# Patient Record
Sex: FEMALE | Race: WHITE | NOT HISPANIC OR LATINO | Employment: UNEMPLOYED | URBAN - METROPOLITAN AREA
[De-identification: names, ages, dates, MRNs, and addresses within clinical notes are randomized per-mention and may not be internally consistent; named-entity substitution may affect disease eponyms.]

---

## 2017-01-17 ENCOUNTER — OFFICE VISIT (OUTPATIENT)
Dept: PSYCHIATRY | Facility: CLINIC | Age: 47
End: 2017-01-17
Attending: CLINICAL NURSE SPECIALIST
Payer: COMMERCIAL

## 2017-01-17 VITALS
BODY MASS INDEX: 19.49 KG/M2 | WEIGHT: 110 LBS | SYSTOLIC BLOOD PRESSURE: 128 MMHG | HEART RATE: 66 BPM | DIASTOLIC BLOOD PRESSURE: 79 MMHG

## 2017-01-17 DIAGNOSIS — F32.A DEPRESSION, UNSPECIFIED DEPRESSION TYPE: ICD-10-CM

## 2017-01-17 DIAGNOSIS — F41.9 ANXIETY: Primary | ICD-10-CM

## 2017-01-17 PROCEDURE — 99212 OFFICE O/P EST SF 10 MIN: CPT | Mod: ZF

## 2017-01-17 RX ORDER — CLONAZEPAM 0.5 MG/1
0.25 TABLET ORAL 2 TIMES DAILY PRN
Qty: 20 TABLET | Refills: 1
Start: 2017-01-17 | End: 2017-05-16

## 2017-01-17 NOTE — PROGRESS NOTES
Outpatient Psychiatry Progress Note     Provider: SUNNY Ayala CNS  Date: 2017  Service:  Medication follow up with counseling.   Patient Identification: Valencia Ye  : 1970   MRN: [id number]    Valencia Ye is a 46 year old year old female who presents for ongoing psychiatric care.  Valencia Ye was last seen in clinic on 16.   At that time,   Assessment & Plan       Valencia Ye is seen today for follow up and reports increase in anxiety and would like to consider psychotropic medications. Reviewed previous medication trials and other medication options. She would like to try Fluoxetine 5 to 10mg. Will also reorder Klonopin which she had used sparingly before.    Diagnosis  Axis 1: Anxiety  Axis 2: none  Axis 3: See problem list in the medical record    Plan:  Medication: Start Fluoxetine 10mg taking one capsule every other day for 5 mg daily dose and increase to one capsule daily if tolerated. Klonopin at previous dose of 0.25mg bid prn.  OTC Recommendations: none  Lab Orders:  none  Referrals: none  Release of Information: none  Future Treatment Considerations:per symptoms    Return for Follow Up:3 months               2017   Today Valencia reports has jaw tightness with Fluoxetine. She tried lithium orotate and at 10mg thought she had some effect but it seemed to lose it's omph.  Tried LULU E at 50mg and this had some effect but didn't help that much. She didn't increase further because in the past at higher doses.  Then went back to Klonopin at /4 th 1/2 tablet  4 to 5 days per week and had a really good effect - able to sleep better and anxiety during the day greatly improved. With improvement in anxiety depression became more apparent. Doesn't take Klonopin on the weekend. Seemed to have a reaction when she drank Champagne of over intoxication and the hang over.   Thinks nausea might be due to the lithium and is thinking that she will try  stopping this and see if it gets better. If it does not resolve will consider restarting it and trying higher dose.  Stress is the same.  Side effects of medication include: see above  Psychiatric Review of Systems:  The patient endorses symptoms of depression: In the last 2 weeks several days appetite disturbance.  More than 1/2 days sleep disturbance.  Nearly everyday anhedonia, feeling depressed, fatigue, concentration problems.  She  patient endorses symptoms of anxiety : has some flashbacks of past things when she is anxious  She endorses symptoms of gagandeep including none.    She endorses symptoms of psychosis including no psychotic symptoms.       Review of Medical Systems:  Sleep: disrupted  Energy: decreased  Concentration: decreased  Appetite: decreased  GI Concerns: no new concerns  Cardiac concerns: no new concerns  Neurological concerns: no new concerns  Other medical concerns: no new concerns  Current Substance Use:  Alcohol:might have a glass or two of wine on days she doesn't take Klonopin.   Other drugs:none  Caffeine:no change  Nicotine: none  Past Medical History:   Past Medical History   Diagnosis Date     Churg-Antonina syndrome (H)      dx 1990     Goiter      Hypocalcemia      Steroid long-term use      Hypertension      Pneumonia      4/23/10     Asthma      associated with Churg Antonina syndrome     Sinusitis, chronic      Pericarditis      1990 and 1992     Recurrent UTI      Aortitis (H)      Varicose veins of leg with swelling      Hearing loss, conductive      Major depressive disorder      Eustachian tube dysfunction      Osteoporosis      Rheumatoid vasculitis (H)      Nonspecific abnormal results of thyroid function study      Irritable bowel syndrome      Mannose-binding lectin deficiency 2014     Patient Active Problem List   Diagnosis     Goiter     History of systemic steroid therapy     ILD (interstitial lung disease) (H)     Palpitations     Churg-Antonina syndrome (H)     History  of eating disorder     Bronchiectasis (H)     Asthma     Anxiety     Chronic fatigue     Mannose-binding lectin deficiency     Benign essential hypertension       Allergies:   Allergies   Allergen Reactions     Colistimethate Anaphylaxis     Colymycin M [Colistimethate Sodium] Anaphylaxis     Clindamycin Rash     Zithromax [Azithromycin]      Heart palpitation          Current Medications     Current Outpatient Prescriptions Ordered in [facility]   Medication Sig Dispense Refill     UNABLE TO FIND 3,600 mg daily MEDICATION NAME: monolaurin       UNABLE TO FIND daily MEDICATION NAME: lithium orotate       UNKNOWN MED DOSAGE NAC takes daily at unknown dose       clonazePAM (KLONOPIN) 0.5 MG tablet Take 0.5 tablets (0.25 mg) by mouth 2 times daily as needed for anxiety 20 tablet 1     atenolol (TENORMIN) 25 MG tablet 1/4 tab at bedtime 45 tablet 0     fluticasone (FLONASE) 50 MCG/ACT nasal spray Spray 2 sprays into both nostrils daily 48 g 3     predniSONE (DELTASONE) 1 MG tablet Take 5 mgs one day, alternating with 6 mgs the next. 200 tablet 5     UNABLE TO FIND Take 1 capsule by mouth daily MEDICATION NAME: resveratrol       ciprofloxacin-dexamethasone (CIPRODEX) otic suspension Instill 3 drops into the affected ear twice daily as needed. 7.5 mL 1     albuterol (2.5 MG/3ML) 0.083% nebulizer solution Take 1 vial (2.5 mg) by nebulization 2 times daily Use with Mucomyst 60 vial 11     budesonide-formoterol (SYMBICORT) 160-4.5 MCG/ACT inhaler Inhale 2 puffs into the lungs 2 times daily 1 Inhaler 11     Ipratropium-Albuterol (COMBIVENT RESPIMAT)  MCG/ACT inhaler Inhale 1 puff into the lungs 4 times daily Not to exceed 6 doses per day. 1 Inhaler 11     acetylcysteine (MUCOMYST) 20 % nebulizer solution Take 4 mLs by nebulization 2 times daily Use with albuterol solution. Discard open bottle of Mucomyst after 96 hours. 240 mL 11     UNABLE TO FIND Take 12.5 mg by mouth daily MEDICATION NAME: NADH with coQ10       UNABLE TO  "FIND Take 1 capsule by mouth daily lMEDICATION NAME: L-carnitine       UNABLE TO FIND Take 1 capsule by mouth daily MEDICATION NAME: phosphtydil choline       UNABLE TO FIND Take 1 tablet by mouth daily MEDICATION NAME: probiotics       conjugated estrogens (PREMARIN) vaginal cream Place  vaginally three times a week. Use 0.5 grams weekly as directed 42.5 g 0     cholecalciferol 5000 UNITS CAPS Take by mouth daily       sulfamethoxazole-trimethoprim (BACTRIM DS,SEPTRA DS) 800-160 MG per tablet Take 1 tablet by mouth postcoitally to prevent UTI's. 30 tablet 0     glucosamine-chondroitin (GLUCOSAMINE CHONDR COMPLEX) 500-400 MG CAPS Take 2 capsules by mouth daily.       phenazopyridine (PYRIDIUM) 200 MG tablet Take 200 mg by mouth 3 times daily as needed. For pain       Omega-3 Fatty Acids (FISH OIL) 1200 MG capsule Take 3 capsules by mouth daily.       No current Epic-ordered facility-administered medications on file.        Mental Status Exam     Appearance:  Casually dressed and Well groomed  Behavior/relationship to examiner/demeanor:  Cooperative  Orientation: Oriented to person, place, time and situation  Psychomotor: normal form  Speech Rate:  Normal  Speech Spontaneity:  Normal  Mood:  \"depressed\"  Affect:  Appropriate/mood-congruent  Thought Process (Associations):  Goal directed  Thought Content:  no overt psychosis, denies suicidal ideation, intent or thoughts and patient does not appear to be responding to internal stimuli  Abnormal Perception:  None  Attention/Concentration:  Normal  Language:  Intact  Insight:  Good  Judgment:  Good      Results     Vital signs: /79 mmHg  Pulse 66  Wt 49.896 kg (110 lb)  LMP 02/01/2009    Laboratory Data:   Reviewed recent data ordered by other providers    Assessment & Plan      Valencia Ye is seen today for follow up and reports she has been more depressed but anxiety is controlled with low dose Klonopin prn.  Has tried a number of supplements for " depression and continues on low dose Lithium orotate but has side effects or mood instability on other supplements and with previous medication trials. At this time would like to continue current supplements with slight increase in Klonopin. Has been in therapy in the past and is not interested in pursuing at this time.    Diagnosis  Axis 1: Anxiety, Depression, unspecified  Axis 2: none  Axis 3: See problem list in the medical record    Plan:  Medication: Continue Klonopin at 1/4 to 1/2 tablet 4 to 5 times a week  OTC Recommendations: none  Lab Orders:  none  Referrals: none  Release of Information: none  Future Treatment Considerations:per symptoms  Return for Follow Up: 6 months   The risks, benefits, alternatives and side effects have been discussed and are understood by the patient. The patient understands the risks of using street drugs or alcohol. There are no medical contraindications, the patient agrees to treatment, and has the capacity to do so. The patient understands to call 911 or come to the nearest ED if life threatening or urgent symptoms present.  Over 50% of this time was spent counseling the patient and/or coordinating care regarding review of social and occupational functioning.  In addition patient was counseled on health and wellness practices to augment medication treatment of symptoms. See note for details.    Marlyn Flores, APRN CNS 1/17/2017

## 2017-01-17 NOTE — TELEPHONE ENCOUNTER
Marlyn Flores APRN CNS     Sent: Tue January 17, 2017  2:15 PM       To: Sissy Wilkes RN              Message        Please call in [facility].       Thank you,       Marlyn

## 2017-01-17 NOTE — NURSING NOTE
Chief Complaint   Patient presents with     RECHECK     Anxiety     Reviewed allergies, smoking status, and pharmacy preference  Administered abuse screening questions   Obtained weight, blood pressure and heart rate   Mily Mclaughlin LPN

## 2017-01-26 ENCOUNTER — MYC MEDICAL ADVICE (OUTPATIENT)
Dept: INTERNAL MEDICINE | Facility: CLINIC | Age: 47
End: 2017-01-26

## 2017-01-26 DIAGNOSIS — R00.2 PALPITATIONS: Primary | ICD-10-CM

## 2017-01-27 ENCOUNTER — OFFICE VISIT (OUTPATIENT)
Dept: PULMONOLOGY | Facility: CLINIC | Age: 47
End: 2017-01-27
Attending: INTERNAL MEDICINE

## 2017-01-27 VITALS
SYSTOLIC BLOOD PRESSURE: 114 MMHG | DIASTOLIC BLOOD PRESSURE: 79 MMHG | RESPIRATION RATE: 16 BRPM | HEART RATE: 71 BPM | OXYGEN SATURATION: 98 %

## 2017-01-27 DIAGNOSIS — M30.1 CHURG-STRAUSS SYNDROME (H): ICD-10-CM

## 2017-01-27 DIAGNOSIS — J84.9 ILD (INTERSTITIAL LUNG DISEASE) (H): Primary | ICD-10-CM

## 2017-01-27 DIAGNOSIS — D72.18 CHURG-STRAUSS SYNDROME (H): ICD-10-CM

## 2017-01-27 DIAGNOSIS — J47.9 BRONCHIECTASIS WITHOUT COMPLICATION (H): Primary | ICD-10-CM

## 2017-01-27 LAB
DLCOUNC-%PRED-PRE: 96 %
DLCOUNC-PRE: 21.76 ML/MIN/MMHG
DLCOUNC-PRED: 22.56 ML/MIN/MMHG
ERV-%PRED-PRE: 124 %
ERV-PRE: 1.47 L
ERV-PRED: 1.18 L
EXPTIME-PRE: 7.36 SEC
FEF2575-%PRED-PRE: 52 %
FEF2575-PRE: 1.41 L/SEC
FEF2575-PRED: 2.71 L/SEC
FEFMAX-%PRED-PRE: 90 %
FEFMAX-PRE: 5.95 L/SEC
FEFMAX-PRED: 6.59 L/SEC
FEV1-%PRED-PRE: 105 %
FEV1-PRE: 2.67 L
FEV1FEV6-PRE: 66 %
FEV1FEV6-PRED: 83 %
FEV1FVC-PRE: 66 %
FEV1FVC-PRED: 83 %
FEV1SVC-PRE: 73 %
FEV1SVC-PRED: 76 %
FIFMAX-PRE: 4.56 L/SEC
FRCPLETH-%PRED-PRE: 140 %
FRCPLETH-PRE: 3.6 L
FRCPLETH-PRED: 2.57 L
FVC-%PRED-PRE: 131 %
FVC-PRE: 4.04 L
FVC-PRED: 3.08 L
IC-%PRED-PRE: 101 %
IC-PRE: 2.2 L
IC-PRED: 2.17 L
RVPLETH-%PRED-PRE: 136 %
RVPLETH-PRE: 2.13 L
RVPLETH-PRED: 1.56 L
TLCPLETH-%PRED-PRE: 125 %
TLCPLETH-PRE: 5.8 L
TLCPLETH-PRED: 4.61 L
VA-%PRED-PRE: 100 %
VA-PRE: 4.75 L
VC-%PRED-PRE: 109 %
VC-PRE: 3.66 L
VC-PRED: 3.35 L

## 2017-01-27 RX ORDER — ALBUTEROL SULFATE 0.83 MG/ML
1 SOLUTION RESPIRATORY (INHALATION) 2 TIMES DAILY
Qty: 60 VIAL | Refills: 11 | Status: SHIPPED | OUTPATIENT
Start: 2017-01-27 | End: 2018-03-15

## 2017-01-27 RX ORDER — BUDESONIDE AND FORMOTEROL FUMARATE DIHYDRATE 160; 4.5 UG/1; UG/1
2 AEROSOL RESPIRATORY (INHALATION) 2 TIMES DAILY
Qty: 1 INHALER | Refills: 11 | Status: SHIPPED | OUTPATIENT
Start: 2017-01-27 | End: 2018-03-12

## 2017-01-27 RX ORDER — ACETYLCYSTEINE 200 MG/ML
4 SOLUTION ORAL; RESPIRATORY (INHALATION) 2 TIMES DAILY
Qty: 240 ML | Refills: 11 | Status: SHIPPED | OUTPATIENT
Start: 2017-01-27 | End: 2017-02-24

## 2017-01-27 ASSESSMENT — PAIN SCALES - GENERAL: PAINLEVEL: NO PAIN (0)

## 2017-01-27 NOTE — MR AVS SNAPSHOT
After Visit Summary   1/27/2017    Valencia Ye    MRN: [id number]           Patient Information     Date Of Birth          1970        Visit Information        Provider Department      1/27/2017 4:30 PM Dagoberto Briscoe MD [facility]        Today's Diagnoses     Bronchiectasis without complication (H)    -  1     Churg-Antonina syndrome (H)           Care Instructions    Continue current medication.          Follow-ups after your visit        Follow-up notes from your care team     Return in about 1 year (around 1/27/2018).      Your next 10 appointments already scheduled     Jan 26, 2018  7:30 AM   CF LOOP with  PFL CF   [facility] Pulmonary Function Testing ([facility])    [address] [place] [id number] [phone]            Jan 26, 2018  8:10 AM   (Arrive by 7:55 AM)   RETURN CYSTIC FIBROSIS VISIT with Dagoberto Briscoe MD   [facility] ([facility])    [address] [place] [id number] [phone]              Future tests that were ordered for you today     Open Future Orders        Priority Expected Expires Ordered    RESPIRATORY FLOW VOLUME LOOP Routine 1/27/2018 2/27/2018 1/27/2017            Who to contact     If you have questions or need follow up information about today's clinic visit or your schedule please contact [facility] directly at [phone].  Normal or non-critical lab and imaging results will be communicated to you by MyChart, letter or phone within 4 business days after the clinic has received the results. If you do not hear from us within 7 days, please contact the clinic through MyChart or phone. If you have a critical or abnormal lab result, we will notify you by phone as soon as possible.  Submit refill requests through [employer] or call your  pharmacy and they will forward the refill request to us. Please allow 3 business days for your refill to be completed.          Additional Information About Your Visit        [employer] Information     [employer] gives you secure access to your electronic health record. If you see a primary care provider, you can also send messages to your care team and make appointments. If you have questions, please call your primary care clinic.  If you do not have a primary care provider, please call [phone] and they will assist you.        Care EveryWhere ID     This is your Care EveryWhere ID. This could be used by other organizations to access your [facility] medical records  [id number]        Your Vitals Were     Pulse Respirations Pulse Oximetry Last Period          71 16 98% 02/01/2009         Blood Pressure from Last 3 Encounters:   01/27/17 114/79   01/17/17 128/79   12/07/16 120/80    Weight from Last 3 Encounters:   01/17/17 49.896 kg (110 lb)   12/07/16 50.395 kg (111 lb 1.6 oz)   11/10/16 49.669 kg (109 lb 8 oz)                 Where to get your medicines      These medications were sent to [id number] - [address] AT [facility] [place] [address]  [address] [facility] [address], [facility] [address]     Phone:  [phone]    - acetylcysteine 20 % injection  - albuterol (2.5 MG/3ML) 0.083% neb solution  - budesonide-formoterol 160-4.5 MCG/ACT Inhaler  - Ipratropium-Albuterol  MCG/ACT inhaler       Primary Care Provider Office Phone # Fax #    Morelia Simental [phone]       [facility] [address]  [facility] [address]        Thank you!     Thank you for choosing [facility]  for your care. Our goal is always to provide you with excellent care. Hearing back from our patients is one way we can continue to improve our services. Please take a few minutes to complete the written survey that you may receive in the mail after  your visit with us. Thank you!             Your Updated Medication List - Protect others around you: Learn how to safely use, store and throw away your medicines at www.disposemymeds.org.          This list is accurate as of: 1/27/17  5:44 PM.  Always use your most recent med list.                   Brand Name Dispense Instructions for use    acetylcysteine 20 % injection    MUCOMYST    240 mL    Take 4 mLs by nebulization 2 times daily Use with albuterol solution. Discard open bottle of Mucomyst after 96 hours.       albuterol (2.5 MG/3ML) 0.083% neb solution     60 vial    Take 1 vial (2.5 mg) by nebulization 2 times daily Use with Mucomyst       atenolol 25 MG tablet    TENORMIN    45 tablet    1/4 tab at bedtime       budesonide-formoterol 160-4.5 MCG/ACT Inhaler    SYMBICORT    1 Inhaler    Inhale 2 puffs into the lungs 2 times daily       cholecalciferol 5000 UNITS Caps      Take by mouth daily       ciprofloxacin-dexamethasone otic suspension    CIPRODEX    7.5 mL    Instill 3 drops into the affected ear twice daily as needed.       clonazePAM 0.5 MG tablet    klonoPIN    20 tablet    Take 0.5 tablets (0.25 mg) by mouth 2 times daily as needed for anxiety       conjugated estrogens cream    PREMARIN    42.5 g    Place  vaginally three times a week. Use 0.5 grams weekly as directed       Fish Oil 1200 MG Caps      Take 3 capsules by mouth daily.       fluticasone 50 MCG/ACT spray    FLONASE    48 g    Spray 2 sprays into both nostrils daily       GLUCOSAMINE CHONDR COMPLEX 500-400 MG Caps per capsule   Generic drug:  glucosamine-chondroitin      Take 2 capsules by mouth daily.       Ipratropium-Albuterol  MCG/ACT inhaler    COMBIVENT RESPIMAT    1 Inhaler    Inhale 1 puff into the lungs 4 times daily Not to exceed 6 doses per day.       phenazopyridine 200 MG tablet    PYRIDIUM     Take 200 mg by mouth 3 times daily as needed. For pain       predniSONE 1 MG tablet    DELTASONE    200 tablet    Take 5 mgs  one day, alternating with 6 mgs the next.       * UNABLE TO FIND      Take 12.5 mg by mouth daily MEDICATION NAME: NADH with coQ10       * UNABLE TO FIND      Take 1 capsule by mouth daily lMEDICATION NAME: L-carnitine       * UNABLE TO FIND      Take 1 capsule by mouth daily MEDICATION NAME: phosphtydil choline       * UNABLE TO FIND      Take 1 tablet by mouth daily MEDICATION NAME: probiotics       * UNABLE TO FIND      3,600 mg daily MEDICATION NAME: monolaurin       * UNABLE TO FIND      daily MEDICATION NAME: lithium orotate       * UNABLE TO FIND      Take 1 capsule by mouth daily MEDICATION NAME: resveratrol       UNKNOWN MED DOSAGE      NAC takes daily at unknown dose       * Notice:  This list has 7 medication(s) that are the same as other medications prescribed for you. Read the directions carefully, and ask your doctor or other care provider to review them with you.

## 2017-01-27 NOTE — PROGRESS NOTES
Reason for Visit  Valencia Ye is a 46 year old female who is being seen for Bronchiectasis.    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  She has been doing well since her last visit with no exacerbations. She does not appear to be actively infected at this time.  She did discontinue her aztreonam nebs without any change in her symptoms.  I do think it is reasonable to stay off of this medication.  She will continue her 1-2 times daily airway clearance with albuterol and Mucomyst and twice daily inhaled steroids.  She has received her flu vaccine. I have encouraged her to increase her exercise. PFTs are increased from her last appointment and within her baseline range. I do not think she is having an exacerbation at this time.  I have reminded her to call my office if she has any progression in her symptoms.   I will see the patient in followup in 1 year, and if she is not feeling well she will schedule a follow-up in 6 months.    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 This is her first visit to the pulmonary clinic in about 1 year.    The patient has had some fatigue in the past year, but has had no lung infections since she began vesting regularly. She did not tolerate inhaled antibiotics and so she is not using them at this time. She notes her exercise tolerance has decreased, however she thinks this could be due to aging rather than her bronchiectasis. She exercises intermittently and usually does Pilates or aerobic dance, however the patient notes she stays very busy and does not have much time for exercise these days. She does not cough and has no lung congestion. The patient will occasionally  expectorate clear/white, occasionally yellow sputum. Occasionally she has chest muscle spasms which usually last for a few minutes. They are self-limiting. No fever, chills or sweats.     Vest therapies for 20 minutes qAM during the week, BID for 30 minutes on the weekends.       REVIEW OF SYSTEMS:     ENT: No sinus or ear pain, rhinorrhea or sore throat.  She has occasional palpitations, though with no associated symptoms, longstanding and unchanged.    She has chronic myalgias and arthralgias and easy bruising, all unchanged.   She has no nausea, vomiting, diarrhea or abdominal pain.     A complete ROS was otherwise negative except as noted in the HPI.    Current Outpatient Prescriptions   Medication     UNABLE TO FIND     UNABLE TO FIND     UNKNOWN MED DOSAGE     atenolol (TENORMIN) 25 MG tablet     fluticasone (FLONASE) 50 MCG/ACT nasal spray     UNABLE TO FIND     ciprofloxacin-dexamethasone (CIPRODEX) otic suspension     albuterol (2.5 MG/3ML) 0.083% nebulizer solution     budesonide-formoterol (SYMBICORT) 160-4.5 MCG/ACT inhaler     acetylcysteine (MUCOMYST) 20 % nebulizer solution     UNABLE TO FIND     UNABLE TO FIND     cholecalciferol 5000 UNITS CAPS     glucosamine-chondroitin (GLUCOSAMINE CHONDR COMPLEX) 500-400 MG CAPS     Omega-3 Fatty Acids (FISH OIL) 1200 MG capsule     clonazePAM (KLONOPIN) 0.5 MG tablet     predniSONE (DELTASONE) 1 MG tablet     Ipratropium-Albuterol (COMBIVENT RESPIMAT)  MCG/ACT inhaler     UNABLE TO FIND     UNABLE TO FIND     conjugated estrogens (PREMARIN) vaginal cream     phenazopyridine (PYRIDIUM) 200 MG tablet     No current facility-administered medications for this visit.     Allergies   Allergen Reactions     Colistimethate Anaphylaxis     Colymycin M [Colistimethate Sodium] Anaphylaxis     Clindamycin Rash     Zithromax [Azithromycin]      Heart palpitation     Past Medical History   Diagnosis Date     Churg-Antonina syndrome (H)      dx 1990     Goiter       Hypocalcemia      Steroid long-term use      Hypertension      Pneumonia      4/23/10     Asthma      associated with Churg Antonina syndrome     Sinusitis, chronic      Pericarditis       and      Recurrent UTI      Aortitis (H)      Varicose veins of leg with swelling      Hearing loss, conductive      Major depressive disorder      Eustachian tube dysfunction      Osteoporosis      Rheumatoid vasculitis (H)      Nonspecific abnormal results of thyroid function study      Irritable bowel syndrome      Mannose-binding lectin deficiency        Past Surgical History   Procedure Laterality Date      section       Hysterectomy  2009     without oopherectomy     Hc colp cervix/upper vagina w loop elec bx cervix       Sinus surgery       Ent surgery       Tubal ligation       Picc insertion  10/10/2013     5fr DL PASV PICC, 43cm (1cm external) in the L basilic vein w/ tip in the low SVC.     Colonoscopy Left 2014     Procedure: COMBINED COLONOSCOPY, SINGLE OR MULTIPLE BIOPSY/POLYPECTOMY BY BIOPSY;  Surgeon: Js Pinedo MD;  Location: [facility]     Hysterectomy, pap no longer indicated       cervix removed        Social History     Social History     Marital Status: Single     Spouse Name: N/A     Number of Children: N/A     Years of Education: N/A     Occupational History     Not on file.     Social History Main Topics     Smoking status: Never Smoker      Smokeless tobacco: Never Used     Alcohol Use: 0.0 oz/week     0 Standard drinks or equivalent per week      Comment: 12 drinks/week     Drug Use: No     Sexual Activity:     Partners: Male     Other Topics Concern      Service No     Blood Transfusions No     Caffeine Concern No     Occupational Exposure No     Hobby Hazards No     Sleep Concern No     Stress Concern Yes     Weight Concern No     Special Diet Yes     IBS diet     Back Care No     Exercise Not Asked     1 mile power walk 5 days a week at least.       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  .  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  Non smoker. No smokers at home.  Enjoys walking and dancing.  Alcohol--1 to 2 drinks daily.  Few times a year has more than 4 drinks in a day.  No illicits.                       /79 mmHg  Pulse 71  Resp 16  SpO2 98%  LMP 02/01/2009  Exam:   GENERAL APPEARANCE: Well developed, well nourished, alert, and in no apparent distress.  EYES: PERRL, EOMI  HENT: Nasal mucosa with edema and no hyperemia. No nasal polyps.  EARS: Canals clear, TMs scarred with bilat perforation  MOUTH: Oral mucosa is moist, without any lesions, no tonsillar enlargement, no oropharyngeal exudate.  NECK: supple, no masses, no thyromegaly.  LYMPHATICS: No significant axillary, cervical, or supraclavicular nodes.  RESP: normal percussion, good air flow throughout.  No crackles. No rhonchi. No wheezes.  CV: Occasional ectopic beats. Normal S1, S2, regular rhythm, normal rate. No murmur.  No rub. No gallop. No LE edema.   ABDOMEN:  Bowel sounds normal, soft, nontender, no HSM or masses.   MS: extremities normal. No clubbing. No cyanosis.  SKIN: no rash on limited exam  NEURO: Mentation intact, speech normal, normal strength and tone, normal gait and stance  PSYCH: mentation appears normal. and affect normal/bright  Results:  Recent Results (from the past 168 hour(s))   General PFT Lab (Please always keep checked)    Collection Time: 01/27/17  2:44 PM   Result Value Ref Range    FVC-Pred 3.08 L    FVC-Pre 4.04 L    FVC-%Pred-Pre 131 %    FEV1-Pre 2.67 L    FEV1-%Pred-Pre 105 %    FEV1FVC-Pred 83 %    FEV1FVC-Pre 66 %    FEFMax-Pred 6.59 L/sec    FEFMax-Pre 5.95 L/sec    FEFMax-%Pred-Pre 90 %    FEF2575-Pred 2.71 L/sec    FEF2575-Pre 1.41 L/sec    CVW8672-%Pred-Pre 52 %    ExpTime-Pre 7.36 sec    FIFMax-Pre 4.56 L/sec    VC-Pred 3.35 L    VC-Pre 3.66 L    VC-%Pred-Pre 109 %    IC-Pred  2.17 L    IC-Pre 2.20 L    IC-%Pred-Pre 101 %    ERV-Pred 1.18 L    ERV-Pre 1.47 L    ERV-%Pred-Pre 124 %    FEV1FEV6-Pred 83 %    FEV1FEV6-Pre 66 %    FRCPleth-Pred 2.57 L    FRCPleth-Pre 3.60 L    FRCPleth-%Pred-Pre 140 %    RVPleth-Pred 1.56 L    RVPleth-Pre 2.13 L    RVPleth-%Pred-Pre 136 %    TLCPleth-Pred 4.61 L    TLCPleth-Pre 5.80 L    TLCPleth-%Pred-Pre 125 %    DLCOunc-Pred 22.56 ml/min/mmHg    DLCOunc-Pre 21.76 ml/min/mmHg    DLCOunc-%Pred-Pre 96 %    VA-Pre 4.75 L    VA-%Pred-Pre 100 %    FEV1SVC-Pred 76 %    FEV1SVC-Pre 73 %                             Results as noted above.    PFT Interpretation:  Very mild obstructive ventilatory defect.  Increased from previous.  Similar to recent best.  Valid Maneuver                          Scribe Disclosure:   I, Lisa Graham, am serving as a scribe; to document services personally performed by DAGOBERTO PIERRE MD based on data collection and the provider's statements to me.     Provider Disclosure:  I agree with above History, Review of Systems, Physical exam and Plan.  I have reviewed the content of the documentation and have edited it as needed. I have personally performed the services documented here and the documentation accurately represents those services and the decisions I have made.      Electronically signed by:  Dagoberto Pierre

## 2017-01-27 NOTE — Clinical Note
1/27/2017       RE: Valencia Ye  [address] [place] [id number]     Dear Colleague,    Thank you for referring your patient, Valencia Ye, to the [facility] at [facility]. Please see a copy of my visit note below.    Reason for Visit  Valencia Ye is a 46 year old female who is being seen for Bronchiectasis.    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  She has been doing well since her last visit with no exacerbations. She does not appear to be actively infected at this time.  She did discontinue her aztreonam nebs without any change in her symptoms.  I do think it is reasonable to stay off of this medication.  She will continue her 1-2 times daily airway clearance with albuterol and Mucomyst and twice daily inhaled steroids.  She has received her flu vaccine. I have encouraged her to increase her exercise. PFTs are increased from her last appointment and within her baseline range. I do not think she is having an exacerbation at this time.  I have reminded her to call my office if she has any progression in her symptoms.   I will see the patient in followup in 1 year, and if she is not feeling well she will schedule a follow-up in 6 months.    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 This is her first visit to the pulmonary clinic in about 1 year.    The patient has had some fatigue in the past year, but has had no lung infections since she began vesting regularly. She did not tolerate inhaled antibiotics and so she is not using them at this time. She notes her exercise tolerance has decreased, however she  thinks this could be due to aging rather than her bronchiectasis. She exercises intermittently and usually does Pilates or aerobic dance, however the patient notes she stays very busy and does not have much time for exercise these days. She does not cough and has no lung congestion. The patient will occasionally expectorate clear/white, occasionally yellow sputum. Occasionally she has chest muscle spasms which usually last for a few minutes. They are self-limiting. No fever, chills or sweats.     Vest therapies for 20 minutes qAM during the week, BID for 30 minutes on the weekends.       REVIEW OF SYSTEMS:     ENT: No sinus or ear pain, rhinorrhea or sore throat.  She has occasional palpitations, though with no associated symptoms, longstanding and unchanged.    She has chronic myalgias and arthralgias and easy bruising, all unchanged.   She has no nausea, vomiting, diarrhea or abdominal pain.     A complete ROS was otherwise negative except as noted in the HPI.    Current Outpatient Prescriptions   Medication     UNABLE TO FIND     UNABLE TO FIND     UNKNOWN MED DOSAGE     atenolol (TENORMIN) 25 MG tablet     fluticasone (FLONASE) 50 MCG/ACT nasal spray     UNABLE TO FIND     ciprofloxacin-dexamethasone (CIPRODEX) otic suspension     albuterol (2.5 MG/3ML) 0.083% nebulizer solution     budesonide-formoterol (SYMBICORT) 160-4.5 MCG/ACT inhaler     acetylcysteine (MUCOMYST) 20 % nebulizer solution     UNABLE TO FIND     UNABLE TO FIND     cholecalciferol 5000 UNITS CAPS     glucosamine-chondroitin (GLUCOSAMINE CHONDR COMPLEX) 500-400 MG CAPS     Omega-3 Fatty Acids (FISH OIL) 1200 MG capsule     clonazePAM (KLONOPIN) 0.5 MG tablet     predniSONE (DELTASONE) 1 MG tablet     Ipratropium-Albuterol (COMBIVENT RESPIMAT)  MCG/ACT inhaler     UNABLE TO FIND     UNABLE TO FIND     conjugated estrogens (PREMARIN) vaginal cream     phenazopyridine (PYRIDIUM) 200 MG tablet     No current facility-administered medications  for this visit.     Allergies   Allergen Reactions     Colistimethate Anaphylaxis     Colymycin M [Colistimethate Sodium] Anaphylaxis     Clindamycin Rash     Zithromax [Azithromycin]      Heart palpitation     Past Medical History   Diagnosis Date     Churg-Antonina syndrome (H)      dx      Goiter      Hypocalcemia      Steroid long-term use      Hypertension      Pneumonia      4/23/10     Asthma      associated with Churg Antonina syndrome     Sinusitis, chronic      Pericarditis       and      Recurrent UTI      Aortitis (H)      Varicose veins of leg with swelling      Hearing loss, conductive      Major depressive disorder      Eustachian tube dysfunction      Osteoporosis      Rheumatoid vasculitis (H)      Nonspecific abnormal results of thyroid function study      Irritable bowel syndrome      Mannose-binding lectin deficiency        Past Surgical History   Procedure Laterality Date      section       Hysterectomy  2009     without oopherectomy     Hc colp cervix/upper vagina w loop elec bx cervix       Sinus surgery       Ent surgery       Tubal ligation       Picc insertion  10/10/2013     5fr DL PASV PICC, 43cm (1cm external) in the L basilic vein w/ tip in the low SVC.     Colonoscopy Left 2014     Procedure: COMBINED COLONOSCOPY, SINGLE OR MULTIPLE BIOPSY/POLYPECTOMY BY BIOPSY;  Surgeon: Js Pinedo MD;  Location: [facility]     Hysterectomy, pap no longer indicated       cervix removed        Social History     Social History     Marital Status: Single     Spouse Name: N/A     Number of Children: N/A     Years of Education: N/A     Occupational History     Not on file.     Social History Main Topics     Smoking status: Never Smoker      Smokeless tobacco: Never Used     Alcohol Use: 0.0 oz/week     0 Standard drinks or equivalent per week      Comment: 12 drinks/week     Drug Use: No     Sexual Activity:     Partners: Male     Other Topics Concern       Service No     Blood Transfusions No     Caffeine Concern No     Occupational Exposure No     Hobby Hazards No     Sleep Concern No     Stress Concern Yes     Weight Concern No     Special Diet Yes     IBS diet     Back Care No     Exercise Not Asked     1 mile power walk 5 days a week at least.      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  .  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  Non smoker. No smokers at home.  Enjoys walking and dancing.  Alcohol--1 to 2 drinks daily.  Few times a year has more than 4 drinks in a day.  No illicits.                       /79 mmHg  Pulse 71  Resp 16  SpO2 98%  LMP 02/01/2009  Exam:   GENERAL APPEARANCE: Well developed, well nourished, alert, and in no apparent distress.  EYES: PERRL, EOMI  HENT: Nasal mucosa with edema and no hyperemia. No nasal polyps.  EARS: Canals clear, TMs scarred with bilat perforation  MOUTH: Oral mucosa is moist, without any lesions, no tonsillar enlargement, no oropharyngeal exudate.  NECK: supple, no masses, no thyromegaly.  LYMPHATICS: No significant axillary, cervical, or supraclavicular nodes.  RESP: normal percussion, good air flow throughout.  No crackles. No rhonchi. No wheezes.  CV: Occasional ectopic beats. Normal S1, S2, regular rhythm, normal rate. No murmur.  No rub. No gallop. No LE edema.   ABDOMEN:  Bowel sounds normal, soft, nontender, no HSM or masses.   MS: extremities normal. No clubbing. No cyanosis.  SKIN: no rash on limited exam  NEURO: Mentation intact, speech normal, normal strength and tone, normal gait and stance  PSYCH: mentation appears normal. and affect normal/bright  Results:  Recent Results (from the past 168 hour(s))   General PFT Lab (Please always keep checked)    Collection Time: 01/27/17  2:44 PM   Result Value Ref Range    FVC-Pred 3.08 L    FVC-Pre 4.04 L    FVC-%Pred-Pre 131 %    FEV1-Pre 2.67 L    FEV1-%Pred-Pre 105 %     FEV1FVC-Pred 83 %    FEV1FVC-Pre 66 %    FEFMax-Pred 6.59 L/sec    FEFMax-Pre 5.95 L/sec    FEFMax-%Pred-Pre 90 %    FEF2575-Pred 2.71 L/sec    FEF2575-Pre 1.41 L/sec    OSM1027-%Pred-Pre 52 %    ExpTime-Pre 7.36 sec    FIFMax-Pre 4.56 L/sec    VC-Pred 3.35 L    VC-Pre 3.66 L    VC-%Pred-Pre 109 %    IC-Pred 2.17 L    IC-Pre 2.20 L    IC-%Pred-Pre 101 %    ERV-Pred 1.18 L    ERV-Pre 1.47 L    ERV-%Pred-Pre 124 %    FEV1FEV6-Pred 83 %    FEV1FEV6-Pre 66 %    FRCPleth-Pred 2.57 L    FRCPleth-Pre 3.60 L    FRCPleth-%Pred-Pre 140 %    RVPleth-Pred 1.56 L    RVPleth-Pre 2.13 L    RVPleth-%Pred-Pre 136 %    TLCPleth-Pred 4.61 L    TLCPleth-Pre 5.80 L    TLCPleth-%Pred-Pre 125 %    DLCOunc-Pred 22.56 ml/min/mmHg    DLCOunc-Pre 21.76 ml/min/mmHg    DLCOunc-%Pred-Pre 96 %    VA-Pre 4.75 L    VA-%Pred-Pre 100 %    FEV1SVC-Pred 76 %    FEV1SVC-Pre 73 %                             Results as noted above.    PFT Interpretation:  {PFT INTER:646450}  {Increase/decrease:645412}  {UMP PFT BELOW SIMILAR:486910}  Valid Maneuver                          Scribe Disclosure:   I, Lisa Graham, am serving as a scribe; to document services personally performed by DAGOBERTO PIERRE MD based on data collection and the provider's statements to me.     Provider Disclosure:  I agree with above History, Review of Systems, Physical exam and Plan.  I have reviewed the content of the documentation and have edited it as needed. I have personally performed the services documented here and the documentation accurately represents those services and the decisions I have made.      Electronically signed by:  ***      Again, thank you for allowing me to participate in the care of your patient.      Sincerely,    Dagoberto Pierre MD

## 2017-01-27 NOTE — LETTER
1/27/2017       RE: Valencia Ye  [address] [place] [id number]     Dear Colleague,    Thank you for referring your patient, Valencia Ye, to the [facility] at [facility]. Please see a copy of my visit note below.    Reason for Visit  Valencia Ye is a 46 year old female who is being seen for Bronchiectasis.    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  She has been doing well since her last visit with no exacerbations. She does not appear to be actively infected at this time.  She did discontinue her aztreonam nebs without any change in her symptoms.  I do think it is reasonable to stay off of this medication.  She will continue her 1-2 times daily airway clearance with albuterol and Mucomyst and twice daily inhaled steroids.  She has received her flu vaccine. I have encouraged her to increase her exercise. PFTs are increased from her last appointment and within her baseline range. I do not think she is having an exacerbation at this time.  I have reminded her to call my office if she has any progression in her symptoms.   I will see the patient in followup in 1 year, and if she is not feeling well she will schedule a follow-up in 6 months.    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 This is her first visit to the pulmonary clinic in about 1 year.    The patient has had some fatigue in the past year, but has had no lung infections since she began vesting regularly. She did not tolerate inhaled antibiotics and so she is not using them at this time. She notes her exercise tolerance has decreased, however she  thinks this could be due to aging rather than her bronchiectasis. She exercises intermittently and usually does Pilates or aerobic dance, however the patient notes she stays very busy and does not have much time for exercise these days. She does not cough and has no lung congestion. The patient will occasionally expectorate clear/white, occasionally yellow sputum. Occasionally she has chest muscle spasms which usually last for a few minutes. They are self-limiting. No fever, chills or sweats.     Vest therapies for 20 minutes qAM during the week, BID for 30 minutes on the weekends.       REVIEW OF SYSTEMS:     ENT: No sinus or ear pain, rhinorrhea or sore throat.  She has occasional palpitations, though with no associated symptoms, longstanding and unchanged.    She has chronic myalgias and arthralgias and easy bruising, all unchanged.   She has no nausea, vomiting, diarrhea or abdominal pain.     A complete ROS was otherwise negative except as noted in the HPI.    Current Outpatient Prescriptions   Medication     UNABLE TO FIND     UNABLE TO FIND     UNKNOWN MED DOSAGE     atenolol (TENORMIN) 25 MG tablet     fluticasone (FLONASE) 50 MCG/ACT nasal spray     UNABLE TO FIND     ciprofloxacin-dexamethasone (CIPRODEX) otic suspension     albuterol (2.5 MG/3ML) 0.083% nebulizer solution     budesonide-formoterol (SYMBICORT) 160-4.5 MCG/ACT inhaler     acetylcysteine (MUCOMYST) 20 % nebulizer solution     UNABLE TO FIND     UNABLE TO FIND     cholecalciferol 5000 UNITS CAPS     glucosamine-chondroitin (GLUCOSAMINE CHONDR COMPLEX) 500-400 MG CAPS     Omega-3 Fatty Acids (FISH OIL) 1200 MG capsule     clonazePAM (KLONOPIN) 0.5 MG tablet     predniSONE (DELTASONE) 1 MG tablet     Ipratropium-Albuterol (COMBIVENT RESPIMAT)  MCG/ACT inhaler     UNABLE TO FIND     UNABLE TO FIND     conjugated estrogens (PREMARIN) vaginal cream     phenazopyridine (PYRIDIUM) 200 MG tablet     No current facility-administered medications  for this visit.     Allergies   Allergen Reactions     Colistimethate Anaphylaxis     Colymycin M [Colistimethate Sodium] Anaphylaxis     Clindamycin Rash     Zithromax [Azithromycin]      Heart palpitation     Past Medical History   Diagnosis Date     Churg-Antonina syndrome (H)      dx      Goiter      Hypocalcemia      Steroid long-term use      Hypertension      Pneumonia      4/23/10     Asthma      associated with Churg Antonina syndrome     Sinusitis, chronic      Pericarditis       and      Recurrent UTI      Aortitis (H)      Varicose veins of leg with swelling      Hearing loss, conductive      Major depressive disorder      Eustachian tube dysfunction      Osteoporosis      Rheumatoid vasculitis (H)      Nonspecific abnormal results of thyroid function study      Irritable bowel syndrome      Mannose-binding lectin deficiency        Past Surgical History   Procedure Laterality Date      section       Hysterectomy  2009     without oopherectomy     Hc colp cervix/upper vagina w loop elec bx cervix       Sinus surgery       Ent surgery       Tubal ligation       Picc insertion  10/10/2013     5fr DL PASV PICC, 43cm (1cm external) in the L basilic vein w/ tip in the low SVC.     Colonoscopy Left 2014     Procedure: COMBINED COLONOSCOPY, SINGLE OR MULTIPLE BIOPSY/POLYPECTOMY BY BIOPSY;  Surgeon: Js Pinedo MD;  Location: [facility]     Hysterectomy, pap no longer indicated       cervix removed        Social History     Social History     Marital Status: Single     Spouse Name: N/A     Number of Children: N/A     Years of Education: N/A     Occupational History     Not on file.     Social History Main Topics     Smoking status: Never Smoker      Smokeless tobacco: Never Used     Alcohol Use: 0.0 oz/week     0 Standard drinks or equivalent per week      Comment: 12 drinks/week     Drug Use: No     Sexual Activity:     Partners: Male     Other Topics Concern       Service No     Blood Transfusions No     Caffeine Concern No     Occupational Exposure No     Hobby Hazards No     Sleep Concern No     Stress Concern Yes     Weight Concern No     Special Diet Yes     IBS diet     Back Care No     Exercise Not Asked     1 mile power walk 5 days a week at least.      Seat Belt Yes     Self-Exams Yes     Social History Narrative    [employer] in Radiology Department.  .  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  Non smoker. No smokers at home.  Enjoys walking and dancing.  Alcohol--1 to 2 drinks daily.  Few times a year has more than 4 drinks in a day.  No illicits.                       /79 mmHg  Pulse 71  Resp 16  SpO2 98%  LMP 02/01/2009  Exam:   GENERAL APPEARANCE: Well developed, well nourished, alert, and in no apparent distress.  EYES: PERRL, EOMI  HENT: Nasal mucosa with edema and no hyperemia. No nasal polyps.  EARS: Canals clear, TMs scarred with bilat perforation  MOUTH: Oral mucosa is moist, without any lesions, no tonsillar enlargement, no oropharyngeal exudate.  NECK: supple, no masses, no thyromegaly.  LYMPHATICS: No significant axillary, cervical, or supraclavicular nodes.  RESP: normal percussion, good air flow throughout.  No crackles. No rhonchi. No wheezes.  CV: Occasional ectopic beats. Normal S1, S2, regular rhythm, normal rate. No murmur.  No rub. No gallop. No LE edema.   ABDOMEN:  Bowel sounds normal, soft, nontender, no HSM or masses.   MS: extremities normal. No clubbing. No cyanosis.  SKIN: no rash on limited exam  NEURO: Mentation intact, speech normal, normal strength and tone, normal gait and stance  PSYCH: mentation appears normal. and affect normal/bright  Results:  Recent Results (from the past 168 hour(s))   General PFT Lab (Please always keep checked)    Collection Time: 01/27/17  2:44 PM   Result Value Ref Range    FVC-Pred 3.08 L    FVC-Pre 4.04 L    FVC-%Pred-Pre 131 %    FEV1-Pre 2.67 L    FEV1-%Pred-Pre 105 %     FEV1FVC-Pred 83 %    FEV1FVC-Pre 66 %    FEFMax-Pred 6.59 L/sec    FEFMax-Pre 5.95 L/sec    FEFMax-%Pred-Pre 90 %    FEF2575-Pred 2.71 L/sec    FEF2575-Pre 1.41 L/sec    XGQ3465-%Pred-Pre 52 %    ExpTime-Pre 7.36 sec    FIFMax-Pre 4.56 L/sec    VC-Pred 3.35 L    VC-Pre 3.66 L    VC-%Pred-Pre 109 %    IC-Pred 2.17 L    IC-Pre 2.20 L    IC-%Pred-Pre 101 %    ERV-Pred 1.18 L    ERV-Pre 1.47 L    ERV-%Pred-Pre 124 %    FEV1FEV6-Pred 83 %    FEV1FEV6-Pre 66 %    FRCPleth-Pred 2.57 L    FRCPleth-Pre 3.60 L    FRCPleth-%Pred-Pre 140 %    RVPleth-Pred 1.56 L    RVPleth-Pre 2.13 L    RVPleth-%Pred-Pre 136 %    TLCPleth-Pred 4.61 L    TLCPleth-Pre 5.80 L    TLCPleth-%Pred-Pre 125 %    DLCOunc-Pred 22.56 ml/min/mmHg    DLCOunc-Pre 21.76 ml/min/mmHg    DLCOunc-%Pred-Pre 96 %    VA-Pre 4.75 L    VA-%Pred-Pre 100 %    FEV1SVC-Pred 76 %    FEV1SVC-Pre 73 %                             Results as noted above.    PFT Interpretation:  Very mild obstructive ventilatory defect.  Increased from previous.  Similar to recent best.  Valid Maneuver                          Scribe Disclosure:   I, Lisa Graham, am serving as a scribe; to document services personally performed by DAGOBERTO PIERRE MD based on data collection and the provider's statements to me.     Provider Disclosure:  I agree with above History, Review of Systems, Physical exam and Plan.  I have reviewed the content of the documentation and have edited it as needed. I have personally performed the services documented here and the documentation accurately represents those services and the decisions I have made.      Electronically signed by:  Dagoberto Pierre       Again, thank you for allowing me to participate in the care of your patient.      Sincerely,    Dagoberto Pierre MD

## 2017-01-29 PROBLEM — F32.A DEPRESSION, UNSPECIFIED DEPRESSION TYPE: Status: ACTIVE | Noted: 2017-01-29

## 2017-01-29 RX ORDER — ATENOLOL 25 MG/1
TABLET ORAL
Qty: 45 TABLET | Refills: 1 | Status: SHIPPED | OUTPATIENT
Start: 2017-01-29 | End: 2017-05-19

## 2017-02-24 DIAGNOSIS — J47.9 BRONCHIECTASIS WITHOUT COMPLICATION (H): ICD-10-CM

## 2017-02-24 RX ORDER — ACETYLCYSTEINE 200 MG/ML
4 SOLUTION ORAL; RESPIRATORY (INHALATION) 2 TIMES DAILY
Qty: 240 ML | Refills: 11 | Status: SHIPPED | OUTPATIENT
Start: 2017-02-24 | End: 2018-02-22

## 2017-03-08 ENCOUNTER — TELEPHONE (OUTPATIENT)
Dept: INTERNAL MEDICINE | Facility: CLINIC | Age: 47
End: 2017-03-08

## 2017-03-08 NOTE — TELEPHONE ENCOUNTER
Pt states she finished Tamiflu yesterday but now her gums are really raw and sore. Hurts to eat. Denies having any white patches. Does not think this is thrush. She is immunocompromised.   Advised to take zyrtec or claritin and go to . She agrees.

## 2017-03-10 ENCOUNTER — APPOINTMENT (OUTPATIENT)
Dept: GENERAL RADIOLOGY | Facility: CLINIC | Age: 47
End: 2017-03-10
Attending: EMERGENCY MEDICINE
Payer: COMMERCIAL

## 2017-03-10 ENCOUNTER — HOSPITAL ENCOUNTER (EMERGENCY)
Facility: CLINIC | Age: 47
Discharge: HOME | End: 2017-03-10
Attending: EMERGENCY MEDICINE | Admitting: EMERGENCY MEDICINE
Payer: COMMERCIAL

## 2017-03-10 VITALS
HEIGHT: 63 IN | OXYGEN SATURATION: 97 % | SYSTOLIC BLOOD PRESSURE: 98 MMHG | HEART RATE: 87 BPM | WEIGHT: 102 LBS | RESPIRATION RATE: 16 BRPM | BODY MASS INDEX: 18.07 KG/M2 | DIASTOLIC BLOOD PRESSURE: 73 MMHG | TEMPERATURE: 97.9 F

## 2017-03-10 DIAGNOSIS — M79.10 MYALGIA: ICD-10-CM

## 2017-03-10 DIAGNOSIS — R50.9 FEVER, UNSPECIFIED: ICD-10-CM

## 2017-03-10 LAB
ALBUMIN SERPL-MCNC: 3.7 G/DL (ref 3.4–5)
ALBUMIN UR-MCNC: NEGATIVE MG/DL
ALP SERPL-CCNC: 48 U/L (ref 40–150)
ALT SERPL W P-5'-P-CCNC: 18 U/L (ref 0–50)
ANION GAP SERPL CALCULATED.3IONS-SCNC: 9 MMOL/L (ref 3–14)
APPEARANCE UR: CLEAR
AST SERPL W P-5'-P-CCNC: 17 U/L (ref 0–45)
BASOPHILS # BLD AUTO: 0 10E9/L (ref 0–0.2)
BASOPHILS NFR BLD AUTO: 0.1 %
BILIRUB SERPL-MCNC: 0.4 MG/DL (ref 0.2–1.3)
BILIRUB UR QL STRIP: NEGATIVE
BUN SERPL-MCNC: 14 MG/DL (ref 7–30)
CALCIUM SERPL-MCNC: 9 MG/DL (ref 8.5–10.1)
CHLORIDE SERPL-SCNC: 104 MMOL/L (ref 94–109)
CO2 SERPL-SCNC: 23 MMOL/L (ref 20–32)
COLOR UR AUTO: ABNORMAL
CREAT SERPL-MCNC: 0.77 MG/DL (ref 0.52–1.04)
DIFFERENTIAL METHOD BLD: ABNORMAL
EOSINOPHIL # BLD AUTO: 0 10E9/L (ref 0–0.7)
EOSINOPHIL NFR BLD AUTO: 0.2 %
ERYTHROCYTE [DISTWIDTH] IN BLOOD BY AUTOMATED COUNT: 12 % (ref 10–15)
FLUAV+FLUBV AG SPEC QL: NEGATIVE
FLUAV+FLUBV AG SPEC QL: NORMAL
GFR SERPL CREATININE-BSD FRML MDRD: 82 ML/MIN/1.7M2
GLUCOSE SERPL-MCNC: 84 MG/DL (ref 70–99)
GLUCOSE UR STRIP-MCNC: NEGATIVE MG/DL
HCT VFR BLD AUTO: 39.6 % (ref 35–47)
HGB BLD-MCNC: 13.6 G/DL (ref 11.7–15.7)
HGB UR QL STRIP: NEGATIVE
IMM GRANULOCYTES # BLD: 0.1 10E9/L (ref 0–0.4)
IMM GRANULOCYTES NFR BLD: 0.3 %
KETONES UR STRIP-MCNC: NEGATIVE MG/DL
LACTATE BLD-SCNC: 2.2 MMOL/L (ref 0.7–2.1)
LACTATE BLD-SCNC: 2.3 MMOL/L (ref 0.7–2.1)
LEUKOCYTE ESTERASE UR QL STRIP: NEGATIVE
LIPASE SERPL-CCNC: 149 U/L (ref 73–393)
LYMPHOCYTES # BLD AUTO: 2.2 10E9/L (ref 0.8–5.3)
LYMPHOCYTES NFR BLD AUTO: 10.6 %
MCH RBC QN AUTO: 33.7 PG (ref 26.5–33)
MCHC RBC AUTO-ENTMCNC: 34.3 G/DL (ref 31.5–36.5)
MCV RBC AUTO: 98 FL (ref 78–100)
MONOCYTES # BLD AUTO: 0.2 10E9/L (ref 0–1.3)
MONOCYTES NFR BLD AUTO: 0.7 %
MUCOUS THREADS #/AREA URNS LPF: PRESENT /LPF
NEUTROPHILS # BLD AUTO: 18.3 10E9/L (ref 1.6–8.3)
NEUTROPHILS NFR BLD AUTO: 88.1 %
NITRATE UR QL: NEGATIVE
NRBC # BLD AUTO: 0.1 10*3/UL
NRBC BLD AUTO-RTO: 0 /100
PH UR STRIP: 7 PH (ref 5–7)
PLATELET # BLD AUTO: 292 10E9/L (ref 150–450)
POTASSIUM SERPL-SCNC: 4.2 MMOL/L (ref 3.4–5.3)
PROCALCITONIN SERPL-MCNC: NORMAL NG/ML
PROT SERPL-MCNC: 7.2 G/DL (ref 6.8–8.8)
RBC # BLD AUTO: 4.04 10E12/L (ref 3.8–5.2)
RBC #/AREA URNS AUTO: 1 /HPF (ref 0–2)
SODIUM SERPL-SCNC: 136 MMOL/L (ref 133–144)
SP GR UR STRIP: 1 (ref 1–1.03)
SPECIMEN SOURCE: NORMAL
SQUAMOUS #/AREA URNS AUTO: <1 /HPF (ref 0–1)
URN SPEC COLLECT METH UR: ABNORMAL
UROBILINOGEN UR STRIP-MCNC: NORMAL MG/DL (ref 0–2)
WBC # BLD AUTO: 20.8 10E9/L (ref 4–11)
WBC #/AREA URNS AUTO: <1 /HPF (ref 0–2)

## 2017-03-10 PROCEDURE — 87086 URINE CULTURE/COLONY COUNT: CPT | Performed by: EMERGENCY MEDICINE

## 2017-03-10 PROCEDURE — 99284 EMERGENCY DEPT VISIT MOD MDM: CPT | Mod: Z6 | Performed by: EMERGENCY MEDICINE

## 2017-03-10 PROCEDURE — 81001 URINALYSIS AUTO W/SCOPE: CPT | Performed by: EMERGENCY MEDICINE

## 2017-03-10 PROCEDURE — 85025 COMPLETE CBC W/AUTO DIFF WBC: CPT | Performed by: EMERGENCY MEDICINE

## 2017-03-10 PROCEDURE — 25000128 H RX IP 250 OP 636: Performed by: EMERGENCY MEDICINE

## 2017-03-10 PROCEDURE — 87040 BLOOD CULTURE FOR BACTERIA: CPT | Performed by: EMERGENCY MEDICINE

## 2017-03-10 PROCEDURE — 96374 THER/PROPH/DIAG INJ IV PUSH: CPT | Performed by: EMERGENCY MEDICINE

## 2017-03-10 PROCEDURE — 25000132 ZZH RX MED GY IP 250 OP 250 PS 637: Performed by: EMERGENCY MEDICINE

## 2017-03-10 PROCEDURE — 71020 XR CHEST 2 VW: CPT

## 2017-03-10 PROCEDURE — 83690 ASSAY OF LIPASE: CPT | Performed by: EMERGENCY MEDICINE

## 2017-03-10 PROCEDURE — 83605 ASSAY OF LACTIC ACID: CPT | Performed by: EMERGENCY MEDICINE

## 2017-03-10 PROCEDURE — 84145 PROCALCITONIN (PCT): CPT | Performed by: EMERGENCY MEDICINE

## 2017-03-10 PROCEDURE — 99284 EMERGENCY DEPT VISIT MOD MDM: CPT | Mod: 25 | Performed by: EMERGENCY MEDICINE

## 2017-03-10 PROCEDURE — 87804 INFLUENZA ASSAY W/OPTIC: CPT | Performed by: EMERGENCY MEDICINE

## 2017-03-10 PROCEDURE — 96361 HYDRATE IV INFUSION ADD-ON: CPT

## 2017-03-10 PROCEDURE — 80053 COMPREHEN METABOLIC PANEL: CPT | Performed by: EMERGENCY MEDICINE

## 2017-03-10 RX ORDER — ACETAMINOPHEN 325 MG/1
650 TABLET ORAL ONCE
Status: COMPLETED | OUTPATIENT
Start: 2017-03-10 | End: 2017-03-10

## 2017-03-10 RX ORDER — KETOROLAC TROMETHAMINE 30 MG/ML
15 INJECTION, SOLUTION INTRAMUSCULAR; INTRAVENOUS ONCE
Status: COMPLETED | OUTPATIENT
Start: 2017-03-10 | End: 2017-03-10

## 2017-03-10 RX ADMIN — SODIUM CHLORIDE 1000 ML: 9 INJECTION, SOLUTION INTRAVENOUS at 18:36

## 2017-03-10 RX ADMIN — SODIUM CHLORIDE 1000 ML: 9 INJECTION, SOLUTION INTRAVENOUS at 21:00

## 2017-03-10 RX ADMIN — ACETAMINOPHEN 650 MG: 325 TABLET, FILM COATED ORAL at 18:36

## 2017-03-10 RX ADMIN — KETOROLAC TROMETHAMINE 15 MG: 30 INJECTION, SOLUTION INTRAMUSCULAR at 18:36

## 2017-03-10 ASSESSMENT — ENCOUNTER SYMPTOMS
DIARRHEA: 0
AGITATION: 0
FATIGUE: 1
BACK PAIN: 0
SHORTNESS OF BREATH: 0
FEVER: 1
ABDOMINAL PAIN: 0
NECK PAIN: 0
POLYDIPSIA: 0
DIFFICULTY URINATING: 0
ARTHRALGIAS: 0
COUGH: 1
LIGHT-HEADEDNESS: 0
ADENOPATHY: 0
NAUSEA: 0
HEADACHES: 0
CHILLS: 1
EYE PAIN: 0
COLOR CHANGE: 0
NECK STIFFNESS: 0
BRUISES/BLEEDS EASILY: 0
VOMITING: 0
MYALGIAS: 1
SORE THROAT: 0
EYE REDNESS: 0
PHOTOPHOBIA: 0

## 2017-03-10 NOTE — ED AVS SNAPSHOT
[facility] [place], Emergency Department    [address] [place] [id number]    Phone:  [phone]                                       Valencia Ye   MRN: [id number]    Department:  [facility], Emergency Department   Date of Visit:  3/10/2017           After Visit Summary Signature Page     I have received my discharge instructions, and my questions have been answered. I have discussed any challenges I see with this plan with the nurse or doctor.    ..........................................................................................................................................  Patient/Patient Representative Signature      ..........................................................................................................................................  Patient Representative Print Name and Relationship to Patient    ..................................................               ................................................  Date                                            Time    ..........................................................................................................................................  Reviewed by Signature/Title    ...................................................              ..............................................  Date                                                            Time

## 2017-03-10 NOTE — ED AVS SNAPSHOT
[facility], Emergency Department    [address] [facility] [address]    Phone:  [phone]                                       Valencia Ye   MRN: [id number]    Department:  [facility], Emergency Department   Date of Visit:  3/10/2017           Patient Information     Date Of Birth          1970        Your diagnoses for this visit were:     Fever, unspecified     Myalgia        You were seen by Radames Silveira MD.        Discharge Instructions       Please make an appointment to follow up with Your Primary Care Provider in 3 days unless symptoms completely resolve. If your symptoms worsen please come back to the emergency department for re-evaluation.    Febrile Illness, Uncertain Cause (Adult)  You have a fever, but the cause is not certain. A fever is a natural reaction of the body to an illness such as infection due to a virus or bacteria. In most cases, the temperature itself is not harmful. It actually helps the body fight infections. A fever does not need to be treated unless you feel very uncomfortable.  Sometimes a fever can be an early sign of a more serious infection, so make sure to follow up if your condition worsens.  Home care  Unless given other instructions by your healthcare provider, follow these guidelines when caring for yourself at home.  General care    If your symptoms are severe, rest at home for the first 2 to 3 days. When you resume activity, don't let yourself get too tired.    Do not smoke. Also avoid being exposed to secondhand smoke.    Your appetite may be poor, so a light diet is fine. Avoid dehydration by drinking 6 to 8 glasses of fluids per day (such as water, soft drinks, sports drinks, juices, tea, or soup). Extra fluids will help loosen secretions in the nose and lungs.  Medicines    You can take acetaminophen or ibuprofen for pain, unless you were given a different fever-reducing/pain medicine to use. (Note: If you have chronic liver or kidney  disease or have ever had a stomach ulcer or gastrointestinal bleeding, talk with your healthcare provider before using these medicines. Also talk to your provider if you are taking medicine to prevent blood clots.) Aspirin should never be given to anyone younger than 18 years of age who is ill with a viral infection or fever. It may cause severe liver or brain damage.    If you were given antibiotics, take them until they are used up, or your healthcare provider tells you to stop. It is important to finish the antibiotics even though you feel better. This is to make sure the infection has cleared. Be aware that antibiotics are not usually given for a fever with an unknown cause.    Over-the-counter medicines will not shorten the duration of the illness. However, they may be helpful for the following symptoms: cough, sore throat, or nasal and sinus congestion. Ask your pharmacist for product suggestions. (Note: Do not use decongestants if you have high blood pressure.)  Follow-up care  Follow up with your healthcare provider, or as advised.    If a culture was done, you will be notified if your treatment needs to be changed. You can call as directed for the results.    If X-rays, a CT, or an ultrasound were done, a specialist will review them. You will be notified of any findings that may affect your care.  Call 911  Contact emergency services right away if any of these occur:    Trouble breathing or swallowing, or wheezing    Chest pain    Confusion    Extreme drowsiness or trouble awakening    Fainting or loss of consciousness    Rapid heart rate    Low blood pressure    Vomiting blood, or large amounts of blood in stool    Seizure  When to seek medical advice  Call your healthcare provider right away if any of these occur:    Cough with lots of colored sputum (mucus) or blood in your sputum    Severe headache    Face, neck, throat, or ear pain    Feeling drowsy    Abdominal pain    Repeated vomiting or  diarrhea    Joint pain or a new rash    Burning when urinating    Fever of 100.4 F (38 C) or higher, that does not get better after taking fever-reducing medicine    Feeling weak or dizzy    [id number] [employer]. [address] [place] [address], [place] [address]. All rights reserved. This information is not intended as a substitute for professional medical care. Always follow your healthcare professional's instructions.            Future Appointments        Provider Department Dept Phone Center    3/28/2017 10:15 AM Ron Myers MD [facility] AT [place] [phone] [facility] CLIN    1/26/2018 7:30 AM Pulmonary Function Lab [facility] Pulmonary Function Testing [phone] [facility]    1/26/2018 8:10 AM Dagoberto Briscoe MD [facility] for [facility] [phone] [facility]      24 Hour Appointment Hotline       To make an appointment at any [facility], call [phone] ([phone]). If you don't have a family doctor or clinic, we will help you find one. [facility] clinics are conveniently located to serve the needs of you and your family.             Review of your medicines      Our records show that you are taking the medicines listed below. If these are incorrect, please call your family doctor or clinic.        Dose / Directions Last dose taken    acetylcysteine 20 % nebulizer solution   Commonly known as:  MUCOMYST   Dose:  4 mL   Quantity:  240 mL        Take 4 mLs by nebulization 2 times daily Use with albuterol solution. Discard open bottle of Mucomyst after 96 hours.   Refills:  11        albuterol (2.5 MG/3ML) 0.083% neb solution   Dose:  1 vial   Quantity:  60 vial        Take 1 vial (2.5 mg) by nebulization 2 times daily Use with Mucomyst   Refills:  11        atenolol 25 MG tablet   Commonly known as:  TENORMIN   Quantity:  45 tablet        1/4 tab at bedtime   Refills:  1        budesonide-formoterol 160-4.5 MCG/ACT Inhaler   Commonly known  as:  SYMBICORT   Dose:  2 puff   Quantity:  1 Inhaler        Inhale 2 puffs into the lungs 2 times daily   Refills:  11        cholecalciferol 5000 UNITS Caps        Take by mouth daily   Refills:  0        ciprofloxacin-dexamethasone otic suspension   Commonly known as:  CIPRODEX   Quantity:  7.5 mL        Instill 3 drops into the affected ear twice daily as needed.   Refills:  1        clonazePAM 0.5 MG tablet   Commonly known as:  klonoPIN   Dose:  0.25 mg   Quantity:  20 tablet        Take 0.5 tablets (0.25 mg) by mouth 2 times daily as needed for anxiety   Refills:  1        conjugated estrogens cream   Commonly known as:  PREMARIN   Quantity:  42.5 g        Place  vaginally three times a week. Use 0.5 grams weekly as directed   Refills:  0        Fish Oil 1200 MG Caps   Dose:  3 capsule        Take 3 capsules by mouth daily.   Refills:  0        fluticasone 50 MCG/ACT spray   Commonly known as:  FLONASE   Dose:  2 spray   Quantity:  48 g        Spray 2 sprays into both nostrils daily   Refills:  3        GLUCOSAMINE CHONDR COMPLEX 500-400 MG Caps per capsule   Dose:  2 capsule   Generic drug:  glucosamine-chondroitin        Take 2 capsules by mouth daily.   Refills:  0        Ipratropium-Albuterol  MCG/ACT inhaler   Commonly known as:  COMBIVENT RESPIMAT   Dose:  1 puff   Quantity:  1 Inhaler        Inhale 1 puff into the lungs 4 times daily Not to exceed 6 doses per day.   Refills:  11        phenazopyridine 200 MG tablet   Commonly known as:  PYRIDIUM   Dose:  200 mg        Take 200 mg by mouth 3 times daily as needed. For pain   Refills:  0        predniSONE 1 MG tablet   Commonly known as:  DELTASONE   Quantity:  200 tablet        Take 5 mgs one day, alternating with 6 mgs the next.   Refills:  5        * UNABLE TO FIND   Dose:  12.5 mg        Take 12.5 mg by mouth daily MEDICATION NAME: NADH with coQ10   Refills:  0        * UNABLE TO FIND   Dose:  1 capsule        Take 1 capsule by mouth daily  lMEDICATION NAME: L-carnitine   Refills:  0        * UNABLE TO FIND   Dose:  1 capsule        Take 1 capsule by mouth daily MEDICATION NAME: phosphtydil choline   Refills:  0        * UNABLE TO FIND   Dose:  1 tablet        Take 1 tablet by mouth daily MEDICATION NAME: probiotics   Refills:  0        * UNABLE TO FIND   Dose:  3600 mg        3,600 mg daily MEDICATION NAME: monolaurin   Refills:  0        * UNABLE TO FIND   Indication:  5-10 mg        daily MEDICATION NAME: lithium orotate   Refills:  0        * UNABLE TO FIND   Dose:  1 capsule        Take 1 capsule by mouth daily MEDICATION NAME: resveratrol   Refills:  0        UNKNOWN MED DOSAGE        NAC takes daily at unknown dose   Refills:  0        * Notice:  This list has 7 medication(s) that are the same as other medications prescribed for you. Read the directions carefully, and ask your doctor or other care provider to review them with you.            Procedures and tests performed during your visit     Procedure/Test Number of Times Performed    Blood culture 3    CBC with platelets differential 1    Comprehensive metabolic panel 1    Influenza A/B antigen 1    Lactic acid whole blood 2    Lipase 1    Peripheral IV catheter 1    Procalcitonin 1    UA with Microscopic 1    Urine Culture 1    XR Chest 2 Views 1      Orders Needing Specimen Collection     None      Pending Results     Date and Time Order Name Status Description    3/10/2017 2103 Urine Culture In process     3/10/2017 1902 Blood culture In process     3/10/2017 1838 Blood culture In process             Pending Culture Results     Date and Time Order Name Status Description    3/10/2017 2103 Urine Culture In process     3/10/2017 1902 Blood culture In process     3/10/2017 1838 Blood culture In process             Thank you for choosing [facility]       Thank you for choosing [facility] for your care. Our goal is always to provide you with excellent care. Hearing back from our patients is one way  we can continue to improve our services. Please take a few minutes to complete the written survey that you may receive in the mail after you visit with us. Thank you!        [employer] Information     [employer] gives you secure access to your electronic health record. If you see a primary care provider, you can also send messages to your care team and make appointments. If you have questions, please call your primary care clinic.  If you do not have a primary care provider, please call [phone] and they will assist you.        Care EveryWhere ID     This is your Care EveryWhere ID. This could be used by other organizations to access your [facility] medical records  [id number]        After Visit Summary       This is your record. Keep this with you and show to your community pharmacist(s) and doctor(s) at your next visit.

## 2017-03-10 NOTE — ED NOTES
Alert orientated ambulatory patient presents to ER triage with c/o:    1.) Generalized Body Aches    Hx:  Patient given Tamiflu last week for influenza.  Patient reports a return of symptoms this week.      Airway WDLs  Breathing WDLs  Circulation HR > 100

## 2017-03-11 LAB
BACTERIA SPEC CULT: NO GROWTH
Lab: NORMAL
MICRO REPORT STATUS: NORMAL
SPECIMEN SOURCE: NORMAL

## 2017-03-11 NOTE — ED PROVIDER NOTES
History     Chief Complaint   Patient presents with     Generalized Body Aches     HPI  Valencia Ye is a 46 year old female with a complex medical history including hypertension, pericarditis, Churg-Antonina syndrome, Mannose-binding lectin deficiency who presents to the Emergency Department with generalized body aches. She states that 7 days ago (Friday) she had shakes, chills, and sweats and was started on Tamiflu by her immunologist. She states that while taking this she developed pain in her gums.     Patient states that she completed this Tuesday. The patient states that today she had generalized body aches and shaking chills. She states that she has had a cough minimally productive of yellow sputum. Patient states that this cough was productive of grey sputum and she did vest treatments for this. Patient has had congestion which has been improving. The patient reports loss of appetite. She reports ear fullness and history of tubes in her ears. The patient did not measure her temperature at home. No nausea, vomiting, diarrhea.     She states that she is followed by Dr. Butts at [facility]ology here. Patient is a non-smoker. She is chronically on alternating doses of 5mg and 6mg of prednisone.    I have reviewed the Medications, Allergies, Past Medical and Surgical History, and Social History in the Epic system and with the patient.    Review of Systems   Constitutional: Positive for chills, fatigue and fever ( subjective).   HENT: Positive for congestion. Negative for sore throat.    Eyes: Negative for photophobia, pain, redness and visual disturbance.   Respiratory: Positive for cough. Negative for shortness of breath.    Cardiovascular: Negative for chest pain.   Gastrointestinal: Negative for abdominal pain, diarrhea, nausea and vomiting.   Endocrine: Negative for polydipsia and polyuria.   Genitourinary: Negative for difficulty urinating.   Musculoskeletal: Positive for  "myalgias. Negative for arthralgias, back pain, neck pain and neck stiffness.   Skin: Negative for color change.   Allergic/Immunologic: Positive for immunocompromised state.   Neurological: Negative for light-headedness and headaches.   Hematological: Negative for adenopathy. Does not bruise/bleed easily.   Psychiatric/Behavioral: Negative for agitation and behavioral problems.       Physical Exam   BP: 124/75  Heart Rate: 118  Temp: 97.9  F (36.6  C)  Resp: 18  Height: 160 cm (5' 3\")  Weight: 46.3 kg (102 lb)  SpO2: 98 %  Physical Exam   Constitutional: She is oriented to person, place, and time. She appears well-developed and well-nourished. No distress.   HENT:   Head: Normocephalic and atraumatic.   Right Ear: External ear normal.   Left Ear: External ear normal.   Nose: Nose normal.   Mouth/Throat: Oropharynx is clear and moist. No oropharyngeal exudate.   Eyes: Conjunctivae and EOM are normal. Pupils are equal, round, and reactive to light. No scleral icterus.   Neck: Normal range of motion and full passive range of motion without pain. Neck supple. No spinous process tenderness and no muscular tenderness present. No rigidity. No edema, no erythema and normal range of motion present.   There is no meningismus   Cardiovascular: Normal heart sounds and intact distal pulses.    tachycardia   Pulmonary/Chest: Breath sounds normal. No respiratory distress.   Abdominal: Soft. Bowel sounds are normal. She exhibits no distension. There is no tenderness. There is no rebound and no guarding.   Musculoskeletal: Normal range of motion. She exhibits no edema or tenderness.   Lymphadenopathy:     She has no cervical adenopathy.   Neurological: She is alert and oriented to person, place, and time. No cranial nerve deficit. She exhibits normal muscle tone. Coordination normal.   Skin: Skin is warm. No rash noted. She is not diaphoretic. No erythema.   Psychiatric: She has a normal mood and affect. Her behavior is normal. " Judgment and thought content normal.   Nursing note and vitals reviewed.      ED Course     ED Course     Procedures             Critical Care time:  none               Labs Ordered and Resulted from Time of ED Arrival Up to the Time of Departure from the ED   CBC WITH PLATELETS DIFFERENTIAL - Abnormal; Notable for the following:        Result Value    WBC 20.8 (*)     MCH 33.7 (*)     Absolute Neutrophil 18.3 (*)     All other components within normal limits   LACTIC ACID WHOLE BLOOD - Abnormal; Notable for the following:     Lactic Acid 2.3 (*)     All other components within normal limits   ROUTINE UA WITH MICROSCOPIC - Abnormal; Notable for the following:     Mucous Urine Present (*)     All other components within normal limits   LACTIC ACID WHOLE BLOOD - Abnormal; Notable for the following:     Lactic Acid 2.2 (*)     All other components within normal limits   COMPREHENSIVE METABOLIC PANEL   LIPASE   PROCALCITONIN   PERIPHERAL IV CATHETER   BLOOD CULTURE   INFLUENZA A/B ANTIGEN   URINE CULTURE AEROBIC BACTERIAL   BLOOD CULTURE   BLOOD CULTURE       Assessments & Plan (with Medical Decision Making)   This is a 46 year old female with a recent influenza diagnosis and completion of Tamiflu course presents with body aches, fatigue, subjective fevers and chills. Differential diagnosis: influenza, pneumonia, dehydration, electrolyte abnormalities, unlikely acute pancreatitis or hepatitis.    After thorough history and physical examination, the patient appears to be in no acute distress.  I rechecked her temperature in the room during the physical examination and at this point she does have a fever with a temperature of 100.5 degrees Farenheit. I will give her oral Tylenol, hydrate her with a bolus of IV saline, treat her with intravenous Toradol as well. I will obtain laboratory studies and chest x-ray for further diagnostic evaluation. She and her family agree with the plan.     Patient s laboratory studies  returned with a leukocytosis of 20,800. There is no anemia hemoglobin is normal at 13.6. Electrolytes show no evidence of dehydration creatinine is normal at 0.77. LFTs and lipase are normal. Procalcitonin is undetectable. Influenza testing is negative. Lactic acid was initially elevated at 2.3 and after a 2L IV bolus it slightly decreased to 2.2. Urinalysis shows no evidence of infection. I reviewed patient s chest x-ray and I read the radiology report; there is no evidence of pneumonia. Patient s symptoms have improved after emergency department stay and management. I offered her admission to the observation unit given her fever and leukocytosis, however, she declined stating that she preferred to go home and follow with her primary care physician and immunologist. Her family agrees with this plan as well. She does agree to return to the emergency department if her symptoms worsen or she develops any focal signs of infection. At this point she ll be discharged.     I have reviewed the nursing notes.  I have reviewed the findings, diagnosis, plan and need for follow up with the patient.    Discharge Medication List as of 3/10/2017 10:48 PM          Final diagnoses:   Fever, unspecified   Myalgia     ISaul, am serving as a trained medical scribe to document services personally performed by Radames Silveira MD, based on the provider's statements to me.      Radames CLEMENTE MD, was physically present and have reviewed and verified the accuracy of this note documented by Saul Huntley.    3/10/2017   [facility], EMERGENCY DEPARTMENT     Radames Silveira MD  03/11/17 0056

## 2017-03-11 NOTE — DISCHARGE INSTRUCTIONS
Please make an appointment to follow up with Your Primary Care Provider in 3 days unless symptoms completely resolve. If your symptoms worsen please come back to the emergency department for re-evaluation.    Febrile Illness, Uncertain Cause (Adult)  You have a fever, but the cause is not certain. A fever is a natural reaction of the body to an illness such as infection due to a virus or bacteria. In most cases, the temperature itself is not harmful. It actually helps the body fight infections. A fever does not need to be treated unless you feel very uncomfortable.  Sometimes a fever can be an early sign of a more serious infection, so make sure to follow up if your condition worsens.  Home care  Unless given other instructions by your healthcare provider, follow these guidelines when caring for yourself at home.  General care    If your symptoms are severe, rest at home for the first 2 to 3 days. When you resume activity, don't let yourself get too tired.    Do not smoke. Also avoid being exposed to secondhand smoke.    Your appetite may be poor, so a light diet is fine. Avoid dehydration by drinking 6 to 8 glasses of fluids per day (such as water, soft drinks, sports drinks, juices, tea, or soup). Extra fluids will help loosen secretions in the nose and lungs.  Medicines    You can take acetaminophen or ibuprofen for pain, unless you were given a different fever-reducing/pain medicine to use. (Note: If you have chronic liver or kidney disease or have ever had a stomach ulcer or gastrointestinal bleeding, talk with your healthcare provider before using these medicines. Also talk to your provider if you are taking medicine to prevent blood clots.) Aspirin should never be given to anyone younger than 18 years of age who is ill with a viral infection or fever. It may cause severe liver or brain damage.    If you were given antibiotics, take them until they are used up, or your healthcare provider tells you to stop. It  is important to finish the antibiotics even though you feel better. This is to make sure the infection has cleared. Be aware that antibiotics are not usually given for a fever with an unknown cause.    Over-the-counter medicines will not shorten the duration of the illness. However, they may be helpful for the following symptoms: cough, sore throat, or nasal and sinus congestion. Ask your pharmacist for product suggestions. (Note: Do not use decongestants if you have high blood pressure.)  Follow-up care  Follow up with your healthcare provider, or as advised.    If a culture was done, you will be notified if your treatment needs to be changed. You can call as directed for the results.    If X-rays, a CT, or an ultrasound were done, a specialist will review them. You will be notified of any findings that may affect your care.  Call 911  Contact emergency services right away if any of these occur:    Trouble breathing or swallowing, or wheezing    Chest pain    Confusion    Extreme drowsiness or trouble awakening    Fainting or loss of consciousness    Rapid heart rate    Low blood pressure    Vomiting blood, or large amounts of blood in stool    Seizure  When to seek medical advice  Call your healthcare provider right away if any of these occur:    Cough with lots of colored sputum (mucus) or blood in your sputum    Severe headache    Face, neck, throat, or ear pain    Feeling drowsy    Abdominal pain    Repeated vomiting or diarrhea    Joint pain or a new rash    Burning when urinating    Fever of 100.4 F (38 C) or higher, that does not get better after taking fever-reducing medicine    Feeling weak or dizzy    0112-2622 [employer]. [address] [place] [address], [place] [address]. All rights reserved. This information is not intended as a substitute for professional medical care. Always follow your healthcare professional's instructions.

## 2017-03-16 LAB
BACTERIA SPEC CULT: NO GROWTH
BACTERIA SPEC CULT: NO GROWTH
MICRO REPORT STATUS: NORMAL
MICRO REPORT STATUS: NORMAL
SPECIMEN SOURCE: NORMAL
SPECIMEN SOURCE: NORMAL

## 2017-03-17 ENCOUNTER — OFFICE VISIT (OUTPATIENT)
Dept: INTERNAL MEDICINE | Facility: CLINIC | Age: 47
End: 2017-03-17
Payer: COMMERCIAL

## 2017-03-17 VITALS
DIASTOLIC BLOOD PRESSURE: 70 MMHG | OXYGEN SATURATION: 99 % | HEIGHT: 63 IN | BODY MASS INDEX: 19.4 KG/M2 | SYSTOLIC BLOOD PRESSURE: 110 MMHG | TEMPERATURE: 97.7 F | WEIGHT: 109.5 LBS | HEART RATE: 85 BPM

## 2017-03-17 DIAGNOSIS — J18.9 PNEUMONIA DUE TO INFECTIOUS ORGANISM, UNSPECIFIED LATERALITY, UNSPECIFIED PART OF LUNG: Primary | ICD-10-CM

## 2017-03-17 DIAGNOSIS — B37.31 YEAST INFECTION OF THE VAGINA: ICD-10-CM

## 2017-03-17 PROCEDURE — 99214 OFFICE O/P EST MOD 30 MIN: CPT | Performed by: INTERNAL MEDICINE

## 2017-03-17 RX ORDER — FLUCONAZOLE 150 MG/1
150 TABLET ORAL ONCE
Qty: 1 TABLET | Refills: 1 | Status: SHIPPED | OUTPATIENT
Start: 2017-03-17 | End: 2017-03-17

## 2017-03-17 NOTE — MR AVS SNAPSHOT
After Visit Summary   3/17/2017    Valencia Ye    MRN: [id number]           Patient Information     Date Of Birth          1970        Visit Information        Provider Department      3/17/2017 1:20 PM Morelia Simental MD [facility]        Today's Diagnoses     Pneumonia due to infectious organism, unspecified laterality, unspecified part of lung    -  1    Yeast infection of the vagina           Follow-ups after your visit        Your next 10 appointments already scheduled     Mar 28, 2017 10:15 AM CDT   New Visit with Ron Myers MD   [facility] [place] ([facility])    [address]  [facility] [address]   [phone]            Apr 03, 2017  5:20 PM CDT   MyChart Long with Morelia Simental MD   [facility] ([facility])    [facility] [address]  [facility] [address]   [phone]            Apr 04, 2017  8:45 AM CDT   Adult Med Follow UP with SUNNY Ayala CNS   [facility] ([facility])    [address]  [address] [facility] [address]   [phone]            Apr 04, 2017  2:00 PM CDT   (Arrive by 1:45 PM)   MyChart Eye Care New with Randal Logan OD   [facility] ([facility])    [address] [place] [id number] [phone]            Jan 26, 2018  7:30 AM CST   CF LOOP with  PFL CF   [facility] Pulmonary Function Testing ([facility])    [address] [place] [id number] [phone]            Jan 26, 2018  8:10 AM CST   (Arrive by 7:55 AM)   RETURN CYSTIC FIBROSIS VISIT with Dagoberto Briscoe MD   [facility] ([facility])    [address] [place] [id number] [phone]              Who to contact      "If you have questions or need follow up information about today's clinic visit or your schedule please contact [facility] directly at [phone].  Normal or non-critical lab and imaging results will be communicated to you by [employer], letter or phone within 4 business days after the clinic has received the results. If you do not hear from us within 7 days, please contact the clinic through [employer] or phone. If you have a critical or abnormal lab result, we will notify you by phone as soon as possible.  Submit refill requests through [employer] or call your pharmacy and they will forward the refill request to us. Please allow 3 business days for your refill to be completed.          Additional Information About Your Visit        [employer] Information     [employer] gives you secure access to your electronic health record. If you see a primary care provider, you can also send messages to your care team and make appointments. If you have questions, please call your primary care clinic.  If you do not have a primary care provider, please call [phone] and they will assist you.        Care EveryWhere ID     This is your Care EveryWhere ID. This could be used by other organizations to access your [facility] medical records  [id number]        Your Vitals Were     Pulse Temperature Height Last Period Pulse Oximetry BMI (Body Mass Index)    85 97.7  F (36.5  C) (Oral) 5' 3\" (1.6 m) 02/01/2009 99% 19.4 kg/m2       Blood Pressure from Last 3 Encounters:   03/21/17 106/67   03/17/17 110/70   03/10/17 98/73    Weight from Last 3 Encounters:   03/21/17 108 lb 6.4 oz (49.2 kg)   03/17/17 109 lb 8 oz (49.7 kg)   03/10/17 102 lb (46.3 kg)              Today, you had the following     No orders found for display         Today's Medication Changes          These changes are accurate as of: 3/17/17 11:59 PM.  If you have any questions, ask your nurse or doctor.               Start taking these medicines.        " Dose/Directions    fluconazole 150 MG tablet   Commonly known as:  DIFLUCAN   Used for:  Yeast infection of the vagina   Started by:  Morelia Simental MD        Dose:  150 mg   Take 1 tablet (150 mg) by mouth once for 1 dose   Quantity:  1 tablet   Refills:  1            Where to get your medicines      These medications were sent to [facility] - [facility] [address] [place] [address]  [address], [facility] [address]     Phone:  [phone]     fluconazole 150 MG tablet                Primary Care Provider Office Phone # Fax #    Morelia Simental [phone]       [facility] [address]  [facility] [address]        Thank you!     Thank you for choosing [facility]  for your care. Our goal is always to provide you with excellent care. Hearing back from our patients is one way we can continue to improve our services. Please take a few minutes to complete the written survey that you may receive in the mail after your visit with us. Thank you!             Your Updated Medication List - Protect others around you: Learn how to safely use, store and throw away your medicines at www.disposemymeds.org.          This list is accurate as of: 3/17/17 11:59 PM.  Always use your most recent med list.                   Brand Name Dispense Instructions for use    acetylcysteine 20 % nebulizer solution    MUCOMYST    240 mL    Take 4 mLs by nebulization 2 times daily Use with albuterol solution. Discard open bottle of Mucomyst after 96 hours.       albuterol (2.5 MG/3ML) 0.083% neb solution     60 vial    Take 1 vial (2.5 mg) by nebulization 2 times daily Use with Mucomyst       atenolol 25 MG tablet    TENORMIN    45 tablet    1/4 tab at bedtime       budesonide-formoterol 160-4.5 MCG/ACT Inhaler    SYMBICORT    1 Inhaler    Inhale 2 puffs into the lungs 2 times daily       cholecalciferol 5000 UNITS Caps      Take by mouth daily        ciprofloxacin-dexamethasone otic suspension    CIPRODEX    7.5 mL    Instill 3 drops into the affected ear twice daily as needed.       clonazePAM 0.5 MG tablet    klonoPIN    20 tablet    Take 0.5 tablets (0.25 mg) by mouth 2 times daily as needed for anxiety       conjugated estrogens cream    PREMARIN    42.5 g    Place  vaginally three times a week. Use 0.5 grams weekly as directed       Fish Oil 1200 MG Caps      Take 3 capsules by mouth daily.       fluconazole 150 MG tablet    DIFLUCAN    1 tablet    Take 1 tablet (150 mg) by mouth once for 1 dose       fluticasone 50 MCG/ACT spray    FLONASE    48 g    Spray 2 sprays into both nostrils daily       GLUCOSAMINE CHONDR COMPLEX 500-400 MG Caps per capsule   Generic drug:  glucosamine-chondroitin      Take 2 capsules by mouth daily.       Ipratropium-Albuterol  MCG/ACT inhaler    COMBIVENT RESPIMAT    1 Inhaler    Inhale 1 puff into the lungs 4 times daily Not to exceed 6 doses per day.       predniSONE 1 MG tablet    DELTASONE    200 tablet    Take 5 mgs one day, alternating with 6 mgs the next.       * UNABLE TO FIND      Take 12.5 mg by mouth daily MEDICATION NAME: NADH with coQ10       * UNABLE TO FIND      Take 1 capsule by mouth daily MEDICATION NAME: phosphtydil choline       * UNABLE TO FIND      3,600 mg daily MEDICATION NAME: monolaurin       * UNABLE TO FIND      daily MEDICATION NAME: lithium orotate       * UNABLE TO FIND      Take 1 capsule by mouth daily MEDICATION NAME: resveratrol       * Notice:  This list has 5 medication(s) that are the same as other medications prescribed for you. Read the directions carefully, and ask your doctor or other care provider to review them with you.

## 2017-03-17 NOTE — PROGRESS NOTES
SUBJECTIVE:                                                    Valencia Ye is a 46 year old female who presents to clinic today for the following health issues:      ED/UC Followup:    Facility:  [facility]  Date of visit: 03/10/17  Reason for visit: fever, bad body aches  Current Status: improved mildly       Her immunologist put her on antibiotic for presumed pneumonia the day after her ED visit. Her fever resolved in 2 days. She has mild cough. She still is very tired though. She has mild dyspnea. She is tolerating the Augmentin with only some mild abdominal bloating.    Patient Active Problem List   Diagnosis     Goiter     History of systemic steroid therapy     ILD (interstitial lung disease) (H)     Palpitations     Churg-Antonina syndrome (H)     History of eating disorder     Bronchiectasis (H)     Asthma     Anxiety     Chronic fatigue     Mannose-binding lectin deficiency     Benign essential hypertension     Depression, unspecified depression type     Current Outpatient Prescriptions   Medication Sig Dispense Refill     acetylcysteine (MUCOMYST) 20 % injection Take 4 mLs by nebulization 2 times daily Use with albuterol solution. Discard open bottle of Mucomyst after 96 hours. 240 mL 11     atenolol (TENORMIN) 25 MG tablet 1/4 tab at bedtime 45 tablet 1     albuterol (2.5 MG/3ML) 0.083% neb solution Take 1 vial (2.5 mg) by nebulization 2 times daily Use with Mucomyst 60 vial 11     budesonide-formoterol (SYMBICORT) 160-4.5 MCG/ACT Inhaler Inhale 2 puffs into the lungs 2 times daily 1 Inhaler 11     Ipratropium-Albuterol (COMBIVENT RESPIMAT)  MCG/ACT inhaler Inhale 1 puff into the lungs 4 times daily Not to exceed 6 doses per day. 1 Inhaler 11     clonazePAM (KLONOPIN) 0.5 MG tablet Take 0.5 tablets (0.25 mg) by mouth 2 times daily as needed for anxiety 20 tablet 1     UNABLE TO FIND 3,600 mg daily MEDICATION NAME: monolaurin       fluticasone (FLONASE) 50 MCG/ACT nasal spray Spray 2 sprays into  "both nostrils daily 48 g 3     predniSONE (DELTASONE) 1 MG tablet Take 5 mgs one day, alternating with 6 mgs the next. 200 tablet 5     UNABLE TO FIND Take 1 capsule by mouth daily MEDICATION NAME: resveratrol       UNABLE TO FIND Take 12.5 mg by mouth daily MEDICATION NAME: NADH with coQ10       UNABLE TO FIND Take 1 capsule by mouth daily MEDICATION NAME: phosphtydil choline       cholecalciferol 5000 UNITS CAPS Take by mouth daily       glucosamine-chondroitin (GLUCOSAMINE CHONDR COMPLEX) 500-400 MG CAPS Take 2 capsules by mouth daily.       Omega-3 Fatty Acids (FISH OIL) 1200 MG capsule Take 3 capsules by mouth daily.       amoxicillin-clavulanate (AUGMENTIN) 875-125 MG per tablet Take 1 tablet by mouth 2 times daily  0     fluvoxaMINE (LUVOX) 25 MG tablet Take one half to one tablet by mouth at bedtime 30 tablet 1     UNABLE TO FIND daily MEDICATION NAME: lithium orotate       ciprofloxacin-dexamethasone (CIPRODEX) otic suspension Instill 3 drops into the affected ear twice daily as needed. 7.5 mL 1     conjugated estrogens (PREMARIN) vaginal cream Place  vaginally three times a week. Use 0.5 grams weekly as directed 42.5 g 0      Social History   Substance Use Topics     Smoking status: Never Smoker     Smokeless tobacco: Never Used     Alcohol use 0.0 oz/week     0 Standard drinks or equivalent per week      Comment: 12 drinks/week        Reviewed and updated as needed this visit by clinical staff  Allergies  Meds       Reviewed and updated as needed this visit by Provider         ROS:  No fever, chills, no sore throat, nasal congestion, sinus pain, she has a little vaginal itching    OBJECTIVE:                                                    /70  Pulse 85  Temp 97.7  F (36.5  C) (Oral)  Ht 5' 3\" (1.6 m)  Wt 109 lb 8 oz (49.7 kg)  LMP 02/01/2009  SpO2 99%  BMI 19.4 kg/m2  Body mass index is 19.4 kg/(m^2).      Lungs clear     ASSESSMENT/PLAN:                                                  "           1. Pneumonia due to infectious organism, unspecified laterality, unspecified part of lung  Presumed but never documented, improving. Advised it can take a long time to recover, make sure adequate diet    2. Yeast infection of the vagina    - fluconazole (DIFLUCAN) 150 MG tablet; Take 1 tablet (150 mg) by mouth once for 1 dose  Dispense: 1 tablet; Refill: 1        Morelia Simental MD  [facility]

## 2017-03-17 NOTE — NURSING NOTE
"Chief Complaint   Patient presents with     Hospital F/U       Initial /70  Pulse 85  Temp 97.7  F (36.5  C) (Oral)  Ht 5' 3\" (1.6 m)  Wt 109 lb 8 oz (49.7 kg)  LMP 02/01/2009  SpO2 99%  BMI 19.4 kg/m2 Estimated body mass index is 19.4 kg/(m^2) as calculated from the following:    Height as of this encounter: 5' 3\" (1.6 m).    Weight as of this encounter: 109 lb 8 oz (49.7 kg).  Medication Reconciliation: complete    "

## 2017-03-20 ASSESSMENT — PATIENT HEALTH QUESTIONNAIRE - PHQ9: SUM OF ALL RESPONSES TO PHQ QUESTIONS 1-9: 16

## 2017-03-20 ASSESSMENT — ASTHMA QUESTIONNAIRES: ACT_TOTALSCORE: 20

## 2017-03-21 ENCOUNTER — HOSPITAL ENCOUNTER (OUTPATIENT)
Dept: LAB | Facility: CLINIC | Age: 47
Discharge: HOME | End: 2017-03-21
Attending: ALLERGY & IMMUNOLOGY | Admitting: ALLERGY & IMMUNOLOGY
Payer: COMMERCIAL

## 2017-03-21 ENCOUNTER — OFFICE VISIT (OUTPATIENT)
Dept: PSYCHIATRY | Facility: CLINIC | Age: 47
End: 2017-03-21
Attending: CLINICAL NURSE SPECIALIST
Payer: COMMERCIAL

## 2017-03-21 ENCOUNTER — MYC MEDICAL ADVICE (OUTPATIENT)
Dept: INTERNAL MEDICINE | Facility: CLINIC | Age: 47
End: 2017-03-21

## 2017-03-21 VITALS
SYSTOLIC BLOOD PRESSURE: 106 MMHG | WEIGHT: 108.4 LBS | DIASTOLIC BLOOD PRESSURE: 67 MMHG | BODY MASS INDEX: 19.2 KG/M2 | HEART RATE: 76 BPM

## 2017-03-21 DIAGNOSIS — B99.9 RECURRENT INFECTIONS: Primary | ICD-10-CM

## 2017-03-21 DIAGNOSIS — D72.18 CHURG-STRAUSS SYNDROME WITH LUNG INVOLVEMENT (H): ICD-10-CM

## 2017-03-21 DIAGNOSIS — F32.A DEPRESSION, UNSPECIFIED DEPRESSION TYPE: ICD-10-CM

## 2017-03-21 DIAGNOSIS — M30.1 CHURG-STRAUSS SYNDROME WITH LUNG INVOLVEMENT (H): ICD-10-CM

## 2017-03-21 DIAGNOSIS — F41.9 ANXIETY: Primary | ICD-10-CM

## 2017-03-21 LAB
BASOPHILS # BLD AUTO: 0.1 10E9/L (ref 0–0.2)
BASOPHILS NFR BLD AUTO: 1.1 %
CRP SERPL-MCNC: <2.9 MG/L (ref 0–8)
DIFFERENTIAL METHOD BLD: ABNORMAL
EOSINOPHIL # BLD AUTO: 0.2 10E9/L (ref 0–0.7)
EOSINOPHIL NFR BLD AUTO: 2 %
ERYTHROCYTE [DISTWIDTH] IN BLOOD BY AUTOMATED COUNT: 11.9 % (ref 10–15)
ERYTHROCYTE [SEDIMENTATION RATE] IN BLOOD BY WESTERGREN METHOD: 8 MM/H (ref 0–20)
HCT VFR BLD AUTO: 39.7 % (ref 35–47)
HGB BLD-MCNC: 13.6 G/DL (ref 11.7–15.7)
IMM GRANULOCYTES # BLD: 0 10E9/L (ref 0–0.4)
IMM GRANULOCYTES NFR BLD: 0.2 %
LYMPHOCYTES # BLD AUTO: 1.2 10E9/L (ref 0.8–5.3)
LYMPHOCYTES NFR BLD AUTO: 15.1 %
MCH RBC QN AUTO: 33.8 PG (ref 26.5–33)
MCHC RBC AUTO-ENTMCNC: 34.3 G/DL (ref 31.5–36.5)
MCV RBC AUTO: 99 FL (ref 78–100)
MONOCYTES # BLD AUTO: 0.4 10E9/L (ref 0–1.3)
MONOCYTES NFR BLD AUTO: 4.5 %
NEUTROPHILS # BLD AUTO: 6.2 10E9/L (ref 1.6–8.3)
NEUTROPHILS NFR BLD AUTO: 77.1 %
NRBC # BLD AUTO: 0 10*3/UL
NRBC BLD AUTO-RTO: 0 /100
PLATELET # BLD AUTO: 332 10E9/L (ref 150–450)
RBC # BLD AUTO: 4.02 10E12/L (ref 3.8–5.2)
WBC # BLD AUTO: 8 10E9/L (ref 4–11)

## 2017-03-21 PROCEDURE — 99212 OFFICE O/P EST SF 10 MIN: CPT | Mod: ZF

## 2017-03-21 PROCEDURE — 36415 COLL VENOUS BLD VENIPUNCTURE: CPT | Performed by: ALLERGY & IMMUNOLOGY

## 2017-03-21 PROCEDURE — 85025 COMPLETE CBC W/AUTO DIFF WBC: CPT | Performed by: ALLERGY & IMMUNOLOGY

## 2017-03-21 PROCEDURE — 85652 RBC SED RATE AUTOMATED: CPT | Performed by: ALLERGY & IMMUNOLOGY

## 2017-03-21 PROCEDURE — 86140 C-REACTIVE PROTEIN: CPT | Performed by: ALLERGY & IMMUNOLOGY

## 2017-03-21 RX ORDER — FLUVOXAMINE MALEATE 25 MG
TABLET ORAL
Qty: 30 TABLET | Refills: 1 | Status: SHIPPED | OUTPATIENT
Start: 2017-03-21 | End: 2017-05-16 | Stop reason: SINTOL

## 2017-03-21 NOTE — PROGRESS NOTES
"  Outpatient Psychiatry Progress Note     Provider: SUNNY Ayala CNS  Date: 3/21/2017  Service:  Medication follow up with counseling.   Patient Identification: Valencia Ye  : 1970   MRN: [id number]    Valencia Ye is a 46 year old year old female who presents for ongoing psychiatric care.  Valencia Ye was last seen in clinic on 17.   At that time,   Assessment & Plan      Valencia Ye is seen today for follow up and reports she has been more depressed but anxiety is controlled with low dose Klonopin prn. Has tried a number of supplements for depression and continues on low dose Lithium orotate but has side effects or mood instability on other supplements and with previous medication trials. At this time would like to continue current supplements with slight increase in Klonopin. Has been in therapy in the past and is not interested in pursuing at this time.     Diagnosis  Axis 1: Anxiety, Depression, unspecified  Axis 2: none  Axis 3: See problem list in the medical record     Plan:  Medication: Continue Klonopin at 1/4 to 1/2 tablet 4 to 5 times a week  OTC Recommendations: none  Lab Orders: none  Referrals: none  Release of Information: none  Future Treatment Considerations:per symptoms  Return for Follow Up: 6 months      3/21/2017  Today Valencia reports she is not doing well, stating \"last week was the worst week of all.\"  She has always felt that she could work things but not at this time. She recently had a lung infection and had to take a week off work.  Muscles hurt all the time , brain fog. The last 2 1/2 weeks have been bad, maybe had the flu elevated white count. Is now on Augmentin for 2 weeks. Significant increase in fatigue where she has not been able to do anything after coming back from work to home. Reports occasionally taking Klonopin 0.25 mg to help with sleep.  But states when she takes Klonopin, \"I feel throbbing pain of depression\" but " "when she does not take Klonopin, feels \"sharp pain of anxiety, so it's double edge sword.\" Further decreased appetite.  Reports sudden weeping at home x last 2 weeks.  suggested she stop taking Astrology classes but pt reports this is one of the only thing that she really looks forward to besides being with the family.  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      Pt also discussed that she feels she has limitation on her life due to complicated medical problems and feels she needs to do all things while her time allows her.  She is also concerned that she may not be able to do as much as she can do due to her failing health.     Side effects of medication include: none    Psychiatric Review of Systems:  The patient endorses symptoms of depression: In the last 2 weeks per PHQ 9 several days passive thoughts of being better off dead.  Nearly everyday anhedonia, feeling depressed, sleep and appetite disturbance, fatigue, feelings of failure, concentration problems.  She  patient endorses symptoms of anxiety : increased last 2 weeks, but managed sufficiently with Klonopin 0.25 mg occasional use during day time  She endorses symptoms of gagandeep including none.    She endorses symptoms of psychosis including no psychotic symptoms.       Review of Medical Systems:  Sleep:  Able to sleep at night with Klonopin  Energy: decreased  Concentration: stable  Appetite: decreased  GI Concerns: no change  Cardiac concerns: stable  Neurological concerns: no change  Other medical concerns: recent lung infection    Current Substance Use:  Alcohol:none for a couple months  Other drugs:denies  Caffeine:denies  Nicotine: denies    Past Medical History:   Past Medical History   Diagnosis Date     Aortitis (H)      Asthma      associated with Churg Antonina syndrome     Churg-Antonina syndrome (H)      dx 1990     Eustachian tube dysfunction      Goiter      Hearing loss, conductive      Hypertension      " Hypocalcemia      Irritable bowel syndrome      Major depressive disorder      Mannose-binding lectin deficiency 2014     Nonspecific abnormal results of thyroid function study      Osteoporosis      Pericarditis      1990 and 1992     Pneumonia      4/23/10     Recurrent UTI      Rheumatoid vasculitis (H)      Sinusitis, chronic      Steroid long-term use      Varicose veins of leg with swelling      Patient Active Problem List   Diagnosis     Goiter     History of systemic steroid therapy     ILD (interstitial lung disease) (H)     Palpitations     Churg-Antonina syndrome (H)     History of eating disorder     Bronchiectasis (H)     Asthma     Anxiety     Chronic fatigue     Mannose-binding lectin deficiency     Benign essential hypertension     Depression, unspecified depression type       Allergies:   Allergies   Allergen Reactions     Colistimethate Anaphylaxis     Colymycin M [Colistimethate Sodium] Anaphylaxis     Clindamycin Rash     Zithromax [Azithromycin]      Heart palpitation          Current Medications     Current Outpatient Prescriptions Ordered in [facility]   Medication Sig Dispense Refill     acetylcysteine (MUCOMYST) 20 % injection Take 4 mLs by nebulization 2 times daily Use with albuterol solution. Discard open bottle of Mucomyst after 96 hours. 240 mL 11     atenolol (TENORMIN) 25 MG tablet 1/4 tab at bedtime 45 tablet 1     albuterol (2.5 MG/3ML) 0.083% neb solution Take 1 vial (2.5 mg) by nebulization 2 times daily Use with Mucomyst 60 vial 11     budesonide-formoterol (SYMBICORT) 160-4.5 MCG/ACT Inhaler Inhale 2 puffs into the lungs 2 times daily 1 Inhaler 11     Ipratropium-Albuterol (COMBIVENT RESPIMAT)  MCG/ACT inhaler Inhale 1 puff into the lungs 4 times daily Not to exceed 6 doses per day. 1 Inhaler 11     clonazePAM (KLONOPIN) 0.5 MG tablet Take 0.5 tablets (0.25 mg) by mouth 2 times daily as needed for anxiety 20 tablet 1     UNABLE TO FIND 3,600 mg daily MEDICATION NAME: monolaurin        UNABLE TO FIND daily MEDICATION NAME: lithium orotate       UNKNOWN MED DOSAGE NAC takes daily at unknown dose       fluticasone (FLONASE) 50 MCG/ACT nasal spray Spray 2 sprays into both nostrils daily 48 g 3     predniSONE (DELTASONE) 1 MG tablet Take 5 mgs one day, alternating with 6 mgs the next. 200 tablet 5     UNABLE TO FIND Take 1 capsule by mouth daily MEDICATION NAME: resveratrol       ciprofloxacin-dexamethasone (CIPRODEX) otic suspension Instill 3 drops into the affected ear twice daily as needed. 7.5 mL 1     UNABLE TO FIND Take 12.5 mg by mouth daily MEDICATION NAME: NADH with coQ10       UNABLE TO FIND Take 1 capsule by mouth daily MEDICATION NAME: phosphtydil choline       conjugated estrogens (PREMARIN) vaginal cream Place  vaginally three times a week. Use 0.5 grams weekly as directed 42.5 g 0     cholecalciferol 5000 UNITS CAPS Take by mouth daily       glucosamine-chondroitin (GLUCOSAMINE CHONDR COMPLEX) 500-400 MG CAPS Take 2 capsules by mouth daily.       Omega-3 Fatty Acids (FISH OIL) 1200 MG capsule Take 3 capsules by mouth daily.       No current Epic-ordered facility-administered medications on file.         Mental Status Exam     Appearance:  Formally dressed and Well groomed  Behavior/relationship to examiner/demeanor:  Cooperative   Orientation: Oriented to person, place, time and situation  Psychomotor: normal form  Speech Rate:  Normal  Speech Spontaneity:  Normal  Mood:  calm and neutral  Affect:  Appropriate/mood-congruent  Thought Process (Associations):  Goal directed  Thought Content:  no overt psychosis, denies suicidal ideation, intent or thoughts and patient does not appear to be responding to internal stimuli  Abnormal Perception:  None  Attention/Concentration:  Normal  Language:  Intact  Insight:  Adequate  Judgment:  Adequate for safety      Results     Vital signs: LMP 02/01/2009    Laboratory Data:  reviewed lab results from ED visit 3/10/2017    Assessment & Plan       Valencia Ye is seen today for follow up and reports worsening depressed mood, decreased appetite and fatigue in the context of recent lung infection.  Pt continues to take supplements for depression, but is open to try Luvox for augmentation of anxiety and depression in addition to PRN Klonopin.  Pt declines therapy at this time as she feels she cannot find somebody guaranteed to work well with her, otherwise adding another appointment will only increase her fatigue.    Diagnosis  Axis 1: Anxiety, Depression, unspecified  Axis 2: none  Axis 3: See problem list in the medical record      Plan:  Medication: Start Luvox 12.5mg and continue PRN Klonopin  OTC Recommendations: no change  Lab Orders:  none  Referrals: none  Release of Information: none  Future Treatment Considerations:per response to Luvox  Return for Follow Up:2 weeks   The risks, benefits, alternatives and side effects have been discussed and are understood by the patient. The patient understands the risks of using street drugs or alcohol. There are no medical contraindications, the patient agrees to treatment, and has the capacity to do so. The patient understands to call 911 or come to the nearest ED if life threatening or urgent symptoms present.  Over 50% of this time was spent counseling the patient and/or coordinating care regarding review of social and occupational functioning.  In addition patient was counseled on health and wellness practices to augment medication treatment of symptoms. See note for details.    Isabell Luther, Psychiatric/Mental Health Nurse Practitioner Student, 3/21/2017      Marlyn Flores, SUNNY CNS 3/21/2017

## 2017-03-21 NOTE — MR AVS SNAPSHOT
After Visit Summary   3/21/2017    Valencia Ye    MRN: [id number]           Patient Information     Date Of Birth          1970        Visit Information        Provider Department      3/21/2017 8:15 AM Marlyn Flores APRN [facility]        Today's Diagnoses     Anxiety    -  1    Depression, unspecified depression type           Follow-ups after your visit        Follow-up notes from your care team     Return in about 2 weeks (around 4/4/2017).      Your next 10 appointments already scheduled     Apr 03, 2017  5:20 PM CDT   [facility] with Morelia Simental MD   [facility] ([facility])    [address]  [facility] [address]   [phone]            Apr 04, 2017  8:45 AM CDT   Adult Med Follow UP with USNNY Ayala CNS   Psychiatry Clinic ([facility])    [place] [id number]  [address] [facility] [id number] [phone]            Apr 04, 2017  2:00 PM CDT   (Arrive by 1:45 PM)   [facility] New with Randal Logan OD   [facility] ([facility])    [address] [place] [address]  [address] [facility] [id number] [phone]            Jun 02, 2017 11:00 AM CDT   New Visit with Yvrose Moeller MD   [facility] [place] ([facility])    [address]  [facility] [address]   [phone]            Jan 26, 2018  7:30 AM CST   CF LOOP with  PFL CF   [facility] Pulmonary Function Testing ([facility])    [address] [facility]  [address] [facility] [id number] [phone]            Jan 26, 2018  8:10 AM CST   (Arrive by 7:55 AM)   RETURN CYSTIC FIBROSIS VISIT with Dagoberto Briscoe MD   [facility] ([facility])    [address] [place] [address]  [address] [facility] [id number] [phone]               Who to contact     Please call your clinic at [phone] to:    Ask questions about your health    Make or cancel appointments    Discuss your medicines    Learn about your test results    Speak to your doctor   If you have compliments or concerns about an experience at your clinic, or if you wish to file a complaint, please contact [facility] Patient Relations at [phone] or email us at [email or web address]         Additional Information About Your Visit        [employer] Information     [employer] gives you secure access to your electronic health record. If you see a primary care provider, you can also send messages to your care team and make appointments. If you have questions, please call your primary care clinic.  If you do not have a primary care provider, please call [phone] and they will assist you.      [employer] is an electronic gateway that provides easy, online access to your medical records. With [employer], you can request a clinic appointment, read your test results, renew a prescription or communicate with your care team.     To access your existing account, please contact your [facility] or call [phone] for assistance.        Care EveryWhere ID     This is your Care EveryWhere ID. This could be used by other organizations to access your [place] medical records  [id number]        Your Vitals Were     Pulse Last Period BMI (Body Mass Index)             76 02/01/2009 19.2 kg/m2          Blood Pressure from Last 3 Encounters:   03/21/17 106/67   03/17/17 110/70   03/10/17 98/73    Weight from Last 3 Encounters:   03/21/17 49.2 kg (108 lb 6.4 oz)   03/17/17 49.7 kg (109 lb 8 oz)   03/10/17 46.3 kg (102 lb)              Today, you had the following     No orders found for display         Today's Medication Changes          These changes are accurate as of: 3/21/17 11:59 PM.  If you have any questions, ask your nurse  or doctor.               Start taking these medicines.        Dose/Directions    fluvoxaMINE 25 MG tablet   Commonly known as:  LUVOX   Used for:  Depression, unspecified depression type, Anxiety   Started by:  Marlyn Flores APRN CNS        Take one half to one tablet by mouth at bedtime   Quantity:  30 tablet   Refills:  1            Where to get your medicines      These medications were sent to [id number] - [address] AT [facility] [address]  [address] [place] [address] [address], [facility] [address]     Phone:  [phone]     fluvoxaMINE 25 MG tablet                Primary Care Provider Office Phone # Fax #    Morelia Simental [phone]       [facility] [address]  [facility] [address]        Thank you!     Thank you for choosing PSYCHIATRY CLINIC  for your care. Our goal is always to provide you with excellent care. Hearing back from our patients is one way we can continue to improve our services. Please take a few minutes to complete the written survey that you may receive in the mail after your visit with us. Thank you!             Your Updated Medication List - Protect others around you: Learn how to safely use, store and throw away your medicines at www.disposemymeds.org.          This list is accurate as of: 3/21/17 11:59 PM.  Always use your most recent med list.                   Brand Name Dispense Instructions for use    acetylcysteine 20 % nebulizer solution    MUCOMYST    240 mL    Take 4 mLs by nebulization 2 times daily Use with albuterol solution. Discard open bottle of Mucomyst after 96 hours.       albuterol (2.5 MG/3ML) 0.083% neb solution     60 vial    Take 1 vial (2.5 mg) by nebulization 2 times daily Use with Mucomyst       atenolol 25 MG tablet    TENORMIN    45 tablet    1/4 tab at bedtime       AUGMENTIN 875-125 MG per tablet   Generic drug:  amoxicillin-clavulanate      Take 1 tablet by mouth 2 times daily        budesonide-formoterol 160-4.5 MCG/ACT Inhaler    SYMBICORT    1 Inhaler    Inhale 2 puffs into the lungs 2 times daily       cholecalciferol 5000 UNITS Caps      Take by mouth daily       ciprofloxacin-dexamethasone otic suspension    CIPRODEX    7.5 mL    Instill 3 drops into the affected ear twice daily as needed.       clonazePAM 0.5 MG tablet    klonoPIN    20 tablet    Take 0.5 tablets (0.25 mg) by mouth 2 times daily as needed for anxiety       conjugated estrogens cream    PREMARIN    42.5 g    Place  vaginally three times a week. Use 0.5 grams weekly as directed       Fish Oil 1200 MG Caps      Take 3 capsules by mouth daily.       fluticasone 50 MCG/ACT spray    FLONASE    48 g    Spray 2 sprays into both nostrils daily       fluvoxaMINE 25 MG tablet    LUVOX    30 tablet    Take one half to one tablet by mouth at bedtime       GLUCOSAMINE CHONDR COMPLEX 500-400 MG Caps per capsule   Generic drug:  glucosamine-chondroitin      Take 2 capsules by mouth daily.       Ipratropium-Albuterol  MCG/ACT inhaler    COMBIVENT RESPIMAT    1 Inhaler    Inhale 1 puff into the lungs 4 times daily Not to exceed 6 doses per day.       * UNABLE TO FIND      Take 12.5 mg by mouth daily MEDICATION NAME: NADH with coQ10       * UNABLE TO FIND      Take 1 capsule by mouth daily MEDICATION NAME: phosphtydil choline       * UNABLE TO FIND      3,600 mg daily MEDICATION NAME: monolaurin       * UNABLE TO FIND      daily MEDICATION NAME: lithium orotate       * UNABLE TO FIND      Take 1 capsule by mouth daily MEDICATION NAME: resveratrol       * Notice:  This list has 5 medication(s) that are the same as other medications prescribed for you. Read the directions carefully, and ask your doctor or other care provider to review them with you.

## 2017-03-21 NOTE — NURSING NOTE
Chief Complaint   Patient presents with     Recheck Medication   anxiety    Reviewed allergies, smoking status, and pharmacy preference  Administered abuse screening questions   Obtained weight, blood pressure and heart rate

## 2017-03-23 DIAGNOSIS — J47.9 BRONCHIECTASIS WITHOUT COMPLICATION (H): ICD-10-CM

## 2017-03-23 PROBLEM — F33.1 MODERATE EPISODE OF RECURRENT MAJOR DEPRESSIVE DISORDER (H): Status: ACTIVE | Noted: 2017-01-29

## 2017-03-23 RX ORDER — PREDNISONE 1 MG/1
TABLET ORAL
Qty: 200 TABLET | Refills: 5 | Status: SHIPPED | OUTPATIENT
Start: 2017-03-23 | End: 2017-11-04

## 2017-03-23 NOTE — TELEPHONE ENCOUNTER
Prednisone      Last Written Prescription Date:  8/29/16  Last Fill Quantity: 200,   # refills: 5  Last Office Visit with [facility], [facility] or [facility] prescribing provider: 3/17/17  Future Office visit:    Next 5 appointments (look out 90 days)     Apr 03, 2017  5:20 PM CDT   [facility] with Morelia Simental MD   [facility] ([facility])    [address]  [facility] [id number] [phone]                   Routing refill request to provider for review/approval because:  Drug not on the [facility], [facility] or [facility] refill protocol or controlled substance

## 2017-04-03 ENCOUNTER — MYC MEDICAL ADVICE (OUTPATIENT)
Dept: INTERNAL MEDICINE | Facility: CLINIC | Age: 47
End: 2017-04-03

## 2017-04-03 DIAGNOSIS — N84.2: Primary | ICD-10-CM

## 2017-04-04 ENCOUNTER — OFFICE VISIT (OUTPATIENT)
Dept: PSYCHIATRY | Facility: CLINIC | Age: 47
End: 2017-04-04
Attending: CLINICAL NURSE SPECIALIST
Payer: COMMERCIAL

## 2017-04-04 VITALS
BODY MASS INDEX: 19.31 KG/M2 | SYSTOLIC BLOOD PRESSURE: 100 MMHG | DIASTOLIC BLOOD PRESSURE: 62 MMHG | WEIGHT: 109 LBS | HEART RATE: 70 BPM

## 2017-04-04 DIAGNOSIS — F41.9 ANXIETY: Primary | ICD-10-CM

## 2017-04-04 DIAGNOSIS — F33.1 MODERATE EPISODE OF RECURRENT MAJOR DEPRESSIVE DISORDER (H): ICD-10-CM

## 2017-04-04 PROCEDURE — 99212 OFFICE O/P EST SF 10 MIN: CPT | Mod: ZF

## 2017-04-04 NOTE — PROGRESS NOTES
Outpatient Psychiatry Progress Note     Provider: SUNNY Ayala CNS  Date: 2017  Service:  Medication follow up with counseling.   Patient Identification: Valencia Ye  : 1970   MRN: [id number]    Valencia Ye is a 46 year old year old female who presents for ongoing psychiatric care.  Valencia Ye was last seen in clinic on 3/21/17.   At that time,   Assessment & Plan      Valencia Ye is seen today for follow up and reports worsening depressed mood, decreased appetite and fatigue in the context of recent lung infection. Pt continues to take supplements for depression, but is open to try Luvox for augmentation of anxiety and depression in addition to PRN Klonopin. Pt declines therapy at this time as she feels she cannot find somebody guaranteed to work well with her, otherwise adding another appointment will only increase her fatigue.     Diagnosis  Axis 1: Anxiety, Depression, unspecified  Axis 2: none  Axis 3: See problem list in the medical record        Plan:  Medication: Start Luvox 12.5mg and continue PRN Klonopin  OTC Recommendations: no change  Lab Orders: none  Referrals: none  Release of Information: none  Future Treatment Considerations:per response to Luvox  Return for Follow Up:2 weeks    3/27/17 My Chart message:  Hello - i see that there are quite a few articles published in medical journals about glaucoma being a side effect of Luvox and clonazepam. I'm already on prednisone for life. So now I'm terrified that the meds that have been helpful with anxiety and depression come at too great a risk. I'm nixing the Luvox and tapering off the clonazepam (occasional, desperate case use only) until (if..) there is good evidence that my current cocktail of drugs is not going to result in glaucoma. Which presents the other terrifying problem of having nothing to help with anxiety.     2017  Today Valencia reports she has taken Luvox most days since  last seen despite of her initial hesitation with Luvox. Though moderate, feels calmer. Has less crying episodes. However, feels Luvox may be affecting her terminal sleep, usually wakes up at 4 am, but has been waking up 2:30 to 3 am when she takes Luvox.  Continues to take Klonopin 0.25 mg HS every night.    Continues fatigued since last bought of lung problems.  Feels that she is accepting the changes in functioning due to health problems, it is just that she doesn't have time to do all the things that she needs to do to keep up with things. Reports she has been anxious and depressed since young age, but was able to balance difficulties with her interests such as dancing and home projects, but due to low energy, cannot engage in activities that bring her mood and anxiety.  She realizes that she can't see herself lazy because this is not the cause. Denies suicidal ideation, self-injurious behavior or homicidal ideation.    Side effects of medication include: mild jaw clenching.    Psychiatric Review of Systems:    The patient endorses symptoms of depression: In the last 2 weeks per PHQ 9: several days: insomnia, suicidal ideation, more than half the days: anhedonia, depressed, concentration; nearly every day: fatigue, poor appetite and self-failure.  She  patient endorses symptoms of anxiety : some ruminating thoughts  She endorses symptoms of gagandeep including none.    She endorses symptoms of psychosis including no psychotic symptoms.       Review of Medical Systems:  Sleep: takes a quarter tablet of Klonopin everynight. Not sleeping through the night but wakes up at the end since the Luvox  Energy: remains low  Concentration: remains low  Appetite: remains low  GI Concerns: denies  Cardiac concerns: denies  Neurological concerns: denies  Other medical concerns: feels not recovers fully from lung infection any longer    Current Substance Use:  Alcohol:denies  Other drugs:denies  Caffeine:denies  Nicotine:  denies    Past Medical History:   Past Medical History:   Diagnosis Date     Aortitis (H)      Asthma     associated with Churg Antonina syndrome     Churg-Antonina syndrome (H)     dx 1990     Eustachian tube dysfunction      Goiter      Hearing loss, conductive      Hypertension      Hypocalcemia      Irritable bowel syndrome      Major depressive disorder      Mannose-binding lectin deficiency 2014     Nonspecific abnormal results of thyroid function study      Osteoporosis      Pericarditis     1990 and 1992     Pneumonia     4/23/10     Recurrent UTI      Rheumatoid vasculitis (H)      Sinusitis, chronic      Steroid long-term use      Varicose veins of leg with swelling      Patient Active Problem List   Diagnosis     Goiter     History of systemic steroid therapy     ILD (interstitial lung disease) (H)     Palpitations     Churg-Antonina syndrome (H)     History of eating disorder     Bronchiectasis (H)     Asthma     Anxiety     Chronic fatigue     Mannose-binding lectin deficiency     Benign essential hypertension     Moderate episode of recurrent major depressive disorder (H)       Allergies:   Allergies   Allergen Reactions     Colistimethate Anaphylaxis     Colymycin M [Colistimethate Sodium] Anaphylaxis     Clindamycin Rash     Zithromax [Azithromycin]      Heart palpitation          Current Medications     Current Outpatient Prescriptions Ordered in [facility]   Medication Sig Dispense Refill     predniSONE (DELTASONE) 1 MG tablet Take 5 mgs one day, alternating with 6 mgs the next. 200 tablet 5     amoxicillin-clavulanate (AUGMENTIN) 875-125 MG per tablet Take 1 tablet by mouth 2 times daily  0     fluvoxaMINE (LUVOX) 25 MG tablet Take one half to one tablet by mouth at bedtime 30 tablet 1     acetylcysteine (MUCOMYST) 20 % injection Take 4 mLs by nebulization 2 times daily Use with albuterol solution. Discard open bottle of Mucomyst after 96 hours. 240 mL 11     atenolol (TENORMIN) 25 MG tablet 1/4 tab at  bedtime 45 tablet 1     albuterol (2.5 MG/3ML) 0.083% neb solution Take 1 vial (2.5 mg) by nebulization 2 times daily Use with Mucomyst 60 vial 11     budesonide-formoterol (SYMBICORT) 160-4.5 MCG/ACT Inhaler Inhale 2 puffs into the lungs 2 times daily 1 Inhaler 11     Ipratropium-Albuterol (COMBIVENT RESPIMAT)  MCG/ACT inhaler Inhale 1 puff into the lungs 4 times daily Not to exceed 6 doses per day. 1 Inhaler 11     clonazePAM (KLONOPIN) 0.5 MG tablet Take 0.5 tablets (0.25 mg) by mouth 2 times daily as needed for anxiety 20 tablet 1     UNABLE TO FIND 3,600 mg daily MEDICATION NAME: monolaurin       UNABLE TO FIND daily MEDICATION NAME: lithium orotate       fluticasone (FLONASE) 50 MCG/ACT nasal spray Spray 2 sprays into both nostrils daily 48 g 3     UNABLE TO FIND Take 1 capsule by mouth daily MEDICATION NAME: resveratrol       ciprofloxacin-dexamethasone (CIPRODEX) otic suspension Instill 3 drops into the affected ear twice daily as needed. 7.5 mL 1     UNABLE TO FIND Take 12.5 mg by mouth daily MEDICATION NAME: NADH with coQ10       UNABLE TO FIND Take 1 capsule by mouth daily MEDICATION NAME: phosphtydil choline       conjugated estrogens (PREMARIN) vaginal cream Place  vaginally three times a week. Use 0.5 grams weekly as directed 42.5 g 0     cholecalciferol 5000 UNITS CAPS Take by mouth daily       glucosamine-chondroitin (GLUCOSAMINE CHONDR COMPLEX) 500-400 MG CAPS Take 2 capsules by mouth daily.       Omega-3 Fatty Acids (FISH OIL) 1200 MG capsule Take 3 capsules by mouth daily.       No current Epic-ordered facility-administered medications on file.         Mental Status Exam     Appearance:  Casually dressed and Well groomed  Behavior/relationship to examiner/demeanor:  Cooperative and Pleasant  Orientation: Oriented to person, place, time and situation  Psychomotor: normal form  Speech Rate:  Normal  Speech Spontaneity:  Normal  Mood:  calm and neutral  Affect:   Appropriate/mood-congruent  Thought Process (Associations):  Goal directed  Thought Content:  no overt psychosis, denies suicidal ideation, intent or thoughts and patient does not appear to be responding to internal stimuli  Abnormal Perception:  None  Attention/Concentration:  Normal  Language:  Intact  Insight:  Adequate  Judgment:  Adequate for safety      Results     Vital signs: /62  Pulse 70  Wt 49.4 kg (109 lb)  LMP 02/01/2009  BMI 19.31 kg/m2    Laboratory Data:  none    Assessment & Plan      Valencia Ye is seen today for follow up and reports some improvement in anxiety and mood symptoms, but reports terminal insomnia since starting Luvox.  Pt wants to continue current dose despite some sleep difficulties to determine medication effectiveness in current dose.  Discussed to try taking Luvox in AM (currently taking at HS) to see if it makes difference in night time sleeping.     Diagnosis  Axis 1: Anxiety, Depression, unspecified  Axis 2: none  Axis 3: See problem list in the medical record    Plan:  Medication: Continue Luvox 12.5mg and Klonopin 1/4 tablet at time   OTC Recommendations: none  Lab Orders:  none  Referrals: none  Release of Information: none  Future Treatment Considerations:per symptoms  Return for Follow Up: 4 weeks   The risks, benefits, alternatives and side effects have been discussed and are understood by the patient. The patient understands the risks of using street drugs or alcohol. There are no medical contraindications, the patient agrees to treatment, and has the capacity to do so. The patient understands to call 911 or come to the nearest ED if life threatening or urgent symptoms present.  Over 50% of this time was spent counseling the patient and/or coordinating care regarding review of social and occupational functioning.  In addition patient was counseled on health and wellness practices to augment medication treatment of symptoms. See note for  details.    Isabell Luther, Psychiatric/Mental Health Nurse Practitioner Student, 4/4/2017      Marlyn Flores, APRN CNS 4/4/2017

## 2017-04-04 NOTE — MR AVS SNAPSHOT
After Visit Summary   4/4/2017    Valencia Ye    MRN: [id number]           Patient Information     Date Of Birth          1970        Visit Information        Provider Department      4/4/2017 8:45 AM Marlyn Flores APRN [facility]        Today's Diagnoses     Anxiety    -  1    Moderate episode of recurrent major depressive disorder (H)           Follow-ups after your visit        Follow-up notes from your care team     Return in about 4 weeks (around 5/2/2017).      Your next 10 appointments already scheduled     Apr 14, 2017 11:00 AM CDT   [facility]    [address]  [facility] [id number] [phone]            May 05, 2017  2:20 PM CDT   (Arrive by 2:05 PM)   RETURN GENERAL with Randal Logan OD   [facility] ([facility])    [address] [place] [address]  [address] [facility] [id number] [phone]            Jun 02, 2017 11:00 AM CDT   New Visit with Yvrose Moeller MD   [facility] [place] ([facility])    [address] [facility] [address]  [facility] [id number] [phone]            Jan 26, 2018  7:30 AM CST   CF LOOP with  PFL CF   [facility] ([facility])    [address]  [address] [facility] [id number] [phone]            Jan 26, 2018  8:10 AM CST   (Arrive by 7:55 AM)   RETURN CYSTIC FIBROSIS VISIT with Dagoberto Briscoe MD   [facility] ([facility])    [address] [place] [address]  [address] [facility] [id number] [phone]              Who to contact     Please call your clinic at [phone] to:    Ask questions about your health    Make or cancel appointments    Discuss your medicines    Learn about your test results    Speak to your doctor    If you have compliments or concerns about an experience at your clinic, or if you wish to file a complaint, please contact [facility] Patient Relations at [phone] or email us at [email or web address]         Additional Information About Your Visit        MyChart Information     [employer] gives you secure access to your electronic health record. If you see a primary care provider, you can also send messages to your care team and make appointments. If you have questions, please call your primary care clinic.  If you do not have a primary care provider, please call [phone] and they will assist you.      [employer] is an electronic gateway that provides easy, online access to your medical records. With [employer], you can request a clinic appointment, read your test results, renew a prescription or communicate with your care team.     To access your existing account, please contact your [facility] or call [phone] for assistance.        Care EveryWhere ID     This is your Care EveryWhere ID. This could be used by other organizations to access your [facility] medical records  [id number]        Your Vitals Were     Pulse Last Period BMI (Body Mass Index)             70 02/01/2009 19.31 kg/m2          Blood Pressure from Last 3 Encounters:   04/04/17 100/62   03/21/17 106/67   03/17/17 110/70    Weight from Last 3 Encounters:   04/04/17 49.4 kg (109 lb)   03/21/17 49.2 kg (108 lb 6.4 oz)   03/17/17 49.7 kg (109 lb 8 oz)              Today, you had the following     No orders found for display       Primary Care Provider Office Phone # Fax #    Morelia Simental [phone]       [facility] [address] [facility] [address]  [facility] [address]        Thank you!     Thank you for choosing PSYCHIATRY CLINIC  for your care. Our goal is always to provide you with excellent care. Hearing back from our patients is one way we can continue to  improve our services. Please take a few minutes to complete the written survey that you may receive in the mail after your visit with us. Thank you!             Your Updated Medication List - Protect others around you: Learn how to safely use, store and throw away your medicines at www.disposemymeds.org.          This list is accurate as of: 4/4/17 11:59 PM.  Always use your most recent med list.                   Brand Name Dispense Instructions for use    acetylcysteine 20 % nebulizer solution    MUCOMYST    240 mL    Take 4 mLs by nebulization 2 times daily Use with albuterol solution. Discard open bottle of Mucomyst after 96 hours.       albuterol (2.5 MG/3ML) 0.083% neb solution     60 vial    Take 1 vial (2.5 mg) by nebulization 2 times daily Use with Mucomyst       atenolol 25 MG tablet    TENORMIN    45 tablet    1/4 tab at bedtime       AUGMENTIN 875-125 MG per tablet   Generic drug:  amoxicillin-clavulanate      Take 1 tablet by mouth 2 times daily       budesonide-formoterol 160-4.5 MCG/ACT Inhaler    SYMBICORT    1 Inhaler    Inhale 2 puffs into the lungs 2 times daily       cholecalciferol 5000 UNITS Caps      Take by mouth daily       ciprofloxacin-dexamethasone otic suspension    CIPRODEX    7.5 mL    Instill 3 drops into the affected ear twice daily as needed.       clonazePAM 0.5 MG tablet    klonoPIN    20 tablet    Take 0.5 tablets (0.25 mg) by mouth 2 times daily as needed for anxiety       conjugated estrogens cream    PREMARIN    42.5 g    Place  vaginally three times a week. Use 0.5 grams weekly as directed       Fish Oil 1200 MG Caps      Take 3 capsules by mouth daily.       fluticasone 50 MCG/ACT spray    FLONASE    48 g    Spray 2 sprays into both nostrils daily       fluvoxaMINE 25 MG tablet    LUVOX    30 tablet    Take one half to one tablet by mouth at bedtime       GLUCOSAMINE CHONDR COMPLEX 500-400 MG Caps per capsule   Generic drug:  glucosamine-chondroitin      Take 2 capsules by  mouth daily.       Ipratropium-Albuterol  MCG/ACT inhaler    COMBIVENT RESPIMAT    1 Inhaler    Inhale 1 puff into the lungs 4 times daily Not to exceed 6 doses per day.       predniSONE 1 MG tablet    DELTASONE    200 tablet    Take 5 mgs one day, alternating with 6 mgs the next.       * UNABLE TO FIND      Take 12.5 mg by mouth daily MEDICATION NAME: NADH with coQ10       * UNABLE TO FIND      Take 1 capsule by mouth daily MEDICATION NAME: phosphtydil choline       * UNABLE TO FIND      3,600 mg daily MEDICATION NAME: monolaurin       * UNABLE TO FIND      daily MEDICATION NAME: lithium orotate       * UNABLE TO FIND      Take 1 capsule by mouth daily MEDICATION NAME: resveratrol       * Notice:  This list has 5 medication(s) that are the same as other medications prescribed for you. Read the directions carefully, and ask your doctor or other care provider to review them with you.

## 2017-04-05 ENCOUNTER — TELEPHONE (OUTPATIENT)
Dept: OBGYN | Facility: CLINIC | Age: 47
End: 2017-04-05

## 2017-04-05 NOTE — TELEPHONE ENCOUNTER
Pt calls back, states she needs to change appt back to 04/14 appt she originally had d/t work schedule conflict. Unable to have appt 04/10 or 04/11.    Denies drainage from bumps. States bump increased in number from 2 to 3 after a couple days. Instruct pt to call if bumps increase in number or worsening symptoms.

## 2017-04-05 NOTE — TELEPHONE ENCOUNTER
"Pt calls, states she has appt scheduled with Dr. Sorenson for 04/17 to evaluate vaginal \"polyps\". Indicates the discomfort from them has increased and asking for sooner appt.     States she had 2 bumps in her vagina which has increased to 3 bumps now. Denies discoloration, itching. States she knows what infection looks like and they don't look infected. Notes discomfort when urine hits them.    Rescheduled appt for 04/10.  "

## 2017-04-14 ENCOUNTER — OFFICE VISIT (OUTPATIENT)
Dept: OBGYN | Facility: CLINIC | Age: 47
End: 2017-04-14
Payer: COMMERCIAL

## 2017-04-14 VITALS
SYSTOLIC BLOOD PRESSURE: 104 MMHG | DIASTOLIC BLOOD PRESSURE: 60 MMHG | TEMPERATURE: 98 F | WEIGHT: 107.3 LBS | BODY MASS INDEX: 19.01 KG/M2

## 2017-04-14 DIAGNOSIS — N76.2 ACUTE VULVITIS: Primary | ICD-10-CM

## 2017-04-14 PROCEDURE — 99213 OFFICE O/P EST LOW 20 MIN: CPT | Performed by: OBSTETRICS & GYNECOLOGY

## 2017-04-14 RX ORDER — NYSTATIN AND TRIAMCINOLONE ACETONIDE 100000; 1 [USP'U]/G; MG/G
OINTMENT TOPICAL 2 TIMES DAILY
Qty: 30 G | Refills: 1 | Status: SHIPPED | OUTPATIENT
Start: 2017-04-14 | End: 2017-07-03

## 2017-04-14 ASSESSMENT — PATIENT HEALTH QUESTIONNAIRE - PHQ9: SUM OF ALL RESPONSES TO PHQ QUESTIONS 1-9: 17

## 2017-04-14 NOTE — PROGRESS NOTES
SUBJECTIVE:  Valencia Ye is an 46 year old  P5 woman who presents for new onset vulvar symptoms     -noted onset of symptoms in vulvar area aver last month; began after using antibiotics     -she noted polyps vaginally and red polyp at rectal verge     -'vaginal' polyps symptomatic        Past Medical History:   Diagnosis Date     Aortitis (H)      Asthma     associated with Churg Antonina syndrome     Churg-Antonina syndrome (H)     dx      Eustachian tube dysfunction      Goiter      Hearing loss, conductive      Hypertension      Hypocalcemia      Irritable bowel syndrome      Major depressive disorder      Mannose-binding lectin deficiency      Nonspecific abnormal results of thyroid function study      Osteoporosis      Pericarditis      and      Pneumonia     4/23/10     Recurrent UTI      Rheumatoid vasculitis (H)      Sinusitis, chronic      Steroid long-term use      Varicose veins of leg with swelling      Past Surgical History:   Procedure Laterality Date      SECTION       COLONOSCOPY Left 2014    Procedure: COMBINED COLONOSCOPY, SINGLE OR MULTIPLE BIOPSY/POLYPECTOMY BY BIOPSY;  Surgeon: Js Pinedo MD;  Location:  GI     ENT SURGERY       HC COLP CERVIX/UPPER VAGINA W LOOP ELEC BX CERVIX       HYSTERECTOMY  2009    without oopherectomy     HYSTERECTOMY, PAP NO LONGER INDICATED      cervix removed      PICC INSERTION  10/10/2013    5fr DL PASV PICC, 43cm (1cm external) in the L basilic vein w/ tip in the low SVC.     SINUS SURGERY       TUBAL LIGATION       Current Outpatient Prescriptions   Medication     predniSONE (DELTASONE) 1 MG tablet     acetylcysteine (MUCOMYST) 20 % injection     atenolol (TENORMIN) 25 MG tablet     albuterol (2.5 MG/3ML) 0.083% neb solution     budesonide-formoterol (SYMBICORT) 160-4.5 MCG/ACT Inhaler     Ipratropium-Albuterol (COMBIVENT RESPIMAT)  MCG/ACT inhaler     clonazePAM (KLONOPIN) 0.5 MG tablet      UNABLE TO FIND     fluticasone (FLONASE) 50 MCG/ACT nasal spray     UNABLE TO FIND     ciprofloxacin-dexamethasone (CIPRODEX) otic suspension     UNABLE TO FIND     UNABLE TO FIND     conjugated estrogens (PREMARIN) vaginal cream     cholecalciferol 5000 UNITS CAPS     glucosamine-chondroitin (GLUCOSAMINE CHONDR COMPLEX) 500-400 MG CAPS     Omega-3 Fatty Acids (FISH OIL) 1200 MG capsule     fluvoxaMINE (LUVOX) 25 MG tablet     No current facility-administered medications for this visit.      Allergies   Allergen Reactions     Colistimethate Anaphylaxis     Colymycin M [Colistimethate Sodium] Anaphylaxis     Clindamycin Rash     Zithromax [Azithromycin]      Heart palpitation     Social History   Substance Use Topics     Smoking status: Never Smoker     Smokeless tobacco: Never Used     Alcohol use 0.0 oz/week     0 Standard drinks or equivalent per week      Comment: 12 drinks/week       Review of Systems  CONSTITUTIONAL:NEGATIVE  EYES: NEGATIVE  ENT/MOUTH: NEGATIVE  RESP: NEGATIVE  CV: NEGATIVE  GI: NEGATIVE  : see above    OBJECTIVE:  /60  Temp 98  F (36.7  C) (Oral)  Wt 107 lb 4.8 oz (48.7 kg)  LMP 02/01/2009  BMI 19.01 kg/m2   EXAM:  GENERAL APPEARANCE: healthy, alert and no distress      Pelvic Exam:  Vulva: hypertrophied remnants of hymenal ring      -likely what noted by patient as vaginal polyps  Vagina: negative  Rectum; hemorrhoid    ASSESSMENT:  Vulvitis      -pathophysiology reviewed, recommend therapy with Mycolog  Hemorrhoid      -does not desires therapy at present    PLAN:  As above      Health Maintenance   Topic Date Due     CYSTIC FIBROSIS ANNUAL STUDY  1970     ASTHMA ACTION PLAN Q1 YR (NO INBASKET)  10/02/1977     CYSTIC FIBROSIS DEXA  10/10/2014     PHQ-9 Q1 MONTH (NO INBASKET)  04/17/2017     INFLUENZA VACCINE (SYSTEM ASSIGNED)  09/01/2017     ASTHMA CONTROL TEST Q6 MOS (NO INBASKET)  09/17/2017     ROSIBEL QUESTIONNAIRE 1 YEAR  10/17/2017     COLON CANCER SCREEN (SYSTEM  ASSIGNED)  12/19/2017     TETANUS IMMUNIZATION (SYSTEM ASSIGNED)  01/20/2021

## 2017-04-14 NOTE — MR AVS SNAPSHOT
After Visit Summary   4/14/2017    Valencia Ye    MRN: [id number]           Patient Information     Date Of Birth          1970        Visit Information        Provider Department      4/14/2017 11:00 AM Robin Sorenson MD [facility]        Today's Diagnoses     Acute vulvitis    -  1       Follow-ups after your visit        Your next 10 appointments already scheduled     May 05, 2017  2:20 PM CDT   (Arrive by 2:05 PM)   RETURN GENERAL with Randal Logan OD   [facility] Ophthalmology ([facility])    [address] [place] [address]  [address] [address] [phone]            Jun 02, 2017 11:00 AM CDT   New Visit with Yvrose Moeller MD   [facility] ([facility])    [address]  [facility] [id number] [phone]            Jan 26, 2018  7:30 AM CST   CF LOOP with  PFL CF   [facility] Pulmonary Function Testing ([facility])    [address] [place] [id number] [phone]            Jan 26, 2018  8:10 AM CST   (Arrive by 7:55 AM)   RETURN CYSTIC FIBROSIS VISIT with Dagoberto Briscoe MD   [facility] for [facility] ([facility])    [address] [place] [id number] [phone]              Who to contact     If you have questions or need follow up information about today's clinic visit or your schedule please contact [facility] directly at [phone].  Normal or non-critical lab and imaging results will be communicated to you by MyChart, letter or phone within 4 business days after the clinic has received the results. If you do not hear from us within 7 days, please contact the clinic through MyChart or phone. If you have a critical or abnormal lab result, we will notify you by phone as soon as possible.  Submit  refill requests through [employer] or call your pharmacy and they will forward the refill request to us. Please allow 3 business days for your refill to be completed.          Additional Information About Your Visit        [employer] Information     [employer] gives you secure access to your electronic health record. If you see a primary care provider, you can also send messages to your care team and make appointments. If you have questions, please call your primary care clinic.  If you do not have a primary care provider, please call [phone] and they will assist you.        Care EveryWhere ID     This is your Care EveryWhere ID. This could be used by other organizations to access your [facility] medical records  [id number]        Your Vitals Were     Temperature Last Period BMI (Body Mass Index)             98  F (36.7  C) (Oral) 02/01/2009 19.01 kg/m2          Blood Pressure from Last 3 Encounters:   04/14/17 104/60   03/17/17 110/70   03/10/17 98/73    Weight from Last 3 Encounters:   04/14/17 107 lb 4.8 oz (48.7 kg)   03/17/17 109 lb 8 oz (49.7 kg)   03/10/17 102 lb (46.3 kg)              Today, you had the following     No orders found for display         Today's Medication Changes          These changes are accurate as of: 4/14/17 11:31 AM.  If you have any questions, ask your nurse or doctor.               Start taking these medicines.        Dose/Directions    nystatin-triamcinolone ointment   Commonly known as:  MYCOLOG   Used for:  Acute vulvitis        Apply topically 2 times daily   Quantity:  30 g   Refills:  1         These medicines have changed or have updated prescriptions.        Dose/Directions    * UNABLE TO FIND   This may have changed:  Another medication with the same name was removed. Continue taking this medication, and follow the directions you see here.   Used for:  Abdominal pain, right upper quadrant        Dose:  12.5 mg   Take 12.5 mg by mouth daily MEDICATION NAME: NADH with coQ10    Refills:  0       * UNABLE TO FIND   This may have changed:  Another medication with the same name was removed. Continue taking this medication, and follow the directions you see here.        Dose:  1 capsule   Take 1 capsule by mouth daily MEDICATION NAME: phosphtydil choline   Refills:  0       * UNABLE TO FIND   This may have changed:  Another medication with the same name was removed. Continue taking this medication, and follow the directions you see here.        Dose:  3600 mg   3,600 mg daily MEDICATION NAME: monolaurin   Refills:  0       * UNABLE TO FIND   This may have changed:  Another medication with the same name was removed. Continue taking this medication, and follow the directions you see here.        Dose:  1 capsule   Take 1 capsule by mouth daily MEDICATION NAME: resveratrol   Refills:  0       * Notice:  This list has 4 medication(s) that are the same as other medications prescribed for you. Read the directions carefully, and ask your doctor or other care provider to review them with you.         Where to get your medicines      These medications were sent to [facility] [address] [place] [address] AT [facility] [address]  [address] [place] [address] E, [facility] [address]     Phone:  [phone]     nystatin-triamcinolone ointment                Primary Care Provider Office Phone # Fax #    Morelia Simental [phone]       [facility] [address]  [facility] [address]        Thank you!     Thank you for choosing [facility]  for your care. Our goal is always to provide you with excellent care. Hearing back from our patients is one way we can continue to improve our services. Please take a few minutes to complete the written survey that you may receive in the mail after your visit with us. Thank you!             Your Updated Medication List - Protect others around you: Learn how to safely use, store and throw away your  medicines at www.disposemymeds.org.          This list is accurate as of: 4/14/17 11:31 AM.  Always use your most recent med list.                   Brand Name Dispense Instructions for use    acetylcysteine 20 % nebulizer solution    MUCOMYST    240 mL    Take 4 mLs by nebulization 2 times daily Use with albuterol solution. Discard open bottle of Mucomyst after 96 hours.       albuterol (2.5 MG/3ML) 0.083% neb solution     60 vial    Take 1 vial (2.5 mg) by nebulization 2 times daily Use with Mucomyst       atenolol 25 MG tablet    TENORMIN    45 tablet    1/4 tab at bedtime       budesonide-formoterol 160-4.5 MCG/ACT Inhaler    SYMBICORT    1 Inhaler    Inhale 2 puffs into the lungs 2 times daily       cholecalciferol 5000 UNITS Caps      Take by mouth daily       ciprofloxacin-dexamethasone otic suspension    CIPRODEX    7.5 mL    Instill 3 drops into the affected ear twice daily as needed.       clonazePAM 0.5 MG tablet    klonoPIN    20 tablet    Take 0.5 tablets (0.25 mg) by mouth 2 times daily as needed for anxiety       conjugated estrogens cream    PREMARIN    42.5 g    Place  vaginally three times a week. Use 0.5 grams weekly as directed       Fish Oil 1200 MG Caps      Take 3 capsules by mouth daily.       fluticasone 50 MCG/ACT spray    FLONASE    48 g    Spray 2 sprays into both nostrils daily       fluvoxaMINE 25 MG tablet    LUVOX    30 tablet    Take one half to one tablet by mouth at bedtime       GLUCOSAMINE CHONDR COMPLEX 500-400 MG Caps per capsule   Generic drug:  glucosamine-chondroitin      Take 2 capsules by mouth daily.       Ipratropium-Albuterol  MCG/ACT inhaler    COMBIVENT RESPIMAT    1 Inhaler    Inhale 1 puff into the lungs 4 times daily Not to exceed 6 doses per day.       nystatin-triamcinolone ointment    MYCOLOG    30 g    Apply topically 2 times daily       predniSONE 1 MG tablet    DELTASONE    200 tablet    Take 5 mgs one day, alternating with 6 mgs the next.       *  UNABLE TO FIND      Take 12.5 mg by mouth daily MEDICATION NAME: NADH with coQ10       * UNABLE TO FIND      Take 1 capsule by mouth daily MEDICATION NAME: phosphtydil choline       * UNABLE TO FIND      3,600 mg daily MEDICATION NAME: monolaurin       * UNABLE TO FIND      Take 1 capsule by mouth daily MEDICATION NAME: resveratrol       * Notice:  This list has 4 medication(s) that are the same as other medications prescribed for you. Read the directions carefully, and ask your doctor or other care provider to review them with you.

## 2017-04-14 NOTE — NURSING NOTE
"Chief Complaint   Patient presents with     Vaginal Problem     Sudden onset of vaginal itching end of March this year with vaginal bumps.         Initial /60  Temp 98  F (36.7  C) (Oral)  Wt 107 lb 4.8 oz (48.7 kg)  LMP 2009  BMI 19.01 kg/m2 Estimated body mass index is 19.01 kg/(m^2) as calculated from the following:    Height as of 3/17/17: 5' 3\" (1.6 m).    Weight as of this encounter: 107 lb 4.8 oz (48.7 kg).  BP completed using cuff size: regular        The following HM Due: NONE      The following patient reported/Care Every where data was sent to:  P ABSTRACT QUALITY INITIATIVES [25085]  NA     n/a            "

## 2017-05-16 ENCOUNTER — MYC MEDICAL ADVICE (OUTPATIENT)
Dept: PSYCHIATRY | Facility: CLINIC | Age: 47
End: 2017-05-16

## 2017-05-16 ENCOUNTER — OFFICE VISIT (OUTPATIENT)
Dept: PSYCHIATRY | Facility: CLINIC | Age: 47
End: 2017-05-16
Attending: CLINICAL NURSE SPECIALIST
Payer: COMMERCIAL

## 2017-05-16 VITALS
DIASTOLIC BLOOD PRESSURE: 73 MMHG | BODY MASS INDEX: 18.88 KG/M2 | WEIGHT: 106.6 LBS | SYSTOLIC BLOOD PRESSURE: 110 MMHG | HEART RATE: 77 BPM

## 2017-05-16 DIAGNOSIS — Z92.241 HISTORY OF SYSTEMIC STEROID THERAPY: ICD-10-CM

## 2017-05-16 DIAGNOSIS — F33.1 MODERATE EPISODE OF RECURRENT MAJOR DEPRESSIVE DISORDER (H): ICD-10-CM

## 2017-05-16 DIAGNOSIS — J84.9 ILD (INTERSTITIAL LUNG DISEASE) (H): ICD-10-CM

## 2017-05-16 DIAGNOSIS — F41.9 ANXIETY: Primary | ICD-10-CM

## 2017-05-16 DIAGNOSIS — J47.9 BRONCHIECTASIS (H): Primary | ICD-10-CM

## 2017-05-16 DIAGNOSIS — R53.82 CHRONIC FATIGUE: ICD-10-CM

## 2017-05-16 PROCEDURE — 99212 OFFICE O/P EST SF 10 MIN: CPT | Mod: ZF

## 2017-05-16 RX ORDER — NALTREXONE 100 %
POWDER (GRAM) MISCELLANEOUS
Qty: 30 G | Refills: 1 | Status: SHIPPED | OUTPATIENT
Start: 2017-05-16 | End: 2017-07-18

## 2017-05-16 RX ORDER — CLONAZEPAM 0.5 MG/1
0.25 TABLET ORAL 2 TIMES DAILY PRN
Qty: 20 TABLET | Refills: 1
Start: 2017-05-16 | End: 2018-02-22

## 2017-05-16 NOTE — MR AVS SNAPSHOT
After Visit Summary   5/16/2017    Valencia Ye    MRN: [id number]           Patient Information     Date Of Birth          1970        Visit Information        Provider Department      5/16/2017 9:15 AM Marlyn Flores APRN [facility]        Today's Diagnoses     Anxiety    -  1    Moderate episode of recurrent major depressive disorder (H)        ILD (interstitial lung disease) (H)        History of systemic steroid therapy        Chronic fatigue           Follow-ups after your visit        Follow-up notes from your care team     Return in about 2 months (around 7/16/2017).      Your next 10 appointments already scheduled     Jun 09, 2017  4:00 PM CDT   CF LOOP with  PFL CF   [facility] Pulmonary Function Testing ([facility])    [address] [place] [address]   [phone]            Jun 09, 2017  4:30 PM CDT   (Arrive by 4:15 PM)   RETURN CYSTIC FIBROSIS VISIT with Dagoberto Briscoe MD   [facility] ([facility])    [address] [place] [address]   [phone]            Jun 23, 2017  8:15 AM CDT   Lab with  LAB    [facility] ([facility])    [address] [place] [address]   [phone]            Jun 23, 2017  8:30 AM CDT   PFT VISIT with  PFL C   [facility] Pulmonary Function Testing ([facility])    [address] [place] [address]   [phone]            Jun 23, 2017  8:50 AM CDT   (Arrive by 8:35 AM)   RETURN CYSTIC FIBROSIS VISIT with Dagoberto Briscoe MD   [facility] ([facility])    [address] [place] [address]   [phone]            Jul 18, 2017  7:15 AM CDT   Adult Med Follow UP with SUNNY Ayala CNS    [facility] ([facility])    [address]  [address] [facility] [address]   [phone]            Jan 26, 2018  7:30 AM CST   CF LOOP with UC PFL CF   [facility] Pulmonary Function Testing ([facility])    [address] [facility] [facility] [address]  [address] [facility] [id number] [phone]            Jan 26, 2018  8:10 AM CST   (Arrive by 7:55 AM)   RETURN CYSTIC FIBROSIS VISIT with Dagoberto Briscoe MD   [facility] for Lung Science and Health ([facility])    [address] [facility] [address]  [address] [facility] [id number] [phone]              Who to contact     Please call your clinic at [phone] to:    Ask questions about your health    Make or cancel appointments    Discuss your medicines    Learn about your test results    Speak to your doctor   If you have compliments or concerns about an experience at your clinic, or if you wish to file a complaint, please contact [facility] Patient Relations at [phone] or email us at [email or web address]         Additional Information About Your Visit        [employer] Information     [employer] gives you secure access to your electronic health record. If you see a primary care provider, you can also send messages to your care team and make appointments. If you have questions, please call your primary care clinic.  If you do not have a primary care provider, please call [phone] and they will assist you.      [employer] is an electronic gateway that provides easy, online access to your medical records. With [employer], you can request a clinic appointment, read your test results, renew a prescription or communicate with your care team.     To access your existing account, please contact your [facility] or call [phone] for assistance.        Care EveryWhere ID     This is your Care EveryWhere ID. This  could be used by other organizations to access your [facility] medical records  [id number]        Your Vitals Were     Pulse Last Period BMI (Body Mass Index)             77 02/01/2009 18.88 kg/m2          Blood Pressure from Last 3 Encounters:   06/02/17 90/66   05/26/17 106/60   05/19/17 108/74    Weight from Last 3 Encounters:   06/02/17 48.1 kg (106 lb 1.6 oz)   05/19/17 46.7 kg (103 lb)   05/16/17 48.4 kg (106 lb 9.6 oz)              Today, you had the following     No orders found for display         Today's Medication Changes          These changes are accurate as of: 5/16/17 11:59 PM.  If you have any questions, ask your nurse or doctor.               Start taking these medicines.        Dose/Directions    Naltrexone Powd   Used for:  Chronic fatigue, History of systemic steroid therapy, ILD (interstitial lung disease) (H)   Started by:  Marlyn Flores APRN CNS        4.5mg compounded in capsule to take one daily by mouth. Please dispense 30 capsules   Quantity:  30 g   Refills:  1         Stop taking these medicines if you haven't already. Please contact your care team if you have questions.     fluvoxaMINE 25 MG tablet   Commonly known as:  LUVOX   Stopped by:  Marlyn Flores APRN CNS                Where to get your medicines      These medications were sent to [facility] [place], [place] - [address]  [address], [facility] [address]     Phone:  [phone]     Naltrexone Powd         Some of these will need a paper prescription and others can be bought over the counter.  Ask your nurse if you have questions.     You don't need a prescription for these medications     clonazePAM 0.5 MG tablet                Primary Care Provider Office Phone # Fax #    Morelia Simental [phone]       [facility] [address]  [facility] [address]        Thank you!     Thank you for choosing [facility]  for your care. Our goal is  always to provide you with excellent care. Hearing back from our patients is one way we can continue to improve our services. Please take a few minutes to complete the written survey that you may receive in the mail after your visit with us. Thank you!             Your Updated Medication List - Protect others around you: Learn how to safely use, store and throw away your medicines at www.disposemymeds.org.          This list is accurate as of: 5/16/17 11:59 PM.  Always use your most recent med list.                   Brand Name Dispense Instructions for use    acetylcysteine 20 % nebulizer solution    MUCOMYST    240 mL    Take 4 mLs by nebulization 2 times daily Use with albuterol solution. Discard open bottle of Mucomyst after 96 hours.       albuterol (2.5 MG/3ML) 0.083% neb solution     60 vial    Take 1 vial (2.5 mg) by nebulization 2 times daily Use with Mucomyst       budesonide-formoterol 160-4.5 MCG/ACT Inhaler    SYMBICORT    1 Inhaler    Inhale 2 puffs into the lungs 2 times daily       cholecalciferol 5000 UNITS Caps      Take by mouth daily       ciprofloxacin-dexamethasone otic suspension    CIPRODEX    7.5 mL    Instill 3 drops into the affected ear twice daily as needed.       clonazePAM 0.5 MG tablet    klonoPIN    20 tablet    Take 0.5 tablets (0.25 mg) by mouth 2 times daily as needed for anxiety       conjugated estrogens cream    PREMARIN    42.5 g    Place  vaginally three times a week. Use 0.5 grams weekly as directed       Fish Oil 1200 MG Caps      Take 3 capsules by mouth daily.       fluticasone 50 MCG/ACT spray    FLONASE    48 g    Spray 2 sprays into both nostrils daily       GLUCOSAMINE CHONDR COMPLEX 500-400 MG Caps per capsule   Generic drug:  glucosamine-chondroitin      Take 2 capsules by mouth daily.       Ipratropium-Albuterol  MCG/ACT inhaler    COMBIVENT RESPIMAT    1 Inhaler    Inhale 1 puff into the lungs 4 times daily Not to exceed 6 doses per day.       Naltrexone  Powd     30 g    4.5mg compounded in capsule to take one daily by mouth. Please dispense 30 capsules       nystatin-triamcinolone ointment    MYCOLOG    30 g    Apply topically 2 times daily       predniSONE 1 MG tablet    DELTASONE    200 tablet    Take 5 mgs one day, alternating with 6 mgs the next.       * UNABLE TO FIND      Take 12.5 mg by mouth daily MEDICATION NAME: NADH with coQ10       * UNABLE TO FIND      Take 1 capsule by mouth daily MEDICATION NAME: phosphtydil choline       * UNABLE TO FIND      3,600 mg daily MEDICATION NAME: monolaurin       * UNABLE TO FIND      Take 1 capsule by mouth daily MEDICATION NAME: resveratrol       UNKNOWN MED DOSAGE      Lithium orotate       * Notice:  This list has 4 medication(s) that are the same as other medications prescribed for you. Read the directions carefully, and ask your doctor or other care provider to review them with you.

## 2017-05-16 NOTE — PROGRESS NOTES
Outpatient Psychiatry Progress Note     Provider: SUNNY Ayala CNS  Date: 2017  Service:  Medication follow up with counseling.   Patient Identification: Valencia Ye  : 1970   MRN: [id number]    Valencia Ye is a 46 year old year old female who presents for ongoing psychiatric care.  Valencia Ye was last seen in clinic on 17.   At that time,   Assessment & Plan      Valencia Ye is seen today for follow up and reports some improvement in anxiety and mood symptoms, but reports terminal insomnia since starting Luvox. Pt wants to continue current dose despite some sleep difficulties to determine medication effectiveness in current dose. Discussed to try taking Luvox in AM (currently taking at HS) to see if it makes difference in night time sleeping.  Diagnosis  Axis 1: Anxiety, Depression, unspecified  Axis 2: none  Axis 3: See problem list in the medical record     Plan:  Medication: Continue Luvox 12.5mg and Klonopin 1/4 tablet at time   OTC Recommendations: none  Lab Orders: none  Referrals: none  Release of Information: none  Future Treatment Considerations:per symptoms  Return for Follow Up: 4 weeks      ____________________________________________________________________________________________________________________________________________    2017   Per My chart message on 17  Hello - The luvox was working nicely at a very low dose: decreased both anxiety and depression, helped some with energy. I noticed my heart murmur/irregular heart beat had gone wild - no change in caffeine, etc. It became so severe that it was frightening and I'd have to pause sometimes (while sitting at my desk - so this wasn't a matter of physical activity exacerbating the problem) and just work on calming my heart beat. The only new thing I had on board (diet, meds, sleep, routine, exercise, caffeine, etc) was the luvox. I nixed it and the heart beat problem  cleared up. (Of course internet search turns up arrhythmia associated with Luvox, but if I'd grown an extra head and four arms I could probably find an internet source to support that as a side effect, too.) I have even upped my caffeine since stopping Luvox with no further aggravation of the heart rhythm. Unfortunately, this leaves me with only the 1/4 - 1/2 tab of clonazepam nightly on board for anxiety and depression. I'm not   expecting that there is anything else to try, but thought I'd send this update - thank you -  Valencia  Today Valencia reports she has increased Lithium orotate to twice a day 10mg each.  She is feeling more calm and helps to keep emotions in check.   Hasn't been using klonopin as often and when she does uses only about 1/8 of a tablet.  Has a cold and worried about getting pneumonia.  Feeling that due to fatigue she isn't take care of her children well enough  Side effects of medication include: see above  Psychiatric Review of Systems:  The patient endorses symptoms of depression: In the last 2 weeks per PHQ 9 several days feeling depressed, sleep disturbance, concentration problems, thoughts of being better off dead.  She  patient endorses symptoms of anxiety : feeling overwhelmed, worry about being behind in care of family, home, usual activities  She endorses symptoms of gagandeep including none.    She endorses symptoms of psychosis including no psychotic symptoms.       Review of Medical Systems:  Sleep: mild  Energy: low secondary to health  Concentration: mild to moderate  Appetite: eating healthy, some difficulty with appetite due to health  GI Concerns: no new concerns  Cardiac concerns: no new concerns  Neurological concerns: no new concerns  Other medical concerns: pulmonary disease  Current Substance Use:  Alcohol:denies  Other drugs:none  Caffeine:daily in the am  Nicotine: none  Past Medical History:   Past Medical History:   Diagnosis Date     Aortitis (H)      Asthma     associated  with Churg Antonina syndrome     Churg-Antonina syndrome (H)     dx 1990     Eustachian tube dysfunction      Goiter      Hearing loss, conductive      Hypertension      Hypocalcemia      Irritable bowel syndrome      Major depressive disorder      Mannose-binding lectin deficiency 2014     Nonspecific abnormal results of thyroid function study      Osteoporosis      Pericarditis     1990 and 1992     Pneumonia     4/23/10     Recurrent UTI      Rheumatoid vasculitis (H)      Sinusitis, chronic      Steroid long-term use      Varicose veins of leg with swelling      Patient Active Problem List   Diagnosis     Goiter     History of systemic steroid therapy     ILD (interstitial lung disease) (H)     Palpitations     Churg-Antonina syndrome (H)     History of eating disorder     Bronchiectasis (H)     Asthma     Anxiety     Chronic fatigue     Mannose-binding lectin deficiency     Benign essential hypertension     Moderate episode of recurrent major depressive disorder (H)       Allergies:   Allergies   Allergen Reactions     Colistimethate Anaphylaxis     Colymycin M [Colistimethate Sodium] Anaphylaxis     Clindamycin Rash     Zithromax [Azithromycin]      Heart palpitation          Current Medications     Current Outpatient Prescriptions Ordered in [facility]   Medication Sig Dispense Refill     nystatin-triamcinolone (MYCOLOG) ointment Apply topically 2 times daily 30 g 1     predniSONE (DELTASONE) 1 MG tablet Take 5 mgs one day, alternating with 6 mgs the next. 200 tablet 5     fluvoxaMINE (LUVOX) 25 MG tablet Take one half to one tablet by mouth at bedtime (Patient not taking: Reported on 4/14/2017) 30 tablet 1     acetylcysteine (MUCOMYST) 20 % injection Take 4 mLs by nebulization 2 times daily Use with albuterol solution. Discard open bottle of Mucomyst after 96 hours. 240 mL 11     atenolol (TENORMIN) 25 MG tablet 1/4 tab at bedtime 45 tablet 1     albuterol (2.5 MG/3ML) 0.083% neb solution Take 1 vial (2.5 mg) by  "nebulization 2 times daily Use with Mucomyst 60 vial 11     budesonide-formoterol (SYMBICORT) 160-4.5 MCG/ACT Inhaler Inhale 2 puffs into the lungs 2 times daily 1 Inhaler 11     Ipratropium-Albuterol (COMBIVENT RESPIMAT)  MCG/ACT inhaler Inhale 1 puff into the lungs 4 times daily Not to exceed 6 doses per day. 1 Inhaler 11     clonazePAM (KLONOPIN) 0.5 MG tablet Take 0.5 tablets (0.25 mg) by mouth 2 times daily as needed for anxiety 20 tablet 1     UNABLE TO FIND 3,600 mg daily MEDICATION NAME: monolaurin       fluticasone (FLONASE) 50 MCG/ACT nasal spray Spray 2 sprays into both nostrils daily 48 g 3     UNABLE TO FIND Take 1 capsule by mouth daily MEDICATION NAME: resveratrol       ciprofloxacin-dexamethasone (CIPRODEX) otic suspension Instill 3 drops into the affected ear twice daily as needed. 7.5 mL 1     UNABLE TO FIND Take 12.5 mg by mouth daily MEDICATION NAME: NADH with coQ10       UNABLE TO FIND Take 1 capsule by mouth daily MEDICATION NAME: phosphtydil choline       conjugated estrogens (PREMARIN) vaginal cream Place  vaginally three times a week. Use 0.5 grams weekly as directed 42.5 g 0     cholecalciferol 5000 UNITS CAPS Take by mouth daily       glucosamine-chondroitin (GLUCOSAMINE CHONDR COMPLEX) 500-400 MG CAPS Take 2 capsules by mouth daily.       Omega-3 Fatty Acids (FISH OIL) 1200 MG capsule Take 3 capsules by mouth daily.       No current Epic-ordered facility-administered medications on file.         Mental Status Exam     Appearance:  Casually dressed and Well groomed  Behavior/relationship to examiner/demeanor:  Cooperative  Orientation: Oriented to person, place, time and situation  Psychomotor: normal form  Speech Rate:  Normal  Speech Spontaneity:  Normal  Mood:  \"difficult\"  Affect:  Appropriate/mood-congruent  Thought Process (Associations):  Goal directed  Thought Content:  no overt psychosis, denies suicidal ideation, intent or thoughts and patient does not appear to be " responding to internal stimuli  Abnormal Perception:  None  Attention/Concentration:  Normal  Language:  Intact  Insight:  Good  Judgment:  Good      Results     Vital signs: /73  Pulse 77  Wt 48.4 kg (106 lb 9.6 oz)  LMP 02/01/2009  BMI 18.88 kg/m2    Laboratory Data:  no new data reviewed    Assessment & Plan      Valencia Ye is seen today for follow up and reports she did not tolerate Luvox and discontinued it. Some benefit after she decided to increase lithium orotate slightly. Physically not feeling well and overwhelmed with work.  Gave information on LDN which is not necessarily indicated for mood unless symptoms are secondary to inflammation.     Diagnosis  Axis 1: anxiety, Major Depression, Recurrent moderate  Axis 2: none  Axis 3: See problem list in the medical record    Plan:  Medication: Continue current medication  OTC Recommendations: given information on low dose Naltrexone    Lab Orders:  none  Referrals: none, patient is not interested in therapy referral  Release of Information: none  Future Treatment Considerations:per consideration of LDN  Return for Follow Up:8 weeks   The risks, benefits, alternatives and side effects have been discussed and are understood by the patient. The patient understands the risks of using street drugs or alcohol. There are no medical contraindications, the patient agrees to treatment, and has the capacity to do so. The patient understands to call 911 or come to the nearest ED if life threatening or urgent symptoms present.  Over 50% of this time was spent counseling the patient and/or coordinating care regarding review of social and occupational functioning.  In addition patient was counseled on health and wellness practices to augment medication treatment of symptoms. See note for details.    Marlyn Flores, APRN CNS 5/16/2017

## 2017-05-16 NOTE — TELEPHONE ENCOUNTER
Message  Received: Today       Marlyn Flores APRN CNS Blake, Katherine J, RN                     Please phone in Klonopin

## 2017-05-16 NOTE — NURSING NOTE
Chief Complaint   Patient presents with     Recheck Medication     moderate episode of recurrent major depressive disorder     Reviewed allergies, smoking status, and pharmacy preference  Administered abuse screening questions   Obtained weight, blood pressure and heart rate

## 2017-05-17 DIAGNOSIS — J47.9 BRONCHIECTASIS (H): Primary | ICD-10-CM

## 2017-05-17 NOTE — TELEPHONE ENCOUNTER
[facility]  May 17, 2017    Morelia Simental    Provider: Dagoberto Briscoe MD    Caller: Patient     Clinical information:  Valencia Ye called with complaint of having caught a cold last week that seems to have resolved. However, since the weekend, waking at night with cough, increased mucus, shortness of breath, muscle aches and feeling exhausted. Occasional chest pain. Vesting twice a day. Has concern for pneumonia.    Plan:   Discussed with Dr Briscoe:  See in clinic on Friday with CXR.  Call back with any new or worsening symptoms/concerns.    Caller verbalized understanding of plan and agrees with advice given.

## 2017-05-19 ENCOUNTER — OFFICE VISIT (OUTPATIENT)
Dept: PULMONOLOGY | Facility: CLINIC | Age: 47
End: 2017-05-19
Attending: INTERNAL MEDICINE
Payer: COMMERCIAL

## 2017-05-19 VITALS
HEART RATE: 69 BPM | HEIGHT: 62 IN | BODY MASS INDEX: 18.95 KG/M2 | DIASTOLIC BLOOD PRESSURE: 74 MMHG | RESPIRATION RATE: 16 BRPM | OXYGEN SATURATION: 97 % | WEIGHT: 103 LBS | SYSTOLIC BLOOD PRESSURE: 108 MMHG

## 2017-05-19 DIAGNOSIS — J84.9 ILD (INTERSTITIAL LUNG DISEASE) (H): Primary | ICD-10-CM

## 2017-05-19 DIAGNOSIS — M30.1 CHURG-STRAUSS SYNDROME (H): ICD-10-CM

## 2017-05-19 DIAGNOSIS — D72.18 CHURG-STRAUSS SYNDROME (H): ICD-10-CM

## 2017-05-19 DIAGNOSIS — J47.0 BRONCHIECTASIS WITH ACUTE LOWER RESPIRATORY INFECTION (H): ICD-10-CM

## 2017-05-19 DIAGNOSIS — J47.9 BRONCHIECTASIS (H): ICD-10-CM

## 2017-05-19 DIAGNOSIS — D72.18 CHURG-STRAUSS SYNDROME (H): Primary | ICD-10-CM

## 2017-05-19 DIAGNOSIS — B99.9 RECURRENT INFECTIONS: Primary | ICD-10-CM

## 2017-05-19 DIAGNOSIS — R00.2 PALPITATIONS: ICD-10-CM

## 2017-05-19 DIAGNOSIS — M30.1 CHURG-STRAUSS SYNDROME (H): Primary | ICD-10-CM

## 2017-05-19 DIAGNOSIS — B99.9 RECURRENT INFECTIONS: ICD-10-CM

## 2017-05-19 DIAGNOSIS — J45.30 MILD PERSISTENT ASTHMA WITHOUT COMPLICATION: ICD-10-CM

## 2017-05-19 LAB
ALT SERPL W P-5'-P-CCNC: 16 U/L (ref 0–50)
BASOPHILS # BLD AUTO: 0.1 10E9/L (ref 0–0.2)
BASOPHILS NFR BLD AUTO: 0.6 %
CRP SERPL-MCNC: 13.3 MG/L (ref 0–8)
DIFFERENTIAL METHOD BLD: ABNORMAL
EOSINOPHIL # BLD AUTO: 0.1 10E9/L (ref 0–0.7)
EOSINOPHIL NFR BLD AUTO: 0.6 %
ERYTHROCYTE [DISTWIDTH] IN BLOOD BY AUTOMATED COUNT: 12.2 % (ref 10–15)
ERYTHROCYTE [SEDIMENTATION RATE] IN BLOOD BY WESTERGREN METHOD: 13 MM/H (ref 0–20)
EXPTIME-PRE: 8.32 SEC
FEF2575-%PRED-PRE: 52 %
FEF2575-PRE: 1.41 L/SEC
FEF2575-PRED: 2.7 L/SEC
FEFMAX-%PRED-PRE: 87 %
FEFMAX-PRE: 5.77 L/SEC
FEFMAX-PRED: 6.59 L/SEC
FEV1-%PRED-PRE: 100 %
FEV1-PRE: 2.54 L
FEV1FEV6-PRE: 65 %
FEV1FEV6-PRED: 83 %
FEV1FVC-PRE: 65 %
FEV1FVC-PRED: 83 %
FIFMAX-PRE: 4.92 L/SEC
FVC-%PRED-PRE: 128 %
FVC-PRE: 3.93 L
FVC-PRED: 3.07 L
GRAM STN SPEC: NORMAL
HCT VFR BLD AUTO: 39.6 % (ref 35–47)
HGB BLD-MCNC: 13.5 G/DL (ref 11.7–15.7)
IMM GRANULOCYTES # BLD: 0 10E9/L (ref 0–0.4)
IMM GRANULOCYTES NFR BLD: 0.3 %
LYMPHOCYTES # BLD AUTO: 0.6 10E9/L (ref 0.8–5.3)
LYMPHOCYTES NFR BLD AUTO: 4.9 %
Lab: NORMAL
MCH RBC QN AUTO: 34 PG (ref 26.5–33)
MCHC RBC AUTO-ENTMCNC: 34.1 G/DL (ref 31.5–36.5)
MCV RBC AUTO: 100 FL (ref 78–100)
MICRO REPORT STATUS: NORMAL
MONOCYTES # BLD AUTO: 0.5 10E9/L (ref 0–1.3)
MONOCYTES NFR BLD AUTO: 3.7 %
NEUTROPHILS # BLD AUTO: 11.3 10E9/L (ref 1.6–8.3)
NEUTROPHILS NFR BLD AUTO: 89.9 %
NRBC # BLD AUTO: 0 10*3/UL
NRBC BLD AUTO-RTO: 0 /100
PLATELET # BLD AUTO: 272 10E9/L (ref 150–450)
RBC # BLD AUTO: 3.97 10E12/L (ref 3.8–5.2)
SPECIMEN SOURCE: NORMAL
WBC # BLD AUTO: 12.6 10E9/L (ref 4–11)

## 2017-05-19 PROCEDURE — 87102 FUNGUS ISOLATION CULTURE: CPT | Performed by: INTERNAL MEDICINE

## 2017-05-19 PROCEDURE — 85025 COMPLETE CBC W/AUTO DIFF WBC: CPT | Performed by: INTERNAL MEDICINE

## 2017-05-19 PROCEDURE — 87205 SMEAR GRAM STAIN: CPT | Performed by: INTERNAL MEDICINE

## 2017-05-19 PROCEDURE — 87070 CULTURE OTHR SPECIMN AEROBIC: CPT | Performed by: INTERNAL MEDICINE

## 2017-05-19 PROCEDURE — 86140 C-REACTIVE PROTEIN: CPT | Performed by: INTERNAL MEDICINE

## 2017-05-19 PROCEDURE — 85652 RBC SED RATE AUTOMATED: CPT | Performed by: INTERNAL MEDICINE

## 2017-05-19 PROCEDURE — 36415 COLL VENOUS BLD VENIPUNCTURE: CPT | Performed by: INTERNAL MEDICINE

## 2017-05-19 PROCEDURE — 84460 ALANINE AMINO (ALT) (SGPT): CPT | Performed by: INTERNAL MEDICINE

## 2017-05-19 PROCEDURE — 99212 OFFICE O/P EST SF 10 MIN: CPT | Mod: ZF

## 2017-05-19 RX ORDER — ATENOLOL 25 MG/1
12.5 TABLET ORAL AT BEDTIME
Qty: 45 TABLET | Refills: 1 | COMMUNITY
Start: 2017-05-19 | End: 2017-07-20

## 2017-05-19 ASSESSMENT — PAIN SCALES - GENERAL: PAINLEVEL: NO PAIN (0)

## 2017-05-19 NOTE — PROGRESS NOTES
Reason for Visit  Valencia Ye is a 46 year old female who is being seen for Bronchiectasis.    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 She currently reports increased and darker sputum production, decreased exercise tolerance, and severe fatigue.  PFTs are slightly decreased today, however not significantly below her baseline. Left shift noted and WBC is mildly elevated with today's labs, consistent with infection. CXR was reviewed with the patient and appears to be relatively stable, no infiltrate suggestive of pneumonia appreciated on my review. The patient's symptom complex, labs and physical exam are consistent with a pulmonary exacerbation. The patient most recent cultures were done in 2014 and grew Staph, however prior to that she has grown a strain of Pseudomonas which is resistant to most oral antibiotics. For now, will treat potential Staph infection with Augmentin and await recent sputum culture and gram stain results. If the cultures are primarily Pseudomonas, we will place a PICC line and an appropriate antipseudomonal will be selected. She is allergic to clindamycin and azithromycin. I have encouraged her to continue vest therapies BID with albuterol and Mucomyst and twice daily inhaled steroids.  She has received her flu vaccine. I have asked her to call the pulmonary office with any increase in her symptoms.    I will see the patient in followup in 1 month with PFTs and a sputum culture.    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 The patient presented to the ED on 03/10/17 with a low grade fever (100.5), body aches, fatigue, subjective fevers and chills. She was found to have leukocytosis  "of 20.8 and elevated lactic acid, which normalized after IV saline was administered. The patient was also given oral Tylenol and IV toradol. A CXR showed no signs of pneumonia and so the patient was discharged for outpatient management. Her immunologist started her on Augmentin for a presumed pneumococcal infection on 03/12 and her symptoms did mostly resolve. The patient does note that since this infection her stamina has decreased, and she will have coughing fits with laughing.    Today she is not feeling well. The patient reports she recently contracted a cold at work, however her co-workers only had upper respiratory symptoms. This cold started in her upper respiratory tract and then went to her lungs. Initial symptoms of this cold included a sore throat, mild nasal congestion, and mild sinus pain.  Currently she is experiencing increased sputum production, increased night time dyspnea which wakes her up, and her peak flows have been decreased overall - recently they have been <300, and typically her baseline peak flow is around 410. Sputum is thick and grey-abdelrahman yellow in color in the mornings. Once she is moving around after a vest treatment sputum lightens to yellow but is still quite thick. At her baseline the patient does not produce any sputum. No recent episodes of hemoptysis. She reports that inhalers and vest treatments have provided minimal relief.  The patient also reports experiencing significant fatigue since the cold moved into her lungs. The patient was unable to go to work from Wednesday-Friday of this week. Currently she reports severe fatigue. The patient's exercise tolerance is decreased and she is unable to exercise. She did not notice any fevers, chills or night sweats with this illness.    Vest therapies increased to 20 minutes BID since she had a cold. Otherwise she typically only does vest therapy once daily for 20 minutes.    The patient has not had very many lung \"spasms\" (muscle cramps " in her chest) since her last appointment.     REVIEW OF SYSTEMS:     ENT: No sinus or ear pain, rhinorrhea or sore throat. Sinus symptoms have resolved.  She has occasional palpitations, though with no associated symptoms, longstanding and unchanged.    She has chronic myalgias and arthralgias, all unchanged. Previous body aches from March resolved.  She has no nausea, vomiting, diarrhea or abdominal pain.     A complete ROS was otherwise negative except as noted in the HPI.    Current Outpatient Prescriptions   Medication     UNKNOWN MED DOSAGE     clonazePAM (KLONOPIN) 0.5 MG tablet     Naltrexone POWD     nystatin-triamcinolone (MYCOLOG) ointment     predniSONE (DELTASONE) 1 MG tablet     acetylcysteine (MUCOMYST) 20 % injection     atenolol (TENORMIN) 25 MG tablet     albuterol (2.5 MG/3ML) 0.083% neb solution     budesonide-formoterol (SYMBICORT) 160-4.5 MCG/ACT Inhaler     Ipratropium-Albuterol (COMBIVENT RESPIMAT)  MCG/ACT inhaler     UNABLE TO FIND     fluticasone (FLONASE) 50 MCG/ACT nasal spray     UNABLE TO FIND     ciprofloxacin-dexamethasone (CIPRODEX) otic suspension     UNABLE TO FIND     UNABLE TO FIND     conjugated estrogens (PREMARIN) vaginal cream     cholecalciferol 5000 UNITS CAPS     glucosamine-chondroitin (GLUCOSAMINE CHONDR COMPLEX) 500-400 MG CAPS     Omega-3 Fatty Acids (FISH OIL) 1200 MG capsule     No current facility-administered medications for this visit.      Allergies   Allergen Reactions     Colistimethate Anaphylaxis     Colymycin M [Colistimethate Sodium] Anaphylaxis     Clindamycin Rash     Zithromax [Azithromycin]      Heart palpitation     Past Medical History:   Diagnosis Date     Aortitis (H)      Asthma     associated with Churg Antonina syndrome     Churg-Antonina syndrome (H)     dx 1990     Eustachian tube dysfunction      Goiter      Hearing loss, conductive      Hypertension      Hypocalcemia      Irritable bowel syndrome      Major depressive disorder       Mannose-binding lectin deficiency      Nonspecific abnormal results of thyroid function study      Osteoporosis      Pericarditis      and      Pneumonia     4/23/10     Recurrent UTI      Rheumatoid vasculitis (H)      Sinusitis, chronic      Steroid long-term use      Varicose veins of leg with swelling        Past Surgical History:   Procedure Laterality Date      SECTION       COLONOSCOPY Left 2014    Procedure: COMBINED COLONOSCOPY, SINGLE OR MULTIPLE BIOPSY/POLYPECTOMY BY BIOPSY;  Surgeon: Js Pinedo MD;  Location:  GI     ENT SURGERY       HC COLP CERVIX/UPPER VAGINA W LOOP ELEC BX CERVIX       HYSTERECTOMY  2009    without oopherectomy     HYSTERECTOMY, PAP NO LONGER INDICATED      cervix removed      PICC INSERTION  10/10/2013    5fr DL PASV PICC, 43cm (1cm external) in the L basilic vein w/ tip in the low SVC.     SINUS SURGERY       TUBAL LIGATION         Social History     Social History     Marital status: Single     Spouse name: N/A     Number of children: N/A     Years of education: N/A     Occupational History     Not on file.     Social History Main Topics     Smoking status: Never Smoker     Smokeless tobacco: Never Used     Alcohol use 0.0 oz/week     0 Standard drinks or equivalent per week      Comment: 12 drinks/week     Drug use: No     Sexual activity: Yes     Partners: Male     Other Topics Concern      Service No     Blood Transfusions No     Caffeine Concern No     Occupational Exposure No     Hobby Hazards No     Sleep Concern No     Stress Concern Yes     Weight Concern No     Special Diet Yes     IBS diet     Back Care No     Exercise Not Asked     1 mile power walk 5 days a week at least.      Seat Belt Yes     Self-Exams Yes     Social History Narrative    [employer] in [employer] Department.  .  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  Non smoker. No smokers at home.  Enjoys walking and dancing.  Alcohol--1 to 2  "drinks daily.  Few times a year has more than 4 drinks in a day.  No illicits.                       /74 (BP Location: Left arm, Patient Position: Chair, Cuff Size: Adult Regular)  Pulse 69  Resp 16  Ht 1.575 m (5' 2.01\")  Wt 46.7 kg (103 lb)  LMP 02/01/2009  SpO2 97%  BMI 18.83 kg/m2    Exam:   GENERAL APPEARANCE: Well developed, well nourished, alert, and in no apparent distress.  EYES: PERRL, EOMI  HENT: Nasal mucosa with edema and no hyperemia. No nasal polyps.  EARS: Canals clear, TMs scarred with bilat perforation  MOUTH: Oral mucosa is moist, without any lesions, no tonsillar enlargement, no oropharyngeal exudate.  NECK: supple, no masses, no thyromegaly.  LYMPHATICS: No significant axillary, cervical, or supraclavicular nodes.  RESP: normal percussion, good air flow throughout.  Faint crackles in left mid lung field. No rhonchi. No wheezes.  CV: No ectopic beats. Normal S1, S2, regular rhythm, normal rate. No murmur.  No rub. No gallop. No LE edema.   ABDOMEN:  Bowel sounds normal, soft, nontender, no HSM or masses.   MS: extremities normal. No clubbing. No cyanosis.  SKIN: no rash on limited exam  NEURO: Mentation intact, speech normal, normal strength and tone, normal gait and stance  PSYCH: mentation appears normal. and affect normal/bright  Results:  Recent Results (from the past 168 hour(s))   CBC with platelets differential    Collection Time: 05/19/17  1:08 PM   Result Value Ref Range    WBC 12.6 (H) 4.0 - 11.0 10e9/L    RBC Count 3.97 3.8 - 5.2 10e12/L    Hemoglobin 13.5 11.7 - 15.7 g/dL    Hematocrit 39.6 35.0 - 47.0 %     78 - 100 fl    MCH 34.0 (H) 26.5 - 33.0 pg    MCHC 34.1 31.5 - 36.5 g/dL    RDW 12.2 10.0 - 15.0 %    Platelet Count 272 150 - 450 10e9/L    Diff Method Automated Method     % Neutrophils 89.9 %    % Lymphocytes 4.9 %    % Monocytes 3.7 %    % Eosinophils 0.6 %    % Basophils 0.6 %    % Immature Granulocytes 0.3 %    Nucleated RBCs 0 0 /100    Absolute " Neutrophil 11.3 (H) 1.6 - 8.3 10e9/L    Absolute Lymphocytes 0.6 (L) 0.8 - 5.3 10e9/L    Absolute Monocytes 0.5 0.0 - 1.3 10e9/L    Absolute Eosinophils 0.1 0.0 - 0.7 10e9/L    Absolute Basophils 0.1 0.0 - 0.2 10e9/L    Abs Immature Granulocytes 0.0 0 - 0.4 10e9/L    Absolute Nucleated RBC 0.0    Erythrocyte sedimentation rate auto    Collection Time: 05/19/17  1:08 PM   Result Value Ref Range    Sed Rate 13 0 - 20 mm/h   CRP inflammation    Collection Time: 05/19/17  1:08 PM   Result Value Ref Range    CRP Inflammation 13.3 (H) 0.0 - 8.0 mg/L   ALT    Collection Time: 05/19/17  1:08 PM   Result Value Ref Range    ALT 16 0 - 50 U/L   General PFT Lab (Please always keep checked)    Collection Time: 05/19/17  1:11 PM   Result Value Ref Range    FVC-Pred 3.07 L    FVC-Pre 3.93 L    FVC-%Pred-Pre 128 %    FEV1-Pre 2.54 L    FEV1-%Pred-Pre 100 %    FEV1FVC-Pred 83 %    FEV1FVC-Pre 65 %    FEFMax-Pred 6.59 L/sec    FEFMax-Pre 5.77 L/sec    FEFMax-%Pred-Pre 87 %    FEF2575-Pred 2.70 L/sec    FEF2575-Pre 1.41 L/sec    XIL4655-%Pred-Pre 52 %    ExpTime-Pre 8.32 sec    FIFMax-Pre 4.92 L/sec    FEV1FEV6-Pred 83 %    FEV1FEV6-Pre 65 %   Gram stain    Collection Time: 05/19/17  2:30 PM   Result Value Ref Range    Specimen Description Sputum     Special Requests Screen     Gram Stain Pending     Micro Report Status Pending                   Results as noted above.    PFT Interpretation:  Normal spirometry.  Decreased from previous.  Below recent best.   Valid Maneuver          Scribe Disclosure:   I, Lisa Graham, am serving as a scribe; to document services personally performed by JOLIE PIERRE MD based on data collection and the provider's statements to me.     Provider Disclosure:  I agree with above History, Review of Systems, Physical exam and Plan.  I have reviewed the content of the documentation and have edited it as needed. I have personally performed the services documented here and the documentation accurately  represents those services and the decisions I have made.      Electronically signed by:  Dagoberto Briscoe

## 2017-05-19 NOTE — MR AVS SNAPSHOT
After Visit Summary   5/19/2017    Valencia Ye    MRN: [id number]           Patient Information     Date Of Birth          1970        Visit Information        Provider Department      5/19/2017 2:30 PM Dagoberto Briscoe MD [facility] Lung Science and Health        Today's Diagnoses     Churg-Antonina syndrome (H)    -  1    Palpitations        Mild persistent asthma without complication        Bronchiectasis with acute lower respiratory infection (H)          Care Instructions    Augmentin twice daily for 2 weeks.  Continue vest therapy twice daily.  Call if symptoms get worse or not improving by the middle of next week.        Follow-ups after your visit        Follow-up notes from your care team     Return in about 1 month (around 6/19/2017).      Your next 10 appointments already scheduled     Jun 02, 2017 11:00 AM CDT   New Visit with Yvrose Moeller MD   [facility] PHYSICIANS [facility] ([facility])    [address]  [facility] [address]   [phone]            Jun 23, 2017  8:15 AM CDT   Lab with  LAB    Health Lab ([facility])    [address] [place] [address]   [phone]            Jun 23, 2017  8:30 AM CDT   PFT VISIT with  PFL [facility] Pulmonary Function Testing ([facility])    [address] [place] [address]   [phone]            Jun 23, 2017  8:50 AM CDT   (Arrive by 8:35 AM)   RETURN CYSTIC FIBROSIS VISIT with Dagoberto Briscoe MD   [facility] and Health ([facility])    [address] [place] [address]   [phone]            Jul 18, 2017  7:15 AM CDT   Adult Med Follow UP with SUNNY Ayala CNS   Psychiatry Clinic ([facility])    [address]  [place]  [facility] [address]   [phone]            Jan 26, 2018  7:30 AM CST   CF LOOP with UC PFL CF   [facility] Pulmonary Function Testing ([facility])    [address] [id number] [phone]            Jan 26, 2018  8:10 AM CST   (Arrive by 7:55 AM)   RETURN CYSTIC FIBROSIS VISIT with Dagoberto Briscoe MD   [facility] Lung Science and Health ([facility])    [address] [place] [id number] [phone]              Future tests that were ordered for you today     Open Future Orders        Priority Expected Expires Ordered    RESPIRATORY FLOW VOLUME LOOP Routine 6/19/2017 7/19/2017 5/19/2017    ALT Routine  6/18/2017 5/19/2017            Who to contact     If you have questions or need follow up information about today's clinic visit or your schedule please contact [facility] LUNG SCIENCE AND HEALTH directly at [phone].  Normal or non-critical lab and imaging results will be communicated to you by [employer], letter or phone within 4 business days after the clinic has received the results. If you do not hear from us within 7 days, please contact the clinic through [employer] or phone. If you have a critical or abnormal lab result, we will notify you by phone as soon as possible.  Submit refill requests through [employer] or call your pharmacy and they will forward the refill request to us. Please allow 3 business days for your refill to be completed.          Additional Information About Your Visit        Vaccsyshart Information     [employer] gives you secure access to your electronic health record. If you see a primary care provider, you can also send messages to your care team and make appointments. If you have questions, please call your primary care clinic.  If you do not have a primary care provider, please call [phone] and they will assist you.        Care EveryWhere ID   "   This is your Care EveryWhere ID. This could be used by other organizations to access your [place] medical records  [id number]        Your Vitals Were     Pulse Respirations Height Last Period Pulse Oximetry BMI (Body Mass Index)    69 16 1.575 m (5' 2.01\") 02/01/2009 97% 18.83 kg/m2       Blood Pressure from Last 3 Encounters:   05/19/17 108/74   04/14/17 104/60   03/17/17 110/70    Weight from Last 3 Encounters:   05/19/17 46.7 kg (103 lb)   04/14/17 48.7 kg (107 lb 4.8 oz)   03/17/17 49.7 kg (109 lb 8 oz)              We Performed the Following     Fungus Culture, non-blood     Gram stain     Sputum Culture Aerobic Bacterial          Today's Medication Changes          These changes are accurate as of: 5/19/17  3:35 PM.  If you have any questions, ask your nurse or doctor.               Start taking these medicines.        Dose/Directions    amoxicillin-clavulanate 875-125 MG per tablet   Commonly known as:  AUGMENTIN   Used for:  Bronchiectasis with acute lower respiratory infection (H)   Started by:  Dagoberto Briscoe MD        Dose:  1 tablet   Take 1 tablet by mouth 2 times daily for 14 days   Quantity:  28 tablet   Refills:  0         These medicines have changed or have updated prescriptions.        Dose/Directions    atenolol 25 MG tablet   Commonly known as:  TENORMIN   This may have changed:    - how much to take  - how to take this  - when to take this   Used for:  Palpitations   Changed by:  Dagoberto Briscoe MD        Dose:  12.5 mg   Take 0.5 tablets (12.5 mg) by mouth At Bedtime 1/4 tab at bedtime   Quantity:  45 tablet   Refills:  1       * UNABLE TO FIND   This may have changed:  Another medication with the same name was removed. Continue taking this medication, and follow the directions you see here.   Used for:  Abdominal pain, right upper quadrant   Changed by:  Pratibha Epstein MD        Dose:  12.5 mg   Take 12.5 mg by mouth daily MEDICATION NAME: NADH with coQ10 "   Refills:  0       * UNABLE TO FIND   This may have changed:  Another medication with the same name was removed. Continue taking this medication, and follow the directions you see here.   Changed by:  Pratibha Epstein MD        Dose:  1 capsule   Take 1 capsule by mouth daily MEDICATION NAME: phosphtydil choline   Refills:  0       * UNABLE TO FIND   This may have changed:  Another medication with the same name was removed. Continue taking this medication, and follow the directions you see here.   Changed by:  Morelia Simental MD        Dose:  3600 mg   3,600 mg daily MEDICATION NAME: monolaurin   Refills:  0       * Notice:  This list has 3 medication(s) that are the same as other medications prescribed for you. Read the directions carefully, and ask your doctor or other care provider to review them with you.         Where to get your medicines      These medications were sent to [facility] [id number] [place] [address] AT [facility] [address]  [address] [place] [address] E, [facility] [address]     Phone:  [phone]     amoxicillin-clavulanate 875-125 MG per tablet                Primary Care Provider Office Phone # Fax #    Morelia Simental [phone]       [facility] [address]  [facility] [address]        Thank you!     Thank you for choosing [facility] FOR LUNG SCIENCE AND HEALTH  for your care. Our goal is always to provide you with excellent care. Hearing back from our patients is one way we can continue to improve our services. Please take a few minutes to complete the written survey that you may receive in the mail after your visit with us. Thank you!             Your Updated Medication List - Protect others around you: Learn how to safely use, store and throw away your medicines at www.disposemymeds.org.          This list is accurate as of: 5/19/17  3:35 PM.  Always use your most recent med list.                   Brand Name Dispense  Instructions for use    acetylcysteine 20 % nebulizer solution    MUCOMYST    240 mL    Take 4 mLs by nebulization 2 times daily Use with albuterol solution. Discard open bottle of Mucomyst after 96 hours.       albuterol (2.5 MG/3ML) 0.083% neb solution     60 vial    Take 1 vial (2.5 mg) by nebulization 2 times daily Use with Mucomyst       amoxicillin-clavulanate 875-125 MG per tablet    AUGMENTIN    28 tablet    Take 1 tablet by mouth 2 times daily for 14 days       atenolol 25 MG tablet    TENORMIN    45 tablet    Take 0.5 tablets (12.5 mg) by mouth At Bedtime 1/4 tab at bedtime       budesonide-formoterol 160-4.5 MCG/ACT Inhaler    SYMBICORT    1 Inhaler    Inhale 2 puffs into the lungs 2 times daily       cholecalciferol 5000 UNITS Caps      Take by mouth daily       ciprofloxacin-dexamethasone otic suspension    CIPRODEX    7.5 mL    Instill 3 drops into the affected ear twice daily as needed.       clonazePAM 0.5 MG tablet    klonoPIN    20 tablet    Take 0.5 tablets (0.25 mg) by mouth 2 times daily as needed for anxiety       conjugated estrogens cream    PREMARIN    42.5 g    Place  vaginally three times a week. Use 0.5 grams weekly as directed       Fish Oil 1200 MG Caps      Take 3 capsules by mouth daily.       fluticasone 50 MCG/ACT spray    FLONASE    48 g    Spray 2 sprays into both nostrils daily       GLUCOSAMINE CHONDR COMPLEX 500-400 MG Caps per capsule   Generic drug:  glucosamine-chondroitin      Take 2 capsules by mouth daily.       Ipratropium-Albuterol  MCG/ACT inhaler    COMBIVENT RESPIMAT    1 Inhaler    Inhale 1 puff into the lungs 4 times daily Not to exceed 6 doses per day.       Naltrexone Powd     30 g    4.5mg compounded in capsule to take one daily by mouth. Please dispense 30 capsules       nystatin-triamcinolone ointment    MYCOLOG    30 g    Apply topically 2 times daily       predniSONE 1 MG tablet    DELTASONE    200 tablet    Take 5 mgs one day, alternating with 6 mgs  the next.       * UNABLE TO FIND      Take 12.5 mg by mouth daily MEDICATION NAME: NADH with coQ10       * UNABLE TO FIND      Take 1 capsule by mouth daily MEDICATION NAME: phosphtydil choline       * UNABLE TO FIND      3,600 mg daily MEDICATION NAME: monolaurin       UNKNOWN MED DOSAGE      Lithium oritate       * Notice:  This list has 3 medication(s) that are the same as other medications prescribed for you. Read the directions carefully, and ask your doctor or other care provider to review them with you.

## 2017-05-19 NOTE — NURSING NOTE
Chief Complaint   Patient presents with     Bronchiectasis     Patient is being seen for bronchiectasis follow up      Benita Horvath CMA at 2:21 PM on 5/19/2017

## 2017-05-19 NOTE — PATIENT INSTRUCTIONS
Augmentin twice daily for 2 weeks.  Continue vest therapy twice daily.  Call if symptoms get worse or not improving by the middle of next week.

## 2017-05-21 LAB
BACTERIA SPEC CULT: NORMAL
MICRO REPORT STATUS: NORMAL
SPECIMEN SOURCE: NORMAL

## 2017-05-22 ENCOUNTER — CARE COORDINATION (OUTPATIENT)
Dept: PULMONOLOGY | Facility: CLINIC | Age: 47
End: 2017-05-22

## 2017-05-22 DIAGNOSIS — J47.9 BRONCHIECTASIS WITHOUT ACUTE EXACERBATION (H): Primary | ICD-10-CM

## 2017-05-22 NOTE — PROGRESS NOTES
Pt called with increased work of breathing and not feeling better, asking about plans for abx. Talked with Dr. Briscoe and plan to start ynes and tobra.  PICC line and test dose to be completed on 5/26.  Pt and [facility] updated with plan.

## 2017-05-23 DIAGNOSIS — J47.9 BRONCHIECTASIS (H): Primary | ICD-10-CM

## 2017-05-23 RX ORDER — MEROPENEM 1 G/1
2 INJECTION, POWDER, FOR SOLUTION INTRAVENOUS EVERY 8 HOURS
Qty: 1 EACH | COMMUNITY
Start: 2017-05-26 | End: 2017-07-03

## 2017-05-26 ENCOUNTER — HOSPITAL ENCOUNTER (OUTPATIENT)
Dept: VASCULAR ULTRASOUND | Facility: CLINIC | Age: 47
Discharge: HOME | End: 2017-05-26
Attending: INTERNAL MEDICINE | Admitting: INTERNAL MEDICINE
Payer: COMMERCIAL

## 2017-05-26 ENCOUNTER — INFUSION THERAPY VISIT (OUTPATIENT)
Dept: INFUSION THERAPY | Facility: CLINIC | Age: 47
End: 2017-05-26
Attending: INTERNAL MEDICINE
Payer: COMMERCIAL

## 2017-05-26 VITALS
TEMPERATURE: 98.2 F | OXYGEN SATURATION: 100 % | DIASTOLIC BLOOD PRESSURE: 60 MMHG | SYSTOLIC BLOOD PRESSURE: 106 MMHG | RESPIRATION RATE: 16 BRPM | HEART RATE: 57 BPM

## 2017-05-26 DIAGNOSIS — J47.9 BRONCHIECTASIS WITHOUT ACUTE EXACERBATION (H): Primary | ICD-10-CM

## 2017-05-26 DIAGNOSIS — J47.0 BRONCHIECTASIS WITH ACUTE LOWER RESPIRATORY INFECTION (H): ICD-10-CM

## 2017-05-26 DIAGNOSIS — J47.9 BRONCHIECTASIS (H): ICD-10-CM

## 2017-05-26 DIAGNOSIS — J45.909 ASTHMA: ICD-10-CM

## 2017-05-26 DIAGNOSIS — J47.9 BRONCHIECTASIS WITHOUT ACUTE EXACERBATION (H): ICD-10-CM

## 2017-05-26 PROCEDURE — 36569 INSJ PICC 5 YR+ W/O IMAGING: CPT

## 2017-05-26 PROCEDURE — 96365 THER/PROPH/DIAG IV INF INIT: CPT

## 2017-05-26 PROCEDURE — 25000125 ZZHC RX 250: Performed by: INTERNAL MEDICINE

## 2017-05-26 PROCEDURE — C1751 CATH, INF, PER/CENT/MIDLINE: HCPCS

## 2017-05-26 PROCEDURE — 25000128 H RX IP 250 OP 636: Mod: ZF | Performed by: INTERNAL MEDICINE

## 2017-05-26 PROCEDURE — 40000269 ZZH STATISTIC NO CHARGE FACILITY FEE: Mod: ZF

## 2017-05-26 RX ADMIN — LIDOCAINE HYDROCHLORIDE 2 ML: 10 INJECTION, SOLUTION INFILTRATION; PERINEURAL at 13:58

## 2017-05-26 RX ADMIN — MEROPENEM 2 G: 500 INJECTION, POWDER, FOR SOLUTION INTRAVENOUS at 14:31

## 2017-05-26 NOTE — MR AVS SNAPSHOT
After Visit Summary   5/26/2017    Valencia Ye    MRN: [id number]           Patient Information     Date Of Birth          1970        Visit Information        Provider Department      5/26/2017 3:00 PM UC [facility] ATC; [facility] INFUSION [facility] Specialty and Procedure        Today's Diagnoses     Bronchiectasis without acute exacerbation (H)    -  1    Asthma        Bronchiectasis with acute lower respiratory infection (H)        Bronchiectasis (H)          Care Instructions      Meropenem Solution for injection  What is this medicine?  MEROPENEM (yonas oh PEN em) is a carbapenem antibiotic. It is used to treat certain kinds of bacterial infections. It will not work for colds, flu, or other viral infections.  This medicine may be used for other purposes; ask your health care provider or pharmacist if you have questions.  What should I tell my health care provider before I take this medicine?  They need to know if you have any of these conditions:    brain tumor or lesion    kidney disease    seizure disorder    an unusual or allergic reaction to meropenem, other antibiotics or medicines, foods, dyes, or preservatives    pregnant or trying to get pregnant    breast-feeding  How should I use this medicine?  This medicine is for infusion into a vein. It is usually given by a health care professional in a hospital or clinic setting.  If you get this medicine at home, you will be taught how to prepare and give this medicine. Use exactly as directed. Take your medicine at regular intervals. Do not take it more often than directed.  It is important that you put your used needles and syringes in a special sharps container. Do not put them in a trash can. If you do not have a sharps container, call your pharmacist or healthcare provider to get one.  Talk to your pediatrician regarding the use of this medicine in children. While this drug may be prescribed for children as young  as newborns for selected conditions, precautions do apply.  Overdosage: If you think you have taken too much of this medicine contact a poison control center or emergency room at once.  NOTE: This medicine is only for you. Do not share this medicine with others.  What if I miss a dose?  If you miss a dose, use it as soon as you can. If it is almost time for your next dose, use only that dose. Do not use double or extra doses.  What may interact with this medicine?    birth control pills    probenecid    valproic acid  This list may not describe all possible interactions. Give your health care provider a list of all the medicines, herbs, non-prescription drugs, or dietary supplements you use. Also tell them if you smoke, drink alcohol, or use illegal drugs. Some items may interact with your medicine.  What should I watch for while using this medicine?  Your condition will be monitored carefully while you are receiving this medicine. Tell your doctor or healthcare professional if your symptoms do not start to get better or if they get worse.  Do not treat diarrhea with over the counter products. Contact your doctor if you have diarrhea that lasts more than 2 days or if it is severe and watery.  What side effects may I notice from receiving this medicine?  Side effects that you should report to your doctor or health care professional as soon as possible:    allergic reactions like skin rash, itching or hives, swelling of the face, lips, or tongue    anxiety, confusion, or dizziness    breathing problems    change in blood pressure    chest pain    dark urine    fever    irregular heart rate    pain, swelling, or irritation at site where injected    redness, blistering, peeling or loosening of the skin, including inside the mouth    seizures    unusual bleeding or bruising    white or red patches in mouth    yellowing of the eyes or skin  Side effects that usually do not require medical attention (report to your doctor  or health care professional if they continue or are bothersome):    constipation or diarrhea    diaper rash    headache    nausea, vomiting    stomach upset or gas    vaginal itch or irritation  This list may not describe all possible side effects. Call your doctor for medical advice about side effects. You may report side effects to FDA at 1-922-VWQ-3067.  Where should I keep my medicine?  Keep out of the reach of children.  If you are using this medicine at home, you will be instructed on how to store this medicine. Throw away any unused medicine after the expiration date on the label.  NOTE:This sheet is a summary. It may not cover all possible information. If you have questions about this medicine, talk to your doctor, pharmacist, or health care provider. Copyright  2016 [employer] Standard                Follow-ups after your visit        Your next 10 appointments already scheduled     Jun 02, 2017 11:00 AM CDT   New Visit with Yvrose Moeller MD   [facility] PHYSICIANS [facility] ([facility])    [address]  [facility] [address]   [phone]            Jun 23, 2017  8:15 AM CDT   Lab with  LAB    Health Lab ([facility])    [address] [place] [address]   [phone]            Jun 23, 2017  8:30 AM CDT   PFT VISIT with  PFL [facility] Pulmonary Function Testing ([facility])    [address] [place] [address]   [phone]            Jun 23, 2017  8:50 AM CDT   (Arrive by 8:35 AM)   RETURN CYSTIC FIBROSIS VISIT with Dagoberto Briscoe MD   [facility] for Lung Science and Health ([facility])    [address] [place] [address]   [phone]            Jul 18, 2017  7:15 AM CDT   Adult Med Follow UP with SUNNY Ayala CNS   Psychiatry Clinic ([facility])    [facility]  [address]  [address]  [facility] [address]   [phone]            Jan 26, 2018  7:30 AM CST   CF LOOP with UC PFL CF   [facility] Pulmonary Function Testing ([facility])    [address] [facility]  [address] [facility] [address] [phone]            Jan 26, 2018  8:10 AM CST   (Arrive by 7:55 AM)   RETURN CYSTIC FIBROSIS VISIT with Dagoberto Briscoe MD   [facility] for Lung Science and Health ([facility])    [address] [id number] [phone]              Future tests that were ordered for you today     Open Standing Orders        Priority Remaining Interval Expires Ordered    XR Chest 1 View STAT 2/2 CONDITIONAL X 2  5/25/2017    XR Chest Port 1 View STAT 2/2 CONDITIONAL X 2  5/25/2017    Retention sutures Routine [id number] PRN  5/25/2017            Who to contact     If you have questions or need follow up information about today's clinic visit or your schedule please contact [facility] SPECIALTY AND PROCEDURE directly at [phone].  Normal or non-critical lab and imaging results will be communicated to you by [employer], letter or phone within 4 business days after the clinic has received the results. If you do not hear from us within 7 days, please contact the clinic through [employer] or phone. If you have a critical or abnormal lab result, we will notify you by phone as soon as possible.  Submit refill requests through [employer] or call your pharmacy and they will forward the refill request to us. Please allow 3 business days for your refill to be completed.          Additional Information About Your Visit        [employer] Information     [employer] gives you secure access to your electronic health record. If you see a primary care provider, you can also send messages to your care team and make appointments. If you have questions, please call your primary  care clinic.  If you do not have a primary care provider, please call [phone] and they will assist you.        Care EveryWhere ID     This is your Care EveryWhere ID. This could be used by other organizations to access your [place] medical records  [id number]        Your Vitals Were     Pulse Temperature Respirations Last Period Pulse Oximetry       57 98.2  F (36.8  C) (Oral) 16 02/01/2009 100%        Blood Pressure from Last 3 Encounters:   05/26/17 106/60   05/19/17 108/74   04/14/17 104/60    Weight from Last 3 Encounters:   05/19/17 46.7 kg (103 lb)   04/14/17 48.7 kg (107 lb 4.8 oz)   03/17/17 49.7 kg (109 lb 8 oz)              Today, you had the following     No orders found for display       Primary Care Provider Office Phone # Fax #    Morelia Simental [phone]       [facility] [address]  [facility] [address]        Thank you!     Thank you for choosing [facility] SPECIALTY AND PROCEDURE  for your care. Our goal is always to provide you with excellent care. Hearing back from our patients is one way we can continue to improve our services. Please take a few minutes to complete the written survey that you may receive in the mail after your visit with us. Thank you!             Your Updated Medication List - Protect others around you: Learn how to safely use, store and throw away your medicines at www.disposemymeds.org.          This list is accurate as of: 5/26/17  3:31 PM.  Always use your most recent med list.                   Brand Name Dispense Instructions for use    acetylcysteine 20 % nebulizer solution    MUCOMYST    240 mL    Take 4 mLs by nebulization 2 times daily Use with albuterol solution. Discard open bottle of Mucomyst after 96 hours.       albuterol (2.5 MG/3ML) 0.083% neb solution     60 vial    Take 1 vial (2.5 mg) by nebulization 2 times daily Use with Mucomyst       amoxicillin-clavulanate 875-125 MG per tablet     AUGMENTIN    28 tablet    Take 1 tablet by mouth 2 times daily for 14 days       atenolol 25 MG tablet    TENORMIN    45 tablet    Take 0.5 tablets (12.5 mg) by mouth At Bedtime 1/4 tab at bedtime       budesonide-formoterol 160-4.5 MCG/ACT Inhaler    SYMBICORT    1 Inhaler    Inhale 2 puffs into the lungs 2 times daily       cholecalciferol 5000 UNITS Caps      Take by mouth daily       ciprofloxacin-dexamethasone otic suspension    CIPRODEX    7.5 mL    Instill 3 drops into the affected ear twice daily as needed.       clonazePAM 0.5 MG tablet    klonoPIN    20 tablet    Take 0.5 tablets (0.25 mg) by mouth 2 times daily as needed for anxiety       conjugated estrogens cream    PREMARIN    42.5 g    Place  vaginally three times a week. Use 0.5 grams weekly as directed       Fish Oil 1200 MG Caps      Take 3 capsules by mouth daily.       fluticasone 50 MCG/ACT spray    FLONASE    48 g    Spray 2 sprays into both nostrils daily       GLUCOSAMINE CHONDR COMPLEX 500-400 MG Caps per capsule   Generic drug:  glucosamine-chondroitin      Take 2 capsules by mouth daily.       Ipratropium-Albuterol  MCG/ACT inhaler    COMBIVENT RESPIMAT    1 Inhaler    Inhale 1 puff into the lungs 4 times daily Not to exceed 6 doses per day.       meropenem 1 G vial    MERREM    1 each    Inject 2,000 mg (2 g) into the vein every 8 hours       Naltrexone Powd     30 g    4.5mg compounded in capsule to take one daily by mouth. Please dispense 30 capsules       nystatin-triamcinolone ointment    MYCOLOG    30 g    Apply topically 2 times daily       predniSONE 1 MG tablet    DELTASONE    200 tablet    Take 5 mgs one day, alternating with 6 mgs the next.       tobramycin 250 mg      250 mg every 24 hours       * UNABLE TO FIND      Take 12.5 mg by mouth daily MEDICATION NAME: NADH with coQ10       * UNABLE TO FIND      Take 1 capsule by mouth daily MEDICATION NAME: phosphtydil choline       * UNABLE TO FIND      3,600 mg daily  MEDICATION NAME: monolaurin       UNKNOWN MED DOSAGE      Lithium oritate       * Notice:  This list has 3 medication(s) that are the same as other medications prescribed for you. Read the directions carefully, and ask your doctor or other care provider to review them with you.

## 2017-05-26 NOTE — PATIENT INSTRUCTIONS
Meropenem Solution for injection  What is this medicine?  MEROPENEM (yonas oh PEN em) is a carbapenem antibiotic. It is used to treat certain kinds of bacterial infections. It will not work for colds, flu, or other viral infections.  This medicine may be used for other purposes; ask your health care provider or pharmacist if you have questions.  What should I tell my health care provider before I take this medicine?  They need to know if you have any of these conditions:    brain tumor or lesion    kidney disease    seizure disorder    an unusual or allergic reaction to meropenem, other antibiotics or medicines, foods, dyes, or preservatives    pregnant or trying to get pregnant    breast-feeding  How should I use this medicine?  This medicine is for infusion into a vein. It is usually given by a health care professional in a hospital or clinic setting.  If you get this medicine at home, you will be taught how to prepare and give this medicine. Use exactly as directed. Take your medicine at regular intervals. Do not take it more often than directed.  It is important that you put your used needles and syringes in a special sharps container. Do not put them in a trash can. If you do not have a sharps container, call your pharmacist or healthcare provider to get one.  Talk to your pediatrician regarding the use of this medicine in children. While this drug may be prescribed for children as young as newborns for selected conditions, precautions do apply.  Overdosage: If you think you have taken too much of this medicine contact a poison control center or emergency room at once.  NOTE: This medicine is only for you. Do not share this medicine with others.  What if I miss a dose?  If you miss a dose, use it as soon as you can. If it is almost time for your next dose, use only that dose. Do not use double or extra doses.  What may interact with this medicine?    birth control pills    probenecid    valproic acid  This list  may not describe all possible interactions. Give your health care provider a list of all the medicines, herbs, non-prescription drugs, or dietary supplements you use. Also tell them if you smoke, drink alcohol, or use illegal drugs. Some items may interact with your medicine.  What should I watch for while using this medicine?  Your condition will be monitored carefully while you are receiving this medicine. Tell your doctor or healthcare professional if your symptoms do not start to get better or if they get worse.  Do not treat diarrhea with over the counter products. Contact your doctor if you have diarrhea that lasts more than 2 days or if it is severe and watery.  What side effects may I notice from receiving this medicine?  Side effects that you should report to your doctor or health care professional as soon as possible:    allergic reactions like skin rash, itching or hives, swelling of the face, lips, or tongue    anxiety, confusion, or dizziness    breathing problems    change in blood pressure    chest pain    dark urine    fever    irregular heart rate    pain, swelling, or irritation at site where injected    redness, blistering, peeling or loosening of the skin, including inside the mouth    seizures    unusual bleeding or bruising    white or red patches in mouth    yellowing of the eyes or skin  Side effects that usually do not require medical attention (report to your doctor or health care professional if they continue or are bothersome):    constipation or diarrhea    diaper rash    headache    nausea, vomiting    stomach upset or gas    vaginal itch or irritation  This list may not describe all possible side effects. Call your doctor for medical advice about side effects. You may report side effects to FDA at 0-736-FDA-2928.  Where should I keep my medicine?  Keep out of the reach of children.  If you are using this medicine at home, you will be instructed on how to store this medicine. Throw away  any unused medicine after the expiration date on the label.  NOTE:This sheet is a summary. It may not cover all possible information. If you have questions about this medicine, talk to your doctor, pharmacist, or health care provider. Copyright  2016 Gold Standard

## 2017-05-26 NOTE — PROGRESS NOTES
Nursing Note  Valencia Ye presents today to [facility] for:   Chief Complaint   Patient presents with     Infusion     merrem     During today's [facility] appointment, orders from Dr. Briscoe were completed.  Frequency: Today is 1st dose     Progress note:  Patient identification verified by name and date of birth.  Assessment completed.  Vitals recorded in Doc Flowsheets.  Patient was provided with education regarding infusion and possible side effects.  Patient verbalized understanding.      needed: No  Premedications: were not ordered.  Infusion Rates: infusion given over approximately 30 minutes + 30 minute observation period due to first dose of antibiotic.  Approximate Infusion length:30 minutes.   Labs: were not ordered for this appointment.  Vascular access: PICC accessed today.  Treatment Conditions: non-applicable.  Patient tolerated infusion: well.    Discharge Plan:   Follow up plan of care with: ongoing infusions at [facility].  Discharge instructions were reviewed with patient.  Patient/representative verbalized understanding of discharge instructions and all questions answered.  Patient discharged from [facility] in stable condition.    Sandra Allan RN       Administrations This Visit     meropenem (MERREM) 2 g in NaCl 0.9 % 100 mL intermittent infusion     Admin Date Action Dose Route Administered By             05/26/2017 New Bag 2 g Intravenous Sandra Allan RN                            /58 (BP Location: Left arm)  Pulse 63  Temp 96.7  F (35.9  C) (Oral)  Resp 16  LMP 02/01/2009  SpO2 100%

## 2017-05-30 ENCOUNTER — PRE VISIT (OUTPATIENT)
Dept: CARDIOLOGY | Facility: CLINIC | Age: 47
End: 2017-05-30

## 2017-05-30 LAB
BUN SERPL-MCNC: 13 MG/DL (ref 7–30)
CREAT SERPL-MCNC: 0.62 MG/DL (ref 0.52–1.04)
GFR SERPL CREATININE-BSD FRML MDRD: NORMAL ML/MIN/1.7M2
MAGNESIUM SERPL-MCNC: 2.6 MG/DL (ref 1.6–2.3)
PHOSPHATE SERPL-MCNC: 3.3 MG/DL (ref 2.5–4.5)
POTASSIUM SERPL-SCNC: 4.2 MMOL/L (ref 3.4–5.3)

## 2017-05-30 PROCEDURE — 84520 ASSAY OF UREA NITROGEN: CPT | Performed by: INTERNAL MEDICINE

## 2017-05-30 PROCEDURE — 82565 ASSAY OF CREATININE: CPT | Performed by: INTERNAL MEDICINE

## 2017-05-30 PROCEDURE — 84100 ASSAY OF PHOSPHORUS: CPT | Performed by: INTERNAL MEDICINE

## 2017-05-30 PROCEDURE — 84132 ASSAY OF SERUM POTASSIUM: CPT | Performed by: INTERNAL MEDICINE

## 2017-05-30 PROCEDURE — 83735 ASSAY OF MAGNESIUM: CPT | Performed by: INTERNAL MEDICINE

## 2017-05-31 ENCOUNTER — HOME INFUSION (PRE-WILLOW HOME INFUSION) (OUTPATIENT)
Dept: PHARMACY | Facility: CLINIC | Age: 47
End: 2017-05-31

## 2017-06-01 NOTE — PROGRESS NOTES
This is a snapshot of the patient's [facility] medical record. For complete information or questions call [phone] or In [facility],  Home Infusion ([id number]).  [facility] Number:  [id number]

## 2017-06-02 ENCOUNTER — OFFICE VISIT (OUTPATIENT)
Dept: CARDIOLOGY | Facility: CLINIC | Age: 47
End: 2017-06-02
Attending: INTERNAL MEDICINE
Payer: COMMERCIAL

## 2017-06-02 VITALS
HEART RATE: 76 BPM | HEIGHT: 62 IN | WEIGHT: 106.1 LBS | DIASTOLIC BLOOD PRESSURE: 66 MMHG | SYSTOLIC BLOOD PRESSURE: 90 MMHG | BODY MASS INDEX: 19.53 KG/M2

## 2017-06-02 DIAGNOSIS — R00.2 PALPITATIONS: ICD-10-CM

## 2017-06-02 DIAGNOSIS — D72.18 CHURG-STRAUSS SYNDROME (H): ICD-10-CM

## 2017-06-02 DIAGNOSIS — Z82.49 FAMILY HISTORY OF EARLY CAD: ICD-10-CM

## 2017-06-02 DIAGNOSIS — I10 BENIGN ESSENTIAL HYPERTENSION: Primary | ICD-10-CM

## 2017-06-02 DIAGNOSIS — M30.1 CHURG-STRAUSS SYNDROME (H): ICD-10-CM

## 2017-06-02 PROCEDURE — 93000 ELECTROCARDIOGRAM COMPLETE: CPT | Performed by: INTERNAL MEDICINE

## 2017-06-02 PROCEDURE — 99204 OFFICE O/P NEW MOD 45 MIN: CPT | Mod: 25 | Performed by: INTERNAL MEDICINE

## 2017-06-02 NOTE — LETTER
"6/2/2017    Morelia Simental MD  [address] [facility] [address]  [place] [address]    RE: Valencia Ye       Dear Colleague,    I had the pleasure of seeing Valencia Ye in the [facility].    REASON FOR VISIT:  New patient evaluation to establish care.      Ms. Katie Ye is a pleasant, 46-year-old lady with a history of Churg-Antonina vasculitis, Mannose-binding lectin hypertension, interstitial lung disease and asthma.  She states she has a history of cardiac involvement with the pericardial effusion and probable pericarditis in the past.  I cannot find any Cardiology notes throughout review of her history, however, and the pulmonologist notes I have reviewed do not refer to cardiology issues.  Her last echocardiogram was in 2014 with a Holter monitor at that time, which was benign.  She has had ongoing palpitations likely related to PACs and PVCs as documented on the Holter monitor.  She thinks that perhaps over the course of her life, the palpitations may have gradually worsened with age, but there have been no acute changes.  She does not get lightheaded, presyncope or syncope.  She takes atenolol for her palpitations and states that if she stops the medication, it will increase as will her blood pressure, she says into the 160s.        She has no shortness of breath on exertion, PND, orthopnea, pedal edema or chest pain.  She does note if she lies on her left side, she gets a throbbing sensation in her left chest.  Otherwise, she has palpitations felt as an occasional skip unprovoked throughout the day.      Another thing which has developed is fatigue with exertion and difficulty with exercising.  She is not limited clearly by shortness of breath and not by chest discomfort; however, she notes that she feels that doing physical exertion \"takes the energy out of her,\" rather than giving her energy as it used to.  She is, in fact, considering filing for disability.    "   From the standpoint of standard risk factors, her father had a myocardial infarction in his early 50s.  She denies a history of tobacco abuse, diabetes or dyslipidemia, but does state she is indeed hypertensive when not on her atenolol.        Outpatient Encounter Prescriptions as of 6/2/2017   Medication Sig Dispense Refill     [DISCONTINUED] meropenem (MERREM) 1 G vial Inject 2,000 mg (2 g) into the vein every 8 hours 1 each      [DISCONTINUED] atenolol (TENORMIN) 25 MG tablet Take 0.5 tablets (12.5 mg) by mouth At Bedtime 1/4 tab at bedtime 45 tablet 1     clonazePAM (KLONOPIN) 0.5 MG tablet Take 0.5 tablets (0.25 mg) by mouth 2 times daily as needed for anxiety 20 tablet 1     [DISCONTINUED] UNKNOWN MED DOSAGE Lithium orotate       predniSONE (DELTASONE) 1 MG tablet Take 5 mgs one day, alternating with 6 mgs the next. 200 tablet 5     acetylcysteine (MUCOMYST) 20 % injection Take 4 mLs by nebulization 2 times daily Use with albuterol solution. Discard open bottle of Mucomyst after 96 hours. 240 mL 11     albuterol (2.5 MG/3ML) 0.083% neb solution Take 1 vial (2.5 mg) by nebulization 2 times daily Use with Mucomyst 60 vial 11     budesonide-formoterol (SYMBICORT) 160-4.5 MCG/ACT Inhaler Inhale 2 puffs into the lungs 2 times daily 1 Inhaler 11     [DISCONTINUED] Ipratropium-Albuterol (COMBIVENT RESPIMAT)  MCG/ACT inhaler Inhale 1 puff into the lungs 4 times daily Not to exceed 6 doses per day. 1 Inhaler 11     UNABLE TO FIND 3,600 mg daily MEDICATION NAME: monolaurin       fluticasone (FLONASE) 50 MCG/ACT nasal spray Spray 2 sprays into both nostrils daily 48 g 3     ciprofloxacin-dexamethasone (CIPRODEX) otic suspension Instill 3 drops into the affected ear twice daily as needed. 7.5 mL 1     [DISCONTINUED] UNABLE TO FIND Take 12.5 mg by mouth daily MEDICATION NAME: NADH with coQ10       [DISCONTINUED] UNABLE TO FIND Take 1 capsule by mouth daily MEDICATION NAME: phosphtydil choline       conjugated  estrogens (PREMARIN) vaginal cream Place  vaginally three times a week. Use 0.5 grams weekly as directed (Patient not taking: Reported on 8/11/2017) 42.5 g 0     cholecalciferol 5000 UNITS CAPS Take by mouth daily       glucosamine-chondroitin (GLUCOSAMINE CHONDR COMPLEX) 500-400 MG CAPS Take 2 capsules by mouth daily.       Omega-3 Fatty Acids (FISH OIL) 1200 MG capsule Take 3 capsules by mouth daily.       [DISCONTINUED] tobramycin 250 mg 250 mg every 24 hours       [DISCONTINUED] amoxicillin-clavulanate (AUGMENTIN) 875-125 MG per tablet Take 1 tablet by mouth 2 times daily for 14 days 28 tablet 0     [DISCONTINUED] Naltrexone POWD 4.5mg compounded in capsule to take one daily by mouth. Please dispense 30 capsules (Patient not taking: Reported on 7/14/2017) 30 g 1     [DISCONTINUED] nystatin-triamcinolone (MYCOLOG) ointment Apply topically 2 times daily 30 g 1     No facility-administered encounter medications on file as of 6/2/2017.      ASSESSMENT AND PLAN:  A pleasant, 46-year-old lady with an unfortunate history of Churg-Antonina syndrome.  At this time, she is not clearly symptomatic from the cardiac standpoint, though I would be concerned with her fatigue.  We will do an echocardiogram as well as again a 24 hour Holter monitor.  Of note, Churg-Antonina syndrome may manifest cardiac involvement with myocarditis, heart failure, pericarditis, arrhythmia, coronary arteritis, valvulopathy or intracavity cardiac thrombosis.  If the echocardiogram is abnormal, CMRI will be performed.      Further recommendations will be pending the results of her studies.  Her EKG at this time demonstrates sinus rhythm with a nonspecific ST abnormality, but it is largely normal.      Total time is 45 minutes, 40 in coordination of care and counseling.     Again, thank you for allowing me to participate in the care of your patient.      Sincerely,    Yvrose Moeller MD     [facility]

## 2017-06-02 NOTE — MR AVS SNAPSHOT
After Visit Summary   6/2/2017    Valencia eY    MRN: [id number]           Patient Information     Date Of Birth          1970        Visit Information        Provider Department      6/2/2017 11:00 AM Yvrose Moeller MD [facility] [place]        Today's Diagnoses     Benign essential hypertension    -  1    Churg-Antonina syndrome (H)        Palpitations        Family history of early CAD           Follow-ups after your visit        Additional Services     Follow-Up with Cardiac Advanced Practice Provider                 Your next 10 appointments already scheduled     Jun 23, 2017  8:15 AM CDT   Lab with  LAB    Health Lab ([facility])    [address]  [address] [facility] [address]   [phone]            Jun 23, 2017  8:30 AM CDT   PFT VISIT with  PFL C   [facility] ([facility])    [address]  [address] [facility] [address]   [phone]            Jun 23, 2017  8:50 AM CDT   (Arrive by 8:35 AM)   RETURN CYSTIC FIBROSIS VISIT with Dagoberto Briscoe MD   [facility] ([facility])    [address]  [address] [facility] [address]   [phone]            Jul 18, 2017  7:15 AM CDT   Adult Med Follow UP with SUNNY Ayala CNS   Psychiatry Clinic ([facility])    [address]  [address] [facility] [address]   [phone]            Jan 26, 2018  7:30 AM CST   CF LOOP with  PFL CF   [facility] ([facility])    [address]  [address] [facility] [address]   [phone]            Jan 26, 2018  8:10 AM CST   (Arrive by 7:55 AM)   RETURN CYSTIC FIBROSIS VISIT with Dagoberto Briscoe MD   [facility] ([facility]  "[facility])    [address]  [address] [facility] [id number] [phone]              Future tests that were ordered for you today     Open Future Orders        Priority Expected Expires Ordered    Echocardiogram Complete Routine 6/2/2017 6/2/2018 6/2/2017    Holter Monitor 24 hour - Adult Routine 6/2/2017 7/17/2017 6/2/2017    Follow-Up with Cardiac Advanced Practice Provider Routine 6/14/2017 6/2/2019 6/2/2017    Lipid Profile Routine 6/2/2017 6/2/2018 6/2/2017            Who to contact     If you have questions or need follow up information about today's clinic visit or your schedule please contact [facility] [place] directly at [phone].  Normal or non-critical lab and imaging results will be communicated to you by [employer], letter or phone within 4 business days after the clinic has received the results. If you do not hear from us within 7 days, please contact the clinic through [employer] or phone. If you have a critical or abnormal lab result, we will notify you by phone as soon as possible.  Submit refill requests through [employer] or call your pharmacy and they will forward the refill request to us. Please allow 3 business days for your refill to be completed.          Additional Information About Your Visit        [employer] Information     [employer] gives you secure access to your electronic health record. If you see a primary care provider, you can also send messages to your care team and make appointments. If you have questions, please call your primary care clinic.  If you do not have a primary care provider, please call [phone] and they will assist you.        Care EveryWhere ID     This is your Care EveryWhere ID. This could be used by other organizations to access your [place] medical records  [id number]        Your Vitals Were     Pulse Height Last Period BMI (Body Mass Index)          76 1.575 m (5' 2\") 02/01/2009 19.41 kg/m2      "    Blood Pressure from Last 3 Encounters:   06/02/17 90/66   05/26/17 106/60   05/19/17 108/74    Weight from Last 3 Encounters:   06/02/17 48.1 kg (106 lb 1.6 oz)   05/19/17 46.7 kg (103 lb)   04/14/17 48.7 kg (107 lb 4.8 oz)              We Performed the Following     EKG 12-lead complete w/read - Clinics (performed today)        Primary Care Provider Office Phone # Fax #    Morelia Simental [phone]       [facility] [address]  [facility] [address]        Thank you!     Thank you for choosing [facility] [place]  for your care. Our goal is always to provide you with excellent care. Hearing back from our patients is one way we can continue to improve our services. Please take a few minutes to complete the written survey that you may receive in the mail after your visit with us. Thank you!             Your Updated Medication List - Protect others around you: Learn how to safely use, store and throw away your medicines at www.disposemymeds.org.          This list is accurate as of: 6/2/17 12:49 PM.  Always use your most recent med list.                   Brand Name Dispense Instructions for use    acetylcysteine 20 % nebulizer solution    MUCOMYST    240 mL    Take 4 mLs by nebulization 2 times daily Use with albuterol solution. Discard open bottle of Mucomyst after 96 hours.       albuterol (2.5 MG/3ML) 0.083% neb solution     60 vial    Take 1 vial (2.5 mg) by nebulization 2 times daily Use with Mucomyst       atenolol 25 MG tablet    TENORMIN    45 tablet    Take 0.5 tablets (12.5 mg) by mouth At Bedtime 1/4 tab at bedtime       budesonide-formoterol 160-4.5 MCG/ACT Inhaler    SYMBICORT    1 Inhaler    Inhale 2 puffs into the lungs 2 times daily       cholecalciferol 5000 UNITS Caps      Take by mouth daily       ciprofloxacin-dexamethasone otic suspension    CIPRODEX    7.5 mL    Instill 3 drops into the affected ear twice daily as needed.        clonazePAM 0.5 MG tablet    klonoPIN    20 tablet    Take 0.5 tablets (0.25 mg) by mouth 2 times daily as needed for anxiety       conjugated estrogens cream    PREMARIN    42.5 g    Place  vaginally three times a week. Use 0.5 grams weekly as directed       Fish Oil 1200 MG Caps      Take 3 capsules by mouth daily.       fluticasone 50 MCG/ACT spray    FLONASE    48 g    Spray 2 sprays into both nostrils daily       GLUCOSAMINE CHONDR COMPLEX 500-400 MG Caps per capsule   Generic drug:  glucosamine-chondroitin      Take 2 capsules by mouth daily.       Ipratropium-Albuterol  MCG/ACT inhaler    COMBIVENT RESPIMAT    1 Inhaler    Inhale 1 puff into the lungs 4 times daily Not to exceed 6 doses per day.       meropenem 1 G vial    MERREM    1 each    Inject 2,000 mg (2 g) into the vein every 8 hours       Naltrexone Powd     30 g    4.5mg compounded in capsule to take one daily by mouth. Please dispense 30 capsules       nystatin-triamcinolone ointment    MYCOLOG    30 g    Apply topically 2 times daily       predniSONE 1 MG tablet    DELTASONE    200 tablet    Take 5 mgs one day, alternating with 6 mgs the next.       * UNABLE TO FIND      Take 12.5 mg by mouth daily MEDICATION NAME: NADH with coQ10       * UNABLE TO FIND      Take 1 capsule by mouth daily MEDICATION NAME: phosphtydil choline       * UNABLE TO FIND      3,600 mg daily MEDICATION NAME: monolaurin       UNKNOWN MED DOSAGE      Lithium orotate       * Notice:  This list has 3 medication(s) that are the same as other medications prescribed for you. Read the directions carefully, and ask your doctor or other care provider to review them with you.

## 2017-06-02 NOTE — PROGRESS NOTES
"REASON FOR VISIT:  New patient evaluation to establish care.      HISTORY OF PRESENT ILLNESS:  Ms. Katie Ye is a pleasant, 46-year-old lady with a history of Churg-Antonina vasculitis, Mannose-binding lectin hypertension, interstitial lung disease and asthma.  She states she has a history of cardiac involvement with the pericardial effusion and probable pericarditis in the past.  I cannot find any Cardiology notes throughout review of her history, however, and the pulmonologist notes I have reviewed do not refer to cardiology issues.  Her last echocardiogram was in 2014 with a Holter monitor at that time, which was benign.  She has had ongoing palpitations likely related to PACs and PVCs as documented on the Holter monitor.  She thinks that perhaps over the course of her life, the palpitations may have gradually worsened with age, but there have been no acute changes.  She does not get lightheaded, presyncope or syncope.  She takes atenolol for her palpitations and states that if she stops the medication, it will increase as will her blood pressure, she says into the 160s.        She has no shortness of breath on exertion, PND, orthopnea, pedal edema or chest pain.  She does note if she lies on her left side, she gets a throbbing sensation in her left chest.  Otherwise, she has palpitations felt as an occasional skip unprovoked throughout the day.      Another thing which has developed is fatigue with exertion and difficulty with exercising.  She is not limited clearly by shortness of breath and not by chest discomfort; however, she notes that she feels that doing physical exertion \"takes the energy out of her,\" rather than giving her energy as it used to.  She is, in fact, considering filing for disability.      From the standpoint of standard risk factors, her father had a myocardial infarction in his early 50s.  She denies a history of tobacco abuse, diabetes or dyslipidemia, but does state she is indeed " hypertensive when not on her atenolol.        ASSESSMENT AND PLAN:  A pleasant, 46-year-old lady with an unfortunate history of Churg-Antonina syndrome.  At this time, she is not clearly symptomatic from the cardiac standpoint, though I would be concerned with her fatigue.  We will do an echocardiogram as well as again a 24 hour Holter monitor.  Of note, Churg-Antonina syndrome may manifest cardiac involvement with myocarditis, heart failure, pericarditis, arrhythmia, coronary arteritis, valvulopathy or intracavity cardiac thrombosis.  If the echocardiogram is abnormal, CMRI will be performed.      Further recommendations will be pending the results of her studies.  Her EKG at this time demonstrates sinus rhythm with a nonspecific ST abnormality, but it is largely normal.      Total time is 45 minutes, 40 in coordination of care and counseling.      cc:   Morelia Simental MD   [facility] [address] [place] [address]         SCARLETT ENRIQUE MD             D: 2017 12:48   T: 2017 13:58   MT: [email or web address]      Name:     NABOR BUENROSTRO   MRN:      [id number]        Account:      [id number]   :      1970           Service Date: 2017      Document: [id number]

## 2017-06-02 NOTE — PROGRESS NOTES
DIAGNOSES:      Encounter Diagnoses   Name Primary?     Benign essential hypertension Yes     Churg-Antonina syndrome (H)      Palpitations      Family history of early CAD          HPI:  See dictation        MEDICATIONS:    Current Outpatient Prescriptions   Medication Sig Dispense Refill     meropenem (MERREM) 1 G vial Inject 2,000 mg (2 g) into the vein every 8 hours 1 each      atenolol (TENORMIN) 25 MG tablet Take 0.5 tablets (12.5 mg) by mouth At Bedtime 1/4 tab at bedtime 45 tablet 1     UNKNOWN MED DOSAGE Lithium orotate       clonazePAM (KLONOPIN) 0.5 MG tablet Take 0.5 tablets (0.25 mg) by mouth 2 times daily as needed for anxiety 20 tablet 1     predniSONE (DELTASONE) 1 MG tablet Take 5 mgs one day, alternating with 6 mgs the next. 200 tablet 5     acetylcysteine (MUCOMYST) 20 % injection Take 4 mLs by nebulization 2 times daily Use with albuterol solution. Discard open bottle of Mucomyst after 96 hours. 240 mL 11     albuterol (2.5 MG/3ML) 0.083% neb solution Take 1 vial (2.5 mg) by nebulization 2 times daily Use with Mucomyst 60 vial 11     budesonide-formoterol (SYMBICORT) 160-4.5 MCG/ACT Inhaler Inhale 2 puffs into the lungs 2 times daily 1 Inhaler 11     Ipratropium-Albuterol (COMBIVENT RESPIMAT)  MCG/ACT inhaler Inhale 1 puff into the lungs 4 times daily Not to exceed 6 doses per day. 1 Inhaler 11     UNABLE TO FIND 3,600 mg daily MEDICATION NAME: monolaurin       fluticasone (FLONASE) 50 MCG/ACT nasal spray Spray 2 sprays into both nostrils daily 48 g 3     ciprofloxacin-dexamethasone (CIPRODEX) otic suspension Instill 3 drops into the affected ear twice daily as needed. 7.5 mL 1     UNABLE TO FIND Take 12.5 mg by mouth daily MEDICATION NAME: NADH with coQ10       UNABLE TO FIND Take 1 capsule by mouth daily MEDICATION NAME: phosphtydil choline       conjugated estrogens (PREMARIN) vaginal cream Place  vaginally three times a week. Use 0.5 grams weekly as directed 42.5 g 0     cholecalciferol  5000 UNITS CAPS Take by mouth daily       glucosamine-chondroitin (GLUCOSAMINE CHONDR COMPLEX) 500-400 MG CAPS Take 2 capsules by mouth daily.       Omega-3 Fatty Acids (FISH OIL) 1200 MG capsule Take 3 capsules by mouth daily.       Naltrexone POWD 4.5mg compounded in capsule to take one daily by mouth. Please dispense 30 capsules (Patient not taking: Reported on 2017) 30 g 1     nystatin-triamcinolone (MYCOLOG) ointment Apply topically 2 times daily (Patient not taking: Reported on 2017) 30 g 1         ALLERGIES     Allergies   Allergen Reactions     Colistimethate Anaphylaxis     Colymycin M [Colistimethate Sodium] Anaphylaxis     Clindamycin Rash     Zithromax [Azithromycin]      Heart palpitation       PAST MEDICAL HISTORY:  Past Medical History:   Diagnosis Date     Aortitis (H)      Asthma     associated with Churg Antonina syndrome     Churg-Antonina syndrome (H)     dx      Eustachian tube dysfunction      Goiter      Hearing loss, conductive      Hypertension      Hypocalcemia      Irritable bowel syndrome      Major depressive disorder      Mannose-binding lectin deficiency      Nonspecific abnormal results of thyroid function study      Osteoporosis      Pericarditis      and      Pneumonia     4/23/10     Recurrent UTI      Rheumatoid vasculitis (H)      Sinusitis, chronic      Steroid long-term use      Varicose veins of leg with swelling        PAST SURGICAL HISTORY:  Past Surgical History:   Procedure Laterality Date      SECTION       COLONOSCOPY Left 2014    Procedure: COMBINED COLONOSCOPY, SINGLE OR MULTIPLE BIOPSY/POLYPECTOMY BY BIOPSY;  Surgeon: Js Pinedo MD;  Location:  GI     ENT SURGERY       HC COLP CERVIX/UPPER VAGINA W LOOP ELEC BX CERVIX       HYSTERECTOMY  2009    without oopherectomy     HYSTERECTOMY, PAP NO LONGER INDICATED      cervix removed      PICC INSERTION  10/10/2013    5fr DL PASV PICC, 43cm (1cm external) in the L  basilic vein w/ tip in the low SVC.     PICC INSERTION Left 05/26/2017    5fr DL BioFlo PICC, 42cm (3cm external) in the L lateral brachial vein w/ tip in the SVC RA junction.     SINUS SURGERY       TUBAL LIGATION         FAMILY HISTORY:  Family History   Problem Relation Age of Onset     Allergies Mother      Seasonal     Alcohol/Drug Mother      C.A.D. Father      DIABETES Father      Type 2     C.A.D. Maternal Grandmother      Arthritis Maternal Grandmother      Musculoskeletal Disorder Maternal Grandmother      MS     Alcohol/Drug Maternal Grandfather      Asthma Son      Asthma Daughter      Allergies Daughter      Seasonal     Unknown/Adopted Paternal Grandmother      Unknown/Adopted Paternal Grandfather      CANCER Maternal Aunt      breast age 53     Breast Cancer Maternal Aunt      in her 50s       SOCIAL HISTORY:  Social History     Social History     Marital status: Single     Spouse name: N/A     Number of children: N/A     Years of education: N/A     Social History Main Topics     Smoking status: Never Smoker     Smokeless tobacco: Never Used     Alcohol use No     Drug use: No     Sexual activity: Yes     Partners: Male     Other Topics Concern      Service No     Blood Transfusions No     Caffeine Concern No     Occupational Exposure No     Hobby Hazards No     Sleep Concern No     Stress Concern Yes     Weight Concern No     Special Diet Yes     IBS diet     Back Care No     Exercise Not Asked     1 mile power walk 5 days a week at least.      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  Non smoker. No smokers at home.  Enjoys walking and dancing.  Alcohol--1 to 2 drinks daily.  Few times a year has more than 4 drinks in a day.  No illicits.                       Review of Systems:  Skin:  Negative     Eyes:  Positive for glasses  ENT:  Positive for    Respiratory:  Negative   "  Cardiovascular:    Positive for;palpitations;lightheadedness;dizziness;fatigue  Gastroenterology: Positive for    Genitourinary:  Negative    Musculoskeletal:  Negative    Neurologic:  Positive for numbness or tingling of feet  Psychiatric:  Positive for anxiety;depression  Heme/Lymph/Imm:  Negative    Endocrine:  Positive for thyroid disorder    Physical Exam:  Vitals: BP 90/66 (BP Location: Right arm, Patient Position: Chair, Cuff Size: Adult Regular)  Pulse 76  Ht 1.575 m (5' 2\")  Wt 48.1 kg (106 lb 1.6 oz)  LMP 02/01/2009  BMI 19.41 kg/m2    Constitutional:  cooperative;alert and oriented;in no acute distress chronically ill;thin;frail      Skin:  warm and dry to the touch, no apparent skin lesions or masses noted        Head:  normocephalic, no masses or lesions        Eyes:  pupils equal and round;conjunctivae and lids unremarkable;sclera white;no xanthalasma;EOMS intact        ENT:  no pallor or cyanosis        Neck:  carotid pulses are full and equal bilaterally;JVP normal;no carotid bruit        Chest:  normal breath sounds, clear to auscultation, normal A-P diameter, normal symmetry, normal respiratory excursion, no use of accessory muscles        Cardiac: regular rhythm;normal S1 and S2;no S3 or S4       holosystolic murmur;grade 2;apical          Abdomen:  abdomen soft;BS normoactive        Vascular: pulses full and equal                                        Extremities and Back:  no edema;no deformities, clubbing, cyanosis, erythema observed              Neurological:  no gross motor deficits;affect appropriate, oriented to time, person and place          ASSESSMENT AND PLAN:    See dictation    ORDERS AT TODAY'S VISIT:    Orders Placed This Encounter   Procedures     Lipid Profile     Follow-Up with Cardiac Advanced Practice Provider     EKG 12-lead complete w/read - Clinics (performed today)     Holter Monitor 24 hour - Adult     Echocardiogram Complete           CC  Morelia Simental MD  [place] [facility]  [address]  [place] [address]

## 2017-06-06 ENCOUNTER — HOME INFUSION (PRE-WILLOW HOME INFUSION) (OUTPATIENT)
Dept: PHARMACY | Facility: CLINIC | Age: 47
End: 2017-06-06

## 2017-06-06 LAB
BUN SERPL-MCNC: 8 MG/DL (ref 7–30)
CREAT SERPL-MCNC: 0.6 MG/DL (ref 0.52–1.04)
GFR SERPL CREATININE-BSD FRML MDRD: NORMAL ML/MIN/1.7M2
MAGNESIUM SERPL-MCNC: 2.6 MG/DL (ref 1.6–2.3)
PHOSPHATE SERPL-MCNC: 1.6 MG/DL (ref 2.5–4.5)
POTASSIUM SERPL-SCNC: 3.9 MMOL/L (ref 3.4–5.3)

## 2017-06-06 PROCEDURE — 84132 ASSAY OF SERUM POTASSIUM: CPT | Performed by: INTERNAL MEDICINE

## 2017-06-06 PROCEDURE — 83735 ASSAY OF MAGNESIUM: CPT | Performed by: INTERNAL MEDICINE

## 2017-06-06 PROCEDURE — 82565 ASSAY OF CREATININE: CPT | Performed by: INTERNAL MEDICINE

## 2017-06-06 PROCEDURE — 84520 ASSAY OF UREA NITROGEN: CPT | Performed by: INTERNAL MEDICINE

## 2017-06-06 PROCEDURE — 84100 ASSAY OF PHOSPHORUS: CPT | Performed by: INTERNAL MEDICINE

## 2017-06-07 ENCOUNTER — HOME INFUSION (PRE-WILLOW HOME INFUSION) (OUTPATIENT)
Dept: PHARMACY | Facility: CLINIC | Age: 47
End: 2017-06-07

## 2017-06-07 ASSESSMENT — PATIENT HEALTH QUESTIONNAIRE - PHQ9: SUM OF ALL RESPONSES TO PHQ QUESTIONS 1-9: 11

## 2017-06-09 ENCOUNTER — TRANSFERRED RECORDS (OUTPATIENT)
Dept: HEALTH INFORMATION MANAGEMENT | Facility: CLINIC | Age: 47
End: 2017-06-09

## 2017-06-09 ENCOUNTER — OFFICE VISIT (OUTPATIENT)
Dept: PULMONOLOGY | Facility: CLINIC | Age: 47
End: 2017-06-09
Attending: INTERNAL MEDICINE
Payer: COMMERCIAL

## 2017-06-09 VITALS
SYSTOLIC BLOOD PRESSURE: 115 MMHG | HEIGHT: 62 IN | WEIGHT: 105 LBS | OXYGEN SATURATION: 100 % | BODY MASS INDEX: 19.32 KG/M2 | HEART RATE: 63 BPM | DIASTOLIC BLOOD PRESSURE: 76 MMHG

## 2017-06-09 DIAGNOSIS — I10 BENIGN ESSENTIAL HYPERTENSION: ICD-10-CM

## 2017-06-09 DIAGNOSIS — J47.9 BRONCHIECTASIS WITHOUT ACUTE EXACERBATION (H): Primary | ICD-10-CM

## 2017-06-09 DIAGNOSIS — D72.18 CHURG-STRAUSS SYNDROME (H): ICD-10-CM

## 2017-06-09 DIAGNOSIS — R00.2 PALPITATIONS: ICD-10-CM

## 2017-06-09 DIAGNOSIS — Z82.49 FAMILY HISTORY OF EARLY CAD: ICD-10-CM

## 2017-06-09 DIAGNOSIS — M30.1 CHURG-STRAUSS SYNDROME (H): ICD-10-CM

## 2017-06-09 DIAGNOSIS — J47.1 BRONCHIECTASIS WITH ACUTE EXACERBATION (H): Primary | ICD-10-CM

## 2017-06-09 LAB
ALT SERPL W P-5'-P-CCNC: 47 U/L (ref 0–50)
BASOPHILS # BLD AUTO: 0.1 10E9/L (ref 0–0.2)
BASOPHILS NFR BLD AUTO: 0.8 %
CHOLEST SERPL-MCNC: 179 MG/DL
CRP SERPL-MCNC: <2.9 MG/L (ref 0–8)
DIFFERENTIAL METHOD BLD: NORMAL
EOSINOPHIL # BLD AUTO: 0.2 10E9/L (ref 0–0.7)
EOSINOPHIL NFR BLD AUTO: 3.2 %
ERYTHROCYTE [DISTWIDTH] IN BLOOD BY AUTOMATED COUNT: 12.1 % (ref 10–15)
ERYTHROCYTE [SEDIMENTATION RATE] IN BLOOD BY WESTERGREN METHOD: 7 MM/H (ref 0–20)
EXPTIME-PRE: 6.6 SEC
FEF2575-%PRED-PRE: 79 %
FEF2575-PRE: 2.14 L/SEC
FEF2575-PRED: 2.7 L/SEC
FEFMAX-%PRED-PRE: 90 %
FEFMAX-PRE: 5.99 L/SEC
FEFMAX-PRED: 6.58 L/SEC
FEV1-%PRED-PRE: 116 %
FEV1-PRE: 2.96 L
FEV1FEV6-PRE: 73 %
FEV1FEV6-PRED: 83 %
FEV1FVC-PRE: 73 %
FEV1FVC-PRED: 83 %
FIFMAX-PRE: 5.32 L/SEC
FVC-%PRED-PRE: 132 %
FVC-PRE: 4.05 L
FVC-PRED: 3.07 L
HCT VFR BLD AUTO: 38.5 % (ref 35–47)
HDLC SERPL-MCNC: 71 MG/DL
HGB BLD-MCNC: 12.7 G/DL (ref 11.7–15.7)
IMM GRANULOCYTES # BLD: 0 10E9/L (ref 0–0.4)
IMM GRANULOCYTES NFR BLD: 0.3 %
LDLC SERPL CALC-MCNC: 88 MG/DL
LYMPHOCYTES # BLD AUTO: 1.1 10E9/L (ref 0.8–5.3)
LYMPHOCYTES NFR BLD AUTO: 17 %
MCH RBC QN AUTO: 33 PG (ref 26.5–33)
MCHC RBC AUTO-ENTMCNC: 33 G/DL (ref 31.5–36.5)
MCV RBC AUTO: 100 FL (ref 78–100)
MONOCYTES # BLD AUTO: 0.6 10E9/L (ref 0–1.3)
MONOCYTES NFR BLD AUTO: 9 %
NEUTROPHILS # BLD AUTO: 4.7 10E9/L (ref 1.6–8.3)
NEUTROPHILS NFR BLD AUTO: 69.7 %
NONHDLC SERPL-MCNC: 109 MG/DL
NRBC # BLD AUTO: 0 10*3/UL
NRBC BLD AUTO-RTO: 0 /100
PLATELET # BLD AUTO: 284 10E9/L (ref 150–450)
RBC # BLD AUTO: 3.85 10E12/L (ref 3.8–5.2)
TRIGL SERPL-MCNC: 102 MG/DL
WBC # BLD AUTO: 6.7 10E9/L (ref 4–11)

## 2017-06-09 PROCEDURE — 99211 OFF/OP EST MAY X REQ PHY/QHP: CPT | Mod: ZF

## 2017-06-09 NOTE — LETTER
6/9/2017       RE: Valencia Ye  [address] [place] [id number]     Dear Colleague,    Thank you for referring your patient, Valencia Ye, to the [facility] at [facility]. Please see a copy of my visit note below.    Reason for Visit  Valencia Ye is a 46 year old female who is being seen for Bronchiectasis.    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 At her last appointment she was placed on IV meropenem and IV tobramycin for a pulmonary exacerbation. IV tobramycin was discontinued due to symptoms consistent with vestibular toxicity.     1. Pulmonary: although the patient's long-standing fatigue is unchanged, the patient does notice a significant improvement in her pulmonary symptoms. Her sputum production and cough have improved significantly.  PFTs are significantly increased from her last appointment and the best they have been in our records. Her pulmonary exacerbation has resolved. Her PICC line will be pulled after today's appointment. I have encouraged her to continue current airway clearance therapy with albuterol and Mucomyst and inhaled steroids.  She has received her flu vaccine. I have asked her to call the pulmonary office with any increase in her symptoms. A work letter will be completed indicating she will be off work for the next 2-3 days as she is still feeling quite fatigued, which is partially related to poor sleep as a result of her IV antibiotic dose schedule.     2. Fatigue: I have questioned whether or not the patient actually requires atenolol as her BP appears to be in an excellent range, and this may actually be contributing to her current fatigue. Currently she is taking 0.25 - 0.5 tablet qPM depending on her BP at that time. She reports that when atenolol has been discontinued in  "the past, her BP has increased. The patient will discuss whether or not atenolol can be discontinued with her PCP.    3. Healthcare Coordination: The patient has requested that all of her pulmonary office notes be faxed to Dr. Sunil Butts at [facility], her immunologist who manages her Churg-Antonina syndrome.  At Dr. Butts's request, a CBC with differential, ESR and CRP will be completed after today's appointment and results will be faxed to Dr. Butts.    I will see the patient in followup in 2 months with PFTs and a sputum culture.     Pulmonary HPI    The patient was seen and examined by Dagoberto Birmingham Katie Ye is a 46-year-old female with eosinophilic granuloma with polyangiitis (Churg-Antonina syndrome) and bronchiectasis. At her last pulmonary appointment on 05/19 she was placed on IV meropenem and IV tobramycin for a pulmonary exacerbation. Tobramycin was discontinued due worsened tinnitus.    The patient reports persistent fatigue. Breathing is comfortable at rest. The patient reports she slept for most of the day yesterday due to fatigue from her activities the day before - she ran [employer] and some relatives stopped by for a brief visit. She denies cough and sputum production, marked improvement since before antibiotics. No hemoptysis or CP. She continues to do vest therapies 20 minutes qhs.    She will wake up in the morning with fatigue and muscle aches. Her brain doesn't \"warm up\" until 11am, then she is okay for a few hours, and then brain function will decline again. Her fatigue will worsen as the day goes on. She reports she does not feel sleepy or drowsy, she just feels \"empty\". Prior to IV antibiotics she was working full-time and has tried to arrange her work day so that her more mentally demanding tasks can occur while her brain is functioning well. The patient reports her fatigue has gradually worsened over the past year. She feels that she has never fully " recovered to her previous level of health following her severe illness in 2013.     The patient is being followed by immunologist Dr. Sunil Butts out of [facility]. He is managing her Churg-Antonina syndrome.           REVIEW OF SYSTEMS:     Constitutional: Weight is stable.  ENT: No sinus or ear pain, rhinorrhea or sore throat. Tobramycin was discontinued after noting increased tinnitus. The patient reports she only had about 3-4 doses of IV tobramycin, however her tinnitus and hearing loss have both worsened.   CV: She has occasional palpitations, though with no associated symptoms, longstanding and unchanged.    MS: She has chronic myalgias and arthralgias, all unchanged.   GI: Occasional nausea with antibiotics. Eating food does help. She has no diarrhea or abdominal pain.     A complete ROS was otherwise negative except as noted in the HPI.    Current Outpatient Prescriptions   Medication     meropenem (MERREM) 1 G vial     atenolol (TENORMIN) 25 MG tablet     UNKNOWN MED DOSAGE     clonazePAM (KLONOPIN) 0.5 MG tablet     Naltrexone POWD     nystatin-triamcinolone (MYCOLOG) ointment     predniSONE (DELTASONE) 1 MG tablet     acetylcysteine (MUCOMYST) 20 % injection     albuterol (2.5 MG/3ML) 0.083% neb solution     budesonide-formoterol (SYMBICORT) 160-4.5 MCG/ACT Inhaler     Ipratropium-Albuterol (COMBIVENT RESPIMAT)  MCG/ACT inhaler     UNABLE TO FIND     fluticasone (FLONASE) 50 MCG/ACT nasal spray     ciprofloxacin-dexamethasone (CIPRODEX) otic suspension     UNABLE TO FIND     UNABLE TO FIND     conjugated estrogens (PREMARIN) vaginal cream     cholecalciferol 5000 UNITS CAPS     glucosamine-chondroitin (GLUCOSAMINE CHONDR COMPLEX) 500-400 MG CAPS     Omega-3 Fatty Acids (FISH OIL) 1200 MG capsule     No current facility-administered medications for this visit.      Allergies   Allergen Reactions     Colistimethate Anaphylaxis     Colymycin M [Colistimethate Sodium] Anaphylaxis     Tamiflu  [Oseltamivir]      Gums Blister up     Clindamycin Rash     Zithromax [Azithromycin]      Heart palpitation     Past Medical History:   Diagnosis Date     Aortitis (H)      Asthma     associated with Churg Antonina syndrome     Churg-Antonina syndrome (H)     dx      Eustachian tube dysfunction      Goiter      Hearing loss, conductive      Hypertension      Hypocalcemia      Irritable bowel syndrome      Major depressive disorder      Mannose-binding lectin deficiency      Nonspecific abnormal results of thyroid function study      Osteoporosis      Pericarditis      and      Pneumonia     4/23/10     Recurrent UTI      Rheumatoid vasculitis (H)      Sinusitis, chronic      Steroid long-term use      Varicose veins of leg with swelling        Past Surgical History:   Procedure Laterality Date      SECTION       COLONOSCOPY Left 2014    Procedure: COMBINED COLONOSCOPY, SINGLE OR MULTIPLE BIOPSY/POLYPECTOMY BY BIOPSY;  Surgeon: Js Pinedo MD;  Location:  GI     ENT SURGERY       HC COLP CERVIX/UPPER VAGINA W LOOP ELEC BX CERVIX       HYSTERECTOMY  2009    without oopherectomy     HYSTERECTOMY, PAP NO LONGER INDICATED      cervix removed      PICC INSERTION  10/10/2013    5fr DL PASV PICC, 43cm (1cm external) in the L basilic vein w/ tip in the low SVC.     PICC INSERTION Left 2017    5fr DL BioFlo PICC, 42cm (3cm external) in the L lateral brachial vein w/ tip in the SVC RA junction.     SINUS SURGERY       TUBAL LIGATION         Social History     Social History     Marital status: Single     Spouse name: N/A     Number of children: N/A     Years of education: N/A     Occupational History     Not on file.     Social History Main Topics     Smoking status: Never Smoker     Smokeless tobacco: Never Used     Alcohol use No     Drug use: No     Sexual activity: Yes     Partners: Male     Other Topics Concern      Service No     Blood Transfusions No      "Caffeine Concern No     Occupational Exposure No     Hobby Hazards No     Sleep Concern No     Stress Concern Yes     Weight Concern No     Special Diet Yes     IBS diet     Back Care No     Exercise Not Asked     1 mile power walk 5 days a week at least.      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  .  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  Non smoker. No smokers at home.  Enjoys walking and dancing.  Alcohol--1 to 2 drinks daily.  Few times a year has more than 4 drinks in a day.  No illicits.                       /76 (BP Location: Right arm, Patient Position: Sitting, Cuff Size: Adult Regular)  Pulse 63  Ht 1.575 m (5' 2.01\")  Wt 47.6 kg (105 lb)  LMP 02/01/2009  SpO2 100%  BMI 19.2 kg/m2    Exam:   GENERAL APPEARANCE: Well developed, well nourished, alert, and in no apparent distress.  EYES: PERRL, EOMI  HENT: Nasal mucosa with no edema and no hyperemia. No nasal polyps.  EARS: Canals clear, TMs scarred with bilat perforation  MOUTH: Oral mucosa is moist, without any lesions, no tonsillar enlargement, no oropharyngeal exudate.  NECK: supple, no masses, no thyromegaly.  LYMPHATICS: No significant axillary, cervical, or supraclavicular nodes.  RESP: normal percussion, good air flow throughout.  Faint crackles in left mid lung field. No rhonchi. No wheezes.  CV: No ectopic beats. Normal S1, S2, regular rhythm, normal rate. No murmur.  No rub. No gallop. No LE edema.   ABDOMEN:  Bowel sounds normal, soft, nontender, no HSM or masses.   MS: extremities normal. No clubbing. No cyanosis.  SKIN: no rash on limited exam  NEURO: Mentation intact, speech normal, normal strength and tone, normal gait and stance  PSYCH: mentation appears normal. and affect normal/bright  Results:  Recent Results (from the past 168 hour(s))   Urea nitrogen    Collection Time: 06/06/17 10:30 AM   Result Value Ref Range    Urea Nitrogen 8 7 - 30 mg/dL "   Creatinine    Collection Time: 06/06/17 10:30 AM   Result Value Ref Range    Creatinine 0.60 0.52 - 1.04 mg/dL    GFR Estimate >90  Non  GFR Calc   >60 mL/min/1.7m2    GFR Estimate If Black >90   GFR Calc   >60 mL/min/1.7m2   Potassium    Collection Time: 06/06/17 10:30 AM   Result Value Ref Range    Potassium 3.9 3.4 - 5.3 mmol/L   Magnesium    Collection Time: 06/06/17 10:30 AM   Result Value Ref Range    Magnesium 2.6 (H) 1.6 - 2.3 mg/dL   Phosphorus    Collection Time: 06/06/17 10:30 AM   Result Value Ref Range    Phosphorus 1.6 (L) 2.5 - 4.5 mg/dL   General PFT Lab (Please always keep checked)    Collection Time: 06/09/17  3:50 PM   Result Value Ref Range    FVC-Pred 3.07 L    FVC-Pre 4.05 L    FVC-%Pred-Pre 132 %    FEV1-Pre 2.96 L    FEV1-%Pred-Pre 116 %    FEV1FVC-Pred 83 %    FEV1FVC-Pre 73 %    FEFMax-Pred 6.58 L/sec    FEFMax-Pre 5.99 L/sec    FEFMax-%Pred-Pre 90 %    FEF2575-Pred 2.70 L/sec    FEF2575-Pre 2.14 L/sec    ATH3900-%Pred-Pre 79 %    ExpTime-Pre 6.60 sec    FIFMax-Pre 5.32 L/sec    FEV1FEV6-Pred 83 %    FEV1FEV6-Pre 73 %                             Results as noted above.    PFT Interpretation:  Very mild obstructive ventilatory defect.  Increased from previous.  Similar to recent best.  Valid Maneuver              Scribe Disclosure:   I, Lisa Graham, am serving as a scribe; to document services personally performed by DAGOBERTO PIERRE MD based on data collection and the provider's statements to me.     Provider Disclosure:  I agree with above History, Review of Systems, Physical exam and Plan.  I have reviewed the content of the documentation and have edited it as needed. I have personally performed the services documented here and the documentation accurately represents those services and the decisions I have made.      Electronically signed by:  aDgoberto Pierre     Again, thank you for allowing me to participate in the care of your patient.       Sincerely,    Dagoberto Briscoe MD

## 2017-06-09 NOTE — PATIENT INSTRUCTIONS
Stop IV meropenem.  Discuss stopping atenolol with your primary in case it is contributing to your fatigue.  Otherwise continue current medication, nebs and vest therapy.

## 2017-06-09 NOTE — NURSING NOTE
Valencia CAMARGO Katie Ye comes into clinic today at the request of Dr. Briscoe.    Pt PICC line removed. Site Clean,dry and intact. No evidence of infection. PICC length 42 cm. No bleeding after removal. Site cleaned, covered with sterile gauze and secured with Coban. Pt advised to leave dressing on for 24 hours and call CF office with any questions or concerns.      This service provided today was under the supervising provider of the day Dr Briscoe, who was available if needed.    Reason for visit: DC PICC line    [place]

## 2017-06-09 NOTE — NURSING NOTE
Chief Complaint   Patient presents with     Cystic Fibrosis     Return Visit for f/u- Bronchiectasis    Lexy Johnston at 3:57 PM on 6/9/2017

## 2017-06-09 NOTE — MR AVS SNAPSHOT
After Visit Summary   6/9/2017    Valencia Ye    MRN: [id number]           Patient Information     Date Of Birth          1970        Visit Information        Provider Department      6/9/2017 4:30 PM Dagoberto Briscoe MD [facility]        Today's Diagnoses     Bronchiectasis with acute exacerbation (H)    -  1    Churg-Antonina syndrome (H)          Care Instructions    Stop IV meropenem.  Discuss stopping atenolol with your primary in case it is contributing to your fatigue.  Otherwise continue current medication, nebs and vest therapy.           Follow-ups after your visit        Follow-up notes from your care team     Return in about 2 months (around 8/9/2017).      Your next 10 appointments already scheduled     Jun 20, 2017  8:00 AM CDT   Ech Complete with [address] ([facility])    [address] [facility] [address]  [facility] [id number] [phone]           1.  Please bring or wear a comfortable two-piece outfit. 2.  You may eat, drink and take your normal medicines. 3.  For any questions that cannot be answered, please contact the ordering physician ***Please check-in at the [facility] located in [address] in the [facility] building. When you are finished registering, please go to [address] and have a seat. The technician will call your name for the test.            Jun 23, 2017  8:15 AM CDT   Lab with  LAB    Health Lab ([facility])    [address] [place] [id number] [phone]            Jun 23, 2017  8:30 AM CDT   PFT VISIT with  PFL [facility] Pulmonary Function Testing ([facility])    [address] [id number] [phone]            Jun 23, 2017  8:50 AM CDT   (Arrive by 8:35 AM)   RETURN CYSTIC FIBROSIS VISIT with Dagoberto  Jean Briscoe MD   [facility] ([facility])    [address] [address] [phone]            Jul 18, 2017  7:15 AM CDT   Adult Med Follow UP with SUNNY Ayala CNS   Psychiatry Clinic ([facility])    [address]  [address] [facility] [address]   [phone]            Jan 26, 2018  7:30 AM CST   CF LOOP with  PFL CF   [facility] Pulmonary Function Testing ([facility])    [address] [address] [phone]            Jan 26, 2018  8:10 AM CST   (Arrive by 7:55 AM)   RETURN CYSTIC FIBROSIS VISIT with Dagoberto Briscoe MD   [facility] ([facility])    [address] [id number] [phone]              Who to contact     If you have questions or need follow up information about today's clinic visit or your schedule please contact [facility] directly at [phone].  Normal or non-critical lab and imaging results will be communicated to you by PlateJoyhart, letter or phone within 4 business days after the clinic has received the results. If you do not hear from us within 7 days, please contact the clinic through PlateJoyhart or phone. If you have a critical or abnormal lab result, we will notify you by phone as soon as possible.  Submit refill requests through SimplyTapp or call your pharmacy and they will forward the refill request to us. Please allow 3 business days for your refill to be completed.          Additional Information About Your Visit        SimplyTapp Information     SimplyTapp gives you secure access to your electronic health record. If you see a primary care provider, you can also send messages to your care team and make appointments. If you have questions, please call your  "primary care clinic.  If you do not have a primary care provider, please call [phone] and they will assist you.        Care EveryWhere ID     This is your Care EveryWhere ID. This could be used by other organizations to access your [place] medical records  [id number]        Your Vitals Were     Pulse Height Last Period Pulse Oximetry BMI (Body Mass Index)       63 1.575 m (5' 2.01\") 02/01/2009 100% 19.2 kg/m2        Blood Pressure from Last 3 Encounters:   06/09/17 115/76   06/02/17 90/66   05/26/17 106/60    Weight from Last 3 Encounters:   06/09/17 47.6 kg (105 lb)   06/02/17 48.1 kg (106 lb 1.6 oz)   05/19/17 46.7 kg (103 lb)              We Performed the Following     PICC removal          Today's Medication Changes          These changes are accurate as of: 6/9/17 11:59 PM.  If you have any questions, ask your nurse or doctor.               Stop taking these medicines if you haven't already. Please contact your care team if you have questions.     UNKNOWN MED DOSAGE   Stopped by:  Dagoberto Briscoe MD                    Primary Care Provider Office Phone # Fax #    Morelia Simental [phone]       [facility] [address] [address]        Thank you!     Thank you for choosing [facility]  for your care. Our goal is always to provide you with excellent care. Hearing back from our patients is one way we can continue to improve our services. Please take a few minutes to complete the written survey that you may receive in the mail after your visit with us. Thank you!             Your Updated Medication List - Protect others around you: Learn how to safely use, store and throw away your medicines at www.disposemymeds.org.          This list is accurate as of: 6/9/17 11:59 PM.  Always use your most recent med list.                   Brand Name Dispense Instructions for use    acetylcysteine 20 % nebulizer solution    " MUCOMYST    240 mL    Take 4 mLs by nebulization 2 times daily Use with albuterol solution. Discard open bottle of Mucomyst after 96 hours.       albuterol (2.5 MG/3ML) 0.083% neb solution     60 vial    Take 1 vial (2.5 mg) by nebulization 2 times daily Use with Mucomyst       atenolol 25 MG tablet    TENORMIN    45 tablet    Take 0.5 tablets (12.5 mg) by mouth At Bedtime 1/4 tab at bedtime       budesonide-formoterol 160-4.5 MCG/ACT Inhaler    SYMBICORT    1 Inhaler    Inhale 2 puffs into the lungs 2 times daily       cholecalciferol 5000 UNITS Caps      Take by mouth daily       ciprofloxacin-dexamethasone otic suspension    CIPRODEX    7.5 mL    Instill 3 drops into the affected ear twice daily as needed.       clonazePAM 0.5 MG tablet    klonoPIN    20 tablet    Take 0.5 tablets (0.25 mg) by mouth 2 times daily as needed for anxiety       conjugated estrogens cream    PREMARIN    42.5 g    Place  vaginally three times a week. Use 0.5 grams weekly as directed       Fish Oil 1200 MG Caps      Take 3 capsules by mouth daily.       fluticasone 50 MCG/ACT spray    FLONASE    48 g    Spray 2 sprays into both nostrils daily       GLUCOSAMINE CHONDR COMPLEX 500-400 MG Caps per capsule   Generic drug:  glucosamine-chondroitin      Take 2 capsules by mouth daily.       Ipratropium-Albuterol  MCG/ACT inhaler    COMBIVENT RESPIMAT    1 Inhaler    Inhale 1 puff into the lungs 4 times daily Not to exceed 6 doses per day.       meropenem 1 G vial    MERREM    1 each    Inject 2,000 mg (2 g) into the vein every 8 hours       Naltrexone Powd     30 g    4.5mg compounded in capsule to take one daily by mouth. Please dispense 30 capsules       nystatin-triamcinolone ointment    MYCOLOG    30 g    Apply topically 2 times daily       predniSONE 1 MG tablet    DELTASONE    200 tablet    Take 5 mgs one day, alternating with 6 mgs the next.       * UNABLE TO FIND      Take 12.5 mg by mouth daily MEDICATION NAME: NADH with coQ10        * UNABLE TO FIND      Take 1 capsule by mouth daily MEDICATION NAME: phosphtydil choline       * UNABLE TO FIND      3,600 mg daily MEDICATION NAME: monolaurin       * Notice:  This list has 3 medication(s) that are the same as other medications prescribed for you. Read the directions carefully, and ask your doctor or other care provider to review them with you.

## 2017-06-09 NOTE — PROGRESS NOTES
Reason for Visit  Valencia Ye is a 46 year old female who is being seen for Bronchiectasis.    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 At her last appointment she was placed on IV meropenem and IV tobramycin for a pulmonary exacerbation. IV tobramycin was discontinued due to symptoms consistent with vestibular toxicity.     1. Pulmonary: although the patient's long-standing fatigue is unchanged, the patient does notice a significant improvement in her pulmonary symptoms. Her sputum production and cough have improved significantly.  PFTs are significantly increased from her last appointment and the best they have been in our records. Her pulmonary exacerbation has resolved. Her PICC line will be pulled after today's appointment. I have encouraged her to continue current airway clearance therapy with albuterol and Mucomyst and inhaled steroids.  She has received her flu vaccine. I have asked her to call the pulmonary office with any increase in her symptoms. A work letter will be completed indicating she will be off work for the next 2-3 days as she is still feeling quite fatigued, which is partially related to poor sleep as a result of her IV antibiotic dose schedule.     2. Fatigue: I have questioned whether or not the patient actually requires atenolol as her BP appears to be in an excellent range, and this may actually be contributing to her current fatigue. Currently she is taking 0.25 - 0.5 tablet qPM depending on her BP at that time. She reports that when atenolol has been discontinued in the past, her BP has increased. The patient will discuss whether or not atenolol can be discontinued with her PCP.    3. Healthcare Coordination: The patient has requested that all of her pulmonary office notes be faxed to Dr. Sunil Butts at [facility], her immunologist who manages her Churg-Antonina  "syndrome.  At Dr. Butts's request, a CBC with differential, ESR and CRP will be completed after today's appointment and results will be faxed to Dr. Butts.    I will see the patient in followup in 2 months with PFTs and a sputum culture.     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 At her last pulmonary appointment on 05/19 she was placed on IV meropenem and IV tobramycin for a pulmonary exacerbation. Tobramycin was discontinued due worsened tinnitus.    The patient reports persistent fatigue. Breathing is comfortable at rest. The patient reports she slept for most of the day yesterday due to fatigue from her activities the day before - she ran 1 errand and some relatives stopped by for a brief visit. She denies cough and sputum production, marked improvement since before antibiotics. No hemoptysis or CP. She continues to do vest therapies 20 minutes qhs.    She will wake up in the morning with fatigue and muscle aches. Her brain doesn't \"warm up\" until 11am, then she is okay for a few hours, and then brain function will decline again. Her fatigue will worsen as the day goes on. She reports she does not feel sleepy or drowsy, she just feels \"empty\". Prior to IV antibiotics she was working full-time and has tried to arrange her work day so that her more mentally demanding tasks can occur while her brain is functioning well. The patient reports her fatigue has gradually worsened over the past year. She feels that she has never fully recovered to her previous level of health following her severe illness in 2013.     The patient is being followed by immunologist Dr. Sunil Butts out of [facility]. He is managing her Churg-Antonina syndrome.           REVIEW OF SYSTEMS:     Constitutional: Weight is stable.  ENT: No sinus or ear pain, " rhinorrhea or sore throat. Tobramycin was discontinued after noting increased tinnitus. The patient reports she only had about 3-4 doses of IV tobramycin, however her tinnitus and hearing loss have both worsened.   CV: She has occasional palpitations, though with no associated symptoms, longstanding and unchanged.    MS: She has chronic myalgias and arthralgias, all unchanged.   GI: Occasional nausea with antibiotics. Eating food does help. She has no diarrhea or abdominal pain.     A complete ROS was otherwise negative except as noted in the HPI.    Current Outpatient Prescriptions   Medication     meropenem (MERREM) 1 G vial     atenolol (TENORMIN) 25 MG tablet     UNKNOWN MED DOSAGE     clonazePAM (KLONOPIN) 0.5 MG tablet     Naltrexone POWD     nystatin-triamcinolone (MYCOLOG) ointment     predniSONE (DELTASONE) 1 MG tablet     acetylcysteine (MUCOMYST) 20 % injection     albuterol (2.5 MG/3ML) 0.083% neb solution     budesonide-formoterol (SYMBICORT) 160-4.5 MCG/ACT Inhaler     Ipratropium-Albuterol (COMBIVENT RESPIMAT)  MCG/ACT inhaler     UNABLE TO FIND     fluticasone (FLONASE) 50 MCG/ACT nasal spray     ciprofloxacin-dexamethasone (CIPRODEX) otic suspension     UNABLE TO FIND     UNABLE TO FIND     conjugated estrogens (PREMARIN) vaginal cream     cholecalciferol 5000 UNITS CAPS     glucosamine-chondroitin (GLUCOSAMINE CHONDR COMPLEX) 500-400 MG CAPS     Omega-3 Fatty Acids (FISH OIL) 1200 MG capsule     No current facility-administered medications for this visit.      Allergies   Allergen Reactions     Colistimethate Anaphylaxis     Colymycin M [Colistimethate Sodium] Anaphylaxis     Tamiflu [Oseltamivir]      Gums Blister up     Clindamycin Rash     Zithromax [Azithromycin]      Heart palpitation     Past Medical History:   Diagnosis Date     Aortitis (H)      Asthma     associated with Churg Antonina syndrome     Churg-Antonina syndrome (H)     dx 1990     Eustachian tube dysfunction      Goiter       Hearing loss, conductive      Hypertension      Hypocalcemia      Irritable bowel syndrome      Major depressive disorder      Mannose-binding lectin deficiency      Nonspecific abnormal results of thyroid function study      Osteoporosis      Pericarditis      and      Pneumonia     4/23/10     Recurrent UTI      Rheumatoid vasculitis (H)      Sinusitis, chronic      Steroid long-term use      Varicose veins of leg with swelling        Past Surgical History:   Procedure Laterality Date      SECTION       COLONOSCOPY Left 2014    Procedure: COMBINED COLONOSCOPY, SINGLE OR MULTIPLE BIOPSY/POLYPECTOMY BY BIOPSY;  Surgeon: Js Pinedo MD;  Location:  GI     ENT SURGERY       HC COLP CERVIX/UPPER VAGINA W LOOP ELEC BX CERVIX       HYSTERECTOMY  2009    without oopherectomy     HYSTERECTOMY, PAP NO LONGER INDICATED      cervix removed      PICC INSERTION  10/10/2013    5fr DL PASV PICC, 43cm (1cm external) in the L basilic vein w/ tip in the low SVC.     PICC INSERTION Left 2017    5fr DL BioFlo PICC, 42cm (3cm external) in the L lateral brachial vein w/ tip in the SVC RA junction.     SINUS SURGERY       TUBAL LIGATION         Social History     Social History     Marital status: Single     Spouse name: N/A     Number of children: N/A     Years of education: N/A     Occupational History     Not on file.     Social History Main Topics     Smoking status: Never Smoker     Smokeless tobacco: Never Used     Alcohol use No     Drug use: No     Sexual activity: Yes     Partners: Male     Other Topics Concern      Service No     Blood Transfusions No     Caffeine Concern No     Occupational Exposure No     Hobby Hazards No     Sleep Concern No     Stress Concern Yes     Weight Concern No     Special Diet Yes     IBS diet     Back Care No     Exercise Not Asked     1 mile power walk 5 days a week at least.      Seat Belt Yes     Self-Exams Yes     Social History  "Narrative    [employer] in Radiology Department.  .  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  Non smoker. No smokers at home.  Enjoys walking and dancing.  Alcohol--1 to 2 drinks daily.  Few times a year has more than 4 drinks in a day.  No illicits.                       /76 (BP Location: Right arm, Patient Position: Sitting, Cuff Size: Adult Regular)  Pulse 63  Ht 1.575 m (5' 2.01\")  Wt 47.6 kg (105 lb)  LMP 02/01/2009  SpO2 100%  BMI 19.2 kg/m2    Exam:   GENERAL APPEARANCE: Well developed, well nourished, alert, and in no apparent distress.  EYES: PERRL, EOMI  HENT: Nasal mucosa with no edema and no hyperemia. No nasal polyps.  EARS: Canals clear, TMs scarred with bilat perforation  MOUTH: Oral mucosa is moist, without any lesions, no tonsillar enlargement, no oropharyngeal exudate.  NECK: supple, no masses, no thyromegaly.  LYMPHATICS: No significant axillary, cervical, or supraclavicular nodes.  RESP: normal percussion, good air flow throughout.  Faint crackles in left mid lung field. No rhonchi. No wheezes.  CV: No ectopic beats. Normal S1, S2, regular rhythm, normal rate. No murmur.  No rub. No gallop. No LE edema.   ABDOMEN:  Bowel sounds normal, soft, nontender, no HSM or masses.   MS: extremities normal. No clubbing. No cyanosis.  SKIN: no rash on limited exam  NEURO: Mentation intact, speech normal, normal strength and tone, normal gait and stance  PSYCH: mentation appears normal. and affect normal/bright  Results:  Recent Results (from the past 168 hour(s))   Urea nitrogen    Collection Time: 06/06/17 10:30 AM   Result Value Ref Range    Urea Nitrogen 8 7 - 30 mg/dL   Creatinine    Collection Time: 06/06/17 10:30 AM   Result Value Ref Range    Creatinine 0.60 0.52 - 1.04 mg/dL    GFR Estimate >90  Non  GFR Calc   >60 mL/min/1.7m2    GFR Estimate If Black >90   GFR Calc   >60 mL/min/1.7m2   Potassium    Collection Time: 06/06/17 10:30 AM   Result " Value Ref Range    Potassium 3.9 3.4 - 5.3 mmol/L   Magnesium    Collection Time: 06/06/17 10:30 AM   Result Value Ref Range    Magnesium 2.6 (H) 1.6 - 2.3 mg/dL   Phosphorus    Collection Time: 06/06/17 10:30 AM   Result Value Ref Range    Phosphorus 1.6 (L) 2.5 - 4.5 mg/dL   General PFT Lab (Please always keep checked)    Collection Time: 06/09/17  3:50 PM   Result Value Ref Range    FVC-Pred 3.07 L    FVC-Pre 4.05 L    FVC-%Pred-Pre 132 %    FEV1-Pre 2.96 L    FEV1-%Pred-Pre 116 %    FEV1FVC-Pred 83 %    FEV1FVC-Pre 73 %    FEFMax-Pred 6.58 L/sec    FEFMax-Pre 5.99 L/sec    FEFMax-%Pred-Pre 90 %    FEF2575-Pred 2.70 L/sec    FEF2575-Pre 2.14 L/sec    BIA0069-%Pred-Pre 79 %    ExpTime-Pre 6.60 sec    FIFMax-Pre 5.32 L/sec    FEV1FEV6-Pred 83 %    FEV1FEV6-Pre 73 %                             Results as noted above.    PFT Interpretation:  Very mild obstructive ventilatory defect.  Increased from previous.  Similar to recent best.  Valid Maneuver              Scribe Disclosure:   I, Lisa Graham, am serving as a scribe; to document services personally performed by DAGOBERTO PIERRE MD based on data collection and the provider's statements to me.     Provider Disclosure:  I agree with above History, Review of Systems, Physical exam and Plan.  I have reviewed the content of the documentation and have edited it as needed. I have personally performed the services documented here and the documentation accurately represents those services and the decisions I have made.      Electronically signed by:  Dagoberto Pierre

## 2017-06-13 ENCOUNTER — OFFICE VISIT (OUTPATIENT)
Dept: PSYCHIATRY | Facility: CLINIC | Age: 47
End: 2017-06-13
Attending: CLINICAL NURSE SPECIALIST
Payer: COMMERCIAL

## 2017-06-13 VITALS
BODY MASS INDEX: 19.16 KG/M2 | WEIGHT: 104.8 LBS | DIASTOLIC BLOOD PRESSURE: 80 MMHG | HEART RATE: 83 BPM | SYSTOLIC BLOOD PRESSURE: 128 MMHG

## 2017-06-13 DIAGNOSIS — F41.9 ANXIETY: Primary | ICD-10-CM

## 2017-06-13 DIAGNOSIS — F33.2 SEVERE RECURRENT MAJOR DEPRESSION WITHOUT PSYCHOTIC FEATURES (H): ICD-10-CM

## 2017-06-13 PROCEDURE — 99212 OFFICE O/P EST SF 10 MIN: CPT | Mod: ZF

## 2017-06-13 RX ORDER — MIRTAZAPINE 7.5 MG/1
7.5 TABLET, FILM COATED ORAL AT BEDTIME
Qty: 30 TABLET | Refills: 1 | Status: SHIPPED | OUTPATIENT
Start: 2017-06-13 | End: 2017-06-21

## 2017-06-13 NOTE — NURSING NOTE
Chief Complaint   Patient presents with     Recheck Medication     Anxiety    Reviewed allergies, smoking status, and pharmacy preference  Obtained weight, blood pressure and heart rate

## 2017-06-13 NOTE — PROGRESS NOTES
Outpatient Psychiatry Progress Note     Provider: SUNNY Ayala CNS  Date: 2017  Service:  Medication follow up with counseling.   Patient Identification: Valencia Ye  : 1970   MRN: [id number]    Valencia Ye is a 46 year old year old female who presents for ongoing psychiatric care.  Valencia Ye was last seen in clinic on 17.   At that time,   Assessment & Plan       Valencia Ye is seen today for follow up and reports she did not tolerate Luvox and discontinued it. Some benefit after she decided to increase lithium orotate slightly. Physically not feeling well and overwhelmed with work.  Gave information on LDN which is not necessarily indicated for mood unless symptoms are secondary to inflammation.      Diagnosis  Axis 1: anxiety, Major Depression, Recurrent moderate  Axis 2: none  Axis 3: See problem list in the medical record     Plan:  Medication: Continue current medication  OTC Recommendations: given information on low dose Naltrexone    Lab Orders:  none  Referrals: none, patient is not interested in therapy referral  Release of Information: none  Future Treatment Considerations:per consideration of LDN  Return for Follow Up:8 weeks      ____________________________________________________________________________________________________________________________________________    2017  Today Valencia reports she was off work due to lung infection. Had pic line and off work. Continues to be very fatigued and emotional. Unable to focus on anything. Feeling depressed about her life and being sick.  Feels agitated, racing thoughts, can't get anything, feels the stress of not being able to get things done. Doesn't have anymore to give to anyone and she is  Started LDN on 17. Not sure if any effect.   Since  - mother committed suicide- alcohol toxicity, Valencia had major health problems that year also  Previous medication trials - Zoloft,  Lexapro, Prozac jaw tightness, Wellbutrin psychotic anxiety.  Luvox heart palpitations.    Side effects of medication include: none with current medication.  Psychiatric Review of Systems:  The patient endorses symptoms of depression: In the last 2 weeks per PHQ 9 more than 1/2 days sleep disturbance, restless/lethargy.  Nearly everyday anhedonia, feeling depressed, fatigue, appetite disturbance, feelings of failure, concentration problems, SI but denies plan or intent.   She  patient endorses symptoms of anxiety : with depression increased anxiety about not functioning well in any role.  She endorses symptoms of gagandeep including denies.    She endorses symptoms of psychosis including no psychotic symptoms.       Review of Medical Systems:  Sleep: difficulty falling asleep and waking up alot  Energy: low  Concentration: decreased  Appetite: decreased  GI Concerns: no change  Cardiac concerns: none  Neurological concerns: no new concerns  Other medical concerns: respiratory  Current Substance Use:  Alcohol:denies frequent use or abuse  Other drugs:denies  Caffeine:daily but some decrease  Nicotine: none  Past Medical History:   Past Medical History:   Diagnosis Date     Aortitis (H)      Asthma     associated with Churg Antonina syndrome     Churg-Antonina syndrome (H)     dx 1990     Eustachian tube dysfunction      Goiter      Hearing loss, conductive      Hypertension      Hypocalcemia      Irritable bowel syndrome      Major depressive disorder      Mannose-binding lectin deficiency 2014     Nonspecific abnormal results of thyroid function study      Osteoporosis      Pericarditis     1990 and 1992     Pneumonia     4/23/10     Recurrent UTI      Rheumatoid vasculitis (H)      Sinusitis, chronic      Steroid long-term use      Varicose veins of leg with swelling      Patient Active Problem List   Diagnosis     Goiter     History of systemic steroid therapy     ILD (interstitial lung disease) (H)     Palpitations      Churg-Antonina syndrome (H)     History of eating disorder     Bronchiectasis (H)     Asthma     Anxiety     Chronic fatigue     Mannose-binding lectin deficiency     Benign essential hypertension     Moderate episode of recurrent major depressive disorder (H)     Bronchiectasis without acute exacerbation (H)       Allergies:   Allergies   Allergen Reactions     Colistimethate Anaphylaxis     Colymycin M [Colistimethate Sodium] Anaphylaxis     Tamiflu [Oseltamivir]      Gums Blister up     Clindamycin Rash     Zithromax [Azithromycin]      Heart palpitation          Current Medications     Current Outpatient Prescriptions Ordered in [facility]   Medication Sig Dispense Refill     meropenem (MERREM) 1 G vial Inject 2,000 mg (2 g) into the vein every 8 hours 1 each      atenolol (TENORMIN) 25 MG tablet Take 0.5 tablets (12.5 mg) by mouth At Bedtime 1/4 tab at bedtime 45 tablet 1     clonazePAM (KLONOPIN) 0.5 MG tablet Take 0.5 tablets (0.25 mg) by mouth 2 times daily as needed for anxiety 20 tablet 1     Naltrexone POWD 4.5mg compounded in capsule to take one daily by mouth. Please dispense 30 capsules 30 g 1     nystatin-triamcinolone (MYCOLOG) ointment Apply topically 2 times daily 30 g 1     predniSONE (DELTASONE) 1 MG tablet Take 5 mgs one day, alternating with 6 mgs the next. 200 tablet 5     acetylcysteine (MUCOMYST) 20 % injection Take 4 mLs by nebulization 2 times daily Use with albuterol solution. Discard open bottle of Mucomyst after 96 hours. 240 mL 11     albuterol (2.5 MG/3ML) 0.083% neb solution Take 1 vial (2.5 mg) by nebulization 2 times daily Use with Mucomyst 60 vial 11     budesonide-formoterol (SYMBICORT) 160-4.5 MCG/ACT Inhaler Inhale 2 puffs into the lungs 2 times daily 1 Inhaler 11     Ipratropium-Albuterol (COMBIVENT RESPIMAT)  MCG/ACT inhaler Inhale 1 puff into the lungs 4 times daily Not to exceed 6 doses per day. 1 Inhaler 11     UNABLE TO FIND 3,600 mg daily MEDICATION NAME: monolaurin        "fluticasone (FLONASE) 50 MCG/ACT nasal spray Spray 2 sprays into both nostrils daily 48 g 3     ciprofloxacin-dexamethasone (CIPRODEX) otic suspension Instill 3 drops into the affected ear twice daily as needed. 7.5 mL 1     UNABLE TO FIND Take 12.5 mg by mouth daily MEDICATION NAME: NADH with coQ10       UNABLE TO FIND Take 1 capsule by mouth daily MEDICATION NAME: phosphtydil choline       conjugated estrogens (PREMARIN) vaginal cream Place  vaginally three times a week. Use 0.5 grams weekly as directed 42.5 g 0     cholecalciferol 5000 UNITS CAPS Take by mouth daily       glucosamine-chondroitin (GLUCOSAMINE CHONDR COMPLEX) 500-400 MG CAPS Take 2 capsules by mouth daily.       Omega-3 Fatty Acids (FISH OIL) 1200 MG capsule Take 3 capsules by mouth daily.       No current Epic-ordered facility-administered medications on file.         Mental Status Exam     Appearance:  Casually dressed and Well groomed  Behavior/relationship to examiner/demeanor:  Cooperative  Orientation: Oriented to person, place, time and situation  Psychomotor: normal form  Speech Rate:  Normal  Speech Spontaneity:  Normal  Mood:  \"depressed\"  Affect:  Appropriate/mood-congruent and Dysphoric  Thought Process (Associations):  Goal directed and Circumstantial  Thought Content:  no overt psychosis, patient does not appear to be responding to internal stimuli, Suicidal ideation and denies suicidal intent or plan  Abnormal Perception:  None  Attention/Concentration:  Normal  Language:  Intact  Insight:  Adequate  Judgment:  Adequate for safety      Results     Vital signs: /80  Pulse 83  Wt 47.5 kg (104 lb 12.8 oz)  LMP 02/01/2009  BMI 19.16 kg/m2    Laboratory Data:  reviewed data from other providers    Assessment & Plan      Valencia Ye is seen today for follow up and reports she has been more depressed, which she thinks is secondary to limitations due to decline in health. Struggles through the day and reports SI, feelings " of hopeless and helplessness.  Is willing to try another medication and is requesting form for FMLA be completed.     Diagnosis  Axis 1: Major Depression, Recurrent severe without psychosis, Anxiety with obsessive features  Axis 2: none  Axis 3: See problem list in the medical record    Plan:  Medication: Start Mirtazapine 7.5mg, continue LDN  OTC Recommendations: no new recommendations  Lab Orders:  none  Referrals: Hillary Chopra Ph.D  Release of Information: FMLA  Future Treatment Considerations:per symptoms, response to treatment.  Return for Follow Up:6/21, 6/28/17, 7/21/17   The risks, benefits, alternatives and side effects have been discussed and are understood by the patient. The patient understands the risks of using street drugs or alcohol. There are no medical contraindications, the patient agrees to treatment, and has the capacity to do so. The patient understands to call 911 or come to the nearest ED if life threatening or urgent symptoms present.  Over 50% of this time was spent counseling the patient and/or coordinating care regarding review of social and occupational functioning.  In addition patient was counseled on health and wellness practices to augment medication treatment of symptoms. See note for details.    Marlyn Flores, APRN CNS 6/13/2017

## 2017-06-13 NOTE — MR AVS SNAPSHOT
After Visit Summary   6/13/2017    Valencia Ye    MRN: [id number]           Patient Information     Date Of Birth          1970        Visit Information        Provider Department      6/13/2017 7:45 AM Marlyn Flores APRN CNS [facility]        Today's Diagnoses     Anxiety    -  1    Severe recurrent major depression without psychotic features (H)           Follow-ups after your visit        Your next 10 appointments already scheduled     Jun 20, 2017  8:00 AM CDT   Ech Complete with [id number] ([facility])    [address] [place] [address]  [facility] [id number] [phone]           1.  Please bring or wear a comfortable two-piece outfit. 2.  You may eat, drink and take your normal medicines. 3.  For any questions that cannot be answered, please contact the ordering physician ***Please check-in at the [place] Registration Office located in [address] in the [facility] building. When you are finished registering, please go to [address] and have a seat. The technician will call your name for the test.            Jun 21, 2017  7:15 AM CDT   Adult Med Follow UP with SUNNY Ayala CNS   Psychiatry Clinic ([facility])    [address]  [address] [facility] [address] [phone]            Jun 23, 2017  8:15 AM CDT   Lab with  LAB    Health Lab ([facility])    [address] [place] [id number] [phone]            Jun 28, 2017  1:45 PM CDT   Adult Med Follow UP with SUNNY Ayala CNS   Psychiatry Clinic ([facility])    [address]  [address] [facility] [address] [phone]            Jul 18, 2017  7:15 AM CDT   Adult Med Follow UP with SUNNY Ayala CNS   Psychiatry Clinic ([facility])    [facility]  [facility]  [address] [facility] [address]  [address] [facility] [address]   [phone]            Jul 21, 2017  8:45 AM CDT   Adult Med Follow UP with SUNNY Ayala CNS   Psychiatry Clinic ([facility])    [address]  [address] [facility] [address]   [phone]            Aug 11, 2017  2:00 PM CDT   CF LOOP with [facility] PFL CF   [facility] Pulmonary Function Testing ([facility])    [address] [place] [address] [phone]            Aug 11, 2017  2:30 PM CDT   (Arrive by 2:15 PM)   RETURN CYSTIC FIBROSIS VISIT with Dagoberto Briscoe MD   [facility] ([facility])    [address] [place] [address] [phone]            Jan 26, 2018  7:30 AM CST   CF LOOP with UC PFL CF   [facility] Pulmonary Function Testing ([facility])    [address] [place] [id number] [phone]            Jan 26, 2018  8:10 AM CST   (Arrive by 7:55 AM)   RETURN CYSTIC FIBROSIS VISIT with Dagoberto Briscoe MD   [facility] ([facility])    [address] [place] [id number] [phone]              Who to contact     Please call your clinic at [phone] to:    Ask questions about your health    Make or cancel appointments    Discuss your medicines    Learn about your test results    Speak to your doctor   If you have compliments or concerns about an experience at your clinic, or if you wish to file a complaint, please contact [facility] Patient Relations at [phone] or email us at [email or web address]         Additional Information About Your Visit        MyChart Information     MyChart gives you secure access to your electronic health record. If you see a  primary care provider, you can also send messages to your care team and make appointments. If you have questions, please call your primary care clinic.  If you do not have a primary care provider, please call [phone] and they will assist you.      [employer] is an electronic gateway that provides easy, online access to your medical records. With [employer], you can request a clinic appointment, read your test results, renew a prescription or communicate with your care team.     To access your existing account, please contact your [facility] or call [phone] for assistance.        Care EveryWhere ID     This is your Care EveryWhere ID. This could be used by other organizations to access your [facility] medical records  [id number]        Your Vitals Were     Pulse Last Period BMI (Body Mass Index)             83 02/01/2009 19.16 kg/m2          Blood Pressure from Last 3 Encounters:   06/13/17 128/80   06/09/17 115/76   06/02/17 90/66    Weight from Last 3 Encounters:   06/13/17 47.5 kg (104 lb 12.8 oz)   06/09/17 47.6 kg (105 lb)   06/02/17 48.1 kg (106 lb 1.6 oz)              Today, you had the following     No orders found for display         Today's Medication Changes          These changes are accurate as of: 6/13/17 11:59 PM.  If you have any questions, ask your nurse or doctor.               Start taking these medicines.        Dose/Directions    mirtazapine 7.5 MG Tabs tablet   Commonly known as:  REMERON   Used for:  Anxiety   Started by:  Marlyn Flores APRN CNS        Dose:  7.5 mg   Take 1 tablet (7.5 mg) by mouth At Bedtime   Quantity:  30 tablet   Refills:  1            Where to get your medicines      These medications were sent to [facility] [address] [facility] [address] [facility] [address] [facility] [address]  [address] [facility] [address], [facility] [address]     Phone:  [phone]     mirtazapine 7.5 MG Tabs tablet                Primary Care Provider  Office Phone # Fax #    Morelia Simental [phone]       [facility] [address] [address]        Thank you!     Thank you for choosing PSYCHIATRY CLINIC  for your care. Our goal is always to provide you with excellent care. Hearing back from our patients is one way we can continue to improve our services. Please take a few minutes to complete the written survey that you may receive in the mail after your visit with us. Thank you!             Your Updated Medication List - Protect others around you: Learn how to safely use, store and throw away your medicines at www.disposemymeds.org.          This list is accurate as of: 6/13/17 11:59 PM.  Always use your most recent med list.                   Brand Name Dispense Instructions for use    acetylcysteine 20 % nebulizer solution    MUCOMYST    240 mL    Take 4 mLs by nebulization 2 times daily Use with albuterol solution. Discard open bottle of Mucomyst after 96 hours.       albuterol (2.5 MG/3ML) 0.083% neb solution     60 vial    Take 1 vial (2.5 mg) by nebulization 2 times daily Use with Mucomyst       atenolol 25 MG tablet    TENORMIN    45 tablet    Take 0.5 tablets (12.5 mg) by mouth At Bedtime 1/4 tab at bedtime       budesonide-formoterol 160-4.5 MCG/ACT Inhaler    SYMBICORT    1 Inhaler    Inhale 2 puffs into the lungs 2 times daily       cholecalciferol 5000 UNITS Caps      Take by mouth daily       ciprofloxacin-dexamethasone otic suspension    CIPRODEX    7.5 mL    Instill 3 drops into the affected ear twice daily as needed.       clonazePAM 0.5 MG tablet    klonoPIN    20 tablet    Take 0.5 tablets (0.25 mg) by mouth 2 times daily as needed for anxiety       conjugated estrogens cream    PREMARIN    42.5 g    Place  vaginally three times a week. Use 0.5 grams weekly as directed       Fish Oil 1200 MG Caps      Take 3 capsules by mouth daily.       fluticasone 50 MCG/ACT spray    FLONASE    48 g    Spray 2  sprays into both nostrils daily       GLUCOSAMINE CHONDR COMPLEX 500-400 MG Caps per capsule   Generic drug:  glucosamine-chondroitin      Take 2 capsules by mouth daily.       Ipratropium-Albuterol  MCG/ACT inhaler    COMBIVENT RESPIMAT    1 Inhaler    Inhale 1 puff into the lungs 4 times daily Not to exceed 6 doses per day.       meropenem 1 G vial    MERREM    1 each    Inject 2,000 mg (2 g) into the vein every 8 hours       mirtazapine 7.5 MG Tabs tablet    REMERON    30 tablet    Take 1 tablet (7.5 mg) by mouth At Bedtime       Naltrexone Powd     30 g    4.5mg compounded in capsule to take one daily by mouth. Please dispense 30 capsules       nystatin-triamcinolone ointment    MYCOLOG    30 g    Apply topically 2 times daily       predniSONE 1 MG tablet    DELTASONE    200 tablet    Take 5 mgs one day, alternating with 6 mgs the next.       * UNABLE TO FIND      Take 12.5 mg by mouth daily MEDICATION NAME: NADH with coQ10       * UNABLE TO FIND      Take 1 capsule by mouth daily MEDICATION NAME: phosphtydil choline       * UNABLE TO FIND      3,600 mg daily MEDICATION NAME: monolaurin       * Notice:  This list has 3 medication(s) that are the same as other medications prescribed for you. Read the directions carefully, and ask your doctor or other care provider to review them with you.

## 2017-06-16 ENCOUNTER — TELEPHONE (OUTPATIENT)
Dept: PSYCHIATRY | Facility: CLINIC | Age: 47
End: 2017-06-16

## 2017-06-16 LAB
FUNGUS SPEC CULT: NORMAL
MICRO REPORT STATUS: NORMAL
SPECIMEN SOURCE: NORMAL

## 2017-06-16 NOTE — TELEPHONE ENCOUNTER
-Incoming fax from: [employer]  -Requesting completion of enclosed form: Behavioral Health Questionnaire; request for office records dated 5/15/17 to present also requested  -Purpose: Short term disability claim  -Due date: Not indicated  -Return to: [employer] at [phone] (fax)  -MERARY included: No  -Will notify provider to see if pt brought forms to appt earlier this week and if pt may have completed MERARY at that time

## 2017-06-16 NOTE — TELEPHONE ENCOUNTER
-Per provider she has only rec'd FMLA forms  -Behavioral Health Questionnaire placed in provider folder for review  -Will verify if pt has completed MERARY for [employer]

## 2017-06-19 PROBLEM — F33.2 SEVERE RECURRENT MAJOR DEPRESSION WITHOUT PSYCHOTIC FEATURES (H): Status: ACTIVE | Noted: 2017-06-19

## 2017-06-20 ENCOUNTER — HOSPITAL ENCOUNTER (OUTPATIENT)
Dept: CARDIOLOGY | Facility: CLINIC | Age: 47
Discharge: HOME | End: 2017-06-20
Attending: INTERNAL MEDICINE | Admitting: INTERNAL MEDICINE
Payer: COMMERCIAL

## 2017-06-20 DIAGNOSIS — Z82.49 FAMILY HISTORY OF EARLY CAD: ICD-10-CM

## 2017-06-20 DIAGNOSIS — R00.2 PALPITATIONS: ICD-10-CM

## 2017-06-20 PROCEDURE — 93306 TTE W/DOPPLER COMPLETE: CPT | Mod: 26 | Performed by: INTERNAL MEDICINE

## 2017-06-20 PROCEDURE — 93306 TTE W/DOPPLER COMPLETE: CPT

## 2017-06-21 ENCOUNTER — OFFICE VISIT (OUTPATIENT)
Dept: PSYCHIATRY | Facility: CLINIC | Age: 47
End: 2017-06-21
Attending: CLINICAL NURSE SPECIALIST
Payer: COMMERCIAL

## 2017-06-21 VITALS
SYSTOLIC BLOOD PRESSURE: 120 MMHG | HEART RATE: 65 BPM | WEIGHT: 108 LBS | BODY MASS INDEX: 19.75 KG/M2 | DIASTOLIC BLOOD PRESSURE: 73 MMHG

## 2017-06-21 DIAGNOSIS — F33.2 SEVERE RECURRENT MAJOR DEPRESSION WITHOUT PSYCHOTIC FEATURES (H): ICD-10-CM

## 2017-06-21 DIAGNOSIS — F41.9 ANXIETY: Primary | ICD-10-CM

## 2017-06-21 PROCEDURE — 99212 OFFICE O/P EST SF 10 MIN: CPT | Mod: ZF

## 2017-06-21 ASSESSMENT — PATIENT HEALTH QUESTIONNAIRE - PHQ9: SUM OF ALL RESPONSES TO PHQ QUESTIONS 1-9: 25

## 2017-06-21 NOTE — MR AVS SNAPSHOT
After Visit Summary   6/21/2017    Valencia Ye    MRN: [id number]           Patient Information     Date Of Birth          1970        Visit Information        Provider Department      6/21/2017 7:15 AM Marlyn Flores APRN [facility]        Today's Diagnoses     Anxiety    -  1    Severe recurrent major depression without psychotic features (H)           Follow-ups after your visit        Your next 10 appointments already scheduled     Jul 03, 2017  8:40 AM CDT   SHORT with Morelia Simental MD   [facility] ([facility]    [address]  [facility] [address]   [phone]            Jul 18, 2017  7:15 AM CDT   Adult Med Follow UP with SUNNY Ayala CNS   [facility] ([facility])    [place] [id number]  [address] [place] [address]   [phone]            Jul 21, 2017  8:45 AM CDT   Adult Med Follow UP with SUNNY Ayala CNS   [facility] ([facility])    [place] [id number]  [address] [place] [address]   [phone]            Aug 11, 2017  2:00 PM CDT   CF LOOP with UC PFL CF   [facility] ([facility])    [address] [place] [address]   [phone]            Aug 11, 2017  2:30 PM CDT   (Arrive by 2:15 PM)   RETURN CYSTIC FIBROSIS VISIT with Dagoberto Briscoe MD   [facility] ([facility])    [address] [place] [address]   [phone]            Jan 26, 2018  7:30 AM CST   CF LOOP with UC PFL CF   [facility] ([facility])    [address] [place] [address]   [phone]            Jan 26, 2018  8:10 AM CST   (Arrive by 7:55 AM)   RETURN CYSTIC FIBROSIS VISIT  with Dagoberto Briscoe MD   [facility] ([facility])    [address] [address] [phone]              Who to contact     Please call your clinic at [phone] to:    Ask questions about your health    Make or cancel appointments    Discuss your medicines    Learn about your test results    Speak to your doctor   If you have compliments or concerns about an experience at your clinic, or if you wish to file a complaint, please contact [facility] Patient Relations at [phone] or email us at [email or web address]         Additional Information About Your Visit        [employer] Information     [employer] gives you secure access to your electronic health record. If you see a primary care provider, you can also send messages to your care team and make appointments. If you have questions, please call your primary care clinic.  If you do not have a primary care provider, please call [phone] and they will assist you.      [employer] is an electronic gateway that provides easy, online access to your medical records. With [employer], you can request a clinic appointment, read your test results, renew a prescription or communicate with your care team.     To access your existing account, please contact your [facility] or call [phone] for assistance.        Care EveryWhere ID     This is your Care EveryWhere ID. This could be used by other organizations to access your [place] medical records  [id number]        Your Vitals Were     Pulse Last Period BMI (Body Mass Index)             65 02/01/2009 19.75 kg/m2          Blood Pressure from Last 3 Encounters:   06/21/17 120/73   06/13/17 128/80   06/09/17 115/76    Weight from Last 3 Encounters:   06/21/17 49 kg (108 lb)   06/13/17 47.5 kg (104 lb 12.8 oz)   06/09/17 47.6 kg (105 lb)              Today, you  had the following     No orders found for display       Primary Care Provider Office Phone # Fax #    Morelia Simental [phone]       [facility] [address] [facility] [address]  [facility] [address]        Equal Access to Services     JACK THOMPSON : Hadii aad ku hadhemalathao Soomaali, waaxda luqadaha, qaybta kaalmada adeegyada, vale join hayaan meganbabak gurrolaharpal merritt . So [facility] [phone].    ATENCIÓN: Si habla español, tiene a waller disposición servicios gratuitos de asistencia lingüística. ame al [phone].    We comply with applicable federal civil rights laws and [place] laws. We do not discriminate on the basis of race, color, national origin, age, disability sex, sexual orientation or gender identity.            Thank you!     Thank you for choosing [facility]  for your care. Our goal is always to provide you with excellent care. Hearing back from our patients is one way we can continue to improve our services. Please take a few minutes to complete the written survey that you may receive in the mail after your visit with us. Thank you!             Your Updated Medication List - Protect others around you: Learn how to safely use, store and throw away your medicines at www.disposemymeds.org.          This list is accurate as of: 6/21/17 11:59 PM.  Always use your most recent med list.                   Brand Name Dispense Instructions for use Diagnosis    acetylcysteine 20 % nebulizer solution    MUCOMYST    240 mL    Take 4 mLs by nebulization 2 times daily Use with albuterol solution. Discard open bottle of Mucomyst after 96 hours.    Bronchiectasis without complication (H)       albuterol (2.5 MG/3ML) 0.083% neb solution     60 vial    Take 1 vial (2.5 mg) by nebulization 2 times daily Use with Mucomyst    Bronchiectasis without complication (H)       atenolol 25 MG tablet    TENORMIN    45 tablet    Take 0.5 tablets (12.5 mg) by mouth At Bedtime 1/4 tab at bedtime    Palpitations        budesonide-formoterol 160-4.5 MCG/ACT Inhaler    SYMBICORT    1 Inhaler    Inhale 2 puffs into the lungs 2 times daily    Churg-Antonina syndrome (H)       cholecalciferol 5000 UNITS Caps      Take by mouth daily        ciprofloxacin-dexamethasone otic suspension    CIPRODEX    7.5 mL    Instill 3 drops into the affected ear twice daily as needed.    Ear pain, unspecified laterality       clonazePAM 0.5 MG tablet    klonoPIN    20 tablet    Take 0.5 tablets (0.25 mg) by mouth 2 times daily as needed for anxiety    Anxiety       conjugated estrogens cream    PREMARIN    42.5 g    Place  vaginally three times a week. Use 0.5 grams weekly as directed    Vaginal dryness       Fish Oil 1200 MG Caps      Take 3 capsules by mouth daily.        fluticasone 50 MCG/ACT spray    FLONASE    48 g    Spray 2 sprays into both nostrils daily    Chronic rhinitis       GLUCOSAMINE CHONDR COMPLEX 500-400 MG Caps per capsule   Generic drug:  glucosamine-chondroitin      Take 2 capsules by mouth daily.        Ipratropium-Albuterol  MCG/ACT inhaler    COMBIVENT RESPIMAT    1 Inhaler    Inhale 1 puff into the lungs 4 times daily Not to exceed 6 doses per day.    Churg-Antonina syndrome (H)       meropenem 1 G vial    MERREM    1 each    Inject 2,000 mg (2 g) into the vein every 8 hours    Bronchiectasis (H)       Naltrexone Powd     30 g    4.5mg compounded in capsule to take one daily by mouth. Please dispense 30 capsules    Chronic fatigue, History of systemic steroid therapy, ILD (interstitial lung disease) (H)       nystatin-triamcinolone ointment    MYCOLOG    30 g    Apply topically 2 times daily    Acute vulvitis       predniSONE 1 MG tablet    DELTASONE    200 tablet    Take 5 mgs one day, alternating with 6 mgs the next.    Bronchiectasis without complication (H)       * UNABLE TO FIND      Take 12.5 mg by mouth daily MEDICATION NAME: NADH with coQ10    Abdominal pain, right upper quadrant       * UNABLE TO FIND       Take 1 capsule by mouth daily MEDICATION NAME: phosphtydil choline        * UNABLE TO FIND      3,600 mg daily MEDICATION NAME: monolaurin        * Notice:  This list has 3 medication(s) that are the same as other medications prescribed for you. Read the directions carefully, and ask your doctor or other care provider to review them with you.

## 2017-06-21 NOTE — PROGRESS NOTES
Outpatient Psychiatry Progress Note     Provider: SUNNY Ayala CNS  Date: 2017  Service:  Medication follow up with counseling.   Patient Identification: Valencia Ye  : 1970   MRN: [id number]    Valencia Ye is a 46 year old year old female who presents for ongoing psychiatric care.  Valencia Ye was last seen in clinic on 17.   At that time,   Assessment & Plan       Valencia Ye is seen today for follow up and reports she has been more depressed, which she thinks is secondary to limitations due to decline in health. Struggles through the day and reports SI, feelings of hopeless and helplessness.  Is willing to try another medication and is requesting form for FMLA be completed.      Diagnosis  Axis 1: Major Depression, Recurrent severe without psychosis, Anxiety with obsessive features  Axis 2: none  Axis 3: See problem list in the medical record     Plan:  Medication: Start Mirtazapine 7.5mg, continue LDN  OTC Recommendations: no new recommendations  Lab Orders:  none  Referrals: Hillary Chopra Ph.D  Release of Information: FMLA  Future Treatment Considerations:per symptoms, response to treatment.  Return for Follow Up:, 17, 17      ____________________________________________________________________________________________________________________________________________    2017  Today Valencia reports she stopped Mirtazapine after the first day due side effects. One of her daughter's made a suicide gesture last week and is hospitalized.   Has scheduled with a therapist at [facility]  No new flairs with health problems, no return of viral symptoms.   Taking Klonopin at night and at times 1/8 th of a tablet prn.  Stopped lithium orotate.  Continues with great difficult with daily functioning  Side effects of medication include: see My Chart message  Psychiatric Review of Systems:  The patient endorses symptoms of depression: In the last  2 weeks per PHQ 9 PHQ 9 score of 25.  More than 1/2 days sleep disturbance, SI but denies intent. Has thought of plans and states there is nothing in the home that she would use. Nearly everyday anhedonia, feeling depressed, fatigue, appetite disturbance, feelings of failure, concentration problems, restless/lethargy.   She  patient endorses symptoms of anxiety : continued worry, feeling nervous, panic, difficulty leaving home  She endorses symptoms of gagandeep including none.    She endorses symptoms of psychosis including no psychotic symptoms.       Review of Medical Systems:  Sleep: trouble getting to sleep but then sleeping too much  Energy: low  Concentration: decreased  Appetite: decreased  GI Concerns: no new concerns  Cardiac concerns: no new concerns  Neurological concerns: no new concerns  Other medical concerns: no new health concerns  Current Substance Use:   Alcohol:denies frequent use or abuse  Other drugs:denies  Caffeine:has cut back  Nicotine: none  Past Medical History:   Past Medical History:   Diagnosis Date     Aortitis (H)      Asthma     associated with Churg Antonina syndrome     Churg-Antonina syndrome (H)     dx 1990     Eustachian tube dysfunction      Goiter      Hearing loss, conductive      Hypertension      Hypocalcemia      Irritable bowel syndrome      Major depressive disorder      Mannose-binding lectin deficiency 2014     Nonspecific abnormal results of thyroid function study      Osteoporosis      Pericarditis     1990 and 1992     Pneumonia     4/23/10     Recurrent UTI      Rheumatoid vasculitis (H)      Sinusitis, chronic      Steroid long-term use      Varicose veins of leg with swelling      Patient Active Problem List   Diagnosis     Goiter     History of systemic steroid therapy     ILD (interstitial lung disease) (H)     Palpitations     Churg-Antonina syndrome (H)     History of eating disorder     Bronchiectasis (H)     Asthma     Anxiety     Chronic fatigue      Mannose-binding lectin deficiency     Benign essential hypertension     Moderate episode of recurrent major depressive disorder (H)     Bronchiectasis without acute exacerbation (H)     Severe recurrent major depression without psychotic features (H)       Allergies:   Allergies   Allergen Reactions     Colistimethate Anaphylaxis     Colymycin M [Colistimethate Sodium] Anaphylaxis     Tamiflu [Oseltamivir]      Gums Blister up     Clindamycin Rash     Zithromax [Azithromycin]      Heart palpitation          Current Medications     Current Outpatient Prescriptions Ordered in [facility]   Medication Sig Dispense Refill     mirtazapine (REMERON) 7.5 MG TABS tablet Take 1 tablet (7.5 mg) by mouth At Bedtime 30 tablet 1     meropenem (MERREM) 1 G vial Inject 2,000 mg (2 g) into the vein every 8 hours 1 each      atenolol (TENORMIN) 25 MG tablet Take 0.5 tablets (12.5 mg) by mouth At Bedtime 1/4 tab at bedtime 45 tablet 1     clonazePAM (KLONOPIN) 0.5 MG tablet Take 0.5 tablets (0.25 mg) by mouth 2 times daily as needed for anxiety 20 tablet 1     Naltrexone POWD 4.5mg compounded in capsule to take one daily by mouth. Please dispense 30 capsules 30 g 1     nystatin-triamcinolone (MYCOLOG) ointment Apply topically 2 times daily 30 g 1     predniSONE (DELTASONE) 1 MG tablet Take 5 mgs one day, alternating with 6 mgs the next. 200 tablet 5     acetylcysteine (MUCOMYST) 20 % injection Take 4 mLs by nebulization 2 times daily Use with albuterol solution. Discard open bottle of Mucomyst after 96 hours. 240 mL 11     albuterol (2.5 MG/3ML) 0.083% neb solution Take 1 vial (2.5 mg) by nebulization 2 times daily Use with Mucomyst 60 vial 11     budesonide-formoterol (SYMBICORT) 160-4.5 MCG/ACT Inhaler Inhale 2 puffs into the lungs 2 times daily 1 Inhaler 11     Ipratropium-Albuterol (COMBIVENT RESPIMAT)  MCG/ACT inhaler Inhale 1 puff into the lungs 4 times daily Not to exceed 6 doses per day. 1 Inhaler 11     UNABLE TO FIND 3,600  "mg daily MEDICATION NAME: monolaurin       fluticasone (FLONASE) 50 MCG/ACT nasal spray Spray 2 sprays into both nostrils daily 48 g 3     ciprofloxacin-dexamethasone (CIPRODEX) otic suspension Instill 3 drops into the affected ear twice daily as needed. 7.5 mL 1     UNABLE TO FIND Take 12.5 mg by mouth daily MEDICATION NAME: NADH with coQ10       UNABLE TO FIND Take 1 capsule by mouth daily MEDICATION NAME: phosphtydil choline       conjugated estrogens (PREMARIN) vaginal cream Place  vaginally three times a week. Use 0.5 grams weekly as directed 42.5 g 0     cholecalciferol 5000 UNITS CAPS Take by mouth daily       glucosamine-chondroitin (GLUCOSAMINE CHONDR COMPLEX) 500-400 MG CAPS Take 2 capsules by mouth daily.       Omega-3 Fatty Acids (FISH OIL) 1200 MG capsule Take 3 capsules by mouth daily.       No current Epic-ordered facility-administered medications on file.         Mental Status Exam     Appearance:  Casually dressed and Well groomed  Behavior/relationship to examiner/demeanor:  Cooperative  Orientation: Oriented to person, place, time and situation  Psychomotor: normal form  Speech Rate:  Normal  Speech Spontaneity:  Normal  Mood:  \"depressed\"  Affect:  Dysphoric  Thought Process (Associations):  Goal directed and Circumstantial  Thought Content:  no overt psychosis, patient does not appear to be responding to internal stimuli and Suicidal ideation  Abnormal Perception:  None  Attention/Concentration:  Normal  Language:  Intact  Insight:  Adequate  Judgment:  Adequate for safety      Results     Vital signs: /73  Pulse 65  Wt 49 kg (108 lb)  LMP 02/01/2009  BMI 19.75 kg/m2    Laboratory Data:  no new data    Assessment & Plan      Valencia Ye is seen today for follow up and reports She continues to be very depressed and anxious. Does not tolerate psychotropic medications. Has scheduled with a therapist. Continues unable to work at this time.    Diagnosis  Axis 1: Major Depression, " Recurrent Sever, Anxiety  Axis 2: none  Axis 3: See problem list in the medical record    Plan:  Medication: continue Klonopin and Low Dose naltrexone  OTC Recommendations: none  Lab Orders:  none  Referrals: recommend she contact other providers to consider addition support for disability due to medical problems.  Release of Information: Dr. Butts at [facility] phone # [phone], Fax [phone]. ( Letter faxed on 6/23/17)  Future Treatment Considerations:per medication tolerance, therapy recommendations  Return for Follow Up:one week   The risks, benefits, alternatives and side effects have been discussed and are understood by the patient. The patient understands the risks of using street drugs or alcohol. There are no medical contraindications, the patient agrees to treatment, and has the capacity to do so. The patient understands to call 911 or come to the nearest ED if life threatening or urgent symptoms present.  Over 50% of this time was spent counseling the patient and/or coordinating care regarding review of social and occupational functioning.  In addition patient was counseled on health and wellness practices to augment medication treatment of symptoms. See note for details.    Marlyn Flores, APRN CNS 6/21/2017

## 2017-06-21 NOTE — LETTER
June 23, 2017      RE: Valencia Ye  [address] [place] [id number]       I see Valencia for treatment of depression and anxiety and am currently completing paper work for short term disability.  I think that at least part of her inability to work is due to chronic health problems and recent respiratory infection.  I have asked her to contact you also for short term disability recommendations.  She is not sure that she will be able to return to work even on a part time basis. Valencia is pursuing therapy for depression and anxiety but she has had exceptional side effects with any medication and even supplements for depression and so not currently taking medication to treat these symptoms other than very low dose Clonazepam.   I recently started her on Low Dose Naltrexone thinking that if the inflammation she has improved her mood would improve. She has been on 4.5mg for several weeks without improvement but will continue taking it.    Would you be able to contact me by phone to discuss your opinion about including her medical problems in the application for disability?  I can be reached directly at [phone] but will be out of the office from 6/29/17 until 7/18/17.      Sincerely,      Marlyn CORREA, CNS

## 2017-06-22 NOTE — PROGRESS NOTES
This is a snapshot of the patient's [facility] medical record. For complete information or questions call [phone] or In [facility] ([id number]).  [facility] Number:  [id number]

## 2017-06-22 NOTE — TELEPHONE ENCOUNTER
-Rec'd 2nd request from [employer] for:    -Behavioral Health Questionnaire   -Office visit notes from 5/15/17 - present  -Will notify provider

## 2017-06-23 ENCOUNTER — OFFICE VISIT (OUTPATIENT)
Dept: OPHTHALMOLOGY | Facility: CLINIC | Age: 47
End: 2017-06-23

## 2017-06-23 DIAGNOSIS — M30.1 CHURG-STRAUSS SYNDROME (H): Primary | ICD-10-CM

## 2017-06-23 DIAGNOSIS — H52.13 MYOPIA, BILATERAL: ICD-10-CM

## 2017-06-23 DIAGNOSIS — D72.18 CHURG-STRAUSS SYNDROME (H): Primary | ICD-10-CM

## 2017-06-23 RX ORDER — MIRTAZAPINE 7.5 MG/1
TABLET, FILM COATED ORAL
Refills: 1 | COMMUNITY
Start: 2017-06-14 | End: 2017-07-03

## 2017-06-23 ASSESSMENT — CONF VISUAL FIELD
OD_NORMAL: 1
METHOD: COUNTING FINGERS
OS_NORMAL: 1

## 2017-06-23 ASSESSMENT — REFRACTION_WEARINGRX
SPECS_TYPE: SUN
OS_CYLINDER: +0.75
OD_AXIS: 166
OS_AXIS: 176
OS_SPHERE: -2.25
OD_SPHERE: -2.50
OD_CYLINDER: +0.75

## 2017-06-23 ASSESSMENT — REFRACTION_MANIFEST
OD_AXIS: 168
OD_CYLINDER: +0.75
OS_CYLINDER: +1.00
OS_SPHERE: -1.50
OS_AXIS: 175
OS_ADD: +1.50
OD_SPHERE: -1.75
OD_ADD: +1.50

## 2017-06-23 ASSESSMENT — VISUAL ACUITY
METHOD: SNELLEN - LINEAR
OS_CC: 20/20
OS_SC: J1+
OD_CC: 20/20
OD_SC: J1+
CORRECTION_TYPE: GLASSES

## 2017-06-23 ASSESSMENT — EXTERNAL EXAM - LEFT EYE: OS_EXAM: NORMAL

## 2017-06-23 ASSESSMENT — EXTERNAL EXAM - RIGHT EYE: OD_EXAM: NORMAL

## 2017-06-23 ASSESSMENT — SLIT LAMP EXAM - LIDS
COMMENTS: NORMAL
COMMENTS: NORMAL

## 2017-06-23 ASSESSMENT — TONOMETRY
IOP_METHOD: TONOPEN
OD_IOP_MMHG: 14
OS_IOP_MMHG: 15

## 2017-06-23 ASSESSMENT — CUP TO DISC RATIO
OS_RATIO: 0.2
OD_RATIO: 0.2

## 2017-06-23 NOTE — PROGRESS NOTES
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  Plan:  Continue to monitor annually.    (H52.13) Myopia, bilateral  Comment: Compound myopic astigmatism with presbyopia OU  Plan: Refraction   Educated patient on condition and clinical findings. Dispensed spectacle prescription for full time wear. Educated patient on possibility of adaptation period, if symptoms do not improve return to clinic for further testing. Discussed lens options at length, as patient has never worn a bifocal before.      Complete documentation of historical and exam elements from today's encounter can  be found in the full encounter summary report (not reduplicated in this progress  note). I personally obtained the chief complaint(s) and history of present illness. I  confirmed and edited as necessary the review of systems, past medical/surgical  history, family history, social history, and examination findings as documented by  others; and I examined the patient myself. I personally reviewed the relevant tests,  images, and reports as documented above. I formulated and edited as necessary the  assessment and plan and discussed the findings and management plan with the  patient and family.    Randal Logan, DAVID, FAAO

## 2017-06-23 NOTE — NURSING NOTE
Chief Complaints and History of Present Illnesses   Patient presents with     Annual Eye Exam     long time predisone use     HPI    Affected eye(s):  Both   Symptoms:     No blurred vision   Floaters (Comment: both eyes, increased some since last visit.)      Frequency:  Constant       Do you have eye pain now?:  No      Comments:  Patient states vision has been stable since last visit both eyes. More floaters both eyes since last visit. Denies eye pain or irritation. Does not take eye drops.    Cathi Peng COT 7:32 AM June 23, 2017

## 2017-06-23 NOTE — MR AVS SNAPSHOT
After Visit Summary   6/23/2017    Valencia Ye    MRN: [id number]           Patient Information     Date Of Birth          1970        Visit Information        Provider Department      6/23/2017 7:40 AM [facility] Ophthalmology        Today's Diagnoses     Churg-Antonina syndrome    -  1    Myopia, bilateral           Follow-ups after your visit        Follow-up notes from your care team     Return in about 1 year (around 6/23/2018) for Annual Visit.      Your next 10 appointments already scheduled     Jun 28, 2017  1:45 PM CDT   Adult Med Follow UP with SUNNY Ayala CNS   Psychiatry Clinic ([facility])    [address]  [address] [facility] [address] [phone]            Jun 29, 2017  3:00 PM CDT   MyChart Short with Morelia Simental MD   [facility]    [facility] [address]   [phone]            Jul 18, 2017  7:15 AM CDT   Adult Med Follow UP with SUNNY Ayala CNS   Psychiatry Clinic ([facility])    [address]  [address] [facility] [address] [phone]            Jul 21, 2017  8:45 AM CDT   Adult Med Follow UP with SUNNY Ayala CNS   Psychiatry Clinic ([facility])    [address]  [address] [facility] [address]   [phone]            Aug 11, 2017  2:00 PM CDT   CF LOOP with  PFL CF   [facility] Pulmonary Function Testing ([facility])    [address] [facility]  [address] [facility] [id number] [phone]            Aug 11, 2017  2:30 PM CDT   (Arrive by 2:15 PM)   RETURN CYSTIC FIBROSIS VISIT with Dagoberto Briscoe MD   [facility] ([facility])    [address] [facility]  [address] [facility] [address]    [phone]            Jan 26, 2018  7:30 AM CST   CF LOOP with UC PFL CF   [facility] Pulmonary Function Testing ([facility])    [address] [address] [phone]            Jan 26, 2018  8:10 AM CST   (Arrive by 7:55 AM)   RETURN CYSTIC FIBROSIS VISIT with Dagoberto Briscoe MD   [facility] ([facility])    [address] [address] [phone]              Who to contact     Please call your clinic at [phone] to:    Ask questions about your health    Make or cancel appointments    Discuss your medicines    Learn about your test results    Speak to your doctor   If you have compliments or concerns about an experience at your clinic, or if you wish to file a complaint, please contact [facility] Patient Relations at [phone] or email us at [email or web address]         Additional Information About Your Visit        [employer] Information     [employer] gives you secure access to your electronic health record. If you see a primary care provider, you can also send messages to your care team and make appointments. If you have questions, please call your primary care clinic.  If you do not have a primary care provider, please call [phone] and they will assist you.      [employer] is an electronic gateway that provides easy, online access to your medical records. With [employer], you can request a clinic appointment, read your test results, renew a prescription or communicate with your care team.     To access your existing account, please contact your [facility] Clinic or call [phone] for assistance.        Care EveryWhere ID     This is your Care EveryWhere ID. This could be used by other organizations to access your [facility] medical records  [id number]        Your Vitals Were     Last  Period                   02/01/2009            Blood Pressure from Last 3 Encounters:   06/09/17 115/76   06/02/17 90/66   05/26/17 106/60    Weight from Last 3 Encounters:   06/09/17 47.6 kg (105 lb)   06/02/17 48.1 kg (106 lb 1.6 oz)   05/19/17 46.7 kg (103 lb)              We Performed the Following     Refraction        Primary Care Provider Office Phone # Fax #    Morelia Simental [phone]       [facility] [address] [facility] [address]  [facility] [address]        Equal Access to Services     [facility]: Hadii aad ku hadasho Sooliver, waaxda luqadaha, qaybta kaalmada adebabakyada, vale merritt . So [facility] [phone].    ATENCIÓN: Si habla español, tiene a waller disposición servicios gratuitos de asistencia lingüística. [place] [facility] [phone].    We comply with applicable federal civil rights laws and [place] laws. We do not discriminate on the basis of race, color, national origin, age, disability sex, sexual orientation or gender identity.            Thank you!     Thank you for choosing [facility]  for your care. Our goal is always to provide you with excellent care. Hearing back from our patients is one way we can continue to improve our services. Please take a few minutes to complete the written survey that you may receive in the mail after your visit with us. Thank you!             Your Updated Medication List - Protect others around you: Learn how to safely use, store and throw away your medicines at www.disposemymeds.org.          This list is accurate as of: 6/23/17  9:08 AM.  Always use your most recent med list.                   Brand Name Dispense Instructions for use Diagnosis    acetylcysteine 20 % nebulizer solution    MUCOMYST    240 mL    Take 4 mLs by nebulization 2 times daily Use with albuterol solution. Discard open bottle of Mucomyst after 96 hours.    Bronchiectasis without complication (H)       albuterol (2.5 MG/3ML) 0.083% neb  solution     60 vial    Take 1 vial (2.5 mg) by nebulization 2 times daily Use with Mucomyst    Bronchiectasis without complication (H)       atenolol 25 MG tablet    TENORMIN    45 tablet    Take 0.5 tablets (12.5 mg) by mouth At Bedtime 1/4 tab at bedtime    Palpitations       budesonide-formoterol 160-4.5 MCG/ACT Inhaler    SYMBICORT    1 Inhaler    Inhale 2 puffs into the lungs 2 times daily    Churg-Antonina syndrome (H)       cholecalciferol 5000 UNITS Caps      Take by mouth daily        ciprofloxacin-dexamethasone otic suspension    CIPRODEX    7.5 mL    Instill 3 drops into the affected ear twice daily as needed.    Ear pain, unspecified laterality       clonazePAM 0.5 MG tablet    klonoPIN    20 tablet    Take 0.5 tablets (0.25 mg) by mouth 2 times daily as needed for anxiety    Anxiety       conjugated estrogens cream    PREMARIN    42.5 g    Place  vaginally three times a week. Use 0.5 grams weekly as directed    Vaginal dryness       Fish Oil 1200 MG Caps      Take 3 capsules by mouth daily.        fluticasone 50 MCG/ACT spray    FLONASE    48 g    Spray 2 sprays into both nostrils daily    Chronic rhinitis       GLUCOSAMINE CHONDR COMPLEX 500-400 MG Caps per capsule   Generic drug:  glucosamine-chondroitin      Take 2 capsules by mouth daily.        Ipratropium-Albuterol  MCG/ACT inhaler    COMBIVENT RESPIMAT    1 Inhaler    Inhale 1 puff into the lungs 4 times daily Not to exceed 6 doses per day.    Churg-Antonina syndrome (H)       meropenem 1 G vial    MERREM    1 each    Inject 2,000 mg (2 g) into the vein every 8 hours    Bronchiectasis (H)       mirtazapine 7.5 MG Tabs tablet    REMERON     TK 1 T PO QHS        Naltrexone HCl Powd           Naltrexone Powd     30 g    4.5mg compounded in capsule to take one daily by mouth. Please dispense 30 capsules    Chronic fatigue, History of systemic steroid therapy, ILD (interstitial lung disease) (H)       nystatin-triamcinolone ointment    MYCOLOG     30 g    Apply topically 2 times daily    Acute vulvitis       predniSONE 1 MG tablet    DELTASONE    200 tablet    Take 5 mgs one day, alternating with 6 mgs the next.    Bronchiectasis without complication (H)       * UNABLE TO FIND      Take 12.5 mg by mouth daily MEDICATION NAME: NADH with coQ10    Abdominal pain, right upper quadrant       * UNABLE TO FIND      Take 1 capsule by mouth daily MEDICATION NAME: phosphtydil choline        * UNABLE TO FIND      3,600 mg daily MEDICATION NAME: monolaurin        * Notice:  This list has 3 medication(s) that are the same as other medications prescribed for you. Read the directions carefully, and ask your doctor or other care provider to review them with you.

## 2017-06-27 ENCOUNTER — TELEPHONE (OUTPATIENT)
Dept: PSYCHIATRY | Facility: CLINIC | Age: 47
End: 2017-06-27

## 2017-06-27 NOTE — TELEPHONE ENCOUNTER
-Rec'd completed Behavioral Health Questionnaire from Marlyn Flores, CNS, APRN  -Signed MERARY included  -Faxed to [employer] at [phone]  -Request for records has been tubed to medical records dept for processing along with MERARY

## 2017-07-03 ENCOUNTER — OFFICE VISIT (OUTPATIENT)
Dept: INTERNAL MEDICINE | Facility: CLINIC | Age: 47
End: 2017-07-03
Payer: COMMERCIAL

## 2017-07-03 VITALS
DIASTOLIC BLOOD PRESSURE: 70 MMHG | WEIGHT: 108 LBS | TEMPERATURE: 97.8 F | OXYGEN SATURATION: 100 % | HEART RATE: 82 BPM | SYSTOLIC BLOOD PRESSURE: 110 MMHG | HEIGHT: 63 IN | BODY MASS INDEX: 19.14 KG/M2

## 2017-07-03 DIAGNOSIS — R53.83 FATIGUE, UNSPECIFIED TYPE: Primary | ICD-10-CM

## 2017-07-03 PROCEDURE — 99214 OFFICE O/P EST MOD 30 MIN: CPT | Performed by: INTERNAL MEDICINE

## 2017-07-03 NOTE — NURSING NOTE
"Chief Complaint   Patient presents with     Fatigue   would like to talk about about Ongoing Fatigue     Initial /70  Pulse 82  Temp 97.8  F (36.6  C) (Oral)  Ht 5' 2.75\" (1.594 m)  Wt 108 lb (49 kg)  LMP 02/01/2009  SpO2 100%  BMI 19.28 kg/m2 Estimated body mass index is 19.28 kg/(m^2) as calculated from the following:    Height as of this encounter: 5' 2.75\" (1.594 m).    Weight as of this encounter: 108 lb (49 kg).  Medication Reconciliation: complete    "

## 2017-07-03 NOTE — PROGRESS NOTES
SUBJECTIVE:                                                    Valencia Ye is a 46 year old female who presents to clinic today for the following health issues:     Complaints of persistent fatigue: She has had several episodes of significant infection during the spring and winter. She was on long courses of antibioitc. She did not feel she recovered very well from these episodes. She was put on Naltrexone by her psychiatrist, not sure how that would help. She has been on Nuvigil in the past which did not really help. She has a hard time with memory. She did not go for sleep study.     She has diffuse aches, across upper back, legs, low back hurt with activity. Her arms also hrut.   She is on leave because of the infections, fatigue, anxiety and depression. This runs through the end of July but not sure she will be able to go back to work then.     Patient Active Problem List   Diagnosis     Goiter     History of systemic steroid therapy     ILD (interstitial lung disease) (H)     Palpitations     Churg-Antonina syndrome (H)     History of eating disorder     Bronchiectasis (H)     Asthma     Anxiety     Chronic fatigue     Mannose-binding lectin deficiency     Benign essential hypertension     Moderate episode of recurrent major depressive disorder (H)     Bronchiectasis without acute exacerbation (H)     Severe recurrent major depression without psychotic features (H)     Current Outpatient Prescriptions   Medication Sig Dispense Refill     atenolol (TENORMIN) 25 MG tablet Take 0.5 tablets (12.5 mg) by mouth At Bedtime 1/4 tab at bedtime 45 tablet 1     clonazePAM (KLONOPIN) 0.5 MG tablet Take 0.5 tablets (0.25 mg) by mouth 2 times daily as needed for anxiety 20 tablet 1     Naltrexone POWD 4.5mg compounded in capsule to take one daily by mouth. Please dispense 30 capsules 30 g 1     predniSONE (DELTASONE) 1 MG tablet Take 5 mgs one day, alternating with 6 mgs the next. 200 tablet 5     acetylcysteine  "(MUCOMYST) 20 % injection Take 4 mLs by nebulization 2 times daily Use with albuterol solution. Discard open bottle of Mucomyst after 96 hours. 240 mL 11     albuterol (2.5 MG/3ML) 0.083% neb solution Take 1 vial (2.5 mg) by nebulization 2 times daily Use with Mucomyst 60 vial 11     budesonide-formoterol (SYMBICORT) 160-4.5 MCG/ACT Inhaler Inhale 2 puffs into the lungs 2 times daily 1 Inhaler 11     UNABLE TO FIND 3,600 mg daily MEDICATION NAME: monolaurin       fluticasone (FLONASE) 50 MCG/ACT nasal spray Spray 2 sprays into both nostrils daily 48 g 3     ciprofloxacin-dexamethasone (CIPRODEX) otic suspension Instill 3 drops into the affected ear twice daily as needed. 7.5 mL 1     conjugated estrogens (PREMARIN) vaginal cream Place  vaginally three times a week. Use 0.5 grams weekly as directed 42.5 g 0     cholecalciferol 5000 UNITS CAPS Take by mouth daily       glucosamine-chondroitin (GLUCOSAMINE CHONDR COMPLEX) 500-400 MG CAPS Take 2 capsules by mouth daily.       Omega-3 Fatty Acids (FISH OIL) 1200 MG capsule Take 3 capsules by mouth daily.       Ipratropium-Albuterol (COMBIVENT RESPIMAT)  MCG/ACT inhaler Inhale 1 puff into the lungs 4 times daily Not to exceed 6 doses per day. 1 Inhaler 11      Social History   Substance Use Topics     Smoking status: Never Smoker     Smokeless tobacco: Never Used     Alcohol use No        ROS:  No fever, chills, stable weight    OBJECTIVE:     /70  Pulse 82  Temp 97.8  F (36.6  C) (Oral)  Ht 5' 2.75\" (1.594 m)  Wt 108 lb (49 kg)  LMP 02/01/2009  SpO2 100%  BMI 19.28 kg/m2  Body mass index is 19.28 kg/(m^2).    Not examined.       ASSESSMENT/PLAN:             1. Fatigue, unspecified type  Fatigue is likely multifactorial related to her depression, infections but may still have sleep disturbance. Refer for sleep study again, continue follow up with her other providers.   - SLEEP EVALUATION & MANAGEMENT REFERRAL - ADULT; Future        Morelia Simental, " MD [facility]    25 minutes spent with the patient, >50% of time spent counseling about recovery from infection, diet, sleep

## 2017-07-03 NOTE — MR AVS SNAPSHOT
After Visit Summary   7/3/2017    Valencia Ye    MRN: [id number]           Patient Information     Date Of Birth          1970        Visit Information        Provider Department      7/3/2017 8:40 AM Morelia Simental MD [facility]        Today's Diagnoses     Fatigue, unspecified type    -  1       Follow-ups after your visit        Additional Services     SLEEP EVALUATION & MANAGEMENT REFERRAL - ADULT       Please be aware that coverage of these services is subject to the terms and limitations of your health insurance plan.  Call member services at your health plan with any benefit or coverage questions.      Please bring the following to your appointment:    >>   List of current medications   >>   This referral request   >>   Any documents/labs given to you for this referral    [facility] [phone] (Age 18 and up)                  Your next 10 appointments already scheduled     Jul 18, 2017  7:15 AM CDT   Adult Med Follow UP with SUNNY Ayala CNS   Psychiatry Clinic ([facility])    [address]  [address] [facility] [address] [phone]            Jul 21, 2017  8:45 AM CDT   Adult Med Follow UP with SUNNY Ayala CNS   Psychiatry Clinic ([facility])    [address]  [facility] [address] [phone]            Aug 11, 2017  2:00 PM CDT   CF LOOP with  PFL CF   [facility] Pulmonary Function Testing ([facility])    [address] [id number] [phone]            Aug 11, 2017  2:30 PM CDT   (Arrive by 2:15 PM)   RETURN CYSTIC FIBROSIS VISIT with Dagoberto Briscoe MD   [facility] ([facility])    [address] [id number] [phone]            Jan 26,  2018  7:30 AM CST   CF LOOP with UC PFL CF   [facility] Pulmonary Function Testing ([facility])    [address] [facility]  [address] [facility] [address] [phone]            Jan 26, 2018  8:10 AM CST   (Arrive by 7:55 AM)   RETURN CYSTIC FIBROSIS VISIT with Dagoberto Briscoe MD   [facility] ([facility])    [address] [address] [phone]              Future tests that were ordered for you today     Open Future Orders        Priority Expected Expires Ordered    SLEEP EVALUATION & MANAGEMENT REFERRAL - ADULT Routine  7/3/2018 7/3/2017            Who to contact     If you have questions or need follow up information about today's clinic visit or your schedule please contact [facility] directly at [phone].  Normal or non-critical lab and imaging results will be communicated to you by MyChart, letter or phone within 4 business days after the clinic has received the results. If you do not hear from us within 7 days, please contact the clinic through [employer] or phone. If you have a critical or abnormal lab result, we will notify you by phone as soon as possible.  Submit refill requests through [employer] or call your pharmacy and they will forward the refill request to us. Please allow 3 business days for your refill to be completed.          Additional Information About Your Visit        MyChart Information     [employer] gives you secure access to your electronic health record. If you see a primary care provider, you can also send messages to your care team and make appointments. If you have questions, please call your primary care clinic.  If you do not have a primary care provider, please call [phone] and they will assist you.        Care EveryWhere ID     This is your Care EveryWhere ID. This could be used by other organizations to access your [facility]  "records  [id number]        Your Vitals Were     Pulse Temperature Height Last Period Pulse Oximetry BMI (Body Mass Index)    82 97.8  F (36.6  C) (Oral) 5' 2.75\" (1.594 m) 02/01/2009 100% 19.28 kg/m2       Blood Pressure from Last 3 Encounters:   07/03/17 110/70   06/21/17 120/73   06/13/17 128/80    Weight from Last 3 Encounters:   07/03/17 108 lb (49 kg)   06/21/17 108 lb (49 kg)   06/13/17 104 lb 12.8 oz (47.5 kg)                 Today's Medication Changes          These changes are accurate as of: 7/3/17  9:20 AM.  If you have any questions, ask your nurse or doctor.               These medicines have changed or have updated prescriptions.        Dose/Directions    UNABLE TO FIND   This may have changed:  Another medication with the same name was removed. Continue taking this medication, and follow the directions you see here.   Changed by:  Morelia Simental MD        Dose:  3600 mg   3,600 mg daily MEDICATION NAME: monolaurin   Refills:  0         Stop taking these medicines if you haven't already. Please contact your care team if you have questions.     meropenem 1 G vial   Commonly known as:  MERREM   Stopped by:  Morelia Simental MD           mirtazapine 7.5 MG Tabs tablet   Commonly known as:  REMERON   Stopped by:  Morelia Simental MD           nystatin-triamcinolone ointment   Commonly known as:  MYCOLOG   Stopped by:  Morelia Simental MD                    Primary Care Provider Office Phone # Fax #    Morelia Simental [phone]       [facility] [address]  [facility] [address]        Equal Access to Services     [facility]: Hadii camden rader Sooliver, waaxda luqadaha, qaybta kaalmada adeegyada, vale fields. So [facility] [phone].    ATENCIÓN: Si habla español, tiene a waller disposición servicios gratuitos de asistencia lingüística. Llame al [phone].    We comply with applicable federal civil rights laws and [place] laws. We do not " discriminate on the basis of race, color, national origin, age, disability sex, sexual orientation or gender identity.            Thank you!     Thank you for choosing [facility]  for your care. Our goal is always to provide you with excellent care. Hearing back from our patients is one way we can continue to improve our services. Please take a few minutes to complete the written survey that you may receive in the mail after your visit with us. Thank you!             Your Updated Medication List - Protect others around you: Learn how to safely use, store and throw away your medicines at www.disposemymeds.org.          This list is accurate as of: 7/3/17  9:20 AM.  Always use your most recent med list.                   Brand Name Dispense Instructions for use Diagnosis    acetylcysteine 20 % nebulizer solution    MUCOMYST    240 mL    Take 4 mLs by nebulization 2 times daily Use with albuterol solution. Discard open bottle of Mucomyst after 96 hours.    Bronchiectasis without complication (H)       albuterol (2.5 MG/3ML) 0.083% neb solution     60 vial    Take 1 vial (2.5 mg) by nebulization 2 times daily Use with Mucomyst    Bronchiectasis without complication (H)       atenolol 25 MG tablet    TENORMIN    45 tablet    Take 0.5 tablets (12.5 mg) by mouth At Bedtime 1/4 tab at bedtime    Palpitations       budesonide-formoterol 160-4.5 MCG/ACT Inhaler    SYMBICORT    1 Inhaler    Inhale 2 puffs into the lungs 2 times daily    Churg-Antonina syndrome (H)       cholecalciferol 5000 UNITS Caps      Take by mouth daily        ciprofloxacin-dexamethasone otic suspension    CIPRODEX    7.5 mL    Instill 3 drops into the affected ear twice daily as needed.    Ear pain, unspecified laterality       clonazePAM 0.5 MG tablet    klonoPIN    20 tablet    Take 0.5 tablets (0.25 mg) by mouth 2 times daily as needed for anxiety    Anxiety       conjugated estrogens cream    PREMARIN    42.5 g    Place  vaginally  three times a week. Use 0.5 grams weekly as directed    Vaginal dryness       Fish Oil 1200 MG Caps      Take 3 capsules by mouth daily.        fluticasone 50 MCG/ACT spray    FLONASE    48 g    Spray 2 sprays into both nostrils daily    Chronic rhinitis       GLUCOSAMINE CHONDR COMPLEX 500-400 MG Caps per capsule   Generic drug:  glucosamine-chondroitin      Take 2 capsules by mouth daily.        Ipratropium-Albuterol  MCG/ACT inhaler    COMBIVENT RESPIMAT    1 Inhaler    Inhale 1 puff into the lungs 4 times daily Not to exceed 6 doses per day.    Churg-Antonina syndrome (H)       Naltrexone HCl Powd           Naltrexone Powd     30 g    4.5mg compounded in capsule to take one daily by mouth. Please dispense 30 capsules    Chronic fatigue, History of systemic steroid therapy, ILD (interstitial lung disease) (H)       predniSONE 1 MG tablet    DELTASONE    200 tablet    Take 5 mgs one day, alternating with 6 mgs the next.    Bronchiectasis without complication (H)       UNABLE TO FIND      3,600 mg daily MEDICATION NAME: monolaurin

## 2017-07-06 DIAGNOSIS — D72.18 CHURG-STRAUSS SYNDROME (H): ICD-10-CM

## 2017-07-06 DIAGNOSIS — M30.1 CHURG-STRAUSS SYNDROME (H): ICD-10-CM

## 2017-07-07 NOTE — PROGRESS NOTES
This is a recent snapshot of the patient's [facility] medical record.  For current drug dose and complete information and questions, call [phone] or In Basket pool, [facility] ([id number])  CSN Number:  [id number]

## 2017-07-14 ENCOUNTER — HOSPITAL ENCOUNTER (OUTPATIENT)
Dept: CARDIOLOGY | Facility: CLINIC | Age: 47
Discharge: HOME | End: 2017-07-14
Attending: INTERNAL MEDICINE | Admitting: INTERNAL MEDICINE
Payer: COMMERCIAL

## 2017-07-14 ENCOUNTER — OFFICE VISIT (OUTPATIENT)
Dept: SLEEP MEDICINE | Facility: CLINIC | Age: 47
End: 2017-07-14
Attending: INTERNAL MEDICINE
Payer: COMMERCIAL

## 2017-07-14 VITALS
DIASTOLIC BLOOD PRESSURE: 70 MMHG | WEIGHT: 105 LBS | HEART RATE: 65 BPM | OXYGEN SATURATION: 99 % | BODY MASS INDEX: 18.61 KG/M2 | HEIGHT: 63 IN | SYSTOLIC BLOOD PRESSURE: 112 MMHG

## 2017-07-14 DIAGNOSIS — M30.1 CHURG-STRAUSS SYNDROME (H): ICD-10-CM

## 2017-07-14 DIAGNOSIS — D72.18 CHURG-STRAUSS SYNDROME (H): ICD-10-CM

## 2017-07-14 DIAGNOSIS — J45.909 UNCOMPLICATED ASTHMA, UNSPECIFIED ASTHMA SEVERITY: ICD-10-CM

## 2017-07-14 DIAGNOSIS — J84.9 INTERSTITIAL LUNG DISEASE (H): ICD-10-CM

## 2017-07-14 DIAGNOSIS — R00.2 PALPITATIONS: ICD-10-CM

## 2017-07-14 DIAGNOSIS — R53.83 FATIGUE, UNSPECIFIED TYPE: ICD-10-CM

## 2017-07-14 DIAGNOSIS — I10 ESSENTIAL HYPERTENSION: Primary | ICD-10-CM

## 2017-07-14 DIAGNOSIS — Z82.49 FAMILY HISTORY OF EARLY CAD: ICD-10-CM

## 2017-07-14 DIAGNOSIS — G47.19 EXCESSIVE DAYTIME SLEEPINESS: ICD-10-CM

## 2017-07-14 PROCEDURE — 93225 XTRNL ECG REC<48 HRS REC: CPT

## 2017-07-14 PROCEDURE — 93227 XTRNL ECG REC<48 HR R&I: CPT | Performed by: INTERNAL MEDICINE

## 2017-07-14 PROCEDURE — 99245 OFF/OP CONSLTJ NEW/EST HI 55: CPT | Performed by: FAMILY MEDICINE

## 2017-07-14 RX ORDER — ZOLPIDEM TARTRATE 5 MG/1
TABLET ORAL
Qty: 1 TABLET | Refills: 0 | Status: SHIPPED | OUTPATIENT
Start: 2017-07-14 | End: 2017-08-11

## 2017-07-14 NOTE — PATIENT INSTRUCTIONS
Your blood pressure was checked while you were in clinic today.  Please read the guidelines below about what these numbers mean and what you should do about them.  Your systolic blood pressure is the top number.  This is the pressure when the heart is pumping.  Your diastolic blood pressure is the bottom number.  This is the pressure in between beats.  If your systolic blood pressure is less than 120 and your diastolic blood pressure is less than 80, then your blood pressure is normal. There is nothing more that you need to do about it  If your systolic blood pressure is 120-139 or your diastolic blood pressure is 80-89, your blood pressure may be higher than it should be.  You should have your blood pressure re-checked within a year by a primary care provider.  If your systolic blood pressure is 140 or greater or your diastolic blood pressure is 90 or greater, you may have high blood pressure.  High blood pressure is treatable, but if left untreated over time it can put you at risk for heart attack, stroke, or kidney failure.  You should have your blood pressure re-checked by a primary care provider within the next four weeks.  Your BMI is Body mass index is 18.6 kg/(m^2).  Weight management is a personal decision.  If you are interested in exploring weight loss strategies, the following discussion covers the approaches that may be successful. Body mass index (BMI) is one way to tell whether you are at a healthy weight, overweight, or obese. It measures your weight in relation to your height.  A BMI of 18.5 to 24.9 is in the healthy range. A person with a BMI of 25 to 29.9 is considered overweight, and someone with a BMI of 30 or greater is considered obese. More than two-thirds of American adults are considered overweight or obese.  Being overweight or obese increases the risk for further weight gain. Excess weight may lead to heart disease and diabetes.  Creating and following plans for healthy eating and  physical activity may help you improve your health.  Weight control is part of healthy lifestyle and includes exercise, emotional health, and healthy eating habits. Careful eating habits lifelong are the mainstay of weight control. Though there are significant health benefits from weight loss, long-term weight loss with diet alone may be very difficult to achieve- studies show long-term success with dietary management in less than 10% of people. Attaining a healthy weight may be especially difficult to achieve in those with severe obesity. In some cases, medications, devices and surgical management might be considered.  What can you do?  If you are overweight or obese and are interested in methods for weight loss, you should discuss this with your provider.     Consider reducing daily calorie intake by 500 calories.     Keep a food journal.     Avoiding skipping meals, consider cutting portions instead.    Diet combined with exercise helps maintain muscle while optimizing fat loss. Strength training is particularly important for building and maintaining muscle mass. Exercise helps reduce stress, increase energy, and improves fitness. Increasing exercise without diet control, however, may not burn enough calories to loose weight.       Start walking three days a week 10-20 minutes at a time    Work towards walking thirty minutes five days a week     Eventually, increase the speed of your walking for 1-2 minutes at time    In addition, we recommend that you review healthy lifestyles and methods for weight loss available through the National Institutes of Health patient information sites:  http://win.niddk.nih.gov/publications/index.htm    And look into health and wellness programs that may be available through your health insurance provider, employer, local community center, or soumya club.        MY TREATMENT INFORMATION FOR SLEEP APNEA -  [place] Ye    DOCTOR: Valdemar Granado MD, MPH  SLEEP CENTER :  "[facility]         If I haven't had a sleep study yet, what can I expect?  A personal story from [employer]  https://www.youtube.com/watch?v=AxPLmlRpnCs      Am I having a home sleep study?  Here is a video in case you get home and want to make sure you have done it correctly  https://www.[employer].com/watch?v=JZM1D7gChj7&feature=youtu.be      Frequently asked questions:  1. What is Obstructive Sleep Apnea (THALIA)? THALIA is the most common type of sleep apnea. Apnea literally means, \"without breath.\" It is characterized by repetitive pauses in breathing, despite continued effort to breathe, and is usually associated with a reduction in blood oxygen saturation. Apneas can last 10 to over 60 seconds. It is caused by narrowing or collapse of the upper airway as muscles relax during sleep. Severity of sleep apnea is determined by frequency of breathing events and their effect on your sleep and oxygen levels determined during sleep testing.     2. What are the consequences of THALIA? Symptoms include: daytime sleepiness- possibly increasing the risk of falling asleep while driving, unrefreshing/restless sleep, snoring, insomnia, waking frequently to urinate, waking with heartburn or reflux, reduced concentration and memory, and morning headaches. Other health consequences may include development of high blood pressure and other cardiovascular disease in persons who are susceptible. Untreated THALIA  can contribute to heart disease, stroke and diabetes.     3. What are the treatment options? In most situations, sleep apnea is a lifelong disease that must be managed with daily therapy. Medications are not effective for sleep apnea and surgery is generally not performed until other therapies have been tried. Therapy is usually tailored to the individual patient based on many factors including your wishes as well as severity of sleep apnea and severity of obesity. Continuous Positive Airway (CPAP) is the most reliable " treatment. An oral device to hold your jaw forward is usually the next most reliable option. Other options include postioning devices (to keep you off your back), weight loss, and surgery including a tongue pacing device. There is more detail about some of these options below.            1. CPAP-  WHAT DOES IT DO AND HOW CAN I LEARN TO WEAR IT?                               BEFORE I START, CAN I WATCH A MOVIE TO GET A PLAN ON HOW TO USE CPAP?  https://www.Wir[employer].com/watch?l=o3W75xa138U      Continuous positive airway pressure, or CPAP, is the most effective treatment for obstructive sleep apnea. It works by blowing room air, through a mask, to hold your throat open. A decision to use CPAP is a major step forward in the pursuit of a healthier life. The successful use of CPAP will help you breathe easier, sleep better and live healthier. You can choose CPAP equipment from any durable medical equipment provider that meets your needs.  Using CPAP can be a positive experience if you keep these rg points in mind:  1. Commitment  CPAP is not a quick fix for your problem. It involves a long-term commitment to improve your sleep and your health.    2. Communication  Stay in close communication with both your sleep doctor and your CPAP supplier. Ask lots of questions and seek help when you need it.    3. Consistency  Use CPAP all night, every night and for every nap. You will receive the maximum health benefits from CPAP when you use it every time that you sleep. This will also make it easier for your body to adjust to the treatment.    4. Correction  The first machine and mask that you try may not be the best ones for you. Work with your sleep doctor and your CPAP supplier to make corrections to your equipment selection. Ask about trying a different type of machine or mask if you have ongoing problems. Make sure that your mask is a good fit and learn to use your equipment properly.    5. Challenge  Tell a family member or  "close friend to ask you each morning if you used your CPAP the previous night. Have someone to challenge you to give it your best effort.    6. Connection   Your adjustment to CPAP will be easier if you are able to connect with others who use the same treatment. Ask your sleep doctor if there is a support group in your area for people who have sleep apnea, or look for one on the Internet.  7. Comfort   Increase your level of comfort by using a saline spray, decongestant or heated humidifier if CPAP irritates your nose, mouth or throat. Use your unit's \"ramp\" setting to slowly get used to the air pressure level. There may be soft pads you can buy that will fit over your mask straps. Look on www.CPAP.com for accessories that can help make CPAP use more comfortable.  8. Cleaning   Clean your mask, tubing and headgear on a regular basis. Put this time in your schedule so that you don't forget to do it. Check and replace the filters for your CPAP unit and humidifier.    9. Completion   Although you are never finished with CPAP therapy, you should reward yourself by celebrating the completion of your first month of treatment. Expect this first month to be your hardest period of adjustment. It will involve some trial and error as you find the machine, mask and pressure settings that are right for you.    10. Continuation  After your first month of treatment, continue to make a daily commitment to use your CPAP all night, every night and for every nap.    CPAP-Tips to starting with success:  Begin using your CPAP for short periods of time during the day while you watch TV or read.    Use CPAP every night and for every nap. Using it less often reduces the health benefits and makes it harder for your body to get used to it.    Make small adjustments to your mask, tubing, straps and headgear until you get the right fit. Tightening the mask may actually worsen the leak.  If it leaves significant marks on your face or irritates " the bridge of your nose, it may not be the best mask for you.  Speak with the person who supplied the mask and consider trying other masks. Insurances will allow you to try different masks during the first month of starting CPAP.  Insurance also covers a new mask, hose and filter about every 6 months.    Use a saline nasal spray to ease mild nasal congestion. Neti-Pot or saline nasal rinses may also help. Nasal gel sprays can help reduce nasal dryness.  Biotene mouthwash can be helpful to protect your teeth if you experience frequent dry mouth.  Dry mouth may be a sign of air escaping out of your mouth or out of the mask in the case of a full face mask.  Speak with your provider if you expect that is the case.     Take a nasal decongestant to relieve more severe nasal or sinus congestion.  Do not use Afrin (oxymetazoline) nasal spray more than 3 days in a row.  Speak with your sleep doctor if your nasal congestion is chronic.    Use a heated humidifier that fits your CPAP model to enhance your breathing comfort. Adjust the heat setting up if you get a dry nose or throat, down if you get condensation in the hose or mask.  Position the CPAP lower than you so that any condensation in the hose drains back into the machine rather than towards the mask.    Try a system that uses nasal pillows if traditional masks give you problems.    Clean your mask, tubing and headgear once a week. Make sure the equipment dries fully.    Regularly check and replace the filters for your CPAP unit and humidifier.    Work closely with your sleep provider and your CPAP supplier to make sure that you have the machine, mask and air pressure setting that works best for you. It is better to stop using it and call your provider to solve problems than to lay awake all night frustrated with the device.      BESIDES CPAP, WHAT OTHER THERAPIES ARE THERE?        Positioning Device  Positioning devices are generally used when sleep apnea is mild and  only occurs on your back.This example shows a pillow that straps around the waist. It may be appropriate for those whose sleep study shows milder sleep apnea that occurs primarily when lying flat on one's back. Preliminary studies have shown benefit but effectiveness at home may need to be verified by a home sleep test. These devices are generally not covered by medical insurance.                        Oral Appliance  What is oral appliance therapy?  An oral appliance is a small acrylic device that fits over the upper and lower teeth or tongue (similar to an orthodontic retainer or a mouth guard). This device slightly advances the lower jaw or tongue, which moves the base of the tongue forward, opens the airway, improves breathing and can effectively treat snoring and obstructive sleep apnea sleep apnea. The appliance is fabricated and customized by a qualified dentist with experience in treating snoring and sleep apnea. Oral appliances are usually well tolerated and have relatively high compliance by patients1, 2, 3.  When is an oral appliance indicated?  Oral appliance therapy is recommended as a first-line treatment for patients with primary snoring, mild sleep apnea, and for patients with moderate sleep apnea who prefer appliance therapy to use of CPAP4, 5. Severity of sleep apnea is determined by sleep testing and is based on the number of respiratory events per hour of sleep.   How successful is oral appliance therapy?  The success rate of oral appliance therapy in patients with mild sleep apnea is 75-80% while in patients with moderate sleep apnea it is 50-70%. The chance of success in patients with severe sleep apnea is 40-50%. The research also shows that oral appliances have a beneficial effect on the cardiovascular health of THALIA patients at the same magnitude as CPAP therapy7.  Oral appliances should be a second-line treatment in cases of severe sleep apnea, but if not completely successful then a  combination therapy utilizing CPAP plus oral appliance therapy may be effective. Oral appliances tend to be effective in a broad range of patients although studies show that the patients who have the highest success are females, younger patients, those with milder disease, and less severe obesity. 3, 6.   The chances of success are lower in patients who have more severe THALIA, are older, and those who are morbidly obese.     Example of an oral appliance   Finding a dentist that practices dental sleep medicine  Specific training is available through the American Academy of Dental Sleep Medicine for dentists interested in working in the field of sleep. To find a dentist who is educated in the field of sleep and the use of oral appliances, near you, visit the Web site of the American Academy of Dental Sleep Medicine; also see   http://www.accpstorage.org/newOrganization/patients/oralAppliances.pdf  To search for a dentist certified in these practices:  Http://aadsm.org/FindADentist.aspx?1  1. Elsi, et al. Objectively measured vs self-reported compliance during oral appliance therapy for sleep-disordered breathing. Chest 2013; 144(5): 9122-9034.  2. Sb, et al. Objective measurement of compliance during oral appliance therapy for sleep-disordered breathing. Thorax 2013; 68(1): 91-96.  3. Kellee et al. Mandibular advancement devices in 620 men and women with THALIA and snoring: tolerability and predictors of treatment success. Chest 2004; 125: 9983-9758.  4. Clifford, et al. Oral appliances for snoring and THALIA: a review. Sleep 2006; 29: 244-262.  5. Ayad et al. Oral appliance treatment for THALIA: an update. J Clin Sleep Med 2014; 10(2): 215-227.  6. Florentinekema, et al. Predictors of OSAH treatment outcome. J Dent Res 2007; 86: 6611-8175.      Weight Loss:    Weight management is a personal decision.  If you are interested in exploring weight loss strategies, the following discussion covers the impact on  weight loss on sleep apnea and the approaches that may be successful.    Weight loss decreases severity of sleep apnea in most people with obesity. For those with mild obesity who have developed snoring with weight gain, even 15-30 pound weight loss can improve and occasionally eliminate sleep apnea.  Structured and life-long dietary and health habits are necessary to lose weight and keep healthier weight levels.     Though there may be significant health benefits from weight loss, long-term weight loss is very difficult to achieve- studies show success with dietary management in less than 10% of people. In addition, substantial weight loss may require years of dietary control and may be difficult if patients have severe obesity. In these cases, surgical management may be considered.  Finally, older individuals who have tolerated obesity without health complications may be less likely to benefit from weight loss strategies.    Your BMI is Body mass index is 18.6 kg/(m^2).  Body mass index (BMI) is one way to tell whether you are at a healthy weight, overweight, or obese. It measures your weight in relation to your height.  A BMI of 18.5 to 24.9 is in the healthy range. A person with a BMI of 25 to 29.9 is considered overweight, and someone with a BMI of 30 or greater is considered obese. More than two-thirds of American adults are considered overweight or obese.  Being overweight or obese increases the risk for further weight gain. Excess weight may lead to heart disease and diabetes.  Creating and following plans for healthy eating and physical activity may help you improve your health.  Weight control is part of healthy lifestyle and includes exercise, emotional health, and healthy eating habits. Careful eating habits lifelong are the mainstay of weight control. Though there are significant health benefits from weight loss, long-term weight loss with diet alone may be very difficult to achieve- studies show  long-term success with dietary management in less than 10% of people. Attaining a healthy weight may be especially difficult to achieve in those with severe obesity. In some cases, medications, devices and surgical management might be considered.  What can you do?  If you are overweight or obese and are interested in methods for weight loss, you should discuss this with your provider.     Consider reducing daily calorie intake by 500 calories.     Keep a food journal.     Avoiding skipping meals, consider cutting portions instead.    Diet combined with exercise helps maintain muscle while optimizing fat loss. Strength training is particularly important for building and maintaining muscle mass. Exercise helps reduce stress, increase energy, and improves fitness. Increasing exercise without diet control, however, may not burn enough calories to loose weight.       Start walking three days a week 10-20 minutes at a time    Work towards walking thirty minutes five days a week     Eventually, increase the speed of your walking for 1-2 minutes at time    In addition, we recommend that you review healthy lifestyles and methods for weight loss available through the National Institutes of Health patient information sites:  http://win.niddk.nih.gov/publications/index.htm    And look into health and wellness programs that may be available through your health insurance provider, employer, local community center, or soumya club.    Surgery:    Upper Airway Surgery for THALIA  Surgery for THALIA is a second-line treatment option in the management of sleep apnea.  Surgery should be considered for patients who are having a difficult time tolerating CPAP.    Surgery for THALIA is directed at areas that are responsible for narrowing or complete obstruction of the airway during sleep.  There are a wide range of procedures available to enlarge and/or stabilize the airway to prevent blockage of breathing in the three major areas where it can  occur: the palate, tongue, and nasal regions.  Successful surgical treatment depends on the accurate identification of the factors responsible for obstructive sleep apnea in each person.  A personalized approach is required because there is no single treatment that works well for everyone.  Because of anatomic variation, consultation with an examination by a sleep surgeon is a critical first step in determining what surgical options are best for each patient.  In some cases, examination during sedation may be recommended in order to guide the selection of procedures.  Patients will be counseled about risks and benefits as well as the typical recovery course after surgery. Surgery is typically not a cure for a person s THALIA.  However, surgery will often significantly improve one s THALIA severity (termed  success rate ).  Even in the absence of a cure, surgery will decrease the cardiovascular risk associated with OSA7; improve overall quality of life8 (sleepiness, functionality, sleep quality, etc).          Palate Procedures:  Patients with THALIA often have narrowing of their airway in the region of their tonsils and uvula.  The goals of palate procedures are to widen the airway in this region as well as to help the tissues resist collapse.  Modern palate procedure techniques focus on tissue conservation and soft tissue rearrangement, rather than tissue removal.  Often the uvula is preserved in this procedure. Residual sleep apnea is common in patient after pharyngoplasty with an average reduction in sleep apnea events of 33%2.      Tongue Procedures:  While patients are awake, the muscles that surround the throat are active and keep this region open for breathing. These muscles relax during sleep, allowing the tongue and other structures to collapse and block breathing.  There are several different tongue procedures available.  Selection of a tongue base procedure depends on characteristics seen on physical exam.   Generally, procedures are aimed at removing bulky tissues in this area or preventing the back of the tongue from falling back during sleep.  Success rates for tongue surgery range from 50-62%3.    Hypoglossal Nerve Stimulation:  Hypoglossal nerve stimulation has recently received approval from the [place] Food and Drug Administration for the treatment of obstructive sleep apnea.  This is based on research showing that the system was safe and effective in treating sleep apnea6.  Results showed that the median AHI score decreased 68%, from 29.3 to 9.0. This therapy uses an implant system that senses breathing patterns and delivers mild stimulation to airway muscles, which keeps the airway open during sleep.  The system consists of three fully implanted components: a small generator (similar in size to a pacemaker), a breathing sensor, and a stimulation lead.  Using a small handheld remote, a patient turns the therapy on before bed and off upon awakening.    Candidates for this device must be greater than 22 years of age, have moderate to severe THALIA (AHI between 20-65), BMI less than 32, have tried CPAP/oral appliance without success, and have appropriate upper airway anatomy (determined by a sleep endoscopy performed by Dr. Silver).    Hypoglossal Nerve Stimulation Pathway:    The sleep surgeon s office will work with the patient through the insurance prior-authorization process (including communications and appeals).    Nasal Procedures:  Nasal obstruction can interfere with nasal breathing during the day and night.  Studies have shown that relief of nasal obstruction can improve the ability of some patients to tolerate positive airway pressure therapy for obstructive sleep apnea1.  Treatment options include medications such as nasal saline, topical corticosteroid and antihistamine sprays, and oral medications such as antihistamines or decongestants. Non-surgical treatments can include external nasal dilators for  selected patients. If these are not successful by themselves, surgery can improve the nasal airway either alone or in combination with these other options.    Combination Procedures:  Combination of surgical procedures and other treatments may be recommended, particularly if patients have more than one area of narrowing or persistent positional disease.  The success rate of combination surgery ranges from 66-80%2,3.      1. Marsha IRBY. The Role of the Nose in Snoring and Obstructive Sleep Apnoea: An Update.  Eur Arch Otorhinolaryngol. 2011; 268: 1365-73.  2.  Reena SM; Zoey JA; Lore JR; Pallanch JF; Arabella MB; Wm SG; Aile VELAZQUEZ. Surgical modifications of the upper airway for obstructive sleep apnea in adults: a systematic review and meta-analysis. SLEEP 2010;33(10):0671-0362. Jessica BARBOSA. Hypopharyngeal surgery in obstructive sleep apnea: an evidence-based medicine review.  Arch Otolaryngol Head Neck Surg. 2006 Feb;132(2):206-13.  3. Celestino YH1, Jana Y, Rober HODA. The efficacy of anatomically based multilevel surgery for obstructive sleep apnea. Otolaryngol Head Neck Surg. 2003 Oct;129(4):327-35.  4. Jessica BARBOSA, Goldberg A. Hypopharyngeal Surgery in Obstructive Sleep Apnea: An Evidence-Based Medicine Review. Arch Otolaryngol Head Neck Surg. 2006 Feb;132(2):206-13.  5. Marisa PJ et al. Upper-Airway Stimulation for Obstructive Sleep Apnea.  N Engl J Med. 2014 Jan 9;370(2):139-49.  6. Raimundo Y et al. Increased Incidence of Cardiovascular Disease in Middle-aged Men with Obstructive Sleep Apnea. Am J Respir Crit Care Med; 2002 166: 159-165  7. Sharpe EM et al. Studying Life Effects and Effectiveness of Palatopharyngoplasty (SLEEP) study: Subjective Outcomes of Isolated Uvulopalatopharyngoplasty. Otolaryngol Head Neck Surg. 2011; 144: 623-631.  Please, meet with the nurse and schedule the sleep study and the follow up visit. Bring with you the sleeping aid and use it as necessary.    Thank you!    Valdemar Granado  MD, MPH  Clinical Sleep and Occupational / Environmental Medicine

## 2017-07-14 NOTE — MR AVS SNAPSHOT
After Visit Summary   7/14/2017    Valencia Ye    MRN: [id number]           Patient Information     Date Of Birth          1970        Visit Information        Provider Department      7/14/2017 11:00 AM Valdemar Granado MD [facility]        Today's Diagnoses     Essential hypertension    -  1    Fatigue, unspecified type        Interstitial lung disease (H)        Churg-Antonina syndrome (H)        Uncomplicated asthma, unspecified asthma severity        Excessive daytime sleepiness          Care Instructions    Your blood pressure was checked while you were in clinic today.  Please read the guidelines below about what these numbers mean and what you should do about them.  Your systolic blood pressure is the top number.  This is the pressure when the heart is pumping.  Your diastolic blood pressure is the bottom number.  This is the pressure in between beats.  If your systolic blood pressure is less than 120 and your diastolic blood pressure is less than 80, then your blood pressure is normal. There is nothing more that you need to do about it  If your systolic blood pressure is 120-139 or your diastolic blood pressure is 80-89, your blood pressure may be higher than it should be.  You should have your blood pressure re-checked within a year by a primary care provider.  If your systolic blood pressure is 140 or greater or your diastolic blood pressure is 90 or greater, you may have high blood pressure.  High blood pressure is treatable, but if left untreated over time it can put you at risk for heart attack, stroke, or kidney failure.  You should have your blood pressure re-checked by a primary care provider within the next four weeks.  Your BMI is Body mass index is 18.6 kg/(m^2).  Weight management is a personal decision.  If you are interested in exploring weight loss strategies, the following discussion covers the approaches that may be successful. Body mass  index (BMI) is one way to tell whether you are at a healthy weight, overweight, or obese. It measures your weight in relation to your height.  A BMI of 18.5 to 24.9 is in the healthy range. A person with a BMI of 25 to 29.9 is considered overweight, and someone with a BMI of 30 or greater is considered obese. More than two-thirds of American adults are considered overweight or obese.  Being overweight or obese increases the risk for further weight gain. Excess weight may lead to heart disease and diabetes.  Creating and following plans for healthy eating and physical activity may help you improve your health.  Weight control is part of healthy lifestyle and includes exercise, emotional health, and healthy eating habits. Careful eating habits lifelong are the mainstay of weight control. Though there are significant health benefits from weight loss, long-term weight loss with diet alone may be very difficult to achieve- studies show long-term success with dietary management in less than 10% of people. Attaining a healthy weight may be especially difficult to achieve in those with severe obesity. In some cases, medications, devices and surgical management might be considered.  What can you do?  If you are overweight or obese and are interested in methods for weight loss, you should discuss this with your provider.     Consider reducing daily calorie intake by 500 calories.     Keep a food journal.     Avoiding skipping meals, consider cutting portions instead.    Diet combined with exercise helps maintain muscle while optimizing fat loss. Strength training is particularly important for building and maintaining muscle mass. Exercise helps reduce stress, increase energy, and improves fitness. Increasing exercise without diet control, however, may not burn enough calories to loose weight.       Start walking three days a week 10-20 minutes at a time    Work towards walking thirty minutes five days a week     Eventually,  "increase the speed of your walking for 1-2 minutes at time    In addition, we recommend that you review healthy lifestyles and methods for weight loss available through the National Institutes of Health patient information sites:  http://win.niddk.nih.gov/publications/index.htm    And look into health and wellness programs that may be available through your health insurance provider, employer, local community center, or soumya club.        MY TREATMENT INFORMATION FOR SLEEP APNEA -  Valnecia Ye    DOCTOR: Valdemar Granado MD, MPH  SLEEP CENTER : [facility]         If I haven't had a sleep study yet, what can I expect?  A personal story from [employer]  https://www.RealOps.com/watch?v=AxPLmlRpnCs      Am I having a home sleep study?  Here is a video in case you get home and want to make sure you have done it correctly  https://www.RealOps.com/watch?v=QXC1B8jXvf8&feature=youtu.be      Frequently asked questions:  1. What is Obstructive Sleep Apnea (THALIA)? THALIA is the most common type of sleep apnea. Apnea literally means, \"without breath.\" It is characterized by repetitive pauses in breathing, despite continued effort to breathe, and is usually associated with a reduction in blood oxygen saturation. Apneas can last 10 to over 60 seconds. It is caused by narrowing or collapse of the upper airway as muscles relax during sleep. Severity of sleep apnea is determined by frequency of breathing events and their effect on your sleep and oxygen levels determined during sleep testing.     2. What are the consequences of THALIA? Symptoms include: daytime sleepiness- possibly increasing the risk of falling asleep while driving, unrefreshing/restless sleep, snoring, insomnia, waking frequently to urinate, waking with heartburn or reflux, reduced concentration and memory, and morning headaches. Other health consequences may include development of high blood pressure and other cardiovascular disease in persons who " are susceptible. Untreated THALIA  can contribute to heart disease, stroke and diabetes.     3. What are the treatment options? In most situations, sleep apnea is a lifelong disease that must be managed with daily therapy. Medications are not effective for sleep apnea and surgery is generally not performed until other therapies have been tried. Therapy is usually tailored to the individual patient based on many factors including your wishes as well as severity of sleep apnea and severity of obesity. Continuous Positive Airway (CPAP) is the most reliable treatment. An oral device to hold your jaw forward is usually the next most reliable option. Other options include postioning devices (to keep you off your back), weight loss, and surgery including a tongue pacing device. There is more detail about some of these options below.            1. CPAP-  WHAT DOES IT DO AND HOW CAN I LEARN TO WEAR IT?                               BEFORE I START, CAN I WATCH A MOVIE TO GET A PLAN ON HOW TO USE CPAP?  https://www.Nifti.com/watch?w=m1R04hf389K      Continuous positive airway pressure, or CPAP, is the most effective treatment for obstructive sleep apnea. It works by blowing room air, through a mask, to hold your throat open. A decision to use CPAP is a major step forward in the pursuit of a healthier life. The successful use of CPAP will help you breathe easier, sleep better and live healthier. You can choose CPAP equipment from any durable medical equipment provider that meets your needs.  Using CPAP can be a positive experience if you keep these rg points in mind:  1. Commitment  CPAP is not a quick fix for your problem. It involves a long-term commitment to improve your sleep and your health.    2. Communication  Stay in close communication with both your sleep doctor and your CPAP supplier. Ask lots of questions and seek help when you need it.    3. Consistency  Use CPAP all night, every night and for every nap. You will  "receive the maximum health benefits from CPAP when you use it every time that you sleep. This will also make it easier for your body to adjust to the treatment.    4. Correction  The first machine and mask that you try may not be the best ones for you. Work with your sleep doctor and your CPAP supplier to make corrections to your equipment selection. Ask about trying a different type of machine or mask if you have ongoing problems. Make sure that your mask is a good fit and learn to use your equipment properly.    5. Challenge  Tell a family member or close friend to ask you each morning if you used your CPAP the previous night. Have someone to challenge you to give it your best effort.    6. Connection   Your adjustment to CPAP will be easier if you are able to connect with others who use the same treatment. Ask your sleep doctor if there is a support group in your area for people who have sleep apnea, or look for one on the Internet.  7. Comfort   Increase your level of comfort by using a saline spray, decongestant or heated humidifier if CPAP irritates your nose, mouth or throat. Use your unit's \"ramp\" setting to slowly get used to the air pressure level. There may be soft pads you can buy that will fit over your mask straps. Look on www.CPAP.com for accessories that can help make CPAP use more comfortable.  8. Cleaning   Clean your mask, tubing and headgear on a regular basis. Put this time in your schedule so that you don't forget to do it. Check and replace the filters for your CPAP unit and humidifier.    9. Completion   Although you are never finished with CPAP therapy, you should reward yourself by celebrating the completion of your first month of treatment. Expect this first month to be your hardest period of adjustment. It will involve some trial and error as you find the machine, mask and pressure settings that are right for you.    10. Continuation  After your first month of treatment, continue to make " a daily commitment to use your CPAP all night, every night and for every nap.    CPAP-Tips to starting with success:  Begin using your CPAP for short periods of time during the day while you watch TV or read.    Use CPAP every night and for every nap. Using it less often reduces the health benefits and makes it harder for your body to get used to it.    Make small adjustments to your mask, tubing, straps and headgear until you get the right fit. Tightening the mask may actually worsen the leak.  If it leaves significant marks on your face or irritates the bridge of your nose, it may not be the best mask for you.  Speak with the person who supplied the mask and consider trying other masks. Insurances will allow you to try different masks during the first month of starting CPAP.  Insurance also covers a new mask, hose and filter about every 6 months.    Use a saline nasal spray to ease mild nasal congestion. Neti-Pot or saline nasal rinses may also help. Nasal gel sprays can help reduce nasal dryness.  Biotene mouthwash can be helpful to protect your teeth if you experience frequent dry mouth.  Dry mouth may be a sign of air escaping out of your mouth or out of the mask in the case of a full face mask.  Speak with your provider if you expect that is the case.     Take a nasal decongestant to relieve more severe nasal or sinus congestion.  Do not use Afrin (oxymetazoline) nasal spray more than 3 days in a row.  Speak with your sleep doctor if your nasal congestion is chronic.    Use a heated humidifier that fits your CPAP model to enhance your breathing comfort. Adjust the heat setting up if you get a dry nose or throat, down if you get condensation in the hose or mask.  Position the CPAP lower than you so that any condensation in the hose drains back into the machine rather than towards the mask.    Try a system that uses nasal pillows if traditional masks give you problems.    Clean your mask, tubing and headgear  once a week. Make sure the equipment dries fully.    Regularly check and replace the filters for your CPAP unit and humidifier.    Work closely with your sleep provider and your CPAP supplier to make sure that you have the machine, mask and air pressure setting that works best for you. It is better to stop using it and call your provider to solve problems than to lay awake all night frustrated with the device.      BESIDES CPAP, WHAT OTHER THERAPIES ARE THERE?        Positioning Device  Positioning devices are generally used when sleep apnea is mild and only occurs on your back.This example shows a pillow that straps around the waist. It may be appropriate for those whose sleep study shows milder sleep apnea that occurs primarily when lying flat on one's back. Preliminary studies have shown benefit but effectiveness at home may need to be verified by a home sleep test. These devices are generally not covered by medical insurance.                        Oral Appliance  What is oral appliance therapy?  An oral appliance is a small acrylic device that fits over the upper and lower teeth or tongue (similar to an orthodontic retainer or a mouth guard). This device slightly advances the lower jaw or tongue, which moves the base of the tongue forward, opens the airway, improves breathing and can effectively treat snoring and obstructive sleep apnea sleep apnea. The appliance is fabricated and customized by a qualified dentist with experience in treating snoring and sleep apnea. Oral appliances are usually well tolerated and have relatively high compliance by patients1, 2, 3.  When is an oral appliance indicated?  Oral appliance therapy is recommended as a first-line treatment for patients with primary snoring, mild sleep apnea, and for patients with moderate sleep apnea who prefer appliance therapy to use of CPAP4, 5. Severity of sleep apnea is determined by sleep testing and is based on the number of respiratory events  per hour of sleep.   How successful is oral appliance therapy?  The success rate of oral appliance therapy in patients with mild sleep apnea is 75-80% while in patients with moderate sleep apnea it is 50-70%. The chance of success in patients with severe sleep apnea is 40-50%. The research also shows that oral appliances have a beneficial effect on the cardiovascular health of THALIA patients at the same magnitude as CPAP therapy7.  Oral appliances should be a second-line treatment in cases of severe sleep apnea, but if not completely successful then a combination therapy utilizing CPAP plus oral appliance therapy may be effective. Oral appliances tend to be effective in a broad range of patients although studies show that the patients who have the highest success are females, younger patients, those with milder disease, and less severe obesity. 3, 6.   The chances of success are lower in patients who have more severe THALIA, are older, and those who are morbidly obese.     Example of an oral appliance   Finding a dentist that practices dental sleep medicine  Specific training is available through the American Academy of Dental Sleep Medicine for dentists interested in working in the field of sleep. To find a dentist who is educated in the field of sleep and the use of oral appliances, near you, visit the Web site of the American Academy of Dental Sleep Medicine; also see   http://www.accpstorage.org/newOrganization/patients/oralAppliances.pdf  To search for a dentist certified in these practices:  Http://aadsm.org/FindADentist.aspx?1  1. Elsi, et al. Objectively measured vs self-reported compliance during oral appliance therapy for sleep-disordered breathing. Chest 2013; 144(5): 2777-0821.  2. Sb et al. Objective measurement of compliance during oral appliance therapy for sleep-disordered breathing. Thorax 2013; 68(1): 91-96.  3. Kellee et al. Mandibular advancement devices in 620 men and women with  THALIA and snoring: tolerability and predictors of treatment success. Chest 2004; 125: 2495-6963.  4. Clifford et al. Oral appliances for snoring and THALIA: a review. Sleep 2006; 29: 244-262.  5. Ayad et al. Oral appliance treatment for THALIA: an update. J Clin Sleep Med 2014; 10(2): 215-227.  6. Chula et al. Predictors of OSAH treatment outcome. J Dent Res 2007; 86: 4148-8853.      Weight Loss:    Weight management is a personal decision.  If you are interested in exploring weight loss strategies, the following discussion covers the impact on weight loss on sleep apnea and the approaches that may be successful.    Weight loss decreases severity of sleep apnea in most people with obesity. For those with mild obesity who have developed snoring with weight gain, even 15-30 pound weight loss can improve and occasionally eliminate sleep apnea.  Structured and life-long dietary and health habits are necessary to lose weight and keep healthier weight levels.     Though there may be significant health benefits from weight loss, long-term weight loss is very difficult to achieve- studies show success with dietary management in less than 10% of people. In addition, substantial weight loss may require years of dietary control and may be difficult if patients have severe obesity. In these cases, surgical management may be considered.  Finally, older individuals who have tolerated obesity without health complications may be less likely to benefit from weight loss strategies.    Your BMI is Body mass index is 18.6 kg/(m^2).  Body mass index (BMI) is one way to tell whether you are at a healthy weight, overweight, or obese. It measures your weight in relation to your height.  A BMI of 18.5 to 24.9 is in the healthy range. A person with a BMI of 25 to 29.9 is considered overweight, and someone with a BMI of 30 or greater is considered obese. More than two-thirds of American adults are considered overweight or obese.  Being  overweight or obese increases the risk for further weight gain. Excess weight may lead to heart disease and diabetes.  Creating and following plans for healthy eating and physical activity may help you improve your health.  Weight control is part of healthy lifestyle and includes exercise, emotional health, and healthy eating habits. Careful eating habits lifelong are the mainstay of weight control. Though there are significant health benefits from weight loss, long-term weight loss with diet alone may be very difficult to achieve- studies show long-term success with dietary management in less than 10% of people. Attaining a healthy weight may be especially difficult to achieve in those with severe obesity. In some cases, medications, devices and surgical management might be considered.  What can you do?  If you are overweight or obese and are interested in methods for weight loss, you should discuss this with your provider.     Consider reducing daily calorie intake by 500 calories.     Keep a food journal.     Avoiding skipping meals, consider cutting portions instead.    Diet combined with exercise helps maintain muscle while optimizing fat loss. Strength training is particularly important for building and maintaining muscle mass. Exercise helps reduce stress, increase energy, and improves fitness. Increasing exercise without diet control, however, may not burn enough calories to loose weight.       Start walking three days a week 10-20 minutes at a time    Work towards walking thirty minutes five days a week     Eventually, increase the speed of your walking for 1-2 minutes at time    In addition, we recommend that you review healthy lifestyles and methods for weight loss available through the National Institutes of Health patient information sites:  http://win.niddk.nih.gov/publications/index.htm    And look into health and wellness programs that may be available through your health insurance provider,  employer, local community center, or soumya club.    Surgery:    Upper Airway Surgery for THALIA  Surgery for THALIA is a second-line treatment option in the management of sleep apnea.  Surgery should be considered for patients who are having a difficult time tolerating CPAP.    Surgery for THALIA is directed at areas that are responsible for narrowing or complete obstruction of the airway during sleep.  There are a wide range of procedures available to enlarge and/or stabilize the airway to prevent blockage of breathing in the three major areas where it can occur: the palate, tongue, and nasal regions.  Successful surgical treatment depends on the accurate identification of the factors responsible for obstructive sleep apnea in each person.  A personalized approach is required because there is no single treatment that works well for everyone.  Because of anatomic variation, consultation with an examination by a sleep surgeon is a critical first step in determining what surgical options are best for each patient.  In some cases, examination during sedation may be recommended in order to guide the selection of procedures.  Patients will be counseled about risks and benefits as well as the typical recovery course after surgery. Surgery is typically not a cure for a person s THALIA.  However, surgery will often significantly improve one s THALIA severity (termed  success rate ).  Even in the absence of a cure, surgery will decrease the cardiovascular risk associated with OSA7; improve overall quality of life8 (sleepiness, functionality, sleep quality, etc).          Palate Procedures:  Patients with THALIA often have narrowing of their airway in the region of their tonsils and uvula.  The goals of palate procedures are to widen the airway in this region as well as to help the tissues resist collapse.  Modern palate procedure techniques focus on tissue conservation and soft tissue rearrangement, rather than tissue removal.  Often the  uvula is preserved in this procedure. Residual sleep apnea is common in patient after pharyngoplasty with an average reduction in sleep apnea events of 33%2.      Tongue Procedures:  While patients are awake, the muscles that surround the throat are active and keep this region open for breathing. These muscles relax during sleep, allowing the tongue and other structures to collapse and block breathing.  There are several different tongue procedures available.  Selection of a tongue base procedure depends on characteristics seen on physical exam.  Generally, procedures are aimed at removing bulky tissues in this area or preventing the back of the tongue from falling back during sleep.  Success rates for tongue surgery range from 50-62%3.    Hypoglossal Nerve Stimulation:  Hypoglossal nerve stimulation has recently received approval from the [place] Food and Drug Administration for the treatment of obstructive sleep apnea.  This is based on research showing that the system was safe and effective in treating sleep apnea6.  Results showed that the median AHI score decreased 68%, from 29.3 to 9.0. This therapy uses an implant system that senses breathing patterns and delivers mild stimulation to airway muscles, which keeps the airway open during sleep.  The system consists of three fully implanted components: a small generator (similar in size to a pacemaker), a breathing sensor, and a stimulation lead.  Using a small handheld remote, a patient turns the therapy on before bed and off upon awakening.    Candidates for this device must be greater than 22 years of age, have moderate to severe THALIA (AHI between 20-65), BMI less than 32, have tried CPAP/oral appliance without success, and have appropriate upper airway anatomy (determined by a sleep endoscopy performed by Dr. Silver).    Hypoglossal Nerve Stimulation Pathway:    The sleep surgeon s office will work with the patient through the insurance prior-authorization  process (including communications and appeals).    Nasal Procedures:  Nasal obstruction can interfere with nasal breathing during the day and night.  Studies have shown that relief of nasal obstruction can improve the ability of some patients to tolerate positive airway pressure therapy for obstructive sleep apnea1.  Treatment options include medications such as nasal saline, topical corticosteroid and antihistamine sprays, and oral medications such as antihistamines or decongestants. Non-surgical treatments can include external nasal dilators for selected patients. If these are not successful by themselves, surgery can improve the nasal airway either alone or in combination with these other options.    Combination Procedures:  Combination of surgical procedures and other treatments may be recommended, particularly if patients have more than one area of narrowing or persistent positional disease.  The success rate of combination surgery ranges from 66-80%2,3.      1. Marsha IRBY. The Role of the Nose in Snoring and Obstructive Sleep Apnoea: An Update.  Eur Arch Otorhinolaryngol. 2011; 268: 1365-73.  2.  Reena SM; Zoey JA; Lore JR; Pallanch JF; Arabella MB; Wm SG; Alie VELAZQUEZ. Surgical modifications of the upper airway for obstructive sleep apnea in adults: a systematic review and meta-analysis. SLEEP 2010;33(10):2521-6790. Jessica BARBOSA. Hypopharyngeal surgery in obstructive sleep apnea: an evidence-based medicine review.  Arch Otolaryngol Head Neck Surg. 2006 Feb;132(2):206-13.  3. Celestino YH1, Jana Y, Rober HODA. The efficacy of anatomically based multilevel surgery for obstructive sleep apnea. Otolaryngol Head Neck Surg. 2003 Oct;129(4):327-35.  4. Kezirian E, Goldberg A. Hypopharyngeal Surgery in Obstructive Sleep Apnea: An Evidence-Based Medicine Review. Arch Otolaryngol Head Neck Surg. 2006 Feb;132(2):206-13.  5. Marisa GODWIN et al. Upper-Airway Stimulation for Obstructive Sleep Apnea.  N Engl J Med. 2014 Faraz  9;370(2):139-49.  6. Ramiundo Y et al. Increased Incidence of Cardiovascular Disease in Middle-aged Men with Obstructive Sleep Apnea. Am J Respir Crit Care Med; 2002 166: 159-165  7. Dell GOOD et al. Studying Life Effects and Effectiveness of Palatopharyngoplasty (SLEEP) study: Subjective Outcomes of Isolated Uvulopalatopharyngoplasty. Otolaryngol Head Neck Surg. 2011; 144: 623-631.  Please, meet with the nurse and schedule the sleep study and the follow up visit. Bring with you the sleeping aid and use it as necessary.    Thank you!    Valdemar Granado MD, MPH  Clinical Sleep and Occupational / Environmental Medicine            Follow-ups after your visit        Your next 10 appointments already scheduled     Jul 18, 2017  7:15 AM CDT   Adult Med Follow UP with SUNNY Ayala CNS   Psychiatry Clinic ([facility])    [address]  [address] [place] [address]   [phone]            Jul 21, 2017  8:45 AM CDT   Adult Med Follow UP with SUNNY Ayala CNS   Psychiatry Clinic ([facility])    [address]  [facility] [place] [address]   [phone]            Aug 11, 2017  2:00 PM CDT   CF LOOP with [facility] Pulmonary Function Testing ([facility])    [address] [place] [address]   [phone]            Aug 11, 2017  2:30 PM CDT   (Arrive by 2:15 PM)   RETURN CYSTIC FIBROSIS VISIT with Dagoberto Briscoe MD   [facility] for [facility] and [facility] ([facility])    [address] [place] [address]   [phone]            Jan 26, 2018  7:30 AM CST   CF LOOP with [facility] Pulmonary Function Testing ([facility])    [address] [place] [address]   [phone]            Jan 26, 2018  8:10 AM CST   (Arrive by 7:55  "AM)   RETURN CYSTIC FIBROSIS VISIT with Dagoberto Briscoe MD   [facility] for [facility] ([facility])    [address] [facility]  3rd Floor  [facility] [address] [phone]              Future tests that were ordered for you today     Open Future Orders        Priority Expected Expires Ordered    Comprehensive Sleep Study Routine  1/10/2018 7/14/2017            Who to contact     If you have questions or need follow up information about today's clinic visit or your schedule please contact [facility] directly at [phone].  Normal or non-critical lab and imaging results will be communicated to you by [employer], letter or phone within 4 business days after the clinic has received the results. If you do not hear from us within 7 days, please contact the clinic through [employer] or phone. If you have a critical or abnormal lab result, we will notify you by phone as soon as possible.  Submit refill requests through [employer] or call your pharmacy and they will forward the refill request to us. Please allow 3 business days for your refill to be completed.          Additional Information About Your Visit        MyChart Information     [employer] gives you secure access to your electronic health record. If you see a primary care provider, you can also send messages to your care team and make appointments. If you have questions, please call your primary care clinic.  If you do not have a primary care provider, please call [phone] and they will assist you.        Care EveryWhere ID     This is your Care EveryWhere ID. This could be used by other organizations to access your [facility] medical records  [id number]        Your Vitals Were     Pulse Height Last Period Pulse Oximetry BMI (Body Mass Index)       65 1.6 m (5' 3\") 02/01/2009 99% 18.6 kg/m2        Blood Pressure from Last 3 Encounters:   07/14/17 112/70   07/03/17 110/70   06/09/17 115/76    " Weight from Last 3 Encounters:   07/14/17 47.6 kg (105 lb)   07/03/17 49 kg (108 lb)   06/09/17 47.6 kg (105 lb)              We Performed the Following     SLEEP EVALUATION & MANAGEMENT REFERRAL - ADULT          Today's Medication Changes          These changes are accurate as of: 7/14/17 12:16 PM.  If you have any questions, ask your nurse or doctor.               Start taking these medicines.        Dose/Directions    zolpidem 5 MG tablet   Commonly known as:  AMBIEN   Used for:  Fatigue, unspecified type, Excessive daytime sleepiness, Uncomplicated asthma, unspecified asthma severity, Churg-Antonina syndrome (H), Interstitial lung disease (H), Essential hypertension        Take tablet by mouth 15 minutes prior to sleep, for Sleep Study   Quantity:  1 tablet   Refills:  0            Where to get your medicines      Some of these will need a paper prescription and others can be bought over the counter.  Ask your nurse if you have questions.     Bring a paper prescription for each of these medications     zolpidem 5 MG tablet                Primary Care Provider Office Phone # Fax #    Morelia Simental [phone]       [facility] [address]  [facility] [address]        Equal Access to Services     JACK THOMPSON AH: Hadii camden trinidado Sooliver, waaxda luqadaha, qaybta kaalmada adeegyada, vale fields. So [facility] [phone].    ATENCIÓN: Si habla español, tiene a waller disposición servicios gratuitos de asistencia lingüística. Llame al [phone].    We comply with applicable federal civil rights laws and [place] laws. We do not discriminate on the basis of race, color, national origin, age, disability sex, sexual orientation or gender identity.            Thank you!     Thank you for choosing [facility]  for your care. Our goal is always to provide you with excellent care. Hearing back from our patients is one way we can  continue to improve our services. Please take a few minutes to complete the written survey that you may receive in the mail after your visit with us. Thank you!             Your Updated Medication List - Protect others around you: Learn how to safely use, store and throw away your medicines at www.disposemymeds.org.          This list is accurate as of: 7/14/17 12:16 PM.  Always use your most recent med list.                   Brand Name Dispense Instructions for use Diagnosis    acetylcysteine 20 % nebulizer solution    MUCOMYST    240 mL    Take 4 mLs by nebulization 2 times daily Use with albuterol solution. Discard open bottle of Mucomyst after 96 hours.    Bronchiectasis without complication (H)       albuterol (2.5 MG/3ML) 0.083% neb solution     60 vial    Take 1 vial (2.5 mg) by nebulization 2 times daily Use with Mucomyst    Bronchiectasis without complication (H)       atenolol 25 MG tablet    TENORMIN    45 tablet    Take 0.5 tablets (12.5 mg) by mouth At Bedtime 1/4 tab at bedtime    Palpitations       budesonide-formoterol 160-4.5 MCG/ACT Inhaler    SYMBICORT    1 Inhaler    Inhale 2 puffs into the lungs 2 times daily    Churg-Antonina syndrome (H)       cholecalciferol 5000 UNITS Caps      Take by mouth daily        ciprofloxacin-dexamethasone otic suspension    CIPRODEX    7.5 mL    Instill 3 drops into the affected ear twice daily as needed.    Ear pain, unspecified laterality       clonazePAM 0.5 MG tablet    klonoPIN    20 tablet    Take 0.5 tablets (0.25 mg) by mouth 2 times daily as needed for anxiety    Anxiety       conjugated estrogens cream    PREMARIN    42.5 g    Place  vaginally three times a week. Use 0.5 grams weekly as directed    Vaginal dryness       Fish Oil 1200 MG Caps      Take 3 capsules by mouth daily.        fluticasone 50 MCG/ACT spray    FLONASE    48 g    Spray 2 sprays into both nostrils daily    Chronic rhinitis       GLUCOSAMINE CHONDR COMPLEX 500-400 MG Caps per capsule    Generic drug:  glucosamine-chondroitin      Take 2 capsules by mouth daily.        Ipratropium-Albuterol  MCG/ACT inhaler    COMBIVENT RESPIMAT    1 Inhaler    Inhale 1 puff into the lungs 4 times daily Not to exceed 6 doses per day.    Churg-Antonina syndrome (H)       Naltrexone Powd     30 g    4.5mg compounded in capsule to take one daily by mouth. Please dispense 30 capsules    Chronic fatigue, History of systemic steroid therapy, ILD (interstitial lung disease) (H)       predniSONE 1 MG tablet    DELTASONE    200 tablet    Take 5 mgs one day, alternating with 6 mgs the next.    Bronchiectasis without complication (H)       UNABLE TO FIND      3,600 mg daily MEDICATION NAME: monolaurin        zolpidem 5 MG tablet    AMBIEN    1 tablet    Take tablet by mouth 15 minutes prior to sleep, for Sleep Study    Fatigue, unspecified type, Excessive daytime sleepiness, Uncomplicated asthma, unspecified asthma severity, Churg-Antonina syndrome (H), Interstitial lung disease (H), Essential hypertension

## 2017-07-14 NOTE — PROGRESS NOTES
"[facility]  Outpatient Sleep Medicine Consultation  July 14, 2017        Name: Valencia Ye MRN# [id number]   Age: 46 year old YOB: 1970       Date of Consultation: July 14, 2017  Consultation is requested by: Morelia Simental MD  [facility]  [address] [facility] [address]  [place] [address]  Primary care provider: Morelia Simental    Patient is accompanied by: Patient presents alone today       Reason for Sleep Consult:     Valencia Ye is a 46 year old female patient that presents here for an initial evaluation of \"fatigue.\"         Assessment and Plan:     Summary Sleep Diagnoses:    (1) HTN  (2) Fatigue  (3) Interstitial Lung Disease  (4) Churg-Antonina Syndrome  (5) Asthma      Summary Recommendations:    This patient presents with sxs of chronic fatigue. However, her presentation is not consistent with a hypocretin deficiency or any hypersomnolence disorder (further confirmed by the lack of improvement of the sxs with the use of modafinil). In fact, I also have a low grade of suspicion for any sleep disordered breathing given the physical examination, body habitus, history, and the recent PFT and echocardiogram. Nonetheless, this patient will undergo an in-lab split-night PSG sleep study with TCM (and ABG if TCM shows any abnormalities). This test will help evaluation with a level of certainty that this patient with known pulmonary complications does not have any sleep abnormalities producing the chronic fatigue. At this point, given the low suspicion, no actigraphy or MSLT studies will be obtained. The nature and pathophysiology of the possible conditions were discussed. The different treatment options were also presented. The patient was given zolpidem 5 mg to use during the night of the study only (side effects and possible complications discussed). The patient will follow up with me after completing the PSG sleep study.    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    Educational materials provided in instructions. The patient was instructed to avoid driving or operating any heavy machinery when experiencing drowsiness.    All questions and concerns were addressed today. Pt agrees and understands the assessment and plan.         History of Present Illness:     Valencia Ye is a 46 year old  RHD female pt with history of chronic fatigue, mannose-binding lectin deficiency, HTN, MDD, interstitial lung disease, Churg-Antonina syndrome, asthma, anxiety disorder, and history of eating disorder presents for an initial evaluation today for \"fatigue\" (I was asked by the patient's PCP to evaluate the patient's fatigue). Pt explains that the fatigue started since 2013, after a severe lung infection. She has been lacking stamina and experiencing severe physical fatigue. However, this is not described as excessive daytime sleepiness. In fact, the patient states that she is unable to sleep during the day. She denies any inadvertent naps. Pt was prescribed modafinil for the chronic fatigue, but she did not like this treatment (\"she cannot explain why\"). In fact, when being on this stimulant agent, she did not notice much improvement of the condition. Pt explains that she does not want any pharmacological agent to treat the condition.    Pt does not snore. She denies gasping / choking for air, witnessed apneas, morning cephalgia, morning myalgias, SOB, CP, GERD sxs, or xerostomia. Pt denies any EDS or drowsiness when driving.    PREVIOUS IN- LAB or HOME SLEEP STUDIES: None Reported    SLEEP-WAKE SCHEDULE:     Valencia Ye      -Describes herself " as neither a morning or night person; prefers to go to sleep at 10:00 PM and wakes up at 7:00 AM.      -Pt takes no naps; takes no inadvertent naps.      -ON WEEKDAYS, goes to sleep at 9:00 PM during the week; awakens 4:30 AM with an alarm; falls asleep in 15 minutes; denies difficulty falling asleep.      -ON WEEKENDS, goes to sleep at 10:00 PM and wakes up at 7:00 AM with an alarm; falls asleep in 15 minutes.        -Awakens 1-2 times a night for 5 minutes before falling back to sleep; awakens to go to the bathroom.      BEDTIME ACTIVITIES AND SHIFT WORK:    Valencia Ye     -Bedtime Activities and Other Sleeping Information: Pt lives with , kids, dog, and cat. Sleeps with  on a queen size bed. Dog sleeps on the bed . Pt sleeps in all positions. Pt uses all types of electronics in bed and she also reads and eats in bed.      -Occupation: . Pt works day shifts.    -Working Hours: 715 AM to 415 PM     SCALES        SLEEP APNEA: [employer] score: 2 / 8        SLEEPINESS: [place] sleepiness scale (ESS):  1 / 24    Drowsy driving / near accidents: Denies any near accidents    Consequences: Fatigue    SLEEP COMPLAINTS:  Cardio-respiratory     -Snoring: No snoring reported    -Dyspnea: Pt denies having any witnessed apneas or dyspnea   -Morning headaches or confusion: Denies any morning cephalgia   -Coexisting Lung disease: Insterstitial lung diease with asthma and Churg-Antonina syndrome      -Coexisting Heart disease: History of cardiac effusion with current palpitations     -Does patient have a bed partner: Patient sleeps with spouse   -Has bed partner been sleeping separately because of snoring:  No            RLS Screen:      -When you try to relax in the evening or sleep at night, do you ever have unpleasant, restless feelings in your legs that can be relieved by walking or movement? None Reported     -Periodic limb movement: None Reported    Narcolepsy:     - Denies sudden urges of  sleep attacks   - Denies cataplexy   - Denies sleep paralysis    - Denies hallucinations     Sleep Behaviors:   - Denies leg symptoms/movements   - Denies motor restlessness   - Denies night terrors   - Admits bruxism (uses a dental guard)   - Denies automatic behaviors    Other Subjective Complaints:   - Denies anxiety or rumination    - Denies pain and discomfort at  night   - Denies waking up with heart pounding or racing   - Denies GERD or aspiration         Parasomnia:    -NREM - Denies recurrent persistent confusional arousal, night eating, sleep walking or sleep terrors.      -REM  - Denies dream enactment or injuries.     -Driving Accident or Near Accidents: None reported         Medications:     Current Outpatient Prescriptions   Medication Sig     Ipratropium-Albuterol (COMBIVENT RESPIMAT)  MCG/ACT inhaler Inhale 1 puff into the lungs 4 times daily Not to exceed 6 doses per day.     atenolol (TENORMIN) 25 MG tablet Take 0.5 tablets (12.5 mg) by mouth At Bedtime 1/4 tab at bedtime     clonazePAM (KLONOPIN) 0.5 MG tablet Take 0.5 tablets (0.25 mg) by mouth 2 times daily as needed for anxiety     predniSONE (DELTASONE) 1 MG tablet Take 5 mgs one day, alternating with 6 mgs the next.     acetylcysteine (MUCOMYST) 20 % injection Take 4 mLs by nebulization 2 times daily Use with albuterol solution. Discard open bottle of Mucomyst after 96 hours.     albuterol (2.5 MG/3ML) 0.083% neb solution Take 1 vial (2.5 mg) by nebulization 2 times daily Use with Mucomyst     budesonide-formoterol (SYMBICORT) 160-4.5 MCG/ACT Inhaler Inhale 2 puffs into the lungs 2 times daily     UNABLE TO FIND 3,600 mg daily MEDICATION NAME: monolaurin     fluticasone (FLONASE) 50 MCG/ACT nasal spray Spray 2 sprays into both nostrils daily     ciprofloxacin-dexamethasone (CIPRODEX) otic suspension Instill 3 drops into the affected ear twice daily as needed.     conjugated estrogens (PREMARIN) vaginal cream Place  vaginally three times  a week. Use 0.5 grams weekly as directed     cholecalciferol 5000 UNITS CAPS Take by mouth daily     glucosamine-chondroitin (GLUCOSAMINE CHONDR COMPLEX) 500-400 MG CAPS Take 2 capsules by mouth daily.     Omega-3 Fatty Acids (FISH OIL) 1200 MG capsule Take 3 capsules by mouth daily.     Naltrexone POWD 4.5mg compounded in capsule to take one daily by mouth. Please dispense 30 capsules (Patient not taking: Reported on 2017)     No current facility-administered medications for this visit.       Allergies   Allergen Reactions     Colistimethate Anaphylaxis     Colymycin M [Colistimethate Sodium] Anaphylaxis     Tamiflu [Oseltamivir]      Gums Blister up     Clindamycin Rash     Zithromax [Azithromycin]      Heart palpitation          Past Medical History:     Denies needing any 02 supplement at night.    Past Medical History:   Diagnosis Date     Aortitis (H)      Asthma     associated with Churg Antonina syndrome     Churg-Antonina syndrome (H)     dx      Eustachian tube dysfunction      Goiter      Hearing loss, conductive      Hypertension      Hypocalcemia      Irritable bowel syndrome      Major depressive disorder      Mannose-binding lectin deficiency      Nonspecific abnormal results of thyroid function study      Osteoporosis      Pericarditis      and      Pneumonia     4/23/10     Recurrent UTI      Rheumatoid vasculitis (H)      Sinusitis, chronic      Steroid long-term use      Varicose veins of leg with swelling            Past Surgical History:    Admits previous upper airway surgery.     Past Surgical History:   Procedure Laterality Date      SECTION       COLONOSCOPY Left 2014    Procedure: COMBINED COLONOSCOPY, SINGLE OR MULTIPLE BIOPSY/POLYPECTOMY BY BIOPSY;  Surgeon: Js Pinedo MD;  Location:  GI     ENT SURGERY       HC COLP CERVIX/UPPER VAGINA W LOOP ELEC BX CERVIX       HYSTERECTOMY  2009    without oopherectomy     HYSTERECTOMY, PAP NO  LONGER INDICATED      cervix removed      PICC INSERTION  10/10/2013    5fr DL PASV PICC, 43cm (1cm external) in the L basilic vein w/ tip in the low SVC.     PICC INSERTION Left 05/26/2017    5fr DL BioFlo PICC, 42cm (3cm external) in the L lateral brachial vein w/ tip in the SVC RA junction.     SINUS SURGERY       TUBAL LIGATION              Social History:     Social History   Substance Use Topics     Smoking status: Never Smoker     Smokeless tobacco: Never Used     Alcohol use No     Chemical History:     Tobacco: Never smoked     Uses 1/2 cups/day of coffee. Last caffeine intake is usually before in the morning.    Supplements for wakefulness: Patient does not use any supplements to stay awake    EtOH: None Reported  Recreational Drugs: Patient denies using any recreational drugs     Psych Hx:   PHQ2: Positive   -Little Interest or pleasure in doing things? 1 - several days   -Feeling down, depressed, or hopeless? 2 - more than half the days    Current dangers to self or others: No. Pt denies any SI / HI, hallucinations, or delusions         Family History:     Family History   Problem Relation Age of Onset     Allergies Mother      Seasonal     Alcohol/Drug Mother      C.A.D. Father      DIABETES Father      Type 2     C.A.D. Maternal Grandmother      Arthritis Maternal Grandmother      Musculoskeletal Disorder Maternal Grandmother      MS     Alcohol/Drug Maternal Grandfather      Asthma Son      Asthma Daughter      Allergies Daughter      Seasonal     Unknown/Adopted Paternal Grandmother      Unknown/Adopted Paternal Grandfather      CANCER Maternal Aunt      breast age 53     Breast Cancer Maternal Aunt      in her 50s    -Aunt and grandmother with MS   -Grandmother with RA    Sleep Family Hx:       RLS- None reported   THALIA - None reported  Insomnia - None reported  Parasomnia - None reported         Review of Systems:     A complete 10 point review of systems was negative other than HPI or as commented  "below:     CONSTITUTIONAL: NEGATIVE for weight gain/loss, fever, chills, sweats or night sweats, drug allergies.  EYES: NEGATIVE for changes in vision, blind spots, double vision.  ENT: NEGATIVE for ear pain, sore throat, sinus pain, post-nasal drip, runny nose, bloody nose  CARDIAC: NEGATIVE for fast heartbeats or fluttering in chest, chest pain or pressure, breathlessness when lying flat, swollen legs or swollen feet.  NEUROLOGIC: NEGATIVE headaches, weakness or numbness in the arms or legs.  DERMATOLOGIC: NEGATIVE for rashes, new moles or change in mole(s)  PULMONARY: NEGATIVE SOB at rest, SOB with activity, dry cough, productive cough, coughing up blood, wheezing or whistling when breathing.    GASTROINTESTINAL: NEGATIVE for nausea or vomitting, loose or watery stools, fat or grease in stools, constipation, abdominal pain, bowel movements black in color or blood noted.  GENITOURINARY: NEGATIVE for pain during urination, blood in urine, urinating more frequently than usual, irregular menstrual periods.  MUSCULOSKELETAL: NEGATIVE for muscle pain, bone or joint pain, swollen joints.  ENDOCRINE: NEGATIVE for increased thirst or urination, diabetes.  LYMPHATIC: NEGATIVE for swollen lymph nodes, lumps or bumps in the breasts or nipple discharge.         Physical Examination:   /70  Pulse 65  Ht 1.6 m (5' 3\")  Wt 47.6 kg (105 lb)  LMP 02/01/2009  SpO2 99%  BMI 18.6 kg/m2     VS: Reviewed and normal.  General: Alert, oriented, not in distress. Dressed casually; Good eye contact; Comfortably sitting in a chair; in no apparent distress  HEENT: Normocephalic and atraumatic; NL TM x 2; pupils are isocoric and equally responsive to the light. PERRLA. EOMI. Normal fundoscopic examination; Nasal turbinates are normal with a normal septal alignment;  Mallampati score: Grade II; Tonsillar hypertrophy: 1  hidden by pillars; Pharynx with no erythema or exudates.  NECK: neck supple; symmetrical; no lymphadenopathy; no " thyromegaly, bruit, JVD noted. Neck circumference of 11.8 inches (30 cm).  Lungs: both hemithoraces are symmetrical, normal to palpation, no dullness to percussion, auscultation of lungs revealed normal breath sounds with no expirium prolongation, wheezing, rhonci and crackles.  CVS: Normal S1 and S2 heart sounds with no extra heart sounds. No murmur, rubs, or clicks. Normal peripheral pulses throughout with no obvious peripheral edema.  Abdomen: Bowel sounds present. Abdomen is soft, non-tender, and non-distended. No organomegaly, ascitis, or obvious masses noted. Negative CVA tenderness.  Extremities/musculoskeletal: no peripheral edema, deformity, cyanosis, clubbing  Neurology: awake, alert, and oriented x 3. No obvious gross motor / sensorial deficits with normal strength in all extremities at 5/5 and normal sensation throughout. Cranial nerves are grossly intact with normal II to XII CN functions. Negative Romberg's test with normal gait. DTR are symmetric and normal at 2+/4.  Integumentary: no obvious skin rash.  Psychiatry: Mood and affect are appropriate. Euthymic with affect congruent with full range and intensity. No SI/HI with adequate insight and judgement.          Data: All pertinent previous laboratory data reviewed     No results found for: PH, PHARTERIAL, PO2, [id number], SAT, PCO2, HCO3, BASEEXCESS, DARRON, BEB  Lab Results   Component Value Date    TSH 0.90 11/10/2016    TSH 0.86 09/12/2013     Lab Results   Component Value Date    GLC 84 03/10/2017    GLC 82 11/10/2016     Lab Results   Component Value Date    HGB 12.7 06/09/2017    HGB 13.5 05/19/2017     Lab Results   Component Value Date    BUN 8 06/06/2017    BUN 13 05/30/2017    CR 0.60 06/06/2017    CR 0.62 05/30/2017     Echocardiology:    -Echocardiogram obtained on 06/20/2017 showed a visual EF estimated at 60 to 65%. The right ventricle was normal in size and function. There was only mild mitral regurgitation noted.    -Echocardiogram  obtained on 03/28/2014 showed a visual EF estimated at 60 to 65%. The global right ventricular function was also normal. The pulmonary artery systolic pressure was also normal. The inferior vena cava was also normal. No effusions were noted. There was mild mitral insufficiency noted.  Chest x-ray:    -Chest x-ray films obtained on 05/19/2017 showed normal findings.   -Chest x-ray films obtained on 03/10/2017 showed clear chest.   -Chest CT w/o contrast obtained in 07/10/2013 showed numerous new tree in bud and centrilobular nodules in the lung bases that were consistent with Churg-Antonina syndrome.  PFT:    -PFT obtained on 06/09/2017 showed normal findings with a FVC% at 132%, FEV1% at 116, and a FEV1/FVC at 73%.   -PFT obtained on 05/19/2017 showed a FVC% at 128%, FEV% at 100%, and FEV1/FVC at 65%.  Laboratory Studies:  Component Value Flag Ref Range Units Status Collected Lab   WBC 6.7  4.0 - 11.0 10e9/L Final 06/09/2017  5:56 PM 1740   RBC Count 3.85  3.8 - 5.2 10e12/L Final 06/09/2017  5:56 PM 1740   Hemoglobin 12.7  11.7 - 15.7 g/dL Final 06/09/2017  5:56 PM 1740   Hematocrit 38.5  35.0 - 47.0 % Final 06/09/2017  5:56 PM 1740     78 - 100 fl Final 06/09/2017  5:56 PM 1740   MCH 33.0  26.5 - 33.0 pg Final 06/09/2017  5:56 PM 1740   MCHC 33.0  31.5 - 36.5 g/dL Final 06/09/2017  5:56 PM 1740   RDW 12.1  10.0 - 15.0 % Final 06/09/2017  5:56 PM 1740   Platelet Count 284  150 - 450 10e9/L Final 06/09/2017  5:56 PM 1740   Diff Method     Final 06/09/2017  5:56 PM 1740   Automated Method   % Neutrophils 69.7   % Final 06/09/2017  5:56 PM 1740   % Lymphocytes 17.0   % Final 06/09/2017  5:56 PM 1740   % Monocytes 9.0   % Final 06/09/2017  5:56 PM 1740   % Eosinophils 3.2   % Final 06/09/2017  5:56 PM 1740   % Basophils 0.8   % Final 06/09/2017  5:56 PM 1740   % Immature Granulocytes 0.3   % Final 06/09/2017  5:56 PM 1740   Nucleated RBCs 0  0 /100 Final 06/09/2017  5:56 PM 1740   Absolute Neutrophil 4.7  1.6 -  8.3 10e9/L Final 06/09/2017  5:56 PM 1740   Absolute Lymphocytes 1.1  0.8 - 5.3 10e9/L Final 06/09/2017  5:56 PM 1740   Absolute Monocytes 0.6  0.0 - 1.3 10e9/L Final 06/09/2017  5:56 PM 1740   Absolute Eosinophils 0.2  0.0 - 0.7 10e9/L Final 06/09/2017  5:56 PM 1740   Absolute Basophils 0.1  0.0 - 0.2 10e9/L Final 06/09/2017  5:56 PM 1740   Abs Immature Granulocytes 0.0  0 - 0.4 10e9/L Final 06/09/2017  5:56 PM 1740   Absolute Nucleated RBC 0.0    Final 06/09/2017  5:56 PM 1740     Component Value Flag Ref Range Units Status Collected Lab   Cholesterol 179  <200 mg/dL Final 06/09/2017  5:56 PM 1740   Triglycerides 102  <150 mg/dL Final 06/09/2017  5:56 PM 1740   HDL Cholesterol 71  >49 mg/dL Final 06/09/2017  5:56 PM 1740   LDL Cholesterol Calculated 88  <100 mg/dL Final 06/09/2017  5:56 PM 1740   Comment:   Desirable:       <100 mg/dl   Non HDL Cholesterol 109  <130 mg/dL Final 06/09/2017  5:56 PM 1740     Component Value Flag Ref Range Units Status Collected Lab   Phosphorus 1.6 (L) 2.5 - 4.5 mg/dL Final 06/06/2017 10:30 AM [facility]     Component Value Flag Ref Range Units Status Collected Lab   Magnesium 2.6 (H) 1.6 - 2.3 mg/dL Final 06/06/2017 10:30 AM [facility]        Valdemar Granado MD, MPH  Clinical Sleep Medicine    [place] [address], [place] [address] [phone] Clinic    Total time spent with patient: 80 min. Over >50% of the time was spent for face to face counseling, education, and evaluation.

## 2017-07-14 NOTE — NURSING NOTE
"Chief Complaint   Patient presents with     Consult     great fatigue, referral from PCP, has great anxiety,        Initial /70  Pulse 65  Ht 1.6 m (5' 3\")  Wt 47.6 kg (105 lb)  LMP 02/01/2009  SpO2 99%  BMI 18.6 kg/m2 Estimated body mass index is 18.6 kg/(m^2) as calculated from the following:    Height as of this encounter: 1.6 m (5' 3\").    Weight as of this encounter: 47.6 kg (105 lb).  Medication Reconciliation: complete     Neck circumference 30 cm  ESS 1    Odessa Luo CNA, Sleep Clinic Specialist  "

## 2017-07-18 ENCOUNTER — OFFICE VISIT (OUTPATIENT)
Dept: PSYCHIATRY | Facility: CLINIC | Age: 47
End: 2017-07-18
Attending: CLINICAL NURSE SPECIALIST
Payer: COMMERCIAL

## 2017-07-18 VITALS
DIASTOLIC BLOOD PRESSURE: 78 MMHG | SYSTOLIC BLOOD PRESSURE: 134 MMHG | WEIGHT: 106 LBS | HEART RATE: 76 BPM | BODY MASS INDEX: 18.78 KG/M2

## 2017-07-18 DIAGNOSIS — F41.9 ANXIETY: Primary | ICD-10-CM

## 2017-07-18 DIAGNOSIS — F33.2 SEVERE RECURRENT MAJOR DEPRESSION WITHOUT PSYCHOTIC FEATURES (H): ICD-10-CM

## 2017-07-18 PROCEDURE — 99212 OFFICE O/P EST SF 10 MIN: CPT | Mod: ZF

## 2017-07-18 NOTE — MR AVS SNAPSHOT
After Visit Summary   7/18/2017    Valencia Ye    MRN: [id number]           Patient Information     Date Of Birth          1970        Visit Information        Provider Department      7/18/2017 7:15 AM Marlyn Flores APRN [facility]        Today's Diagnoses     Anxiety    -  1    Severe recurrent major depression without psychotic features (H)           Follow-ups after your visit        Follow-up notes from your care team     Return in about 2 weeks (around 8/1/2017).      Your next 10 appointments already scheduled     Aug 04, 2017  4:45 PM CDT   Adult Med Follow UP with SUNNY Ayala CNS   Psychiatry Clinic ([facility])    [address]  [address] [place] [id number] [phone]            Aug 11, 2017  2:00 PM CDT   CF LOOP with UC PFL CF   [facility] ([facility])    [address] [place] [id number] [phone]            Aug 11, 2017  2:30 PM CDT   (Arrive by 2:15 PM)   RETURN CYSTIC FIBROSIS VISIT with Dagoberto Briscoe MD   [facility] ([facility])    [address] [place] [id number]   [phone]            Jan 26, 2018  7:30 AM CST   CF LOOP with UC PFL CF   [facility] ([facility])    [address] [place] [id number]   [phone]            Jan 26, 2018  8:10 AM CST   (Arrive by 7:55 AM)   RETURN CYSTIC FIBROSIS VISIT with Dagoberto Briscoe MD   [facility] ([facility])    [address] [place] [id number] [phone]              Who to contact     Please call your clinic at [phone] to:    Ask questions about your health    Make or cancel appointments    Discuss  your medicines    Learn about your test results    Speak to your doctor   If you have compliments or concerns about an experience at your clinic, or if you wish to file a complaint, please contact [facility] Patient Relations at [phone] or email us at [email or web address]         Additional Information About Your Visit        MyChart Information     [employer] gives you secure access to your electronic health record. If you see a primary care provider, you can also send messages to your care team and make appointments. If you have questions, please call your primary care clinic.  If you do not have a primary care provider, please call [phone] and they will assist you.      [employer] is an electronic gateway that provides easy, online access to your medical records. With [employer], you can request a clinic appointment, read your test results, renew a prescription or communicate with your care team.     To access your existing account, please contact your [facility] Clinic or call [phone] for assistance.        Care EveryWhere ID     This is your Care EveryWhere ID. This could be used by other organizations to access your [facility] medical records  [id number]        Your Vitals Were     Pulse Last Period BMI (Body Mass Index)             76 02/01/2009 18.78 kg/m2          Blood Pressure from Last 3 Encounters:   07/18/17 134/78   07/14/17 112/70   07/03/17 110/70    Weight from Last 3 Encounters:   07/18/17 48.1 kg (106 lb)   07/14/17 47.6 kg (105 lb)   07/03/17 49 kg (108 lb)              Today, you had the following     No orders found for display       Primary Care Provider Office Phone # Fax #    Morelia Simental [phone]       [facility] [address] [facility] [address]  [facility] [address]        Equal Access to Services     JACK THOMPSON : ruby Cai qaybta kaalmada adeegyada, waxay  jose luis hugomelinda washington starr'aan ah. So [facility] [phone].    ATENCIÓN: Si devaughn mayfeild, tiene a waller disposición servicios gratuitos de asistencia lingüística. [place] al [phone].    We comply with applicable federal civil rights laws and [place] laws. We do not discriminate on the basis of race, color, national origin, age, disability sex, sexual orientation or gender identity.            Thank you!     Thank you for choosing PS[facility]ATRY CLINIC  for your care. Our goal is always to provide you with excellent care. Hearing back from our patients is one way we can continue to improve our services. Please take a few minutes to complete the written survey that you may receive in the mail after your visit with us. Thank you!             Your Updated Medication List - Protect others around you: Learn how to safely use, store and throw away your medicines at www.disposemymeds.org.          This list is accurate as of: 7/18/17  7:57 AM.  Always use your most recent med list.                   Brand Name Dispense Instructions for use Diagnosis    acetylcysteine 20 % nebulizer solution    MUCOMYST    240 mL    Take 4 mLs by nebulization 2 times daily Use with albuterol solution. Discard open bottle of Mucomyst after 96 hours.    Bronchiectasis without complication (H)       albuterol (2.5 MG/3ML) 0.083% neb solution     60 vial    Take 1 vial (2.5 mg) by nebulization 2 times daily Use with Mucomyst    Bronchiectasis without complication (H)       atenolol 25 MG tablet    TENORMIN    45 tablet    Take 0.5 tablets (12.5 mg) by mouth At Bedtime 1/4 tab at bedtime    Palpitations       budesonide-formoterol 160-4.5 MCG/ACT Inhaler    SYMBICORT    1 Inhaler    Inhale 2 puffs into the lungs 2 times daily    Churg-Antonina syndrome (H)       cholecalciferol 5000 UNITS Caps      Take by mouth daily        ciprofloxacin-dexamethasone otic suspension    CIPRODEX    7.5 mL    Instill 3 drops into the affected ear twice daily as  needed.    Ear pain, unspecified laterality       clonazePAM 0.5 MG tablet    klonoPIN    20 tablet    Take 0.5 tablets (0.25 mg) by mouth 2 times daily as needed for anxiety    Anxiety       conjugated estrogens cream    PREMARIN    42.5 g    Place  vaginally three times a week. Use 0.5 grams weekly as directed    Vaginal dryness       Fish Oil 1200 MG Caps      Take 3 capsules by mouth daily.        fluticasone 50 MCG/ACT spray    FLONASE    48 g    Spray 2 sprays into both nostrils daily    Chronic rhinitis       GLUCOSAMINE CHONDR COMPLEX 500-400 MG Caps per capsule   Generic drug:  glucosamine-chondroitin      Take 2 capsules by mouth daily.        Ipratropium-Albuterol  MCG/ACT inhaler    COMBIVENT RESPIMAT    1 Inhaler    Inhale 1 puff into the lungs 4 times daily Not to exceed 6 doses per day.    Churg-Antonina syndrome (H)       predniSONE 1 MG tablet    DELTASONE    200 tablet    Take 5 mgs one day, alternating with 6 mgs the next.    Bronchiectasis without complication (H)       UNABLE TO FIND      3,600 mg daily MEDICATION NAME: monolaurin        zolpidem 5 MG tablet    AMBIEN    1 tablet    Take tablet by mouth 15 minutes prior to sleep, for Sleep Study    Fatigue, unspecified type, Excessive daytime sleepiness, Uncomplicated asthma, unspecified asthma severity, Churg-Antonina syndrome (H), Interstitial lung disease (H), Essential hypertension

## 2017-07-18 NOTE — PROGRESS NOTES
Outpatient Psychiatry Progress Note     Provider: SUNNY Ayala CNS  Date: 2017  Service:  Medication follow up with counseling.   Patient Identification: Valencia Ye  : 1970   MRN: [id number]    Valencia Ye is a 46 year old year old female who presents for ongoing psychiatric care.  Valencia Ye was last seen in clinic on 17.   At that time,   Assessment & Plan       Valencia Ye is seen today for follow up and reports She continues to be very depressed and anxious. Does not tolerate psychotropic medications. Has scheduled with a therapist. Continues unable to work at this time.     Diagnosis  Axis 1: Major Depression, Recurrent Sever, Anxiety  Axis 2: none  Axis 3: See problem list in the medical record     Plan:  Medication: continue Klonopin and Low Dose naltrexone  OTC Recommendations: none  Lab Orders:  none  Referrals: recommend she contact other providers to consider addition support for disability due to medical problems.  Release of Information: Dr. Butts at [facility] phone # [phone], Fax [phone]. ( Letter faxed on 17)  Future Treatment Considerations:per medication tolerance, therapy recommendations  Return for Follow Up:one week      ____________________________________________________________________________________________________________________________________________    2017  Today Valencia reports things are not going well.  She feels that she needs to return to work next week because financially. She restricted LULU e and also considered retrying low dose Sertraline which she had left.  Stopped low dose naltrexone since no effect.  She is getting enough sleep but fatigue continues to a major concern.  Had an assessment for a sleep study but due to lack of support for sleep disorder she decided not to complete the study.   Feels that she has no importance than to care for other people and is not getting  any reciprocal support.  would not consider marriage therapy and is not supportive of her being off work.   Side effects of medication include: none from current medication.   Psychiatric Review of Systems:  The patient endorses symptoms of depression: In the last 2 weeks per PHQ 9 several days  Sleep disturbance. More than 1/2 days SI but denies intent or plan at this time. Nearly everyday anhedonia, feeling depressed, fatigue, appetite disturbance, feelings of failure, concentration problems, lethargy.   She  patient endorses symptoms of anxiety : ruminating, overwhelmed easily  She endorses symptoms of gagandeep including denies.    She endorses symptoms of psychosis including no psychotic symptoms.       Review of Medical Systems:  Sleep: generally ok  Energy: continued low  Concentration: continued impairment  Appetite: decreased  GI Concerns: no new concerns  Cardiac concerns: no new concerns  Neurological concerns: no new concerns  Other medical concerns: no new concerns  Current Substance Use:  Alcohol:denies abuse  Other drugs:denies  Caffeine:no change  Nicotine: none  Past Medical History:   Past Medical History:   Diagnosis Date     Aortitis (H)      Asthma     associated with Churg Antonina syndrome     Churg-Antonina syndrome (H)     dx 1990     Eustachian tube dysfunction      Goiter      Hearing loss, conductive      Hypertension      Hypocalcemia      Irritable bowel syndrome      Major depressive disorder      Mannose-binding lectin deficiency 2014     Nonspecific abnormal results of thyroid function study      Osteoporosis      Pericarditis     1990 and 1992     Pneumonia     4/23/10     Recurrent UTI      Rheumatoid vasculitis (H)      Sinusitis, chronic      Steroid long-term use      Varicose veins of leg with swelling      Patient Active Problem List   Diagnosis     Goiter     History of systemic steroid therapy     ILD (interstitial lung disease) (H)     Palpitations     Churg-Antonina  syndrome (H)     History of eating disorder     Bronchiectasis (H)     Asthma     Anxiety     Chronic fatigue     Mannose-binding lectin deficiency     Benign essential hypertension     Moderate episode of recurrent major depressive disorder (H)     Bronchiectasis without acute exacerbation (H)     Severe recurrent major depression without psychotic features (H)       Allergies:   Allergies   Allergen Reactions     Colistimethate Anaphylaxis     Colymycin M [Colistimethate Sodium] Anaphylaxis     Tamiflu [Oseltamivir]      Gums Blister up     Clindamycin Rash     Zithromax [Azithromycin]      Heart palpitation          Current Medications     Current Outpatient Prescriptions Ordered in [facility]   Medication Sig Dispense Refill     zolpidem (AMBIEN) 5 MG tablet Take tablet by mouth 15 minutes prior to sleep, for Sleep Study 1 tablet 0     Ipratropium-Albuterol (COMBIVENT RESPIMAT)  MCG/ACT inhaler Inhale 1 puff into the lungs 4 times daily Not to exceed 6 doses per day. 1 Inhaler 11     atenolol (TENORMIN) 25 MG tablet Take 0.5 tablets (12.5 mg) by mouth At Bedtime 1/4 tab at bedtime 45 tablet 1     clonazePAM (KLONOPIN) 0.5 MG tablet Take 0.5 tablets (0.25 mg) by mouth 2 times daily as needed for anxiety 20 tablet 1     Naltrexone POWD 4.5mg compounded in capsule to take one daily by mouth. Please dispense 30 capsules (Patient not taking: Reported on 7/14/2017) 30 g 1     predniSONE (DELTASONE) 1 MG tablet Take 5 mgs one day, alternating with 6 mgs the next. 200 tablet 5     acetylcysteine (MUCOMYST) 20 % injection Take 4 mLs by nebulization 2 times daily Use with albuterol solution. Discard open bottle of Mucomyst after 96 hours. 240 mL 11     albuterol (2.5 MG/3ML) 0.083% neb solution Take 1 vial (2.5 mg) by nebulization 2 times daily Use with Mucomyst 60 vial 11     budesonide-formoterol (SYMBICORT) 160-4.5 MCG/ACT Inhaler Inhale 2 puffs into the lungs 2 times daily 1 Inhaler 11     UNABLE TO FIND 3,600 mg  "daily MEDICATION NAME: monolaurin       fluticasone (FLONASE) 50 MCG/ACT nasal spray Spray 2 sprays into both nostrils daily 48 g 3     ciprofloxacin-dexamethasone (CIPRODEX) otic suspension Instill 3 drops into the affected ear twice daily as needed. 7.5 mL 1     conjugated estrogens (PREMARIN) vaginal cream Place  vaginally three times a week. Use 0.5 grams weekly as directed 42.5 g 0     cholecalciferol 5000 UNITS CAPS Take by mouth daily       glucosamine-chondroitin (GLUCOSAMINE CHONDR COMPLEX) 500-400 MG CAPS Take 2 capsules by mouth daily.       Omega-3 Fatty Acids (FISH OIL) 1200 MG capsule Take 3 capsules by mouth daily.       No current Epic-ordered facility-administered medications on file.         Mental Status Exam     Appearance:  Casually dressed and Well groomed  Behavior/relationship to examiner/demeanor:  Cooperative  Orientation: Oriented to person, place, time and situation  Psychomotor: normal form  Speech Rate:  Normal  Speech Spontaneity:  Normal and Pressured  Mood:  \"nothing has changed\"  Affect:  Dysphoric and mild agitation  Thought Process (Associations):  Circumstantial  Thought Content:  no overt psychosis, patient does not appear to be responding to internal stimuli, Suicidal ideation and denies suicidal intent or plan  Abnormal Perception:  None  Attention/Concentration:  Normal  Language:  Intact  Insight:  Adequate  Judgment:  Adequate for safety      Results     Vital signs: /78  Pulse 76  Wt 48.1 kg (106 lb)  LMP 02/01/2009  BMI 18.78 kg/m2    Laboratory Data:  no new data    Assessment & Plan      Valencia Ye is seen today for follow up and reports she continues to be very depressed and fatigued but feels that she has to go back to work next week and see if she is able to function. Due to intolerance of multiple medications patient elects not to try new medication at this time.  Recommend that she continue with therapy but patient feels that it worsens symptoms " and also states by returning to work she will not have time.  Reviewed suicidal ideation given that she reports hopelessness and feeling that her life is all about taking care of other people with out any return in support. Patient agrees if worsening of SI she will seek help and is not at risk of self harm.   Patient will find out of letter is needed or form completed to return to work next week and let me know.    Diagnosis  Axis 1: Major Depression, Recurrent, Severe without psychosis, Anxiety  Axis 2: none  Axis 3: See problem list in the medical record    Plan:  Medication: no new medications at this time.   OTC Recommendations: per current medication list   Lab Orders:  none  Referrals: none  Release of Information: none needed Therapist is Jose Quijano at [facility] and will try to contact him to discuss patient.  Future Treatment Considerations: per medication history  Return for Follow Up:2 weeks    The risks, benefits, alternatives and side effects have been discussed and are understood by the patient. The patient understands the risks of using street drugs or alcohol. There are no medical contraindications, the patient agrees to treatment, and has the capacity to do so. The patient understands to call 911 or come to the nearest ED if life threatening or urgent symptoms present.  Over 50% of this time was spent counseling the patient and/or coordinating care regarding review of social and occupational functioning.  In addition patient was counseled on health and wellness practices to augment medication treatment of symptoms. See note for details.    Marlyn Flores, APRN CNS 7/18/2017

## 2017-07-19 ENCOUNTER — MYC MEDICAL ADVICE (OUTPATIENT)
Dept: INTERNAL MEDICINE | Facility: CLINIC | Age: 47
End: 2017-07-19

## 2017-07-19 ENCOUNTER — TELEPHONE (OUTPATIENT)
Dept: CARDIOLOGY | Facility: CLINIC | Age: 47
End: 2017-07-19

## 2017-07-19 DIAGNOSIS — R00.2 PALPITATIONS: ICD-10-CM

## 2017-07-19 DIAGNOSIS — D72.18 CHURG-STRAUSS SYNDROME (H): Primary | ICD-10-CM

## 2017-07-19 DIAGNOSIS — M30.1 CHURG-STRAUSS SYNDROME (H): Primary | ICD-10-CM

## 2017-07-19 NOTE — TELEPHONE ENCOUNTER
"Results of 24-h holter received. Predominant rhythm was sinus with sinus arrhythmia.  Average HR 80, min. HR 52, max .  4554 SVBP with 5 paired beats.  Per interpreting physicians notes, \"Maximum -SVT. Did not record how long this patient was in SVT. no recording of offset or cessation\".    Per OV note from Dr. Moeller (6/2/17), \" A pleasant, 46-year-old lady with an unfortunate history of Churg-Antonina syndrome.  At this time, she is not clearly symptomatic from the cardiac standpoint, though I would be concerned with her fatigue.  We will do an echocardiogram as well as again a 24 hour Holter monitor.  Of note, Churg-Antonina syndrome may manifest cardiac involvement with myocarditis, heart failure, pericarditis, arrhythmia, coronary arteritis, valvulopathy or intracavity cardiac thrombosis.  If the echocardiogram is abnormal, CMRI will be performed. \"    Results of Echocardiogram are as follows:  Interpretation Summary     The visual ejection fraction is estimated at 60-65%.  The right ventricle is normal in size and function.  There is mild (1+) mitral regurgitation.  Compared to prior study, there is no significant change.    Pt has ordered F/U OV with JIMMY, per scheduling notes, pt declined to schedule f/u OV with JIMMY at this time.     Will route to Dr. Moeller for recommendations.   "

## 2017-07-19 NOTE — TELEPHONE ENCOUNTER
See my chart message.   Call to pharmacy to see if they have Atenolol 25 mg. THey are completely out as are all the [facility].     Please advise for alternative.   Sent her my chart message back regarding this shortage.       Last OV 7/3/17.     BP Readings from Last 3 Encounters:   07/14/17 112/70   07/03/17 110/70   06/09/17 115/76

## 2017-07-20 RX ORDER — METOPROLOL SUCCINATE 25 MG/1
25 TABLET, EXTENDED RELEASE ORAL DAILY
Qty: 30 TABLET | Refills: 2 | Status: SHIPPED | OUTPATIENT
Start: 2017-07-20 | End: 2017-08-11

## 2017-07-21 NOTE — TELEPHONE ENCOUNTER
Please ask for the holter technicians to provide the information outlined in the comment (full disclosure for the SVT). Also please arrange a cardiac MRI with gadoliunium for more sensitive evaluation for cardiac involvement in Churg-Antonina

## 2017-07-25 NOTE — TELEPHONE ENCOUNTER
Call placed to patient to discuss results of holter monitor, echo and recommendations from Dr. Moeller for cMRI.  Pt in agreement with plan to proceed with cMRI and offers no further questions at this time.  Pt transferred to scheduling to schedule MRI.

## 2017-07-29 ENCOUNTER — HOSPITAL ENCOUNTER (OUTPATIENT)
Dept: LAB | Facility: CLINIC | Age: 47
Discharge: HOME | End: 2017-07-29
Attending: INTERNAL MEDICINE | Admitting: INTERNAL MEDICINE
Payer: COMMERCIAL

## 2017-07-29 DIAGNOSIS — I10 ESSENTIAL HYPERTENSION: ICD-10-CM

## 2017-07-29 PROCEDURE — 80048 BASIC METABOLIC PNL TOTAL CA: CPT | Performed by: INTERNAL MEDICINE

## 2017-07-29 PROCEDURE — 36415 COLL VENOUS BLD VENIPUNCTURE: CPT | Performed by: INTERNAL MEDICINE

## 2017-07-31 DIAGNOSIS — I10 ESSENTIAL HYPERTENSION: Primary | ICD-10-CM

## 2017-07-31 LAB
ANION GAP SERPL CALCULATED.3IONS-SCNC: 5 MMOL/L (ref 3–14)
BUN SERPL-MCNC: 9 MG/DL (ref 7–30)
CALCIUM SERPL-MCNC: 8.8 MG/DL (ref 8.5–10.1)
CHLORIDE SERPL-SCNC: 106 MMOL/L (ref 94–109)
CO2 SERPL-SCNC: 30 MMOL/L (ref 20–32)
CREAT SERPL-MCNC: 0.66 MG/DL (ref 0.52–1.04)
GFR SERPL CREATININE-BSD FRML MDRD: NORMAL ML/MIN/1.7M2
GLUCOSE SERPL-MCNC: NORMAL MG/DL (ref 70–99)
POTASSIUM SERPL-SCNC: NORMAL MMOL/L (ref 3.4–5.3)
SODIUM SERPL-SCNC: 141 MMOL/L (ref 133–144)

## 2017-08-01 ENCOUNTER — HOSPITAL ENCOUNTER (OUTPATIENT)
Dept: CARDIOLOGY | Facility: CLINIC | Age: 47
Discharge: HOME | End: 2017-08-01
Attending: INTERNAL MEDICINE | Admitting: INTERNAL MEDICINE
Payer: COMMERCIAL

## 2017-08-01 DIAGNOSIS — D72.18 CHURG-STRAUSS SYNDROME (H): ICD-10-CM

## 2017-08-01 DIAGNOSIS — M30.1 CHURG-STRAUSS SYNDROME (H): ICD-10-CM

## 2017-08-01 PROCEDURE — A9585 GADOBUTROL INJECTION: HCPCS | Performed by: INTERNAL MEDICINE

## 2017-08-01 PROCEDURE — 75561 CARDIAC MRI FOR MORPH W/DYE: CPT

## 2017-08-01 PROCEDURE — 25000128 H RX IP 250 OP 636: Performed by: INTERNAL MEDICINE

## 2017-08-01 PROCEDURE — 75561 CARDIAC MRI FOR MORPH W/DYE: CPT | Mod: 26 | Performed by: INTERNAL MEDICINE

## 2017-08-01 RX ORDER — DIPHENHYDRAMINE HCL 25 MG
25 CAPSULE ORAL
Status: DISCONTINUED | OUTPATIENT
Start: 2017-08-01 | End: 2017-08-02 | Stop reason: HOSPADM

## 2017-08-01 RX ORDER — ACYCLOVIR 200 MG/1
0-1 CAPSULE ORAL
Status: DISCONTINUED | OUTPATIENT
Start: 2017-08-01 | End: 2017-08-02 | Stop reason: HOSPADM

## 2017-08-01 RX ORDER — METHYLPREDNISOLONE SODIUM SUCCINATE 125 MG/2ML
125 INJECTION, POWDER, LYOPHILIZED, FOR SOLUTION INTRAMUSCULAR; INTRAVENOUS
Status: DISCONTINUED | OUTPATIENT
Start: 2017-08-01 | End: 2017-08-02 | Stop reason: HOSPADM

## 2017-08-01 RX ORDER — GADOBUTROL 604.72 MG/ML
5-65 INJECTION INTRAVENOUS ONCE
Status: COMPLETED | OUTPATIENT
Start: 2017-08-01 | End: 2017-08-01

## 2017-08-01 RX ORDER — DIPHENHYDRAMINE HYDROCHLORIDE 50 MG/ML
25-50 INJECTION INTRAMUSCULAR; INTRAVENOUS
Status: DISCONTINUED | OUTPATIENT
Start: 2017-08-01 | End: 2017-08-02 | Stop reason: HOSPADM

## 2017-08-01 RX ORDER — ONDANSETRON 2 MG/ML
4 INJECTION INTRAMUSCULAR; INTRAVENOUS
Status: DISCONTINUED | OUTPATIENT
Start: 2017-08-01 | End: 2017-08-02 | Stop reason: HOSPADM

## 2017-08-01 RX ORDER — DIAZEPAM 5 MG
5 TABLET ORAL EVERY 30 MIN PRN
Status: DISCONTINUED | OUTPATIENT
Start: 2017-08-01 | End: 2017-08-02 | Stop reason: HOSPADM

## 2017-08-01 RX ADMIN — GADOBUTROL 6 ML: 604.72 INJECTION INTRAVENOUS at 09:30

## 2017-08-01 NOTE — TELEPHONE ENCOUNTER
MR cardiac with contrast results for review:    CONCLUSIONS: Normal LV function; LVEF of 55%. 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     Cardiac MRI with gadoliunium completed for more sensitive evaluation for cardiac involvement in Churg-Antonina.     Holter Techs contacted to obtain full disclosure for episode of SVT on holter monitor.  No further records available, full report scanned into epic.     Will route to Dr. Moeller for review of cMRI.

## 2017-08-04 ENCOUNTER — OFFICE VISIT (OUTPATIENT)
Dept: PSYCHIATRY | Facility: CLINIC | Age: 47
End: 2017-08-04
Attending: CLINICAL NURSE SPECIALIST

## 2017-08-04 DIAGNOSIS — M30.1 CHURG-STRAUSS SYNDROME (H): Primary | ICD-10-CM

## 2017-08-04 DIAGNOSIS — D72.18 CHURG-STRAUSS SYNDROME (H): Primary | ICD-10-CM

## 2017-08-04 DIAGNOSIS — F33.2 SEVERE RECURRENT MAJOR DEPRESSION WITHOUT PSYCHOTIC FEATURES (H): ICD-10-CM

## 2017-08-04 DIAGNOSIS — F41.9 ANXIETY: Primary | ICD-10-CM

## 2017-08-04 RX ORDER — SERTRALINE HYDROCHLORIDE 25 MG/1
25 TABLET, FILM COATED ORAL DAILY
Qty: 30 TABLET | Refills: 5 | Status: SHIPPED | OUTPATIENT
Start: 2017-08-04 | End: 2017-10-13

## 2017-08-04 NOTE — MR AVS SNAPSHOT
After Visit Summary   8/4/2017    Valencia Ye    MRN: [id number]           Patient Information     Date Of Birth          1970        Visit Information        Provider Department      8/4/2017 4:45 PM Marlyn Flores APRN [facility]        Today's Diagnoses     Anxiety    -  1    Severe recurrent major depression without psychotic features (H)           Follow-ups after your visit        Follow-up notes from your care team     Return in about 4 weeks (around 9/1/2017).      Your next 10 appointments already scheduled     Sep 06, 2017  4:45 PM CDT   Adult Med Follow UP with SUNNY Ayala CNS   Psychiatry Clinic ([facility])    [address]  [address] [facility] [id number] [phone]            Nov 17, 2017  2:30 PM CST   CF LOOP with UC PFL CF   [facility] Function [facility] ([facility])    [address] [place] [id number] [phone]            Nov 17, 2017  3:10 PM CST   (Arrive by 2:55 PM)   RETURN CYSTIC FIBROSIS VISIT with Dagoberto Briscoe MD   [facility] ([facility])    [address] [place] [id number] [phone]            Jan 26, 2018  7:30 AM CST   CF LOOP with UC PFL CF   [facility] ([facility])    [address] [place] [id number]   [phone]            Jan 26, 2018  8:10 AM CST   (Arrive by 7:55 AM)   RETURN CYSTIC FIBROSIS VISIT with Dagoberto Briscoe MD   [facility] ([facility])    [address] [place] [id number] [phone]              Who to contact     Please call your clinic at [phone] to:    Ask questions about your health    Make or cancel appointments    Discuss  your medicines    Learn about your test results    Speak to your doctor   If you have compliments or concerns about an experience at your clinic, or if you wish to file a complaint, please contact [facility] Patient Relations at [phone] or email us at [email or web address]         Additional Information About Your Visit        [employer] Information     [employer] gives you secure access to your electronic health record. If you see a primary care provider, you can also send messages to your care team and make appointments. If you have questions, please call your primary care clinic.  If you do not have a primary care provider, please call [phone] and they will assist you.      Info is an electronic gateway that provides easy, online access to your medical records. With Info, you can request a clinic appointment, read your test results, renew a prescription or communicate with your care team.     To access your existing account, please contact your [facility] or call [phone] for assistance.        Care EveryWhere ID     This is your Care EveryWhere ID. This could be used by other organizations to access your [facility] medical records  [id number]        Your Vitals Were     Last Period                   02/01/2009            Blood Pressure from Last 3 Encounters:   08/11/17 122/77   07/18/17 134/78   07/14/17 112/70    Weight from Last 3 Encounters:   08/11/17 46.3 kg (102 lb)   07/18/17 48.1 kg (106 lb)   07/14/17 47.6 kg (105 lb)              Today, you had the following     No orders found for display         Today's Medication Changes          These changes are accurate as of: 8/4/17 11:59 PM.  If you have any questions, ask your nurse or doctor.               Start taking these medicines.        Dose/Directions    sertraline 25 MG tablet   Commonly known as:  ZOLOFT   Used for:  Anxiety, Severe recurrent major depression without  psychotic features (H)   Started by:  Marlyn Flores APRN CNS        Dose:  25 mg   Take 1 tablet (25 mg) by mouth daily   Quantity:  30 tablet   Refills:  5            Where to get your medicines      These medications were sent to [id number] - [place], [place] - [address] AT [facility] [place] [address]  [address], [facility] [address]     Phone:  [phone]     sertraline 25 MG tablet                Primary Care Provider Office Phone # Fax #    Morelia Simental [phone]       [address]  [facility] [address]        Equal Access to Services     JACK THOMPSON : Francesco rader Sooliver, waaxda priyank, qaybta kaalmada washingtonyamarc, vale merritt . [facility] [phone].    ATENCIÓN: Si habla español, tiene a waller disposición servicios gratuitos de asistencia lingüística. [facility] [phone].    We comply with applicable federal civil rights laws and [place] laws. We do not discriminate on the basis of race, color, national origin, age, disability sex, sexual orientation or gender identity.            Thank you!     Thank you for choosing PSYCHIATRY CLINIC  for your care. Our goal is always to provide you with excellent care. Hearing back from our patients is one way we can continue to improve our services. Please take a few minutes to complete the written survey that you may receive in the mail after your visit with us. Thank you!             Your Updated Medication List - Protect others around you: Learn how to safely use, store and throw away your medicines at www.disposemymeds.org.          This list is accurate as of: 8/4/17 11:59 PM.  Always use your most recent med list.                   Brand Name Dispense Instructions for use Diagnosis    acetylcysteine 20 % nebulizer solution    MUCOMYST    240 mL    Take 4 mLs by nebulization 2 times daily Use with albuterol solution. Discard open bottle of Mucomyst after 96  hours.    Bronchiectasis without complication (H)       albuterol (2.5 MG/3ML) 0.083% neb solution     60 vial    Take 1 vial (2.5 mg) by nebulization 2 times daily Use with Mucomyst    Bronchiectasis without complication (H)       budesonide-formoterol 160-4.5 MCG/ACT Inhaler    SYMBICORT    1 Inhaler    Inhale 2 puffs into the lungs 2 times daily    Churg-Antonina syndrome (H)       cholecalciferol 5000 UNITS Caps      Take by mouth daily        ciprofloxacin-dexamethasone otic suspension    CIPRODEX    7.5 mL    Instill 3 drops into the affected ear twice daily as needed.    Ear pain, unspecified laterality       clonazePAM 0.5 MG tablet    klonoPIN    20 tablet    Take 0.5 tablets (0.25 mg) by mouth 2 times daily as needed for anxiety    Anxiety       conjugated estrogens cream    PREMARIN    42.5 g    Place  vaginally three times a week. Use 0.5 grams weekly as directed    Vaginal dryness       Fish Oil 1200 MG Caps      Take 3 capsules by mouth daily.        fluticasone 50 MCG/ACT spray    FLONASE    48 g    Spray 2 sprays into both nostrils daily    Chronic rhinitis       GLUCOSAMINE CHONDR COMPLEX 500-400 MG Caps per capsule   Generic drug:  glucosamine-chondroitin      Take 2 capsules by mouth daily.        Ipratropium-Albuterol  MCG/ACT inhaler    COMBIVENT RESPIMAT    1 Inhaler    Inhale 1 puff into the lungs 4 times daily Not to exceed 6 doses per day.    Churg-Antonina syndrome (H)       predniSONE 1 MG tablet    DELTASONE    200 tablet    Take 5 mgs one day, alternating with 6 mgs the next.    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

## 2017-08-04 NOTE — TELEPHONE ENCOUNTER
Discussed with Dr. Moeller. Per Dr. Moeller, Pt. 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 Dr. Moeller would defer changes in treatment to patients Immunologist who manages her Churavel Sarkar.  Dr Moeller recommends obtains labs (Eosinophil cationic protein, BNP, and troponin) and request results of MR be sent to Dr. Sunil Butts at [facility] who manages patient Que Sarkar.  Call placed to [facility] at [phone].  No answer, left detailed VM with triage RN for Dr. Butts and asked for a return call.     Last OV note, MR results faxed to [facility] at [phone].      Call placed to patient to discuss results and recommendations from Dr. Moeller. Pt states understanding and is agreeable to the recommended lab draw and following up with Dr. Butts.

## 2017-08-04 NOTE — PROGRESS NOTES
Outpatient Psychiatry Progress Note     Provider: SUNNY Ayala CNS  Date: 2017  Service:  Medication follow up with counseling.   Patient Identification: Valencia Ye  : 1970   MRN: [id number]    Valencia Ye is a 46 year old year old female who presents for ongoing psychiatric care.  Valencia Ye was last seen in clinic on 17.   At that time,   Assessment & Plan       Valencia Ye is seen today for follow up and reports she continues to be very depressed and fatigued but feels that she has to go back to work next week and see if she is able to function. Due to intolerance of multiple medications patient elects not to try new medication at this time.  Recommend that she continue with therapy but patient feels that it worsens symptoms and also states by returning to work she will not have time.  Reviewed suicidal ideation given that she reports hopelessness and feeling that her life is all about taking care of other people with out any return in support. Patient agrees if worsening of SI she will seek help and is not at risk of self harm.   Patient will find out if letter is needed or form completed to return to work next week and let me know.     Diagnosis  Axis 1: Major Depression, Recurrent, Severe without psychosis, Anxiety  Axis 2: none  Axis 3: See problem list in the medical record     Plan:  Medication: no new medications at this time.   OTC Recommendations: per current medication list   Lab Orders:  none  Referrals: none  Release of Information: none needed Therapist is Jose Quijano at [facility] and will try to contact him to discuss patient.  Future Treatment Considerations: per medication history  Return for Follow Up:2 weeks       ____________________________________________________________________________________________________________________________________________    2017  Today Valencia reports she did return to work. She  feels exhausted all the time. She has been taking Setraline 25mg a few weeks ago and had this left over from a previous prescription.  Before with Sertraline had caused jaw tightness. She is tolerating it this time. Feels less anxious, not as depressed.    At times has internal agitation.  Might use klonopin for this and for sleep if wakes but but always less than prescribed.   Had cardiac MRI recently.  Still very difficult to keep up her home and care of kids.     Side effects of medication include: has been waking up during the night  Psychiatric Review of Systems:  The patient endorses symptoms of depression: In the last 2 weeks per PHQ 9 several days restless/lethargy, SI but denies intent or plan.  More than 1/2 days feeling depressed, sleep disturbance.  Nearly everyday anhedonia, fatigue, appetite disturbance, feelings of failure, concentration problems.   She  patient endorses symptoms of anxiety : continued rumination, obsessive thinking, feeling nervous, tense, overwhelmed  She endorses symptoms of gagandeep including none.    She endorses symptoms of psychosis including no psychotic symptoms.       Review of Medical Systems:  Sleep: moderate insomnia  Energy: still very fatigued  Concentration: continued decreased  Appetite: continued decreased  GI Concerns: no new concerns  Cardiac concerns: no new concerns  Neurological concerns: no new concerns  Other medical concerns: no new concerns, recent cardiac MRI has been discussed results yet with provider  Current Substance Use:  Alcohol:denies  Other drugs:denies  Caffeine:decaf tea  Nicotine: none  Past Medical History:   Past Medical History:   Diagnosis Date     Aortitis (H)      Asthma     associated with Churg Antonina syndrome     Churg-Antonina syndrome (H)     dx 1990     Eustachian tube dysfunction      Goiter      Hearing loss, conductive      Hypertension      Hypocalcemia      Irritable bowel syndrome      Major depressive disorder       Mannose-binding lectin deficiency 2014     Nonspecific abnormal results of thyroid function study      Osteoporosis      Pericarditis     1990 and 1992     Pneumonia     4/23/10     Recurrent UTI      Rheumatoid vasculitis (H)      Sinusitis, chronic      Steroid long-term use      Varicose veins of leg with swelling      Patient Active Problem List   Diagnosis     Goiter     History of systemic steroid therapy     ILD (interstitial lung disease) (H)     Palpitations     Churg-Antonina syndrome (H)     History of eating disorder     Bronchiectasis (H)     Asthma     Anxiety     Chronic fatigue     Mannose-binding lectin deficiency     Benign essential hypertension     Moderate episode of recurrent major depressive disorder (H)     Bronchiectasis without acute exacerbation (H)     Severe recurrent major depression without psychotic features (H)       Allergies:   Allergies   Allergen Reactions     Colistimethate Anaphylaxis     Colymycin M [Colistimethate Sodium] Anaphylaxis     Tamiflu [Oseltamivir]      Gums Blister up     Clindamycin Rash     Zithromax [Azithromycin]      Heart palpitation          Current Medications     Current Outpatient Prescriptions Ordered in [facility]   Medication Sig Dispense Refill     metoprolol (TOPROL-XL) 25 MG 24 hr tablet Take 1 tablet (25 mg) by mouth daily 30 tablet 2     zolpidem (AMBIEN) 5 MG tablet Take tablet by mouth 15 minutes prior to sleep, for Sleep Study 1 tablet 0     Ipratropium-Albuterol (COMBIVENT RESPIMAT)  MCG/ACT inhaler Inhale 1 puff into the lungs 4 times daily Not to exceed 6 doses per day. 1 Inhaler 11     clonazePAM (KLONOPIN) 0.5 MG tablet Take 0.5 tablets (0.25 mg) by mouth 2 times daily as needed for anxiety 20 tablet 1     predniSONE (DELTASONE) 1 MG tablet Take 5 mgs one day, alternating with 6 mgs the next. 200 tablet 5     acetylcysteine (MUCOMYST) 20 % injection Take 4 mLs by nebulization 2 times daily Use with albuterol solution. Discard open bottle  "of Mucomyst after 96 hours. 240 mL 11     albuterol (2.5 MG/3ML) 0.083% neb solution Take 1 vial (2.5 mg) by nebulization 2 times daily Use with Mucomyst 60 vial 11     budesonide-formoterol (SYMBICORT) 160-4.5 MCG/ACT Inhaler Inhale 2 puffs into the lungs 2 times daily 1 Inhaler 11     UNABLE TO FIND 3,600 mg daily MEDICATION NAME: monolaurin       fluticasone (FLONASE) 50 MCG/ACT nasal spray Spray 2 sprays into both nostrils daily 48 g 3     ciprofloxacin-dexamethasone (CIPRODEX) otic suspension Instill 3 drops into the affected ear twice daily as needed. 7.5 mL 1     conjugated estrogens (PREMARIN) vaginal cream Place  vaginally three times a week. Use 0.5 grams weekly as directed 42.5 g 0     cholecalciferol 5000 UNITS CAPS Take by mouth daily       glucosamine-chondroitin (GLUCOSAMINE CHONDR COMPLEX) 500-400 MG CAPS Take 2 capsules by mouth daily.       Omega-3 Fatty Acids (FISH OIL) 1200 MG capsule Take 3 capsules by mouth daily.       No current Epic-ordered facility-administered medications on file.         Mental Status Exam     Appearance:  Casually dressed and Well groomed  Behavior/relationship to examiner/demeanor:  Cooperative  Orientation: Oriented to person, place, time and situation  Psychomotor: normal form  Speech Rate:  Normal  Speech Spontaneity:  Normal  Mood:  \"about the same\"  Affect:  Dysphoric  Thought Process (Associations):  Goal directed and Circumstantial  Thought Content:  no overt psychosis, patient does not appear to be responding to internal stimuli, Suicidal ideation and denies suicidal intent or plan  Abnormal Perception:  None  Attention/Concentration:  Normal  Language:  Intact  Insight:  Adequate  Judgment:  Adequate for safety      Results     Vital signs: LMP 02/01/2009    Laboratory Data:  no new data reviewed    Assessment & Plan      Valencia Ye is seen today for follow up and reports she decided to retry Zoloft and has been taking 25mg with some improvement in " anxiety and depression. Side effect of insomnia. Also continues PRN use of low dose Klonopin. Overall continues to be very depressed. Returned to work because she felt she had no choice but doesn't feel that she is getting work done.     Diagnosis  Axis 1: Anxiety, Major Depression, Recurrent, severe without psychosis.  Axis 2: none  Axis 3: See problem list in the medical record    Plan:  Medication: Continue sertraline 25mg, klonopin prn  OTC Recommendations: none  Lab Orders:  none  Referrals: none  Release of Information: none  Future Treatment Considerations:per symptoms and medication tolerance as on 25mg sertraline for longer time period.   Return for Follow Up:4 weeks   The risks, benefits, alternatives and side effects have been discussed and are understood by the patient. The patient understands the risks of using street drugs or alcohol. There are no medical contraindications, the patient agrees to treatment, and has the capacity to do so. The patient understands to call 911 or come to the nearest ED if life threatening or urgent symptoms present.  Over 50% of this time was spent counseling the patient and/or coordinating care regarding review of social and occupational functioning.  In addition patient was counseled on health and wellness practices to augment medication treatment of symptoms. See note for details.    Marlyn Flores, APRN CNS 8/4/2017

## 2017-08-11 ENCOUNTER — OFFICE VISIT (OUTPATIENT)
Dept: PULMONOLOGY | Facility: CLINIC | Age: 47
End: 2017-08-11
Attending: INTERNAL MEDICINE
Payer: COMMERCIAL

## 2017-08-11 VITALS
SYSTOLIC BLOOD PRESSURE: 122 MMHG | BODY MASS INDEX: 18.07 KG/M2 | OXYGEN SATURATION: 98 % | DIASTOLIC BLOOD PRESSURE: 77 MMHG | HEART RATE: 68 BPM | WEIGHT: 102 LBS | RESPIRATION RATE: 16 BRPM

## 2017-08-11 DIAGNOSIS — D72.18 CHURG-STRAUSS SYNDROME (H): ICD-10-CM

## 2017-08-11 DIAGNOSIS — J47.1 BRONCHIECTASIS WITH ACUTE EXACERBATION (H): Primary | ICD-10-CM

## 2017-08-11 DIAGNOSIS — J47.1 BRONCHIECTASIS WITH ACUTE EXACERBATION (H): ICD-10-CM

## 2017-08-11 DIAGNOSIS — M30.1 CHURG-STRAUSS SYNDROME (H): Primary | ICD-10-CM

## 2017-08-11 DIAGNOSIS — M30.1 CHURG-STRAUSS SYNDROME (H): ICD-10-CM

## 2017-08-11 DIAGNOSIS — J47.9 BRONCHIECTASIS WITHOUT COMPLICATION (H): ICD-10-CM

## 2017-08-11 DIAGNOSIS — D72.18 CHURG-STRAUSS SYNDROME (H): Primary | ICD-10-CM

## 2017-08-11 DIAGNOSIS — R00.2 PALPITATIONS: ICD-10-CM

## 2017-08-11 LAB
GRAM STN SPEC: NORMAL
Lab: NORMAL
MICRO REPORT STATUS: NORMAL
NT-PROBNP SERPL-MCNC: 178 PG/ML (ref 0–125)
SPECIMEN SOURCE: NORMAL
TROPONIN I SERPL-MCNC: NORMAL UG/L (ref 0–0.04)

## 2017-08-11 PROCEDURE — 87070 CULTURE OTHR SPECIMN AEROBIC: CPT | Performed by: INTERNAL MEDICINE

## 2017-08-11 PROCEDURE — 87205 SMEAR GRAM STAIN: CPT | Performed by: INTERNAL MEDICINE

## 2017-08-11 PROCEDURE — 99212 OFFICE O/P EST SF 10 MIN: CPT | Mod: ZF

## 2017-08-11 RX ORDER — METOPROLOL SUCCINATE 25 MG/1
12.5 TABLET, EXTENDED RELEASE ORAL DAILY
Qty: 30 TABLET | Refills: 2 | COMMUNITY
Start: 2017-08-11 | End: 2018-02-16

## 2017-08-11 ASSESSMENT — PAIN SCALES - GENERAL: PAINLEVEL: NO PAIN (0)

## 2017-08-11 NOTE — PATIENT INSTRUCTIONS
Increase vest therapy to twice daily whenever possible.  Increase exercise as tolerated.  Otherwise continue current medication, nebs and vest therapy.

## 2017-08-11 NOTE — LETTER
8/11/2017       RE: Valencia Ye  [address] [place] [id number]     Dear Colleague,    Thank you for referring your patient, Valencia Ye, to the [facility] at [facility]. Please see a copy of my visit note below.    Reason for Visit  Valencia Ye is a 46 year old female who is being seen for Bronchiectasis.    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      1. Pulmonary: The patient does report a recent viral illness with symptoms including an increased cough and minimal sputum production. She reports these symptoms are improving. PFTs are slightly decreased from her last appointment, which is likely due to post-viral airway inflammation. She does not appear to be experiencing a pulmonary exacerbation at this time. I have encouraged her to increase vest therapies to twice daily when she is able, and to also be more consistent with exercise when possible.  I have asked her to call the pulmonary office with any increase in her symptoms.     2. Fatigue: Ongoing, thought to be related to current depression, however etiology remains unclear. She was seen by cardiology and completed a 24 hour Holter monitor and ECHO. She will continue to follow-up with her psychiatrist and immunologist. I will defer to their plans of care.    3. Healthcare Coordination: The patient has requested that all of her pulmonary office notes be faxed to Dr. 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      I will see the patient in followup in 3 months with PFTs and a sputum culture.     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 Earlier this year she had been on IV meropenem for an exacerbation, stopped at the last clinic visit.    The patient did have a recent viral illness with an accompanying increased cough. The cough was mostly dry although she did expectorate some clear sputum. She believes she caught this illness from her kids. Other than this illness she has been well. Sputum is typically thick and yellow in the morning however lightens to white as the day progresses. No hemoptysis. Breathing is comfortable at rest. Exercise tolerance is slightly decreased however she attributes this to deconditioning. The patient has no trouble completing her daily activities. She has not been exercising due to resuming work full-time and fatigue. She denies any recent fevers, chills or night sweats. The patient is vesting once daily for 30 minutes.    She reports fatigue is still an ongoing issue, and thinks it is due to both depression and poor physical health.          REVIEW OF SYSTEMS:     Constitutional: Appetite is good.  ENT: No sinus or ear pain, rhinorrhea or sore throat.   CV: She has occasional palpitations, though with no associated symptoms, longstanding and unchanged. Cardiac MRI was recently completed and showed early Churg-Antonina involvement, however not concerning. Cardiology did not recommend any interventions at this time.  GI: No nausea, vomiting, diarrhea or abdominal pain.   Psych: The patient has had a difficult time with psych meds but is currently taking Zoloft.    A complete ROS was otherwise negative except as noted in the HPI.    Current Outpatient Prescriptions   Medication     Probiotic Product (PROBIOTIC ADVANCED PO)     metoprolol (TOPROL-XL) 25 MG 24 hr tablet     sertraline (ZOLOFT) 25 MG tablet     Ipratropium-Albuterol (COMBIVENT RESPIMAT)  MCG/ACT inhaler     clonazePAM (KLONOPIN) 0.5 MG tablet     predniSONE (DELTASONE) 1 MG tablet     acetylcysteine (MUCOMYST) 20 % injection      albuterol (2.5 MG/3ML) 0.083% neb solution     budesonide-formoterol (SYMBICORT) 160-4.5 MCG/ACT Inhaler     UNABLE TO FIND     fluticasone (FLONASE) 50 MCG/ACT nasal spray     ciprofloxacin-dexamethasone (CIPRODEX) otic suspension     cholecalciferol 5000 UNITS CAPS     glucosamine-chondroitin (GLUCOSAMINE CHONDR COMPLEX) 500-400 MG CAPS     Omega-3 Fatty Acids (FISH OIL) 1200 MG capsule     [DISCONTINUED] metoprolol (TOPROL-XL) 25 MG 24 hr tablet     conjugated estrogens (PREMARIN) vaginal cream     No current facility-administered medications for this visit.      Allergies   Allergen Reactions     Colistimethate Anaphylaxis     Colymycin M [Colistimethate Sodium] Anaphylaxis     Tamiflu [Oseltamivir]      Gums Blister up     Clindamycin Rash     Zithromax [Azithromycin]      Heart palpitation     Past Medical History:   Diagnosis Date     Aortitis (H)      Asthma     associated with Churg Antonina syndrome     Churg-Antonina syndrome (H)     dx      Eustachian tube dysfunction      Goiter      Hearing loss, conductive      Hypertension      Hypocalcemia      Irritable bowel syndrome      Major depressive disorder      Mannose-binding lectin deficiency      Nonspecific abnormal results of thyroid function study      Osteoporosis      Pericarditis      and      Pneumonia     4/23/10     Recurrent UTI      Rheumatoid vasculitis (H)      Sinusitis, chronic      Steroid long-term use      Varicose veins of leg with swelling        Past Surgical History:   Procedure Laterality Date      SECTION       COLONOSCOPY Left 2014    Procedure: COMBINED COLONOSCOPY, SINGLE OR MULTIPLE BIOPSY/POLYPECTOMY BY BIOPSY;  Surgeon: Js Pinedo MD;  Location:  GI     ENT SURGERY       HC COLP CERVIX/UPPER VAGINA W LOOP ELEC BX CERVIX       HYSTERECTOMY  2009    without oopherectomy     HYSTERECTOMY, PAP NO LONGER INDICATED      cervix removed      PICC INSERTION  10/10/2013    5fr DL  PASV PICC, 43cm (1cm external) in the L basilic vein w/ tip in the low SVC.     PICC INSERTION Left 05/26/2017    5fr DL BioFlo PICC, 42cm (3cm external) in the L lateral brachial vein w/ tip in the SVC RA junction.     SINUS SURGERY       TUBAL LIGATION         Social History     Social History     Marital status: Single     Spouse name: N/A     Number of children: N/A     Years of education: N/A     Occupational History     Not on file.     Social History Main Topics     Smoking status: Never Smoker     Smokeless tobacco: Never Used     Alcohol use No     Drug use: No     Sexual activity: Yes     Partners: Male     Other Topics Concern      Service No     Blood Transfusions No     Caffeine Concern No     Occupational Exposure No     Hobby Hazards No     Sleep Concern No     Stress Concern Yes     Weight Concern No     Special Diet Yes     IBS diet     Back Care No     Exercise Not Asked     1 mile power walk 5 days a week at least.      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  Non smoker. No smokers at home.  Enjoys walking and dancing.  Alcohol--1 to 2 drinks daily.  Few times a year has more than 4 drinks in a day.  No illicits.                       /77 (BP Location: Right arm, Patient Position: Chair, Cuff Size: Adult Small)  Pulse 68  Resp 16  Wt 46.3 kg (102 lb)  LMP 02/01/2009  SpO2 98%  BMI 18.07 kg/m2    Exam:   GENERAL APPEARANCE: Well developed, well nourished, alert, and in no apparent distress.  EYES: PERRL, EOMI  HENT: Nasal mucosa with edema and no hyperemia. No nasal polyps.  EARS: Canals clear, TMs scarred with bilat perforation  MOUTH: Oral mucosa is moist, without any lesions, no tonsillar enlargement, no oropharyngeal exudate.  NECK: supple, no masses, no thyromegaly.  LYMPHATICS: No significant axillary, cervical, or supraclavicular nodes.  RESP: normal  percussion, good air flow throughout.  No crackles. No rhonchi. No wheezes.  CV: No ectopic beats. Normal S1, S2, regular rhythm, normal rate. No murmur.  No rub. No gallop. No LE edema.   ABDOMEN:  Bowel sounds normal, soft, nontender, no HSM or masses.   MS: extremities normal. No clubbing. No cyanosis.  SKIN: no rash on limited exam  NEURO: Mentation intact, speech normal, normal strength and tone, normal gait and stance  PSYCH: mentation appears normal. and affect normal/bright  Results:  Recent Results (from the past 168 hour(s))   General PFT Lab (Please always keep checked)    Collection Time: 08/11/17  1:39 PM   Result Value Ref Range    FVC-Pred 3.07 L    FVC-Pre 4.04 L    FVC-%Pred-Pre 131 %    FEV1-Pre 2.57 L    FEV1-%Pred-Pre 101 %    FEV1FVC-Pred 82 %    FEV1FVC-Pre 64 %    FEFMax-Pred 6.58 L/sec    FEFMax-Pre 5.55 L/sec    FEFMax-%Pred-Pre 84 %    FEF2575-Pred 2.69 L/sec    FEF2575-Pre 1.38 L/sec    [id number]-%Pred-Pre 51 %    ExpTime-Pre 8.47 sec    FIFMax-Pre 5.33 L/sec    FEV1FEV6-Pred 83 %    FEV1FEV6-Pre 65 %                                         Results as noted above.    PFT Interpretation:  Very mild obstructive ventilatory defect.  Decreased from previous.  Below recent best.   Valid Maneuver              Scribe Disclosure:   I, Lisa Graham, am serving as a scribe; to document services personally performed by DAGOBERTO PIERRE MD based on data collection and the provider's statements to me.     Provider Disclosure:  I agree with above History, Review of Systems, Physical exam and Plan.  I have reviewed the content of the documentation and have edited it as needed. I have personally performed the services documented here and the documentation accurately represents those services and the decisions I have made.      Electronically signed by:  Dagoberto Pierre     Again, thank you for allowing me to participate in the care of your patient.      Sincerely,    Dagoberto Pierre MD

## 2017-08-11 NOTE — NURSING NOTE
Chief Complaint   Patient presents with     Bronchiectasis     Patient is being seen for bronchiectasis follow up      Benita Horvath CMA at 2:35 PM on 8/11/2017

## 2017-08-11 NOTE — PROGRESS NOTES
Reason for Visit  Valencia Ye is a 46 year old female who is being seen for Bronchiectasis.    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      1. Pulmonary: The patient does report a recent viral illness with symptoms including an increased cough and minimal sputum production. She reports these symptoms are improving. PFTs are slightly decreased from her last appointment, which is likely due to post-viral airway inflammation. She does not appear to be experiencing a pulmonary exacerbation at this time. I have encouraged her to increase vest therapies to twice daily when she is able, and to also be more consistent with exercise when possible.  I have asked her to call the pulmonary office with any increase in her symptoms.     2. Fatigue: Ongoing, thought to be related to current depression, however etiology remains unclear. She was seen by cardiology and completed a 24 hour Holter monitor and ECHO. She will continue to follow-up with her psychiatrist and immunologist. I will defer to their plans of care.    3. Healthcare Coordination: The patient has requested that all of her pulmonary office notes be faxed to Dr. 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      I will see the patient in followup in 3 months with PFTs and a sputum culture.     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 Earlier this year she had been on IV meropenem for an exacerbation, stopped at the last clinic visit.    The patient did have a recent viral illness with an accompanying increased cough. The cough was mostly dry although she did expectorate some clear sputum. She believes she caught this illness from her  kids. Other than this illness she has been well. Sputum is typically thick and yellow in the morning however lightens to white as the day progresses. No hemoptysis. Breathing is comfortable at rest. Exercise tolerance is slightly decreased however she attributes this to deconditioning. The patient has no trouble completing her daily activities. She has not been exercising due to resuming work full-time and fatigue. She denies any recent fevers, chills or night sweats. The patient is vesting once daily for 30 minutes.    She reports fatigue is still an ongoing issue, and thinks it is due to both depression and poor physical health.          REVIEW OF SYSTEMS:     Constitutional: Appetite is good.  ENT: No sinus or ear pain, rhinorrhea or sore throat.   CV: She has occasional palpitations, though with no associated symptoms, longstanding and unchanged. Cardiac MRI was recently completed and showed early Churg-Antonina involvement, however not concerning. Cardiology did not recommend any interventions at this time.  GI: No nausea, vomiting, diarrhea or abdominal pain.   Psych: The patient has had a difficult time with psych meds but is currently taking Zoloft.    A complete ROS was otherwise negative except as noted in the HPI.    Current Outpatient Prescriptions   Medication     Probiotic Product (PROBIOTIC ADVANCED PO)     metoprolol (TOPROL-XL) 25 MG 24 hr tablet     sertraline (ZOLOFT) 25 MG tablet     Ipratropium-Albuterol (COMBIVENT RESPIMAT)  MCG/ACT inhaler     clonazePAM (KLONOPIN) 0.5 MG tablet     predniSONE (DELTASONE) 1 MG tablet     acetylcysteine (MUCOMYST) 20 % injection     albuterol (2.5 MG/3ML) 0.083% neb solution     budesonide-formoterol (SYMBICORT) 160-4.5 MCG/ACT Inhaler     UNABLE TO FIND     fluticasone (FLONASE) 50 MCG/ACT nasal spray     ciprofloxacin-dexamethasone (CIPRODEX) otic suspension     cholecalciferol 5000 UNITS CAPS     glucosamine-chondroitin (GLUCOSAMINE CHONDR COMPLEX)  500-400 MG CAPS     Omega-3 Fatty Acids (FISH OIL) 1200 MG capsule     [DISCONTINUED] metoprolol (TOPROL-XL) 25 MG 24 hr tablet     conjugated estrogens (PREMARIN) vaginal cream     No current facility-administered medications for this visit.      Allergies   Allergen Reactions     Colistimethate Anaphylaxis     Colymycin M [Colistimethate Sodium] Anaphylaxis     Tamiflu [Oseltamivir]      Gums Blister up     Clindamycin Rash     Zithromax [Azithromycin]      Heart palpitation     Past Medical History:   Diagnosis Date     Aortitis (H)      Asthma     associated with Churg Antonina syndrome     Churg-Antonina syndrome (H)     dx      Eustachian tube dysfunction      Goiter      Hearing loss, conductive      Hypertension      Hypocalcemia      Irritable bowel syndrome      Major depressive disorder      Mannose-binding lectin deficiency      Nonspecific abnormal results of thyroid function study      Osteoporosis      Pericarditis      and      Pneumonia     4/23/10     Recurrent UTI      Rheumatoid vasculitis (H)      Sinusitis, chronic      Steroid long-term use      Varicose veins of leg with swelling        Past Surgical History:   Procedure Laterality Date      SECTION       COLONOSCOPY Left 2014    Procedure: COMBINED COLONOSCOPY, SINGLE OR MULTIPLE BIOPSY/POLYPECTOMY BY BIOPSY;  Surgeon: Js Pinedo MD;  Location:  GI     ENT SURGERY       HC COLP CERVIX/UPPER VAGINA W LOOP ELEC BX CERVIX       HYSTERECTOMY  2009    without oopherectomy     HYSTERECTOMY, PAP NO LONGER INDICATED      cervix removed      PICC INSERTION  10/10/2013    5fr DL PASV PICC, 43cm (1cm external) in the L basilic vein w/ tip in the low SVC.     PICC INSERTION Left 2017    5fr DL BioFlo PICC, 42cm (3cm external) in the L lateral brachial vein w/ tip in the SVC RA junction.     SINUS SURGERY       TUBAL LIGATION         Social History     Social History     Marital status: Single      Spouse name: N/A     Number of children: N/A     Years of education: N/A     Occupational History     Not on file.     Social History Main Topics     Smoking status: Never Smoker     Smokeless tobacco: Never Used     Alcohol use No     Drug use: No     Sexual activity: Yes     Partners: Male     Other Topics Concern      Service No     Blood Transfusions No     Caffeine Concern No     Occupational Exposure No     Hobby Hazards No     Sleep Concern No     Stress Concern Yes     Weight Concern No     Special Diet Yes     IBS diet     Back Care No     Exercise Not Asked     1 mile power walk 5 days a week at least.      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  Non smoker. No smokers at home.  Enjoys walking and dancing.  Alcohol--1 to 2 drinks daily.  Few times a year has more than 4 drinks in a day.  No illicits.                       /77 (BP Location: Right arm, Patient Position: Chair, Cuff Size: Adult Small)  Pulse 68  Resp 16  Wt 46.3 kg (102 lb)  LMP 02/01/2009  SpO2 98%  BMI 18.07 kg/m2    Exam:   GENERAL APPEARANCE: Well developed, well nourished, alert, and in no apparent distress.  EYES: PERRL, EOMI  HENT: Nasal mucosa with edema and no hyperemia. No nasal polyps.  EARS: Canals clear, TMs scarred with bilat perforation  MOUTH: Oral mucosa is moist, without any lesions, no tonsillar enlargement, no oropharyngeal exudate.  NECK: supple, no masses, no thyromegaly.  LYMPHATICS: No significant axillary, cervical, or supraclavicular nodes.  RESP: normal percussion, good air flow throughout.  No crackles. No rhonchi. No wheezes.  CV: No ectopic beats. Normal S1, S2, regular rhythm, normal rate. No murmur.  No rub. No gallop. No LE edema.   ABDOMEN:  Bowel sounds normal, soft, nontender, no HSM or masses.   MS: extremities normal. No clubbing. No cyanosis.  SKIN: no rash on  limited exam  NEURO: Mentation intact, speech normal, normal strength and tone, normal gait and stance  PSYCH: mentation appears normal. and affect normal/bright  Results:  Recent Results (from the past 168 hour(s))   General PFT Lab (Please always keep checked)    Collection Time: 08/11/17  1:39 PM   Result Value Ref Range    FVC-Pred 3.07 L    FVC-Pre 4.04 L    FVC-%Pred-Pre 131 %    FEV1-Pre 2.57 L    FEV1-%Pred-Pre 101 %    FEV1FVC-Pred 82 %    FEV1FVC-Pre 64 %    FEFMax-Pred 6.58 L/sec    FEFMax-Pre 5.55 L/sec    FEFMax-%Pred-Pre 84 %    FEF2575-Pred 2.69 L/sec    FEF2575-Pre 1.38 L/sec    [id number]-%Pred-Pre 51 %    ExpTime-Pre 8.47 sec    FIFMax-Pre 5.33 L/sec    FEV1FEV6-Pred 83 %    FEV1FEV6-Pre 65 %                                         Results as noted above.    PFT Interpretation:  Very mild obstructive ventilatory defect.  Decreased from previous.  Below recent best.   Valid Maneuver              Scribe Disclosure:   I, Lisa Graham, am serving as a scribe; to document services personally performed by DAGOBERTO PIERRE MD based on data collection and the provider's statements to me.     Provider Disclosure:  I agree with above History, Review of Systems, Physical exam and Plan.  I have reviewed the content of the documentation and have edited it as needed. I have personally performed the services documented here and the documentation accurately represents those services and the decisions I have made.      Electronically signed by:  Dagoberto Pierre

## 2017-08-11 NOTE — MR AVS SNAPSHOT
After Visit Summary   8/11/2017    Valencia Ye    MRN: [id number]           Patient Information     Date Of Birth          1970        Visit Information        Provider Department      8/11/2017 2:30 PM Dagoberto Briscoe MD [facility]        Today's Diagnoses     Churg-Antonina syndrome (H)    -  1    Palpitations        Bronchiectasis without complication (H)          Care Instructions    Increase vest therapy to twice daily whenever possible.  Increase exercise as tolerated.  Otherwise continue current medication, nebs and vest therapy.           Follow-ups after your visit        Follow-up notes from your care team     Return in about 3 months (around 11/11/2017).      Your next 10 appointments already scheduled     Sep 06, 2017  4:45 PM CDT   Adult Med Follow UP with SUNNY Ayala CNS   Psychiatry Clinic ([facility])    [address]  [address] [facility] [address]   [phone]            Nov 17, 2017  2:30 PM CST   CF LOOP with [facility] PFL [facility] Pulmonary Function Testing ([facility])    [address] [place] [address]   [phone]            Nov 17, 2017  3:10 PM CST   (Arrive by 2:55 PM)   RETURN CYSTIC FIBROSIS VISIT with Dagoberto Briscoe MD   [facility] ([facility])    [address] [address]   [phone]            Jan 26, 2018  7:30 AM CST   CF LOOP with UC PFL [facility] Pulmonary Function Testing ([facility])    [address] [place] [address]   [phone]            Jan 26, 2018  8:10 AM CST   (Arrive by 7:55 AM)   RETURN CYSTIC FIBROSIS VISIT with Dagoberto Briscoe MD   [facility] ([facility])    [facility]  [address] [id number] [phone]              Future tests that were ordered for you today     Open Future Orders        Priority Expected Expires Ordered    RESPIRATORY FLOW VOLUME LOOP Routine 11/11/2017 12/11/2017 8/11/2017            Who to contact     If you have questions or need follow up information about today's clinic visit or your schedule please contact [facility] directly at [phone].  Normal or non-critical lab and imaging results will be communicated to you by Saratahart, letter or phone within 4 business days after the clinic has received the results. If you do not hear from us within 7 days, please contact the clinic through [employer] or phone. If you have a critical or abnormal lab result, we will notify you by phone as soon as possible.  Submit refill requests through [employer] or call your pharmacy and they will forward the refill request to us. Please allow 3 business days for your refill to be completed.          Additional Information About Your Visit        [employer] Information     [employer] gives you secure access to your electronic health record. If you see a primary care provider, you can also send messages to your care team and make appointments. If you have questions, please call your primary care clinic.  If you do not have a primary care provider, please call [phone] and they will assist you.        Care EveryWhere ID     This is your Care EveryWhere ID. This could be used by other organizations to access your [place] medical records  [id number]        Your Vitals Were     Pulse Respirations Last Period Pulse Oximetry BMI (Body Mass Index)       68 16 02/01/2009 98% 18.07 kg/m2        Blood Pressure from Last 3 Encounters:   08/11/17 122/77   07/14/17 112/70   07/03/17 110/70    Weight from Last 3 Encounters:   08/11/17 46.3 kg (102 lb)   07/14/17 47.6 kg (105 lb)   07/03/17 49 kg (108 lb)                 Today's  Medication Changes          These changes are accurate as of: 8/11/17  3:33 PM.  If you have any questions, ask your nurse or doctor.               These medicines have changed or have updated prescriptions.        Dose/Directions    metoprolol 25 MG 24 hr tablet   Commonly known as:  TOPROL-XL   This may have changed:  how much to take   Used for:  Palpitations   Changed by:  Dagoberto Briscoe MD        Dose:  12.5 mg   Take 0.5 tablets (12.5 mg) by mouth daily   Quantity:  30 tablet   Refills:  2                Primary Care Provider Office Phone # Fax #    Morelia Simental [phone]       Derrell BARBOSA N[address] [facility] [address]  [facility] [address]        Equal Access to Services     [facility]: Hadii camden rader Sooliver, waaxda lujose, qaybta kaalmada pineda, vale fields. So [facility] [phone].    ATENCIÓN: Si habla español, tiene a waller disposición servicios gratuitos de asistencia lingüística. Llame al [phone].    We comply with applicable federal civil rights laws and [place] laws. We do not discriminate on the basis of race, color, national origin, age, disability sex, sexual orientation or gender identity.            Thank you!     Thank you for choosing [facility]  for your care. Our goal is always to provide you with excellent care. Hearing back from our patients is one way we can continue to improve our services. Please take a few minutes to complete the written survey that you may receive in the mail after your visit with us. Thank you!             Your Updated Medication List - Protect others around you: Learn how to safely use, store and throw away your medicines at www.disposemymeds.org.          This list is accurate as of: 8/11/17  3:33 PM.  Always use your most recent med list.                   Brand Name Dispense Instructions for use Diagnosis    acetylcysteine 20 % nebulizer solution    MUCOMYST    240 mL    Take  4 mLs by nebulization 2 times daily Use with albuterol solution. Discard open bottle of Mucomyst after 96 hours.    Bronchiectasis without complication (H)       albuterol (2.5 MG/3ML) 0.083% neb solution     60 vial    Take 1 vial (2.5 mg) by nebulization 2 times daily Use with Mucomyst    Bronchiectasis without complication (H)       budesonide-formoterol 160-4.5 MCG/ACT Inhaler    SYMBICORT    1 Inhaler    Inhale 2 puffs into the lungs 2 times daily    Churg-Antonina syndrome (H)       cholecalciferol 5000 UNITS Caps      Take by mouth daily        ciprofloxacin-dexamethasone otic suspension    CIPRODEX    7.5 mL    Instill 3 drops into the affected ear twice daily as needed.    Ear pain, unspecified laterality       clonazePAM 0.5 MG tablet    klonoPIN    20 tablet    Take 0.5 tablets (0.25 mg) by mouth 2 times daily as needed for anxiety    Anxiety       conjugated estrogens cream    PREMARIN    42.5 g    Place  vaginally three times a week. Use 0.5 grams weekly as directed    Vaginal dryness       Fish Oil 1200 MG Caps      Take 3 capsules by mouth daily.        fluticasone 50 MCG/ACT spray    FLONASE    48 g    Spray 2 sprays into both nostrils daily    Chronic rhinitis       GLUCOSAMINE CHONDR COMPLEX 500-400 MG Caps per capsule   Generic drug:  glucosamine-chondroitin      Take 2 capsules by mouth daily.        Ipratropium-Albuterol  MCG/ACT inhaler    COMBIVENT RESPIMAT    1 Inhaler    Inhale 1 puff into the lungs 4 times daily Not to exceed 6 doses per day.    Churg-Antonina syndrome (H)       metoprolol 25 MG 24 hr tablet    TOPROL-XL    30 tablet    Take 0.5 tablets (12.5 mg) by mouth daily    Palpitations       predniSONE 1 MG tablet    DELTASONE    200 tablet    Take 5 mgs one day, alternating with 6 mgs the next.    Bronchiectasis without complication (H)       PROBIOTIC ADVANCED PO           sertraline 25 MG tablet    ZOLOFT    30 tablet    Take 1 tablet (25 mg) by mouth daily    Anxiety, Severe  recurrent major depression without psychotic features (H)       UNABLE TO FIND      3,600 mg daily MEDICATION NAME: monolaurin

## 2017-08-13 LAB
BACTERIA SPEC CULT: NORMAL
MICRO REPORT STATUS: NORMAL
SPECIMEN SOURCE: NORMAL

## 2017-08-16 LAB
EXPTIME-PRE: 8.47 SEC
FEF2575-%PRED-PRE: 51 %
FEF2575-PRE: 1.38 L/SEC
FEF2575-PRED: 2.69 L/SEC
FEFMAX-%PRED-PRE: 84 %
FEFMAX-PRE: 5.55 L/SEC
FEFMAX-PRED: 6.58 L/SEC
FEV1-%PRED-PRE: 101 %
FEV1-PRE: 2.57 L
FEV1FEV6-PRE: 65 %
FEV1FEV6-PRED: 83 %
FEV1FVC-PRE: 64 %
FEV1FVC-PRED: 82 %
FIFMAX-PRE: 5.33 L/SEC
FVC-%PRED-PRE: 131 %
FVC-PRE: 4.04 L
FVC-PRED: 3.07 L

## 2017-08-22 ASSESSMENT — PATIENT HEALTH QUESTIONNAIRE - PHQ9: SUM OF ALL RESPONSES TO PHQ QUESTIONS 1-9: 21

## 2017-08-28 ENCOUNTER — MYC MEDICAL ADVICE (OUTPATIENT)
Dept: INTERNAL MEDICINE | Facility: CLINIC | Age: 47
End: 2017-08-28

## 2017-08-28 NOTE — TELEPHONE ENCOUNTER
Per other 8/28 my chart-Hello - I believe I'm due for another colonoscopy and need a referral.  Immunologist reminded me about getting this done again soon due to adenomatous polyp in last colonoscopy. - thank you - Z        Last Colonoscopy-12/19/14        Per note attached to 12/19/14 path report-Notes Recorded by Saul White MD on 12/21/2014 at 9:52 PM  Precancerous polyp removed.  Next colon in 5 years

## 2017-09-06 ENCOUNTER — OFFICE VISIT (OUTPATIENT)
Dept: PSYCHIATRY | Facility: CLINIC | Age: 47
End: 2017-09-06
Attending: CLINICAL NURSE SPECIALIST

## 2017-09-06 DIAGNOSIS — F41.9 ANXIETY: Primary | ICD-10-CM

## 2017-09-06 DIAGNOSIS — F33.2 SEVERE RECURRENT MAJOR DEPRESSION WITHOUT PSYCHOTIC FEATURES (H): ICD-10-CM

## 2017-09-06 DIAGNOSIS — F33.1 MODERATE EPISODE OF RECURRENT MAJOR DEPRESSIVE DISORDER (H): ICD-10-CM

## 2017-09-06 NOTE — MR AVS SNAPSHOT
After Visit Summary   9/6/2017    Valencia Ye    MRN: [id number]           Patient Information     Date Of Birth          1970        Visit Information        Provider Department      9/6/2017 4:45 PM Marlyn Flores APRN CNS [facility]ry [facility]        Today's Diagnoses     Anxiety    -  1    Moderate episode of recurrent major depressive disorder (H)        Severe recurrent major depression without psychotic features (H)           Follow-ups after your visit        Follow-up notes from your care team     Return in about 3 months (around 12/6/2017).      Your next 10 appointments already scheduled     Nov 17, 2017  2:30 PM CST   CF LOOP with UC PFL CF   [facility] Pulmonary Function Testing ([facility] [place])    [address] [id number] [phone]            Nov 17, 2017  3:10 PM CST   (Arrive by 2:55 PM)   RETURN CYSTIC FIBROSIS VISIT with Dagoberto Briscoe MD   [facility] ([facility])    [address] [id number] [phone]            Dec 06, 2017  7:15 AM CST   Adult Med Follow UP with SUNNY Ayala CNS   Psychiatry Clinic ([facility])    [address]  [address] [facility] [id number] [phone]            Jan 26, 2018  7:30 AM CST   CF LOOP with UC PFL CF   [facility] Pulmonary Function Testing ([facility] [place])    [address] [id number] [phone]            Jan 26, 2018  8:10 AM CST   (Arrive by 7:55 AM)   RETURN CYSTIC FIBROSIS VISIT with Dagoberto Briscoe MD   [facility] ([facility])    [address] [id number] [phone]              Who to contact     Please call your clinic at [phone] to:    Ask  questions about your health    Make or cancel appointments    Discuss your medicines    Learn about your test results    Speak to your doctor   If you have compliments or concerns about an experience at your clinic, or if you wish to file a complaint, please contact [facility] Patient Relations at [phone] or email us at [email or web address]         Additional Information About Your Visit        MyChart Information     [employer] gives you secure access to your electronic health record. If you see a primary care provider, you can also send messages to your care team and make appointments. If you have questions, please call your primary care clinic.  If you do not have a primary care provider, please call [phone] and they will assist you.      [employer] is an electronic gateway that provides easy, online access to your medical records. With [employer], you can request a clinic appointment, read your test results, renew a prescription or communicate with your care team.     To access your existing account, please contact your [facility] Clinic or call [phone] for assistance.        Care EveryWhere ID     This is your Care EveryWhere ID. This could be used by other organizations to access your [place] medical records  [id number]        Your Vitals Were     Last Period                   02/01/2009            Blood Pressure from Last 3 Encounters:   09/13/17 128/76   08/11/17 122/77   07/18/17 134/78    Weight from Last 3 Encounters:   09/13/17 51.4 kg (113 lb 6.4 oz)   08/11/17 46.3 kg (102 lb)   07/18/17 48.1 kg (106 lb)              Today, you had the following     No orders found for display       Primary Care Provider Office Phone # Fax #    Morelia Simental [phone]       [address]  [facility] [address]        Equal Access to Services     JACK THOMPSON : ruby Cai qaybta kaalmada  vale sungbabak starr'aan ah. So [facility] [phone].    ATENCIÓN: Si devaughn mayfield, tiene a waller disposición servicios gratuitos de asistencia lingüística. [place] al [phone].    We comply with applicable federal civil rights laws and [place] laws. We do not discriminate on the basis of race, color, national origin, age, disability sex, sexual orientation or gender identity.            Thank you!     Thank you for choosing PSYCHIATRY CLINIC  for your care. Our goal is always to provide you with excellent care. Hearing back from our patients is one way we can continue to improve our services. Please take a few minutes to complete the written survey that you may receive in the mail after your visit with us. Thank you!             Your Updated Medication List - Protect others around you: Learn how to safely use, store and throw away your medicines at www.disposemymeds.org.          This list is accurate as of: 9/6/17 11:59 PM.  Always use your most recent med list.                   Brand Name Dispense Instructions for use Diagnosis    acetylcysteine 20 % nebulizer solution    MUCOMYST    240 mL    Take 4 mLs by nebulization 2 times daily Use with albuterol solution. Discard open bottle of Mucomyst after 96 hours.    Bronchiectasis without complication (H)       albuterol (2.5 MG/3ML) 0.083% neb solution     60 vial    Take 1 vial (2.5 mg) by nebulization 2 times daily Use with Mucomyst    Bronchiectasis without complication (H)       budesonide-formoterol 160-4.5 MCG/ACT Inhaler    SYMBICORT    1 Inhaler    Inhale 2 puffs into the lungs 2 times daily    Churg-Antonina syndrome (H)       cholecalciferol 5000 UNITS Caps      Take by mouth daily        ciprofloxacin-dexamethasone otic suspension    CIPRODEX    7.5 mL    Instill 3 drops into the affected ear twice daily as needed.    Ear pain, unspecified laterality       clonazePAM 0.5 MG tablet    klonoPIN    20 tablet    Take 0.5 tablets (0.25  mg) by mouth 2 times daily as needed for anxiety    Anxiety       conjugated estrogens cream    PREMARIN    42.5 g    Place  vaginally three times a week. Use 0.5 grams weekly as directed    Vaginal dryness       Fish Oil 1200 MG Caps      Take 3 capsules by mouth daily.        GLUCOSAMINE CHONDR COMPLEX 500-400 MG Caps per capsule   Generic drug:  glucosamine-chondroitin      Take 2 capsules by mouth daily.        Ipratropium-Albuterol  MCG/ACT inhaler    COMBIVENT RESPIMAT    1 Inhaler    Inhale 1 puff into the lungs 4 times daily Not to exceed 6 doses per day.    Churg-Antonina syndrome (H)       metoprolol 25 MG 24 hr tablet    TOPROL-XL    30 tablet    Take 0.5 tablets (12.5 mg) by mouth daily    Palpitations       predniSONE 1 MG tablet    DELTASONE    200 tablet    Take 5 mgs one day, alternating with 6 mgs the next.    Bronchiectasis without complication (H)       PROBIOTIC ADVANCED PO           sertraline 25 MG tablet    ZOLOFT    30 tablet    Take 1 tablet (25 mg) by mouth daily    Anxiety, Severe recurrent major depression without psychotic features (H)       UNABLE TO FIND      3,600 mg daily MEDICATION NAME: aston

## 2017-09-06 NOTE — PROGRESS NOTES
"  Outpatient Psychiatry Progress Note     Provider: SUNNY Ayala CNS  Date: 2017  Service:  Medication follow up with counseling.   Patient Identification: Valencia Ye  : 1970   MRN: [id number]    Valencia Ye is a 46 year old year old female who presents for ongoing psychiatric care.  Valencia Ye was last seen in clinic on 17.   At that time,   Assessment & Plan       Valencia Ye is seen today for follow up and reports she decided to retry Zoloft and has been taking 25mg with some improvement in anxiety and depression. Side effect of insomnia. Also continues PRN use of low dose Klonopin. Overall continues to be very depressed. Returned to work because she felt she had no choice but doesn't feel that she is getting work done.      Diagnosis  Axis 1: Anxiety, Major Depression, Recurrent, severe without psychosis.  Axis 2: none  Axis 3: See problem list in the medical record     Plan:  Medication: Continue sertraline 25mg, klonopin prn  OTC Recommendations: none  Lab Orders:  none  Referrals: none  Release of Information: none  Future Treatment Considerations:per symptoms and medication tolerance as on 25mg sertraline for longer time period.   Return for Follow Up:4 weeks      ____________________________________________________________________________________________________________________________________________    2017  Today Valencia reports she continues to take Sertraline 25mg and this seems to help to take the edge off depression and anxiety. States \"I don't have crushing depression at least.\" She does have some internal agitation from it and Klonopin HS seems to take the edge of that next day. Takes on 1/2 tab or less only at HS.  Had recent increase in family stress due to aunt's death, one of child's hospitalization and involvement with law enforcement, but was able to manage the stress.     Contacted me on 17 to consider increase in " Sertraline to 50mg. Advised to consider 37.5mg first but she continued at 25mg.  Pt wants to continue on this dose at this time as she wanted to see if internal agitation will cease.    Side effects of medication include: see above.   Psychiatric Review of Systems:  The patient endorses symptoms of depression: In the last 2 weeks per PHQ 9 several days feeling depressed, restless/lethargy.  More than 1/2 days anhedonia, feelings of failure. Nearly everyday sleep disturbance, fatigue,appetite disturbance, concentration problems.  She  patient endorses symptoms of anxiety : some improvement but still easily overwhelmed. She is settling limits with her children.  She endorses symptoms of gagandeep including none.    She endorses symptoms of psychosis including no psychotic symptoms.       Review of Medical Systems:  Sleep: wakes up early but better than it had been, reports once when she falls asleep, able to stay asleep  Energy: continued low  Concentration: stable  Appetite: continued low  GI Concerns: none  Cardiac concerns: none  Neurological concerns: none  Other medical concerns: no new concerns    Current Substance Use:  Alcohol:denies  Other drugs:denies  Caffeine:not reviewed  Nicotine: none    Past Medical History:   Past Medical History:   Diagnosis Date     Aortitis (H)      Asthma     associated with Churg Antonina syndrome     Churg-Antonina syndrome (H)     dx 1990     Eustachian tube dysfunction      Goiter      Hearing loss, conductive      Hypertension      Hypocalcemia      Irritable bowel syndrome      Major depressive disorder      Mannose-binding lectin deficiency 2014     Nonspecific abnormal results of thyroid function study      Osteoporosis      Pericarditis     1990 and 1992     Pneumonia     4/23/10     Recurrent UTI      Rheumatoid vasculitis (H)      Sinusitis, chronic      Steroid long-term use      Varicose veins of leg with swelling      Patient Active Problem List   Diagnosis     Goiter      History of systemic steroid therapy     ILD (interstitial lung disease) (H)     Palpitations     Churg-Antonina syndrome (H)     History of eating disorder     Bronchiectasis (H)     Asthma     Anxiety     Chronic fatigue     Mannose-binding lectin deficiency     Benign essential hypertension     Moderate episode of recurrent major depressive disorder (H)     Bronchiectasis without acute exacerbation (H)     Severe recurrent major depression without psychotic features (H)       Allergies:   Allergies   Allergen Reactions     Colistimethate Anaphylaxis     Colymycin M [Colistimethate Sodium] Anaphylaxis     Tamiflu [Oseltamivir]      Gums Blister up     Clindamycin Rash     Zithromax [Azithromycin]      Heart palpitation          Current Medications     Current Outpatient Prescriptions Ordered in [facility]   Medication Sig Dispense Refill     Probiotic Product (PROBIOTIC ADVANCED PO)        metoprolol (TOPROL-XL) 25 MG 24 hr tablet Take 0.5 tablets (12.5 mg) by mouth daily 30 tablet 2     sertraline (ZOLOFT) 25 MG tablet Take 1 tablet (25 mg) by mouth daily 30 tablet 5     Ipratropium-Albuterol (COMBIVENT RESPIMAT)  MCG/ACT inhaler Inhale 1 puff into the lungs 4 times daily Not to exceed 6 doses per day. 1 Inhaler 11     clonazePAM (KLONOPIN) 0.5 MG tablet Take 0.5 tablets (0.25 mg) by mouth 2 times daily as needed for anxiety 20 tablet 1     predniSONE (DELTASONE) 1 MG tablet Take 5 mgs one day, alternating with 6 mgs the next. 200 tablet 5     acetylcysteine (MUCOMYST) 20 % injection Take 4 mLs by nebulization 2 times daily Use with albuterol solution. Discard open bottle of Mucomyst after 96 hours. 240 mL 11     albuterol (2.5 MG/3ML) 0.083% neb solution Take 1 vial (2.5 mg) by nebulization 2 times daily Use with Mucomyst 60 vial 11     budesonide-formoterol (SYMBICORT) 160-4.5 MCG/ACT Inhaler Inhale 2 puffs into the lungs 2 times daily 1 Inhaler 11     UNABLE TO FIND 3,600 mg daily MEDICATION NAME: monolaurin    "    fluticasone (FLONASE) 50 MCG/ACT nasal spray Spray 2 sprays into both nostrils daily 48 g 3     ciprofloxacin-dexamethasone (CIPRODEX) otic suspension Instill 3 drops into the affected ear twice daily as needed. 7.5 mL 1     conjugated estrogens (PREMARIN) vaginal cream Place  vaginally three times a week. Use 0.5 grams weekly as directed (Patient not taking: Reported on 8/11/2017) 42.5 g 0     cholecalciferol 5000 UNITS CAPS Take by mouth daily       glucosamine-chondroitin (GLUCOSAMINE CHONDR COMPLEX) 500-400 MG CAPS Take 2 capsules by mouth daily.       Omega-3 Fatty Acids (FISH OIL) 1200 MG capsule Take 3 capsules by mouth daily.       No current Epic-ordered facility-administered medications on file.         Mental Status Exam     Appearance:  Casually dressed and Well groomed  Behavior/relationship to examiner/demeanor:  Cooperative, Engaged and Pleasant  Orientation: Oriented to person, place, time and situation  Psychomotor: normal form  Speech Rate:  Normal  Speech Spontaneity:  Normal  Mood:  \"okay\"  Affect:  Appropriate/mood-congruent  Thought Process (Associations):  Logical and Goal directed  Thought Content:  no overt psychosis, denies suicidal ideation, intent or thoughts and patient does not appear to be responding to internal stimuli  Abnormal Perception:  None  Attention/Concentration:  Normal  Language:  Intact  Insight:  Good  Judgment:  Good      Results     Vital signs: LMP 02/01/2009    Laboratory Data:  no results    Assessment & Plan      Valencia Ye is seen today for follow up and reports improvement in mood and anxiety with Zoloft 25 mg and Klonopin 0.25 mg HS.  Pt seemed to be more relaxed with less sense of doom despite of recent increase in family stress.  Pt wants to continue current medication regimen.    Diagnosis  Anxiety, Major Depression, Recurrent, severe without psychosis    Plan:  Medication: Continue current dose of Sertraline 25mg, Klonopin 0.25mg  OTC " Recommendations: none  Lab Orders:  none  Referrals: none  Release of Information: none  Future Treatment Considerations:per symptoms  Return for Follow Up:3 months   The risks, benefits, alternatives and side effects have been discussed and are understood by the patient. The patient understands the risks of using street drugs or alcohol. There are no medical contraindications, the patient agrees to treatment, and has the capacity to do so. The patient understands to call 911 or come to the nearest ED if life threatening or urgent symptoms present.  Over 50% of this time was spent counseling the patient and/or coordinating care regarding review of social and occupational functioning.  In addition patient was counseled on health and wellness practices to augment medication treatment of symptoms. See note for details.      Isabell Luther, Psychiatric/Mental Health Nurse Practitioner Student, acting as a scribe for SUNNY Ayala.     I , Marlyn Flores, personally performed the entire clinical encounter as documented by SUNNY Garza 9/6/2017

## 2017-09-07 ASSESSMENT — PATIENT HEALTH QUESTIONNAIRE - PHQ9: SUM OF ALL RESPONSES TO PHQ QUESTIONS 1-9: 19

## 2017-09-10 DIAGNOSIS — J31.0 CHRONIC RHINITIS: ICD-10-CM

## 2017-09-11 RX ORDER — FLUTICASONE PROPIONATE 50 MCG
SPRAY, SUSPENSION (ML) NASAL
Qty: 48 ML | Refills: 1 | Status: SHIPPED | OUTPATIENT
Start: 2017-09-11 | End: 2018-04-29

## 2017-09-11 NOTE — TELEPHONE ENCOUNTER
EVELYNNASE      Last Written Prescription Date: 09/08/16  Last Fill Quantity: 48 g,  # refills: 3   Last Office Visit with G, [facility] or [facility] prescribing provider: 07/03/17

## 2017-09-13 ENCOUNTER — OFFICE VISIT (OUTPATIENT)
Dept: INTERNAL MEDICINE | Facility: CLINIC | Age: 47
End: 2017-09-13
Payer: COMMERCIAL

## 2017-09-13 VITALS
WEIGHT: 113.4 LBS | TEMPERATURE: 97.6 F | OXYGEN SATURATION: 100 % | SYSTOLIC BLOOD PRESSURE: 128 MMHG | DIASTOLIC BLOOD PRESSURE: 76 MMHG | RESPIRATION RATE: 16 BRPM | BODY MASS INDEX: 20.09 KG/M2 | HEART RATE: 82 BPM

## 2017-09-13 DIAGNOSIS — R30.0 DYSURIA: Primary | ICD-10-CM

## 2017-09-13 LAB
ALBUMIN UR-MCNC: NEGATIVE MG/DL
APPEARANCE UR: CLEAR
BACTERIA #/AREA URNS HPF: ABNORMAL /HPF
BILIRUB UR QL STRIP: NEGATIVE
COLOR UR AUTO: YELLOW
GLUCOSE UR STRIP-MCNC: NEGATIVE MG/DL
HGB UR QL STRIP: ABNORMAL
KETONES UR STRIP-MCNC: NEGATIVE MG/DL
LEUKOCYTE ESTERASE UR QL STRIP: ABNORMAL
NITRATE UR QL: NEGATIVE
PH UR STRIP: 7 PH (ref 5–7)
RBC #/AREA URNS AUTO: ABNORMAL /HPF
SOURCE: ABNORMAL
SP GR UR STRIP: <=1.005 (ref 1–1.03)
UROBILINOGEN UR STRIP-ACNC: 0.2 EU/DL (ref 0.2–1)
WBC #/AREA URNS AUTO: ABNORMAL /HPF

## 2017-09-13 PROCEDURE — 99213 OFFICE O/P EST LOW 20 MIN: CPT | Performed by: NURSE PRACTITIONER

## 2017-09-13 PROCEDURE — 81001 URINALYSIS AUTO W/SCOPE: CPT | Performed by: NURSE PRACTITIONER

## 2017-09-13 RX ORDER — CIPROFLOXACIN 250 MG/1
250 TABLET, FILM COATED ORAL 2 TIMES DAILY
Qty: 6 TABLET | Refills: 0 | Status: SHIPPED | OUTPATIENT
Start: 2017-09-13 | End: 2017-10-13

## 2017-09-13 NOTE — NURSING NOTE
"Chief Complaint   Patient presents with     Urinary Pain     Urgency, painful urination, frequent urination sx started last night. Hx of uti's.        Initial /76 (Patient Position: Standing)  Pulse 82  Temp 97.6  F (36.4  C) (Oral)  Resp 16  Wt 113 lb 6.4 oz (51.4 kg)  LMP 02/01/2009  SpO2 100%  BMI 20.09 kg/m2 Estimated body mass index is 20.09 kg/(m^2) as calculated from the following:    Height as of 7/14/17: 5' 3\" (1.6 m).    Weight as of this encounter: 113 lb 6.4 oz (51.4 kg).  Medication Reconciliation: complete      Joshua Yun CMA      "

## 2017-09-13 NOTE — PROGRESS NOTES
SUBJECTIVE:   Valencia Ye is a 46 year old female who presents to clinic today for the following health issues:          URINARY TRACT SYMPTOMS      Duration: 1 day    Description  dysuria, frequency and urgency    Intensity:  moderate    Accompanying signs and symptoms:  Fever/chills: no   Flank pain no   Nausea and vomiting: no   Vaginal symptoms: none  Abdominal/Pelvic Pain: YES- pelvic pressure    History  History of frequent UTI's: YES  History of kidney stones: no   Sexually Active: YES  Possibility of pregnancy: No    Precipitating or alleviating factors: None    Therapies tried and outcome: increase fluid intake           Problem list and histories reviewed & adjusted, as indicated.  Additional history: as documented    Patient Active Problem List   Diagnosis     Goiter     History of systemic steroid therapy     ILD (interstitial lung disease) (H)     Palpitations     Churg-Antonina syndrome (H)     History of eating disorder     Bronchiectasis (H)     Asthma     Anxiety     Chronic fatigue     Mannose-binding lectin deficiency     Benign essential hypertension     Moderate episode of recurrent major depressive disorder (H)     Bronchiectasis without acute exacerbation (H)     Severe recurrent major depression without psychotic features (H)     Past Surgical History:   Procedure Laterality Date      SECTION       COLONOSCOPY Left 2014    Procedure: COMBINED COLONOSCOPY, SINGLE OR MULTIPLE BIOPSY/POLYPECTOMY BY BIOPSY;  Surgeon: Js Pinedo MD;  Location:  GI     ENT SURGERY       HC COLP CERVIX/UPPER VAGINA W LOOP ELEC BX CERVIX       HYSTERECTOMY  2009    without oopherectomy     HYSTERECTOMY, PAP NO LONGER INDICATED      cervix removed      PICC INSERTION  10/10/2013    5fr DL PASV PICC, 43cm (1cm external) in the L basilic vein w/ tip in the low SVC.     PICC INSERTION Left 2017    5fr DL BioFlo PICC, 42cm (3cm external) in the L lateral brachial  vein w/ tip in the SVC RA junction.     SINUS SURGERY       TUBAL LIGATION         Social History   Substance Use Topics     Smoking status: Never Smoker     Smokeless tobacco: Never Used     Alcohol use No     Family History   Problem Relation Age of Onset     Allergies Mother      Seasonal     Alcohol/Drug Mother      C.A.D. Father      DIABETES Father      Type 2     C.A.D. Maternal Grandmother      Arthritis Maternal Grandmother      Musculoskeletal Disorder Maternal Grandmother      MS     Alcohol/Drug Maternal Grandfather      Asthma Son      Asthma Daughter      Allergies Daughter      Seasonal     Unknown/Adopted Paternal Grandmother      Unknown/Adopted Paternal Grandfather      CANCER Maternal Aunt      breast age 53     Breast Cancer Maternal Aunt      in her 50s         Current Outpatient Prescriptions   Medication Sig Dispense Refill     ciprofloxacin (CIPRO) 250 MG tablet Take 1 tablet (250 mg) by mouth 2 times daily 6 tablet 0     fluticasone (FLONASE) 50 MCG/ACT spray SHAKE LIQUID AND USE 2 SPRAYS IN EACH NOSTRIL DAILY 48 mL 1     Probiotic Product (PROBIOTIC ADVANCED PO)        metoprolol (TOPROL-XL) 25 MG 24 hr tablet Take 0.5 tablets (12.5 mg) by mouth daily 30 tablet 2     sertraline (ZOLOFT) 25 MG tablet Take 1 tablet (25 mg) by mouth daily 30 tablet 5     Ipratropium-Albuterol (COMBIVENT RESPIMAT)  MCG/ACT inhaler Inhale 1 puff into the lungs 4 times daily Not to exceed 6 doses per day. 1 Inhaler 11     clonazePAM (KLONOPIN) 0.5 MG tablet Take 0.5 tablets (0.25 mg) by mouth 2 times daily as needed for anxiety 20 tablet 1     predniSONE (DELTASONE) 1 MG tablet Take 5 mgs one day, alternating with 6 mgs the next. 200 tablet 5     acetylcysteine (MUCOMYST) 20 % injection Take 4 mLs by nebulization 2 times daily Use with albuterol solution. Discard open bottle of Mucomyst after 96 hours. 240 mL 11     albuterol (2.5 MG/3ML) 0.083% neb solution Take 1 vial (2.5 mg) by nebulization 2 times  daily Use with Mucomyst 60 vial 11     budesonide-formoterol (SYMBICORT) 160-4.5 MCG/ACT Inhaler Inhale 2 puffs into the lungs 2 times daily 1 Inhaler 11     UNABLE TO FIND 3,600 mg daily MEDICATION NAME: monolaurin       ciprofloxacin-dexamethasone (CIPRODEX) otic suspension Instill 3 drops into the affected ear twice daily as needed. 7.5 mL 1     conjugated estrogens (PREMARIN) vaginal cream Place  vaginally three times a week. Use 0.5 grams weekly as directed 42.5 g 0     cholecalciferol 5000 UNITS CAPS Take by mouth daily       glucosamine-chondroitin (GLUCOSAMINE CHONDR COMPLEX) 500-400 MG CAPS Take 2 capsules by mouth daily.       Omega-3 Fatty Acids (FISH OIL) 1200 MG capsule Take 3 capsules by mouth daily.       BP Readings from Last 3 Encounters:   09/13/17 128/76   08/11/17 122/77   07/14/17 112/70    Wt Readings from Last 3 Encounters:   09/13/17 113 lb 6.4 oz (51.4 kg)   08/11/17 102 lb (46.3 kg)   07/14/17 105 lb (47.6 kg)                        Reviewed and updated as needed this visit by clinical staffTobacco  Allergies  Meds  Med Hx  Surg Hx  Fam Hx  Soc Hx      Reviewed and updated as needed this visit by Provider         ROS:  C: NEGATIVE for fever, chills, change in weight  E/M: NEGATIVE for ear, mouth and throat problems  R: NEGATIVE for significant cough or SOB  CV: NEGATIVE for chest pain, palpitations or peripheral edema    OBJECTIVE:     /76 (Patient Position: Standing)  Pulse 82  Temp 97.6  F (36.4  C) (Oral)  Resp 16  Wt 113 lb 6.4 oz (51.4 kg)  LMP 02/01/2009  SpO2 100%  BMI 20.09 kg/m2  Body mass index is 20.09 kg/(m^2).  GENERAL: healthy, alert and pacing the room  RESP: lungs clear to auscultation - no rales, rhonchi or wheezes  CV: regular rate and rhythm, normal S1 S2, no S3 or S4, no murmur, click or rub, no peripheral edema and peripheral pulses strong  ABDOMEN: soft, nontender, no hepatosplenomegaly, no masses and bowel sounds normal  BACK: no CVA tenderness, no  paralumbar tenderness    Results for orders placed or performed in visit on 09/13/17 (from the past 24 hour(s))   UA reflex to Microscopic and Culture   Result Value Ref Range    Color Urine Yellow     Appearance Urine Clear     Glucose Urine Negative NEG^Negative mg/dL    Bilirubin Urine Negative NEG^Negative    Ketones Urine Negative NEG^Negative mg/dL    Specific Gravity Urine <=1.005 1.003 - 1.035    Blood Urine Large (A) NEG^Negative    pH Urine 7.0 5.0 - 7.0 pH    Protein Albumin Urine Negative NEG^Negative mg/dL    Urobilinogen Urine 0.2 0.2 - 1.0 EU/dL    Nitrite Urine Negative NEG^Negative    Leukocyte Esterase Urine Small (A) NEG^Negative    Source Midstream Urine    Urine Microscopic   Result Value Ref Range    WBC Urine 5-10 (A) OTO2^O - 2 /HPF    RBC Urine O - 2 OTO2^O - 2 /HPF    Bacteria Urine Few (A) NEG^Negative /HPF       ASSESSMENT/PLAN:               ICD-10-CM    1. Dysuria R30.0 UA reflex to Microscopic and Culture     Urine Microscopic     ciprofloxacin (CIPRO) 250 MG tablet       Patient Instructions   Azo as needed for pain    Silvia Danielson, NP  [facility]

## 2017-09-13 NOTE — MR AVS SNAPSHOT
After Visit Summary   9/13/2017    Valencia Ye    MRN: [id number]           Patient Information     Date Of Birth          1970        Visit Information        Provider Department      9/13/2017 8:20 AM Silvia Danielson NP [facility]        Today's Diagnoses     Dysuria    -  1      Care Instructions    Azo as needed for pain    Silvia Danielson CNP            Follow-ups after your visit        Your next 10 appointments already scheduled     Nov 17, 2017  2:30 PM CST   CF LOOP with UC PFL CF   [facility] Pulmonary Function Testing ([facility])    [address] [place] [address]   [phone]            Nov 17, 2017  3:10 PM CST   (Arrive by 2:55 PM)   RETURN CYSTIC FIBROSIS VISIT with Dagoberto Briscoe MD   [facility] ([facility])    [address] [place] [address]   [phone]            Dec 06, 2017  7:15 AM CST   Adult Med Follow UP with SUNNY Ayala CNS   Psychiatry Clinic ([facility])    [address]  [address] [facility] [address]   [phone]            Jan 26, 2018  7:30 AM CST   CF LOOP with UC PFL CF   [facility] Pulmonary Function Testing ([facility])    [address] [place] [address]   [phone]            Jan 26, 2018  8:10 AM CST   (Arrive by 7:55 AM)   RETURN CYSTIC FIBROSIS VISIT with Dagoberto Briscoe MD   [facility] ([facility])    [address] [place] [id number] [phone]              Who to contact     If you have questions or need follow up information about today's clinic visit or your schedule please contact [facility] directly at [phone].  Normal or non-critical lab and  imaging results will be communicated to you by [employer], letter or phone within 4 business days after the clinic has received the results. If you do not hear from us within 7 days, please contact the clinic through Delta ID or phone. If you have a critical or abnormal lab result, we will notify you by phone as soon as possible.  Submit refill requests through Delta ID or call your pharmacy and they will forward the refill request to us. Please allow 3 business days for your refill to be completed.          Additional Information About Your Visit        Delta ID Information     Delta ID gives you secure access to your electronic health record. If you see a primary care provider, you can also send messages to your care team and make appointments. If you have questions, please call your primary care clinic.  If you do not have a primary care provider, please call [phone] and they will assist you.        Care EveryWhere ID     This is your Care EveryWhere ID. This could be used by other organizations to access your [facility] medical records  [id number]        Your Vitals Were     Pulse Temperature Respirations Last Period Pulse Oximetry BMI (Body Mass Index)    82 97.6  F (36.4  C) (Oral) 16 02/01/2009 100% 20.09 kg/m2       Blood Pressure from Last 3 Encounters:   09/13/17 128/76   08/11/17 122/77   07/14/17 112/70    Weight from Last 3 Encounters:   09/13/17 113 lb 6.4 oz (51.4 kg)   08/11/17 102 lb (46.3 kg)   07/14/17 105 lb (47.6 kg)              We Performed the Following     UA reflex to Microscopic and Culture     Urine Microscopic          Today's Medication Changes          These changes are accurate as of: 9/13/17  8:59 AM.  If you have any questions, ask your nurse or doctor.               Start taking these medicines.        Dose/Directions    ciprofloxacin 250 MG tablet   Commonly known as:  CIPRO   Used for:  Dysuria   Started by:  Silvia Danielson NP        Dose:  250 mg   Take 1 tablet (250 mg) by  mouth 2 times daily   Quantity:  6 tablet   Refills:  0            Where to get your medicines      These medications were sent to [facility] [place], [place] - [address]  [address] [facility] [address]     Phone:  [phone]     ciprofloxacin 250 MG tablet                Primary Care Provider Office Phone # Fax #    Morelia Simental [phone]       [address]  [facility] [address]        Equal Access to Services     JACK THOMPSON : Hadii aad ku hadasho Soomaali, waaxda luqadaha, qaybta kaalmada adeegyada, waxay idiin hayaan adebabak kharash shaina . So [facility] [phone].    ATENCIÓN: Si habla español, tiene a waller disposición servicios gratuitos de asistencia lingüística. [facility] [phone].    We comply with applicable federal civil rights laws and [place] laws. We do not discriminate on the basis of race, color, national origin, age, disability sex, sexual orientation or gender identity.            Thank you!     Thank you for choosing [facility]  for your care. Our goal is always to provide you with excellent care. Hearing back from our patients is one way we can continue to improve our services. Please take a few minutes to complete the written survey that you may receive in the mail after your visit with us. Thank you!             Your Updated Medication List - Protect others around you: Learn how to safely use, store and throw away your medicines at www.disposemymeds.org.          This list is accurate as of: 9/13/17  8:59 AM.  Always use your most recent med list.                   Brand Name Dispense Instructions for use Diagnosis    acetylcysteine 20 % nebulizer solution    MUCOMYST    240 mL    Take 4 mLs by nebulization 2 times daily Use with albuterol solution. Discard open bottle of Mucomyst after 96 hours.    Bronchiectasis without complication (H)       albuterol (2.5 MG/3ML) 0.083% neb solution     60 vial    Take  1 vial (2.5 mg) by nebulization 2 times daily Use with Mucomyst    Bronchiectasis without complication (H)       budesonide-formoterol 160-4.5 MCG/ACT Inhaler    SYMBICORT    1 Inhaler    Inhale 2 puffs into the lungs 2 times daily    Churg-Antonina syndrome (H)       cholecalciferol 5000 UNITS Caps      Take by mouth daily        ciprofloxacin 250 MG tablet    CIPRO    6 tablet    Take 1 tablet (250 mg) by mouth 2 times daily    Dysuria       ciprofloxacin-dexamethasone otic suspension    CIPRODEX    7.5 mL    Instill 3 drops into the affected ear twice daily as needed.    Ear pain, unspecified laterality       clonazePAM 0.5 MG tablet    klonoPIN    20 tablet    Take 0.5 tablets (0.25 mg) by mouth 2 times daily as needed for anxiety    Anxiety       conjugated estrogens cream    PREMARIN    42.5 g    Place  vaginally three times a week. Use 0.5 grams weekly as directed    Vaginal dryness       Fish Oil 1200 MG Caps      Take 3 capsules by mouth daily.        fluticasone 50 MCG/ACT spray    FLONASE    48 mL    SHAKE LIQUID AND USE 2 SPRAYS IN EACH NOSTRIL DAILY    Chronic rhinitis       GLUCOSAMINE CHONDR COMPLEX 500-400 MG Caps per capsule   Generic drug:  glucosamine-chondroitin      Take 2 capsules by mouth daily.        Ipratropium-Albuterol  MCG/ACT inhaler    COMBIVENT RESPIMAT    1 Inhaler    Inhale 1 puff into the lungs 4 times daily Not to exceed 6 doses per day.    Churg-Antonina syndrome (H)       metoprolol 25 MG 24 hr tablet    TOPROL-XL    30 tablet    Take 0.5 tablets (12.5 mg) by mouth daily    Palpitations       predniSONE 1 MG tablet    DELTASONE    200 tablet    Take 5 mgs one day, alternating with 6 mgs the next.    Bronchiectasis without complication (H)       PROBIOTIC ADVANCED PO           sertraline 25 MG tablet    ZOLOFT    30 tablet    Take 1 tablet (25 mg) by mouth daily    Anxiety, Severe recurrent major depression without psychotic features (H)       UNABLE TO FIND      3,600 mg  daily MEDICATION NAME: monolaurin

## 2017-10-13 ENCOUNTER — OFFICE VISIT (OUTPATIENT)
Dept: PSYCHIATRY | Facility: CLINIC | Age: 47
End: 2017-10-13
Attending: CLINICAL NURSE SPECIALIST

## 2017-10-13 DIAGNOSIS — F41.9 ANXIETY: Primary | ICD-10-CM

## 2017-10-13 DIAGNOSIS — F33.1 MODERATE EPISODE OF RECURRENT MAJOR DEPRESSIVE DISORDER (H): ICD-10-CM

## 2017-10-13 RX ORDER — VILAZODONE HYDROCHLORIDE 20 MG/1
TABLET ORAL
Qty: 15 TABLET | Refills: 1 | Status: SHIPPED | OUTPATIENT
Start: 2017-10-13 | End: 2017-11-13

## 2017-10-13 NOTE — MR AVS SNAPSHOT
After Visit Summary   10/13/2017    Valencia Ye    MRN: [id number]           Patient Information     Date Of Birth          1970        Visit Information        Provider Department      10/13/2017 4:45 PM Marlyn Flores APRN CNS [facility]        Today's Diagnoses     Anxiety    -  1    Moderate episode of recurrent major depressive disorder (H)           Follow-ups after your visit        Follow-up notes from your care team     Return in about 4 weeks (around 11/10/2017).      Your next 10 appointments already scheduled     Nov 17, 2017  2:30 PM CST   CF LOOP with UC PFL CF   [facility] Pulmonary Function Testing ([facility])    [address] [id number] [phone]            Nov 17, 2017  3:10 PM CST   (Arrive by 2:55 PM)   RETURN CYSTIC FIBROSIS VISIT with Dagoberto Briscoe MD   [facility] ([facility])    [address] [place] [id number] [phone]            Dec 06, 2017  7:15 AM CST   Adult Med Follow UP with SUNNY Ayala CNS   Psychiatry Clinic ([facility])    [address]  [address] [facility] [id number] [phone]            Jan 26, 2018  7:30 AM CST   CF LOOP with UC PFL CF   [facility] Pulmonary Function Testing ([facility])    [address] [id number] [phone]            Jan 26, 2018  8:10 AM CST   (Arrive by 7:55 AM)   RETURN CYSTIC FIBROSIS VISIT with Dagoberto Briscoe MD   [facility] ([facility])    [address] [place] [id number] [phone]              Who to contact     Please call your clinic at [phone] to:    Ask questions about your health    Make or cancel appointments    Discuss  your medicines    Learn about your test results    Speak to your doctor   If you have compliments or concerns about an experience at your clinic, or if you wish to file a complaint, please contact [facility] Patient Relations at [phone] or email us at [email or web address]         Additional Information About Your Visit        Emailagehart Information     Email[employer]hart gives you secure access to your electronic health record. If you see a primary care provider, you can also send messages to your care team and make appointments. If you have questions, please call your primary care clinic.  If you do not have a primary care provider, please call [phone] and they will assist you.      [employer] is an electronic gateway that provides easy, online access to your medical records. With [employer], you can request a clinic appointment, read your test results, renew a prescription or communicate with your care team.     To access your existing account, please contact your [facility] or call [phone] for assistance.        Care EveryWhere ID     This is your Care EveryWhere ID. This could be used by other organizations to access your [place] medical records  [id number]        Your Vitals Were     Last Period                   02/01/2009            Blood Pressure from Last 3 Encounters:   09/13/17 128/76   08/11/17 122/77   07/18/17 134/78    Weight from Last 3 Encounters:   09/13/17 51.4 kg (113 lb 6.4 oz)   08/11/17 46.3 kg (102 lb)   07/18/17 48.1 kg (106 lb)              Today, you had the following     No orders found for display         Today's Medication Changes          These changes are accurate as of: 10/13/17 11:59 PM.  If you have any questions, ask your nurse or doctor.               Start taking these medicines.        Dose/Directions    vilazodone 20 MG Tabs tablet   Commonly known as:  VIIBRYD   Used for:  Anxiety, Moderate episode of  recurrent major depressive disorder (H)        Take 1/2 tablet by mouth daily with food.   Quantity:  15 tablet   Refills:  1            Where to get your medicines      These medications were sent to [id number] - [place], [place] - [address] [facility] [address]  [address], [facility] [address]     Phone:  [phone]     vilazodone 20 MG Tabs tablet                Primary Care Provider Office Phone # Fax #    Morelia Simental [phone]       [address]  [facility] [address]        Equal Access to Services     [facility]: Hadii aad ku hadasho Sooliver, waaxda luqadaha, qaybta kaalmada adeegyada, vale merritt . So [facility] [phone].    ATENCIÓN: Si habla español, tiene a waller disposición servicios gratuitos de asistencia lingüística. [facility] [phone].    We comply with applicable federal civil rights laws and [place] laws. We do not discriminate on the basis of race, color, national origin, age, disability, sex, sexual orientation, or gender identity.            Thank you!     Thank you for choosing [facility]  for your care. Our goal is always to provide you with excellent care. Hearing back from our patients is one way we can continue to improve our services. Please take a few minutes to complete the written survey that you may receive in the mail after your visit with us. Thank you!             Your Updated Medication List - Protect others around you: Learn how to safely use, store and throw away your medicines at www.disposemymeds.org.          This list is accurate as of: 10/13/17 11:59 PM.  Always use your most recent med list.                   Brand Name Dispense Instructions for use Diagnosis    acetylcysteine 20 % nebulizer solution    MUCOMYST    240 mL    Take 4 mLs by nebulization 2 times daily Use with albuterol solution. Discard open bottle of Mucomyst after 96 hours.    Bronchiectasis without  complication (H)       albuterol (2.5 MG/3ML) 0.083% neb solution     60 vial    Take 1 vial (2.5 mg) by nebulization 2 times daily Use with Mucomyst    Bronchiectasis without complication (H)       budesonide-formoterol 160-4.5 MCG/ACT Inhaler    SYMBICORT    1 Inhaler    Inhale 2 puffs into the lungs 2 times daily    Churg-Antonina syndrome (H)       cholecalciferol 5000 UNITS Caps      Take by mouth daily        ciprofloxacin-dexamethasone otic suspension    CIPRODEX    7.5 mL    Instill 3 drops into the affected ear twice daily as needed.    Ear pain, unspecified laterality       clonazePAM 0.5 MG tablet    klonoPIN    20 tablet    Take 0.5 tablets (0.25 mg) by mouth 2 times daily as needed for anxiety    Anxiety       conjugated estrogens cream    PREMARIN    42.5 g    Place  vaginally three times a week. Use 0.5 grams weekly as directed    Vaginal dryness       fish Oil 1200 MG capsule      Take 3 capsules by mouth daily.        fluticasone 50 MCG/ACT spray    FLONASE    48 mL    SHAKE LIQUID AND USE 2 SPRAYS IN EACH NOSTRIL DAILY    Chronic rhinitis       GLUCOSAMINE CHONDR COMPLEX 500-400 MG Caps per capsule   Generic drug:  glucosamine-chondroitin      Take 2 capsules by mouth daily.        Ipratropium-Albuterol  MCG/ACT inhaler    COMBIVENT RESPIMAT    1 Inhaler    Inhale 1 puff into the lungs 4 times daily Not to exceed 6 doses per day.    Churg-Antonina syndrome (H)       metoprolol 25 MG 24 hr tablet    TOPROL-XL    30 tablet    Take 0.5 tablets (12.5 mg) by mouth daily    Palpitations       predniSONE 1 MG tablet    DELTASONE    200 tablet    Take 5 mgs one day, alternating with 6 mgs the next.    Bronchiectasis without complication (H)       PROBIOTIC ADVANCED PO           UNABLE TO FIND      3,600 mg daily MEDICATION NAME: monolaurin        vilazodone 20 MG Tabs tablet    VIIBRYD    15 tablet    Take 1/2 tablet by mouth daily with food.    Anxiety, Moderate episode of recurrent major depressive  disorder (H)

## 2017-10-13 NOTE — PROGRESS NOTES
Outpatient Psychiatry Progress Note     Provider: SUNNY Ayala CNS  Date: 10/13/2017  Service:  Medication follow up with counseling.   Patient Identification: Valencia Ye  : 1970   MRN: [id number]    Valencia Ye is a 47 year old year old female who presents for ongoing psychiatric care.  Valencia Ye was last seen in clinic on 17.   At that time,   Assessment & Plan       Valencia Ye is seen today for follow up and reports improvement in mood and anxiety with Zoloft 25 mg and Klonopin 0.25 mg HS.  Pt seemed to be more relaxed with less sense of doom despite of recent increase in family stress.  Pt wants to continue current medication regimen.     Diagnosis  Anxiety, Major Depression, Recurrent, severe without psychosis     Plan:  Medication: Continue current dose of Sertraline 25mg, Klonopin 0.25mg  OTC Recommendations: none  Lab Orders:  none  Referrals: none  Release of Information: none  Future Treatment Considerations:per symptoms  Return for Follow Up:3 months      ____________________________________________________________________________________________________________________________________________    10/13/2017  Today Valencia reports she stopped Sertraline at 12.5mg since 17. She discontinued it because of heart palpitations which she feels certain was caused by the sertraline as she has had this with most medication that he can be a side effect.  Not taking Klonopin even daily.   Depression has started coming back since off sertraline. Worrying is not as bad as before she was off of it.    Side effects of medication include: see above.  Psychiatric Review of Systems:  The patient endorses symptoms of depression: In the last 2 weeks per PHQ 9 several days anhedonia, sleep disturbance, restless/lethargy.  More than 1/2 days feelings of failure.  Nearly everyday feeling depressed, fatigue, appetite disturbance, concentration problems.  She   patient endorses symptoms of anxiety : worry  She endorses symptoms of gagandeep including none.    She endorses symptoms of psychosis including no psychotic symptoms.       Review of Medical Systems:  Sleep: mild insomnia.  Energy: decreased  Concentration: decreased  Appetite: decreased  GI Concerns: no new concerns  Cardiac concerns: no new concerns  Neurological concerns: no new concerns  Other medical concerns: no new concerns  Current Substance Use:  Alcohol:denies  Other drugs:denies  Caffeine:denies  Nicotine: none  Past Medical History:   Past Medical History:   Diagnosis Date     Aortitis (H)      Asthma     associated with Churg Antonina syndrome     Churg-Antonina syndrome (H)     dx 1990     Eustachian tube dysfunction      Goiter      Hearing loss, conductive      Hypertension      Hypocalcemia      Irritable bowel syndrome      Major depressive disorder      Mannose-binding lectin deficiency 2014     Nonspecific abnormal results of thyroid function study      Osteoporosis      Pericarditis     1990 and 1992     Pneumonia     4/23/10     Recurrent UTI      Rheumatoid vasculitis (H)      Sinusitis, chronic      Steroid long-term use      Varicose veins of leg with swelling      Patient Active Problem List   Diagnosis     Goiter     History of systemic steroid therapy     ILD (interstitial lung disease) (H)     Palpitations     Churg-Antonina syndrome (H)     History of eating disorder     Bronchiectasis (H)     Asthma     Anxiety     Chronic fatigue     Mannose-binding lectin deficiency     Benign essential hypertension     Moderate episode of recurrent major depressive disorder (H)     Bronchiectasis without acute exacerbation (H)     Severe recurrent major depression without psychotic features (H)       Allergies:   Allergies   Allergen Reactions     Colistimethate Anaphylaxis     Colymycin M [Colistimethate Sodium] Anaphylaxis     Tamiflu [Oseltamivir]      Gums Blister up     Clindamycin Rash     Zithromax  [Azithromycin]      Heart palpitation          Current Medications     Current Outpatient Prescriptions Ordered in [facility]   Medication Sig Dispense Refill     ciprofloxacin (CIPRO) 250 MG tablet Take 1 tablet (250 mg) by mouth 2 times daily 6 tablet 0     fluticasone (FLONASE) 50 MCG/ACT spray SHAKE LIQUID AND USE 2 SPRAYS IN EACH NOSTRIL DAILY 48 mL 1     Probiotic Product (PROBIOTIC ADVANCED PO)        metoprolol (TOPROL-XL) 25 MG 24 hr tablet Take 0.5 tablets (12.5 mg) by mouth daily 30 tablet 2     sertraline (ZOLOFT) 25 MG tablet Take 1 tablet (25 mg) by mouth daily 30 tablet 5     Ipratropium-Albuterol (COMBIVENT RESPIMAT)  MCG/ACT inhaler Inhale 1 puff into the lungs 4 times daily Not to exceed 6 doses per day. 1 Inhaler 11     clonazePAM (KLONOPIN) 0.5 MG tablet Take 0.5 tablets (0.25 mg) by mouth 2 times daily as needed for anxiety 20 tablet 1     predniSONE (DELTASONE) 1 MG tablet Take 5 mgs one day, alternating with 6 mgs the next. 200 tablet 5     acetylcysteine (MUCOMYST) 20 % injection Take 4 mLs by nebulization 2 times daily Use with albuterol solution. Discard open bottle of Mucomyst after 96 hours. 240 mL 11     albuterol (2.5 MG/3ML) 0.083% neb solution Take 1 vial (2.5 mg) by nebulization 2 times daily Use with Mucomyst 60 vial 11     budesonide-formoterol (SYMBICORT) 160-4.5 MCG/ACT Inhaler Inhale 2 puffs into the lungs 2 times daily 1 Inhaler 11     UNABLE TO FIND 3,600 mg daily MEDICATION NAME: monolaurin       ciprofloxacin-dexamethasone (CIPRODEX) otic suspension Instill 3 drops into the affected ear twice daily as needed. 7.5 mL 1     conjugated estrogens (PREMARIN) vaginal cream Place  vaginally three times a week. Use 0.5 grams weekly as directed 42.5 g 0     cholecalciferol 5000 UNITS CAPS Take by mouth daily       glucosamine-chondroitin (GLUCOSAMINE CHONDR COMPLEX) 500-400 MG CAPS Take 2 capsules by mouth daily.       Omega-3 Fatty Acids (FISH OIL) 1200 MG capsule Take 3 capsules by  "mouth daily.       No current Epic-ordered facility-administered medications on file.         Mental Status Exam     Appearance:  Casually dressed and Well groomed  Behavior/relationship to examiner/demeanor:  Cooperative and Resistant  Orientation: Oriented to person, place, time and situation  Psychomotor: normal form  Speech Rate:  Normal  Speech Spontaneity:  Normal  Mood:  \"depressed\"  Affect:  Appropriate/mood-congruent and Dysphoric  Thought Process (Associations):  Goal directed and Circumstantial  Thought Content:  no overt psychosis, denies suicidal ideation, intent or thoughts and patient does not appear to be responding to internal stimuli  Abnormal Perception:  None  Attention/Concentration:  Normal  Language:  Intact  Insight:  Fair  Judgment:  Adequate for safety      Results     Vital signs: LMP 02/01/2009    Laboratory Data:  no new data    Assessment & Plan      Valencia Ye is seen today for follow up and reports she stopped Sertaline 12. 5mg due to concerns it caused heart palpitation but was not evaluated for this. Since then has been more depressed and increase difficulty functioning.    Diagnosis  Anxiety, Major Depression, Recurrent moderate.    Plan:  Medication: Start Viibryd 10mg daily, may start as 1/4 tablet if patient prefers  OTC Recommendations: none  Lab Orders:  none  Referrals: none  Release of Information: none  Future Treatment Considerations:per response to changes made today.  Return for Follow Up:4 weeks    The risks, benefits, alternatives and side effects have been discussed and are understood by the patient. The patient understands the risks of using street drugs or alcohol. There are no medical contraindications, the patient agrees to treatment, and has the capacity to do so. The patient understands to call 911 or come to the nearest ED if life threatening or urgent symptoms present.  In addition time was spent counseling the patient and/or coordinating care " regarding review of social and occupational functioning.  In addition patient was counseled on health and wellness practices to augment medication treatment of symptoms. See note for details.    Marlyn Flores, APRN CNS 10/13/2017

## 2017-10-16 ENCOUNTER — MYC MEDICAL ADVICE (OUTPATIENT)
Dept: INTERNAL MEDICINE | Facility: CLINIC | Age: 47
End: 2017-10-16

## 2017-10-17 ENCOUNTER — CARE COORDINATION (OUTPATIENT)
Dept: CARDIOLOGY | Facility: CLINIC | Age: 47
End: 2017-10-17

## 2017-10-17 ENCOUNTER — TELEPHONE (OUTPATIENT)
Dept: PSYCHIATRY | Facility: CLINIC | Age: 47
End: 2017-10-17

## 2017-10-17 DIAGNOSIS — R00.2 PALPITATIONS: Primary | ICD-10-CM

## 2017-10-17 NOTE — LETTER
"November 2, 2017      RE: Valencia Ye  [address] [address]  ID: [id number]         To Whom It May Concern,    This request is in response to denial of coverage for Viibryd 20 mg tablet; take 1/2 tablet daily #15 for a 30 day supply.  We received a response that the medication was denied as \"your prescriber has not provided information to show you have been stable on the requested drug for 90 days or more.\"  We kindly ask that you reconsider the decision to deny coverage of this medication as it would be impossible to provide this information as it is a new medication for this patient which was first prescribed at an appointment on 10/13/17.  Viibryd has been prescribed for the treatment of Major Depressive Disorder, recurrent, moderate (F33.1) which is an FDA approved diagnosis for this medication.  Valencia has tried and failed other antidepressants in the past including sertraline, bupropion IR & XR, fluoxetine, fluvoxamine, and mirtazapine.  Past medications were either ineffective in treating her depressive symptoms or caused intolerable side effects such as sedation, anxiety, jaw clenching, and visual disturbance.  We are requesting an expedited review in this matter.  Please feel free to contact the clinic with any questions.        Sincerely,          Griselad Wilkes RN/Marlyn Flores, CNS, APRN    "

## 2017-10-17 NOTE — TELEPHONE ENCOUNTER
-Fax from [facility]  -PA needed for Viibryd 20 mg tabs; take 1/2 daily #15  -Insurance contacted:    PH: [phone]   ID: [id number]  -PA initiated via phone  -Outcome should be known w/in 72 hours

## 2017-10-17 NOTE — PROGRESS NOTES
The following Sina message was received from patient:     Valencia Wilson Ephraim MARY Chatterjee [facility] Heart Team 5        Phone Number: [phone]                     Dr. Sajan Taylor - I saw you this year for a Holter monitor, and then cardiac MRI. I have a long-standing irregular heart beat.  Recently, I was on Zoloft and it was working very well, all side effects were tolerable, until my irregular heartbeat really ramped up - to the point of being very uncomfortable. (Baseline is that I notice the 'normal irregularity' throughout the day, but not scary or very uncomfortable) Stopping Zoloft brought about relief, and when I restarted it, the very uncomfortable, ramped up irregular heart beat resumed, and then stopped when I stopped the Zoloft. My psych provider wasn't convinced that the Zoloft could cause this. I very much want to be able to take the Zoloft, but am very afraid that the heart rhythm issue will start again, and would like to know your opinion on the matter - whether this could be the Zoloft, or perhaps a separate issue with very coincidental timing of occurrences. - Thank you - Valencia       Will route to Dr. Moeller for review.

## 2017-10-18 ASSESSMENT — PATIENT HEALTH QUESTIONNAIRE - PHQ9: SUM OF ALL RESPONSES TO PHQ QUESTIONS 1-9: 17

## 2017-10-30 NOTE — TELEPHONE ENCOUNTER
-Fax from [facility]  -PA denied as pt has not been stable on requested drug for 90 days or more  -Will plan to appeal decision upon return to clinic later this week  -Provider has been notified of initial denial

## 2017-11-02 DIAGNOSIS — R00.2 PALPITATIONS: Primary | ICD-10-CM

## 2017-11-02 NOTE — TELEPHONE ENCOUNTER
-Letter completed with additional information for PA review (see letters tab)  -Faxed to Clinical Review Dept with [facility] at [phone]

## 2017-11-02 NOTE — PROGRESS NOTES
She should have an ECG and a 48 hour holter to start. Also TSH with reflex FT4 and basic chemistries, Magnesium.  We need to know ASAP what her Immunologist did with the information of her possible cardiac involvement and at what interval follow-up is recommended.

## 2017-11-02 NOTE — PROGRESS NOTES
Orders placed for ECG and a 48 hour holter to start. Also TSH with reflex FT4 and basic chemistries, Magnesium.    Call placed to Dr. Butts at [facility].  Spoke with Dr. Bell nurse.  Reviewed details of patients case.  RN reviewed previous conversation regarding patient from 8/4/17 with Team 5 RN.  Dr. Bell RN confirms that MR record was received and message routed to Dr. Butts to review MR report.  No further recommendations or notations made regarding MR report or changes to treatment of Churg-Antonina.  Pt has not been seen by Immunology since 06/2017, she is due for next OV 12/15/17 (pt sees Dr. Butts q6 months). Dr. Butts is currently out of the office, his RN will message Dr. Butts regarding patient and will contact us back; RN Team 5 direct number given to RN.     Called pt to discuss.  Pt confirms she has not seen Dr. Butts since 06/2017.  Reviewed recommendations from Dr. Moeller.  Pt in agreement with plan. Pt call transferred to scheduling to make appts.

## 2017-11-04 DIAGNOSIS — J47.9 BRONCHIECTASIS WITHOUT COMPLICATION (H): ICD-10-CM

## 2017-11-06 NOTE — PROGRESS NOTES
Received voicemail message from Dr. Benjamín low/ [facility]. Attempted to call Dr. Butts back, left message with  that Dr. Moeller nurse is out & advised he call back tomorrow [place] RN

## 2017-11-06 NOTE — TELEPHONE ENCOUNTER
Prednisone       Last Written Prescription Date: 3/23/17  Last Fill Quantity: 200,  # refills: 5   Last Office Visit with [facility] or [facility] prescribing provider: 9/13/17                                         Next 5 appointments (look out 90 days)     Nov 13, 2017  5:00 PM CST   Lucia Chin with Morelia Simental MD   [facility]    [address] [facility] [id number] [phone]            Nov 21, 2017  8:00 AM CST   Return Visit with Florentino Marina PA-C   [facility] ([facility])    [address] [address]  [facility] [address] [phone]

## 2017-11-07 DIAGNOSIS — H92.09 EAR PAIN, UNSPECIFIED LATERALITY: ICD-10-CM

## 2017-11-07 RX ORDER — PREDNISONE 1 MG/1
TABLET ORAL
Qty: 200 TABLET | Refills: 0 | Status: SHIPPED | OUTPATIENT
Start: 2017-11-07 | End: 2017-12-06

## 2017-11-07 NOTE — PROGRESS NOTES
Call received from Dr. Butts at [facility].  Discussed MR results and report from patient of recent increase in palpitations.  Per Dr. Butts, Patient labs does not show eosinophilia however, if MR results show cardiac involvement he prefers to begin treatment.  Treatment would include monthly IV solumedrol.  Dr. Butts states he will contact the patient to discuss symptoms and treatment.  Reviewed upcoming appts with cardiology including BMP, Mg, TSH and 48h holter monitor and OV 11/21/17; Dr. Butts would also recommend CRP, CBC, Troponin and sed rate.  Pt has OV with Dr. Butts in December.  Advised Dr. Butts results from upcoming labs and OV note will be faxed to his office for review ([phone]).  Dr. Butts states if our office has further questions he can be contacted on his cell phone at [phone].     Will route message to Dr. Moeller for review.

## 2017-11-08 NOTE — TELEPHONE ENCOUNTER
-Discuss with provider  -Will notify pt via [employer] of denial and see if she is willing to restart low dose Zoloft  -PA denial letter placed in scanning

## 2017-11-08 NOTE — TELEPHONE ENCOUNTER
-Rec'd denial from [facility] citing same denial reason as previous  -Called insurance at [phone]  -Explained that this is a new medication and if pt unable to pay for medication, as insurance won't cover, it would be difficult for her to be on medication for 90 days  -Per insurance rep medication is excluded from plan and pt would need to get samples or pay out of pocket for 90 days and then medication would likely be covered    -Call to [facility]   -Notified them of denial  -Confirmed pt has not picked up medication  -OOP cost for this medication is $153    -Will notify provider prior to contacting pt

## 2017-11-13 ENCOUNTER — OFFICE VISIT (OUTPATIENT)
Dept: INTERNAL MEDICINE | Facility: CLINIC | Age: 47
End: 2017-11-13
Payer: COMMERCIAL

## 2017-11-13 VITALS
HEIGHT: 63 IN | SYSTOLIC BLOOD PRESSURE: 118 MMHG | BODY MASS INDEX: 19.84 KG/M2 | WEIGHT: 112 LBS | OXYGEN SATURATION: 98 % | TEMPERATURE: 98.3 F | DIASTOLIC BLOOD PRESSURE: 86 MMHG | HEART RATE: 85 BPM | RESPIRATION RATE: 16 BRPM

## 2017-11-13 DIAGNOSIS — F41.9 ANXIETY: ICD-10-CM

## 2017-11-13 DIAGNOSIS — Z79.899 CONTROLLED SUBSTANCE AGREEMENT SIGNED: ICD-10-CM

## 2017-11-13 DIAGNOSIS — F33.41 RECURRENT MAJOR DEPRESSIVE DISORDER, IN PARTIAL REMISSION (H): Primary | ICD-10-CM

## 2017-11-13 PROCEDURE — 99214 OFFICE O/P EST MOD 30 MIN: CPT | Performed by: INTERNAL MEDICINE

## 2017-11-13 RX ORDER — SERTRALINE HYDROCHLORIDE 25 MG/1
25 TABLET, FILM COATED ORAL DAILY
COMMUNITY
End: 2018-02-09

## 2017-11-13 NOTE — NURSING NOTE
"Chief Complaint   Patient presents with     Depression     Would like PCP to manage depression        Initial /86  Pulse 85  Temp 98.3  F (36.8  C) (Oral)  Resp 16  Ht 5' 3\" (1.6 m)  Wt 112 lb (50.8 kg)  LMP 02/01/2009  SpO2 98%  BMI 19.84 kg/m2 Estimated body mass index is 19.84 kg/(m^2) as calculated from the following:    Height as of this encounter: 5' 3\" (1.6 m).    Weight as of this encounter: 112 lb (50.8 kg).  Medication Reconciliation: complete      Joshua Yun, SEGUNDO      "

## 2017-11-13 NOTE — MR AVS SNAPSHOT
After Visit Summary   11/13/2017    Valencia Ye    MRN: [id number]           Patient Information     Date Of Birth          1970        Visit Information        Provider Department      11/13/2017 5:00 PM Morelia Simental MD [facility]        Today's Diagnoses     Recurrent major depressive disorder, in partial remission (H)    -  1    Anxiety        Controlled substance agreement signed           Follow-ups after your visit        Your next 10 appointments already scheduled     Jan 25, 2018  8:00 AM CST   CF LOOP with UC PFL CF   [facility] ([facility])    [address] [place] [address] [phone]            Jan 25, 2018  8:30 AM CST   (Arrive by 8:15 AM)   Return Bronchiectas Non CF with Dagoberto Briscoe MD   [facility] ([facility])    [address] [place] [id number] [phone]              Who to contact     If you have questions or need follow up information about today's clinic visit or your schedule please contact [facility] directly at [phone].  Normal or non-critical lab and imaging results will be communicated to you by MyChart, letter or phone within 4 business days after the clinic has received the results. If you do not hear from us within 7 days, please contact the clinic through [employer] or phone. If you have a critical or abnormal lab result, we will notify you by phone as soon as possible.  Submit refill requests through [employer] or call your pharmacy and they will forward the refill request to us. Please allow 3 business days for your refill to be completed.          Additional Information About Your Visit        MyChart Information     [employer] gives you secure access to your electronic health record. If you see a primary care provider, you can also send messages  "to your care team and make appointments. If you have questions, please call your primary care clinic.  If you do not have a primary care provider, please call [phone] and they will assist you.        Care EveryWhere ID     This is your Care EveryWhere ID. This could be used by other organizations to access your [place] medical records  [id number]        Your Vitals Were     Pulse Temperature Respirations Height Last Period Pulse Oximetry    85 98.3  F (36.8  C) (Oral) 16 5' 3\" (1.6 m) 02/01/2009 98%    BMI (Body Mass Index)                   19.84 kg/m2            Blood Pressure from Last 3 Encounters:   11/13/17 118/86   09/13/17 128/76   08/11/17 122/77    Weight from Last 3 Encounters:   11/13/17 112 lb (50.8 kg)   09/13/17 113 lb 6.4 oz (51.4 kg)   08/11/17 102 lb (46.3 kg)              Today, you had the following     No orders found for display         Today's Medication Changes          These changes are accurate as of: 11/13/17 11:59 PM.  If you have any questions, ask your nurse or doctor.               These medicines have changed or have updated prescriptions.        Dose/Directions    * sertraline 25 MG tablet   Commonly known as:  ZOLOFT   This may have changed:  Another medication with the same name was added. Make sure you understand how and when to take each.   Changed by:  Morelia Simental MD        Dose:  25 mg   Take 25 mg by mouth daily Pt takes 0.5 tablet daily   Refills:  0       * sertraline 25 MG tablet   Commonly known as:  ZOLOFT   This may have changed:  You were already taking a medication with the same name, and this prescription was added. Make sure you understand how and when to take each.   Used for:  Recurrent major depressive disorder, in partial remission (H), Anxiety   Changed by:  Morelia Simental MD        1/2 tablet daily   Quantity:  45 tablet   Refills:  3       * Notice:  This list has 2 medication(s) that are the same as other medications prescribed for you. Read the " directions carefully, and ask your doctor or other care provider to review them with you.      Stop taking these medicines if you haven't already. Please contact your care team if you have questions.     conjugated estrogens cream   Commonly known as:  PREMARIN   Stopped by:  Morelia Simental MD           vilazodone 20 MG Tabs tablet   Commonly known as:  VIIBRYD   Stopped by:  Morelia Simental MD                Where to get your medicines      These medications were sent to [facility] [id number] [place] [address] [address] [facility] [address]  [address] [facility] [address], [facility] [address]     Phone:  [phone]     sertraline 25 MG tablet                Primary Care Provider Office Phone # Fax #    Morelia Simental [phone]       [address] [facility] [address]  [facility] [address]        Equal Access to Services     JAQUELINE [facility] HospitalQUEENIE : Hadii camden cain hadasho Sooliver, waaxda luqadaha, qaybta kaalmada adeegyada, vale osorioin hayalyssa merritt . So [facility] [phone].    ATENCIÓN: Si habla español, tiene a waller disposición servicios gratuitos de asistencia lingüística. Llame al [phone].    We comply with applicable federal civil rights laws and [place] laws. We do not discriminate on the basis of race, color, national origin, age, disability, sex, sexual orientation, or gender identity.            Thank you!     Thank you for choosing [facility]  for your care. Our goal is always to provide you with excellent care. Hearing back from our patients is one way we can continue to improve our services. Please take a few minutes to complete the written survey that you may receive in the mail after your visit with us. Thank you!             Your Updated Medication List - Protect others around you: Learn how to safely use, store and throw away your medicines at www.disposemymeds.org.          This list is accurate as of: 11/13/17 11:59 PM.  Always use your most recent med list.                    Brand Name Dispense Instructions for use Diagnosis    acetylcysteine 20 % nebulizer solution    MUCOMYST    240 mL    Take 4 mLs by nebulization 2 times daily Use with albuterol solution. Discard open bottle of Mucomyst after 96 hours.    Bronchiectasis without complication (H)       albuterol (2.5 MG/3ML) 0.083% neb solution     60 vial    Take 1 vial (2.5 mg) by nebulization 2 times daily Use with Mucomyst    Bronchiectasis without complication (H)       budesonide-formoterol 160-4.5 MCG/ACT Inhaler    SYMBICORT    1 Inhaler    Inhale 2 puffs into the lungs 2 times daily    Churg-Antonina syndrome (H)       cholecalciferol 5000 UNITS Caps      Take by mouth daily        ciprofloxacin-dexamethasone otic suspension    CIPRODEX    7.5 mL    Instill 3 drops into the affected ear twice daily as needed.    Ear pain, unspecified laterality       clonazePAM 0.5 MG tablet    klonoPIN    20 tablet    Take 0.5 tablets (0.25 mg) by mouth 2 times daily as needed for anxiety    Anxiety       fish Oil 1200 MG capsule      Take 3 capsules by mouth daily.        fluticasone 50 MCG/ACT spray    FLONASE    48 mL    SHAKE LIQUID AND USE 2 SPRAYS IN EACH NOSTRIL DAILY    Chronic rhinitis       GLUCOSAMINE CHONDR COMPLEX 500-400 MG Caps per capsule   Generic drug:  glucosamine-chondroitin      Take 2 capsules by mouth daily.        Ipratropium-Albuterol  MCG/ACT inhaler    COMBIVENT RESPIMAT    1 Inhaler    Inhale 1 puff into the lungs 4 times daily Not to exceed 6 doses per day.    Churg-Antonina syndrome (H)       metoprolol 25 MG 24 hr tablet    TOPROL-XL    30 tablet    Take 0.5 tablets (12.5 mg) by mouth daily    Palpitations       NONFORMULARY      400 mg Gymnema Edd (herbal) pt takes 1-2 capsules as needed.        predniSONE 1 MG tablet    DELTASONE    200 tablet    TAKE 5 TABLETS BY MOUTH ONE DAY AND ALTERNATING WITH 6 TABLETS THE NEXT.    Bronchiectasis without complication (H)       PROBIOTIC ADVANCED  PO           * sertraline 25 MG tablet    ZOLOFT     Take 25 mg by mouth daily Pt takes 0.5 tablet daily        * sertraline 25 MG tablet    ZOLOFT    45 tablet    1/2 tablet daily    Recurrent major depressive disorder, in partial remission (H), Anxiety       UNABLE TO FIND      3,600 mg daily MEDICATION NAME: monolaurin        * Notice:  This list has 2 medication(s) that are the same as other medications prescribed for you. Read the directions carefully, and ask your doctor or other care provider to review them with you.

## 2017-11-13 NOTE — LETTER
[facility]    11/13/17    Patient: Valencia Ye  YOB: 1970  Medical Record Number: [id number]                                                                  Controlled Substance Agreement  I understand that my care provider has prescribed controlled substances (narcotics, tranquilizers, and/or stimulants) to help manage my condition(s).  I am taking this medicine to help me function or work.  I know that this is strong medicine.  It could have serious side effects and even cause a dependency on the drug.  If I stop these medicines suddenly, I could have severe withdrawal symptoms.    The risks, benefits, and side effects of these medication(s) were explained to me.  I agree that:  1. I will take part in other treatments as advised by my provider.  This may be psychiatry or counseling, physical therapy, behavioral therapy, group treatment, or a referral to a pain clinic.  I will reduce or stop my medicine when my provider tells me to do so.   2. I will take my medicines as prescribed.  I will not change the dose or schedule unless my provider tells me to.  There will be no refills if I  run out early.   I may be contacted at any time without warning and asked to complete a drug test or pill count.   3. I will keep all my appointments at the clinic.  If I miss appointments or fail to follow instructions, my provider may stop my medicine.  4. I will not ask other providers to prescribe controlled substances. And I will not accept controlled substances from other people. If I need another prescribed controlled substance for a new reason, I will notify my provider within one business day.  5. If I enroll in the [place] Medical Marijuana program, I will tell my provider.  I will also sign an agreement to share my medical records with my provider.  6. I will use one pharmacy to fill all of my controlled substance prescriptions.  If my prescription is mailed to my pharmacy, it may  take 5 to 7 days for my medicine to be ready.  7. I understand that my provider, clinic care team, and pharmacy can track controlled substance prescriptions from other providers through a central database (prescription monitoring program).  8. I will bring in my list of medications (or my medicine bottles) each time I come to the clinic.  REV- 04/2016                                                                                                                                            Page 1 of 2      [facility]    11/13/17    Patient: Valencia Ye  YOB: 1970  Medical Record Number: [id number]    9. Refills of controlled substances will be made only during office hours.  It is up to me to make sure that I do not run out of my medicines on weekends or holidays.    10. I am responsible for my prescriptions.  If the medicine is lost or stolen, it will not be replaced.   I also agree not to share these medicines with anyone.  11. I agree to not use ANY illegal or recreational drugs.  This includes marijuana, cocaine, bath salts or other drugs.  I agree not to use alcohol unless my provider says I may.  I agree to give urine samples whenever asked.  If I fail to give a urine sample, the provider may stop my medicine.     12. I will tell my nurse or provider right away if I become pregnant or have a new medical problem treated outside of [facility].  13. I understand that this medicine can affect my thinking and judgment.  It may be unsafe for me to drive, use machinery and do dangerous tasks.  I will not do any of these things until I know how the medicine affects me.  If my dose changes, I will wait to see how it affects me.  I will contact my provider if I have concerns about medicine side effects.  I understand that if I do not follow any of the conditions above, my prescriptions or treatment may be stopped.    I agree that my provider, clinic care team, and pharmacy may  work with any city, state or federal law enforcement agency that investigates the misuse, sale, or other diversion of my controlled medicine. I will allow my provider to discuss my care with or share a copy of this agreement with any other treating provider, pharmacy or emergency room where I receive care.  I agree to give up (waive) any right of privacy or confidentiality with respect to these authorizations.   I have read this agreement and have asked questions about anything I did not understand.   ___________________________________    ___________________________  Patient Signature                                                           Date and Time  ___________________________________     ____________________________  Witness                                                                            Date and Time  ___________________________________  Morelia Simental MD  REV-  04/2016                                                                                                                                                                 Page 2 of 2

## 2017-11-13 NOTE — PROGRESS NOTES
SUBJECTIVE:   Valencia Ye is a 47 year old female who presents to clinic today for the following health issues:      Depression and Anxiety Follow-Up    Status since last visit: Improved     She is taking zoloft 12.5 mg and feels that is helping a lot. She had previously been on Wellbutrin, had anxiety, Luvox caused side effects,  Prozac caused jaw tightness, Mirtazapine caused rage and Viibrid was not covered.     She uses clonazepam for anxiety but only takes 1/4th of a 0.5 mg tablet most nights. She would like me to manage these medications now. She does not feel she needs any changes.     Other associated symptoms: none    Complicating factors: none    Significant life event: No     Current substance abuse: None    PHQ-9 Score and MyChart F/U Questions 9/6/2017 10/13/2017 11/19/2017   Total Score 19 17 6   Q9: Suicide Ideation Several days Not at all Not at all     ROSIBEL-7 SCORE 1/14/2016 10/20/2016 11/19/2017   Total Score 4 21 9       PHQ-9  English  PHQ-9   Any Language  GAD7  Suicide Assessment Five-step Evaluation and Treatment (SAFE-T)      Amount of exercise or physical activity: 2-3 days/week for an average of 30-45 minutes    Problems taking medications regularly: No    Medication side effects: none    Diet: carbohydrate counting    Other concerns:   She was having some heartburn, stomach upset when she called to make the appointment but those symptoms resolved.     Patient Active Problem List   Diagnosis     Goiter     History of systemic steroid therapy     ILD (interstitial lung disease) (H)     Palpitations     Churg-Antonina syndrome (H)     History of eating disorder     Bronchiectasis (H)     Asthma     Anxiety     Chronic fatigue     Mannose-binding lectin deficiency     Benign essential hypertension     Moderate episode of recurrent major depressive disorder (H)     Bronchiectasis without acute exacerbation (H)     Severe recurrent major depression without psychotic features (H)      Current Outpatient Prescriptions   Medication Sig Dispense Refill     sertraline (ZOLOFT) 25 MG tablet Take 25 mg by mouth daily Pt takes 0.5 tablet daily       NONFORMULARY 400 mg Gymnema Edd (herbal) pt takes 1-2 capsules as needed.       predniSONE (DELTASONE) 1 MG tablet TAKE 5 TABLETS BY MOUTH ONE DAY AND ALTERNATING WITH 6 TABLETS THE NEXT. 200 tablet 0     fluticasone (FLONASE) 50 MCG/ACT spray SHAKE LIQUID AND USE 2 SPRAYS IN EACH NOSTRIL DAILY 48 mL 1     Probiotic Product (PROBIOTIC ADVANCED PO)        metoprolol (TOPROL-XL) 25 MG 24 hr tablet Take 0.5 tablets (12.5 mg) by mouth daily 30 tablet 2     Ipratropium-Albuterol (COMBIVENT RESPIMAT)  MCG/ACT inhaler Inhale 1 puff into the lungs 4 times daily Not to exceed 6 doses per day. 1 Inhaler 11     clonazePAM (KLONOPIN) 0.5 MG tablet Take 0.5 tablets (0.25 mg) by mouth 2 times daily as needed for anxiety 20 tablet 1     acetylcysteine (MUCOMYST) 20 % injection Take 4 mLs by nebulization 2 times daily Use with albuterol solution. Discard open bottle of Mucomyst after 96 hours. 240 mL 11     albuterol (2.5 MG/3ML) 0.083% neb solution Take 1 vial (2.5 mg) by nebulization 2 times daily Use with Mucomyst 60 vial 11     budesonide-formoterol (SYMBICORT) 160-4.5 MCG/ACT Inhaler Inhale 2 puffs into the lungs 2 times daily 1 Inhaler 11     UNABLE TO FIND 3,600 mg daily MEDICATION NAME: monolaurin       ciprofloxacin-dexamethasone (CIPRODEX) otic suspension Instill 3 drops into the affected ear twice daily as needed. 7.5 mL 1     cholecalciferol 5000 UNITS CAPS Take by mouth daily       glucosamine-chondroitin (GLUCOSAMINE CHONDR COMPLEX) 500-400 MG CAPS Take 2 capsules by mouth daily.       Omega-3 Fatty Acids (FISH OIL) 1200 MG capsule Take 3 capsules by mouth daily.        Social History   Substance Use Topics     Smoking status: Never Smoker     Smokeless tobacco: Never Used     Alcohol use No        Reviewed and updated as needed this visit by  "clinical staff     Reviewed and updated as needed this visit by Provider         ROS:  negative    OBJECTIVE:     /86  Pulse 85  Temp 98.3  F (36.8  C) (Oral)  Resp 16  Ht 5' 3\" (1.6 m)  Wt 112 lb (50.8 kg)  LMP 02/01/2009  SpO2 98%  BMI 19.84 kg/m2  Body mass index is 19.84 kg/(m^2).    PHQ-9 SCORE 9/6/2017 10/13/2017 11/19/2017   Total Score - - -   Total Score 19 17 6      ROSIBEL-7 SCORE 1/14/2016 10/20/2016 11/19/2017   Total Score 4 21 9             ASSESSMENT/PLAN:       1. Recurrent major depressive disorder, in partial remission (H)  Improved control of symptoms, advised I can manage this problem, sent renewal to pharmacy  - sertraline (ZOLOFT) 25 MG tablet; 1/2 tablet daily  Dispense: 45 tablet; Refill: 3    2. Anxiety  Advised that the dose of clonazepam is very low so very low risk and she has had so many medication intolerances, will continue to prescribe the clonazepam. Reviewed the CSA and signed, she will call when she needs refill.   - sertraline (ZOLOFT) 25 MG tablet; 1/2 tablet daily  Dispense: 45 tablet; Refill: 3        Morelia Simental MD  [facility]    25 minutes spent with the patient, >50% of time spent counseling about medications, CSA    "

## 2017-11-15 NOTE — PROGRESS NOTES
In reviewing patients chart, patient has since cancelled her OV with cardiology.  Call placed to patient to discuss.  Pt states her palpitations have lessened and she does not feel as though an appointment with cardiology is necessary at this time.  Reviewed with patient conversation with Dr. Butts regarding follow-up recommendations. Pt confirms she does have an upcoming OV with Dr. Butts in December.  Encouraged patient to contact Dr. Bell office to inquire if labs are still recommended and if so, to get them scheduled prior to OV.  Pt states understanding and is in agreement with plan.

## 2017-11-19 PROBLEM — F33.2 SEVERE RECURRENT MAJOR DEPRESSION WITHOUT PSYCHOTIC FEATURES (H): Status: RESOLVED | Noted: 2017-06-19 | Resolved: 2017-11-19

## 2017-11-19 PROBLEM — Z79.899 CONTROLLED SUBSTANCE AGREEMENT SIGNED: Status: ACTIVE | Noted: 2017-11-13

## 2017-11-19 RX ORDER — SERTRALINE HYDROCHLORIDE 25 MG/1
TABLET, FILM COATED ORAL
Qty: 45 TABLET | Refills: 3 | Status: SHIPPED | OUTPATIENT
Start: 2017-11-19 | End: 2018-02-09

## 2017-11-19 ASSESSMENT — ANXIETY QUESTIONNAIRES
7. FEELING AFRAID AS IF SOMETHING AWFUL MIGHT HAPPEN: MORE THAN HALF THE DAYS
2. NOT BEING ABLE TO STOP OR CONTROL WORRYING: SEVERAL DAYS
1. FEELING NERVOUS, ANXIOUS, OR ON EDGE: SEVERAL DAYS
6. BECOMING EASILY ANNOYED OR IRRITABLE: MORE THAN HALF THE DAYS
5. BEING SO RESTLESS THAT IT IS HARD TO SIT STILL: SEVERAL DAYS
GAD7 TOTAL SCORE: 9
IF YOU CHECKED OFF ANY PROBLEMS ON THIS QUESTIONNAIRE, HOW DIFFICULT HAVE THESE PROBLEMS MADE IT FOR YOU TO DO YOUR WORK, TAKE CARE OF THINGS AT HOME, OR GET ALONG WITH OTHER PEOPLE: SOMEWHAT DIFFICULT
3. WORRYING TOO MUCH ABOUT DIFFERENT THINGS: SEVERAL DAYS

## 2017-11-19 ASSESSMENT — PATIENT HEALTH QUESTIONNAIRE - PHQ9
SUM OF ALL RESPONSES TO PHQ QUESTIONS 1-9: 6
5. POOR APPETITE OR OVEREATING: SEVERAL DAYS

## 2017-11-20 ASSESSMENT — ANXIETY QUESTIONNAIRES: GAD7 TOTAL SCORE: 9

## 2017-11-20 ASSESSMENT — ASTHMA QUESTIONNAIRES: ACT_TOTALSCORE: 24

## 2017-12-06 DIAGNOSIS — R00.2 PALPITATIONS: ICD-10-CM

## 2017-12-06 DIAGNOSIS — J47.9 BRONCHIECTASIS WITHOUT COMPLICATION (H): ICD-10-CM

## 2017-12-07 RX ORDER — CIPROFLOXACIN AND DEXAMETHASONE 3; 1 MG/ML; MG/ML
SUSPENSION/ DROPS AURICULAR (OTIC)
Qty: 7.5 ML | Refills: 0 | OUTPATIENT
Start: 2017-12-07

## 2017-12-11 RX ORDER — PREDNISONE 1 MG/1
TABLET ORAL
Qty: 200 TABLET | Refills: 0 | Status: SHIPPED | OUTPATIENT
Start: 2017-12-11 | End: 2018-01-11

## 2017-12-11 RX ORDER — METOPROLOL SUCCINATE 25 MG/1
TABLET, EXTENDED RELEASE ORAL
Qty: 90 TABLET | Refills: 0 | Status: SHIPPED | OUTPATIENT
Start: 2017-12-11 | End: 2018-02-09

## 2017-12-15 ENCOUNTER — MYC MEDICAL ADVICE (OUTPATIENT)
Dept: INTERNAL MEDICINE | Facility: CLINIC | Age: 47
End: 2017-12-15

## 2017-12-15 ENCOUNTER — CARE COORDINATION (OUTPATIENT)
Dept: CARDIOLOGY | Facility: CLINIC | Age: 47
End: 2017-12-15

## 2017-12-15 ENCOUNTER — TRANSFERRED RECORDS (OUTPATIENT)
Dept: HEALTH INFORMATION MANAGEMENT | Facility: CLINIC | Age: 47
End: 2017-12-15

## 2017-12-15 NOTE — PROGRESS NOTES
"Call received from Dr. Sunil Butts at [facility] regarding patient Valencia Ye.  This patient has known Churg-Antonina.  An MR cardiac done 2017 was concerning for cardiac involvement and contact was made with Dr. Butts for recommendations for patient.  Pt followed up with Dr. Butts today and he calls to review his findings and discuss.  Dr. Butts patient \"looked good\" today.  She has no evidence of eosinophilia or systemic disease however, she continues to have irregular heartbeat on his exam.  Other than the MR, patient has no other imaging studies for comparison.  Dr. Butts voices the question of whether or not this is active disease vs scar from a previous flair of the disease. He states it is possible to have Churg-Antonina isolated to only the heart however this usually isn't discovered until after a patient is  so Dr. Butts would like to be proactive in monitoring and attempting to determine if this is active disease or residual from a flair up. Dr. Butts states if the diffuse scar seen on the MR is old, no treatment is necessary however if it is from current active disease there is treatment.  Dr Butts calls today to ask Dr. Moeller' opinion regarding follow-up or repeat imaging to assess if it is active disease or not.  He states at times a PET scan has been helpful in diagnosing active disease.      Of note, per tele enc 17, pt had called with concerns for palpitations.  Dr. Moeller had recommended an EKG, 48 holter monitor and labs; per scheduling notes, pt was contacted to schedule these tests however pt declined stating she was feeling better.     Will route tele enc to Dr. Moeller for review and recommendations.     Dr. Butts's cell phone number to contact is [phone].  "

## 2017-12-21 ENCOUNTER — DOCUMENTATION ONLY (OUTPATIENT)
Dept: CARDIOLOGY | Facility: CLINIC | Age: 47
End: 2017-12-21

## 2017-12-21 DIAGNOSIS — D72.18 CHURG-STRAUSS SYNDROME (H): Primary | ICD-10-CM

## 2017-12-21 DIAGNOSIS — M30.1 CHURG-STRAUSS SYNDROME (H): Primary | ICD-10-CM

## 2017-12-21 DIAGNOSIS — R00.2 PALPITATIONS: ICD-10-CM

## 2017-12-21 DIAGNOSIS — I10 BENIGN ESSENTIAL HYPERTENSION: ICD-10-CM

## 2017-12-22 NOTE — PROGRESS NOTES
Don't worry about asking Bilal- she does need an  FDG-PET scan per my reading as I thought. Could you please ask Divine Garcia how it can be arranged? I looked at orders and it looks as if I can order the injection kit but not a scan (?)

## 2017-12-22 NOTE — PROGRESS NOTES
Could we ask Dr. Ulloa if there is a way to differentiate active vs scar on cMRI- I believe the patient likely needs a cardiac PET scan, however, which would have to be done at the [place]. Can we try to schedule one there?

## 2018-01-04 NOTE — PROGRESS NOTES
"Per Dr. Moeller (12/21/17), \"Don't worry about asking Bilal- she does need an  FDG-PET scan per my reading as I thought. Could you please ask Divine Jose how it can be arranged? I looked at orders and it looks as if I can order the injection kit but not a scan (?)\"    Call placed to Dr. Butts.  Discussed plan for FDG-PET to be completed to differentiate active vs scarring seen on the cMRI.  Dr. Butts in agreement with plan.  Will update Dr. Butts when imaging is completed.  "

## 2018-01-11 DIAGNOSIS — J47.9 BRONCHIECTASIS WITHOUT COMPLICATION (H): ICD-10-CM

## 2018-01-11 NOTE — PROGRESS NOTES
Discussed with imaging director on arranging PET scan for patient.  Orders placed for cardiac viability scan ([id number]) and PET perfusion scan ([id number]).  Outpatient PET scans are completed at [facility] on the [facility].  [facility] has access to Epic however, when orders are placed they unfortunately do not get routed to [facility] to review.  [facility] [phone] to informed them of orders in place.

## 2018-01-11 NOTE — PROGRESS NOTES
Call placed to [facility] to confirm orders.  [facility] staff asked that patient be given contact number to call and schedule PET scan.  Scan will be scheduled at least 1 week out to allow time for PA for imaging from insurance.      Call placed to patient to discuss recommendations.  Pt states understanding and is in agreement with plan.  Pt given contact number for [facility] ([phone]) and states she will call to schedule.

## 2018-01-16 RX ORDER — PREDNISONE 1 MG/1
TABLET ORAL
Qty: 200 TABLET | Refills: 0 | Status: SHIPPED | OUTPATIENT
Start: 2018-01-16 | End: 2018-02-10

## 2018-01-16 NOTE — TELEPHONE ENCOUNTER
Requested Prescriptions   Pending Prescriptions Disp Refills     predniSONE (DELTASONE) 1 MG tablet [Pharmacy Med Name: PREDNISONE 1MG TABLETS] 200 tablet 0     Sig: TAKE 5 TABLETS BY MOUTH ONE DAY AND ALTERNATING WITH 6 TABLETS THE NEXT.    There is no refill protocol information for this order      Last OV: 11/13/17  Routing refill request to provider for review/approval because:  Drug [place] [facility] [place]     Please advise, thanks.

## 2018-01-16 NOTE — PROGRESS NOTES
Call received from patient regarding cardiac PET scan.  Pt states she received a call from her insurance company ([employer]) and was informed that they have denied approval for the cardiac PET scan stating it was not medically necessary.   [employer] informed pt that a [id number] review needed to be completed by Dr. Moeller in attempts to getting PET scan approved.  Reference number is #[id number].  Contact number for [employer] is [phone], option 2.      Will route message to Dr. Moeller for review.

## 2018-01-17 NOTE — PROGRESS NOTES
Call placed to Dr. Butts/[facility] regarding need for [id number] review for authorization of cardiac PET scan.  Discussed with Dr. Butts's nurse who will relay information to Dr. Butts.  Provided nurse with [employer] contact number and case reference number.

## 2018-01-18 NOTE — PROGRESS NOTES
"Faxed received from [employer] regarding prior authorization for cardiac-PET scan.      Per correspondence:     \"On 01/18/18 we determined that a Pet Scan is eligible for coverage under your benefit plan at your in-network benefit level.  You may receive one Pet Scan, provided by Yvrose Moeller MD at [facility] during the following time frame: 01/11/18-12/31/18.      The following reference number has been assigned to this approval: [id number].\"    A copy of this letter was faxed the [facility] at [phone].     Called pt to update her on Prior authorization.      Pt states understanding and has PET scan scheduled for next week.     Will close encounter.       "

## 2018-01-23 ENCOUNTER — RADIANT APPOINTMENT (OUTPATIENT)
Dept: PET IMAGING | Facility: CLINIC | Age: 48
End: 2018-01-23
Attending: INTERNAL MEDICINE
Payer: COMMERCIAL

## 2018-01-23 DIAGNOSIS — D72.18 CHURG-STRAUSS SYNDROME (H): ICD-10-CM

## 2018-01-23 DIAGNOSIS — I10 BENIGN ESSENTIAL HYPERTENSION: ICD-10-CM

## 2018-01-23 DIAGNOSIS — R00.2 PALPITATIONS: ICD-10-CM

## 2018-01-23 DIAGNOSIS — M30.1 CHURG-STRAUSS SYNDROME (H): ICD-10-CM

## 2018-01-23 LAB — GLUCOSE SERPL-MCNC: 98 MG/DL (ref 70–99)

## 2018-01-26 ENCOUNTER — RADIANT APPOINTMENT (OUTPATIENT)
Dept: PET IMAGING | Facility: CLINIC | Age: 48
End: 2018-01-26
Attending: INTERNAL MEDICINE
Payer: COMMERCIAL

## 2018-01-26 ENCOUNTER — CARE COORDINATION (OUTPATIENT)
Dept: CARDIOLOGY | Facility: CLINIC | Age: 48
End: 2018-01-26

## 2018-01-26 DIAGNOSIS — I10 BENIGN ESSENTIAL HYPERTENSION: ICD-10-CM

## 2018-01-26 DIAGNOSIS — D72.18 CHURG-STRAUSS SYNDROME (H): ICD-10-CM

## 2018-01-26 DIAGNOSIS — R00.2 PALPITATIONS: ICD-10-CM

## 2018-01-26 DIAGNOSIS — M30.1 CHURG-STRAUSS SYNDROME (H): ICD-10-CM

## 2018-01-26 DIAGNOSIS — I10 BENIGN ESSENTIAL HYPERTENSION: Primary | ICD-10-CM

## 2018-01-26 NOTE — PROGRESS NOTES
Cardiac PET viability results available:     Impression:   1. Cardiac: No abnormal FDG uptake to suggest active inflammatory  myopathy.  2. Diffusely decreased perfusion in all vascular territories, most  pronounced at the apex. This suggests 3 vessel ischemic disease.   Ejection fraction is at the lower limits of normal at 51 %. Diffuse  hypokinesia and dyskinesia of the inferoapical wall.  3. No abnormal FDG uptake in the rest of the body.        Cardiac PET perfusion results available.  Impression:   1. Cardiac: No abnormal FDG uptake to suggest active inflammatory  myopathy.  2. Diffusely decreased perfusion in all vascular territories, most  pronounced at the apex. This suggests 3 vessel ischemic disease.   Ejection fraction is at the lower limits of normal at 51 %. Diffuse  hypokinesia and dyskinesia of the inferoapical wall.  3. No abnormal FDG uptake in the rest of the body.

## 2018-01-26 NOTE — PROGRESS NOTES
Cardiac PET viability results available:     Impression:   1. Cardiac: No abnormal FDG uptake to suggest active inflammatory  myopathy.  2. Diffusely decreased perfusion in all vascular territories, most  pronounced at the apex. This suggests 3 vessel ischemic disease.   Ejection fraction is at the lower limits of normal at 51 %. Diffuse  hypokinesia and dyskinesia of the inferoapical wall.  3. No abnormal FDG uptake in the rest of the body.        Cardiac PET perfusion results available.  Impression:   1. Cardiac: No abnormal FDG uptake to suggest active inflammatory  myopathy.  2. Diffusely decreased perfusion in all vascular territories, most  pronounced at the apex. This suggests 3 vessel ischemic disease.   Ejection fraction is at the lower limits of normal at 51 %. Diffuse  hypokinesia and dyskinesia of the inferoapical wall.  3. No abnormal FDG uptake in the rest of the body.     Will route to Dr. Moeller for review.

## 2018-01-30 NOTE — PROGRESS NOTES
Discussed results with Dr. Moeller.  Per Dr. Moeller, PET shows no indication of active inflammation related to Churg-ju.  Scarring noted on apex due to previous inflammation; this does place the patient at higher risk for arrhythmias.  Previously pt c/o palpitations, 48-h holter monitor recommended however, palpitations subsided and patient decided to forego holter at that time.  Dr. Moeller suggested if patient is continuing to experience palpitations or they are increasing in frequency, duration, holter monitor should be completed and pt should f/u with Dr. Moeller in clinic to review results.       Call placed to patient to discuss PET scan results.  Pt states understanding.  Discussed increased risk for arrhythmias due to scarring seen on PET scan and recommendations for 48-h holter monitor if palpitations are increasing.  Pt does state that the palpitations seem to have been increasing over the last few years and is agreeable to completing holter at this time.  Pt unable to schedule appt at this time as she is at work.  Number for scheduling given to patient to call at her convenience to schedule 48h holter and OV appt with Dr. Moeller to review results.  Pt offers no further questions at this time.     Call placed to Dr. Butts at [facility] to discuss results.  Dr. Butts aware of results.  Requests progress note faxed to [facility].  Note faxed.      Will close encounter.

## 2018-02-07 ENCOUNTER — MYC MEDICAL ADVICE (OUTPATIENT)
Dept: CARDIOLOGY | Facility: CLINIC | Age: 48
End: 2018-02-07

## 2018-02-07 NOTE — TELEPHONE ENCOUNTER
Pt called back, she would like to see Dr. Moeller sooner than April. Pt scheduled w/ Dr. Moeller 2/16/18 at 9:15. Pt aware of date, time & location. LPenfield RN

## 2018-02-09 ENCOUNTER — OFFICE VISIT (OUTPATIENT)
Dept: PULMONOLOGY | Facility: CLINIC | Age: 48
End: 2018-02-09
Attending: INTERNAL MEDICINE
Payer: COMMERCIAL

## 2018-02-09 VITALS
SYSTOLIC BLOOD PRESSURE: 138 MMHG | WEIGHT: 106.7 LBS | OXYGEN SATURATION: 100 % | HEART RATE: 90 BPM | RESPIRATION RATE: 18 BRPM | BODY MASS INDEX: 19.64 KG/M2 | DIASTOLIC BLOOD PRESSURE: 84 MMHG | HEIGHT: 62 IN

## 2018-02-09 DIAGNOSIS — J47.9 BRONCHIECTASIS WITHOUT ACUTE EXACERBATION (H): Primary | ICD-10-CM

## 2018-02-09 DIAGNOSIS — J47.9 BRONCHIECTASIS WITHOUT COMPLICATION (H): Primary | ICD-10-CM

## 2018-02-09 LAB
EXPTIME-PRE: 7.83 SEC
FEF2575-%PRED-PRE: 61 %
FEF2575-PRE: 1.65 L/SEC
FEF2575-PRED: 2.67 L/SEC
FEFMAX-%PRED-PRE: 90 %
FEFMAX-PRE: 5.93 L/SEC
FEFMAX-PRED: 6.57 L/SEC
FEV1-%PRED-PRE: 107 %
FEV1-PRE: 2.71 L
FEV1FEV6-PRE: 67 %
FEV1FEV6-PRED: 83 %
FEV1FVC-PRE: 67 %
FEV1FVC-PRED: 82 %
FIFMAX-PRE: 4.94 L/SEC
FVC-%PRED-PRE: 132 %
FVC-PRE: 4.04 L
FVC-PRED: 3.06 L

## 2018-02-09 PROCEDURE — G0463 HOSPITAL OUTPT CLINIC VISIT: HCPCS | Mod: ZF

## 2018-02-09 PROCEDURE — 87077 CULTURE AEROBIC IDENTIFY: CPT | Performed by: INTERNAL MEDICINE

## 2018-02-09 PROCEDURE — 87070 CULTURE OTHR SPECIMN AEROBIC: CPT | Performed by: INTERNAL MEDICINE

## 2018-02-09 ASSESSMENT — PAIN SCALES - GENERAL: PAINLEVEL: NO PAIN (0)

## 2018-02-09 NOTE — MR AVS SNAPSHOT
After Visit Summary   2/9/2018    Valencia Ye    MRN: [id number]           Patient Information     Date Of Birth          1970        Visit Information        Provider Department      2/9/2018 8:30 AM Dagoberto Briscoe MD [facility]        Today's Diagnoses     Bronchiectasis (H)    -  1      Care Instructions    Self-Care Plan       RECOMMENDATIONS:   Continue current medication, exercise, nebs and vest therapy.          MRN: [id number]   Clinic Date: February 9, 2018   Patient: Valencia Ye       Pulmonary Function Tests  FEV1: amount of air you can blow out in 1 second  FVC: total amount of air you can take in and blow out    Your Goals:         PFT Latest Ref Rng & Units 2/9/2018   FVC L 4.04   FEV1 L 2.71   FVC% % 132   FEV1% % 107          Airway Clearance: The Most Important Way to Keep Your Lungs Healthy  Vest Settings:    Hill-Rom Frequencies: 8, 9, 10 Pressure 10 Then, Frequencies 18, 19, 20 Pressure 6      RespirTech: Quick Start with Pressure of     Do each frequency for 5 minutes; Deflate vest after each frequency & cough 3 times before beginning the next setting.    Vest and Neb Therapy should be done 1 times/day.      Wt Readings from Last 3 Encounters:   11/13/17 50.8 kg (112 lb)   09/13/17 51.4 kg (113 lb 6.4 oz)   08/11/17 46.3 kg (102 lb)       There is no height or weight on file to calculate BMI.          CF Nurse Line:  Rhonda Moore: [phone]   Carin Persaud RT: [phone]                      Follow-ups after your visit        Follow-up notes from your care team     Return in about 6 months (around 8/9/2018).      Your next 10 appointments already scheduled     Feb 16, 2018  9:15 AM CST   Return Visit with Yvrose Moeller MD   [facility] ([facility])    [address] [place] [address]   [phone]            Mar 09,  2018  3:45 PM CST   Holter Monitor with [facility] DEVICE [facility] (Formerly [facility])    [address]  [facility] [address]   [phone]           LOCATION - [address], [address] [place] [address] **Please check-in at the [place] [facility], [address], in the [facility] building. When you are finished registering, please go to [address] and have a seat.            Apr 02, 2018  3:15 PM CDT   Return Visit with Yvrose Moeller MD   [facility] ([facility])    [address]  [facility] [address]   [phone]            Aug 02, 2018  8:00 AM CDT   PFT VISIT with RADHA PFL KIRSTEN   [facility] Pul[facility] Function Testing ([facility])    [address]  3rd Floor  [facility] [id number] [phone]            Aug 02, 2018  8:30 AM CDT   (Arrive by 8:15 AM)   Return Bronchiectas Non CF with Dagoberto Briscoe MD   [facility] ([facility])    [address]  [address] [place] [id number] [phone]              Future tests that were ordered for you today     Open Standing Orders        Priority Remaining Interval Expires Ordered    Throat Culture Aerobic Bacterial Routine 19/20 2/9/2019 2/9/2018          Open Future Orders        Priority Expected Expires Ordered    RESPIRATORY FLOW VOLUME LOOP Routine 8/9/2018 9/9/2018 2/9/2018            Who to contact     If you have questions or need follow up information about today's clinic visit or your schedule please contact [facility] directly at [phone].  Normal or non-critical lab and imaging results will be communicated to you by MyChart, letter or phone within 4 business days after the clinic has received the results. If you do not hear from us within 7 days,  "please contact the clinic through [employer] or phone. If you have a critical or abnormal lab result, we will notify you by phone as soon as possible.  Submit refill requests through [employer] or call your pharmacy and they will forward the refill request to us. Please allow 3 business days for your refill to be completed.          Additional Information About Your Visit        [email or web address] Information     [employer] gives you secure access to your electronic health record. If you see a primary care provider, you can also send messages to your care team and make appointments. If you have questions, please call your primary care clinic.  If you do not have a primary care provider, please call [phone] and they will assist you.        Care EveryWhere ID     This is your Care EveryWhere ID. This could be used by other organizations to access your [facility] medical records  [id number]        Your Vitals Were     Pulse Respirations Height Last Period Pulse Oximetry BMI (Body Mass Index)    90 18 1.575 m (5' 2.01\") 02/01/2009 100% 19.51 kg/m2       Blood Pressure from Last 3 Encounters:   02/09/18 138/84   11/13/17 118/86   09/13/17 128/76    Weight from Last 3 Encounters:   02/09/18 48.4 kg (106 lb 11.2 oz)   11/13/17 50.8 kg (112 lb)   09/13/17 51.4 kg (113 lb 6.4 oz)                 Today's Medication Changes          These changes are accurate as of 2/9/18  8:56 AM.  If you have any questions, ask your nurse or doctor.               These medicines have changed or have updated prescriptions.        Dose/Directions    metoprolol succinate 25 MG 24 hr tablet   Commonly known as:  TOPROL-XL   This may have changed:  Another medication with the same name was removed. Continue taking this medication, and follow the directions you see here.   Used for:  Palpitations   Changed by:  Dagoberto Briscoe MD        Dose:  12.5 mg   Take 0.5 tablets (12.5 mg) by mouth daily   Quantity:  30 tablet   Refills:  2         Stop taking " these medicines if you haven't already. Please contact your care team if you have questions.     NONFORMULARY   Stopped by:  Dagoberto Briscoe MD           sertraline 25 MG tablet   Commonly known as:  ZOLOFT   Stopped by:  Dagoberto Briscoe MD                    Primary Care Provider Office Phone # Fax #    Morelia Simental [phone]       [address] [facility] [address]  [facility] [address]        Equal Access to Services     [facility]: Hadii aad ku hadasho Soomaali, waaxda luqadaha, qaybta kaalmada adeegyada, waxay idiin hayaan adeeg kharash la'aan . So [facility] [phone].    ATENCIÓN: Si habla español, tiene a waller disposición servicios gratuitos de asistencia lingüística. [facility] [phone].    We comply with applicable federal civil rights laws and [place] laws. We do not discriminate on the basis of race, color, national origin, age, disability, sex, sexual orientation, or gender identity.            Thank you!     Thank you for choosing [facility]  for your care. Our goal is always to provide you with excellent care. Hearing back from our patients is one way we can continue to improve our services. Please take a few minutes to complete the written survey that you may receive in the mail after your visit with us. Thank you!             Your Updated Medication List - Protect others around you: Learn how to safely use, store and throw away your medicines at www.disposemymeds.org.          This list is accurate as of 2/9/18  8:56 AM.  Always use your most recent med list.                   Brand Name Dispense Instructions for use Diagnosis    acetylcysteine 20 % nebulizer solution    MUCOMYST    240 mL    Take 4 mLs by nebulization 2 times daily Use with albuterol solution. Discard open bottle of Mucomyst after 96 hours.    Bronchiectasis without complication (H)       albuterol (2.5 MG/3ML) 0.083% neb solution     60 vial    Take 1 vial (2.5 mg) by  nebulization 2 times daily Use with Mucomyst    Bronchiectasis without complication (H)       budesonide-formoterol 160-4.5 MCG/ACT Inhaler    SYMBICORT    1 Inhaler    Inhale 2 puffs into the lungs 2 times daily    Churg-Antonina syndrome (H)       cholecalciferol 5000 UNITS Caps      Take by mouth daily        ciprofloxacin-dexamethasone otic suspension    CIPRODEX    7.5 mL    Instill 3 drops into the affected ear twice daily as needed.    Ear pain, unspecified laterality       clonazePAM 0.5 MG tablet    klonoPIN    20 tablet    Take 0.5 tablets (0.25 mg) by mouth 2 times daily as needed for anxiety    Anxiety       fish Oil 1200 MG capsule      Take 3 capsules by mouth daily.        fluticasone 50 MCG/ACT spray    FLONASE    48 mL    SHAKE LIQUID AND USE 2 SPRAYS IN EACH NOSTRIL DAILY    Chronic rhinitis       GLUCOSAMINE CHONDR COMPLEX 500-400 MG Caps per capsule   Generic drug:  glucosamine-chondroitin      Take 2 capsules by mouth daily.        Ipratropium-Albuterol  MCG/ACT inhaler    COMBIVENT RESPIMAT    1 Inhaler    Inhale 1 puff into the lungs 4 times daily Not to exceed 6 doses per day.    Churg-Antonina syndrome (H)       metoprolol succinate 25 MG 24 hr tablet    TOPROL-XL    30 tablet    Take 0.5 tablets (12.5 mg) by mouth daily    Palpitations       predniSONE 1 MG tablet    DELTASONE    200 tablet    TAKE 5 TABLETS BY MOUTH ONE DAY AND ALTERNATING WITH 6 TABLETS THE NEXT.    Bronchiectasis without complication (H)       PROBIOTIC ADVANCED PO           UNABLE TO FIND      3,600 mg daily MEDICATION NAME: monolaurin

## 2018-02-09 NOTE — PROGRESS NOTES
Reason for Visit  Valencia Ye is a 46 year old female who is being seen for Bronchiectasis.    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      1. Pulmonary: Cough and exercise tolerance are at baseline.  Previous exacerbation entirely resolved. . PFTs today are WNL , improved since last appointment. She does not appear to be experiencing a pulmonary exacerbation at this time. I have encouraged her to continue her current airway clearance therapy and mediations. Flu vaccine is complete this year.    2. Fatigue: Improved today which the patient contributes to consistent sleep and exercise.    3. Cardiac: Patient recently had PET scan for persistent cardiac arythmia. She is aware that the results were abnormal and will discuss with her cardiologist at her next appointment.    4. Healthcare Coordination: The patient has requested that all of her pulmonary office notes be faxed to Dr. Sunil Butts at [facility], her immunologist who manages her Churg-Antonina syndrome.    I will see the patient in followup in 6 months with PFTs.     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    Today, the patient is doing well and has had no illnesses since her last appointment. She reports an occasional cough producing a small amount of white or clear sputum. She has been doing aerobic dancing every day and can tollerate about 30 minutes without difficulty breathing. She does note that her breathing becomes more difficult during exercise if she has not been dancing for a few weeks. She has been consistently doing her vest therapy for 30 minutes per day. Her fatigue has improved which she has managed with sleep and exercise.      REVIEW OF SYSTEMS:      Constitutional: Appetite is at her baseline.  ENT: No sinus or ear pain, rhinorrhea or sore throat.   CV: Her irregular HR has worsened over the last few years and she is seeing a cardiologist for this.  GI: No nausea, vomiting, diarrhea or abdominal pain.   Psych: Patient notes occasional chest pain attributed to anxiety.  A complete ROS was otherwise negative except as noted in the HPI.    Current Outpatient Prescriptions   Medication     predniSONE (DELTASONE) 1 MG tablet     metoprolol (TOPROL-XL) 25 MG 24 hr tablet     sertraline (ZOLOFT) 25 MG tablet     sertraline (ZOLOFT) 25 MG tablet     NONFORMULARY     fluticasone (FLONASE) 50 MCG/ACT spray     Probiotic Product (PROBIOTIC ADVANCED PO)     metoprolol (TOPROL-XL) 25 MG 24 hr tablet     Ipratropium-Albuterol (COMBIVENT RESPIMAT)  MCG/ACT inhaler     clonazePAM (KLONOPIN) 0.5 MG tablet     acetylcysteine (MUCOMYST) 20 % injection     albuterol (2.5 MG/3ML) 0.083% neb solution     budesonide-formoterol (SYMBICORT) 160-4.5 MCG/ACT Inhaler     UNABLE TO FIND     ciprofloxacin-dexamethasone (CIPRODEX) otic suspension     cholecalciferol 5000 UNITS CAPS     glucosamine-chondroitin (GLUCOSAMINE CHONDR COMPLEX) 500-400 MG CAPS     Omega-3 Fatty Acids (FISH OIL) 1200 MG capsule     No current facility-administered medications for this visit.      Allergies   Allergen Reactions     Colistimethate Anaphylaxis     Colymycin M [Colistimethate Sodium] Anaphylaxis     Tamiflu [Oseltamivir]      Gums Blister up     Clindamycin Rash     Zithromax [Azithromycin]      Heart palpitation     Past Medical History:   Diagnosis Date     Aortitis (H)      Asthma     associated with Churg Antonina syndrome     Churg-Antonina syndrome (H)     dx 1990     Eustachian tube dysfunction      Goiter      Hearing loss, conductive      Hypertension      Hypocalcemia      Irritable bowel syndrome      Major depressive disorder      Mannose-binding lectin deficiency 2014      Nonspecific abnormal results of thyroid function study      Osteoporosis      Pericarditis      and      Pneumonia     4/23/10     Recurrent UTI      Rheumatoid vasculitis (H)      Sinusitis, chronic      Steroid long-term use      Varicose veins of leg with swelling        Past Surgical History:   Procedure Laterality Date      SECTION       COLONOSCOPY Left 2014    Procedure: COMBINED COLONOSCOPY, SINGLE OR MULTIPLE BIOPSY/POLYPECTOMY BY BIOPSY;  Surgeon: Js Pinedo MD;  Location:  GI     ENT SURGERY       HC COLP CERVIX/UPPER VAGINA W LOOP ELEC BX CERVIX       HYSTERECTOMY  2009    without oopherectomy     HYSTERECTOMY, PAP NO LONGER INDICATED      cervix removed      PICC INSERTION  10/10/2013    5fr DL PASV PICC, 43cm (1cm external) in the L basilic vein w/ tip in the low SVC.     PICC INSERTION Left 2017    5fr DL BioFlo PICC, 42cm (3cm external) in the L lateral brachial vein w/ tip in the SVC RA junction.     SINUS SURGERY       TUBAL LIGATION         Social History     Social History     Marital status: Single     Spouse name: N/A     Number of children: N/A     Years of education: N/A     Occupational History     Not on file.     Social History Main Topics     Smoking status: Never Smoker     Smokeless tobacco: Never Used     Alcohol use No     Drug use: No     Sexual activity: Yes     Partners: Male     Other Topics Concern      Service No     Blood Transfusions No     Caffeine Concern No     Occupational Exposure No     Hobby Hazards No     Sleep Concern No     Stress Concern Yes     Weight Concern No     Special Diet Yes     IBS diet     Back Care No     Exercise Not Asked     1 mile power walk 5 days a week at least.      Seat Belt Yes     Self-Exams Yes     Social History Narrative    [employer] in Radiology Department.  .  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  Non smoker. No smokers at home.  Enjoys walking and dancing.   "Alcohol--1 to 2 drinks daily.  Few times a year has more than 4 drinks in a day.  No illicits.                       /84  Pulse 90  Resp 18  Ht 1.575 m (5' 2.01\")  Wt 48.4 kg (106 lb 11.2 oz)  LMP 02/01/2009  SpO2 100%  BMI 19.51 kg/m2    Exam:   GENERAL APPEARANCE: Well developed, well nourished, alert, and in no apparent distress.  EYES: PERRL, EOMI  HENT: Nasal mucosa with mild edema and no hyperemia. No nasal polyps.  EARS: Canals clear, TMs scarred with bilat perforation.  MOUTH: Oral mucosa is moist, without any lesions, no tonsillar enlargement, no oropharyngeal exudate.  NECK: supple, no masses, no thyromegaly.  LYMPHATICS: No significant axillary, cervical, or supraclavicular nodes.  RESP: normal percussion, good air flow throughout. No crackles. No rhonchi. No wheezes.  CV: No ectopic beats. Normal S1, S2, regular rhythm, normal rate. No murmur.  No rub. No gallop. No LE edema.   ABDOMEN:  Bowel sounds normal, soft, nontender, no HSM or masses.   MS: extremities normal. No clubbing. No cyanosis.  SKIN: no rash on limited exam  NEURO: Mentation intact, speech normal, normal strength and tone, normal gait and stance  PSYCH: mentation appears normal. and affect normal/bright  Results:  Recent Results (from the past 168 hour(s))   General PFT Lab (Please always keep checked)    Collection Time: 02/09/18  7:48 AM   Result Value Ref Range    FVC-Pred 3.06 L    FVC-Pre 4.04 L    FVC-%Pred-Pre 132 %    FEV1-Pre 2.71 L    FEV1-%Pred-Pre 107 %    FEV1FVC-Pred 82 %    FEV1FVC-Pre 67 %    FEFMax-Pred 6.57 L/sec    FEFMax-Pre 5.93 L/sec    FEFMax-%Pred-Pre 90 %    FEF2575-Pred 2.67 L/sec    FEF2575-Pre 1.65 L/sec    GZZ4543-%Pred-Pre 61 %    ExpTime-Pre 7.83 sec    FIFMax-Pre 4.94 L/sec    FEV1FEV6-Pred 83 %    FEV1FEV6-Pre 67 %     Results as noted above.    PFT Interpretation:  Normal spirometry.  Increased from previous.  Similar to recent best.  Valid Maneuver      Scribe Disclosure:   I,Davis Umaña, " am serving as a scribe; to document services personally performed by DAGOBERTO PIERRE MD based on data collection and the provider's statements to me.     Provider Disclosure:  I agree with above History, Review of Systems, Physical exam and Plan.  I have reviewed the content of the documentation and have edited it as needed. I have personally performed the services documented here and the documentation accurately represents those services and the decisions I have made.      Electronically signed by:  Dagoberto Pierre

## 2018-02-09 NOTE — LETTER
2/9/2018        RE: Valencia Ye  [address] [facility] [address]     Dear Colleague,    Thank you for referring your patient, Valencia Ye, to the [facility] at [facility]. Please see a copy of my visit note below.    Reason for Visit  Valencia Ye is a 46 year old female who is being seen for Bronchiectasis.    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      1. Pulmonary: Cough and exercise tolerance are at baseline.  Previous exacerbation entirely resolved. . PFTs today are WNL , improved since last appointment. She does not appear to be experiencing a pulmonary exacerbation at this time. I have encouraged her to continue her current airway clearance therapy and mediations. Flu vaccine is complete this year.    2. Fatigue: Improved today which the patient contributes to consistent sleep and exercise.    3. Cardiac: Patient recently had PET scan for persistent cardiac arythmia. She is aware that the results were abnormal and will discuss with her cardiologist at her next appointment.    4. Healthcare Coordination: The patient has requested that all of her pulmonary office notes be faxed to Dr. 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    I will see the patient in followup in 6 months with PFTs.     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    Today, the patient is doing well and has had no illnesses since her last appointment. She reports an occasional cough producing a small amount of white or clear sputum. She has been doing aerobic dancing every day and can tollerate about 30 minutes without  difficulty breathing. She does note that her breathing becomes more difficult during exercise if she has not been dancing for a few weeks. She has been consistently doing her vest therapy for 30 minutes per day. Her fatigue has improved which she has managed with sleep and exercise.      REVIEW OF SYSTEMS:     Constitutional: Appetite is at her baseline.  ENT: No sinus or ear pain, rhinorrhea or sore throat.   CV: Her irregular HR has worsened over the last few years and she is seeing a cardiologist for this.  GI: No nausea, vomiting, diarrhea or abdominal pain.   Psych: Patient notes occasional chest pain attributed to anxiety.  A complete ROS was otherwise negative except as noted in the HPI.    Current Outpatient Prescriptions   Medication     predniSONE (DELTASONE) 1 MG tablet     metoprolol (TOPROL-XL) 25 MG 24 hr tablet     sertraline (ZOLOFT) 25 MG tablet     sertraline (ZOLOFT) 25 MG tablet     NONFORMULARY     fluticasone (FLONASE) 50 MCG/ACT spray     Probiotic Product (PROBIOTIC ADVANCED PO)     metoprolol (TOPROL-XL) 25 MG 24 hr tablet     Ipratropium-Albuterol (COMBIVENT RESPIMAT)  MCG/ACT inhaler     clonazePAM (KLONOPIN) 0.5 MG tablet     acetylcysteine (MUCOMYST) 20 % injection     albuterol (2.5 MG/3ML) 0.083% neb solution     budesonide-formoterol (SYMBICORT) 160-4.5 MCG/ACT Inhaler     UNABLE TO FIND     ciprofloxacin-dexamethasone (CIPRODEX) otic suspension     cholecalciferol 5000 UNITS CAPS     glucosamine-chondroitin (GLUCOSAMINE CHONDR COMPLEX) 500-400 MG CAPS     Omega-3 Fatty Acids (FISH OIL) 1200 MG capsule     No current facility-administered medications for this visit.      Allergies   Allergen Reactions     Colistimethate Anaphylaxis     Colymycin M [Colistimethate Sodium] Anaphylaxis     Tamiflu [Oseltamivir]      Gums Blister up     Clindamycin Rash     Zithromax [Azithromycin]      Heart palpitation     Past Medical History:   Diagnosis Date     Aortitis (H)      Asthma      associated with Churg Antonina syndrome     Churg-Antonina syndrome (H)     dx      Eustachian tube dysfunction      Goiter      Hearing loss, conductive      Hypertension      Hypocalcemia      Irritable bowel syndrome      Major depressive disorder      Mannose-binding lectin deficiency      Nonspecific abnormal results of thyroid function study      Osteoporosis      Pericarditis      and      Pneumonia     4/23/10     Recurrent UTI      Rheumatoid vasculitis (H)      Sinusitis, chronic      Steroid long-term use      Varicose veins of leg with swelling        Past Surgical History:   Procedure Laterality Date      SECTION       COLONOSCOPY Left 2014    Procedure: COMBINED COLONOSCOPY, SINGLE OR MULTIPLE BIOPSY/POLYPECTOMY BY BIOPSY;  Surgeon: Js Pinedo MD;  Location:  GI     ENT SURGERY       HC COLP CERVIX/UPPER VAGINA W LOOP ELEC BX CERVIX       HYSTERECTOMY  2009    without oopherectomy     HYSTERECTOMY, PAP NO LONGER INDICATED      cervix removed      PICC INSERTION  10/10/2013    5fr DL PASV PICC, 43cm (1cm external) in the L basilic vein w/ tip in the low SVC.     PICC INSERTION Left 2017    5fr DL BioFlo PICC, 42cm (3cm external) in the L lateral brachial vein w/ tip in the SVC RA junction.     SINUS SURGERY       TUBAL LIGATION         Social History     Social History     Marital status: Single     Spouse name: N/A     Number of children: N/A     Years of education: N/A     Occupational History     Not on file.     Social History Main Topics     Smoking status: Never Smoker     Smokeless tobacco: Never Used     Alcohol use No     Drug use: No     Sexual activity: Yes     Partners: Male     Other Topics Concern      Service No     Blood Transfusions No     Caffeine Concern No     Occupational Exposure No     Hobby Hazards No     Sleep Concern No     Stress Concern Yes     Weight Concern No     Special Diet Yes     IBS diet     Back Care  "No     Exercise Not Asked     1 mile power walk 5 days a week at least.      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  Non smoker. No smokers at home.  Enjoys walking and dancing.  Alcohol--1 to 2 drinks daily.  Few times a year has more than 4 drinks in a day.  No illicits.                       /84  Pulse 90  Resp 18  Ht 1.575 m (5' 2.01\")  Wt 48.4 kg (106 lb 11.2 oz)  LMP 02/01/2009  SpO2 100%  BMI 19.51 kg/m2    Exam:   GENERAL APPEARANCE: Well developed, well nourished, alert, and in no apparent distress.  EYES: PERRL, EOMI  HENT: Nasal mucosa with mild edema and no hyperemia. No nasal polyps.  EARS: Canals clear, TMs scarred with bilat perforation.  MOUTH: Oral mucosa is moist, without any lesions, no tonsillar enlargement, no oropharyngeal exudate.  NECK: supple, no masses, no thyromegaly.  LYMPHATICS: No significant axillary, cervical, or supraclavicular nodes.  RESP: normal percussion, good air flow throughout. No crackles. No rhonchi. No wheezes.  CV: No ectopic beats. Normal S1, S2, regular rhythm, normal rate. No murmur.  No rub. No gallop. No LE edema.   ABDOMEN:  Bowel sounds normal, soft, nontender, no HSM or masses.   MS: extremities normal. No clubbing. No cyanosis.  SKIN: no rash on limited exam  NEURO: Mentation intact, speech normal, normal strength and tone, normal gait and stance  PSYCH: mentation appears normal. and affect normal/bright  Results:  Recent Results (from the past 168 hour(s))   General PFT Lab (Please always keep checked)    Collection Time: 02/09/18  7:48 AM   Result Value Ref Range    FVC-Pred 3.06 L    FVC-Pre 4.04 L    FVC-%Pred-Pre 132 %    FEV1-Pre 2.71 L    FEV1-%Pred-Pre 107 %    FEV1FVC-Pred 82 %    FEV1FVC-Pre 67 %    FEFMax-Pred 6.57 L/sec    FEFMax-Pre 5.93 L/sec    FEFMax-%Pred-Pre 90 %    FEF2575-Pred 2.67 L/sec    FEF2575-Pre 1.65 L/sec "    RBQ7147-%Pred-Pre 61 %    ExpTime-Pre 7.83 sec    FIFMax-Pre 4.94 L/sec    FEV1FEV6-Pred 83 %    FEV1FEV6-Pre 67 %     Results as noted above.    PFT Interpretation:  Normal spirometry.  Increased from previous.  Similar to recent best.  Valid Maneuver      Scribe Disclosure:   I,Davis Umaña, am serving as a scribe; to document services personally performed by DAGOBERTO PIERRE MD based on data collection and the provider's statements to me.     Provider Disclosure:  I agree with above History, Review of Systems, Physical exam and Plan.  I have reviewed the content of the documentation and have edited it as needed. I have personally performed the services documented here and the documentation accurately represents those services and the decisions I have made.      Electronically signed by:  Dagoberto Pierre

## 2018-02-09 NOTE — PATIENT INSTRUCTIONS
Self-Care Plan       RECOMMENDATIONS:   Continue current medication, exercise, nebs and vest therapy.          MRN: [id number]   Clinic Date: February 9, 2018   Patient: Valencia Ye       Pulmonary Function Tests  FEV1: amount of air you can blow out in 1 second  FVC: total amount of air you can take in and blow out    Your Goals:         PFT Latest Ref Rng & Units 2/9/2018   FVC L 4.04   FEV1 L 2.71   FVC% % 132   FEV1% % 107          Airway Clearance: The Most Important Way to Keep Your Lungs Healthy  Vest Settings:    Hill-Rom Frequencies: 8, 9, 10 Pressure 10 Then, Frequencies 18, 19, 20 Pressure 6      RespirTech: Quick Start with Pressure of     Do each frequency for 5 minutes; Deflate vest after each frequency & cough 3 times before beginning the next setting.    Vest and Neb Therapy should be done 1 times/day.      Wt Readings from Last 3 Encounters:   11/13/17 50.8 kg (112 lb)   09/13/17 51.4 kg (113 lb 6.4 oz)   08/11/17 46.3 kg (102 lb)       There is no height or weight on file to calculate BMI.          CF Nurse Line:  Rhonda Moore: [phone]   Carin Persaud RT: [phone]

## 2018-02-09 NOTE — NURSING NOTE
Chief Complaint   Patient presents with     RECHECK     Bronchiectasis     Des Singh CMA at 8:22 AM on 2/9/2018

## 2018-02-10 DIAGNOSIS — J47.9 BRONCHIECTASIS WITHOUT COMPLICATION (H): ICD-10-CM

## 2018-02-11 LAB
BACTERIA SPEC CULT: ABNORMAL
Lab: ABNORMAL
SPECIMEN SOURCE: ABNORMAL

## 2018-02-12 RX ORDER — PREDNISONE 1 MG/1
TABLET ORAL
Qty: 200 TABLET | Refills: 0 | Status: SHIPPED | OUTPATIENT
Start: 2018-02-12 | End: 2018-03-21

## 2018-02-12 NOTE — TELEPHONE ENCOUNTER
Prednisone      Last Written Prescription Date:  1-16-18  Last Fill Quantity: 200,   # refills: 0  Last Office Visit: 11-13-17  Future Office visit:    Next 5 appointments (look out 90 days)     Feb 16, 2018  9:15 AM CST   Return Visit with Yvrose Moeller MD   [facility] ([facility])    [address]  [facility] [address]   [phone]            Apr 02, 2018  3:15 PM CDT   Return Visit with Yvrose Moeller MD   [facility] ([facility])    [address]  [facility] [address] [phone]                   Routing refill request to provider for review/approval because:  Drug not on the [facility] or [facility] refill protocol or controlled substance    Please advise, thanks.

## 2018-02-16 ENCOUNTER — OFFICE VISIT (OUTPATIENT)
Dept: CARDIOLOGY | Facility: CLINIC | Age: 48
End: 2018-02-16
Payer: COMMERCIAL

## 2018-02-16 VITALS
DIASTOLIC BLOOD PRESSURE: 74 MMHG | BODY MASS INDEX: 20.06 KG/M2 | WEIGHT: 109 LBS | SYSTOLIC BLOOD PRESSURE: 118 MMHG | HEART RATE: 71 BPM | HEIGHT: 62 IN

## 2018-02-16 DIAGNOSIS — R00.2 PALPITATIONS: ICD-10-CM

## 2018-02-16 DIAGNOSIS — Z82.49 FAMILY HISTORY OF CORONARY ARTERY DISEASE: Primary | ICD-10-CM

## 2018-02-16 DIAGNOSIS — D72.18 CHURG-STRAUSS SYNDROME (H): ICD-10-CM

## 2018-02-16 DIAGNOSIS — M30.1 CHURG-STRAUSS SYNDROME (H): ICD-10-CM

## 2018-02-16 PROCEDURE — 99214 OFFICE O/P EST MOD 30 MIN: CPT | Performed by: INTERNAL MEDICINE

## 2018-02-16 PROCEDURE — 93000 ELECTROCARDIOGRAM COMPLETE: CPT | Performed by: INTERNAL MEDICINE

## 2018-02-16 RX ORDER — METOPROLOL SUCCINATE 25 MG/1
12.5 TABLET, EXTENDED RELEASE ORAL DAILY
Qty: 45 TABLET | Refills: 3 | Status: SHIPPED | OUTPATIENT
Start: 2018-02-16 | End: 2019-03-13

## 2018-02-16 NOTE — MR AVS SNAPSHOT
After Visit Summary   2/16/2018    Valencia Ye    MRN: [id number]           Patient Information     Date Of Birth          1970        Visit Information        Provider Department      2/16/2018 9:15 AM Yvrose Moeller MD [facility]   [place]        Today's Diagnoses     Family history of coronary artery disease    -  1    Churg-Antonina syndrome (H)        Palpitations           Follow-ups after your visit        Additional Services     Follow-Up with Electrophysiologist           Follow-Up with Cardiologist                 Your next 10 appointments already scheduled     Mar 09, 2018  3:45 PM CST   Holter Monitor with [facility] DEVICE [facility] ([facility])    [address]  [facility] [address]   [phone]           LOCATION - [address], [address] [place] [address] **Please check-in at the [place] Registration Office, [address], in the [facility] building. When you are finished registering, please go to [address] and have a seat.            Apr 02, 2018  3:15 PM CDT   Return Visit with Yvrose Moeller MD   [facility] ([facility])    [address]  [facility] [address]   [phone]            Aug 02, 2018  8:00 AM CDT   PFT VISIT with  PFL KIRSTEN   [facility] ([facility])    [address]  3rd Floor  [facility] [id number] [phone]            Aug 02, 2018  8:30 AM CDT   (Arrive by 8:15 AM)   Return Bronchiectas Non CF with Dagoberto Briscoe MD   [facility] ([facility])    [address]  [address] [place] [id number] [phone]              Future tests that were ordered for you today     Open Future Orders        Priority Expected  "Expires Ordered    EKG 12-lead complete w/read - Clinics (performed today) Routine 2/16/2018 3/9/2018 2/16/2018    Follow-Up with Cardiologist Routine 3/2/2018 2/16/2020 2/16/2018    Follow-Up with Electrophysiologist Routine 2/16/2018 2/16/2019 2/16/2018    Lipid Profile Routine 2/16/2018 2/16/2019 2/16/2018    Lipoprotein A Routine 2/16/2018 2/16/2019 2/16/2018    CT Angiogram coronary artery Routine 2/16/2018 2/16/2019 2/16/2018            Who to contact     If you have questions or need follow up information about today's clinic visit or your schedule please contact [facility] directly at [phone].  Normal or non-critical lab and imaging results will be communicated to you by MyChart, letter or phone within 4 business days after the clinic has received the results. If you do not hear from us within 7 days, please contact the clinic through [employer] or phone. If you have a critical or abnormal lab result, we will notify you by phone as soon as possible.  Submit refill requests through [employer] or call your pharmacy and they will forward the refill request to us. Please allow 3 business days for your refill to be completed.          Additional Information About Your Visit        [employer] Information     [employer] gives you secure access to your electronic health record. If you see a primary care provider, you can also send messages to your care team and make appointments. If you have questions, please call your primary care clinic.  If you do not have a primary care provider, please call [phone] and they will assist you.        Care EveryWhere ID     This is your Care EveryWhere ID. This could be used by other organizations to access your [place] medical records  [id number]        Your Vitals Were     Pulse Height Last Period BMI (Body Mass Index)          71 1.575 m (5' 2\") 02/01/2009 19.94 kg/m2         Blood Pressure from Last 3 Encounters:   02/16/18 118/74 "   02/09/18 138/84   11/13/17 118/86    Weight from Last 3 Encounters:   02/16/18 49.4 kg (109 lb)   02/09/18 48.4 kg (106 lb 11.2 oz)   11/13/17 50.8 kg (112 lb)              We Performed the Following     EKG 12-lead complete w/read - Clinics (performed today)          Where to get your medicines      These medications were sent to [id number] - [address] AT [facility] [address]  [address], [place] [address]     Phone:  [phone]     metoprolol succinate 25 MG 24 hr tablet          Primary Care Provider Office Phone # Fax #    Morelia Simental [phone]       [address] [facility] [address]  [place] [address]        Equal Access to Services     JACK THOMPSON : Hadii camden trinidado Sooliver, waaxda luqadaha, qaybta kaalmada adebabakyada, vale merritt . So [facility] [phone].    ATENCIÓN: Si habla español, tiene a waller disposición servicios gratuitos de asistencia lingüística. Justynaame al [phone].    We comply with applicable federal civil rights laws and [place] laws. We do not discriminate on the basis of race, color, national origin, age, disability, sex, sexual orientation, or gender identity.            Thank you!     Thank you for choosing [facility]  for your care. Our goal is always to provide you with excellent care. Hearing back from our patients is one way we can continue to improve our services. Please take a few minutes to complete the written survey that you may receive in the mail after your visit with us. Thank you!             Your Updated Medication List - Protect others around you: Learn how to safely use, store and throw away your medicines at www.disposemymeds.org.          This list is accurate as of 2/16/18  9:59 AM.  Always use your most recent med list.                   Brand Name Dispense Instructions for use Diagnosis    acetylcysteine 20 % nebulizer solution     MUCOMYST    240 mL    Take 4 mLs by nebulization 2 times daily Use with albuterol solution. Discard open bottle of Mucomyst after 96 hours.    Bronchiectasis without complication (H)       albuterol (2.5 MG/3ML) 0.083% neb solution     60 vial    Take 1 vial (2.5 mg) by nebulization 2 times daily Use with Mucomyst    Bronchiectasis without complication (H)       budesonide-formoterol 160-4.5 MCG/ACT Inhaler    SYMBICORT    1 Inhaler    Inhale 2 puffs into the lungs 2 times daily    Churg-Antonina syndrome (H)       cholecalciferol 5000 UNITS Caps      Take by mouth daily        ciprofloxacin-dexamethasone otic suspension    CIPRODEX    7.5 mL    Instill 3 drops into the affected ear twice daily as needed.    Ear pain, unspecified laterality       clonazePAM 0.5 MG tablet    klonoPIN    20 tablet    Take 0.5 tablets (0.25 mg) by mouth 2 times daily as needed for anxiety    Anxiety       fish Oil 1200 MG capsule      Take 3 capsules by mouth daily.        fluticasone 50 MCG/ACT spray    FLONASE    48 mL    SHAKE LIQUID AND USE 2 SPRAYS IN EACH NOSTRIL DAILY    Chronic rhinitis       GLUCOSAMINE CHONDR COMPLEX 500-400 MG Caps per capsule   Generic drug:  glucosamine-chondroitin      Take 2 capsules by mouth daily.        Ipratropium-Albuterol  MCG/ACT inhaler    COMBIVENT RESPIMAT    1 Inhaler    Inhale 1 puff into the lungs 4 times daily Not to exceed 6 doses per day.    Churg-Antonina syndrome (H)       metoprolol succinate 25 MG 24 hr tablet    TOPROL-XL    45 tablet    Take 0.5 tablets (12.5 mg) by mouth daily    Palpitations       predniSONE 1 MG tablet    DELTASONE    200 tablet    TAKE 5 TABLETS BY MOUTH ONE DAY AND ALTERNATING WITH 6 TABLETS THE NEXT.    Bronchiectasis without complication (H)       PROBIOTIC ADVANCED PO           UNABLE TO FIND      3,600 mg daily MEDICATION NAME: monolaurin

## 2018-02-16 NOTE — LETTER
2/16/2018    Morelia Simental MD  [address] [facility] [address]  [facility] [address]    RE: Valencia Ye       Dear Colleague,    I had the pleasure of seeing Valencia Ye in the [facility].    DIAGNOSES:      Encounter Diagnoses   Name Primary?     Family history of coronary artery disease Yes     Churg-Antonina syndrome (H)      Palpitations          [id number]  See dictation        MEDICATIONS:    Current Outpatient Prescriptions   Medication Sig Dispense Refill     metoprolol succinate (TOPROL-XL) 25 MG 24 hr tablet Take 0.5 tablets (12.5 mg) by mouth daily 45 tablet 3     predniSONE (DELTASONE) 1 MG tablet TAKE 5 TABLETS BY MOUTH ONE DAY AND ALTERNATING WITH 6 TABLETS THE NEXT. 200 tablet 0     fluticasone (FLONASE) 50 MCG/ACT spray SHAKE LIQUID AND USE 2 SPRAYS IN EACH NOSTRIL DAILY 48 mL 1     Probiotic Product (PROBIOTIC ADVANCED PO)        Ipratropium-Albuterol (COMBIVENT RESPIMAT)  MCG/ACT inhaler Inhale 1 puff into the lungs 4 times daily Not to exceed 6 doses per day. 1 Inhaler 11     clonazePAM (KLONOPIN) 0.5 MG tablet Take 0.5 tablets (0.25 mg) by mouth 2 times daily as needed for anxiety 20 tablet 1     acetylcysteine (MUCOMYST) 20 % injection Take 4 mLs by nebulization 2 times daily Use with albuterol solution. Discard open bottle of Mucomyst after 96 hours. 240 mL 11     albuterol (2.5 MG/3ML) 0.083% neb solution Take 1 vial (2.5 mg) by nebulization 2 times daily Use with Mucomyst 60 vial 11     budesonide-formoterol (SYMBICORT) 160-4.5 MCG/ACT Inhaler Inhale 2 puffs into the lungs 2 times daily 1 Inhaler 11     UNABLE TO FIND 3,600 mg daily MEDICATION NAME: monolaurin       ciprofloxacin-dexamethasone (CIPRODEX) otic suspension Instill 3 drops into the affected ear twice daily as needed. 7.5 mL 1     cholecalciferol 5000 UNITS CAPS Take by mouth daily       glucosamine-chondroitin (GLUCOSAMINE CHONDR COMPLEX) 500-400 MG CAPS Take 2 capsules by mouth daily.        Omega-3 Fatty Acids (FISH OIL) 1200 MG capsule Take 3 capsules by mouth daily.       [DISCONTINUED] metoprolol (TOPROL-XL) 25 MG 24 hr tablet Take 0.5 tablets (12.5 mg) by mouth daily 30 tablet 2         ALLERGIES     Allergies   Allergen Reactions     Colistimethate Anaphylaxis     Colymycin M [Colistimethate Sodium] Anaphylaxis     Tamiflu [Oseltamivir]      Gums Blister up     Clindamycin Rash     Zithromax [Azithromycin]      Heart palpitation       PAST MEDICAL HISTORY:  Past Medical History:   Diagnosis Date     Aortitis (H)      Asthma     associated with Churg Antonina syndrome     Churg-Antonina syndrome (H)     dx      Eustachian tube dysfunction      Goiter      Hearing loss, conductive      Hypertension      Hypocalcemia      Irritable bowel syndrome      Major depressive disorder      Mannose-binding lectin deficiency      Nonspecific abnormal results of thyroid function study      Osteoporosis      Pericarditis      and      Pneumonia     4/23/10     Recurrent UTI      Rheumatoid vasculitis (H)      Sinusitis, chronic      Steroid long-term use      Varicose veins of leg with swelling        PAST SURGICAL HISTORY:  Past Surgical History:   Procedure Laterality Date      SECTION       COLONOSCOPY Left 2014    Procedure: COMBINED COLONOSCOPY, SINGLE OR MULTIPLE BIOPSY/POLYPECTOMY BY BIOPSY;  Surgeon: Js Pinedo MD;  Location:  GI     ENT SURGERY       HC COLP CERVIX/UPPER VAGINA W LOOP ELEC BX CERVIX       HYSTERECTOMY  2009    without oopherectomy     HYSTERECTOMY, PAP NO LONGER INDICATED      cervix removed      PICC INSERTION  10/10/2013    5fr DL PASV PICC, 43cm (1cm external) in the L basilic vein w/ tip in the low SVC.     PICC INSERTION Left 2017    5fr DL BioFlo PICC, 42cm (3cm external) in the L lateral brachial vein w/ tip in the SVC RA junction.     SINUS SURGERY       TUBAL LIGATION         FAMILY HISTORY:  Family History    Problem Relation Age of Onset     Allergies Mother      Seasonal     Alcohol/Drug Mother      C.A.D. Father      DIABETES Father      Type 2     C.A.D. Maternal Grandmother      Arthritis Maternal Grandmother      Musculoskeletal Disorder Maternal Grandmother      MS     Alcohol/Drug Maternal Grandfather      Asthma Son      Asthma Daughter      Allergies Daughter      Seasonal     Unknown/Adopted Paternal Grandmother      Unknown/Adopted Paternal Grandfather      CANCER Maternal Aunt      breast age 53     Breast Cancer Maternal Aunt      in her 50s       SOCIAL HISTORY:  Social History     Social History     Marital status: Single     Spouse name: N/A     Number of children: N/A     Years of education: N/A     Social History Main Topics     Smoking status: Never Smoker     Smokeless tobacco: Never Used     Alcohol use No     Drug use: No     Sexual activity: Yes     Partners: Male     Other Topics Concern      Service No     Blood Transfusions No     Caffeine Concern No     Occupational Exposure No     Hobby Hazards No     Sleep Concern No     Stress Concern Yes     Weight Concern No     Special Diet Yes     IBS diet     Back Care No     Exercise Not Asked     1 mile power walk 5 days a week at least.      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  Non smoker. No smokers at home.  Enjoys walking and dancing.  Alcohol--1 to 2 drinks daily.  Few times a year has more than 4 drinks in a day.  No illicits.                       Review of Systems:  Skin:  Negative     Eyes:  Positive for glasses  ENT:  not assessed    Respiratory:  Negative    Cardiovascular:    Positive for;palpitations;fatigue  Gastroenterology: Positive for    Genitourinary:  Negative    Musculoskeletal:  Negative    Neurologic:  Positive for    Psychiatric:  Positive for anxiety;depression  Heme/Lymph/Imm:  Negative   "  Endocrine:  Positive for thyroid disorder    Physical Exam:  Vitals: /74  Pulse 71  Ht 1.575 m (5' 2\")  Wt 49.4 kg (109 lb)  LMP 02/01/2009  BMI 19.94 kg/m2    Constitutional:  cooperative, alert and oriented, well developed, well nourished, in no acute distress        Skin:  warm and dry to the touch, no apparent skin lesions or masses noted        Head:  normocephalic, no masses or lesions        Eyes:  pupils equal and round;conjunctivae and lids unremarkable;sclera white;no xanthalasma;EOMS intact        ENT:  no pallor or cyanosis, dentition good        Neck:  carotid pulses are full and equal bilaterally;JVP normal;no carotid bruit        Chest:  normal breath sounds, clear to auscultation, normal A-P diameter, normal symmetry, normal respiratory excursion, no use of accessory muscles        Cardiac: regular rhythm, normal S1/S2, no S3 or S4, apical impulse not displaced, no murmurs, gallops or rubs       holosystolic murmur          Abdomen:  abdomen soft;BS normoactive        Vascular: pulses full and equal                                        Extremities and Back:  no edema;no deformities, clubbing, cyanosis, erythema observed              Neurological:  no gross motor deficits          ASSESSMENT AND PLAN:    See dictation    ORDERS AT TODAY'S VISIT:    Orders Placed This Encounter   Procedures     CT Angiogram coronary artery     Lipid Profile     Lipoprotein A     Follow-Up with Electrophysiologist     Follow-Up with Cardiologist     EKG 12-lead complete w/read - Clinics (performed today)     EKG 12-lead complete w/read - Clinics (performed today)           CC  No referring provider defined for this encounter.              Thank you for allowing me to participate in the care of your patient.      Sincerely,     Yvrose Moeller MD     [facility]    cc:   No referring provider defined for this encounter.        "

## 2018-02-16 NOTE — PROGRESS NOTES
DIAGNOSES:      Encounter Diagnoses   Name Primary?     Family history of coronary artery disease Yes     Churg-Antonina syndrome (H)      Palpitations          [id number]  See dictation        MEDICATIONS:    Current Outpatient Prescriptions   Medication Sig Dispense Refill     metoprolol succinate (TOPROL-XL) 25 MG 24 hr tablet Take 0.5 tablets (12.5 mg) by mouth daily 45 tablet 3     predniSONE (DELTASONE) 1 MG tablet TAKE 5 TABLETS BY MOUTH ONE DAY AND ALTERNATING WITH 6 TABLETS THE NEXT. 200 tablet 0     fluticasone (FLONASE) 50 MCG/ACT spray SHAKE LIQUID AND USE 2 SPRAYS IN EACH NOSTRIL DAILY 48 mL 1     Probiotic Product (PROBIOTIC ADVANCED PO)        Ipratropium-Albuterol (COMBIVENT RESPIMAT)  MCG/ACT inhaler Inhale 1 puff into the lungs 4 times daily Not to exceed 6 doses per day. 1 Inhaler 11     clonazePAM (KLONOPIN) 0.5 MG tablet Take 0.5 tablets (0.25 mg) by mouth 2 times daily as needed for anxiety 20 tablet 1     acetylcysteine (MUCOMYST) 20 % injection Take 4 mLs by nebulization 2 times daily Use with albuterol solution. Discard open bottle of Mucomyst after 96 hours. 240 mL 11     albuterol (2.5 MG/3ML) 0.083% neb solution Take 1 vial (2.5 mg) by nebulization 2 times daily Use with Mucomyst 60 vial 11     budesonide-formoterol (SYMBICORT) 160-4.5 MCG/ACT Inhaler Inhale 2 puffs into the lungs 2 times daily 1 Inhaler 11     UNABLE TO FIND 3,600 mg daily MEDICATION NAME: monolaurin       ciprofloxacin-dexamethasone (CIPRODEX) otic suspension Instill 3 drops into the affected ear twice daily as needed. 7.5 mL 1     cholecalciferol 5000 UNITS CAPS Take by mouth daily       glucosamine-chondroitin (GLUCOSAMINE CHONDR COMPLEX) 500-400 MG CAPS Take 2 capsules by mouth daily.       Omega-3 Fatty Acids (FISH OIL) 1200 MG capsule Take 3 capsules by mouth daily.       [DISCONTINUED] metoprolol (TOPROL-XL) 25 MG 24 hr tablet Take 0.5 tablets (12.5 mg) by mouth daily 30 tablet 2         ALLERGIES      Allergies   Allergen Reactions     Colistimethate Anaphylaxis     Colymycin M [Colistimethate Sodium] Anaphylaxis     Tamiflu [Oseltamivir]      Gums Blister up     Clindamycin Rash     Zithromax [Azithromycin]      Heart palpitation       PAST MEDICAL HISTORY:  Past Medical History:   Diagnosis Date     Aortitis (H)      Asthma     associated with Churg Antonina syndrome     Churg-Antonina syndrome (H)     dx      Eustachian tube dysfunction      Goiter      Hearing loss, conductive      Hypertension      Hypocalcemia      Irritable bowel syndrome      Major depressive disorder      Mannose-binding lectin deficiency      Nonspecific abnormal results of thyroid function study      Osteoporosis      Pericarditis      and      Pneumonia     4/23/10     Recurrent UTI      Rheumatoid vasculitis (H)      Sinusitis, chronic      Steroid long-term use      Varicose veins of leg with swelling        PAST SURGICAL HISTORY:  Past Surgical History:   Procedure Laterality Date      SECTION       COLONOSCOPY Left 2014    Procedure: COMBINED COLONOSCOPY, SINGLE OR MULTIPLE BIOPSY/POLYPECTOMY BY BIOPSY;  Surgeon: Js Pinedo MD;  Location:  GI     ENT SURGERY       HC COLP CERVIX/UPPER VAGINA W LOOP ELEC BX CERVIX       HYSTERECTOMY  2009    without oopherectomy     HYSTERECTOMY, PAP NO LONGER INDICATED      cervix removed      PICC INSERTION  10/10/2013    5fr DL PASV PICC, 43cm (1cm external) in the L basilic vein w/ tip in the low SVC.     PICC INSERTION Left 2017    5fr DL BioFlo PICC, 42cm (3cm external) in the L lateral brachial vein w/ tip in the SVC RA junction.     SINUS SURGERY       TUBAL LIGATION         FAMILY HISTORY:  Family History   Problem Relation Age of Onset     Allergies Mother      Seasonal     Alcohol/Drug Mother      C.A.D. Father      DIABETES Father      Type 2     C.A.D. Maternal Grandmother      Arthritis Maternal Grandmother       "Musculoskeletal Disorder Maternal Grandmother      MS     Alcohol/Drug Maternal Grandfather      Asthma Son      Asthma Daughter      Allergies Daughter      Seasonal     Unknown/Adopted Paternal Grandmother      Unknown/Adopted Paternal Grandfather      CANCER Maternal Aunt      breast age 53     Breast Cancer Maternal Aunt      in her 50s       SOCIAL HISTORY:  Social History     Social History     Marital status: Single     Spouse name: N/A     Number of children: N/A     Years of education: N/A     Social History Main Topics     Smoking status: Never Smoker     Smokeless tobacco: Never Used     Alcohol use No     Drug use: No     Sexual activity: Yes     Partners: Male     Other Topics Concern      Service No     Blood Transfusions No     Caffeine Concern No     Occupational Exposure No     Hobby Hazards No     Sleep Concern No     Stress Concern Yes     Weight Concern No     Special Diet Yes     IBS diet     Back Care No     Exercise Not Asked     1 mile power walk 5 days a week at least.      Seat Belt Yes     Self-Exams Yes     Social History Narrative    [employer] in Radiology Department.  .  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  Non smoker. No smokers at home.  Enjoys walking and dancing.  Alcohol--1 to 2 drinks daily.  Few times a year has more than 4 drinks in a day.  No illicits.                       Review of Systems:  Skin:  Negative     Eyes:  Positive for glasses  ENT:  not assessed    Respiratory:  Negative    Cardiovascular:    Positive for;palpitations;fatigue  Gastroenterology: Positive for    Genitourinary:  Negative    Musculoskeletal:  Negative    Neurologic:  Positive for    Psychiatric:  Positive for anxiety;depression  Heme/Lymph/Imm:  Negative    Endocrine:  Positive for thyroid disorder    Physical Exam:  Vitals: /74  Pulse 71  Ht 1.575 m (5' 2\")  Wt 49.4 kg (109 lb)  LMP 02/01/2009  BMI 19.94 kg/m2    Constitutional:  cooperative, alert and oriented, " well developed, well nourished, in no acute distress        Skin:  warm and dry to the touch, no apparent skin lesions or masses noted        Head:  normocephalic, no masses or lesions        Eyes:  pupils equal and round;conjunctivae and lids unremarkable;sclera white;no xanthalasma;EOMS intact        ENT:  no pallor or cyanosis, dentition good        Neck:  carotid pulses are full and equal bilaterally;JVP normal;no carotid bruit        Chest:  normal breath sounds, clear to auscultation, normal A-P diameter, normal symmetry, normal respiratory excursion, no use of accessory muscles        Cardiac: regular rhythm, normal S1/S2, no S3 or S4, apical impulse not displaced, no murmurs, gallops or rubs       holosystolic murmur          Abdomen:  abdomen soft;BS normoactive        Vascular: pulses full and equal                                        Extremities and Back:  no edema;no deformities, clubbing, cyanosis, erythema observed              Neurological:  no gross motor deficits          ASSESSMENT AND PLAN:    See dictation    ORDERS AT TODAY'S VISIT:    Orders Placed This Encounter   Procedures     CT Angiogram coronary artery     Lipid Profile     Lipoprotein A     Follow-Up with Electrophysiologist     Follow-Up with Cardiologist     EKG 12-lead complete w/read - Clinics (performed today)     EKG 12-lead complete w/read - Clinics (performed today)           CC  No referring provider defined for this encounter.

## 2018-02-18 NOTE — PROGRESS NOTES
Service Date: 02/16/2018      HISTORY OF PRESENT ILLNESS: Mr. Katie Ye is very interesting and pleasant 47-year-old lady who comes in routine followup.  She has a history of Churg-Antonina vasculitis, Mannose-binding lectin hypertension, interstitial lung disease, and asthma.  She has had a pericardial effusion and probable pericarditis in the past.  We had evaluated the patient with a number of tests in response to complaints of fatigue with exertion and difficulty with exercising.  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  PET scan was subsequently performed which demonstrated an actually globally reduced perfusion which was described as concerning for multivessel coronary disease but frankly potentially related to her scarring.  Abnormal wall motion was described, however, gating was likely problematic with the patient's frequent ectopy.  We had ordered a Holter monitor, the results of which are not yet available.  Ms. Katie Ye's last Holter in 07/2017 demonstrated a short run of SVT, otherwise a low burden of ventricular ectopy.      Ms. Katie Ye describes chest pain lasting seconds to a few minutes at a time.  It is experienced as a pressure or ache in the center of her chest radiating to her back and shoulders, usually onset at rest.  She can do a half hour of aerobics more days than not, with no limitations and no reproduction of the chest discomfort.  She denies tobacco abuse, but does have a family history of heart disease with her father having had a myocardial infarction at the age of 50-51 with stents implanted.  She denies diabetes but is hypertensive.  She is not dyslipidemic.      Of note, she still has palpitations throughout the day.  She reduces her caffeine intake and cannot find a clear  correlation at this point.  They are nonexertional.      ASSESSMENT AND PLAN:  A pleasant 47-year-old lady with Churg-Antonina vasculitis with cardiac involvement as outlined above.  It does not appear to be active on PET scanning.  There was a comment on the PET scan that her diffuse decreased perfusion could be related to multivessel coronary disease.  It is most likely related to her eosinophilic myocarditis, however, she also has ongoing risk factors of hypertension and premature coronary disease history in her family, as well as chest discomfort, albeit atypical.  In the light of this, I would recommend a CT coronary angiogram for definitive evaluation.  I have suggested she take a full metoprolol rather than half the night before her CT as she takes her medications at night.      From the standpoint of overall risk stratification, we will check a lipoprotein(a) and lipids and make further recommendations pending the course as well.      Given her ongoing palpitations, I do think it would be reasonable to see Electrophysiology once for evaluation in the setting of her Churg-Antonina vasculitis and cardiac infiltrates which likely cause areas of potential arrhythmia generation as scar.      We will see her back in followup of the above and make further recommendations at that time.      It was a pleasure being involved in her care.  Ms. Katie Ye has quite a number of very astute questions regarding her course and expected course and I think it would be helpful to get further information from the CT coronary angiogram for more definitive prognostic information.      Total time is 45 minutes, 30 in coordination, care and counseling.      Scarlett Moeller MD      cc:   Morelia Simental MD   [facility] [address] [place] [address]         SCARLETT MOELLER MD             D: 02/18/2018   T: 02/18/2018   MT: al      Name:     NABOR BUENROSTRO   MRN:      [id number]         Account:      [id number]   :      1970           Service Date: 2018      Document: [id number]

## 2018-02-19 ENCOUNTER — HOSPITAL ENCOUNTER (EMERGENCY)
Facility: CLINIC | Age: 48
Discharge: HOME | End: 2018-02-19
Attending: EMERGENCY MEDICINE | Admitting: EMERGENCY MEDICINE
Payer: COMMERCIAL

## 2018-02-19 ENCOUNTER — CARE COORDINATION (OUTPATIENT)
Dept: CARDIOLOGY | Facility: CLINIC | Age: 48
End: 2018-02-19

## 2018-02-19 ENCOUNTER — APPOINTMENT (OUTPATIENT)
Dept: GENERAL RADIOLOGY | Facility: CLINIC | Age: 48
End: 2018-02-19
Attending: EMERGENCY MEDICINE
Payer: COMMERCIAL

## 2018-02-19 VITALS
RESPIRATION RATE: 16 BRPM | WEIGHT: 108.47 LBS | OXYGEN SATURATION: 100 % | BODY MASS INDEX: 19.96 KG/M2 | SYSTOLIC BLOOD PRESSURE: 128 MMHG | HEIGHT: 62 IN | HEART RATE: 65 BPM | DIASTOLIC BLOOD PRESSURE: 82 MMHG | TEMPERATURE: 97.8 F

## 2018-02-19 DIAGNOSIS — R07.9 CHEST PAIN, UNSPECIFIED TYPE: ICD-10-CM

## 2018-02-19 LAB
ANION GAP SERPL CALCULATED.3IONS-SCNC: 6 MMOL/L (ref 3–14)
BASOPHILS # BLD AUTO: 0.1 10E9/L (ref 0–0.2)
BASOPHILS NFR BLD AUTO: 1 %
BUN SERPL-MCNC: 8 MG/DL (ref 7–30)
CALCIUM SERPL-MCNC: 8.3 MG/DL (ref 8.5–10.1)
CHLORIDE SERPL-SCNC: 109 MMOL/L (ref 94–109)
CO2 SERPL-SCNC: 26 MMOL/L (ref 20–32)
CREAT SERPL-MCNC: 0.56 MG/DL (ref 0.52–1.04)
DIFFERENTIAL METHOD BLD: NORMAL
EOSINOPHIL # BLD AUTO: 0.2 10E9/L (ref 0–0.7)
EOSINOPHIL NFR BLD AUTO: 2.8 %
ERYTHROCYTE [DISTWIDTH] IN BLOOD BY AUTOMATED COUNT: 13.1 % (ref 10–15)
GFR SERPL CREATININE-BSD FRML MDRD: >90 ML/MIN/1.7M2
GLUCOSE SERPL-MCNC: 85 MG/DL (ref 70–99)
HCT VFR BLD AUTO: 39.3 % (ref 35–47)
HGB BLD-MCNC: 13 G/DL (ref 11.7–15.7)
IMM GRANULOCYTES # BLD: 0 10E9/L (ref 0–0.4)
IMM GRANULOCYTES NFR BLD: 0.2 %
LYMPHOCYTES # BLD AUTO: 1.2 10E9/L (ref 0.8–5.3)
LYMPHOCYTES NFR BLD AUTO: 18.8 %
MCH RBC QN AUTO: 32.8 PG (ref 26.5–33)
MCHC RBC AUTO-ENTMCNC: 33.1 G/DL (ref 31.5–36.5)
MCV RBC AUTO: 99 FL (ref 78–100)
MONOCYTES # BLD AUTO: 0.3 10E9/L (ref 0–1.3)
MONOCYTES NFR BLD AUTO: 4.6 %
NEUTROPHILS # BLD AUTO: 4.5 10E9/L (ref 1.6–8.3)
NEUTROPHILS NFR BLD AUTO: 72.6 %
NRBC # BLD AUTO: 0 10*3/UL
NRBC BLD AUTO-RTO: 0 /100
PLATELET # BLD AUTO: 283 10E9/L (ref 150–450)
POTASSIUM SERPL-SCNC: 3.8 MMOL/L (ref 3.4–5.3)
RBC # BLD AUTO: 3.96 10E12/L (ref 3.8–5.2)
SODIUM SERPL-SCNC: 140 MMOL/L (ref 133–144)
TROPONIN I SERPL-MCNC: <0.015 UG/L (ref 0–0.04)
WBC # BLD AUTO: 6.1 10E9/L (ref 4–11)

## 2018-02-19 PROCEDURE — 99285 EMERGENCY DEPT VISIT HI MDM: CPT | Mod: 25 | Performed by: EMERGENCY MEDICINE

## 2018-02-19 PROCEDURE — 84484 ASSAY OF TROPONIN QUANT: CPT | Performed by: EMERGENCY MEDICINE

## 2018-02-19 PROCEDURE — 85025 COMPLETE CBC W/AUTO DIFF WBC: CPT | Performed by: EMERGENCY MEDICINE

## 2018-02-19 PROCEDURE — 80048 BASIC METABOLIC PNL TOTAL CA: CPT | Performed by: EMERGENCY MEDICINE

## 2018-02-19 PROCEDURE — 93005 ELECTROCARDIOGRAM TRACING: CPT | Performed by: EMERGENCY MEDICINE

## 2018-02-19 PROCEDURE — 71046 X-RAY EXAM CHEST 2 VIEWS: CPT

## 2018-02-19 PROCEDURE — 93010 ELECTROCARDIOGRAM REPORT: CPT | Performed by: EMERGENCY MEDICINE

## 2018-02-19 ASSESSMENT — ENCOUNTER SYMPTOMS
PALPITATIONS: 0
COUGH: 0
VOMITING: 0
WHEEZING: 0
FEVER: 0
CHILLS: 0
SHORTNESS OF BREATH: 0

## 2018-02-19 NOTE — PROGRESS NOTES
Discussed patient case with Dr. Moeller who recommends pt present to the ER.  Call placed to patient to discuss.  Pt states understanding and is in agreement with plan.

## 2018-02-19 NOTE — ED NOTES
"Advised to come in by her cardiologist to be evaluated for 3 days of midsternal chest pain taht radiates to back, shoulders and right neck. Pain worsens with lying down. Rates it at a 2/10 now. Hx of \"3 vessel ischemic diseas\" and Churg-Antonina syndrome-reports that it is not active at this time.   "

## 2018-02-19 NOTE — ED AVS SNAPSHOT
[facility], [place], Emergency Department    [address] [place] [id number]    Phone:  [phone]                                       Valencia Ye   MRN: [id number]    Department:  [facility], Emergency Department   Date of Visit:  2/19/2018           After Visit Summary Signature Page     I have received my discharge instructions, and my questions have been answered. I have discussed any challenges I see with this plan with the nurse or doctor.    ..........................................................................................................................................  Patient/Patient Representative Signature      ..........................................................................................................................................  Patient Representative Print Name and Relationship to Patient    ..................................................               ................................................  Date                                            Time    ..........................................................................................................................................  Reviewed by Signature/Title    ...................................................              ..............................................  Date                                                            Time

## 2018-02-19 NOTE — DISCHARGE INSTRUCTIONS
*CHEST PAIN, UNCERTAIN CAUSE    Based on your exam today, the exact cause of your chest pain is not certain. Your condition does not seem serious at this time, and your pain does not appear to be coming from your heart. However, sometimes the signs of a serious problem take more time to appear. Therefore, watch for the warning signs listed below.  HOME CARE:  1. Rest today and avoid strenuous activity.  2. Take any prescribed medicine as directed.  FOLLOW UP with your doctor in 1-3 days.   GET PROMPT MEDICAL ATTENTION if any of the following occur:    A change in the type of pain: if it feels different, becomes more severe, lasts longer, or begins to spread into your shoulder, arm, neck, jaw or back    Shortness of breath or increased pain with breathing    Weakness, dizziness, or fainting    Cough with blood or dark colored sputum (phlegm)    Fever over 101  F (38.3  C)    Swelling, pain or redness in one leg    4944-2795 [employer]. [address] [place] [address]. All rights reserved. This information is not intended as a substitute for professional medical care. Always follow your healthcare professional's instructions.  This information has been modified by your health care provider with permission from the publisher.

## 2018-02-19 NOTE — PROGRESS NOTES
"Pt calls today with concerns of chest pain that have been ongoing since the weekend.  Pt states she has had chest pain on and off for a number of years but never to this degree.  Pt states she has been under an extreme amount of stress this weekend, more so than usual.  She goes on to describe family and work issues that contribute to her stress including her 15 year old daughter who is hospitalized currently for severe psychiatric concerns. Pt states the episodes of pain are more severe than the ever have been, they last anywhere from 1-20 minutes in duration.  The pain is precipitated with exercise and without exercise, the pain is center of the chest at times does radiate to the left arm and back.  There is no associated SOB, nausea/vomiting, dizziness, syncope or near syncope.  The pain states the pain is worse when she is laying down at night or laying on her stomach but indeed it is a pain and not a burning/indigestion feeling.  Pt concerned that given her recent PET scan findings indicating \"Diffusely decreased perfusion in all vascular territories, most pronounced at the apex. This suggests 3 vessel ischemic disease\" that her symptoms of pain may mean she is having a heart attack.  Pt stated she ignored it all weekend reasoning that she didn't have time to deal with it but calls this AM to touch base and see what she should do.      Pt was in to see Dr. Moeller on 2/16/18 to review her concerns regarding the PET scan findings.  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  It does not appear to be active on PET scanning.  There was a comment on the PET scan that her diffuse decreased perfusion could be related to multivessel coronary disease.  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  In the light of this, I would recommend a CT coronary angiogram for definitive evaluation.  I have suggested she take a full metoprolol rather than half the night before her CT as she takes her medications at night.\"    Pt is scheduled to undergo a CTa on Thursday 22/22/18.      Pt is very tearful during the encounter.  Pt does confirm she has a good support system at home including her  and family however escaping the life and work stressors at this time is not an option.      Advised pt that given the increasing pain and nature of her symptoms and recent PET scan results it is recommended that pt present to the ER for evaluation.  Pt hesitant to present to the ER as recommend but states she will 'work myself up to it.'     Advised pt to contact clinic back should she have further questions or concerns. Pt states understanding.   "

## 2018-02-19 NOTE — ED PROVIDER NOTES
"  History     Chief Complaint   Patient presents with     Chest Pain     HPI  Valencia Ye is a 47 year old female with a history of Churg-Antonina syndrome, asthma, HTN, IBS, pericarditis, and recurrent UTIs who presents for evaluation of chest pain. Patient complains over the weekend she had \"thunderous\" mid-sternal chest pain radiating through to her back as well as to her bilateral shoulders. Her pain has been intermittent throughout the weekend, but last night her pain was severe enough to make it difficult for her to find a comfortable position to sleep in. She has not noticed any pattern to time of onset of her pain, and states her pain is not exacerbated or alleviated by exertion, deep inhalation, or position. Her chest pain over the weekend was \"the worst chest pain I've ever had\" compared to prior episodes of chest pain. She denies trauma or injury to the chest. She denies shortness of breath, fever, chills, cough, or vomiting. Additionally, patient reports she has had increasingly worsening palpitations over the past few years and was recently diagnosed with three vessel ischemia which is currently being worked up in the outpatient setting. She had a recent PET scan (results below) and does have an angiogram scheduled for this Thursday, three days from now. Currently, she states her pain is only mild and is declining pain medication. She did call her primary Cardiologist, Dr. Moeller, today regarding her ongoing chest pain was recommended present here to the ED for further evaluation. No history of DVT or PE. She does do daily vest treatments and is on chronic prednisone.      PET Cardiac Ammonia Perfusion (01/26/18) Impression:   1. Cardiac: No abnormal FDG uptake to suggest active inflammatory  myopathy.  2. Diffusely decreased perfusion in all vascular territories, most  pronounced at the apex. This suggests 3 vessel ischemic disease.   Ejection fraction is at the lower limits of normal " at 51 %. Diffuse  hypokinesia and dyskinesia of the inferoapical wall.  3. No abnormal FDG uptake in the rest of the body.     I have reviewed the Medications, Allergies, Past Medical and Surgical History, and Social History in the Epic system.  Past Medical History:   Diagnosis Date     Aortitis (H)      Asthma     associated with Churg Antonina syndrome     Churg-Antonina syndrome (H)     dx      Eustachian tube dysfunction      Goiter      Hearing loss, conductive      Hypertension      Hypocalcemia      Irritable bowel syndrome      Major depressive disorder      Mannose-binding lectin deficiency      Nonspecific abnormal results of thyroid function study      Osteoporosis      Pericarditis      and      Pneumonia     4/23/10     Recurrent UTI      Rheumatoid vasculitis (H)      Sinusitis, chronic      Steroid long-term use      Varicose veins of leg with swelling        Past Surgical History:   Procedure Laterality Date      SECTION       COLONOSCOPY Left 2014    Procedure: COMBINED COLONOSCOPY, SINGLE OR MULTIPLE BIOPSY/POLYPECTOMY BY BIOPSY;  Surgeon: Js Pinedo MD;  Location:  GI     ENT SURGERY       HC COLP CERVIX/UPPER VAGINA W LOOP ELEC BX CERVIX       HYSTERECTOMY  2009    without oopherectomy     HYSTERECTOMY, PAP NO LONGER INDICATED      cervix removed      PICC INSERTION  10/10/2013    5fr DL PASV PICC, 43cm (1cm external) in the L basilic vein w/ tip in the low SVC.     PICC INSERTION Left 2017    5fr DL BioFlo PICC, 42cm (3cm external) in the L lateral brachial vein w/ tip in the SVC RA junction.     SINUS SURGERY       TUBAL LIGATION         Family History   Problem Relation Age of Onset     Allergies Mother      Seasonal     Alcohol/Drug Mother      C.A.D. Father      DIABETES Father      Type 2     C.A.D. Maternal Grandmother      Arthritis Maternal Grandmother      Musculoskeletal Disorder Maternal Grandmother      MS     Alcohol/Drug  "Maternal Grandfather      Asthma Son      Asthma Daughter      Allergies Daughter      Seasonal     Unknown/Adopted Paternal Grandmother      Unknown/Adopted Paternal Grandfather      CANCER Maternal Aunt      breast age 53     Breast Cancer Maternal Aunt      in her 50s       Social History   Substance Use Topics     Smoking status: Never Smoker     Smokeless tobacco: Never Used     Alcohol use No       No current facility-administered medications for this encounter.      Current Outpatient Prescriptions   Medication     metoprolol succinate (TOPROL-XL) 25 MG 24 hr tablet     predniSONE (DELTASONE) 1 MG tablet     fluticasone (FLONASE) 50 MCG/ACT spray     Probiotic Product (PROBIOTIC ADVANCED PO)     Ipratropium-Albuterol (COMBIVENT RESPIMAT)  MCG/ACT inhaler     clonazePAM (KLONOPIN) 0.5 MG tablet     acetylcysteine (MUCOMYST) 20 % injection     albuterol (2.5 MG/3ML) 0.083% neb solution     budesonide-formoterol (SYMBICORT) 160-4.5 MCG/ACT Inhaler     UNABLE TO FIND     ciprofloxacin-dexamethasone (CIPRODEX) otic suspension     cholecalciferol 5000 UNITS CAPS     glucosamine-chondroitin (GLUCOSAMINE CHONDR COMPLEX) 500-400 MG CAPS     Omega-3 Fatty Acids (FISH OIL) 1200 MG capsule        Allergies   Allergen Reactions     Colistimethate Anaphylaxis     Colymycin M [Colistimethate Sodium] Anaphylaxis     Tamiflu [Oseltamivir]      Gums Blister up     Clindamycin Rash     Zithromax [Azithromycin]      Heart palpitation       Review of Systems   Constitutional: Negative for chills and fever.   Respiratory: Negative for cough, shortness of breath and wheezing.    Cardiovascular: Positive for chest pain. Negative for palpitations.   Gastrointestinal: Negative for vomiting.   All other systems reviewed and are negative.      Physical Exam   BP: 139/73  Pulse: 69  Temp: 97.8  F (36.6  C)  Resp: 14  Height: 157.5 cm (5' 2\")  Weight: 49.2 kg (108 lb 7.5 oz) (standing scale)  SpO2: 99 %      Physical Exam "   Constitutional: She is oriented to person, place, and time. She appears well-developed. No distress.   HENT:   Head: Normocephalic and atraumatic.   Mouth/Throat: Oropharynx is clear and moist.   Eyes: Conjunctivae are normal. Pupils are equal, round, and reactive to light.   Cardiovascular: Normal rate, regular rhythm and normal heart sounds.    Pulmonary/Chest: Effort normal and breath sounds normal. No respiratory distress. She has no wheezes. She has no rales.   Abdominal: Soft. She exhibits no distension. There is no tenderness.   Neurological: She is alert and oriented to person, place, and time. No cranial nerve deficit.   Skin: Skin is warm and dry. No rash noted.   Psychiatric: She has a normal mood and affect.       ED Course     ED Course     Procedures       4:47 PM  The patient was seen and examined by Dr. Umana in Room 7.        Labs Ordered and Resulted from Time of ED Arrival Up to the Time of Departure from the ED   BASIC METABOLIC PANEL - Abnormal; Notable for the following:        Result Value    Calcium 8.3 (*)     All other components within normal limits   CBC WITH PLATELETS DIFFERENTIAL   TROPONIN I            Assessments & Plan (with Medical Decision Making)     47-year-old female with history of Churg-Antonina vasculitis, asthma, prior pericarditis.  The patient arrives today to the emergency department at the direction of her cardiologist for evaluation of several days of chest discomfort.  Upon my evaluation this patient is noted to be alert, afebrile, and hemodynamically stable.  She appears in no distress at rest and is seated upright in bed.  Currently the patient is speaking full sentences SPO2 99% without evidence of increased work of breathing by history my suspicion for pulmonary embolus pneumothorax, aortic pathology would be quite low.  We did obtain an EKG that demonstrates no obvious sign of strain nor ischemic type pattern.  Troponin is negative.  The case was discussed in  person with cardiology in our emergency department.  At this time the patient has requested dismissal from the hospital as she has a tentative CT angiography of her coronary vessels in approximately 72 hours.  We did offer hospitalization but ultimately her cardiology team feels comfortable with dismissal to home with plan to call or return emergently with increasing pain or other concern.    I have reviewed the nursing notes.    I have reviewed the findings, diagnosis, plan and need for follow up with the patient.    New Prescriptions    No medications on file       Final diagnoses:   Chest pain, unspecified type   I, Effie Bui, am serving as a trained medical scribe to document services personally performed by Irving Umana MD, based on the provider's statements to me.   I, Irving Umana MD, was physically present and have reviewed and verified the accuracy of this note documented by Effie Bui.      2/19/2018   [facility], [place], EMERGENCY DEPARTMENT     Irving Umana MD  02/19/18 7213

## 2018-02-19 NOTE — ED AVS SNAPSHOT
[facility], Emergency Department    [address] [address]    Phone:  [phone]                                       Valencia Ye   MRN: [id number]    Department:  [facility], Emergency Department   Date of Visit:  2/19/2018           Patient Information     Date Of Birth          1970        Your diagnoses for this visit were:     Chest pain, unspecified type        You were seen by Irving Umana MD.      Follow-up Information     Follow up with [facility], Emergency Department.    Specialty:  EMERGENCY MEDICINE    Why:  As needed, If symptoms worsen    Contact information:    [address] [id number] [phone]    Additional information:    The [facility] is located on the corner of [facility] and [facility] on the [facility]. It is easily accessible from virtually any point in the [facility] area, via [employer] and [employer].        Discharge Instructions          *CHEST PAIN, UNCERTAIN CAUSE    Based on your exam today, the exact cause of your chest pain is not certain. Your condition does not seem serious at this time, and your pain does not appear to be coming from your heart. However, sometimes the signs of a serious problem take more time to appear. Therefore, watch for the warning signs listed below.  HOME CARE:  1. Rest today and avoid strenuous activity.  2. Take any prescribed medicine as directed.  FOLLOW UP with your doctor in 1-3 days.   GET PROMPT MEDICAL ATTENTION if any of the following occur:    A change in the type of pain: if it feels different, becomes more severe, lasts longer, or begins to spread into your shoulder, arm, neck, jaw or back    Shortness of breath or increased pain with breathing    Weakness, dizziness, or fainting    Cough with blood or dark colored sputum (phlegm)    Fever over 101  F (38.3  C)    Swelling, pain or redness in one leg    5134-2275 [employer]  [employer]. [address] [place] [address]. All rights reserved. This information is not intended as a substitute for professional medical care. Always follow your healthcare professional's instructions.  This information has been modified by your health care provider with permission from the publisher.      Future Appointments        Provider Department Dept Phone Center    2/22/2018 7:40 AM [facility]YS[facility] HEART AT [place] [address] [phone] UM PSA CLIN    2/22/2018  8:00 AM [facility] HEART [facility] CT ROOM 1 [facility] [phone] CVIMG    3/9/2018 8:15 AM Melia Webb MD [place] [phone] UM PSA CLIN    3/9/2018 3:45 PM [facility] Device Room [facility] [phone] RS    4/2/2018 3:15 PM Yvrose Moeller MD [place] [phone] UMP PSA CLIN    8/2/2018 8:00 AM Pulmonary Function Lab [facility] Pulmonary Function Testing [phone] [facility]    8/2/2018 8:30 AM Dagoberto Briscoe MD [facility] [phone] [facility]      24 Hour Appointment Hotline       To make an appointment at any [facility], call [phone] ([phone]). If you don't have a family doctor or clinic, we will help you find one. [facility] are conveniently located to serve the needs of you and your family.             Review of your medicines      Our records show that you are taking the medicines listed below. If these are incorrect, please call your family doctor or clinic.        Dose / Directions Last dose taken    acetylcysteine 20 % nebulizer solution   Commonly known as:  MUCOMYST   Dose:  4 mL   Quantity:  240 mL        Take 4 mLs by nebulization 2 times daily Use with albuterol solution. Discard open bottle of Mucomyst after 96 hours.   Refills:  11        albuterol (2.5 MG/3ML) 0.083% neb solution    Dose:  1 vial   Quantity:  60 vial        Take 1 vial (2.5 mg) by nebulization 2 times daily Use with Mucomyst   Refills:  11        budesonide-formoterol 160-4.5 MCG/ACT Inhaler   Commonly known as:  SYMBICORT   Dose:  2 puff   Quantity:  1 Inhaler        Inhale 2 puffs into the lungs 2 times daily   Refills:  11        cholecalciferol 5000 UNITS Caps        Take by mouth daily   Refills:  0        ciprofloxacin-dexamethasone otic suspension   Commonly known as:  CIPRODEX   Quantity:  7.5 mL        Instill 3 drops into the affected ear twice daily as needed.   Refills:  1        clonazePAM 0.5 MG tablet   Commonly known as:  klonoPIN   Dose:  0.25 mg   Quantity:  20 tablet        Take 0.5 tablets (0.25 mg) by mouth 2 times daily as needed for anxiety   Refills:  1        fish Oil 1200 MG capsule   Dose:  3 capsule        Take 3 capsules by mouth daily.   Refills:  0        fluticasone 50 MCG/ACT spray   Commonly known as:  FLONASE   Quantity:  48 mL        SHAKE LIQUID AND USE 2 SPRAYS IN EACH NOSTRIL DAILY   Refills:  1        GLUCOSAMINE CHONDR COMPLEX 500-400 MG Caps per capsule   Dose:  2 capsule   Generic drug:  glucosamine-chondroitin        Take 2 capsules by mouth daily.   Refills:  0        Ipratropium-Albuterol  MCG/ACT inhaler   Commonly known as:  COMBIVENT RESPIMAT   Dose:  1 puff   Quantity:  1 Inhaler        Inhale 1 puff into the lungs 4 times daily Not to exceed 6 doses per day.   Refills:  11        metoprolol succinate 25 MG 24 hr tablet   Commonly known as:  TOPROL-XL   Dose:  12.5 mg   Quantity:  45 tablet        Take 0.5 tablets (12.5 mg) by mouth daily   Refills:  3        predniSONE 1 MG tablet   Commonly known as:  DELTASONE   Quantity:  200 tablet        TAKE 5 TABLETS BY MOUTH ONE DAY AND ALTERNATING WITH 6 TABLETS THE NEXT.   Refills:  0        PROBIOTIC ADVANCED PO        Refills:  0        UNABLE TO FIND   Dose:  3600 mg        3,600 mg daily MEDICATION NAME: fadiarin    Refills:  0                Procedures and tests performed during your visit     Basic metabolic panel    CBC with platelets differential    EKG 12 lead    Troponin I    XR Chest 2 Views      Orders Needing Specimen Collection     None      Pending Results     Date and Time Order Name Status Description    2/19/2018 1614 EKG 12 lead Preliminary             Pending Culture Results     No orders found from 2/17/2018 to 2/20/2018.            Pending Results Instructions     If you had any lab results that were not finalized at the time of your Discharge, you can call the ED Lab Result RN at [phone]. You will be contacted by this team for any positive Lab results or changes in treatment. The nurses are available 7 days a week from 10A to 6:30P.  You can leave a message 24 hours per day and they will return your call.        Thank you for choosing [facility]       Thank you for choosing [facility] for your care. Our goal is always to provide you with excellent care. Hearing back from our patients is one way we can continue to improve our services. Please take a few minutes to complete the written survey that you may receive in the mail after you visit with us. Thank you!        [employer] Information     [employer] gives you secure access to your electronic health record. If you see a primary care provider, you can also send messages to your care team and make appointments. If you have questions, please call your primary care clinic.  If you do not have a primary care provider, please call [phone] and they will assist you.        Care EveryWhere ID     This is your Care EveryWhere ID. This could be used by other organizations to access your [facility] medical records  [id number]        Equal Access to Services     JACK THOMPSON : Francesco Cosby, ruby yost, [place]e [place]. So [facility] [phone].    ATENCIÓN: Si habla español, tiene a waller disposición  servicios gratuitos de asistencia lingüística. [place] [phone].    We comply with applicable federal civil rights laws and [place] laws. We do not discriminate on the basis of race, color, national origin, age, disability, sex, sexual orientation, or gender identity.            After Visit Summary       This is your record. Keep this with you and show to your community pharmacist(s) and doctor(s) at your next visit.

## 2018-02-20 ENCOUNTER — MYC MEDICAL ADVICE (OUTPATIENT)
Dept: INTERNAL MEDICINE | Facility: CLINIC | Age: 48
End: 2018-02-20

## 2018-02-20 DIAGNOSIS — F41.9 ANXIETY: ICD-10-CM

## 2018-02-20 LAB — INTERPRETATION ECG - MUSE: NORMAL

## 2018-02-22 ENCOUNTER — TELEPHONE (OUTPATIENT)
Dept: PSYCHIATRY | Facility: CLINIC | Age: 48
End: 2018-02-22

## 2018-02-22 ENCOUNTER — HOSPITAL ENCOUNTER (OUTPATIENT)
Dept: CARDIOLOGY | Facility: CLINIC | Age: 48
Discharge: HOME | End: 2018-02-22
Attending: INTERNAL MEDICINE | Admitting: INTERNAL MEDICINE
Payer: COMMERCIAL

## 2018-02-22 DIAGNOSIS — D72.18 CHURG-STRAUSS SYNDROME (H): ICD-10-CM

## 2018-02-22 DIAGNOSIS — J47.9 BRONCHIECTASIS WITHOUT COMPLICATION (H): ICD-10-CM

## 2018-02-22 DIAGNOSIS — Z82.49 FAMILY HISTORY OF CORONARY ARTERY DISEASE: ICD-10-CM

## 2018-02-22 DIAGNOSIS — M30.1 CHURG-STRAUSS SYNDROME (H): ICD-10-CM

## 2018-02-22 LAB
CHOLEST SERPL-MCNC: 173 MG/DL
HDLC SERPL-MCNC: 78 MG/DL
LDLC SERPL CALC-MCNC: 84 MG/DL
NONHDLC SERPL-MCNC: 95 MG/DL
TRIGL SERPL-MCNC: 55 MG/DL

## 2018-02-22 PROCEDURE — 25000125 ZZHC RX 250: Performed by: INTERNAL MEDICINE

## 2018-02-22 PROCEDURE — 25000132 ZZH RX MED GY IP 250 OP 250 PS 637: Performed by: INTERNAL MEDICINE

## 2018-02-22 PROCEDURE — 25000128 H RX IP 250 OP 636: Performed by: INTERNAL MEDICINE

## 2018-02-22 PROCEDURE — 36415 COLL VENOUS BLD VENIPUNCTURE: CPT | Performed by: INTERNAL MEDICINE

## 2018-02-22 PROCEDURE — 80061 LIPID PANEL: CPT | Performed by: INTERNAL MEDICINE

## 2018-02-22 PROCEDURE — 75574 CT ANGIO HRT W/3D IMAGE: CPT

## 2018-02-22 PROCEDURE — 83695 ASSAY OF LIPOPROTEIN(A): CPT | Performed by: INTERNAL MEDICINE

## 2018-02-22 PROCEDURE — 75574 CT ANGIO HRT W/3D IMAGE: CPT | Mod: 26 | Performed by: INTERNAL MEDICINE

## 2018-02-22 RX ORDER — NITROGLYCERIN 0.4 MG/1
0.4 TABLET SUBLINGUAL
Status: DISCONTINUED | OUTPATIENT
Start: 2018-02-22 | End: 2018-02-23 | Stop reason: HOSPADM

## 2018-02-22 RX ORDER — IOPAMIDOL 755 MG/ML
500 INJECTION, SOLUTION INTRAVASCULAR ONCE
Status: COMPLETED | OUTPATIENT
Start: 2018-02-22 | End: 2018-02-22

## 2018-02-22 RX ORDER — ACYCLOVIR 200 MG/1
0-1 CAPSULE ORAL
Status: DISCONTINUED | OUTPATIENT
Start: 2018-02-22 | End: 2018-02-23 | Stop reason: HOSPADM

## 2018-02-22 RX ORDER — ACETYLCYSTEINE 200 MG/ML
4 SOLUTION ORAL; RESPIRATORY (INHALATION) 2 TIMES DAILY
Qty: 240 ML | Refills: 11 | Status: SHIPPED | OUTPATIENT
Start: 2018-02-22 | End: 2019-04-12

## 2018-02-22 RX ORDER — METOPROLOL TARTRATE 1 MG/ML
5-15 INJECTION, SOLUTION INTRAVENOUS
Status: DISCONTINUED | OUTPATIENT
Start: 2018-02-22 | End: 2018-02-23 | Stop reason: HOSPADM

## 2018-02-22 RX ORDER — METOPROLOL TARTRATE 50 MG
50-100 TABLET ORAL
Status: COMPLETED | OUTPATIENT
Start: 2018-02-22 | End: 2018-02-22

## 2018-02-22 RX ADMIN — METOPROLOL TARTRATE 5 MG: 5 INJECTION, SOLUTION INTRAVENOUS at 08:48

## 2018-02-22 RX ADMIN — METOPROLOL TARTRATE 100 MG: 50 TABLET ORAL at 07:45

## 2018-02-22 RX ADMIN — NITROGLYCERIN 0.4 MG: 0.4 TABLET SUBLINGUAL at 09:25

## 2018-02-22 RX ADMIN — IOPAMIDOL 110 ML: 755 INJECTION, SOLUTION INTRAVENOUS at 09:34

## 2018-02-22 RX ADMIN — METOPROLOL TARTRATE 5 MG: 5 INJECTION, SOLUTION INTRAVENOUS at 08:51

## 2018-02-22 RX ADMIN — METOPROLOL TARTRATE 5 MG: 5 INJECTION, SOLUTION INTRAVENOUS at 09:00

## 2018-02-22 RX ADMIN — METOPROLOL TARTRATE 5 MG: 5 INJECTION, SOLUTION INTRAVENOUS at 08:45

## 2018-02-22 RX ADMIN — SODIUM CHLORIDE 100 ML: 9 INJECTION, SOLUTION INTRAVENOUS at 09:34

## 2018-02-22 RX ADMIN — METOPROLOL TARTRATE 5 MG: 5 INJECTION, SOLUTION INTRAVENOUS at 08:57

## 2018-02-22 RX ADMIN — METOPROLOL TARTRATE 5 MG: 5 INJECTION, SOLUTION INTRAVENOUS at 08:54

## 2018-02-22 RX ADMIN — METOPROLOL TARTRATE 5 MG: 5 INJECTION, SOLUTION INTRAVENOUS at 09:03

## 2018-02-22 RX ADMIN — METOPROLOL TARTRATE 5 MG: 5 INJECTION, SOLUTION INTRAVENOUS at 09:06

## 2018-02-22 NOTE — TELEPHONE ENCOUNTER
-Will notify Marlyn Folres, CNS, APRN    -Faxed request back to pharmacy asking that they forward to pt's PCP who is now prescribing her meds

## 2018-02-22 NOTE — TELEPHONE ENCOUNTER
Last seen: 10/13/17  RTC: 4 weeks  Cancel: 12/6/17 (appt not needed)  No-show: none  Next appt: not scheduled    Incoming refill from [facility] via fax    Medication requested: Klonopin 0.5 mg tabs  Directions: Take 1/2 tab BID PRN anxiety  Qty: 20  Last refilled: 7/18/17    Msg sent to /intake staff to contact pt and schedule appt with Marlyn Flores CNS, APRN  Will notify provider of refill request and seek approval to RF

## 2018-02-22 NOTE — TELEPHONE ENCOUNTER
Clonazepam Refill request per My Chart.  Dr Simental to take over refilling.     Last Office Visit: 11/13/17  Future Office visit:    Next 5 appointments (look out 90 days)     Apr 02, 2018  3:15 PM CDT   Return Visit with Yvrose Moeller MD   [facility]    [address]  [facility] [id number] [phone]                   Routing refill request to provider for review/approval because:  Drug not on the [facility], [facility] or [facility] refill protocol or controlled substance    From OV on 11/13/17.  2. Anxiety  Advised that the dose of clonazepam is very low so very low risk and she has had so many medication intolerances, will continue to prescribe the clonazepam. Reviewed the CSA and signed, she will call when she needs refill.

## 2018-02-23 RX ORDER — CLONAZEPAM 0.5 MG/1
0.25 TABLET ORAL 2 TIMES DAILY PRN
Qty: 20 TABLET | Refills: 0 | Status: SHIPPED | OUTPATIENT
Start: 2018-02-23 | End: 2018-04-30

## 2018-02-26 LAB — LPA SERPL-MCNC: 59 MG/DL (ref 0–30)

## 2018-03-01 ENCOUNTER — TELEPHONE (OUTPATIENT)
Dept: CARDIOLOGY | Facility: CLINIC | Age: 48
End: 2018-03-01

## 2018-03-01 NOTE — TELEPHONE ENCOUNTER
WALE for pt to call back as pt is scheduled to see Dr Webb on 3/9 for palpitations, with a Holter to be placed after the appointment. Discussed with Dr Webb who agreed that the Holter needs to be completed first prior to being seen by EP.  Pt is also scheduled to f/u with Dr Moeller to review the Holter.  JNelsonLUCILA

## 2018-03-01 NOTE — TELEPHONE ENCOUNTER
Spoke to patient to inform her that her appointment with Dr. Webb is on the same day that she is having her 48 hr holter placed. I told her that we should reschedule with Dr. Webb to make the appointment more meaningful and she agreed. We will have her come in on 3/28 at 2:45. She was pleased with this plan. Serena

## 2018-03-09 ENCOUNTER — HOSPITAL ENCOUNTER (OUTPATIENT)
Dept: CARDIOLOGY | Facility: CLINIC | Age: 48
Discharge: HOME | End: 2018-03-09
Attending: INTERNAL MEDICINE | Admitting: INTERNAL MEDICINE
Payer: COMMERCIAL

## 2018-03-09 DIAGNOSIS — R00.2 PALPITATIONS: ICD-10-CM

## 2018-03-09 PROCEDURE — 93226 XTRNL ECG REC<48 HR SCAN A/R: CPT | Performed by: INTERNAL MEDICINE

## 2018-03-09 PROCEDURE — 93227 XTRNL ECG REC<48 HR R&I: CPT | Performed by: INTERNAL MEDICINE

## 2018-03-12 DIAGNOSIS — D72.18 CHURG-STRAUSS SYNDROME (H): ICD-10-CM

## 2018-03-12 DIAGNOSIS — M30.1 CHURG-STRAUSS SYNDROME (H): ICD-10-CM

## 2018-03-12 RX ORDER — BUDESONIDE AND FORMOTEROL FUMARATE DIHYDRATE 160; 4.5 UG/1; UG/1
2 AEROSOL RESPIRATORY (INHALATION) 2 TIMES DAILY
Qty: 1 INHALER | Refills: 11 | Status: SHIPPED | OUTPATIENT
Start: 2018-03-12 | End: 2019-04-12

## 2018-03-15 DIAGNOSIS — J47.9 BRONCHIECTASIS WITHOUT COMPLICATION (H): ICD-10-CM

## 2018-03-15 RX ORDER — ALBUTEROL SULFATE 0.83 MG/ML
1 SOLUTION RESPIRATORY (INHALATION) 2 TIMES DAILY
Qty: 60 VIAL | Refills: 11 | Status: SHIPPED | OUTPATIENT
Start: 2018-03-15 | End: 2019-04-12

## 2018-03-21 DIAGNOSIS — J47.9 BRONCHIECTASIS WITHOUT COMPLICATION (H): ICD-10-CM

## 2018-03-21 RX ORDER — PREDNISONE 1 MG/1
TABLET ORAL
Qty: 200 TABLET | Refills: 0 | Status: SHIPPED | OUTPATIENT
Start: 2018-03-21 | End: 2018-04-29

## 2018-03-28 ENCOUNTER — OFFICE VISIT (OUTPATIENT)
Dept: CARDIOLOGY | Facility: CLINIC | Age: 48
End: 2018-03-28
Attending: INTERNAL MEDICINE
Payer: COMMERCIAL

## 2018-03-28 VITALS
BODY MASS INDEX: 19.69 KG/M2 | WEIGHT: 107 LBS | HEIGHT: 62 IN | SYSTOLIC BLOOD PRESSURE: 112 MMHG | HEART RATE: 82 BPM | DIASTOLIC BLOOD PRESSURE: 72 MMHG

## 2018-03-28 DIAGNOSIS — I49.3 PVC'S (PREMATURE VENTRICULAR CONTRACTIONS): Primary | ICD-10-CM

## 2018-03-28 DIAGNOSIS — R00.2 PALPITATIONS: ICD-10-CM

## 2018-03-28 DIAGNOSIS — M30.1 CHURG-STRAUSS SYNDROME (H): ICD-10-CM

## 2018-03-28 DIAGNOSIS — Z82.49 FAMILY HISTORY OF CORONARY ARTERY DISEASE: ICD-10-CM

## 2018-03-28 DIAGNOSIS — D72.18 CHURG-STRAUSS SYNDROME (H): ICD-10-CM

## 2018-03-28 PROCEDURE — 99203 OFFICE O/P NEW LOW 30 MIN: CPT | Performed by: INTERNAL MEDICINE

## 2018-03-28 NOTE — PROGRESS NOTES
Service Date: 03/28/2018      HISTORY OF PRESENT ILLNESS:    It is my pleasure seeing Ms. 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  She has been referred by Dr. Yvrose Moeller.        She is a delightful 47-year-old female who was diagnosed with Churg-Antonina vasculitis in 1990 (age 20), though in retrospect symptoms started few years prior.  She has seen many physicians over the years and has been maintained on low dose prednisone (5-6 mg daily).  She has done well this with no recent flares of the disease.  Her main physician is Dr. Sunil Butts, immunologist.  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      Her past history is well outlined in Dr. Moeller' note from 02/16/2018.  The patient has had extensive recent evaluation to determine the extent of her cardiac disease.  A cardiac MRI in 08/2017 showed normal LVEF of 55% with thickening and hypokinesis of the apex.  There was evidence of late gadolinium enhancement with diffuse subendocardial hyperenhancement of the mid and apical segments including the papillary muscle.  The scar burden was 14%.  The patient had a cardiac PET scan in 01/2018 showing normal left ventricular cavity, though diffusely decreased perfusion throughout all vascular territories, most pronounced at the apex.  The patient had a coronary CT angiogram in 02/2018 showing a calcium score of 0 and normal coronary arteries.      A 48-hour Holter monitor was performed in March.  It showed approximately 1857 PVCs for a PVC burden of less than 1%.  There were at least 2 different morphologies of PVCs and a few couplets but no runs of ventricular tachycardia.      The patient is aware of palpitation in which she feels her heart skipping or flip flopping.  This has improved on beta-blocker therapy.  She is on low-dose metoprolol XL 12.50 mg  "daily but she told me that \"little medication goes a long way with me\".  She has never had syncope and does not have sudden onset tachycardia.  She exercises modestly.  Strenuous exercise makes her feel short of breath.  This symptom has not changed much over the years.      PHYSICAL EXAMINATION:   VITAL SIGNS:  Blood pressure 112/72, pulse 82 and regular, weight 48 kg.  Height 157 cm.     GENERAL:  She is a healthy-appearing pleasant woman who is alert, oriented and provides excellent history.   HEENT:  Normocephalic, atraumatic.  Sclerae are anicteric.  Mouth, oropharynx clear.   NECK:  Supple, without thyromegaly or lymphadenopathy.  No carotid bruits.   LUNGS:  Clear.  No crackles or wheezes.   CARDIOVASCULAR:  Normal JVP, regular rhythm.  No gallop, murmur or rub.   ABDOMEN:  Soft, nontender.   EXTREMITIES:  No edema.   SKIN:  No rash.   BACK:  No CVA tenderness.   NEUROLOGIC:  Alert and oriented x3.      DIAGNOSTIC STUDIES:    Recent laboratory tests show sodium 140, potassium 3.8, creatinine 0.56.  HDL 78, LDL 84, total cholesterol 173, triglyceride 55.  Hematocrit 39%, white count 6100, eosinophils 2.8%.      I reviewed an electrocardiogram from 02/19/2018 that looked minimally abnormal showing sinus rhythm and mild nonspecific ST-T abnormalities.        For the remainder of her cardiac tests, please see HPI.      IMPRESSION:    1.  Eosinophilic heart disease, Churg-Antonina syndrome.  Evidence of mild to moderate left ventricular scarring, predominantly at the apex.  These findings explain the ischemic defects on her PET scan.  LV function and coronary arteries are normal.        She obviously has potentially arrhythmic substrate (LV scar).  So far, however, she has only had low-grade ventricular ectopy (single PVCs and occasional couplets).  PVC burden is <1%.  No documented ventricular tachycardia or concerning symptoms for sustained ventricular arrhythmia (syncope, sustained palpitation).  She is already " "on a small dose of metoprolol which helps her palpitation significantly.  Metoprolol XL 12.5 mg daily has worked better than atenolol in this regard.         I see no reason to change her current medical treatment.  There is no indication for antiarrhythmic drug therapy, invasive EP study for risk stratification or an ICD at this time.      RECOMMENDATIONS:    Continue present therapy.  We discussed symptoms that would raise a \"red flag\" about worrisome arrhythmias, such as syncope, near syncope or sudden onset sustained tachycardia.        It was a pleasure being part of her care.  Please feel free to contact me with any questions.         ADI HERRERA MD, FACC         cc:      Morelia Simental MD   [facility]   [address]   [place] [address]      Yvrose Moeller MD   [facility]   [id number]   [place] [address]          D: 2018   T: 2018   MT: CASIMIRO      Name:     NABOR BUENROSTRO   MRN:      [id number]        Account:      [id number]   :      1970           Service Date: 2018      Document: [id number]      "

## 2018-03-28 NOTE — LETTER
3/28/2018    Morelia Simental MD  [address]  [facility] [address]    RE: Valencia CAMARGO Katie Ye       Dear Colleague,    I had the pleasure of seeing Valencia Ye in the [facility].    HPI and Plan:   See dictation    No orders of the defined types were placed in this encounter.      No orders of the defined types were placed in this encounter.      There are no discontinued medications.      Encounter Diagnoses   Name Primary?     Family history of coronary artery disease      Churg-Antonina syndrome (H)      Palpitations        CURRENT MEDICATIONS:  Current Outpatient Prescriptions   Medication Sig Dispense Refill     predniSONE (DELTASONE) 1 MG tablet TAKE 5 TABLETS BY MOUTH ONE DAY AND ALTERNATING WITH 6 TABLETS THE NEXT. 200 tablet 0     albuterol (2.5 MG/3ML) 0.083% neb solution Take 1 vial (2.5 mg) by nebulization 2 times daily Use with Mucomyst 60 vial 11     budesonide-formoterol (SYMBICORT) 160-4.5 MCG/ACT Inhaler Inhale 2 puffs into the lungs 2 times daily 1 Inhaler 11     clonazePAM (KLONOPIN) 0.5 MG tablet Take 0.5 tablets (0.25 mg) by mouth 2 times daily as needed for anxiety 20 tablet 0     acetylcysteine (MUCOMYST) 20 % nebulizer solution Take 4 mLs by nebulization 2 times daily Use with albuterol solution. Discard open bottle of Mucomyst after 96 hours. 240 mL 11     metoprolol succinate (TOPROL-XL) 25 MG 24 hr tablet Take 0.5 tablets (12.5 mg) by mouth daily 45 tablet 3     fluticasone (FLONASE) 50 MCG/ACT spray SHAKE LIQUID AND USE 2 SPRAYS IN EACH NOSTRIL DAILY 48 mL 1     Probiotic Product (PROBIOTIC ADVANCED PO)        Ipratropium-Albuterol (COMBIVENT RESPIMAT)  MCG/ACT inhaler Inhale 1 puff into the lungs 4 times daily Not to exceed 6 doses per day. 1 Inhaler 11     UNABLE TO FIND 3,600 mg daily MEDICATION NAME: monolaurin       ciprofloxacin-dexamethasone (CIPRODEX) otic suspension Instill 3 drops into the affected ear twice daily as needed.  7.5 mL 1     cholecalciferol 5000 UNITS CAPS Take by mouth daily       glucosamine-chondroitin (GLUCOSAMINE CHONDR COMPLEX) 500-400 MG CAPS Take 2 capsules by mouth daily.       Omega-3 Fatty Acids (FISH OIL) 1200 MG capsule Take 3 capsules by mouth daily.         ALLERGIES     Allergies   Allergen Reactions     Colistimethate Anaphylaxis     Colymycin M [Colistimethate Sodium] Anaphylaxis     Tamiflu [Oseltamivir]      Gums Blister up     Clindamycin Rash     Zithromax [Azithromycin]      Heart palpitation       PAST MEDICAL HISTORY:  Past Medical History:   Diagnosis Date     Aortitis (H)      Asthma     associated with Churg Antonina syndrome     Churg-Antonina syndrome (H)     dx      Eustachian tube dysfunction      Goiter      Hearing loss, conductive      Hypertension      Hypocalcemia      Irritable bowel syndrome      Major depressive disorder      Mannose-binding lectin deficiency      Nonspecific abnormal results of thyroid function study      Osteoporosis      Pericarditis      and      Pneumonia     4/23/10     Recurrent UTI      Rheumatoid vasculitis (H)      Sinusitis, chronic      Steroid long-term use      Varicose veins of leg with swelling        PAST SURGICAL HISTORY:  Past Surgical History:   Procedure Laterality Date      SECTION       COLONOSCOPY Left 2014    Procedure: COMBINED COLONOSCOPY, SINGLE OR MULTIPLE BIOPSY/POLYPECTOMY BY BIOPSY;  Surgeon: Js Pinedo MD;  Location:  GI     ENT SURGERY       HC COLP CERVIX/UPPER VAGINA W LOOP ELEC BX CERVIX       HYSTERECTOMY  2009    without oopherectomy     HYSTERECTOMY, PAP NO LONGER INDICATED      cervix removed      PICC INSERTION  10/10/2013    5fr DL PASV PICC, 43cm (1cm external) in the L basilic vein w/ tip in the low SVC.     PICC INSERTION Left 2017    5fr DL BioFlo PICC, 42cm (3cm external) in the L lateral brachial vein w/ tip in the SVC RA junction.     SINUS SURGERY       TUBAL  LIGATION         FAMILY HISTORY:  Family History   Problem Relation Age of Onset     Allergies Mother      Seasonal     Alcohol/Drug Mother      C.A.D. Father      DIABETES Father      Type 2     C.A.D. Maternal Grandmother      Arthritis Maternal Grandmother      Musculoskeletal Disorder Maternal Grandmother      MS     Alcohol/Drug Maternal Grandfather      Asthma Son      Asthma Daughter      Allergies Daughter      Seasonal     Unknown/Adopted Paternal Grandmother      Unknown/Adopted Paternal Grandfather      CANCER Maternal Aunt      breast age 53     Breast Cancer Maternal Aunt      in her 50s       SOCIAL HISTORY:  Social History     Social History     Marital status: Single     Spouse name: N/A     Number of children: N/A     Years of education: N/A     Social History Main Topics     Smoking status: Never Smoker     Smokeless tobacco: Never Used     Alcohol use No     Drug use: No     Sexual activity: Yes     Partners: Male     Other Topics Concern      Service No     Blood Transfusions No     Caffeine Concern No     Occupational Exposure No     Hobby Hazards No     Sleep Concern No     Stress Concern Yes     Weight Concern No     Special Diet Yes     IBS diet     Back Care No     Exercise Not Asked     1 mile power walk 5 days a week at least.      Seat Belt Yes     Self-Exams Yes     Social History Narrative    [employer] in Radiology Department.  .  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  Non smoker. No smokers at home.  Enjoys walking and dancing.  Alcohol--1 to 2 drinks daily.  Few times a year has more than 4 drinks in a day.  No illicits.                       Review of Systems:  Skin:  Negative       Eyes:  Positive for glasses    ENT:  not assessed      Respiratory:  Negative       Cardiovascular:    Positive for;palpitations;fatigue chest ache   Gastroenterology: Positive for   IBS  Genitourinary:  Negative      Musculoskeletal:  Negative      Neurologic:  Positive for       Psychiatric:  Positive for anxiety;depression    Heme/Lymph/Imm:  Negative      Endocrine:  Positive for thyroid disorder goiter    [id number]          Thank you for allowing me to participate in the care of your patient.      Sincerely,     Melia Webb MD     [facility]    cc:   Yvrose Moeller MD  [address] [address]

## 2018-03-28 NOTE — LETTER
3/28/2018      Morelia Simental MD  [address] [facility] [address]  [facility] [address]      RE: Valencia Ye       Dear Colleague,    I had the pleasure of seeing Valencia Ye in the [facility].    Service Date: 03/28/2018      HISTORY OF PRESENT ILLNESS:    It is my pleasure seeing Ms. 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  She has been referred by Dr. Yvrose Moeller.        She is a delightful 47-year-old female who was diagnosed with Churg-Antonina vasculitis in 1990 (age 20), though in retrospect symptoms started few years prior.  She has seen many physicians over the years and has been maintained on low dose prednisone (5-6 mg daily).  She has done well this with no recent flares of the disease.  Her main physician is Dr. Sunil Butts, immunologist.  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      Her past history is well outlined in Dr. Moeller' note from 02/16/2018.  The patient has had extensive recent evaluation to determine the extent of her cardiac disease.  A cardiac MRI in 08/2017 showed normal LVEF of 55% with thickening and hypokinesis of the apex.  There was evidence of late gadolinium enhancement with diffuse subendocardial hyperenhancement of the mid and apical segments including the papillary muscle.  The scar burden was 14%.  The patient had a cardiac PET scan in 01/2018 showing normal left ventricular cavity, though diffusely decreased perfusion throughout all vascular territories, most pronounced at the apex.  The patient had a coronary CT angiogram in 02/2018 showing a calcium score of 0 and normal coronary arteries.      A 48-hour Holter monitor was performed in March.  It showed approximately 1857 PVCs for a PVC burden of less than 1%.  There were at least 2 different morphologies of PVCs and a  "few couplets but no runs of ventricular tachycardia.      The patient is aware of palpitation in which she feels her heart skipping or flip flopping.  This has improved on beta-blocker therapy.  She is on low-dose metoprolol XL 12.50 mg daily but she told me that \"little medication goes a long way with me\".  She has never had syncope and does not have sudden onset tachycardia.  She exercises modestly.  Strenuous exercise makes her feel short of breath.  This symptom has not changed much over the years.      PHYSICAL EXAMINATION:   VITAL SIGNS:  Blood pressure 112/72, pulse 82 and regular, weight 48 kg.  Height 157 cm.     GENERAL:  She is a healthy-appearing pleasant woman who is alert, oriented and provides excellent history.   HEENT:  Normocephalic, atraumatic.  Sclerae are anicteric.  Mouth, oropharynx clear.   NECK:  Supple, without thyromegaly or lymphadenopathy.  No carotid bruits.   LUNGS:  Clear.  No crackles or wheezes.   CARDIOVASCULAR:  Normal JVP, regular rhythm.  No gallop, murmur or rub.   ABDOMEN:  Soft, nontender.   EXTREMITIES:  No edema.   SKIN:  No rash.   BACK:  No CVA tenderness.   NEUROLOGIC:  Alert and oriented x3.      DIAGNOSTIC STUDIES:    Recent laboratory tests show sodium 140, potassium 3.8, creatinine 0.56.  HDL 78, LDL 84, total cholesterol 173, triglyceride 55.  Hematocrit 39%, white count 6100, eosinophils 2.8%.      I reviewed an electrocardiogram from 02/19/2018 that looked minimally abnormal showing sinus rhythm and mild nonspecific ST-T abnormalities.        For the remainder of her cardiac tests, please see HPI.      IMPRESSION:    1.  Eosinophilic heart disease, Churg-Antonina syndrome.  Evidence of mild to moderate left ventricular scarring, predominantly at the apex.  These findings explain the ischemic defects on her PET scan.  LV function and coronary arteries are normal.        She obviously has potentially arrhythmic substrate (LV scar).  So far, however, she has only had " "low-grade ventricular ectopy (single PVCs and occasional couplets).  PVC burden is <1%.  No documented ventricular tachycardia or concerning symptoms for sustained ventricular arrhythmia (syncope, sustained palpitation).  She is already on a small dose of metoprolol which helps her palpitation significantly.  Metoprolol XL 12.5 mg daily has worked better than atenolol in this regard.         I see no reason to change her current medical treatment.  There is no indication for antiarrhythmic drug therapy, invasive EP study for risk stratification or an ICD at this time.      RECOMMENDATIONS:    Continue present therapy.  We discussed symptoms that would raise a \"red flag\" about worrisome arrhythmias, such as syncope, near syncope or sudden onset sustained tachycardia.        It was a pleasure being part of her care.  Please feel free to contact me with any questions.         ADI HERRERA MD, [facility]         cc:      Morelia Simental MD   [facility]   [address]   [place] [address]      Yvrose Moeller MD   [facility]   [address] [facility]   [place] [address]          D: 2018   T: 2018   MT: CASIMIRO      Name:     NABOR BUENROSTRO   MRN:      [id number]        Account:      [id number]   :      1970           Service Date: 2018      Document: [id number]           Outpatient Encounter Prescriptions as of 3/28/2018   Medication Sig Dispense Refill     predniSONE (DELTASONE) 1 MG tablet TAKE 5 TABLETS BY MOUTH ONE DAY AND ALTERNATING WITH 6 TABLETS THE NEXT. 200 tablet 0     albuterol (2.5 MG/3ML) 0.083% neb solution Take 1 vial (2.5 mg) by nebulization 2 times daily Use with Mucomyst 60 vial 11     budesonide-formoterol (SYMBICORT) 160-4.5 MCG/ACT Inhaler Inhale 2 puffs into the lungs 2 times daily 1 Inhaler 11     clonazePAM (KLONOPIN) 0.5 MG tablet Take 0.5 tablets (0.25 mg) by mouth 2 times daily as needed for anxiety 20 " tablet 0     acetylcysteine (MUCOMYST) 20 % nebulizer solution Take 4 mLs by nebulization 2 times daily Use with albuterol solution. Discard open bottle of Mucomyst after 96 hours. 240 mL 11     metoprolol succinate (TOPROL-XL) 25 MG 24 hr tablet Take 0.5 tablets (12.5 mg) by mouth daily 45 tablet 3     fluticasone (FLONASE) 50 MCG/ACT spray SHAKE LIQUID AND USE 2 SPRAYS IN EACH NOSTRIL DAILY 48 mL 1     Probiotic Product (PROBIOTIC ADVANCED PO)        Ipratropium-Albuterol (COMBIVENT RESPIMAT)  MCG/ACT inhaler Inhale 1 puff into the lungs 4 times daily Not to exceed 6 doses per day. 1 Inhaler 11     UNABLE TO FIND 3,600 mg daily MEDICATION NAME: monolaurin       ciprofloxacin-dexamethasone (CIPRODEX) otic suspension Instill 3 drops into the affected ear twice daily as needed. 7.5 mL 1     cholecalciferol 5000 UNITS CAPS Take by mouth daily       glucosamine-chondroitin (GLUCOSAMINE CHONDR COMPLEX) 500-400 MG CAPS Take 2 capsules by mouth daily.       Omega-3 Fatty Acids (FISH OIL) 1200 MG capsule Take 3 capsules by mouth daily.       No facility-administered encounter medications on file as of 3/28/2018.        Again, thank you for allowing me to participate in the care of your patient.      Sincerely,    Melia Webb MD     [facility]

## 2018-03-28 NOTE — MR AVS SNAPSHOT
After Visit Summary   3/28/2018    Valencia Ye    MRN: [id number]           Patient Information     Date Of Birth          1970        Visit Information        Provider Department      3/28/2018 2:45 PM Melia Webb MD [facility]        Today's Diagnoses     PVC's (premature ventricular contractions)    -  1    Churg-Antonina syndrome (H)        Palpitations        Family history of coronary artery disease           Follow-ups after your visit        Your next 10 appointments already scheduled     Apr 02, 2018  3:15 PM CDT   Return Visit with Yvrose Moeller MD   [facility] ([facility])    [address]  [facility] [id number] [phone]            Aug 02, 2018  8:00 AM CDT   PFT VISIT with RADHA PFL A   [facility] ([facility])    [address]  3rd Floor  [facility] [address] [phone]            Aug 02, 2018  8:30 AM CDT   (Arrive by 8:15 AM)   Return Bronchiectas Non CF with Dagoberto Briscoe MD   [facility] ([facility])    [address]  [address] [place] [address] [phone]              Who to contact     If you have questions or need follow up information about today's clinic visit or your schedule please contact [facility] directly at [phone].  Normal or non-critical lab and imaging results will be communicated to you by MyChart, letter or phone within 4 business days after the clinic has received the results. If you do not hear from us within 7 days, please contact the clinic through MyChart or phone. If you have a critical or abnormal lab result, we will notify you by phone as soon as possible.  Submit refill requests through [employer] or call your pharmacy and  "they will forward the refill request to us. Please allow 3 business days for your refill to be completed.          Additional Information About Your Visit        [employer] Information     [email or web address] gives you secure access to your electronic health record. If you see a primary care provider, you can also send messages to your care team and make appointments. If you have questions, please call your primary care clinic.  If you do not have a primary care provider, please call [phone] and they will assist you.        Care EveryWhere ID     This is your Care EveryWhere ID. This could be used by other organizations to access your [place] medical records  [id number]        Your Vitals Were     Pulse Height Last Period BMI (Body Mass Index)          82 1.575 m (5' 2\") 02/01/2009 19.57 kg/m2         Blood Pressure from Last 3 Encounters:   03/28/18 112/72   02/19/18 128/82   02/16/18 118/74    Weight from Last 3 Encounters:   03/28/18 48.5 kg (107 lb)   02/19/18 49.2 kg (108 lb 7.5 oz)   02/16/18 49.4 kg (109 lb)              We Performed the Following     Follow-Up with Electrophysiologist        Primary Care Provider Office Phone # Fax #    Morelia Simental [phone]       [address] [place] [address]        Equal Access to Services     JAQUELINE THOMPSON : Hadii aad ku hadasho Soomaali, waaxda luqadaha, qaybta kaalmada adeegyada, vale fields. So [facility] [phone].    ATENCIÓN: Si habla español, tiene a waller disposición servicios gratuitos de asistencia lingüística. [place] al [phone].    We comply with applicable federal civil rights laws and [place] laws. We do not discriminate on the basis of race, color, national origin, age, disability, sex, sexual orientation, or gender identity.            Thank you!     Thank you for choosing [facility]  for your care. Our goal is always to provide you with excellent care. Hearing " back from our patients is one way we can continue to improve our services. Please take a few minutes to complete the written survey that you may receive in the mail after your visit with us. Thank you!             Your Updated Medication List - Protect others around you: Learn how to safely use, store and throw away your medicines at www.disposemymeds.org.          This list is accurate as of 3/28/18  3:18 PM.  Always use your most recent med list.                   Brand Name Dispense Instructions for use Diagnosis    acetylcysteine 20 % nebulizer solution    MUCOMYST    240 mL    Take 4 mLs by nebulization 2 times daily Use with albuterol solution. Discard open bottle of Mucomyst after 96 hours.    Bronchiectasis without complication (H)       albuterol (2.5 MG/3ML) 0.083% neb solution     60 vial    Take 1 vial (2.5 mg) by nebulization 2 times daily Use with Mucomyst    Bronchiectasis without complication (H)       budesonide-formoterol 160-4.5 MCG/ACT Inhaler    SYMBICORT    1 Inhaler    Inhale 2 puffs into the lungs 2 times daily    Churg-Antonina syndrome (H)       cholecalciferol 5000 UNITS Caps      Take by mouth daily        ciprofloxacin-dexamethasone otic suspension    CIPRODEX    7.5 mL    Instill 3 drops into the affected ear twice daily as needed.    Ear pain, unspecified laterality       clonazePAM 0.5 MG tablet    klonoPIN    20 tablet    Take 0.5 tablets (0.25 mg) by mouth 2 times daily as needed for anxiety    Anxiety       fish Oil 1200 MG capsule      Take 3 capsules by mouth daily.        fluticasone 50 MCG/ACT spray    FLONASE    48 mL    SHAKE LIQUID AND USE 2 SPRAYS IN EACH NOSTRIL DAILY    Chronic rhinitis       GLUCOSAMINE CHONDR COMPLEX 500-400 MG Caps per capsule   Generic drug:  glucosamine-chondroitin      Take 2 capsules by mouth daily.        Ipratropium-Albuterol  MCG/ACT inhaler    COMBIVENT RESPIMAT    1 Inhaler    Inhale 1 puff into the lungs 4 times daily Not to exceed 6  doses per day.    Churg-Antonina syndrome (H)       metoprolol succinate 25 MG 24 hr tablet    TOPROL-XL    45 tablet    Take 0.5 tablets (12.5 mg) by mouth daily    Palpitations       predniSONE 1 MG tablet    DELTASONE    200 tablet    TAKE 5 TABLETS BY MOUTH ONE DAY AND ALTERNATING WITH 6 TABLETS THE NEXT.    Bronchiectasis without complication (H)       PROBIOTIC ADVANCED PO           UNABLE TO FIND      3,600 mg daily MEDICATION NAME: monolaurin

## 2018-03-28 NOTE — PROGRESS NOTES
HPI and Plan:   See dictation    No orders of the defined types were placed in this encounter.      No orders of the defined types were placed in this encounter.      There are no discontinued medications.      Encounter Diagnoses   Name Primary?     Family history of coronary artery disease      Churg-Antonina syndrome (H)      Palpitations        CURRENT MEDICATIONS:  Current Outpatient Prescriptions   Medication Sig Dispense Refill     predniSONE (DELTASONE) 1 MG tablet TAKE 5 TABLETS BY MOUTH ONE DAY AND ALTERNATING WITH 6 TABLETS THE NEXT. 200 tablet 0     albuterol (2.5 MG/3ML) 0.083% neb solution Take 1 vial (2.5 mg) by nebulization 2 times daily Use with Mucomyst 60 vial 11     budesonide-formoterol (SYMBICORT) 160-4.5 MCG/ACT Inhaler Inhale 2 puffs into the lungs 2 times daily 1 Inhaler 11     clonazePAM (KLONOPIN) 0.5 MG tablet Take 0.5 tablets (0.25 mg) by mouth 2 times daily as needed for anxiety 20 tablet 0     acetylcysteine (MUCOMYST) 20 % nebulizer solution Take 4 mLs by nebulization 2 times daily Use with albuterol solution. Discard open bottle of Mucomyst after 96 hours. 240 mL 11     metoprolol succinate (TOPROL-XL) 25 MG 24 hr tablet Take 0.5 tablets (12.5 mg) by mouth daily 45 tablet 3     fluticasone (FLONASE) 50 MCG/ACT spray SHAKE LIQUID AND USE 2 SPRAYS IN EACH NOSTRIL DAILY 48 mL 1     Probiotic Product (PROBIOTIC ADVANCED PO)        Ipratropium-Albuterol (COMBIVENT RESPIMAT)  MCG/ACT inhaler Inhale 1 puff into the lungs 4 times daily Not to exceed 6 doses per day. 1 Inhaler 11     UNABLE TO FIND 3,600 mg daily MEDICATION NAME: monolaurin       ciprofloxacin-dexamethasone (CIPRODEX) otic suspension Instill 3 drops into the affected ear twice daily as needed. 7.5 mL 1     cholecalciferol 5000 UNITS CAPS Take by mouth daily       glucosamine-chondroitin (GLUCOSAMINE CHONDR COMPLEX) 500-400 MG CAPS Take 2 capsules by mouth daily.       Omega-3 Fatty Acids (FISH OIL) 1200 MG capsule Take 3  capsules by mouth daily.         ALLERGIES     Allergies   Allergen Reactions     Colistimethate Anaphylaxis     Colymycin M [Colistimethate Sodium] Anaphylaxis     Tamiflu [Oseltamivir]      Gums Blister up     Clindamycin Rash     Zithromax [Azithromycin]      Heart palpitation       PAST MEDICAL HISTORY:  Past Medical History:   Diagnosis Date     Aortitis (H)      Asthma     associated with Churg Antonina syndrome     Churg-Antonina syndrome (H)     dx      Eustachian tube dysfunction      Goiter      Hearing loss, conductive      Hypertension      Hypocalcemia      Irritable bowel syndrome      Major depressive disorder      Mannose-binding lectin deficiency      Nonspecific abnormal results of thyroid function study      Osteoporosis      Pericarditis      and      Pneumonia     4/23/10     Recurrent UTI      Rheumatoid vasculitis (H)      Sinusitis, chronic      Steroid long-term use      Varicose veins of leg with swelling        PAST SURGICAL HISTORY:  Past Surgical History:   Procedure Laterality Date      SECTION       COLONOSCOPY Left 2014    Procedure: COMBINED COLONOSCOPY, SINGLE OR MULTIPLE BIOPSY/POLYPECTOMY BY BIOPSY;  Surgeon: Js Pinedo MD;  Location:  GI     ENT SURGERY       HC COLP CERVIX/UPPER VAGINA W LOOP ELEC BX CERVIX       HYSTERECTOMY  2009    without oopherectomy     HYSTERECTOMY, PAP NO LONGER INDICATED      cervix removed      PICC INSERTION  10/10/2013    5fr DL PASV PICC, 43cm (1cm external) in the L basilic vein w/ tip in the low SVC.     PICC INSERTION Left 2017    5fr DL BioFlo PICC, 42cm (3cm external) in the L lateral brachial vein w/ tip in the SVC RA junction.     SINUS SURGERY       TUBAL LIGATION         FAMILY HISTORY:  Family History   Problem Relation Age of Onset     Allergies Mother      Seasonal     Alcohol/Drug Mother      C.A.D. Father      DIABETES Father      Type 2     C.A.D. Maternal Grandmother       Arthritis Maternal Grandmother      Musculoskeletal Disorder Maternal Grandmother      MS     Alcohol/Drug Maternal Grandfather      Asthma Son      Asthma Daughter      Allergies Daughter      Seasonal     Unknown/Adopted Paternal Grandmother      Unknown/Adopted Paternal Grandfather      CANCER Maternal Aunt      breast age 53     Breast Cancer Maternal Aunt      in her 50s       SOCIAL HISTORY:  Social History     Social History     Marital status: Single     Spouse name: N/A     Number of children: N/A     Years of education: N/A     Social History Main Topics     Smoking status: Never Smoker     Smokeless tobacco: Never Used     Alcohol use No     Drug use: No     Sexual activity: Yes     Partners: Male     Other Topics Concern      Service No     Blood Transfusions No     Caffeine Concern No     Occupational Exposure No     Hobby Hazards No     Sleep Concern No     Stress Concern Yes     Weight Concern No     Special Diet Yes     IBS diet     Back Care No     Exercise Not Asked     1 mile power walk 5 days a week at least.      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  .  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  Non smoker. No smokers at home.  Enjoys walking and dancing.  Alcohol--1 to 2 drinks daily.  Few times a year has more than 4 drinks in a day.  No illicits.                       Review of Systems:  Skin:  Negative       Eyes:  Positive for glasses    ENT:  not assessed      Respiratory:  Negative       Cardiovascular:    Positive for;palpitations;fatigue chest ache   Gastroenterology: Positive for   IBS  Genitourinary:  Negative      Musculoskeletal:  Negative      Neurologic:  Positive for      Psychiatric:  Positive for anxiety;depression    Heme/Lymph/Imm:  Negative      Endocrine:  Positive for thyroid disorder goiter    [id number]

## 2018-04-02 ENCOUNTER — OFFICE VISIT (OUTPATIENT)
Dept: CARDIOLOGY | Facility: CLINIC | Age: 48
End: 2018-04-02
Attending: INTERNAL MEDICINE
Payer: COMMERCIAL

## 2018-04-02 ENCOUNTER — MYC MEDICAL ADVICE (OUTPATIENT)
Dept: INTERNAL MEDICINE | Facility: CLINIC | Age: 48
End: 2018-04-02

## 2018-04-02 VITALS
HEIGHT: 61 IN | BODY MASS INDEX: 20.67 KG/M2 | WEIGHT: 109.5 LBS | DIASTOLIC BLOOD PRESSURE: 70 MMHG | HEART RATE: 76 BPM | SYSTOLIC BLOOD PRESSURE: 114 MMHG

## 2018-04-02 DIAGNOSIS — R00.2 PALPITATIONS: ICD-10-CM

## 2018-04-02 DIAGNOSIS — H92.09 EAR PAIN, UNSPECIFIED LATERALITY: ICD-10-CM

## 2018-04-02 DIAGNOSIS — M30.1 CHURG-STRAUSS SYNDROME (H): ICD-10-CM

## 2018-04-02 DIAGNOSIS — E78.49 ALPHA-LIPOPROTEINEMIA: Primary | ICD-10-CM

## 2018-04-02 DIAGNOSIS — D72.18 CHURG-STRAUSS SYNDROME (H): ICD-10-CM

## 2018-04-02 DIAGNOSIS — I10 BENIGN ESSENTIAL HYPERTENSION: ICD-10-CM

## 2018-04-02 PROCEDURE — 99214 OFFICE O/P EST MOD 30 MIN: CPT | Performed by: INTERNAL MEDICINE

## 2018-04-02 RX ORDER — ATORVASTATIN CALCIUM 10 MG/1
5 TABLET, FILM COATED ORAL DAILY
Qty: 30 TABLET | Refills: 11 | Status: SHIPPED | OUTPATIENT
Start: 2018-04-02 | End: 2019-05-02

## 2018-04-02 NOTE — PROGRESS NOTES
DIAGNOSES:      Encounter Diagnoses   Name Primary?     Benign essential hypertension      Palpitations      Churg-Antonina syndrome (H)      Alpha-lipoproteinemia Yes         HPI:  See [id number]        MEDICATIONS:    Current Outpatient Prescriptions   Medication Sig Dispense Refill     MAGNESIUM PO Take 2 tablets by mouth daily Magnesium Orotate       atorvastatin (LIPITOR) 10 MG tablet Take 0.5 tablets (5 mg) by mouth daily 30 tablet 11     predniSONE (DELTASONE) 1 MG tablet TAKE 5 TABLETS BY MOUTH ONE DAY AND ALTERNATING WITH 6 TABLETS THE NEXT. 200 tablet 0     albuterol (2.5 MG/3ML) 0.083% neb solution Take 1 vial (2.5 mg) by nebulization 2 times daily Use with Mucomyst 60 vial 11     budesonide-formoterol (SYMBICORT) 160-4.5 MCG/ACT Inhaler Inhale 2 puffs into the lungs 2 times daily 1 Inhaler 11     clonazePAM (KLONOPIN) 0.5 MG tablet Take 0.5 tablets (0.25 mg) by mouth 2 times daily as needed for anxiety 20 tablet 0     acetylcysteine (MUCOMYST) 20 % nebulizer solution Take 4 mLs by nebulization 2 times daily Use with albuterol solution. Discard open bottle of Mucomyst after 96 hours. 240 mL 11     metoprolol succinate (TOPROL-XL) 25 MG 24 hr tablet Take 0.5 tablets (12.5 mg) by mouth daily 45 tablet 3     fluticasone (FLONASE) 50 MCG/ACT spray SHAKE LIQUID AND USE 2 SPRAYS IN EACH NOSTRIL DAILY 48 mL 1     Probiotic Product (PROBIOTIC ADVANCED PO)        Ipratropium-Albuterol (COMBIVENT RESPIMAT)  MCG/ACT inhaler Inhale 1 puff into the lungs 4 times daily Not to exceed 6 doses per day. 1 Inhaler 11     UNABLE TO FIND 3,600 mg daily MEDICATION NAME: monolaurin       ciprofloxacin-dexamethasone (CIPRODEX) otic suspension Instill 3 drops into the affected ear twice daily as needed. 7.5 mL 1     cholecalciferol 5000 UNITS CAPS Take by mouth daily       glucosamine-chondroitin (GLUCOSAMINE CHONDR COMPLEX) 500-400 MG CAPS Take 2 capsules by mouth daily.       Omega-3 Fatty Acids (FISH OIL) 1200 MG  capsule Take 3 capsules by mouth daily.           ALLERGIES     Allergies   Allergen Reactions     Colistimethate Anaphylaxis     Colymycin M [Colistimethate Sodium] Anaphylaxis     Tamiflu [Oseltamivir]      Gums Blister up     Clindamycin Rash     Zithromax [Azithromycin]      Heart palpitation       PAST MEDICAL HISTORY:  Past Medical History:   Diagnosis Date     Aortitis (H)      Asthma     associated with Churg Antonina syndrome     Churg-Antonina syndrome (H)     dx      Eustachian tube dysfunction      Goiter      Hearing loss, conductive      Hypertension      Hypocalcemia      Irritable bowel syndrome      Major depressive disorder      Mannose-binding lectin deficiency      Nonspecific abnormal results of thyroid function study      Osteoporosis      Pericarditis      and      Pneumonia     4/23/10     Recurrent UTI      Rheumatoid vasculitis (H)      Sinusitis, chronic      Steroid long-term use      Varicose veins of leg with swelling        PAST SURGICAL HISTORY:  Past Surgical History:   Procedure Laterality Date      SECTION       COLONOSCOPY Left 2014    Procedure: COMBINED COLONOSCOPY, SINGLE OR MULTIPLE BIOPSY/POLYPECTOMY BY BIOPSY;  Surgeon: Js Pinedo MD;  Location:  GI     ENT SURGERY       HC COLP CERVIX/UPPER VAGINA W LOOP ELEC BX CERVIX       HYSTERECTOMY  2009    without oopherectomy     HYSTERECTOMY, PAP NO LONGER INDICATED      cervix removed      PICC INSERTION  10/10/2013    5fr DL PASV PICC, 43cm (1cm external) in the L basilic vein w/ tip in the low SVC.     PICC INSERTION Left 2017    5fr DL BioFlo PICC, 42cm (3cm external) in the L lateral brachial vein w/ tip in the SVC RA junction.     SINUS SURGERY       TUBAL LIGATION         FAMILY HISTORY:  Family History   Problem Relation Age of Onset     Allergies Mother      Seasonal     Alcohol/Drug Mother      C.A.D. Father      DIABETES Father      Type 2     C.A.D. Maternal  Grandmother      Arthritis Maternal Grandmother      Musculoskeletal Disorder Maternal Grandmother      MS     Alcohol/Drug Maternal Grandfather      Asthma Son      Asthma Daughter      Allergies Daughter      Seasonal     Unknown/Adopted Paternal Grandmother      Unknown/Adopted Paternal Grandfather      CANCER Maternal Aunt      breast age 53     Breast Cancer Maternal Aunt      in her 50s       SOCIAL HISTORY:  Social History     Social History     Marital status: Single     Spouse name: N/A     Number of children: N/A     Years of education: N/A     Social History Main Topics     Smoking status: Never Smoker     Smokeless tobacco: Never Used     Alcohol use 0.0 oz/week     0 Standard drinks or equivalent per week      Comment: occ on the weekends     Drug use: No     Sexual activity: Yes     Partners: Male     Other Topics Concern      Service No     Blood Transfusions No     Caffeine Concern No     Occupational Exposure No     Hobby Hazards No     Sleep Concern No     Stress Concern Yes     Weight Concern No     Special Diet Yes     IBS diet     Back Care No     Exercise Not Asked     1 mile power walk 5 days a week at least.      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  .  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  Non smoker. No smokers at home.  Enjoys walking and dancing.  Alcohol--1 to 2 drinks daily.  Few times a year has more than 4 drinks in a day.  No illicits.                       Review of Systems:  Skin:  Negative     Eyes:  Positive for glasses  ENT:  Negative    Respiratory:  Negative    Cardiovascular:    Positive for;palpitations;edema;fatigue  Gastroenterology: Positive for    Genitourinary:  Negative    Musculoskeletal:  Negative    Neurologic:  Positive for numbness or tingling of feet  Psychiatric:  Positive for excessive stress;anxiety;depression  Heme/Lymph/Imm:  Negative    Endocrine:  Positive for thyroid  "disorder    Physical Exam:  Vitals: /70 (BP Location: Right arm, Patient Position: Chair, Cuff Size: Adult Regular)  Pulse 76  Ht 1.549 m (5' 1\")  Wt 49.7 kg (109 lb 8 oz)  LMP 02/01/2009  BMI 20.69 kg/m2    Constitutional:  cooperative, alert and oriented, well developed, well nourished, in no acute distress        Skin:  warm and dry to the touch, no apparent skin lesions or masses noted        Head:  normocephalic, no masses or lesions        Eyes:  pupils equal and round;conjunctivae and lids unremarkable;sclera white;no xanthalasma;EOMS intact        ENT:  no pallor or cyanosis, dentition good        Neck:  JVP normal        Chest:  no intercostal retraction;normal respiratory excursion        Cardiac: regular rhythm                  Abdomen:  not assessed this visit        Vascular:                                          Extremities and Back:  no edema              Neurological:  no gross motor deficits          ASSESSMENT AND PLAN:    See dictation    ORDERS AT TODAY'S VISIT:    Orders Placed This Encounter   Procedures     Lipid Profile     Follow-Up with Cardiac Advanced Practice Provider           CC  Yvrose Moeller MD  [address]  [place] [address]            "

## 2018-04-02 NOTE — LETTER
4/2/2018      Morelia Simental MD  [address] [facility] [address]  [facility] [address]      RE: Valencia Ye       Dear Colleague,    I had the pleasure of seeing Valencia Ye in the [facility].    Service Date: 04/02/2018      HISTORY OF PRESENT ILLNESS:  Ms. Katie Ye is a very pleasant 47-year-old lady who comes in routine followup.  She has a history of Churg-Antonina vasculitis since 1990, maintained on prednisone.  Cardiac MRI in 08/2017 showed evidence of diffuse subendocardial hyperenhancement of the mid and apical segments including the papillary muscle with a scar burden of 14%, but no activity on PET scanning.  Coronary CT angiogram showed no evidence of the coronary obstruction and a normal calcium score.      She has a low burden of PVCs, though does complain of palpitations.  She was seen by Dr. Webb who had no further recommendations.      Ms. Katie Ye has a substantial family history of coronary disease, her father having had myocardial infarctions in his 50s, and she herself had an elevated lipoprotein(a).  She has had several presentations with chest discomfort and on her last visit, she was directed to the emergency room, though primarily to investigate noncardiac causes.  It is not clear if that was done despite having directly discussed it with one of the ER physicians.      She has not had further chest discomfort which was atypical.  Cardiac troponin had been drawn and was negative.  I have recommended a proton pump inhibitor and GI evaluation to the patient, given her chronic prednisone use that can cause gastritis.      Ms. Katie Ye and has no cardiorespiratory complaints at this time.  We have discussed the difficulties in using evidence-based approach in her management given her unusual circumstances.  With her elevated lipoprotein(a), it would be reasonable to start a statin; however, coronary CT angiogram was entirely normal.      We have  discussed risks and benefits of starting a statin and we have decided on 5 mg of atorvastatin daily and cautioned her to be aware of symptoms of myositis and report them.  Will follow up lipids in 8 weeks' time.      She is on fish oil, predominantly for migraine prevention and she will continue that.        We do not have clear evidence for aspirin in her age group in considering the fish oil dose that she uses as well as possible GI irritation as outlined above.      I think we can follow up clinically yearly and also first for surveillance of worsening of her heart infiltration and perhaps every other year with an echo or 3-5 years with an MRI.      We will see her back in 8 weeks in followup of her lipids.      Total time is 30 minutes, 25 in coordination of care and counseling.      cc:      Morelia Simental MD    [facility]   [address]   [place] [address]         SCARLETT ENRIQUE MD             D: 2018   T: 2018   MT: CASIMIRO      Name:     NABOR BUENROSTRO   MRN:      [id number]        Account:      [id number]   :      1970           Service Date: 2018      Document: [id number]         Outpatient Encounter Prescriptions as of 2018   Medication Sig Dispense Refill     MAGNESIUM PO Take 2 tablets by mouth daily Magnesium Orotate       atorvastatin (LIPITOR) 10 MG tablet Take 0.5 tablets (5 mg) by mouth daily 30 tablet 11     predniSONE (DELTASONE) 1 MG tablet TAKE 5 TABLETS BY MOUTH ONE DAY AND ALTERNATING WITH 6 TABLETS THE NEXT. 200 tablet 0     albuterol (2.5 MG/3ML) 0.083% neb solution Take 1 vial (2.5 mg) by nebulization 2 times daily Use with Mucomyst 60 vial 11     budesonide-formoterol (SYMBICORT) 160-4.5 MCG/ACT Inhaler Inhale 2 puffs into the lungs 2 times daily 1 Inhaler 11     clonazePAM (KLONOPIN) 0.5 MG tablet Take 0.5 tablets (0.25 mg) by mouth 2 times daily as needed for anxiety 20 tablet 0     acetylcysteine (MUCOMYST) 20 %  nebulizer solution Take 4 mLs by nebulization 2 times daily Use with albuterol solution. Discard open bottle of Mucomyst after 96 hours. 240 mL 11     metoprolol succinate (TOPROL-XL) 25 MG 24 hr tablet Take 0.5 tablets (12.5 mg) by mouth daily 45 tablet 3     fluticasone (FLONASE) 50 MCG/ACT spray SHAKE LIQUID AND USE 2 SPRAYS IN EACH NOSTRIL DAILY 48 mL 1     Probiotic Product (PROBIOTIC ADVANCED PO)        Ipratropium-Albuterol (COMBIVENT RESPIMAT)  MCG/ACT inhaler Inhale 1 puff into the lungs 4 times daily Not to exceed 6 doses per day. 1 Inhaler 11     UNABLE TO FIND 3,600 mg daily MEDICATION NAME: monolaurin       [DISCONTINUED] ciprofloxacin-dexamethasone (CIPRODEX) otic suspension Instill 3 drops into the affected ear twice daily as needed. 7.5 mL 1     cholecalciferol 5000 UNITS CAPS Take by mouth daily       glucosamine-chondroitin (GLUCOSAMINE CHONDR COMPLEX) 500-400 MG CAPS Take 2 capsules by mouth daily.       Omega-3 Fatty Acids (FISH OIL) 1200 MG capsule Take 3 capsules by mouth daily.       No facility-administered encounter medications on file as of 4/2/2018.        Again, thank you for allowing me to participate in the care of your patient.      Sincerely,    Yvrose Moeller MD     [facility]

## 2018-04-02 NOTE — LETTER
4/2/2018    Morelia Simental MD  [address]  [facility] [address]    RE: Valencia Ye       Dear Colleague,    I had the pleasure of seeing Valencia Ye in the [facility].    DIAGNOSES:      Encounter Diagnoses   Name Primary?     Benign essential hypertension      Palpitations      Churg-Antonina syndrome (H)      Alpha-lipoproteinemia Yes         HPI:  See [id number]        MEDICATIONS:    Current Outpatient Prescriptions   Medication Sig Dispense Refill     MAGNESIUM PO Take 2 tablets by mouth daily Magnesium Orotate       atorvastatin (LIPITOR) 10 MG tablet Take 0.5 tablets (5 mg) by mouth daily 30 tablet 11     predniSONE (DELTASONE) 1 MG tablet TAKE 5 TABLETS BY MOUTH ONE DAY AND ALTERNATING WITH 6 TABLETS THE NEXT. 200 tablet 0     albuterol (2.5 MG/3ML) 0.083% neb solution Take 1 vial (2.5 mg) by nebulization 2 times daily Use with Mucomyst 60 vial 11     budesonide-formoterol (SYMBICORT) 160-4.5 MCG/ACT Inhaler Inhale 2 puffs into the lungs 2 times daily 1 Inhaler 11     clonazePAM (KLONOPIN) 0.5 MG tablet Take 0.5 tablets (0.25 mg) by mouth 2 times daily as needed for anxiety 20 tablet 0     acetylcysteine (MUCOMYST) 20 % nebulizer solution Take 4 mLs by nebulization 2 times daily Use with albuterol solution. Discard open bottle of Mucomyst after 96 hours. 240 mL 11     metoprolol succinate (TOPROL-XL) 25 MG 24 hr tablet Take 0.5 tablets (12.5 mg) by mouth daily 45 tablet 3     fluticasone (FLONASE) 50 MCG/ACT spray SHAKE LIQUID AND USE 2 SPRAYS IN EACH NOSTRIL DAILY 48 mL 1     Probiotic Product (PROBIOTIC ADVANCED PO)        Ipratropium-Albuterol (COMBIVENT RESPIMAT)  MCG/ACT inhaler Inhale 1 puff into the lungs 4 times daily Not to exceed 6 doses per day. 1 Inhaler 11     UNABLE TO FIND 3,600 mg daily MEDICATION NAME: monolaurin       ciprofloxacin-dexamethasone (CIPRODEX) otic suspension Instill 3 drops into the affected ear twice daily  as needed. 7.5 mL 1     cholecalciferol 5000 UNITS CAPS Take by mouth daily       glucosamine-chondroitin (GLUCOSAMINE CHONDR COMPLEX) 500-400 MG CAPS Take 2 capsules by mouth daily.       Omega-3 Fatty Acids (FISH OIL) 1200 MG capsule Take 3 capsules by mouth daily.           ALLERGIES     Allergies   Allergen Reactions     Colistimethate Anaphylaxis     Colymycin M [Colistimethate Sodium] Anaphylaxis     Tamiflu [Oseltamivir]      Gums Blister up     Clindamycin Rash     Zithromax [Azithromycin]      Heart palpitation       PAST MEDICAL HISTORY:  Past Medical History:   Diagnosis Date     Aortitis (H)      Asthma     associated with Churg Antonina syndrome     Churg-Antonina syndrome (H)     dx      Eustachian tube dysfunction      Goiter      Hearing loss, conductive      Hypertension      Hypocalcemia      Irritable bowel syndrome      Major depressive disorder      Mannose-binding lectin deficiency      Nonspecific abnormal results of thyroid function study      Osteoporosis      Pericarditis      and      Pneumonia     4/23/10     Recurrent UTI      Rheumatoid vasculitis (H)      Sinusitis, chronic      Steroid long-term use      Varicose veins of leg with swelling        PAST SURGICAL HISTORY:  Past Surgical History:   Procedure Laterality Date      SECTION       COLONOSCOPY Left 2014    Procedure: COMBINED COLONOSCOPY, SINGLE OR MULTIPLE BIOPSY/POLYPECTOMY BY BIOPSY;  Surgeon: Js Pinedo MD;  Location:  GI     ENT SURGERY        COLP CERVIX/UPPER VAGINA W LOOP ELEC BX CERVIX       HYSTERECTOMY  2009    without oopherectomy     HYSTERECTOMY, PAP NO LONGER INDICATED      cervix removed      PICC INSERTION  10/10/2013    5fr DL PASV PICC, 43cm (1cm external) in the L basilic vein w/ tip in the low SVC.     PICC INSERTION Left 2017    5fr DL BioFlo PICC, 42cm (3cm external) in the L lateral brachial vein w/ tip in the SVC RA junction.     SINUS  SURGERY       TUBAL LIGATION         FAMILY HISTORY:  Family History   Problem Relation Age of Onset     Allergies Mother      Seasonal     Alcohol/Drug Mother      C.A.D. Father      DIABETES Father      Type 2     C.A.D. Maternal Grandmother      Arthritis Maternal Grandmother      Musculoskeletal Disorder Maternal Grandmother      MS     Alcohol/Drug Maternal Grandfather      Asthma Son      Asthma Daughter      Allergies Daughter      Seasonal     Unknown/Adopted Paternal Grandmother      Unknown/Adopted Paternal Grandfather      CANCER Maternal Aunt      breast age 53     Breast Cancer Maternal Aunt      in her 50s       SOCIAL HISTORY:  Social History     Social History     Marital status: Single     Spouse name: N/A     Number of children: N/A     Years of education: N/A     Social History Main Topics     Smoking status: Never Smoker     Smokeless tobacco: Never Used     Alcohol use 0.0 oz/week     0 Standard drinks or equivalent per week      Comment: occ on the weekends     Drug use: No     Sexual activity: Yes     Partners: Male     Other Topics Concern      Service No     Blood Transfusions No     Caffeine Concern No     Occupational Exposure No     Hobby Hazards No     Sleep Concern No     Stress Concern Yes     Weight Concern No     Special Diet Yes     IBS diet     Back Care No     Exercise Not Asked     1 mile power walk 5 days a week at least.      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  Non smoker. No smokers at home.  Enjoys walking and dancing.  Alcohol--1 to 2 drinks daily.  Few times a year has more than 4 drinks in a day.  No illicits.                       Review of Systems:  Skin:  Negative     Eyes:  Positive for glasses  ENT:  Negative    Respiratory:  Negative    Cardiovascular:    Positive for;palpitations;edema;fatigue  Gastroenterology: Positive for   "  Genitourinary:  Negative    Musculoskeletal:  Negative    Neurologic:  Positive for numbness or tingling of feet  Psychiatric:  Positive for excessive stress;anxiety;depression  Heme/Lymph/Imm:  Negative    Endocrine:  Positive for thyroid disorder    Physical Exam:  Vitals: /70 (BP Location: Right arm, Patient Position: Chair, Cuff Size: Adult Regular)  Pulse 76  Ht 1.549 m (5' 1\")  Wt 49.7 kg (109 lb 8 oz)  LMP 02/01/2009  BMI 20.69 kg/m2    Constitutional:  cooperative, alert and oriented, well developed, well nourished, in no acute distress        Skin:  warm and dry to the touch, no apparent skin lesions or masses noted        Head:  normocephalic, no masses or lesions        Eyes:  pupils equal and round;conjunctivae and lids unremarkable;sclera white;no xanthalasma;EOMS intact        ENT:  no pallor or cyanosis, dentition good        Neck:  JVP normal        Chest:  no intercostal retraction;normal respiratory excursion        Cardiac: regular rhythm                  Abdomen:  not assessed this visit        Vascular:                                          Extremities and Back:  no edema              Neurological:  no gross motor deficits          ASSESSMENT AND PLAN:    See dictation    ORDERS AT TODAY'S VISIT:    Orders Placed This Encounter   Procedures     Lipid Profile     Follow-Up with Cardiac Advanced Practice Provider           CC  Yvrose Moeller MD  [address]  [place] [id number]              Thank you for allowing me to participate in the care of your patient.      Sincerely,     Yvrose Moeller MD     [facility]    cc:   Yvrose Moeller MD  [address]  [place] [id number]        "

## 2018-04-03 RX ORDER — CIPROFLOXACIN AND DEXAMETHASONE 3; 1 MG/ML; MG/ML
SUSPENSION/ DROPS AURICULAR (OTIC)
Qty: 7.5 ML | Refills: 3 | Status: CANCELLED | OUTPATIENT
Start: 2018-04-03

## 2018-04-03 NOTE — PROGRESS NOTES
Service Date: 04/02/2018      HISTORY OF PRESENT ILLNESS:  Ms. Katie Ye is a very pleasant 47-year-old lady who comes in routine followup.  She has a history of Churg-Antonina vasculitis since 1990, maintained on prednisone.  Cardiac MRI in 08/2017 showed evidence of diffuse subendocardial hyperenhancement of the mid and apical segments including the papillary muscle with a scar burden of 14%, but no activity on PET scanning.  Coronary CT angiogram showed no evidence of the coronary obstruction and a normal calcium score.      She has a low burden of PVCs, though does complain of palpitations.  She was seen by Dr. Webb who had no further recommendations.      Ms. Katie Ye has a substantial family history of coronary disease, her father having had myocardial infarctions in his 50s, and she herself had an elevated lipoprotein(a).  She has had several presentations with chest discomfort and on her last visit, she was directed to the emergency room, though primarily to investigate noncardiac causes.  It is not clear if that was done despite having directly discussed it with one of the ER physicians.      She has not had further chest discomfort which was atypical.  Cardiac troponin had been drawn and was negative.  I have recommended a proton pump inhibitor and GI evaluation to the patient, given her chronic prednisone use that can cause gastritis.      Ms. Katie Ye and has no cardiorespiratory complaints at this time.  We have discussed the difficulties in using evidence-based approach in her management given her unusual circumstances.  With her elevated lipoprotein(a), it would be reasonable to start a statin; however, coronary CT angiogram was entirely normal.      We have discussed risks and benefits of starting a statin and we have decided on 5 mg of atorvastatin daily and cautioned her to be aware of symptoms of myositis and report them.  Will follow up lipids in 8 weeks' time.      She is on fish oil,  predominantly for migraine prevention and she will continue that.        We do not have clear evidence for aspirin in her age group in considering the fish oil dose that she uses as well as possible GI irritation as outlined above.      I think we can follow up clinically yearly and also first for surveillance of worsening of her heart infiltration and perhaps every other year with an echo or 3-5 years with an MRI.      We will see her back in 8 weeks in followup of her lipids.      Total time is 30 minutes, 25 in coordination of care and counseling.      cc:      Morelia Simental MD    [facility]   [address]   [place] [address]         SCARLETT ENRIQUE MD             D: 2018   T: 2018   MT: CASIMIRO      Name:     NABOR BUENROSTRO   MRN:      [id number]        Account:      [id number]   :      1970           Service Date: 2018      Document: [id number]

## 2018-04-03 NOTE — TELEPHONE ENCOUNTER
This has been filled by ENT in the past, the last was 6/2/16. That one stated she needed to follow up with ENT prior to further refills. I would not order a medication like this for someone to use on an as needed basis. It could be sent to ENT to decide if they want to refill it but may require a visit.

## 2018-04-04 ENCOUNTER — MYC MEDICAL ADVICE (OUTPATIENT)
Dept: INTERNAL MEDICINE | Facility: CLINIC | Age: 48
End: 2018-04-04

## 2018-04-04 DIAGNOSIS — H92.09 EAR PAIN, UNSPECIFIED LATERALITY: ICD-10-CM

## 2018-04-05 RX ORDER — CIPROFLOXACIN AND DEXAMETHASONE 3; 1 MG/ML; MG/ML
SUSPENSION/ DROPS AURICULAR (OTIC)
Qty: 7.5 ML | Refills: 0 | Status: SHIPPED | OUTPATIENT
Start: 2018-04-05 | End: 2018-05-27

## 2018-04-17 ENCOUNTER — OFFICE VISIT (OUTPATIENT)
Dept: FAMILY MEDICINE | Facility: CLINIC | Age: 48
End: 2018-04-17
Payer: COMMERCIAL

## 2018-04-17 VITALS
WEIGHT: 109 LBS | DIASTOLIC BLOOD PRESSURE: 72 MMHG | BODY MASS INDEX: 20.6 KG/M2 | RESPIRATION RATE: 12 BRPM | HEART RATE: 88 BPM | SYSTOLIC BLOOD PRESSURE: 110 MMHG | OXYGEN SATURATION: 98 % | TEMPERATURE: 98 F

## 2018-04-17 DIAGNOSIS — R30.0 DYSURIA: Primary | ICD-10-CM

## 2018-04-17 DIAGNOSIS — F41.9 ANXIETY: ICD-10-CM

## 2018-04-17 LAB
ALBUMIN UR-MCNC: NEGATIVE MG/DL
APPEARANCE UR: CLEAR
BILIRUB UR QL STRIP: NEGATIVE
COLOR UR AUTO: YELLOW
GLUCOSE UR STRIP-MCNC: NEGATIVE MG/DL
HGB UR QL STRIP: NEGATIVE
KETONES UR STRIP-MCNC: NEGATIVE MG/DL
LEUKOCYTE ESTERASE UR QL STRIP: NEGATIVE
NITRATE UR QL: NEGATIVE
PH UR STRIP: 6 PH (ref 5–7)
SOURCE: NORMAL
SP GR UR STRIP: <=1.005 (ref 1–1.03)
UROBILINOGEN UR STRIP-ACNC: 0.2 EU/DL (ref 0.2–1)

## 2018-04-17 PROCEDURE — 99213 OFFICE O/P EST LOW 20 MIN: CPT | Performed by: NURSE PRACTITIONER

## 2018-04-17 PROCEDURE — 81003 URINALYSIS AUTO W/O SCOPE: CPT | Performed by: NURSE PRACTITIONER

## 2018-04-17 PROCEDURE — 87086 URINE CULTURE/COLONY COUNT: CPT | Performed by: NURSE PRACTITIONER

## 2018-04-17 RX ORDER — CIPROFLOXACIN 250 MG/1
250 TABLET, FILM COATED ORAL 2 TIMES DAILY
Qty: 6 TABLET | Refills: 0 | Status: SHIPPED | OUTPATIENT
Start: 2018-04-17 | End: 2018-05-21

## 2018-04-17 RX ORDER — SERTRALINE HYDROCHLORIDE 25 MG/1
12.5 TABLET, FILM COATED ORAL EVERY OTHER DAY
Qty: 30 TABLET | Refills: 0 | COMMUNITY
Start: 2018-04-17 | End: 2018-05-27

## 2018-04-17 ASSESSMENT — ANXIETY QUESTIONNAIRES
3. WORRYING TOO MUCH ABOUT DIFFERENT THINGS: MORE THAN HALF THE DAYS
1. FEELING NERVOUS, ANXIOUS, OR ON EDGE: NEARLY EVERY DAY
5. BEING SO RESTLESS THAT IT IS HARD TO SIT STILL: SEVERAL DAYS
6. BECOMING EASILY ANNOYED OR IRRITABLE: SEVERAL DAYS
7. FEELING AFRAID AS IF SOMETHING AWFUL MIGHT HAPPEN: SEVERAL DAYS
GAD7 TOTAL SCORE: 14
2. NOT BEING ABLE TO STOP OR CONTROL WORRYING: NEARLY EVERY DAY

## 2018-04-17 ASSESSMENT — PATIENT HEALTH QUESTIONNAIRE - PHQ9: 5. POOR APPETITE OR OVEREATING: NEARLY EVERY DAY

## 2018-04-17 NOTE — PROGRESS NOTES
SUBJECTIVE:   Valencia Ye is a 47 year old female who presents to clinic today for the following health issues:    URINARY TRACT SYMPTOMS      Duration: today    Description  dysuria and urgency    Intensity:  moderate    Accompanying signs and symptoms:  Fever/chills: no   Flank pain no   Nausea and vomiting: no   Vaginal symptoms: none  Abdominal/Pelvic Pain: YES    History  History of frequent UTI's: YES  History of kidney stones: no   Sexually Active: YES  Possibility of pregnancy: No    Precipitating or alleviating factors: None  Therapies tried and outcome: bi [place] reports that starting today she has been having lower abdominal pain and urgency. She took Azo this morning. She has not had any vaginal concerns, fever or hematuria. She has had UTI's in the past and is confident that this is another.    Depression/Anxiety:  PHQ9 score: 10 - denies thoughts of harming self or others  GAD7 score: 14    Asthma:  ACT score: 23      Problem list and histories reviewed & adjusted, as indicated.  Additional history: as documented    Reviewed and updated as needed this visit by clinical staff       Reviewed and updated as needed this visit by Provider       ROS:  Constitutional, cardiovascular, pulmonary,  and psych systems are negative, except as otherwise noted.    This document serves as a record of the services and decisions personally performed and made by Susan Haase, CNP. It was created on her behalf by Yossi Murphy, a trained medical scribe. The creation of this document is based the provider's statements to the medical scribe.  Yossi Murphy April 17, 2018 10:27 AM      OBJECTIVE:   /72 (BP Location: Left arm, Patient Position: Chair, Cuff Size: Adult Regular)  Pulse 88  Temp 98  F (36.7  C) (Oral)  Resp 12  Wt 49.4 kg (109 lb)  LMP 02/01/2009  SpO2 98%  BMI 20.6 kg/m2  Body mass index is 20.6 kg/(m^2).  GENERAL: healthy, alert and no distress  RESP: lungs clear to auscultation - no  rales, rhonchi or wheezes  CV: regular rate and rhythm, normal S1 S2, no S3 or S4, no murmur, click or rub, no peripheral edema   ABDOMEN: soft, nontender, no hepatosplenomegaly, no masses and bowel sounds normal  PSYCH: mentation appears normal, affect normal/bright    ASSESSMENT/PLAN:   Valencia was seen today for urinary problem.    Diagnoses and all orders for this visit:    Dysuria: UA with normal results, patient informed.  Due to underlying patient condition will prescribe cipro x 3 days while culture is in process.   -     DEPRESSION ACTION PLAN (DAP)  -     *UA reflex to Microscopic and Culture ([place] and [facility] (except [place] and [place])  -     Urine Culture Aerobic Bacterial  -     ciprofloxacin (CIPRO) 250 MG tablet; Take 1 tablet (250 mg) by mouth 2 times daily    Anxiety: well controlled.    Follow up as needed if symptoms get worse or do not improve.   The information in this document, created by the medical scribe for me, accurately reflects the services I personally performed and the decisions made by me. I have reviewed and approved this document for accuracy.   Susan Haase, CNP Susan Haase, [facility]

## 2018-04-17 NOTE — MR AVS SNAPSHOT
After Visit Summary   4/17/2018    Valencia Ye    MRN: [id number]           Patient Information     Date Of Birth          1970        Visit Information        Provider Department      4/17/2018 10:30 AM Haase, Susan Rachele, APRN CNP [facility]        Today's Diagnoses     Dysuria    -  1       Follow-ups after your visit        Follow-up notes from your care team     Return if symptoms worsen or fail to improve.      Your next 10 appointments already scheduled     Aug 02, 2018  8:00 AM CDT   PFT VISIT with RADHA CURTIS   [facility] ([facility])    [address]  3rd Floor  [facility] [id number] [phone]            Aug 02, 2018  8:30 AM CDT   (Arrive by 8:15 AM)   Return Bronchiectas Non CF with Dagoberto Briscoe MD   [facility] ([facility])    [address]  [address] [place] [id number] [phone]              Who to contact     If you have questions or need follow up information about today's clinic visit or your schedule please contact [facility] directly at [phone].  Normal or non-critical lab and imaging results will be communicated to you by MyChart, letter or phone within 4 business days after the clinic has received the results. If you do not hear from us within 7 days, please contact the clinic through MyChart or phone. If you have a critical or abnormal lab result, we will notify you by phone as soon as possible.  Submit refill requests through Yapp or call your pharmacy and they will forward the refill request to us. Please allow 3 business days for your refill to be completed.          Additional Information About Your Visit        Kalturahart Information     Yapp gives you secure access to your electronic health record. If you see a primary care provider, you can also send messages to  your care team and make appointments. If you have questions, please call your primary care clinic.  If you do not have a primary care provider, please call [phone] and they will assist you.        Care EveryWhere ID     This is your Care EveryWhere ID. This could be used by other organizations to access your [place] medical records  [id number]        Your Vitals Were     Pulse Temperature Respirations Last Period Pulse Oximetry BMI (Body Mass Index)    88 98  F (36.7  C) (Oral) 12 02/01/2009 98% 20.6 kg/m2       Blood Pressure from Last 3 Encounters:   04/17/18 110/72   04/02/18 114/70   03/28/18 112/72    Weight from Last 3 Encounters:   04/17/18 109 lb (49.4 kg)   04/02/18 109 lb 8 oz (49.7 kg)   03/28/18 107 lb (48.5 kg)              We Performed the Following     *UA reflex to Microscopic and Culture ([facility] (except [place] and [place])     Asthma Action Plan (AAP)     DEPRESSION ACTION PLAN (DAP)     Urine Culture Aerobic Bacterial          Today's Medication Changes          These changes are accurate as of 4/17/18 11:03 AM.  If you have any questions, ask your nurse or doctor.               Start taking these medicines.        Dose/Directions    ciprofloxacin 250 MG tablet   Commonly known as:  CIPRO   Used for:  Dysuria   Started by:  Haase, Susan Rachele, APRN CNP        Dose:  250 mg   Take 1 tablet (250 mg) by mouth 2 times daily   Quantity:  6 tablet   Refills:  0            Where to get your medicines      These medications were sent to [facility] [address]  [address] [facility] [address]     Phone:  [phone]     ciprofloxacin 250 MG tablet                Primary Care Provider Office Phone # Fax #    Morelia Simental [phone]       [address] [place] [address]        Equal Access to Services     JACK THOMPSON AH: Francesco Cosby, ruby yost, josé miguel sung,  vale amaya jorge alberto starr'aan ah. So [facility] [phone].    ATENCIÓN: Si habla chaparro, tiene a waller disposición servicios gratuitos de asistencia lingüística. [place] [phone].    We comply with applicable federal civil rights laws and [place] laws. We do not discriminate on the basis of race, color, national origin, age, disability, sex, sexual orientation, or gender identity.            Thank you!     Thank you for choosing [facility]  for your care. Our goal is always to provide you with excellent care. Hearing back from our patients is one way we can continue to improve our services. Please take a few minutes to complete the written survey that you may receive in the mail after your visit with us. Thank you!             Your Updated Medication List - Protect others around you: Learn how to safely use, store and throw away your medicines at www.disposemymeds.org.          This list is accurate as of 4/17/18 11:03 AM.  Always use your most recent med list.                   Brand Name Dispense Instructions for use Diagnosis    acetylcysteine 20 % nebulizer solution    MUCOMYST    240 mL    Take 4 mLs by nebulization 2 times daily Use with albuterol solution. Discard open bottle of Mucomyst after 96 hours.    Bronchiectasis without complication (H)       albuterol (2.5 MG/3ML) 0.083% neb solution     60 vial    Take 1 vial (2.5 mg) by nebulization 2 times daily Use with Mucomyst    Bronchiectasis without complication (H)       atorvastatin 10 MG tablet    LIPITOR    30 tablet    Take 0.5 tablets (5 mg) by mouth daily    Alpha-lipoproteinemia       budesonide-formoterol 160-4.5 MCG/ACT Inhaler    SYMBICORT    1 Inhaler    Inhale 2 puffs into the lungs 2 times daily    Churg-Antonina syndrome (H)       cholecalciferol 5000 units Caps      Take by mouth daily        ciprofloxacin 250 MG tablet    CIPRO    6 tablet    Take 1 tablet (250 mg) by mouth 2 times daily    Dysuria        ciprofloxacin-dexamethasone otic suspension    CIPRODEX    7.5 mL    Instill 3 drops into the affected ear twice daily as needed.    Ear pain, unspecified laterality       clonazePAM 0.5 MG tablet    klonoPIN    20 tablet    Take 0.5 tablets (0.25 mg) by mouth 2 times daily as needed for anxiety    Anxiety       fish Oil 1200 MG capsule      Take 3 capsules by mouth daily.        fluticasone 50 MCG/ACT spray    FLONASE    48 mL    SHAKE LIQUID AND USE 2 SPRAYS IN EACH NOSTRIL DAILY    Chronic rhinitis       GLUCOSAMINE CHONDR COMPLEX 500-400 MG Caps per capsule   Generic drug:  glucosamine-chondroitin      Take 2 capsules by mouth daily.        Ipratropium-Albuterol  MCG/ACT inhaler    COMBIVENT RESPIMAT    1 Inhaler    Inhale 1 puff into the lungs 4 times daily Not to exceed 6 doses per day.    Churg-Antonina syndrome (H)       MAGNESIUM PO      Take 2 tablets by mouth daily Magnesium Orotate        metoprolol succinate 25 MG 24 hr tablet    TOPROL-XL    45 tablet    Take 0.5 tablets (12.5 mg) by mouth daily    Palpitations       predniSONE 1 MG tablet    DELTASONE    200 tablet    TAKE 5 TABLETS BY MOUTH ONE DAY AND ALTERNATING WITH 6 TABLETS THE NEXT.    Bronchiectasis without complication (H)       PROBIOTIC ADVANCED PO           UNABLE TO FIND      3,600 mg daily MEDICATION NAME: monolaurin

## 2018-04-18 LAB
BACTERIA SPEC CULT: NO GROWTH
SPECIMEN SOURCE: NORMAL

## 2018-04-18 ASSESSMENT — ANXIETY QUESTIONNAIRES: GAD7 TOTAL SCORE: 14

## 2018-04-18 ASSESSMENT — PATIENT HEALTH QUESTIONNAIRE - PHQ9: SUM OF ALL RESPONSES TO PHQ QUESTIONS 1-9: 10

## 2018-04-18 ASSESSMENT — ASTHMA QUESTIONNAIRES: ACT_TOTALSCORE: 23

## 2018-04-29 DIAGNOSIS — J31.0 CHRONIC RHINITIS: ICD-10-CM

## 2018-04-29 DIAGNOSIS — J47.9 BRONCHIECTASIS WITHOUT COMPLICATION (H): ICD-10-CM

## 2018-04-30 DIAGNOSIS — F41.9 ANXIETY: ICD-10-CM

## 2018-04-30 RX ORDER — CLONAZEPAM 0.5 MG/1
0.25 TABLET ORAL 2 TIMES DAILY PRN
Qty: 20 TABLET | Refills: 0 | Status: SHIPPED | OUTPATIENT
Start: 2018-04-30 | End: 2018-07-17

## 2018-04-30 NOTE — TELEPHONE ENCOUNTER
Clonazepam      Last Written Prescription Date:  02/23/18  Last Fill Quantity: 20,   # refills: 0  Last Office Visit: 11/13/17  Future Office visit:    Next 5 appointments (look out 90 days)     May 07, 2018  4:40 PM CDT   Lucia Chin with Morelia Simental MD   [facility] ([facility])    [address]  [facility] [address]   [phone]                   Routing refill request to provider for review/approval because:  Drug not on the [facility], [facility] or [facility] refill protocol or controlled substance

## 2018-05-01 RX ORDER — FLUTICASONE PROPIONATE 50 MCG
SPRAY, SUSPENSION (ML) NASAL
Qty: 48 ML | Refills: 0 | Status: SHIPPED | OUTPATIENT
Start: 2018-05-01 | End: 2018-05-27

## 2018-05-01 RX ORDER — PREDNISONE 1 MG/1
TABLET ORAL
Qty: 200 TABLET | Refills: 0 | Status: SHIPPED | OUTPATIENT
Start: 2018-05-01 | End: 2018-05-27

## 2018-05-01 NOTE — TELEPHONE ENCOUNTER
"Forwarded Prednisone request to PCP    Last refill on Prednisone-3/21/18-#200    Requested Prescriptions   Pending Prescriptions Disp Refills     predniSONE (DELTASONE) 1 MG tablet [Pharmacy Med Name: PREDNISONE 1MG TABLETS] 200 tablet 0     Sig: TAKE 5 TABLETS BY MOUTH ONE DAY AND ALTERNATING WITH 6 TABLETS THE NEXT.    There is no refill protocol information for this order        fluticasone (FLONASE) 50 MCG/ACT spray [Pharmacy Med Name: FLUTICASONE 50MCG NASAL SP (120) RX] 48 mL 0     Sig: SHAKE LIQUID AND USE 2 SPRAYS IN EACH NOSTRIL DAILY    Inhaled Steroids Protocol Passed    4/29/2018  9:26 PM       Passed - Patient is age 12 or older       Passed - Asthma control assessment score within normal limits in last 6 months    Please review ACT score.          Passed - Recent (6 mo) or future (30 days) visit within the authorizing provider's specialty    Patient had office visit in the last 6 months or has a visit in the next 30 days with authorizing provider or within the authorizing provider's specialty.  See \"Patient Info\" tab in inbasket, or \"Choose Columns\" in Meds & Orders section of the refill encounter.              "

## 2018-05-04 ENCOUNTER — TELEPHONE (OUTPATIENT)
Dept: INTERNAL MEDICINE | Facility: CLINIC | Age: 48
End: 2018-05-04

## 2018-05-13 ASSESSMENT — ASTHMA QUESTIONNAIRES: ACT_TOTALSCORE: 25

## 2018-05-21 ENCOUNTER — OFFICE VISIT (OUTPATIENT)
Dept: INTERNAL MEDICINE | Facility: CLINIC | Age: 48
End: 2018-05-21
Payer: COMMERCIAL

## 2018-05-21 VITALS
DIASTOLIC BLOOD PRESSURE: 78 MMHG | RESPIRATION RATE: 16 BRPM | SYSTOLIC BLOOD PRESSURE: 128 MMHG | TEMPERATURE: 98 F | BODY MASS INDEX: 20.61 KG/M2 | WEIGHT: 109.1 LBS | HEART RATE: 87 BPM | OXYGEN SATURATION: 92 %

## 2018-05-21 DIAGNOSIS — F33.41 RECURRENT MAJOR DEPRESSIVE DISORDER, IN PARTIAL REMISSION (H): Primary | ICD-10-CM

## 2018-05-21 DIAGNOSIS — J47.9 BRONCHIECTASIS WITHOUT COMPLICATION (H): ICD-10-CM

## 2018-05-21 DIAGNOSIS — H92.09 EAR PAIN, UNSPECIFIED LATERALITY: ICD-10-CM

## 2018-05-21 DIAGNOSIS — I10 BENIGN ESSENTIAL HYPERTENSION: ICD-10-CM

## 2018-05-21 DIAGNOSIS — J31.0 CHRONIC RHINITIS, UNSPECIFIED TYPE: ICD-10-CM

## 2018-05-21 DIAGNOSIS — J84.9 ILD (INTERSTITIAL LUNG DISEASE) (H): ICD-10-CM

## 2018-05-21 DIAGNOSIS — F41.9 ANXIETY: ICD-10-CM

## 2018-05-21 PROCEDURE — 99214 OFFICE O/P EST MOD 30 MIN: CPT | Performed by: INTERNAL MEDICINE

## 2018-05-21 NOTE — PROGRESS NOTES
SUBJECTIVE:   Valencia Ye is a 47 year old female who presents to clinic today for the following health issues:      Hypertension Follow-up      Outpatient blood pressures are being checked at home.  Results are normal.    Low Salt Diet: low salt    Depression and Anxiety Follow-Up    Status since last visit: stable    Other associated symptoms:reasonably manageable     Complicating factors:     Significant life event: No     Current substance abuse: None    PHQ-9 11/19/2017 4/17/2018 5/27/2018   Total Score 6 10 11   Q9: Suicide Ideation Not at all Not at all Not at all     ROSIBEL-7 SCORE 11/19/2017 4/17/2018 5/27/2018   Total Score 9 14 14       PHQ-9  English  PHQ-9   Any Language  ROSIBEL-7  Suicide Assessment Five-step Evaluation and Treatment (SAFE-T)    Asthma Follow-Up    Was ACT completed today?    Yes    ACT Total Scores 5/27/2018   ACT TOTAL SCORE (Goal Greater than or Equal to 20) 25   In the past 12 months, how many times did you visit the emergency room for your asthma without being admitted to the hospital? 0   In the past 12 months, how many times were you hospitalized overnight because of your asthma? 0       Recent asthma triggers that patient is dealing with: None          Amount of exercise or physical activity: 4-5 days/week for an average of 30-45 minutes    Problems taking medications regularly: No    Medication side effects: none    Diet: low salt    Other problems:   1.  Interstitial lung disease: Overall respiratory status is stable  2.  Bronchiectasis: No acute symptoms  3: Chronic fatigue: Overall stable  4.  Palpitations: Not really bothering right now.    Current Concerns:   none    Patient Active Problem List   Diagnosis     Goiter     History of systemic steroid therapy     ILD (interstitial lung disease) (H)     Palpitations     Churg-Antonina syndrome (H)     History of eating disorder     Bronchiectasis (H)     Asthma     Anxiety     Chronic fatigue     Mannose-binding lectin  deficiency     Benign essential hypertension     Recurrent major depressive disorder, in partial remission (H)     Bronchiectasis without acute exacerbation (H)     Controlled substance agreement signed       Current Outpatient Prescriptions   Medication Sig Dispense Refill     acetylcysteine (MUCOMYST) 20 % nebulizer solution Take 4 mLs by nebulization 2 times daily Use with albuterol solution. Discard open bottle of Mucomyst after 96 hours. 240 mL 11     albuterol (2.5 MG/3ML) 0.083% neb solution Take 1 vial (2.5 mg) by nebulization 2 times daily Use with Mucomyst 60 vial 11     atorvastatin (LIPITOR) 10 MG tablet Take 0.5 tablets (5 mg) by mouth daily 30 tablet 11     budesonide-formoterol (SYMBICORT) 160-4.5 MCG/ACT Inhaler Inhale 2 puffs into the lungs 2 times daily 1 Inhaler 11     cholecalciferol 5000 UNITS CAPS Take by mouth daily       ciprofloxacin-dexamethasone (CIPRODEX) otic suspension Instill 3 drops into the affected ear twice daily as needed. 7.5 mL 3     clonazePAM (KLONOPIN) 0.5 MG tablet Take 0.5 tablets (0.25 mg) by mouth 2 times daily as needed for anxiety 20 tablet 0     fluticasone (FLONASE) 50 MCG/ACT spray Spray 2 sprays into both nostrils daily 48 mL 3     glucosamine-chondroitin (GLUCOSAMINE CHONDR COMPLEX) 500-400 MG CAPS Take 2 capsules by mouth daily.       Ipratropium-Albuterol (COMBIVENT RESPIMAT)  MCG/ACT inhaler Inhale 1 puff into the lungs 4 times daily Not to exceed 6 doses per day. 1 Inhaler 11     metoprolol succinate (TOPROL-XL) 25 MG 24 hr tablet Take 0.5 tablets (12.5 mg) by mouth daily 45 tablet 3     Omega-3 Fatty Acids (FISH OIL) 1200 MG capsule Take 3 capsules by mouth daily.       predniSONE (DELTASONE) 1 MG tablet TAKE 5 TABLETS BY MOUTH ONE DAY AND ALTERNATING WITH 6 TABLETS THE NEXT. 200 tablet 3     Probiotic Product (PROBIOTIC ADVANCED PO)        sertraline (ZOLOFT) 25 MG tablet Take 0.5 tablets (12.5 mg) by mouth every other day 30 tablet 3     UNABLE TO FIND  3,600 mg daily MEDICATION NAME: monolaurin       MAGNESIUM PO Take 2 tablets by mouth daily Magnesium Orotate       [DISCONTINUED] sertraline (ZOLOFT) 25 MG tablet Take 0.5 tablets (12.5 mg) by mouth every other day 30 tablet 0         Social History   Substance Use Topics     Smoking status: Never Smoker     Smokeless tobacco: Never Used     Alcohol use 0.0 oz/week     0 Standard drinks or equivalent per week      Comment: occ on the weekends                   ROS:  No fever, chills, no dyspnea on exertion, no chest pain, palpitations, PND, orthopnea, edema, syncope, headache, abdominal pain     OBJECTIVE:     /78  Pulse 87  Temp 98  F (36.7  C) (Oral)  Resp 16  Wt 109 lb 1.6 oz (49.5 kg)  LMP 02/01/2009  SpO2 92%  BMI 20.61 kg/m2  Body mass index is 20.61 kg/(m^2).     CV: normal S1, S2 without murmur, S3 or S4.   Lungs: clear        ASSESSMENT/PLAN:       (F33.41) Recurrent major depressive disorder, in partial remission (H)  (primary encounter diagnosis)  Comment: Symptoms are stable and does not wish to adjust her medication dose at this time.  Plan: Continue medication    (J47.9) Bronchiectasis without complication (H)  Comment: Overall stable  Plan: predniSONE (DELTASONE) 1 MG tablet        Refill prednisone    (J84.9) ILD (interstitial lung disease) (H)  Comment: Stable  Plan: My follow-up    (I10) Benign essential hypertension  Comment: Controlled  Plan: Continue medication    (F41.9) Anxiety  Comment: Stable, adjustment of dose  Plan: sertraline (ZOLOFT) 25 MG tablet            (J31.0) Chronic rhinitis, unspecified type  Comment: StablePlan: fluticasone (FLONASE) 50 MCG/ACT spray            (H92.09) Ear pain, unspecified laterality  Comment:   Plan: ciprofloxacin-dexamethasone (CIPRODEX) otic         suspension            Morelia Simental MD  [facility]

## 2018-05-21 NOTE — MR AVS SNAPSHOT
"              After Visit Summary   5/21/2018    Valencia Ye    MRN: [id number]           Patient Information     Date Of Birth          1970        Visit Information        Provider Department      5/21/2018 4:40 PM Morelia Simental MD [facility]         Follow-ups after your visit        Follow-up notes from your care team     Return in about 6 weeks (around 6/30/2018) for fasting lab.      Your next 10 appointments already scheduled     May 24, 2018  2:00 PM CDT   MA SCREENING DIGITAL BILATERAL with RHBCMA2   [facility] ([facility])    [address], [address]  [facility] [id number] [phone]           Do not use any powder, lotion or deodorant under your arms or on your breast. If you do, we will ask you to remove it before your exam.  Wear comfortable, two-piece clothing.  If you have any allergies, tell your care team.  Bring any previous mammograms from other facilities or have them mailed to the breast center. Three-dimensional (3D) mammograms are available at [place] locations in [facility], [facility], [facility], and [place]. [facility] locations include [place] and [facility] in [place]. Benefits of 3D mammograms include: - Improved rate of cancer detection - Decreases your chance of having to go back for more tests, which means fewer: - \"False-positive\" results (This means that there is an abnormal area but it isn't cancer.) - Invasive testing procedures, such as a biopsy or surgery - Can provide clearer images of the breast if you have dense breast tissue. 3D mammography is an optional exam that anyone can have with a 2D mammogram. It doesn't replace or take the place of a 2D mammogram. 2D mammograms remain an effective screening test for all women.  Not all insurance companies cover the cost of a 3D mammogram. Check with your insurance.            Aug 02, 2018  8:00 AM CDT "   PFT VISIT with  PFL KIRSTEN   [facility] ([facility])    [address]  3rd Floor  [facility] [id number] [phone]            Aug 02, 2018  8:30 AM CDT   (Arrive by 8:15 AM)   Return Bronchiectas Non CF with Dagoberto Briscoe MD   [facility] ([facility])    [address]  [address]  [facility] [id number] [phone]              Who to contact     If you have questions or need follow up information about today's clinic visit or your schedule please contact [facility] directly at [phone].  Normal or non-critical lab and imaging results will be communicated to you by Pressfliphart, letter or phone within 4 business days after the clinic has received the results. If you do not hear from us within 7 days, please contact the clinic through Klick2Contactt or phone. If you have a critical or abnormal lab result, we will notify you by phone as soon as possible.  Submit refill requests through Electro-LuminX or call your pharmacy and they will forward the refill request to us. Please allow 3 business days for your refill to be completed.          Additional Information About Your Visit        Electro-LuminX Information     Electro-LuminX gives you secure access to your electronic health record. If you see a primary care provider, you can also send messages to your care team and make appointments. If you have questions, please call your primary care clinic.  If you do not have a primary care provider, please call [phone] and they will assist you.        Care EveryWhere ID     This is your Care EveryWhere ID. This could be used by other organizations to access your [place] medical records  [id number]        Your Vitals Were     Pulse Temperature Respirations Last Period Pulse Oximetry BMI (Body Mass Index)    87 98  F (36.7  C) (Oral) 16 02/01/2009 92% 20.61 kg/m2       Blood Pressure from  Last 3 Encounters:   05/21/18 128/78   04/17/18 110/72   04/02/18 114/70    Weight from Last 3 Encounters:   05/21/18 109 lb 1.6 oz (49.5 kg)   04/17/18 109 lb (49.4 kg)   04/02/18 109 lb 8 oz (49.7 kg)              Today, you had the following     No orders found for display         Today's Medication Changes          These changes are accurate as of 5/21/18  5:19 PM.  If you have any questions, ask your nurse or doctor.               Stop taking these medicines if you haven't already. Please contact your care team if you have questions.     ciprofloxacin 250 MG tablet   Commonly known as:  CIPRO   Stopped by:  Morelia Simental MD                    Primary Care Provider Office Phone # Fax #    Morelia Simental [phone]       [address] [address]        Equal Access to Services     [facility]: Hadii camden cain hadasho Sooliver, waaxda luqadaha, qaybta kaalmada adeegyada, waxay join hayalyssa merritt . So [facility] [phone].    ATENCIÓN: Si habla español, tiene a waller disposición servicios gratuitos de asistencia lingüística. Llame al [phone].    We comply with applicable federal civil rights laws and [place] laws. We do not discriminate on the basis of race, color, national origin, age, disability, sex, sexual orientation, or gender identity.            Thank you!     Thank you for choosing [facility]  for your care. Our goal is always to provide you with excellent care. Hearing back from our patients is one way we can continue to improve our services. Please take a few minutes to complete the written survey that you may receive in the mail after your visit with us. Thank you!             Your Updated Medication List - Protect others around you: Learn how to safely use, store and throw away your medicines at www.disposemymeds.org.          This list is accurate as of 5/21/18  5:19 PM.  Always use your most recent med list.                   Brand  Name Dispense Instructions for use Diagnosis    acetylcysteine 20 % nebulizer solution    MUCOMYST    240 mL    Take 4 mLs by nebulization 2 times daily Use with albuterol solution. Discard open bottle of Mucomyst after 96 hours.    Bronchiectasis without complication (H)       albuterol (2.5 MG/3ML) 0.083% neb solution     60 vial    Take 1 vial (2.5 mg) by nebulization 2 times daily Use with Mucomyst    Bronchiectasis without complication (H)       atorvastatin 10 MG tablet    LIPITOR    30 tablet    Take 0.5 tablets (5 mg) by mouth daily    Alpha-lipoproteinemia       budesonide-formoterol 160-4.5 MCG/ACT Inhaler    SYMBICORT    1 Inhaler    Inhale 2 puffs into the lungs 2 times daily    Churg-Antonina syndrome (H)       cholecalciferol 5000 units Caps      Take by mouth daily        ciprofloxacin-dexamethasone otic suspension    CIPRODEX    7.5 mL    Instill 3 drops into the affected ear twice daily as needed.    Ear pain, unspecified laterality       clonazePAM 0.5 MG tablet    klonoPIN    20 tablet    Take 0.5 tablets (0.25 mg) by mouth 2 times daily as needed for anxiety    Anxiety       fish Oil 1200 MG capsule      Take 3 capsules by mouth daily.        fluticasone 50 MCG/ACT spray    FLONASE    48 mL    SHAKE LIQUID AND USE 2 SPRAYS IN EACH NOSTRIL DAILY    Chronic rhinitis       GLUCOSAMINE CHONDR COMPLEX 500-400 MG Caps per capsule   Generic drug:  glucosamine-chondroitin      Take 2 capsules by mouth daily.        Ipratropium-Albuterol  MCG/ACT inhaler    COMBIVENT RESPIMAT    1 Inhaler    Inhale 1 puff into the lungs 4 times daily Not to exceed 6 doses per day.    Churg-Antonina syndrome (H)       MAGNESIUM PO      Take 2 tablets by mouth daily Magnesium Orotate        metoprolol succinate 25 MG 24 hr tablet    TOPROL-XL    45 tablet    Take 0.5 tablets (12.5 mg) by mouth daily    Palpitations       predniSONE 1 MG tablet    DELTASONE    200 tablet    TAKE 5 TABLETS BY MOUTH ONE DAY AND ALTERNATING  WITH 6 TABLETS THE NEXT.    Bronchiectasis without complication (H)       PROBIOTIC ADVANCED PO           sertraline 25 MG tablet    ZOLOFT    30 tablet    Take 0.5 tablets (12.5 mg) by mouth every other day    Anxiety       UNABLE TO FIND      3,600 mg daily MEDICATION NAME: monolaurin

## 2018-05-24 ENCOUNTER — HOSPITAL ENCOUNTER (OUTPATIENT)
Dept: MAMMOGRAPHY | Facility: CLINIC | Age: 48
Discharge: HOME | End: 2018-05-24
Attending: INTERNAL MEDICINE | Admitting: INTERNAL MEDICINE
Payer: COMMERCIAL

## 2018-05-24 DIAGNOSIS — Z12.31 VISIT FOR SCREENING MAMMOGRAM: ICD-10-CM

## 2018-05-24 PROCEDURE — 77067 SCR MAMMO BI INCL CAD: CPT

## 2018-05-27 RX ORDER — CIPROFLOXACIN AND DEXAMETHASONE 3; 1 MG/ML; MG/ML
SUSPENSION/ DROPS AURICULAR (OTIC)
Qty: 7.5 ML | Refills: 3 | Status: SHIPPED | OUTPATIENT
Start: 2018-05-27 | End: 2019-06-06

## 2018-05-27 RX ORDER — FLUTICASONE PROPIONATE 50 MCG
2 SPRAY, SUSPENSION (ML) NASAL DAILY
Qty: 48 ML | Refills: 3 | Status: SHIPPED | OUTPATIENT
Start: 2018-05-27 | End: 2019-06-06

## 2018-05-27 RX ORDER — PREDNISONE 1 MG/1
TABLET ORAL
Qty: 200 TABLET | Refills: 3 | Status: SHIPPED | OUTPATIENT
Start: 2018-05-27 | End: 2018-10-29

## 2018-05-27 RX ORDER — SERTRALINE HYDROCHLORIDE 25 MG/1
12.5 TABLET, FILM COATED ORAL EVERY OTHER DAY
Qty: 30 TABLET | Refills: 3 | Status: SHIPPED | OUTPATIENT
Start: 2018-05-27 | End: 2018-09-04

## 2018-05-27 ASSESSMENT — ANXIETY QUESTIONNAIRES
5. BEING SO RESTLESS THAT IT IS HARD TO SIT STILL: MORE THAN HALF THE DAYS
GAD7 TOTAL SCORE: 14
7. FEELING AFRAID AS IF SOMETHING AWFUL MIGHT HAPPEN: SEVERAL DAYS
3. WORRYING TOO MUCH ABOUT DIFFERENT THINGS: MORE THAN HALF THE DAYS
1. FEELING NERVOUS, ANXIOUS, OR ON EDGE: NEARLY EVERY DAY
2. NOT BEING ABLE TO STOP OR CONTROL WORRYING: MORE THAN HALF THE DAYS
6. BECOMING EASILY ANNOYED OR IRRITABLE: SEVERAL DAYS

## 2018-05-27 ASSESSMENT — PATIENT HEALTH QUESTIONNAIRE - PHQ9: 5. POOR APPETITE OR OVEREATING: NEARLY EVERY DAY

## 2018-05-28 ASSESSMENT — ASTHMA QUESTIONNAIRES: ACT_TOTALSCORE: 25

## 2018-05-28 ASSESSMENT — ANXIETY QUESTIONNAIRES: GAD7 TOTAL SCORE: 14

## 2018-05-28 ASSESSMENT — PATIENT HEALTH QUESTIONNAIRE - PHQ9: SUM OF ALL RESPONSES TO PHQ QUESTIONS 1-9: 11

## 2018-07-05 ENCOUNTER — OFFICE VISIT (OUTPATIENT)
Dept: INTERNAL MEDICINE | Facility: CLINIC | Age: 48
End: 2018-07-05
Payer: COMMERCIAL

## 2018-07-05 VITALS
TEMPERATURE: 98.2 F | WEIGHT: 106.9 LBS | RESPIRATION RATE: 17 BRPM | SYSTOLIC BLOOD PRESSURE: 118 MMHG | DIASTOLIC BLOOD PRESSURE: 78 MMHG | BODY MASS INDEX: 20.18 KG/M2 | HEART RATE: 84 BPM | HEIGHT: 61 IN

## 2018-07-05 DIAGNOSIS — H53.9 VISUAL DISTURBANCE: ICD-10-CM

## 2018-07-05 DIAGNOSIS — M62.89 MUSCLE STIFFNESS: ICD-10-CM

## 2018-07-05 DIAGNOSIS — M25.641 STIFFNESS OF RIGHT HAND JOINT: Primary | ICD-10-CM

## 2018-07-05 LAB — ERYTHROCYTE [SEDIMENTATION RATE] IN BLOOD BY WESTERGREN METHOD: 6 MM/H (ref 0–20)

## 2018-07-05 PROCEDURE — 86431 RHEUMATOID FACTOR QUANT: CPT | Performed by: INTERNAL MEDICINE

## 2018-07-05 PROCEDURE — 83735 ASSAY OF MAGNESIUM: CPT | Performed by: INTERNAL MEDICINE

## 2018-07-05 PROCEDURE — 99214 OFFICE O/P EST MOD 30 MIN: CPT | Performed by: INTERNAL MEDICINE

## 2018-07-05 PROCEDURE — 84100 ASSAY OF PHOSPHORUS: CPT | Performed by: INTERNAL MEDICINE

## 2018-07-05 PROCEDURE — 36415 COLL VENOUS BLD VENIPUNCTURE: CPT | Performed by: INTERNAL MEDICINE

## 2018-07-05 PROCEDURE — 86140 C-REACTIVE PROTEIN: CPT | Performed by: INTERNAL MEDICINE

## 2018-07-05 PROCEDURE — 84443 ASSAY THYROID STIM HORMONE: CPT | Performed by: INTERNAL MEDICINE

## 2018-07-05 PROCEDURE — 86038 ANTINUCLEAR ANTIBODIES: CPT | Performed by: INTERNAL MEDICINE

## 2018-07-05 PROCEDURE — 80048 BASIC METABOLIC PNL TOTAL CA: CPT | Performed by: INTERNAL MEDICINE

## 2018-07-05 PROCEDURE — 85652 RBC SED RATE AUTOMATED: CPT | Performed by: INTERNAL MEDICINE

## 2018-07-05 NOTE — PROGRESS NOTES
SUBJECTIVE:   Valencia Ye is a 47 year old female who presents to clinic today for the following health issues:      Complaints of significant right hand stiffness: This is been going on a long time but gradually worsening.  She states it does not feel like this is her actual joints that are stiff but instead it feels like it is her entire hand.  It is really primarily only the right, the left does not really bother her much.  She moved her bowels to her left hand but does a lot of keyboarding with the right so it is very uncomfortable by the end of the day.  She has been using heat on it which seems to help make it more flexible.  There is infrequently some aching along some of the palmar tendons and the base of the thumb on the palm side and some aching in the forearm but otherwise the hand itself is not really hurting.  It does not really spasm.  There is no associated numbness or tingling.  There does not appear to be specific joint swellings or pain.  She finds it much more difficult to straighten the fingers then to curl them.   She does wants to be sure it is not something that is systemic.     She also mentions that she had one episode of acute vision loss of her left eye only for 30 seconds a few days ago.  She had been squatting and when she got up from squatting she saw a little bit of scars which she says is not at all unusual for her, but then the vision of her left eye went gray.  She has had previous migraine auras and has a small visual defect on the left from a bad episode many many years ago.    Patient Active Problem List   Diagnosis     Goiter     History of systemic steroid therapy     ILD (interstitial lung disease) (H)     Palpitations     Churg-Antonina syndrome (H)     History of eating disorder     Bronchiectasis (H)     Asthma     Anxiety     Chronic fatigue     Mannose-binding lectin deficiency     Benign essential hypertension     Recurrent major depressive disorder, in partial  remission (H)     Bronchiectasis without acute exacerbation (H)     Controlled substance agreement signed     Current Outpatient Prescriptions   Medication Sig Dispense Refill     acetylcysteine (MUCOMYST) 20 % nebulizer solution Take 4 mLs by nebulization 2 times daily Use with albuterol solution. Discard open bottle of Mucomyst after 96 hours. 240 mL 11     albuterol (2.5 MG/3ML) 0.083% neb solution Take 1 vial (2.5 mg) by nebulization 2 times daily Use with Mucomyst 60 vial 11     atorvastatin (LIPITOR) 10 MG tablet Take 0.5 tablets (5 mg) by mouth daily 30 tablet 11     budesonide-formoterol (SYMBICORT) 160-4.5 MCG/ACT Inhaler Inhale 2 puffs into the lungs 2 times daily 1 Inhaler 11     cholecalciferol 5000 UNITS CAPS Take by mouth daily       ciprofloxacin-dexamethasone (CIPRODEX) otic suspension Instill 3 drops into the affected ear twice daily as needed. 7.5 mL 3     clonazePAM (KLONOPIN) 0.5 MG tablet Take 0.5 tablets (0.25 mg) by mouth 2 times daily as needed for anxiety 20 tablet 0     fluticasone (FLONASE) 50 MCG/ACT spray Spray 2 sprays into both nostrils daily 48 mL 3     glucosamine-chondroitin (GLUCOSAMINE CHONDR COMPLEX) 500-400 MG CAPS Take 2 capsules by mouth daily.       Ipratropium-Albuterol (COMBIVENT RESPIMAT)  MCG/ACT inhaler Inhale 1 puff into the lungs 4 times daily Not to exceed 6 doses per day. 1 Inhaler 11     MAGNESIUM PO Take 2 tablets by mouth daily Magnesium Orotate       metoprolol succinate (TOPROL-XL) 25 MG 24 hr tablet Take 0.5 tablets (12.5 mg) by mouth daily 45 tablet 3     Omega-3 Fatty Acids (FISH OIL) 1200 MG capsule Take 3 capsules by mouth daily.       predniSONE (DELTASONE) 1 MG tablet TAKE 5 TABLETS BY MOUTH ONE DAY AND ALTERNATING WITH 6 TABLETS THE NEXT. 200 tablet 3     Probiotic Product (PROBIOTIC ADVANCED PO)        UNABLE TO FIND 3,600 mg daily MEDICATION NAME: monolaurin       sertraline (ZOLOFT) 25 MG tablet Take 0.5 tablets (12.5 mg) by mouth every other  "day (Patient not taking: Reported on 7/5/2018) 30 tablet 3          Reviewed and updated as needed this visit by clinical staff  Tobacco  Allergies  Meds  Med Hx  Surg Hx  Fam Hx  Soc Hx      Reviewed and updated as needed this visit by Provider         ROS:  Negative for headache, hemianopsia, focal numbness, tingling, weakness in the face or arms, double vision or slurred speech    OBJECTIVE:     /78 (BP Location: Right arm, Patient Position: Chair, Cuff Size: Adult Regular)  Pulse 84  Temp 98.2  F (36.8  C) (Oral)  Resp 17  Ht 5' 1\" (1.549 m)  Wt 106 lb 14.4 oz (48.5 kg)  LMP 02/01/2009  BMI 20.2 kg/m2  Body mass index is 20.2 kg/(m^2).    Left fundus: Normal disc and vessels  Hand: Very minimal tenderness of the forearm extensor muscles,  no tenderness, synovitis or effusion of the wrist, MCP, PIP or DIP joints.  Range of motion at the joints is overall full.  There is no significant tenderness to the dorsal hand, there is some mild tenderness over the base of the thumb and the fourth finger palmar tendon.       ASSESSMENT/PLAN:             1. Stiffness of right hand joint  It is unclear if the stiffness really is related to the joints, tendons or muscles.  Will check some metabolic labs, likely is fairly benign and related to some overuse so may need to continue using heat and will contact her with the results.  - Basic metabolic panel  - Magnesium  - Phosphorus  - TSH with free T4 reflex  - Anti Nuclear Dinorah IgG by IFA with Reflex  - Rheumatoid factor  - CRP, inflammation  - ESR: Erythrocyte sedimentation rate    2. Muscle stiffness  As above  - Basic metabolic panel  - Magnesium  - Phosphorus  - TSH with free T4 reflex  - Anti Nuclear Dinorah IgG by IFA with Reflex  - Rheumatoid factor  - CRP, inflammation  - ESR: Erythrocyte sedimentation rate    3. Visual disturbance  Reassured that the fundal exam is negative for evidence of any thrombus.  Likely this was related to a drop in pressure, could " be related to previous ocular migraines or could have been an ocular migraine triggered.  She has a lot of recurrences she may need to see her eye doctor.        Morelia Simental MD  [facility]

## 2018-07-05 NOTE — MR AVS SNAPSHOT
After Visit Summary   7/5/2018    Valencia Ye    MRN: [id number]           Patient Information     Date Of Birth          1970        Visit Information        Provider Department      7/5/2018 5:00 PM Morelia Simental MD [facility]        Today's Diagnoses     Stiffness of right hand joint    -  1    Muscle stiffness        Visual disturbance           Follow-ups after your visit        Your next 10 appointments already scheduled     Aug 02, 2018  8:00 AM CDT   PFT VISIT with RADHA CURTIS   [facility] ([facility])    [address]  3rd Floor  [facility] [id number] [phone]            Aug 02, 2018  8:30 AM CDT   (Arrive by 8:15 AM)   Return Bronchiectas Non CF with Dagoberto Briscoe MD   [facility] ([facility])    [address]  [address] [place] [id number] [phone]              Who to contact     If you have questions or need follow up information about today's clinic visit or your schedule please contact [facility] directly at [phone].  Normal or non-critical lab and imaging results will be communicated to you by Devunityhart, letter or phone within 4 business days after the clinic has received the results. If you do not hear from us within 7 days, please contact the clinic through Devunityhart or phone. If you have a critical or abnormal lab result, we will notify you by phone as soon as possible.  Submit refill requests through [employer]Dom[employer] or call your pharmacy and they will forward the refill request to us. Please allow 3 business days for your refill to be completed.          Additional Information About Your Visit        MyChart Information     [employer] gives you secure access to your electronic health record. If you see a primary care provider, you can also send messages to your care team and make appointments. If  "you have questions, please call your primary care clinic.  If you do not have a primary care provider, please call [phone] and they will assist you.        Care EveryWhere ID     This is your Care EveryWhere ID. This could be used by other organizations to access your [place] medical records  [id number]        Your Vitals Were     Pulse Temperature Respirations Height Last Period BMI (Body Mass Index)    84 98.2  F (36.8  C) (Oral) 17 5' 1\" (1.549 m) 02/01/2009 20.2 kg/m2       Blood Pressure from Last 3 Encounters:   07/05/18 118/78   05/21/18 128/78   04/17/18 110/72    Weight from Last 3 Encounters:   07/05/18 106 lb 14.4 oz (48.5 kg)   05/21/18 109 lb 1.6 oz (49.5 kg)   04/17/18 109 lb (49.4 kg)              We Performed the Following     Anti Nuclear Dinorah IgG by IFA with Reflex     Basic metabolic panel     CRP, inflammation     ESR: Erythrocyte sedimentation rate     Magnesium     Phosphorus     Rheumatoid factor     TSH with free T4 reflex        Primary Care Provider Office Phone # Fax #    Morelia Simental [phone]       [address] [facility] [address]  [facility] [address]        Equal Access to Services     JACK THOMPSON AH: Hadii aad ku hadasho Soomaali, waaxda luqadaha, qaybta kaalmada adeegyada, waxay join hayannan [place]. So [facility] [phone].    ATENCIÓN: Si habla español, tiene a waller disposición servicios gratuitos de asistencia lingüística. Llame al [phone].    We comply with applicable federal civil rights laws and [place] laws. We do not discriminate on the basis of race, color, national origin, age, disability, sex, sexual orientation, or gender identity.            Thank you!     Thank you for choosing [facility]  for your care. Our goal is always to provide you with excellent care. Hearing back from our patients is one way we can continue to improve our services. Please take a few minutes to complete the written survey that you may receive in " the mail after your visit with us. Thank you!             Your Updated Medication List - Protect others around you: Learn how to safely use, store and throw away your medicines at www.disposemymeds.org.          This list is accurate as of 7/5/18 11:59 PM.  Always use your most recent med list.                   Brand Name Dispense Instructions for use Diagnosis    acetylcysteine 20 % nebulizer solution    MUCOMYST    240 mL    Take 4 mLs by nebulization 2 times daily Use with albuterol solution. Discard open bottle of Mucomyst after 96 hours.    Bronchiectasis without complication (H)       albuterol (2.5 MG/3ML) 0.083% neb solution     60 vial    Take 1 vial (2.5 mg) by nebulization 2 times daily Use with Mucomyst    Bronchiectasis without complication (H)       atorvastatin 10 MG tablet    LIPITOR    30 tablet    Take 0.5 tablets (5 mg) by mouth daily    Alpha-lipoproteinemia       budesonide-formoterol 160-4.5 MCG/ACT Inhaler    SYMBICORT    1 Inhaler    Inhale 2 puffs into the lungs 2 times daily    Churg-Antonina syndrome (H)       cholecalciferol 5000 units Caps      Take by mouth daily        ciprofloxacin-dexamethasone otic suspension    CIPRODEX    7.5 mL    Instill 3 drops into the affected ear twice daily as needed.    Ear pain, unspecified laterality       clonazePAM 0.5 MG tablet    klonoPIN    20 tablet    Take 0.5 tablets (0.25 mg) by mouth 2 times daily as needed for anxiety    Anxiety       fish Oil 1200 MG capsule      Take 3 capsules by mouth daily.        fluticasone 50 MCG/ACT spray    FLONASE    48 mL    Spray 2 sprays into both nostrils daily    Chronic rhinitis, unspecified type       GLUCOSAMINE CHONDR COMPLEX 500-400 MG Caps per capsule   Generic drug:  glucosamine-chondroitin      Take 2 capsules by mouth daily.        Ipratropium-Albuterol  MCG/ACT inhaler    COMBIVENT RESPIMAT    1 Inhaler    Inhale 1 puff into the lungs 4 times daily Not to exceed 6 doses per day.    Churg-Antonina  syndrome (H)       MAGNESIUM PO      Take 2 tablets by mouth daily Magnesium Orotate        metoprolol succinate 25 MG 24 hr tablet    TOPROL-XL    45 tablet    Take 0.5 tablets (12.5 mg) by mouth daily    Palpitations       predniSONE 1 MG tablet    DELTASONE    200 tablet    TAKE 5 TABLETS BY MOUTH ONE DAY AND ALTERNATING WITH 6 TABLETS THE NEXT.    Bronchiectasis without complication (H)       PROBIOTIC ADVANCED PO           sertraline 25 MG tablet    ZOLOFT    30 tablet    Take 0.5 tablets (12.5 mg) by mouth every other day    Anxiety       UNABLE TO FIND      3,600 mg daily MEDICATION NAME: aston

## 2018-07-06 LAB
ANION GAP SERPL CALCULATED.3IONS-SCNC: 11 MMOL/L (ref 3–14)
BUN SERPL-MCNC: 19 MG/DL (ref 7–30)
CALCIUM SERPL-MCNC: 9.2 MG/DL (ref 8.5–10.1)
CHLORIDE SERPL-SCNC: 106 MMOL/L (ref 94–109)
CO2 SERPL-SCNC: 23 MMOL/L (ref 20–32)
CREAT SERPL-MCNC: 0.73 MG/DL (ref 0.52–1.04)
CRP SERPL-MCNC: <2.9 MG/L (ref 0–8)
GFR SERPL CREATININE-BSD FRML MDRD: 86 ML/MIN/1.7M2
GLUCOSE SERPL-MCNC: 90 MG/DL (ref 70–99)
MAGNESIUM SERPL-MCNC: 2.4 MG/DL (ref 1.6–2.3)
PHOSPHATE SERPL-MCNC: 4.4 MG/DL (ref 2.5–4.5)
POTASSIUM SERPL-SCNC: 4.2 MMOL/L (ref 3.4–5.3)
SODIUM SERPL-SCNC: 140 MMOL/L (ref 133–144)
TSH SERPL DL<=0.005 MIU/L-ACNC: 0.56 MU/L (ref 0.4–4)

## 2018-07-09 LAB
ANA SER QL IF: NEGATIVE
RHEUMATOID FACT SER NEPH-ACNC: <20 IU/ML (ref 0–20)

## 2018-07-17 DIAGNOSIS — F41.9 ANXIETY: ICD-10-CM

## 2018-07-17 RX ORDER — CLONAZEPAM 0.5 MG/1
0.25 TABLET ORAL 2 TIMES DAILY PRN
Qty: 20 TABLET | Refills: 3 | Status: SHIPPED | OUTPATIENT
Start: 2018-07-17 | End: 2019-06-10

## 2018-07-17 NOTE — TELEPHONE ENCOUNTER
clonazepam      Last Written Prescription Date:  4/30/18  Last Fill Quantity: 20,   # refills: 0  Last Office Visit: 5/21/18  Future Office visit:    Next 5 appointments (look out 90 days)     Sep 04, 2018  9:00 AM CDT   MyChart [facility] Evaluation with [facility] Simental [facility]    [address]  [facility] [address]   [phone]                   Routing refill request to provider for review/approval because:  Drug not on the [facility], [facility] or [facility] refill protocol or controlled substance

## 2018-08-08 DIAGNOSIS — M30.1 CHURG-STRAUSS SYNDROME (H): ICD-10-CM

## 2018-08-08 DIAGNOSIS — D72.18 CHURG-STRAUSS SYNDROME (H): ICD-10-CM

## 2018-08-22 DIAGNOSIS — E78.49 ALPHA-LIPOPROTEINEMIA: ICD-10-CM

## 2018-08-22 LAB
CHOLEST SERPL-MCNC: 131 MG/DL
HDLC SERPL-MCNC: 73 MG/DL
LDLC SERPL CALC-MCNC: 48 MG/DL
NONHDLC SERPL-MCNC: 58 MG/DL
TRIGL SERPL-MCNC: 49 MG/DL

## 2018-08-22 PROCEDURE — 36415 COLL VENOUS BLD VENIPUNCTURE: CPT | Performed by: INTERNAL MEDICINE

## 2018-08-22 PROCEDURE — 80061 LIPID PANEL: CPT | Performed by: INTERNAL MEDICINE

## 2018-09-04 ENCOUNTER — OFFICE VISIT (OUTPATIENT)
Dept: INTERNAL MEDICINE | Facility: CLINIC | Age: 48
End: 2018-09-04
Payer: COMMERCIAL

## 2018-09-04 VITALS
BODY MASS INDEX: 20.06 KG/M2 | OXYGEN SATURATION: 99 % | DIASTOLIC BLOOD PRESSURE: 70 MMHG | WEIGHT: 109 LBS | SYSTOLIC BLOOD PRESSURE: 110 MMHG | TEMPERATURE: 98.2 F | HEIGHT: 62 IN | HEART RATE: 77 BPM

## 2018-09-04 DIAGNOSIS — R42 DIZZINESS: Primary | ICD-10-CM

## 2018-09-04 DIAGNOSIS — K58.1 IRRITABLE BOWEL SYNDROME WITH CONSTIPATION: ICD-10-CM

## 2018-09-04 PROBLEM — F33.41 RECURRENT MAJOR DEPRESSIVE DISORDER, IN PARTIAL REMISSION (H): Status: ACTIVE | Noted: 2017-01-29

## 2018-09-04 PROCEDURE — 99214 OFFICE O/P EST MOD 30 MIN: CPT | Performed by: INTERNAL MEDICINE

## 2018-09-04 RX ORDER — LUBIPROSTONE 8 UG/1
1 CAPSULE ORAL 2 TIMES DAILY
Qty: 60 CAPSULE | Refills: 3 | Status: SHIPPED | OUTPATIENT
Start: 2018-09-04 | End: 2019-05-01

## 2018-09-04 RX ORDER — DIAZEPAM 5 MG
5 TABLET ORAL EVERY 8 HOURS PRN
Qty: 10 TABLET | Refills: 0 | Status: SHIPPED | OUTPATIENT
Start: 2018-09-04 | End: 2018-10-11

## 2018-09-04 NOTE — PROGRESS NOTES
SUBJECTIVE:   Valencia Ye is a 47 year old female who presents to clinic today for the following health issues:    1. Dizziness: She complains she has been having episodes of dizziness lasting a day or so in the past few months.  She has had about 6 episodes in the last 3 months, the worst episode was 2 and 3 days ago.  She awakened with the dizziness which she describes as being very off balance, associated with nausea, rarely vomiting.  She feels unsteady getting up and walking, the dizziness is much worse with movements especially turning her head or bending down.  It is sometimes described as vertigo but not always.  She takes Dramamine for it which decreases the symptoms to some degree but mostly just tries to sit still or sleep.  She has chronic tinnitus which is overall stable.  She has some hearing loss which she thinks might be slightly worse recently.    2. Abdominal pain: She reports this is her irritable bowel syndrome in the last few months she has been having more problems with chronic constipation.  This is associated with bloating, decreased frequency of bowel movements, small bowel movements.  She feels fairly steady pain or discomfort with it.  She has been trying very hard to follow the best diet for her symptoms, has been eating smaller amounts because of the symptoms.  When she takes a laxative even a very small amount she will have some diarrhea but then goes right back to constipation.      Patient Active Problem List   Diagnosis     Goiter     History of systemic steroid therapy     ILD (interstitial lung disease) (H)     Palpitations     Churg-Antonina syndrome (H)     History of eating disorder     Bronchiectasis (H)     Asthma     Anxiety     Chronic fatigue     Mannose-binding lectin deficiency     Benign essential hypertension     Recurrent major depressive disorder, in partial remission (H)     Bronchiectasis without acute exacerbation (H)     Controlled substance agreement  signed     Current Outpatient Prescriptions   Medication Sig Dispense Refill     acetylcysteine (MUCOMYST) 20 % nebulizer solution Take 4 mLs by nebulization 2 times daily Use with albuterol solution. Discard open bottle of Mucomyst after 96 hours. 240 mL 11     albuterol (2.5 MG/3ML) 0.083% neb solution Take 1 vial (2.5 mg) by nebulization 2 times daily Use with Mucomyst 60 vial 11     atorvastatin (LIPITOR) 10 MG tablet Take 0.5 tablets (5 mg) by mouth daily 30 tablet 11     budesonide-formoterol (SYMBICORT) 160-4.5 MCG/ACT Inhaler Inhale 2 puffs into the lungs 2 times daily 1 Inhaler 11     cholecalciferol 5000 UNITS CAPS Take 4,000 Units by mouth daily        ciprofloxacin-dexamethasone (CIPRODEX) otic suspension Instill 3 drops into the affected ear twice daily as needed. 7.5 mL 3     clonazePAM (KLONOPIN) 0.5 MG tablet Take 0.5 tablets (0.25 mg) by mouth 2 times daily as needed for anxiety 20 tablet 3     fluticasone (FLONASE) 50 MCG/ACT spray Spray 2 sprays into both nostrils daily 48 mL 3     glucosamine-chondroitin (GLUCOSAMINE CHONDR COMPLEX) 500-400 MG CAPS Take 2 capsules by mouth daily.       Ipratropium-Albuterol (COMBIVENT RESPIMAT)  MCG/ACT inhaler Inhale 1 puff into the lungs 4 times daily Not to exceed 6 doses per day. 1 Inhaler 11     metoprolol succinate (TOPROL-XL) 25 MG 24 hr tablet Take 0.5 tablets (12.5 mg) by mouth daily 45 tablet 3     Omega-3 Fatty Acids (FISH OIL) 1200 MG capsule Take 3 capsules by mouth daily.       predniSONE (DELTASONE) 1 MG tablet TAKE 5 TABLETS BY MOUTH ONE DAY AND ALTERNATING WITH 6 TABLETS THE NEXT. 200 tablet 3     Probiotic Product (PROBIOTIC ADVANCED PO)        UNABLE TO FIND 3,600 mg daily MEDICATION NAME: monolaurin       vitamin B complex with vitamin C (VITAMIN  B COMPLEX) TABS tablet Take 1 tablet by mouth daily        Social History   Substance Use Topics     Smoking status: Never Smoker     Smokeless tobacco: Never Used     Alcohol use 0.0 oz/week  "    0 Standard drinks or equivalent per week      Comment: occ on the weekends             ROS:  No fever, chills, headache, double vision, slurred speech, focal numbness, tingling, weakness, ear pain, clumsiness, ataxia, blood in stool, diarrhea    OBJECTIVE:     /70 (BP Location: Right arm, Cuff Size: Adult Regular)  Pulse 77  Temp 98.2  F (36.8  C) (Oral)  Ht 5' 2\" (1.575 m)  Wt 109 lb (49.4 kg)  LMP 02/01/2009  SpO2 99%  Breastfeeding? No  BMI 19.94 kg/m2  Body mass index is 19.94 kg/(m^2).    PERRL, EOMI, fundi benign  Neurologic exam:  Cranial nerves 2-12 intact. No nystagmus.  Finger nose finger: normal  Gait: normal  Tandem: normal  Romberg: negative       ASSESSMENT/PLAN:     (R42) Dizziness  (primary encounter diagnosis)  Comment: Symptoms are most suggestive of vestibular, no other symptoms or abnormal findings that would suggest a central process. This could be Ménière's disease since this is recurrent.  Recommend she see ENT, she goes to the [facility].  Suggest she try Bonine instead of Dramamine when symptoms occur, if still not very helpful will provide a small prescription of Valium to try pending the ENT evaluation.  Plan: diazepam (VALIUM) 5 MG tablet            (K58.1) Irritable bowel syndrome with constipation  Comment: Suggest consider trial of Amitiza  Plan: Lubiprostone 8 MCG CAPS capsule               Morelia Simental MD  [facility]    "

## 2018-09-04 NOTE — MR AVS SNAPSHOT
After Visit Summary   9/4/2018    Valencia Ye    MRN: [id number]           Patient Information     Date Of Birth          1970        Visit Information        Provider Department      9/4/2018 9:00 AM Morelia Simental MD [facility]        Today's Diagnoses     Irritable bowel syndrome with constipation    -  1    Dizziness          Care Instructions    Bonine or Meclizine otc for dizziness.           Follow-ups after your visit        Your next 10 appointments already scheduled     Oct 11, 2018  4:00 PM CDT   PFT VISIT with  [facility] ([facility])    [address]  3rd Floor  [facility] [id number] [phone]            Oct 11, 2018  4:50 PM CDT   (Arrive by 4:35 PM)   Return Bronchiectas Non CF with Dagoberto Briscoe MD   [facility] ([facility])    [address]  [address] [place] [id number] [phone]              Who to contact     If you have questions or need follow up information about today's clinic visit or your schedule please contact [facility] directly at [phone].  Normal or non-critical lab and imaging results will be communicated to you by MyChart, letter or phone within 4 business days after the clinic has received the results. If you do not hear from us within 7 days, please contact the clinic through MyChart or phone. If you have a critical or abnormal lab result, we will notify you by phone as soon as possible.  Submit refill requests through Rebyoo or call your pharmacy and they will forward the refill request to us. Please allow 3 business days for your refill to be completed.          Additional Information About Your Visit        MyChart Information     Rebyoo gives you secure access to your electronic health record. If you see a primary care provider, you can also send  "messages to your care team and make appointments. If you have questions, please call your primary care clinic.  If you do not have a primary care provider, please call [phone] and they will assist you.        Care EveryWhere ID     This is your Care EveryWhere ID. This could be used by other organizations to access your [place] medical records  [id number]        Your Vitals Were     Pulse Temperature Height Last Period Pulse Oximetry Breastfeeding?    77 98.2  F (36.8  C) (Oral) 5' 2\" (1.575 m) 02/01/2009 99% No    BMI (Body Mass Index)                   19.94 kg/m2            Blood Pressure from Last 3 Encounters:   09/04/18 110/70   07/05/18 118/78   05/21/18 128/78    Weight from Last 3 Encounters:   09/04/18 109 lb (49.4 kg)   07/05/18 106 lb 14.4 oz (48.5 kg)   05/21/18 109 lb 1.6 oz (49.5 kg)              Today, you had the following     No orders found for display         Today's Medication Changes          These changes are accurate as of 9/4/18  9:46 AM.  If you have any questions, ask your nurse or doctor.               Start taking these medicines.        Dose/Directions    diazepam 5 MG tablet   Commonly known as:  VALIUM   Used for:  Dizziness   Started by:  Morelia Simental MD        Dose:  5 mg   Take 1 tablet (5 mg) by mouth every 8 hours as needed (dizziness)   Quantity:  10 tablet   Refills:  0       Lubiprostone 8 MCG Caps capsule   Used for:  Irritable bowel syndrome with constipation   Started by:  Morelia Simental MD        Dose:  1 capsule   Take 1 capsule (8 mcg) by mouth 2 times daily   Quantity:  60 capsule   Refills:  3            Where to get your medicines      These medications were sent to [facility] [address] [facility] [address] [facility] [address] AT [facility] OF [facility] [address]  [address] [facility] [address], [facility] [address]     Phone:  [phone]     Lubiprostone 8 MCG Caps capsule         Some of these will need a paper prescription and others can be bought over the counter.  Ask " your nurse if you have questions.     Bring a paper prescription for each of these medications     diazepam 5 MG tablet                Primary Care Provider Office Phone # Fax #    Morelia Simental -[phone]       [address]  [facility] [address]        Equal Access to Services     JACK THOMPSON : Hadii camden cain hadhemalathao Soomaali, waaxda luqadaha, qaybta kaalmada adeegyada, waxay idiin hayannamelinda brasherjenniferharpal fields. So [facility] [phone].    ATENCIÓN: Si habla español, tiene a waller disposición servicios gratuitos de asistencia lingüística. Llame al [phone].    We comply with applicable federal civil rights laws and [place] laws. We do not discriminate on the basis of race, color, national origin, age, disability, sex, sexual orientation, or gender identity.            Thank you!     Thank you for choosing [facility]  for your care. Our goal is always to provide you with excellent care. Hearing back from our patients is one way we can continue to improve our services. Please take a few minutes to complete the written survey that you may receive in the mail after your visit with us. Thank you!             Your Updated Medication List - Protect others around you: Learn how to safely use, store and throw away your medicines at www.disposemymeds.org.          This list is accurate as of 9/4/18  9:46 AM.  Always use your most recent med list.                   Brand Name Dispense Instructions for use Diagnosis    acetylcysteine 20 % nebulizer solution    MUCOMYST    240 mL    Take 4 mLs by nebulization 2 times daily Use with albuterol solution. Discard open bottle of Mucomyst after 96 hours.    Bronchiectasis without complication (H)       albuterol (2.5 MG/3ML) 0.083% neb solution     60 vial    Take 1 vial (2.5 mg) by nebulization 2 times daily Use with Mucomyst    Bronchiectasis without complication (H)       atorvastatin 10 MG tablet    LIPITOR    30 tablet    Take 0.5  tablets (5 mg) by mouth daily    Alpha-lipoproteinemia       budesonide-formoterol 160-4.5 MCG/ACT Inhaler    SYMBICORT    1 Inhaler    Inhale 2 puffs into the lungs 2 times daily    Churg-Antonina syndrome (H)       cholecalciferol 5000 units Caps      Take 4,000 Units by mouth daily        ciprofloxacin-dexamethasone otic suspension    CIPRODEX    7.5 mL    Instill 3 drops into the affected ear twice daily as needed.    Ear pain, unspecified laterality       clonazePAM 0.5 MG tablet    klonoPIN    20 tablet    Take 0.5 tablets (0.25 mg) by mouth 2 times daily as needed for anxiety    Anxiety       diazepam 5 MG tablet    VALIUM    10 tablet    Take 1 tablet (5 mg) by mouth every 8 hours as needed (dizziness)    Dizziness       fish Oil 1200 MG capsule      Take 3 capsules by mouth daily.        fluticasone 50 MCG/ACT spray    FLONASE    48 mL    Spray 2 sprays into both nostrils daily    Chronic rhinitis, unspecified type       GLUCOSAMINE CHONDR COMPLEX 500-400 MG Caps per capsule   Generic drug:  glucosamine-chondroitin      Take 2 capsules by mouth daily.        Ipratropium-Albuterol  MCG/ACT inhaler    COMBIVENT RESPIMAT    1 Inhaler    Inhale 1 puff into the lungs 4 times daily Not to exceed 6 doses per day.    Churg-Antonina syndrome (H)       Lubiprostone 8 MCG Caps capsule     60 capsule    Take 1 capsule (8 mcg) by mouth 2 times daily    Irritable bowel syndrome with constipation       metoprolol succinate 25 MG 24 hr tablet    TOPROL-XL    45 tablet    Take 0.5 tablets (12.5 mg) by mouth daily    Palpitations       predniSONE 1 MG tablet    DELTASONE    200 tablet    TAKE 5 TABLETS BY MOUTH ONE DAY AND ALTERNATING WITH 6 TABLETS THE NEXT.    Bronchiectasis without complication (H)       PROBIOTIC ADVANCED PO           UNABLE TO FIND      3,600 mg daily MEDICATION NAME: monolaurin        vitamin B complex with vitamin C Tabs tablet      Take 1 tablet by mouth daily

## 2018-09-04 NOTE — NURSING NOTE
"/70 (BP Location: Right arm, Cuff Size: Adult Regular)  Pulse 77  Temp 98.2  F (36.8  C) (Oral)  Ht 5' 2\" (1.575 m)  Wt 109 lb (49.4 kg)  LMP 02/01/2009  SpO2 99%  Breastfeeding? No  BMI 19.94 kg/m2    "

## 2018-10-11 ENCOUNTER — OFFICE VISIT (OUTPATIENT)
Dept: PULMONOLOGY | Facility: CLINIC | Age: 48
End: 2018-10-11
Attending: INTERNAL MEDICINE
Payer: COMMERCIAL

## 2018-10-11 VITALS
HEART RATE: 65 BPM | WEIGHT: 109 LBS | RESPIRATION RATE: 17 BRPM | SYSTOLIC BLOOD PRESSURE: 121 MMHG | BODY MASS INDEX: 20.06 KG/M2 | OXYGEN SATURATION: 100 % | HEIGHT: 62 IN | DIASTOLIC BLOOD PRESSURE: 78 MMHG

## 2018-10-11 DIAGNOSIS — J47.9 BRONCHIECTASIS WITHOUT ACUTE EXACERBATION (H): Primary | ICD-10-CM

## 2018-10-11 LAB
EXPTIME-PRE: 7.9 SEC
FEF2575-%PRED-PRE: 62 %
FEF2575-PRE: 1.65 L/SEC
FEF2575-PRED: 2.64 L/SEC
FEFMAX-%PRED-PRE: 86 %
FEFMAX-PRE: 5.64 L/SEC
FEFMAX-PRED: 6.55 L/SEC
FEV1-%PRED-PRE: 105 %
FEV1-PRE: 2.63 L
FEV1FEV6-PRE: 68 %
FEV1FEV6-PRED: 83 %
FEV1FVC-PRE: 68 %
FEV1FVC-PRED: 81 %
FIFMAX-PRE: 3.62 L/SEC
FVC-%PRED-PRE: 127 %
FVC-PRE: 3.88 L
FVC-PRED: 3.04 L

## 2018-10-11 PROCEDURE — G0463 HOSPITAL OUTPT CLINIC VISIT: HCPCS | Mod: ZF

## 2018-10-11 ASSESSMENT — PAIN SCALES - GENERAL: PAINLEVEL: NO PAIN (0)

## 2018-10-11 NOTE — PROGRESS NOTES
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 Since her last visit, she had a cardiac CT angiogram showing LV function and coronary arteries are normal.     # Pulmonary: She reports baseline symptoms and excellent exercise tolerance. PFTs are in the normal range and similar to her recent baseline.  She is oxygenating very well at 100% SpO2. She does not appear to be experiencing a pulmonary exacerbation at this time. I have encouraged her to continue her current airway clearance therapy, exercise and mediations. The patient has received a quadvalent influenza vaccine in September for the 2018-19 flu season.    # Cardiac: As above, she had a cardiac CT angiogram which showed normal LV function and coronary arteries. She follows with Dr. Moeller in cardiology and I will defer to her for further management of this patients cardiac disease.    I will see the patient in followup in 6 months with PFTs. She will call with any new pulmonary symptoms.       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    Today, the patient is feeling well. She reports no recent acute illnesses. Breathing is comfortable at rest and exercise is well tolerated. She has been doing aerobic exercise occasionally which is varied in form. Rare cough productive of a small volume of clear-white sputum. No hemoptysis. Occasional cramps for seconds to minutes, unchanged from baseline. No fever, chills, or night sweats.    She is doing vest therapy 30 minutes once daily at [place] standard frequencies and pressures.       Review of Systems:   CONST: Appetite is  good.  ENT: No sinus/ear pain, sore throat, or rhinorrhea.   GI: No nausea, vomiting, or loose stools. No abdominal pain.  MS: Infrequent joint pain at baseline.    A complete ROS was otherwise negative except as noted in the HPI.    Current Outpatient Prescriptions   Medication     acetylcysteine (MUCOMYST) 20 % nebulizer solution     albuterol (2.5 MG/3ML) 0.083% neb solution     atorvastatin (LIPITOR) 10 MG tablet     budesonide-formoterol (SYMBICORT) 160-4.5 MCG/ACT Inhaler     cholecalciferol 5000 UNITS CAPS     ciprofloxacin-dexamethasone (CIPRODEX) otic suspension     clonazePAM (KLONOPIN) 0.5 MG tablet     diazepam (VALIUM) 5 MG tablet     fluticasone (FLONASE) 50 MCG/ACT spray     glucosamine-chondroitin (GLUCOSAMINE CHONDR COMPLEX) 500-400 MG CAPS     Ipratropium-Albuterol (COMBIVENT RESPIMAT)  MCG/ACT inhaler     Lubiprostone 8 MCG CAPS capsule     metoprolol succinate (TOPROL-XL) 25 MG 24 hr tablet     Omega-3 Fatty Acids (FISH OIL) 1200 MG capsule     predniSONE (DELTASONE) 1 MG tablet     Probiotic Product (PROBIOTIC ADVANCED PO)     UNABLE TO FIND     vitamin B complex with vitamin C (VITAMIN  B COMPLEX) TABS tablet     No current facility-administered medications for this visit.      Allergies   Allergen Reactions     Colistimethate Anaphylaxis     Colymycin M [Colistimethate Sodium] Anaphylaxis     Tamiflu [Oseltamivir]      Gums Blister up     Clindamycin Rash     Zithromax [Azithromycin]      Heart palpitation     Past Medical History:   Diagnosis Date     Aortitis (H)      Asthma     associated with Churg Antonina syndrome     Churg-Antonina syndrome (H)     dx 1990     Eustachian tube dysfunction      Goiter      Hearing loss, conductive      Hypertension      Hypocalcemia      Irritable bowel syndrome      Major depressive disorder      Mannose-binding lectin deficiency 2014     Nonspecific abnormal results of thyroid function study      Osteoporosis      Pericarditis     1990 and 1992      Pneumonia     4/23/10     Recurrent UTI      Rheumatoid vasculitis (H)      Sinusitis, chronic      Steroid long-term use      Varicose veins of leg with swelling        Past Surgical History:   Procedure Laterality Date      SECTION       COLONOSCOPY Left 2014    Procedure: COMBINED COLONOSCOPY, SINGLE OR MULTIPLE BIOPSY/POLYPECTOMY BY BIOPSY;  Surgeon: Js Pinedo MD;  Location:  GI     ENT SURGERY       HC COLP CERVIX/UPPER VAGINA W LOOP ELEC BX CERVIX       HYSTERECTOMY  2009    without oopherectomy     HYSTERECTOMY, PAP NO LONGER INDICATED      cervix removed      PICC INSERTION  10/10/2013    5fr DL PASV PICC, 43cm (1cm external) in the L basilic vein w/ tip in the low SVC.     PICC INSERTION Left 2017    5fr DL BioFlo PICC, 42cm (3cm external) in the L lateral brachial vein w/ tip in the SVC RA junction.     SINUS SURGERY       TUBAL LIGATION         Social History     Social History     Marital status: Single     Spouse name: N/A     Number of children: N/A     Years of education: N/A     Occupational History     Not on file.     Social History Main Topics     Smoking status: Never Smoker     Smokeless tobacco: Never Used     Alcohol use 0.0 oz/week     0 Standard drinks or equivalent per week      Comment: occ on the weekends     Drug use: No     Sexual activity: Yes     Partners: Male     Other Topics Concern      Service No     Blood Transfusions No     Caffeine Concern No     Occupational Exposure No     Hobby Hazards No     Sleep Concern No     Stress Concern Yes     Weight Concern No     Special Diet Yes     IBS diet     Back Care No     Exercise Not Asked     1 mile power walk 5 days a week at least.      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  Non smoker. No smokers at home.  Enjoys walking and dancing.  Alcohol--1 to 2 drinks  daily.  Few times a year has more than 4 drinks in a day.  No illicits.                     LMP 02/01/2009    Exam:   GENERAL APPEARANCE: Well developed, well nourished, alert, and in no apparent distress.  EYES: PERRL, EOMI  HENT: Nasal mucosa with mild edema and no hyperemia. No nasal polyps.  EARS: Canals clear, TMs scarred with bilat perforation.  MOUTH: Oral mucosa is moist, without any lesions, no tonsillar enlargement, no oropharyngeal exudate.  NECK: supple, no masses, no thyromegaly.  LYMPHATICS: No significant axillary, cervical, or supraclavicular nodes.  RESP: normal percussion, good air flow throughout. No crackles. No rhonchi. No wheezes.  CV: No ectopic beats. Normal S1, S2, regular rhythm, normal rate. No murmur.  No rub. No gallop. No LE edema.   ABDOMEN:  Bowel sounds normal, soft, nontender, no HSM or masses.   MS: extremities normal. No clubbing. No cyanosis.  SKIN: no rash on limited exam  NEURO: Mentation intact, speech normal, normal strength and tone, normal gait and stance  PSYCH: mentation appears normal. and affect normal/bright  Results:  Recent Results (from the past 168 hour(s))   General PFT Lab (Please always keep checked)    Collection Time: 10/11/18  3:58 PM   Result Value Ref Range    FVC-Pred 3.04 L    FVC-Pre 3.88 L    FVC-%Pred-Pre 127 %    FEV1-Pre 2.63 L    FEV1-%Pred-Pre 105 %    FEV1FVC-Pred 81 %    FEV1FVC-Pre 68 %    FEFMax-Pred 6.55 L/sec    FEFMax-Pre 5.64 L/sec    FEFMax-%Pred-Pre 86 %    FEF2575-Pred 2.64 L/sec    FEF2575-Pre 1.65 L/sec    LGU6033-%Pred-Pre 62 %    ExpTime-Pre 7.90 sec    FIFMax-Pre 3.62 L/sec    FEV1FEV6-Pred 83 %    FEV1FEV6-Pre 68 %                 Results as noted above.    PFT Interpretation:  Normal spirometry.  Not significantly changed from previous.   Similar to recent baseline.  Valid Maneuver              Scribe Disclosure:   Davis CLEMENTE, am serving as a scribe; to document services personally performed by Dagoberto Briscoe MD based  on data collection and the provider's statements to me.     Provider Disclosure:  I agree with above History, Review of Systems, Physical Exam and Plan.  I have reviewed the content of the documentation and have edited it as needed. I have personally performed the services documented here and the documentation accurately represents those services and the decisions I have made.      Electronically signed by:  Dagoberto Briscoe

## 2018-10-11 NOTE — MR AVS SNAPSHOT
After Visit Summary   10/11/2018    Valencia Ye    MRN: [id number]           Patient Information     Date Of Birth          1970        Visit Information        Provider Department      10/11/2018 4:50 PM Dagoberto Briscoe MD [facility]        Today's Diagnoses     Bronchiectasis without acute exacerbation (H)    -  1      Care Instructions    Continue current medication, nebs and vest therapy.           Follow-ups after your visit        Follow-up notes from your care team     Return in about 6 months (around 4/11/2019).      Your next 10 appointments already scheduled     Nov 09, 2018  8:00 AM CST   Walk In From ENT with Trang Richard   [facility] ([facility])    [address] [place] [id number] [phone]            Nov 09, 2018  9:00 AM CST   (Arrive by 8:45 AM)   New Patient Visit with Jean Mejia MD   [facility] ([facility])    [address] [place] [id number] [phone]              Future tests that were ordered for you today     Open Future Orders        Priority Expected Expires Ordered    RESPIRATORY FLOW VOLUME LOOP Routine 4/11/2019 5/11/2019 10/11/2018            Who to contact     If you have questions or need follow up information about today's clinic visit or your schedule please contact [facility] directly at [phone].  Normal or non-critical lab and imaging results will be communicated to you by MyChart, letter or phone within 4 business days after the clinic has received the results. If you do not hear from us within 7 days, please contact the clinic through MyChart or phone. If you have a critical or abnormal lab result, we will notify you by phone as soon as possible.  Submit refill requests through [employer] or call your pharmacy and they  "will forward the refill request to us. Please allow 3 business days for your refill to be completed.          Additional Information About Your Visit        ChinaPNRhart Information     [facility] Places gives you secure access to your electronic health record. If you see a primary care provider, you can also send messages to your care team and make appointments. If you have questions, please call your primary care clinic.  If you do not have a primary care provider, please call [phone] and they will assist you.        Care EveryWhere ID     This is your Care EveryWhere ID. This could be used by other organizations to access your [place] medical records  [id number]        Your Vitals Were     Pulse Respirations Height Last Period Pulse Oximetry BMI (Body Mass Index)    65 17 1.575 m (5' 2\") 02/01/2009 100% 19.94 kg/m2       Blood Pressure from Last 3 Encounters:   10/11/18 121/78   09/04/18 110/70   07/05/18 118/78    Weight from Last 3 Encounters:   10/11/18 49.4 kg (109 lb)   09/04/18 49.4 kg (109 lb)   07/05/18 48.5 kg (106 lb 14.4 oz)                 Today's Medication Changes          These changes are accurate as of 10/11/18  5:54 PM.  If you have any questions, ask your nurse or doctor.               Stop taking these medicines if you haven't already. Please contact your care team if you have questions.     diazepam 5 MG tablet   Commonly known as:  VALIUM   Stopped by:  Dagoberto Briscoe MD           UNABLE TO FIND   Stopped by:  Dagoberto Briscoe MD                    Primary Care Provider Office Phone # Fax #    Morelia Simental [phone]       [address] [facility] [address]  [facility] [address]        Equal Access to Services     [facility] AH: Hadii aad ku hadasho Soomaali, waaxda luqadaha, qaybta kaalmada adebabakyada, vale fields. So [facility] [phone].    ATENCIÓN: Si habla español, tiene a waller disposición servicios gratuitos de asistencia lingüística. Llame al " [phone].    We comply with applicable federal civil rights laws and [place] laws. We do not discriminate on the basis of race, color, national origin, age, disability, sex, sexual orientation, or gender identity.            Thank you!     Thank you for choosing [facility]  for your care. Our goal is always to provide you with excellent care. Hearing back from our patients is one way we can continue to improve our services. Please take a few minutes to complete the written survey that you may receive in the mail after your visit with us. Thank you!             Your Updated Medication List - Protect others around you: Learn how to safely use, store and throw away your medicines at www.disposemymeds.org.          This list is accurate as of 10/11/18  5:54 PM.  Always use your most recent med list.                   Brand Name Dispense Instructions for use Diagnosis    acetylcysteine 20 % nebulizer solution    MUCOMYST    240 mL    Take 4 mLs by nebulization 2 times daily Use with albuterol solution. Discard open bottle of Mucomyst after 96 hours.    Bronchiectasis without complication (H)       albuterol (2.5 MG/3ML) 0.083% neb solution     60 vial    Take 1 vial (2.5 mg) by nebulization 2 times daily Use with Mucomyst    Bronchiectasis without complication (H)       atorvastatin 10 MG tablet    LIPITOR    30 tablet    Take 0.5 tablets (5 mg) by mouth daily    Alpha-lipoproteinemia       budesonide-formoterol 160-4.5 MCG/ACT Inhaler    SYMBICORT    1 Inhaler    Inhale 2 puffs into the lungs 2 times daily    Churg-Antonina syndrome (H)       cholecalciferol 5000 units Caps      Take 4,000 Units by mouth daily        ciprofloxacin-dexamethasone otic suspension    CIPRODEX    7.5 mL    Instill 3 drops into the affected ear twice daily as needed.    Ear pain, unspecified laterality       clonazePAM 0.5 MG tablet    klonoPIN    20 tablet    Take 0.5 tablets (0.25 mg) by mouth 2 times  daily as needed for anxiety    Anxiety       fish Oil 1200 MG capsule      Take 3 capsules by mouth daily.        fluticasone 50 MCG/ACT spray    FLONASE    48 mL    Spray 2 sprays into both nostrils daily    Chronic rhinitis, unspecified type       GLUCOSAMINE CHONDR COMPLEX 500-400 MG Caps per capsule   Generic drug:  glucosamine-chondroitin      Take 2 capsules by mouth daily.        Ipratropium-Albuterol  MCG/ACT inhaler    COMBIVENT RESPIMAT    1 Inhaler    Inhale 1 puff into the lungs 4 times daily Not to exceed 6 doses per day.    Churg-Antonina syndrome (H)       Lubiprostone 8 MCG Caps capsule     60 capsule    Take 1 capsule (8 mcg) by mouth 2 times daily    Irritable bowel syndrome with constipation       metoprolol succinate 25 MG 24 hr tablet    TOPROL-XL    45 tablet    Take 0.5 tablets (12.5 mg) by mouth daily    Palpitations       predniSONE 1 MG tablet    DELTASONE    200 tablet    TAKE 5 TABLETS BY MOUTH ONE DAY AND ALTERNATING WITH 6 TABLETS THE NEXT.    Bronchiectasis without complication (H)       PROBIOTIC ADVANCED PO           vitamin B complex with vitamin C Tabs tablet      Take 1 tablet by mouth daily

## 2018-10-11 NOTE — NURSING NOTE
Chief Complaint   Patient presents with     RECHECK     Bronchiectas, NON CF      Joy Scanlon CMA

## 2018-10-11 NOTE — LETTER
10/11/2018       RE: Valencia Ye  [address] [facility] [address]     Dear Colleague,    Thank you for referring your patient, Valencia Ye, to the [facility] at [facility]. Please see a copy of my visit note below.    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 Since her last visit, she had a cardiac CT angiogram showing LV function and coronary arteries are normal.     # Pulmonary: She reports baseline symptoms and excellent exercise tolerance. PFTs are in the normal range and similar to her recent baseline.  She is oxygenating very well at 100% SpO2. She does not appear to be experiencing a pulmonary exacerbation at this time. I have encouraged her to continue her current airway clearance therapy, exercise and mediations. The patient has received a quadvalent influenza vaccine in September for the 2018-19 flu season.    # Cardiac: As above, she had a cardiac CT angiogram which showed normal LV function and coronary arteries. She follows with Dr. Moeller in cardiology and I will defer to her for further management of this patients cardiac disease.    I will see the patient in followup in 6 months with PFTs. She will call with any new pulmonary symptoms.       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    Today, the patient is feeling well. She reports no recent acute illnesses. Breathing is comfortable at rest and exercise is well tolerated. She has been doing aerobic exercise occasionally which is varied in form. Rare cough  productive of a small volume of clear-white sputum. No hemoptysis. Occasional cramps for seconds to minutes, unchanged from baseline. No fever, chills, or night sweats.    She is doing vest therapy 30 minutes once daily at MN standard frequencies and pressures.       Review of Systems:   CONST: Appetite is good.  ENT: No sinus/ear pain, sore throat, or rhinorrhea.   GI: No nausea, vomiting, or loose stools. No abdominal pain.  MS: Infrequent joint pain at baseline.    A complete ROS was otherwise negative except as noted in the HPI.    Current Outpatient Prescriptions   Medication     acetylcysteine (MUCOMYST) 20 % nebulizer solution     albuterol (2.5 MG/3ML) 0.083% neb solution     atorvastatin (LIPITOR) 10 MG tablet     budesonide-formoterol (SYMBICORT) 160-4.5 MCG/ACT Inhaler     cholecalciferol 5000 UNITS CAPS     ciprofloxacin-dexamethasone (CIPRODEX) otic suspension     clonazePAM (KLONOPIN) 0.5 MG tablet     diazepam (VALIUM) 5 MG tablet     fluticasone (FLONASE) 50 MCG/ACT spray     glucosamine-chondroitin (GLUCOSAMINE CHONDR COMPLEX) 500-400 MG CAPS     Ipratropium-Albuterol (COMBIVENT RESPIMAT)  MCG/ACT inhaler     Lubiprostone 8 MCG CAPS capsule     metoprolol succinate (TOPROL-XL) 25 MG 24 hr tablet     Omega-3 Fatty Acids (FISH OIL) 1200 MG capsule     predniSONE (DELTASONE) 1 MG tablet     Probiotic Product (PROBIOTIC ADVANCED PO)     UNABLE TO FIND     vitamin B complex with vitamin C (VITAMIN  B COMPLEX) TABS tablet     No current facility-administered medications for this visit.      Allergies   Allergen Reactions     Colistimethate Anaphylaxis     Colymycin M [Colistimethate Sodium] Anaphylaxis     Tamiflu [Oseltamivir]      Gums Blister up     Clindamycin Rash     Zithromax [Azithromycin]      Heart palpitation     Past Medical History:   Diagnosis Date     Aortitis (H)      Asthma     associated with Churg Antonina syndrome     Churg-Antonina syndrome (H)     dx 1990     Eustachian tube  dysfunction      Goiter      Hearing loss, conductive      Hypertension      Hypocalcemia      Irritable bowel syndrome      Major depressive disorder      Mannose-binding lectin deficiency      Nonspecific abnormal results of thyroid function study      Osteoporosis      Pericarditis      and      Pneumonia     4/23/10     Recurrent UTI      Rheumatoid vasculitis (H)      Sinusitis, chronic      Steroid long-term use      Varicose veins of leg with swelling        Past Surgical History:   Procedure Laterality Date      SECTION       COLONOSCOPY Left 2014    Procedure: COMBINED COLONOSCOPY, SINGLE OR MULTIPLE BIOPSY/POLYPECTOMY BY BIOPSY;  Surgeon: Js Pinedo MD;  Location:  GI     ENT SURGERY       HC COLP CERVIX/UPPER VAGINA W LOOP ELEC BX CERVIX       HYSTERECTOMY  2009    without oopherectomy     HYSTERECTOMY, PAP NO LONGER INDICATED      cervix removed      PICC INSERTION  10/10/2013    5fr DL PASV PICC, 43cm (1cm external) in the L basilic vein w/ tip in the low SVC.     PICC INSERTION Left 2017    5fr DL BioFlo PICC, 42cm (3cm external) in the L lateral brachial vein w/ tip in the SVC RA junction.     SINUS SURGERY       TUBAL LIGATION         Social History     Social History     Marital status: Single     Spouse name: N/A     Number of children: N/A     Years of education: N/A     Occupational History     Not on file.     Social History Main Topics     Smoking status: Never Smoker     Smokeless tobacco: Never Used     Alcohol use 0.0 oz/week     0 Standard drinks or equivalent per week      Comment: occ on the weekends     Drug use: No     Sexual activity: Yes     Partners: Male     Other Topics Concern      Service No     Blood Transfusions No     Caffeine Concern No     Occupational Exposure No     Hobby Hazards No     Sleep Concern No     Stress Concern Yes     Weight Concern No     Special Diet Yes     IBS diet     Back Care No      Exercise Not Asked     1 mile power walk 5 days a week at least.      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  .  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  Non smoker. No smokers at home.  Enjoys walking and dancing.  Alcohol--1 to 2 drinks daily.  Few times a year has more than 4 drinks in a day.  No illicits.                     LMP 02/01/2009    Exam:   GENERAL APPEARANCE: Well developed, well nourished, alert, and in no apparent distress.  EYES: PERRL, EOMI  HENT: Nasal mucosa with mild edema and no hyperemia. No nasal polyps.  EARS: Canals clear, TMs scarred with bilat perforation.  MOUTH: Oral mucosa is moist, without any lesions, no tonsillar enlargement, no oropharyngeal exudate.  NECK: supple, no masses, no thyromegaly.  LYMPHATICS: No significant axillary, cervical, or supraclavicular nodes.  RESP: normal percussion, good air flow throughout. No crackles. No rhonchi. No wheezes.  CV: No ectopic beats. Normal S1, S2, regular rhythm, normal rate. No murmur.  No rub. No gallop. No LE edema.   ABDOMEN:  Bowel sounds normal, soft, nontender, no HSM or masses.   MS: extremities normal. No clubbing. No cyanosis.  SKIN: no rash on limited exam  NEURO: Mentation intact, speech normal, normal strength and tone, normal gait and stance  PSYCH: mentation appears normal. and affect normal/bright  Results:  Recent Results (from the past 168 hour(s))   General PFT Lab (Please always keep checked)    Collection Time: 10/11/18  3:58 PM   Result Value Ref Range    FVC-Pred 3.04 L    FVC-Pre 3.88 L    FVC-%Pred-Pre 127 %    FEV1-Pre 2.63 L    FEV1-%Pred-Pre 105 %    FEV1FVC-Pred 81 %    FEV1FVC-Pre 68 %    FEFMax-Pred 6.55 L/sec    FEFMax-Pre 5.64 L/sec    FEFMax-%Pred-Pre 86 %    FEF2575-Pred 2.64 L/sec    FEF2575-Pre 1.65 L/sec    AUQ4031-%Pred-Pre 62 %    ExpTime-Pre 7.90 sec    FIFMax-Pre 3.62 L/sec    FEV1FEV6-Pred 83 %    FEV1FEV6-Pre 68 %                  Results as noted above.    PFT Interpretation:  Normal spirometry.  Not significantly changed from previous.   Similar to recent baseline.  Valid Maneuver              Scribe Disclosure:   I, Davis Umaña, am serving as a scribe; to document services personally performed by Dagoberto Briscoe MD based on data collection and the provider's statements to me.     Provider Disclosure:  I agree with above History, Review of Systems, Physical Exam and Plan.  I have reviewed the content of the documentation and have edited it as needed. I have personally performed the services documented here and the documentation accurately represents those services and the decisions I have made.      Electronically signed by:  Dagoberto Briscoe

## 2018-10-29 DIAGNOSIS — J47.9 BRONCHIECTASIS WITHOUT COMPLICATION (H): ICD-10-CM

## 2018-10-30 NOTE — TELEPHONE ENCOUNTER
Requested Prescriptions   Pending Prescriptions Disp Refills     predniSONE (DELTASONE) 1 MG tablet [Pharmacy Med Name: PREDNISONE  Last Written Prescription Date:  5/27/2018  Last Fill Quantity: 200,  # refills: 3   Last office visit: 9/4/2018 with prescribing provider:     Future Office Visit:   1MG TABLETS] 200 tablet 0     Sig: TAKE 5 TABLETS BY MOUTH EVERY DAY AND ALTERNATING WITH 6 TABLETS THE NEXT    There is no refill protocol information for this order

## 2018-10-31 RX ORDER — PREDNISONE 1 MG/1
TABLET ORAL
Qty: 200 TABLET | Refills: 0 | Status: SHIPPED | OUTPATIENT
Start: 2018-10-31 | End: 2018-12-12

## 2018-10-31 NOTE — TELEPHONE ENCOUNTER
Routing refill request to provider for review/approval because:  Drug not on the [facility] refill protocol   Leticia CERON RN

## 2018-11-01 NOTE — TELEPHONE ENCOUNTER
FUTURE VISIT INFORMATION      FUTURE VISIT INFORMATION:    Date: 11/09/2018    Time: 9:00    Location: [facility]  REFERRAL INFORMATION:    Referring provider:  LEILA    Referring providers clinic:  [facility]    Reason for visit/diagnosis  DIZZINESS, CONCERN FOR MENIERE'S    RECORDS REQUESTED FROM:       Clinic name Comments Records Status Imaging Status   [facility] OFFICE VISIT: 09/04/2018, 05/21/2018 INTERNAL N/A

## 2018-11-09 ENCOUNTER — PRE VISIT (OUTPATIENT)
Dept: OTOLARYNGOLOGY | Facility: CLINIC | Age: 48
End: 2018-11-09

## 2018-11-13 DIAGNOSIS — H91.90 HEARING LOSS, UNSPECIFIED HEARING LOSS TYPE, UNSPECIFIED LATERALITY: Primary | ICD-10-CM

## 2018-11-15 ENCOUNTER — OFFICE VISIT (OUTPATIENT)
Dept: INTERNAL MEDICINE | Facility: CLINIC | Age: 48
End: 2018-11-15
Payer: COMMERCIAL

## 2018-11-15 VITALS
RESPIRATION RATE: 16 BRPM | BODY MASS INDEX: 20 KG/M2 | WEIGHT: 108.7 LBS | HEIGHT: 62 IN | OXYGEN SATURATION: 99 % | SYSTOLIC BLOOD PRESSURE: 100 MMHG | HEART RATE: 73 BPM | DIASTOLIC BLOOD PRESSURE: 70 MMHG | TEMPERATURE: 98.7 F

## 2018-11-15 DIAGNOSIS — F33.1 MODERATE EPISODE OF RECURRENT MAJOR DEPRESSIVE DISORDER (H): Primary | ICD-10-CM

## 2018-11-15 PROCEDURE — 99214 OFFICE O/P EST MOD 30 MIN: CPT | Performed by: INTERNAL MEDICINE

## 2018-11-15 RX ORDER — SERTRALINE HYDROCHLORIDE 25 MG/1
TABLET, FILM COATED ORAL
Qty: 45 TABLET | Refills: 3 | Status: SHIPPED | OUTPATIENT
Start: 2018-11-15 | End: 2019-06-10

## 2018-11-15 NOTE — MR AVS SNAPSHOT
After Visit Summary   11/15/2018    Valencia Ye    MRN: [id number]           Patient Information     Date Of Birth          1970        Visit Information        Provider Department      11/15/2018 5:00 PM Morelia Simental MD [facility]        Today's Diagnoses     Moderate episode of recurrent major depressive disorder (H)    -  1       Follow-ups after your visit        Your next 10 appointments already scheduled     Nov 30, 2018  5:30 PM CST   MR BRAIN W/O CONTRAST with UCMR1   [facility] [place] [facility] ([facility])    [address] [place] [address] [phone]           How do I prepare for my exam? (Food and drink instructions) **If you will be receiving sedation or general anesthesia, please see special notes below.**  How do I prepare for my exam? (Other instructions) Take your medicines as usual, unless your doctor tells you not to. Please remove any body piercings and hair extensions before you arrive. Follow your doctor s orders. If you do not, we may have to postpone your exam. You may or may not receive IV contrast for this exam pending the discretion of the Radiologist.  You do not need to do anything special to prepare. **If you will be receiving sedation or general anesthesia, please see special notes below.**  What should I wear:  The MRI machine uses a strong magnet. Please wear clothes without metal (snaps, zippers). A sweatsuit works well, or we may give you a hospital gown.  How long does the exam take: Most tests take 30 to 60 minutes.  HOWEVER, IF YOUR DOCTOR PRESCRIBES ANESTHESIA please plan on spending four to five hours in the recovery room.  What should I bring: Bring a list of your current medicines to your exam (including vitamins, minerals and over-the-counter drugs). Also bring the results of similar scans you may have had.  Do I need a : **If you will be receiving sedation or  general anesthesia, please see special notes below.**  What should I do after the exam? No Restrictions, You may resume normal activities.  What is this test: MRI (magnetic resonance imaging) uses a strong magnet and radio waves to look inside the body. An MRA (magnetic resonance angiogram) does the same thing, but it lets us look at your blood vessels. A computer turns the radio waves into pictures showing cross sections of the body, much like slices of bread. This helps us see any problems more clearly.  Who should I call with questions: Please call the Imaging Department at your exam site with any questions. Directions, parking instructions, and other information is available on our website, [employer].  How do I prepare if I m having sedation or anesthesia? **IMPORTANT** THE INSTRUCTIONS BELOW ARE ONLY FOR THOSE PATIENTS WHO HAVE BEEN TOLD THEY WILL RECEIVE SEDATION OR GENERAL ANESTHESIA DURING THEIR MRI PROCEDURE:  IF YOU WILL RECEIVE SEDATION (take medicine to help you relax during your exam): You must get the medicine from your doctor before you arrive. Bring the medicine to the exam. Do not take it at home. Arrive one hour early. Bring someone who can take you home after the test. Your medicine will make you sleepy. After the exam, you may not drive, take a bus or take a taxi by yourself. No eating 8 hours before your exam. You may have clear liquids up until 4 hours before your exam. (Clear liquids include water, clear tea, black coffee and fruit juice without pulp.)  IF YOU WILL RECEIVE ANESTHESIA (be asleep for your exam): Arrive 1 1/2 hours early. Bring someone who can take you home after the test. You may not drive, take a bus or take a taxi by yourself. No eating 8 hours before your exam. You may have clear liquids up until 4 hours before your exam. (Clear liquids include water, clear tea, black coffee and fruit juice without pulp.) You will spend four to five hours in the recovery room.             Dec 10, 2018  8:00 AM CST   (Arrive by 7:45 AM)   Balance Testing with [facility] ([facility] [place] [facility])    [address] [address] [phone]           You are scheduled for the following tests: VNG (Videonystagmography). You will wear goggles with small cameras. These record small eye movements as you change position. This test takes 1 to 1 1/2 hours. Rotational Chair. You will sit a chair that has a motor. The room will be dark. You will wear goggles with a camera while the chair rocks slowly from side to side. This test takes 20 to 30 minutes. CDP (Computerized Dynamic Posturography). You will stand on a platform with your eyes open or closed. It will be unsteady at times. This will tell us how your balance systems work together and how well you sense the ground under your feet. This test takes 30 minutes.  Why am I having these tests? These are tests for your inner ear. They may help us find out why you feel dizzy or off balance.  How do I prepare? Below is a list of things that can affect test results. Do NOT take these for 48 hours before your tests or we will need to reschedule. We want to make sure your test results are correct. Ask your doctor if you have questions about stopping any medicine.  48 hours before the tests: Stop drinking alcohol (beer, wine, liquor). Do NOT take Amitriptyline. Do NOT take medicines for: Allergy or colds. Examples: Benadryl (diphenhydramine), Allegra (fexofenadine), Zyrtec (cetirizine) and any over-the-counter medicine that may cause drowsiness. Anti-anxiety, unless you have been on them every day for the past 8 weeks or more. Examples: Xanax (alprazolam), Klonopin (clonazepam), Valium (diazepam), Ativan (lorazepam). Dizziness and nausea. Examples: Antivert/Bonine/Dramamine Less-Drowsy Formula (meclizine), Dramamine (dimenhydrinate), Trans-derm Scop (Scopolamine), Compazine  (prochlorperazine), Phenergan (promethazine). Sleeping. This includes sleeping pills, sedatives, tranquilizers, muscle relaxants and narcotics. It is okay to take medicines for diabetes, heart, blood pressure, seizures, thyroid or an ongoing condition.  The day of the tests:   Please have a  with you.   Do not have caffeine (coffee, soda, chocolate, energy drinks).   Do not smoke or have nicotine for at least 4 hours before the tests. This includes tobacco, e-cigarettes and nicotine gum.   Do not eat 2 to 3 hours before the tests, unless you have diabetes. Then, you should follow your usual diet.   Do not wear any make-up or lotions around your eyes or eyelids.   If you wear contact lenses, be prepared to take them out for testing; or wear your glasses.   If you take the anti-nausea medicine Zofran (ondansetron), you may bring it with you to take after your tests.  Who should I call if I have questions? If you have any questions, please call the Balance Center at [facility]: [phone]            Jan 08, 2019  8:00 AM CST   (Arrive by 7:45 AM)   New Patient Visit with Rick L Nissen, MD   [facility] ([facility])    [address] [place] [address] [phone]              Who to contact     If you have questions or need follow up information about today's clinic visit or your schedule please contact [facility] directly at [phone].  Normal or non-critical lab and imaging results will be communicated to you by MyChart, letter or phone within 4 business days after the clinic has received the results. If you do not hear from us within 7 days, please contact the clinic through MyChart or phone. If you have a critical or abnormal lab result, we will notify you by phone as soon as possible.  Submit refill requests through [employer] or call your pharmacy and they will forward the refill request to us.  "Please allow 3 business days for your refill to be completed.          Additional Information About Your Visit        MyChart Information     [employer] gives you secure access to your electronic health record. If you see a primary care provider, you can also send messages to your care team and make appointments. If you have questions, please call your primary care clinic.  If you do not have a primary care provider, please call [phone] and they will assist you.        Care EveryWhere ID     This is your Care EveryWhere ID. This could be used by other organizations to access your [facility] medical records  [id number]        Your Vitals Were     Pulse Temperature Respirations Height Last Period Pulse Oximetry    73 98.7  F (37.1  C) (Oral) 16 5' 2\" (1.575 m) 02/01/2009 99%    BMI (Body Mass Index)                   19.88 kg/m2            Blood Pressure from Last 3 Encounters:   11/15/18 100/70   10/11/18 121/78   09/04/18 110/70    Weight from Last 3 Encounters:   11/16/18 110 lb (49.9 kg)   11/15/18 108 lb 11.2 oz (49.3 kg)   10/11/18 109 lb (49.4 kg)              Today, you had the following     No orders found for display         Today's Medication Changes          These changes are accurate as of 11/15/18 11:59 PM.  If you have any questions, ask your nurse or doctor.               Start taking these medicines.        Dose/Directions    sertraline 25 MG tablet   Commonly known as:  ZOLOFT   Used for:  Moderate episode of recurrent major depressive disorder (H)   Started by:  Morelia Simental MD        1/2 tablet daily   Quantity:  45 tablet   Refills:  3            Where to get your medicines      These medications were sent to [facility] [address] - [facility] [address] [facility] [address] [facility] [address]  [address], [facility] [address]     Phone:  [phone]     sertraline 25 MG tablet                Primary Care Provider Office Phone # Fax #    Morelia Simental [phone] "       [address] [facility] [address]  [facility] [address]        Equal Access to Services     MARISAJAQUELINE JAY : Hadii camden ku hadhemalathao Soevali, waaxda luqadaha, qaybta kaalmada megankhushi, vale amaya hugomelinda brasherjenniferharpal merritt . So [facility] [phone].    ATENCIÓN: Si habla español, tiene a waller disposición servicios gratuitos de asistencia lingüística. Llame al [phone].    We comply with applicable federal civil rights laws and [place] laws. We do not discriminate on the basis of race, color, national origin, age, disability, sex, sexual orientation, or gender identity.            Thank you!     Thank you for choosing [facility]  for your care. Our goal is always to provide you with excellent care. Hearing back from our patients is one way we can continue to improve our services. Please take a few minutes to complete the written survey that you may receive in the mail after your visit with us. Thank you!             Your Updated Medication List - Protect others around you: Learn how to safely use, store and throw away your medicines at www.disposemymeds.org.          This list is accurate as of 11/15/18 11:59 PM.  Always use your most recent med list.                   Brand Name Dispense Instructions for use Diagnosis    acetylcysteine 20 % nebulizer solution    MUCOMYST    240 mL    Take 4 mLs by nebulization 2 times daily Use with albuterol solution. Discard open bottle of Mucomyst after 96 hours.    Bronchiectasis without complication (H)       albuterol (2.5 MG/3ML) 0.083% neb solution     60 vial    Take 1 vial (2.5 mg) by nebulization 2 times daily Use with Mucomyst    Bronchiectasis without complication (H)       atorvastatin 10 MG tablet    LIPITOR    30 tablet    Take 0.5 tablets (5 mg) by mouth daily    Alpha-lipoproteinemia       budesonide-formoterol 160-4.5 MCG/ACT Inhaler    SYMBICORT    1 Inhaler    Inhale 2 puffs into the lungs 2 times daily    Churg-Antonina syndrome (H)        cholecalciferol 5000 units Caps      Take 4,000 Units by mouth daily        ciprofloxacin-dexamethasone otic suspension    CIPRODEX    7.5 mL    Instill 3 drops into the affected ear twice daily as needed.    Ear pain, unspecified laterality       clonazePAM 0.5 MG tablet    klonoPIN    20 tablet    Take 0.5 tablets (0.25 mg) by mouth 2 times daily as needed for anxiety    Anxiety       fish Oil 1200 MG capsule      Take 3 capsules by mouth daily.        fluticasone 50 MCG/ACT spray    FLONASE    48 mL    Spray 2 sprays into both nostrils daily    Chronic rhinitis, unspecified type       GLUCOSAMINE CHONDR COMPLEX 500-400 MG Caps per capsule   Generic drug:  glucosamine-chondroitin      Take 2 capsules by mouth daily.        Ipratropium-Albuterol  MCG/ACT inhaler    COMBIVENT RESPIMAT    1 Inhaler    Inhale 1 puff into the lungs 4 times daily Not to exceed 6 doses per day.    Churg-Antonina syndrome (H)       Lubiprostone 8 MCG Caps capsule     60 capsule    Take 1 capsule (8 mcg) by mouth 2 times daily    Irritable bowel syndrome with constipation       metoprolol succinate 25 MG 24 hr tablet    TOPROL-XL    45 tablet    Take 0.5 tablets (12.5 mg) by mouth daily    Palpitations       predniSONE 1 MG tablet    DELTASONE    200 tablet    TAKE 5 TABLETS BY MOUTH EVERY DAY AND ALTERNATING WITH 6 TABLETS THE NEXT    Bronchiectasis without complication (H)       PROBIOTIC ADVANCED PO           sertraline 25 MG tablet    ZOLOFT    45 tablet    1/2 tablet daily    Moderate episode of recurrent major depressive disorder (H)       vitamin B complex with vitamin C Tabs tablet      Take 1 tablet by mouth daily

## 2018-11-15 NOTE — PROGRESS NOTES
SUBJECTIVE:   Valencia Ye is a 48 year old female who presents to clinic today for the following health issues:      Depression and Anxiety Follow-Up    Status since last visit: Worsened she has had issues with recurrent depression and anxiety reports she has been off of medication for about a year.  She thinks the last 1-2 months ago her mood has been worsening with more depression and anxiety.  There are have been some stresses.  She still had some Zoloft and did start back on it 2 weeks ago and is starting to feel a little better.  She takes one half of a 25 mg tablets because higher doses caused side effects.  She does use clonazepam quite rarely for anxiety and does not feel she needs to use it any more frequently she does tend to get drowsy from it.    Other associated symptoms:None    Complicating factors:     Significant life event: Yes-  Just the same ongoing issues      Current substance abuse: None    PHQ 4/17/2018 5/27/2018 11/19/2018   PHQ-9 Total Score 10 11 19   Q9: Suicide Ideation Not at all Not at all Not at all     ROSIBEL-7 SCORE 4/17/2018 5/27/2018 11/19/2018   Total Score 14 14 19       PHQ-9  English  PHQ-9   Any Language  ROSIBEL-7  Suicide Assessment Five-step Evaluation and Treatment (SAFE-T)    Amount of exercise or physical activity: 4-5 days/week for an average of 30-45 minutes    Problems taking medications regularly: No    Medication side effects: yes but deals with them    Diet: yes ibs     She reports that Amitiza is working very well for her irritable bowel syndrome.  Her lung disease is overall is doing fairly well.    Patient Active Problem List   Diagnosis     Goiter     History of systemic steroid therapy     ILD (interstitial lung disease) (H)     Palpitations     Churg-Antonina syndrome (H)     History of eating disorder     Asthma     Anxiety     Chronic fatigue     Mannose-binding lectin deficiency     Benign essential hypertension     Recurrent major depressive disorder,  in partial remission (H)     Bronchiectasis without acute exacerbation (H)     Controlled substance agreement signed     Current Outpatient Prescriptions   Medication Sig Dispense Refill     acetylcysteine (MUCOMYST) 20 % nebulizer solution Take 4 mLs by nebulization 2 times daily Use with albuterol solution. Discard open bottle of Mucomyst after 96 hours. 240 mL 11     albuterol (2.5 MG/3ML) 0.083% neb solution Take 1 vial (2.5 mg) by nebulization 2 times daily Use with Mucomyst 60 vial 11     atorvastatin (LIPITOR) 10 MG tablet Take 0.5 tablets (5 mg) by mouth daily 30 tablet 11     budesonide-formoterol (SYMBICORT) 160-4.5 MCG/ACT Inhaler Inhale 2 puffs into the lungs 2 times daily 1 Inhaler 11     cholecalciferol 5000 UNITS CAPS Take 4,000 Units by mouth daily        ciprofloxacin-dexamethasone (CIPRODEX) otic suspension Instill 3 drops into the affected ear twice daily as needed. 7.5 mL 3     fluticasone (FLONASE) 50 MCG/ACT spray Spray 2 sprays into both nostrils daily 48 mL 3     glucosamine-chondroitin (GLUCOSAMINE CHONDR COMPLEX) 500-400 MG CAPS Take 2 capsules by mouth daily.       Lubiprostone 8 MCG CAPS capsule Take 1 capsule (8 mcg) by mouth 2 times daily 60 capsule 3     metoprolol succinate (TOPROL-XL) 25 MG 24 hr tablet Take 0.5 tablets (12.5 mg) by mouth daily 45 tablet 3     Omega-3 Fatty Acids (FISH OIL) 1200 MG capsule Take 3 capsules by mouth daily.       predniSONE (DELTASONE) 1 MG tablet TAKE 5 TABLETS BY MOUTH EVERY DAY AND ALTERNATING WITH 6 TABLETS THE NEXT 200 tablet 0     Probiotic Product (PROBIOTIC ADVANCED PO)        vitamin B complex with vitamin C (VITAMIN  B COMPLEX) TABS tablet Take 1 tablet by mouth daily       clonazePAM (KLONOPIN) 0.5 MG tablet Take 0.5 tablets (0.25 mg) by mouth 2 times daily as needed for anxiety (Patient not taking: Reported on 11/15/2018) 20 tablet 3     Ipratropium-Albuterol (COMBIVENT RESPIMAT)  MCG/ACT inhaler Inhale 1 puff into the lungs 4 times  "daily Not to exceed 6 doses per day. 1 Inhaler 11             ROS:  negative    OBJECTIVE:     /70 (BP Location: Left arm, Patient Position: Chair, Cuff Size: Adult Regular)  Pulse 73  Temp 98.7  F (37.1  C) (Oral)  Resp 16  Ht 5' 2\" (1.575 m)  Wt 108 lb 11.2 oz (49.3 kg)  LMP 02/01/2009  SpO2 99%  BMI 19.88 kg/m2  Body mass index is 19.88 kg/(m^2).    PHQ-9 SCORE 4/17/2018 5/27/2018 11/19/2018   Total Score - - -   Total Score 10 11 19     ROSIBEL-7 SCORE 4/17/2018 5/27/2018 11/19/2018   Total Score 14 14 19             ASSESSMENT/PLAN:         (F33.1) Moderate episode of recurrent major depressive disorder (H)  (primary encounter diagnosis)  Comment: Active symptoms, she feels that the Zoloft works well for her so recommend she continue on her current dose.  She feels her symptoms are not continuing to improve or worsening, may need to revisit alternative medications.  She declines counseling at this time.  Plan: sertraline (ZOLOFT) 25 MG tablet        She can continue the as needed clonazepam for anxiety symptoms         Morelia Simental MD  [facility]    25 minutes spent with the patient, >50% of time spent counseling about mood medications, doses,     "

## 2018-11-16 ENCOUNTER — OFFICE VISIT (OUTPATIENT)
Dept: AUDIOLOGY | Facility: CLINIC | Age: 48
End: 2018-11-16
Attending: OTOLARYNGOLOGY
Payer: COMMERCIAL

## 2018-11-16 ENCOUNTER — OFFICE VISIT (OUTPATIENT)
Dept: OTOLARYNGOLOGY | Facility: CLINIC | Age: 48
End: 2018-11-16
Payer: COMMERCIAL

## 2018-11-16 VITALS — HEIGHT: 64 IN | WEIGHT: 110 LBS | BODY MASS INDEX: 18.78 KG/M2

## 2018-11-16 DIAGNOSIS — R42 DIZZINESS: Primary | ICD-10-CM

## 2018-11-16 DIAGNOSIS — H90.0 CONDUCTIVE HEARING LOSS, BILATERAL: Primary | ICD-10-CM

## 2018-11-16 DIAGNOSIS — H91.90 HEARING LOSS, UNSPECIFIED HEARING LOSS TYPE, UNSPECIFIED LATERALITY: ICD-10-CM

## 2018-11-16 DIAGNOSIS — H90.0 CONDUCTIVE HEARING LOSS, BILATERAL: ICD-10-CM

## 2018-11-16 ASSESSMENT — PAIN SCALES - GENERAL: PAINLEVEL: NO PAIN (0)

## 2018-11-16 NOTE — PROGRESS NOTES
The patient presents with a history of dizziness.  The patient reports that the symptoms have recently become much more severe and frequent. She describes the events as imbalance rather than vertigo and they improve with meclizine. 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 The patient denies sinusitis, rhinitis, facial pain, nasal obstruction or purulent nasal discharge. The patient denies chronic or recurrent tonsillitis, chronic or recurrent pharyngitis. The patient's Audiogram and Tympanogram are reviewed with her and they demonstrate bilateral conductive hearing loss that is moderate with 100% word recognition scores and abnormal tympanograms.     This patient is seen in consultation at the request of Dr. Morelia Simental.    All other systems were reviewed and they are either negative or they are not directly pertinent to this Otolaryngology examination.      Past Medical History:    Past Medical History:   Diagnosis Date     Anxiety      Aortitis (H)      Asthma     associated with Churg Antonina syndrome     Cerebral infarction (H) August 1990    Very small, caused small permanent optical migraine     Chronic sinusitis      Churg-Antonina syndrome (H)     dx 1990     Conductive hearing loss      Depressive disorder      Eustachian tube dysfunction      Goiter      Hearing loss, conductive      Heart rate problem      History of emphysema      Hypertension      Hypocalcemia      Immunosuppression (H)      Irritable bowel syndrome      Major depressive disorder      Mannose-binding lectin deficiency 2014     Nonspecific abnormal results of thyroid function study      Osteoporosis      Pericarditis     1990 and 1992     Pneumonia     4/23/10     Recurrent otitis media      Recurrent UTI      Rheumatoid vasculitis (H)      Sinusitis, chronic      Steroid long-term use      Tinnitus      Varicose veins of leg with  swelling        Past Surgical History:    Past Surgical History:   Procedure Laterality Date      SECTION       COLONOSCOPY Left 2014    Procedure: COMBINED COLONOSCOPY, SINGLE OR MULTIPLE BIOPSY/POLYPECTOMY BY BIOPSY;  Surgeon: Js Pinedo MD;  Location:  GI     ENT SURGERY       GENITOURINARY SURGERY       HC COLP CERVIX/UPPER VAGINA W LOOP ELEC BX CERVIX       HEAD & NECK SURGERY       HYSTERECTOMY  2009    without oopherectomy     HYSTERECTOMY, PAP NO LONGER INDICATED      cervix removed      PE TUBES       PICC INSERTION  10/10/2013    5fr DL PASV PICC, 43cm (1cm external) in the L basilic vein w/ tip in the low SVC.     PICC INSERTION Left 2017    5fr DL BioFlo PICC, 42cm (3cm external) in the L lateral brachial vein w/ tip in the SVC RA junction.     SINUS SURGERY       TUBAL LIGATION       TYMPANOPLASTY         Medications:      Current Outpatient Prescriptions:      acetylcysteine (MUCOMYST) 20 % nebulizer solution, Take 4 mLs by nebulization 2 times daily Use with albuterol solution. Discard open bottle of Mucomyst after 96 hours., Disp: 240 mL, Rfl: 11     albuterol (2.5 MG/3ML) 0.083% neb solution, Take 1 vial (2.5 mg) by nebulization 2 times daily Use with Mucomyst, Disp: 60 vial, Rfl: 11     atorvastatin (LIPITOR) 10 MG tablet, Take 0.5 tablets (5 mg) by mouth daily, Disp: 30 tablet, Rfl: 11     budesonide-formoterol (SYMBICORT) 160-4.5 MCG/ACT Inhaler, Inhale 2 puffs into the lungs 2 times daily, Disp: 1 Inhaler, Rfl: 11     cholecalciferol 5000 UNITS CAPS, Take 4,000 Units by mouth daily , Disp: , Rfl:      ciprofloxacin-dexamethasone (CIPRODEX) otic suspension, Instill 3 drops into the affected ear twice daily as needed., Disp: 7.5 mL, Rfl: 3     clonazePAM (KLONOPIN) 0.5 MG tablet, Take 0.5 tablets (0.25 mg) by mouth 2 times daily as needed for anxiety, Disp: 20 tablet, Rfl: 3     fluticasone (FLONASE) 50 MCG/ACT spray, Spray 2 sprays into both nostrils  daily, Disp: 48 mL, Rfl: 3     glucosamine-chondroitin (GLUCOSAMINE CHONDR COMPLEX) 500-400 MG CAPS, Take 2 capsules by mouth daily., Disp: , Rfl:      Ipratropium-Albuterol (COMBIVENT RESPIMAT)  MCG/ACT inhaler, Inhale 1 puff into the lungs 4 times daily Not to exceed 6 doses per day., Disp: 1 Inhaler, Rfl: 11     Lubiprostone 8 MCG CAPS capsule, Take 1 capsule (8 mcg) by mouth 2 times daily, Disp: 60 capsule, Rfl: 3     metoprolol succinate (TOPROL-XL) 25 MG 24 hr tablet, Take 0.5 tablets (12.5 mg) by mouth daily, Disp: 45 tablet, Rfl: 3     Omega-3 Fatty Acids (FISH OIL) 1200 MG capsule, Take 3 capsules by mouth daily., Disp: , Rfl:      predniSONE (DELTASONE) 1 MG tablet, TAKE 5 TABLETS BY MOUTH EVERY DAY AND ALTERNATING WITH 6 TABLETS THE NEXT, Disp: 200 tablet, Rfl: 0     Probiotic Product (PROBIOTIC ADVANCED PO), , Disp: , Rfl:      sertraline (ZOLOFT) 25 MG tablet, 1/2 tablet daily, Disp: 45 tablet, Rfl: 3     vitamin B complex with vitamin C (VITAMIN  B COMPLEX) TABS tablet, Take 1 tablet by mouth daily, Disp: , Rfl:     Allergies:    Colistimethate; Colymycin m [colistimethate sodium]; Tamiflu [oseltamivir]; Clindamycin; and Zithromax [azithromycin]    Physical Examination:    The patient is a well developed, well nourished female in no apparent distress.  She is normocephalic, atraumatic with pupils equally round and reactive to light.    Oral Cavity Examination:  Normal mucosa with no masses or lesions  Nasal Examination: Normal mucosa with no masses or lesions  Ear Examination: Ear canals clear, tympanic membranes and middle ear spaces normal  Neurological Examination: Facial nerve function intact and symmetric.  The patient has no gaze induced or spontaneous nystagmus.  Integumentary Examination: No lesions on the skin of the head and neck  Neck Examination: No masses or lesions, no lymphadenopathy  Endocrine Examination: Normal thyroid examination    Assessment and Plan:    The patient presents  with a history of dizziness.  Her case is dicussed with neurotologist Dr. Jerald Capone and he has recommended rotary chair testing in addition to an MRI scan of the head. She will be referred to Dr. Jerald Capone for follow up after this testing.     CC: Morelia Simental

## 2018-11-16 NOTE — PROGRESS NOTES
AUDIOLOGY REPORT    SUMMARY: Audiology visit completed. See audiogram for results.      RECOMMENDATIONS: Follow-up with ENT.    Temo Blandon, [facility]  Licensed Audiologist  [place] License #[id number]

## 2018-11-16 NOTE — PATIENT INSTRUCTIONS
1.  You were seen in the ENT Clinic today by Dr. Mejia.  If you have any questions or concerns after your appointment, please call [phone]. Press option #1 for scheduling related needs. Press option #3 for Nurse advice.  2.  Please schedule an appointment for the following:   - MRI scan   - Vestibular testing with Audiology  3.  Plan is to return to clinic to see Dr. Capone for further evaluation AFTER completion of MRI scan and appointment with Audiology.    Griselda Beaulieu LPN  [facility] ENT    The patient presents with a history of dizziness.  Her case is dicussed with neurotologist Dr. Jerald Capone and he has recommended rotary chair testing in addition to an MRI scan of the head. She will be referred to Dr. Jerald Capone for follow up after this testing.

## 2018-11-16 NOTE — LETTER
11/16/2018       RE: Valencia Ye  [address] [facility] [address]     Dear Colleague,    Thank you for referring your patient, Valencia Ye, to the [facility] EAR NOSE AND THROAT at [facility]. Please see a copy of my visit note below.    The patient presents with a history of dizziness.  The patient reports that the symptoms have recently become much more severe and frequent. She describes the events as imbalance rather than vertigo and they improve with meclizine. 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 The patient denies sinusitis, rhinitis, facial pain, nasal obstruction or purulent nasal discharge. The patient denies chronic or recurrent tonsillitis, chronic or recurrent pharyngitis. The patient's Audiogram and Tympanogram are reviewed with her and they demonstrate bilateral conductive hearing loss that is moderate with 100% word recognition scores and abnormal tympanograms.     This patient is seen in consultation at the request of Dr. Morelia Simental.    All other systems were reviewed and they are either negative or they are not directly pertinent to this Otolaryngology examination.      Past Medical History:    Past Medical History:   Diagnosis Date     Anxiety      Aortitis (H)      Asthma     associated with Churg Antonina syndrome     Cerebral infarction (H) August 1990    Very small, caused small permanent optical migraine     Chronic sinusitis      Churg-Antonina syndrome (H)     dx 1990     Conductive hearing loss      Depressive disorder      Eustachian tube dysfunction      Goiter      Hearing loss, conductive      Heart rate problem      History of emphysema      Hypertension      Hypocalcemia      Immunosuppression (H)      Irritable bowel syndrome      Major depressive disorder      Mannose-binding lectin deficiency 2014     Nonspecific  abnormal results of thyroid function study      Osteoporosis      Pericarditis      and      Pneumonia     4/23/10     Recurrent otitis media      Recurrent UTI      Rheumatoid vasculitis (H)      Sinusitis, chronic      Steroid long-term use      Tinnitus      Varicose veins of leg with swelling        Past Surgical History:    Past Surgical History:   Procedure Laterality Date      SECTION       COLONOSCOPY Left 2014    Procedure: COMBINED COLONOSCOPY, SINGLE OR MULTIPLE BIOPSY/POLYPECTOMY BY BIOPSY;  Surgeon: Js Pinedo MD;  Location:  GI     ENT SURGERY       GENITOURINARY SURGERY       HC COLP CERVIX/UPPER VAGINA W LOOP ELEC BX CERVIX       HEAD & NECK SURGERY       HYSTERECTOMY  2009    without oopherectomy     HYSTERECTOMY, PAP NO LONGER INDICATED      cervix removed      PE TUBES       PICC INSERTION  10/10/2013    5fr DL PASV PICC, 43cm (1cm external) in the L basilic vein w/ tip in the low SVC.     PICC INSERTION Left 2017    5fr DL BioFlo PICC, 42cm (3cm external) in the L lateral brachial vein w/ tip in the SVC RA junction.     SINUS SURGERY       TUBAL LIGATION       TYMPANOPLASTY         Medications:      Current Outpatient Prescriptions:      acetylcysteine (MUCOMYST) 20 % nebulizer solution, Take 4 mLs by nebulization 2 times daily Use with albuterol solution. Discard open bottle of Mucomyst after 96 hours., Disp: 240 mL, Rfl: 11     albuterol (2.5 MG/3ML) 0.083% neb solution, Take 1 vial (2.5 mg) by nebulization 2 times daily Use with Mucomyst, Disp: 60 vial, Rfl: 11     atorvastatin (LIPITOR) 10 MG tablet, Take 0.5 tablets (5 mg) by mouth daily, Disp: 30 tablet, Rfl: 11     budesonide-formoterol (SYMBICORT) 160-4.5 MCG/ACT Inhaler, Inhale 2 puffs into the lungs 2 times daily, Disp: 1 Inhaler, Rfl: 11     cholecalciferol 5000 UNITS CAPS, Take 4,000 Units by mouth daily , Disp: , Rfl:      ciprofloxacin-dexamethasone (CIPRODEX) otic suspension,  Instill 3 drops into the affected ear twice daily as needed., Disp: 7.5 mL, Rfl: 3     clonazePAM (KLONOPIN) 0.5 MG tablet, Take 0.5 tablets (0.25 mg) by mouth 2 times daily as needed for anxiety, Disp: 20 tablet, Rfl: 3     fluticasone (FLONASE) 50 MCG/ACT spray, Spray 2 sprays into both nostrils daily, Disp: 48 mL, Rfl: 3     glucosamine-chondroitin (GLUCOSAMINE CHONDR COMPLEX) 500-400 MG CAPS, Take 2 capsules by mouth daily., Disp: , Rfl:      Ipratropium-Albuterol (COMBIVENT RESPIMAT)  MCG/ACT inhaler, Inhale 1 puff into the lungs 4 times daily Not to exceed 6 doses per day., Disp: 1 Inhaler, Rfl: 11     Lubiprostone 8 MCG CAPS capsule, Take 1 capsule (8 mcg) by mouth 2 times daily, Disp: 60 capsule, Rfl: 3     metoprolol succinate (TOPROL-XL) 25 MG 24 hr tablet, Take 0.5 tablets (12.5 mg) by mouth daily, Disp: 45 tablet, Rfl: 3     Omega-3 Fatty Acids (FISH OIL) 1200 MG capsule, Take 3 capsules by mouth daily., Disp: , Rfl:      predniSONE (DELTASONE) 1 MG tablet, TAKE 5 TABLETS BY MOUTH EVERY DAY AND ALTERNATING WITH 6 TABLETS THE NEXT, Disp: 200 tablet, Rfl: 0     Probiotic Product (PROBIOTIC ADVANCED PO), , Disp: , Rfl:      sertraline (ZOLOFT) 25 MG tablet, 1/2 tablet daily, Disp: 45 tablet, Rfl: 3     vitamin B complex with vitamin C (VITAMIN  B COMPLEX) TABS tablet, Take 1 tablet by mouth daily, Disp: , Rfl:     Allergies:    Colistimethate; Colymycin m [colistimethate sodium]; Tamiflu [oseltamivir]; Clindamycin; and Zithromax [azithromycin]    Physical Examination:    The patient is a well developed, well nourished female in no apparent distress.  She is normocephalic, atraumatic with pupils equally round and reactive to light.    Oral Cavity Examination:  Normal mucosa with no masses or lesions  Nasal Examination: Normal mucosa with no masses or lesions  Ear Examination: Ear canals clear, tympanic membranes and middle ear spaces normal  Neurological Examination: Facial nerve function intact and  symmetric.  The patient has no gaze induced or spontaneous nystagmus.  Integumentary Examination: No lesions on the skin of the head and neck  Neck Examination: No masses or lesions, no lymphadenopathy  Endocrine Examination: Normal thyroid examination    Assessment and Plan:    The patient presents with a history of dizziness.  Her case is dicussed with neurotologist Dr. Jerald Capone and he has recommended rotary chair testing in addition to an MRI scan of the head. She will be referred to Dr. Jerald Capone for follow up after this testing.       Jean Mejia MD      CC: Morelia Simental

## 2018-11-16 NOTE — MR AVS SNAPSHOT
After Visit Summary   11/16/2018    Valencia Ye    MRN: [id number]           Patient Information     Date Of Birth          1970        Visit Information        Provider Department      11/16/2018 8:30 AM Jean Mejia MD [facility]        Today's Diagnoses     Dizziness    -  1    Conductive hearing loss, bilateral          Care Instructions    1.  You were seen in the ENT Clinic today by Dr. Mejia.  If you have any questions or concerns after your appointment, please call [phone]. Press option #1 for scheduling related needs. Press option #3 for Nurse advice.  2.  Please schedule an appointment for the following:   - MRI scan   - Vestibular testing with Audiology  3.  Plan is to return to clinic to see Dr. Capone for further evaluation AFTER completion of MRI scan and appointment with Audiology.    Griselda Beaulieu LPN  [facility]    The patient presents with a history of dizziness.  Her case is dicussed with neurotologist Dr. Jerald Capone and he has recommended rotary chair testing in addition to an MRI scan of the head. She will be referred to Dr. Jerald Capone for follow up after this testing.           Follow-ups after your visit        Additional Services     AUDIOLOGY BALANCE REFERRAL       Rotary Chair Testing                  Your next 10 appointments already scheduled     Nov 30, 2018  5:30 PM CST   MR BRAIN W/O CONTRAST with [address] [facility] ([facility])    [address] [place] [address] [phone]           How do I prepare for my exam? (Food and drink instructions) **If you will be receiving sedation or general anesthesia, please see special notes below.**  How do I prepare for my exam? (Other instructions) Take your medicines as usual, unless your doctor tells you not to. Please remove any body piercings and hair extensions before you arrive.  Follow your doctor s orders. If you do not, we may have to postpone your exam. You may or may not receive IV contrast for this exam pending the discretion of the Radiologist.  You do not need to do anything special to prepare. **If you will be receiving sedation or general anesthesia, please see special notes below.**  What should I wear:  The MRI machine uses a strong magnet. Please wear clothes without metal (snaps, zippers). A sweatsuit works well, or we may give you a hospital gown.  How long does the exam take: Most tests take 30 to 60 minutes.  HOWEVER, IF YOUR DOCTOR PRESCRIBES ANESTHESIA please plan on spending four to five hours in the recovery room.  What should I bring: Bring a list of your current medicines to your exam (including vitamins, minerals and over-the-counter drugs). Also bring the results of similar scans you may have had.  Do I need a : **If you will be receiving sedation or general anesthesia, please see special notes below.**  What should I do after the exam? No Restrictions, You may resume normal activities.  What is this test: MRI (magnetic resonance imaging) uses a strong magnet and radio waves to look inside the body. An MRA (magnetic resonance angiogram) does the same thing, but it lets us look at your blood vessels. A computer turns the radio waves into pictures showing cross sections of the body, much like slices of bread. This helps us see any problems more clearly.  Who should I call with questions: Please call the Imaging Department at your exam site with any questions. Directions, parking instructions, and other information is available on our website, [email or web address].  How do I prepare if I m having sedation or anesthesia? **IMPORTANT** THE INSTRUCTIONS BELOW ARE ONLY FOR THOSE PATIENTS WHO HAVE BEEN TOLD THEY WILL RECEIVE SEDATION OR GENERAL ANESTHESIA DURING THEIR MRI PROCEDURE:  IF YOU WILL RECEIVE SEDATION (take medicine to help you relax during your exam): You  must get the medicine from your doctor before you arrive. Bring the medicine to the exam. Do not take it at home. Arrive one hour early. Bring someone who can take you home after the test. Your medicine will make you sleepy. After the exam, you may not drive, take a bus or take a taxi by yourself. No eating 8 hours before your exam. You may have clear liquids up until 4 hours before your exam. (Clear liquids include water, clear tea, black coffee and fruit juice without pulp.)  IF YOU WILL RECEIVE ANESTHESIA (be asleep for your exam): Arrive 1 1/2 hours early. Bring someone who can take you home after the test. You may not drive, take a bus or take a taxi by yourself. No eating 8 hours before your exam. You may have clear liquids up until 4 hours before your exam. (Clear liquids include water, clear tea, black coffee and fruit juice without pulp.) You will spend four to five hours in the recovery room.            Dec 10, 2018  8:00 AM CST   (Arrive by 7:45 AM)   Balance Testing with [facility] ([facility])    [address] [place] [id number] [phone]           You are scheduled for the following tests: VNG (Videonystagmography). You will wear goggles with small cameras. These record small eye movements as you change position. This test takes 1 to 1 1/2 hours. Rotational Chair. You will sit a chair that has a motor. The room will be dark. You will wear goggles with a camera while the chair rocks slowly from side to side. This test takes 20 to 30 minutes. CDP (Computerized Dynamic Posturography). You will stand on a platform with your eyes open or closed. It will be unsteady at times. This will tell us how your balance systems work together and how well you sense the ground under your feet. This test takes 30 minutes.  Why am I having these tests? These are tests for your inner ear. They may help us find out why you feel dizzy or off  balance.  How do I prepare? Below is a list of things that can affect test results. Do NOT take these for 48 hours before your tests or we will need to reschedule. We want to make sure your test results are correct. Ask your doctor if you have questions about stopping any medicine.  48 hours before the tests: Stop drinking alcohol (beer, wine, liquor). Do NOT take Amitriptyline. Do NOT take medicines for: Allergy or colds. Examples: Benadryl (diphenhydramine), Allegra (fexofenadine), Zyrtec (cetirizine) and any over-the-counter medicine that may cause drowsiness. Anti-anxiety, unless you have been on them every day for the past 8 weeks or more. Examples: Xanax (alprazolam), Klonopin (clonazepam), Valium (diazepam), Ativan (lorazepam). Dizziness and nausea. Examples: Antivert/Bonine/Dramamine Less-Drowsy Formula (meclizine), Dramamine (dimenhydrinate), Trans-derm Scop (Scopolamine), Compazine (prochlorperazine), Phenergan (promethazine). Sleeping. This includes sleeping pills, sedatives, tranquilizers, muscle relaxants and narcotics. It is okay to take medicines for diabetes, heart, blood pressure, seizures, thyroid or an ongoing condition.  The day of the tests:   Please have a  with you.   Do not have caffeine (coffee, soda, chocolate, energy drinks).   Do not smoke or have nicotine for at least 4 hours before the tests. This includes tobacco, e-cigarettes and nicotine gum.   Do not eat 2 to 3 hours before the tests, unless you have diabetes. Then, you should follow your usual diet.   Do not wear any make-up or lotions around your eyes or eyelids.   If you wear contact lenses, be prepared to take them out for testing; or wear your glasses.   If you take the anti-nausea medicine Zofran (ondansetron), you may bring it with you to take after your tests.  Who should I call if I have questions? If you have any questions, please call the [facility] Center at [facility]: [phone]            [place]  "08, 2019  8:00 AM CST   (Arrive by 7:45 AM)   New Patient Visit with Rick L Nissen, MD   [facility] ([facility] [place])    [address] [address] [phone]              Future tests that were ordered for you today     Open Future Orders        Priority Expected Expires Ordered    MRI Brain w/o contrast Routine  11/16/2019 11/16/2018            Who to contact     Please call your clinic at [phone] to:    Ask questions about your health    Make or cancel appointments    Discuss your medicines    Learn about your test results    Speak to your doctor            Additional Information About Your Visit        [employer] Information     [employer] gives you secure access to your electronic health record. If you see a primary care provider, you can also send messages to your care team and make appointments. If you have questions, please call your primary care clinic.  If you do not have a primary care provider, please call [phone] and they will assist you.      [employer] is an electronic gateway that provides easy, online access to your medical records. With [employer], you can request a clinic appointment, read your test results, renew a prescription or communicate with your care team.     To access your existing account, please contact your [facility] or call [phone] for assistance.        Care EveryWhere ID     This is your Care EveryWhere ID. This could be used by other organizations to access your [facility] medical records  [id number]        Your Vitals Were     Height Last Period BMI (Body Mass Index)             1.62 m (5' 3.78\") 02/01/2009 19.01 kg/m2          Blood Pressure from Last 3 Encounters:   11/15/18 100/70   10/11/18 121/78   09/04/18 110/70    Weight from Last 3 Encounters:   11/16/18 49.9 kg (110 lb)   11/15/18 49.3 kg (108 lb 11.2 oz)   10/11/18 49.4 kg (109 lb)              We Performed the " Following     AUDIOLOGY BALANCE REFERRAL        Primary Care Provider Office Phone # Fax #    Morelia Simental [phone]       [address] [place] [id number]        Equal Access to Services     JACK THOMPSON : Hadii aad ku amoso Soevali, waaxda luqadaha, qaybta kaalmada adebabakyada, vale dewitt laKarlalyssa fields. So [facility] [phone].    ATENCIÓN: Si habla español, tiene a waller disposición servicios gratuitos de asistencia lingüística. Llame al [phone].    We comply with applicable federal civil rights laws and [place] laws. We do not discriminate on the basis of race, color, national origin, age, disability, sex, sexual orientation, or gender identity.            Thank you!     Thank you for choosing [facility]  for your care. Our goal is always to provide you with excellent care. Hearing back from our patients is one way we can continue to improve our services. Please take a few minutes to complete the written survey that you may receive in the mail after your visit with us. Thank you!             Your Updated Medication List - Protect others around you: Learn how to safely use, store and throw away your medicines at www.disposemymeds.org.          This list is accurate as of 11/16/18  8:35 AM.  Always use your most recent med list.                   Brand Name Dispense Instructions for use Diagnosis    acetylcysteine 20 % nebulizer solution    MUCOMYST    240 mL    Take 4 mLs by nebulization 2 times daily Use with albuterol solution. Discard open bottle of Mucomyst after 96 hours.    Bronchiectasis without complication (H)       albuterol (2.5 MG/3ML) 0.083% neb solution     60 vial    Take 1 vial (2.5 mg) by nebulization 2 times daily Use with Mucomyst    Bronchiectasis without complication (H)       atorvastatin 10 MG tablet    LIPITOR    30 tablet    Take 0.5 tablets (5 mg) by mouth daily    Alpha-lipoproteinemia       budesonide-formoterol  160-4.5 MCG/ACT Inhaler    SYMBICORT    1 Inhaler    Inhale 2 puffs into the lungs 2 times daily    Churg-Antonina syndrome (H)       cholecalciferol 5000 units Caps      Take 4,000 Units by mouth daily        ciprofloxacin-dexamethasone otic suspension    CIPRODEX    7.5 mL    Instill 3 drops into the affected ear twice daily as needed.    Ear pain, unspecified laterality       clonazePAM 0.5 MG tablet    klonoPIN    20 tablet    Take 0.5 tablets (0.25 mg) by mouth 2 times daily as needed for anxiety    Anxiety       fish Oil 1200 MG capsule      Take 3 capsules by mouth daily.        fluticasone 50 MCG/ACT spray    FLONASE    48 mL    Spray 2 sprays into both nostrils daily    Chronic rhinitis, unspecified type       GLUCOSAMINE CHONDR COMPLEX 500-400 MG Caps per capsule   Generic drug:  glucosamine-chondroitin      Take 2 capsules by mouth daily.        Ipratropium-Albuterol  MCG/ACT inhaler    COMBIVENT RESPIMAT    1 Inhaler    Inhale 1 puff into the lungs 4 times daily Not to exceed 6 doses per day.    Churg-Antonina syndrome (H)       Lubiprostone 8 MCG Caps capsule     60 capsule    Take 1 capsule (8 mcg) by mouth 2 times daily    Irritable bowel syndrome with constipation       metoprolol succinate 25 MG 24 hr tablet    TOPROL-XL    45 tablet    Take 0.5 tablets (12.5 mg) by mouth daily    Palpitations       predniSONE 1 MG tablet    DELTASONE    200 tablet    TAKE 5 TABLETS BY MOUTH EVERY DAY AND ALTERNATING WITH 6 TABLETS THE NEXT    Bronchiectasis without complication (H)       PROBIOTIC ADVANCED PO           sertraline 25 MG tablet    ZOLOFT    45 tablet    1/2 tablet daily    Recurrent major depressive disorder, in partial remission (H)       vitamin B complex with vitamin C Tabs tablet      Take 1 tablet by mouth daily

## 2018-11-16 NOTE — NURSING NOTE
"Chief Complaint   Patient presents with     Consult     dizziness      Height 1.62 m (5' 3.78\"), weight 49.9 kg (110 lb), last menstrual period 02/01/2009, not currently breastfeeding.    Dwayne Ramos LPN    "

## 2018-11-16 NOTE — MR AVS SNAPSHOT
After Visit Summary   11/16/2018    Valencia Ye    MRN: [id number]           Patient Information     Date Of Birth          1970        Visit Information        Provider Department      11/16/2018 7:00 AM Eugenia Jeronimo, [facility]        Today's Diagnoses     Conductive hearing loss, bilateral    -  1    Hearing loss, unspecified hearing loss type, unspecified laterality           Follow-ups after your visit        Your next 10 appointments already scheduled     Nov 30, 2018  5:30 PM CST   MR BRAIN W/O CONTRAST with UC1   [facility] [place] [facility] ([facility] [place])    [address] [place] [id number] [phone]           How do I prepare for my exam? (Food and drink instructions) **If you will be receiving sedation or general anesthesia, please see special notes below.**  How do I prepare for my exam? (Other instructions) Take your medicines as usual, unless your doctor tells you not to. Please remove any body piercings and hair extensions before you arrive. Follow your doctor s orders. If you do not, we may have to postpone your exam. You may or may not receive IV contrast for this exam pending the discretion of the Radiologist.  You do not need to do anything special to prepare. **If you will be receiving sedation or general anesthesia, please see special notes below.**  What should I wear:  The MRI machine uses a strong magnet. Please wear clothes without metal (snaps, zippers). A sweatsuit works well, or we may give you a hospital gown.  How long does the exam take: Most tests take 30 to 60 minutes.  HOWEVER, IF YOUR DOCTOR PRESCRIBES ANESTHESIA please plan on spending four to five hours in the recovery room.  What should I bring: Bring a list of your current medicines to your exam (including vitamins, minerals and over-the-counter drugs). Also bring the results of similar scans you may have had.  Do I need a :  **If you will be receiving sedation or general anesthesia, please see special notes below.**  What should I do after the exam? No Restrictions, You may resume normal activities.  What is this test: MRI (magnetic resonance imaging) uses a strong magnet and radio waves to look inside the body. An MRA (magnetic resonance angiogram) does the same thing, but it lets us look at your blood vessels. A computer turns the radio waves into pictures showing cross sections of the body, much like slices of bread. This helps us see any problems more clearly.  Who should I call with questions: Please call the Imaging Department at your exam site with any questions. Directions, parking instructions, and other information is available on our website, [email or web address].  How do I prepare if I m having sedation or anesthesia? **IMPORTANT** THE INSTRUCTIONS BELOW ARE ONLY FOR THOSE PATIENTS WHO HAVE BEEN TOLD THEY WILL RECEIVE SEDATION OR GENERAL ANESTHESIA DURING THEIR MRI PROCEDURE:  IF YOU WILL RECEIVE SEDATION (take medicine to help you relax during your exam): You must get the medicine from your doctor before you arrive. Bring the medicine to the exam. Do not take it at home. Arrive one hour early. Bring someone who can take you home after the test. Your medicine will make you sleepy. After the exam, you may not drive, take a bus or take a taxi by yourself. No eating 8 hours before your exam. You may have clear liquids up until 4 hours before your exam. (Clear liquids include water, clear tea, black coffee and fruit juice without pulp.)  IF YOU WILL RECEIVE ANESTHESIA (be asleep for your exam): Arrive 1 1/2 hours early. Bring someone who can take you home after the test. You may not drive, take a bus or take a taxi by yourself. No eating 8 hours before your exam. You may have clear liquids up until 4 hours before your exam. (Clear liquids include water, clear tea, black coffee and fruit juice without pulp.) You will spend four  to five hours in the recovery room.            Dec 10, 2018  8:00 AM CST   (Arrive by 7:45 AM)   Balance Testing with Trang Kline [facility] ([facility] [place])    [address] [address] [phone]           You are scheduled for the following tests: VNG (Videonystagmography). You will wear goggles with small cameras. These record small eye movements as you change position. This test takes 1 to 1 1/2 hours. Rotational Chair. You will sit a chair that has a motor. The room will be dark. You will wear goggles with a camera while the chair rocks slowly from side to side. This test takes 20 to 30 minutes. CDP (Computerized Dynamic Posturography). You will stand on a platform with your eyes open or closed. It will be unsteady at times. This will tell us how your balance systems work together and how well you sense the ground under your feet. This test takes 30 minutes.  Why am I having these tests? These are tests for your inner ear. They may help us find out why you feel dizzy or off balance.  How do I prepare? Below is a list of things that can affect test results. Do NOT take these for 48 hours before your tests or we will need to reschedule. We want to make sure your test results are correct. Ask your doctor if you have questions about stopping any medicine.  48 hours before the tests: Stop drinking alcohol (beer, wine, liquor). Do NOT take Amitriptyline. Do NOT take medicines for: Allergy or colds. Examples: Benadryl (diphenhydramine), Allegra (fexofenadine), Zyrtec (cetirizine) and any over-the-counter medicine that may cause drowsiness. Anti-anxiety, unless you have been on them every day for the past 8 weeks or more. Examples: Xanax (alprazolam), Klonopin (clonazepam), Valium (diazepam), Ativan (lorazepam). Dizziness and nausea. Examples: Antivert/Bonine/Dramamine Less-Drowsy Formula (meclizine), Dramamine (dimenhydrinate), Trans-derm Scop  (Scopolamine), Compazine (prochlorperazine), Phenergan (promethazine). Sleeping. This includes sleeping pills, sedatives, tranquilizers, muscle relaxants and narcotics. It is okay to take medicines for diabetes, heart, blood pressure, seizures, thyroid or an ongoing condition.  The day of the tests:   Please have a  with you.   Do not have caffeine (coffee, soda, chocolate, energy drinks).   Do not smoke or have nicotine for at least 4 hours before the tests. This includes tobacco, e-cigarettes and nicotine gum.   Do not eat 2 to 3 hours before the tests, unless you have diabetes. Then, you should follow your usual diet.   Do not wear any make-up or lotions around your eyes or eyelids.   If you wear contact lenses, be prepared to take them out for testing; or wear your glasses.   If you take the anti-nausea medicine Zofran (ondansetron), you may bring it with you to take after your tests.  Who should I call if I have questions? If you have any questions, please call the Balance Center at [facility]: [phone]            Jan 08, 2019  8:00 AM CST   (Arrive by 7:45 AM)   New Patient Visit with Rick L Nissen, MD   [facility] ([facility] [place])    [address] [place] [address] [phone]              Future tests that were ordered for you today     Open Future Orders        Priority Expected Expires Ordered    MRI Brain w/o contrast Routine  11/16/2019 11/16/2018            Who to contact     Please call your clinic at [phone] to:    Ask questions about your health    Make or cancel appointments    Discuss your medicines    Learn about your test results    Speak to your doctor            Additional Information About Your Visit        [employer] Information     [employer] gives you secure access to your electronic health record. If you see a primary care provider, you can also send messages to your care team and make  appointments. If you have questions, please call your primary care clinic.  If you do not have a primary care provider, please call [phone] and they will assist you.      [employer] is an electronic gateway that provides easy, online access to your medical records. With [employer], you can request a clinic appointment, read your test results, renew a prescription or communicate with your care team.     To access your existing account, please contact your [facility] or call [phone] for assistance.        Care EveryWhere ID     This is your Care EveryWhere ID. This could be used by other organizations to access your [facility] medical records  [id number]        Your Vitals Were     Last Period                   02/01/2009            Blood Pressure from Last 3 Encounters:   11/15/18 100/70   10/11/18 121/78   09/04/18 110/70    Weight from Last 3 Encounters:   11/16/18 49.9 kg (110 lb)   11/15/18 49.3 kg (108 lb 11.2 oz)   10/11/18 49.4 kg (109 lb)              We Performed the Following     AUDIOGRAM/TYMPANOGRAM - INTERFACE     AUDIOLOGY ADULT REFERRAL     [facility] Thr[facility] Eval & Speech Recog ([address])     Tympanometry (impedance - testing) ([address])        Primary Care Provider Office Phone # Fax #    Morelia Simental [phone]       [address]  [facility] [address]        Equal Access to Services     [facility]: Hadii aad ku hadasho Soomaali, waaxda luqadaha, qaybta kaalmada adeegyada, vale merritt . So [facility] [phone].    ATENCIÓN: Si habla español, tiene a waller disposición servicios gratuitos de asistencia lingüística. Llame al [phone].    We comply with applicable federal civil rights laws and [place] laws. We do not discriminate on the basis of race, color, national origin, age, disability, sex, sexual orientation, or gender identity.            Thank you!     Thank you for choosing [facility]  for  your care. Our goal is always to provide you with excellent care. Hearing back from our patients is one way we can continue to improve our services. Please take a few minutes to complete the written survey that you may receive in the mail after your visit with us. Thank you!             Your Updated Medication List - Protect others around you: Learn how to safely use, store and throw away your medicines at www.disposemymeds.org.          This list is accurate as of 11/16/18  2:49 PM.  Always use your most recent med list.                   Brand Name Dispense Instructions for use Diagnosis    acetylcysteine 20 % nebulizer solution    MUCOMYST    240 mL    Take 4 mLs by nebulization 2 times daily Use with albuterol solution. Discard open bottle of Mucomyst after 96 hours.    Bronchiectasis without complication (H)       albuterol (2.5 MG/3ML) 0.083% neb solution     60 vial    Take 1 vial (2.5 mg) by nebulization 2 times daily Use with Mucomyst    Bronchiectasis without complication (H)       atorvastatin 10 MG tablet    LIPITOR    30 tablet    Take 0.5 tablets (5 mg) by mouth daily    Alpha-lipoproteinemia       budesonide-formoterol 160-4.5 MCG/ACT Inhaler    SYMBICORT    1 Inhaler    Inhale 2 puffs into the lungs 2 times daily    Churg-Antonina syndrome (H)       cholecalciferol 5000 units Caps      Take 4,000 Units by mouth daily        ciprofloxacin-dexamethasone otic suspension    CIPRODEX    7.5 mL    Instill 3 drops into the affected ear twice daily as needed.    Ear pain, unspecified laterality       clonazePAM 0.5 MG tablet    klonoPIN    20 tablet    Take 0.5 tablets (0.25 mg) by mouth 2 times daily as needed for anxiety    Anxiety       fish Oil 1200 MG capsule      Take 3 capsules by mouth daily.        fluticasone 50 MCG/ACT spray    FLONASE    48 mL    Spray 2 sprays into both nostrils daily    Chronic rhinitis, unspecified type       GLUCOSAMINE CHONDR COMPLEX 500-400 MG Caps per capsule   Generic  drug:  glucosamine-chondroitin      Take 2 capsules by mouth daily.        Ipratropium-Albuterol  MCG/ACT inhaler    COMBIVENT RESPIMAT    1 Inhaler    Inhale 1 puff into the lungs 4 times daily Not to exceed 6 doses per day.    Churg-Antonina syndrome (H)       Lubiprostone 8 MCG Caps capsule     60 capsule    Take 1 capsule (8 mcg) by mouth 2 times daily    Irritable bowel syndrome with constipation       metoprolol succinate 25 MG 24 hr tablet    TOPROL-XL    45 tablet    Take 0.5 tablets (12.5 mg) by mouth daily    Palpitations       predniSONE 1 MG tablet    DELTASONE    200 tablet    TAKE 5 TABLETS BY MOUTH EVERY DAY AND ALTERNATING WITH 6 TABLETS THE NEXT    Bronchiectasis without complication (H)       PROBIOTIC ADVANCED PO           sertraline 25 MG tablet    ZOLOFT    45 tablet    1/2 tablet daily    Recurrent major depressive disorder, in partial remission (H)       vitamin B complex with vitamin C Tabs tablet      Take 1 tablet by mouth daily

## 2018-11-19 ASSESSMENT — ANXIETY QUESTIONNAIRES
GAD7 TOTAL SCORE: 19
7. FEELING AFRAID AS IF SOMETHING AWFUL MIGHT HAPPEN: MORE THAN HALF THE DAYS
6. BECOMING EASILY ANNOYED OR IRRITABLE: MORE THAN HALF THE DAYS
5. BEING SO RESTLESS THAT IT IS HARD TO SIT STILL: NEARLY EVERY DAY
1. FEELING NERVOUS, ANXIOUS, OR ON EDGE: NEARLY EVERY DAY
2. NOT BEING ABLE TO STOP OR CONTROL WORRYING: NEARLY EVERY DAY
3. WORRYING TOO MUCH ABOUT DIFFERENT THINGS: NEARLY EVERY DAY

## 2018-11-19 ASSESSMENT — PATIENT HEALTH QUESTIONNAIRE - PHQ9
SUM OF ALL RESPONSES TO PHQ QUESTIONS 1-9: 19
5. POOR APPETITE OR OVEREATING: NEARLY EVERY DAY

## 2018-11-20 ASSESSMENT — ANXIETY QUESTIONNAIRES: GAD7 TOTAL SCORE: 19

## 2018-11-20 ASSESSMENT — ASTHMA QUESTIONNAIRES: ACT_TOTALSCORE: 25

## 2018-11-30 ENCOUNTER — RADIANT APPOINTMENT (OUTPATIENT)
Dept: MRI IMAGING | Facility: CLINIC | Age: 48
End: 2018-11-30
Attending: OTOLARYNGOLOGY
Payer: COMMERCIAL

## 2018-11-30 DIAGNOSIS — R42 DIZZINESS: ICD-10-CM

## 2018-12-03 ENCOUNTER — MYC MEDICAL ADVICE (OUTPATIENT)
Dept: INTERNAL MEDICINE | Facility: CLINIC | Age: 48
End: 2018-12-03

## 2018-12-03 DIAGNOSIS — J32.0 CHRONIC MAXILLARY SINUSITIS: Primary | ICD-10-CM

## 2018-12-04 ENCOUNTER — TELEPHONE (OUTPATIENT)
Dept: OTOLARYNGOLOGY | Facility: CLINIC | Age: 48
End: 2018-12-04

## 2018-12-04 ENCOUNTER — TRANSFERRED RECORDS (OUTPATIENT)
Dept: HEALTH INFORMATION MANAGEMENT | Facility: CLINIC | Age: 48
End: 2018-12-04

## 2018-12-04 LAB
CREAT SERPL-MCNC: 0.65 MG/DL (ref 0.57–1)
GFR SERPL CREATININE-BSD FRML MDRD: 107 ML/MIN/1.73M2
GLUCOSE SERPL-MCNC: 83 MG/DL (ref 65–99)
POTASSIUM SERPL-SCNC: 4 MMOL/L (ref 3.5–5.2)

## 2018-12-04 NOTE — TELEPHONE ENCOUNTER
After talking w/ Dr. Mejia, I notified the pt that she should use one of the antibiotics that was prescribed between Dr. Butts and Dr. Mejia, but not both. Pt decided she wanted to go forward with using the doxycycline that Dr. Butts ordered. Pt will return to clinic for appt that is scheduled w/ Dr. Nissen on Tuesday, January 8, 2019 at 0800.    Griselda Beaulieu LPN

## 2018-12-04 NOTE — TELEPHONE ENCOUNTER
Talked with pt about MRI results. Pt said Dr. Butts at [facility] had also reviewed MRI this morning at her appt and prescribed her doxycycline. Dr. Mejia originally wanted to prescribe pt Augmentin. But after talking w/ pt, I told her that I would speak w/ Dr. Mejia again to see what he'd like to do going forward and would call pt back. Gave contact information if pt needed anything in the mean time. Also notified pt that Dr. Mejia wants her to follow-up w/ Dr. Nissen as earlier discussed.    Griselda Beaulieu LPN

## 2018-12-05 ENCOUNTER — MYC MEDICAL ADVICE (OUTPATIENT)
Dept: INTERNAL MEDICINE | Facility: CLINIC | Age: 48
End: 2018-12-05

## 2018-12-10 ENCOUNTER — OFFICE VISIT (OUTPATIENT)
Dept: AUDIOLOGY | Facility: CLINIC | Age: 48
End: 2018-12-10
Payer: COMMERCIAL

## 2018-12-10 DIAGNOSIS — R42 DIZZINESS AND GIDDINESS: Primary | ICD-10-CM

## 2018-12-10 NOTE — PROGRESS NOTES
AUDIOLOGY REPORT-BALANCE ASSESSMENT    SUBJECTIVE: Valencia Ye, 48 year old, was seen in Audiology at the [facility] on 12/10/2018, for rotational chair testing, referred by Jean Mejia M.D. The patient reports having dizziness on and off for about 20 years, which she attributed to her chronic ear infections and sinus issues. Her more recent symptoms of dizziness have been ongoing for about a year, and are becoming more severe and frequent. She reports that she has a baseline constant sense of minimal dizziness with nausea, with a mild headache. On days where things are worse, she says she will wake up feeling more severe dizziness, described as head floatiness and feeling off-kilter, and it will last all day. She has had to decrease her work hours due to her dizziness. She is unsure how often these bad days occur, but she says they are becoming more frequent. She has thrown up on one occasion, but she says that dramamine has helped with her nausea. She has noticed that being outside in cold air can trigger her symptoms, and once she is feeling dizzy, any sort of movement will make things worse. She will rest in bed all day, and sometimes this helps. She denies falls. Valencia reports a history of chronic ear infections and sinus issues beginning in adulthood. She had a tympanomastoidectomy and eustachian tuboplasty on the right ear in 2008 with Dr. Capone. She has longstanding bilateral tympanic membrane perforations. She reports bilateral aural pressure, tinnitus, itchiness, and occasional achiness, with the left ear more significant than the right. Hearing evaluations have revealed mild conductive hearing loss bilaterally. She reports the hearing occasionally fluctuates, but that is doesn't seem to correlate with her dizziness. Valencia's medical history is significant for Churg-Antonina syndrome, autoimmune disease, chronic fatigue, bronchiectasis,  anxiety/depression, mannose-binding lectin deficiency. 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 She had an MRI on 11/30/18 which showed bilateral mastoid effusions and paranasal sinus mucosal thickening. She reports a history of migraine headaches, with light/sound sensitivity, nausea, and visual auras, but she has not had one for more than 10 years. She reports sustaining a mild concussion at age 4, but denies other head trauma. Valencia Ye has not taken any antivestibular medications in the past 48 hours.    OBJECTIVE:  Dizziness Handicap Inventory (DHI): 66/100 ;Severe perceived impairment    Rotational chair testing:   Sinusoidal harmonic acceleration test:  Spontaneous nystagmus: Absent  Phase: Mild phase lead at 0.64 only.  Normal at 0.01, 0.02, 0.04, 0.08, 0.16 and 0.32 Hz  Gain: Normal at 0.01, 0.02, 0.04, 0.08, 0.16, 0.32 and 0.64 Hz  Symmetry: Normal at 0.01, 0.02,0.04, 0.08, 0.16, 0.32, and 0.64 Hz  Spectral Purity: Normal at 0.01, 0.02, 0.04, 0.08, 0.16, 0.32 and 0.64 Hz  Overall rotational chair test: Mild phase lead at 0.64 Hz, with normal phase at all other frequencies..  Normal gain, symmetry, and spectral purity at 0.01, 0.02, 0.04, 0.08, 0.16, 0.32 and 0.64 Hz    ASSESSMENT:  Rotational chair testing fell within the normal range for all frequencies, with the exception of a mild phase lead at 0.64 Hz. This may be an indication of central involvement, but could also be affected by poor head postioning, and is likely non-significant. No indication of peripheral system involvement.    PLAN:  Follow-up with Dr. Nissen for medical management. Please call this clinic at [phone] with questions regarding these results or recommendations.       Temo Kline.  Licensed Audiologist  [place] # [id number]

## 2018-12-12 DIAGNOSIS — J47.9 BRONCHIECTASIS WITHOUT COMPLICATION (H): ICD-10-CM

## 2018-12-12 RX ORDER — PREDNISONE 1 MG/1
TABLET ORAL
Qty: 200 TABLET | Refills: 0 | Status: SHIPPED | OUTPATIENT
Start: 2018-12-12 | End: 2019-01-09

## 2018-12-12 NOTE — TELEPHONE ENCOUNTER
Requested Prescriptions   Pending Prescriptions Disp Refills     predniSONE (DELTASONE) 1 MG tablet [Pharmacy Med Name: PREDNISONE 1MG TABLETS] 200 tablet 0     Sig: TAKE 5 TABLETS BY MOUTH EVERY DAY AND ALTERNATING WITH 6 TABLETS THE NEXT    There is no refill protocol information for this order      Last Written Prescription Date:  10/31/2018  Last Fill Quantity: 200,  # refills: 0   Last office visit: 11/15/2018 with prescribing provider:     Future Office Visit:

## 2018-12-12 NOTE — TELEPHONE ENCOUNTER
Pharmacy calls screaming because she was holding for 20 minutes an d patient is standing in front of her and she wants the refill now because it is an emergency.  Asked pharmacy to not scream at me multiple times and explained we can not pull a MD out of a room to fill.  Advised RX will be addressed as soon as possible.

## 2019-01-02 NOTE — TELEPHONE ENCOUNTER
FUTURE VISIT INFORMATION      FUTURE VISIT INFORMATION:    Date: 1/8/19    Time: 8AM    Location: [facility]  REFERRAL INFORMATION:    Referring provider:  Dr Jean Mejia    Referring providers clinic:  [facility]    Reason for visit/diagnosis  Dizzy    RECORDS REQUESTED FROM:       Clinic name Comments Records Status Imaging Status   [facility] ENT  11/16/18 notes with Dr Mejia   Epic    Imaging 11/30/18 MRI Brain [facility] PACS   [facility] Audiology  11/16/18 Audiogram with Eugenia Costello  12/10/18 Balance Testing with Kali Costello EPIC

## 2019-01-04 ENCOUNTER — MYC MEDICAL ADVICE (OUTPATIENT)
Dept: OTOLARYNGOLOGY | Facility: CLINIC | Age: 49
End: 2019-01-04

## 2019-01-04 NOTE — LETTER
5/19/2017     RE: Valencia Ye  [address] [place] [id number]     Dear Colleague,    Thank you for referring your patient, Valencia Ye, to the [facility] at [facility]. Please see a copy of my visit note below.    Reason for Visit  Valencia Ye is a 46 year old female who is being seen for Bronchiectasis.    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 She currently reports increased and darker sputum production, decreased exercise tolerance, and severe fatigue.  PFTs are slightly decreased today, however not significantly below her baseline. Left shift noted and WBC is mildly elevated with today's labs, consistent with infection. CXR was reviewed with the patient and appears to be relatively stable, no infiltrate suggestive of pneumonia appreciated on my review. The patient's symptom complex, labs and physical exam are consistent with a pulmonary exacerbation. The patient most recent cultures were done in 2014 and grew Staph, however prior to that she has grown a strain of Pseudomonas which is resistant to most oral antibiotics. For now, will treat potential Staph infection with Augmentin and await recent sputum culture and gram stain results. If the cultures are primarily Pseudomonas, we will place a PICC line and an appropriate antipseudomonal will be selected. She is allergic to clindamycin and azithromycin. I have encouraged her to continue vest therapies BID with albuterol and Mucomyst and twice daily inhaled steroids.  She has received her flu vaccine. I have asked her to call the pulmonary office with any increase in her symptoms.    I will see the patient in followup in 1 month with PFTs and a sputum culture.    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 The patient presented to the ED on 03/10/17 with a low grade fever (100.5), body aches, fatigue, subjective fevers and chills. She was found to have leukocytosis of 20.8 and elevated lactic acid, which normalized after IV saline was administered. The patient was also given oral Tylenol and IV toradol. A CXR showed no signs of pneumonia and so the patient was discharged for outpatient management. Her immunologist started her on Augmentin for a presumed pneumococcal infection on 03/12 and her symptoms did mostly resolve. The patient does note that since this infection her stamina has decreased, and she will have coughing fits with laughing.    Today she is not feeling well. The patient reports she recently contracted a cold at work, however her co-workers only had upper respiratory symptoms. This cold started in her upper respiratory tract and then went to her lungs. Initial symptoms of this cold included a sore throat, mild nasal congestion, and mild sinus pain.  Currently she is experiencing increased sputum production, increased night time dyspnea which wakes her up, and her peak flows have been decreased overall - recently they have been <300, and typically her baseline peak flow is around 410. Sputum is thick and grey-abdelrahman yellow in color in the mornings. Once she is moving around after a vest treatment sputum lightens to yellow but is still quite thick. At her baseline the patient does not produce any sputum. No recent episodes of hemoptysis. She reports that inhalers and vest treatments have provided minimal relief.  The patient also reports experiencing significant fatigue since the cold moved into her lungs. The patient was unable to go to work from Wednesday-Friday of this week. Currently she reports severe fatigue. The patient's exercise tolerance is  "decreased and she is unable to exercise. She did not notice any fevers, chills or night sweats with this illness.    Vest therapies increased to 20 minutes BID since she had a cold. Otherwise she typically only does vest therapy once daily for 20 minutes.    The patient has not had very many lung \"spasms\" (muscle cramps in her chest) since her last appointment.     REVIEW OF SYSTEMS:     ENT: No sinus or ear pain, rhinorrhea or sore throat. Sinus symptoms have resolved.  She has occasional palpitations, though with no associated symptoms, longstanding and unchanged.    She has chronic myalgias and arthralgias, all unchanged. Previous body aches from March resolved.  She has no nausea, vomiting, diarrhea or abdominal pain.     A complete ROS was otherwise negative except as noted in the HPI.    Current Outpatient Prescriptions   Medication     UNKNOWN MED DOSAGE     clonazePAM (KLONOPIN) 0.5 MG tablet     Naltrexone POWD     nystatin-triamcinolone (MYCOLOG) ointment     predniSONE (DELTASONE) 1 MG tablet     acetylcysteine (MUCOMYST) 20 % injection     atenolol (TENORMIN) 25 MG tablet     albuterol (2.5 MG/3ML) 0.083% neb solution     budesonide-formoterol (SYMBICORT) 160-4.5 MCG/ACT Inhaler     Ipratropium-Albuterol (COMBIVENT RESPIMAT)  MCG/ACT inhaler     UNABLE TO FIND     fluticasone (FLONASE) 50 MCG/ACT nasal spray     UNABLE TO FIND     ciprofloxacin-dexamethasone (CIPRODEX) otic suspension     UNABLE TO FIND     UNABLE TO FIND     conjugated estrogens (PREMARIN) vaginal cream     cholecalciferol 5000 UNITS CAPS     glucosamine-chondroitin (GLUCOSAMINE CHONDR COMPLEX) 500-400 MG CAPS     Omega-3 Fatty Acids (FISH OIL) 1200 MG capsule     No current facility-administered medications for this visit.      Allergies   Allergen Reactions     Colistimethate Anaphylaxis     Colymycin M [Colistimethate Sodium] Anaphylaxis     Clindamycin Rash     Zithromax [Azithromycin]      Heart palpitation     Past Medical " History:   Diagnosis Date     Aortitis (H)      Asthma     associated with Churg Antonina syndrome     Churg-Antonina syndrome (H)     dx      Eustachian tube dysfunction      Goiter      Hearing loss, conductive      Hypertension      Hypocalcemia      Irritable bowel syndrome      Major depressive disorder      Mannose-binding lectin deficiency      Nonspecific abnormal results of thyroid function study      Osteoporosis      Pericarditis      and      Pneumonia     4/23/10     Recurrent UTI      Rheumatoid vasculitis (H)      Sinusitis, chronic      Steroid long-term use      Varicose veins of leg with swelling        Past Surgical History:   Procedure Laterality Date      SECTION       COLONOSCOPY Left 2014    Procedure: COMBINED COLONOSCOPY, SINGLE OR MULTIPLE BIOPSY/POLYPECTOMY BY BIOPSY;  Surgeon: Js Pinedo MD;  Location:  GI     ENT SURGERY       HC COLP CERVIX/UPPER VAGINA W LOOP ELEC BX CERVIX       HYSTERECTOMY  2009    without oopherectomy     HYSTERECTOMY, PAP NO LONGER INDICATED      cervix removed      PICC INSERTION  10/10/2013    5fr DL PASV PICC, 43cm (1cm external) in the L basilic vein w/ tip in the low SVC.     SINUS SURGERY       TUBAL LIGATION         Social History     Social History     Marital status: Single     Spouse name: N/A     Number of children: N/A     Years of education: N/A     Occupational History     Not on file.     Social History Main Topics     Smoking status: Never Smoker     Smokeless tobacco: Never Used     Alcohol use 0.0 oz/week     0 Standard drinks or equivalent per week      Comment: 12 drinks/week     Drug use: No     Sexual activity: Yes     Partners: Male     Other Topics Concern      Service No     Blood Transfusions No     Caffeine Concern No     Occupational Exposure No     Hobby Hazards No     Sleep Concern No     Stress Concern Yes     Weight Concern No     Special Diet Yes     IBS diet     Back  "Care No     Exercise Not Asked     1 mile power walk 5 days a week at least.      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  .  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  Non smoker. No smokers at home.  Enjoys walking and dancing.  Alcohol--1 to 2 drinks daily.  Few times a year has more than 4 drinks in a day.  No illicits.                       /74 (BP Location: Left arm, Patient Position: Chair, Cuff Size: Adult Regular)  Pulse 69  Resp 16  Ht 1.575 m (5' 2.01\")  Wt 46.7 kg (103 lb)  LMP 02/01/2009  SpO2 97%  BMI 18.83 kg/m2    Exam:   GENERAL APPEARANCE: Well developed, well nourished, alert, and in no apparent distress.  EYES: PERRL, EOMI  HENT: Nasal mucosa with edema and no hyperemia. No nasal polyps.  EARS: Canals clear, TMs scarred with bilat perforation  MOUTH: Oral mucosa is moist, without any lesions, no tonsillar enlargement, no oropharyngeal exudate.  NECK: supple, no masses, no thyromegaly.  LYMPHATICS: No significant axillary, cervical, or supraclavicular nodes.  RESP: normal percussion, good air flow throughout.  Faint crackles in left mid lung field. No rhonchi. No wheezes.  CV: No ectopic beats. Normal S1, S2, regular rhythm, normal rate. No murmur.  No rub. No gallop. No LE edema.   ABDOMEN:  Bowel sounds normal, soft, nontender, no HSM or masses.   MS: extremities normal. No clubbing. No cyanosis.  SKIN: no rash on limited exam  NEURO: Mentation intact, speech normal, normal strength and tone, normal gait and stance  PSYCH: mentation appears normal. and affect normal/bright  Results:  Recent Results (from the past 168 hour(s))   CBC with platelets differential    Collection Time: 05/19/17  1:08 PM   Result Value Ref Range    WBC 12.6 (H) 4.0 - 11.0 10e9/L    RBC Count 3.97 3.8 - 5.2 10e12/L    Hemoglobin 13.5 11.7 - 15.7 g/dL    Hematocrit 39.6 35.0 - 47.0 %     78 - 100 fl    MCH 34.0 (H) 26.5 - 33.0 pg "    MCHC 34.1 31.5 - 36.5 g/dL    RDW 12.2 10.0 - 15.0 %    Platelet Count 272 150 - 450 10e9/L    Diff Method Automated Method     % Neutrophils 89.9 %    % Lymphocytes 4.9 %    % Monocytes 3.7 %    % Eosinophils 0.6 %    % Basophils 0.6 %    % Immature Granulocytes 0.3 %    Nucleated RBCs 0 0 /100    Absolute Neutrophil 11.3 (H) 1.6 - 8.3 10e9/L    Absolute Lymphocytes 0.6 (L) 0.8 - 5.3 10e9/L    Absolute Monocytes 0.5 0.0 - 1.3 10e9/L    Absolute Eosinophils 0.1 0.0 - 0.7 10e9/L    Absolute Basophils 0.1 0.0 - 0.2 10e9/L    Abs Immature Granulocytes 0.0 0 - 0.4 10e9/L    Absolute Nucleated RBC 0.0    Erythrocyte sedimentation rate auto    Collection Time: 05/19/17  1:08 PM   Result Value Ref Range    Sed Rate 13 0 - 20 mm/h   CRP inflammation    Collection Time: 05/19/17  1:08 PM   Result Value Ref Range    CRP Inflammation 13.3 (H) 0.0 - 8.0 mg/L   ALT    Collection Time: 05/19/17  1:08 PM   Result Value Ref Range    ALT 16 0 - 50 U/L   General PFT Lab (Please always keep checked)    Collection Time: 05/19/17  1:11 PM   Result Value Ref Range    FVC-Pred 3.07 L    FVC-Pre 3.93 L    FVC-%Pred-Pre 128 %    FEV1-Pre 2.54 L    FEV1-%Pred-Pre 100 %    FEV1FVC-Pred 83 %    FEV1FVC-Pre 65 %    FEFMax-Pred 6.59 L/sec    FEFMax-Pre 5.77 L/sec    FEFMax-%Pred-Pre 87 %    FEF2575-Pred 2.70 L/sec    FEF2575-Pre 1.41 L/sec    TMT0819-%Pred-Pre 52 %    ExpTime-Pre 8.32 sec    FIFMax-Pre 4.92 L/sec    FEV1FEV6-Pred 83 %    FEV1FEV6-Pre 65 %   Gram stain    Collection Time: 05/19/17  2:30 PM   Result Value Ref Range    Specimen Description Sputum     Special Requests Screen     Gram Stain Pending     Micro Report Status Pending                   Results as noted above.    PFT Interpretation:  Normal spirometry.  Decreased from previous.  Below recent best.   Valid Maneuver          Scribe Disclosure:   Lisa CLEMENTE, am serving as a scribe; to document services personally performed by JOLIE PIERRE MD based on data  collection and the provider's statements to me.     Provider Disclosure:  I agree with above History, Review of Systems, Physical exam and Plan.  I have reviewed the content of the documentation and have edited it as needed. I have personally performed the services documented here and the documentation accurately represents those services and the decisions I have made.      Electronically signed by:  Dagoberto Briscoe      2.04

## 2019-01-07 NOTE — TELEPHONE ENCOUNTER
Spoke w/ pt and said that if she feels she wants to cancel appt, she can. Notified pt that I spoke w/ my manager, Carlos Alberto, and didn't see why pt would need to see Dr. Nissen. Pt agreed and stated she already had cancelled appt. Reassured pt that if in the future she needs to come in, to give us a call to schedule an appt.    Griselda Beaulieu LPN

## 2019-01-08 ENCOUNTER — PRE VISIT (OUTPATIENT)
Dept: OTOLARYNGOLOGY | Facility: CLINIC | Age: 49
End: 2019-01-08

## 2019-01-09 DIAGNOSIS — J47.9 BRONCHIECTASIS WITHOUT COMPLICATION (H): ICD-10-CM

## 2019-01-09 NOTE — TELEPHONE ENCOUNTER
Requested Prescriptions   Pending Prescriptions Disp Refills     predniSONE (DELTASONE) 1 MG tablet  Last Written Prescription Date:  12/12/18  Last Fill Quantity: 200,  # refills: 0   Last office visit: 11/15/2018 with prescribing provider:  11/15/18   Future Office Visit:     200 tablet 0    There is no refill protocol information for this order

## 2019-01-10 RX ORDER — PREDNISONE 1 MG/1
TABLET ORAL
Qty: 200 TABLET | Refills: 1 | Status: SHIPPED | OUTPATIENT
Start: 2019-01-10 | End: 2019-03-26

## 2019-01-17 ENCOUNTER — TELEPHONE (OUTPATIENT)
Dept: OTOLARYNGOLOGY | Facility: CLINIC | Age: 49
End: 2019-01-17

## 2019-01-17 NOTE — TELEPHONE ENCOUNTER
[facility] Call Center    Phone Message    May a detailed message be left on voicemail: yes    Reason for Call: Other: PT:  Pt wondering if anyone has talked to her Immunologist from [facility] with [facility]. Pt would like a call back regarding the Mastoid infection (pt is having pain and night sweats) , pt can be reach on her Mobile phone thanks.     Action Taken: Message routed to:  [facility] ENT

## 2019-01-17 NOTE — TELEPHONE ENCOUNTER
Sent message to clinic coordinator pool to call pt and schedule an appt for a WIN and an appt w/ Dr. Nissen per Dr. Butts and Dr. Mejia.    Griselda Beaulieu LPN

## 2019-01-18 ENCOUNTER — TELEPHONE (OUTPATIENT)
Dept: INTERNAL MEDICINE | Facility: CLINIC | Age: 49
End: 2019-01-18

## 2019-01-18 ENCOUNTER — NURSE TRIAGE (OUTPATIENT)
Dept: NURSING | Facility: CLINIC | Age: 49
End: 2019-01-18

## 2019-01-18 ENCOUNTER — MYC MEDICAL ADVICE (OUTPATIENT)
Dept: INTERNAL MEDICINE | Facility: CLINIC | Age: 49
End: 2019-01-18

## 2019-01-18 NOTE — TELEPHONE ENCOUNTER
Patient calls regarding [employer] message, feels it would be faster to call our office. Patient states she needs to be seen within the [facility] clinic sooner than February. She states that Dr. Butts Immunologist called ENT and they didn't seem to understand the concern/need for sooner appointment. Patient has tried to call Dr Butts's office to have him call ENT again, but the nurse there has not been very helpful. Dr. Butts did offer to have her see a colleague outside of [facility], but patient does not want so go outside of ealth due to her complex history. She is on FMLA due to symptoms and concerned about losing her job.   Patient asks if Dr. Simental could please call [facility] to get her in for a sooner appointment in the next week. Their phone number is [phone].     Patient can be reached at [phone] with questions.

## 2019-01-18 NOTE — TELEPHONE ENCOUNTER
Call placed to patient to discuss symptoms which include night sweats and mastoid pain.  Discussed appointment opening on 1/22 with Dr. Nissen and audiology.  Patient will be scheduled by OLIVA Villalobos for these appointments.

## 2019-01-18 NOTE — TELEPHONE ENCOUNTER
[facility] Call Center    Phone Message    May a detailed message be left on voicemail: yes    Reason for Call: Other: PT:  Pt states she cannot wait until 2/26/19, wondering if she can be squeezed in for an appt. Pt says she is a lot of pain (mastoid infusion on MRI, night sweats and dizziness). Please call pt to discuss Sx's when available thanks.     Action Taken: Message routed to:  [facility] ENT

## 2019-01-18 NOTE — TELEPHONE ENCOUNTER
Valencia is calling and is wanting to know if MD Simental received her message.  Main symptom today is fatigue, dizziness and night sweats and mastoid pain and is requesting an appointment today as she is on FML leave.  [facility]NA transferred caller through to schedule an appointment.

## 2019-01-18 NOTE — TELEPHONE ENCOUNTER
Pt would  Like to be seen for:    Night sweats, fatigue, mastoid pain, unresponsive to steroids.  Please call pt to advise.       Thank you,   Frannie OSCAR   Central Scheduling  [phone]

## 2019-01-18 NOTE — TELEPHONE ENCOUNTER
Please see MyChart message. Patient has been communicating with primary care provider regarding concerns.

## 2019-01-21 DIAGNOSIS — H91.90 HEARING LOSS, UNSPECIFIED HEARING LOSS TYPE, UNSPECIFIED LATERALITY: Primary | ICD-10-CM

## 2019-01-21 NOTE — TELEPHONE ENCOUNTER
Per patient's most recent [employer] message, was able to get an appointment scheduled 1-22-19 with ENT.

## 2019-01-22 ENCOUNTER — MYC MEDICAL ADVICE (OUTPATIENT)
Dept: INTERNAL MEDICINE | Facility: CLINIC | Age: 49
End: 2019-01-22

## 2019-01-22 ENCOUNTER — OFFICE VISIT (OUTPATIENT)
Dept: OTOLARYNGOLOGY | Facility: CLINIC | Age: 49
End: 2019-01-22
Payer: COMMERCIAL

## 2019-01-22 ENCOUNTER — OFFICE VISIT (OUTPATIENT)
Dept: AUDIOLOGY | Facility: CLINIC | Age: 49
End: 2019-01-22
Payer: COMMERCIAL

## 2019-01-22 ENCOUNTER — ANCILLARY PROCEDURE (OUTPATIENT)
Dept: CT IMAGING | Facility: CLINIC | Age: 49
End: 2019-01-22
Payer: COMMERCIAL

## 2019-01-22 VITALS
HEART RATE: 64 BPM | SYSTOLIC BLOOD PRESSURE: 131 MMHG | DIASTOLIC BLOOD PRESSURE: 82 MMHG | WEIGHT: 110 LBS | BODY MASS INDEX: 19.49 KG/M2 | HEIGHT: 63 IN

## 2019-01-22 DIAGNOSIS — H91.90 HEARING LOSS, UNSPECIFIED HEARING LOSS TYPE, UNSPECIFIED LATERALITY: ICD-10-CM

## 2019-01-22 DIAGNOSIS — H61.23 EXCESSIVE CERUMEN IN BOTH EAR CANALS: ICD-10-CM

## 2019-01-22 DIAGNOSIS — H72.93 EAR DRUM PERFORATION, BILATERAL: ICD-10-CM

## 2019-01-22 DIAGNOSIS — R42 DIZZINESS: Primary | ICD-10-CM

## 2019-01-22 DIAGNOSIS — R42 DIZZINESS: ICD-10-CM

## 2019-01-22 DIAGNOSIS — H90.0 CONDUCTIVE HEARING LOSS, BILATERAL: Primary | ICD-10-CM

## 2019-01-22 DIAGNOSIS — H90.0 CONDUCTIVE HEARING LOSS, BILATERAL: ICD-10-CM

## 2019-01-22 DIAGNOSIS — H93.13 TINNITUS, BILATERAL: ICD-10-CM

## 2019-01-22 ASSESSMENT — PAIN SCALES - GENERAL: PAINLEVEL: MODERATE PAIN (5)

## 2019-01-22 ASSESSMENT — MIFFLIN-ST. JEOR: SCORE: 1107.96

## 2019-01-22 NOTE — LETTER
1/22/2019       RE: Valencia Ye  [address] [facility] [address]     Dear Colleague,    Thank you for referring your patient, Valencia Ye, to the [facility] EAR [facility]ROAT at [facility]. Please see a copy of my visit note below.    Dear Dr. Simental, [place] BARBOSA:    I had the pleasure of meeting Valencia Ye in consultation today at the [facility] at your request.    CHIEF COMPLAINT: Dizziness    HISTORY OF PRESENT ILLNESS: Patient is a 48-year-old in today for assessment of dizziness.  She describes her dizziness as a lightheadedness or off-balance sensation.  This started last May.  It seems to be intermittent and varies in intensity, seems to have a low level baseline even at its best has a slight imbalance.  Mostly when she is up.  She also complains of a lot of fatigue, head pain, night sweats, muscle aches, etc.  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  She has had multiple tubes in the past, obviously no longer needs to tubes with the perforations.  She did have a recent MRI scan which showed some possible inflammatory changes within the mastoid.  With this constant imbalance she was sent over for assessment from an ear standpoint.  She feels her hearing has been fairly stable, she does have mild hearing loss in both ears but again stable.  She also has bilateral tinnitus she has had for years.  She has not had any recent pain or drainage in either ear.  She denies any dysphagia, hoarseness, facial paresthesias.  She recently was placed on high-dose pulse IV Solu-Medrol to treat possible auto immune inner ear disease as a cause for the dizziness.  Reviewed multiple records  today.    ALLERGIES:    Allergies   Allergen Reactions     Colistimethate Anaphylaxis     Colymycin M [Colistimethate Sodium] Anaphylaxis     Tamiflu [Oseltamivir]      Gums Blister up     Clindamycin Rash     Zithromax [Azithromycin]      Heart palpitation       HABITS: Social History    Substance and Sexual Activity      Alcohol use: Yes        Alcohol/week: 0.0 oz        Comment: occ on the weekends     History   Smoking Status     Never Smoker   Smokeless Tobacco     Never Used         PAST MEDICAL HISTORY: Please see today's intake form (for the remainder of the PMH) which I reviewed and signed.  Past Medical History:   Diagnosis Date     Anxiety      Aortitis (H)      Asthma     associated with Churg Antonina syndrome     Bronchiectasis (H)      Cerebral infarction (H) August 1990    Very small, caused small permanent optical migraine     Chronic sinusitis      Churg-Antonina syndrome (H)     dx 1990     Conductive hearing loss      Depressive disorder      Eustachian tube dysfunction      Goiter      Hearing loss, conductive      Heart rate problem      Hypertension      Hypocalcemia      Immunosuppression (H)      Irritable bowel syndrome      Major depressive disorder      Mannose-binding lectin deficiency 2014     Nonspecific abnormal results of thyroid function study      Osteoporosis      Pericarditis     1990 and 1992     Pneumonia     4/23/10     Recurrent otitis media      Recurrent UTI      Rheumatoid vasculitis (H)      Sinusitis, chronic      Steroid long-term use      Tinnitus      Varicose veins of leg with swelling        FAMILY HISTORY/SOCIAL HISTORY:   Family History   Problem Relation Age of Onset     Allergies Mother         Seasonal     Alcohol/Drug Mother      Substance Abuse Mother      Depression Mother      C.A.D. Father      Diabetes Father         Type 2     Depression Father      Heart Disease Father      C.A.D. Maternal Grandmother      Arthritis Maternal Grandmother       Musculoskeletal Disorder Maternal Grandmother         MS     Heart Disease Maternal Grandmother      Hypertension Maternal Grandmother      Alcohol/Drug Maternal Grandfather      Substance Abuse Maternal Grandfather      Depression Maternal Grandfather      Asthma Son      Asthma Daughter      Allergies Daughter         Seasonal     Unknown/Adopted Paternal Grandmother      Unknown/Adopted Paternal Grandfather      Cancer Maternal Aunt         breast age 53     Breast Cancer Maternal Aunt         in her 50s      Social History     Socioeconomic History     Marital status: Single     Spouse name: Not on file     Number of children: Not on file     Years of education: Not on file     Highest education level: Not on file   Social Needs     Financial resource strain: Not on file     Food insecurity - worry: Not on file     Food insecurity - inability: Not on file     Transportation needs - medical: Not on file     Transportation needs - non-medical: Not on file   Occupational History     Not on file   Tobacco Use     Smoking status: Never Smoker     Smokeless tobacco: Never Used   Substance and Sexual Activity     Alcohol use: Yes     Alcohol/week: 0.0 oz     Comment: occ on the weekends     Drug use: No     Sexual activity: Yes     Partners: Male     Birth control/protection: Male Surgical, Female Surgical   Other Topics Concern     Parent/sibling w/ CABG, MI or angioplasty before 65F 55M? Not Asked      Service No     Blood Transfusions No     Caffeine Concern No     Occupational Exposure No     Hobby Hazards No     Sleep Concern No     Stress Concern Yes     Weight Concern No     Special Diet Yes     Comment: IBS diet     Back Care No     Exercise Not Asked     Comment: 1 mile power walk 5 days a week at least.      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  .  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  Non smoker.  No smokers at home.  Enjoys walking and dancing.  Alcohol--1 to 2 drinks daily.  Few times a year has more than 4 drinks in a day.  No illicits.                       REVIEW OF SYSTEMS: Patient Supplied Answers to Review of Systems   ENT ROS 1/22/2019   Neurology Dizzy spells, Headache   Psychology -   Ears, Nose, Throat Ear pain, Ringing/noise in ears   Musculoskeletal Neck pain       The remainder of the 10 point ROS is negative    PHYSICIAL EXAMINATION:  Constitutional: The patient was well-groomed and in no acute distress.   Skin: Warm and pink.  Psychiatric: The patient's affect was calm, cooperative, and appropriate.   Respiratory: Breathing comfortably without stridor or exertion of accessory muscles.  Eyes: Pupils were equal and reactive. Extraocular movement intact.   Head: Normocephalic and atraumatic. No lesions or scars.  Ears: Both ears examined under the microscope for microscopic assessment and cleaning of cerumen.  The right side initially was examined and cleaned with curettes.  Following removal of cerumen, TM fully visualized and shows a large central perforation.  It is clean and dry with normal appearance to the middle ear mucosa through the perforation.  The left side was cleaned and examined using microscope and curette.  She has a smaller posterior inferior perforation that again is clean and dry.  Normal middle ear mucosa noted through the perforation.  No sign of active drainage on either side.  Nose: Sinuses were nontender. Anterior rhinoscopy revealed midline septum and absence of purulence or polyps.  Oral Cavity: Normal tongue, floor of mouth, buccal mucosa, and palate. No lesions or masses on inspection or palpation. No abnormal lymph tissue in the oropharynx.   Neck: The parotid is soft without masses. Supple with normal laryngeal and tracheal landmarks.   Lymphatic: There is no palpable lymphadenopathy or other masses in the neck.   Neurologic: Alert and oriented x 3. Cranial  nerves III-XI within normal limits. Voice quality normal.  Cerebellar Function Tests:  Grossly normal    Audiogram: Audiogram performed shows mild to moderate bilateral conductive hearing loss.  Maintains 100% discrimination in each ear.  Audiogram looks stable and unchanged looking back to previous audiograms over the past several years.    Rotational chair: Show normal results throughout with no sign for any vestibular abnormality      IMPRESSION AND PLAN:   1. Dizziness: I do not feel her dizziness is from any inner ear abnormality, based on her description of symptoms and lack of any associated auditory symptoms.  I also do not feel she has autoimmune inner ear disease with her hearing showing stable results and her not noticing any significant hearing change or alterations.  Recommend she return to her immunologist for any further workup needs or treatment.  2. Bilateral ear perforations: They appear stable, recommend just monitor, no further treatment needed.  3. Bilateral mild conductive hearing losses: Again stable, no further treatment needed at this time, monitor.  4. Excessive cerumen: Cleaned today, no further treatment needed at this time.        Thank you very much for the opportunity to participate in the care of your patient.    Rick L Nissen MD

## 2019-01-22 NOTE — NURSING NOTE
"Chief Complaint   Patient presents with     Consult     mastoid infusion      Blood pressure 131/82, pulse 64, height 1.6 m (5' 2.99\"), weight 49.9 kg (110 lb), last menstrual period 02/01/2009, not currently breastfeeding.    Dwayne Ramos LPN    "

## 2019-01-22 NOTE — PROGRESS NOTES
AUDIOLOGY REPORT    SUMMARY: Audiology visit completed. See audiogram for results.      RECOMMENDATIONS: Follow-up with ENT.      Temo Chow.  Licensed Audiologist  [place] #[id number]

## 2019-01-22 NOTE — PATIENT INSTRUCTIONS
1.  You were seen in the ENT Clinic today by Dr. Nissen.  If you have any questions or concerns after your appointment, please call [phone]. Press option #1 for scheduling related needs. Press option #3 for Nurse advice.  2.  Please schedule an appointment for the following:   - CT Scan - Temporal Bone (will call with results)  3.  Plan is to return to clinic as needed.    Griselda Beaulieu LPN  [facility]

## 2019-01-23 NOTE — PROGRESS NOTES
Dear Dr. Simental Evi:    I had the pleasure of meeting Valencia Ye in consultation today at the [facility] at your request.    CHIEF COMPLAINT: Dizziness    HISTORY OF PRESENT ILLNESS: Patient is a 48-year-old in today for assessment of dizziness.  She describes her dizziness as a lightheadedness or off-balance sensation.  This started last May.  It seems to be intermittent and varies in intensity, seems to have a low level baseline even at its best has a slight imbalance.  Mostly when she is up.  She also complains of a lot of fatigue, head pain, night sweats, muscle aches, etc.  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  She has had multiple tubes in the past, obviously no longer needs to tubes with the perforations.  She did have a recent MRI scan which showed some possible inflammatory changes within the mastoid.  With this constant imbalance she was sent over for assessment from an ear standpoint.  She feels her hearing has been fairly stable, she does have mild hearing loss in both ears but again stable.  She also has bilateral tinnitus she has had for years.  She has not had any recent pain or drainage in either ear.  She denies any dysphagia, hoarseness, facial paresthesias.  She recently was placed on high-dose pulse IV Solu-Medrol to treat possible auto immune inner ear disease as a cause for the dizziness.  Reviewed multiple records today.    ALLERGIES:    Allergies   Allergen Reactions     Colistimethate Anaphylaxis     Colymycin M [Colistimethate Sodium] Anaphylaxis     Tamiflu [Oseltamivir]      Gums Blister up     Clindamycin Rash     Zithromax [Azithromycin]      Heart palpitation       HABITS: Social History    Substance and  Sexual Activity      Alcohol use: Yes        Alcohol/week: 0.0 oz        Comment: occ on the weekends     History   Smoking Status     Never Smoker   Smokeless Tobacco     Never Used         PAST MEDICAL HISTORY: Please see today's intake form (for the remainder of the PMH) which I reviewed and signed.  Past Medical History:   Diagnosis Date     Anxiety      Aortitis (H)      Asthma     associated with Churg Antonina syndrome     Bronchiectasis (H)      Cerebral infarction (H) August 1990    Very small, caused small permanent optical migraine     Chronic sinusitis      Churg-Antonina syndrome (H)     dx 1990     Conductive hearing loss      Depressive disorder      Eustachian tube dysfunction      Goiter      Hearing loss, conductive      Heart rate problem      Hypertension      Hypocalcemia      Immunosuppression (H)      Irritable bowel syndrome      Major depressive disorder      Mannose-binding lectin deficiency 2014     Nonspecific abnormal results of thyroid function study      Osteoporosis      Pericarditis     1990 and 1992     Pneumonia     4/23/10     Recurrent otitis media      Recurrent UTI      Rheumatoid vasculitis (H)      Sinusitis, chronic      Steroid long-term use      Tinnitus      Varicose veins of leg with swelling        FAMILY HISTORY/SOCIAL HISTORY:   Family History   Problem Relation Age of Onset     Allergies Mother         Seasonal     Alcohol/Drug Mother      Substance Abuse Mother      Depression Mother      C.A.D. Father      Diabetes Father         Type 2     Depression Father      Heart Disease Father      C.A.D. Maternal Grandmother      Arthritis Maternal Grandmother      Musculoskeletal Disorder Maternal Grandmother         MS     Heart Disease Maternal Grandmother      Hypertension Maternal Grandmother      Alcohol/Drug Maternal Grandfather      Substance Abuse Maternal Grandfather      Depression Maternal Grandfather      Asthma Son      Asthma Daughter      Allergies Daughter          Seasonal     Unknown/Adopted Paternal Grandmother      Unknown/Adopted Paternal Grandfather      Cancer Maternal Aunt         breast age 53     Breast Cancer Maternal Aunt         in her 50s      Social History     Socioeconomic History     Marital status: Single     Spouse name: Not on file     Number of children: Not on file     Years of education: Not on file     Highest education level: Not on file   Social Needs     Financial resource strain: Not on file     Food insecurity - worry: Not on file     Food insecurity - inability: Not on file     Transportation needs - medical: Not on file     Transportation needs - non-medical: Not on file   Occupational History     Not on file   Tobacco Use     Smoking status: Never Smoker     Smokeless tobacco: Never Used   Substance and Sexual Activity     Alcohol use: Yes     Alcohol/week: 0.0 oz     Comment: occ on the weekends     Drug use: No     Sexual activity: Yes     Partners: Male     Birth control/protection: Male Surgical, Female Surgical   Other Topics Concern     Parent/sibling w/ CABG, MI or angioplasty before 65F 55M? Not Asked      Service No     Blood Transfusions No     Caffeine Concern No     Occupational Exposure No     Hobby Hazards No     Sleep Concern No     Stress Concern Yes     Weight Concern No     Special Diet Yes     Comment: IBS diet     Back Care No     Exercise Not Asked     Comment: 1 mile power walk 5 days a week at least.      Bike Helmet Not Asked     Seat Belt Yes     Self-Exams Yes   Social History Narrative    [employer] in Radiology Department.  .  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  Non smoker. No smokers at home.  Enjoys walking and dancing.  Alcohol--1 to 2 drinks daily.  Few times a year has more than 4 drinks in a day.  No illicits.                       REVIEW OF SYSTEMS: Patient Supplied Answers to Review of Systems  [facility] E[facility] ROS 1/22/2019   Neurology Dizzy spells, Headache   Psychology -   Ears,  Nose, Throat Ear pain, Ringing/noise in ears   Musculoskeletal Neck pain       The remainder of the 10 point ROS is negative    PHYSICIAL EXAMINATION:  Constitutional: The patient was well-groomed and in no acute distress.   Skin: Warm and pink.  Psychiatric: The patient's affect was calm, cooperative, and appropriate.   Respiratory: Breathing comfortably without stridor or exertion of accessory muscles.  Eyes: Pupils were equal and reactive. Extraocular movement intact.   Head: Normocephalic and atraumatic. No lesions or scars.  Ears: Both ears examined under the microscope for microscopic assessment and cleaning of cerumen.  The right side initially was examined and cleaned with curettes.  Following removal of cerumen, TM fully visualized and shows a large central perforation.  It is clean and dry with normal appearance to the middle ear mucosa through the perforation.  The left side was cleaned and examined using microscope and curette.  She has a smaller posterior inferior perforation that again is clean and dry.  Normal middle ear mucosa noted through the perforation.  No sign of active drainage on either side.  Nose: Sinuses were nontender. Anterior rhinoscopy revealed midline septum and absence of purulence or polyps.  Oral Cavity: Normal tongue, floor of mouth, buccal mucosa, and palate. No lesions or masses on inspection or palpation. No abnormal lymph tissue in the oropharynx.   Neck: The parotid is soft without masses. Supple with normal laryngeal and tracheal landmarks.   Lymphatic: There is no palpable lymphadenopathy or other masses in the neck.   Neurologic: Alert and oriented x 3. Cranial nerves III-XI within normal limits. Voice quality normal.  Cerebellar Function Tests:  Grossly normal    Audiogram: Audiogram performed shows mild to moderate bilateral conductive hearing loss.  Maintains 100% discrimination in each ear.  Audiogram looks stable and unchanged looking back to previous audiograms over  the past several years.    Rotational chair: Show normal results throughout with no sign for any vestibular abnormality      IMPRESSION AND PLAN:   1. Dizziness: I do not feel her dizziness is from any inner ear abnormality, based on her description of symptoms and lack of any associated auditory symptoms.  I also do not feel she has autoimmune inner ear disease with her hearing showing stable results and her not noticing any significant hearing change or alterations.  Recommend she return to her immunologist for any further workup needs or treatment.  2. Bilateral ear perforations: They appear stable, recommend just monitor, no further treatment needed.  3. Bilateral mild conductive hearing losses: Again stable, no further treatment needed at this time, monitor.  4. Excessive cerumen: Cleaned today, no further treatment needed at this time.        Thank you very much for the opportunity to participate in the care of your patient.    Rick L Nissen MD

## 2019-01-28 ENCOUNTER — OFFICE VISIT (OUTPATIENT)
Dept: INTERNAL MEDICINE | Facility: CLINIC | Age: 49
End: 2019-01-28
Payer: COMMERCIAL

## 2019-01-28 VITALS
WEIGHT: 111.5 LBS | SYSTOLIC BLOOD PRESSURE: 116 MMHG | BODY MASS INDEX: 19.76 KG/M2 | HEIGHT: 63 IN | DIASTOLIC BLOOD PRESSURE: 66 MMHG | HEART RATE: 75 BPM | TEMPERATURE: 97.9 F | OXYGEN SATURATION: 98 % | RESPIRATION RATE: 18 BRPM

## 2019-01-28 DIAGNOSIS — G44.209 TENSION HEADACHE: ICD-10-CM

## 2019-01-28 DIAGNOSIS — R61 NIGHT SWEATS: ICD-10-CM

## 2019-01-28 DIAGNOSIS — R42 DIZZINESS: Primary | ICD-10-CM

## 2019-01-28 PROCEDURE — 99214 OFFICE O/P EST MOD 30 MIN: CPT | Performed by: INTERNAL MEDICINE

## 2019-01-28 ASSESSMENT — MIFFLIN-ST. JEOR: SCORE: 1114.76

## 2019-01-28 NOTE — PROGRESS NOTES
SUBJECTIVE:   Valencia Ye is a 48 year old female who presents to clinic today for the following health issues:    1.  Follow-up of dizziness: Since she saw me for this in September, she has undergone extensive evaluation with ENT.  She had MRI testing, rotary chair testing.  ENT does not feel her dizziness is due to a vestibular process.  She is asking about where she goes from here.    2.  Headache: She is been having a lot of posterior headaches, sometimes involving the ears, mastoid area, less likely frontal.  They can last for a few days.  She takes ibuprofen infrequently which can help a little bit.  The headaches do not seem to be related to the dizziness.  She has had some symptoms of some right eye twitching frequently, tingling of her left face, arm and occasionally the leg.    3.  She has had some episodes of night sweats.  She was having them quite significantly a few weeks ago, awakening with fairly significant sweating but then they have decreased in the last week or so.  She does not have a lot of daytime hot flashes or feeling warm during the day.  She said some fatigue and body aches.  She is noticed some increase in skipped beats.  She had a hysterectomy but still has her ovaries.  She does not tend to have symptoms cyclically that can help her try her cycle at all.  These night sweats are new in the past month.  She has been having fever or chills, no other symptoms of infection such as cough, sinus drainage.  No enlarged lymph nodes.          Amount of exercise or physical activity: 4-5 days/week for an average of 15-30 minutes    Problems taking medications regularly: No    Medication side effects: none    Diet: regular (no restrictions)    Patient Active Problem List   Diagnosis     Goiter     History of systemic steroid therapy     ILD (interstitial lung disease) (H)     Palpitations     Churg-Antonina syndrome (H)     History of eating disorder     Asthma     Anxiety     Chronic  fatigue     Mannose-binding lectin deficiency     Benign essential hypertension     Moderate episode of recurrent major depressive disorder (H)     Bronchiectasis without acute exacerbation (H)     Controlled substance agreement signed     Current Outpatient Medications   Medication Sig Dispense Refill     acetylcysteine (MUCOMYST) 20 % nebulizer solution Take 4 mLs by nebulization 2 times daily Use with albuterol solution. Discard open bottle of Mucomyst after 96 hours. 240 mL 11     albuterol (2.5 MG/3ML) 0.083% neb solution Take 1 vial (2.5 mg) by nebulization 2 times daily Use with Mucomyst 60 vial 11     atorvastatin (LIPITOR) 10 MG tablet Take 0.5 tablets (5 mg) by mouth daily 30 tablet 11     budesonide-formoterol (SYMBICORT) 160-4.5 MCG/ACT Inhaler Inhale 2 puffs into the lungs 2 times daily 1 Inhaler 11     cholecalciferol 5000 UNITS CAPS Take 4,000 Units by mouth daily        ciprofloxacin-dexamethasone (CIPRODEX) otic suspension Instill 3 drops into the affected ear twice daily as needed. 7.5 mL 3     clonazePAM (KLONOPIN) 0.5 MG tablet Take 0.5 tablets (0.25 mg) by mouth 2 times daily as needed for anxiety 20 tablet 3     fluticasone (FLONASE) 50 MCG/ACT spray Spray 2 sprays into both nostrils daily 48 mL 3     glucosamine-chondroitin (GLUCOSAMINE CHONDR COMPLEX) 500-400 MG CAPS Take 2 capsules by mouth daily.       Ipratropium-Albuterol (COMBIVENT RESPIMAT)  MCG/ACT inhaler Inhale 1 puff into the lungs 4 times daily Not to exceed 6 doses per day. 1 Inhaler 11     Lubiprostone 8 MCG CAPS capsule Take 1 capsule (8 mcg) by mouth 2 times daily 60 capsule 3     metoprolol succinate (TOPROL-XL) 25 MG 24 hr tablet Take 0.5 tablets (12.5 mg) by mouth daily 45 tablet 3     Omega-3 Fatty Acids (FISH OIL) 1200 MG capsule Take 3 capsules by mouth daily.       predniSONE (DELTASONE) 1 MG tablet TAKE 5 TABLETS BY MOUTH EVERY DAY AND ALTERNATING WITH 6 TABLETS THE NEXT. 200 tablet 1     Probiotic Product  "(PROBIOTIC ADVANCED PO)        sertraline (ZOLOFT) 25 MG tablet 1/2 tablet daily 45 tablet 3     vitamin B complex with vitamin C (VITAMIN  B COMPLEX) TABS tablet Take 1 tablet by mouth daily        Social History     Tobacco Use     Smoking status: Never Smoker     Smokeless tobacco: Never Used   Substance Use Topics     Alcohol use: Yes     Alcohol/week: 0.0 oz     Comment: occ on the weekends     Drug use: No        Reviewed and updated as needed this visit by clinical staff  Allergies  Meds       Reviewed and updated as needed this visit by Provider         ROS:  No fever or chills, no sudden weight changes, no sinus pain or pressure, she gets some soreness around her ears but we know her CAT scan did not show mastoiditis, she does have chronic tinnitus and hearing loss which are stable.  No focal weakness, incoordination, double vision, slurred speech.  She does not really feel she has any visual changes.  No other areas of twitching.  No gait disturbance.    OBJECTIVE:     /66 (BP Location: Left arm, Patient Position: Sitting, Cuff Size: Adult Regular)   Pulse 75   Temp 97.9  F (36.6  C) (Oral)   Resp 18   Ht 1.6 m (5' 2.99\")   Wt 50.6 kg (111 lb 8 oz)   LMP 02/01/2009   SpO2 98%   BMI 19.76 kg/m    Body mass index is 19.76 kg/m .    Not examined      ASSESSMENT/PLAN:             1. Dizziness  She has had full evaluation by ENT and they do not feel that her problem was vestibular so recommend referral to neurology for assessment of the dizziness.   her symptoms do not seem to be related to orthostasis or heart rhythm disturbances they are not syncope/presyncope.  - NEUROLOGY ADULT REFERRAL    2. Tension headache  She is having more persistent headaches, may be tension, may be tension and migraine.  She can follow-up with neurology for this as well as work on neck stretches  - NEUROLOGY ADULT REFERRAL    3. Night sweats  Advised that likely the night sweats are related to menopausal status.  She " is not have other symptoms suggesting infection, the symptoms have flared up and are decreased now.  Discussed the possibility of checking labs but explained that there can be difficulties interpreting the labs.  She will hold off on blood tests for now, she did have blood counts and thyroid function test within the past year.  Advised to contact me for any new symptoms that suggest infection.          Morelia Simental MD  [facility]      29 minutes spent with the patient, >50% of time spent counseling about dizziness, headache management, night sweats and menopausal issues.

## 2019-01-28 NOTE — NURSING NOTE
"/66 (BP Location: Left arm, Patient Position: Sitting, Cuff Size: Adult Regular)   Pulse 75   Temp 97.9  F (36.6  C) (Oral)   Resp 18   Ht 1.6 m (5' 2.99\")   Wt 50.6 kg (111 lb 8 oz)   LMP 02/01/2009   SpO2 98%   BMI 19.76 kg/m    Patient here for dizziness, fatigue, night sweats, mastoid effusion.  "

## 2019-02-05 ENCOUNTER — CARE COORDINATION (OUTPATIENT)
Dept: OTOLARYNGOLOGY | Facility: CLINIC | Age: 49
End: 2019-02-05

## 2019-02-05 NOTE — PROGRESS NOTES
CT reviewed by Dr Nissen:  Please let pt know her CT scan of her temporal bone (ear) showed changes consistent with her prior surgeries and otherwise looked stable. No further treatment needed from us, other than follow the ears along for cleanings as needed, usually every year or 2 for cleaning of her cavities.  Discussed with patient over the phone, patient agrees with plan.

## 2019-02-07 ENCOUNTER — OFFICE VISIT (OUTPATIENT)
Dept: NEUROLOGY | Facility: CLINIC | Age: 49
End: 2019-02-07
Payer: COMMERCIAL

## 2019-02-07 ENCOUNTER — TELEPHONE (OUTPATIENT)
Dept: NEUROLOGY | Facility: CLINIC | Age: 49
End: 2019-02-07

## 2019-02-07 VITALS
DIASTOLIC BLOOD PRESSURE: 75 MMHG | BODY MASS INDEX: 20.02 KG/M2 | HEIGHT: 63 IN | WEIGHT: 113 LBS | OXYGEN SATURATION: 96 % | SYSTOLIC BLOOD PRESSURE: 116 MMHG | HEART RATE: 76 BPM

## 2019-02-07 DIAGNOSIS — I49.9 IRREGULAR HEART BEATS: ICD-10-CM

## 2019-02-07 DIAGNOSIS — D72.18 CHURG-STRAUSS SYNDROME (H): ICD-10-CM

## 2019-02-07 DIAGNOSIS — Z79.52 CURRENT CHRONIC USE OF SYSTEMIC STEROIDS: ICD-10-CM

## 2019-02-07 DIAGNOSIS — M30.1 CHURG-STRAUSS SYNDROME (H): ICD-10-CM

## 2019-02-07 DIAGNOSIS — R29.818 TRANSIENT NEUROLOGICAL SYMPTOMS: Primary | ICD-10-CM

## 2019-02-07 DIAGNOSIS — R42 DIZZINESS: ICD-10-CM

## 2019-02-07 DIAGNOSIS — R42 DISEQUILIBRIUM: ICD-10-CM

## 2019-02-07 ASSESSMENT — ENCOUNTER SYMPTOMS
TINGLING: 1
PALPITATIONS: 1
TASTE DISTURBANCE: 0
WEAKNESS: 0
NIGHT SWEATS: 1
PARALYSIS: 0
LOSS OF CONSCIOUSNESS: 0
TREMORS: 0
SINUS CONGESTION: 0
DISTURBANCES IN COORDINATION: 0
VOMITING: 0
DECREASED APPETITE: 0
DECREASED CONCENTRATION: 1
NAUSEA: 1
FATIGUE: 1
MEMORY LOSS: 0
NUMBNESS: 0
LEG PAIN: 0
LIGHT-HEADEDNESS: 1
HEARTBURN: 0
POLYDIPSIA: 0
BACK PAIN: 0
JAUNDICE: 0
RECTAL PAIN: 0
MYALGIAS: 1
HOT FLASHES: 0
ALTERED TEMPERATURE REGULATION: 1
EXERCISE INTOLERANCE: 0
FEVER: 0
DIARRHEA: 0
NECK PAIN: 0
SINUS PAIN: 1
SMELL DISTURBANCE: 0
BOWEL INCONTINENCE: 0
SPEECH CHANGE: 0
INSOMNIA: 0
NERVOUS/ANXIOUS: 1
SORE THROAT: 0
BREAST PAIN: 1
DIZZINESS: 1
HALLUCINATIONS: 0
HEADACHES: 1
SYNCOPE: 0
ARTHRALGIAS: 1
WEIGHT GAIN: 0
MUSCLE WEAKNESS: 0
JOINT SWELLING: 0
SLEEP DISTURBANCES DUE TO BREATHING: 0
BREAST MASS: 0
STIFFNESS: 0
NECK MASS: 0
DECREASED LIBIDO: 1
ABDOMINAL PAIN: 0
SEIZURES: 0
BLOOD IN STOOL: 0
PANIC: 0
HYPOTENSION: 0
CONSTIPATION: 1
BLOATING: 1
HOARSE VOICE: 0
POLYPHAGIA: 0
WEIGHT LOSS: 0
MUSCLE CRAMPS: 1
CHILLS: 0
TROUBLE SWALLOWING: 0
HYPERTENSION: 0
INCREASED ENERGY: 0
DEPRESSION: 1
ORTHOPNEA: 0

## 2019-02-07 ASSESSMENT — PAIN SCALES - GENERAL: PAINLEVEL: NO PAIN (0)

## 2019-02-07 ASSESSMENT — MIFFLIN-ST. JEOR: SCORE: 1121.69
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
## 2019-02-07 NOTE — NURSING NOTE
Chief Complaint   Patient presents with     RECHECK     DIZZINESS, HEADACHES       Myriam Rocha MA
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
## 2019-02-08 ENCOUNTER — TELEPHONE (OUTPATIENT)
Dept: NEUROLOGY | Facility: CLINIC | Age: 49
End: 2019-02-08

## 2019-02-08 NOTE — TELEPHONE ENCOUNTER
Called and spoke to the patient about EKG results and follow up plan. Patient is in agreement with the plan.

## 2019-02-12 ENCOUNTER — OFFICE VISIT (OUTPATIENT)
Dept: OPHTHALMOLOGY | Facility: CLINIC | Age: 49
End: 2019-02-12
Payer: COMMERCIAL

## 2019-02-12 DIAGNOSIS — H52.13 MYOPIA, BILATERAL: ICD-10-CM

## 2019-02-12 DIAGNOSIS — M30.1 CHURG-STRAUSS SYNDROME (H): Primary | ICD-10-CM

## 2019-02-12 DIAGNOSIS — D72.18 CHURG-STRAUSS SYNDROME (H): Primary | ICD-10-CM

## 2019-02-12 ASSESSMENT — REFRACTION_WEARINGRX
OS_CYLINDER: +1.00
OS_AXIS: 175
OS_SPHERE: -1.50
OS_ADD: +1.50
OD_ADD: +1.50
OD_AXIS: 170
OD_SPHERE: -1.75
OD_CYLINDER: +0.75

## 2019-02-12 ASSESSMENT — TONOMETRY
OS_IOP_MMHG: 19
OD_IOP_MMHG: 18
IOP_METHOD: ICARE

## 2019-02-12 ASSESSMENT — REFRACTION_MANIFEST
OD_AXIS: 170
OD_ADD: +1.50
OS_CYLINDER: +1.00
OS_SPHERE: -1.50
OD_CYLINDER: +0.75
OD_SPHERE: -1.50
OS_AXIS: 175
OS_ADD: +1.50

## 2019-02-12 ASSESSMENT — SLIT LAMP EXAM - LIDS
COMMENTS: NORMAL
COMMENTS: NORMAL

## 2019-02-12 ASSESSMENT — CUP TO DISC RATIO
OD_RATIO: 0.2
OS_RATIO: 0.2

## 2019-02-12 ASSESSMENT — EXTERNAL EXAM - LEFT EYE: OS_EXAM: NORMAL

## 2019-02-12 ASSESSMENT — CONF VISUAL FIELD
OD_NORMAL: 1
OS_NORMAL: 1

## 2019-02-12 ASSESSMENT — EXTERNAL EXAM - RIGHT EYE: OD_EXAM: NORMAL

## 2019-02-12 ASSESSMENT — VISUAL ACUITY
CORRECTION_TYPE: GLASSES
METHOD: SNELLEN - LINEAR
OS_CC: 20/20
OD_CC: 20/20

## 2019-02-12 NOTE — NURSING NOTE
Chief Complaints and History of Present Illnesses   Patient presents with     COMPREHENSIVE EYE EXAM     Chief Complaint(s) and History of Present Illness(es)     COMPREHENSIVE EYE EXAM     Laterality: both eyes    Associated symptoms: floaters (stable floaters per pt).  Negative for flashes, eye pain, dryness and tearing              Comments     Patient notes she is here for an eye exam it has been a while, no concerns today, denies blurred VA.     Batool Sawant February 12, 2019 4:32 PM

## 2019-02-12 NOTE — PROGRESS NOTES
History  HPI     COMPREHENSIVE EYE EXAM     In both eyes.  Associated symptoms include floaters (stable floaters per pt).  Negative for flashes, eye pain, dryness and tearing.              Comments     Patient notes she is here for an eye exam it has been a while, no concerns today, denies blurred VA.     Batool Sawant February 12, 2019 4:32 PM            Last edited by Cierra Sawant on 2/12/2019  4:32 PM. 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 Continue management as directed and return to clinic in 1 year for dilated exam, or sooner, as needed.    (H52.13) Myopia, bilateral  Comment: Myopia with presbyopia both eyes   Plan: REFRACTION   Dispensed spectacle prescription for full time wear. Educated patient on possibility of adaptation period, if symptoms do not improve return to clinic for further testing.    Return to clinic in 1 year for comprehensive eye exam.    Complete documentation of historical and exam elements from today's encounter can  be found in the full encounter summary report (not reduplicated in this progress  note). I personally obtained the chief complaint(s) and history of present illness. I  confirmed and edited as necessary the review of systems, past medical/surgical  history, family history, social history, and examination findings as documented by  others; and I examined the patient myself. I personally reviewed the relevant tests,  images, and reports as documented above. I formulated and edited as necessary the  assessment and plan and discussed the findings and management plan with the  patient and family.    Randal Logan, DAVID, FAAO

## 2019-02-18 ENCOUNTER — TELEPHONE (OUTPATIENT)
Dept: NEUROLOGY | Facility: CLINIC | Age: 49
End: 2019-02-18

## 2019-02-18 ENCOUNTER — MYC MEDICAL ADVICE (OUTPATIENT)
Dept: INTERNAL MEDICINE | Facility: CLINIC | Age: 49
End: 2019-02-18

## 2019-02-18 ENCOUNTER — ANCILLARY PROCEDURE (OUTPATIENT)
Dept: MRI IMAGING | Facility: CLINIC | Age: 49
End: 2019-02-18
Attending: NURSE PRACTITIONER
Payer: COMMERCIAL

## 2019-02-18 ENCOUNTER — TELEPHONE (OUTPATIENT)
Dept: INTERNAL MEDICINE | Facility: CLINIC | Age: 49
End: 2019-02-18

## 2019-02-18 DIAGNOSIS — R42 DISEQUILIBRIUM: ICD-10-CM

## 2019-02-18 DIAGNOSIS — Z79.52 CURRENT CHRONIC USE OF SYSTEMIC STEROIDS: ICD-10-CM

## 2019-02-18 DIAGNOSIS — R42 DIZZINESS: ICD-10-CM

## 2019-02-18 DIAGNOSIS — R29.818 TRANSIENT NEUROLOGICAL SYMPTOMS: ICD-10-CM

## 2019-02-18 RX ORDER — GADOBUTROL 604.72 MG/ML
7.5 INJECTION INTRAVENOUS ONCE
Status: COMPLETED | OUTPATIENT
Start: 2019-02-18 | End: 2019-02-18

## 2019-02-18 RX ADMIN — GADOBUTROL 5.5 ML: 604.72 INJECTION INTRAVENOUS at 19:07

## 2019-02-18 NOTE — TELEPHONE ENCOUNTER
Patient also calls regarding [employer] message. She states Dr. Simental is aware of her dizziness and sent her to Neurology for further evaluation. Neurology ordered CT and this is scheduled for this evening. She is concerned that symptoms are worsening and it's now a struggle for her to do any of her daily activities. She is wanting to have the CT scan read ASAP and also left message with Neurology regarding this. Patient wanted our office to be aware of this as well.     Patient denies any vomiting, severe headache or vision changes. She is advised to go to ER if she develops difficulty walking or speaking, severe headache or vision changes. Patient states she has been watching for signs of a stroke and will go to ER if she develops any of these.     Patient advised her [employer] message was routed to the covering provider-Dr. Cruz this morning. Please call her cell phone with any further recommendations.

## 2019-02-18 NOTE — TELEPHONE ENCOUNTER
[facility]    Phone Message    May a detailed message be left on voicemail: no    Reason for Call: Other: Pt says her CT is scheduled for this evening and her dizziness and worsened and interfered with her sleeping patterns. Pt wants the CT read asap so she can get some results immediately due to her symptoms.      Action Taken: Message routed to:  [facility]

## 2019-02-18 NOTE — TELEPHONE ENCOUNTER
See [employer] message. Patient also called CT requesting CT be read ASAP once complete. Will route to covering provider Dr. Cruz as an FYI.

## 2019-02-19 ENCOUNTER — MYC MEDICAL ADVICE (OUTPATIENT)
Dept: INTERNAL MEDICINE | Facility: CLINIC | Age: 49
End: 2019-02-19

## 2019-02-19 NOTE — TELEPHONE ENCOUNTER
Called and spoke to the patient. Discussed brain MRI and brain MRI/neck MRA results which are normal except chronic sinus changes and mild mastoid effusion but no other acute or chronic changes to explain patient's persistent imbalance feeling. Unfortunately no definitive answer can be provided. May try Balance PT but patient did not feel it needed. Patient reports headache every other day but she did not feel headache needed to be treated because it does not appear to be relevant to her symptoms. . Patient would like to find a cause of her symptoms. Recommended to return to our Clinic for re-assessment. Offered a second opinion with one of our neurologist but patient felt it's not needed. Patient states that she will schedule an appointment to follow up in our Clinic.   Recommended to follow up with her PCP as well. Patient appreciate the call.

## 2019-02-19 NOTE — RESULT ENCOUNTER NOTE
Dear Valencia,   Your recent brain images were reviewed and no acute findings to explain your symptoms found at this time. Please review your brain MRI and MRA results and let me know if you have any questions. Follow up as per our telephone discussion.  Sincerely, Paloma

## 2019-02-19 NOTE — DISCHARGE INSTRUCTIONS
MRI Contrast Discharge Instructions    The IV contrast you received today will pass out of your body in your  urine. This will happen in the next 24 hours. You will not feel this process.  Your urine will not change color.    Drink at least 4 extra glasses of water or juice today (unless your doctor  has restricted your fluids). This reduces the stress on your kidneys.  You may take your regular medicines.    If you are on dialysis: It is best to have dialysis today.    If you have a reaction: Most reactions happen right away. If you have  any new symptoms after leaving the hospital (such as hives or swelling),  call your hospital at the correct number below. Or call your family doctor.  If you have breathing distress or wheezing, call 911.    Special instructions: ***    I have read and understand the above information.    Signature:______________________________________ Date:___________    Staff:__________________________________________ Date:___________     Time:__________    [place] Departments:    ___[place]: [phone]  ___[facility]: [phone]  ___[facility]: [phone] ___[facility]: [phone]  ___[facility]: [phone]  ___[facility]: [phone]  ___[phone]  ___[facility]: [phone]  ___[phone]

## 2019-02-19 NOTE — DISCHARGE INSTRUCTIONS
MRI Contrast Discharge Instructions    The IV contrast you received today will pass out of your body in your  urine. This will happen in the next 24 hours. You will not feel this process.  Your urine will not change color.    Drink at least 4 extra glasses of water or juice today (unless your doctor  has restricted your fluids). This reduces the stress on your kidneys.  You may take your regular medicines.    If you are on dialysis: It is best to have dialysis today.    If you have a reaction: Most reactions happen right away. If you have  any new symptoms after leaving the hospital (such as hives or swelling),  call your hospital at the correct number below. Or call your family doctor.  If you have breathing distress or wheezing, call 911.    Special instructions: ***    I have read and understand the above information.    Signature:______________________________________ Date:___________    Staff:__________________________________________ Date:___________     Time:__________    [facility] Departments:    ___[place]: [phone]  ___[facility]: [phone]  ___[place]: [phone] ___[facility]: [phone]  ___[facility]: [phone]  ___[facility]: [phone]  ___[phone]  ___[facility]: [phone]  ___[phone]

## 2019-02-19 NOTE — TELEPHONE ENCOUNTER
Pt is calling looking for her CT scan results that was ordered through neurology.  IM RN told me to advise her to call neurology.  Pt stated she is waiting for a call but wants to have someone look at the results as she cannot continue to miss work and needs to know the next step.  I told pt I would document this and inform RN but she should talk to neurology.

## 2019-02-19 NOTE — TELEPHONE ENCOUNTER
[facility] Call Center    Phone Message    May a detailed message be left on voicemail: no    Reason for Call: Other: Pt is calling again to follow-up on getting her results. Please call her back.     Action Taken: Message routed to:  [facility] ([facility]): [facility]UROLOGY [facility]ULT [facility]

## 2019-02-26 DIAGNOSIS — I49.9 IRREGULAR HEART BEATS: ICD-10-CM

## 2019-02-26 LAB
CRP SERPL-MCNC: <2.9 MG/L (ref 0–8)
MAGNESIUM SERPL-MCNC: 2.3 MG/DL (ref 1.6–2.3)
NT-PROBNP SERPL-MCNC: 146 PG/ML (ref 0–125)
TROPONIN I SERPL-MCNC: <0.015 UG/L (ref 0–0.04)

## 2019-02-28 ENCOUNTER — OFFICE VISIT (OUTPATIENT)
Dept: CARDIOLOGY | Facility: CLINIC | Age: 49
End: 2019-02-28
Attending: NURSE PRACTITIONER
Payer: COMMERCIAL

## 2019-02-28 VITALS
HEART RATE: 71 BPM | BODY MASS INDEX: 20.45 KG/M2 | HEIGHT: 63 IN | WEIGHT: 115.4 LBS | SYSTOLIC BLOOD PRESSURE: 125 MMHG | OXYGEN SATURATION: 97 % | DIASTOLIC BLOOD PRESSURE: 86 MMHG

## 2019-02-28 DIAGNOSIS — I49.3 PVC'S (PREMATURE VENTRICULAR CONTRACTIONS): ICD-10-CM

## 2019-02-28 DIAGNOSIS — R42 DIZZINESS: Primary | ICD-10-CM

## 2019-02-28 DIAGNOSIS — R42 DIZZINESS: ICD-10-CM

## 2019-02-28 LAB
CRP SERPL-MCNC: <2.9 MG/L (ref 0–8)
ERYTHROCYTE [SEDIMENTATION RATE] IN BLOOD BY WESTERGREN METHOD: 6 MM/H (ref 0–20)

## 2019-02-28 PROCEDURE — 93005 ELECTROCARDIOGRAM TRACING: CPT | Mod: ZF

## 2019-02-28 PROCEDURE — 86140 C-REACTIVE PROTEIN: CPT | Performed by: INTERNAL MEDICINE

## 2019-02-28 PROCEDURE — 36415 COLL VENOUS BLD VENIPUNCTURE: CPT | Performed by: INTERNAL MEDICINE

## 2019-02-28 PROCEDURE — 83088 ASSAY OF HISTAMINE: CPT | Performed by: INTERNAL MEDICINE

## 2019-02-28 PROCEDURE — 86038 ANTINUCLEAR ANTIBODIES: CPT | Performed by: INTERNAL MEDICINE

## 2019-02-28 PROCEDURE — 99214 OFFICE O/P EST MOD 30 MIN: CPT | Mod: GC | Performed by: INTERNAL MEDICINE

## 2019-02-28 PROCEDURE — G0463 HOSPITAL OUTPT CLINIC VISIT: HCPCS | Mod: 25,ZF

## 2019-02-28 PROCEDURE — 84150 ASSAY OF PROSTAGLANDIN: CPT | Performed by: INTERNAL MEDICINE

## 2019-02-28 PROCEDURE — 82542 COL CHROMOTOGRAPHY QUAL/QUAN: CPT | Performed by: INTERNAL MEDICINE

## 2019-02-28 PROCEDURE — 85652 RBC SED RATE AUTOMATED: CPT | Performed by: INTERNAL MEDICINE

## 2019-02-28 ASSESSMENT — PAIN SCALES - GENERAL: PAINLEVEL: NO PAIN (0)

## 2019-02-28 ASSESSMENT — MIFFLIN-ST. JEOR: SCORE: 1128.61

## 2019-02-28 NOTE — NURSING NOTE
Chief Complaint   Patient presents with     New Patient     reason for visit:Mehul, unsure of reason for visit. Has followed at [facility]. Last visit 4/3/18- Sajan     Vitals were taken and medications were reconciled. EKG was performed.    ALEXA Jain  2:34 PM

## 2019-02-28 NOTE — PATIENT INSTRUCTIONS
You will be scheduled for a follow up visit: as needed    Testing Scheduled: labs today. We will communicate results via my chart    We encourage you to use My Chart as your primary form of communication if possible. If you need assistance in setting this up, please contact our office or ask at your follow up visit.     If you need a medication refill please contact your pharmacy. Please allow at least 3 business days for your refill to be completed.       Cardiology  Telephone Number    Benita Webb -[phone]   Or send a message to your provider via my chart.   For scheduling procedures:    [facility]    Clinic appointments       [phone]   For the Device Clinic (Pacemakers and ICD's)   RN's :   Mily Crawford  During business hours: [phone]    After business hours:   [phone]- select option 4 and ask for job code [id number].          As always, Thank you for trusting us with your health care needs!

## 2019-02-28 NOTE — LETTER
2/28/2019      RE: Valencia Ye  [address] [facility] [address]       Dear Colleague,    Thank you for the opportunity to participate in the care of your patient, Valencia Ye, at the [facility] HEART [facility]. Please see a copy of my visit note below.              Cardiology Consultation  02/28/2019    HPI:  Ms. 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     She was seen by Dr. Webb last year for PVCs.  She had undergone collapse with significant cardiac testing.  She had a cardiac MRI in 08/2017 which showed normal LVEF of 55% with late gadolinium enhancement with diffuse subendocardial hyperenhancement of the mid and apical segments, scar burden was 14%.  She followed up with a cardiac PET scan on 01/2018 which showed normal LV cavity with diffusely decreased perfusion throughout all vascular territories, most pronounced at the apex.  A coronary CT angiogram in 02/2018 showed a calcium score of 0 and normal coronary arteries.  She then had a Holter monitor in March 2018 that showed PVC burden of less than 1% with at least 2 different morphologies, no runs of VT.  She had previously been on atenolol, however about a year ago was switched to low dose metoprolol and has noticed less palpitations with this change.    For about the last 9 months she has noticed progressively worsening feeling of disequilibrium.  Valencia states she will sporadically feel unsteady, as though she is on a boat.  She states symptoms can come on rather suddenly and without warning.  She cannot identify a precipitating factor for the symptoms.  They  can occur standing sitting or lying and is not relieved with changes in position.  They continue occur at night and wake her up from sleep with a feeling of lightheadedness and room spinning.  She states that the symptoms progressively worsened over the last 9months.  She has been seen by multiple providers for this.  She is seeing her primary care doctor, her rheumatologist, as well as extensive workup by ENT and most recently seen by neurology and had a MRI/MRA of the head and neck, which was normal.  Thus far there is no explanation for her seemingly vertiginous symptoms.    She endorses palpitations from time to time, however these are not associated with her dizziness.  She denies chest pain, shortness of breath, nausea/vomiting.  She does have headaches from time to time.  Previously her vertiginous symptoms were associated with headaches, however this is no longer consistent associated.  She has occasionally some mild bilateral lower extremity edema.    Her family history is significant for considerable autoimmune diseases.  She also has a grandmother who had MS, which was first diagnosed after experiencing unsteadiness with walking and disequilibrium.  Her father has had considerable cardiac disease including multiple MIs starting in his 50s.  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  She denies a smoking history.  She endorses occasional alcohol use but denies abuse.  She denies recreational drug use.    ROS:  A complete 10-point ROS was negative except as above.    PAST MEDICAL HISTORY:  Past Medical History:   Diagnosis Date     Anxiety      Aortitis (H)      Asthma     associated with Churg Antonina syndrome     Bronchiectasis (H)      Cerebral infarction (H) August 1990    Very small, caused small permanent optical migraine     Chronic sinusitis      Churg-Antonina syndrome (H)     dx 1990     Conductive hearing loss      Depressive  disorder      Eustachian tube dysfunction      Goiter      Hearing loss, conductive      Heart rate problem      Hypertension      Hypocalcemia      Immunosuppression (H)      Irritable bowel syndrome      Major depressive disorder      Mannose-binding lectin deficiency      Nonspecific abnormal results of thyroid function study      Osteoporosis      Pericarditis      and      Pneumonia     4/23/10     Recurrent otitis media      Recurrent UTI      Rheumatoid vasculitis (H)      Sinusitis, chronic      Steroid long-term use      Tinnitus      Varicose veins of leg with swelling        PAST SURGICAL HISTORY:  Past Surgical History:   Procedure Laterality Date      SECTION       COLONOSCOPY Left 2014    Procedure: COMBINED COLONOSCOPY, SINGLE OR MULTIPLE BIOPSY/POLYPECTOMY BY BIOPSY;  Surgeon: Js Pinedo MD;  Location:  GI     ENT SURGERY       GENITOURINARY SURGERY       HC COLP CERVIX/UPPER VAGINA W LOOP ELEC BX CERVIX       HEAD & NECK SURGERY       HYSTERECTOMY  2009    without oopherectomy     HYSTERECTOMY, PAP NO LONGER INDICATED      cervix removed      PE TUBES       PICC INSERTION  10/10/2013    5fr DL PASV PICC, 43cm (1cm external) in the L basilic vein w/ tip in the low SVC.     PICC INSERTION Left 2017    5fr DL BioFlo PICC, 42cm (3cm external) in the L lateral brachial vein w/ tip in the SVC RA junction.     SINUS SURGERY       TUBAL LIGATION       TYMPANOPLASTY         FAMILY HISTORY:  Family History   Problem Relation Age of Onset     Allergies Mother         Seasonal     Alcohol/Drug Mother      Substance Abuse Mother      Depression Mother      C.A.D. Father      Diabetes Father         Type 2     Depression Father      Heart Disease Father      C.A.D. Maternal Grandmother      Arthritis Maternal Grandmother      Musculoskeletal Disorder Maternal Grandmother         MS     Heart Disease Maternal Grandmother      Hypertension Maternal  Grandmother      Alcohol/Drug Maternal Grandfather      Substance Abuse Maternal Grandfather      Depression Maternal Grandfather      Asthma Son      Asthma Daughter      Allergies Daughter         Seasonal     Unknown/Adopted Paternal Grandmother      Unknown/Adopted Paternal Grandfather      Cancer Maternal Aunt         breast age 53     Breast Cancer Maternal Aunt         in her 50s     Glaucoma No family hx of      Macular Degeneration No family hx of        SOCIAL HISTORY:  Social History     Socioeconomic History     Marital status: Single     Spouse name: None     Number of children: None     Years of education: None     Highest education level: None   Occupational History     None   Social Needs     Financial resource strain: None     Food insecurity:     Worry: None     Inability: None     Transportation needs:     Medical: None     Non-medical: None   Tobacco Use     Smoking status: Never Smoker     Smokeless tobacco: Never Used   Substance and Sexual Activity     Alcohol use: Yes     Alcohol/week: 0.0 oz     Comment: occ on the weekends     Drug use: No     Sexual activity: Yes     Partners: Male     Birth control/protection: Male Surgical, Female Surgical   Lifestyle     Physical activity:     Days per week: None     Minutes per session: None     Stress: None   Relationships     Social connections:     Talks on phone: None     Gets together: None     Attends [employer] service: None     Active member of club or organization: None     Attends meetings of clubs or organizations: None     Relationship status: None     Intimate partner violence:     Fear of current or ex partner: None     Emotionally abused: None     Physically abused: None     Forced sexual activity: None   Other Topics Concern     Parent/sibling w/ CABG, MI or angioplasty before 65F 55M? Not Asked      Service No     Blood Transfusions No     Caffeine Concern No     Occupational Exposure No     Hobby Hazards No     Sleep Concern No      Stress Concern Yes     Weight Concern No     Special Diet Yes     Comment: IBS diet     Back Care No     Exercise Not Asked     Comment: 1 mile power walk 5 days a week at least.      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  Non smoker. No smokers at home.  Enjoys walking and dancing.  Alcohol--1 to 2 drinks daily.  Few times a year has more than 4 drinks in a day.  No illicits.                       MEDICATIONS:  Current Outpatient Medications   Medication     acetylcysteine (MUCOMYST) 20 % nebulizer solution     albuterol (2.5 MG/3ML) 0.083% neb solution     atorvastatin (LIPITOR) 10 MG tablet     budesonide-formoterol (SYMBICORT) 160-4.5 MCG/ACT Inhaler     cholecalciferol 5000 UNITS CAPS     fluticasone (FLONASE) 50 MCG/ACT spray     glucosamine-chondroitin (GLUCOSAMINE CHONDR COMPLEX) 500-400 MG CAPS     Ipratropium-Albuterol (COMBIVENT RESPIMAT)  MCG/ACT inhaler     Lubiprostone 8 MCG CAPS capsule     metoprolol succinate (TOPROL-XL) 25 MG 24 hr tablet     neomycin-polymyxin-hydrocortisone (CORTISPORIN) 3.5-87698-9 otic solution     Omega-3 Fatty Acids (FISH OIL) 1200 MG capsule     predniSONE (DELTASONE) 1 MG tablet     Probiotic Product (PROBIOTIC ADVANCED PO)     sertraline (ZOLOFT) 25 MG tablet     vitamin B complex with vitamin C (VITAMIN  B COMPLEX) TABS tablet     ciprofloxacin-dexamethasone (CIPRODEX) otic suspension     clonazePAM (KLONOPIN) 0.5 MG tablet     No current facility-administered medications for this visit.        ALLERGIES:     Allergies   Allergen Reactions     Colistimethate Anaphylaxis     Colymycin M [Colistimethate Sodium] Anaphylaxis     Tamiflu [Oseltamivir]      Gums Blister up     Clindamycin Rash     Zithromax [Azithromycin]      Heart palpitation       PHYSICAL EXAM:  /86 (BP Location: Left arm, Patient Position: Chair,  "Cuff Size: Adult Regular)   Pulse 71   Ht 1.594 m (5' 2.75\")   Wt 52.3 kg (115 lb 6.4 oz)   LMP 02/01/2009   SpO2 97%   BMI 20.61 kg/m     Gen: alert, interactive, NAD  HEENT: atraumatic, EOMI, MMM  Neck: supple, no JVD  CV: RRR, no m/r/g  Chest: CTAB, no wheezes or crackles  Abd: soft, NT, ND, +BS  Ext: trace b/l LE edema, 2+ peripheral pulses  Skin: warm and dry, no rashes on exposed surfaces  Neuro: alert and oriented, no focal deficits    LABS:    CMP  Last Comprehensive Metabolic Panel:  Sodium   Date Value Ref Range Status   07/05/2018 140 133 - 144 mmol/L Final     Potassium   Date Value Ref Range Status   12/04/2018 4.0 3.5 - 5.2 mmol/L Final     Chloride   Date Value Ref Range Status   07/05/2018 106 94 - 109 mmol/L Final     Carbon Dioxide   Date Value Ref Range Status   07/05/2018 23 20 - 32 mmol/L Final     Anion Gap   Date Value Ref Range Status   07/05/2018 11 3 - 14 mmol/L Final     Glucose   Date Value Ref Range Status   12/04/2018 83 65 - 99 mg/dL Final     Urea Nitrogen   Date Value Ref Range Status   07/05/2018 19 7 - 30 mg/dL Final     Creatinine   Date Value Ref Range Status   12/04/2018 0.65 0.57 - 1.00 mg/dL Final     GFR Estimate   Date Value Ref Range Status   12/04/2018 107 >59 ml/min/1.73m2 Final     Calcium   Date Value Ref Range Status   07/05/2018 9.2 8.5 - 10.1 mg/dL Final     Bilirubin Total   Date Value Ref Range Status   03/10/2017 0.4 0.2 - 1.3 mg/dL Final     Alkaline Phosphatase   Date Value Ref Range Status   03/10/2017 48 40 - 150 U/L Final     ALT   Date Value Ref Range Status   06/09/2017 47 0 - 50 U/L Final     AST   Date Value Ref Range Status   03/10/2017 17 0 - 45 U/L Final     Comment:     Specimen is hemolyzed which can falsely elevate AST. Analysis of a   non-hemolyzed   specimen may result in a lower value.         CBC  CBC RESULTS:   Recent Labs   Lab Test 02/19/18  1626   WBC 6.1   RBC 3.96   HGB 13.0   HCT 39.3   MCV 99   MCH 32.8   MCHC 33.1   RDW 13.1   PLT " 283       TROP  Lab Results   Component Value Date    TROPI <0.015 02/26/2019    TROPI <0.015 02/19/2018    TROPI  08/11/2017     <0.015  The 99th percentile for upper reference range is 0.045 ug/L.  Troponin values in   the range of 0.045 - 0.120 ug/L may be associated with risks of adverse   clinical events.         LIPIDS  Recent Labs   Lab Test 08/22/18  0818 02/22/18  0721  01/11/12  0752   CHOL 131 173   < > 140   HDL 73 78   < > 66   LDL 48 84   < > 63   TRIG 49 55   < > 57   CHOLHDLRATIO  --   --   --  2.1    < > = values in this interval not displayed.       TSH  TSH   Date Value Ref Range Status   07/05/2018 0.56 0.40 - 4.00 mU/L Final       HBA1C  No results found for: A1C    CARDIAC DATA:  MR Cardiac w/ Contrast 8/1/2017:  1. The LV is normal in cavity size and wall thickness. The global systolic function is normal. The LVEF is  55%. There is thickening with hypokinesis of the apical segment.      2. The RV is normal in cavity size. The global systolic function is normal. The RVEF is 57%.      3. Both atria are normal in size.     4. There is mild mitral and tricuspid regurgitation. There is mild thickening of mitral valve leaflets.      5. Late gadolinium enhancement imaging shows diffuse subendocardial hyperenhancement of the mid and apical  segments, including the papillary muscles.      6. There is no intracardiac thrombus.     CONCLUSIONS: Normal LV function; LVEF of 55%. 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    PET Cardiac Viability 1/23/18  Findings: Rest Ammonia     1. Overall quality of the study: Diagnostic  2. Left ventricular cavity is normal on the rest ammonia study.   3. PET perfusion images demonstrate diffusely decreased perfusion  throughout all vascular territories, most pronounced at the level of  the apex.     Anterior wall          Base  0.66 Mid 0.76 Distal  0.62.  Anteroseptum        Base  0.57, Mid 0.63.  Inferoseptum          Base  0.48, Mid 0.60.  Septum                  Distal 0.51.  Anterolateral wall   Base 0.60, Mid 0.75  Inferolateral wall    Base 0.59, Mid 0.66.  Lateral wall            Distal  0.65.  Inferior wall            Base  0.55, Mid 0.62, Distal 0.51.  [place] 0.40     4. At Rest the gated PET imaging demonstrates that the left  ventricular cavity is normal in size. Left ventricular ejection  fraction is at 51 %, lower limits of normal.  Left ventricular  end-diastolic volume is 87 mL. End-systolic volume is 43 cc. There is  diffuse hypokinesia with dyskinesia of the inferoapical wall.     Myocardial Blood Flow   Global   Rest 0.58.    LAD      Rest 0.56.  LCX      Rest 0.65  RCA      Rest 0.56  Normal Values: (Myocardial Blood Flow) MBF (QMP (ml/g/min) >= 0.8 at  rest..      Findings on FDG inflammation study:      FDG heart:   The heart was analyzed in traditional three-view analysis including  the short axis view, vertical long axis view, and the horizontal long  axis views. There is no abnormal FDG uptake to suggest inflammatory  cardiac myopathy.      FDG body:   There is no abnormal FDG uptake in the remainder of the body.  Postsurgical changes of right mastoidectomy. Pansinus mucosal  thickening and postsurgical changes of right maxillary antrostomy.                                                                      Impression:   1. Cardiac: No abnormal FDG uptake to suggest active inflammatory  myopathy.  2. Diffusely decreased perfusion in all vascular territories, most  pronounced at the apex. This suggests 3 vessel ischemic disease.   Ejection fraction is at the lower limits of normal at 51 %. Diffuse  hypokinesia and dyskinesia of the inferoapical wall.  3. No abnormal FDG uptake in the rest of the body.     CTA Angiogram Coronary Artery 2/22/18  FINDINGS:     CORONARY CALCIUM SCORE: The total Agatston calcium score is 0, Left  main: 0,  left anterior descendin,  circumflex: 0, right coronary  artery: 0.      CORONARY ANGIOGRAPHY : Normal coronary arteries.     CORONARY ANGIOGRAPHY     DOMINANCE: Right dominant system.      LEFT MAIN:      The left main arises from the left coronary cusp.      The left main is widely patent without any stenosis or plaque.         LEFT ANTERIOR DESCENDING ARTERY:      The left anterior descending artery and its branches are widely patent  without any stenosis or plaque.        LEFT CIRCUMFLEX ARTERY:      The circumflex artery and its branches are widely patent without any  stenosis or plaque.        RIGHT CORONARY ARTERY:     The proximal right coronary artery is widely patent without any  stenosis or plaque. In one phase, the mid right coronary artery appear  to have possibly a trivial degree of mixed plaque. However on viewing  this area in multiple phases it is probably normal. The remainder of  the right coronary artery and the distal branches are widely patent  without any stenosis or plaque.     ADDITIONAL FINDINGS:      The proximal ascending aorta is normal in size. The aortic valve is  trileaflet.     Normal pulmonary venous anatomy with all four pulmonary veins draining  into the left atrium.       There is no left ventricular mass or thrombus.      Normal pericardial thickness. There is no pericardial effusion.     The proximal pulmonary arteries are well opacified.     Please review Radiology report for incidental noncardiac findings that  will follow separately.    48 Hour Holter Monitor 3/9/18      EKG today: Sinus rhythm. Normal axis. No ectopy      ASSESSMENT/PLAN:  Ms. Valencia Ye is a very bright well-spoken woman who presents to clinic today for evaluation of dizziness/dysequilibrium and PVCs on recent EKG with neurology.      Her symptoms of disequilibrium are quite a mystery.  It certainly sounds as though they are vestibular or neurologic, however workup to this point is been  unrevealing. Her dizziness does not seem to be postural and unlikely to be autonomic dysregulation, as this can occur with any position and even wakes her up from sleep at times.     She has a history  suggestive of eosinophilic heart disease with evidence of diffuse scar tissue, however no history of impaired cardiac function or significant arrhythmia.  She was seen by EP last year for PVCs, however only had less than 1% PVC burden which was relatively well controlled with metoprolol.  She does endorse palpitations, however her palpitations do not seem to correlate with her symptoms of disequilibrium.        There is a family historty of MS, but neuro eval including imaging does not suggest this in her case    She does have a history of autoimmune disease including eosinophilia, so we will order testing for mast cell activation syndrome, however I am not particularly suspicious for this to yield an etiology of her symptoms.  I recommend continued workup with her multiple other specialists to help further elucidate a cause of her symptoms.    As for her PVCs, she has a rather low burden of PVCs and do not appear to be particularly symptomatic.  These are benign rhythms and we do not treat unless they are particularly burdensome or appear to be impairing cardiac function.  She should continue low-dose metoprolol.    1. Dizziness/Dysequilibrium  2. PVCs  3. HTN  4. Eosinophilic heart disease...but no recent elevation of Eosinophilia. Does have MR LGE but no e\vidence of ongoing inflammation    - Will order JOSE,CRP, ESR, serum histamine; Urine: Catecholamines, Histamine,N-methylhistamine, Prostaglandin D2  - Cont Metoprolol, PVCs are a benign rhythm that do not require aggressive treatment  - We will call her for abnormal lab results  - No follow up planned, but will be happy to see her back in clinic should a new problem emerge    Pt seen and discussed with Dr. Carver.    Julián Blas MD  Cardiology Fellow,  PGY-4  [phone]    I very much appreciated the opportunity to see and assess Valencia Ye in the clinic with CV Fellow Dr Blas. Please do not hesitate to contact my office if you have any questions or concerns.      Aime Carver MD  Cardiac Arrhythmia Service  [facility]  [phone]

## 2019-02-28 NOTE — PROGRESS NOTES
Cardiology Consultation  02/28/2019    HPI:  Ms. 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     She was seen by Dr. Webb last year for PVCs.  She had undergone collapse with significant cardiac testing.  She had a cardiac MRI in 08/2017 which showed normal LVEF of 55% with late gadolinium enhancement with diffuse subendocardial hyperenhancement of the mid and apical segments, scar burden was 14%.  She followed up with a cardiac PET scan on 01/2018 which showed normal LV cavity with diffusely decreased perfusion throughout all vascular territories, most pronounced at the apex.  A coronary CT angiogram in 02/2018 showed a calcium score of 0 and normal coronary arteries.  She then had a Holter monitor in March 2018 that showed PVC burden of less than 1% with at least 2 different morphologies, no runs of VT.  She had previously been on atenolol, however about a year ago was switched to low dose metoprolol and has noticed less palpitations with this change.    For about the last 9 months she has noticed progressively worsening feeling of disequilibrium.  Valencia states she will sporadically feel unsteady, as though she is on a boat.  She states symptoms can come on rather suddenly and without warning.  She cannot identify a precipitating factor for the symptoms.  They can occur standing sitting or lying and is not relieved with changes in position.  They continue occur at night and wake her up from sleep with a feeling of lightheadedness and room spinning.  She states that the symptoms progressively worsened over the last 9months.  She has been seen by multiple providers for this.   She is seeing her primary care doctor, her rheumatologist, as well as extensive workup by ENT and most recently seen by neurology and had a MRI/MRA of the head and neck, which was normal.  Thus far there is no explanation for her seemingly vertiginous symptoms.    She endorses palpitations from time to time, however these are not associated with her dizziness.  She denies chest pain, shortness of breath, nausea/vomiting.  She does have headaches from time to time.  Previously her vertiginous symptoms were associated with headaches, however this is no longer consistent associated.  She has occasionally some mild bilateral lower extremity edema.    Her family history is significant for considerable autoimmune diseases.  She also has a grandmother who had MS, which was first diagnosed after experiencing unsteadiness with walking and disequilibrium.  Her father has had considerable cardiac disease including multiple MIs starting in his 50s.  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  She denies a smoking history.  She endorses occasional alcohol use but denies abuse.  She denies recreational drug use.      ROS:  A complete 10-point ROS was negative except as above.    PAST MEDICAL HISTORY:  Past Medical History:   Diagnosis Date     Anxiety      Aortitis (H)      Asthma     associated with Churg Antonina syndrome     Bronchiectasis (H)      Cerebral infarction (H) August 1990    Very small, caused small permanent optical migraine     Chronic sinusitis      Churg-Antonina syndrome (H)     dx 1990     Conductive hearing loss      Depressive disorder      Eustachian tube dysfunction      Goiter      Hearing loss, conductive      Heart rate problem      Hypertension      Hypocalcemia      Immunosuppression (H)      Irritable bowel syndrome      Major depressive disorder      Mannose-binding lectin deficiency 2014     Nonspecific abnormal results of thyroid  function study      Osteoporosis      Pericarditis      and      Pneumonia     4/23/10     Recurrent otitis media      Recurrent UTI      Rheumatoid vasculitis (H)      Sinusitis, chronic      Steroid long-term use      Tinnitus      Varicose veins of leg with swelling        PAST SURGICAL HISTORY:  Past Surgical History:   Procedure Laterality Date      SECTION       COLONOSCOPY Left 2014    Procedure: COMBINED COLONOSCOPY, SINGLE OR MULTIPLE BIOPSY/POLYPECTOMY BY BIOPSY;  Surgeon: Js Pinedo MD;  Location:  GI     ENT SURGERY       GENITOURINARY SURGERY       HC COLP CERVIX/UPPER VAGINA W LOOP ELEC BX CERVIX       HEAD & NECK SURGERY       HYSTERECTOMY  2009    without oopherectomy     HYSTERECTOMY, PAP NO LONGER INDICATED      cervix removed      PE TUBES       PICC INSERTION  10/10/2013    5fr DL PASV PICC, 43cm (1cm external) in the L basilic vein w/ tip in the low SVC.     PICC INSERTION Left 2017    5fr DL BioFlo PICC, 42cm (3cm external) in the L lateral brachial vein w/ tip in the SVC RA junction.     SINUS SURGERY       TUBAL LIGATION       TYMPANOPLASTY         FAMILY HISTORY:  Family History   Problem Relation Age of Onset     Allergies Mother         Seasonal     Alcohol/Drug Mother      Substance Abuse Mother      Depression Mother      C.A.D. Father      Diabetes Father         Type 2     Depression Father      Heart Disease Father      C.A.D. Maternal Grandmother      Arthritis Maternal Grandmother      Musculoskeletal Disorder Maternal Grandmother         MS     Heart Disease Maternal Grandmother      Hypertension Maternal Grandmother      Alcohol/Drug Maternal Grandfather      Substance Abuse Maternal Grandfather      Depression Maternal Grandfather      Asthma Son      Asthma Daughter      Allergies Daughter         Seasonal     Unknown/Adopted Paternal Grandmother      Unknown/Adopted Paternal Grandfather      Cancer Maternal Aunt          breast age 53     Breast Cancer Maternal Aunt         in her 50s     Glaucoma No family hx of      Macular Degeneration No family hx of        SOCIAL HISTORY:  Social History     Socioeconomic History     Marital status: Single     Spouse name: None     Number of children: None     Years of education: None     Highest education level: None   Occupational History     None   Social Needs     Financial resource strain: None     Food insecurity:     Worry: None     Inability: None     Transportation needs:     Medical: None     Non-medical: None   Tobacco Use     Smoking status: Never Smoker     Smokeless tobacco: Never Used   Substance and Sexual Activity     Alcohol use: Yes     Alcohol/week: 0.0 oz     Comment: occ on the weekends     Drug use: No     Sexual activity: Yes     Partners: Male     Birth control/protection: Male Surgical, Female Surgical   Lifestyle     Physical activity:     Days per week: None     Minutes per session: None     Stress: None   Relationships     Social connections:     Talks on phone: None     Gets together: None     Attends Muslim service: None     Active member of club or organization: None     Attends meetings of clubs or organizations: None     Relationship status: None     Intimate partner violence:     Fear of current or ex partner: None     Emotionally abused: None     Physically abused: None     Forced sexual activity: None   Other Topics Concern     Parent/sibling w/ CABG, MI or angioplasty before 65F 55M? Not Asked      Service No     Blood Transfusions No     Caffeine Concern No     Occupational Exposure No     Hobby Hazards No     Sleep Concern No     Stress Concern Yes     Weight Concern No     Special Diet Yes     Comment: IBS diet     Back Care No     Exercise Not Asked     Comment: 1 mile power walk 5 days a week at least.      Bike Helmet Not Asked     Seat Belt Yes     Self-Exams Yes   Social History Narrative     in Radiology Department.  .   "Has partner.  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  Non smoker. No smokers at home.  Enjoys walking and dancing.  Alcohol--1 to 2 drinks daily.  Few times a year has more than 4 drinks in a day.  No illicits.                       MEDICATIONS:  Current Outpatient Medications   Medication     acetylcysteine (MUCOMYST) 20 % nebulizer solution     albuterol (2.5 MG/3ML) 0.083% neb solution     atorvastatin (LIPITOR) 10 MG tablet     budesonide-formoterol (SYMBICORT) 160-4.5 MCG/ACT Inhaler     cholecalciferol 5000 UNITS CAPS     fluticasone (FLONASE) 50 MCG/ACT spray     glucosamine-chondroitin (GLUCOSAMINE CHONDR COMPLEX) 500-400 MG CAPS     Ipratropium-Albuterol (COMBIVENT RESPIMAT)  MCG/ACT inhaler     Lubiprostone 8 MCG CAPS capsule     metoprolol succinate (TOPROL-XL) 25 MG 24 hr tablet     neomycin-polymyxin-hydrocortisone (CORTISPORIN) 3.5-76906-8 otic solution     Omega-3 Fatty Acids (FISH OIL) 1200 MG capsule     predniSONE (DELTASONE) 1 MG tablet     Probiotic Product (PROBIOTIC ADVANCED PO)     sertraline (ZOLOFT) 25 MG tablet     vitamin B complex with vitamin C (VITAMIN  B COMPLEX) TABS tablet     ciprofloxacin-dexamethasone (CIPRODEX) otic suspension     clonazePAM (KLONOPIN) 0.5 MG tablet     No current facility-administered medications for this visit.        ALLERGIES:     Allergies   Allergen Reactions     Colistimethate Anaphylaxis     Colymycin M [Colistimethate Sodium] Anaphylaxis     Tamiflu [Oseltamivir]      Gums Blister up     Clindamycin Rash     Zithromax [Azithromycin]      Heart palpitation       PHYSICAL EXAM:  /86 (BP Location: Left arm, Patient Position: Chair, Cuff Size: Adult Regular)   Pulse 71   Ht 1.594 m (5' 2.75\")   Wt 52.3 kg (115 lb 6.4 oz)   LMP 02/01/2009   SpO2 97%   BMI 20.61 kg/m    Gen: alert, interactive, NAD  HEENT: atraumatic, EOMI, MMM  Neck: supple, no JVD  CV: RRR, no m/r/g  Chest: CTAB, no wheezes or crackles  Abd: soft, NT, ND, +BS  Ext: trace b/l LE " edema, 2+ peripheral pulses  Skin: warm and dry, no rashes on exposed surfaces  Neuro: alert and oriented, no focal deficits    LABS:    CMP  Last Comprehensive Metabolic Panel:  Sodium   Date Value Ref Range Status   07/05/2018 140 133 - 144 mmol/L Final     Potassium   Date Value Ref Range Status   12/04/2018 4.0 3.5 - 5.2 mmol/L Final     Chloride   Date Value Ref Range Status   07/05/2018 106 94 - 109 mmol/L Final     Carbon Dioxide   Date Value Ref Range Status   07/05/2018 23 20 - 32 mmol/L Final     Anion Gap   Date Value Ref Range Status   07/05/2018 11 3 - 14 mmol/L Final     Glucose   Date Value Ref Range Status   12/04/2018 83 65 - 99 mg/dL Final     Urea Nitrogen   Date Value Ref Range Status   07/05/2018 19 7 - 30 mg/dL Final     Creatinine   Date Value Ref Range Status   12/04/2018 0.65 0.57 - 1.00 mg/dL Final     GFR Estimate   Date Value Ref Range Status   12/04/2018 107 >59 ml/min/1.73m2 Final     Calcium   Date Value Ref Range Status   07/05/2018 9.2 8.5 - 10.1 mg/dL Final     Bilirubin Total   Date Value Ref Range Status   03/10/2017 0.4 0.2 - 1.3 mg/dL Final     Alkaline Phosphatase   Date Value Ref Range Status   03/10/2017 48 40 - 150 U/L Final     ALT   Date Value Ref Range Status   06/09/2017 47 0 - 50 U/L Final     AST   Date Value Ref Range Status   03/10/2017 17 0 - 45 U/L Final     Comment:     Specimen is hemolyzed which can falsely elevate AST. Analysis of a   non-hemolyzed   specimen may result in a lower value.         CBC  CBC RESULTS:   Recent Labs   Lab Test 02/19/18  1626   WBC 6.1   RBC 3.96   HGB 13.0   HCT 39.3   MCV 99   MCH 32.8   MCHC 33.1   RDW 13.1          TROP  Lab Results   Component Value Date    TROPI <0.015 02/26/2019    TROPI <0.015 02/19/2018    TROPI  08/11/2017     <0.015  The 99th percentile for upper reference range is 0.045 ug/L.  Troponin values in   the range of 0.045 - 0.120 ug/L may be associated with risks of adverse   clinical events.          LIPIDS  Recent Labs   Lab Test 08/22/18  0818 02/22/18  0721  01/11/12  0752   CHOL 131 173   < > 140   HDL 73 78   < > 66   LDL 48 84   < > 63   TRIG 49 55   < > 57   CHOLHDLRATIO  --   --   --  2.1    < > = values in this interval not displayed.       TSH  TSH   Date Value Ref Range Status   07/05/2018 0.56 0.40 - 4.00 mU/L Final       HBA1C  No results found for: A1C    CARDIAC DATA:  MR Cardiac w/ Contrast 8/1/2017:  1. The LV is normal in cavity size and wall thickness. The global systolic function is normal. The LVEF is  55%. There is thickening with hypokinesis of the apical segment.      2. The RV is normal in cavity size. The global systolic function is normal. The RVEF is 57%.      3. Both atria are normal in size.     4. There is mild mitral and tricuspid regurgitation. There is mild thickening of mitral valve leaflets.      5. Late gadolinium enhancement imaging shows diffuse subendocardial hyperenhancement of the mid and apical  segments, including the papillary muscles.      6. There is no intracardiac thrombus.     CONCLUSIONS: Normal LV function; LVEF of 55%. 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    PET Cardiac Viability 1/23/18  Findings: Rest Ammonia     1. Overall quality of the study: Diagnostic  2. Left ventricular cavity is normal on the rest ammonia study.   3. PET perfusion images demonstrate diffusely decreased perfusion  throughout all vascular territories, most pronounced at the level of  the apex.     Anterior wall          Base  0.66 Mid 0.76 Distal 0.62.  Anteroseptum        Base  0.57, Mid 0.63.  Inferoseptum          Base  0.48, Mid 0.60.  Septum                  Distal 0.51.  Anterolateral wall   Base 0.60, Mid 0.75  Inferolateral wall    Base 0.59, Mid 0.66.  Lateral wall            Distal  0.65.  Inferior wall            Base  0.55, Mid 0.62, Distal 0.51.  [place]  0.40     4. At Rest the gated PET imaging demonstrates that the left  ventricular cavity is normal in size. Left ventricular ejection  fraction is at 51 %, lower limits of normal.  Left ventricular  end-diastolic volume is 87 mL. End-systolic volume is 43 cc. There is  diffuse hypokinesia with dyskinesia of the inferoapical wall.     Myocardial Blood Flow   Global   Rest 0.58.    LAD      Rest 0.56.  LCX      Rest 0.65  RCA      Rest 0.56  Normal Values: (Myocardial Blood Flow) MBF (QMP (ml/g/min) >= 0.8 at  rest..      Findings on FDG inflammation study:      FDG heart:   The heart was analyzed in traditional three-view analysis including  the short axis view, vertical long axis view, and the horizontal long  axis views. There is no abnormal FDG uptake to suggest inflammatory  cardiac myopathy.      FDG body:   There is no abnormal FDG uptake in the remainder of the body.  Postsurgical changes of right mastoidectomy. Pansinus mucosal  thickening and postsurgical changes of right maxillary antrostomy.                                                                      Impression:   1. Cardiac: No abnormal FDG uptake to suggest active inflammatory  myopathy.  2. Diffusely decreased perfusion in all vascular territories, most  pronounced at the apex. This suggests 3 vessel ischemic disease.   Ejection fraction is at the lower limits of normal at 51 %. Diffuse  hypokinesia and dyskinesia of the inferoapical wall.  3. No abnormal FDG uptake in the rest of the body.     CTA Angiogram Coronary Artery 18  FINDINGS:     CORONARY CALCIUM SCORE: The total Agatston calcium score is 0, Left  main: 0, left anterior descendin,  circumflex: 0, right coronary  artery: 0.      CORONARY ANGIOGRAPHY : Normal coronary arteries.     CORONARY ANGIOGRAPHY     DOMINANCE: Right dominant system.      LEFT MAIN:      The left main arises from the left coronary cusp.      The left main is widely patent without any stenosis or plaque.          LEFT ANTERIOR DESCENDING ARTERY:      The left anterior descending artery and its branches are widely patent  without any stenosis or plaque.        LEFT CIRCUMFLEX ARTERY:      The circumflex artery and its branches are widely patent without any  stenosis or plaque.        RIGHT CORONARY ARTERY:     The proximal right coronary artery is widely patent without any  stenosis or plaque. In one phase, the mid right coronary artery appear  to have possibly a trivial degree of mixed plaque. However on viewing  this area in multiple phases it is probably normal. The remainder of  the right coronary artery and the distal branches are widely patent  without any stenosis or plaque.     ADDITIONAL FINDINGS:      The proximal ascending aorta is normal in size. The aortic valve is  trileaflet.     Normal pulmonary venous anatomy with all four pulmonary veins draining  into the left atrium.       There is no left ventricular mass or thrombus.      Normal pericardial thickness. There is no pericardial effusion.     The proximal pulmonary arteries are well opacified.     Please review Radiology report for incidental noncardiac findings that  will follow separately.    48 Hour Holter Monitor 3/9/18      EKG today: Sinus rhythm. Normal axis. No ectopy      ASSESSMENT/PLAN:  Ms. Valencia Ye is a very bright well-spoken woman who presents to clinic today for evaluation of dizziness/dysequilibrium and PVCs on recent EKG with neurology.      Her symptoms of disequilibrium are quite a mystery.  It certainly sounds as though they are vestibular or neurologic, however workup to this point is been unrevealing. Her dizziness does not seem to be postural and unlikely to be autonomic dysregulation, as this can occur with any position and even wakes her up from sleep at times.     She has a history  suggestive of eosinophilic heart disease with evidence of diffuse scar tissue, however no history of impaired cardiac function or  significant arrhythmia.  She was seen by EP last year for PVCs, however only had less than 1% PVC burden which was relatively well controlled with metoprolol.  She does endorse palpitations, however her palpitations do not seem to correlate with her symptoms of disequilibrium.        There is a family historty of MS, but neuro eval including imaging does not suggest this in her case    She does have a history of autoimmune disease including eosinophilia, so we will order testing for mast cell activation syndrome, however I am not particularly suspicious for this to yield an etiology of her symptoms.  I recommend continued workup with her multiple other specialists to help further elucidate a cause of her symptoms.    As for her PVCs, she has a rather low burden of PVCs and do not appear to be particularly symptomatic.  These are benign rhythms and we do not treat unless they are particularly burdensome or appear to be impairing cardiac function.  She should continue low-dose metoprolol.    1. Dizziness/Dysequilibrium  2. PVCs  3. HTN  4. Eosinophilic heart disease...but no recent elevation of Eosinophilia. Does have MR LGE but no e\vidence of ongoing inflammation    - Will order JOSE,CRP, ESR, serum histamine; Urine: Catecholamines, Histamine,N-methylhistamine, Prostaglandin D2  - Cont Metoprolol, PVCs are a benign rhythm that do not require aggressive treatment  - We will call her for abnormal lab results  - No follow up planned, but will be happy to see her back in clinic should a new problem emerge    Pt seen and discussed with Dr. Carver.    Julián Blas MD  Cardiology Fellow, PGY-4  [phone]    I very much appreciated the opportunity to see and assess Valencia Ye in the clinic with CV Fellow Dr Blas. Please do not hesitate to contact my office if you have any questions or concerns.      Aime Carver MD  Cardiac Arrhythmia Service  [facility]  [phone]

## 2019-03-01 LAB
ANA SER QL IF: NEGATIVE
INTERPRETATION ECG - MUSE: NORMAL

## 2019-03-03 PROCEDURE — 99000 SPECIMEN HANDLING OFFICE-LAB: CPT | Performed by: INTERNAL MEDICINE

## 2019-03-03 PROCEDURE — 82384 ASSAY THREE CATECHOLAMINES: CPT | Mod: 90 | Performed by: INTERNAL MEDICINE

## 2019-03-04 ENCOUNTER — MYC MEDICAL ADVICE (OUTPATIENT)
Dept: INTERNAL MEDICINE | Facility: CLINIC | Age: 49
End: 2019-03-04

## 2019-03-04 LAB
COLLECT DURATION TIME SPEC: NORMAL H
CREAT UR-MCNC: 28 MG/DL
HISTAMINE 24H UR-MRATE: NORMAL UG/D (ref 0–60)
HISTAMINE BLD-SCNC: 2768 NMOL/L (ref 180–1800)
HISTAMINE UR-SCNC: 61 NMOL/L
HISTAMINE/CREAT 24H UR-SRTO: 218 NMOL/G CRT (ref 0–450)
SPECIMEN VOL ?TM UR: NORMAL ML

## 2019-03-05 DIAGNOSIS — R42 DIZZINESS: ICD-10-CM

## 2019-03-05 DIAGNOSIS — I49.3 PVC'S (PREMATURE VENTRICULAR CONTRACTIONS): ICD-10-CM

## 2019-03-06 ENCOUNTER — MYC MEDICAL ADVICE (OUTPATIENT)
Dept: INTERNAL MEDICINE | Facility: CLINIC | Age: 49
End: 2019-03-06

## 2019-03-06 LAB
COLLECT DURATION TIME UR: NORMAL H
CREAT UR-MCNC: 28 MG/DL
ME-HISTAMINE/CREAT 24H UR: 159 MCG/G CR (ref 30–200)
SPECIMEN VOL 24H UR: NORMAL ML

## 2019-03-08 LAB
CATECHOLS UR-IMP: NORMAL
COLLECT DURATION TIME SPEC: 24 H
CREAT 24H UR-MRATE: NORMAL MG/D (ref 700–1600)
CREAT UR-MCNC: 15 MG/DL
DOPAMINE 24H UR-MRATE: NORMAL UG/D (ref 77–324)
DOPAMINE UR-MCNC: <1 UG/L
DOPAMINE/CREAT UR: 153 UG/G CRT (ref 0–250)
EPINEPH 24H UR-MRATE: NORMAL UG/D (ref 1–7)
EPINEPH UR-MCNC: 23 UG/L
EPINEPH/CREAT UR: <7 UG/G CRT (ref 0–20)
NOREPINEPH 24H UR-MRATE: NORMAL UG/D (ref 16–71)
NOREPINEPH UR-MCNC: 5 UG/L
NOREPINEPH/CREAT UR: 33 UG/G CRT (ref 0–45)
SPECIMEN VOL ?TM UR: 6000 ML

## 2019-03-12 ENCOUNTER — TRANSFERRED RECORDS (OUTPATIENT)
Dept: HEALTH INFORMATION MANAGEMENT | Facility: CLINIC | Age: 49
End: 2019-03-12

## 2019-03-12 LAB
ALT SERPL-CCNC: 29 IU/L (ref 0–32)
AST SERPL-CCNC: 24 IU/L (ref 0–40)
CREAT SERPL-MCNC: 0.69 MG/DL (ref 0.57–1)
GFR SERPL CREATININE-BSD FRML MDRD: 105 ML/MIN/1.73
GLUCOSE SERPL-MCNC: 88 MG/DL (ref 65–99)
POTASSIUM SERPL-SCNC: 4.2 MMOL/L (ref 3.5–5.2)
TSH SERPL-ACNC: 1.54 UIU/ML (ref 0.45–4.5)

## 2019-03-13 DIAGNOSIS — R00.2 PALPITATIONS: ICD-10-CM

## 2019-03-13 NOTE — TELEPHONE ENCOUNTER
"Requested Prescriptions   Pending Prescriptions Disp Refills     metoprolol succinate ER (TOPROL-XL) 25 MG 24 hr tablet [Pharmacy Med Name: METOPROLOL ER SUCCINATE 25MG TABS] 45 tablet 0    Last Written Prescription Date:  02/16/2018  Last Fill Quantity: 45,  # refills: 3   Last office visit: 2/28/2019 with prescribing provider:     Future Office Visit:   Sig: TAKE 1/2 TABLET(12.5 MG) BY MOUTH DAILY    Beta-Blockers Protocol Passed - 3/13/2019  5:44 AM       Passed - Blood pressure under 140/90 in past 12 months    BP Readings from Last 3 Encounters:   02/28/19 125/86   02/07/19 116/75   01/28/19 116/66                Passed - Patient is age 6 or older       Passed - Recent (12 mo) or future (30 days) visit within the authorizing provider's specialty    Patient had office visit in the last 12 months or has a visit in the next 30 days with authorizing provider or within the authorizing provider's specialty.  See \"Patient Info\" tab in inbasket, or \"Choose Columns\" in Meds & Orders section of the refill encounter.             Passed - Medication is active on med list        "

## 2019-03-14 RX ORDER — METOPROLOL SUCCINATE 25 MG/1
TABLET, EXTENDED RELEASE ORAL
Qty: 45 TABLET | Refills: 3 | Status: SHIPPED | OUTPATIENT
Start: 2019-03-14 | End: 2020-06-25

## 2019-03-15 LAB — PROSTAGLANDIN D2 URINE: NORMAL

## 2019-03-26 DIAGNOSIS — J47.9 BRONCHIECTASIS WITHOUT COMPLICATION (H): ICD-10-CM

## 2019-03-26 NOTE — TELEPHONE ENCOUNTER
Requested Prescriptions   Pending Prescriptions Disp Refills     predniSONE (DELTASONE) 1 MG tablet [Pharmacy Med Name: PREDNISONE  Last Written Prescription Date:  1/10/2019  Last Fill Quantity: 200,  # refills: 1   Last office visit: 1/28/2019 with prescribing provider:     Future Office Visit:   1MG TABLETS] 200 tablet 0     Sig: TAKE 5 TABLETS BY MOUTH EVERY DAY AND ALTERNATING WITH 6 TABLETS THE NEXT.    There is no refill protocol information for this order

## 2019-03-27 RX ORDER — PREDNISONE 1 MG/1
TABLET ORAL
Qty: 200 TABLET | Refills: 0 | Status: SHIPPED | OUTPATIENT
Start: 2019-03-27 | End: 2019-05-01

## 2019-04-12 ENCOUNTER — MYC REFILL (OUTPATIENT)
Dept: PULMONOLOGY | Facility: CLINIC | Age: 49
End: 2019-04-12

## 2019-04-12 DIAGNOSIS — J47.9 BRONCHIECTASIS WITHOUT COMPLICATION (H): ICD-10-CM

## 2019-04-12 DIAGNOSIS — M30.1 CHURG-STRAUSS SYNDROME (H): ICD-10-CM

## 2019-04-12 DIAGNOSIS — D72.18 CHURG-STRAUSS SYNDROME (H): ICD-10-CM

## 2019-04-12 RX ORDER — ALBUTEROL SULFATE 0.83 MG/ML
2.5 SOLUTION RESPIRATORY (INHALATION) 2 TIMES DAILY
Qty: 60 VIAL | Refills: 11 | Status: SHIPPED | OUTPATIENT
Start: 2019-04-12 | End: 2019-12-26

## 2019-04-12 RX ORDER — BUDESONIDE AND FORMOTEROL FUMARATE DIHYDRATE 160; 4.5 UG/1; UG/1
2 AEROSOL RESPIRATORY (INHALATION) 2 TIMES DAILY
Qty: 1 INHALER | Refills: 11 | Status: SHIPPED | OUTPATIENT
Start: 2019-04-12 | End: 2019-12-26

## 2019-04-12 RX ORDER — ACETYLCYSTEINE 200 MG/ML
4 SOLUTION ORAL; RESPIRATORY (INHALATION) 2 TIMES DAILY
Qty: 240 ML | Refills: 11 | Status: SHIPPED | OUTPATIENT
Start: 2019-04-12 | End: 2019-12-26

## 2019-05-01 DIAGNOSIS — J47.9 BRONCHIECTASIS WITHOUT COMPLICATION (H): ICD-10-CM

## 2019-05-01 DIAGNOSIS — K58.1 IRRITABLE BOWEL SYNDROME WITH CONSTIPATION: ICD-10-CM

## 2019-05-02 DIAGNOSIS — E78.49 ALPHA-LIPOPROTEINEMIA: ICD-10-CM

## 2019-05-02 RX ORDER — LUBIPROSTONE 8 UG/1
CAPSULE, GELATIN COATED ORAL
Qty: 60 CAPSULE | Refills: 11 | Status: SHIPPED | OUTPATIENT
Start: 2019-05-02 | End: 2020-02-04

## 2019-05-02 RX ORDER — PREDNISONE 1 MG/1
TABLET ORAL
Qty: 200 TABLET | Refills: 2 | Status: SHIPPED | OUTPATIENT
Start: 2019-05-02 | End: 2019-08-10

## 2019-05-02 RX ORDER — ATORVASTATIN CALCIUM 10 MG/1
5 TABLET, FILM COATED ORAL DAILY
Qty: 90 TABLET | Refills: 2 | Status: SHIPPED | OUTPATIENT
Start: 2019-05-02 | End: 2020-05-13

## 2019-05-02 NOTE — TELEPHONE ENCOUNTER
Requested Prescriptions   Pending Prescriptions Disp Refills     AMITIZA 8 MCG capsule [Pharmacy Med Name: AMITIZA 8MCG CAPSULES] 60 capsule 0     Sig: TAKE 1 CAPSULE(8 MCG) BY MOUTH TWICE DAILY   Last Written Prescription Date:  09/04/2018  Last Fill Quantity: 60,  # refills: 3   Last office visit: 1/28/2019 with prescribing provider:     Future Office Visit:      There is no refill protocol information for this order        predniSONE (DELTASONE) 1 MG tablet [Pharmacy Med Name: PREDNISONE 1MG TABLETS] 200 tablet 0     Sig: TAKE 5 TABLETS BY MOUTH EVERY DAY AND ALTERNATING WITH 6 TABLETS THE NEXT.       There is no refill protocol information for this order      Last Written Prescription Date:  03/27/2019  Last Fill Quantity: 200,  # refills: 0   Last office visit: 1/28/2019 with prescribing provider:     Future Office Visit:

## 2019-05-06 ENCOUNTER — MYC MEDICAL ADVICE (OUTPATIENT)
Dept: INTERNAL MEDICINE | Facility: CLINIC | Age: 49
End: 2019-05-06

## 2019-05-23 ENCOUNTER — OFFICE VISIT (OUTPATIENT)
Dept: INTERNAL MEDICINE | Facility: CLINIC | Age: 49
End: 2019-05-23
Payer: COMMERCIAL

## 2019-05-23 VITALS
HEART RATE: 66 BPM | SYSTOLIC BLOOD PRESSURE: 120 MMHG | BODY MASS INDEX: 20.16 KG/M2 | WEIGHT: 113.8 LBS | OXYGEN SATURATION: 99 % | DIASTOLIC BLOOD PRESSURE: 68 MMHG | RESPIRATION RATE: 16 BRPM | TEMPERATURE: 97.4 F | HEIGHT: 63 IN

## 2019-05-23 DIAGNOSIS — R39.15 URGENCY OF URINATION: Primary | ICD-10-CM

## 2019-05-23 LAB
NON-SQ EPI CELLS #/AREA URNS LPF: NORMAL /LPF
RBC #/AREA URNS AUTO: NORMAL /HPF
WBC #/AREA URNS AUTO: NORMAL /HPF

## 2019-05-23 PROCEDURE — 99213 OFFICE O/P EST LOW 20 MIN: CPT | Performed by: NURSE PRACTITIONER

## 2019-05-23 PROCEDURE — 81015 MICROSCOPIC EXAM OF URINE: CPT | Performed by: NURSE PRACTITIONER

## 2019-05-23 ASSESSMENT — MIFFLIN-ST. JEOR: SCORE: 1121.35

## 2019-05-23 NOTE — PROGRESS NOTES
Subjective     Valencia Ye is a 48 year old female who presents to clinic today for the following health issues:    Few weeks on and off pressure and urgency   Took pyridium and seems to help  Urine irritates vaginal area  Still taking pyridium     Abdominal migraine pain over weekend - IBS     HPI         Patient Active Problem List   Diagnosis     Goiter     History of systemic steroid therapy     ILD (interstitial lung disease) (H)     Palpitations     Churg-Antonina syndrome (H)     History of eating disorder     Asthma     Anxiety     Chronic fatigue     Mannose-binding lectin deficiency     Benign essential hypertension     Moderate episode of recurrent major depressive disorder (H)     Bronchiectasis without acute exacerbation (H)     Controlled substance agreement signed     Past Surgical History:   Procedure Laterality Date      SECTION       COLONOSCOPY Left 2014    Procedure: COMBINED COLONOSCOPY, SINGLE OR MULTIPLE BIOPSY/POLYPECTOMY BY BIOPSY;  Surgeon: Js Pinedo MD;  Location:  GI     ENT SURGERY       GENITOURINARY SURGERY       HC COLP CERVIX/UPPER VAGINA W LOOP ELEC BX CERVIX       HEAD & NECK SURGERY       HYSTERECTOMY  2009    without oopherectomy     HYSTERECTOMY, PAP NO LONGER INDICATED      cervix removed      PE TUBES       PICC INSERTION  10/10/2013    5fr DL PASV PICC, 43cm (1cm external) in the L basilic vein w/ tip in the low SVC.     PICC INSERTION Left 2017    5fr DL BioFlo PICC, 42cm (3cm external) in the L lateral brachial vein w/ tip in the SVC RA junction.     SINUS SURGERY       TUBAL LIGATION       TYMPANOPLASTY         Social History     Tobacco Use     Smoking status: Never Smoker     Smokeless tobacco: Never Used   Substance Use Topics     Alcohol use: Yes     Alcohol/week: 0.0 oz     Comment: occ on the weekends     Family History   Problem Relation Age of Onset     Allergies Mother         Seasonal     Alcohol/Drug Mother   "    Substance Abuse Mother      Depression Mother      C.A.D. Father      Diabetes Father         Type 2     Depression Father      Heart Disease Father      C.A.D. Maternal Grandmother      Arthritis Maternal Grandmother      Musculoskeletal Disorder Maternal Grandmother         MS     Heart Disease Maternal Grandmother      Hypertension Maternal Grandmother      Alcohol/Drug Maternal Grandfather      Substance Abuse Maternal Grandfather      Depression Maternal Grandfather      Asthma Son      Asthma Daughter      Allergies Daughter         Seasonal     Unknown/Adopted Paternal Grandmother      Unknown/Adopted Paternal Grandfather      Cancer Maternal Aunt         breast age 53     Breast Cancer Maternal Aunt         in her 50s     Glaucoma No family hx of      Macular Degeneration No family hx of            Reviewed and updated as needed this visit by Provider  Tobacco  Allergies  Meds  Problems  Med Hx  Surg Hx  Fam Hx         Review of Systems   ROS COMP: Constitutional, HEENT, cardiovascular, pulmonary, gi and gu systems are negative, except as otherwise noted.      Objective    /68   Pulse 66   Temp 97.4  F (36.3  C) (Oral)   Resp 16   Ht 1.594 m (5' 2.75\")   Wt 51.6 kg (113 lb 12.8 oz)   LMP 02/01/2009   SpO2 99%   BMI 20.32 kg/m    Body mass index is 20.32 kg/m .  Physical Exam   GENERAL:  alert and no distress  RESP: lungs clear to auscultation - no rales, rhonchi or wheezes  CV: regular rate and rhythm, normal S1 S2, no S3 or S4, no murmur, click or rub, no peripheral edema and peripheral pulses strong  ABDOMEN: soft, nontender, no hepatosplenomegaly, no masses and bowel sounds normal  PSYCH: mentation appears normal, affect normal/bright    Diagnostic Test Results:  Labs reviewed in Epic  Urine negative         Assessment & Plan     1. Urgency of urination  Neg urine test   - Urine Microscopic       Patient Instructions   Increase fluid intake             Return in about 6 months " (around 11/23/2019).    SUNNY العلي [facility]

## 2019-06-05 ENCOUNTER — TRANSFERRED RECORDS (OUTPATIENT)
Dept: HEALTH INFORMATION MANAGEMENT | Facility: CLINIC | Age: 49
End: 2019-06-05

## 2019-06-10 ENCOUNTER — OFFICE VISIT (OUTPATIENT)
Dept: INTERNAL MEDICINE | Facility: CLINIC | Age: 49
End: 2019-06-10
Payer: COMMERCIAL

## 2019-06-10 VITALS
HEIGHT: 63 IN | OXYGEN SATURATION: 100 % | DIASTOLIC BLOOD PRESSURE: 78 MMHG | BODY MASS INDEX: 20.07 KG/M2 | SYSTOLIC BLOOD PRESSURE: 124 MMHG | TEMPERATURE: 97.5 F | RESPIRATION RATE: 16 BRPM | HEART RATE: 68 BPM | WEIGHT: 113.3 LBS

## 2019-06-10 DIAGNOSIS — R35.0 URINARY FREQUENCY: Primary | ICD-10-CM

## 2019-06-10 LAB
ALBUMIN UR-MCNC: NEGATIVE MG/DL
APPEARANCE UR: CLEAR
BILIRUB UR QL STRIP: NEGATIVE
COLOR UR AUTO: YELLOW
GLUCOSE UR STRIP-MCNC: NEGATIVE MG/DL
HGB UR QL STRIP: NEGATIVE
KETONES UR STRIP-MCNC: NEGATIVE MG/DL
LEUKOCYTE ESTERASE UR QL STRIP: NEGATIVE
NITRATE UR QL: NEGATIVE
PH UR STRIP: 5 PH (ref 5–7)
SOURCE: NORMAL
SP GR UR STRIP: <=1.005 (ref 1–1.03)
UROBILINOGEN UR STRIP-ACNC: 0.2 EU/DL (ref 0.2–1)

## 2019-06-10 PROCEDURE — 99213 OFFICE O/P EST LOW 20 MIN: CPT | Performed by: INTERNAL MEDICINE

## 2019-06-10 PROCEDURE — 81003 URINALYSIS AUTO W/O SCOPE: CPT | Performed by: INTERNAL MEDICINE

## 2019-06-10 ASSESSMENT — ANXIETY QUESTIONNAIRES
5. BEING SO RESTLESS THAT IT IS HARD TO SIT STILL: SEVERAL DAYS
7. FEELING AFRAID AS IF SOMETHING AWFUL MIGHT HAPPEN: SEVERAL DAYS
GAD7 TOTAL SCORE: 11
6. BECOMING EASILY ANNOYED OR IRRITABLE: SEVERAL DAYS
GAD7 TOTAL SCORE: 11
1. FEELING NERVOUS, ANXIOUS, OR ON EDGE: NEARLY EVERY DAY
3. WORRYING TOO MUCH ABOUT DIFFERENT THINGS: SEVERAL DAYS
4. TROUBLE RELAXING: NEARLY EVERY DAY
GAD7 TOTAL SCORE: 11
7. FEELING AFRAID AS IF SOMETHING AWFUL MIGHT HAPPEN: SEVERAL DAYS
2. NOT BEING ABLE TO STOP OR CONTROL WORRYING: SEVERAL DAYS

## 2019-06-10 ASSESSMENT — PATIENT HEALTH QUESTIONNAIRE - PHQ9
SUM OF ALL RESPONSES TO PHQ QUESTIONS 1-9: 9
10. IF YOU CHECKED OFF ANY PROBLEMS, HOW DIFFICULT HAVE THESE PROBLEMS MADE IT FOR YOU TO DO YOUR WORK, TAKE CARE OF THINGS AT HOME, OR GET ALONG WITH OTHER PEOPLE: EXTREMELY DIFFICULT
SUM OF ALL RESPONSES TO PHQ QUESTIONS 1-9: 9

## 2019-06-10 ASSESSMENT — MIFFLIN-ST. JEOR: SCORE: 1119.09

## 2019-06-10 NOTE — NURSING NOTE
"Vital signs:  Temp: 97.5  F (36.4  C) Temp src: Oral BP: 124/78 Pulse: 68   Resp: 16 SpO2: 100 %     Height: 159.4 cm (5' 2.75\") Weight: 51.4 kg (113 lb 4.8 oz)  Estimated body mass index is 20.23 kg/m  as calculated from the following:    Height as of this encounter: 1.594 m (5' 2.75\").    Weight as of this encounter: 51.4 kg (113 lb 4.8 oz).          "

## 2019-06-10 NOTE — LETTER
My Asthma Action Plan  Name: Valencia Ye   YOB: 1970  Date: 6/10/2019   My doctor: Morelia Simental MD   My clinic: [facility]        My Control Medicine:   My Rescue Medicine:    My Asthma Severity:   Avoid your asthma triggers: None            GREEN ZONE   Good Control    I feel good    No cough or wheeze    Can work, sleep and play without asthma symptoms       Take your asthma control medicine every day.     1. If exercise triggers your asthma, take your rescue medication    15 minutes before exercise or sports, and    During exercise if you have asthma symptoms  2. Spacer to use with inhaler: If you have a spacer, make sure to use it with your inhaler             YELLOW ZONE Getting Worse  I have ANY of these:    I do not feel good    Cough or wheeze    Chest feels tight    Wake up at night   1. Keep taking your Green Zone medications  2. Start taking your rescue medicine:    every 20 minutes for up to 1 hour. Then every 4 hours for 24-48 hours.  3. If you stay in the Yellow Zone for more than 12-24 hours, contact your doctor.  4. If you do not return to the Green Zone in 12-24 hours or you get worse, start taking your oral steroid medicine if prescribed by your provider.           RED ZONE Medical Alert - Get Help  I have ANY of these:    I feel awful    Medicine is not helping    Breathing getting harder    Trouble walking or talking    Nose opens wide to breathe       1. Take your rescue medicine NOW  2. If your provider has prescribed an oral steroid medicine, start taking it NOW  3. Call your doctor NOW  4. If you are still in the Red Zone after 20 minutes and you have not reached your doctor:    Take your rescue medicine again and    Call 911 or go to the emergency room right away    See your regular doctor within 2 weeks of an Emergency Room or Urgent Care visit for follow-up treatment.          Annual Reminders:  Meet with Asthma Educator,  Flu Shot in the Fall,  consider Pneumonia Vaccination for patients with asthma (aged 19 and older).    Pharmacy:    [facility] [id number] [place], [place] - [address] [facility] [address]  [facility] [place], [place] - [address]                      Asthma Triggers  How To Control Things That Make Your Asthma Worse    Triggers are things that make your asthma worse.  Look at the list below to help you find your triggers and what you can do about them.  You can help prevent asthma flare-ups by staying away from your triggers.      Trigger                                                          What you can do   Cigarette Smoke  Tobacco smoke can make asthma worse. Do not allow smoking in your home, car or around you.  Be sure no one smokes at a child s day care or school.  If you smoke, ask your health care provider for ways to help you quit.  Ask family members to quit too.  Ask your health care provider for a referral to Quit Plan to help you quit smoking, or call 1-919-810-PLAN.     Colds, Flu, Bronchitis  These are common triggers of asthma. Wash your hands often.  Don t touch your eyes, nose or mouth.  Get a flu shot every year.     Dust Mites  These are tiny bugs that live in cloth or carpet. They are too small to see. Wash sheets and blankets in hot water every week.   Encase pillows and mattress in dust mite proof covers.  Avoid having carpet if you can. If you have carpet, vacuum weekly.   Use a dust mask and HEPA vacuum.   Pollen and Outdoor Mold  Some people are allergic to trees, grass, or weed pollen, or molds. Try to keep your windows closed.  Limit time out doors when pollen count is high.   Ask you health care provider about taking medicine during allergy season.     Animal Dander  Some people are allergic to skin flakes, urine or saliva from pets with fur or feathers. Keep pets with fur or feathers out of your home.    If you can t keep the pet outdoors, then keep the pet out of  your bedroom.  Keep the bedroom door closed.  Keep pets off cloth furniture and away from stuffed toys.     Mice, Rats, and Cockroaches  Some people are allergic to the waste from these pests.   Cover food and garbage.  Clean up spills and food crumbs.  Store grease in the refrigerator.   Keep food out of the bedroom.   Indoor Mold  This can be a trigger if your home has high moisture. Fix leaking faucets, pipes, or other sources of water.   Clean moldy surfaces.  Dehumidify basement if it is damp and smelly.   Smoke, Strong Odors, and Sprays  These can reduce air quality. Stay away from strong odors and sprays, such as perfume, powder, hair spray, paints, smoke incense, paint, cleaning products, candles and new carpet.   Exercise or Sports  Some people with asthma have this trigger. Be active!  Ask your doctor about taking medicine before sports or exercise to prevent symptoms.    Warm up for 5-10 minutes before and after sports or exercise.     Other Triggers of Asthma  Cold air:  Cover your nose and mouth with a scarf.  Sometimes laughing or crying can be a trigger.  Some medicines and food can trigger asthma.

## 2019-06-10 NOTE — PROGRESS NOTES
Subjective     Valencia Ye is a 48 year old female who presents to clinic today for the following health issues:    Complaints of urinary frequency: She reports that she has been having issues with frequent urination and for a few months.  She has a lot of urgency every day as well as frequency, going every 1-2 hours most of the time.  She has not had any leakage with this.  She can have occasional burning, may bother her for day or 2, then be gone for a long time.  These symptoms have had variations, a little worse than the little better.  She has nocturia 1-2 times at night at the most but frequently will have no nocturia.  There is no change in the urine such as cloudiness, blood, odor.  She has not had abdominal pain or flank pain but wants to be sure she does not have an infection.  She wonders if stress may aggravate the symptoms that she been under a lot of stress recently with some increasing symptoms she is going to quit her job in about 3 weeks take a few months off to help reduce the stress related to the work.    She does not drink much caffeine, infrequent carbonated beverages.     History of Present Illness     Asthma:  She presents for follow up of asthma.  She has no cough, some wheezing, and some shortness of breath. She is using a relief medication a few times a week. She does not miss any doses of her controller medication throughout the week.Patient is aware of the following triggers: unaware of any triggers. The patient has not had a visit to the Emergency Room, Urgent Care or Hospital due to asthma since the last clinic visit.      Patient Active Problem List   Diagnosis     Goiter     History of systemic steroid therapy     ILD (interstitial lung disease) (H)     Palpitations     Churg-Antonina syndrome (H)     History of eating disorder     Asthma     Anxiety     Chronic fatigue     Mannose-binding lectin deficiency     Benign essential hypertension     Moderate episode of recurrent  major depressive disorder (H)     Bronchiectasis without acute exacerbation (H)     Controlled substance agreement signed     Current Outpatient Medications   Medication Sig Dispense Refill     acetylcysteine (MUCOMYST) 20 % neb solution Take 4 mLs by nebulization 2 times daily Use with albuterol solution. Discard open bottle of Mucomyst after 96 hours. 240 mL 11     albuterol (PROVENTIL) (2.5 MG/3ML) 0.083% neb solution Take 1 vial (2.5 mg) by nebulization 2 times daily Use with Mucomyst 60 vial 11     AMITIZA 8 MCG capsule TAKE 1 CAPSULE(8 MCG) BY MOUTH TWICE DAILY 60 capsule 11     atorvastatin (LIPITOR) 10 MG tablet Take 0.5 tablets (5 mg) by mouth daily 90 tablet 2     budesonide-formoterol (SYMBICORT) 160-4.5 MCG/ACT Inhaler Inhale 2 puffs into the lungs 2 times daily 1 Inhaler 11     cholecalciferol 5000 UNITS CAPS Take 4,000 Units by mouth daily        ciprofloxacin-dexamethasone (CIPRODEX) 0.3-0.1 % otic suspension INSTILL 3 DROPS INTO THE AFFECTED EAR TWICE DAILY AS NEEDED 7.5 mL 11     fluticasone (FLONASE) 50 MCG/ACT nasal spray USE 2 SPRAYS INTO BOTH NOSTRIL DAILY 48 mL 0     glucosamine-chondroitin (GLUCOSAMINE CHONDR COMPLEX) 500-400 MG CAPS Take 2 capsules by mouth daily.       Ipratropium-Albuterol (COMBIVENT RESPIMAT)  MCG/ACT inhaler Inhale 1 puff into the lungs 4 times daily Not to exceed 6 doses per day. 1 Inhaler 11     metoprolol succinate ER (TOPROL-XL) 25 MG 24 hr tablet TAKE 1/2 TABLET(12.5 MG) BY MOUTH DAILY 45 tablet 3     Omega-3 Fatty Acids (FISH OIL) 1200 MG capsule Take 3 capsules by mouth daily.       predniSONE (DELTASONE) 1 MG tablet TAKE 5 TABLETS BY MOUTH EVERY DAY AND ALTERNATING WITH 6 TABLETS THE NEXT. 200 tablet 2     vitamin B complex with vitamin C (VITAMIN  B COMPLEX) TABS tablet Take 1 tablet by mouth daily        .  Social History     Tobacco Use     Smoking status: Never Smoker     Smokeless tobacco: Never Used   Substance Use Topics     Alcohol use: Yes      "Alcohol/week: 0.0 oz     Comment: occ on the weekends     Drug use: No               Review of Systems   No fever, chills, flank pain, blood in the urine, odor in the urine, cloudiness of the urine      Objective    /78   Pulse 68   Temp 97.5  F (36.4  C) (Oral)   Resp 16   Ht 1.594 m (5' 2.75\")   Wt 51.4 kg (113 lb 4.8 oz)   LMP 02/01/2009   SpO2 100%   BMI 20.23 kg/m    Body mass index is 20.23 kg/m .  Physical Exam       UA:   Very dilute with low specific gravity but negative nitrite or leukocyte esterase        Assessment & Plan     1. Urinary frequency  Her urine is negative though it is very dilute.  Her symptoms are not strongly suggestive of infection and she reports usually with UTI she has much more severe dysuria.  This may be some overactive bladder, discussed with her things that can aggravate it including carbonated beverages, caffeine also stress may have some impact.  Discussed options that can help such as frequent urination, avoiding caffeine beverages and discuss medications.  She is not interested in starting medication at this time.  Advised that she may decrease her fluid intake a little bit given how dilute this urine is which can help reduce some of the frequency.  If she wishes to try medication later, she could send me a message to my chart.  If her symptoms worsen, other different symptoms start occurring      - UA reflex to Microscopic and Culture         No follow-ups on file.    Morelia Simental MD  [facility]        "

## 2019-06-11 ASSESSMENT — ANXIETY QUESTIONNAIRES: GAD7 TOTAL SCORE: 11

## 2019-06-11 ASSESSMENT — PATIENT HEALTH QUESTIONNAIRE - PHQ9: SUM OF ALL RESPONSES TO PHQ QUESTIONS 1-9: 9

## 2019-06-11 ASSESSMENT — ASTHMA QUESTIONNAIRES: ACT_TOTALSCORE: 21

## 2019-06-13 ENCOUNTER — OFFICE VISIT (OUTPATIENT)
Dept: PULMONOLOGY | Facility: CLINIC | Age: 49
End: 2019-06-13
Attending: INTERNAL MEDICINE
Payer: COMMERCIAL

## 2019-06-13 VITALS
WEIGHT: 113.32 LBS | BODY MASS INDEX: 20.08 KG/M2 | HEART RATE: 74 BPM | TEMPERATURE: 98.4 F | OXYGEN SATURATION: 100 % | RESPIRATION RATE: 16 BRPM | DIASTOLIC BLOOD PRESSURE: 85 MMHG | HEIGHT: 63 IN | SYSTOLIC BLOOD PRESSURE: 125 MMHG

## 2019-06-13 DIAGNOSIS — M30.1 CHURG-STRAUSS SYNDROME (H): Primary | ICD-10-CM

## 2019-06-13 DIAGNOSIS — E84.9 CF (CYSTIC FIBROSIS) (H): Primary | ICD-10-CM

## 2019-06-13 DIAGNOSIS — J47.9 BRONCHIECTASIS WITHOUT ACUTE EXACERBATION (H): ICD-10-CM

## 2019-06-13 DIAGNOSIS — D72.18 CHURG-STRAUSS SYNDROME (H): Primary | ICD-10-CM

## 2019-06-13 LAB
EXPTIME-PRE: 10.43 SEC
FEF2575-%PRED-PRE: 49 %
FEF2575-PRE: 1.32 L/SEC
FEF2575-PRED: 2.65 L/SEC
FEFMAX-%PRED-PRE: 86 %
FEFMAX-PRE: 5.65 L/SEC
FEFMAX-PRED: 6.55 L/SEC
FEV1-%PRED-PRE: 100 %
FEV1-PRE: 2.51 L
FEV1FEV6-PRE: 66 %
FEV1FEV6-PRED: 83 %
FEV1FVC-PRE: 64 %
FEV1FVC-PRED: 82 %
FIFMAX-PRE: 4.82 L/SEC
FVC-%PRED-PRE: 128 %
FVC-PRE: 3.92 L
FVC-PRED: 3.04 L

## 2019-06-13 PROCEDURE — G0463 HOSPITAL OUTPT CLINIC VISIT: HCPCS | Mod: ZF

## 2019-06-13 RX ORDER — AMOXICILLIN 500 MG
4 CAPSULE ORAL DAILY
COMMUNITY
Start: 2019-06-13

## 2019-06-13 RX ORDER — CHOLECALCIFEROL (VITAMIN D3) 50 MCG
2 TABLET ORAL DAILY
COMMUNITY

## 2019-06-13 ASSESSMENT — MIFFLIN-ST. JEOR: SCORE: 1119.16

## 2019-06-13 ASSESSMENT — PAIN SCALES - GENERAL: PAINLEVEL: MODERATE PAIN (5)

## 2019-06-13 NOTE — PROGRESS NOTES
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 Since then, she has followed up IM, Cardiology and Neurology for ongoing vertigo associated with chronic fatigue. Patient concluded it may be related to work stressors which will be improved after resignation July 5th.    # Bronchiectasis: She reports occasional 'chest cramps' the last 2-3 weeks with associated moderate pain, similar to previous asthma symptoms. She is oxygenating very well at 100% SpO2. PFTs are reduced from her previous visit though remains within an ideal range. She does not appear to be experiencing a pulmonary exacerbation at this time. Instructed patient to use Symbicort consistently twice daily. I have encouraged her to continue her current airway clearance therapy, exercise and mediations. She will bring sputum culture when obtained. Will call CF office if cough or sputum increases. The patient has received a quadvalent influenza vaccine in September for the 2018-19 flu season.    I will see the patient in followup in 6 months with PFTs. She will call with any new pulmonary symptoms.       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 Patient was last seen in clinic in October 2018. Since then, she was evaluated by IM, Cardiology and Neurology for ongoing vertigo associated with chronic fatigue. Patient concluded it may be related to work stressors which will be improved after resignation July 5th.    Today, the patient is feeling well. She reports MG above baseline and mild SOB intermittent at rest, improved on its own. She is using combivent inhaler every day. She is not  exercising regularly, goes on walks every now and then. Reports increased productive cough of clear to yellow sputum, about 1-2 teaspoons in volume. No hemoptysis. No fever, chills, or night sweats. She is doing vest therapy 30 minutes once daily at MN standard frequencies and pressures.     Reports occasional 'chest cramps' for seconds to one hours, significantly increased from baseline the last 2-3 weeks. She reports moderate pain with episodes. Notes similar symptoms to asthma as a child.    Review of Systems:   CONST: Appetite is fair.  ENT: No sinus/ear pain, sore throat, or rhinorrhea.   GI: No nausea, vomiting. Reports IBS, unchanged. No abdominal pain.  MS: Baseline joint pain  NEURO: Reports headaches  PSYCH: Reports anxiety/depression symptoms well-controlled. Continues to periodically follow up with therapist.  SOC: Patient has resigned from her current position with [employer] starting July 5.  MS: Infrequent joint pain at baseline.    A complete ROS was otherwise negative except as noted in the HPI.    Current Outpatient Medications   Medication     acetylcysteine (MUCOMYST) 20 % neb solution     albuterol (PROVENTIL) (2.5 MG/3ML) 0.083% neb solution     AMITIZA 8 MCG capsule     atorvastatin (LIPITOR) 10 MG tablet     budesonide-formoterol (SYMBICORT) 160-4.5 MCG/ACT Inhaler     ciprofloxacin-dexamethasone (CIPRODEX) 0.3-0.1 % otic suspension     fluticasone (FLONASE) 50 MCG/ACT nasal spray     glucosamine-chondroitin (GLUCOSAMINE CHONDR COMPLEX) 500-400 MG CAPS     Ipratropium-Albuterol (COMBIVENT RESPIMAT)  MCG/ACT inhaler     metoprolol succinate ER (TOPROL-XL) 25 MG 24 hr tablet     Omega-3 Fatty Acids (FISH OIL) 1200 MG capsule     predniSONE (DELTASONE) 1 MG tablet     vitamin B complex with vitamin C (VITAMIN  B COMPLEX) TABS tablet     vitamin D3 (CHOLECALCIFEROL) 2000 units (50 mcg) tablet     No current facility-administered medications for this visit.      Allergies   Allergen  Reactions     Colistimethate Anaphylaxis     Colymycin M [Colistimethate Sodium] Anaphylaxis     Tamiflu [Oseltamivir]      Gums Blister up     Clindamycin Rash     Zithromax [Azithromycin]      Heart palpitation     Past Medical History:   Diagnosis Date     Anxiety      Aortitis (H)      Asthma     associated with Churg Antonina syndrome     Bronchiectasis (H)      Cerebral infarction (H) 1990    Very small, caused small permanent optical migraine     Chronic sinusitis      Churg-Antonina syndrome (H)     dx      Conductive hearing loss      Depressive disorder      Eustachian tube dysfunction      Goiter      Hearing loss, conductive      Heart rate problem      Hypertension      Hypocalcemia      Immunosuppression (H)      Irritable bowel syndrome      Major depressive disorder      Mannose-binding lectin deficiency      Nonspecific abnormal results of thyroid function study      Osteoporosis      Pericarditis      and      Pneumonia     4/23/10     Recurrent otitis media      Recurrent UTI      Rheumatoid vasculitis (H)      Sinusitis, chronic      Steroid long-term use      Tinnitus      Varicose veins of leg with swelling        Past Surgical History:   Procedure Laterality Date      SECTION       COLONOSCOPY Left 2014    Procedure: COMBINED COLONOSCOPY, SINGLE OR MULTIPLE BIOPSY/POLYPECTOMY BY BIOPSY;  Surgeon: Js Pinedo MD;  Location:  GI     ENT SURGERY       GENITOURINARY SURGERY       HC COLP CERVIX/UPPER VAGINA W LOOP ELEC BX CERVIX       HEAD & NECK SURGERY       HYSTERECTOMY  2009    without oopherectomy     HYSTERECTOMY, PAP NO LONGER INDICATED      cervix removed      PE TUBES       PICC INSERTION  10/10/2013    5fr DL PASV PICC, 43cm (1cm external) in the L basilic vein w/ tip in the low SVC.     PICC INSERTION Left 2017    5fr DL BioFlo PICC, 42cm (3cm external) in the L lateral brachial vein w/ tip in the SVC RA junction.      SINUS SURGERY       TUBAL LIGATION       TYMPANOPLASTY         Social History     Socioeconomic History     Marital status: Single     Spouse name: Not on file     Number of children: Not on file     Years of education: Not on file     Highest education level: Not on file   Occupational History     Not on file   Social Needs     Financial resource strain: Not on file     Food insecurity:     Worry: Not on file     Inability: Not on file     Transportation needs:     Medical: Not on file     Non-medical: Not on file   Tobacco Use     Smoking status: Never Smoker     Smokeless tobacco: Never Used   Substance and Sexual Activity     Alcohol use: Yes     Alcohol/week: 0.0 oz     Comment: occ on the weekends     Drug use: No     Sexual activity: Yes     Partners: Male     Birth control/protection: Male Surgical, Female Surgical   Lifestyle     Physical activity:     Days per week: Not on file     Minutes per session: Not on file     Stress: Not on file   Relationships     Social connections:     Talks on phone: Not on file     Gets together: Not on file     Attends Evangelical service: Not on file     Active member of club or organization: Not on file     Attends meetings of clubs or organizations: Not on file     Relationship status: Not on file     Intimate partner violence:     Fear of current or ex partner: Not on file     Emotionally abused: Not on file     Physically abused: Not on file     Forced sexual activity: Not on file   Other Topics Concern     Parent/sibling w/ CABG, MI or angioplasty before 65F 55M? Not Asked      Service No     Blood Transfusions No     Caffeine Concern No     Occupational Exposure No     Hobby Hazards No     Sleep Concern No     Stress Concern Yes     Weight Concern No     Special Diet Yes     Comment: IBS diet     Back Care No     Exercise Not Asked     Comment: 1 mile power walk 5 days a week at least.      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  .  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  Non smoker. No smokers at home.  Enjoys walking and dancing.  Alcohol--1 to 2 drinks daily.  Few times a year has more than 4 drinks in a day.  No illicits.                     /85 (BP Location: Left arm, Patient Position: Chair, Cuff Size: Adult Regular)   Pulse 74   Temp 98.4  F (36.9  C) (Oral)   Resp 16   Ht 1.594 m (5' 2.75\")   Wt 51.4 kg (113 lb 5.1 oz)   LMP 02/01/2009   SpO2 100%   BMI 20.23 kg/m      Exam:   GENERAL APPEARANCE: Well developed, well nourished, alert, and in no apparent distress.  EYES: PERRL, EOMI  HENT: Nasal mucosa with mild edema and hyperemia. No nasal polyps.  EARS: Canals clear, TMs scarred with bilat perforation.  MOUTH: Oral mucosa is moist, without any lesions, no tonsillar enlargement, no oropharyngeal exudate.  NECK: supple, no masses, no thyromegaly.  LYMPHATICS: No significant axillary, cervical, or supraclavicular nodes.  RESP: normal percussion, good air flow throughout. No crackles. No rhonchi. No wheezes.  CV: No ectopic beats. Normal S1, S2, regular rhythm, normal rate. No murmur.  No rub. No gallop. No LE edema.   ABDOMEN:  Bowel sounds normal, soft, nontender, no HSM or masses.   MS: extremities normal. No clubbing. No cyanosis.  SKIN: no rash on limited exam  NEURO: Mentation intact, speech normal, normal strength and tone, normal gait and stance  PSYCH: mentation appears normal. and affect normal/bright  Results:  Recent Results (from the past 168 hour(s))   UA reflex to Microscopic and Culture    Collection Time: 06/10/19 10:08 AM   Result Value Ref Range    Color Urine Yellow     Appearance Urine Clear     Glucose Urine Negative NEG^Negative mg/dL    Bilirubin Urine Negative NEG^Negative    Ketones Urine Negative NEG^Negative mg/dL    Specific Gravity Urine <=1.005 1.003 - 1.035    Blood Urine Negative NEG^Negative    pH Urine 5.0 5.0 - 7.0 " pH    Protein Albumin Urine Negative NEG^Negative mg/dL    Urobilinogen Urine 0.2 0.2 - 1.0 EU/dL    Nitrite Urine Negative NEG^Negative    Leukocyte Esterase Urine Negative NEG^Negative    Source Midstream Urine    General PFT Lab (Please always keep checked)    Collection Time: 06/13/19  9:54 AM   Result Value Ref Range    FVC-Pred 3.04 L    FVC-Pre 3.92 L    FVC-%Pred-Pre 128 %    FEV1-Pre 2.51 L    FEV1-%Pred-Pre 100 %    FEV1FVC-Pred 82 %    FEV1FVC-Pre 64 %    FEFMax-Pred 6.55 L/sec    FEFMax-Pre 5.65 L/sec    FEFMax-%Pred-Pre 86 %    FEF2575-Pred 2.65 L/sec    FEF2575-Pre 1.32 L/sec    SBG9998-%Pred-Pre 49 %    ExpTime-Pre 10.43 sec    FIFMax-Pre 4.82 L/sec    FEV1FEV6-Pred 83 %    FEV1FEV6-Pre 66 %                      Results as noted above.    PFT Interpretation:  Very mild obstructive ventilatory defect.  Unchanged from previous.   Similar to recent best.  Valid Maneuver          Scribe Disclosure:   I, Bharati Catherine, am serving as a scribe; to document services personally performed by Dagoberto Briscoe MD based on data collection and the provider's statements to me.     Provider Disclosure:  I agree with above History, Review of Systems, Physical Exam and Plan.  I have reviewed the content of the documentation and have edited it as needed. I have personally performed the services documented here and the documentation accurately represents those services and the decisions I have made.      Electronically signed by:  Dagoberto Briscoe

## 2019-06-13 NOTE — LETTER
6/13/2019       RE: Valencia Ye  [address] [facility] [address]     Dear Colleague,    Thank you for referring your patient, Valencia Ye, to the [facility] at [facility]. Please see a copy of my visit note below.    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 Since then, she has followed up IM, Cardiology and Neurology for ongoing vertigo associated with chronic fatigue. Patient concluded it may be related to work stressors which will be improved after resignation July 5th.    # Bronchiectasis: She reports occasional 'chest cramps' the last 2-3 weeks with associated moderate pain, similar to previous asthma symptoms. She is oxygenating very well at 100% SpO2. PFTs are reduced from her previous visit though remains within an ideal range. She does not appear to be experiencing a pulmonary exacerbation at this time. Instructed patient to use Symbicort consistently twice daily. I have encouraged her to continue her current airway clearance therapy, exercise and mediations. She will bring sputum culture when obtained. Will call CF office if cough or sputum increases. The patient has received a quadvalent influenza vaccine in September for the 2018-19 flu season.    I will see the patient in followup in 6 months with PFTs. She will call with any new pulmonary symptoms.       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 Patient was last seen in clinic in October 2018. Since then, she was evaluated by IM, Cardiology and Neurology for ongoing vertigo  associated with chronic fatigue. Patient concluded it may be related to work stressors which will be improved after resignation July 5th.    Today, the patient is feeling well. She reports MG above baseline and mild SOB intermittent at rest, improved on its own. She is using combivent inhaler every day. She is not exercising regularly, goes on walks every now and then. Reports increased productive cough of clear to yellow sputum, about 1-2 teaspoons in volume. No hemoptysis. No fever, chills, or night sweats. She is doing vest therapy 30 minutes once daily at MN standard frequencies and pressures.     Reports occasional 'chest cramps' for seconds to one hours, significantly increased from baseline the last 2-3 weeks. She reports moderate pain with episodes. Notes similar symptoms to asthma as a child.    Review of Systems:   CONST: Appetite is fair.  ENT: No sinus/ear pain, sore throat, or rhinorrhea.   GI: No nausea, vomiting. Reports IBS, unchanged. No abdominal pain.  MS: Baseline joint pain  NEURO: Reports headaches  PSYCH: Reports anxiety/depression symptoms well-controlled. Continues to periodically follow up with therapist.  SOC: Patient has resigned from her current position with [employer] starting July 5.  MS: Infrequent joint pain at baseline.    A complete ROS was otherwise negative except as noted in the HPI.    Current Outpatient Medications   Medication     acetylcysteine (MUCOMYST) 20 % neb solution     albuterol (PROVENTIL) (2.5 MG/3ML) 0.083% neb solution     AMITIZA 8 MCG capsule     atorvastatin (LIPITOR) 10 MG tablet     budesonide-formoterol (SYMBICORT) 160-4.5 MCG/ACT Inhaler     ciprofloxacin-dexamethasone (CIPRODEX) 0.3-0.1 % otic suspension     fluticasone (FLONASE) 50 MCG/ACT nasal spray     glucosamine-chondroitin (GLUCOSAMINE CHONDR COMPLEX) 500-400 MG CAPS     Ipratropium-Albuterol (COMBIVENT RESPIMAT)  MCG/ACT inhaler     metoprolol succinate ER (TOPROL-XL) 25 MG 24 hr  tablet     Omega-3 Fatty Acids (FISH OIL) 1200 MG capsule     predniSONE (DELTASONE) 1 MG tablet     vitamin B complex with vitamin C (VITAMIN  B COMPLEX) TABS tablet     vitamin D3 (CHOLECALCIFEROL) 2000 units (50 mcg) tablet     No current facility-administered medications for this visit.      Allergies   Allergen Reactions     Colistimethate Anaphylaxis     Colymycin M [Colistimethate Sodium] Anaphylaxis     Tamiflu [Oseltamivir]      Gums Blister up     Clindamycin Rash     Zithromax [Azithromycin]      Heart palpitation     Past Medical History:   Diagnosis Date     Anxiety      Aortitis (H)      Asthma     associated with Churg Antonina syndrome     Bronchiectasis (H)      Cerebral infarction (H) 1990    Very small, caused small permanent optical migraine     Chronic sinusitis      Churg-Antonina syndrome (H)     1990     Conductive hearing loss      Depressive disorder      Eustachian tube dysfunction      Goiter      Hearing loss, conductive      Heart rate problem      Hypertension      Hypocalcemia      Immunosuppression (H)      Irritable bowel syndrome      Major depressive disorder      Mannose-binding lectin deficiency      Nonspecific abnormal results of thyroid function study      Osteoporosis      Pericarditis      and      Pneumonia     4/23/10     Recurrent otitis media      Recurrent UTI      Rheumatoid vasculitis (H)      Sinusitis, chronic      Steroid long-term use      Tinnitus      Varicose veins of leg with swelling        Past Surgical History:   Procedure Laterality Date      SECTION       COLONOSCOPY Left 2014    Procedure: COMBINED COLONOSCOPY, SINGLE OR MULTIPLE BIOPSY/POLYPECTOMY BY BIOPSY;  Surgeon: Js Pinedo MD;  Location:  GI     ENT SURGERY       GENITOURINARY SURGERY       HC COLP CERVIX/UPPER VAGINA W LOOP ELEC BX CERVIX       HEAD & NECK SURGERY       HYSTERECTOMY  2009    without oopherectomy     HYSTERECTOMY, PAP NO  LONGER INDICATED      cervix removed      PE TUBES       PICC INSERTION  10/10/2013    5fr DL PASV PICC, 43cm (1cm external) in the L basilic vein w/ tip in the low SVC.     PICC INSERTION Left 05/26/2017    5fr DL BioFlo PICC, 42cm (3cm external) in the L lateral brachial vein w/ tip in the SVC RA junction.     SINUS SURGERY       TUBAL LIGATION       TYMPANOPLASTY         Social History     Socioeconomic History     Marital status: Single     Spouse name: Not on file     Number of children: Not on file     Years of education: Not on file     Highest education level: Not on file   Occupational History     Not on file   Social Needs     Financial resource strain: Not on file     Food insecurity:     Worry: Not on file     Inability: Not on file     Transportation needs:     Medical: Not on file     Non-medical: Not on file   Tobacco Use     Smoking status: Never Smoker     Smokeless tobacco: Never Used   Substance and Sexual Activity     Alcohol use: Yes     Alcohol/week: 0.0 oz     Comment: occ on the weekends     Drug use: No     Sexual activity: Yes     Partners: Male     Birth control/protection: Male Surgical, Female Surgical   Lifestyle     Physical activity:     Days per week: Not on file     Minutes per session: Not on file     Stress: Not on file   Relationships     Social connections:     Talks on phone: Not on file     Gets together: Not on file     Attends Religion service: Not on file     Active member of club or organization: Not on file     Attends meetings of clubs or organizations: Not on file     Relationship status: Not on file     Intimate partner violence:     Fear of current or ex partner: Not on file     Emotionally abused: Not on file     Physically abused: Not on file     Forced sexual activity: Not on file   Other Topics Concern     Parent/sibling w/ CABG, MI or angioplasty before 65F 55M? Not Asked      Service No     Blood Transfusions No     Caffeine Concern No     Occupational  "Exposure No     Hobby Hazards No     Sleep Concern No     Stress Concern Yes     Weight Concern No     Special Diet Yes     Comment: IBS diet     Back Care No     Exercise Not Asked     Comment: 1 mile power walk 5 days a week at least.      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  .  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  Non smoker. No smokers at home.  Enjoys walking and dancing.  Alcohol--1 to 2 drinks daily.  Few times a year has more than 4 drinks in a day.  No illicits.                     /85 (BP Location: Left arm, Patient Position: Chair, Cuff Size: Adult Regular)   Pulse 74   Temp 98.4  F (36.9  C) (Oral)   Resp 16   Ht 1.594 m (5' 2.75\")   Wt 51.4 kg (113 lb 5.1 oz)   LMP 02/01/2009   SpO2 100%   BMI 20.23 kg/m       Exam:   GENERAL APPEARANCE: Well developed, well nourished, alert, and in no apparent distress.  EYES: PERRL, EOMI  HENT: Nasal mucosa with mild edema and hyperemia. No nasal polyps.  EARS: Canals clear, TMs scarred with bilat perforation.  MOUTH: Oral mucosa is moist, without any lesions, no tonsillar enlargement, no oropharyngeal exudate.  NECK: supple, no masses, no thyromegaly.  LYMPHATICS: No significant axillary, cervical, or supraclavicular nodes.  RESP: normal percussion, good air flow throughout. No crackles. No rhonchi. No wheezes.  CV: No ectopic beats. Normal S1, S2, regular rhythm, normal rate. No murmur.  No rub. No gallop. No LE edema.   ABDOMEN:  Bowel sounds normal, soft, nontender, no HSM or masses.   MS: extremities normal. No clubbing. No cyanosis.  SKIN: no rash on limited exam  NEURO: Mentation intact, speech normal, normal strength and tone, normal gait and stance  PSYCH: mentation appears normal. and affect normal/bright  Results:  Recent Results (from the past 168 hour(s))   UA reflex to Microscopic and Culture    Collection Time: 06/10/19 10:08 AM "   Result Value Ref Range    Color Urine Yellow     Appearance Urine Clear     Glucose Urine Negative NEG^Negative mg/dL    Bilirubin Urine Negative NEG^Negative    Ketones Urine Negative NEG^Negative mg/dL    Specific Gravity Urine <=1.005 1.003 - 1.035    Blood Urine Negative NEG^Negative    pH Urine 5.0 5.0 - 7.0 pH    Protein Albumin Urine Negative NEG^Negative mg/dL    Urobilinogen Urine 0.2 0.2 - 1.0 EU/dL    Nitrite Urine Negative NEG^Negative    Leukocyte Esterase Urine Negative NEG^Negative    Source Midstream Urine    General PFT Lab (Please always keep checked)    Collection Time: 06/13/19  9:54 AM   Result Value Ref Range    FVC-Pred 3.04 L    FVC-Pre 3.92 L    FVC-%Pred-Pre 128 %    FEV1-Pre 2.51 L    FEV1-%Pred-Pre 100 %    FEV1FVC-Pred 82 %    FEV1FVC-Pre 64 %    FEFMax-Pred 6.55 L/sec    FEFMax-Pre 5.65 L/sec    FEFMax-%Pred-Pre 86 %    FEF2575-Pred 2.65 L/sec    FEF2575-Pre 1.32 L/sec    [id number]-%Pred-Pre 49 %    ExpTime-Pre 10.43 sec    FIFMax-Pre 4.82 L/sec    FEV1FEV6-Pred 83 %    FEV1FEV6-Pre 66 %                      Results as noted above.    PFT Interpretation:  Very mild obstructive ventilatory defect.  Unchanged from previous.   Similar to recent best.  Valid Maneuver          Scribe Disclosure:   I, Bharati Catherine, am serving as a scribe; to document services personally performed by Dagoberto Briscoe MD based on data collection and the provider's statements to me.     Provider Disclosure:  I agree with above History, Review of Systems, Physical Exam and Plan.  I have reviewed the content of the documentation and have edited it as needed. I have personally performed the services documented here and the documentation accurately represents those services and the decisions I have made.      Electronically signed by:  Dagoberto Briscoe         Again, thank you for allowing me to participate in the care of your patient.      Sincerely,    Dagoberto Briscoe MD

## 2019-06-13 NOTE — PATIENT INSTRUCTIONS
Use your Symbicort consistently twice daily.  Bring a sputum culture and call my office if your cough and sputum increases.  Otherwise continue current medication, nebs and vest therapy.

## 2019-07-08 DIAGNOSIS — Z12.31 VISIT FOR SCREENING MAMMOGRAM: ICD-10-CM

## 2019-07-08 PROCEDURE — 77067 SCR MAMMO BI INCL CAD: CPT | Mod: TC

## 2019-07-13 ENCOUNTER — MYC MEDICAL ADVICE (OUTPATIENT)
Dept: INTERNAL MEDICINE | Facility: CLINIC | Age: 49
End: 2019-07-13

## 2019-07-13 DIAGNOSIS — R35.0 URINARY FREQUENCY: Primary | ICD-10-CM

## 2019-07-16 ENCOUNTER — PRE VISIT (OUTPATIENT)
Dept: UROLOGY | Facility: CLINIC | Age: 49
End: 2019-07-16

## 2019-07-16 NOTE — TELEPHONE ENCOUNTER
MEDICAL RECORDS REQUEST   [facility]  Urology Clinic  [address] [place] [address]  PHONE: [phone]  Fax: [phone]        FUTURE VISIT INFORMATION                                                   Valencia Ye, : 1970 scheduled for future visit at [facility]    APPOINTMENT INFORMATION:    Date: 19 9:30AM     Provider:  Carlos Laughlin MD     Reason for Visit/Diagnosis: Urinary frequency     REFERRAL INFORMATION:    Referring provider:  Morelia Simental     Specialty: MD    Referring providers clinic: [facility]     Clinic contact number: [phone]    RECORDS REQUESTED FOR VISIT                                                     NOTES  STATUS/DETAILS   OFFICE NOTE from referring provider  yes   OFFICE NOTE from other specialist  no   DISCHARGE SUMMARY from hospital  no   DISCHARGE REPORT from the ER  no   OPERATIVE REPORT  no   MEDICATION LIST  no   LABS     URINALYSIS (UA)  yes     PRE-VISIT CHECKLIST      Record collection complete Yes- All recs in Epic    Appointment appropriately scheduled           (right time/right provider) Yes   MyChart activation Yes   Questionnaire complete If no, please explain: In process      Completed by: Bekah Yun

## 2019-07-18 ENCOUNTER — PRE VISIT (OUTPATIENT)
Dept: UROLOGY | Facility: CLINIC | Age: 49
End: 2019-07-18

## 2019-07-18 NOTE — TELEPHONE ENCOUNTER
Reason for Visit: Consult    Diagnosis: urinary frequency    Orders/Procedures/Records: n/a    Contact Patient: n/a    Rooming Requirements: UA dip / PVR      Taniya Pitt LPN  07/18/19  5:54 PM

## 2019-07-30 ENCOUNTER — OFFICE VISIT (OUTPATIENT)
Dept: INTERNAL MEDICINE | Facility: CLINIC | Age: 49
End: 2019-07-30
Payer: COMMERCIAL

## 2019-07-30 VITALS
TEMPERATURE: 97.8 F | HEART RATE: 82 BPM | BODY MASS INDEX: 19.91 KG/M2 | DIASTOLIC BLOOD PRESSURE: 71 MMHG | WEIGHT: 112.4 LBS | SYSTOLIC BLOOD PRESSURE: 112 MMHG | HEIGHT: 63 IN | OXYGEN SATURATION: 97 % | RESPIRATION RATE: 18 BRPM

## 2019-07-30 DIAGNOSIS — G47.10 HYPERSOMNOLENCE: Primary | ICD-10-CM

## 2019-07-30 DIAGNOSIS — M85.80 OSTEOPENIA, UNSPECIFIED LOCATION: ICD-10-CM

## 2019-07-30 PROCEDURE — 99214 OFFICE O/P EST MOD 30 MIN: CPT | Performed by: INTERNAL MEDICINE

## 2019-07-30 ASSESSMENT — MIFFLIN-ST. JEOR: SCORE: 1115

## 2019-07-30 NOTE — PROGRESS NOTES
"Subjective     Valenciacarmencita Ye is a 48 year old female who presents to clinic today for the following health issues:     HPI:   Worsening fatigue: She reports for the past months she has been having gradually worsening fatigue, lack of endurance with activity.  She is now having issues with daytime sleepiness that has been new past few months.  She quit her job at the beginning of this month that she felt she could no longer continue doing the work.  She feels she sleeps at least 8 hours but does not feel well rested in the morning.  She does not think she awakens during the night, does not have significant restlessness, does not snore or stop breathing as far she knows.  For the past year so she is been having problems with morning headache once or twice a week, will last a few days.  Recently she has been feeling very sleepy during the day and is actually been taking naps 2-3 times a week.  This is been very unusual for her.  Sleep a few hours but does not feel rested.  She does not have very long endurance when doing activities such as walking the dog or shopping.  If she feels a little more energy, and does a little bit more activity, she feels more exhausted for couple days afterwards.    She complains of \"brain fog\" which she reports is issues with word finding difficulty, stuttering of her words, some lack of focus and concentration that she feels may be related to the fatigue.    She does feel more muscle tension, was told she might have fibromyalgia.  She gets very sensitive to light and sound, feet.  She has not had any other new focal symptoms such as night sweats, fever or chills, dyspnea, chest pains, abdominal concerns, new skin issues or joint issues  She does not complain of orthostatic lightheadedness.    Patient Active Problem List   Diagnosis     Goiter     History of systemic steroid therapy     ILD (interstitial lung disease) (H)     Palpitations     Churg-Antonina syndrome (H)     History " of eating disorder     Asthma     Anxiety     Chronic fatigue     Mannose-binding lectin deficiency     Benign essential hypertension     Moderate episode of recurrent major depressive disorder (H)     Bronchiectasis without acute exacerbation (H)     Controlled substance agreement signed     Irritable bowel syndrome without diarrhea     Urinary frequency     Urinary urgency     Bladder pain     Overactive bladder     Current Outpatient Medications   Medication Sig Dispense Refill     acetylcysteine (MUCOMYST) 20 % neb solution Take 4 mLs by nebulization 2 times daily Use with albuterol solution. Discard open bottle of Mucomyst after 96 hours. 240 mL 11     albuterol (PROVENTIL) (2.5 MG/3ML) 0.083% neb solution Take 1 vial (2.5 mg) by nebulization 2 times daily Use with Mucomyst 60 vial 11     AMITIZA 8 MCG capsule TAKE 1 CAPSULE(8 MCG) BY MOUTH TWICE DAILY 60 capsule 11     atorvastatin (LIPITOR) 10 MG tablet Take 0.5 tablets (5 mg) by mouth daily 90 tablet 2     budesonide-formoterol (SYMBICORT) 160-4.5 MCG/ACT Inhaler Inhale 2 puffs into the lungs 2 times daily 1 Inhaler 11     ciprofloxacin-dexamethasone (CIPRODEX) 0.3-0.1 % otic suspension INSTILL 3 DROPS INTO THE AFFECTED EAR TWICE DAILY AS NEEDED 7.5 mL 11     fluticasone (FLONASE) 50 MCG/ACT nasal spray USE 2 SPRAYS INTO BOTH NOSTRIL DAILY 48 mL 0     glucosamine-chondroitin (GLUCOSAMINE CHONDR COMPLEX) 500-400 MG CAPS Take 2 capsules by mouth daily.       Ipratropium-Albuterol (COMBIVENT RESPIMAT)  MCG/ACT inhaler Inhale 1 puff into the lungs 4 times daily Not to exceed 6 doses per day. 1 Inhaler 11     metoprolol succinate ER (TOPROL-XL) 25 MG 24 hr tablet TAKE 1/2 TABLET(12.5 MG) BY MOUTH DAILY 45 tablet 3     Omega-3 Fatty Acids (FISH OIL) 1200 MG capsule Take 4 capsules (4.8 g) by mouth daily       predniSONE (DELTASONE) 1 MG tablet TAKE 5 TABLETS BY MOUTH EVERY DAY AND ALTERNATING WITH 6 TABLETS THE NEXT. 200 tablet 2     vitamin B complex with  "vitamin C (VITAMIN  B COMPLEX) TABS tablet Take 1 tablet by mouth daily       vitamin D3 (CHOLECALCIFEROL) 2000 units (50 mcg) tablet Take 2 tablets by mouth daily        Social History     Tobacco Use     Smoking status: Never Smoker     Smokeless tobacco: Never Used   Substance Use Topics     Alcohol use: Yes     Alcohol/week: 0.0 oz     Comment: occ on the weekends     Drug use: No      Reviewed and updated as needed this visit by Provider         Review of Systems   No fever, chills, enlarged lymph nodes, night sweats, weight changes      Objective    /71 (BP Location: Left arm, Patient Position: Sitting, Cuff Size: Adult Regular)   Pulse 82   Temp 97.8  F (36.6  C) (Oral)   Resp 18   Ht 1.594 m (5' 2.75\")   Wt 51 kg (112 lb 6.4 oz)   LMP 02/01/2009   SpO2 97%   BMI 20.07 kg/m    Body mass index is 20.07 kg/m .  Physical Exam     Not examined        Assessment & Plan     1. Hypersomnolence  She has recent complaints of some daytime hypersomnolence, could have sleep disturbance that is aggravating her symptoms so recommend a sleep study.  No other specific symptoms.  With her other energy and fatigue issues, need to consider possibility of endocrine or metabolic problem, particularly since she is been on chronic steroids.  Is under endocrinology follow-up.  - SLEEP EVALUATION & MANAGEMENT REFERRAL - ADULT -[facility] [place]  [phone] (Age 18 and up); Future    2. Osteopenia, unspecified location  She was advised to get a new bone density test done.  Ordered.  - DX Hip/Pelvis/Spine; Future       25 minutes spent with the patient, >50% of time spent counseling about potential causes of her fatigue and lack of energy, benefits of sleep evaluation and possible management    No follow-ups on file.    Morelia Simental MD  [facility]        "

## 2019-07-30 NOTE — NURSING NOTE
"/71 (BP Location: Left arm, Patient Position: Sitting, Cuff Size: Adult Regular)   Pulse 82   Temp 97.8  F (36.6  C) (Oral)   Resp 18   Ht 1.594 m (5' 2.75\")   Wt 51 kg (112 lb 6.4 oz)   LMP 02/01/2009   SpO2 97%   BMI 20.07 kg/m  '  Patient has chronic fatigue has bothered her for years, has gotten worse last few months. Had to quit job  "

## 2019-07-31 ENCOUNTER — OFFICE VISIT (OUTPATIENT)
Dept: UROLOGY | Facility: CLINIC | Age: 49
End: 2019-07-31
Payer: COMMERCIAL

## 2019-07-31 VITALS
DIASTOLIC BLOOD PRESSURE: 71 MMHG | SYSTOLIC BLOOD PRESSURE: 112 MMHG | WEIGHT: 112 LBS | BODY MASS INDEX: 19.84 KG/M2 | HEIGHT: 63 IN | HEART RATE: 74 BPM

## 2019-07-31 DIAGNOSIS — R39.89 BLADDER PAIN: ICD-10-CM

## 2019-07-31 DIAGNOSIS — R35.0 URINARY FREQUENCY: Primary | ICD-10-CM

## 2019-07-31 DIAGNOSIS — N32.81 OVERACTIVE BLADDER: ICD-10-CM

## 2019-07-31 DIAGNOSIS — R39.15 URINARY URGENCY: ICD-10-CM

## 2019-07-31 LAB
APPEARANCE UR: CLEAR
BILIRUB UR QL: NORMAL
COLOR UR: YELLOW
GLUCOSE URINE: NORMAL MG/DL
HGB UR QL: NORMAL
KETONES UR QL: NORMAL MG/DL
LEUKOCYTE ESTERASE URINE: NORMAL
NITRITE UR QL STRIP: NORMAL
PH UR STRIP: 7 PH (ref 5–7)
PROTEIN ALBUMIN URINE: NORMAL MG/DL
SOURCE: NORMAL
SP GR UR STRIP: 1.01 (ref 1–1.03)
UROBILINOGEN UR QL STRIP: 0.2 EU/DL (ref 0.2–1)

## 2019-07-31 ASSESSMENT — ENCOUNTER SYMPTOMS
PALPITATIONS: 0
NUMBNESS: 0
NECK MASS: 0
WEIGHT GAIN: 0
HOARSE VOICE: 0
TINGLING: 1
SLEEP DISTURBANCES DUE TO BREATHING: 0
FLANK PAIN: 0
BACK PAIN: 0
DYSURIA: 0
PARALYSIS: 0
SHORTNESS OF BREATH: 0
RECTAL PAIN: 0
EXERCISE INTOLERANCE: 0
NAUSEA: 0
LIGHT-HEADEDNESS: 0
MUSCLE CRAMPS: 0
JAUNDICE: 0
DYSPNEA ON EXERTION: 0
INSOMNIA: 0
SINUS CONGESTION: 0
BLOOD IN STOOL: 0
STIFFNESS: 0
WHEEZING: 0
FATIGUE: 1
NERVOUS/ANXIOUS: 1
ORTHOPNEA: 0
SINUS PAIN: 1
COUGH DISTURBING SLEEP: 0
NECK PAIN: 0
ABDOMINAL PAIN: 0
TROUBLE SWALLOWING: 0
TASTE DISTURBANCE: 0
HYPERTENSION: 0
SMELL DISTURBANCE: 0
POLYPHAGIA: 0
SORE THROAT: 0
LOSS OF CONSCIOUSNESS: 0
INCREASED ENERGY: 1
SPEECH CHANGE: 1
SPUTUM PRODUCTION: 1
DIFFICULTY URINATING: 1
DEPRESSION: 1
ALTERED TEMPERATURE REGULATION: 0
POSTURAL DYSPNEA: 0
HYPOTENSION: 0
DISTURBANCES IN COORDINATION: 1
MUSCLE WEAKNESS: 0
MEMORY LOSS: 0
BLOATING: 1
NIGHT SWEATS: 0
HEMATURIA: 0
DIARRHEA: 0
WEIGHT LOSS: 0
DECREASED CONCENTRATION: 1
JOINT SWELLING: 0
HEARTBURN: 0
FEVER: 0
SYNCOPE: 0
PANIC: 0
COUGH: 1
HEMOPTYSIS: 0
LEG PAIN: 0
ARTHRALGIAS: 0
CONSTIPATION: 1
DECREASED APPETITE: 0
HALLUCINATIONS: 0
DIZZINESS: 1
HEADACHES: 1
SEIZURES: 0
POLYDIPSIA: 0
MYALGIAS: 1
WEAKNESS: 0
SNORES LOUDLY: 0
CHILLS: 0
VOMITING: 0
BOWEL INCONTINENCE: 0

## 2019-07-31 ASSESSMENT — MIFFLIN-ST. JEOR: SCORE: 1113.19

## 2019-07-31 ASSESSMENT — PAIN SCALES - GENERAL: PAINLEVEL: NO PAIN (0)

## 2019-07-31 NOTE — NURSING NOTE
"Chief Complaint   Patient presents with     Consult     Urinary frequency       Blood pressure 112/71, pulse 74, height 1.594 m (5' 2.75\"), weight 50.8 kg (112 lb), last menstrual period 02/01/2009, not currently breastfeeding. Body mass index is 20 kg/m .    Patient Active Problem List   Diagnosis     Goiter     History of systemic steroid therapy     ILD (interstitial lung disease) (H)     Palpitations     Churg-Antonina syndrome (H)     History of eating disorder     Asthma     Anxiety     Chronic fatigue     Mannose-binding lectin deficiency     Benign essential hypertension     Moderate episode of recurrent major depressive disorder (H)     Bronchiectasis without acute exacerbation (H)     Controlled substance agreement signed     Irritable bowel syndrome without diarrhea       Allergies   Allergen Reactions     Colistimethate Anaphylaxis     Colymycin M [Colistimethate Sodium] Anaphylaxis     Tamiflu [Oseltamivir]      Gums Blister up     Clindamycin Rash     Zithromax [Azithromycin]      Heart palpitation       Current Outpatient Medications   Medication Sig Dispense Refill     acetylcysteine (MUCOMYST) 20 % neb solution Take 4 mLs by nebulization 2 times daily Use with albuterol solution. Discard open bottle of Mucomyst after 96 hours. 240 mL 11     albuterol (PROVENTIL) (2.5 MG/3ML) 0.083% neb solution Take 1 vial (2.5 mg) by nebulization 2 times daily Use with Mucomyst 60 vial 11     AMITIZA 8 MCG capsule TAKE 1 CAPSULE(8 MCG) BY MOUTH TWICE DAILY 60 capsule 11     atorvastatin (LIPITOR) 10 MG tablet Take 0.5 tablets (5 mg) by mouth daily 90 tablet 2     budesonide-formoterol (SYMBICORT) 160-4.5 MCG/ACT Inhaler Inhale 2 puffs into the lungs 2 times daily 1 Inhaler 11     ciprofloxacin-dexamethasone (CIPRODEX) 0.3-0.1 % otic suspension INSTILL 3 DROPS INTO THE AFFECTED EAR TWICE DAILY AS NEEDED 7.5 mL 11     fluticasone (FLONASE) 50 MCG/ACT nasal spray USE 2 SPRAYS INTO BOTH NOSTRIL DAILY 48 mL 0     " glucosamine-chondroitin (GLUCOSAMINE CHONDR COMPLEX) 500-400 MG CAPS Take 2 capsules by mouth daily.       Ipratropium-Albuterol (COMBIVENT RESPIMAT)  MCG/ACT inhaler Inhale 1 puff into the lungs 4 times daily Not to exceed 6 doses per day. 1 Inhaler 11     metoprolol succinate ER (TOPROL-XL) 25 MG 24 hr tablet TAKE 1/2 TABLET(12.5 MG) BY MOUTH DAILY 45 tablet 3     Omega-3 Fatty Acids (FISH OIL) 1200 MG capsule Take 4 capsules (4.8 g) by mouth daily       predniSONE (DELTASONE) 1 MG tablet TAKE 5 TABLETS BY MOUTH EVERY DAY AND ALTERNATING WITH 6 TABLETS THE NEXT. 200 tablet 2     vitamin B complex with vitamin C (VITAMIN  B COMPLEX) TABS tablet Take 1 tablet by mouth daily       vitamin D3 (CHOLECALCIFEROL) 2000 units (50 mcg) tablet Take 2 tablets by mouth daily         Social History     Tobacco Use     Smoking status: Never Smoker     Smokeless tobacco: Never Used   Substance Use Topics     Alcohol use: Yes     Alcohol/week: 0.0 oz     Comment: occ on the weekends     Drug use: No       Ashley Osborn, EMT  7/31/2019  9:29 AM

## 2019-07-31 NOTE — PROGRESS NOTES
"CC: intermittent urinary frequency, urgency, bladder pain    HPI:  Valencia Ye is a 48 year old female asked to be seen in consultation by Dr. Simental for the above.  This problem has been going on since 2018 and has been getting worse.  She complains of significant bladder pain that waxes and wanes; a few weeks ago it was so bad it hurt to walk. She has urinary urgency every day. She will feel the need to urinate 15 minutes after she has gone; if she ignores this it goes away. It is not associated with incontinence. She has tried pyridium for her bladder pain with relief.  The patient voids q1hour, nocturia X 1 occasionally.  She drinks a lot of water, tea, stevia-sweetened beverages.  She denies any hematuria, intermittency, or any recent hx of UTI's or stones. Occasional hesitancy, dysuria, feeling of incomplete emptying. She used to have recurrent UTIs in the past but has not had one in a while.    The patient has IBS with constipation and endorses splinting.  She is sexually active and denies any dyspareunia or pelvic pain.   She has long-standing rectocele that she has to splint.  She has no neurological or balance problems. She was evaluated by neurology in February for feelings of \"disequilibrium\", MRI showed no acute abnormalities. PMH significant for IBS, depression, and eosinophilic granuloma with polyangiitis and bronchiectasis, on low dose prednisone.    Obstetric Hx:  She is .  The weight of her largest baby was 7 lbs 8oz.  Babies were vaginal deliveries, has twins delivered via .  S/p hysterectomy 2008 for dysplasia. Not on HRT    Past Medical History:   Diagnosis Date     Anxiety      Aortitis (H)      Asthma     associated with Churg Antonina syndrome     Bronchiectasis (H)      Cerebral infarction (H) 1990    Very small, caused small permanent optical migraine     Chronic sinusitis      Churg-Antonina syndrome (H)     1990     Conductive hearing loss      " Depressive disorder      Eustachian tube dysfunction      Goiter      Hearing loss, conductive      Heart rate problem      Hypertension      Hypocalcemia      Immunosuppression (H)      Irritable bowel syndrome      Major depressive disorder      Mannose-binding lectin deficiency      Nonspecific abnormal results of thyroid function study      Osteoporosis      Pericarditis      and      Pneumonia     4/23/10     Recurrent otitis media      Recurrent UTI      Rheumatoid vasculitis (H)      Sinusitis, chronic      Steroid long-term use      Tinnitus      Varicose veins of leg with swelling        Past Surgical History:   Procedure Laterality Date      SECTION       COLONOSCOPY Left 2014    Procedure: COMBINED COLONOSCOPY, SINGLE OR MULTIPLE BIOPSY/POLYPECTOMY BY BIOPSY;  Surgeon: Js Pinedo MD;  Location:  GI     ENT SURGERY       GENITOURINARY SURGERY       HC COLP CERVIX/UPPER VAGINA W LOOP ELEC BX CERVIX       HEAD & NECK SURGERY       HYSTERECTOMY  2009    without oopherectomy     HYSTERECTOMY, PAP NO LONGER INDICATED      cervix removed      PE TUBES       PICC INSERTION  10/10/2013    5fr DL PASV PICC, 43cm (1cm external) in the L basilic vein w/ tip in the low SVC.     PICC INSERTION Left 2017    5fr DL BioFlo PICC, 42cm (3cm external) in the L lateral brachial vein w/ tip in the SVC RA junction.     SINUS SURGERY       TUBAL LIGATION       TYMPANOPLASTY       Meds, Allergies, FHx and SHx reviewed per nurse's intake note.    ROS is negative on a 14 point scale except for below.  All other positive and pertinent information is mentioned in the HPI.    PEx:   Last menstrual period 2009, not currently breastfeeding.  Data Unavailable, Body mass index is 20 kg/m ., 0 lbs 0 oz  Gen appearance:  Well groomed  HEENT:  EOMI, AT NC  Psych:  Normal Affect  Neuro:  A/O X 3  Skin:  Warm to touch  Resp:  No increased respiratory effort  Vasc:  RRR  lymph:  No  "LE edema  Musk:  Full ROM in extremities  :  deferred    RU: 20 on bladder scan by nursing.    UA: UA RESULTS pending, dipstick normal.  Recent Labs   Lab Test 06/10/19  1008 05/23/19  1050   COLOR Yellow  --    APPEARANCE Clear  --    URINEGLC Negative  --    URINEBILI Negative  --    URINEKETONE Negative  --    SG <=1.005  --    UBLD Negative  --    URINEPH 5.0  --    PROTEIN Negative  --    UROBILINOGEN 0.2  --    NITRITE Negative  --    LEUKEST Negative  --    RBCU  --  O - 2   WBCU  --  0 - 5       Orders Only on 06/13/2019   Component Date Value Ref Range Status     FVC-Pred 06/13/2019 3.04  L Preliminary     FVC-Pre 06/13/2019 3.92  L Preliminary     FVC-%Pred-Pre 06/13/2019 128  % Preliminary     FEV1-Pre 06/13/2019 2.51  L Preliminary     FEV1-%Pred-Pre 06/13/2019 100  % Preliminary     FEV1FVC-Pred 06/13/2019 82  % Preliminary     FEV1FVC-Pre 06/13/2019 64  % Preliminary     FEFMax-Pred 06/13/2019 6.55  L/sec Preliminary     FEFMax-Pre 06/13/2019 5.65  L/sec Preliminary     FEFMax-%Pred-Pre 06/13/2019 86  % Preliminary     FEF2575-Pred 06/13/2019 2.65  L/sec Preliminary     FEF2575-Pre 06/13/2019 1.32  L/sec Preliminary     IQS1612-%Pred-Pre 06/13/2019 49  % Preliminary     ExpTime-Pre 06/13/2019 10.43  sec Preliminary     FIFMax-Pre 06/13/2019 4.82  L/sec Preliminary     FEV1FEV6-Pred 06/13/2019 83  % Preliminary     FEV1FEV6-Pre 06/13/2019 66  % Preliminary       ASSESSMENT and PLAN:  This is a 48 year old female with urinary urgency and intermittent flares of pain unrelated to food, drink, or other activity that she can identify.  Different management options were discussed with the patient including observation, avoiding bladder irritants such as artificial sweeteners, pelvic floor physical therapy, PTNS, medications. Discussed the need for cystoscopy during a \"flare\" to evaluate her bladder, asked patient to call the next time this happens to schedule. The patient has elected to proceed with pelvic " floor physical therapy  - schedule appointment at time of next flare for cystoscopy  - f/u appointment in 3 months for routine cystoscopy  - pelvic floor PT referral      Thank you for allowing me to participate in Ms. Katie Ye's care.  I will keep you updated on her progress.    Lisa Snow MD  Urology Resident    Patient was seen, evaluated and plan was formulated in conjunction with me and I agree with the above.  Suze Laughlin MD    Answers for HPI/ROS submitted by the patient on 7/31/2019   General Symptoms: Yes  Skin Symptoms: No  HENT Symptoms: Yes  EYE SYMPTOMS: No  HEART SYMPTOMS: Yes  LUNG SYMPTOMS: Yes  INTESTINAL SYMPTOMS: Yes  URINARY SYMPTOMS: Yes  GYNECOLOGIC SYMPTOMS: No  BREAST SYMPTOMS: No  SKELETAL SYMPTOMS: Yes  BLOOD SYMPTOMS: No  NERVOUS SYSTEM SYMPTOMS: Yes  MENTAL HEALTH SYMPTOMS: Yes  Fever: No  Loss of appetite: No  Weight loss: No  Weight gain: No  Fatigue: Yes  Night sweats: No  Chills: No  Increased stress: No  Excessive hunger: No  Excessive thirst: No  Feeling hot or cold when others believe the temperature is normal: No  Loss of height: No  Post-operative complications: No  Surgical site pain: No  Hallucinations: No  Change in or Loss of Energy: Yes  Hyperactivity: No  Confusion: Yes  Ear pain: No  Ear discharge: Yes  Hearing loss: Yes  Tinnitus: Yes  Nosebleeds: No  Congestion: No  Sinus pain: Yes  Trouble swallowing: No   Voice hoarseness: No  Mouth sores: No  Sore throat: No  Tooth pain: No  Gum tenderness: No  Bleeding gums: No  Change in taste: No  Change in sense of smell: No  Dry mouth: No  Hearing aid used: No  Neck lump: No  Cough: Yes  Sputum or phlegm: Yes  Coughing up blood: No  Difficulty breating or shortness of breath: No  Snoring: No  Wheezing: No  Difficulty breathing on exertion: No  Nighttime Cough: No  Difficulty breathing when lying flat: No  Chest pain or pressure: No  Fast or irregular heartbeat: No  Pain in legs with walking:  No  Trouble breathing while lying down: No  Fingers or toes appear blue: No  High blood pressure: No  Low blood pressure: No  Fainting: No  Murmurs: Yes  Pacemaker: No  Varicose veins: Yes  Edema or swelling: Yes  Wake up at night with shortness of breath: No  Light-headedness: No  Exercise intolerance: No  Heart burn or indigestion: No  Nausea: No  Vomiting: No  Abdominal pain: No  Bloating: Yes  Constipation: Yes  Diarrhea: No  Blood in stool: No  Black stools: No  Rectal or Anal pain: No  Fecal incontinence: No  Yellowing of skin or eyes: No  Vomit with blood: No  Change in stools: No  Trouble holding urine or incontinence: No  Pain or burning: No  Trouble starting or stopping: No  Increased frequency of urination: Yes  Blood in urine: No  Decreased frequency of urination: No  Frequent nighttime urination: No  Flank pain: No  Difficulty emptying bladder: Yes  Back pain: No  Muscle aches: Yes  Neck pain: No  Swollen joints: No  Joint pain: No  Bone pain: No  Muscle cramps: No  Muscle weakness: No  Joint stiffness: No  Bone fracture: No  Trouble with coordination: Yes  Dizziness or trouble with balance: Yes  Fainting or black-out spells: No  Memory loss: No  Headache: Yes  Seizures: No  Speech problems: Yes  Tingling: Yes  Weakness: No  Difficulty walking: No  Paralysis: No  Numbness: No  Nervous or Anxious: Yes  Depression: Yes  Trouble sleeping: No  Trouble thinking or concentrating: Yes  Mood changes: Yes  Panic attacks: No

## 2019-07-31 NOTE — LETTER
"2019       RE: Valencia Ye  [address] [facility] [address]     Dear Colleague,    Thank you for referring your patient, Valencia Ye, to the [facility] UROLOGY AND INST FOR PROSTATE AND UROLOGIC CANCERS at [facility]. Please see a copy of my visit note below.    CC: intermittent urinary frequency, urgency, bladder pain    HPI:  Valencia Ye is a 48 year old female asked to be seen in consultation by Dr. Simental for the above.  This problem has been going on since 2018 and has been getting worse.  She complains of significant bladder pain that waxes and wanes; a few weeks ago it was so bad it hurt to walk. She has urinary urgency every day. She will feel the need to urinate 15 minutes after she has gone; if she ignores this it goes away. It is not associated with incontinence. She has tried pyridium for her bladder pain with relief.  The patient voids q1hour, nocturia X 1 occasionally.  She drinks a lot of water, tea, stevia-sweetened beverages.  She denies any hematuria, intermittency, or any recent hx of UTI's or stones. Occasional hesitancy, dysuria, feeling of incomplete emptying. She used to have recurrent UTIs in the past but has not had one in a while.    The patient has IBS with constipation and endorses splinting.  She is sexually active and denies any dyspareunia or pelvic pain.   She has long-standing rectocele that she has to splint.  She has no neurological or balance problems. She was evaluated by neurology in February for feelings of \"disequilibrium\", MRI showed no acute abnormalities. PMH significant for IBS, depression, and eosinophilic granuloma with polyangiitis and bronchiectasis, on low dose prednisone.    Obstetric Hx:  She is .  The weight of her largest baby was 7 lbs 8oz.  Babies were vaginal deliveries, has twins delivered via .  S/p hysterectomy 2008 for dysplasia. Not on HRT    Past Medical History: "   Diagnosis Date     Anxiety      Aortitis (H)      Asthma     associated with Churg Antonina syndrome     Bronchiectasis (H)      Cerebral infarction (H) 1990    Very small, caused small permanent optical migraine     Chronic sinusitis      Churg-Antonina syndrome (H)     dx      Conductive hearing loss      Depressive disorder      Eustachian tube dysfunction      Goiter      Hearing loss, conductive      Heart rate problem      Hypertension      Hypocalcemia      Immunosuppression (H)      Irritable bowel syndrome      Major depressive disorder      Mannose-binding lectin deficiency      Nonspecific abnormal results of thyroid function study      Osteoporosis      Pericarditis      and      Pneumonia     4/23/10     Recurrent otitis media      Recurrent UTI      Rheumatoid vasculitis (H)      Sinusitis, chronic      Steroid long-term use      Tinnitus      Varicose veins of leg with swelling        Past Surgical History:   Procedure Laterality Date      SECTION       COLONOSCOPY Left 2014    Procedure: COMBINED COLONOSCOPY, SINGLE OR MULTIPLE BIOPSY/POLYPECTOMY BY BIOPSY;  Surgeon: Js Pinedo MD;  Location:  GI     ENT SURGERY       GENITOURINARY SURGERY       HC COLP CERVIX/UPPER VAGINA W LOOP ELEC BX CERVIX       HEAD & NECK SURGERY       HYSTERECTOMY  2009    without oopherectomy     HYSTERECTOMY, PAP NO LONGER INDICATED      cervix removed      PE TUBES       PICC INSERTION  10/10/2013    5fr DL PASV PICC, 43cm (1cm external) in the L basilic vein w/ tip in the low SVC.     PICC INSERTION Left 2017    5fr DL BioFlo PICC, 42cm (3cm external) in the L lateral brachial vein w/ tip in the SVC RA junction.     SINUS SURGERY       TUBAL LIGATION       TYMPANOPLASTY       Meds, Allergies, FHx and SHx reviewed per nurse's intake note.    ROS is negative on a 14 point scale except for below.  All other positive and pertinent information is mentioned in  the HPI.    PEx:   Last menstrual period 02/01/2009, not currently breastfeeding.  Data Unavailable, Body mass index is 20 kg/m ., 0 lbs 0 oz  Gen appearance:  Well groomed  HEENT:  EOMI, AT NC  Psych:  Normal Affect  Neuro:  A/O X 3  Skin:  Warm to touch  Resp:  No increased respiratory effort  Vasc:  RRR  lymph:  No LE edema  Musk:  Full ROM in extremities  :  deferred    RU: 20 on bladder scan by nursing.    UA: UA RESULTS pending, dipstick normal.  Recent Labs   Lab Test 06/10/19  1008 05/23/19  1050   COLOR Yellow  --    APPEARANCE Clear  --    URINEGLC Negative  --    URINEBILI Negative  --    URINEKETONE Negative  --    SG <=1.005  --    UBLD Negative  --    URINEPH 5.0  --    PROTEIN Negative  --    UROBILINOGEN 0.2  --    NITRITE Negative  --    LEUKEST Negative  --    RBCU  --  O - 2   WBCU  --  0 - 5       Orders Only on 06/13/2019   Component Date Value Ref Range Status     FVC-Pred 06/13/2019 3.04  L Preliminary     FVC-Pre 06/13/2019 3.92  L Preliminary     FVC-%Pred-Pre 06/13/2019 128  % Preliminary     FEV1-Pre 06/13/2019 2.51  L Preliminary     FEV1-%Pred-Pre 06/13/2019 100  % Preliminary     FEV1FVC-Pred 06/13/2019 82  % Preliminary     FEV1FVC-Pre 06/13/2019 64  % Preliminary     FEFMax-Pred 06/13/2019 6.55  L/sec Preliminary     FEFMax-Pre 06/13/2019 5.65  L/sec Preliminary     FEFMax-%Pred-Pre 06/13/2019 86  % Preliminary     FEF2575-Pred 06/13/2019 2.65  L/sec Preliminary     FEF2575-Pre 06/13/2019 1.32  L/sec Preliminary     MWS6512-%Pred-Pre 06/13/2019 49  % Preliminary     ExpTime-Pre 06/13/2019 10.43  sec Preliminary     FIFMax-Pre 06/13/2019 4.82  L/sec Preliminary     FEV1FEV6-Pred 06/13/2019 83  % Preliminary     FEV1FEV6-Pre 06/13/2019 66  % Preliminary       ASSESSMENT and PLAN:  This is a 48 year old female with urinary urgency and intermittent flares of pain unrelated to food, drink, or other activity that she can identify.  Different management options were discussed with the  "patient including observation, avoiding bladder irritants such as artificial sweeteners, pelvic floor physical therapy, PTNS, medications. Discussed the need for cystoscopy during a \"flare\" to evaluate her bladder, asked patient to call the next time this happens to schedule. The patient has elected to proceed with pelvic floor physical therapy  - schedule appointment at time of next flare for cystoscopy  - f/u appointment in 3 months for routine cystoscopy  - pelvic floor PT referral      Thank you for allowing me to participate in Ms. Katie Ye's care.  I will keep you updated on her progress.    Lisa Snow MD  Urology Resident    Patient was seen, evaluated and plan was formulated in conjunction with me and I agree with the above.  Suze Laughlin MD    Answers for HPI/ROS submitted by the patient on 7/31/2019   General Symptoms: Yes  Skin Symptoms: No  HENT Symptoms: Yes  EYE SYMPTOMS: No  HEART SYMPTOMS: Yes  LUNG SYMPTOMS: Yes  INTESTINAL SYMPTOMS: Yes  URINARY SYMPTOMS: Yes  GYNECOLOGIC SYMPTOMS: No  BREAST SYMPTOMS: No  SKELETAL SYMPTOMS: Yes  BLOOD SYMPTOMS: No  NERVOUS SYSTEM SYMPTOMS: Yes  MENTAL HEALTH SYMPTOMS: Yes  Fever: No  Loss of appetite: No  Weight loss: No  Weight gain: No  Fatigue: Yes  Night sweats: No  Chills: No  Increased stress: No  Excessive hunger: No  Excessive thirst: No  Feeling hot or cold when others believe the temperature is normal: No  Loss of height: No  Post-operative complications: No  Surgical site pain: No  Hallucinations: No  Change in or Loss of Energy: Yes  Hyperactivity: No  Confusion: Yes  Ear pain: No  Ear discharge: Yes  Hearing loss: Yes  Tinnitus: Yes  Nosebleeds: No  Congestion: No  Sinus pain: Yes  Trouble swallowing: No   Voice hoarseness: No  Mouth sores: No  Sore throat: No  Tooth pain: No  Gum tenderness: No  Bleeding gums: No  Change in taste: No  Change in sense of smell: No  Dry mouth: No  Hearing aid used: No  Neck lump: No  Cough: " Yes  Sputum or phlegm: Yes  Coughing up blood: No  Difficulty breating or shortness of breath: No  Snoring: No  Wheezing: No  Difficulty breathing on exertion: No  Nighttime Cough: No  Difficulty breathing when lying flat: No  Chest pain or pressure: No  Fast or irregular heartbeat: No  Pain in legs with walking: No  Trouble breathing while lying down: No  Fingers or toes appear blue: No  High blood pressure: No  Low blood pressure: No  Fainting: No  Murmurs: Yes  Pacemaker: No  Varicose veins: Yes  Edema or swelling: Yes  Wake up at night with shortness of breath: No  Light-headedness: No  Exercise intolerance: No  Heart burn or indigestion: No  Nausea: No  Vomiting: No  Abdominal pain: No  Bloating: Yes  Constipation: Yes  Diarrhea: No  Blood in stool: No  Black stools: No  Rectal or Anal pain: No  Fecal incontinence: No  Yellowing of skin or eyes: No  Vomit with blood: No  Change in stools: No  Trouble holding urine or incontinence: No  Pain or burning: No  Trouble starting or stopping: No  Increased frequency of urination: Yes  Blood in urine: No  Decreased frequency of urination: No  Frequent nighttime urination: No  Flank pain: No  Difficulty emptying bladder: Yes  Back pain: No  Muscle aches: Yes  Neck pain: No  Swollen joints: No  Joint pain: No  Bone pain: No  Muscle cramps: No  Muscle weakness: No  Joint stiffness: No  Bone fracture: No  Trouble with coordination: Yes  Dizziness or trouble with balance: Yes  Fainting or black-out spells: No  Memory loss: No  Headache: Yes  Seizures: No  Speech problems: Yes  Tingling: Yes  Weakness: No  Difficulty walking: No  Paralysis: No  Numbness: No  Nervous or Anxious: Yes  Depression: Yes  Trouble sleeping: No  Trouble thinking or concentrating: Yes  Mood changes: Yes  Panic attacks: No      Again, thank you for allowing me to participate in the care of your patient.      Sincerely,    Suze Laughlin MD

## 2019-08-10 DIAGNOSIS — J47.9 BRONCHIECTASIS WITHOUT COMPLICATION (H): ICD-10-CM

## 2019-08-13 RX ORDER — PREDNISONE 1 MG/1
TABLET ORAL
Qty: 200 TABLET | Refills: 0 | Status: SHIPPED | OUTPATIENT
Start: 2019-08-13 | End: 2019-09-22

## 2019-08-13 NOTE — TELEPHONE ENCOUNTER
"Routing refill request to provider for review/approval because:  Drug not on the G refill protocol     This was prescribed on 5/2/19 for \"bronchiectasis without complication\". Please advise if this should be triaged further. Last office visit 7/30/19    "

## 2019-09-09 ENCOUNTER — OFFICE VISIT (OUTPATIENT)
Dept: INTERNAL MEDICINE | Facility: CLINIC | Age: 49
End: 2019-09-09
Payer: COMMERCIAL

## 2019-09-09 VITALS
TEMPERATURE: 98.1 F | BODY MASS INDEX: 19.99 KG/M2 | HEART RATE: 85 BPM | OXYGEN SATURATION: 98 % | HEIGHT: 63 IN | SYSTOLIC BLOOD PRESSURE: 110 MMHG | WEIGHT: 112.8 LBS | RESPIRATION RATE: 18 BRPM | DIASTOLIC BLOOD PRESSURE: 74 MMHG

## 2019-09-09 DIAGNOSIS — M77.12 LATERAL EPICONDYLITIS OF LEFT ELBOW: Primary | ICD-10-CM

## 2019-09-09 DIAGNOSIS — M77.02 MEDIAL EPICONDYLITIS OF ELBOW, LEFT: ICD-10-CM

## 2019-09-09 DIAGNOSIS — Z23 NEED FOR PROPHYLACTIC VACCINATION AND INOCULATION AGAINST INFLUENZA: ICD-10-CM

## 2019-09-09 PROCEDURE — 90686 IIV4 VACC NO PRSV 0.5 ML IM: CPT | Performed by: INTERNAL MEDICINE

## 2019-09-09 PROCEDURE — 99213 OFFICE O/P EST LOW 20 MIN: CPT | Performed by: INTERNAL MEDICINE

## 2019-09-09 PROCEDURE — 90471 IMMUNIZATION ADMIN: CPT | Performed by: INTERNAL MEDICINE

## 2019-09-09 ASSESSMENT — MIFFLIN-ST. JEOR: SCORE: 1116.82

## 2019-09-09 NOTE — PROGRESS NOTES
Subjective     Valencia Ye is a 48 year old female who presents to clinic today for the following health issues:    HPI   Left arm pain: She reports that almost 2 months ago, she started to feel aching pain in both arms, focus primarily around the elbow but with radiation into the lower arm and upper arm.  The right has gradually gone away after retiring but the left has continued to bother her.  It is aching pain that varies a lot, sometimes there is no pain, sometimes soreness with use or certain movements but sometimes it makes all the way up and down the arm.  It is at its worst at the lateral epicondyle area but can be medial.  It seems to bother her more when she is overly with lifting up with her hand with the palmar hand down, bothers with a lot of bending at the elbow, pushing and pulling with the forearm.  She is not really having pain up in the shoulder.  She has some neck and upper back pains that are stable.  She has infrequent tingling sensation down the arm that is usually very brief but tends to be present when the pain is more pronounced.    She is not on the computer very often, she does not spend a lot of time on smart phone, she uses a tablet she is usually using her right hand.  It will bother her with things like cooking projects but has not been doing much of that lately.  She has used some ibuprofen but that tends to bother her stomach so she has not used it regularly.  She did start taking tumeric supplement to see if that would help and is not sure.    She also stopped her atorvastatin about a month ago to see if that would help, does not think that has made any difference in her symptoms.    She will be having a sleep study next month, stable fatigue          Patient Active Problem List   Diagnosis     Goiter     History of systemic steroid therapy     ILD (interstitial lung disease) (H)     Palpitations     Churg-Antonina syndrome (H)     History of eating disorder     Asthma      Anxiety     Chronic fatigue     Mannose-binding lectin deficiency     Benign essential hypertension     Moderate episode of recurrent major depressive disorder (H)     Bronchiectasis without acute exacerbation (H)     Controlled substance agreement signed     Irritable bowel syndrome without diarrhea     Bladder pain     Overactive bladder     Current Outpatient Medications   Medication Sig Dispense Refill     acetylcysteine (MUCOMYST) 20 % neb solution Take 4 mLs by nebulization 2 times daily Use with albuterol solution. Discard open bottle of Mucomyst after 96 hours. 240 mL 11     albuterol (PROVENTIL) (2.5 MG/3ML) 0.083% neb solution Take 1 vial (2.5 mg) by nebulization 2 times daily Use with Mucomyst 60 vial 11     AMITIZA 8 MCG capsule TAKE 1 CAPSULE(8 MCG) BY MOUTH TWICE DAILY 60 capsule 11     atorvastatin (LIPITOR) 10 MG tablet Take 0.5 tablets (5 mg) by mouth daily 90 tablet 2     budesonide-formoterol (SYMBICORT) 160-4.5 MCG/ACT Inhaler Inhale 2 puffs into the lungs 2 times daily 1 Inhaler 11     ciprofloxacin-dexamethasone (CIPRODEX) 0.3-0.1 % otic suspension INSTILL 3 DROPS INTO THE AFFECTED EAR TWICE DAILY AS NEEDED 7.5 mL 11     fluticasone (FLONASE) 50 MCG/ACT nasal spray USE 2 SPRAYS INTO BOTH NOSTRIL DAILY 48 mL 0     glucosamine-chondroitin (GLUCOSAMINE CHONDR COMPLEX) 500-400 MG CAPS Take 2 capsules by mouth daily.       Ipratropium-Albuterol (COMBIVENT RESPIMAT)  MCG/ACT inhaler Inhale 1 puff into the lungs 4 times daily Not to exceed 6 doses per day. 1 Inhaler 11     metoprolol succinate ER (TOPROL-XL) 25 MG 24 hr tablet TAKE 1/2 TABLET(12.5 MG) BY MOUTH DAILY 45 tablet 3     Omega-3 Fatty Acids (FISH OIL) 1200 MG capsule Take 4 capsules (4.8 g) by mouth daily       predniSONE (DELTASONE) 1 MG tablet TAKE 5 TABLETS BY MOUTH EVERY DAY AND ALTERNATING WITH 6 TABLETS THE NEXT. 200 tablet 0     vitamin B complex with vitamin C (VITAMIN  B COMPLEX) TABS tablet Take 1 tablet by mouth daily        "vitamin D3 (CHOLECALCIFEROL) 2000 units (50 mcg) tablet Take 2 tablets by mouth daily       Social History     Tobacco Use     Smoking status: Never Smoker     Smokeless tobacco: Never Used   Substance Use Topics     Alcohol use: Yes     Alcohol/week: 0.0 oz     Comment: occ on the weekends     Drug use: No      Reviewed and updated as needed this visit by Provider         Review of Systems   No fever, chills, pain radiating from the neck down the arm, pain with movement of the shoulder, no pain at the wrist or hand joints      Objective    /74 (BP Location: Left arm, Patient Position: Sitting, Cuff Size: Adult Regular)   Pulse 85   Temp 98.1  F (36.7  C) (Oral)   Resp 18   Ht 1.594 m (5' 2.75\")   Wt 51.2 kg (112 lb 12.8 oz)   LMP 02/01/2009   SpO2 98%   BMI 20.14 kg/m    Body mass index is 20.14 kg/m .  Physical Exam     Left shoulder: No tenderness at the shoulder joints or tendons, full range of motion without pain  Left elbow: No tenderness at the joint, there is some mild tenderness to medial lateral epicondyles, moderate tenderness in the muscles of the extensor forearm, mild tenderness of the muscles of the flexor forearm  There is pain with resisted movements at the elbows  No tenderness at the wrist joints or hand joints  There is a negative Tinel's at the elbow        Assessment & Plan     1. Lateral epicondylitis of left elbow  Most of her pain seems to be localized on the lateral epicondyles, likely this represents epicondylitis.  Is less likely to be a joint problem.  Recommend consider wearing a tennis elbow brace especially when doing any repetitive activity, icing, given a handout with some exercises.  I gave her a handout with exercises.  I suspect is probably not due to the cholesterol medication but she can restart it and see if anything worsens.  If it continues to have pain despite doing exercises, would refer to orthopedics to evaluate to be sure there is not some other " cause    2. Medial epicondylitis of elbow, left  Plan as above         No follow-ups on file.    Morelia Simental MD  [facility]

## 2019-09-09 NOTE — NURSING NOTE
"/74 (BP Location: Left arm, Patient Position: Sitting, Cuff Size: Adult Regular)   Pulse 85   Temp 98.1  F (36.7  C) (Oral)   Resp 18   Ht 1.594 m (5' 2.75\")   Wt 51.2 kg (112 lb 12.8 oz)   LMP 02/01/2009   SpO2 98%   BMI 20.14 kg/m    Pain in left arm and intermittent tingling in left arm, interferes with ability to get things done.Overall pain and muscular fatigue.  "

## 2019-09-18 ENCOUNTER — MYC MEDICAL ADVICE (OUTPATIENT)
Dept: INTERNAL MEDICINE | Facility: CLINIC | Age: 49
End: 2019-09-18

## 2019-09-18 DIAGNOSIS — Z86.0100 HISTORY OF COLONIC POLYPS: Primary | ICD-10-CM

## 2019-09-19 NOTE — TELEPHONE ENCOUNTER
See her [employer] message.     Pended order.     Notes Recorded by Saul White MD on 12/21/2014 at 9:52 PM  Precancerous polyp removed.  Next colon in 5 years.

## 2019-10-02 ENCOUNTER — OFFICE VISIT (OUTPATIENT)
Dept: FAMILY MEDICINE | Facility: CLINIC | Age: 49
End: 2019-10-02
Attending: FAMILY MEDICINE
Payer: COMMERCIAL

## 2019-10-02 VITALS
BODY MASS INDEX: 19.76 KG/M2 | HEIGHT: 63 IN | DIASTOLIC BLOOD PRESSURE: 72 MMHG | HEART RATE: 69 BPM | SYSTOLIC BLOOD PRESSURE: 120 MMHG | WEIGHT: 111.5 LBS

## 2019-10-02 DIAGNOSIS — M54.2 NECK PAIN: Primary | ICD-10-CM

## 2019-10-02 DIAGNOSIS — R53.82 CHRONIC FATIGUE: ICD-10-CM

## 2019-10-02 LAB
ANION GAP SERPL CALCULATED.3IONS-SCNC: 7 MMOL/L (ref 3–14)
BASOPHILS # BLD AUTO: 0.1 10E9/L (ref 0–0.2)
BASOPHILS NFR BLD AUTO: 0.6 %
BUN SERPL-MCNC: 14 MG/DL (ref 7–30)
CALCIUM SERPL-MCNC: 8.9 MG/DL (ref 8.5–10.1)
CHLORIDE SERPL-SCNC: 105 MMOL/L (ref 94–109)
CO2 SERPL-SCNC: 29 MMOL/L (ref 20–32)
CREAT SERPL-MCNC: 0.74 MG/DL (ref 0.52–1.04)
DIFFERENTIAL METHOD BLD: ABNORMAL
EOSINOPHIL # BLD AUTO: 0.1 10E9/L (ref 0–0.7)
EOSINOPHIL NFR BLD AUTO: 0.6 %
ERYTHROCYTE [DISTWIDTH] IN BLOOD BY AUTOMATED COUNT: 12.7 % (ref 10–15)
GFR SERPL CREATININE-BSD FRML MDRD: >90 ML/MIN/{1.73_M2}
GLUCOSE SERPL-MCNC: 100 MG/DL (ref 70–99)
HBA1C MFR BLD: 5.2 % (ref 0–5.6)
HCT VFR BLD AUTO: 40.4 % (ref 35–47)
HGB BLD-MCNC: 13.6 G/DL (ref 11.7–15.7)
IMM GRANULOCYTES # BLD: 0 10E9/L (ref 0–0.4)
IMM GRANULOCYTES NFR BLD: 0.4 %
LYMPHOCYTES # BLD AUTO: 0.8 10E9/L (ref 0.8–5.3)
LYMPHOCYTES NFR BLD AUTO: 8.9 %
MCH RBC QN AUTO: 33.9 PG (ref 26.5–33)
MCHC RBC AUTO-ENTMCNC: 33.7 G/DL (ref 31.5–36.5)
MCV RBC AUTO: 101 FL (ref 78–100)
MONOCYTES # BLD AUTO: 0.3 10E9/L (ref 0–1.3)
MONOCYTES NFR BLD AUTO: 3.4 %
NEUTROPHILS # BLD AUTO: 7.4 10E9/L (ref 1.6–8.3)
NEUTROPHILS NFR BLD AUTO: 86.1 %
NRBC # BLD AUTO: 0 10*3/UL
NRBC BLD AUTO-RTO: 0 /100
PLATELET # BLD AUTO: 320 10E9/L (ref 150–450)
POTASSIUM SERPL-SCNC: 3.7 MMOL/L (ref 3.4–5.3)
RBC # BLD AUTO: 4.01 10E12/L (ref 3.8–5.2)
SODIUM SERPL-SCNC: 141 MMOL/L (ref 133–144)
WBC # BLD AUTO: 8.5 10E9/L (ref 4–11)

## 2019-10-02 PROCEDURE — 83036 HEMOGLOBIN GLYCOSYLATED A1C: CPT | Performed by: FAMILY MEDICINE

## 2019-10-02 PROCEDURE — 82306 VITAMIN D 25 HYDROXY: CPT | Performed by: FAMILY MEDICINE

## 2019-10-02 PROCEDURE — 36415 COLL VENOUS BLD VENIPUNCTURE: CPT | Performed by: FAMILY MEDICINE

## 2019-10-02 PROCEDURE — 85025 COMPLETE CBC W/AUTO DIFF WBC: CPT | Performed by: FAMILY MEDICINE

## 2019-10-02 PROCEDURE — 80048 BASIC METABOLIC PNL TOTAL CA: CPT | Performed by: FAMILY MEDICINE

## 2019-10-02 ASSESSMENT — ANXIETY QUESTIONNAIRES
5. BEING SO RESTLESS THAT IT IS HARD TO SIT STILL: NOT AT ALL
1. FEELING NERVOUS, ANXIOUS, OR ON EDGE: SEVERAL DAYS
GAD7 TOTAL SCORE: 3
3. WORRYING TOO MUCH ABOUT DIFFERENT THINGS: NOT AT ALL
2. NOT BEING ABLE TO STOP OR CONTROL WORRYING: NOT AT ALL
6. BECOMING EASILY ANNOYED OR IRRITABLE: SEVERAL DAYS
7. FEELING AFRAID AS IF SOMETHING AWFUL MIGHT HAPPEN: SEVERAL DAYS

## 2019-10-02 ASSESSMENT — PATIENT HEALTH QUESTIONNAIRE - PHQ9
5. POOR APPETITE OR OVEREATING: NOT AT ALL
SUM OF ALL RESPONSES TO PHQ QUESTIONS 1-9: 12

## 2019-10-02 ASSESSMENT — MIFFLIN-ST. JEOR: SCORE: 1105.92

## 2019-10-02 NOTE — LETTER
10/2/2019       RE: Valencia Ye  [address] [facility] [address]     Dear Colleague,    Thank you for referring your patient, Valencia Ye, to the [facility] at [facility]. Please see a copy of my visit note below.    Pt. 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 Has pulmonary function testing done, with most recent in 6/2019, which were felt to be reduced but still in ideal range    Severe fatigue started in 2013  Context: death of mother, getting custody of children from ex-, (bad relationship), moving,  to 2nd     Severe fatigue set in during this time, describes having been highly active multitasking person to having  limited energy, struggling  with bronchiectesis      2014-now  She returned to work, fatigue improved to pre major illness level   3/2017--developed fever, chills, swollen glands. No diagnosis made, but pulmonologist concerned and started round of IV antibiotics, required PICC line, off of work.    4-5/2018-- became dizzy, developed  diffuse pain, night sweats, brain fog, needing FMLA, barely doing any hobbies,   resigned work 4/2019    Current  "Symptoms    Fatigue: Mental and physical fatigue  Lacking energy rather than sleepy  Occasionally will leave an activity she has started, but now more choosing just not to start an activity, has learned to pace self  Avoid doing more activity  than walking dog more than 2x/wk  Walks for 1 hour/1 mile; never PT or stretching  Able to fall and stay asleep; need 7-8, get 7 hours  Don't feel refreshed when wakes  No early waking      \"Brain fog\"--trouble with concentration, some memory, some word finding difficulty, feeling slow      Pain: Muscles through upper shoulders, neck bilaterally, Occiput  Pain across face  ---sometimes will last all day, sometimes she will improve hydration and getting up and moving around,  Then lasts 30 min-2 hours  --can occurs 2 days straight, can be couple weeks with mild, never completely gone  ---aching is baseline pain, occasional stabbing, throbbing    L arm pain--intermittent, random, aching pain in R forearm, starting in August this  Year. Can be gone completely and then return and be severe.    No numb/tingling, burning/tingling weakness    Tests performed:  Reviewed patient's chart for workup  ---MRA/MRI normal,   TSH, JOSE, CRP, ESR, normal  Electrolytes--normal  No cortisol, but patient is on chronic prednisone  --2/2019 cardiology evaluation : Has cardiac scarring from Churg antonina and some cardiac ischemia ;hypokinises apical segment nl LV function, subendocardial ischemia?     Past Medical History:   Diagnosis Date     Anxiety      Aortitis (H)      Asthma     associated with Churg Antonina syndrome     Bronchiectasis (H)      Cerebral infarction (H) August 1990    Very small, caused small permanent optical migraine     Chronic sinusitis      Churg-Antonina syndrome (H)     dx 1990     Conductive hearing loss      Depressive disorder      Eustachian tube dysfunction      Goiter      Hearing loss, conductive      Heart rate problem      Hypertension      Hypocalcemia      " Immunosuppression (H)      Irritable bowel syndrome      Major depressive disorder      Mannose-binding lectin deficiency 2014     Nonspecific abnormal results of thyroid function study      Osteoporosis      Pericarditis     1990 and 1992     Pneumonia     4/23/10     Recurrent otitis media      Recurrent UTI      Rheumatoid vasculitis (H)      Sinusitis, chronic      Steroid long-term use      Tinnitus      Varicose veins of leg with swelling      Current Outpatient Medications   Medication     acetylcysteine (MUCOMYST) 20 % neb solution     albuterol (PROVENTIL) (2.5 MG/3ML) 0.083% neb solution     AMITIZA 8 MCG capsule     atorvastatin (LIPITOR) 10 MG tablet     budesonide-formoterol (SYMBICORT) 160-4.5 MCG/ACT Inhaler     ciprofloxacin-dexamethasone (CIPRODEX) 0.3-0.1 % otic suspension     fluticasone (FLONASE) 50 MCG/ACT nasal spray     glucosamine-chondroitin (GLUCOSAMINE CHONDR COMPLEX) 500-400 MG CAPS     Ipratropium-Albuterol (COMBIVENT RESPIMAT)  MCG/ACT inhaler     metoprolol succinate ER (TOPROL-XL) 25 MG 24 hr tablet     Omega-3 Fatty Acids (FISH OIL) 1200 MG capsule     predniSONE (DELTASONE) 1 MG tablet     vitamin B complex with vitamin C (VITAMIN  B COMPLEX) TABS tablet     vitamin D3 (CHOLECALCIFEROL) 2000 units (50 mcg) tablet     No current facility-administered medications for this visit.      Family History   Problem Relation Age of Onset     Allergies Mother         Seasonal     Alcohol/Drug Mother      Substance Abuse Mother      Depression Mother      C.A.D. Father      Diabetes Father         Type 2     Depression Father      Heart Disease Father      C.A.D. Maternal Grandmother      Arthritis Maternal Grandmother      Musculoskeletal Disorder Maternal Grandmother         MS     Heart Disease Maternal Grandmother      Hypertension Maternal Grandmother      Alcohol/Drug Maternal Grandfather      Substance Abuse Maternal Grandfather      Depression Maternal Grandfather      Asthma Son       Asthma Daughter      Allergies Daughter         Seasonal     Unknown/Adopted Paternal Grandmother      Unknown/Adopted Paternal Grandfather      Cancer Maternal Aunt         breast age 53     Breast Cancer Maternal Aunt         in her 50s     Glaucoma No family hx of      Macular Degeneration No family hx of      Never smoker  ETOH--1/month  IBS no legumes, no nightshades, no sugar  Take supplement  hyst 2008, left ovaries, no hot flashes, night sweats  6months ago      Current Outpatient Medications   Medication     acetylcysteine (MUCOMYST) 20 % neb solution     albuterol (PROVENTIL) (2.5 MG/3ML) 0.083% neb solution     AMITIZA 8 MCG capsule     atorvastatin (LIPITOR) 10 MG tablet     budesonide-formoterol (SYMBICORT) 160-4.5 MCG/ACT Inhaler     ciprofloxacin-dexamethasone (CIPRODEX) 0.3-0.1 % otic suspension     fluticasone (FLONASE) 50 MCG/ACT nasal spray     glucosamine-chondroitin (GLUCOSAMINE CHONDR COMPLEX) 500-400 MG CAPS     Ipratropium-Albuterol (COMBIVENT RESPIMAT)  MCG/ACT inhaler     metoprolol succinate ER (TOPROL-XL) 25 MG 24 hr tablet     Omega-3 Fatty Acids (FISH OIL) 1200 MG capsule     predniSONE (DELTASONE) 1 MG tablet     vitamin B complex with vitamin C (VITAMIN  B COMPLEX) TABS tablet     vitamin D3 (CHOLECALCIFEROL) 2000 units (50 mcg) tablet     No current facility-administered medications for this visit.      Started lipitor--2017 stopped it for 2 months, no difference    ROS  Floaters  Dizziness--CT scan and work up negative  Tinnitus  Hearing loss  hearburn  Nausea  Constipation and bloating--diagnosis of IBS per chart  Urinary frequency and pain--seeing urology  Anxiety, depression  Memory changes  Decreased libido  Remainder of ROS negative    PHQ-9 SCORE 11/19/2018 6/10/2019 10/2/2019   PHQ-9 Total Score - - -   PHQ-9 Total Score MyChart - 9 (Mild depression) -   PHQ-9 Total Score 19 9 12     ROSIBEL-7 SCORE 11/19/2018 6/10/2019 10/2/2019   Total Score - 11 (moderate anxiety) -  "  Total Score 19 11 3     Exam  Blood pressure 120/72, pulse 69, height 1.594 m (5' 2.75\"), weight 50.6 kg (111 lb 8 oz), last menstrual period 02/01/2009, not currently breastfeeding.  Exam:  Constitutional: healthy, alert and no distress  Neck: Neck supple. No adenopathy. Thyroid symmetric, normal size,, Carotids without bruits.  Cardiovascular: negative, PMI normal. No lifts, heaves, or thrills. RRR. No murmurs, clicks gallops or rub  Respiratory: negative, Percussion normal. Good diaphragmatic excursion. Lungs clear  Musculoskeletal: extremities normal- no gross deformities noted, gait normal and normal muscle tone. Tender over neck, soft tissues.   Neurologic: Gait normal. Reflexes normal and symmetric. Sensation grossly WNL.  Speech RRR, mood is depressed, no psychomotor changes, thought process is appropriate,   Affect is normal. No evidence of suicidality.     A/P  1. Chronic Fatigue  2. Chronic pain, multiple locations  3. Churg Antonina  4. Bronchietasis    Counseled patient that fatigue and diffuse chronic pain are often mulifactorial in nature and a single unifying diagnosis with curative treatment is found.  Reviewed that her past work up has been extensive and no evidence for extensive further testing on exam or history  Would recommend:  Basic Metabolic panel given current diagnoses and medications  CBC with platelets, diff  HgbA1c given chronic steroids  X-ray cervical spine given neck pain and osteoporosis history    Symptoms are reasonably consistent with fibromyalgia in absence of formal evaluation, and recommend considering similar treatment approach, which would also address mood symptoms  Can consider HRT if vasomotor symptoms significant factor  May discuss with immunologist if secondary adrenal insufficiency on current dose steroids.    Follow-up if desired for further intervention.       Again, thank you for allowing me to participate in the care of your patient.      Sincerely,    Ihsan" MD Greg

## 2019-10-02 NOTE — PROGRESS NOTES
Pt. 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 Has pulmonary function testing done, with most recent in 6/2019, which were felt to be reduced but still in ideal range    Severe fatigue started in 2013  Context: death of mother, getting custody of children from ex-, (bad relationship), moving,  to 2nd     Severe fatigue set in during this time, describes having been highly active multitasking person to having  limited energy, struggling  with bronchiectesis      2014-now  She returned to work, fatigue improved to pre major illness level   3/2017--developed fever, chills, swollen glands. No diagnosis made, but pulmonologist concerned and started round of IV antibiotics, required PICC line, off of work.    4-5/2018-- became dizzy, developed  diffuse pain, night sweats, brain fog, needing FMLA, barely doing any hobbies,   resigned work 4/2019    Current Symptoms    Fatigue: Mental and physical fatigue  Lacking energy rather than sleepy  Occasionally will leave an activity she has started, but now more choosing just not to start an activity, has learned to pace self  Avoid doing more activity  than walking dog more than 2x/wk  Walks for 1 hour/1 mile;  "never PT or stretching  Able to fall and stay asleep; need 7-8, get 7 hours  Don't feel refreshed when wakes  No early waking      \"Brain fog\"--trouble with concentration, some memory, some word finding difficulty, feeling slow      Pain: Muscles through upper shoulders, neck bilaterally, Occiput  Pain across face  ---sometimes will last all day, sometimes she will improve hydration and getting up and moving around,  Then lasts 30 min-2 hours  --can occurs 2 days straight, can be couple weeks with mild, never completely gone  ---aching is baseline pain, occasional stabbing, throbbing    L arm pain--intermittent, random, aching pain in R forearm, starting in August this  Year. Can be gone completely and then return and be severe.    No numb/tingling, burning/tingling weakness    Tests performed:  Reviewed patient's chart for workup  ---MRA/MRI normal,   TSH, JOSE, CRP, ESR, normal  Electrolytes--normal  No cortisol, but patient is on chronic prednisone  --2/2019 cardiology evaluation : Has cardiac scarring from Churg antonina and some cardiac ischemia ;hypokinises apical segment nl LV function, subendocardial ischemia?     Past Medical History:   Diagnosis Date     Anxiety      Aortitis (H)      Asthma     associated with Churg Antonina syndrome     Bronchiectasis (H)      Cerebral infarction (H) August 1990    Very small, caused small permanent optical migraine     Chronic sinusitis      Churg-Antonina syndrome (H)     dx 1990     Conductive hearing loss      Depressive disorder      Eustachian tube dysfunction      Goiter      Hearing loss, conductive      Heart rate problem      Hypertension      Hypocalcemia      Immunosuppression (H)      Irritable bowel syndrome      Major depressive disorder      Mannose-binding lectin deficiency 2014     Nonspecific abnormal results of thyroid function study      Osteoporosis      Pericarditis     1990 and 1992     Pneumonia     4/23/10     Recurrent otitis media      Recurrent " UTI      Rheumatoid vasculitis (H)      Sinusitis, chronic      Steroid long-term use      Tinnitus      Varicose veins of leg with swelling      Current Outpatient Medications   Medication     acetylcysteine (MUCOMYST) 20 % neb solution     albuterol (PROVENTIL) (2.5 MG/3ML) 0.083% neb solution     AMITIZA 8 MCG capsule     atorvastatin (LIPITOR) 10 MG tablet     budesonide-formoterol (SYMBICORT) 160-4.5 MCG/ACT Inhaler     ciprofloxacin-dexamethasone (CIPRODEX) 0.3-0.1 % otic suspension     fluticasone (FLONASE) 50 MCG/ACT nasal spray     glucosamine-chondroitin (GLUCOSAMINE CHONDR COMPLEX) 500-400 MG CAPS     Ipratropium-Albuterol (COMBIVENT RESPIMAT)  MCG/ACT inhaler     metoprolol succinate ER (TOPROL-XL) 25 MG 24 hr tablet     Omega-3 Fatty Acids (FISH OIL) 1200 MG capsule     predniSONE (DELTASONE) 1 MG tablet     vitamin B complex with vitamin C (VITAMIN  B COMPLEX) TABS tablet     vitamin D3 (CHOLECALCIFEROL) 2000 units (50 mcg) tablet     No current facility-administered medications for this visit.            Family History   Problem Relation Age of Onset     Allergies Mother         Seasonal     Alcohol/Drug Mother      Substance Abuse Mother      Depression Mother      C.A.D. Father      Diabetes Father         Type 2     Depression Father      Heart Disease Father      C.A.D. Maternal Grandmother      Arthritis Maternal Grandmother      Musculoskeletal Disorder Maternal Grandmother         MS     Heart Disease Maternal Grandmother      Hypertension Maternal Grandmother      Alcohol/Drug Maternal Grandfather      Substance Abuse Maternal Grandfather      Depression Maternal Grandfather      Asthma Son      Asthma Daughter      Allergies Daughter         Seasonal     Unknown/Adopted Paternal Grandmother      Unknown/Adopted Paternal Grandfather      Cancer Maternal Aunt         breast age 53     Breast Cancer Maternal Aunt         in her 50s     Glaucoma No family hx of      Macular Degeneration No  "family hx of      Never smoker  ETOH--1/month  IBS no legumes, no nightshades, no sugar  Take supplement  hyst 2008, left ovaries, no hot flashes, night sweats  6months ago      Current Outpatient Medications   Medication     acetylcysteine (MUCOMYST) 20 % neb solution     albuterol (PROVENTIL) (2.5 MG/3ML) 0.083% neb solution     AMITIZA 8 MCG capsule     atorvastatin (LIPITOR) 10 MG tablet     budesonide-formoterol (SYMBICORT) 160-4.5 MCG/ACT Inhaler     ciprofloxacin-dexamethasone (CIPRODEX) 0.3-0.1 % otic suspension     fluticasone (FLONASE) 50 MCG/ACT nasal spray     glucosamine-chondroitin (GLUCOSAMINE CHONDR COMPLEX) 500-400 MG CAPS     Ipratropium-Albuterol (COMBIVENT RESPIMAT)  MCG/ACT inhaler     metoprolol succinate ER (TOPROL-XL) 25 MG 24 hr tablet     Omega-3 Fatty Acids (FISH OIL) 1200 MG capsule     predniSONE (DELTASONE) 1 MG tablet     vitamin B complex with vitamin C (VITAMIN  B COMPLEX) TABS tablet     vitamin D3 (CHOLECALCIFEROL) 2000 units (50 mcg) tablet     No current facility-administered medications for this visit.      Started lipitor--2017 stopped it for 2 months, no difference    ROS  Floaters  Dizziness--CT scan and work up negative  Tinnitus  Hearing loss  hearburn  Nausea  Constipation and bloating--diagnosis of IBS per chart  Urinary frequency and pain--seeing urology  Anxiety, depression  Memory changes  Decreased libido  Remainder of ROS negative    PHQ-9 SCORE 11/19/2018 6/10/2019 10/2/2019   PHQ-9 Total Score - - -   PHQ-9 Total Score MyChart - 9 (Mild depression) -   PHQ-9 Total Score 19 9 12     ROSIBEL-7 SCORE 11/19/2018 6/10/2019 10/2/2019   Total Score - 11 (moderate anxiety) -   Total Score 19 11 3     Exam  Blood pressure 120/72, pulse 69, height 1.594 m (5' 2.75\"), weight 50.6 kg (111 lb 8 oz), last menstrual period 02/01/2009, not currently breastfeeding.  Exam:  Constitutional: healthy, alert and no distress  Neck: Neck supple. No adenopathy. Thyroid symmetric, normal " size,, Carotids without bruits.  Cardiovascular: negative, PMI normal. No lifts, heaves, or thrills. RRR. No murmurs, clicks gallops or rub  Respiratory: negative, Percussion normal. Good diaphragmatic excursion. Lungs clear  Musculoskeletal: extremities normal- no gross deformities noted, gait normal and normal muscle tone. Tender over neck, soft tissues.   Neurologic: Gait normal. Reflexes normal and symmetric. Sensation grossly WNL.  Speech RRR, mood is depressed, no psychomotor changes, thought process is appropriate,   Affect is normal. No evidence of suicidality.     A/P  1. Chronic Fatigue  2. Chronic pain, multiple locations  3. Churg Antonina  4. Bronchietasis    Counseled patient that fatigue and diffuse chronic pain are often mulifactorial in nature and a single unifying diagnosis with curative treatment is found.  Reviewed that her past work up has been extensive and no evidence for extensive further testing on exam or history  Would recommend:  Basic Metabolic panel given current diagnoses and medications  CBC with platelets, diff  HgbA1c given chronic steroids  X-ray cervical spine given neck pain and osteoporosis history    Symptoms are reasonably consistent with fibromyalgia in absence of formal evaluation, and recommend considering similar treatment approach, which would also address mood symptoms  Can consider HRT if vasomotor symptoms significant factor  May discuss with immunologist if secondary adrenal insufficiency on current dose steroids.    Follow-up if desired for further intervention.

## 2019-10-03 ASSESSMENT — ANXIETY QUESTIONNAIRES: GAD7 TOTAL SCORE: 3

## 2019-10-04 LAB
DEPRECATED CALCIDIOL+CALCIFEROL SERPL-MC: <67 UG/L (ref 20–75)
VITAMIN D2 SERPL-MCNC: <5 UG/L
VITAMIN D3 SERPL-MCNC: 62 UG/L

## 2019-10-09 NOTE — RESULT ENCOUNTER NOTE
Dear Valencia,   Here are your recent results which are within the expected range. Please continue with your current plan of care.       Ihsan Garza MD

## 2019-10-11 ENCOUNTER — HOSPITAL ENCOUNTER (OUTPATIENT)
Dept: GENERAL RADIOLOGY | Facility: CLINIC | Age: 49
Discharge: HOME | End: 2019-10-11
Attending: FAMILY MEDICINE | Admitting: FAMILY MEDICINE
Payer: COMMERCIAL

## 2019-10-11 DIAGNOSIS — M54.2 NECK PAIN: ICD-10-CM

## 2019-10-11 PROCEDURE — 72040 X-RAY EXAM NECK SPINE 2-3 VW: CPT

## 2019-10-16 ENCOUNTER — OFFICE VISIT (OUTPATIENT)
Dept: SLEEP MEDICINE | Facility: CLINIC | Age: 49
End: 2019-10-16
Attending: INTERNAL MEDICINE
Payer: COMMERCIAL

## 2019-10-16 ENCOUNTER — OFFICE VISIT (OUTPATIENT)
Dept: SLEEP MEDICINE | Facility: CLINIC | Age: 49
End: 2019-10-16
Payer: COMMERCIAL

## 2019-10-16 VITALS
SYSTOLIC BLOOD PRESSURE: 128 MMHG | HEART RATE: 62 BPM | BODY MASS INDEX: 20.02 KG/M2 | WEIGHT: 113 LBS | DIASTOLIC BLOOD PRESSURE: 77 MMHG | RESPIRATION RATE: 14 BRPM | OXYGEN SATURATION: 100 % | HEIGHT: 63 IN

## 2019-10-16 DIAGNOSIS — D72.18 CHURG-STRAUSS SYNDROME (H): ICD-10-CM

## 2019-10-16 DIAGNOSIS — M30.1 CHURG-STRAUSS SYNDROME (H): ICD-10-CM

## 2019-10-16 DIAGNOSIS — R53.82 CHRONIC FATIGUE: Primary | ICD-10-CM

## 2019-10-16 DIAGNOSIS — R29.818 SUSPECTED SLEEP APNEA: Primary | ICD-10-CM

## 2019-10-16 DIAGNOSIS — R53.82 CHRONIC FATIGUE: ICD-10-CM

## 2019-10-16 PROCEDURE — 99204 OFFICE O/P NEW MOD 45 MIN: CPT | Performed by: PHYSICIAN ASSISTANT

## 2019-10-16 ASSESSMENT — MIFFLIN-ST. JEOR: SCORE: 1112.81

## 2019-10-16 NOTE — PROGRESS NOTES
Sleep Consultation:    Date on this visit: 10/16/2019    Valencia Ye is sent by Morelia Simental for a sleep consultation regarding sleepiness.    Primary Physician: Kamille, EviFAMILIA Ye reports chronic fatigue and pain for 6 years. Her medical history is significant for Churg-Antonina syndrome with bronchiectasis, asthma, anxiety/depression, HTN, IBS.     PFTs showed a very mild obstructive defect. She had a cardiac MRI in 08/2017 which showed normal LVEF of 55% with late gadolinium enhancement with diffuse subendocardial hyperenhancement of the mid and apical segments, scar burden was 14%. A cardiac PET scan on 01/2018 which showed normal LV cavity with diffusely decreased perfusion throughout all vascular territories, most pronounced at the apex.    Valencia goes to sleep at 9:30 PM during the week. She wakes up at 5:30-7:00 AM. Sometimes she wakes to her 's alarm. Sometimes she sleeps through the alarm. She falls asleep in 5-10 minutes.  Valencia denies difficulty falling asleep.  She generally does not wake up in the night.  On weekends, Valencia goes to sleep at 9:30-11:30 PM.  She wakes up at 6:30-7:30 AM without an alarm. She falls asleep in 5-10 minutes.  Patient gets an average of 8 hours of sleep per night. She does not feel refreshed when she wakes. She walks the dog in the morning and then feels exhausted. She watches TV until the afternoon and may get enough energy to do a chore here and there.     Patient does use electronics in bed, watch TV in bed, read in bed and eat in bed. She may be in bed for 90 minutes before trying to go to sleep. She used to have trouble shutting her mind off when she was still working. She was on clonazepam at that time, but got off of it when she retired. She really thought her fatigue would improve with getting out of her stressful work situation, but it didn't. In fact, it almost seems worse.    Valencia is not currently working.  She was working as an  / at the [employer]. She resigned in July secondary to her fatigue. She lives with her , adult son, teenage daughter and a dog.     Valencia has only recently been told that she snores. The snoring is mild to moderate. Her  says she snores for about 30 minutes when she first goes to bed and then she stops. Her  is a very deep sleeper though. There is no report of gasping, snorting or witnessed apneas. They never sleep separately.  Patient sleeps on her back, side and stomach. She wakes with a headache about 5-7 days per week (forehead, back of head, neck etc). The headache pain fluctuates. The pain often is worse in the morning but can be present to varying degrees throughout the day. She has occasional restless legs. It mostly involves her right hip. It started after a flare of Churg-Antonina in the 1990's. She had some neurological damage in her legs and feet at that time. She will take quinine when she has symptoms. She has no history of anemia. She denies that the restless legs is disruptive enough for her to want to pursue any further workup or change in management. She denies snort arousals, morning dry mouth and morning confusion. Valencia has occasional sleep paralysis (more when she was still working) and denies any sleep walking, sleep talking, dream enactment, cataplexy and hypnogogic/hypnopompic hallucinations. She wears a tongue retaining device to prevent grinding.     Valencia has depression and anxiety, denies difficulty breathing through her nose, claustrophobia, reflux at night and heartburn.      Valencia has gained 5-10 pounds in the last couple of years. She has been on continuous prednisone for 15 years, sporadically prior to that. Patient used to be a a morning person. Since retiring, she has become less of a morning person.  She would prefer to go to sleep at 10:00 PM and wake up at 6:00-7:00 AM.  Patient's Spencer Sleepiness score 5/24 inconsistent with  daytime sleepiness.      Valencia does not take naps unless feeling sick. She does not like how she feels after naps. If she has really bad fatigue, she may nap 1-2 times per week, but that has not been the case in the last month. She takes no inadvertant naps.  She denies dozing while driving.  Patient was counseled on the importance of driving while alert, to pull over if drowsy, or nap before getting into the vehicle if sleepy.  She uses 2-3 cups/day of green tea or yerba matte. Last caffeine intake is usually before 2 PM.    Allergies:    Allergies   Allergen Reactions     Colistimethate Anaphylaxis     Colymycin M [Colistimethate Sodium] Anaphylaxis     Tamiflu [Oseltamivir]      Gums Blister up     Clindamycin Rash     Zithromax [Azithromycin]      Heart palpitation       Medications:    Current Outpatient Medications   Medication Sig Dispense Refill     acetylcysteine (MUCOMYST) 20 % neb solution Take 4 mLs by nebulization 2 times daily Use with albuterol solution. Discard open bottle of Mucomyst after 96 hours. 240 mL 11     albuterol (PROVENTIL) (2.5 MG/3ML) 0.083% neb solution Take 1 vial (2.5 mg) by nebulization 2 times daily Use with Mucomyst 60 vial 11     AMITIZA 8 MCG capsule TAKE 1 CAPSULE(8 MCG) BY MOUTH TWICE DAILY 60 capsule 11     atorvastatin (LIPITOR) 10 MG tablet Take 0.5 tablets (5 mg) by mouth daily 90 tablet 2     budesonide-formoterol (SYMBICORT) 160-4.5 MCG/ACT Inhaler Inhale 2 puffs into the lungs 2 times daily 1 Inhaler 11     ciprofloxacin-dexamethasone (CIPRODEX) 0.3-0.1 % otic suspension INSTILL 3 DROPS INTO THE AFFECTED EAR TWICE DAILY AS NEEDED 7.5 mL 11     fluticasone (FLONASE) 50 MCG/ACT nasal spray USE 2 SPRAYS INTO BOTH NOSTRIL DAILY 48 mL 0     glucosamine-chondroitin (GLUCOSAMINE CHONDR COMPLEX) 500-400 MG CAPS Take 2 capsules by mouth daily.       Ipratropium-Albuterol (COMBIVENT RESPIMAT)  MCG/ACT inhaler Inhale 1 puff into the lungs 4 times daily Not to exceed 6 doses  per day. 1 Inhaler 11     metoprolol succinate ER (TOPROL-XL) 25 MG 24 hr tablet TAKE 1/2 TABLET(12.5 MG) BY MOUTH DAILY 45 tablet 3     Omega-3 Fatty Acids (FISH OIL) 1200 MG capsule Take 4 capsules (4.8 g) by mouth daily       predniSONE (DELTASONE) 1 MG tablet TAKE 5 TABLETS BY MOUTH EVERY DAY AND ALTERNATING WITH 6 TABLETS THE NEXT. 200 tablet 0     vitamin B complex with vitamin C (VITAMIN  B COMPLEX) TABS tablet Take 1 tablet by mouth daily       vitamin D3 (CHOLECALCIFEROL) 2000 units (50 mcg) tablet Take 2 tablets by mouth daily         Problem List:  Patient Active Problem List    Diagnosis Date Noted     Bladder pain 07/31/2019     Priority: Medium     Overactive bladder 07/31/2019     Priority: Medium     Controlled substance agreement signed 11/13/2017     Priority: Medium     Patient is followed by BRIAN DEE for ongoing prescription of benzodiazepines.  All refills should be approved by this provider, or covering partner.    Medication(s): clonazepam.   Maximum quantity per month: 7.5  Clinic visit frequency required: Q 6  months     Controlled substance agreement on file: Yes       Date(s): 11/13/17  Benzodiazepine use reviewed by psychiatry:  No    Last [facility] website verification:  none   [email or web address]           Bronchiectasis without acute exacerbation (H) 05/22/2017     Priority: Medium     Moderate episode of recurrent major depressive disorder (H) 01/29/2017     Priority: Medium     Benign essential hypertension 10/20/2016     Priority: Medium     Mannose-binding lectin deficiency      Priority: Medium     Irritable bowel syndrome without diarrhea 07/14/2016     Priority: Medium     Chronic fatigue 01/21/2016     Priority: Medium     Anxiety 05/30/2014     Priority: Medium     Asthma 07/16/2013     Priority: Medium     Problem list name updated by automated process. Provider to review       History of eating disorder 06/16/2013     Priority: Medium     Churg-Antonina syndrome (H)  10/10/2012     Priority: Medium     Palpitations 10/04/2012     Priority: Medium     ILD (interstitial lung disease) (H) 2012     Priority: Medium     Goiter 10/14/2011     Priority: Medium     History of systemic steroid therapy 10/14/2011     Priority: Medium        Past Medical/Surgical History:  Past Medical History:   Diagnosis Date     Anxiety      Aortitis (H)      Asthma     associated with Churg Antonina syndrome     Bronchiectasis (H)      Cerebral infarction (H) 1990    Very small, caused small permanent optical migraine     Chronic sinusitis      Churg-Antonina syndrome (H)     dx      Conductive hearing loss      Depressive disorder      Eustachian tube dysfunction      Goiter      Hearing loss, conductive      Heart rate problem      Hypertension      Hypocalcemia      Immunosuppression (H)      Irritable bowel syndrome      Major depressive disorder      Mannose-binding lectin deficiency      Nonspecific abnormal results of thyroid function study      Osteoporosis      Pericarditis      and      Pneumonia     4/23/10     Recurrent otitis media      Recurrent UTI      Rheumatoid vasculitis (H)      Sinusitis, chronic      Steroid long-term use      Tinnitus      Varicose veins of leg with swelling      Past Surgical History:   Procedure Laterality Date      SECTION       COLONOSCOPY Left 2014    Procedure: COMBINED COLONOSCOPY, SINGLE OR MULTIPLE BIOPSY/POLYPECTOMY BY BIOPSY;  Surgeon: Js Pinedo MD;  Location:  GI     ENT SURGERY       GENITOURINARY SURGERY       HC COLP CERVIX/UPPER VAGINA W LOOP ELEC BX CERVIX       HEAD & NECK SURGERY       HYSTERECTOMY  2009    without oopherectomy     HYSTERECTOMY, PAP NO LONGER INDICATED      cervix removed      PE TUBES       PICC INSERTION  10/10/2013    5fr DL PASV PICC, 43cm (1cm external) in the L basilic vein w/ tip in the low SVC.     PICC INSERTION Left 2017    5fr DL BioFlo PICC,  42cm (3cm external) in the L lateral brachial vein w/ tip in the SVC RA junction.     SINUS SURGERY       TUBAL LIGATION       TYMPANOPLASTY         Social History:  Social History     Socioeconomic History     Marital status: Single     Spouse name: Not on file     Number of children: Not on file     Years of education: Not on file     Highest education level: Not on file   Occupational History     Not on file   Social Needs     Financial resource strain: Not on file     Food insecurity:     Worry: Not on file     Inability: Not on file     Transportation needs:     Medical: Not on file     Non-medical: Not on file   Tobacco Use     Smoking status: Never Smoker     Smokeless tobacco: Never Used   Substance and Sexual Activity     Alcohol use: Yes     Alcohol/week: 0.0 standard drinks     Comment: occ on the weekends, 3 drinks per week     Drug use: No     Sexual activity: Yes     Partners: Male     Birth control/protection: Male Surgical, Female Surgical   Lifestyle     Physical activity:     Days per week: Not on file     Minutes per session: Not on file     Stress: Not on file   Relationships     Social connections:     Talks on phone: Not on file     Gets together: Not on file     Attends [employer] service: Not on file     Active member of club or organization: Not on file     Attends meetings of clubs or organizations: Not on file     Relationship status: Not on file     Intimate partner violence:     Fear of current or ex partner: Not on file     Emotionally abused: Not on file     Physically abused: Not on file     Forced sexual activity: Not on file   Other Topics Concern     Parent/sibling w/ CABG, MI or angioplasty before 65F 55M? Not Asked      Service No     Blood Transfusions No     Caffeine Concern No     Occupational Exposure No     Hobby Hazards No     Sleep Concern No     Stress Concern Yes     Weight Concern No     Special Diet Yes     Comment: IBS diet     Back Care No     Exercise Not  Asked     Comment: 1 mile power walk 5 days a week at least.      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  Non smoker. No smokers at home.  Enjoys walking and dancing.  Alcohol--1 to 2 drinks daily.  Few times a year has more than 4 drinks in a day.  No illicits.                       Family History:  Family History   Problem Relation Age of Onset     Allergies Mother         Seasonal     Alcohol/Drug Mother      Substance Abuse Mother      Depression Mother      C.A.D. Father      Diabetes Father         Type 2     Depression Father      Heart Disease Father      C.A.D. Maternal Grandmother      Arthritis Maternal Grandmother      Musculoskeletal Disorder Maternal Grandmother         MS     Heart Disease Maternal Grandmother      Hypertension Maternal Grandmother      Alcohol/Drug Maternal Grandfather      Substance Abuse Maternal Grandfather      Depression Maternal Grandfather      Asthma Son      Asthma Daughter      Allergies Daughter         Seasonal     Unknown/Adopted Paternal Grandmother      Unknown/Adopted Paternal Grandfather      Cancer Maternal Aunt         breast age 53     Breast Cancer Maternal Aunt         in her 50s     Glaucoma No family hx of      Macular Degeneration No family hx of        Review of Systems:  A complete review of systems reviewed by me is negative with the exeption of what has been mentioned in the history of present illness.  CONSTITUTIONAL: NEGATIVE for weight gain/loss, fever, chills, sweats or night sweats, drug allergies.  EYES: NEGATIVE for changes in vision, blind spots, double vision.  ENT: NEGATIVE for ear pain, sore throat, sinus pain, post-nasal drip, runny nose, bloody nose  CARDIAC: NEGATIVE for fast heartbeats or fluttering in chest, chest pain or pressure, breathlessness when lying flat.  CARDIAC:  POSITIVE for  swollen  "legs, swollen feet and HTN, heart disease  NEUROLOGIC: NEGATIVE weakness or numbness in the arms or legs.  NEUROLOGIC:  POSITIVE for  headaches  DERMATOLOGIC: NEGATIVE for rashes, new moles or change in mole(s)  PULMONARY: NEGATIVE dry cough, productive cough, coughing up blood, wheezing or whistling when breathing.    PULMONARY:  POSITIVE for  SOB at rest and SOB with activity  GASTROINTESTINAL: NEGATIVE for nausea or vomitting, loose or watery stools, fat or grease in stools, bowel movements black in color or blood noted.  GASTROINTESTINAL:  POSITIVE for  constipation and abdominal pain  GENITOURINARY: NEGATIVE for pain during urination, blood in urine, irregular menstrual periods.  GENITOURINARY:  POSITIVE for  urinating more frequently than usual  MUSCULOSKELETAL: NEGATIVE for bone or joint pain, swollen joints.  MUSCULOSKELETAL:  POSITIVE for  muscle pain  ENDOCRINE: NEGATIVE for increased thirst or urination, diabetes.  LYMPHATIC: NEGATIVE for swollen lymph nodes, lumps or bumps in the breasts or nipple discharge.  MENTAL HEALTH: POSITIVE for depression and anxiety    Physical Examination:  Vitals: /77   Pulse 62   Resp 14   Ht 1.594 m (5' 2.76\")   Wt 51.3 kg (113 lb)   LMP 02/01/2009   SpO2 100%   BMI 20.17 kg/m      Neck Cir (cm): 33 cm    Bradner Total Score 10/16/2019   Total score - Bradner 5       EDILBERTO Total Score: 3 (10/16/19 0900)    GENERAL APPEARANCE: healthy, alert, no distress and cooperative  EYES: Eyes grossly normal to inspection, PERRL, conjunctivae and sclerae normal and lids and lashes normal  HENT: nose and mouth without ulcers or lesions, oropharynx crowded and tongue and soft palate approximate  NECK: no adenopathy, no asymmetry, masses, or scars, thyroid normal to palpation and trachea midline and normal to palpation  RESP: lungs clear to auscultation - no rales, rhonchi or wheezes  CV: regular rates and rhythm, normal S1 S2, no S3 or S4, no murmur, click or rub and no " irregular beats  LYMPHATICS: no cervical adenopathy  MS: extremities normal- no gross deformities noted  NEURO: Normal strength and tone, mentation intact, speech normal and cranial nerves 2-12 intact  Mallampati Class: IV.  Tonsillar Stage: 1  hidden by pillars.    Impression/Plan:    (R53.82) Chronic fatigue  (primary encounter diagnosis), (M30.1) Churg-Antonina syndrome (H)  Comment: Valencia presents with a primary symptom of chronic fatigue, worsening over the last 6 months or so. She has a complicated medical history including Churg-Antonina syndrome with bronchiectasis (PFTs show mild obstructive defect). She also has an eosinophilic heart disease. Her LVEF is 51% as of 2017 but scar burden of 14%. She thought her fatigue was related to a stressful work environment, but it did not resolve with retiring in July.  Small amounts of activity (taking her dog for a walk) make her exhausted.  She denies napping or dozing inadvertently. Her ESS is normal at 5/24.  She has only recently been observed to snore, but her  says it is mild and only in the first half-hour of sleep. She has not been observed to have apnea. She does have HTN. Her BMI is normal at 20, age <50 (49), neck <40 cm (33 cm), gender: female.  Her STOP BANG is 3 if scored generously: snoring, tired, HTN. Overall, her risk for THALIA is low. There is mild risk for hypoxemia given her lung disease, although her PFTs show only mild obstructive defect and her SpO2 is normal 100% in the daytime.  Plan: Overnight oximetry study        We talked about the importance of consistent sleep scheduling to help prevent the development of insomnia.      Literature provided regarding sleep apnea.      I will call with results of the oximetry and clinical follow up will be based on the results of that test.       Polysomnography reviewed.  Obstructive sleep apnea reviewed.  Complications of untreated sleep apnea were reviewed.  45 minutes was spent during this visit,  over 50% in counseling and coordination of care.   Bennett Goltz, PA-C    CC: Morelia Simental

## 2019-10-16 NOTE — PATIENT INSTRUCTIONS
"MY TREATMENT INFORMATION FOR SLEEP APNEA-  Val[place] Ye    DOCTOR : Bennett Ezra Goltz, PA-C  SLEEP CENTER : [place]     MY CONTACT NUMBER: [phone]    Am I having a sleep study at a sleep center?  Make sure you have an appointment for the study before you leave!    Am I having a home sleep study?  Watch this video:  [email or web address]/watch?v=CteI_[email or web address]&[email or web address]  Please verify your insurance coverage with your insurance carrier    Frequently asked questions:  1. What is Obstructive Sleep Apnea (THALIA)? THALIA is the most common type of sleep apnea. Apnea means, \"without breath.\"  Apnea is most often caused by narrowing or collapse of the upper airway as muscles relax during sleep.   Almost everyone has occasional apneas. Most people with sleep apnea have had brief interruptions at night frequently for many years.  The severity of sleep apnea is related to how frequent and severe the events are.   2. What are the consequences of THALIA? Symptoms include: feeling sleepy during the day, snoring loudly, gasping or stopping of breathing, trouble sleeping, and occasionally morning headaches or heartburn at night.  Sleepiness can be serious and even increase the risk of falling asleep while driving. Other health consequences may include development of high blood pressure and other cardiovascular disease in persons who are susceptible. Untreated THALIA  can contribute to heart disease, stroke and diabetes.   3. What are the treatment options? In most situations, sleep apnea is a lifelong disease that must be managed with daily therapy. Medications are not effective for sleep apnea and surgery is generally not considered until other therapies have been tried. Your treatment is your choice . Continuous Positive Airway (CPAP) works right away and is the therapy that is effective in nearly everyone. An oral device to hold your jaw forward is usually the next most reliable option. Other " options include postioning devices (to keep you off your back), weight loss, and surgery including a tongue pacing device. There is more detail about some of these options below.    Important tips for using CPAP and similar devices   Know your equipment:  CPAP is continuous positive airway pressure that prevents obstructive sleep apnea by keeping the throat from collapsing while you are sleeping. In most cases, the device is  smart  and can slowly self-adjusts if your throat collapses and keeps a record every day of how well you are treated-this information is available to you and your care team.  BPAP is bilevel positive airway pressure that keeps your throat open and also assists each breath with a pressure boost to maintain adequate breathing.  Special kinds of BPAP are used in patients who have inadequate breathing from lung or heart disease. In most cases, the device is  smart  and can slowly self-adjusts to assist breathing. Like CPAP, the device keeps a record of how well you are treated.  Your mask is your connection to the device. You get to choose what feels most comfortable and the staff will help to make sure if fits. Here: are some examples of the different masks that are available:       Key points to remember on your journey with sleep apnea:  1. Sleep study.  PAP devices often need to be adjusted during a sleep study to show that they are effective and adjusted right.  2. Good tips to remember: Try wearing just the mask during a quiet time during the day so your body adapts to wearing it. A humidifier is recommended for comfort in most cases to prevent drying of your nose and throat. Allergy medication from your provider may help you if you are having nasal congestion.  3. Getting settled-in. It takes more than one night for most of us to get used to wearing a mask. Try wearing just the mask during a quiet time during the day so your body adapts to wearing it. A humidifier is recommended for comfort  in most cases. Our team will work with you carefully on the first day and will be in contact within 4 days and again at 2 and 4 weeks for advice and remote device adjustments. Your therapy is evaluated by the device each day.   4. Use it every night. The more you are able to sleep naturally for 7-8 hours, the more likely you will have good sleep and to prevent health risks or symptoms from sleep apnea. Even if you use it 4 hours it helps. Occasionally all of us are unable to use a medical therapy, in sleep apnea, it is not dangerous to miss one night.   5. Communicate. Call our skilled team on the number provided on the first day if your visit for problems that make it difficult to wear the device. Over 2 out of 3 patients can learn to wear the device long-term with help from our team. Remember to call our team or your sleep providers if you are unable to wear the device as we may have other solutions for those who cannot adapt to mask CPAP therapy. It is recommended that you sleep your sleep provider within the first 3 months and yearly after that if you are not having problems.   6. Use it for your health. We encourage use of CPAP masks during daytime quiet periods to allow your face and brain to adapt to the sensation of CPAP so that it will be a more natural sensation to awaken to at night or during naps. This can be very useful during the first few weeks or months of adapting to CPAP though it does not help medically to wear CPAP during wakefulness and  should not be used as a strategy just to meet guidelines.  7. Take care of your equipment. Make sure you clean your mask and tubing using directions every day and that your filter and mask are replaced as recommended or if they are not working.     BESIDES CPAP, WHAT OTHER THERAPIES ARE THERE?    Positioning Device  Positioning devices are generally used when sleep apnea is mild and only occurs on your back.This example shows a pillow that straps around the  waist. It may be appropriate for those whose sleep study shows milder sleep apnea that occurs primarily when lying flat on one's back. Preliminary studies have shown benefit but effectiveness at home may need to be verified by a home sleep test. These devices are generally not covered by medical insurance.  Examples of devices that maintain sleeping on the back to prevent snoring and mild sleep apnea.    Belt type body positioner  Http://[employer].Minderest/    Electronic reminder  Http://nightshifttherapy.com/  Http://www.Kitchenbug.Minderest.au/      Oral Appliance  What is oral appliance therapy?  An oral appliance device fits on your teeth at night like a retainer used after having braces. The device is made by a specialized dentist and requires several visits over 1-2 months before a manufactured device is made to fit your teeth and is adjusted to prevent your sleep apnea. Once an oral device is working properly, snoring should be improved. A home sleep test may be recommended at that time if to determine whether the sleep apnea is adequately treated.       Some things to remember:  -Oral devices are often, but not always, covered by your medical insurance. Be sure to check with your insurance provider.   -If you are referred for oral therapy, you will be given a list of specialized dentists to consider or you may choose to visit the Web site of the American Academy of Dental Sleep Medicine  -Oral devices are less likely to work if you have severe sleep apnea or are extremely overweight.     More detailed information  An oral appliance is a small acrylic device that fits over the upper and lower teeth  (similar to a retainer or a mouth guard). This device slightly moves jaw forward, which moves the base of the tongue forward, opens the airway, improves breathing for effective treat snoring and obstructive sleep apnea in perhaps 7 out of 10 people .  The best working devices are custom-made by a dental device  after  a mold is made of the teeth 1, 2, 3.  When is an oral appliance indicated?  Oral appliance therapy is recommended as a first-line treatment for patients with primary snoring, mild sleep apnea, and for patients with moderate sleep apnea who prefer appliance therapy to use of CPAP4, 5. Severity of sleep apnea is determined by sleep testing and is based on the number of respiratory events per hour of sleep.   How successful is oral appliance therapy?  The success rate of oral appliance therapy in patients with mild sleep apnea is 75-80% while in patients with moderate sleep apnea it is 50-70%. The chance of success in patients with severe sleep apnea is 40-50%. The research also shows that oral appliances have a beneficial effect on the cardiovascular health of THALIA patients at the same magnitude as CPAP therapy7.  Oral appliances should be a second-line treatment in cases of severe sleep apnea, but if not completely successful then a combination therapy utilizing CPAP plus oral appliance therapy may be effective. Oral appliances tend to be effective in a broad range of patients although studies show that the patients who have the highest success are females, younger patients, those with milder disease, and less severe obesity. 3, 6.   Finding a dentist that practices dental sleep medicine  Specific training is available through the American Academy of Dental Sleep Medicine for dentists interested in working in the field of sleep. To find a dentist who is educated in the field of sleep and the use of oral appliances, near you, visit the Web site of the American Academy of Dental Sleep Medicine.    References  1. Elsi, et al. Objectively measured vs self-reported compliance during oral appliance therapy for sleep-disordered breathing. Chest 2013; 144(5): 9553-9348.  2. Sb et al. Objective measurement of compliance during oral appliance therapy for sleep-disordered breathing. Thorax 2013; 68(1): 91-96.  3.  Kellee et al. Mandibular advancement devices in 620 men and women with THALIA and snoring: tolerability and predictors of treatment success. Chest 2004; 125: 3387-9698.  4. Clifford et al. Oral appliances for snoring and THALIA: a review. Sleep 2006; 29: 244-262.  5. Ayad et al. Oral appliance treatment for THALIA: an update. J Clin Sleep Med 2014; 10(2): 215-227.  6. Chula et al. Predictors of OSAH treatment outcome. J Dent Res 2007; 86: 3935-0799.  Weight Loss:    Weight loss is a long-term strategy that may improve sleep apnea in some patients.    Weight management is a personal decision and the decision should be based on your interest and the potential benefits.  If you are interested in exploring weight loss strategies, the following discussion covers the impact on weight loss on sleep apnea and the approaches that may be successful.    Being overweight does not necessarily mean you will have health consequences.  Those who have BMI over 35 or over 27 with existing medical conditions carries greater risk.   Weight loss decreases severity of sleep apnea in most people with obesity. For those with mild obesity who have developed snoring with weight gain, even 15-30 pound weight loss can improve and occasionally eliminate sleep apnea.  Structured and life-long dietary and health habits are necessary to lose weight and keep healthier weight levels.     Though there may be significant health benefits from weight loss, long-term weight loss is very difficult to achieve- studies show success with dietary management in less than 10% of people. In addition, substantial weight loss may require years of dietary control and may be difficult if patients have severe obesity. In these cases, surgical management may be considered.  Finally, older individuals who have tolerated obesity without health complications may be less likely to benefit from weight loss strategies.      Your BMI is Body mass index is 20.17  kg/m .  Weight management is a personal decision.  If you are interested in exploring weight loss strategies, the following discussion covers the approaches that may be successful. Body mass index (BMI) is one way to tell whether you are at a healthy weight, overweight, or obese. It measures your weight in relation to your height.  A BMI of 18.5 to 24.9 is in the healthy range. A person with a BMI of 25 to 29.9 is considered overweight, and someone with a BMI of 30 or greater is considered obese. More than two-thirds of American adults are considered overweight or obese.  Being overweight or obese increases the risk for further weight gain. Excess weight may lead to heart disease and diabetes.  Creating and following plans for healthy eating and physical activity may help you improve your health.  Weight control is part of healthy lifestyle and includes exercise, emotional health, and healthy eating habits. Careful eating habits lifelong are the mainstay of weight control. Though there are significant health benefits from weight loss, long-term weight loss with diet alone may be very difficult to achieve- studies show long-term success with dietary management in less than 10% of people. Attaining a healthy weight may be especially difficult to achieve in those with severe obesity. In some cases, medications, devices and surgical management might be considered.  What can you do?  If you are overweight or obese and are interested in methods for weight loss, you should discuss this with your provider.     Consider reducing daily calorie intake by 500 calories.     Keep a food journal.     Avoiding skipping meals, consider cutting portions instead.    Diet combined with exercise helps maintain muscle while optimizing fat loss. Strength training is particularly important for building and maintaining muscle mass. Exercise helps reduce stress, increase energy, and improves fitness. Increasing exercise without diet control,  however, may not burn enough calories to loose weight.       Start walking three days a week 10-20 minutes at a time    Work towards walking thirty minutes five days a week     Eventually, increase the speed of your walking for 1-2 minutes at time    In addition, we recommend that you review healthy lifestyles and methods for weight loss available through the National Institutes of Health patient information sites:  http://win.niddk.nih.gov/publications/index.htm    And look into health and wellness programs that may be available through your health insurance provider, employer, local community center, or soumya club.    Surgery:    Surgery for obstructive sleep apnea is considered generally only when other therapies fail to work. Surgery may be discussed with you if you are having a difficult time tolerating CPAP and or when there is an abnormal structure that requires surgical correction.  Nose and throat surgeries often enlarge the airway to prevent collapse.  Most of these surgeries create pain for 1-2 weeks and up to half of the most common surgeries are not effective throughout life.  You should carefully discuss the benefits and drawbacks to surgery with your sleep provider and surgeon to determine if it is the best solution for you.   More information  Surgery for THALIA is directed at areas that are responsible for narrowing or complete obstruction of the airway during sleep.  There are a wide range of procedures available to enlarge and/or stabilize the airway to prevent blockage of breathing in the three major areas where it can occur: the palate, tongue, and nasal regions.  Successful surgical treatment depends on the accurate identification of the factors responsible for obstructive sleep apnea in each person.  A personalized approach is required because there is no single treatment that works well for everyone.  Because of anatomic variation, consultation with an examination by a sleep surgeon is a  critical first step in determining what surgical options are best for each patient.  In some cases, examination during sedation may be recommended in order to guide the selection of procedures.  Patients will be counseled about risks and benefits as well as the typical recovery course after surgery. Surgery is typically not a cure for a person s THALIA.  However, surgery will often significantly improve one s THALIA severity (termed  success rate ).  Even in the absence of a cure, surgery will decrease the cardiovascular risk associated with OSA7; improve overall quality of life8 (sleepiness, functionality, sleep quality, etc).  Palate Procedures:  Patients with THALIA often have narrowing of their airway in the region of their tonsils and uvula.  The goals of palate procedures are to widen the airway in this region as well as to help the tissues resist collapse.  Modern palate procedure techniques focus on tissue conservation and soft tissue rearrangement, rather than tissue removal.  Often the uvula is preserved in this procedure. Residual sleep apnea is common in patient after pharyngoplasty with an average reduction in sleep apnea events of 33%2.    Tongue Procedures:  ExamWhile patients are awake, the muscles that surround the throat are active and keep this region open for breathing. These muscles relax during sleep, allowing the tongue and other structures to collapse and block breathing.  There are several different tongue procedures available.  Selection of a tongue base procedure depends on characteristics seen on physical exam.  Generally, procedures are aimed at removing bulky tissues in this area or preventing the back of the tongue from falling back during sleep.  Success rates for tongue surgery range from 50-62%3.  Hypoglossal Nerve Stimulation:  Hypoglossal nerve stimulation has recently received approval from the [place] Food and Drug Administration for the treatment of obstructive sleep apnea.  This  is based on research showing that the system was safe and effective in treating sleep apnea6.  Results showed that the median AHI score decreased 68%, from 29.3 to 9.0. This therapy uses an implant system that senses breathing patterns and delivers mild stimulation to airway muscles, which keeps the airway open during sleep.  The system consists of three fully implanted components: a small generator (similar in size to a pacemaker), a breathing sensor, and a stimulation lead.  Using a small handheld remote, a patient turns the therapy on before bed and off upon awakening.    Candidates for this device must be greater than 22 years of age, have moderate to severe THALIA (AHI between 20-65), BMI less than 32, have tried CPAP/oral appliance without success, and have appropriate upper airway anatomy (determined by a sleep endoscopy performed by Dr. Silver).  Hypoglossal Nerve Stimulation Pathway:    The sleep surgeon s office will work with the patient through the insurance prior-authorization process (including communications and appeals).    Nasal Procedures:  Nasal obstruction can interfere with nasal breathing during the day and night.  Studies have shown that relief of nasal obstruction can improve the ability of some patients to tolerate positive airway pressure therapy for obstructive sleep apnea1.  Treatment options include medications such as nasal saline, topical corticosteroid and antihistamine sprays, and oral medications such as antihistamines or decongestants. Non-surgical treatments can include external nasal dilators for selected patients. If these are not successful by themselves, surgery can improve the nasal airway either alone or in combination with these other options.  Combination Procedures:  Combination of surgical procedures and other treatments may be recommended, particularly if patients have more than one area of narrowing or persistent positional disease.  The success rate of combination surgery  ranges from 66-80%2,3.    References  1. Marsha IRBY. The Role of the Nose in Snoring and Obstructive Sleep Apnoea: An Update.  Eur Arch Otorhinolaryngol. 2011; 268: 1365-73.  2.  Reena SM; Zoey JA; Lore JR; Pallanch JF; Arabella MB; Wm SG; Alie VELAZQUEZ. Surgical modifications of the upper airway for obstructive sleep apnea in adults: a systematic review and meta-analysis. SLEEP 2010;33(10):4040-6200. Jessica BARBOSA. Hypopharyngeal surgery in obstructive sleep apnea: an evidence-based medicine review.  Arch Otolaryngol Head Neck Surg. 2006 Feb;132(2):206-13.  3. Celestino YH1, Jana Y, Rober HODA. The efficacy of anatomically based multilevel surgery for obstructive sleep apnea. Otolaryngol Head Neck Surg. 2003 Oct;129(4):327-35.  4. Jessica BARBOSA, Goldberg A. Hypopharyngeal Surgery in Obstructive Sleep Apnea: An Evidence-Based Medicine Review. Arch Otolaryngol Head Neck Surg. 2006 Feb;132(2):206-13.  5. Strollo PJ et al. Upper-Airway Stimulation for Obstructive Sleep Apnea.  N Engl J Med. 2014 Jan 9;370(2):139-49.  6. Raimundo Y et al. Increased Incidence of Cardiovascular Disease in Middle-aged Men with Obstructive Sleep Apnea. Am J Respir Crit Care Med; 2002 166: 159-165  7. Dell GOOD et al. Studying Life Effects and Effectiveness of Palatopharyngoplasty (SLEEP) study: Subjective Outcomes of Isolated Uvulopalatopharyngoplasty. Otolaryngol Head Neck Surg. 2011; 144: 623-631.

## 2019-10-17 ENCOUNTER — DOCUMENTATION ONLY (OUTPATIENT)
Dept: SLEEP MEDICINE | Facility: CLINIC | Age: 49
End: 2019-10-17
Payer: COMMERCIAL

## 2019-10-17 NOTE — PROGRESS NOTES
Patient instructed on JANETTE use. Patient demonstrated and verbalized knowledge of use. Insurance was verified by financial securing. Device programmed. Device will be returned tomorrow before noon.

## 2019-10-21 ENCOUNTER — PRE VISIT (OUTPATIENT)
Dept: UROLOGY | Facility: CLINIC | Age: 49
End: 2019-10-21

## 2019-10-21 NOTE — TELEPHONE ENCOUNTER
Reason for Visit: Cystoscopy    Diagnosis: urinary urgency and intermittent flares of pain    Orders/Procedures/Records: in system    Contact Patient: n/a    Rooming Requirements: UA dip prior to getting ready for cystoscopy. If positive for Leuks and/or Nitrites, will not do cystoscopy. If positive, send urine for official UA / UC.        Taniya Pitt LPN  10/21/19  11:40 AM

## 2019-10-21 NOTE — NURSING NOTE
Overnight oximetry completed by patient.         Note: Download successful. Copy given to provider and scanned in to chart.        Janett Haham  [facility] - Specialist

## 2019-10-21 NOTE — PROGRESS NOTES
Oximetry results were obtained for the date of 10/16/2019.  The patient was on room air to screen for THALIA.  The study showed a valid recording time of 8:18 with a 4% desaturation index of 0.2/hr.  The mean oxygen saturation was 95.7% and the lowest SpO2 was 93%. The report shows 1 drop to 81%, but that does not appear to be a true reading. I think the probe got maine.  The patient spent 0 minutes below 89% SpO2, 0 minutes below 90%.    This test does not show any concern for sleep apnea or hypoxemia. I reviewed these results with her. No further assessment is indicated. She was encouraged to return as needed if she would like to discuss her sleep further. Otherwise, she is advised to continue to work with primary provider and her other specialists.  Bennett Goltz, PA-C

## 2019-10-22 DIAGNOSIS — J47.9 BRONCHIECTASIS WITHOUT COMPLICATION (H): ICD-10-CM

## 2019-10-22 NOTE — TELEPHONE ENCOUNTER
predniSONE (DELTASONE) 1 MG tablet      Last Written Prescription Date:  9/25/2019  Last Fill Quantity: 200 tab,   # refills: 0  Last Office Visit: 9/9/2019 with ebnny  Future Office visit:       Routing refill request to provider for review/approval because:  Drug not on the [facility], [facility] or [facility] refill protocol or controlled substance

## 2019-10-23 RX ORDER — PREDNISONE 1 MG/1
TABLET ORAL
Qty: 200 TABLET | Refills: 0 | Status: SHIPPED | OUTPATIENT
Start: 2019-10-23 | End: 2019-11-28

## 2019-11-03 ENCOUNTER — HEALTH MAINTENANCE LETTER (OUTPATIENT)
Age: 49
End: 2019-11-03

## 2019-11-28 DIAGNOSIS — J47.9 BRONCHIECTASIS WITHOUT COMPLICATION (H): ICD-10-CM

## 2019-11-29 RX ORDER — PREDNISONE 1 MG/1
TABLET ORAL
Qty: 200 TABLET | Refills: 0 | Status: SHIPPED | OUTPATIENT
Start: 2019-11-29 | End: 2020-01-03

## 2019-11-29 NOTE — TELEPHONE ENCOUNTER
Requested Prescriptions   Pending Prescriptions Disp Refills     predniSONE (DELTASONE) 1 MG tablet [Pharmacy Med Name: PREDNISONE  Last Written Prescription Date:  10/23/2019  Last Fill Quantity: 200,  # refills: 0   Last office visit: 9/9/2019 with prescribing provider:     Future Office Visit:   Next 5 appointments (look out 90 days)    Dec 19, 2019  5:00 PM CST  PHYSICAL with Morelia Simental MD  [facility] [address] [place]  [facility] [id number] [phone]        1MG TABLETS] 200 tablet 0     Sig: TAKE 5 TABLETS BY MOUTH EVERY DAY AND ALTERNATING WITH 6 TABLETS THE NEXT.       There is no refill protocol information for this order

## 2019-12-20 ENCOUNTER — HOSPITAL ENCOUNTER (OUTPATIENT)
Facility: CLINIC | Age: 49
Discharge: HOME | End: 2019-12-20
Attending: INTERNAL MEDICINE | Admitting: INTERNAL MEDICINE
Payer: COMMERCIAL

## 2019-12-20 VITALS
SYSTOLIC BLOOD PRESSURE: 133 MMHG | DIASTOLIC BLOOD PRESSURE: 90 MMHG | OXYGEN SATURATION: 100 % | HEIGHT: 62 IN | HEART RATE: 69 BPM | WEIGHT: 105 LBS | BODY MASS INDEX: 19.32 KG/M2 | RESPIRATION RATE: 12 BRPM

## 2019-12-20 LAB — COLONOSCOPY: NORMAL

## 2019-12-20 PROCEDURE — G0105 COLORECTAL SCRN; HI RISK IND: HCPCS | Performed by: INTERNAL MEDICINE

## 2019-12-20 PROCEDURE — 25000128 H RX IP 250 OP 636: Performed by: INTERNAL MEDICINE

## 2019-12-20 PROCEDURE — G0500 MOD SEDAT ENDO SERVICE >5YRS: HCPCS | Performed by: INTERNAL MEDICINE

## 2019-12-20 PROCEDURE — 45378 DIAGNOSTIC COLONOSCOPY: CPT | Performed by: INTERNAL MEDICINE

## 2019-12-20 RX ORDER — ONDANSETRON 2 MG/ML
4 INJECTION INTRAMUSCULAR; INTRAVENOUS
Status: DISCONTINUED | OUTPATIENT
Start: 2019-12-20 | End: 2019-12-20 | Stop reason: HOSPADM

## 2019-12-20 RX ORDER — LIDOCAINE 40 MG/G
CREAM TOPICAL
Status: DISCONTINUED | OUTPATIENT
Start: 2019-12-20 | End: 2019-12-20 | Stop reason: HOSPADM

## 2019-12-20 RX ORDER — ONDANSETRON 4 MG/1
4 TABLET, ORALLY DISINTEGRATING ORAL EVERY 6 HOURS PRN
Status: DISCONTINUED | OUTPATIENT
Start: 2019-12-20 | End: 2019-12-20 | Stop reason: HOSPADM

## 2019-12-20 RX ORDER — FENTANYL CITRATE 50 UG/ML
INJECTION, SOLUTION INTRAMUSCULAR; INTRAVENOUS PRN
Status: DISCONTINUED | OUTPATIENT
Start: 2019-12-20 | End: 2019-12-20 | Stop reason: HOSPADM

## 2019-12-20 RX ORDER — FLUMAZENIL 0.1 MG/ML
0.2 INJECTION, SOLUTION INTRAVENOUS
Status: DISCONTINUED | OUTPATIENT
Start: 2019-12-20 | End: 2019-12-20 | Stop reason: HOSPADM

## 2019-12-20 RX ORDER — NALOXONE HYDROCHLORIDE 0.4 MG/ML
.1-.4 INJECTION, SOLUTION INTRAMUSCULAR; INTRAVENOUS; SUBCUTANEOUS
Status: DISCONTINUED | OUTPATIENT
Start: 2019-12-20 | End: 2019-12-20 | Stop reason: HOSPADM

## 2019-12-20 RX ORDER — ONDANSETRON 2 MG/ML
4 INJECTION INTRAMUSCULAR; INTRAVENOUS EVERY 6 HOURS PRN
Status: DISCONTINUED | OUTPATIENT
Start: 2019-12-20 | End: 2019-12-20 | Stop reason: HOSPADM

## 2019-12-20 ASSESSMENT — MIFFLIN-ST. JEOR: SCORE: 1064.53

## 2019-12-20 NOTE — H&P
Pre-Endoscopy History and Physical     Valencia Ye MRN# [id number]   YOB: 1970 Age: 49 year old     Date of Procedure: 12/20/2019  Primary care provider: Morelia Simental  Type of Endoscopy: Colonoscopy with possible biopsy, possible polypectomy  Reason for Procedure: screen  Type of Anesthesia Anticipated: Conscious Sedation    HPI:    Valencia is a 49 year old female who will be undergoing the above procedure.      A history and physical has been performed. The patient's medications and allergies have been reviewed. The risks and benefits of the procedure and the sedation options and risks were discussed with the patient.  All questions were answered and informed consent was obtained.      She denies a personal or family history of anesthesia complications or bleeding disorders.     Patient Active Problem List   Diagnosis     Goiter     History of systemic steroid therapy     ILD (interstitial lung disease) (H)     Palpitations     Churg-Antonina syndrome (H)     History of eating disorder     Asthma     Anxiety     Chronic fatigue     Mannose-binding lectin deficiency     Benign essential hypertension     Moderate episode of recurrent major depressive disorder (H)     Bronchiectasis without acute exacerbation (H)     Controlled substance agreement signed     Irritable bowel syndrome without diarrhea     Bladder pain     Overactive bladder        Past Medical History:   Diagnosis Date     Anxiety      Aortitis (H)      Asthma     associated with Churg Antonina syndrome     Bronchiectasis (H)      Cerebral infarction (H) August 1990    Very small, caused small permanent optical migraine     Chronic sinusitis      Churg-Antonina syndrome (H)     dx 1990     Conductive hearing loss      Depressive disorder      Eustachian tube dysfunction      Goiter      Hearing loss, conductive      Heart rate problem      Hypertension      Hypocalcemia      Immunosuppression (H)      Irritable bowel syndrome       Major depressive disorder      Mannose-binding lectin deficiency      Nonspecific abnormal results of thyroid function study      Osteoporosis      Pericarditis      and      Pneumonia     4/23/10     Recurrent otitis media      Recurrent UTI      Rheumatoid vasculitis (H)      Sinusitis, chronic      Steroid long-term use      Tinnitus      Varicose veins of leg with swelling         Past Surgical History:   Procedure Laterality Date     C/SECTION, CLASSICAL  1998      SECTION       COLONOSCOPY Left 2014    Procedure: COMBINED COLONOSCOPY, SINGLE OR MULTIPLE BIOPSY/POLYPECTOMY BY BIOPSY;  Surgeon: Js Pinedo MD;  Location:  GI     ENT SURGERY       GENITOURINARY SURGERY       HC COLP CERVIX/UPPER VAGINA W LOOP ELEC BX CERVIX       HEAD & NECK SURGERY       HYSTERECTOMY  2009    without oopherectomy     HYSTERECTOMY, PAP NO LONGER INDICATED      cervix removed      PE TUBES       PICC INSERTION  10/10/2013    5fr DL PASV PICC, 43cm (1cm external) in the L basilic vein w/ tip in the low SVC.     PICC INSERTION Left 2017    5fr DL BioFlo PICC, 42cm (3cm external) in the L lateral brachial vein w/ tip in the SVC RA junction.     SINUS SURGERY       TUBAL LIGATION       TYMPANOPLASTY         Social History     Tobacco Use     Smoking status: Never Smoker     Smokeless tobacco: Never Used   Substance Use Topics     Alcohol use: Yes     Alcohol/week: 0.0 standard drinks     Comment: few drinks a week       Family History   Problem Relation Age of Onset     Allergies Mother         Seasonal     Alcohol/Drug Mother      Substance Abuse Mother      Depression Mother      C.A.D. Father      Diabetes Father         Type 2     Depression Father      Heart Disease Father      C.A.D. Maternal Grandmother      Arthritis Maternal Grandmother      Musculoskeletal Disorder Maternal Grandmother         MS     Heart Disease Maternal Grandmother      Hypertension Maternal  Grandmother      Alcohol/Drug Maternal Grandfather      Substance Abuse Maternal Grandfather      Depression Maternal Grandfather      Asthma Son      Asthma Daughter      Allergies Daughter         Seasonal     Unknown/Adopted Paternal Grandmother      Unknown/Adopted Paternal Grandfather      Cancer Maternal Aunt         breast age 53     Breast Cancer Maternal Aunt         in her 50s     Glaucoma No family hx of      Macular Degeneration No family hx of      Colon Cancer No family hx of        Prior to Admission medications    Medication Sig Start Date End Date Taking? Authorizing Provider   AMITIZA 8 MCG capsule TAKE 1 CAPSULE(8 MCG) BY MOUTH TWICE DAILY 5/2/19  Yes Morelia Simental MD   atorvastatin (LIPITOR) 10 MG tablet Take 0.5 tablets (5 mg) by mouth daily 5/2/19  Yes Aime Carver MD   budesonide-formoterol (SYMBICORT) 160-4.5 MCG/ACT Inhaler Inhale 2 puffs into the lungs 2 times daily 4/12/19  Yes Dagoberto Briscoe MD   metoprolol succinate ER (TOPROL-XL) 25 MG 24 hr tablet TAKE 1/2 TABLET(12.5 MG) BY MOUTH DAILY 3/14/19  Yes Morelia Simental MD   Omega-3 Fatty Acids (FISH OIL) 1200 MG capsule Take 4 capsules (4.8 g) by mouth daily 6/13/19  Yes Dagoberto Briscoe MD   predniSONE (DELTASONE) 1 MG tablet TAKE 5 TABLETS BY MOUTH EVERY DAY AND ALTERNATING WITH 6 TABLETS THE NEXT. 11/29/19  Yes Morelia Simental MD   vitamin B complex with vitamin C (VITAMIN  B COMPLEX) TABS tablet Take 1 tablet by mouth daily   Yes Reported, Patient   vitamin D3 (CHOLECALCIFEROL) 2000 units (50 mcg) tablet Take 2 tablets by mouth daily   Yes Reported, Patient   acetylcysteine (MUCOMYST) 20 % neb solution Take 4 mLs by nebulization 2 times daily Use with albuterol solution. Discard open bottle of Mucomyst after 96 hours. 4/12/19   Dagoberto Briscoe MD   albuterol (PROVENTIL) (2.5 MG/3ML) 0.083% neb solution Take 1 vial (2.5 mg) by nebulization 2 times daily Use with Mucomyst 4/12/19   Dagoberto Briscoe MD  "  ciprofloxacin-dexamethasone (CIPRODEX) 0.3-0.1 % otic suspension INSTILL 3 DROPS INTO THE AFFECTED EAR TWICE DAILY AS NEEDED 6/7/19   Morelia Simental MD   fluticasone (FLONASE) 50 MCG/ACT nasal spray USE 2 SPRAYS INTO BOTH NOSTRIL DAILY 6/6/19   Keren Waller MD   glucosamine-chondroitin (GLUCOSAMINE CHONDR COMPLEX) 500-400 MG CAPS Take 2 capsules by mouth daily.    Reported, Patient   Ipratropium-Albuterol (COMBIVENT RESPIMAT)  MCG/ACT inhaler Inhale 1 puff into the lungs 4 times daily Not to exceed 6 doses per day. 4/12/19   Dagoberto Briscoe MD       Allergies   Allergen Reactions     Colistimethate Anaphylaxis     Colymycin M [Colistimethate Sodium] Anaphylaxis     Tamiflu [Oseltamivir]      Gums Blister up     Azithromycin Palpitations     Heart palpitation  Heart palpitation     Clindamycin Rash        REVIEW OF SYSTEMS:   5 point ROS negative except as noted above in HPI, including Gen., Resp., CV, GI &  system review.    PHYSICAL EXAM:   BP (!) 145/91   Resp 16   Ht 1.575 m (5' 2\")   Wt 47.6 kg (105 lb)   LMP 02/01/2009   SpO2 100%   BMI 19.20 kg/m   Estimated body mass index is 19.2 kg/m  as calculated from the following:    Height as of this encounter: 1.575 m (5' 2\").    Weight as of this encounter: 47.6 kg (105 lb).   GENERAL APPEARANCE: alert, and oriented  MENTAL STATUS: alert  AIRWAY EXAM: Mallampatti Class I (visualization of the soft palate, fauces, uvula, anterior and posterior pillars)  RESP: lungs clear to auscultation - no rales, rhonchi or wheezes  CV: regular rates and rhythm  DIAGNOSTICS:    Not indicated    IMPRESSION   ASA Class 3 - Severe systemic disease, but not incapacitating    PLAN:   Plan for Colonoscopy with possible biopsy, possible polypectomy. We discussed the risks, benefits and alternatives and the patient wished to proceed.    The above has been forwarded to the consulting provider.      Signed Electronically by: Bryce Pimentel MD  December " 20, 2019

## 2019-12-20 NOTE — LETTER
November 29, 2019    Valencia RAMAN Wilson Ephraim  [address] [place] [address]    Dear Valencia,       Thank you for choosing [facility]. You are scheduled for the following service(s).   Please be aware that coverage of these services is subject to the terms and limitations of your health insurance plan.  Call member services at your health plan with any benefit or coverage questions.         Procedure:    Colonoscopy   Doctor:      Dr. Pimentel  Arrival Time:   0700 am  *check in at Emergency/Endoscopy desk*  Procedure Time:   0730 am  Location:   [facility]      Endoscopy Department, First Floor (Enter through ER Doors) *       [address] [place], [place] [address]    [phone] or [phone] () to reschedule             MIRALAX/GATORADE DOUBLE PREP  Purchase the following supplies at your local pharmacy:    2 (two)- Bisacodyl tablets   (Dulcolax  laxative NOT Dulcolax  stool softener) each tablet contains 5 mg of bisacodyl  2 (two) - 8.3 ounce bottle of Polyethylene Glycol (PEG) 3350 Powder   (MiraLAX, SmoothLAX, ClearLAX or generic equivalent)  128 oz. Gatorade  (No red colored flavors)  Regular Gatorade , Gatorade G2 , Powerade , Powerade Zero  or Pedialyte  are acceptable. Red flavors are not allowed; all other colors (yellow, green, orange, purple, blue) are okay. It is also okay to buy two 2.12 oz packets of powdered Gatorade that can be mixed with water to a total volume of 64 oz of liquid.  1 - 10 oz. bottle Magnesium Citrate (No red colored flavors)  It is also okay for you to use a 0.5 ounce package of powdered magnesium citrate (17 grams) mixed with 10 ounces of water.      PREPARATION FOR COLONOSCOPY    Transportation:  You must arrange for a ride for the day of your procedure with a responsible adult. A taxi , Uber, etc, is not an option unless you are accompanied by a responsible adult. If you fail to arrange transportation with a  responsible adult, your procedure will be cancelled and rescheduled.    7 days before:    Discontinue fiber supplements and medications containing iron. This includes multivitamins with iron, Metamucil and Fibercon.     3 days before:     Begin a Low-Fiber Diet. A low fiber diet helps make the cleanout more effective.     Examples of a low fiber diet include (but are not limited to): White bread, white rice, pasta, crackers, fish, chicken, eggs, ground beef, creamy peanut butter, cooked/steamed/boiled vegetables, canned fruit, bananas, melons, milk, plain yogurt cheese, salad dressing and other condiments.     The following are not allowed on a low fiber diet: Seeds, nuts, popcorn, bran, whole wheat, corn, quinoa, raw fruits and vegetables, berries and dried fruit, beans and lentils.    For additional details on following a low fiber diet, please see attached information sheet    2 days before:    Stop eating solid foods in the morning.    Begin Clear Liquid Diet. (Clear liquids include things you can see through).     Examples of a clear liquid diet include: Water, tea , coffee ,no milk or non-dairy creamer), clear broth or bouillon, Gatorade, Pedialyte or Powerade, carbonated and non-carbonated soft drinks(Sprite , 7-Up , Gingerale), strained fruit juices without pulp (apple, white grape, white cranberry), Jello-O  and popsicles     The following are not allowed on a clear liquid diet: Red liquids, alcoholic beverages, coffee, dairy products, protein shakes, cream broths, juice with pulp and chewing tobacco.    Between 4-6pm: Drink Miralax - Gatorade preparation, mix 1 (one) bottle of Miralax with 64 oz. of Gatorade in a large pitcher.  Drink 1 (one) - 8 oz. glass every 15 minutes thereafter until the mixture is gone.      1 day before:    Continue clear liquid diet    At noon: Take 2 Bisacodyl (Dulcolax) tablets     Between 4-6pm: Drink Miralax - Gatorade preparation Between 4-6pm: Drink Miralax - Gatorade  preparation, mix 1 (one) bottle of Miralax with 64 oz. of Gatorade in a large pitcher.    Drink 1 (one) - 8 oz. glass every 15 minutes thereafter until the mixture is gone.    Colon Cleansing Tips: Drink adequate amounts of fluid before and after your colon cleansing to prevent dehydration. Stay near a toilet because you will have diarrhea. Even if you are sitting on the toilet, continue to drink the cleansing solution every 15 minutes. If you feel nauseous or vomit, rinse your mouth with water, take a 15 to 30-minute break and then continue drinking the solution. You will be uncomfortable until the stool has flushed from your colon (in about 2-4 hours). You may feel chilled.    Day of your procedure:  You may take all of your morning medications including blood pressure medications, blood thinners (if you have not been instructed to stop these by our office), methadone, anti-seizure medications with sips of water 3 hours prior to your procedure or earlier. Do not take insulin or vitamins prior to your procedure.  Continue the Clear Liquid Diet. Avoid red liquids, alcoholic beverages, coffee, dairy products, protein shakes, and chewing tobacco.      4 hours prior: Drink 10 oz magnesium citrate     3 hours prior:     STOP consuming all liquids     Do not take anything by mouth during this time.     Allow extra time to travel to your procedure as you may need to stop and use a restroom along the way.    You are ready for the procedure, if you followed all instructions and your stool is no longer formed, but clear or yellow liquid. If you are unsure whether your colon is clean, please call our office at [phone] before you leave for your appointment.      Canceling or Rescheduling your appointment, please call [phone] as soon as possible.        Bring the following to your procedure:    Insurance Card / Photo ID     List of Current Medications including over-the-counter medications and supplements     Bring  your rescue inhaler if you currently use one to control asthma               DIRECTIONS    From the north ([place], [place], [place])  Take [address] south, exit to [place] [address]. Get into the left hand luiza, turn left (east), go one-half mile to [address]. Turn left (north) on [address]. Go north to first stoplight, take a right on the newly constructed road, [address] and follow it to the Emergency Entrance  From the south ([facility])  Take 35 north to the east split, [address], and exit to [address] . Turn left (west) on [address] [address] to [address]. Turn right (north) on [address]. Go north to first stoplight, take a right on the newly constructed road, [address] and [facility] it [facility]   From the east via [address] ([facility])  Take [address] south to [place] [address] exit. Turn right on [address] . Go west to [address]. Turn right (north) on [address], go to the first stoplight, take a right and follow the newly constructed road, [address], to the Emergency Entrance   From the east via [address] ([facility])  Take [address] west to [address]. Turn left (south) on [address] to [address]. Turn left (east) on [address] and follow the newly constructed road [facility]   From the west via [address] ([facility])  Take [address] east to [address]. Turn right (south) on [address] to [address].  Turn left (east) on [address] and follow the newly constructed road Emergency Entrance      PRE-PROCEDURE CHECKLIST    If you have diabetes, ask your regular doctor for diet and medication restrictions.  If you take a medication to thin your blood (such as Coumadin or Lovenox) and have not already discussed this please call your primary doctor to advice on holding this medication  If you take aspirin or Plavix,  you may continue to do so.  If you are or may be pregnant, please  discuss the risks and benefits of this procedure with your doctor.          You must arrange for a ride for the day of your exam. If you fail to arrange transportation with a responsible adult, your procedure will need to be cancelled and rescheduled. Taxi ,Bus and Medical transport are not acceptable unless you have a responsible adult that you know & trust with you.  Please arrange for this  to be able to pick you up in our department, approximately one hour after your scheduled procedure, if they are not able to stay with you.  *If you must cancel or reschedule your appointment, please call Endoscopy  at [phone].*      What happens during a colonoscopy?    Plan to spend up to two hours, starting at registration time, at the endoscopy center the day of your procedure. The exam itself takes about 15 minutes to complete, and recovery 30 minutes.     Before the exam:    You will change into a gown.    Your medical history will be reviewed with you and you will be given a consent form to sign.     A nurse will insert an intravenous (IV) line into your hand or arm.    During the exam:     Medicine will be given through the IV line to help you relax and feel drowsy.     Your heart rate and oxygen levels will be monitored. If your blood pressure is low, you may be given fluids through the IV line.     The doctor will insert a flexible hollow tube, called a colonoscope, into your rectum.   o The scope will be advanced slowly through the large intestine (colon).    You may have a feeling of pressure or fullness.     If an abnormal tissue, or a polyp are found, the doctor may remove it through the endoscope for closer examination, or biopsy. Tissue removal is painless.    What happens after the exam?        Your doctor will talk with you about the initial results of your exam.    The doctor will prepare a full report for the physician who referred you for the colonoscopy.    You may feel bloated after  the procedure. This is normal.     Medication given during the exam will prohibit you from driving for the rest of the day.     Following the exam, you may resume your normal diet. Your first meal should be light, no greasy foods. Avoid alcohol until the next day.     You may resume your regular activities the day after the procedure.     A nurse will provide you with complete discharge instructions before you leave the endoscopy center. Be sure to ask the nurse for specific instructions if you take blood thinners such as aspirin, Coumadin or Plavix.     Any tissue samples removed during the exam will be sent to a lab for evaluation. It may take 5-7 working days for you to be notified of the results.       Low-Fiber Diet    A low-fiber diet limits the amount of food waste that has to move through the large intestine.    Foods Recommended Foods to Avoid   Breads, Cereal, Rice and Pasta:   White bread, rolls, biscuits, and croissant, kimo toast   Waffles, Tristanian toast, and pancakes   White rice, noodles, pasta, macaroni and peeled cooked potatoes   Plain crackers, Saltines   Cooked cereals: farina, Cream of Rice   Cold cereals: Puffed Rice, Rice Krispies, Corn Flakes, and Special K Breads, Cereal, Rice and Pasta:   Breads or rolls with nuts, seeds or fruit   Whole wheat, pumpernickel, rye breads and cornbread   Potatoes with skin, brown or wild rice, and kasha (buckwheat)     Vegetables:    Tender cooked and canned vegetables without seeds: carrots, asparagus tips, green or wax beans, pumpkin, spinach, lima beans   Vegetables:   Raw or steamed vegetables   Vegetables with seeds   Sauerkraut   Winter squash, peas, broccoli, [place] sprouts, cabbage, onions, cauliflower, baked beans, peas and corn     Fruits:   Strained fruit juice   canned fruit, except pineapple   ripe bananas   melons Fruits:   prunes and prune juice   raw or dried fruit   all berries, figs, dates and raisins     Milk/Dairy:   milk, plain or  flavored   yogurt, custard, and ice cream   cheese and cottage cheese Milk/Dairy:   Yogurt with nuts or seeds   Meat and other proteins:   ground, wellcooked tender beef, lamb, ham, veal, pork, fish, poultry, and organ meats   eggs   peanut butter without nuts  Fats, Snack, Sweets, Condiments, and Beverages:   Margarine, butter, oils, mayonnaise, sour cream, and salad dressing   Plain graviesbouillon, broth, and soups made with allowed vegetables   Coffee, tea, and carbonated drinks   Plain cakes and cookies   Gelatin, plain puddings, custard, ice cream, sherbet, Popsicles   Hard candy or pretzels   Ketchup, mustard   Sugar, clear jelly, honey, and syrup   Spices, cooked herbs,    Meat and other proteins:   tough, fibrous meats with gristle   dry beans, peas, and lentils   peanut butter with nuts   Tofu  Fats, Snack, Sweets, Condiments, and Beverages:   Nuts, seeds, and coconut   Jam, marmalade, and preserves   Pickles, olives, relish, and horseradish   All desserts containing nuts, seeds, dried fruit, coconut, or made from whole grains or bran   Candy made with nuts or seeds   popcorn

## 2019-12-20 NOTE — DISCHARGE INSTRUCTIONS
DIVERTICULOSIS  You have diverticulosis. This means that small pouches have formed in the wall of the colon (large intestine). Most often, this problem causes no symptoms. But the pouches in the colon are at risk for becoming infected or inflamed. When this happens, the condition is called diverticulitis.  The doctor will talk with you about how to manage your condition. Diet changes may be all that are needed to help control diverticulosis and prevent progression to diverticulitis. If you develop diverticulitis, other treatments will likely be needed.  HOME CARE  Medications: You may be told to take fiber supplements daily. Fiber adds bulk to the stool so that it passes through the colon more easily. Stool softeners may be recommended. You may also be given medications for pain relief. Be sure to take all medications as directed.  General Care:    Eat unprocessed foods that are high in fiber. Whole grains, fruits, and vegetables are good choices.    Drink 6 to 8 glasses of water daily.    Watch for changes in your bowel movements. Tell the doctor if you notice any changes.    Begin an exercise program. Ask your doctor how to get started.    Get plenty of rest and sleep.  FOLLOW UP as advised by the doctor or our staff. Regular visits may be needed to check on your health. Be sure to keep all your appointments.  GET PROMPT MEDICAL ATTENTION if any of the following occurs:    Fever of 100.6 F (38 C) or higher, or as directed by your healthcare provider    Severe cramps in the lower left side of the abdomen or pain that is getting progressively worse.    Tenderness in the lower left side of the abdomen or worsening pain throughout the abdomen    Diarrhea or constipation that does not improve within 24 hours    Nausea and vomiting    Bleeding from the rectum      1617-5300 [facility], [address] [place] [address], [place] [address]. All rights reserved. This information is not intended as a substitute for professional  medical care. Always follow your healthcare professional's instructions.    Eating a High-Fiber Diet  Fiber is what gives strength and structure to plants. Most grains, beans, vegetables, and fruits contain fiber. Foods rich in fiber are often low in calories and fat, and they fill you up more. They may also reduce your risks for certain health problems. To find out the amount of fiber in canned, packaged, or frozen foods, read the  Nutrition Facts  label. It tells you how much fiber is in a serving.      Types of Fiber and Their Benefits  There are two types of fiber: insoluble and soluble. They both aid digestion and help you maintain a healthy weight.  Insoluble fiber: This is found in whole grains, cereals, certain fruits and vegetables (such as apple skin, corn, and carrots). Insoluble fiber may prevent constipation and reduce the risk of certain types of cancer.   Soluble fiber: This type of fiber is in oats, beans, and certain fruits and vegetables (such as strawberries and peas). Soluble fiber can reduce cholesterol (which may help lower the risk of heart disease), and helps control blood sugar levels.  Look for High-Fiber Foods  Whole-grain breads and cereals: Try to eat 6-8 ounces a day. Include wheat and oat bran cereals, whole-wheat muffins or toast, and corn tortillas in your meals.  Fruits: Try to eat 2 cups a day. Apples, oranges, strawberries, pears, and bananas are good sources. (Note: Fruit juice is low in fiber.)  Vegetables: Try to eat 3 cups a day. Add asparagus, carrots, broccoli, peas, and corn to your meals.  Legumes (beans): One cup of cooked lentils gives you over 15 grams of fiber. Try navy beans, lentils, and chickpeas.  Seeds:  A small handful of seeds gives you about 3 grams of fiber. Try sunflower seeds.    Keep Track of Your Fiber  A healthy diet includes 31 grams of fiber a day if you have a 2,000-calorie diet. Keep track of how much fiber you eat. Start by reading food labels. Then  eat a variety of foods high in fiber. Ask your doctor about supplemental fiber products.            3330-2142 Yina Gaffney, [address] [facility], [place] [address]. All rights reserved. This information is not intended as a substitute for professional medical care. Always follow your healthcare professional's instructions.    HIGH FIBER DIET  Fiber is present in all fruits, vegetables, cereals and grains. Fiber passes through the body undigested. A high fiber diet helps food move through the intestinal tract. The added bulk is helpful in preventing constipation. In people with diverticulosis it serves to clean out the pouches along the colon wall while preventing new ones from forming. A high fiber diet also reduces the risk of colon cancer, decreases blood cholesterol and prevents high blood sugar in people with diabetes.    The foods listed below are high in fiber and should be included in your diet. If you are not used to high fiber foods, start with 1 or 2 foods from this list. Every 3-4 days add a new one to your diet until you are eating 4 high fiber foods per day. This should give you 20-35 Gm of fiber/day. It is also important to drink a lot of water when you are on this diet (6-8 glasses a day). Water causes the fiber to swell and increases the benefit.    FOODS HIGH IN DIETARY FIBER:  BREADS: Made with 100% whole wheat flour; elsa, wheat or rye crackers; tortillas, bran muffins  CEREALS: Whole grain cereal with bran (Chex, Raisin Bran, Corn Bran), oatmeal, rolled oats, granola, wheat flakes, brown rice  NUTS: Any nuts  FRUITS: All fresh fruits along with edible skins, (bananas, citrus fruit, mangoes, pears, prunes, raisins, apples, pineapple, apricot, melon, jams and marmalades), fruit juices (especially prune juice)  VEGETABLES: All types, preferably raw or lightly cooked: especially, celery, eggplant, potatoes, spinach, broccoli, brussel sprouts, winter squash, carrots, cauliflower, soybeans,  lentils, fresh and dried beans of all kinds  OTHER: Popcorn, any spices      [id number] [facility], [address] [place] [address], [place] [address]. All rights reserved. This information is not intended as a substitute for professional medical care. Always follow your healthcare professional's instructions.

## 2019-12-26 ENCOUNTER — OFFICE VISIT (OUTPATIENT)
Dept: PULMONOLOGY | Facility: CLINIC | Age: 49
End: 2019-12-26
Attending: INTERNAL MEDICINE
Payer: COMMERCIAL

## 2019-12-26 VITALS
OXYGEN SATURATION: 97 % | WEIGHT: 111.33 LBS | HEART RATE: 75 BPM | SYSTOLIC BLOOD PRESSURE: 125 MMHG | DIASTOLIC BLOOD PRESSURE: 80 MMHG | BODY MASS INDEX: 19.73 KG/M2 | HEIGHT: 63 IN

## 2019-12-26 DIAGNOSIS — D72.18 CHURG-STRAUSS SYNDROME (H): ICD-10-CM

## 2019-12-26 DIAGNOSIS — J45.20 MILD INTERMITTENT ASTHMA WITHOUT COMPLICATION: ICD-10-CM

## 2019-12-26 DIAGNOSIS — J84.9 ILD (INTERSTITIAL LUNG DISEASE) (H): ICD-10-CM

## 2019-12-26 DIAGNOSIS — J47.9 BRONCHIECTASIS WITHOUT ACUTE EXACERBATION (H): Primary | ICD-10-CM

## 2019-12-26 DIAGNOSIS — J47.9 BRONCHIECTASIS WITHOUT COMPLICATION (H): ICD-10-CM

## 2019-12-26 DIAGNOSIS — M30.1 CHURG-STRAUSS SYNDROME (H): ICD-10-CM

## 2019-12-26 DIAGNOSIS — J47.9 BRONCHIECTASIS (H): Primary | ICD-10-CM

## 2019-12-26 LAB
EXPTIME-PRE: 6.94 SEC
FEF2575-%PRED-PRE: 87 %
FEF2575-PRE: 2.27 L/SEC
FEF2575-PRED: 2.6 L/SEC
FEFMAX-%PRED-PRE: 96 %
FEFMAX-PRE: 6.32 L/SEC
FEFMAX-PRED: 6.53 L/SEC
FEV1-%PRED-PRE: 121 %
FEV1-PRE: 3.01 L
FEV1FEV6-PRE: 73 %
FEV1FEV6-PRED: 83 %
FEV1FVC-PRE: 73 %
FEV1FVC-PRED: 82 %
FIFMAX-PRE: 5.15 L/SEC
FVC-%PRED-PRE: 135 %
FVC-PRE: 4.1 L
FVC-PRED: 3.02 L

## 2019-12-26 PROCEDURE — G0463 HOSPITAL OUTPT CLINIC VISIT: HCPCS | Mod: 25

## 2019-12-26 PROCEDURE — 90732 PPSV23 VACC 2 YRS+ SUBQ/IM: CPT | Mod: ZF | Performed by: INTERNAL MEDICINE

## 2019-12-26 PROCEDURE — 25000128 H RX IP 250 OP 636: Mod: ZF | Performed by: INTERNAL MEDICINE

## 2019-12-26 PROCEDURE — G0009 ADMIN PNEUMOCOCCAL VACCINE: HCPCS

## 2019-12-26 RX ORDER — ACETYLCYSTEINE 200 MG/ML
4 SOLUTION ORAL; RESPIRATORY (INHALATION) 2 TIMES DAILY
Qty: 240 ML | Refills: 11 | Status: SHIPPED | OUTPATIENT
Start: 2019-12-26 | End: 2021-01-06

## 2019-12-26 RX ORDER — ALBUTEROL SULFATE 0.83 MG/ML
2.5 SOLUTION RESPIRATORY (INHALATION) 2 TIMES DAILY
Qty: 60 VIAL | Refills: 11 | Status: SHIPPED | OUTPATIENT
Start: 2019-12-26 | End: 2021-01-06

## 2019-12-26 RX ORDER — BUDESONIDE AND FORMOTEROL FUMARATE DIHYDRATE 160; 4.5 UG/1; UG/1
2 AEROSOL RESPIRATORY (INHALATION) 2 TIMES DAILY
Qty: 1 INHALER | Refills: 11 | Status: SHIPPED | OUTPATIENT
Start: 2019-12-26 | End: 2021-01-06

## 2019-12-26 RX ADMIN — PNEUMOCOCCAL VACCINE POLYVALENT 0.5 ML
25; 25; 25; 25; 25; 25; 25; 25; 25; 25; 25; 25; 25; 25; 25; 25; 25; 25; 25; 25; 25; 25; 25 INJECTION, SOLUTION INTRAMUSCULAR; SUBCUTANEOUS at 12:08

## 2019-12-26 ASSESSMENT — PAIN SCALES - GENERAL: PAINLEVEL: NO PAIN (0)

## 2019-12-26 ASSESSMENT — MIFFLIN-ST. JEOR: SCORE: 1105.16

## 2019-12-26 NOTE — NURSING NOTE
Chief Complaint   Patient presents with     Follow Up     bronchiectasis     Vitals were taken and medications were reconciled.     ALEXA Workman

## 2019-12-26 NOTE — PROGRESS NOTES
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   Bronchiectasis: The patient appears to be doing well from a pulmonary standpoint.  Activity is limited by fatigue rather than dyspnea.  She has minimal cough or sputum.  PFTs are the best they have been in the recent past.  She is oxygenating well.  She does not appear to be experiencing a pulmonary exacerbation at this time.  She will continue her current Symbicort, Combivent as needed and daily vest therapy with albuterol and Mucomyst.  All of her pulmonary medications were renewed with today's visit.  She also received a Pneumovax 23.  She has already received her influenza vaccine for this flu season.  Etiology of her fatigue and body aches is unclear.  It is possible that it is related to her eosinophilic granuloma with polyangiitis.  I will defer to her primary care physician for further evaluation.  I will see the patient in follow-up in 6 months with PFTs or in the interim if she has worsening respiratory symptoms.    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     The patient reports a recent head cold which is resolving without intervention.  Breathing is comfortable at rest.  She notes increased dyspnea when trying to work with her arms bent at the waist.  She recovers with standing.  She denies cough.  She denies sputum.  She reports occasional \"lung cramps\" occurring about twice per month lasting up to 30 minutes and resolving spontaneously.  She is doing vest therapy once daily for 30 minutes.  No recent fever or chills.  She does note night sweats in " association with her recent muscle pain and fatigue.  She reports marked fatigue for the past year.  She has been off work since July.  She reports that if she walks more than 1 mile she is completely exhausted.  This is not related to shortness of breath but rather a general sense of fatigue.        ROS Pulmonary  Appetite is fair, unchanged  No ear pain, sore throat or sinus pain  Intermittent palpitations, longstanding and unchanged.  No nausea, vomiting or diarrhea.  Intermittent urinary urgency recently evaluated by urology.  Pain in the upper back, shoulders and neck.  A complete ROS was otherwise negative except as noted in the HPI.    Current Outpatient Medications   Medication     acetylcysteine (MUCOMYST) 20 % neb solution     albuterol (PROVENTIL) (2.5 MG/3ML) 0.083% neb solution     AMITIZA 8 MCG capsule     atorvastatin (LIPITOR) 10 MG tablet     budesonide-formoterol (SYMBICORT) 160-4.5 MCG/ACT Inhaler     ciprofloxacin-dexamethasone (CIPRODEX) 0.3-0.1 % otic suspension     fluticasone (FLONASE) 50 MCG/ACT nasal spray     glucosamine-chondroitin (GLUCOSAMINE CHONDR COMPLEX) 500-400 MG CAPS     Ipratropium-Albuterol (COMBIVENT RESPIMAT)  MCG/ACT inhaler     metoprolol succinate ER (TOPROL-XL) 25 MG 24 hr tablet     Omega-3 Fatty Acids (FISH OIL) 1200 MG capsule     predniSONE (DELTASONE) 1 MG tablet     UNABLE TO FIND     vitamin B complex with vitamin C (VITAMIN  B COMPLEX) TABS tablet     vitamin D3 (CHOLECALCIFEROL) 2000 units (50 mcg) tablet     Current Facility-Administered Medications   Medication     pneumococcal vaccine (PNEUMOVAX 23-gardenia) injection 0.5 mL     Allergies   Allergen Reactions     Colistimethate Anaphylaxis     Colymycin M [Colistimethate Sodium] Anaphylaxis     Tamiflu [Oseltamivir]      Gums Blister up     Azithromycin Palpitations     Heart palpitation  Heart palpitation     Clindamycin Rash     Past Medical History:   Diagnosis Date     Anxiety      Aortitis (H)       Asthma     associated with Churg Antonina syndrome     Bronchiectasis (H)      Cerebral infarction (H) 1990    Very small, caused small permanent optical migraine     Chronic sinusitis      Churg-Antonina syndrome (H)     dx      Conductive hearing loss      Depressive disorder      Eustachian tube dysfunction      Goiter      Hearing loss, conductive      Heart rate problem      Hypertension      Hypocalcemia      Immunosuppression (H)      Irritable bowel syndrome      Major depressive disorder      Mannose-binding lectin deficiency      Nonspecific abnormal results of thyroid function study      Osteoporosis      Pericarditis      and      Pneumonia     4/23/10     Recurrent otitis media      Recurrent UTI      Rheumatoid vasculitis (H)      Sinusitis, chronic      Steroid long-term use      Tinnitus      Varicose veins of leg with swelling        Past Surgical History:   Procedure Laterality Date     C/SECTION, CLASSICAL        SECTION       COLONOSCOPY Left 2014    Procedure: COMBINED COLONOSCOPY, SINGLE OR MULTIPLE BIOPSY/POLYPECTOMY BY BIOPSY;  Surgeon: Js Pinedo MD;  Location:  GI     COLONOSCOPY N/A 2019    Procedure: COLONOSCOPY;  Surgeon: Bryce Pimentel MD;  Location:  GI     ENT SURGERY       GENITOURINARY SURGERY       HC COLP CERVIX/UPPER VAGINA W LOOP ELEC BX CERVIX       HEAD & NECK SURGERY       HYSTERECTOMY  2009    without oopherectomy     HYSTERECTOMY, PAP NO LONGER INDICATED      cervix removed      PE TUBES       PICC INSERTION  10/10/2013    5fr DL PASV PICC, 43cm (1cm external) in the L basilic vein w/ tip in the low SVC.     PICC INSERTION Left 2017    5fr DL BioFlo PICC, 42cm (3cm external) in the L lateral brachial vein w/ tip in the SVC RA junction.     SINUS SURGERY       TUBAL LIGATION       TYMPANOPLASTY         Social History     Socioeconomic History     Marital status:      Spouse name: Not on  file     Number of children: Not on file     Years of education: Not on file     Highest education level: Not on file   Occupational History     Not on file   Social Needs     Financial resource strain: Not on file     Food insecurity:     Worry: Not on file     Inability: Not on file     Transportation needs:     Medical: Not on file     Non-medical: Not on file   Tobacco Use     Smoking status: Never Smoker     Smokeless tobacco: Never Used   Substance and Sexual Activity     Alcohol use: Yes     Alcohol/week: 0.0 standard drinks     Comment: few drinks a week     Drug use: No     Sexual activity: Yes     Partners: Male     Birth control/protection: Male Surgical, Female Surgical   Lifestyle     Physical activity:     Days per week: Not on file     Minutes per session: Not on file     Stress: Not on file   Relationships     Social connections:     Talks on phone: Not on file     Gets together: Not on file     Attends Zoroastrian service: Not on file     Active member of club or organization: Not on file     Attends meetings of clubs or organizations: Not on file     Relationship status: Not on file     Intimate partner violence:     Fear of current or ex partner: Not on file     Emotionally abused: Not on file     Physically abused: Not on file     Forced sexual activity: Not on file   Other Topics Concern     Parent/sibling w/ CABG, MI or angioplasty before 65F 55M? Not Asked      Service No     Blood Transfusions No     Caffeine Concern No     Occupational Exposure No     Hobby Hazards No     Sleep Concern No     Stress Concern Yes     Weight Concern No     Special Diet Yes     Comment: IBS diet     Back Care No     Exercise Not Asked     Comment: 1 mile power walk 5 days a week at least.      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  .  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  Non smoker. No smokers at home.   "Enjoys walking and dancing.  Alcohol--1 to 2 drinks daily.  Few times a year has more than 4 drinks in a day.  No illicits.                         /80   Pulse 75   Ht 1.594 m (5' 2.75\")   Wt 50.5 kg (111 lb 5.3 oz)   LMP 02/01/2009   SpO2 97%   BMI 19.88 kg/m    Exam:   GENERAL APPEARANCE: Well developed, well nourished, alert, and in no apparent distress.  EYES: PERRL, EOMI  HENT: Nasal mucosa with no edema and no hyperemia. No nasal polyps.  EARS: Canals clear, TMs scarred and perforated bilaterally  MOUTH: Oral mucosa is moist, without any lesions, no tonsillar enlargement, no oropharyngeal exudate.  NECK: supple, no masses, no thyromegaly.  LYMPHATICS: No significant axillary, cervical, or supraclavicular nodes.  RESP: normal percussion, good air flow throughout.  No crackles. No rhonchi. No wheezes.  CV: Normal S1, S2, regular rhythm, normal rate. No murmur.  No rub. No gallop. No LE edema.   ABDOMEN:  Bowel sounds normal, soft, nontender, no HSM or masses.   MS: extremities normal. No clubbing. No cyanosis.  SKIN: no rash on limited exam  NEURO: Mentation intact, speech normal, normal strength and tone, normal gait and stance  PSYCH: mentation appears normal. and affect normal/bright  Results:  Recent Results (from the past 168 hour(s))   COLONOSCOPY    Collection Time: 12/20/19  7:22 AM   Result Value Ref Range    COLONOSCOPY       [facility]  _______________________________________________________________________________  Patient Name: Valencia Ye   Procedure Date: 12/20/2019 7:22 AM  MRN: [id number]                       Account Number: [id number]  YOB: 1970              Admit Type: Outpatient  Age: 49                               Gender: Female  Attending MD: Bryce Pimentel MD   Total Sedation Time: 19_minutes continuous   bedside 1:1  Instrument Name: 213 - Pediatric Colonoscope "   _______________________________________________________________________________     Procedure:                Colonoscopy  Indications:              High risk colon cancer surveillance: Personal                             history of colonic polyps  Providers:                Bryce Pimentel MD (Doctor)  Referring MD:             Morelia Simental MD (Referring MD)  Medicines:                Midazolam 2 mg IV, Fentanyl 100 micrograms IV  Complications:            No immediate complications.   _______________________________________________________________________________  Procedure:                Pre-Anesthesia Assessment:                            - Prior to the procedure, a History and Physical                             was performed, and patient medications and                             allergies were reviewed. The patient is competent.                             The risks and benefits of the procedure and the                             sedation options and risks were discussed with the                             patient. All questions were answered and informed                             consent was obtained. Patient identification and                             proposed procedure were verified by the physician                             in the procedure room. Mental Status Examination:                             alert and oriented. Airway Examination: normal                             oropharyngeal airway and neck mobility. Respiratory                              Examination: clear to auscultation. CV Examination:                             normal. Prophylactic Antibiotics: The patient does                             not require prophylactic antibiotics. Prior                             Anticoagulants: The patient has taken no previous                             anticoagulant or antiplatelet agents. ASA Grade                             Assessment: II - A patient with mild systemic                              disease. After reviewing the risks and benefits,                             the patient was deemed in satisfactory condition to                             undergo the procedure. The anesthesia plan was to                             use moderate sedation / analgesia (conscious                             sedation). Immediately prior to administration of                             medications, the patient was re-assessed for                             adequacy to receive sedatives. The heart rate,                              respiratory rate, oxygen saturations, blood                             pressure, adequacy of pulmonary ventilation, and                             response to care were monitored throughout the                             procedure. The physical status of the patient was                             re-assessed after the procedure.                            After obtaining informed consent, the colonoscope                             was passed under direct vision. Throughout the                             procedure, the patient's blood pressure, pulse, and                             oxygen saturations were monitored continuously. The                             Olympus Pediatric Colonoscope, Model # [id number]-H190DL,                             [place] # 213, SN # [id number] was introduced through                             the anus and advanced to the cecum, identified by                             appendiceal orifice and ileocecal valve. The                             colonoscopy w as performed without difficulty. The                             patient tolerated the procedure well. The quality                             of the bowel preparation was good.                                                                                   Findings:       The perianal and digital rectal examinations were normal.       A few small-mouthed diverticula were found in the sigmoid  colon.       The exam was otherwise without abnormality on direct and retroflexion        views.                                                                                   Impression:               - Diverticulosis in the sigmoid colon.                            - The examination was otherwise normal on direct                             and retroflexion views.                            - No specimens collected.  Recommendation:           - Repeat colonoscopy in 5 years for surveillance.                                                                                   Procedure Code(s):        --- Professional ---       , Colorectal cancer screening; colonoscopy on individual at high        risk  Diagnosis Code(s):       --- Professional ---       Z86.010, Personal history of colonic polyps    CPT copyright 2018 [employer]. All rights reserved.    The codes documented in this report are preliminary and upon  review may   be revised to meet current compliance requirements.    Electronically signed by Bryce Pimentel MD  ____________________  Bryce Pimentel MD  12/20/2019 7:56:37 AM  I was physically present for the entire viewing portion of the exam.  __________________________Bryce Pimentel MD  Number of Addenda: 0    Note Initiated On: 12/20/2019 7:22 AM  MRN:                      [id number]  Procedure Date:           12/20/2019 7:22:31 AM  Scope Withdrawal Time: 0 hours 8 minutes 19 seconds   Total Procedure Duration: 0 hours 17 minutes 59 seconds   Estimated Blood Loss:       Scope In: 7:33:56 AM  Scope Out: 7:51:55 AM     General PFT Lab (Please always keep checked)    Collection Time: 12/26/19 10:31 AM   Result Value Ref Range    FVC-Pred 3.02 L    FVC-Pre 4.10 L    FVC-%Pred-Pre 135 %    FEV1-Pre 3.01 L    FEV1-%Pred-Pre 121 %    FEV1FVC-Pred 82 %    FEV1FVC-Pre 73 %    FEFMax-Pred 6.53 L/sec    FEFMax-Pre 6.32 L/sec    FEFMax-%Pred-Pre 96 %    FEF2575-Pred 2.60 L/sec     FEF2575-Pre 2.27 L/sec    MLP0535-%Pred-Pre 87 %    ExpTime-Pre 6.94 sec    FIFMax-Pre 5.15 L/sec    FEV1FEV6-Pred 83 %    FEV1FEV6-Pre 73 %                   Results as noted above.    PFT Interpretation:  Normal spirometry.  Increased from previous.  Similar to recent best.  Valid Maneuver

## 2019-12-26 NOTE — LETTER
12/26/2019       RE: Valencia Ye  [address] [facility] [address]     Dear Colleague,    Thank you for referring your patient, Valencia Ye, to the [facility] at [facility]. Please see a copy of my visit note below.    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   Bronchiectasis: The patient appears to be doing well from a pulmonary standpoint.  Activity is limited by fatigue rather than dyspnea.  She has minimal cough or sputum.  PFTs are the best they have been in the recent past.  She is oxygenating well.  She does not appear to be experiencing a pulmonary exacerbation at this time.  She will continue her current Symbicort, Combivent as needed and daily vest therapy with albuterol and Mucomyst.  All of her pulmonary medications were renewed with today's visit.  She also received a Pneumovax 23.  She has already received her influenza vaccine for this flu season.  Etiology of her fatigue and body aches is unclear.  It is possible that it is related to her eosinophilic granuloma with polyangiitis.  I will defer to her primary care physician for further evaluation.  I will see the patient in follow-up in 6 months with PFTs or in the interim if she has worsening respiratory symptoms.    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     The patient reports a recent head cold which is resolving without intervention.  Breathing is comfortable at rest.  She notes increased dyspnea when trying to work with her arms bent at  "the waist.  She recovers with standing.  She denies cough.  She denies sputum.  She reports occasional \"lung cramps\" occurring about twice per month lasting up to 30 minutes and resolving spontaneously.  She is doing vest therapy once daily for 30 minutes.  No recent fever or chills.  She does note night sweats in association with her recent muscle pain and fatigue.  She reports marked fatigue for the past year.  She has been off work since July.  She reports that if she walks more than 1 mile she is completely exhausted.  This is not related to shortness of breath but rather a general sense of fatigue.        ROS Pulmonary  Appetite is fair, unchanged  No ear pain, sore throat or sinus pain  Intermittent palpitations, longstanding and unchanged.  No nausea, vomiting or diarrhea.  Intermittent urinary urgency recently evaluated by urology.  Pain in the upper back, shoulders and neck.  A complete ROS was otherwise negative except as noted in the HPI.    Current Outpatient Medications   Medication     acetylcysteine (MUCOMYST) 20 % neb solution     albuterol (PROVENTIL) (2.5 MG/3ML) 0.083% neb solution     AMITIZA 8 MCG capsule     atorvastatin (LIPITOR) 10 MG tablet     budesonide-formoterol (SYMBICORT) 160-4.5 MCG/ACT Inhaler     ciprofloxacin-dexamethasone (CIPRODEX) 0.3-0.1 % otic suspension     fluticasone (FLONASE) 50 MCG/ACT nasal spray     glucosamine-chondroitin (GLUCOSAMINE CHONDR COMPLEX) 500-400 MG CAPS     Ipratropium-Albuterol (COMBIVENT RESPIMAT)  MCG/ACT inhaler     metoprolol succinate ER (TOPROL-XL) 25 MG 24 hr tablet     Omega-3 Fatty Acids (FISH OIL) 1200 MG capsule     predniSONE (DELTASONE) 1 MG tablet     UNABLE TO FIND     vitamin B complex with vitamin C (VITAMIN  B COMPLEX) TABS tablet     vitamin D3 (CHOLECALCIFEROL) 2000 units (50 mcg) tablet     Current Facility-Administered Medications   Medication     pneumococcal vaccine (PNEUMOVAX 23-gardenia) injection 0.5 mL     Allergies   Allergen " Reactions     Colistimethate Anaphylaxis     Colymycin M [Colistimethate Sodium] Anaphylaxis     Tamiflu [Oseltamivir]      Gums Blister up     Azithromycin Palpitations     Heart palpitation  Heart palpitation     Clindamycin Rash     Past Medical History:   Diagnosis Date     Anxiety      Aortitis (H)      Asthma     associated with Churg Antonina syndrome     Bronchiectasis (H)      Cerebral infarction (H) 1990    Very small, caused small permanent optical migraine     Chronic sinusitis      Churg-Antonina syndrome (H)     dx      Conductive hearing loss      Depressive disorder      Eustachian tube dysfunction      Goiter      Hearing loss, conductive      Heart rate problem      Hypertension      Hypocalcemia      Immunosuppression (H)      Irritable bowel syndrome      Major depressive disorder      Mannose-binding lectin deficiency      Nonspecific abnormal results of thyroid function study      Osteoporosis      Pericarditis      and      Pneumonia     4/23/10     Recurrent otitis media      Recurrent UTI      Rheumatoid vasculitis (H)      Sinusitis, chronic      Steroid long-term use      Tinnitus      Varicose veins of leg with swelling        Past Surgical History:   Procedure Laterality Date     C/SECTION, CLASSICAL        SECTION       COLONOSCOPY Left 2014    Procedure: COMBINED COLONOSCOPY, SINGLE OR MULTIPLE BIOPSY/POLYPECTOMY BY BIOPSY;  Surgeon: Js Pinedo MD;  Location:  GI     COLONOSCOPY N/A 2019    Procedure: COLONOSCOPY;  Surgeon: Bryce Pimentel MD;  Location:  GI     ENT SURGERY       GENITOURINARY SURGERY       HC COLP CERVIX/UPPER VAGINA W LOOP ELEC BX CERVIX       HEAD & NECK SURGERY       HYSTERECTOMY  2009    without oopherectomy     HYSTERECTOMY, PAP NO LONGER INDICATED      cervix removed      PE TUBES       PICC INSERTION  10/10/2013    5fr DL PASV PICC, 43cm (1cm external) in the L basilic vein w/ tip in  the low SVC.     PICC INSERTION Left 05/26/2017    5fr DL BioFlo PICC, 42cm (3cm external) in the L lateral brachial vein w/ tip in the SVC RA junction.     SINUS SURGERY       TUBAL LIGATION       TYMPANOPLASTY         Social History     Socioeconomic History     Marital status:      Spouse name: Not on file     Number of children: Not on file     Years of education: Not on file     Highest education level: Not on file   Occupational History     Not on file   Social Needs     Financial resource strain: Not on file     Food insecurity:     Worry: Not on file     Inability: Not on file     Transportation needs:     Medical: Not on file     Non-medical: Not on file   Tobacco Use     Smoking status: Never Smoker     Smokeless tobacco: Never Used   Substance and Sexual Activity     Alcohol use: Yes     Alcohol/week: 0.0 standard drinks     Comment: few drinks a week     Drug use: No     Sexual activity: Yes     Partners: Male     Birth control/protection: Male Surgical, Female Surgical   Lifestyle     Physical activity:     Days per week: Not on file     Minutes per session: Not on file     Stress: Not on file   Relationships     Social connections:     Talks on phone: Not on file     Gets together: Not on file     Attends Hinduism service: Not on file     Active member of club or organization: Not on file     Attends meetings of clubs or organizations: Not on file     Relationship status: Not on file     Intimate partner violence:     Fear of current or ex partner: Not on file     Emotionally abused: Not on file     Physically abused: Not on file     Forced sexual activity: Not on file   Other Topics Concern     Parent/sibling w/ CABG, MI or angioplasty before 65F 55M? Not Asked      Service No     Blood Transfusions No     Caffeine Concern No     Occupational Exposure No     Hobby Hazards No     Sleep Concern No     Stress Concern Yes     Weight Concern No     Special Diet Yes     Comment: IBS diet      "Back Care No     Exercise Not Asked     Comment: 1 mile power walk 5 days a week at least.      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  Non smoker. No smokers at home.  Enjoys walking and dancing.  Alcohol--1 to 2 drinks daily.  Few times a year has more than 4 drinks in a day.  No illicits.                         /80   Pulse 75   Ht 1.594 m (5' 2.75\")   Wt 50.5 kg (111 lb 5.3 oz)   LMP 02/01/2009   SpO2 97%   BMI 19.88 kg/m     Exam:   GENERAL APPEARANCE: Well developed, well nourished, alert, and in no apparent distress.  EYES: PERRL, EOMI  HENT: Nasal mucosa with no edema and no hyperemia. No nasal polyps.  EARS: Canals clear, TMs scarred and perforated bilaterally  MOUTH: Oral mucosa is moist, without any lesions, no tonsillar enlargement, no oropharyngeal exudate.  NECK: supple, no masses, no thyromegaly.  LYMPHATICS: No significant axillary, cervical, or supraclavicular nodes.  RESP: normal percussion, good air flow throughout.  No crackles. No rhonchi. No wheezes.  CV: Normal S1, S2, regular rhythm, normal rate. No murmur.  No rub. No gallop. No LE edema.   ABDOMEN:  Bowel sounds normal, soft, nontender, no HSM or masses.   MS: extremities normal. No clubbing. No cyanosis.  SKIN: no rash on limited exam  NEURO: Mentation intact, speech normal, normal strength and tone, normal gait and stance  PSYCH: mentation appears normal. and affect normal/bright  Results:  Recent Results (from the past 168 hour(s))   COLONOSCOPY    Collection Time: 12/20/19  7:22 AM   Result Value Ref Range    COLONOSCOPY       [facility]  _______________________________________________________________________________  Patient Name: Valencia ANGEL Ye   Procedure Date: 12/20/2019 7:22 AM  MRN: [id number]                       Account Number: [id number]  Date of Birth: " 1970              Admit Type: Outpatient  Age: 49                               Gender: Female  Attending MD: Bryce Pimentel MD   Total Sedation Time: 19_minutes continuous   bedside 1:1  Instrument Name: 213 - Pediatric Colonoscope   _______________________________________________________________________________     Procedure:                Colonoscopy  Indications:              High risk colon cancer surveillance: Personal                             history of colonic polyps  Providers:                Bryce Pimentel MD (Doctor)  Referring MD:             Morelia Simental MD (Referring MD)  Medicines:                Midazolam 2 mg IV, Fentanyl 100 micrograms IV  Complications:            No immediate complications.   _______________________________________________________________________________  Procedure:                Pre-Anesthesia Assessment:                            - Prior to the procedure, a History and Physical                             was performed, and patient medications and                             allergies were reviewed. The patient is competent.                             The risks and benefits of the procedure and the                             sedation options and risks were discussed with the                             patient. All questions were answered and informed                             consent was obtained. Patient identification and                             proposed procedure were verified by the physician                             in the procedure room. Mental Status Examination:                             alert and oriented. Airway Examination: normal                             oropharyngeal airway and neck mobility. Respiratory                              Examination: clear to auscultation. CV Examination:                             normal. Prophylactic Antibiotics: The patient does                             not require prophylactic antibiotics.  Prior                             Anticoagulants: The patient has taken no previous                             anticoagulant or antiplatelet agents. ASA Grade                             Assessment: II - A patient with mild systemic                             disease. After reviewing the risks and benefits,                             the patient was deemed in satisfactory condition to                             undergo the procedure. The anesthesia plan was to                             use moderate sedation / analgesia (conscious                             sedation). Immediately prior to administration of                             medications, the patient was re-assessed for                             adequacy to receive sedatives. The heart rate,                              respiratory rate, oxygen saturations, blood                             pressure, adequacy of pulmonary ventilation, and                             response to care were monitored throughout the                             procedure. The physical status of the patient was                             re-assessed after the procedure.                            After obtaining informed consent, the colonoscope                             was passed under direct vision. Throughout the                             procedure, the patient's blood pressure, pulse, and                             oxygen saturations were monitored continuously. The                             Olympus Pediatric Colonoscope, Model # PCF-H190DL,                             [place] # 213, SN # [id number] was introduced through                             the anus and advanced to the cecum, identified by                             appendiceal orifice and ileocecal valve. The                             colonoscopy w as performed without difficulty. The                             patient tolerated the procedure well. The quality                             of the bowel  preparation was good.                                                                                   Findings:       The perianal and digital rectal examinations were normal.       A few small-mouthed diverticula were found in the sigmoid colon.       The exam was otherwise without abnormality on direct and retroflexion        views.                                                                                   Impression:               - Diverticulosis in the sigmoid colon.                            - The examination was otherwise normal on direct                             and retroflexion views.                            - No specimens collected.  Recommendation:           - Repeat colonoscopy in 5 years for surveillance.                                                                                   Procedure Code(s):        --- Professional ---       , Colorectal cancer screening; colonoscopy on individual at high        risk  Diagnosis Code(s):       --- Professional ---       Z86.010, Personal history of colonic polyps    CPT copyright 2018 American Medical Association. All rights reserved.    The codes documented in this report are preliminary and upon  review may   be revised to meet current compliance requirements.    Electronically signed by Bryce Pimentel MD  ____________________  Bryce Pimentel MD  12/20/2019 7:56:37 AM  I was physically present for the entire viewing portion of the exam.  __________________________Bryce Pimentel MD  Number of Addenda: 0    Note Initiated On: 12/20/2019 7:22 AM  MRN:                      [id number]  Procedure Date:           12/20/2019 7:22:31 AM  Scope Withdrawal Time: 0 hours 8 minutes 19 seconds   Total Procedure Duration: 0 hours 17 minutes 59 seconds   Estimated Blood Loss:       Scope In: 7:33:56 AM  Scope Out: 7:51:55 AM     General PFT Lab (Please always keep checked)    Collection Time: 12/26/19 10:31 AM   Result Value Ref Range     FVC-Pred 3.02 L    FVC-Pre 4.10 L    FVC-%Pred-Pre 135 %    FEV1-Pre 3.01 L    FEV1-%Pred-Pre 121 %    FEV1FVC-Pred 82 %    FEV1FVC-Pre 73 %    FEFMax-Pred 6.53 L/sec    FEFMax-Pre 6.32 L/sec    FEFMax-%Pred-Pre 96 %    FEF2575-Pred 2.60 L/sec    FEF2575-Pre 2.27 L/sec    XBQ6327-%Pred-Pre 87 %    ExpTime-Pre 6.94 sec    FIFMax-Pre 5.15 L/sec    FEV1FEV6-Pred 83 %    FEV1FEV6-Pre 73 %                   Results as noted above.    PFT Interpretation:  Normal spirometry.  Increased from previous.  Similar to recent best.  Valid Maneuver      Again, thank you for allowing me to participate in the care of your patient.      Sincerely,    Dagoberto Briscoe MD

## 2019-12-31 DIAGNOSIS — J47.9 BRONCHIECTASIS WITHOUT COMPLICATION (H): ICD-10-CM

## 2019-12-31 NOTE — TELEPHONE ENCOUNTER
Requested Prescriptions   Pending Prescriptions Disp Refills     predniSONE (DELTASONE) 1 MG tablet [Pharmacy Med Name: PREDNISONE 1MG TABLETS] 200 tablet 0     Sig: TAKE 5 TABLETS BY MOUTH EVERY DAY AND ALTERNATING WITH 6 TABLETS THE NEXT.  Last Written Prescription Date:  11/29/19  Last Fill Quantity: 200 tab,  # refills: 0   Last office visit: 9/9/2019 with prescribing provider:  Kamille   Future Office Visit:         There is no refill protocol information for this order

## 2020-01-03 RX ORDER — PREDNISONE 1 MG/1
TABLET ORAL
Qty: 200 TABLET | Refills: 0 | Status: SHIPPED | OUTPATIENT
Start: 2020-01-03 | End: 2020-02-10

## 2020-01-03 NOTE — TELEPHONE ENCOUNTER
Pt was seen by pulmonary on 12/26/19 and prednisone refill forwarded to pulmonary . Dr Simental  is out of office

## 2020-01-03 NOTE — TELEPHONE ENCOUNTER
Routing refill request to provider for review/approval because:  Drug not on the [facility] refill protocol     Last prescribed 11/29/19 for bronchiectasis. Please advise if this should be triaged further.

## 2020-01-10 DIAGNOSIS — J31.0 CHRONIC RHINITIS: ICD-10-CM

## 2020-01-10 NOTE — TELEPHONE ENCOUNTER
"Requested Prescriptions   Pending Prescriptions Disp Refills     fluticasone (FLONASE) 50 MCG/ACT nasal spray [Pharmacy Med Name: FLUTICASONE 50MCG NASAL SP (120) RX] 48 g      Sig: SHAKE LIQUID AND USE 2 SPRAYS IN EACH NOSTRIL DAILY   Last Written Prescription Date:  06/06/2019  Last Fill Quantity: 48 mL,  # refills: 0   Last office visit: 9/9/2019 with prescribing provider:     Future Office Visit:      Inhaled Steroids Protocol Failed - 1/10/2020  2:08 PM        Failed - Asthma control assessment score within normal limits in last 6 months     Please review ACT score.           Passed - Patient is age 12 or older        Passed - Medication is active on med list        Passed - Recent (6 mo) or future (30 days) visit within the authorizing provider's specialty     Patient had office visit in the last 6 months or has a visit in the next 30 days with authorizing provider or within the authorizing provider's specialty.  See \"Patient Info\" tab in inbasket, or \"Choose Columns\" in Meds & Orders section of the refill encounter.            "

## 2020-01-13 RX ORDER — FLUTICASONE PROPIONATE 50 MCG
SPRAY, SUSPENSION (ML) NASAL
Qty: 48 G | Refills: 0 | Status: SHIPPED | OUTPATIENT
Start: 2020-01-13 | End: 2020-06-09

## 2020-01-15 ENCOUNTER — MYC MEDICAL ADVICE (OUTPATIENT)
Dept: INTERNAL MEDICINE | Facility: CLINIC | Age: 50
End: 2020-01-15

## 2020-01-15 DIAGNOSIS — D89.89: ICD-10-CM

## 2020-01-15 DIAGNOSIS — M30.1 CHURG-STRAUSS SYNDROME (H): Primary | ICD-10-CM

## 2020-01-15 DIAGNOSIS — D72.18 CHURG-STRAUSS SYNDROME (H): Primary | ICD-10-CM

## 2020-01-21 ENCOUNTER — TELEPHONE (OUTPATIENT)
Dept: RHEUMATOLOGY | Facility: CLINIC | Age: 50
End: 2020-01-21

## 2020-01-21 NOTE — TELEPHONE ENCOUNTER
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  Please review records and contact patient with appointment options    Action Taken: Message routed to:  [facility] ([facility] Rheumatology

## 2020-01-24 NOTE — TELEPHONE ENCOUNTER
Patient is referred by Humberto Simental for [place]. Patient needs to re establish care with a Rheumatologist. Patient Previously saw Dr. Ortega in 2011 and Dr. Grace in 2012. Then, patient has seen Dr. Butts at [facility] up until his recent residential.     Task sent to schedulers to contact patient to discuss scheduling options.     Lakesha Chadwick CMA   1/24/2020 1:21 PM

## 2020-02-04 ENCOUNTER — OFFICE VISIT (OUTPATIENT)
Dept: URGENT CARE | Facility: URGENT CARE | Age: 50
End: 2020-02-04
Payer: COMMERCIAL

## 2020-02-04 VITALS — BODY MASS INDEX: 19.64 KG/M2 | WEIGHT: 110 LBS

## 2020-02-04 DIAGNOSIS — S61.012A LACERATION OF LEFT THUMB WITHOUT FOREIGN BODY WITHOUT DAMAGE TO NAIL, INITIAL ENCOUNTER: Primary | ICD-10-CM

## 2020-02-04 PROCEDURE — 12001 RPR S/N/AX/GEN/TRNK 2.5CM/<: CPT | Performed by: FAMILY MEDICINE

## 2020-02-04 PROCEDURE — 90471 IMMUNIZATION ADMIN: CPT | Performed by: FAMILY MEDICINE

## 2020-02-04 PROCEDURE — 90715 TDAP VACCINE 7 YRS/> IM: CPT | Performed by: FAMILY MEDICINE

## 2020-02-05 NOTE — PATIENT INSTRUCTIONS
Watch for signs of infection and return for recheck if any drainage, significant redness, or significant swelling appear.    Return in 7 days for suture removal.

## 2020-02-05 NOTE — PROGRESS NOTES
SUBJECTIVE:     Chief Complaint   Patient presents with     Urgent Care     Laceration     left thumb     Valencia Ye is a 49 year old right-handed female who presents to the clinic with a laceration on the left thumb sustained just prior to arrival.  This is a non-work related and accidental injury.    Mechanism of injury: knife.    Associated symptoms: Denies numbness, weakness, or loss of function  Last tetanus booster within 10 years: almost exactly 10 years ago.    EXAM:   The patient appears today in alert,no apparent distress distress  VITALS: Pulse (P) 86   Wt 49.9 kg (110 lb)   LMP 02/01/2009   SpO2 (P) 98%   BMI 19.64 kg/m      Size of laceration: 1 centimeters  Characteristics of the laceration: bleeding- mild, clean and straight  Tendon function intact: yes  Sensation to light touch intact: yes  Pulses intact: yes  Picture included in patient's chart: no    Assessment:  Laceration of left thumb without foreign body without damage to nail, initial encounter    PLAN:  PROCEDURE NOTE::  Digital/regional block applied using a total of 2 cc's of Lidocaine 2% plain  Wound soaked  Laceration was closed using 2 5-0 nylon interrupted sutures  After care instructions:  Keep wound clean and dry for the next 24-48 hours  Sutures out in 1 week  Signs of infection discussed today  Discussed the probability of scarring

## 2020-02-09 DIAGNOSIS — J47.1 BRONCHIECTASIS WITH ACUTE EXACERBATION (H): Primary | ICD-10-CM

## 2020-02-09 NOTE — PROGRESS NOTES
Pt called requesting prescription for nebulizer as her machine is not functioning. Script placed in chart.     DME prescription for nebulizer condenser machine.   NPI: [id number]

## 2020-02-09 NOTE — LETTER
2020        Valenciacarmencita Ye MRN: [id number]   [address] [facility] [address] : 1970             Please provide pt with nebulizer condenser machine.     NPI: [id number]

## 2020-02-10 ENCOUNTER — NURSE TRIAGE (OUTPATIENT)
Dept: INTERNAL MEDICINE | Facility: CLINIC | Age: 50
End: 2020-02-10

## 2020-02-10 ENCOUNTER — TELEPHONE (OUTPATIENT)
Dept: PULMONOLOGY | Facility: CLINIC | Age: 50
End: 2020-02-10

## 2020-02-10 ENCOUNTER — OFFICE VISIT (OUTPATIENT)
Dept: INTERNAL MEDICINE | Facility: CLINIC | Age: 50
End: 2020-02-10
Payer: COMMERCIAL

## 2020-02-10 ENCOUNTER — ANCILLARY PROCEDURE (OUTPATIENT)
Dept: GENERAL RADIOLOGY | Facility: CLINIC | Age: 50
End: 2020-02-10
Attending: INTERNAL MEDICINE
Payer: COMMERCIAL

## 2020-02-10 VITALS
WEIGHT: 108.1 LBS | OXYGEN SATURATION: 96 % | BODY MASS INDEX: 19.15 KG/M2 | DIASTOLIC BLOOD PRESSURE: 70 MMHG | TEMPERATURE: 98.6 F | SYSTOLIC BLOOD PRESSURE: 110 MMHG | RESPIRATION RATE: 16 BRPM | HEIGHT: 63 IN | HEART RATE: 90 BPM

## 2020-02-10 DIAGNOSIS — J47.1 BRONCHIECTASIS WITH (ACUTE) EXACERBATION (H): Primary | ICD-10-CM

## 2020-02-10 DIAGNOSIS — R05.9 COUGH: ICD-10-CM

## 2020-02-10 LAB
GRAM STN SPEC: NORMAL
Lab: NORMAL
SPECIMEN SOURCE: NORMAL

## 2020-02-10 PROCEDURE — 87186 SC STD MICRODIL/AGAR DIL: CPT | Performed by: INTERNAL MEDICINE

## 2020-02-10 PROCEDURE — 87070 CULTURE OTHR SPECIMN AEROBIC: CPT | Performed by: INTERNAL MEDICINE

## 2020-02-10 PROCEDURE — 99214 OFFICE O/P EST MOD 30 MIN: CPT | Performed by: INTERNAL MEDICINE

## 2020-02-10 PROCEDURE — 87077 CULTURE AEROBIC IDENTIFY: CPT | Performed by: INTERNAL MEDICINE

## 2020-02-10 PROCEDURE — 71046 X-RAY EXAM CHEST 2 VIEWS: CPT

## 2020-02-10 PROCEDURE — 87205 SMEAR GRAM STAIN: CPT | Performed by: INTERNAL MEDICINE

## 2020-02-10 RX ORDER — PREDNISONE 1 MG/1
TABLET ORAL
Qty: 200 TABLET | Refills: 1 | Status: SHIPPED | OUTPATIENT
Start: 2020-02-10 | End: 2020-03-03

## 2020-02-10 RX ORDER — PREDNISONE 10 MG/1
TABLET ORAL
Qty: 42 TABLET | Refills: 0 | Status: SHIPPED | OUTPATIENT
Start: 2020-02-10 | End: 2020-03-09

## 2020-02-10 ASSESSMENT — MIFFLIN-ST. JEOR: SCORE: 1090.5

## 2020-02-10 NOTE — TELEPHONE ENCOUNTER
"[facility]  February 10, 2020    Morelia Simental    Provider: Dagoberto Briscoe MD    Caller: Patient     Clinical information:  Valencia Ye called with complaint of experiencing flu-like illness last week. Fever and chills that have resolved.However, now having chest congestion. Notes \"crackle and junk\" in her chest. Vesting 3-4 times/day (normally vests once a day). Per her report, peak flows are decreased.     Plan:   Discussed with Dr Briscoe:  To be seen in clinic.  Call back with any new or worsening symptoms/concerns.    Caller verbalized understanding of plan and agrees with advice given.     "

## 2020-02-10 NOTE — TELEPHONE ENCOUNTER
Additional Information    Negative: Bluish (or gray) lips or face    Negative: Severe difficulty breathing (e.g., struggling for each breath, speaks in single words)    Negative: Rapid onset of cough and has hives    Negative: Coughing started suddenly after medicine, an allergic food or bee sting    Negative: Difficulty breathing after exposure to flames, smoke, or fumes    Negative: Sounds like a life-threatening emergency to the triager    Negative: Previous asthma attacks and this feels like asthma attack    Negative: Chest pain present when not coughing    Negative: Difficulty breathing    Negative: Passed out (i.e., fainted, collapsed and was not responding)    Negative: Patient sounds very sick or weak to the triager    Wheezing is present    Negative: Increasing ankle swelling    Negative: Fever > 100.0 F (37.8 C) and bedridden (e.g., nursing home patient, stroke, chronic illness, recovering from surgery)    Negative: Fever > 100.0 F (37.8 C) and has diabetes mellitus or a weak immune system (e.g., HIV positive, cancer chemotherapy, organ transplant, splenectomy, chronic steroids)    Negative: Fever > 101 F (38.3 C) and over 60 years of age    Negative: Fever > 103 F (39.4 C)    Negative: Coughed up > 1 tablespoon (15 ml) blood (Exception: blood-tinged sputum)    Protocols used: COUGH-A-OH

## 2020-02-10 NOTE — TELEPHONE ENCOUNTER
"Patient calls with concerns for chest congestion. Patient states she is complex, patient has a diagnosis of bronchiectasis.     Patient states she had flu-like symptoms last week- fever, chills, and cough. Patient states fever and chills have resolved, but she continues to have a congested, productive cough. Patient states she is experiencing occasionally wheezing. Patient reports her peak numbers are down. Patient is using her vest, and nebulizer with little results. Patient reports she is coughing up yellow phlegm. Patient denies shortness of breath, but states her breathing feels \"tight\". Patient reports she is able to complete everyday activities.    Patient states she has never experienced something like this- patient states she normally responds to her therapies. Patient states she contacted her CF clinic, but they did not have clinic on Mondays. Patient states she was advised to follow up with her primary care provider today.    Assisted in scheduling an appointment for today. Advised to call back with further concerns. Advised ER if shortness of breath occurs. Patient verbalized understanding.        "

## 2020-02-10 NOTE — PROGRESS NOTES
Subjective     Valencia Ye is a 49 year old female who presents to clinic today for the following health issues:    HPI     Cough and wheezing: She started to develop acute flulike illness about a week ago.  Her son had the symptoms prior to the onset of her symptoms.  She had a fever for a few days, mild nasal congestion, rhinorrhea.  After the first few days, she started to feel more tightness in her chest, with some wheezing, rhonchi and some shortness of breath.  She has been using her percussion vest regularly and nebulizers but over the last few days this does not seem to be helping improve her symptoms.  Her nebulizer machine was not working, she did get a replacement yesterday but still today does not feel that it is helping much.  She does her peak flows regularly and usual is 410.  Today she is getting around 350.    She is not taking any antibiotics, did not increase her prednisone on her own.      Patient Active Problem List   Diagnosis     Goiter     History of systemic steroid therapy     ILD (interstitial lung disease) (H)     Palpitations     Churg-Antonina syndrome (H)     History of eating disorder     Asthma     Anxiety     Chronic fatigue     Mannose-binding lectin deficiency     Benign essential hypertension     Moderate episode of recurrent major depressive disorder (H)     Bronchiectasis without acute exacerbation (H)     Controlled substance agreement signed     Irritable bowel syndrome without diarrhea     Bladder pain     Overactive bladder     Current Outpatient Medications   Medication Sig Dispense Refill     acetylcysteine (MUCOMYST) 20 % neb solution Take 4 mLs by nebulization 2 times daily Use with albuterol solution. Discard open bottle of Mucomyst after 96 hours. 240 mL 11     albuterol (PROVENTIL) (2.5 MG/3ML) 0.083% neb solution Take 1 vial (2.5 mg) by nebulization 2 times daily Use with Mucomyst 60 vial 11     atorvastatin (LIPITOR) 10 MG tablet Take 0.5 tablets (5 mg) by  mouth daily 90 tablet 2     budesonide-formoterol (SYMBICORT) 160-4.5 MCG/ACT Inhaler Inhale 2 puffs into the lungs 2 times daily 1 Inhaler 11     ciprofloxacin-dexamethasone (CIPRODEX) 0.3-0.1 % otic suspension INSTILL 3 DROPS INTO THE AFFECTED EAR TWICE DAILY AS NEEDED 7.5 mL 11     fluticasone (FLONASE) 50 MCG/ACT nasal spray SHAKE LIQUID AND USE 2 SPRAYS IN EACH NOSTRIL DAILY 48 g 0     glucosamine-chondroitin (GLUCOSAMINE CHONDR COMPLEX) 500-400 MG CAPS Take 2 capsules by mouth daily.       Ipratropium-Albuterol (COMBIVENT RESPIMAT)  MCG/ACT inhaler Inhale 1 puff into the lungs 4 times daily Not to exceed 6 doses per day. 1 Inhaler 11     lubiprostone (AMITIZA) 8 MCG capsule TAKE 1 CAPSULE(8 MCG) BY MOUTH TWICE DAILY 180 capsule 3     metoprolol succinate ER (TOPROL-XL) 25 MG 24 hr tablet TAKE 1/2 TABLET(12.5 MG) BY MOUTH DAILY 45 tablet 3     Omega-3 Fatty Acids (FISH OIL) 1200 MG capsule Take 4 capsules (4.8 g) by mouth daily       predniSONE (DELTASONE) 1 MG tablet TAKE 5 TABLETS BY MOUTH EVERY DAY AND ALTERNATING WITH 6 TABLETS THE NEXT. 200 tablet 0     UNABLE TO FIND MEDICATION NAME: Mario-E       vitamin B complex with vitamin C (VITAMIN  B COMPLEX) TABS tablet Take 1 tablet by mouth daily       vitamin D3 (CHOLECALCIFEROL) 2000 units (50 mcg) tablet Take 2 tablets by mouth daily        Social History     Tobacco Use     Smoking status: Never Smoker     Smokeless tobacco: Never Used   Substance Use Topics     Alcohol use: Yes     Alcohol/week: 0.0 standard drinks     Comment: few drinks a week     Drug use: No            Reviewed and updated as needed this visit by Provider         Review of Systems   No fever for couple days, chills, sinus pain, nasal congestion, sore throat.  No chest pain, just tightness, wheezing, hears noises in her lungs.      Objective    /70 (BP Location: Right arm, Patient Position: Sitting, Cuff Size: Adult Regular)   Pulse 90   Temp 98.6  F (37  C) (Oral)   Resp  "16   Ht 1.594 m (5' 2.75\")   Wt 49 kg (108 lb 1.6 oz)   LMP 02/01/2009   SpO2 96%   BMI 19.30 kg/m    Body mass index is 19.3 kg/m .  Physical Exam     Lungs: Diffuse expiratory wheezes, few crackles at the left.    CXR: clear          Assessment & Plan     1. Bronchiectasis with (acute) exacerbation (H)  Exam reveals moderate wheezing and with decreased peak flows despite using nebs and inhalers recommend course of prednisone orally.  Her symptoms are probably triggered by a virus but I did a prescription for an antibiotic to start if she develops more purulent mucus. She has a follow-up with pulmonary tomorrow, if she is improving, she could reschedule that for a week from now but if she is still having significant tightness, she should go ahead and keep that appointment.      O- amoxicillin-clavulanate (AUGMENTIN) 875-125 MG tablet; Take 1 tablet by mouth 2 times daily  Dispense: 20 tablet; Refill: 0  - predniSONE (DELTASONE) 10 MG tablet; 40 mg daily x5 days, then 20 mg daily x5 days, then 15 mg daily x5 days then 10 mg daily x 5 days, then go back to usual dose 5 to 6 mg  Dispense: 42 tablet; Refill: 0  - predniSONE (DELTASONE) 1 MG tablet; TAKE 5 TABLETS BY MOUTH EVERY DAY AND ALTERNATING WITH 6 TABLETS THE NEXT.  Dispense: 200 tablet; Refill: 1    2. Cough    - XR Chest 2 Views; Future           No follow-ups on file.    Morelia Simental MD  [facility]        "

## 2020-02-10 NOTE — NURSING NOTE
"/70 (BP Location: Right arm, Patient Position: Sitting, Cuff Size: Adult Regular)   Pulse 90   Temp 98.6  F (37  C) (Oral)   Resp 16   Ht 1.594 m (5' 2.75\")   Wt 49 kg (108 lb 1.6 oz)   LMP 02/01/2009   SpO2 96%   BMI 19.30 kg/m      "

## 2020-02-10 NOTE — TELEPHONE ENCOUNTER
Patient calls to report lung congestion since last week. She had flu like symptoms which have resolved except for the congestion which is not improving with use of the nebulizer. Please call her back to advise at [phone]

## 2020-02-13 LAB
BACTERIA SPEC CULT: ABNORMAL
BACTERIA SPEC CULT: ABNORMAL
SPECIMEN SOURCE: ABNORMAL

## 2020-02-14 ENCOUNTER — TELEPHONE (OUTPATIENT)
Dept: PULMONOLOGY | Facility: CLINIC | Age: 50
End: 2020-02-14

## 2020-02-14 NOTE — TELEPHONE ENCOUNTER
Call from Valencia:    PCP put her on prednisone and given script for Augmentin. Her symptoms have improved some, but discouraged that her peak flows have not come back to her baseline. Sputum is yellowish in color. Wondering what else can done?    PLAN:  Reviewed sputum culture: +staph aureus.  In discussion with Valencia, she had not started her Augmentin yet. Encouraged Valencia to go ahead and start her Augmentin.

## 2020-02-25 ENCOUNTER — OFFICE VISIT (OUTPATIENT)
Dept: OPHTHALMOLOGY | Facility: CLINIC | Age: 50
End: 2020-02-25
Payer: COMMERCIAL

## 2020-02-25 DIAGNOSIS — B88.0 INFESTATION BY DEMODEX FOLLICULORUM: ICD-10-CM

## 2020-02-25 DIAGNOSIS — H25.042 POSTERIOR SUBCAPSULAR POLAR SENILE CATARACT OF LEFT EYE: ICD-10-CM

## 2020-02-25 DIAGNOSIS — H52.13 MYOPIA, BILATERAL: ICD-10-CM

## 2020-02-25 DIAGNOSIS — M30.1 CHURG-STRAUSS SYNDROME (H): Primary | ICD-10-CM

## 2020-02-25 DIAGNOSIS — D72.18 CHURG-STRAUSS SYNDROME (H): Primary | ICD-10-CM

## 2020-02-25 ASSESSMENT — REFRACTION_WEARINGRX
OS_AXIS: 175
OD_CYLINDER: +0.75
SPECS_TYPE: PAL
OS_CYLINDER: +1.00
OD_SPHERE: -1.75
OD_ADD: +1.50
OS_SPHERE: -1.50
OS_ADD: +1.50
OD_AXIS: 170

## 2020-02-25 ASSESSMENT — REFRACTION_MANIFEST
OS_CYLINDER: +1.00
OS_AXIS: 172
OS_SPHERE: -1.75
OD_ADD: +1.50
OD_SPHERE: -1.75
OD_AXIS: 151
OS_ADD: +1.50
OD_CYLINDER: +0.50

## 2020-02-25 ASSESSMENT — CONF VISUAL FIELD
METHOD: COUNTING FINGERS
OS_NORMAL: 1
OD_NORMAL: 1

## 2020-02-25 ASSESSMENT — TONOMETRY
OD_IOP_MMHG: 17
OS_IOP_MMHG: 18
IOP_METHOD: ICARE

## 2020-02-25 ASSESSMENT — VISUAL ACUITY
OD_CC: 20/20
METHOD: SNELLEN - LINEAR
OS_CC: 20/20
OS_CC+: -2
CORRECTION_TYPE: GLASSES

## 2020-02-25 ASSESSMENT — EXTERNAL EXAM - LEFT EYE: OS_EXAM: NORMAL

## 2020-02-25 ASSESSMENT — CUP TO DISC RATIO
OD_RATIO: 0.2
OS_RATIO: 0.2

## 2020-02-25 ASSESSMENT — EXTERNAL EXAM - RIGHT EYE: OD_EXAM: NORMAL

## 2020-02-25 NOTE — NURSING NOTE
Chief Complaints and History of Present Illnesses   Patient presents with     Annual Eye Exam     Chief Complaint(s) and History of Present Illness(es)     Annual Eye Exam     Laterality: both eyes    Onset: gradual    Onset: years ago    Severity: mild    Frequency: constantly    Context: near vision    Course: stable    Associated symptoms: eye pain    Treatments tried: no treatments    Pain scale: 0/10              Comments     More time to focus. 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 Eye little tired feeling as well.    Right eye pain yesterday, when looking to the right stabbing pain. Started a few days ago    Cathi FLOYD 10:21 AM February 25, 2020

## 2020-02-25 NOTE — PROGRESS NOTES
History  HPI     Annual Eye Exam     In both eyes.  Onset was gradual.  This started years ago.  Severity is mild.  Occurring constantly.  Context:  near vision.  Since onset it is stable.  Associated symptoms include eye pain.  Treatments tried include no treatments.  Pain was noted as 0/10.              Comments     More time to focus. 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 Eye little tired feeling as well.    Right eye pain yesterday, when looking to the right stabbing pain. Started a few days ago    Cathi Peng COT 10:21 AM February 25, 2020             Last edited by Cathi Peng on 2/25/2020 10:21 AM. 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 Continue management as directed and return to clinic in 1 year for dilated exam, or sooner, as needed.    (H52.13) Myopia, bilateral  Comment: Mixed astigmatism with presbyopia both eyes   Plan: REFRACTION         Educated patient on condition and clinical findings. Dispensed spectacle prescription for full time wear as well as reading glasses if needed. Monitor annually.    (H25.042) Posterior subcapsular polar senile cataract of left eye  Comment: Not visually significant, long term steroid use  Plan:  No treatment indicated at this time. Monitor annually.    (B88.0) Infestation by demodex folliculorum  Comment: Mild blepharitis  Plan:  Recommended warm compresses and artificial tears as needed. Recommended Oust Demodex Cleanser as needed. Monitor annually, or sooner if symptoms worsen.    Return to clinic in 1 year for comprehensive eye exam.    Complete documentation of historical and exam elements from today's encounter can  be found in the full encounter summary report (not reduplicated in this progress  note). I personally obtained the  chief complaint(s) and history of present illness. I  confirmed and edited as necessary the review of systems, past medical/surgical  history, family history, social history, and examination findings as documented by  others; and I examined the patient myself. I personally reviewed the relevant tests,  images, and reports as documented above. I formulated and edited as necessary the  assessment and plan and discussed the findings and management plan with the  patient and family.    Randal Logan OD, FAAO

## 2020-02-27 ENCOUNTER — TELEPHONE (OUTPATIENT)
Dept: RHEUMATOLOGY | Facility: CLINIC | Age: 50
End: 2020-02-27

## 2020-02-27 NOTE — TELEPHONE ENCOUNTER
Left message for pt reminding them of upcoming appointment.  Instructed pt to bring updated medications list.  rusty mandel

## 2020-03-03 DIAGNOSIS — J47.1 BRONCHIECTASIS WITH (ACUTE) EXACERBATION (H): ICD-10-CM

## 2020-03-03 RX ORDER — PREDNISONE 1 MG/1
TABLET ORAL
Qty: 295 TABLET | Refills: 1 | Status: SHIPPED | OUTPATIENT
Start: 2020-03-03 | End: 2020-03-30

## 2020-03-03 NOTE — TELEPHONE ENCOUNTER
Pharmacy requesting a 90 day supply.    Medication pended with the remaining amount. ([phone]) Please sign if you agree.  thanks

## 2020-03-03 NOTE — TELEPHONE ENCOUNTER
Fax received from pharmacy requesting 90 day refill for Qty. 495    Prednisone 1MG      Last Written Prescription Date:  02/10/20  Last Fill Quantity: 200,   # refills: 1  Last Office Visit: 02/10/20  Future Office visit:       Routing refill request to provider for review/approval because:  See Above

## 2020-03-07 ENCOUNTER — MYC MEDICAL ADVICE (OUTPATIENT)
Dept: INTERNAL MEDICINE | Facility: CLINIC | Age: 50
End: 2020-03-07

## 2020-03-07 DIAGNOSIS — J47.1 BRONCHIECTASIS WITH (ACUTE) EXACERBATION (H): ICD-10-CM

## 2020-03-09 RX ORDER — PREDNISONE 10 MG/1
TABLET ORAL
Qty: 42 TABLET | Refills: 0 | Status: SHIPPED | OUTPATIENT
Start: 2020-03-09 | End: 2020-10-13

## 2020-03-28 DIAGNOSIS — J47.1 BRONCHIECTASIS WITH (ACUTE) EXACERBATION (H): ICD-10-CM

## 2020-03-30 ENCOUNTER — MYC MEDICAL ADVICE (OUTPATIENT)
Dept: INTERNAL MEDICINE | Facility: CLINIC | Age: 50
End: 2020-03-30

## 2020-03-30 RX ORDER — PREDNISONE 1 MG/1
TABLET ORAL
Qty: 200 TABLET | Refills: 1 | Status: SHIPPED | OUTPATIENT
Start: 2020-03-30 | End: 2020-05-05

## 2020-03-30 NOTE — TELEPHONE ENCOUNTER
Requested Prescriptions   Pending Prescriptions Disp Refills     predniSONE (DELTASONE) 1 MG tablet [Pharmacy Med Name: PREDNISONE 1 MG TABLET] 200 tablet 1     Sig: TAKE 5 TABLETS BY MOUTH EVERY DAY AND ALTERNATING WITH 6 TABLETS THE NEXT.       There is no refill protocol information for this order        Routing refill request to provider for review/approval because:  [place] [facility] [place]       Yvrose Heard RN BSN, Pap Tracking

## 2020-03-30 NOTE — TELEPHONE ENCOUNTER
Requested Prescriptions   Pending Prescriptions Disp Refills     predniSONE (DELTASONE) 1 MG tablet [Pharmacy Med Name: PREDNISONE 1 MG TABLET] 200 tablet 1     Sig: TAKE 5 TABLETS BY MOUTH EVERY DAY AND ALTERNATING WITH 6 TABLETS THE NEXT.       There is no refill protocol information for this order      Last Written Prescription Date:  03/03/2020  Last Fill Quantity: 295,  # refills: 1   Last office visit: 2/10/2020 with prescribing provider:     Future Office Visit:

## 2020-04-01 ENCOUNTER — E-VISIT (OUTPATIENT)
Dept: INTERNAL MEDICINE | Facility: CLINIC | Age: 50
End: 2020-04-01
Payer: COMMERCIAL

## 2020-04-01 DIAGNOSIS — N63.10 LUMP OF RIGHT BREAST: Primary | ICD-10-CM

## 2020-04-01 PROCEDURE — 99421 OL DIG E/M SVC 5-10 MIN: CPT | Performed by: INTERNAL MEDICINE

## 2020-04-02 ENCOUNTER — HOSPITAL ENCOUNTER (OUTPATIENT)
Dept: ULTRASOUND IMAGING | Facility: CLINIC | Age: 50
End: 2020-04-02
Attending: INTERNAL MEDICINE
Payer: COMMERCIAL

## 2020-04-02 ENCOUNTER — HOSPITAL ENCOUNTER (OUTPATIENT)
Dept: MAMMOGRAPHY | Facility: CLINIC | Age: 50
End: 2020-04-02
Attending: INTERNAL MEDICINE
Payer: COMMERCIAL

## 2020-04-02 DIAGNOSIS — N63.10 LUMP OF RIGHT BREAST: ICD-10-CM

## 2020-04-02 PROCEDURE — 76642 ULTRASOUND BREAST LIMITED: CPT | Mod: RT

## 2020-04-02 PROCEDURE — G0279 TOMOSYNTHESIS, MAMMO: HCPCS

## 2020-05-05 ENCOUNTER — VIRTUAL VISIT (OUTPATIENT)
Dept: INTERNAL MEDICINE | Facility: CLINIC | Age: 50
End: 2020-05-05
Payer: COMMERCIAL

## 2020-05-05 ENCOUNTER — MYC MEDICAL ADVICE (OUTPATIENT)
Dept: PULMONOLOGY | Facility: CLINIC | Age: 50
End: 2020-05-05

## 2020-05-05 DIAGNOSIS — J47.9 BRONCHIECTASIS (H): Primary | ICD-10-CM

## 2020-05-05 DIAGNOSIS — H01.003 BLEPHARITIS OF BOTH EYES, UNSPECIFIED EYELID, UNSPECIFIED TYPE: Primary | ICD-10-CM

## 2020-05-05 DIAGNOSIS — H01.006 BLEPHARITIS OF BOTH EYES, UNSPECIFIED EYELID, UNSPECIFIED TYPE: Primary | ICD-10-CM

## 2020-05-05 PROCEDURE — 99213 OFFICE O/P EST LOW 20 MIN: CPT | Mod: TEL | Performed by: INTERNAL MEDICINE

## 2020-05-05 RX ORDER — IPRATROPIUM BROMIDE AND ALBUTEROL 20; 100 UG/1; UG/1
1 SPRAY, METERED RESPIRATORY (INHALATION) 4 TIMES DAILY
Qty: 1 INHALER | Refills: 11 | Status: CANCELLED | OUTPATIENT
Start: 2020-05-05

## 2020-05-05 RX ORDER — BACITRACIN 500 [USP'U]/G
0.5 OINTMENT OPHTHALMIC AT BEDTIME
Qty: 3.5 G | Refills: 0 | Status: SHIPPED | OUTPATIENT
Start: 2020-05-05 | End: 2020-06-25

## 2020-05-05 ASSESSMENT — PATIENT HEALTH QUESTIONNAIRE - PHQ9: 5. POOR APPETITE OR OVEREATING: SEVERAL DAYS

## 2020-05-05 ASSESSMENT — ANXIETY QUESTIONNAIRES
6. BECOMING EASILY ANNOYED OR IRRITABLE: NOT AT ALL
2. NOT BEING ABLE TO STOP OR CONTROL WORRYING: NOT AT ALL
5. BEING SO RESTLESS THAT IT IS HARD TO SIT STILL: SEVERAL DAYS
IF YOU CHECKED OFF ANY PROBLEMS ON THIS QUESTIONNAIRE, HOW DIFFICULT HAVE THESE PROBLEMS MADE IT FOR YOU TO DO YOUR WORK, TAKE CARE OF THINGS AT HOME, OR GET ALONG WITH OTHER PEOPLE: SOMEWHAT DIFFICULT
1. FEELING NERVOUS, ANXIOUS, OR ON EDGE: SEVERAL DAYS
3. WORRYING TOO MUCH ABOUT DIFFERENT THINGS: NOT AT ALL
GAD7 TOTAL SCORE: 3
7. FEELING AFRAID AS IF SOMETHING AWFUL MIGHT HAPPEN: NOT AT ALL

## 2020-05-05 NOTE — PROGRESS NOTES
"Valencia Ye is a 49 year old female who is being evaluated via a billable telephone visit.      The patient has been notified of following:     \"This telephone visit will be conducted via a call between you and your physician/provider. We have found that certain health care needs can be provided without the need for a physical exam.  This service lets us provide the care you need with a short phone conversation.  If a prescription is necessary we can send it directly to your pharmacy.  If lab work is needed we can place an order for that and you can then stop by our lab to have the test done at a later time.    Telephone visits are billed at different rates depending on your insurance coverage. During this emergency period, for some insurers they may be billed the same as an in-person visit.  Please reach out to your insurance provider with any questions.    If during the course of the call the physician/provider feels a telephone visit is not appropriate, you will not be charged for this service.\"    Patient has given verbal consent for Telephone visit?  Yes    What phone number would you like to be contacted at? [phone]    How would you like to obtain your AVS? [email or web address]    Subjective     Valencia Ye is a 49 year old female who presents to clinic today for the following health issues:    HPI      Complaints of bilateral eye irritation: She had been having some irritation several months ago, saw the eye doctor in February.  They felt she had some mild blepharitis, recommended warm packs, lid hygiene with a cleanser and artificial tears.  She was doing that very regularly and had significant improvement in those symptoms for a while.  She reports that a couple weeks ago she is been having an increase in symptoms despite doing the lid hygiene, artificial tears and now she is having redness, swelling along the eyelids, upper and lower.  She has had crusting of the eyes when she gets up in the " morning.  The redness and swelling can persist for a few hours.  She is not having any mattering during the day.  She tried using over-the-counter ketotifen eyedrops for about a week or so and it did not seem to help.  She tried taking Benadryl at bedtime and that did not seem to help.      Patient Active Problem List   Diagnosis     Goiter     History of systemic steroid therapy     ILD (interstitial lung disease) (H)     Palpitations     Churg-Antonina syndrome (H)     History of eating disorder     Asthma     Anxiety     Chronic fatigue     Mannose-binding lectin deficiency     Benign essential hypertension     Moderate episode of recurrent major depressive disorder (H)     Bronchiectasis without acute exacerbation (H)     Controlled substance agreement signed     Irritable bowel syndrome without diarrhea     Bladder pain     Overactive bladder     Current Outpatient Medications   Medication Sig Dispense Refill     acetylcysteine (MUCOMYST) 20 % neb solution Take 4 mLs by nebulization 2 times daily Use with albuterol solution. Discard open bottle of Mucomyst after 96 hours. 240 mL 11     albuterol (PROVENTIL) (2.5 MG/3ML) 0.083% neb solution Take 1 vial (2.5 mg) by nebulization 2 times daily Use with Mucomyst 60 vial 11     amoxicillin-clavulanate (AUGMENTIN) 875-125 MG tablet Take 1 tablet by mouth 2 times daily 20 tablet 0     atorvastatin (LIPITOR) 10 MG tablet Take 0.5 tablets (5 mg) by mouth daily 90 tablet 2     budesonide-formoterol (SYMBICORT) 160-4.5 MCG/ACT Inhaler Inhale 2 puffs into the lungs 2 times daily 1 Inhaler 11     ciprofloxacin-dexamethasone (CIPRODEX) 0.3-0.1 % otic suspension INSTILL 3 DROPS INTO THE AFFECTED EAR TWICE DAILY AS NEEDED 7.5 mL 11     fluticasone (FLONASE) 50 MCG/ACT nasal spray SHAKE LIQUID AND USE 2 SPRAYS IN EACH NOSTRIL DAILY 48 g 0     glucosamine-chondroitin (GLUCOSAMINE CHONDR COMPLEX) 500-400 MG CAPS Take 2 capsules by mouth daily.       Ipratropium-Albuterol (COMBIVENT  RESPIMAT)  MCG/ACT inhaler Inhale 1 puff into the lungs 4 times daily Not to exceed 6 doses per day. 1 Inhaler 11     lubiprostone (AMITIZA) 8 MCG capsule TAKE 1 CAPSULE(8 MCG) BY MOUTH TWICE DAILY 180 capsule 3     metoprolol succinate ER (TOPROL-XL) 25 MG 24 hr tablet TAKE 1/2 TABLET(12.5 MG) BY MOUTH DAILY 45 tablet 3     Omega-3 Fatty Acids (FISH OIL) 1200 MG capsule Take 4 capsules (4.8 g) by mouth daily       predniSONE (DELTASONE) 1 MG tablet TAKE 5 TABLETS BY MOUTH EVERY DAY AND ALTERNATING WITH 6 TABLETS THE NEXT. 590 tablet 3     predniSONE (DELTASONE) 10 MG tablet 40 mg daily x5 days, then 20 mg daily x5 days, then 15 mg daily x5 days then 10 mg daily x 5 days, then go back to usual dose 5 to 6 mg 42 tablet 0     UNABLE TO FIND MEDICATION NAME: Mario-E       vitamin B complex with vitamin C (VITAMIN  B COMPLEX) TABS tablet Take 1 tablet by mouth daily       vitamin D3 (CHOLECALCIFEROL) 2000 units (50 mcg) tablet Take 2 tablets by mouth daily        Social History     Tobacco Use     Smoking status: Never Smoker     Smokeless tobacco: Never Used   Substance Use Topics     Alcohol use: Yes     Alcohol/week: 0.0 standard drinks     Comment: few drinks a week     Drug use: No        Reviewed and updated as needed this visit by Provider         Review of Systems   No eye pain or visual loss       Objective           Assessment/Plan:  1. Blepharitis of both eyes, unspecified eyelid, unspecified type  Her symptoms are probably not allergy as she has not had seasonal allergies, the over-the-counter allergy drops did not help, not having ropey discharge through the day.  This suggest some progression of blepharitis, some further overgrowth of bacteria.  Continue with the lid hygiene and will add antibiotic ointment at bedtime for up to 2 weeks.  I will try bacitracin since she did seem to have some heart rhythm issues with oral azithromycin though overall the risk is probably low.  If the cost of this ointment  is too high, we probably could try the erythromycin.  If symptoms are not improving or recur very quickly, follow-up with the eye doctor again.  - bacitracin 500 UNIT/GM ophthalmic ointment; Place 0.5 inches into both eyes At Bedtime for 14 days  Dispense: 3.5 g; Refill: 0    Return in about 3 months (around 8/5/2020) for Physical Exam.      Phone call duration:  12 minutes    Morelia Simental MD

## 2020-05-06 RX ORDER — ALBUTEROL SULFATE 90 UG/1
2 AEROSOL, METERED RESPIRATORY (INHALATION) EVERY 4 HOURS PRN
Qty: 1 INHALER | Refills: 3 | Status: SHIPPED | OUTPATIENT
Start: 2020-05-06 | End: 2020-05-08

## 2020-05-06 ASSESSMENT — ASTHMA QUESTIONNAIRES: ACT_TOTALSCORE: 22

## 2020-05-06 ASSESSMENT — ANXIETY QUESTIONNAIRES: GAD7 TOTAL SCORE: 3

## 2020-05-08 ENCOUNTER — TELEPHONE (OUTPATIENT)
Dept: PULMONOLOGY | Facility: CLINIC | Age: 50
End: 2020-05-08

## 2020-05-08 DIAGNOSIS — D72.18 CHURG-STRAUSS SYNDROME (H): ICD-10-CM

## 2020-05-08 DIAGNOSIS — M30.1 CHURG-STRAUSS SYNDROME (H): ICD-10-CM

## 2020-05-08 DIAGNOSIS — J47.9 BRONCHIECTASIS (H): Primary | ICD-10-CM

## 2020-05-08 RX ORDER — IPRATROPIUM BROMIDE AND ALBUTEROL 20; 100 UG/1; UG/1
1 SPRAY, METERED RESPIRATORY (INHALATION) 4 TIMES DAILY
Qty: 1 INHALER | Refills: 11 | Status: SHIPPED | OUTPATIENT
Start: 2020-05-08 | End: 2021-01-21

## 2020-05-08 NOTE — TELEPHONE ENCOUNTER
In working on P/A request, I have come to the below question. I don't see that patient has tried any of the formulary alternatives (Ipratropium-Albuterol Neb Sol, Anoro Ellipta, Bevespi Aerosphere, or Stiolto Respimat).  In order to continue I will need to give an explanation as to why none of those options are appropriate. Please advise.

## 2020-05-08 NOTE — TELEPHONE ENCOUNTER
Prior Authorization Retail Medication Request    Medication/Dose:   ICD code (if different than what is on RX):    J47.9, M30.1  Previously Tried and Failed:    Rationale:      Insurance Name:   Insurance ID:        Pharmacy Information (if different than what is on RX)  Name:  [facility]   Phone:

## 2020-05-08 NOTE — TELEPHONE ENCOUNTER
Central Prior Authorization Team   Phone: [phone]          P/A demographics have been submitted to insurance, am awaiting question set.

## 2020-05-13 ENCOUNTER — CLINICAL UPDATE (OUTPATIENT)
Dept: PHARMACY | Facility: CLINIC | Age: 50
End: 2020-05-13
Payer: COMMERCIAL

## 2020-05-13 DIAGNOSIS — J47.9 BRONCHIECTASIS WITHOUT ACUTE EXACERBATION (H): Primary | ICD-10-CM

## 2020-05-13 PROCEDURE — 99207 ZZC NO CHARGE LOS: CPT | Performed by: PHARMACIST

## 2020-05-13 NOTE — PROGRESS NOTES
Clinical Update:                                                    At the request of Dr. Briscoe, a chart review was conducted for Valencia Ye.    Reason for Chart Review: Combivent inhaler not covered by insurance    Discussion: Patient has been stable on Combivent (ipratropium/albuterol  mcg) 1 puff 4 times daily as well as Symbicort (budesonide/formoterol 160-4.5 mcg) 2 puffs two times daily for bronchiectasis.  Received notice from [facility] pharmacy that Combivent inhaler is not covered by insurance. The preferred inhalers that are covered include:   Anoro Ellipta   Bevespi Aerosphere   Stiolto Respimat   Ipratropium-albuterol nebulizer solution    Completing nebulizer treatments 4 times per day is burdensome and and will reduce adherence to treatment regimen with subsequent worsening of pulmonary disease, so switching to ipratropium-albuterol nebulizer solution is not feasible.  The other preferred inhalers cannot be used because they all contain long-acting beta agonists and patient is already taking a long-acting inhaled beta-agonist twice daily with Symbicort (formoterol). Excessive use of beta-agonists can cause serious cardiovascular events and fatalities and concomitant use of beta-agonists should be avoided.    Another option was to switch from Combivent to separate Atrovent HFA and Proair/Ventolin inhalers, one puff of each 4 times daily. However, patient states her copay for Atrovent is $150 and she cannot afford this medication.    Plan:  1. Will ask prior authorization team to pursue PA for Combivent based on rationale as stated above.  2. If PA is approved and patient has a high copay, she may apply for copay assistance at www.[employer].    Tati HernandezD  CF Medication Therapy Management Pharmacist  [facility]  [phone]

## 2020-05-14 NOTE — TELEPHONE ENCOUNTER
PA Initiation    Medication: ipratropium-albuterol (COMBIVENT RESPIMAT)  MCG/ACT inhaler   Insurance Company: [employer] - Phone [phone] Fax [phone]  Pharmacy Filling the Rx: [id number] [place], [place] - [address]  Filling Pharmacy Phone: [phone]  Filling Pharmacy Fax: [phone]  Start Date: 5/8/2020

## 2020-05-15 NOTE — TELEPHONE ENCOUNTER
PRIOR AUTHORIZATION DENIED    Medication: ipratropium-albuterol (COMBIVENT RESPIMAT)  MCG/ACT inhaler     Denial Date: 5/14/2020    Denial Rational:           Appeal Information:

## 2020-05-19 ENCOUNTER — TELEPHONE (OUTPATIENT)
Dept: OPHTHALMOLOGY | Facility: CLINIC | Age: 50
End: 2020-05-19

## 2020-05-19 NOTE — TELEPHONE ENCOUNTER
Spoke to pt at 0900Pt complaint of worsening redness/puffy lids past couple months  No eye pain  No vision changes  Scheduled this Friday with Dr. Logan at [facility] location    Pt aware of date/time/location  Des Bustos RN 9:09 AM 05/19/20

## 2020-05-22 ENCOUNTER — TELEPHONE (OUTPATIENT)
Dept: OPHTHALMOLOGY | Facility: CLINIC | Age: 50
End: 2020-05-22

## 2020-05-22 NOTE — TELEPHONE ENCOUNTER
Scheduled next clinic day with Dr. Logan on wednesday next week at 1200  Pt aware of date/time/location  Des Bustos RN 10:15 AM 05/22/20        [facility] Call Center    Phone Message    May a detailed message be left on voicemail: yes     Reason for Call: Other: Patient cancelled apt for today and wants to reschedule. Please review and call patient to schedule.     Action Taken: Other: Eye Clinic    Travel Screening: Not Applicable

## 2020-05-27 ENCOUNTER — OFFICE VISIT (OUTPATIENT)
Dept: OPHTHALMOLOGY | Facility: CLINIC | Age: 50
End: 2020-05-27
Payer: COMMERCIAL

## 2020-05-27 DIAGNOSIS — H02.88B MEIBOMIAN GLAND DYSFUNCTION (MGD) OF UPPER AND LOWER LIDS OF BOTH EYES: Primary | ICD-10-CM

## 2020-05-27 DIAGNOSIS — H02.88A MEIBOMIAN GLAND DYSFUNCTION (MGD) OF UPPER AND LOWER LIDS OF BOTH EYES: Primary | ICD-10-CM

## 2020-05-27 DIAGNOSIS — B88.0 INFESTATION BY DEMODEX FOLLICULORUM: ICD-10-CM

## 2020-05-27 ASSESSMENT — TONOMETRY
IOP_METHOD: ICARE
OD_IOP_MMHG: 13
OS_IOP_MMHG: 15

## 2020-05-27 ASSESSMENT — VISUAL ACUITY
CORRECTION_TYPE: GLASSES
METHOD: SNELLEN - LINEAR
OD_CC: 20/20
OS_CC: 20/20

## 2020-05-27 ASSESSMENT — EXTERNAL EXAM - RIGHT EYE: OD_EXAM: NORMAL

## 2020-05-27 ASSESSMENT — REFRACTION_WEARINGRX
OS_SPHERE: -1.50
OS_AXIS: 175
SPECS_TYPE: PAL
OD_ADD: +1.50
OS_ADD: +1.50
OD_CYLINDER: +0.75
OD_AXIS: 170
OS_CYLINDER: +1.00
OD_SPHERE: -1.75

## 2020-05-27 ASSESSMENT — CONF VISUAL FIELD
METHOD: COUNTING FINGERS
OS_NORMAL: 1
OD_NORMAL: 1

## 2020-05-27 ASSESSMENT — EXTERNAL EXAM - LEFT EYE: OS_EXAM: NORMAL

## 2020-05-27 NOTE — PROGRESS NOTES
History  HPI     Red Eye Both Eyes     In both eyes.  Characterized as blood shot.  Associated symptoms include dryness and irritation.  Pain was noted as 0/10.  Occurring constantly.  Treatments tried include artificial tears.  Response to treatment was mild improvement.              Comments     Pt states has tried using tea tree eye wash and lid scrubs that did not help. Pt has noticed for the last 2 months waking up with swollen/puffy eye lids and noticing discharge. Pt has tried using bacitracin ointment- burned and hurt eyes so discontinued. Pt has noticed more trouble with focusing- more so when not wearing glasses. AT PRN each eye.     Yasmin Hackett, COT COT 11:57 AM May 27, 2020     Tried tea tree oil wash for a few months, but noticed no benefit.   Notices eye swelling which resolves after several hours.   Currently uses 3 drops of artificial tears right away in the morning, followed by 2 antihistamine drops, followed by warm face wash. Will then use artificial tears 4-5 times throughout the day. 3 drops artificial tears followed by 2 antihistamine drops before bed.          Last edited by Randal Logan, OD on 5/27/2020 12:08 PM. (History)          Assessment/Plan  (H02.88A,  H02.88B) Meibomian gland dysfunction (MGD) of upper and lower lids of both eyes  (primary encounter diagnosis)  Comment: Meibomian gland dysfunction both eyes, tear frothing, noting irritation and puffiness  Plan:  Educated patient on condition and clinical findings. Recommended warm compresses twice each day for ten minutes. Decrease artificial tears to 1 drop four times each day, discontinue antihistamine drops, and continue tea tree facial wash as needed. Monitor annually, or sooner if symptoms worsen.    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 I personally obtained the chief complaint(s) and history of present illness. I  confirmed and edited as necessary the review of systems, past medical/surgical  history, family history, social history, and examination findings as documented by  others; and I examined the patient myself. I personally reviewed the relevant tests,  images, and reports as documented above. I formulated and edited as necessary the  assessment and plan and discussed the findings and management plan with the  patient and family.    Randal Logan OD, FAAO

## 2020-05-27 NOTE — NURSING NOTE
Chief Complaints and History of Present Illnesses   Patient presents with     Red Eye Both Eyes     Chief Complaint(s) and History of Present Illness(es)     Red Eye Both Eyes     Laterality: both eyes    Characteristics: blood shot    Associated symptoms: dryness and irritation    Pain scale: 0/10    Frequency: constantly    Treatments tried: artificial tears    Response to treatment: mild improvement              Comments     Pt states has tried using tea tree eye wash and lid scrubs that did not help. Pt has noticed for the last 2 months waking up with swollen/puffy eye lids and noticing discharge. Pt has tried using bacitracin ointment- burned and hurt eyes so discontinued. Pt has noticed more trouble with focusing- more so when not wearing glasses. AT PRN each eye.     MARIEL May COT 11:57 AM May 27, 2020

## 2020-06-07 DIAGNOSIS — J31.0 CHRONIC RHINITIS: ICD-10-CM

## 2020-06-09 RX ORDER — FLUTICASONE PROPIONATE 50 MCG
SPRAY, SUSPENSION (ML) NASAL
Qty: 48 ML | Refills: 3 | Status: SHIPPED | OUTPATIENT
Start: 2020-06-09 | End: 2021-08-31

## 2020-06-15 NOTE — PROGRESS NOTES
"Rheumatology Clinic Visit 1     Valencia Ye MRN# [id number]   YOB: 1970 Age: 49 year old     Date of Visit: 06/16/2020  Primary care provider: Morelia Simental          Assessment and Plan:   #Eosinophilic granulomatosis with polyangiitis (eosinophilia, severe asthma, pericarditis, vasculitic lower extremity rashes, myalgia, fatigue, dx'd age 19): Patient relates stable, chronic fatigue, waxing and waning myalgia, \"brain fog\" with suboptimal short-term memory.  All symptoms attributable formerly to active EGPA are reported stable with no major flares or uptakes in the past calendar year. Respiratory symptoms are essentially absent.  Examination today by video shows no rash, synovitis, or altered joint range of motion.  Blood work on October 2019 showed normal electrolytes, creatinine, and vitamin D levels.  Hemoglobin A1c was normal at 5.2. Pulmonary function tests done in December 2019 showed FEV1 of 121% of predicted and FVC of 135% of predicted. chest x-ray on February 10, 2020 showed no heart or lung abnormality. Last CT chest Occurred on March 28, 2014; this study showed mild bronchiectasis.    EGPA is currently stable, with no evidence of end-organ insufficiency or systemic involvement with inflammatory disease.  Patient describes concern that subcapsular cataracts have recently been detected by ophthalmology.  She wonders whether ongoing prednisone is contributing to cataract progression.  I stated my impression that 5.5 mg prednisone daily is unlikely to be a major  of cataract progression, that doses of 10 mg/day or above taken for many months to years would be more likely to be associated with accelerated cataract development.  Patient reports that on multiple occasions in the past, she worked with Dr. Butts to try and reduce prednisone dosage.  On each occasion of tapering, she developed flaring symptoms that reflected recurrent EGPA.  I think this history is compatible with " the diagnosis of EGPA; I think that the risk of precipitating flare is greater than the risk or adverse effects associated with tapering prednisone beyond the current low-dose, which is at or below physiologic corticosteroid daily dosage anyway.  I recommend continuing prednisone dose of 5.5 mg every day.    I recommend screening studies for renal function, hepatic function, systemic inflammation, urinalysis, and ANCA titers.  UA, CBC with differential, and creatinine should be performed semiannually.  I agree with patient's current practice of monitoring blood pressure several times weekly. I agree with management through Dr. Novak of asthma with ipratropium, Symbicort, and rescue inhaler.    I recommend follow-up in 6 months time.    Adal Gallo MD  Staff Rheumatologist, [facility]              Active Problem List:     Patient Active Problem List    Diagnosis Date Noted     Bladder pain 07/31/2019     Priority: Medium     Overactive bladder 07/31/2019     Priority: Medium     Controlled substance agreement signed 11/13/2017     Priority: Medium     Patient is followed by BRIAN DEE for ongoing prescription of benzodiazepines.  All refills should be approved by this provider, or covering partner.    Medication(s): clonazepam.   Maximum quantity per month: 7.5  Clinic visit frequency required: Q 6  months     Controlled substance agreement on file: Yes       Date(s): 11/13/17  Benzodiazepine use reviewed by psychiatry:  No    Last [facility] website verification:  none   [email or web address]           Bronchiectasis without acute exacerbation (H) 05/22/2017     Priority: Medium     Moderate episode of recurrent major depressive disorder (H) 01/29/2017     Priority: Medium     Benign essential hypertension 10/20/2016     Priority: Medium     Mannose-binding lectin deficiency      Priority: Medium     Irritable bowel syndrome without diarrhea 07/14/2016     Priority: Medium     Chronic fatigue 01/21/2016      Priority: Medium     Anxiety 05/30/2014     Priority: Medium     Asthma 07/16/2013     Priority: Medium     Problem list name updated by automated process. 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    Her condition has been managed Dr. Butts since 2013; I do not have his records to review today.    By her history, She was diagnosed with EGPA as a teenager after symptoms started at age 16. She had severe life-threatening asthma. By 19, she could not go to school or work due to breathting. She had weightl loss, painful toe rashes, and neurologic symptoms with restless legs, optical migraines. At age 19 in 1990, she was admitted to hospital, found to have pericarditis and eosinophilia. Bx skin showed vasculitis. She was started on prednisone, high dose initially. She has been on prednisone continuously since.    In recent years, she developed bronchiectasis, dx'd in 2013. She has been treated with daily therapeutic vest treatments, intermittent antibiotics.    In May 2018, she developed vertigo and nausea, which persisted for months, now somewhat improved.    She had worked continusously through the illness, until 2019.    She is afflicted with brain fog that causes short-term memory loss, fatigue and muscle pain. The latter is improved with reduced work-related stress. She has chronic sinus and ear problems. She notes dry eyes and reduced tear formation.    She had a respiratory illness in the end of February; she was not tested for COVID. She took  an antibiotic and upped her dose of prednisone, and sx cleared.    Asthma is generally under good control. No recent vasculitis rash. The last episode of pericardial fluid episode was in [id number].    She has noted fluctuating blood pressures, with systolics in 80-90; ranging upward to 130-140s.    Patient was seen in ophthalmology on February 25, 2020 in follow-up of C Antonina syndrome.  Assessment was of no ocular manifestations of systemic rheumatic disease.    Patient was seen by Dr. Novak in pulmonology on December 26, 2019 for follow-up of bronchiectasis and eosinophilic granulomatous polyangiitis.  Pulmonary symptoms were under good control with chronic inhaler use, daily vest therapy and Mucomyst fatigue and body aches were noted.  No change in medication was recommended.    She had severe IBS for years. She has noted marked improvement with use of SamE and Amitiza (for approximately 1 year).         Review of Systems:   Unlless listed in HPI:  Constitutional: negative  Skin: negative  Eyes: negative  Ears/Nose/Throat: negative  Respiratory: No shortness of breath, dyspnea on exertion, cough, or hemoptysis  Cardiovascular: negative  Gastrointestinal: negative  Genitourinary: negative  Musculoskeletal: negative  Neurologic: negative  Psychiatric: negative  Hematologic/Lymphatic/Immunologic: negative  Endocrine: negative          Past Medical History:     Past Medical History:   Diagnosis Date     Anxiety      Aortitis (H)      Asthma     associated with Churg Antonina syndrome     Bronchiectasis (H)      Cerebral infarction (H) August 1990    Very small, caused small permanent optical migraine     Chronic sinusitis      Churg-Antonina syndrome (H)     dx 1990     Conductive hearing loss      Depressive disorder      Eustachian tube dysfunction      Goiter      Hearing loss, conductive      Heart rate problem      Hypertension      Hypocalcemia      Immunosuppression (H)      Irritable bowel syndrome       Major depressive disorder      Mannose-binding lectin deficiency      Nonspecific abnormal results of thyroid function study      Osteoporosis      Pericarditis      and      Pneumonia     4/23/10     Recurrent otitis media      Recurrent UTI      Rheumatoid vasculitis (H)      Sinusitis, chronic      Steroid long-term use      Tinnitus      Varicose veins of leg with swelling      Past Surgical History:   Procedure Laterality Date     C/SECTION, CLASSICAL  1998      SECTION       COLONOSCOPY Left 2014    Procedure: COMBINED COLONOSCOPY, SINGLE OR MULTIPLE BIOPSY/POLYPECTOMY BY BIOPSY;  Surgeon: Js Pinedo MD;  Location:  GI     COLONOSCOPY N/A 2019    Procedure: COLONOSCOPY;  Surgeon: Bryce Pimentel MD;  Location:  GI     ENT SURGERY       GENITOURINARY SURGERY       HC COLP CERVIX/UPPER VAGINA W LOOP ELEC BX CERVIX       HEAD & NECK SURGERY       HYSTERECTOMY  2009    without oopherectomy     HYSTERECTOMY, PAP NO LONGER INDICATED      cervix removed      PE TUBES       PICC INSERTION  10/10/2013    5fr DL PASV PICC, 43cm (1cm external) in the L basilic vein w/ tip in the low SVC.     PICC INSERTION Left 2017    5fr DL BioFlo PICC, 42cm (3cm external) in the L lateral brachial vein w/ tip in the SVC RA junction.     SINUS SURGERY       TUBAL LIGATION       TYMPANOPLASTY       --Hysterectomy for cervical dysplasia.       Social History:     Social History     Occupational History     Not on file   Tobacco Use     Smoking status: Never Smoker     Smokeless tobacco: Never Used   Substance and Sexual Activity     Alcohol use: Yes     Alcohol/week: 0.0 standard drinks     Comment: few drinks a week     Drug use: No     Sexual activity: Yes     Partners: Male     Birth control/protection: Male Surgical, Female Surgical     Has 5 children, all healthy except for asthma  Worked as  at the  for 15 years.  Drinks 1/2 drink 5 days weekly.  Never smoker       Family History:     Family History   Problem Relation Age of Onset     Allergies Mother         Seasonal     Alcohol/Drug Mother      Substance Abuse Mother      Depression Mother      C.A.D. Father      Diabetes Father         Type 2     Depression Father      Heart Disease Father      C.A.D. Maternal Grandmother      Arthritis Maternal Grandmother      Musculoskeletal Disorder Maternal Grandmother         MS     Heart Disease Maternal Grandmother      Hypertension Maternal Grandmother      Alcohol/Drug Maternal Grandfather      Substance Abuse Maternal Grandfather      Depression Maternal Grandfather      Asthma Son      Asthma Daughter      Allergies Daughter         Seasonal     Unknown/Adopted Paternal Grandmother      Unknown/Adopted Paternal Grandfather      Cancer Maternal Aunt         breast age 53     Breast Cancer Maternal Aunt         in her 50s     Glaucoma No family hx of      Macular Degeneration No family hx of      Colon Cancer No family hx of    MGM with MS; aunt MS         Allergies:     Allergies   Allergen Reactions     Colistimethate Anaphylaxis     Colymycin M [Colistimethate Sodium] Anaphylaxis     Tamiflu [Oseltamivir]      Gums Blister up     Azithromycin Palpitations     Heart palpitation  Heart palpitation     Clindamycin Rash            Medications:     Current Outpatient Medications   Medication Sig Dispense Refill     acetylcysteine (MUCOMYST) 20 % neb solution Take 4 mLs by nebulization 2 times daily Use with albuterol solution. Discard open bottle of Mucomyst after 96 hours. 240 mL 11     albuterol (PROVENTIL) (2.5 MG/3ML) 0.083% neb solution Take 1 vial (2.5 mg) by nebulization 2 times daily Use with Mucomyst 60 vial 11     amoxicillin-clavulanate (AUGMENTIN) 875-125 MG tablet Take 1 tablet by mouth 2 times daily 20 tablet 0     atorvastatin (LIPITOR) 10 MG tablet Take 0.5 tablets (5 mg) by mouth daily Labs and appt due in Cardiology 45 tablet 0      budesonide-formoterol (SYMBICORT) 160-4.5 MCG/ACT Inhaler Inhale 2 puffs into the lungs 2 times daily 1 Inhaler 11     ciprofloxacin-dexamethasone (CIPRODEX) 0.3-0.1 % otic suspension INSTILL 3 DROPS INTO THE AFFECTED EAR TWICE DAILY AS NEEDED 7.5 mL 11     co-enzyme Q-10 50 MG CAPS        fluticasone (FLONASE) 50 MCG/ACT nasal spray SHAKE LIQUID AND USE 2 SPRAYS IN EACH NOSTRIL EVERY DAY. 48 mL 3     glucosamine-chondroitin (GLUCOSAMINE CHONDR COMPLEX) 500-400 MG CAPS Take 2 capsules by mouth daily.       ipratropium-albuterol (COMBIVENT RESPIMAT)  MCG/ACT inhaler Inhale 1 puff into the lungs 4 times daily Not to exceed 6 doses per day. 1 Inhaler 11     lubiprostone (AMITIZA) 8 MCG capsule TAKE 1 CAPSULE(8 MCG) BY MOUTH TWICE DAILY 180 capsule 3     metoprolol succinate ER (TOPROL-XL) 25 MG 24 hr tablet TAKE 1/2 TABLET(12.5 MG) BY MOUTH DAILY 45 tablet 3     Omega-3 Fatty Acids (FISH OIL) 1200 MG capsule Take 4 capsules (4.8 g) by mouth daily       PHOSPHATIDYL CHOLINE PO        predniSONE (DELTASONE) 1 MG tablet TAKE 5 TABLETS BY MOUTH EVERY DAY AND ALTERNATING WITH 6 TABLETS THE NEXT. 590 tablet 3     predniSONE (DELTASONE) 10 MG tablet 40 mg daily x5 days, then 20 mg daily x5 days, then 15 mg daily x5 days then 10 mg daily x 5 days, then go back to usual dose 5 to 6 mg 42 tablet 0     UNABLE TO FIND MEDICATION NAME: Mario-E       vitamin B complex with vitamin C (VITAMIN  B COMPLEX) TABS tablet Take 1 tablet by mouth daily       vitamin D3 (CHOLECALCIFEROL) 2000 units (50 mcg) tablet Take 2 tablets by mouth daily              Physical Exam:   Last menstrual period 02/01/2009, not currently breastfeeding.  Wt Readings from Last 4 Encounters:   02/10/20 49 kg (108 lb 1.6 oz)   02/04/20 49.9 kg (110 lb)   12/26/19 50.5 kg (111 lb 5.3 oz)   12/20/19 47.6 kg (105 lb)       Constitutional: well-developed, appearing stated age; cooperative  Eyes: nl EOM, PERRLA, vision, conjunctiva, sclera  ENT: nl external ears,  nose, hearing, lips, teeth, gums, throat  No mucous membrane lesions, normal saliva pool  Neck: no thyroid enlargement  MS: No hand/finger joint visible swelling; intact hand joint ROM  Skin: no nail pitting, alopecia, rash, nodules or lesions  Neuro: nl cranial nerves  Psych: nl judgement, orientation, memory, affect.         Data:     No results found for any visits on 06/16/20.    Recent Labs   Lab Test 10/02/19  1428 02/28/19  1604 02/26/19  0637 07/05/18  1805 02/19/18  1626  06/09/17  1756  03/10/17  1835 11/10/16  1812 12/23/15  2227   WBC 8.5  --   --   --  6.1  --  6.7   < > 20.8* 8.8 8.0   RBC 4.01  --   --   --  3.96  --  3.85   < > 4.04 3.68* 4.02   HGB 13.6  --   --   --  13.0  --  12.7   < > 13.6 12.4 13.4   HCT 40.4  --   --   --  39.3  --  38.5   < > 39.6 37.2 39.0   *  --   --   --  99  --  100   < > 98 101* 97   RDW 12.7  --   --   --  13.1  --  12.1   < > 12.0 12.4 12.5     --   --   --  283  --  284   < > 292 276 275   ALBUMIN  --   --   --   --   --   --   --   --  3.7 4.0 3.8   CRP  --  <2.9 <2.9 <2.9  --   --  <2.9   < >  --  <2.9  --    BUN 14  --   --  19 8   < >  --    < > 14 14 11    < > = values in this interval not displayed.      Recent Labs   Lab Test 03/12/19 07/05/18  1805 11/10/16  1812   TSH 1.540 0.56 0.90   T4  --   --  1.01     Hemoglobin   Date Value Ref Range Status   10/02/2019 13.6 11.7 - 15.7 g/dL Final   02/19/2018 13.0 11.7 - 15.7 g/dL Final   06/09/2017 12.7 11.7 - 15.7 g/dL Final     Urea Nitrogen   Date Value Ref Range Status   10/02/2019 14 7 - 30 mg/dL Final   07/05/2018 19 7 - 30 mg/dL Final   02/19/2018 8 7 - 30 mg/dL Final     Sed Rate   Date Value Ref Range Status   02/28/2019 6 0 - 20 mm/h Final   07/05/2018 6 0 - 20 mm/h Final   06/09/2017 7 0 - 20 mm/h Final     CRP Cardiac Risk   Date Value Ref Range Status   02/04/2008 8.4 mg/L Final     Comment:     Reference Values:   Low Risk:           <1.0 mg/L   Average Risk:       1.0-3.0 mg/L   High  Risk:          >3.0 mg/L   Acute Inflammation: >8.0 mg/L     CRP Inflammation   Date Value Ref Range Status   02/28/2019 <2.9 0.0 - 8.0 mg/L Final   02/26/2019 <2.9 0.0 - 8.0 mg/L Final   07/05/2018 <2.9 0.0 - 8.0 mg/L Final     AST   Date Value Ref Range Status   03/12/2019 24 0 - 40 IU/L Final   03/10/2017 17 0 - 45 U/L Final     Comment:     Specimen is hemolyzed which can falsely elevate AST. Analysis of a   non-hemolyzed   specimen may result in a lower value.     11/10/2016 6 0 - 45 U/L Final     Neutrophil Cytoplasmic IgG Antibody   Date Value Ref Range Status   02/17/2009 <1:20  Final     Comment:     Reference range: <1:20  (Note)  TEST INFORMATION: Anti-Neutrophil Cyto Ab, IgG  Neutrophil Cytoplasmic Antibodies (C-ANCA = granular  cytoplasmic staining, P-ANCA = perinuclear staining) are  found in the serum of over 90% of patients with certain  necrotizing systemic vasculitides, and usually in less  than 5% of patients with collagen vascular disease or  arthritis.                  ANCA'S IN VASCULITIC SYNDROMES                         Approx %      Approx % of                         Pos ANCA    patients demonstrating  ----------------------------------------------------------                (combined patterns)   C Pattern   P Pattern  Wegener's granulomatosis:  -Active Generalized   85 - 92 85 - 90       2 - 5  -Limited forms        60 - 67 85 - 90       2 - 5  -Inactive             30 - 35        85 - 90       2 - 5  Idiopathic Crescentic  Glomerulonephritis      80            some*     majority*  Polyarteritis Nodosa    50             86           14  Churg - Antonina         50             80           20  SLE                   less than 10     0    greater than 90  Rheumatoid Arthritis  less than 5      0    greater than 90  Sjogren's Syndrome        25           0    greater than 90  ----------------------------------------------------------  *No quantitation in the literature.  Performed  by [facility],  [address] [place] [address] [phone]  [email or web address], Remy Watkins MD - Lab. Director                                                  08/19/2008 < 1:20  Final     Comment:     Reference range: < 1:20  (Note)  TEST INFORMATION: Anti-Neutrophil Cyto Ab, IgG  Neutrophil Cytoplasmic Antibodies (C-ANCA = granular  cytoplasmic staining, P-ANCA = perinuclear staining) are  found in the serum of over 90% of patients with certain  necrotizing systemic vasculitides, and usually in less  than 5% of patients with collagen vascular disease or  arthritis.                  ANCA'S IN VASCULITIC SYNDROMES                         Approx %      Approx % of                         Pos ANCA    patients demonstrating  ----------------------------------------------------------                (combined patterns)   C Pattern   P Pattern  Wegener's granulomatosis:  -Active Generalized   85 - 92 85 - 90       2 - 5  -Limited forms        60 - 67 85 - 90       2 - 5  -Inactive             30 - 35        85 - 90       2 - 5  Idiopathic Crescentic  Glomerulonephritis      80            some*     majority*  Polyarteritis Nodosa    50             86           14  Churg - Antonina         50             80           20  SLE                   less than 10     0    greater than 90  Rheumatoid Arthritis  less than 5      0    greater than 90  Sjogren's Syndrome        25           0    greater than 90  ----------------------------------------------------------  *No quantitation in the literature.  Performed by [facility],  [address] [facility], [place] [address] [phone]  [email or web address],  Remy Watkins MD - Lab. Director                                              01/24/2008 < 1:20  Final     Comment:     Reference range: < 1:20  (Note)  TEST INFORMATION: Anti-Neutrophil Cyto Ab, IgG  Neutrophil Cytoplasmic Antibodies (C-ANCA = granular  cytoplasmic staining, P-ANCA = perinuclear staining)  are  found in the serum of over 90% of patients with certain  necrotizing systemic vasculitides, and usually in less  than 5% of patients with collagen vascular disease or  arthritis.                  ANCA'S IN VASCULITIC SYNDROMES                         Approx %      Approx % of                         Pos ANCA    patients demonstrating  ----------------------------------------------------------                (combined patterns)   C Pattern   P Pattern  Wegener's granulomatosis:  -Active Generalized   85 - 92 85 - 90       2 - 5  -Limited forms        60 - 67 85 - 90       2 - 5  -Inactive             30 - 35        85 - 90       2 - 5  Idiopathic Crescentic  Glomerulonephritis      80            some*     majority*  Polyarteritis Nodosa    50             86           14  Churg - Antonina         50             80           20  SLE                   less than 10     0    greater than 90  Rheumatoid Arthritis  less than 5      0    greater than 90  Sjogren's Syndrome        25           0    greater than 90  ----------------------------------------------------------  *No quantitation in the literature.  Performed by [facility],  [address] [place] [id number] [phone]  [email or web address],  Remy Watkins MD - Lab. Director                                                Albumin   Date Value Ref Range Status   03/10/2017 3.7 3.4 - 5.0 g/dL Final   11/10/2016 4.0 3.4 - 5.0 g/dL Final   12/23/2015 3.8 3.4 - 5.0 g/dL Final     Alkaline Phosphatase   Date Value Ref Range Status   03/10/2017 48 40 - 150 U/L Final   11/10/2016 42 40 - 150 U/L Final   12/23/2015 36 (L) 40 - 150 U/L Final     ALT   Date Value Ref Range Status   03/12/2019 29 0 - 32 IU/L Final   06/09/2017 47 0 - 50 U/L Final   05/19/2017 16 0 - 50 U/L Final     Rheumatoid Factor   Date Value Ref Range Status   07/05/2018 <20 <20 IU/mL Final     Recent Labs   Lab Test 10/02/19  1428 03/12/19 07/05/18  1804 02/19/18  4285   06/09/17  1756  05/19/17  1308  03/10/17  1835 11/10/16  1812 12/23/15  2227   WBC 8.5  --   --  6.1  --  6.7  --  12.6*   < > 20.8* 8.8 8.0   HGB 13.6  --   --  13.0  --  12.7  --  13.5   < > 13.6 12.4 13.4   HCT 40.4  --   --  39.3  --  38.5  --  39.6   < > 39.6 37.2 39.0   *  --   --  99  --  100  --  100   < > 98 101* 97     --   --  283  --  284  --  272   < > 292 276 275   BUN 14  --  19 8   < >  --    < >  --   --  14 14 11   TSH  --  1.540 0.56  --   --   --   --   --   --   --  0.90  --    AST  --  24  --   --   --   --   --   --   --  17 6 12   ALT  --  29  --   --   --  47  --  16  --  18 14 18   ALKPHOS  --   --   --   --   --   --   --   --   --  48 42 36*    < > = values in this interval not displayed.     RHEUM RESULTS Latest Ref Rng & Units 2/28/2019 3/12/2019 10/2/2019   COMPLEMENT C3 76 - 169 mg/dL - - -   COMPLEMENT C4 15 - 50 mg/dL - - -   SED RATE 0 - 20 mm/h 6 - -   CRP, INFLAMMATION 0.0 - 8.0 mg/L <2.9 - -   CK TOTAL 32 - 200 U/L - - -   RHEUMATOID FACTOR <20 IU/mL - - -   JOSE SCREEN BY EIA <1.0 - - -   AST 0 - 40 IU/L - 24 -   ALT 0 - 32 IU/L - 29 -   ALBUMIN 3.4 - 5.0 g/dL - - -   WBC 4.0 - 11.0 10e9/L - - 8.5   RBC 3.8 - 5.2 10e12/L - - 4.01   HGB 11.7 - 15.7 g/dL - - 13.6   HCT 35.0 - 47.0 % - - 40.4   MCV 78 - 100 fl - - 101(H)   MCHC 31.5 - 36.5 g/dL - - 33.7   RDW 10.0 - 15.0 % - - 12.7    - 450 10e9/L - - 320   CREATININE 0.52 - 1.04 mg/dL - 0.69 0.74   GFR ESTIMATE, IF BLACK >60 mL/min/[1.73:m2] - 121 >90   GFR ESTIMATE >60 mL/min/[1.73:m2] - 105 >90    - 1,620 mg/dL - - -   HEPATITIS C ANTIBODY NEG - - -       Rheumatoid Factor   Date Value Ref Range Status   07/05/2018 <20 <20 IU/mL Final   ,  ,  ,  ,  ,   SSA (RO) Antibody IgG   Date Value Ref Range Status   02/17/2009 2  Final     Comment:     Reference range: 0 to 40  Unit: AU/mL  (Note)  REFERENCE INTERVALS: SSA (Ro) (WESTLEY) Ab, IgG   29 AU/mL or Less ............. Negative   30 - 40 AU/mL  ................ Equivocal   41 AU/mL or Greater .......... Positive    SSA (Ro) antibody is seen in 70-75% of Sjogren syndrome  cases, 30-40% of systemic lupus erythematosus (SLE) and  5-10% of progressive systemic sclerosis (PSS).  Performed by [facility],  [address] [facility] [facility],[place] [id number] [phone]  [email or web address], Remy Watkins MD- Lab. Director     Scleroderma Antibody IgG   Date Value Ref Range Status   02/17/2009 0  Final     Comment:     Reference range: 0 to 40  Unit: AU/mL  (Note)  REFERENCE INTERVALS: Scleroderma (Scl-70) (WESTLEY) Ab, IgG   29 AU/mL or Less ............. Negative   30 - 40 AU/mL ................ Equivocal   41 AU/mL or Greater .......... Positive    Scleroderma (Scl-70) antibody is seen in 20-60% of  patients with scleroderma and is considered diagnostic  and specific for scleroderma if it is the only WESTLEY  antibody present. Scl-70 is also seen in approximately  25% of progressive systemic sclerosis (PSS).  Performed by [facility],  [address] [place] [address] [phone]  www.[email or web address], Remy Watkins MD - Lab. Director   ,   JOSE interpretation   Date Value Ref Range Status   02/28/2019 Negative NEG^Negative Final     Comment:                                        Reference range:  <1:40  NEGATIVE  1:40 - 1:80  BORDERLINE POSITIVE  >1:80 POSITIVE       ,   JOSE Screen by EIA   Date Value Ref Range Status   11/10/2016 <1.0  Interpretation:  Negative   <1.0 Final   ,  ,  ,  ,  ,  ,  ,  ,   Hep B Surface Agn   Date Value Ref Range Status   03/16/2005 Negative NEG Final   ,  ,  ,  ,  ,  ,  ,  ,  ,  ,  ,   Neutrophil Cytoplasmic IgG Antibody   Date Value Ref Range Status   02/17/2009 <1:20  Final     Comment:     Reference range: <1:20  (Note)  TEST INFORMATION: Anti-Neutrophil Cyto Ab, IgG  Neutrophil Cytoplasmic Antibodies (C-ANCA = granular  cytoplasmic staining, P-ANCA = perinuclear staining) are  found in the serum of over 90% of patients with  "certain  necrotizing systemic vasculitides, and usually in less  than 5% of patients with collagen vascular disease or  arthritis.                  ANCA'S IN VASCULITIC SYNDROMES                         Approx %      Approx % of                         Pos ANCA    patients demonstrating  ----------------------------------------------------------                (combined patterns)   C Pattern   P Pattern  Wegener's granulomatosis:  -Active Generalized   85 - 92 85 - 90       2 - 5  -Limited forms        60 - 67 85 - 90       2 - 5  -Inactive             30 - 35        85 - 90       2 - 5  Idiopathic Crescentic  Glomerulonephritis      80            some*     majority*  Polyarteritis Nodosa    50             86           14  Churg - Antonina         50             80           20  SLE                   less than 10     0    greater than 90  Rheumatoid Arthritis  less than 5      0    greater than 90  Sjogren's Syndrome        25           0    greater than 90  ----------------------------------------------------------  *No quantitation in the literature.  Performed by [facility],  [address] [place] [id number] [phone]  [email or web address], Remy Watkins MD - Lab. Director                                                      IGG   Date Value Ref Range Status   10/04/2013 [id number] 695 - 1,620 mg/dL Final     Reviewed Rheumatology lab flowsheet      Valencia Ye is a 49 year old female who is being evaluated via a billable video visit.      The patient has been notified of following:     \"This video visit will be conducted via a call between you and your physician/provider. We have found that certain health care needs can be provided without the need for an in-person physical exam.  This service lets us provide the care you need with a video conversation.  If a prescription is necessary we can send it directly to your pharmacy.  If lab work is needed we can place an order for that and you can " "then stop by our lab to have the test done at a later time.    Video visits are billed at different rates depending on your insurance coverage.  Please reach out to your insurance provider with any questions.    If during the course of the call the physician/provider feels a video visit is not appropriate, you will not be charged for this service.\"    Patient has given verbal consent for Video visit? Yes    Will anyone else be joining your video visit? No        Video-Visit Details    Type of service:  Video Visit    Video Start Time: 1:02 PM  Video End Time: 2:07 PM    Originating Location (pt. Location): Home    Distant Location (provider location):  [facility] RHEUMATOLOGY     Platform used for Video Visit: Chencho Gallo MD        "

## 2020-06-16 ENCOUNTER — VIRTUAL VISIT (OUTPATIENT)
Dept: RHEUMATOLOGY | Facility: CLINIC | Age: 50
End: 2020-06-16
Attending: INTERNAL MEDICINE
Payer: COMMERCIAL

## 2020-06-16 DIAGNOSIS — M30.1 EOSINOPHILIC GRANULOMATOSIS WITH POLYANGIITIS (EGPA) WITH LUNG INVOLVEMENT (H): Primary | ICD-10-CM

## 2020-06-16 DIAGNOSIS — D72.18 EOSINOPHILIC GRANULOMATOSIS WITH POLYANGIITIS (EGPA) WITH LUNG INVOLVEMENT (H): Primary | ICD-10-CM

## 2020-06-16 RX ORDER — UBIDECARENONE 50 MG
CAPSULE ORAL
COMMUNITY
End: 2020-12-10

## 2020-06-16 NOTE — LETTER
"6/16/2020       RE: Valencia Ye  [address] [facility] [address]     Dear Colleague,    Thank you for referring your patient, Valencia Ye, to the [facility] RHEUMATOLOGY at [facility]. Please see a copy of my visit note below.    Rheumatology Clinic Visit 1     Valencia Ye MRN# [id number]   YOB: 1970 Age: 49 year old     Date of Visit: 06/16/2020  Primary care provider: Morelai Simental          Assessment and Plan:   #Eosinophilic granulomatosis with polyangiitis (eosinophilia, severe asthma, pericarditis, vasculitic lower extremity rashes, myalgia, fatigue, dx'd age 19): Patient relates stable, chronic fatigue, waxing and waning myalgia, \"brain fog\" with suboptimal short-term memory.  All symptoms attributable formerly to active EGPA are reported stable with no major flares or uptakes in the past calendar year. Respiratory symptoms are essentially absent.  Examination today by video shows no rash, synovitis, or altered joint range of motion.  Blood work on October 2019 showed normal electrolytes, creatinine, and vitamin D levels.  Hemoglobin A1c was normal at 5.2. Pulmonary function tests done in December 2019 showed FEV1 of 121% of predicted and FVC of 135% of predicted. chest x-ray on February 10, 2020 showed no heart or lung abnormality. Last CT chest Occurred on March 28, 2014; this study showed mild bronchiectasis.    EGPA is currently stable, with no evidence of end-organ insufficiency or systemic involvement with inflammatory disease.  Patient describes concern that subcapsular cataracts have recently been detected by ophthalmology.  She wonders whether ongoing prednisone is contributing to cataract progression.  I stated my impression that 5.5 mg prednisone daily is unlikely to be a major  of cataract progression, that doses of 10 mg/day or above taken for many months to years would be more likely to be associated with " accelerated cataract development.  Patient reports that on multiple occasions in the past, she worked with Dr. Butts to try and reduce prednisone dosage.  On each occasion of tapering, she developed flaring symptoms that reflected recurrent EGPA.  I think this history is compatible with the diagnosis of EGPA; I think that the risk of precipitating flare is greater than the risk or adverse effects associated with tapering prednisone beyond the current low-dose, which is at or below physiologic corticosteroid daily dosage anyway.  I recommend continuing prednisone dose of 5.5 mg every day.    I recommend screening studies for renal function, hepatic function, systemic inflammation, urinalysis, and ANCA titers.  UA, CBC with differential, and creatinine should be performed semiannually.  I agree with patient's current practice of monitoring blood pressure several times weekly. I agree with management through Dr. Novak of asthma with ipratropium, Symbicort, and rescue inhaler.    I recommend follow-up in 6 months time.    Adal Gallo MD  [facility]              Active Problem List:     Patient Active Problem List    Diagnosis Date Noted     Bladder pain 07/31/2019     Priority: Medium     Overactive bladder 07/31/2019     Priority: Medium     Controlled substance agreement signed 11/13/2017     Priority: Medium     Patient is followed by BRIAN DEE for ongoing prescription of benzodiazepines.  All refills should be approved by this provider, or covering partner.    Medication(s): clonazepam.   Maximum quantity per month: 7.5  Clinic visit frequency required: Q 6  months     Controlled substance agreement on file: Yes       Date(s): 11/13/17  Benzodiazepine use reviewed by psychiatry:  No    Last [facility] website verification:  none   [email or web address]           Bronchiectasis without acute exacerbation (H) 05/22/2017     Priority: Medium     Moderate episode of recurrent major  depressive disorder (H) 01/29/2017     Priority: Medium     Benign essential hypertension 10/20/2016     Priority: Medium     Mannose-binding lectin deficiency      Priority: Medium     Irritable bowel syndrome without diarrhea 07/14/2016     Priority: Medium     Chronic fatigue 01/21/2016     Priority: Medium     Anxiety 05/30/2014     Priority: Medium     Asthma 07/16/2013     Priority: Medium     Problem list name updated by automated process. 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    Her condition has been managed Dr. Butts since 2013; I do not have his records to review today.    By her history, She was diagnosed with EGPA as a teenager after symptoms started at age 16. She had severe life-threatening asthma. By 19, she could not go to school or work due to breathting. She had weightl loss, painful toe rashes, and neurologic symptoms with restless legs, optical migraines. At age 19 in 1990, she was admitted to hospital, found to have pericarditis and eosinophilia. Bx skin showed vasculitis. She was started on prednisone, high dose initially. She has been on prednisone continuously since.    In recent years, she developed bronchiectasis, dx'd in 2013. She has been treated with daily therapeutic vest treatments, intermittent antibiotics.    In May 2018, she developed vertigo and nausea, which persisted for months, now somewhat improved.    She had worked continusously through the illness, until 2019.    She is afflicted with brain fog that  causes short-term memory loss, fatigue and muscle pain. The latter is improved with reduced work-related stress. She has chronic sinus and ear problems. She notes dry eyes and reduced tear formation.    She had a respiratory illness in the end of February; she was not tested for COVID. She took an antibiotic and upped her dose of prednisone, and sx cleared.    Asthma is generally under good control. No recent vasculitis rash. The last episode of pericardial fluid episode was in [id number].    She has noted fluctuating blood pressures, with systolics in 80-90; ranging upward to 130-140s.    Patient was seen in ophthalmology on February 25, 2020 in follow-up of C Antonina syndrome.  Assessment was of no ocular manifestations of systemic rheumatic disease.    Patient was seen by Dr. Novak in pulmonology on December 26, 2019 for follow-up of bronchiectasis and eosinophilic granulomatous polyangiitis.  Pulmonary symptoms were under good control with chronic inhaler use, daily vest therapy and Mucomyst fatigue and body aches were noted.  No change in medication was recommended.    She had severe IBS for years. She has noted marked improvement with use of SamE and Amitiza (for approximately 1 year).         Review of Systems:   Unlless listed in HPI:  Constitutional: negative  Skin: negative  Eyes: negative  Ears/Nose/Throat: negative  Respiratory: No shortness of breath, dyspnea on exertion, cough, or hemoptysis  Cardiovascular: negative  Gastrointestinal: negative  Genitourinary: negative  Musculoskeletal: negative  Neurologic: negative  Psychiatric: negative  Hematologic/Lymphatic/Immunologic: negative  Endocrine: negative          Past Medical History:     Past Medical History:   Diagnosis Date     Anxiety      Aortitis (H)      Asthma     associated with Churg Antonina syndrome     Bronchiectasis (H)      Cerebral infarction (H) August 1990    Very small, caused small permanent optical migraine     Chronic sinusitis       Churg-Antonina syndrome (H)     dx      Conductive hearing loss      Depressive disorder      Eustachian tube dysfunction      Goiter      Hearing loss, conductive      Heart rate problem      Hypertension      Hypocalcemia      Immunosuppression (H)      Irritable bowel syndrome      Major depressive disorder      Mannose-binding lectin deficiency      Nonspecific abnormal results of thyroid function study      Osteoporosis      Pericarditis      and      Pneumonia     4/23/10     Recurrent otitis media      Recurrent UTI      Rheumatoid vasculitis (H)      Sinusitis, chronic      Steroid long-term use      Tinnitus      Varicose veins of leg with swelling      Past Surgical History:   Procedure Laterality Date     C/SECTION, CLASSICAL        SECTION       COLONOSCOPY Left 2014    Procedure: COMBINED COLONOSCOPY, SINGLE OR MULTIPLE BIOPSY/POLYPECTOMY BY BIOPSY;  Surgeon: Js Pinedo MD;  Location:  GI     COLONOSCOPY N/A 2019    Procedure: COLONOSCOPY;  Surgeon: Bryce Pimentel MD;  Location:  GI     ENT SURGERY       GENITOURINARY SURGERY       HC COLP CERVIX/UPPER VAGINA W LOOP ELEC BX CERVIX       HEAD & NECK SURGERY       HYSTERECTOMY  2009    without oopherectomy     HYSTERECTOMY, PAP NO LONGER INDICATED      cervix removed      PE TUBES       PICC INSERTION  10/10/2013    5fr DL PASV PICC, 43cm (1cm external) in the L basilic vein w/ tip in the low SVC.     PICC INSERTION Left 2017    5fr DL BioFlo PICC, 42cm (3cm external) in the L lateral brachial vein w/ tip in the SVC RA junction.     SINUS SURGERY       TUBAL LIGATION       TYMPANOPLASTY       --Hysterectomy for cervical dysplasia.       Social History:     Social History     Occupational History     Not on file   Tobacco Use     Smoking status: Never Smoker     Smokeless tobacco: Never Used   Substance and Sexual Activity     Alcohol use: Yes     Alcohol/week: 0.0 standard  drinks     Comment: few drinks a week     Drug use: No     Sexual activity: Yes     Partners: Male     Birth control/protection: Male Surgical, Female Surgical     Has 5 children, all healthy except for asthma  Worked as  at the [employer] for 15 years.  Drinks 1/2 drink 5 days weekly. Never smoker       Family History:     Family History   Problem Relation Age of Onset     Allergies Mother         Seasonal     Alcohol/Drug Mother      Substance Abuse Mother      Depression Mother      C.A.D. Father      Diabetes Father         Type 2     Depression Father      Heart Disease Father      C.A.D. Maternal Grandmother      Arthritis Maternal Grandmother      Musculoskeletal Disorder Maternal Grandmother         MS     Heart Disease Maternal Grandmother      Hypertension Maternal Grandmother      Alcohol/Drug Maternal Grandfather      Substance Abuse Maternal Grandfather      Depression Maternal Grandfather      Asthma Son      Asthma Daughter      Allergies Daughter         Seasonal     Unknown/Adopted Paternal Grandmother      Unknown/Adopted Paternal Grandfather      Cancer Maternal Aunt         breast age 53     Breast Cancer Maternal Aunt         in her 50s     Glaucoma No family hx of      Macular Degeneration No family hx of      Colon Cancer No family hx of    MGM with MS; aunt MS         Allergies:     Allergies   Allergen Reactions     Colistimethate Anaphylaxis     Colymycin M [Colistimethate Sodium] Anaphylaxis     Tamiflu [Oseltamivir]      Gums Blister up     Azithromycin Palpitations     Heart palpitation  Heart palpitation     Clindamycin Rash            Medications:     Current Outpatient Medications   Medication Sig Dispense Refill     acetylcysteine (MUCOMYST) 20 % neb solution Take 4 mLs by nebulization 2 times daily Use with albuterol solution. Discard open bottle of Mucomyst after 96 hours. 240 mL 11     albuterol (PROVENTIL) (2.5 MG/3ML) 0.083% neb solution Take 1 vial (2.5 mg) by nebulization  2 times daily Use with Mucomyst 60 vial 11     amoxicillin-clavulanate (AUGMENTIN) 875-125 MG tablet Take 1 tablet by mouth 2 times daily 20 tablet 0     atorvastatin (LIPITOR) 10 MG tablet Take 0.5 tablets (5 mg) by mouth daily Labs and appt due in Cardiology 45 tablet 0     budesonide-formoterol (SYMBICORT) 160-4.5 MCG/ACT Inhaler Inhale 2 puffs into the lungs 2 times daily 1 Inhaler 11     ciprofloxacin-dexamethasone (CIPRODEX) 0.3-0.1 % otic suspension INSTILL 3 DROPS INTO THE AFFECTED EAR TWICE DAILY AS NEEDED 7.5 mL 11     co-enzyme Q-10 50 MG CAPS        fluticasone (FLONASE) 50 MCG/ACT nasal spray SHAKE LIQUID AND USE 2 SPRAYS IN EACH NOSTRIL EVERY DAY. 48 mL 3     glucosamine-chondroitin (GLUCOSAMINE CHONDR COMPLEX) 500-400 MG CAPS Take 2 capsules by mouth daily.       ipratropium-albuterol (COMBIVENT RESPIMAT)  MCG/ACT inhaler Inhale 1 puff into the lungs 4 times daily Not to exceed 6 doses per day. 1 Inhaler 11     lubiprostone (AMITIZA) 8 MCG capsule TAKE 1 CAPSULE(8 MCG) BY MOUTH TWICE DAILY 180 capsule 3     metoprolol succinate ER (TOPROL-XL) 25 MG 24 hr tablet TAKE 1/2 TABLET(12.5 MG) BY MOUTH DAILY 45 tablet 3     Omega-3 Fatty Acids (FISH OIL) 1200 MG capsule Take 4 capsules (4.8 g) by mouth daily       PHOSPHATIDYL CHOLINE PO        predniSONE (DELTASONE) 1 MG tablet TAKE 5 TABLETS BY MOUTH EVERY DAY AND ALTERNATING WITH 6 TABLETS THE NEXT. 590 tablet 3     predniSONE (DELTASONE) 10 MG tablet 40 mg daily x5 days, then 20 mg daily x5 days, then 15 mg daily x5 days then 10 mg daily x 5 days, then go back to usual dose 5 to 6 mg 42 tablet 0     UNABLE TO FIND MEDICATION NAME: Mario-E       vitamin B complex with vitamin C (VITAMIN  B COMPLEX) TABS tablet Take 1 tablet by mouth daily       vitamin D3 (CHOLECALCIFEROL) 2000 units (50 mcg) tablet Take 2 tablets by mouth daily              Physical Exam:   Last menstrual period 02/01/2009, not currently breastfeeding.  Wt Readings from Last 4  Encounters:   02/10/20 49 kg (108 lb 1.6 oz)   02/04/20 49.9 kg (110 lb)   12/26/19 50.5 kg (111 lb 5.3 oz)   12/20/19 47.6 kg (105 lb)       Constitutional: well-developed, appearing stated age; cooperative  Eyes: nl EOM, PERRLA, vision, conjunctiva, sclera  ENT: nl external ears, nose, hearing, lips, teeth, gums, throat  No mucous membrane lesions, normal saliva pool  Neck: no thyroid enlargement  MS: No hand/finger joint visible swelling; intact hand joint ROM  Skin: no nail pitting, alopecia, rash, nodules or lesions  Neuro: nl cranial nerves  Psych: nl judgement, orientation, memory, affect.         Data:     No results found for any visits on 06/16/20.    Recent Labs   Lab Test 10/02/19  1428 02/28/19  1604 02/26/19  0637 07/05/18  1805 02/19/18  1626  06/09/17  1756  03/10/17  1835 11/10/16  1812 12/23/15  2227   WBC 8.5  --   --   --  6.1  --  6.7   < > 20.8* 8.8 8.0   RBC 4.01  --   --   --  3.96  --  3.85   < > 4.04 3.68* 4.02   HGB 13.6  --   --   --  13.0  --  12.7   < > 13.6 12.4 13.4   HCT 40.4  --   --   --  39.3  --  38.5   < > 39.6 37.2 39.0   *  --   --   --  99  --  100   < > 98 101* 97   RDW 12.7  --   --   --  13.1  --  12.1   < > 12.0 12.4 12.5     --   --   --  283  --  284   < > 292 276 275   ALBUMIN  --   --   --   --   --   --   --   --  3.7 4.0 3.8   CRP  --  <2.9 <2.9 <2.9  --   --  <2.9   < >  --  <2.9  --    BUN 14  --   --  19 8   < >  --    < > 14 14 11    < > = values in this interval not displayed.      Recent Labs   Lab Test 03/12/19 07/05/18  1805 11/10/16  1812   TSH 1.540 0.56 0.90   T4  --   --  1.01     Hemoglobin   Date Value Ref Range Status   10/02/2019 13.6 11.7 - 15.7 g/dL Final   02/19/2018 13.0 11.7 - 15.7 g/dL Final   06/09/2017 12.7 11.7 - 15.7 g/dL Final     Urea Nitrogen   Date Value Ref Range Status   10/02/2019 14 7 - 30 mg/dL Final   07/05/2018 19 7 - 30 mg/dL Final   02/19/2018 8 7 - 30 mg/dL Final     Sed Rate   Date Value Ref Range Status    02/28/2019 6 0 - 20 mm/h Final   07/05/2018 6 0 - 20 mm/h Final   06/09/2017 7 0 - 20 mm/h Final     CRP Cardiac Risk   Date Value Ref Range Status   02/04/2008 8.4 mg/L Final     Comment:     Reference Values:   Low Risk:           <1.0 mg/L   Average Risk:       1.0-3.0 mg/L   High Risk:          >3.0 mg/L   Acute Inflammation: >8.0 mg/L     CRP Inflammation   Date Value Ref Range Status   02/28/2019 <2.9 0.0 - 8.0 mg/L Final   02/26/2019 <2.9 0.0 - 8.0 mg/L Final   07/05/2018 <2.9 0.0 - 8.0 mg/L Final     AST   Date Value Ref Range Status   03/12/2019 24 0 - 40 IU/L Final   03/10/2017 17 0 - 45 U/L Final     Comment:     Specimen is hemolyzed which can falsely elevate AST. Analysis of a   non-hemolyzed   specimen may result in a lower value.     11/10/2016 6 0 - 45 U/L Final     Neutrophil Cytoplasmic IgG Antibody   Date Value Ref Range Status   02/17/2009 <1:20  Final     Comment:     Reference range: <1:20  (Note)  TEST INFORMATION: Anti-Neutrophil Cyto Ab, IgG  Neutrophil Cytoplasmic Antibodies (C-ANCA = granular  cytoplasmic staining, P-ANCA = perinuclear staining) are  found in the serum of over 90% of patients with certain  necrotizing systemic vasculitides, and usually in less  than 5% of patients with collagen vascular disease or  arthritis.                  ANCA'S IN VASCULITIC SYNDROMES                         Approx %      Approx % of                         Pos ANCA    patients demonstrating  ----------------------------------------------------------                (combined patterns)   C Pattern   P Pattern  Wegener's granulomatosis:  -Active Generalized   85 - 92 85 - 90       2 - 5  -Limited forms        60 - 67 85 - 90       2 - 5  -Inactive             30 - 35        85 - 90       2 - 5  Idiopathic Crescentic  Glomerulonephritis      80            some*     majority*  Polyarteritis Nodosa    50             86           14  Churg - Antonina         50             80            20  SLE                   less than 10     0    greater than 90  Rheumatoid Arthritis  less than 5      0    greater than 90  Sjogren's Syndrome        25           0    greater than 90  ----------------------------------------------------------  *No quantitation in the literature.  Performed by [facility],  [address] [facility] [place] [id number] [phone]  [email or web address], Remy Watkins MD - Lab. Director                                                  08/19/2008 < 1:20  Final     Comment:     Reference range: < 1:20  (Note)  TEST INFORMATION: Anti-Neutrophil Cyto Ab, IgG  Neutrophil Cytoplasmic Antibodies (C-ANCA = granular  cytoplasmic staining, P-ANCA = perinuclear staining) are  found in the serum of over 90% of patients with certain  necrotizing systemic vasculitides, and usually in less  than 5% of patients with collagen vascular disease or  arthritis.                  ANCA'S IN VASCULITIC SYNDROMES                         Approx %      Approx % of                         Pos ANCA    patients demonstrating  ----------------------------------------------------------                (combined patterns)   C Pattern   P Pattern  Wegener's granulomatosis:  -Active Generalized   85 - 92        85 - 90       2 - 5  -Limited forms        60 - 67        85 - 90       2 - 5  -Inactive             30 - 35        85 - 90       2 - 5  Idiopathic Crescentic  Glomerulonephritis      80            some*     majority*  Polyarteritis Nodosa    50             86           14  Churg - Antonina         50             80           20  SLE                   less than 10     0    greater than 90  Rheumatoid Arthritis  less than 5      0    greater than 90  Sjogren's Syndrome        25           0    greater than 90  ----------------------------------------------------------  *No quantitation in the literature.  Performed by [facility],  [address] [facility] [place], [place] [id number] [phone]  [email or web address],  Remy Watkins MD  - Lab. Director                                              01/24/2008 < 1:20  Final     Comment:     Reference range: < 1:20  (Note)  TEST INFORMATION: Anti-Neutrophil Cyto Ab, IgG  Neutrophil Cytoplasmic Antibodies (C-ANCA = granular  cytoplasmic staining, P-ANCA = perinuclear staining) are  found in the serum of over 90% of patients with certain  necrotizing systemic vasculitides, and usually in less  than 5% of patients with collagen vascular disease or  arthritis.                  ANCA'S IN VASCULITIC SYNDROMES                         Approx %      Approx % of                         Pos ANCA    patients demonstrating  ----------------------------------------------------------                (combined patterns)   C Pattern   P Pattern  Wegener's granulomatosis:  -Active Generalized   85 - 92 85 - 90       2 - 5  -Limited forms        60 - 67 85 - 90       2 - 5  -Inactive             30 - 35        85 - 90       2 - 5  Idiopathic Crescentic  Glomerulonephritis      80            some*     majority*  Polyarteritis Nodosa    50             86           14  Churg - Antonina         50             80           20  SLE                   less than 10     0    greater than 90  Rheumatoid Arthritis  less than 5      0    greater than 90  Sjogren's Syndrome        25           0    greater than 90  ----------------------------------------------------------  *No quantitation in the literature.  Performed by [facility],  [address] [place] [id number] [phone]  [email or web address],  Remy Watkins MD - Lab. Director                                                Albumin   Date Value Ref Range Status   03/10/2017 3.7 3.4 - 5.0 g/dL Final   11/10/2016 4.0 3.4 - 5.0 g/dL Final   12/23/2015 3.8 3.4 - 5.0 g/dL Final     Alkaline Phosphatase   Date Value Ref Range Status   03/10/2017 48 40 - 150 U/L Final   11/10/2016 42 40 - 150 U/L Final   12/23/2015 36 (L) 40 - 150 U/L Final     ALT   Date Value Ref  Range Status   03/12/2019 29 0 - 32 IU/L Final   06/09/2017 47 0 - 50 U/L Final   05/19/2017 16 0 - 50 U/L Final     Rheumatoid Factor   Date Value Ref Range Status   07/05/2018 <20 <20 IU/mL Final     Recent Labs   Lab Test 10/02/19  1428 03/12/19 07/05/18  1805 02/19/18  1626  06/09/17  1756  05/19/17  1308  03/10/17  1835 11/10/16  1812 12/23/15  2227   WBC 8.5  --   --  6.1  --  6.7  --  12.6*   < > 20.8* 8.8 8.0   HGB 13.6  --   --  13.0  --  12.7  --  13.5   < > 13.6 12.4 13.4   HCT 40.4  --   --  39.3  --  38.5  --  39.6   < > 39.6 37.2 39.0   *  --   --  99  --  100  --  100   < > 98 101* 97     --   --  283  --  284  --  272   < > 292 276 275   BUN 14  --  19 8   < >  --    < >  --   --  14 14 11   TSH  --  1.540 0.56  --   --   --   --   --   --   --  0.90  --    AST  --  24  --   --   --   --   --   --   --  17 6 12   ALT  --  29  --   --   --  47  --  16  --  18 14 18   ALKPHOS  --   --   --   --   --   --   --   --   --  48 42 36*    < > = values in this interval not displayed.     RHEUM RESULTS Latest Ref Rng & Units 2/28/2019 3/12/2019 10/2/2019   COMPLEMENT C3 76 - 169 mg/dL - - -   COMPLEMENT C4 15 - 50 mg/dL - - -   SED RATE 0 - 20 mm/h 6 - -   CRP, INFLAMMATION 0.0 - 8.0 mg/L <2.9 - -   CK TOTAL 32 - 200 U/L - - -   RHEUMATOID FACTOR <20 IU/mL - - -   JOSE SCREEN BY EIA <1.0 - - -   AST 0 - 40 IU/L - 24 -   ALT 0 - 32 IU/L - 29 -   ALBUMIN 3.4 - 5.0 g/dL - - -   WBC 4.0 - 11.0 10e9/L - - 8.5   RBC 3.8 - 5.2 10e12/L - - 4.01   HGB 11.7 - 15.7 g/dL - - 13.6   HCT 35.0 - 47.0 % - - 40.4   MCV 78 - 100 fl - - 101(H)   MCHC 31.5 - 36.5 g/dL - - 33.7   RDW 10.0 - 15.0 % - - 12.7    - 450 10e9/L - - 320   CREATININE 0.52 - 1.04 mg/dL - 0.69 0.74   GFR ESTIMATE, IF BLACK >60 mL/min/[1.73:m2] - 121 >90   GFR ESTIMATE >60 mL/min/[1.73:m2] - 105 >90    - 1,620 mg/dL - - -   HEPATITIS C ANTIBODY NEG - - -       Rheumatoid Factor   Date Value Ref Range Status   07/05/2018 <20 <20  IU/mL Final   ,  ,  ,  ,  ,   SSA (RO) Antibody IgG   Date Value Ref Range Status   02/17/2009 2  Final     Comment:     Reference range: 0 to 40  Unit: AU/mL  (Note)  REFERENCE INTERVALS: SSA (Ro) (WESTLEY) Ab, IgG   29 AU/mL or Less ............. Negative   30 - 40 AU/mL ................ Equivocal   41 AU/mL or Greater .......... Positive    SSA (Ro) antibody is seen in 70-75% of Sjogren syndrome  cases, 30-40% of systemic lupus erythematosus (SLE) and  5-10% of progressive systemic sclerosis (PSS).  Performed by [facility],  [address] [facility],[place] [id number] [phone]  [email or web address], Remy Watkins MD- Lab. Director     Scleroderma Antibody IgG   Date Value Ref Range Status   02/17/2009 0  Final     Comment:     Reference range: 0 to 40  Unit: AU/mL  (Note)  REFERENCE INTERVALS: Scleroderma (Scl-70) (WESTLEY) Ab, IgG   29 AU/mL or Less ............. Negative   30 - 40 AU/mL ................ Equivocal   41 AU/mL or Greater .......... Positive    Scleroderma (Scl-70) antibody is seen in 20-60% of  patients with scleroderma and is considered diagnostic  and specific for scleroderma if it is the only WESTLEY  antibody present. Scl-70 is also seen in approximately  25% of progressive systemic sclerosis (PSS).  Performed by [facility],  [address] [facility],[place] [id number] [phone]  [email or web address], Remy Watkins MD - Lab. Director   ,   JOSE interpretation   Date Value Ref Range Status   02/28/2019 Negative NEG^Negative Final     Comment:                                        Reference range:  <1:40  NEGATIVE  1:40 - 1:80  BORDERLINE POSITIVE  >1:80 POSITIVE       ,   JOSE Screen by EIA   Date Value Ref Range Status   11/10/2016 <1.0  Interpretation:  Negative   <1.0 Final   ,  ,  ,  ,  ,  ,  ,  ,   Hep B Surface Agn   Date Value Ref Range Status   03/16/2005 Negative NEG Final   ,  ,  ,  ,  ,  ,  ,  ,  ,  ,  ,   Neutrophil Cytoplasmic IgG Antibody   Date Value Ref Range Status   02/17/2009 <1:20  Final      "Comment:     Reference range: <1:20  (Note)  TEST INFORMATION: Anti-Neutrophil Cyto Ab, IgG  Neutrophil Cytoplasmic Antibodies (C-ANCA = granular  cytoplasmic staining, P-ANCA = perinuclear staining) are  found in the serum of over 90% of patients with certain  necrotizing systemic vasculitides, and usually in less  than 5% of patients with collagen vascular disease or  arthritis.                  ANCA'S IN VASCULITIC SYNDROMES                         Approx %      Approx % of                         Pos ANCA    patients demonstrating  ----------------------------------------------------------                (combined patterns)   C Pattern   P Pattern  Wegener's granulomatosis:  -Active Generalized   85 - 92 85 - 90       2 - 5  -Limited forms        60 - 67 85 - 90       2 - 5  -Inactive             30 - 35        85 - 90       2 - 5  Idiopathic Crescentic  Glomerulonephritis      80            some*     majority*  Polyarteritis Nodosa    50             86           14  Churg - Antonina         50             80           20  SLE                   less than 10     0    greater than 90  Rheumatoid Arthritis  less than 5      0    greater than 90  Sjogren's Syndrome        25           0    greater than 90  ----------------------------------------------------------  *No quantitation in the literature.  Performed by [facility],  [address] [place] [id number] [phone]  [email or web address], Remy Watkins MD - Lab. Director                                                      IGG   Date Value Ref Range Status   10/04/2013 854 695 - 1,620 mg/dL Final     Reviewed Rheumatology lab flowsheet      Valencia Ye is a 49 year old female who is being evaluated via a billable video visit.      The patient has been notified of following:     \"This video visit will be conducted via a call between you and your physician/provider. We have found that certain health care needs can be provided without " "the need for an in-person physical exam.  This service lets us provide the care you need with a video conversation.  If a prescription is necessary we can send it directly to your pharmacy.  If lab work is needed we can place an order for that and you can then stop by our lab to have the test done at a later time.    Video visits are billed at different rates depending on your insurance coverage.  Please reach out to your insurance provider with any questions.    If during the course of the call the physician/provider feels a video visit is not appropriate, you will not be charged for this service.\"    Patient has given verbal consent for Video visit? Yes    Will anyone else be joining your video visit? No        Video-Visit Details    Type of service:  Video Visit    Video Start Time: 1:02 PM  Video End Time: 2:07 PM    Originating Location (pt. Location): Home    Distant Location (provider location):  [facility] RHEUMATOLOGY     Platform used for Video Visit: Chencho Gallo MD            "

## 2020-06-16 NOTE — PATIENT INSTRUCTIONS
Diagnosis:  1.  Eosinophilic granulomatous polyangiitis, stable, with controlled pulmonary symptoms and absent musculoskeletal, cutaneous, and neurologic symptoms attributable to systemic inflammation.  2.  Early subcapsular cataracts; these cataracts may have been caused by use of corticosteroids years ago, but I do not think that stable very low-dose prednisone is causing accelerated development of cataracts.    3.  Chronic corticosteroid use.    Plan:  1.  Check urinalysis, complete blood count, creatinine, and inflammation markers in the next month.  2.  Continue management of pulmonary symptoms and disease through Dr. Novak's office and pulmonary.  3.  Continue prednisone 5 mg daily alternating with 6 mg daily.  4.  Bone density scanning to be arranged at your next visit, to screen for corticosteroid associated bone density loss.

## 2020-06-25 ENCOUNTER — VIRTUAL VISIT (OUTPATIENT)
Dept: PULMONOLOGY | Facility: CLINIC | Age: 50
End: 2020-06-25
Attending: INTERNAL MEDICINE
Payer: COMMERCIAL

## 2020-06-25 DIAGNOSIS — R00.2 PALPITATIONS: ICD-10-CM

## 2020-06-25 DIAGNOSIS — K58.1 IRRITABLE BOWEL SYNDROME WITH CONSTIPATION: ICD-10-CM

## 2020-06-25 DIAGNOSIS — J47.9 BRONCHIECTASIS WITHOUT ACUTE EXACERBATION (H): Primary | ICD-10-CM

## 2020-06-25 RX ORDER — METOPROLOL SUCCINATE 25 MG/1
TABLET, EXTENDED RELEASE ORAL
Qty: 45 TABLET | Refills: 3 | COMMUNITY
Start: 2020-06-25 | End: 2020-08-14

## 2020-06-25 RX ORDER — LUBIPROSTONE 8 UG/1
16 CAPSULE, GELATIN COATED ORAL DAILY
Qty: 180 CAPSULE | Refills: 3 | COMMUNITY
Start: 2020-06-25 | End: 2020-06-25

## 2020-06-25 RX ORDER — LUBIPROSTONE 8 UG/1
16 CAPSULE, GELATIN COATED ORAL DAILY
Qty: 180 CAPSULE | Refills: 3 | COMMUNITY
Start: 2020-06-25 | End: 2020-07-28

## 2020-06-25 NOTE — PATIENT INSTRUCTIONS
Continue current medication, nebs and vest therapy.   Mail the paperwork for the combivent to my office.

## 2020-06-25 NOTE — PROGRESS NOTES
"Valencia Ye is a 49 year old female who is being evaluated via a billable video visit.      The patient has been notified of following:     \"This video visit will be conducted via a call between you and your physician/provider. We have found that certain health care needs can be provided without the need for an in-person physical exam.  This service lets us provide the care you need with a video conversation.  If a prescription is necessary we can send it directly to your pharmacy.  If lab work is needed we can place an order for that and you can then stop by our lab to have the test done at a later time.    Video visits are billed at different rates depending on your insurance coverage.  Please reach out to your insurance provider with any questions.    If during the course of the call the physician/provider feels a video visit is not appropriate, you will not be charged for this service.\"    Patient has given verbal consent for Video visit? Yes    Will anyone else be joining your video visit? No        Video-Visit Details    Type of service:  Video Visit    Video Start Time: 8:30  Video End Time: 8:45    Originating Location (pt. 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     The patient appears to be doing well from a pulmonary standpoint.  She did have a respiratory illness in February which has entirely resolved.  No PFTs or oxygen saturation are available at today's video visit but symptomatically she appears to be at baseline.  She does not appear to be having an exacerbation at this time.  " "She will continue her current inhalers nebulizers and vest therapy as well as exercise.  She has been having difficulty getting her Combivent due to insurance limitations.  She will be sending us paperwork to try to help obtain the inhaler.  She has had an excellent response to Combivent and it is important that she has continued access to this medication.    I will see the patient in follow-up in 6 months with PFTs or in the interim if she has a change in her symptoms.    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   Since her last visit, patient had a febrile illness in February with flulike symptoms.  After resolution of flulike symptoms she had ongoing chest tightness which resolved with prednisone and Augmentin.  She has been well since that time.    Breathing is comfortable at rest and with all usual activity.  She reports occasional \"lung pain\" which is longstanding and unchanged.  She has been able to increase her exercise since she stopped working.  She feels this has been helpful.  She has an occasional cough with minimal sputum production.  No recent change.  Mostly swallows so unable to describe.  No recent fever, chills or night sweats.  She is doing vest therapy for 30 minutes once daily.    Review of systems:  Appetite is good  Chronic ear/head pain unchanged.  Recent difficulty with \"puffy eyes and redness\".  She is working with an eye doctor for treatment.  Occasional palpitations longstanding and unchanged.  Body aches are improved since she has less work stress.  A complete ROS was otherwise negative except as noted in the HPI.    Current Outpatient Medications   Medication     acetylcysteine (MUCOMYST) 20 % neb solution     albuterol (PROVENTIL) (2.5 MG/3ML) 0.083% neb solution     atorvastatin (LIPITOR) 10 " MG tablet     budesonide-formoterol (SYMBICORT) 160-4.5 MCG/ACT Inhaler     ciprofloxacin-dexamethasone (CIPRODEX) 0.3-0.1 % otic suspension     co-enzyme Q-10 50 MG CAPS     fluticasone (FLONASE) 50 MCG/ACT nasal spray     glucosamine-chondroitin (GLUCOSAMINE CHONDR COMPLEX) 500-400 MG CAPS     ipratropium-albuterol (COMBIVENT RESPIMAT)  MCG/ACT inhaler     lubiprostone (AMITIZA) 8 MCG capsule     metoprolol succinate ER (TOPROL-XL) 25 MG 24 hr tablet     Omega-3 Fatty Acids (FISH OIL) 1200 MG capsule     PHOSPHATIDYL CHOLINE PO     predniSONE (DELTASONE) 1 MG tablet     predniSONE (DELTASONE) 10 MG tablet     S-Adenosylmethionine (SAME PO)     UNABLE TO FIND     vitamin B complex with vitamin C (VITAMIN  B COMPLEX) TABS tablet     vitamin D3 (CHOLECALCIFEROL) 2000 units (50 mcg) tablet     No current facility-administered medications for this visit.      Allergies   Allergen Reactions     Colistimethate Anaphylaxis     Colymycin M [Colistimethate Sodium] Anaphylaxis     Tamiflu [Oseltamivir]      Gums Blister up     Azithromycin Palpitations     Heart palpitation  Heart palpitation     Clindamycin Rash     Past Medical History:   Diagnosis Date     Anxiety      Aortitis (H)      Asthma     associated with Churg Antonina syndrome     Bronchiectasis (H)      Cerebral infarction (H) August 1990    Very small, caused small permanent optical migraine     Chronic sinusitis      Churg-Antonina syndrome (H)     dx 1990     Conductive hearing loss      Depressive disorder      Eustachian tube dysfunction      Goiter      Hearing loss, conductive      Heart rate problem      Hypertension      Hypocalcemia      Immunosuppression (H)      Irritable bowel syndrome      Major depressive disorder      Mannose-binding lectin deficiency 2014     Nonspecific abnormal results of thyroid function study      Osteoporosis      Pericarditis     1990 and 1992     Pneumonia     4/23/10     Recurrent otitis media      Recurrent UTI       Rheumatoid vasculitis (H)      Sinusitis, chronic      Steroid long-term use      Tinnitus      Varicose veins of leg with swelling        Past Surgical History:   Procedure Laterality Date     C/SECTION, CLASSICAL  1998      SECTION       COLONOSCOPY Left 2014    Procedure: COMBINED COLONOSCOPY, SINGLE OR MULTIPLE BIOPSY/POLYPECTOMY BY BIOPSY;  Surgeon: Js Pinedo MD;  Location:  GI     COLONOSCOPY N/A 2019    Procedure: COLONOSCOPY;  Surgeon: Bryce Pimentel MD;  Location:  GI     ENT SURGERY       GENITOURINARY SURGERY       HC COLP CERVIX/UPPER VAGINA W LOOP ELEC BX CERVIX       HEAD & NECK SURGERY       HYSTERECTOMY  2009    without oopherectomy     HYSTERECTOMY, PAP NO LONGER INDICATED      cervix removed      PE TUBES       PICC INSERTION  10/10/2013    5fr DL PASV PICC, 43cm (1cm external) in the L basilic vein w/ tip in the low SVC.     PICC INSERTION Left 2017    5fr DL BioFlo PICC, 42cm (3cm external) in the L lateral brachial vein w/ tip in the SVC RA junction.     SINUS SURGERY       TUBAL LIGATION       TYMPANOPLASTY         Social History     Socioeconomic History     Marital status:      Spouse name: Not on file     Number of children: Not on file     Years of education: Not on file     Highest education level: Not on file   Occupational History     Not on file   Social Needs     Financial resource strain: Not on file     Food insecurity     Worry: Not on file     Inability: Not on file     Transportation needs     Medical: Not on file     Non-medical: Not on file   Tobacco Use     Smoking status: Never Smoker     Smokeless tobacco: Never Used   Substance and Sexual Activity     Alcohol use: Yes     Alcohol/week: 0.0 standard drinks     Comment: few drinks a week     Drug use: No     Sexual activity: Yes     Partners: Male     Birth control/protection: Male Surgical, Female Surgical   Lifestyle     Physical activity     Days per  week: Not on file     Minutes per session: Not on file     Stress: Not on file   Relationships     Social connections     Talks on phone: Not on file     Gets together: Not on file     Attends Taoism service: Not on file     Active member of club or organization: Not on file     Attends meetings of clubs or organizations: Not on file     Relationship status: Not on file     Intimate partner violence     Fear of current or ex partner: Not on file     Emotionally abused: Not on file     Physically abused: Not on file     Forced sexual activity: Not on file   Other Topics Concern     Parent/sibling w/ CABG, MI or angioplasty before 65F 55M? Not Asked      Service No     Blood Transfusions No     Caffeine Concern No     Occupational Exposure No     Hobby Hazards No     Sleep Concern No     Stress Concern Yes     Weight Concern No     Special Diet Yes     Comment: IBS diet     Back Care No     Exercise Not Asked     Comment: 1 mile power walk 5 days a week at least.      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  Non smoker. No smokers at home.  Enjoys walking and dancing.  Alcohol--1 to 2 drinks daily.  Few times a year has more than 4 drinks in a day.  No illicits.                           Exam:   Constitutional - looks well, in no apparent distress  Eyes - no redness or discharge  Respiratory -breathing appears comfortable.  No cough. Speaking in full sentences.  Skin - No appreciable discoloration or lesions (very limited exam)  Neurological - No apparent tremors. Speech fluent and articlate  Psychiatric - no signs of delirium or anxiety     Exam limited to that easily identified on a virtual visit. The rest of a comprehensive physical examination is deferred due to PHE (public health emergency) video visit restrictions.    Results:  No results found for this or any  previous visit (from the past 168 hour(s)).                No PFTs due to safety concerns during the COVID-19 outbreak.

## 2020-06-25 NOTE — LETTER
"    6/25/2020         RE: Valencia Ye  [address] [facility] [id number]        Dear Colleague,    Thank you for referring your patient, Valencia Ye, to the [facility]. Please see a copy of my visit note below.    Valencia Ye is a 49 year old female who is being evaluated via a billable video visit.      The patient has been notified of following:     \"This video visit will be conducted via a call between you and your physician/provider. We have found that certain health care needs can be provided without the need for an in-person physical exam.  This service lets us provide the care you need with a video conversation.  If a prescription is necessary we can send it directly to your pharmacy.  If lab work is needed we can place an order for that and you can then stop by our lab to have the test done at a later time.    Video visits are billed at different rates depending on your insurance coverage.  Please reach out to your insurance provider with any questions.    If during the course of the call the physician/provider feels a video visit is not appropriate, you will not be charged for this service.\"    Patient has given verbal consent for Video visit? Yes    Will anyone else be joining your video visit? No        Video-Visit Details    Type of service:  Video Visit    Video Start Time: 8:30  Video End Time: 8:45    Originating Location (pt. 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     The patient appears to be doing well from a " "pulmonary standpoint.  She did have a respiratory illness in February which has entirely resolved.  No PFTs or oxygen saturation are available at today's video visit but symptomatically she appears to be at baseline.  She does not appear to be having an exacerbation at this time.  She will continue her current inhalers nebulizers and vest therapy as well as exercise.  She has been having difficulty getting her Combivent due to insurance limitations.  She will be sending us paperwork to try to help obtain the inhaler.  She has had an excellent response to Combivent and it is important that she has continued access to this medication.    I will see the patient in follow-up in 6 months with PFTs or in the interim if she has a change in her symptoms.    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   Since her last visit, patient had a febrile illness in February with flulike symptoms.  After resolution of flulike symptoms she had ongoing chest tightness which resolved with prednisone and Augmentin.  She has been well since that time.    Breathing is comfortable at rest and with all usual activity.  She reports occasional \"lung pain\" which is longstanding and unchanged.  She has been able to increase her exercise since she stopped working.  She feels this has been helpful.  She has an occasional cough with minimal sputum production.  No recent change.  Mostly swallows so unable to describe.  No recent fever, chills or night sweats.  She is doing vest therapy for 30 minutes once daily.    Review of systems:  Appetite is good  Chronic ear/head pain unchanged.  Recent difficulty with \"puffy eyes and redness\".  She is working with an eye doctor for treatment.  Occasional palpitations longstanding and unchanged.  Body aches are improved " since she has less work stress.  A complete ROS was otherwise negative except as noted in the HPI.    Current Outpatient Medications   Medication     acetylcysteine (MUCOMYST) 20 % neb solution     albuterol (PROVENTIL) (2.5 MG/3ML) 0.083% neb solution     atorvastatin (LIPITOR) 10 MG tablet     budesonide-formoterol (SYMBICORT) 160-4.5 MCG/ACT Inhaler     ciprofloxacin-dexamethasone (CIPRODEX) 0.3-0.1 % otic suspension     co-enzyme Q-10 50 MG CAPS     fluticasone (FLONASE) 50 MCG/ACT nasal spray     glucosamine-chondroitin (GLUCOSAMINE CHONDR COMPLEX) 500-400 MG CAPS     ipratropium-albuterol (COMBIVENT RESPIMAT)  MCG/ACT inhaler     lubiprostone (AMITIZA) 8 MCG capsule     metoprolol succinate ER (TOPROL-XL) 25 MG 24 hr tablet     Omega-3 Fatty Acids (FISH OIL) 1200 MG capsule     PHOSPHATIDYL CHOLINE PO     predniSONE (DELTASONE) 1 MG tablet     predniSONE (DELTASONE) 10 MG tablet     S-Adenosylmethionine (SAME PO)     UNABLE TO FIND     vitamin B complex with vitamin C (VITAMIN  B COMPLEX) TABS tablet     vitamin D3 (CHOLECALCIFEROL) 2000 units (50 mcg) tablet     No current facility-administered medications for this visit.      Allergies   Allergen Reactions     Colistimethate Anaphylaxis     Colymycin M [Colistimethate Sodium] Anaphylaxis     Tamiflu [Oseltamivir]      Gums Blister up     Azithromycin Palpitations     Heart palpitation  Heart palpitation     Clindamycin Rash     Past Medical History:   Diagnosis Date     Anxiety      Aortitis (H)      Asthma     associated with Churg Antonina syndrome     Bronchiectasis (H)      Cerebral infarction (H) August 1990    Very small, caused small permanent optical migraine     Chronic sinusitis      Churg-Antonina syndrome (H)     dx 1990     Conductive hearing loss      Depressive disorder      Eustachian tube dysfunction      Goiter      Hearing loss, conductive      Heart rate problem      Hypertension      Hypocalcemia      Immunosuppression (H)       Irritable bowel syndrome      Major depressive disorder      Mannose-binding lectin deficiency      Nonspecific abnormal results of thyroid function study      Osteoporosis      Pericarditis      and      Pneumonia     4/23/10     Recurrent otitis media      Recurrent UTI      Rheumatoid vasculitis (H)      Sinusitis, chronic      Steroid long-term use      Tinnitus      Varicose veins of leg with swelling        Past Surgical History:   Procedure Laterality Date     C/SECTION, CLASSICAL  1998      SECTION       COLONOSCOPY Left 2014    Procedure: COMBINED COLONOSCOPY, SINGLE OR MULTIPLE BIOPSY/POLYPECTOMY BY BIOPSY;  Surgeon: Js Pinedo MD;  Location:  GI     COLONOSCOPY N/A 2019    Procedure: COLONOSCOPY;  Surgeon: Bryce Pimentel MD;  Location:  GI     ENT SURGERY       GENITOURINARY SURGERY       HC COLP CERVIX/UPPER VAGINA W LOOP ELEC BX CERVIX       HEAD & NECK SURGERY       HYSTERECTOMY  2009    without oopherectomy     HYSTERECTOMY, PAP NO LONGER INDICATED      cervix removed      PE TUBES       PICC INSERTION  10/10/2013    5fr DL PASV PICC, 43cm (1cm external) in the L basilic vein w/ tip in the low SVC.     PICC INSERTION Left 2017    5fr DL BioFlo PICC, 42cm (3cm external) in the L lateral brachial vein w/ tip in the SVC RA junction.     SINUS SURGERY       TUBAL LIGATION       TYMPANOPLASTY         Social History     Socioeconomic History     Marital status:      Spouse name: Not on file     Number of children: Not on file     Years of education: Not on file     Highest education level: Not on file   Occupational History     Not on file   Social Needs     Financial resource strain: Not on file     Food insecurity     Worry: Not on file     Inability: Not on file     Transportation needs     Medical: Not on file     Non-medical: Not on file   Tobacco Use     Smoking status: Never Smoker     Smokeless tobacco: Never Used    Substance and Sexual Activity     Alcohol use: Yes     Alcohol/week: 0.0 standard drinks     Comment: few drinks a week     Drug use: No     Sexual activity: Yes     Partners: Male     Birth control/protection: Male Surgical, Female Surgical   Lifestyle     Physical activity     Days per week: Not on file     Minutes per session: Not on file     Stress: Not on file   Relationships     Social connections     Talks on phone: Not on file     Gets together: Not on file     Attends Yarsani service: Not on file     Active member of club or organization: Not on file     Attends meetings of clubs or organizations: Not on file     Relationship status: Not on file     Intimate partner violence     Fear of current or ex partner: Not on file     Emotionally abused: Not on file     Physically abused: Not on file     Forced sexual activity: Not on file   Other Topics Concern     Parent/sibling w/ CABG, MI or angioplasty before 65F 55M? Not Asked      Service No     Blood Transfusions No     Caffeine Concern No     Occupational Exposure No     Hobby Hazards No     Sleep Concern No     Stress Concern Yes     Weight Concern No     Special Diet Yes     Comment: IBS diet     Back Care No     Exercise Not Asked     Comment: 1 mile power walk 5 days a week at least.      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  .  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  Non smoker. No smokers at home.  Enjoys walking and dancing.  Alcohol--1 to 2 drinks daily.  Few times a year has more than 4 drinks in a day.  No illicits.                           Exam:   Constitutional - looks well, in no apparent distress  Eyes - no redness or discharge  Respiratory -breathing appears comfortable.  No cough. Speaking in full sentences.  Skin - No appreciable discoloration or lesions (very limited exam)  Neurological - No apparent tremors. Speech fluent and  articlate  Psychiatric - no signs of delirium or anxiety     Exam limited to that easily identified on a virtual visit. The rest of a comprehensive physical examination is deferred due to PHE (public health emergency) video visit restrictions.    Results:  No results found for this or any previous visit (from the past 168 hour(s)).                No PFTs due to safety concerns during the COVID-19 outbreak.         Again, thank you for allowing me to participate in the care of your patient.        Sincerely,        Dagoberto Briscoe MD

## 2020-06-29 DIAGNOSIS — D72.18 EOSINOPHILIC GRANULOMATOSIS WITH POLYANGIITIS (EGPA) WITH LUNG INVOLVEMENT (H): ICD-10-CM

## 2020-06-29 DIAGNOSIS — M30.1 EOSINOPHILIC GRANULOMATOSIS WITH POLYANGIITIS (EGPA) WITH LUNG INVOLVEMENT (H): ICD-10-CM

## 2020-06-29 LAB
ALBUMIN UR-MCNC: NEGATIVE MG/DL
APPEARANCE UR: CLEAR
BASOPHILS # BLD AUTO: 0.1 10E9/L (ref 0–0.2)
BASOPHILS NFR BLD AUTO: 0.8 %
BILIRUB UR QL STRIP: NEGATIVE
COLOR UR AUTO: YELLOW
DIFFERENTIAL METHOD BLD: ABNORMAL
EOSINOPHIL # BLD AUTO: 0.3 10E9/L (ref 0–0.7)
EOSINOPHIL NFR BLD AUTO: 2.9 %
ERYTHROCYTE [DISTWIDTH] IN BLOOD BY AUTOMATED COUNT: 12.2 % (ref 10–15)
GLUCOSE UR STRIP-MCNC: NEGATIVE MG/DL
HCT VFR BLD AUTO: 39.1 % (ref 35–47)
HGB BLD-MCNC: 12.9 G/DL (ref 11.7–15.7)
HGB UR QL STRIP: NEGATIVE
KETONES UR STRIP-MCNC: NEGATIVE MG/DL
LEUKOCYTE ESTERASE UR QL STRIP: NEGATIVE
LYMPHOCYTES # BLD AUTO: 1.4 10E9/L (ref 0.8–5.3)
LYMPHOCYTES NFR BLD AUTO: 15.3 %
MCH RBC QN AUTO: 33.2 PG (ref 26.5–33)
MCHC RBC AUTO-ENTMCNC: 33 G/DL (ref 31.5–36.5)
MCV RBC AUTO: 101 FL (ref 78–100)
MONOCYTES # BLD AUTO: 0.7 10E9/L (ref 0–1.3)
MONOCYTES NFR BLD AUTO: 7.9 %
NEUTROPHILS # BLD AUTO: 6.8 10E9/L (ref 1.6–8.3)
NEUTROPHILS NFR BLD AUTO: 73.1 %
NITRATE UR QL: NEGATIVE
PH UR STRIP: 7 PH (ref 5–7)
PLATELET # BLD AUTO: 298 10E9/L (ref 150–450)
RBC # BLD AUTO: 3.88 10E12/L (ref 3.8–5.2)
RBC #/AREA URNS AUTO: NORMAL /HPF
SOURCE: NORMAL
SP GR UR STRIP: 1.01 (ref 1–1.03)
UROBILINOGEN UR STRIP-ACNC: 0.2 EU/DL (ref 0.2–1)
WBC # BLD AUTO: 9.3 10E9/L (ref 4–11)
WBC #/AREA URNS AUTO: NORMAL /HPF

## 2020-06-29 PROCEDURE — 82565 ASSAY OF CREATININE: CPT | Performed by: INTERNAL MEDICINE

## 2020-06-29 PROCEDURE — 85025 COMPLETE CBC W/AUTO DIFF WBC: CPT | Performed by: INTERNAL MEDICINE

## 2020-06-29 PROCEDURE — 81001 URINALYSIS AUTO W/SCOPE: CPT | Performed by: INTERNAL MEDICINE

## 2020-06-29 PROCEDURE — 86255 FLUORESCENT ANTIBODY SCREEN: CPT | Performed by: INTERNAL MEDICINE

## 2020-06-29 PROCEDURE — 36415 COLL VENOUS BLD VENIPUNCTURE: CPT | Performed by: INTERNAL MEDICINE

## 2020-06-30 LAB
ANCA AB PATTERN SER IF-IMP: NORMAL
C-ANCA TITR SER IF: NORMAL {TITER}
CREAT SERPL-MCNC: 0.74 MG/DL (ref 0.52–1.04)
GFR SERPL CREATININE-BSD FRML MDRD: >90 ML/MIN/{1.73_M2}

## 2020-07-05 ENCOUNTER — MYC MEDICAL ADVICE (OUTPATIENT)
Dept: INTERNAL MEDICINE | Facility: CLINIC | Age: 50
End: 2020-07-05

## 2020-07-05 DIAGNOSIS — R53.83 FATIGUE, UNSPECIFIED TYPE: Primary | ICD-10-CM

## 2020-07-05 DIAGNOSIS — D75.89 MACROCYTOSIS: ICD-10-CM

## 2020-07-06 ENCOUNTER — MYC MEDICAL ADVICE (OUTPATIENT)
Dept: OPHTHALMOLOGY | Facility: CLINIC | Age: 50
End: 2020-07-06

## 2020-07-06 DIAGNOSIS — H02.88B MEIBOMIAN GLAND DYSFUNCTION (MGD) OF UPPER AND LOWER LIDS OF BOTH EYES: Primary | ICD-10-CM

## 2020-07-06 DIAGNOSIS — H02.88A MEIBOMIAN GLAND DYSFUNCTION (MGD) OF UPPER AND LOWER LIDS OF BOTH EYES: Primary | ICD-10-CM

## 2020-07-14 RX ORDER — DOXYCYCLINE 50 MG/1
50 CAPSULE ORAL 2 TIMES DAILY
Qty: 60 CAPSULE | Refills: 0 | Status: SHIPPED | OUTPATIENT
Start: 2020-07-14 | End: 2020-10-13

## 2020-07-14 NOTE — TELEPHONE ENCOUNTER
Assessment/Plan  (H02.88A,  H02.88B) Meibomian gland dysfunction (MGD) of upper and lower lids of both eyes  (primary encounter diagnosis)  Comment: Symptomatic with current regimen  Plan: doxycycline monohydrate (MONODOX) 50 MG capsule         Educated patient on possible treatment options. Prescribed 50mg doxycycline twice each day for 30 days. Recommended topical used of hypochlorous acid eyelid cleanser. Monitor at follow-up.    Return to clinic in 1 month for follow-up.    Complete documentation of historical and exam elements from today's encounter can  be found in the full encounter summary report (not reduplicated in this progress  note). I personally obtained the chief complaint(s) and history of present illness. I  confirmed and edited as necessary the review of systems, past medical/surgical  history, family history, social history, and examination findings as documented by  others; and I examined the patient myself. I personally reviewed the relevant tests,  images, and reports as documented above. I formulated and edited as necessary the  assessment and plan and discussed the findings and management plan with the  patient and family.    Randal Logan, DAVID, FAAO

## 2020-07-15 DIAGNOSIS — H92.09 EAR PAIN, UNSPECIFIED LATERALITY: ICD-10-CM

## 2020-07-16 RX ORDER — CIPROFLOXACIN AND DEXAMETHASONE 3; 1 MG/ML; MG/ML
SUSPENSION/ DROPS AURICULAR (OTIC)
Qty: 7.5 ML | Refills: 9 | Status: SHIPPED | OUTPATIENT
Start: 2020-07-16 | End: 2022-05-10

## 2020-07-16 NOTE — TELEPHONE ENCOUNTER
Pending Prescriptions:                       Disp   Refills    CIPRODEX 0.3-0.1 % otic suspension [Pharma*7.5 mL 9        Sig: SHAKE LIQUID AND INSTILL 3 DROPS INTO AFFECTED EARS           TWICE A DAY AS NEEDED.    Routing refill request to provider for review/approval because:  Drug not on the [facility] refill protocol

## 2020-08-07 ENCOUNTER — OFFICE VISIT (OUTPATIENT)
Dept: INTERNAL MEDICINE | Facility: CLINIC | Age: 50
End: 2020-08-07
Payer: COMMERCIAL

## 2020-08-07 VITALS
HEART RATE: 72 BPM | BODY MASS INDEX: 19.54 KG/M2 | SYSTOLIC BLOOD PRESSURE: 100 MMHG | HEIGHT: 63 IN | OXYGEN SATURATION: 98 % | DIASTOLIC BLOOD PRESSURE: 63 MMHG | WEIGHT: 110.3 LBS | RESPIRATION RATE: 18 BRPM | TEMPERATURE: 98.7 F

## 2020-08-07 DIAGNOSIS — M79.10 MYALGIA: ICD-10-CM

## 2020-08-07 DIAGNOSIS — R53.83 FATIGUE, UNSPECIFIED TYPE: Primary | ICD-10-CM

## 2020-08-07 PROCEDURE — 99214 OFFICE O/P EST MOD 30 MIN: CPT | Performed by: INTERNAL MEDICINE

## 2020-08-07 RX ORDER — CYANOCOBALAMIN (VITAMIN B-12) 500 MCG
500 LOZENGE ORAL 3 TIMES DAILY
Status: ON HOLD | COMMUNITY
End: 2022-06-22

## 2020-08-07 RX ORDER — PSEUDOEPHEDRINE HCL 120 MG
TABLET, EXTENDED RELEASE ORAL
COMMUNITY
End: 2021-01-21

## 2020-08-07 ASSESSMENT — MIFFLIN-ST. JEOR: SCORE: 1100.48

## 2020-08-07 NOTE — NURSING NOTE
"/63 (BP Location: Left arm, Patient Position: Sitting, Cuff Size: Adult Regular)   Pulse 72   Temp 98.7  F (37.1  C) (Oral)   Resp 18   Ht 1.594 m (5' 2.75\")   Wt 50 kg (110 lb 4.8 oz)   LMP 02/01/2009   SpO2 98%   BMI 19.69 kg/m      "

## 2020-08-07 NOTE — PROGRESS NOTES
"Subjective     Valencia Z Katie Ye is a 49 year old female who presents to clinic today for the following health issues:    HPI   She presents today with concerns of ongoing fatigue, gradually worsening.  She has had longstanding fatigue, thought multifactorial but feels it is steadily getting worse.  She has bronchiectasis but reports her pulmonary status is actually fairly good lately.  She is not feel well rested in the morning, takes a while to get going.  She feels \"brain fog\" through the day which is not really seem to improve after she gets a little energy.  She is having more difficulty recovering from activity, has muscle aches, they seem very tired, seem to burn a little bit after activity.  She has been trying to work out regularly on the bicycle for cardiopulmonary fitness but is not really making progress with improved muscle strength or duration.  If she pushes too hard, her muscles will be very slow to recover, hurt a lot more.  She is having trouble with focus.  She did quit work because of that but still does not seem any better.  She frequently is awakening with that headache and diffuse pains in the muscles.    She has followed with pulmonary, uses a tongue retainer to avoid clenching which is helped her sleep.  She does not really feel like she has lost overall muscle strength, has not had any decrease in muscle bulk, if anything feels she has slight more strength and slight more bulk since working out on the bicycle.    She had tried stopping her statin for 3 or 4 months last year but did not seem to affect the muscle pain.  She has been tried on new bhatti in the distant past but even a small dose caused side effects.    Patient Active Problem List   Diagnosis     Goiter     History of systemic steroid therapy     ILD (interstitial lung disease) (H)     Palpitations     Churg-Antonina syndrome (H)     History of eating disorder     Asthma     Anxiety     Chronic fatigue     Mannose-binding " lectin deficiency     Benign essential hypertension     Moderate episode of recurrent major depressive disorder (H)     Bronchiectasis without acute exacerbation (H)     Controlled substance agreement signed     Irritable bowel syndrome without diarrhea     Bladder pain     Overactive bladder     Current Outpatient Medications   Medication Sig Dispense Refill     acetylcysteine (MUCOMYST) 20 % neb solution Take 4 mLs by nebulization 2 times daily Use with albuterol solution. Discard open bottle of Mucomyst after 96 hours. 240 mL 11     albuterol (PROVENTIL) (2.5 MG/3ML) 0.083% neb solution Take 1 vial (2.5 mg) by nebulization 2 times daily Use with Mucomyst 60 vial 11     AMITIZA 8 MCG capsule TAKE 1 CAPSULE BY MOUTH TWICE A  capsule 3     budesonide-formoterol (SYMBICORT) 160-4.5 MCG/ACT Inhaler Inhale 2 puffs into the lungs 2 times daily 1 Inhaler 11     ciprofloxacin-dexamethasone (CIPRODEX) 0.3-0.1 % otic suspension SHAKE LIQUID AND INSTILL 3 DROPS INTO AFFECTED EARS TWICE A DAY AS NEEDED. 7.5 mL 9     co-enzyme Q-10 50 MG CAPS        doxycycline monohydrate (MONODOX) 50 MG capsule Take 1 capsule (50 mg) by mouth 2 times daily 60 capsule 0     fluticasone (FLONASE) 50 MCG/ACT nasal spray SHAKE LIQUID AND USE 2 SPRAYS IN EACH NOSTRIL EVERY DAY. 48 mL 3     glucosamine-chondroitin (GLUCOSAMINE CHONDR COMPLEX) 500-400 MG CAPS Take 2 capsules by mouth daily.       ipratropium-albuterol (COMBIVENT RESPIMAT)  MCG/ACT inhaler Inhale 1 puff into the lungs 4 times daily Not to exceed 6 doses per day. 1 Inhaler 11     L-Glutamine 500 MG TABS Take 500 mg by mouth 3 times daily       Magnesium ([employer] TRIPLE MAGNESIUM COMPLEX) 400 MG CAPS        Omega-3 Fatty Acids (FISH OIL) 1200 MG capsule Take 4 capsules (4.8 g) by mouth daily       PHOSPHATIDYL CHOLINE PO        predniSONE (DELTASONE) 1 MG tablet TAKE 5 TABLETS BY MOUTH EVERY DAY AND ALTERNATING WITH 6 TABLETS THE NEXT. 590 tablet 3     predniSONE (DELTASONE)  "10 MG tablet 40 mg daily x5 days, then 20 mg daily x5 days, then 15 mg daily x5 days then 10 mg daily x 5 days, then go back to usual dose 5 to 6 mg 42 tablet 0     S-Adenosylmethionine (SAME PO) Take 600 mg by mouth daily       UNABLE TO FIND MEDICATION NAME: Mario-E       vitamin B complex with vitamin C (VITAMIN  B COMPLEX) TABS tablet Take 1 tablet by mouth daily       vitamin D3 (CHOLECALCIFEROL) 2000 units (50 mcg) tablet Take 2 tablets by mouth daily       atorvastatin (LIPITOR) 10 MG tablet Take 0.5 tablets (5 mg) by mouth daily 45 tablet 3     metoprolol succinate ER (TOPROL-XL) 25 MG 24 hr tablet TAKE 1/2 TABLET(12.5 MG) BY MOUTH DAILY. Hold if BP is low 45 tablet 3      Social History     Tobacco Use     Smoking status: Never Smoker     Smokeless tobacco: Never Used   Substance Use Topics     Alcohol use: Yes     Alcohol/week: 0.0 standard drinks     Comment: few drinks a week     Drug use: No        Reviewed and updated as needed this visit by Provider         Review of Systems   No fever, chills, cough, joint pains, rashes, focal neurologic symptoms      Objective    /63 (BP Location: Left arm, Patient Position: Sitting, Cuff Size: Adult Regular)   Pulse 72   Temp 98.7  F (37.1  C) (Oral)   Resp 18   Ht 1.594 m (5' 2.75\")   Wt 50 kg (110 lb 4.8 oz)   LMP 02/01/2009   SpO2 98%   BMI 19.69 kg/m    Body mass index is 19.69 kg/m .  Physical Exam     Not examined.             Assessment & Plan     1. Fatigue, unspecified type  Etiology is unclear, she currently reports that her medical problems seem to be stable if not slightly improved so that would not be an explanation.  She has had sleep evaluation in the past without significant abnormalities but I may need to review the previous records.  She has been on chronic prednisone, may have some degree of adrenal suppression.  Will check some records and then plan on having her do some labs including reassessment for inflammatory markers.  Consider " endocrinology referral.    2. Myalgia  Overall etiology unclear, she has held her statin without relief for a significant period of time.  Again we will consider looking at labs, reviewing previous testing that she may have had done of the muscles.       25 minutes spent with the patient, >50% of time spent counseling about her fatigue, potential causes, Morelia Simental MD  [facility]

## 2020-08-08 ENCOUNTER — MYC MEDICAL ADVICE (OUTPATIENT)
Dept: INTERNAL MEDICINE | Facility: CLINIC | Age: 50
End: 2020-08-08

## 2020-08-08 DIAGNOSIS — I95.89 OTHER SPECIFIED HYPOTENSION: ICD-10-CM

## 2020-08-08 DIAGNOSIS — R53.83 FATIGUE, UNSPECIFIED TYPE: Primary | ICD-10-CM

## 2020-08-08 DIAGNOSIS — R00.2 PALPITATIONS: ICD-10-CM

## 2020-08-08 DIAGNOSIS — E78.5 HYPERLIPIDEMIA LDL GOAL <130: ICD-10-CM

## 2020-08-08 DIAGNOSIS — M79.10 MYALGIA: ICD-10-CM

## 2020-08-10 DIAGNOSIS — E78.49 ALPHA-LIPOPROTEINEMIA: ICD-10-CM

## 2020-08-13 ENCOUNTER — MYC MEDICAL ADVICE (OUTPATIENT)
Dept: INTERNAL MEDICINE | Facility: CLINIC | Age: 50
End: 2020-08-13

## 2020-08-13 RX ORDER — ATORVASTATIN CALCIUM 10 MG/1
5 TABLET, FILM COATED ORAL DAILY
Qty: 45 TABLET | Refills: 3 | Status: SHIPPED | OUTPATIENT
Start: 2020-08-13 | End: 2021-08-30

## 2020-08-13 NOTE — TELEPHONE ENCOUNTER
" atorvastatin (LIPITOR) 10 MG tablet   Take 0.5 tablets (5 mg) by mouth daily Labs and appt due in Cardiology   Last Written Prescription Date:  5/13/20  Last Fill Quantity: 45,   # refills: 0  Last Office Visit : 2/28/2019 Mehul( consult)  4/2/2018 Camille> began statin  Future Office visit:  None    Routing refill request to provider for review/approval because:  Appointment due > [facility]  LDL overdue 8/2/22018      -Per Dr Carver note 2/28/2019:\"No follow up planned, but will be happy to see her back in clinic should a new problem emerge\"  -Previously seen @ [facility] Dr Maher, who started statin 4/2/2018        "

## 2020-08-14 RX ORDER — METOPROLOL SUCCINATE 25 MG/1
TABLET, EXTENDED RELEASE ORAL
Qty: 45 TABLET | Refills: 3 | Status: SHIPPED | OUTPATIENT
Start: 2020-08-14 | End: 2021-08-12

## 2020-08-26 ENCOUNTER — MYC MEDICAL ADVICE (OUTPATIENT)
Dept: INTERNAL MEDICINE | Facility: CLINIC | Age: 50
End: 2020-08-26

## 2020-08-26 DIAGNOSIS — R42 VERTIGO: Primary | ICD-10-CM

## 2020-08-27 RX ORDER — DIAZEPAM 2 MG
2-4 TABLET ORAL EVERY 6 HOURS PRN
Qty: 20 TABLET | Refills: 0 | Status: SHIPPED | OUTPATIENT
Start: 2020-08-27 | End: 2020-08-31

## 2020-08-29 ENCOUNTER — MYC MEDICAL ADVICE (OUTPATIENT)
Dept: INTERNAL MEDICINE | Facility: CLINIC | Age: 50
End: 2020-08-29

## 2020-08-31 ENCOUNTER — MYC MEDICAL ADVICE (OUTPATIENT)
Dept: INTERNAL MEDICINE | Facility: CLINIC | Age: 50
End: 2020-08-31

## 2020-08-31 DIAGNOSIS — R42 VERTIGO: ICD-10-CM

## 2020-08-31 RX ORDER — DIAZEPAM 2 MG
2-4 TABLET ORAL EVERY 6 HOURS PRN
Qty: 20 TABLET | Refills: 0 | Status: SHIPPED | OUTPATIENT
Start: 2020-08-31 | End: 2021-01-08

## 2020-08-31 NOTE — TELEPHONE ENCOUNTER
Please see message below and advise. Patient requesting a refill for diazepam. Patient is also requesting an prescription for Zofran, this has not been previously prescribed by primary care provider. Please advise.    Controlled Substance Refill Request for Diazepam   Problem List Complete:  No     PROVIDER TO CONSIDER COMPLETION OF PROBLEM LIST AND OVERVIEW/CONTROLLED SUBSTANCE AGREEMENT    Last Written Prescription Date:  8/27/20  Last Fill Quantity: 20,   # refills: 0    Last Office Visit with [facility] primary care provider: 8/7/20    Future Office visit:     Controlled substance agreement:   Encounter-Level CSA - 11/13/2017:    Controlled Substance Agreement - Scan on 12/4/2017  6:54 AM: CONTROLLED SUBSTANCE AGREEMENT     Patient-Level CSA:    There are no patient-level csa.         Last Urine Drug Screen: No results found for: CDAUT, No results found for: COMDAT, No results found for: THC13, PCP13, COC13, MAMP13, OPI13, AMP13, BZO13, TCA13, MTD13, BAR13, OXY13, PPX13, BUP13     [place] reviewed:  reviewed- no concerns  [place] profile:  [email or web address]

## 2020-09-02 ENCOUNTER — MYC MEDICAL ADVICE (OUTPATIENT)
Dept: INTERNAL MEDICINE | Facility: CLINIC | Age: 50
End: 2020-09-02

## 2020-09-02 DIAGNOSIS — R42 VERTIGO: Primary | ICD-10-CM

## 2020-09-02 NOTE — TELEPHONE ENCOUNTER
Please inform pt that Dr. Simental is off today and will be back tomorrow and please refer pt to vestibular physical therapy under Dr Simental

## 2020-09-08 ENCOUNTER — HOSPITAL ENCOUNTER (OUTPATIENT)
Dept: PHYSICAL THERAPY | Facility: CLINIC | Age: 50
Setting detail: THERAPIES SERIES
End: 2020-09-08
Attending: INTERNAL MEDICINE
Payer: COMMERCIAL

## 2020-09-08 DIAGNOSIS — R42 VERTIGO: ICD-10-CM

## 2020-09-08 PROCEDURE — 97161 PT EVAL LOW COMPLEX 20 MIN: CPT | Mod: GP | Performed by: PHYSICAL THERAPIST

## 2020-09-08 PROCEDURE — 95992 CANALITH REPOSITIONING PROC: CPT | Mod: GP | Performed by: PHYSICAL THERAPIST

## 2020-09-15 NOTE — PROGRESS NOTES
09/08/20 1600   Quick Adds   Quick Adds Vestibular Eval   Type of Visit Initial OP PT Evaluation   General Information   Start of Care Date 09/08/20   Referring Physician Morelia Simental MD    Orders Evaluate and Treat as Indicated   Order Date 09/03/20   Medical Diagnosis Vertigo R42  - Primary    Onset of illness/injury or Date of Surgery   (onset of new vertigo 2 weeks ago)   Precautions/Limitations no known precautions/limitations   Surgical/Medical history reviewed Yes   Pertinent history of current vestibular problem (include personal factors and/or comorbidities that impact the POC)    (s/p mastoidectomy)   Pertinent history of current problem (include personal factors and/or comorbidities that impact the POC) 50 yo F arrives for OP vestibular PT evaluation of dizziness. PMH: pt reports feeling like a  trainwreck  of health issues and needs to manage many things. Per chart review: hx of systemic steroid therapy, cerebral infarction  Very small, caused small permanent optical migraine , anxiety, chronic fatigue and myalgia with unclear etiology (see EPIC for more details). SURGICAL HX: pt reports  open eardrums  to prevent recurrent infections - conductive hearing loss - 2x sinus surgeries and a mastoidectomy 10 yrs ago//  DIZZINESS HISTORY: Pt reports onset of dizziness years ago (2006) but worse since 2017/2018. Before the past couple of weeks, dizziness intermittent, lasting 1.5 days at a time, described as  woozy   swirly  vomiting every now and then. Was able to return to work but reduced hrs from FT to PT. [place] usually helps. Dizziness would feel better after being 2 minutes on a bike. // PRIOR WORK-UP FOR DIZZINESS in 2017/2018: she describes audiology/ENT follow-up testing including  tilt chair  and also imaging:MRI - pt reports  they didn t find anything vestibular . MD suspected a Meniere's - started on prednisone for 2 months but was still dizzy so not Meniere's. Further f/u looking for a rare  "autoimmune disorder. She suspected stress related dizziness because when not working dizziness improved. She could go 6 weeks without dizziness/vertigo at a time. // NO prior PT for dizziness. // HISTORY OF CURRENT VESTIBULAR PROBLEM: 2 weeks ago woke up laying in bed with significant  4-dimentiaonal  room-spinning dizziness that she has never felt before. Worse in the morning laying in bed and changes in positions. She has been sleeping sitting up in bed the past 2 weeks to avoid dizziness. Sxs make her nauseous. Took Valium today. Reports dramamine isn t working. Currently feels like she is on a boat in calm water.   Prior level of function comment  WORK: Reports she is working on getting disability - not d/t dizziness but d/t significant fatigue.  CURRENT ACTIVITY: Reports getting 30 min of physical activity a day d/t lung disease. LIkes to bike, feeling balanced on the bike.     Current Community Support Family/friend caregiver   Patient role/Employment history Other/comments  (\"open SS application, in process with \")   [place] and adult children[place]   Home/Community Accessibility Comments Driving.   Assistive Devices Comments uses a golfclub for added stability walking the dog   Patient/Family Goals Statement resolve vertigo with positional changes   Fall Risk Screen   Fall screen completed by PT   Have you fallen 2 or more times in the past year? No   Have you fallen and had an injury in the past year? No   Is patient a fall risk? Yes;Department fall risk interventions implemented   Fall screen comments No falls reported and fall risk not formally assessed today (TBA). Pt's balance impaired since new onset of vertigo: reports 1 stumble, no falls, since onset of new vertigo. She has been more careful walking, holding onto walls at times, walking the dog she brings a golfclub with her and uses it as a cane   Abuse Screen (yes response referral indicated)   Feels " "Unsafe at Home or Work/School no   Feels Threatened by Someone no   Does Anyone Try to Keep You From Having Contact with Others or Doing Things Outside Your Home? no   Physical Signs of Abuse Present no   System Outcome Measures   Outcome Measures BPPV   Dizziness Handicap Inventory (score out of 100) A decrease in score by 17.18 or greater indicates a clinically significant change in symptoms. 86   Pain   Patient currently in pain No   Cognitive Status Examination   Orientation orientation to person, place and time   Level of Consciousness alert   Follows Commands and Answers Questions 100% of the time   Observation   Observation tangential and anxious disposition   Bed Mobility   Bed Mobility Comments indep   Transfer Skills   Transfer Comments indep   Gait   Gait Comments over short distances indoors without AD: maintains stability without significant path deviations or added support from walls   Oculomotor Exam   Smooth Pursuit Normal   Smooth Pursuit Comment a little queasy   Saccades Normal   VOR Comments TBA following resolved BPPV   Rapid Head Thrust Corrective Saccade L head thrust   Infrared Goggle Exam or Frenzel Lense Exam   Vestibular Suppressant in Last 24 Hours? Yes  (Valium)   Exam completed with Infrared Goggles   Spontaneous Nystagmus Negative   Gaze Evoked Nystagmus Negative   Head Shake Horizontal Nystagmus Negative   Head Shake Horizontal Nystagmus comments following: \"i'm on a boat, like I have been\"   [place]-Hallpike (right) Upbeating R torsional   [place]-Hallpike (right) comments (+) short duration nystagmus and verigo; return to sitting: reversal of nystagmus and associated vertigo   [place]-Hallpike (Left) Horizontal L   August-Hallpike (left) comments very mild, no vertigo/dizziness   BPPV Canal(s) R Posterior   BPPV Type Canalithasis   Planned Therapy Interventions   Planned Therapy Interventions neuromuscular re-education;other (see comments)   Planned Therapy Interventions Comment CRM   Clinical " Impression   Criteria for Skilled Therapeutic Interventions Met yes, treatment indicated   PT Diagnosis (+) s/s of BPPV   Influenced by the following impairments (+) short duration nystagmus and vertigo with positional testing    Functional limitations due to impairments positional changes in bed limited d/t vertigo, reported gait stability limitations (using additional support) since onset of vertigo   Clinical Presentation Stable/Uncomplicated   Clinical Decision Making (Complexity) Low complexity   Therapy Frequency 1 time/week   Predicted Duration of Therapy Intervention (days/wks) 3 weeks   Risk & Benefits of therapy have been explained Yes   Patient, Family & other staff in agreement with plan of care Yes   Education Assessment   Barriers to Learning No barriers   GOALS   PT Eval Goals 1;2   Goal 1   Goal Identifier DHI (baseline: 86/100, severe perception of handicap)   Goal Description Pt to report significant decline in dizziness per DHI scoring </= 39/100 to be considered low perception of handicap and improved QOL   Target Date 10/01/20   Goal 2   Goal Identifier BPPV (baseline: (+) s/s R posterior canal canalithiasis)   Goal Description Pt will demonstrate (-) s/s of BPPV throughout positional testing of all canals without limitations in bed mobility and transfers d/t dizziness   Target Date 10/01/20   Total Evaluation Time   PT Eval, Low Complexity Minutes (89140) 30

## 2020-09-16 ENCOUNTER — HOSPITAL ENCOUNTER (OUTPATIENT)
Dept: PHYSICAL THERAPY | Facility: CLINIC | Age: 50
Setting detail: THERAPIES SERIES
End: 2020-09-16
Attending: INTERNAL MEDICINE
Payer: COMMERCIAL

## 2020-09-16 PROCEDURE — 97112 NEUROMUSCULAR REEDUCATION: CPT | Mod: GP | Performed by: PHYSICAL THERAPIST

## 2020-09-21 ENCOUNTER — OFFICE VISIT (OUTPATIENT)
Dept: INTERNAL MEDICINE | Facility: CLINIC | Age: 50
End: 2020-09-21
Payer: COMMERCIAL

## 2020-09-21 VITALS
SYSTOLIC BLOOD PRESSURE: 111 MMHG | RESPIRATION RATE: 18 BRPM | WEIGHT: 111.6 LBS | HEIGHT: 63 IN | BODY MASS INDEX: 19.77 KG/M2 | TEMPERATURE: 98.3 F | HEART RATE: 83 BPM | OXYGEN SATURATION: 99 % | DIASTOLIC BLOOD PRESSURE: 68 MMHG

## 2020-09-21 DIAGNOSIS — I95.89 OTHER SPECIFIED HYPOTENSION: ICD-10-CM

## 2020-09-21 DIAGNOSIS — M62.830 SPASM OF MUSCLE OF LOWER BACK: Primary | ICD-10-CM

## 2020-09-21 DIAGNOSIS — M79.10 MYALGIA: ICD-10-CM

## 2020-09-21 DIAGNOSIS — D75.89 MACROCYTOSIS: ICD-10-CM

## 2020-09-21 DIAGNOSIS — L98.9 SKIN LESION: ICD-10-CM

## 2020-09-21 DIAGNOSIS — R53.83 FATIGUE, UNSPECIFIED TYPE: ICD-10-CM

## 2020-09-21 LAB — ERYTHROCYTE [SEDIMENTATION RATE] IN BLOOD BY WESTERGREN METHOD: 30 MM/H (ref 0–20)

## 2020-09-21 PROCEDURE — 99214 OFFICE O/P EST MOD 30 MIN: CPT | Performed by: INTERNAL MEDICINE

## 2020-09-21 PROCEDURE — 82607 VITAMIN B-12: CPT | Performed by: INTERNAL MEDICINE

## 2020-09-21 PROCEDURE — 85652 RBC SED RATE AUTOMATED: CPT | Performed by: INTERNAL MEDICINE

## 2020-09-21 PROCEDURE — 82550 ASSAY OF CK (CPK): CPT | Performed by: INTERNAL MEDICINE

## 2020-09-21 PROCEDURE — 82746 ASSAY OF FOLIC ACID SERUM: CPT | Performed by: INTERNAL MEDICINE

## 2020-09-21 PROCEDURE — 84443 ASSAY THYROID STIM HORMONE: CPT | Performed by: INTERNAL MEDICINE

## 2020-09-21 PROCEDURE — 86140 C-REACTIVE PROTEIN: CPT | Performed by: INTERNAL MEDICINE

## 2020-09-21 PROCEDURE — 80048 BASIC METABOLIC PNL TOTAL CA: CPT | Performed by: INTERNAL MEDICINE

## 2020-09-21 PROCEDURE — 36415 COLL VENOUS BLD VENIPUNCTURE: CPT | Performed by: INTERNAL MEDICINE

## 2020-09-21 RX ORDER — TIZANIDINE 2 MG/1
2-4 TABLET ORAL 3 TIMES DAILY PRN
Qty: 45 TABLET | Refills: 1 | Status: SHIPPED | OUTPATIENT
Start: 2020-09-21 | End: 2021-09-20

## 2020-09-21 ASSESSMENT — MIFFLIN-ST. JEOR: SCORE: 1106.37

## 2020-09-21 NOTE — NURSING NOTE
"/68 (BP Location: Right arm, Patient Position: Sitting, Cuff Size: Adult Regular)   Pulse 83   Temp 98.3  F (36.8  C) (Oral)   Resp 18   Ht 1.594 m (5' 2.75\")   Wt 50.6 kg (111 lb 9.6 oz)   LMP 02/01/2009   SpO2 99%   BMI 19.93 kg/m      "

## 2020-09-21 NOTE — PROGRESS NOTES
Subjective     Valencia Ye is a 49 year old female who presents to clinic today for the following health issues:    HPI     She presents today with complaints of low back pain: This is on the left side, has had episodes occasionally usually lasting 1 to 2 days but starting 3 days ago she started having pain and this is the most painful episode she is ever had.  She is not sure if she overdid some activity the day before, was doing some vacuuming and cleaning but did not really feel like she was doing anything for very long.  The pain is the lateral lumbar muscles near the top of the iliac.  There been sharp pain, spasms.  She has some chills the night it started but no fever when she checked in the morning.  It is constantly sore, bothers a little with movement and if she stays in any significant position for very long.  No radiation into the buttocks or legs.  Overall the pain is better than yesterday.  She has occasional intermittent tingling in the legs but it comes and goes.    She also has a skin lesion on her nose, present for a while, wonders if she should see dermatology.  It is been flat but now slightly raised and there is a slight red dot on it.    She would like to do the labs we discussed at her last visit.    Patient Active Problem List   Diagnosis     Goiter     History of systemic steroid therapy     ILD (interstitial lung disease) (H)     Palpitations     Churg-Antonina syndrome (H)     History of eating disorder     Asthma     Anxiety     Chronic fatigue     Mannose-binding lectin deficiency     Benign essential hypertension     Moderate episode of recurrent major depressive disorder (H)     Bronchiectasis without acute exacerbation (H)     Controlled substance agreement signed     Irritable bowel syndrome without diarrhea     Bladder pain     Overactive bladder     Current Outpatient Medications   Medication Sig Dispense Refill     acetylcysteine (MUCOMYST) 20 % neb solution Take 4 mLs by  nebulization 2 times daily Use with albuterol solution. Discard open bottle of Mucomyst after 96 hours. 240 mL 11     albuterol (PROVENTIL) (2.5 MG/3ML) 0.083% neb solution Take 1 vial (2.5 mg) by nebulization 2 times daily Use with Mucomyst 60 vial 11     AMITIZA 8 MCG capsule TAKE 1 CAPSULE BY MOUTH TWICE A  capsule 3     atorvastatin (LIPITOR) 10 MG tablet Take 0.5 tablets (5 mg) by mouth daily 45 tablet 3     budesonide-formoterol (SYMBICORT) 160-4.5 MCG/ACT Inhaler Inhale 2 puffs into the lungs 2 times daily 1 Inhaler 11     ciprofloxacin-dexamethasone (CIPRODEX) 0.3-0.1 % otic suspension SHAKE LIQUID AND INSTILL 3 DROPS INTO AFFECTED EARS TWICE A DAY AS NEEDED. 7.5 mL 9     co-enzyme Q-10 50 MG CAPS        diazepam (VALIUM) 2 MG tablet Take 1-2 tablets (2-4 mg) by mouth every 6 hours as needed (vertigo) 20 tablet 0     fluticasone (FLONASE) 50 MCG/ACT nasal spray SHAKE LIQUID AND USE 2 SPRAYS IN EACH NOSTRIL EVERY DAY. 48 mL 3     ipratropium-albuterol (COMBIVENT RESPIMAT)  MCG/ACT inhaler Inhale 1 puff into the lungs 4 times daily Not to exceed 6 doses per day. 1 Inhaler 11     L-Glutamine 500 MG TABS Take 500 mg by mouth 3 times daily       Magnesium ([employer] TRIPLE MAGNESIUM COMPLEX) 400 MG CAPS        metoprolol succinate ER (TOPROL-XL) 25 MG 24 hr tablet TAKE 1/2 TABLET(12.5 MG) BY MOUTH DAILY. Hold if BP is low 45 tablet 3     Omega-3 Fatty Acids (FISH OIL) 1200 MG capsule Take 4 capsules (4.8 g) by mouth daily       PHOSPHATIDYL CHOLINE PO        predniSONE (DELTASONE) 1 MG tablet TAKE 5 TABLETS BY MOUTH EVERY DAY AND ALTERNATING WITH 6 TABLETS THE NEXT. 590 tablet 3     S-Adenosylmethionine (SAME PO) Take 600 mg by mouth daily Patient is taking 800 mg       tiZANidine (ZANAFLEX) 2 MG tablet Take 1-2 tablets (2-4 mg) by mouth 3 times daily as needed for muscle spasms 45 tablet 1     vitamin B complex with vitamin C (VITAMIN  B COMPLEX) TABS tablet Take 1 tablet by mouth daily       vitamin D3  "(CHOLECALCIFEROL) 2000 units (50 mcg) tablet Take 2 tablets by mouth daily       doxycycline monohydrate (MONODOX) 50 MG capsule Take 1 capsule (50 mg) by mouth 2 times daily 60 capsule 0     glucosamine-chondroitin (GLUCOSAMINE CHONDR COMPLEX) 500-400 MG CAPS Take 2 capsules by mouth daily.       predniSONE (DELTASONE) 10 MG tablet 40 mg daily x5 days, then 20 mg daily x5 days, then 15 mg daily x5 days then 10 mg daily x 5 days, then go back to usual dose 5 to 6 mg (Patient not taking: Reported on 9/21/2020) 42 tablet 0     UNABLE TO FIND MEDICATION NAME: [facility]        Social History     Tobacco Use     Smoking status: Never Smoker     Smokeless tobacco: Never Used   Substance Use Topics     Alcohol use: Yes     Alcohol/week: 0.0 standard drinks     Comment: few drinks a week     Drug use: No        Review of Systems   No fever, chills, numbness,or  weakness, intermittent tingling in the toes but not no loss of control of bowel or bladder, no gait changes, no abdominal pain, no hematuria.        Objective  Steady,  /68 (BP Location: Right arm, Patient Position: Sitting, Cuff Size: Adult Regular)   Pulse 83   Temp 98.3  F (36.8  C) (Oral)   Resp 18   Ht 1.594 m (5' 2.75\")   Wt 50.6 kg (111 lb 9.6 oz)   LMP 02/01/2009   SpO2 99%   BMI 19.93 kg/m    Body mass index is 19.93 kg/m .  Physical Exam     On the nose there does appear to be a slightly brownish patch, very slight raised area with a tiny telangiectasia  Back: Moderate tenderness of the far lateral muscles on the left lumbar back.  Spine is nontender.  Decreased range of motion with pain on the left side.      Strength 5/5    Assessment & Plan     Spasm of muscle of lower back  Advise likely muscular,, no red flag symptoms or findings to suggest radicular.  I, could have been related to vacuuming.  Recommend gentle stretching, will  use muscle relaxer, consider physical therapy if not improving  - tiZANidine (ZANAFLEX) 2 MG tablet; Take 1-2 " tablets (2-4 mg) by mouth 3 times daily as needed for muscle spasms    Skin lesion  Possible early basal cell, refer to dermatology          Return in about 3 months (around 12/21/2020) for Physical Exam.    Morelia Simental MD  [facility]

## 2020-09-22 LAB
ANION GAP SERPL CALCULATED.3IONS-SCNC: 9 MMOL/L (ref 3–14)
BUN SERPL-MCNC: 12 MG/DL (ref 7–30)
CALCIUM SERPL-MCNC: 9.5 MG/DL (ref 8.5–10.1)
CHLORIDE SERPL-SCNC: 106 MMOL/L (ref 94–109)
CK SERPL-CCNC: 62 U/L (ref 30–225)
CO2 SERPL-SCNC: 23 MMOL/L (ref 20–32)
CREAT SERPL-MCNC: 0.68 MG/DL (ref 0.52–1.04)
CRP SERPL-MCNC: 44 MG/L (ref 0–8)
FOLATE SERPL-MCNC: 18.6 NG/ML
GFR SERPL CREATININE-BSD FRML MDRD: >90 ML/MIN/{1.73_M2}
GLUCOSE SERPL-MCNC: 89 MG/DL (ref 70–99)
POTASSIUM SERPL-SCNC: 3.9 MMOL/L (ref 3.4–5.3)
SODIUM SERPL-SCNC: 138 MMOL/L (ref 133–144)
TSH SERPL DL<=0.005 MIU/L-ACNC: 0.6 MU/L (ref 0.4–4)
VIT B12 SERPL-MCNC: 810 PG/ML (ref 193–986)

## 2020-09-24 ENCOUNTER — MYC MEDICAL ADVICE (OUTPATIENT)
Dept: INTERNAL MEDICINE | Facility: CLINIC | Age: 50
End: 2020-09-24

## 2020-09-25 ENCOUNTER — MYC MEDICAL ADVICE (OUTPATIENT)
Dept: INTERNAL MEDICINE | Facility: CLINIC | Age: 50
End: 2020-09-25

## 2020-09-28 NOTE — TELEPHONE ENCOUNTER
It does look like it was a panic attack. Advised to keep an eye and if she has BP monitor she can check her BP and heart rate when she feels like that.  If symptoms recur, she need follow-up with PCP.    Lynda Betancourt MD

## 2020-10-13 ENCOUNTER — OFFICE VISIT (OUTPATIENT)
Dept: OPHTHALMOLOGY | Facility: CLINIC | Age: 50
End: 2020-10-13
Payer: COMMERCIAL

## 2020-10-13 DIAGNOSIS — H02.88B MEIBOMIAN GLAND DYSFUNCTION (MGD) OF UPPER AND LOWER LIDS OF BOTH EYES: ICD-10-CM

## 2020-10-13 DIAGNOSIS — H04.123 DRY EYE SYNDROME OF BOTH EYES: Primary | ICD-10-CM

## 2020-10-13 DIAGNOSIS — H02.88A MEIBOMIAN GLAND DYSFUNCTION (MGD) OF UPPER AND LOWER LIDS OF BOTH EYES: ICD-10-CM

## 2020-10-13 DIAGNOSIS — B88.0 INFESTATION BY DEMODEX FOLLICULORUM: ICD-10-CM

## 2020-10-13 PROCEDURE — 99213 OFFICE O/P EST LOW 20 MIN: CPT | Performed by: OPTOMETRIST

## 2020-10-13 RX ORDER — CYCLOSPORINE 0.5 MG/ML
1 EMULSION OPHTHALMIC 2 TIMES DAILY
Qty: 1 BOX | Refills: 3 | Status: SHIPPED | OUTPATIENT
Start: 2020-10-13 | End: 2021-05-21

## 2020-10-13 ASSESSMENT — REFRACTION_WEARINGRX
OS_CYLINDER: +1.00
OS_ADD: +1.50
OD_AXIS: 170
OS_SPHERE: -1.50
OD_CYLINDER: +0.75
SPECS_TYPE: PAL
OS_AXIS: 175
OD_ADD: +1.50
OD_SPHERE: -1.75

## 2020-10-13 ASSESSMENT — VISUAL ACUITY
OD_CC: 20/20
CORRECTION_TYPE: GLASSES
OS_CC: 20/20
METHOD: SNELLEN - LINEAR

## 2020-10-13 ASSESSMENT — CONF VISUAL FIELD
OS_NORMAL: 1
OD_NORMAL: 1
METHOD: COUNTING FINGERS

## 2020-10-13 ASSESSMENT — EXTERNAL EXAM - RIGHT EYE: OD_EXAM: NORMAL

## 2020-10-13 ASSESSMENT — EXTERNAL EXAM - LEFT EYE: OS_EXAM: NORMAL

## 2020-10-13 ASSESSMENT — TONOMETRY
OS_IOP_MMHG: 16
IOP_METHOD: ICARE
OD_IOP_MMHG: 14

## 2020-10-13 NOTE — NURSING NOTE
Chief Complaints and History of Present Illnesses   Patient presents with     Red Eye Both Eyes     Chief Complaint(s) and History of Present Illness(es)     Red Eye Both Eyes     Laterality: both eyes    Characteristics: blood shot    Associated symptoms: irritation, discharge, foreign body sensation, dryness and eye pain (non currently).  Negative for burning, rash and itching    Frequency: intermittently    Timing: in the morning    Course: gradually worsening    Treatments tried: artificial tears and warm compresses              Comments     Patient is using Theratears up to QID OU, also using lid wipes BID OU, and WC 1-3x daily. Pt states that the RE lid feels stiff, feels like the RE is droopy has to instill AT to get eye to open     Batool Sawant COT October 13, 2020 10:45 AM

## 2020-10-13 NOTE — PROGRESS NOTES
History  HPI     Red Eye Both Eyes     In both eyes.  Characterized as blood shot.  Associated symptoms include irritation, discharge, foreign body sensation, dryness and eye pain (non currently).  Negative for burning, rash and itching.  Occurring intermittently.  It is worse in the morning.  Since onset it is gradually worsening.  Treatments tried include artificial tears and warm compresses.              Comments     Patient is using Theratears up to QID OU, also using lid wipes BID OU, and WC 1-3x daily. Pt states that the RE lid feels stiff, feels like the RE is droopy has to instill AT to get eye to open     Batool Sawant COT October 13, 2020 10:45 AM            Last edited by Ioana Sawant on 10/13/2020 10:47 AM. (History)          Assessment/Plan  (H04.123) Dry eye syndrome of both eyes  (primary encounter diagnosis)  Comment: Dry eye syndrome both eyes, symptomatic with current regimen  Plan: cycloSPORINE (RESTASIS) 0.05 % ophthalmic emulsion         Educated patient on clinical findings. Decrease lid scrubs to 2-3 times per week. Continue use of warm compresses twice each day both eyes, and Theratears four times each day both eyes. Prescribed Restasis twice each day both eyes. Monitor at follow-up in 2-3 months. If no improvement in symptoms, consider referral for IPL.    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    Complete documentation of historical and exam elements from today's encounter can  be found in the full encounter summary report (not reduplicated in this progress  note). I personally obtained the chief complaint(s) and history of present illness. I  confirmed and edited as necessary the review of systems, past medical/surgical  history, family history, social history, and examination findings as documented  by  others; and I examined the patient myself. I personally reviewed the relevant tests,  images, and reports as documented above. I formulated and edited as necessary the  assessment and plan and discussed the findings and management plan with the  patient and family.    Randal Logan OD, FAAO

## 2020-10-16 ENCOUNTER — TELEPHONE (OUTPATIENT)
Dept: OPHTHALMOLOGY | Facility: CLINIC | Age: 50
End: 2020-10-16

## 2020-10-20 ENCOUNTER — APPOINTMENT (OUTPATIENT)
Dept: CT IMAGING | Facility: CLINIC | Age: 50
End: 2020-10-20
Attending: EMERGENCY MEDICINE
Payer: COMMERCIAL

## 2020-10-20 ENCOUNTER — HOSPITAL ENCOUNTER (EMERGENCY)
Facility: CLINIC | Age: 50
Discharge: HOME | End: 2020-10-20
Attending: EMERGENCY MEDICINE | Admitting: EMERGENCY MEDICINE
Payer: COMMERCIAL

## 2020-10-20 ENCOUNTER — NURSE TRIAGE (OUTPATIENT)
Dept: NURSING | Facility: CLINIC | Age: 50
End: 2020-10-20

## 2020-10-20 VITALS
DIASTOLIC BLOOD PRESSURE: 70 MMHG | OXYGEN SATURATION: 97 % | RESPIRATION RATE: 16 BRPM | SYSTOLIC BLOOD PRESSURE: 118 MMHG | HEART RATE: 79 BPM | TEMPERATURE: 99.1 F

## 2020-10-20 DIAGNOSIS — K57.92 ACUTE DIVERTICULITIS: ICD-10-CM

## 2020-10-20 LAB
ALBUMIN SERPL-MCNC: 3.9 G/DL (ref 3.4–5)
ALBUMIN UR-MCNC: NEGATIVE MG/DL
ALP SERPL-CCNC: 54 U/L (ref 40–150)
ALT SERPL W P-5'-P-CCNC: 17 U/L (ref 0–50)
ANION GAP SERPL CALCULATED.3IONS-SCNC: 6 MMOL/L (ref 3–14)
APPEARANCE UR: CLEAR
AST SERPL W P-5'-P-CCNC: 13 U/L (ref 0–45)
BASOPHILS # BLD AUTO: 0.1 10E9/L (ref 0–0.2)
BASOPHILS NFR BLD AUTO: 0.7 %
BILIRUB SERPL-MCNC: 0.5 MG/DL (ref 0.2–1.3)
BILIRUB UR QL STRIP: NEGATIVE
BUN SERPL-MCNC: 8 MG/DL (ref 7–30)
CALCIUM SERPL-MCNC: 8.7 MG/DL (ref 8.5–10.1)
CHLORIDE SERPL-SCNC: 106 MMOL/L (ref 94–109)
CO2 SERPL-SCNC: 27 MMOL/L (ref 20–32)
COLOR UR AUTO: ABNORMAL
CREAT SERPL-MCNC: 0.82 MG/DL (ref 0.52–1.04)
DIFFERENTIAL METHOD BLD: ABNORMAL
EOSINOPHIL # BLD AUTO: 0.5 10E9/L (ref 0–0.7)
EOSINOPHIL NFR BLD AUTO: 3.6 %
ERYTHROCYTE [DISTWIDTH] IN BLOOD BY AUTOMATED COUNT: 12.5 % (ref 10–15)
GFR SERPL CREATININE-BSD FRML MDRD: 85 ML/MIN/{1.73_M2}
GLUCOSE SERPL-MCNC: 91 MG/DL (ref 70–99)
GLUCOSE UR STRIP-MCNC: NEGATIVE MG/DL
HCT VFR BLD AUTO: 41.3 % (ref 35–47)
HGB BLD-MCNC: 13.5 G/DL (ref 11.7–15.7)
HGB UR QL STRIP: NEGATIVE
IMM GRANULOCYTES # BLD: 0 10E9/L (ref 0–0.4)
IMM GRANULOCYTES NFR BLD: 0.3 %
KETONES UR STRIP-MCNC: NEGATIVE MG/DL
LEUKOCYTE ESTERASE UR QL STRIP: NEGATIVE
LYMPHOCYTES # BLD AUTO: 1.5 10E9/L (ref 0.8–5.3)
LYMPHOCYTES NFR BLD AUTO: 10.3 %
MCH RBC QN AUTO: 32.8 PG (ref 26.5–33)
MCHC RBC AUTO-ENTMCNC: 32.7 G/DL (ref 31.5–36.5)
MCV RBC AUTO: 100 FL (ref 78–100)
MONOCYTES # BLD AUTO: 1.3 10E9/L (ref 0–1.3)
MONOCYTES NFR BLD AUTO: 9 %
NEUTROPHILS # BLD AUTO: 10.7 10E9/L (ref 1.6–8.3)
NEUTROPHILS NFR BLD AUTO: 76.1 %
NITRATE UR QL: NEGATIVE
NRBC # BLD AUTO: 0 10*3/UL
NRBC BLD AUTO-RTO: 0 /100
PH UR STRIP: 7.5 PH (ref 5–7)
PLATELET # BLD AUTO: 261 10E9/L (ref 150–450)
POTASSIUM SERPL-SCNC: 3.5 MMOL/L (ref 3.4–5.3)
PROT SERPL-MCNC: 7.4 G/DL (ref 6.8–8.8)
RBC # BLD AUTO: 4.12 10E12/L (ref 3.8–5.2)
RBC #/AREA URNS AUTO: <1 /HPF (ref 0–2)
SODIUM SERPL-SCNC: 139 MMOL/L (ref 133–144)
SOURCE: ABNORMAL
SP GR UR STRIP: 1.02 (ref 1–1.03)
SQUAMOUS #/AREA URNS AUTO: <1 /HPF (ref 0–1)
UROBILINOGEN UR STRIP-MCNC: NORMAL MG/DL (ref 0–2)
WBC # BLD AUTO: 14 10E9/L (ref 4–11)
WBC #/AREA URNS AUTO: <1 /HPF (ref 0–5)

## 2020-10-20 PROCEDURE — 99285 EMERGENCY DEPT VISIT HI MDM: CPT | Mod: 25

## 2020-10-20 PROCEDURE — 74177 CT ABD & PELVIS W/CONTRAST: CPT

## 2020-10-20 PROCEDURE — 250N000011 HC RX IP 250 OP 636: Performed by: EMERGENCY MEDICINE

## 2020-10-20 PROCEDURE — 250N000013 HC RX MED GY IP 250 OP 250 PS 637: Performed by: EMERGENCY MEDICINE

## 2020-10-20 PROCEDURE — 250N000009 HC RX 250: Performed by: EMERGENCY MEDICINE

## 2020-10-20 PROCEDURE — 96375 TX/PRO/DX INJ NEW DRUG ADDON: CPT

## 2020-10-20 PROCEDURE — 80053 COMPREHEN METABOLIC PANEL: CPT | Performed by: EMERGENCY MEDICINE

## 2020-10-20 PROCEDURE — 96374 THER/PROPH/DIAG INJ IV PUSH: CPT | Mod: 59

## 2020-10-20 PROCEDURE — 85025 COMPLETE CBC W/AUTO DIFF WBC: CPT | Performed by: EMERGENCY MEDICINE

## 2020-10-20 PROCEDURE — 81001 URINALYSIS AUTO W/SCOPE: CPT | Performed by: EMERGENCY MEDICINE

## 2020-10-20 RX ORDER — CIPROFLOXACIN 500 MG/1
500 TABLET, FILM COATED ORAL 2 TIMES DAILY
Qty: 14 TABLET | Refills: 0 | Status: SHIPPED | OUTPATIENT
Start: 2020-10-20 | End: 2020-10-27

## 2020-10-20 RX ORDER — IOPAMIDOL 755 MG/ML
500 INJECTION, SOLUTION INTRAVASCULAR ONCE
Status: COMPLETED | OUTPATIENT
Start: 2020-10-20 | End: 2020-10-20

## 2020-10-20 RX ORDER — ONDANSETRON 4 MG/1
4 TABLET, ORALLY DISINTEGRATING ORAL ONCE
Status: COMPLETED | OUTPATIENT
Start: 2020-10-20 | End: 2020-10-20

## 2020-10-20 RX ORDER — ONDANSETRON 4 MG/1
4 TABLET, ORALLY DISINTEGRATING ORAL EVERY 8 HOURS PRN
Qty: 10 TABLET | Refills: 0 | Status: SHIPPED | OUTPATIENT
Start: 2020-10-20 | End: 2020-10-23

## 2020-10-20 RX ORDER — METRONIDAZOLE 500 MG/1
500 TABLET ORAL 3 TIMES DAILY
Qty: 21 TABLET | Refills: 0 | Status: SHIPPED | OUTPATIENT
Start: 2020-10-20 | End: 2020-10-27

## 2020-10-20 RX ORDER — OXYCODONE HYDROCHLORIDE 5 MG/1
10 TABLET ORAL ONCE
Status: COMPLETED | OUTPATIENT
Start: 2020-10-20 | End: 2020-10-20

## 2020-10-20 RX ORDER — OXYCODONE HYDROCHLORIDE 5 MG/1
5-10 TABLET ORAL EVERY 6 HOURS PRN
Qty: 12 TABLET | Refills: 0 | Status: SHIPPED | OUTPATIENT
Start: 2020-10-20 | End: 2020-11-01

## 2020-10-20 RX ORDER — ONDANSETRON 2 MG/ML
4 INJECTION INTRAMUSCULAR; INTRAVENOUS
Status: COMPLETED | OUTPATIENT
Start: 2020-10-20 | End: 2020-10-20

## 2020-10-20 RX ORDER — OXYCODONE HYDROCHLORIDE 5 MG/1
10 TABLET ORAL
Status: DISCONTINUED | OUTPATIENT
Start: 2020-10-20 | End: 2020-10-20

## 2020-10-20 RX ORDER — MORPHINE SULFATE 4 MG/ML
4 INJECTION, SOLUTION INTRAMUSCULAR; INTRAVENOUS ONCE
Status: COMPLETED | OUTPATIENT
Start: 2020-10-20 | End: 2020-10-20

## 2020-10-20 RX ADMIN — OXYCODONE HYDROCHLORIDE 10 MG: 5 TABLET ORAL at 04:03

## 2020-10-20 RX ADMIN — SODIUM CHLORIDE 54 ML: 9 INJECTION, SOLUTION INTRAVENOUS at 03:04

## 2020-10-20 RX ADMIN — MORPHINE SULFATE 4 MG: 4 INJECTION INTRAVENOUS at 02:46

## 2020-10-20 RX ADMIN — IOPAMIDOL 55 ML: 755 INJECTION, SOLUTION INTRAVENOUS at 03:04

## 2020-10-20 RX ADMIN — ONDANSETRON 4 MG: 4 TABLET, ORALLY DISINTEGRATING ORAL at 04:03

## 2020-10-20 RX ADMIN — ONDANSETRON 4 MG: 2 INJECTION INTRAMUSCULAR; INTRAVENOUS at 02:46

## 2020-10-20 ASSESSMENT — ENCOUNTER SYMPTOMS
FREQUENCY: 0
FEVER: 0
CONSTIPATION: 0
DYSURIA: 0
CHILLS: 0
DIARRHEA: 0
VOMITING: 0
ABDOMINAL PAIN: 1
NAUSEA: 0

## 2020-10-20 NOTE — ED TRIAGE NOTES
Pt c/o generalized abdominal pain x2 days. Around 1600 yesterday pain increased and went to RLQ. Pain 8/10 described as fullness/pressure.

## 2020-10-20 NOTE — TELEPHONE ENCOUNTER
Triage Note  Pt called to report severe abdominal pain on lower right abdomin since 4:30 pm, about 8 hours ago.  Denies fever, vomiting or diarrhea.     Triaged to disposition of Go to ED now. Pt says  will drive her to [facility] ED.  COVID 19 Nurse Triage Plan/Patient Instructions    Please be aware that novel coronavirus (COVID-19) may be circulating in the community. If you develop symptoms such as fever, cough, or SOB or if you have concerns about the presence of another infection including coronavirus (COVID-19), please contact your health care provider or visit [email or web address].     Disposition/Instructions    ED Visit recommended. Follow protocol based instructions.     Bring Your Own Device:  Please also bring your smart device(s) (smart phones, tablets, laptops) and their charging cables for your personal use and to communicate with your care team during your visit.    Thank you for taking steps to prevent the spread of this virus.  o Limit your contact with others.  o Wear a simple mask to cover your cough.  o Wash your hands well and often.    [facility]: About COVID-19: [email or web address]    CDC: What to Do If You're Sick: www.cdc.gov/coronavirus/2019-ncov/about/steps-when-sick.html    CDC: Ending Home Isolation: www.cdc.gov/coronavirus/2019-ncov/hcp/disposition-in-home-patients.html     CDC: Caring for Someone: www.cdc.gov/coronavirus/2019-ncov/if-you-are-sick/care-for-someone.html     [facility]: Interim Guidance for Hospital Discharge to Home: www.health.[facility].[place]/diseases/coronavirus/hcp/hospdischarge.pdf    [facility] clinical trials (COVID-19 research studies): [email or web address].[email or web address].edu/umn-clinical-trials     Below are the COVID-19 hotlines at the [place] Department of Health ([facility]). Interpreters are available.   o For health questions: Call 833-594-0542 or 1-555.215.3710 (7 a.m. to 7 p.m.)  o For questions about schools and childcare: Call  "342.298.3920 or 1-200.542.5977 (7 a.m. to 7 p.m.)     Teresa Jimenez RN      Reason for Disposition    [1] SEVERE pain (e.g., excruciating) AND [2] present > 1 hour    Additional Information    Negative: Shock suspected (e.g., cold/pale/clammy skin, too weak to stand, low BP, rapid pulse)    Negative: Difficult to awaken or acting confused (e.g., disoriented, slurred speech)    Negative: Passed out (i.e., lost consciousness, collapsed and was not responding)    Negative: Sounds like a life-threatening emergency to the triager    Negative: Chest pain    Negative: Pain is mainly in upper abdomen  (if needed ask: \"is it mainly above the belly button?\")    Negative: Followed an abdomen (stomach) injury    Negative: [1] Abdominal pain AND [2] pregnant < 20 weeks    Negative: [1] Abdominal pain AND [2] pregnant > 20 weeks    Negative: [1] Abdominal pain AND [2] postpartum (from 0 to 6 weeks after delivery)    Protocols used: ABDOMINAL PAIN - FEMALE-A-AH      "

## 2020-10-20 NOTE — ED AVS SNAPSHOT
[facility]  [address]  [facility] [address]  Phone: [phone]  Fax: [phone]                                    Valencia Ye   MRN: [id number]    Department: [facility]   Date of Visit: 10/20/2020           After Visit Summary Signature Page    I have received my discharge instructions, and my questions have been answered. I have discussed any challenges I see with this plan with the nurse or doctor.    ..........................................................................................................................................  Patient/Patient Representative Signature      ..........................................................................................................................................  Patient Representative Print Name and Relationship to Patient    ..................................................               ................................................  Date                                   Time    ..........................................................................................................................................  Reviewed by Signature/Title    ...................................................              ..............................................  Date                                               Time          22EPIC Rev 08/18

## 2020-10-20 NOTE — ED PROVIDER NOTES
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  She notes that she began to have generalized abdominal pain on .  It has persisted into today but now has migrated to the right lower quadrant.  She denies any nausea or vomiting.  She denies any stool changes.  She denies urinary changes.  She denies fever or chills.  She had normal oral intake yesterday.  She notes the severity of pain made it hard to sleep and so she presents here to the emergency department.    Allergies:  Colistimethate  Tamiflu   Azithromycin   Clindamycin      Medications:    Lipitor   Valium   Toprol   Zanaflex   Prednisone     Past Medical History:    Anxiety   Aortitis    Asthma   Bronchiectasis    Cerebral infarction     Churg-Antonina syndrome    Depressive disorder   Eustachian tube dysfunction   Goiter   Hearing loss, conductive   Heart rate problem   Hypertension   Irritable bowel syndrome    Mannose-binding lectin deficiency   Osteoporosis   Pericarditis   Recurrent otitis media   Recurrent UTI   Rheumatoid vasculitis    Sinusitis, chronic   Varicose veins     Past Surgical History:     section   Hysterectomy   PICC insertion x2   Tubal ligation   Tympanoplasty     Family History:    Mother: allergies, substance abuse, depression  Father: heart disease, type II diabetes, depression, hyperlipidemia    Son: asthma  Daughter: asthma, allergies     Social History:  Smoking Status: Never smoker   Smokeless Tobacco: Never used   Alcohol Use: Positive  Drug Use: Negative  PCP: Morelia Simental  Marital Status:       Review of Systems   Constitutional: Negative for chills and fever.   Gastrointestinal: Positive for abdominal pain. Negative for constipation, diarrhea, nausea and vomiting.   Genitourinary: Negative for dysuria, frequency and urgency.   All other systems reviewed  and are negative.      Physical Exam     Patient Vitals for the past 24 hrs:   BP Temp Temp src Pulse Resp SpO2   10/20/20 0430 -- -- -- -- -- 97 %   10/20/20 0425 -- -- -- -- -- 98 %   10/20/20 0420 118/70 -- -- 79 -- --   10/20/20 0137 (!) 155/87 99.1  F (37.3  C) Temporal 102 16 100 %     Physical Exam  General: Adult female sitting upright  Eyes: PERRL, Conjunctive within normal limits. No scleral icterus.  ENT: Moist mucous membranes, oropharynx clear.   CV: Normal S1S2, no murmur, rub or gallop. Regular rate and rhythm  Resp: Clear to auscultation bilaterally, no wheezes, rales or rhonchi. Normal respiratory effort.  GI: Abdomen is soft and nondistended. RLQ tenderness to palpation with voluntary guarding. Referred pain to the RLQ on palpation of the LLQ.  No palpable masses.   MSK: No edema. Nontender. Normal active range of motion.  Skin: Warm and dry. No rashes or lesions or ecchymoses on visible skin.  Neuro: Alert and oriented. Responds appropriately to all questions and commands. No focal findings appreciated. Normal muscle tone.  Psych: Normal mood and affect.     Emergency Department Course     Imaging:  Radiology findings were communicated with the patient who voiced understanding of the findings.    CT Abdomen Pelvis w Contrast  1.  Minimal amount of fluid surrounding and mild wall thickening involving the proximal cecum with enlarged diverticula. Findings most compatible with acute right-sided diverticulitis without evidence for perforation or abscess formation.  2.  No evidence for appendicitis.  3.  Large amount of stool throughout the colon. No small bowel dilatation.  Reading per radiology     Laboratory:  Laboratory findings were communicated with the patient who voiced understanding of the findings.    CBC: WBC 14.0(H), HGB 13.5,   CMP: WNL (Creatinine 0.82)    UA with microscopic: pH 7.5(H) o/w WNL     Interventions:  0246 Morphine 4 mg IV  0246 Zofran 4 mg IV   0403 Oxycodone 10 mg  Oral   0403 Zofran 4 mg Oral     Emergency Department Course:    0155 IV was inserted and blood was drawn for laboratory testing, results above.    0219 Nursing notes and vitals reviewed.  I performed an exam of the patient as documented above.     0303 The patient was sent for a CT while in the emergency department, results above.     The patient is reassessed. She notes ongoing pain, mild improvement.     0418 The patient provided a urine sample here in the emergency department. This was sent for laboratory testing, findings above.    0440 Findings and plan explained to the patient. She notes she is feeling comfortable and agreeable with the plan for outpatient treatment. Patient discharged home with instructions regarding supportive care, medications, and reasons to return. The importance of close follow-up was reviewed. The patient was prescribed Cipro, Flagyl, Zofran and oxycodone.     Impression & Plan      Medical Decision Making:  Valencia Ye presented with abdominal pain and CT confirms diverticulitis without abscess or perforation.  Her pain has been controlled by interventions in the emergency department.  This represents uncomplicated right-sided diverticulitis, at this time.  The natural history of this diagnosis was discussed, and I educated the patient regarding the symptoms and signs that should prompt return to the emergency department.  This would include worsening fevers, chills, vomiting, and more intense pain.  Valencia is to take antibiotics and pain medications as directed.  Follow-up with primary care physician is indicated in 1-2 days.  If more episodes occur in the future, consultation with colo-rectal surgery is indicated. All questions were answered.    Diagnosis:    ICD-10-CM    1. Acute diverticulitis  K57.92      Disposition:   Discharged to home.      Discharge Medications:  New Prescriptions    CIPROFLOXACIN (CIPRO) 500 MG TABLET    Take 1 tablet (500 mg) by mouth 2 times daily  for 7 days    METRONIDAZOLE (FLAGYL) 500 MG TABLET    Take 1 tablet (500 mg) by mouth 3 times daily for 7 days    ONDANSETRON (ZOFRAN ODT) 4 MG ODT TAB    Take 1 tablet (4 mg) by mouth every 8 hours as needed for nausea or vomiting    OXYCODONE (ROXICODONE) 5 MG TABLET    Take 1-2 tablets (5-10 mg) by mouth every 6 hours as needed for pain     Scribe Disclosure:  Ann CLEMENTE, am serving as a scribe at 2:25 AM on 10/20/2020 to document services personally performed by Rosita Luke MD based on my observations and the provider's statements to me.      [facility] EMERGENCY DEPT       Rosita Luke MD  10/20/20 0500

## 2020-10-27 ENCOUNTER — VIRTUAL VISIT (OUTPATIENT)
Dept: INTERNAL MEDICINE | Facility: CLINIC | Age: 50
End: 2020-10-27
Payer: COMMERCIAL

## 2020-10-27 DIAGNOSIS — K57.92 ACUTE DIVERTICULITIS: Primary | ICD-10-CM

## 2020-10-27 PROCEDURE — 99214 OFFICE O/P EST MOD 30 MIN: CPT | Mod: GT | Performed by: INTERNAL MEDICINE

## 2020-10-27 RX ORDER — METRONIDAZOLE 500 MG/1
500 TABLET ORAL 3 TIMES DAILY
Qty: 21 TABLET | Refills: 0 | Status: SHIPPED | OUTPATIENT
Start: 2020-10-27 | End: 2021-01-21

## 2020-10-27 RX ORDER — OCTANOIC ACID 100 %
LIQUID (ML) MISCELLANEOUS
COMMUNITY
End: 2021-01-21

## 2020-10-27 RX ORDER — CIPROFLOXACIN 500 MG/1
500 TABLET, FILM COATED ORAL 2 TIMES DAILY
Qty: 14 TABLET | Refills: 0 | Status: SHIPPED | OUTPATIENT
Start: 2020-10-27 | End: 2021-01-21

## 2020-10-27 NOTE — PROGRESS NOTES
"Valencia Ye is a 50 year old female who is being evaluated via a billable video visit.      The patient has been notified of following:     \"This video visit will be conducted via a call between you and your physician/provider. We have found that certain health care needs can be provided without the need for an in-person physical exam.  This service lets us provide the care you need with a video conversation.  If a prescription is necessary we can send it directly to your pharmacy.  If lab work is needed we can place an order for that and you can then stop by our lab to have the test done at a later time.    Video visits are billed at different rates depending on your insurance coverage.  Please reach out to your insurance provider with any questions.    If during the course of the call the physician/provider feels a video visit is not appropriate, you will not be charged for this service.\"    Patient has given verbal consent for Video visit? Yes  How would you like to obtain your AVS? MyChart  If you are dropped from the video visit, the video invite should be resent to: Send to e-mail at: [email or web address]  Will anyone else be joining your video visit? No    Subjective     Valencia Ye is a 50 year old female who presents today via video visit for the following health issues:    HPI       Video Start Time: 9:46    ED/UC Followup:    Facility:  [facility] ED  Date of visit: 10/20/2020  Reason for visit: Acute diverticulitis  Current Status: improved    She reports she still is having right lower quadrant pain.  Diverticulum was near the cecum.  At rest, it is just some slight discomfort but when she has a full bladder, the pain will be fairly sharp, especially after she empties her bladder and will go up to 4/10.  She also will feel a little pain after eating.  The fever lasted a few days and did resolve about 5 days ago.  She took her last dose of antibiotics last night.  She was " given 7 days of treatment despite being on prednisone.  She still has some tenderness when she pushes on the abdomen.   She did not take any of the oxycodone since it tends to cause nausea and she found Tylenol was adequate for the pain.  She is having soft bowel movements which were slightly narrow caliber, even before the pain started.  She did have a negative colonoscopy last December and the CT showed no abnormalities of the colon other than the diverticulitis.    Patient Active Problem List   Diagnosis     Goiter     History of systemic steroid therapy     ILD (interstitial lung disease) (H)     Palpitations     Churg-Antonina syndrome (H)     History of eating disorder     Asthma     Anxiety     Chronic fatigue     Mannose-binding lectin deficiency     Benign essential hypertension     Moderate episode of recurrent major depressive disorder (H)     Bronchiectasis without acute exacerbation (H)     Controlled substance agreement signed     Irritable bowel syndrome without diarrhea     Bladder pain     Overactive bladder     Current Outpatient Medications   Medication Sig Dispense Refill     acetylcysteine (MUCOMYST) 20 % neb solution Take 4 mLs by nebulization 2 times daily Use with albuterol solution. Discard open bottle of Mucomyst after 96 hours. 240 mL 11     albuterol (PROVENTIL) (2.5 MG/3ML) 0.083% neb solution Take 1 vial (2.5 mg) by nebulization 2 times daily Use with Mucomyst 60 vial 11     AMITIZA 8 MCG capsule TAKE 1 CAPSULE BY MOUTH TWICE A  capsule 3     atorvastatin (LIPITOR) 10 MG tablet Take 0.5 tablets (5 mg) by mouth daily 45 tablet 3     budesonide-formoterol (SYMBICORT) 160-4.5 MCG/ACT Inhaler Inhale 2 puffs into the lungs 2 times daily 1 Inhaler 11     Caprylic Acid LIQD        ciprofloxacin (CIPRO) 500 MG tablet Take 1 tablet (500 mg) by mouth 2 times daily 14 tablet 0     ciprofloxacin-dexamethasone (CIPRODEX) 0.3-0.1 % otic suspension SHAKE LIQUID AND INSTILL 3 DROPS INTO AFFECTED  EARS TWICE A DAY AS NEEDED. 7.5 mL 9     co-enzyme Q-10 50 MG CAPS        cycloSPORINE (RESTASIS) 0.05 % ophthalmic emulsion Place 1 drop into both eyes 2 times daily 1 Box 3     diazepam (VALIUM) 2 MG tablet Take 1-2 tablets (2-4 mg) by mouth every 6 hours as needed (vertigo) 20 tablet 0     fluticasone (FLONASE) 50 MCG/ACT nasal spray SHAKE LIQUID AND USE 2 SPRAYS IN EACH NOSTRIL EVERY DAY. 48 mL 3     ipratropium-albuterol (COMBIVENT RESPIMAT)  MCG/ACT inhaler Inhale 1 puff into the lungs 4 times daily Not to exceed 6 doses per day. 1 Inhaler 11     L-Glutamine 500 MG TABS Take 500 mg by mouth 3 times daily       Magnesium ([employer] TRIPLE MAGNESIUM COMPLEX) 400 MG CAPS        metoprolol succinate ER (TOPROL-XL) 25 MG 24 hr tablet TAKE 1/2 TABLET(12.5 MG) BY MOUTH DAILY. Hold if BP is low 45 tablet 3     metroNIDAZOLE (FLAGYL) 500 MG tablet Take 1 tablet (500 mg) by mouth 3 times daily 21 tablet 0     Omega-3 Fatty Acids (FISH OIL) 1200 MG capsule Take 4 capsules (4.8 g) by mouth daily       PHOSPHATIDYL CHOLINE PO        predniSONE (DELTASONE) 1 MG tablet TAKE 5 TABLETS BY MOUTH EVERY DAY AND ALTERNATING WITH 6 TABLETS THE NEXT. 590 tablet 3     S-Adenosylmethionine (SAME PO) Take 600 mg by mouth daily Patient is taking 800 mg       tiZANidine (ZANAFLEX) 2 MG tablet Take 1-2 tablets (2-4 mg) by mouth 3 times daily as needed for muscle spasms 45 tablet 1     vitamin B complex with vitamin C (VITAMIN  B COMPLEX) TABS tablet Take 1 tablet by mouth daily       vitamin D3 (CHOLECALCIFEROL) 2000 units (50 mcg) tablet Take 2 tablets by mouth daily       oxyCODONE (ROXICODONE) 5 MG tablet Take 1-2 tablets (5-10 mg) by mouth every 6 hours as needed for pain 12 tablet 0      Social History     Tobacco Use     Smoking status: Never Smoker     Smokeless tobacco: Never Used   Substance Use Topics     Alcohol use: Yes     Alcohol/week: 0.0 standard drinks     Comment: few drinks a week     Drug use: No          Review of  Systems   No fever, chills, nausea, vomiting, blood in stool, slight narrow caliber to the stools at times, continued pain, no dysuria      Objective           Vitals:  No vitals were obtained today due to virtual visit.    Physical Exam     GENERAL: Healthy, alert and no distress  EYES: Eyes grossly normal to inspection.  No discharge or erythema, or obvious scleral/conjunctival abnormalities.  RESP: No audible wheeze, cough, or visible cyanosis.  No visible retractions or increased work of breathing.    SKIN: Visible skin clear. No significant rash, abnormal pigmentation or lesions.  NEURO: Cranial nerves grossly intact.  Mentation and speech appropriate for age.  PSYCH: Mentation appears normal, affect normal/bright, judgement and insight intact, normal speech and appearance well-groomed.              Assessment & Plan     Acute diverticulitis  She still seems to have some pain and tenderness and she is on prednisone so I advised I would feel better having her continue the antibiotic, probably 3 to 5 days.  Ideally would like to have just very minimal discomfort with a full bladder, lack of tenderness and then stop the antibiotic a couple days after she reaches this threshold.  If she has any recurrent fever greater than 100, recurrent symptoms, suggest she call right away.  - ciprofloxacin (CIPRO) 500 MG tablet; Take 1 tablet (500 mg) by mouth 2 times daily  - metroNIDAZOLE (FLAGYL) 500 MG tablet; Take 1 tablet (500 mg) by mouth 3 times daily            Return in about 6 months (around 4/27/2021) for Physical Exam.    Morelia Simental MD  [facility]      Video-Visit Details    Type of service:  Video Visit    Video End Time:10:00    Originating Location (pt. Location): Home    Distant Location (provider location):  home    Platform used for Video Visit: Chencho

## 2020-10-29 ENCOUNTER — MYC MEDICAL ADVICE (OUTPATIENT)
Dept: RHEUMATOLOGY | Facility: CLINIC | Age: 50
End: 2020-10-29

## 2020-10-29 ENCOUNTER — MYC MEDICAL ADVICE (OUTPATIENT)
Dept: INTERNAL MEDICINE | Facility: CLINIC | Age: 50
End: 2020-10-29

## 2020-10-29 DIAGNOSIS — E78.5 HYPERLIPIDEMIA LDL GOAL <130: ICD-10-CM

## 2020-10-29 DIAGNOSIS — I95.89 OTHER SPECIFIED HYPOTENSION: ICD-10-CM

## 2020-10-29 DIAGNOSIS — M79.10 MYALGIA: ICD-10-CM

## 2020-10-29 DIAGNOSIS — R53.83 FATIGUE, UNSPECIFIED TYPE: ICD-10-CM

## 2020-10-29 LAB — CORTIS SERPL-MCNC: 5.2 UG/DL (ref 4–22)

## 2020-10-29 PROCEDURE — 82533 TOTAL CORTISOL: CPT | Performed by: INTERNAL MEDICINE

## 2020-10-29 PROCEDURE — 80061 LIPID PANEL: CPT | Performed by: INTERNAL MEDICINE

## 2020-10-29 PROCEDURE — 36415 COLL VENOUS BLD VENIPUNCTURE: CPT | Performed by: INTERNAL MEDICINE

## 2020-10-30 ENCOUNTER — VIRTUAL VISIT (OUTPATIENT)
Dept: INTERNAL MEDICINE | Facility: CLINIC | Age: 50
End: 2020-10-30
Payer: COMMERCIAL

## 2020-10-30 DIAGNOSIS — M79.10 MYALGIA: ICD-10-CM

## 2020-10-30 DIAGNOSIS — R53.82 CHRONIC FATIGUE: Primary | ICD-10-CM

## 2020-10-30 LAB
CHOLEST SERPL-MCNC: 125 MG/DL
HDLC SERPL-MCNC: 83 MG/DL
LDLC SERPL CALC-MCNC: 34 MG/DL
NONHDLC SERPL-MCNC: 42 MG/DL
TRIGL SERPL-MCNC: 40 MG/DL

## 2020-10-30 PROCEDURE — 99213 OFFICE O/P EST LOW 20 MIN: CPT | Mod: GT | Performed by: INTERNAL MEDICINE

## 2020-10-30 NOTE — PROGRESS NOTES
"Valencia Ye is a 50 year old female who is being evaluated via a billable video visit.      The patient has been notified of following:     \"This video visit will be conducted via a call between you and your physician/provider. We have found that certain health care needs can be provided without the need for an in-person physical exam.  This service lets us provide the care you need with a video conversation.  If a prescription is necessary we can send it directly to your pharmacy.  If lab work is needed we can place an order for that and you can then stop by our lab to have the test done at a later time.    Video visits are billed at different rates depending on your insurance coverage.  Please reach out to your insurance provider with any questions.    If during the course of the call the physician/provider feels a video visit is not appropriate, you will not be charged for this service.\"    Patient has given verbal consent for Video visit? Yes  How would you like to obtain your AVS? MyChart  If you are dropped from the video visit, the video invite should be resent to: Send to e-mail at: [email or web address]  Will anyone else be joining your video visit? No    Subjective     Valencia Ye is a 50 year old female who presents today via video visit for the following health issues:    [facility]         Video Start Time: 1:18    Disability issues: She has forms that need to be completed for disability issues.  Hand pain.  She is letter interfering with her ability to work the most of her ongoing fatigue and \"brain fog\".  She has had lung disease and chronic bronchiectasis and noticed in 2013 after bad infection she felt more tired, more achy immediately.  In 2017 when she had a severe infection she never seemed to completely recover with ongoing fatigue and difficulty with focus and concentration, difficulty with memory.  Her symptoms remain relatively stable than she started to develop vertigo in 5/18.  This " had some impact on her ability to work, the vertigo comes and goes.  She has been having gradually increasing diffuse pain also that was increasing significantly by 1/19.  This is muscle pain, joint pain.  She is experiencing difficulty in the morning with more significant problems with the brain fog, pain so that it would take her until the afternoon before she felt she was moving better and concentrating better.  This gradually continued to make it more more difficult, often had to leave work because of tiredness or dizziness so she eventually decreased to half days.  Her last day of work that was 7/5/2019, after that she did not feel she was able to return to work.  Her symptoms have continued to gradually worsen over time.  Before she left work, she found she was making a lot of mistakes with her work, it was difficult to concentrate and follow directions.  Currently she is very limited with how much physical work she could do, just walking the dog for half hour will usually cause her to her to be tired so that she is not able to do much more the rest the day.  Along the trip such as the grocery store will take a couple days to recover.      Patient Active Problem List   Diagnosis     Goiter     History of systemic steroid therapy     ILD (interstitial lung disease) (H)     Palpitations     Churg-Antonina syndrome (H)     History of eating disorder     Asthma     Anxiety     Chronic fatigue     Mannose-binding lectin deficiency     Benign essential hypertension     Moderate episode of recurrent major depressive disorder (H)     Bronchiectasis without acute exacerbation (H)     Controlled substance agreement signed     Irritable bowel syndrome without diarrhea     Bladder pain     Overactive bladder     Current Outpatient Medications   Medication Sig Dispense Refill     acetylcysteine (MUCOMYST) 20 % neb solution Take 4 mLs by nebulization 2 times daily Use with albuterol solution. Discard open bottle of Mucomyst  after 96 hours. 240 mL 11     albuterol (PROVENTIL) (2.5 MG/3ML) 0.083% neb solution Take 1 vial (2.5 mg) by nebulization 2 times daily Use with Mucomyst 60 vial 11     AMITIZA 8 MCG capsule TAKE 1 CAPSULE BY MOUTH TWICE A  capsule 3     atorvastatin (LIPITOR) 10 MG tablet Take 0.5 tablets (5 mg) by mouth daily 45 tablet 3     budesonide-formoterol (SYMBICORT) 160-4.5 MCG/ACT Inhaler Inhale 2 puffs into the lungs 2 times daily 1 Inhaler 11     Caprylic Acid LIQD        ciprofloxacin (CIPRO) 500 MG tablet Take 1 tablet (500 mg) by mouth 2 times daily 14 tablet 0     ciprofloxacin-dexamethasone (CIPRODEX) 0.3-0.1 % otic suspension SHAKE LIQUID AND INSTILL 3 DROPS INTO AFFECTED EARS TWICE A DAY AS NEEDED. 7.5 mL 9     co-enzyme Q-10 50 MG CAPS        cycloSPORINE (RESTASIS) 0.05 % ophthalmic emulsion Place 1 drop into both eyes 2 times daily 1 Box 3     diazepam (VALIUM) 2 MG tablet Take 1-2 tablets (2-4 mg) by mouth every 6 hours as needed (vertigo) 20 tablet 0     fluticasone (FLONASE) 50 MCG/ACT nasal spray SHAKE LIQUID AND USE 2 SPRAYS IN EACH NOSTRIL EVERY DAY. 48 mL 3     ipratropium-albuterol (COMBIVENT RESPIMAT)  MCG/ACT inhaler Inhale 1 puff into the lungs 4 times daily Not to exceed 6 doses per day. 1 Inhaler 11     L-Glutamine 500 MG TABS Take 500 mg by mouth 3 times daily       Magnesium ([employer]S TRIPLE MAGNESIUM COMPLEX) 400 MG CAPS        metoprolol succinate ER (TOPROL-XL) 25 MG 24 hr tablet TAKE 1/2 TABLET(12.5 MG) BY MOUTH DAILY. Hold if BP is low 45 tablet 3     metroNIDAZOLE (FLAGYL) 500 MG tablet Take 1 tablet (500 mg) by mouth 3 times daily 21 tablet 0     Omega-3 Fatty Acids (FISH OIL) 1200 MG capsule Take 4 capsules (4.8 g) by mouth daily       PHOSPHATIDYL CHOLINE PO        predniSONE (DELTASONE) 1 MG tablet TAKE 5 TABLETS BY MOUTH EVERY DAY AND ALTERNATING WITH 6 TABLETS THE NEXT. 590 tablet 3     S-Adenosylmethionine (SAME PO) Take 600 mg by mouth daily Patient is taking 800 mg        tiZANidine (ZANAFLEX) 2 MG tablet Take 1-2 tablets (2-4 mg) by mouth 3 times daily as needed for muscle spasms 45 tablet 1     vitamin B complex with vitamin C (VITAMIN  B COMPLEX) TABS tablet Take 1 tablet by mouth daily       vitamin D3 (CHOLECALCIFEROL) 2000 units (50 mcg) tablet Take 2 tablets by mouth daily       oxyCODONE (ROXICODONE) 5 MG tablet Take 1-2 tablets (5-10 mg) by mouth every 6 hours as needed for pain 12 tablet 0      Social History     Tobacco Use     Smoking status: Never Smoker     Smokeless tobacco: Never Used   Substance Use Topics     Alcohol use: Yes     Alcohol/week: 0.0 standard drinks     Comment: few drinks a week     Drug use: No              Review of Systems   No fever, chills, all weight is stable, no other new symptoms since her last visit      Objective           Vitals:  No vitals were obtained today due to virtual visit.    Physical Exam     GENERAL: Healthy, alert and no distress  EYES: Eyes grossly normal to inspection.  No discharge or erythema, or obvious scleral/conjunctival abnormalities.  RESP: No audible wheeze, cough, or visible cyanosis.  No visible retractions or increased work of breathing.    SKIN: Visible skin clear. No significant rash, abnormal pigmentation or lesions.  NEURO: Cranial nerves grossly intact.  Mentation and speech appropriate for age.  PSYCH: Mentation appears normal, affect normal/bright, judgement and insight intact, normal speech and appearance well-groomed.              Assessment & Plan     Chronic fatigue  This has been an ongoing, gradually worsening issue that we have felt may be related to recurrent infections with residual, her underlying immune issues.  We will complete the forms for her disability.    Myalgia  We had done some evaluation without clear cause            Morelia Simental MD  [facility]      Video-Visit Details    Type of service:  Video Visit    Video End Time:1:34    Originating Location (pt.  Location): Home    Distant Location (provider location):  home    Platform used for Video Visit: Chencho

## 2020-11-04 ENCOUNTER — TELEPHONE (OUTPATIENT)
Dept: RHEUMATOLOGY | Facility: CLINIC | Age: 50
End: 2020-11-04

## 2020-11-04 NOTE — TELEPHONE ENCOUNTER
Form received from [facility] - physician's statement.     Form has been placed in Dr. Gallo's signature folder for his review and signature.    Lakesha Chadwick CMA   11/4/2020 2:39 PM

## 2020-11-16 ENCOUNTER — HEALTH MAINTENANCE LETTER (OUTPATIENT)
Age: 50
End: 2020-11-16

## 2020-12-03 NOTE — PROGRESS NOTES
"Rheumatology Clinic Visit--leidy Ye MRN# [id number]   YOB: 1970 Age: 50 year old     Date of Visit: 12/04/2020  Primary care provider: Morelia Simental         Assessment and Plan:   #Eosinophilic granulomatosis with polyangiitis (eosinophilia, severe asthma, pericarditis, vasculitic lower extremity rashes, myalgia, fatigue, dx'd age 19): Patient relates stable, chronic fatigue, waxing and waning myalgia, \"brain fog\" with suboptimal short-term memory.  All symptoms attributable formerly to active EGPA are stable with no major flares since the last visit. Respiratory symptoms are essentially absent. However, residual symptoms are of sufficient severity that her return to previous professional employment is not possible. Examination today by video shows no rash, synovitis, or altered joint range of motion.  Blood work on October 20, 2020 showed normal comprehensive metabolic panel.  CBC was normal.  Urine was clear.  In September 2020, sed rate and CRP were modestly elevated. chest x-ray on February 10, 2020 showed no heart or lung abnormality. Last CT chest Occurred on March 28, 2014; this study showed mild bronchiectasis.    EGPA is currently stable, with no evidence of end-organ insufficiency or systemic involvement with inflammatory disease.  Patient reports that on multiple occasions in the past, she worked with Dr. Butts to try and reduce prednisone dosage.  On each occasion of tapering, she developed flaring symptoms that reflected recurrent EGPA. For the present, I think that the risk of precipitating flare is greater than the risk or adverse effects associated with tapering prednisone beyond the current 5.5 mg dose, which is at or below physiologic corticosteroid daily dosage.  I recommend continuing prednisone dose of 5.5 mg every day.    I recommend  UA, CBC with differential, and creatinine should be performed semiannually.  I agree with patient's current practice of " monitoring blood pressure several times weekly. I agree with management through Dr. Novak of asthma with ipratropium, Symbicort, and rescue inhaler.    I recommend follow-up in 6 months time.    Adal Gallo MD  Staff Rheumatologist, [facility]              Active Problem List:     Patient Active Problem List    Diagnosis Date Noted     Bladder pain 07/31/2019     Priority: Medium     Overactive bladder 07/31/2019     Priority: Medium     Controlled substance agreement signed 11/13/2017     Priority: Medium     Patient is followed by BRIAN DEE for ongoing prescription of benzodiazepines.  All refills should be approved by this provider, or covering partner.    Medication(s): clonazepam.   Maximum quantity per month: 7.5  Clinic visit frequency required: Q 6  months     Controlled substance agreement on file: Yes       Date(s): 11/13/17  Benzodiazepine use reviewed by psychiatry:  No    Last [facility] website verification:  none   [email or web address]           Bronchiectasis without acute exacerbation (H) 05/22/2017     Priority: Medium     Moderate episode of recurrent major depressive disorder (H) 01/29/2017     Priority: Medium     Benign essential hypertension 10/20/2016     Priority: Medium     Mannose-binding lectin deficiency      Priority: Medium     Irritable bowel syndrome without diarrhea 07/14/2016     Priority: Medium     Chronic fatigue 01/21/2016     Priority: Medium     Anxiety 05/30/2014     Priority: Medium     Asthma 07/16/2013     Priority: Medium     Problem list name updated by automated process. 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 Last seen in 6-, when EGPA was stable. Continued low-dose prednisone was recommended.    Interval history 12-:    She needs to have a disability form completed attesting to ongoing symptoms, including chronic fatigue, shortness of breath associated with bronchiectasis, myalgia. These symptoms have been gradually worsening for several years. She noted cognitive deficits with reduced information processing. In April 2019, she resigned, and has since focussed on health. Unfortunately, symptoms have not improved.    She has not had symptoms of rash, respiratory compromise/wheezing, joint pain that in the past have signaled presence of a flare of underlying inflammatory condition. She had an episode of diverticulitis in Fall 2020; abd CT showed mild fluid around the ceacum.    She continues prednisone 5 mg daily alternating with 6 mg daily to suppress eosinophilia.    She has tried methotrexate and imuran; these were ineffective for suppressing symptoms.    Prior hx 6-2020    Her condition has been managed Dr. Butts since 2013; I do not have his records to review today.    By her history, She was diagnosed with EGPA as a teenager after symptoms started at age 16. She had severe life-threatening asthma. By 19, she could not go to school or work due to breathting. She had weightl loss, painful toe rashes, and neurologic symptoms with restless legs, optical migraines. At age 19 in 1990, she was admitted to hospital, found to have pericarditis and eosinophilia. Bx skin showed vasculitis. She was started on prednisone, high dose initially. She has been on prednisone continuously since.    In recent years, she developed bronchiectasis, dx'd in 2013. She has been treated with daily therapeutic vest treatments, intermittent antibiotics.    In May 2018, she developed vertigo and nausea, which persisted for months, now somewhat  improved.    She had worked continusously through the illness, until 2019.    She is afflicted with brain fog that causes short-term memory loss, fatigue and muscle pain. The latter is improved with reduced work-related stress. She has chronic sinus and ear problems. She notes dry eyes and reduced tear formation.    She had a respiratory illness in the end of February; she was not tested for COVID. She took an antibiotic and upped her dose of prednisone, and sx cleared.    Asthma is generally under good control. No recent vasculitis rash. The last episode of pericardial fluid episode was in [id number].    She has noted fluctuating blood pressures, with systolics in 80-90; ranging upward to 130-140s.    Patient was seen in ophthalmology on February 25, 2020 in follow-up of C Antonina syndrome.  Assessment was of no ocular manifestations of systemic rheumatic disease.    Patient was seen by Dr. Novak in pulmonology on December 26, 2019 for follow-up of bronchiectasis and eosinophilic granulomatous polyangiitis.  Pulmonary symptoms were under good control with chronic inhaler use, daily vest therapy and Mucomyst fatigue and body aches were noted.  No change in medication was recommended.    She had severe IBS for years. She has noted marked improvement with use of SamE and Amitiza (for approximately 1 year).         Review of Systems:   Unlless listed in HPI:  Constitutional: negative  Skin: negative  Eyes: negative  Ears/Nose/Throat: negative  Respiratory: No shortness of breath, dyspnea on exertion, cough, or hemoptysis  Cardiovascular: negative  Gastrointestinal: negative  Genitourinary: negative  Musculoskeletal: negative  Neurologic: negative  Psychiatric: negative  Hematologic/Lymphatic/Immunologic: negative  Endocrine: negative          Past Medical History:     Past Medical History:   Diagnosis Date     Anxiety      Aortitis (H)      Asthma     associated with Churg Antonina syndrome     Bronchiectasis (H)       Cerebral infarction (H) 1990    Very small, caused small permanent optical migraine     Chronic sinusitis      Churg-Antonina syndrome (H)     dx      Conductive hearing loss      Depressive disorder      Eustachian tube dysfunction      Goiter      Hearing loss, conductive      Heart rate problem      Hypertension      Hypocalcemia      Immunosuppression (H)      Irritable bowel syndrome      Major depressive disorder      Mannose-binding lectin deficiency      Nonspecific abnormal results of thyroid function study      Osteoporosis      Pericarditis      and      Pneumonia     4/23/10     Recurrent otitis media      Recurrent UTI      Rheumatoid vasculitis (H)      Sinusitis, chronic      Steroid long-term use      Tinnitus      Varicose veins of leg with swelling      Past Surgical History:   Procedure Laterality Date     C/SECTION, CLASSICAL        SECTION       COLONOSCOPY Left 2014    Procedure: COMBINED COLONOSCOPY, SINGLE OR MULTIPLE BIOPSY/POLYPECTOMY BY BIOPSY;  Surgeon: Js iPnedo MD;  Location:  GI     COLONOSCOPY N/A 2019    Procedure: COLONOSCOPY;  Surgeon: Bryce Pimentel MD;  Location:  GI     ENT SURGERY       GENITOURINARY SURGERY       HC COLP CERVIX/UPPER VAGINA W LOOP ELEC BX CERVIX       HEAD & NECK SURGERY       HYSTERECTOMY  2009    without oopherectomy     HYSTERECTOMY, PAP NO LONGER INDICATED      cervix removed      PE TUBES       PICC INSERTION  10/10/2013    5fr DL PASV PICC, 43cm (1cm external) in the L basilic vein w/ tip in the low SVC.     PICC INSERTION Left 2017    5fr DL BioFlo PICC, 42cm (3cm external) in the L lateral brachial vein w/ tip in the SVC RA junction.     SINUS SURGERY       TUBAL LIGATION       TYMPANOPLASTY       --Hysterectomy for cervical dysplasia.       Social History:     Social History     Occupational History     Not on file   Tobacco Use     Smoking status: Never Smoker      Smokeless tobacco: Never Used   Substance and Sexual Activity     Alcohol use: Yes     Alcohol/week: 0.0 standard drinks     Comment: few drinks a week     Drug use: No     Sexual activity: Yes     Partners: Male     Birth control/protection: Male Surgical, Female Surgical     Has 5 children, all healthy except for asthma  Worked as  at the [employer] for 15 years. On disability since 4-2019.  Drinks 1/2 drink 5 days weekly. Never smoker.       Family History:     Family History   Problem Relation Age of Onset     Allergies Mother         Seasonal     Alcohol/Drug Mother      Substance Abuse Mother      Depression Mother      C.A.D. Father      Diabetes Father         Type 2     Depression Father      Heart Disease Father      C.A.D. Maternal Grandmother      Arthritis Maternal Grandmother      Musculoskeletal Disorder Maternal Grandmother         MS     Heart Disease Maternal Grandmother      Hypertension Maternal Grandmother      Alcohol/Drug Maternal Grandfather      Substance Abuse Maternal Grandfather      Depression Maternal Grandfather      Asthma Son      Asthma Daughter      Allergies Daughter         Seasonal     Unknown/Adopted Paternal Grandmother      Unknown/Adopted Paternal Grandfather      Cancer Maternal Aunt         breast age 53     Breast Cancer Maternal Aunt         in her 50s     Glaucoma No family hx of      Macular Degeneration No family hx of      Colon Cancer No family hx of    MGM with MS; aunt MS         Allergies:     Allergies   Allergen Reactions     Colistimethate Anaphylaxis     Colymycin M [Colistimethate Sodium] Anaphylaxis     Tamiflu [Oseltamivir]      Gums Blister up     Azithromycin Palpitations     Heart palpitation  Heart palpitation     Clindamycin Rash            Medications:     Current Outpatient Medications   Medication Sig Dispense Refill     acetylcysteine (MUCOMYST) 20 % neb solution Take 4 mLs by nebulization 2 times daily Use with albuterol solution. Discard  open bottle of Mucomyst after 96 hours. 240 mL 11     albuterol (PROVENTIL) (2.5 MG/3ML) 0.083% neb solution Take 1 vial (2.5 mg) by nebulization 2 times daily Use with Mucomyst 60 vial 11     AMITIZA 8 MCG capsule TAKE 1 CAPSULE BY MOUTH TWICE A  capsule 3     atorvastatin (LIPITOR) 10 MG tablet Take 0.5 tablets (5 mg) by mouth daily 45 tablet 3     budesonide-formoterol (SYMBICORT) 160-4.5 MCG/ACT Inhaler Inhale 2 puffs into the lungs 2 times daily 1 Inhaler 11     Caprylic Acid LIQD        ciprofloxacin (CIPRO) 500 MG tablet Take 1 tablet (500 mg) by mouth 2 times daily 14 tablet 0     ciprofloxacin-dexamethasone (CIPRODEX) 0.3-0.1 % otic suspension SHAKE LIQUID AND INSTILL 3 DROPS INTO AFFECTED EARS TWICE A DAY AS NEEDED. 7.5 mL 9     co-enzyme Q-10 50 MG CAPS        cycloSPORINE (RESTASIS) 0.05 % ophthalmic emulsion Place 1 drop into both eyes 2 times daily 1 Box 3     diazepam (VALIUM) 2 MG tablet Take 1-2 tablets (2-4 mg) by mouth every 6 hours as needed (vertigo) 20 tablet 0     fluticasone (FLONASE) 50 MCG/ACT nasal spray SHAKE LIQUID AND USE 2 SPRAYS IN EACH NOSTRIL EVERY DAY. 48 mL 3     ipratropium-albuterol (COMBIVENT RESPIMAT)  MCG/ACT inhaler Inhale 1 puff into the lungs 4 times daily Not to exceed 6 doses per day. 1 Inhaler 11     L-Glutamine 500 MG TABS Take 500 mg by mouth 3 times daily       Magnesium ([employer] TRIPLE MAGNESIUM COMPLEX) 400 MG CAPS        metoprolol succinate ER (TOPROL-XL) 25 MG 24 hr tablet TAKE 1/2 TABLET(12.5 MG) BY MOUTH DAILY. Hold if BP is low 45 tablet 3     metroNIDAZOLE (FLAGYL) 500 MG tablet Take 1 tablet (500 mg) by mouth 3 times daily 21 tablet 0     Omega-3 Fatty Acids (FISH OIL) 1200 MG capsule Take 4 capsules (4.8 g) by mouth daily       PHOSPHATIDYL CHOLINE PO        predniSONE (DELTASONE) 1 MG tablet TAKE 5 TABLETS BY MOUTH EVERY DAY AND ALTERNATING WITH 6 TABLETS THE NEXT. 590 tablet 3     S-Adenosylmethionine (SAME PO) Take 600 mg by mouth daily Patient  is taking 800 mg       tiZANidine (ZANAFLEX) 2 MG tablet Take 1-2 tablets (2-4 mg) by mouth 3 times daily as needed for muscle spasms 45 tablet 1     vitamin B complex with vitamin C (VITAMIN  B COMPLEX) TABS tablet Take 1 tablet by mouth daily       vitamin D3 (CHOLECALCIFEROL) 2000 units (50 mcg) tablet Take 2 tablets by mouth daily              Physical Exam:   Last menstrual period 02/01/2009, not currently breastfeeding.  Wt Readings from Last 4 Encounters:   09/21/20 50.6 kg (111 lb 9.6 oz)   08/07/20 50 kg (110 lb 4.8 oz)   02/10/20 49 kg (108 lb 1.6 oz)   02/04/20 49.9 kg (110 lb)       Constitutional: well-developed, appearing stated age; cooperative  Eyes: nl EOM, PERRLA, vision, conjunctiva, sclera  ENT: nl external ears, nose, hearing, lips, teeth, gums, throat  No mucous membrane lesions, normal saliva pool  Neck: no thyroid enlargement  MS: No hand/finger joint visible swelling; intact hand joint ROM  Skin: no nail pitting, alopecia, rash, nodules or lesions  Neuro: nl cranial nerves  Psych: nl judgement, orientation, memory, affect.         Data:     No results found for any visits on 12/04/20.    Recent Labs   Lab Test 10/02/19  1428 02/28/19  1604 02/26/19  0637 07/05/18  1805 02/19/18  1626  06/09/17  1756  03/10/17  1835 11/10/16  1812 12/23/15  2227   WBC 8.5  --   --   --  6.1  --  6.7   < > 20.8* 8.8 8.0   RBC 4.01  --   --   --  3.96  --  3.85   < > 4.04 3.68* 4.02   HGB 13.6  --   --   --  13.0  --  12.7   < > 13.6 12.4 13.4   HCT 40.4  --   --   --  39.3  --  38.5   < > 39.6 37.2 39.0   *  --   --   --  99  --  100   < > 98 101* 97   RDW 12.7  --   --   --  13.1  --  12.1   < > 12.0 12.4 12.5     --   --   --  283  --  284   < > 292 276 275   ALBUMIN  --   --   --   --   --   --   --   --  3.7 4.0 3.8   CRP  --  <2.9 <2.9 <2.9  --   --  <2.9   < >  --  <2.9  --    BUN 14  --   --  19 8   < >  --    < > 14 14 11    < > = values in this interval not displayed.      Recent Labs    Lab Test 03/12/19 07/05/18  1805 11/10/16  1812   TSH 1.540 0.56 0.90   T4  --   --  1.01     Hemoglobin   Date Value Ref Range Status   10/20/2020 13.5 11.7 - 15.7 g/dL Final   06/29/2020 12.9 11.7 - 15.7 g/dL Final   10/02/2019 13.6 11.7 - 15.7 g/dL Final     Urea Nitrogen   Date Value Ref Range Status   10/20/2020 8 7 - 30 mg/dL Final   09/21/2020 12 7 - 30 mg/dL Final   10/02/2019 14 7 - 30 mg/dL Final     Sed Rate   Date Value Ref Range Status   09/21/2020 30 (H) 0 - 20 mm/h Final     Comment:     Results confirmed by repeat test   02/28/2019 6 0 - 20 mm/h Final   07/05/2018 6 0 - 20 mm/h Final     CRP Cardiac Risk   Date Value Ref Range Status   02/04/2008 8.4 mg/L Final     Comment:     Reference Values:   Low Risk:           <1.0 mg/L   Average Risk:       1.0-3.0 mg/L   High Risk:          >3.0 mg/L   Acute Inflammation: >8.0 mg/L     CRP Inflammation   Date Value Ref Range Status   09/21/2020 44.0 (H) 0.0 - 8.0 mg/L Final   02/28/2019 <2.9 0.0 - 8.0 mg/L Final   02/26/2019 <2.9 0.0 - 8.0 mg/L Final     AST   Date Value Ref Range Status   10/20/2020 13 0 - 45 U/L Final   03/12/2019 24 0 - 40 IU/L Final   03/10/2017 17 0 - 45 U/L Final     Comment:     Specimen is hemolyzed which can falsely elevate AST. Analysis of a   non-hemolyzed   specimen may result in a lower value.       Neutrophil Cytoplasmic IgG Antibody   Date Value Ref Range Status   02/17/2009 <1:20  Final     Comment:     Reference range: <1:20  (Note)  TEST INFORMATION: Anti-Neutrophil Cyto Ab, IgG  Neutrophil Cytoplasmic Antibodies (C-ANCA = granular  cytoplasmic staining, P-ANCA = perinuclear staining) are  found in the serum of over 90% of patients with certain  necrotizing systemic vasculitides, and usually in less  than 5% of patients with collagen vascular disease or  arthritis.                  ANCA'S IN VASCULITIC SYNDROMES                         Approx %      Approx % of                         Pos ANCA    patients  demonstrating  ----------------------------------------------------------                (combined patterns)   C Pattern   P Pattern  Wegener's granulomatosis:  -Active Generalized   85 - 92        85 - 90       2 - 5  -Limited forms        60 - 67        85 - 90       2 - 5  -Inactive             30 - 35        85 - 90       2 - 5  Idiopathic Crescentic  Glomerulonephritis      80            some*     majority*  Polyarteritis Nodosa    50             86           14  Churg - Antonina         50             80           20  SLE                   less than 10     0    greater than 90  Rheumatoid Arthritis  less than 5      0    greater than 90  Sjogren's Syndrome        25           0    greater than 90  ----------------------------------------------------------  *No quantitation in the literature.  Performed by [employer],  [address] [place] [id number] [phone]  [email or web address], Remy Watkins MD - Lab. Director                                                  08/19/2008 < 1:20  Final     Comment:     Reference range: < 1:20  (Note)  TEST INFORMATION: Anti-Neutrophil Cyto Ab, IgG  Neutrophil Cytoplasmic Antibodies (C-ANCA = granular  cytoplasmic staining, P-ANCA = perinuclear staining) are  found in the serum of over 90% of patients with certain  necrotizing systemic vasculitides, and usually in less  than 5% of patients with collagen vascular disease or  arthritis.                  ANCA'S IN VASCULITIC SYNDROMES                         Approx %      Approx % of                         Pos ANCA    patients demonstrating  ----------------------------------------------------------                (combined patterns)   C Pattern   P Pattern  Wegener's granulomatosis:  -Active Generalized   85 - 92        85 - 90       2 - 5  -Limited forms        60 - 67        85 - 90       2 - 5  -Inactive             30 - 35        85 - 90       2 - 5  Idiopathic Crescentic  Glomerulonephritis      80            some*      majority*  Polyarteritis Nodosa    50             86           14  Churg - Antonina         50             80           20  SLE                   less than 10     0    greater than 90  Rheumatoid Arthritis  less than 5      0    greater than 90  Sjogren's Syndrome        25           0    greater than 90  ----------------------------------------------------------  *No quantitation in the literature.  Performed by [employer] [facility],  [facility] [place] [address] [phone]  [email or web address],  Remy Watkins MD - Lab. Director                                              01/24/2008 < 1:20  Final     Comment:     Reference range: < 1:20  (Note)  TEST INFORMATION: Anti-Neutrophil Cyto Ab, IgG  Neutrophil Cytoplasmic Antibodies (C-ANCA = granular  cytoplasmic staining, P-ANCA = perinuclear staining) are  found in the serum of over 90% of patients with certain  necrotizing systemic vasculitides, and usually in less  than 5% of patients with collagen vascular disease or  arthritis.                  ANCA'S IN VASCULITIC SYNDROMES                         Approx %      Approx % of                         Pos ANCA    patients demonstrating  ----------------------------------------------------------                (combined patterns)   C Pattern   P Pattern  Wegener's granulomatosis:  -Active Generalized   85 - 92 85 - 90       2 - 5  -Limited forms        60 - 67 85 - 90       2 - 5  -Inactive             30 - 35        85 - 90       2 - 5  Idiopathic Crescentic  Glomerulonephritis      80            some*     majority*  Polyarteritis Nodosa    50             86           14  Churg - Antonina         50             80           20  SLE                   less than 10     0    greater than 90  Rheumatoid Arthritis  less than 5      0    greater than 90  Sjogren's Syndrome        25           0    greater than 90  ----------------------------------------------------------  *No quantitation in the  literature.  Performed by [employer],  [address], [facility] [place] [address] [phone]  [email or web address],  Remy Watkins MD - Lab. Director                                                Albumin   Date Value Ref Range Status   10/20/2020 3.9 3.4 - 5.0 g/dL Final   03/10/2017 3.7 3.4 - 5.0 g/dL Final   11/10/2016 4.0 3.4 - 5.0 g/dL Final     Alkaline Phosphatase   Date Value Ref Range Status   10/20/2020 54 40 - 150 U/L Final   03/10/2017 48 40 - 150 U/L Final   11/10/2016 42 40 - 150 U/L Final     ALT   Date Value Ref Range Status   10/20/2020 17 0 - 50 U/L Final   03/12/2019 29 0 - 32 IU/L Final   06/09/2017 47 0 - 50 U/L Final     Rheumatoid Factor   Date Value Ref Range Status   07/05/2018 <20 <20 IU/mL Final     Recent Labs   Lab Test 10/20/20  0155 09/21/20  1609 06/29/20  1501 10/02/19  1428 03/12/19 07/05/18  1805 06/09/17  1756 06/09/17  1756 03/10/17  1835 03/10/17  1835 11/10/16  1812   WBC 14.0*  --  9.3 8.5  --   --    < > 6.7   < > 20.8* 8.8   HGB 13.5  --  12.9 13.6  --   --    < > 12.7   < > 13.6 12.4   HCT 41.3  --  39.1 40.4  --   --    < > 38.5   < > 39.6 37.2     --  101* 101*  --   --    < > 100   < > 98 101*     --  298 320  --   --    < > 284   < > 292 276   BUN 8 12  --  14  --  19   < >  --    < > 14 14   TSH  --  0.60  --   --  1.540 0.56  --   --   --   --  0.90   AST 13  --   --   --  24  --   --   --   --  17 6   ALT 17  --   --   --  29  --   --  47   < > 18 14   ALKPHOS 54  --   --   --   --   --   --   --   --  48 42    < > = values in this interval not displayed.     RHEUM RESULTS Latest Ref Rng & Units 6/29/2020 9/21/2020 10/20/2020   COMPLEMENT C3 76 - 169 mg/dL - - -   COMPLEMENT C4 15 - 50 mg/dL - - -   SED RATE 0 - 20 mm/h - 30(H) -   CRP, INFLAMMATION 0.0 - 8.0 mg/L - 44.0(H) -   CK TOTAL 30 - 225 U/L - 62 -   RHEUMATOID FACTOR <20 IU/mL - - -   JOSE SCREEN BY EIA <1.0 - - -   AST 0 - 45 U/L - - 13   ALT 0 - 50 U/L - - 17   ALBUMIN 3.4 - 5.0 g/dL - - 3.9    WBC 4.0 - 11.0 10e9/L 9.3 - 14.0(H)   RBC 3.8 - 5.2 10e12/L 3.88 - 4.12   HGB 11.7 - 15.7 g/dL 12.9 - 13.5   HCT 35.0 - 47.0 % 39.1 - 41.3   MCV 78 - 100 fl 101(H) - 100   MCHC 31.5 - 36.5 g/dL 33.0 - 32.7   RDW 10.0 - 15.0 % 12.2 - 12.5    - 450 10e9/L 298 - 261   CREATININE 0.52 - 1.04 mg/dL 0.74 0.68 0.82   GFR ESTIMATE, IF BLACK >60 mL/min/[1.73:m2] >90 >90 >90   GFR ESTIMATE >60 mL/min/[1.73:m2] >90 >90 85    - 1,620 mg/dL - - -   HEPATITIS C ANTIBODY NEG - - -       Rheumatoid Factor   Date Value Ref Range Status   07/05/2018 <20 <20 IU/mL Final   ,  ,  ,  ,  ,   SSA (RO) Antibody IgG   Date Value Ref Range Status   02/17/2009 2  Final     Comment:     Reference range: 0 to 40  Unit: AU/mL  (Note)  REFERENCE INTERVALS: SSA (Ro) (WESTLEY) Ab, IgG   29 AU/mL or Less ............. Negative   30 - 40 AU/mL ................ Equivocal   41 AU/mL or Greater .......... Positive    SSA (Ro) antibody is seen in 70-75% of Sjogren syndrome  cases, 30-40% of systemic lupus erythematosus (SLE) and  5-10% of progressive systemic sclerosis (PSS).  Performed by [email or web address],  [address] [place] [address] [phone]  [email or web address], Remy Watkins MD- Lab. Director     Scleroderma Antibody IgG   Date Value Ref Range Status   02/17/2009 0  Final     Comment:     Reference range: 0 to 40  Unit: AU/mL  (Note)  REFERENCE INTERVALS: Scleroderma (Scl-70) (WESTLEY) Ab, IgG   29 AU/mL or Less ............. Negative   30 - 40 AU/mL ................ Equivocal   41 AU/mL or Greater .......... Positive    Scleroderma (Scl-70) antibody is seen in 20-60% of  patients with scleroderma and is considered diagnostic  and specific for scleroderma if it is the only WESTLEY  antibody present. Scl-70 is also seen in approximately  25% of progressive systemic sclerosis (PSS).  Performed by [email or web address],  [address] [place] [address] [phone]  [email or web address], Remy Watkins MD - Lab. Director   ,   JOSE interpretation    Date Value Ref Range Status   02/28/2019 Negative NEG^Negative Final     Comment:                                        Reference range:  <1:40  NEGATIVE  1:40 - 1:80  BORDERLINE POSITIVE  >1:80 POSITIVE       ,   JOSE Screen by EIA   Date Value Ref Range Status   11/10/2016 <1.0  Interpretation:  Negative   <1.0 Final   ,  ,  ,  ,  ,  ,  ,  ,   Hep B Surface Agn   Date Value Ref Range Status   03/16/2005 Negative NEG Final   ,  ,  ,  ,  ,  ,  ,  ,  ,  ,  ,   Neutrophil Cytoplasmic IgG Antibody   Date Value Ref Range Status   02/17/2009 <1:20  Final     Comment:     Reference range: <1:20  (Note)  TEST INFORMATION: Anti-Neutrophil Cyto Ab, IgG  Neutrophil Cytoplasmic Antibodies (C-ANCA = granular  cytoplasmic staining, P-ANCA = perinuclear staining) are  found in the serum of over 90% of patients with certain  necrotizing systemic vasculitides, and usually in less  than 5% of patients with collagen vascular disease or  arthritis.                  ANCA'S IN VASCULITIC SYNDROMES                         Approx %      Approx % of                         Pos ANCA    patients demonstrating  ----------------------------------------------------------                (combined patterns)   C Pattern   P Pattern  Wegener's granulomatosis:  -Active Generalized   85 - 92 85 - 90       2 - 5  -Limited forms        60 - 67 85 - 90       2 - 5  -Inactive             30 - 35        85 - 90       2 - 5  Idiopathic Crescentic  Glomerulonephritis      80            some*     majority*  Polyarteritis Nodosa    50             86           14  Churg - Antonina         50             80           20  SLE                   less than 10     0    greater than 90  Rheumatoid Arthritis  less than 5      0    greater than 90  Sjogren's Syndrome        25           0    greater than 90  ----------------------------------------------------------  *No quantitation in the literature.  Performed by [facility],  [address] [facility] [address],  "[place] [address] [phone]  [email or web address], Remy Watkins MD - Lab. Director                                                      IGG   Date Value Ref Range Status   10/04/2013 [id number] - 1,620 mg/dL Final     Reviewed Rheumatology lab flowsheet      Valencia Ye is a 50 year old female who is being evaluated via a billable video visit.      The patient has been notified of following:     \"This video visit will be conducted via a call between you and your physician/provider. We have found that certain health care needs can be provided without the need for an in-person physical exam.  This service lets us provide the care you need with a video conversation.  If a prescription is necessary we can send it directly to your pharmacy.  If lab work is needed we can place an order for that and you can then stop by our lab to have the test done at a later time.    Video visits are billed at different rates depending on your insurance coverage.  Please reach out to your insurance provider with any questions.    If during the course of the call the physician/provider feels a video visit is not appropriate, you will not be charged for this service.\"    Patient has given verbal consent for Video visit? Yes  How would you like to obtain your AVS? MyChart    Will anyone else be joining your video visit? No        Video-Visit Details    Type of service:  Video Visit    Video Start Time: 12:39 PM  Video End Time: 1:08 PM    Originating Location (pt. Location): Home    Distant Location (provider location):  [facility] [place]     Platform used for Video Visit: Chencho Gallo MD        "

## 2020-12-04 ENCOUNTER — VIRTUAL VISIT (OUTPATIENT)
Dept: RHEUMATOLOGY | Facility: CLINIC | Age: 50
End: 2020-12-04
Attending: INTERNAL MEDICINE
Payer: COMMERCIAL

## 2020-12-04 DIAGNOSIS — J47.1 BRONCHIECTASIS WITH (ACUTE) EXACERBATION (H): ICD-10-CM

## 2020-12-04 PROCEDURE — 99214 OFFICE O/P EST MOD 30 MIN: CPT | Mod: GT | Performed by: INTERNAL MEDICINE

## 2020-12-04 RX ORDER — PREDNISONE 1 MG/1
TABLET ORAL
Qty: 590 TABLET | Refills: 5 | Status: SHIPPED | OUTPATIENT
Start: 2020-12-04 | End: 2021-12-03

## 2020-12-04 ASSESSMENT — PAIN SCALES - GENERAL: PAINLEVEL: NO PAIN (0)

## 2020-12-04 NOTE — LETTER
"12/4/2020       RE: Valencia Ye  [address] [facility] [address]     Dear Colleague,    Thank you for referring your patient, Valencia Ye, to the [facility] [place] at [facility]. Please see a copy of my visit note below.    Rheumatology Clinic Visit--remote     Valencia Ye MRN# [id number]   YOB: 1970 Age: 50 year old     Date of Visit: 12/04/2020  Primary care provider: Morelia Simental         Assessment and Plan:   #Eosinophilic granulomatosis with polyangiitis (eosinophilia, severe asthma, pericarditis, vasculitic lower extremity rashes, myalgia, fatigue, dx'd age 19): Patient relates stable, chronic fatigue, waxing and waning myalgia, \"brain fog\" with suboptimal short-term memory.  All symptoms attributable formerly to active EGPA are stable with no major flares since the last visit. Respiratory symptoms are essentially absent. However, residual symptoms are of sufficient severity that her return to previous professional employment is not possible. Examination today by video shows no rash, synovitis, or altered joint range of motion.  Blood work on October 20, 2020 showed normal comprehensive metabolic panel.  CBC was normal.  Urine was clear.  In September 2020, sed rate and CRP were modestly elevated. chest x-ray on February 10, 2020 showed no heart or lung abnormality. Last CT chest Occurred on March 28, 2014; this study showed mild bronchiectasis.    EGPA is currently stable, with no evidence of end-organ insufficiency or systemic involvement with inflammatory disease.  Patient reports that on multiple occasions in the past, she worked with Dr. Butts to try and reduce prednisone dosage.  On each occasion of tapering, she developed flaring symptoms that reflected recurrent EGPA. For the present, I think that the risk of precipitating flare is greater than the risk or adverse effects " associated with tapering prednisone beyond the current 5.5 mg dose, which is at or below physiologic corticosteroid daily dosage.  I recommend continuing prednisone dose of 5.5 mg every day.    I recommend  UA, CBC with differential, and creatinine should be performed semiannually.  I agree with patient's current practice of monitoring blood pressure several times weekly. I agree with management through Dr. Novak of asthma with ipratropium, Symbicort, and rescue inhaler.    I recommend follow-up in 6 months time.    Adal Gallo MD  Staff Rheumatologist, [facility]              Active Problem List:     Patient Active Problem List    Diagnosis Date Noted     Bladder pain 07/31/2019     Priority: Medium     Overactive bladder 07/31/2019     Priority: Medium     Controlled substance agreement signed 11/13/2017     Priority: Medium     Patient is followed by BRIAN DEE for ongoing prescription of benzodiazepines.  All refills should be approved by this provider, or covering partner.    Medication(s): clonazepam.   Maximum quantity per month: 7.5  Clinic visit frequency required: Q 6  months     Controlled substance agreement on file: Yes       Date(s): 11/13/17  Benzodiazepine use reviewed by psychiatry:  No    Last [facility] website verification:  none   [email or web address]           Bronchiectasis without acute exacerbation (H) 05/22/2017     Priority: Medium     Moderate episode of recurrent major depressive disorder (H) 01/29/2017     Priority: Medium     Benign essential hypertension 10/20/2016     Priority: Medium     Mannose-binding lectin deficiency      Priority: Medium     Irritable bowel syndrome without diarrhea 07/14/2016     Priority: Medium     Chronic fatigue 01/21/2016     Priority: Medium     Anxiety 05/30/2014     Priority: Medium     Asthma 07/16/2013     Priority: Medium     Problem list name updated by automated process. 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 Last seen in 6-, when EGPA was stable. Continued low-dose prednisone was recommended.    Interval history 12-:    She needs to have a disability form completed attesting to ongoing symptoms, including chronic fatigue, shortness of breath associated with bronchiectasis, myalgia. These symptoms have been gradually worsening for several years. She noted cognitive deficits with reduced information processing. In April 2019, she resigned, and has since focussed on health. Unfortunately, symptoms have not improved.    She has not had symptoms of rash, respiratory compromise/wheezing, joint pain that in the past have signaled presence of a flare of underlying inflammatory condition. She had an episode of diverticulitis in Fall 2020; abd CT showed mild fluid around the ceacum.    She continues prednisone 5 mg daily alternating with 6 mg daily to suppress eosinophilia.    She has tried methotrexate and imuran; these were ineffective for suppressing symptoms.    Prior hx 6-2020    Her condition has been managed Dr. Butts since 2013; I do not have his records to review today.    By her history, She was diagnosed with EGPA as a teenager after symptoms started at age 16. She had severe life-threatening asthma. By 19, she could not go to school or work due to breathting. She had weightl loss, painful toe rashes, and neurologic symptoms with restless legs, optical migraines. At age 19 in 1990, she was admitted to hospital, found to have pericarditis and eosinophilia. Bx skin showed vasculitis. She was started on  prednisone, high dose initially. She has been on prednisone continuously since.    In recent years, she developed bronchiectasis, dx'd in 2013. She has been treated with daily therapeutic vest treatments, intermittent antibiotics.    In May 2018, she developed vertigo and nausea, which persisted for months, now somewhat improved.    She had worked continusously through the illness, until 2019.    She is afflicted with brain fog that causes short-term memory loss, fatigue and muscle pain. The latter is improved with reduced work-related stress. She has chronic sinus and ear problems. She notes dry eyes and reduced tear formation.    She had a respiratory illness in the end of February; she was not tested for COVID. She took an antibiotic and upped her dose of prednisone, and sx cleared.    Asthma is generally under good control. No recent vasculitis rash. The last episode of pericardial fluid episode was in [id number].    She has noted fluctuating blood pressures, with systolics in 80-90; ranging upward to 130-140s.    Patient was seen in ophthalmology on February 25, 2020 in follow-up of C Antonina syndrome.  Assessment was of no ocular manifestations of systemic rheumatic disease.    Patient was seen by Dr. Novak in pulmonology on December 26, 2019 for follow-up of bronchiectasis and eosinophilic granulomatous polyangiitis.  Pulmonary symptoms were under good control with chronic inhaler use, daily vest therapy and Mucomyst fatigue and body aches were noted.  No change in medication was recommended.    She had severe IBS for years. She has noted marked improvement with use of SamE and Amitiza (for approximately 1 year).         Review of Systems:   Unlless listed in HPI:  Constitutional: negative  Skin: negative  Eyes: negative  Ears/Nose/Throat: negative  Respiratory: No shortness of breath, dyspnea on exertion, cough, or hemoptysis  Cardiovascular: negative  Gastrointestinal: negative  Genitourinary:  negative  Musculoskeletal: negative  Neurologic: negative  Psychiatric: negative  Hematologic/Lymphatic/Immunologic: negative  Endocrine: negative          Past Medical History:     Past Medical History:   Diagnosis Date     Anxiety      Aortitis (H)      Asthma     associated with Churg Antonina syndrome     Bronchiectasis (H)      Cerebral infarction (H) 1990    Very small, caused small permanent optical migraine     Chronic sinusitis      Churg-Antonina syndrome (H)     dx      Conductive hearing loss      Depressive disorder      Eustachian tube dysfunction      Goiter      Hearing loss, conductive      Heart rate problem      Hypertension      Hypocalcemia      Immunosuppression (H)      Irritable bowel syndrome      Major depressive disorder      Mannose-binding lectin deficiency      Nonspecific abnormal results of thyroid function study      Osteoporosis      Pericarditis      and      Pneumonia     4/23/10     Recurrent otitis media      Recurrent UTI      Rheumatoid vasculitis (H)      Sinusitis, chronic      Steroid long-term use      Tinnitus      Varicose veins of leg with swelling      Past Surgical History:   Procedure Laterality Date     C/SECTION, CLASSICAL        SECTION       COLONOSCOPY Left 2014    Procedure: COMBINED COLONOSCOPY, SINGLE OR MULTIPLE BIOPSY/POLYPECTOMY BY BIOPSY;  Surgeon: Js Pinedo MD;  Location:  GI     COLONOSCOPY N/A 2019    Procedure: COLONOSCOPY;  Surgeon: Bryce Pimentel MD;  Location:  GI     ENT SURGERY       GENITOURINARY SURGERY       HC COLP CERVIX/UPPER VAGINA W LOOP ELEC BX CERVIX       HEAD & NECK SURGERY       HYSTERECTOMY  2009    without oopherectomy     HYSTERECTOMY, PAP NO LONGER INDICATED      cervix removed      PE TUBES       PICC INSERTION  10/10/2013    5fr DL PASV PICC, 43cm (1cm external) in the L basilic vein w/ tip in the low SVC.     PICC INSERTION Left 2017    5fr DL  BioFlo PICC, 42cm (3cm external) in the L lateral brachial vein w/ tip in the SVC RA junction.     SINUS SURGERY       TUBAL LIGATION       TYMPANOPLASTY       --Hysterectomy for cervical dysplasia.       Social History:     Social History     Occupational History     Not on file   Tobacco Use     Smoking status: Never Smoker     Smokeless tobacco: Never Used   Substance and Sexual Activity     Alcohol use: Yes     Alcohol/week: 0.0 standard drinks     Comment: few drinks a week     Drug use: No     Sexual activity: Yes     Partners: Male     Birth control/protection: Male Surgical, Female Surgical     Has 5 children, all healthy except for asthma  Worked as  at [employer] for 15 years. On disability since 4-2019.  Drinks 1/2 drink 5 days weekly. Never smoker.       Family History:     Family History   Problem Relation Age of Onset     Allergies Mother         Seasonal     Alcohol/Drug Mother      Substance Abuse Mother      Depression Mother      C.A.D. Father      Diabetes Father         Type 2     Depression Father      Heart Disease Father      C.A.D. Maternal Grandmother      Arthritis Maternal Grandmother      Musculoskeletal Disorder Maternal Grandmother         MS     Heart Disease Maternal Grandmother      Hypertension Maternal Grandmother      Alcohol/Drug Maternal Grandfather      Substance Abuse Maternal Grandfather      Depression Maternal Grandfather      Asthma Son      Asthma Daughter      Allergies Daughter         Seasonal     Unknown/Adopted Paternal Grandmother      Unknown/Adopted Paternal Grandfather      Cancer Maternal Aunt         breast age 53     Breast Cancer Maternal Aunt         in her 50s     Glaucoma No family hx of      Macular Degeneration No family hx of      Colon Cancer No family hx of    MGM with MS; aunt MS         Allergies:     Allergies   Allergen Reactions     Colistimethate Anaphylaxis     Colymycin M [Colistimethate Sodium] Anaphylaxis     Tamiflu [Oseltamivir]       Gums Blister up     Azithromycin Palpitations     Heart palpitation  Heart palpitation     Clindamycin Rash            Medications:     Current Outpatient Medications   Medication Sig Dispense Refill     acetylcysteine (MUCOMYST) 20 % neb solution Take 4 mLs by nebulization 2 times daily Use with albuterol solution. Discard open bottle of Mucomyst after 96 hours. 240 mL 11     albuterol (PROVENTIL) (2.5 MG/3ML) 0.083% neb solution Take 1 vial (2.5 mg) by nebulization 2 times daily Use with Mucomyst 60 vial 11     AMITIZA 8 MCG capsule TAKE 1 CAPSULE BY MOUTH TWICE A  capsule 3     atorvastatin (LIPITOR) 10 MG tablet Take 0.5 tablets (5 mg) by mouth daily 45 tablet 3     budesonide-formoterol (SYMBICORT) 160-4.5 MCG/ACT Inhaler Inhale 2 puffs into the lungs 2 times daily 1 Inhaler 11     Caprylic Acid LIQD        ciprofloxacin (CIPRO) 500 MG tablet Take 1 tablet (500 mg) by mouth 2 times daily 14 tablet 0     ciprofloxacin-dexamethasone (CIPRODEX) 0.3-0.1 % otic suspension SHAKE LIQUID AND INSTILL 3 DROPS INTO AFFECTED EARS TWICE A DAY AS NEEDED. 7.5 mL 9     co-enzyme Q-10 50 MG CAPS        cycloSPORINE (RESTASIS) 0.05 % ophthalmic emulsion Place 1 drop into both eyes 2 times daily 1 Box 3     diazepam (VALIUM) 2 MG tablet Take 1-2 tablets (2-4 mg) by mouth every 6 hours as needed (vertigo) 20 tablet 0     fluticasone (FLONASE) 50 MCG/ACT nasal spray SHAKE LIQUID AND USE 2 SPRAYS IN EACH NOSTRIL EVERY DAY. 48 mL 3     ipratropium-albuterol (COMBIVENT RESPIMAT)  MCG/ACT inhaler Inhale 1 puff into the lungs 4 times daily Not to exceed 6 doses per day. 1 Inhaler 11     L-Glutamine 500 MG TABS Take 500 mg by mouth 3 times daily       Magnesium ([employer] TRIPLE MAGNESIUM COMPLEX) 400 MG CAPS        metoprolol succinate ER (TOPROL-XL) 25 MG 24 hr tablet TAKE 1/2 TABLET(12.5 MG) BY MOUTH DAILY. Hold if BP is low 45 tablet 3     metroNIDAZOLE (FLAGYL) 500 MG tablet Take 1 tablet (500 mg) by mouth 3 times daily  21 tablet 0     Omega-3 Fatty Acids (FISH OIL) 1200 MG capsule Take 4 capsules (4.8 g) by mouth daily       PHOSPHATIDYL CHOLINE PO        predniSONE (DELTASONE) 1 MG tablet TAKE 5 TABLETS BY MOUTH EVERY DAY AND ALTERNATING WITH 6 TABLETS THE NEXT. 590 tablet 3     S-Adenosylmethionine (SAME PO) Take 600 mg by mouth daily Patient is taking 800 mg       tiZANidine (ZANAFLEX) 2 MG tablet Take 1-2 tablets (2-4 mg) by mouth 3 times daily as needed for muscle spasms 45 tablet 1     vitamin B complex with vitamin C (VITAMIN  B COMPLEX) TABS tablet Take 1 tablet by mouth daily       vitamin D3 (CHOLECALCIFEROL) 2000 units (50 mcg) tablet Take 2 tablets by mouth daily              Physical Exam:   Last menstrual period 02/01/2009, not currently breastfeeding.  Wt Readings from Last 4 Encounters:   09/21/20 50.6 kg (111 lb 9.6 oz)   08/07/20 50 kg (110 lb 4.8 oz)   02/10/20 49 kg (108 lb 1.6 oz)   02/04/20 49.9 kg (110 lb)       Constitutional: well-developed, appearing stated age; cooperative  Eyes: nl EOM, PERRLA, vision, conjunctiva, sclera  ENT: nl external ears, nose, hearing, lips, teeth, gums, throat  No mucous membrane lesions, normal saliva pool  Neck: no thyroid enlargement  MS: No hand/finger joint visible swelling; intact hand joint ROM  Skin: no nail pitting, alopecia, rash, nodules or lesions  Neuro: nl cranial nerves  Psych: nl judgement, orientation, memory, affect.         Data:     No results found for any visits on 12/04/20.    Recent Labs   Lab Test 10/02/19  1428 02/28/19  1604 02/26/19  0637 07/05/18  1805 02/19/18  1626  06/09/17  1756  03/10/17  1835 11/10/16  1812 12/23/15  2227   WBC 8.5  --   --   --  6.1  --  6.7   < > 20.8* 8.8 8.0   RBC 4.01  --   --   --  3.96  --  3.85   < > 4.04 3.68* 4.02   HGB 13.6  --   --   --  13.0  --  12.7   < > 13.6 12.4 13.4   HCT 40.4  --   --   --  39.3  --  38.5   < > 39.6 37.2 39.0   *  --   --   --  99  --  100   < > 98 101* 97   RDW 12.7  --   --   --   13.1  --  12.1   < > 12.0 12.4 12.5     --   --   --  283  --  284   < > 292 276 275   ALBUMIN  --   --   --   --   --   --   --   --  3.7 4.0 3.8   CRP  --  <2.9 <2.9 <2.9  --   --  <2.9   < >  --  <2.9  --    BUN 14  --   --  19 8   < >  --    < > 14 14 11    < > = values in this interval not displayed.      Recent Labs   Lab Test 03/12/19 07/05/18  1805 11/10/16  1812   TSH 1.540 0.56 0.90   T4  --   --  1.01     Hemoglobin   Date Value Ref Range Status   10/20/2020 13.5 11.7 - 15.7 g/dL Final   06/29/2020 12.9 11.7 - 15.7 g/dL Final   10/02/2019 13.6 11.7 - 15.7 g/dL Final     Urea Nitrogen   Date Value Ref Range Status   10/20/2020 8 7 - 30 mg/dL Final   09/21/2020 12 7 - 30 mg/dL Final   10/02/2019 14 7 - 30 mg/dL Final     Sed Rate   Date Value Ref Range Status   09/21/2020 30 (H) 0 - 20 mm/h Final     Comment:     Results confirmed by repeat test   02/28/2019 6 0 - 20 mm/h Final   07/05/2018 6 0 - 20 mm/h Final     CRP Cardiac Risk   Date Value Ref Range Status   02/04/2008 8.4 mg/L Final     Comment:     Reference Values:   Low Risk:           <1.0 mg/L   Average Risk:       1.0-3.0 mg/L   High Risk:          >3.0 mg/L   Acute Inflammation: >8.0 mg/L     CRP Inflammation   Date Value Ref Range Status   09/21/2020 44.0 (H) 0.0 - 8.0 mg/L Final   02/28/2019 <2.9 0.0 - 8.0 mg/L Final   02/26/2019 <2.9 0.0 - 8.0 mg/L Final     AST   Date Value Ref Range Status   10/20/2020 13 0 - 45 U/L Final   03/12/2019 24 0 - 40 IU/L Final   03/10/2017 17 0 - 45 U/L Final     Comment:     Specimen is hemolyzed which can falsely elevate AST. Analysis of a   non-hemolyzed   specimen may result in a lower value.       Neutrophil Cytoplasmic IgG Antibody   Date Value Ref Range Status   02/17/2009 <1:20  Final     Comment:     Reference range: <1:20  (Note)  TEST INFORMATION: Anti-Neutrophil Cyto Ab, IgG  Neutrophil Cytoplasmic Antibodies (C-ANCA = granular  cytoplasmic staining, P-ANCA = perinuclear staining) are  found  in the serum of over 90% of patients with certain  necrotizing systemic vasculitides, and usually in less  than 5% of patients with collagen vascular disease or  arthritis.                  ANCA'S IN VASCULITIC SYNDROMES                         Approx %      Approx % of                         Pos ANCA    patients demonstrating  ----------------------------------------------------------                (combined patterns)   C Pattern   P Pattern  Wegener's granulomatosis:  -Active Generalized   85 - 92        85 - 90       2 - 5  -Limited forms        60 - 67        85 - 90       2 - 5  -Inactive             30 - 35        85 - 90       2 - 5  Idiopathic Crescentic  Glomerulonephritis      80            some*     majority*  Polyarteritis Nodosa    50             86           14  Churg - Antonina         50             80           20  SLE                   less than 10     0    greater than 90  Rheumatoid Arthritis  less than 5      0    greater than 90  Sjogren's Syndrome        25           0    greater than 90  ----------------------------------------------------------  *No quantitation in the literature.  Performed by [employer],  [address] [place] [address] [phone]  [email or web address], Remy Watkins MD - Lab. Director                                                  08/19/2008 < 1:20  Final     Comment:     Reference range: < 1:20  (Note)  TEST INFORMATION: Anti-Neutrophil Cyto Ab, IgG  Neutrophil Cytoplasmic Antibodies (C-ANCA = granular  cytoplasmic staining, P-ANCA = perinuclear staining) are  found in the serum of over 90% of patients with certain  necrotizing systemic vasculitides, and usually in less  than 5% of patients with collagen vascular disease or  arthritis.                  ANCA'S IN VASCULITIC SYNDROMES                         Approx %      Approx % of                         Pos ANCA    patients demonstrating  ----------------------------------------------------------                 (combined patterns)   C Pattern   P Pattern  Wegener's granulomatosis:  -Active Generalized   85 - 92 85 - 90       2 - 5  -Limited forms        60 - 67 85 - 90       2 - 5  -Inactive             30 - 35 85 - 90       2 - 5  Idiopathic Crescentic  Glomerulonephritis      80            some*     majority*  Polyarteritis Nodosa    50             86           14  Churg - Antonina         50             80           20  SLE                   less than 10     0    greater than 90  Rheumatoid Arthritis  less than 5      0    greater than 90  Sjogren's Syndrome        25           0    greater than 90  ----------------------------------------------------------  *No quantitation in the literature.  Performed by [facility],  [address] [place] [address] [phone]  [email or web address],  Remy Watkins MD - Lab. Director                                              01/24/2008 < 1:20  Final     Comment:     Reference range: < 1:20  (Note)  TEST INFORMATION: Anti-Neutrophil Cyto Ab, IgG  Neutrophil Cytoplasmic Antibodies (C-ANCA = granular  cytoplasmic staining, P-ANCA = perinuclear staining) are  found in the serum of over 90% of patients with certain  necrotizing systemic vasculitides, and usually in less  than 5% of patients with collagen vascular disease or  arthritis.                  ANCA'S IN VASCULITIC SYNDROMES                         Approx %      Approx % of                         Pos ANCA    patients demonstrating  ----------------------------------------------------------                (combined patterns)   C Pattern   P Pattern  Wegener's granulomatosis:  -Active Generalized   85 - 92 85 - 90       2 - 5  -Limited forms        60 - 67 85 - 90       2 - 5  -Inactive             30 - 35 85 - 90       2 - 5  Idiopathic Crescentic  Glomerulonephritis      80            some*     majority*  Polyarteritis Nodosa    50             86           14  Churg - Antonina          50             80           20  SLE                   less than 10     0    greater than 90  Rheumatoid Arthritis  less than 5      0    greater than 90  Sjogren's Syndrome        25           0    greater than 90  ----------------------------------------------------------  *No quantitation in the literature.  Performed by [employer],  [facility] [place] [place] [address] [phone]  [email or web address],  Remy Watkins MD - Lab. Director                                                Albumin   Date Value Ref Range Status   10/20/2020 3.9 3.4 - 5.0 g/dL Final   03/10/2017 3.7 3.4 - 5.0 g/dL Final   11/10/2016 4.0 3.4 - 5.0 g/dL Final     Alkaline Phosphatase   Date Value Ref Range Status   10/20/2020 54 40 - 150 U/L Final   03/10/2017 48 40 - 150 U/L Final   11/10/2016 42 40 - 150 U/L Final     ALT   Date Value Ref Range Status   10/20/2020 17 0 - 50 U/L Final   03/12/2019 29 0 - 32 IU/L Final   06/09/2017 47 0 - 50 U/L Final     Rheumatoid Factor   Date Value Ref Range Status   07/05/2018 <20 <20 IU/mL Final     Recent Labs   Lab Test 10/20/20  0155 09/21/20  1609 06/29/20  1501 10/02/19  1428 03/12/19 07/05/18  1805 06/09/17  1756 06/09/17  1756 03/10/17  1835 03/10/17  1835 11/10/16  1812   WBC 14.0*  --  9.3 8.5  --   --    < > 6.7   < > 20.8* 8.8   HGB 13.5  --  12.9 13.6  --   --    < > 12.7   < > 13.6 12.4   HCT 41.3  --  39.1 40.4  --   --    < > 38.5   < > 39.6 37.2     --  101* 101*  --   --    < > 100   < > 98 101*     --  298 320  --   --    < > 284   < > 292 276   BUN 8 12  --  14  --  19   < >  --    < > 14 14   TSH  --  0.60  --   --  1.540 0.56  --   --   --   --  0.90   AST 13  --   --   --  24  --   --   --   --  17 6   ALT 17  --   --   --  29  --   --  47   < > 18 14   ALKPHOS 54  --   --   --   --   --   --   --   --  48 42    < > = values in this interval not displayed.     RHEUM RESULTS Latest Ref Rng & Units 6/29/2020 9/21/2020 10/20/2020   COMPLEMENT C3 76 - 169 mg/dL -  - -   COMPLEMENT C4 15 - 50 mg/dL - - -   SED RATE 0 - 20 mm/h - 30(H) -   CRP, INFLAMMATION 0.0 - 8.0 mg/L - 44.0(H) -   CK TOTAL 30 - 225 U/L - 62 -   RHEUMATOID FACTOR <20 IU/mL - - -   JOSE SCREEN BY EIA <1.0 - - -   AST 0 - 45 U/L - - 13   ALT 0 - 50 U/L - - 17   ALBUMIN 3.4 - 5.0 g/dL - - 3.9   WBC 4.0 - 11.0 10e9/L 9.3 - 14.0(H)   RBC 3.8 - 5.2 10e12/L 3.88 - 4.12   HGB 11.7 - 15.7 g/dL 12.9 - 13.5   HCT 35.0 - 47.0 % 39.1 - 41.3   MCV 78 - 100 fl 101(H) - 100   MCHC 31.5 - 36.5 g/dL 33.0 - 32.7   RDW 10.0 - 15.0 % 12.2 - 12.5    - 450 10e9/L 298 - 261   CREATININE 0.52 - 1.04 mg/dL 0.74 0.68 0.82   GFR ESTIMATE, IF BLACK >60 mL/min/[1.73:m2] >90 >90 >90   GFR ESTIMATE >60 mL/min/[1.73:m2] >90 >90 85    - 1,620 mg/dL - - -   HEPATITIS C ANTIBODY NEG - - -       Rheumatoid Factor   Date Value Ref Range Status   07/05/2018 <20 <20 IU/mL Final   ,  ,  ,  ,  ,   SSA (RO) Antibody IgG   Date Value Ref Range Status   02/17/2009 2  Final     Comment:     Reference range: 0 to 40  Unit: AU/mL  (Note)  REFERENCE INTERVALS: SSA (Ro) (WESTLEY) Ab, IgG   29 AU/mL or Less ............. Negative   30 - 40 AU/mL ................ Equivocal   41 AU/mL or Greater .......... Positive    SSA (Ro) antibody is seen in 70-75% of Sjogren syndrome  cases, 30-40% of systemic lupus erythematosus (SLE) and  5-10% of progressive systemic sclerosis (PSS).  Performed by [facility],  [address] [facility] [place] [address] [phone]  [email or web address], Remy Watkins MD- Lab. Director     Scleroderma Antibody IgG   Date Value Ref Range Status   02/17/2009 0  Final     Comment:     Reference range: 0 to 40  Unit: AU/mL  (Note)  REFERENCE INTERVALS: Scleroderma (Scl-70) (WESTLEY) Ab, IgG   29 AU/mL or Less ............. Negative   30 - 40 AU/mL ................ Equivocal   41 AU/mL or Greater .......... Positive    Scleroderma (Scl-70) antibody is seen in 20-60% of  patients with scleroderma and is considered diagnostic  and specific  for scleroderma if it is the only WESTLEY  antibody present. Scl-70 is also seen in approximately  25% of progressive systemic sclerosis (PSS).  Performed by [email or web address],  [address], [facility] [place] [address] [phone]  [email or web address], Remy aWtkins MD - Lab. Director   ,   JOSE interpretation   Date Value Ref Range Status   02/28/2019 Negative NEG^Negative Final     Comment:                                        Reference range:  <1:40  NEGATIVE  1:40 - 1:80  BORDERLINE POSITIVE  >1:80 POSITIVE       ,   JOSE Screen by EIA   Date Value Ref Range Status   11/10/2016 <1.0  Interpretation:  Negative   <1.0 Final   ,  ,  ,  ,  ,  ,  ,  ,   Hep B Surface Agn   Date Value Ref Range Status   03/16/2005 Negative NEG Final   ,  ,  ,  ,  ,  ,  ,  ,  ,  ,  ,   Neutrophil Cytoplasmic IgG Antibody   Date Value Ref Range Status   02/17/2009 <1:20  Final     Comment:     Reference range: <1:20  (Note)  TEST INFORMATION: Anti-Neutrophil Cyto Ab, IgG  Neutrophil Cytoplasmic Antibodies (C-ANCA = granular  cytoplasmic staining, P-ANCA = perinuclear staining) are  found in the serum of over 90% of patients with certain  necrotizing systemic vasculitides, and usually in less  than 5% of patients with collagen vascular disease or  arthritis.                  ANCA'S IN VASCULITIC SYNDROMES                         Approx %      Approx % of                         Pos ANCA    patients demonstrating  ----------------------------------------------------------                (combined patterns)   C Pattern   P Pattern  Wegener's granulomatosis:  -Active Generalized   85 - 92 85 - 90       2 - 5  -Limited forms        60 - 67 85 - 90       2 - 5  -Inactive             30 - 35        85 - 90       2 - 5  Idiopathic Crescentic  Glomerulonephritis      80            some*     majority*  Polyarteritis Nodosa    50             86           14  Churg - Antonina         50             80           20  SLE                   less than  "10     0    greater than 90  Rheumatoid Arthritis  less than 5      0    greater than 90  Sjogren's Syndrome        25           0    greater than 90  ----------------------------------------------------------  *No quantitation in the literature.  Performed by [facility],  [address] [place] [address] [place] [address] [phone]  [email or web address], Remy Watkins MD - Lab. Director                                                      IGG   Date Value Ref Range Status   10/04/2013 854 005 - 1,620 mg/dL Final     Reviewed Rheumatology lab flowsheet      Valencia Ye is a 50 year old female who is being evaluated via a billable video visit.      The patient has been notified of following:     \"This video visit will be conducted via a call between you and your physician/provider. We have found that certain health care needs can be provided without the need for an in-person physical exam.  This service lets us provide the care you need with a video conversation.  If a prescription is necessary we can send it directly to your pharmacy.  If lab work is needed we can place an order for that and you can then stop by our lab to have the test done at a later time.    Video visits are billed at different rates depending on your insurance coverage.  Please reach out to your insurance provider with any questions.    If during the course of the call the physician/provider feels a video visit is not appropriate, you will not be charged for this service.\"    Patient has given verbal consent for Video visit? Yes  How would you like to obtain your AVS? MyChart    Will anyone else be joining your video visit? No        Video-Visit Details    Type of service:  Video Visit    Video Start Time: 12:39 PM  Video End Time: 1:08 PM    Originating Location (pt. Location): Home    Distant Location (provider location):  [facility] [place]     Platform used for Video Visit: Chencho Gallo, " MD

## 2020-12-04 NOTE — PATIENT INSTRUCTIONS
1. Eosinophilic granulomatous polyangiitis: Symptoms of brain fog, cognitive slowing, fatigue, diffuse myalgia remain stable. laboratory studies, imaging  in the past 10 months do not point towards organ injury associated with E GPA.  However symptoms are of sufficient severity that return to previous professional employment is not possible. I agree with continued use of low-dose prednisone, and reserving use of immunomodulatory therapy such as Nucala for recurrence of eosinophilia associated symptoms or disease.    Plan:  1.  Continue prednisone 5 mg every other day alternating with 6 mg every other day.  2.  Check urinalysis, creatinine, CBC, inflammatory markers in late January, early February 2021.

## 2020-12-21 NOTE — TELEPHONE ENCOUNTER
Called pt & offered her apt w/ Dr. Moeller 2/16/18 in [place]. Pt states as long as Dr. Moeller thinks it is ok to wait until April she prefers to wait until April to see Dr. Moeller as she states she is very busy right now. LPenfield RN    Pt confirmed appt, no changes with medication

## 2021-01-06 DIAGNOSIS — D72.18 CHURG-STRAUSS SYNDROME (H): ICD-10-CM

## 2021-01-06 DIAGNOSIS — M30.1 CHURG-STRAUSS SYNDROME (H): ICD-10-CM

## 2021-01-06 DIAGNOSIS — J47.9 BRONCHIECTASIS WITHOUT COMPLICATION (H): ICD-10-CM

## 2021-01-06 RX ORDER — ACETYLCYSTEINE 200 MG/ML
SOLUTION ORAL; RESPIRATORY (INHALATION)
Qty: 200 ML | Refills: 9 | Status: SHIPPED | OUTPATIENT
Start: 2021-01-06 | End: 2022-01-25

## 2021-01-06 RX ORDER — ALBUTEROL SULFATE 0.83 MG/ML
SOLUTION RESPIRATORY (INHALATION)
Qty: 180 ML | Refills: 11 | Status: SHIPPED | OUTPATIENT
Start: 2021-01-06 | End: 2022-04-15

## 2021-01-06 RX ORDER — BUDESONIDE AND FORMOTEROL FUMARATE DIHYDRATE 160; 4.5 UG/1; UG/1
AEROSOL RESPIRATORY (INHALATION)
Qty: 30.6 INHALER | Refills: 3 | Status: SHIPPED | OUTPATIENT
Start: 2021-01-06 | End: 2022-01-13

## 2021-01-21 ENCOUNTER — OFFICE VISIT (OUTPATIENT)
Dept: PULMONOLOGY | Facility: CLINIC | Age: 51
End: 2021-01-21
Attending: INTERNAL MEDICINE
Payer: COMMERCIAL

## 2021-01-21 VITALS
OXYGEN SATURATION: 99 % | SYSTOLIC BLOOD PRESSURE: 125 MMHG | HEART RATE: 69 BPM | BODY MASS INDEX: 20.15 KG/M2 | WEIGHT: 109.5 LBS | DIASTOLIC BLOOD PRESSURE: 79 MMHG | HEIGHT: 62 IN

## 2021-01-21 DIAGNOSIS — J47.9 BRONCHIECTASIS WITHOUT ACUTE EXACERBATION (H): ICD-10-CM

## 2021-01-21 DIAGNOSIS — D72.18 CHURG-STRAUSS SYNDROME (H): Primary | ICD-10-CM

## 2021-01-21 DIAGNOSIS — J84.9 ILD (INTERSTITIAL LUNG DISEASE) (H): Primary | ICD-10-CM

## 2021-01-21 DIAGNOSIS — M30.1 CHURG-STRAUSS SYNDROME (H): Primary | ICD-10-CM

## 2021-01-21 LAB
EXPTIME-PRE: 7.26 SEC
FEF2575-%PRED-PRE: 62 %
FEF2575-PRE: 1.61 L/SEC
FEF2575-PRED: 2.56 L/SEC
FEFMAX-%PRED-PRE: 97 %
FEFMAX-PRE: 6.33 L/SEC
FEFMAX-PRED: 6.5 L/SEC
FEV1-%PRED-PRE: 104 %
FEV1-PRE: 2.58 L
FEV1FEV6-PRE: 68 %
FEV1FEV6-PRED: 83 %
FEV1FVC-PRE: 67 %
FEV1FVC-PRED: 82 %
FIFMAX-PRE: 5.61 L/SEC
FVC-%PRED-PRE: 127 %
FVC-PRE: 3.83 L
FVC-PRED: 3 L

## 2021-01-21 PROCEDURE — G0463 HOSPITAL OUTPT CLINIC VISIT: HCPCS

## 2021-01-21 PROCEDURE — 99213 OFFICE O/P EST LOW 20 MIN: CPT | Mod: 25 | Performed by: INTERNAL MEDICINE

## 2021-01-21 PROCEDURE — 94375 RESPIRATORY FLOW VOLUME LOOP: CPT | Performed by: INTERNAL MEDICINE

## 2021-01-21 RX ORDER — OCTANOIC ACID 100 %
1 LIQUID (ML) MISCELLANEOUS DAILY
Status: ON HOLD | COMMUNITY
Start: 2021-01-21 | End: 2022-06-22

## 2021-01-21 RX ORDER — PSEUDOEPHEDRINE HCL 120 MG
1 TABLET, EXTENDED RELEASE ORAL 2 TIMES DAILY
COMMUNITY
Start: 2021-01-21 | End: 2021-07-19

## 2021-01-21 ASSESSMENT — MIFFLIN-ST. JEOR: SCORE: 1080.1

## 2021-01-21 NOTE — LETTER
1/21/2021         RE: Valencia Ye  [address] [facility] [id number]        Dear Colleague,    Thank you for referring your patient, Valencia Ye, to the [facility] [place]. Please see a copy of my visit note below.    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  The patient had a respiratory illness in February 2020, presumed Covid.  The patient appears to be doing well from a pulmonary standpoint.  She reports very good exercise tolerance.  She is oxygenating well.  PFTs although down slightly from her last visit remain in the high normal range.  She does not appear to be having an exacerbation at this time.  She will continue her current airway clearance therapy with once daily albuterol, Mucomyst and vest therapy.  Will defer management of her eosinophilic granuloma with polyangiitis to her immunologist.  The patient has received her influenza vaccine for this flu season.    Follow-up in 6 months with PFTs.    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  The patient had a respiratory illness in February 2020, presumed Covid as the patient contracted her illness [place]    Since her last visit, the patient had an episode of the right-sided diverticulitis.  This resolved with oral antibiotics.  Breathing is comfortable at rest.  She reports dyspnea with moderate to heavy exertion.  Exercise tolerance is at baseline.  She walks 1.5 miles 4-6 days/week.  Occasionally biking  when the weather is good.  No cough.  She reports sputum production with spicy food, sputum is white.  Otherwise letter no sputum production.  She does that vest therapy once daily for 30 minutes.  No recent fever or chills.  Occasional night sweats.    Review of systems:  Appetite is fair, at baseline.  Chronic fatigue at baseline.  Intermittent ear pain, at baseline.  Occasional palpitations  No nausea, vomiting or diarrhea          Current Outpatient Medications   Medication     acetylcysteine (MUCOMYST) 20 % neb solution     albuterol (PROVENTIL) (2.5 MG/3ML) 0.083% neb solution     ALBUTEROL SULFATE HFA IN     atorvastatin (LIPITOR) 10 MG tablet     Caprylic Acid LIQD     ciprofloxacin-dexamethasone (CIPRODEX) 0.3-0.1 % otic suspension     cycloSPORINE (RESTASIS) 0.05 % ophthalmic emulsion     diazepam (VALIUM) 2 MG tablet     fluticasone (FLONASE) 50 MCG/ACT nasal spray     L-Glutamine 500 MG TABS     Magnesium ([employer] TRIPLE MAGNESIUM COMPLEX) 400 MG CAPS     metoprolol succinate ER (TOPROL-XL) 25 MG 24 hr tablet     Omega-3 Fatty Acids (FISH OIL) 1200 MG capsule     Phosphatidyl Choline 65 MG TABS     predniSONE (DELTASONE) 1 MG tablet     S-Adenosylmethionine (SAME PO)     SYMBICORT 160-4.5 MCG/ACT Inhaler     tiZANidine (ZANAFLEX) 2 MG tablet     vitamin B complex with vitamin C (VITAMIN  B COMPLEX) TABS tablet     vitamin D3 (CHOLECALCIFEROL) 2000 units (50 mcg) tablet     lubiprostone (AMITIZA) 8 MCG capsule     No current facility-administered medications for this visit.      Allergies   Allergen Reactions     Colistimethate Anaphylaxis     Colymycin M [Colistimethate Sodium] Anaphylaxis     Tamiflu [Oseltamivir]      Gums Blister up     Azithromycin Palpitations     Heart palpitation  Heart palpitation     Clindamycin Rash     Past Medical History:   Diagnosis Date     Anxiety      Aortitis (H)      Asthma     associated with Churg Antonina syndrome     Bronchiectasis (H)      Cerebral infarction (H)  1990    Very small, caused small permanent optical migraine     Chronic sinusitis      Churg-Antonina syndrome (H)     dx      Conductive hearing loss      Depressive disorder      Eustachian tube dysfunction      Goiter      Hearing loss, conductive      Heart rate problem      Hypertension      Hypocalcemia      Immunosuppression (H)      Irritable bowel syndrome      Major depressive disorder      Mannose-binding lectin deficiency      Nonspecific abnormal results of thyroid function study      Osteoporosis      Pericarditis      and      Pneumonia     4/23/10     Recurrent otitis media      Recurrent UTI      Rheumatoid vasculitis (H)      Sinusitis, chronic      Steroid long-term use      Tinnitus      Varicose veins of leg with swelling        Past Surgical History:   Procedure Laterality Date     C/SECTION, CLASSICAL        SECTION       COLONOSCOPY Left 2014    Procedure: COMBINED COLONOSCOPY, SINGLE OR MULTIPLE BIOPSY/POLYPECTOMY BY BIOPSY;  Surgeon: Js Pinedo MD;  Location:  GI     COLONOSCOPY N/A 2019    Procedure: COLONOSCOPY;  Surgeon: Bryce Pimentel MD;  Location:  GI     ENT SURGERY       GENITOURINARY SURGERY       HC COLP CERVIX/UPPER VAGINA W LOOP ELEC BX CERVIX       HEAD & NECK SURGERY       HYSTERECTOMY  2009    without oopherectomy     HYSTERECTOMY, PAP NO LONGER INDICATED      cervix removed      PE TUBES       PICC INSERTION  10/10/2013    5fr DL PASV PICC, 43cm (1cm external) in the L basilic vein w/ tip in the low SVC.     PICC INSERTION Left 2017    5fr DL BioFlo PICC, 42cm (3cm external) in the L lateral brachial vein w/ tip in the SVC RA junction.     SINUS SURGERY       TUBAL LIGATION       TYMPANOPLASTY         Social History     Socioeconomic History     Marital status:      Spouse name: Not on file     Number of children: Not on file     Years of education: Not on file     Highest education  level: Not on file   Occupational History     Not on file   Social Needs     Financial resource strain: Not on file     Food insecurity     Worry: Not on file     Inability: Not on file     Transportation needs     Medical: Not on file     Non-medical: Not on file   Tobacco Use     Smoking status: Never Smoker     Smokeless tobacco: Never Used   Substance and Sexual Activity     Alcohol use: Yes     Alcohol/week: 0.0 standard drinks     Comment: few drinks a week     Drug use: No     Sexual activity: Yes     Partners: Male     Birth control/protection: Male Surgical, Female Surgical   Lifestyle     Physical activity     Days per week: Not on file     Minutes per session: Not on file     Stress: Not on file   Relationships     Social connections     Talks on phone: Not on file     Gets together: Not on file     Attends Quaker service: Not on file     Active member of club or organization: Not on file     Attends meetings of clubs or organizations: Not on file     Relationship status: Not on file     Intimate partner violence     Fear of current or ex partner: Not on file     Emotionally abused: Not on file     Physically abused: Not on file     Forced sexual activity: Not on file   Other Topics Concern     Parent/sibling w/ CABG, MI or angioplasty before 65F 55M? Not Asked      Service No     Blood Transfusions No     Caffeine Concern No     Occupational Exposure No     Hobby Hazards No     Sleep Concern No     Stress Concern Yes     Weight Concern No     Special Diet Yes     Comment: IBS diet     Back Care No     Exercise Not Asked     Comment: 1 mile power walk 5 days a week at least.      Bike Helmet Not Asked     Seat Belt Yes     Self-Exams Yes   Social History Narrative    [employer] in Radiology Department.  .  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  Non smoker. No smokers at home.  Enjoys walking and dancing.  Alcohol--1 to 2 drinks daily.  Few times a year has more than 4 drinks in a  "day.  No illicits.                         /79   Pulse 69   Ht 1.575 m (5' 2.01\")   Wt 49.7 kg (109 lb 8 oz)   LMP 02/01/2009   SpO2 99%   BMI 20.02 kg/m    Exam:   GENERAL APPEARANCE: Well developed, well nourished, alert, and in no apparent distress.  EYES: PERRL, EOMI  HENT: Nasal mucosa with no edema and no hyperemia. No nasal polyps.  EARS: Canals clear, TMs scarred and perforated bilaterally, unchanged from previous.  MOUTH: Oral mucosa is moist, without any lesions, no tonsillar enlargement, no oropharyngeal exudate.  NECK: supple, no masses, no thyromegaly.  LYMPHATICS: No significant axillary, cervical, or supraclavicular nodes.  RESP: normal percussion, good air flow throughout.  No crackles. No rhonchi. No wheezes.  CV: Normal S1, S2, regular rhythm, normal rate. No murmur.  No rub. No gallop. No LE edema.   ABDOMEN:  Bowel sounds normal, soft, nontender, no HSM or masses.   MS: extremities normal. (+) clubbing. No cyanosis.  SKIN: no rash on limited exam  NEURO: Mentation intact, speech normal, normal strength and tone, normal gait and stance  PSYCH: mentation appears normal. and affect normal/bright  Results:  Results for NABOR BUENROSTRO (MRN [id number]) as of 1/28/2021 17:38   Ref. Range 1/21/2021 09:49   FVC-Pred Latest Units: L 3.00   FVC-Pre Latest Units: L 3.83   FVC-%Pred-Pre Latest Units: % 127   FEV1-Pre Latest Units: L 2.58   FEV1-%Pred-Pre Latest Units: % 104   FEV1FVC-Pred Latest Units: % 82   FEV1FVC-Pre Latest Units: % 67   FEV1FEV6-Pred Latest Units: % 83   FEV1FEV6-Pre Latest Units: % 68   FEFMax-Pred Latest Units: L/sec 6.50   FEFMax-Pre Latest Units: L/sec 6.33   FEFMax-%Pred-Pre Latest Units: % 97   FEF2575-Pred Latest Units: L/sec 2.56   FEF2575-Pre Latest Units: L/sec 1.61   [id number]-%Pred-Pre Latest Units: % 62   FIFMax-Pre Latest Units: L/sec 5.61   ExpTime-Pre Latest Units: sec 7.26                 Results as noted above.    PFT Interpretation:  Mild obstructive " ventilatory defect (based on FEV1/FVC ratio, shape of flow volume loop and reduced mid flow).  Decreased from previous.  Similar to recent baseline.  Valid Maneuver      Again, thank you for allowing me to participate in the care of your patient.        Sincerely,        Dagoberto Briscoe MD

## 2021-01-22 ENCOUNTER — MYC MEDICAL ADVICE (OUTPATIENT)
Dept: INTERNAL MEDICINE | Facility: CLINIC | Age: 51
End: 2021-01-22

## 2021-01-26 ENCOUNTER — TELEPHONE (OUTPATIENT)
Dept: INTERNAL MEDICINE | Facility: CLINIC | Age: 51
End: 2021-01-26

## 2021-01-26 DIAGNOSIS — K58.1 IRRITABLE BOWEL SYNDROME WITH CONSTIPATION: ICD-10-CM

## 2021-01-26 RX ORDER — LUBIPROSTONE 8 UG/1
1 CAPSULE, GELATIN COATED ORAL 2 TIMES DAILY
Qty: 180 CAPSULE | Refills: 3 | Status: SHIPPED | OUTPATIENT
Start: 2021-01-26 | End: 2021-03-14 | Stop reason: SINTOL

## 2021-01-27 ENCOUNTER — MYC MEDICAL ADVICE (OUTPATIENT)
Dept: INTERNAL MEDICINE | Facility: CLINIC | Age: 51
End: 2021-01-27

## 2021-01-27 DIAGNOSIS — K58.1 IRRITABLE BOWEL SYNDROME WITH CONSTIPATION: Primary | ICD-10-CM

## 2021-01-27 NOTE — TELEPHONE ENCOUNTER
Advised patient that the insurances advising us Amitiza is not covered, she may want to check and see if there is an alternative they cover such as Linzess, whether they would allow a prior authorization and let's we may have to document that she tried in order to get a prior authorization.    She has not been able to manage this problem with MiraLAX or other laxatives due to excessive diarrhea and it is worked very well for her.

## 2021-01-28 NOTE — PROGRESS NOTES
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  The patient had a respiratory illness in February 2020, presumed Covid.  The patient appears to be doing well from a pulmonary standpoint.  She reports very good exercise tolerance.  She is oxygenating well.  PFTs although down slightly from her last visit remain in the high normal range.  She does not appear to be having an exacerbation at this time.  She will continue her current airway clearance therapy with once daily albuterol, Mucomyst and vest therapy.  Will defer management of her eosinophilic granuloma with polyangiitis to her immunologist.  The patient has received her influenza vaccine for this flu season.    Follow-up in 6 months with PFTs.    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  The patient had a respiratory illness in February 2020, presumed Covid as the patient contracted her illness [place]    Since her last visit, the patient had an episode of the right-sided diverticulitis.  This resolved with oral antibiotics.  Breathing is comfortable at rest.  She reports dyspnea with moderate to heavy exertion.  Exercise tolerance is at baseline.  She walks 1.5 miles 4-6 days/week.  Occasionally biking when the weather is good.  No cough.  She reports sputum production with spicy food, sputum is white.  Otherwise letter no sputum production.  She does that vest therapy once daily for 30 minutes.  No recent fever or chills.  Occasional night sweats.    Review of systems:  Appetite is fair, at  baseline.  Chronic fatigue at baseline.  Intermittent ear pain, at baseline.  Occasional palpitations  No nausea, vomiting or diarrhea          Current Outpatient Medications   Medication     acetylcysteine (MUCOMYST) 20 % neb solution     albuterol (PROVENTIL) (2.5 MG/3ML) 0.083% neb solution     ALBUTEROL SULFATE HFA IN     atorvastatin (LIPITOR) 10 MG tablet     Caprylic Acid LIQD     ciprofloxacin-dexamethasone (CIPRODEX) 0.3-0.1 % otic suspension     cycloSPORINE (RESTASIS) 0.05 % ophthalmic emulsion     diazepam (VALIUM) 2 MG tablet     fluticasone (FLONASE) 50 MCG/ACT nasal spray     L-Glutamine 500 MG TABS     Magnesium ([employer] TRIPLE MAGNESIUM COMPLEX) 400 MG CAPS     metoprolol succinate ER (TOPROL-XL) 25 MG 24 hr tablet     Omega-3 Fatty Acids (FISH OIL) 1200 MG capsule     Phosphatidyl Choline 65 MG TABS     predniSONE (DELTASONE) 1 MG tablet     S-Adenosylmethionine (SAME PO)     SYMBICORT 160-4.5 MCG/ACT Inhaler     tiZANidine (ZANAFLEX) 2 MG tablet     vitamin B complex with vitamin C (VITAMIN  B COMPLEX) TABS tablet     vitamin D3 (CHOLECALCIFEROL) 2000 units (50 mcg) tablet     lubiprostone (AMITIZA) 8 MCG capsule     No current facility-administered medications for this visit.      Allergies   Allergen Reactions     Colistimethate Anaphylaxis     Colymycin M [Colistimethate Sodium] Anaphylaxis     Tamiflu [Oseltamivir]      Gums Blister up     Azithromycin Palpitations     Heart palpitation  Heart palpitation     Clindamycin Rash     Past Medical History:   Diagnosis Date     Anxiety      Aortitis (H)      Asthma     associated with Churg Antonina syndrome     Bronchiectasis (H)      Cerebral infarction (H) August 1990    Very small, caused small permanent optical migraine     Chronic sinusitis      Churg-Antonina syndrome (H)     dx 1990     Conductive hearing loss      Depressive disorder      Eustachian tube dysfunction      Goiter      Hearing loss, conductive      Heart rate problem       Hypertension      Hypocalcemia      Immunosuppression (H)      Irritable bowel syndrome      Major depressive disorder      Mannose-binding lectin deficiency      Nonspecific abnormal results of thyroid function study      Osteoporosis      Pericarditis      and      Pneumonia     4/23/10     Recurrent otitis media      Recurrent UTI      Rheumatoid vasculitis (H)      Sinusitis, chronic      Steroid long-term use      Tinnitus      Varicose veins of leg with swelling        Past Surgical History:   Procedure Laterality Date     C/SECTION, CLASSICAL        SECTION       COLONOSCOPY Left 2014    Procedure: COMBINED COLONOSCOPY, SINGLE OR MULTIPLE BIOPSY/POLYPECTOMY BY BIOPSY;  Surgeon: Js Pinedo MD;  Location:  GI     COLONOSCOPY N/A 2019    Procedure: COLONOSCOPY;  Surgeon: Bryce Pimentel MD;  Location:  GI     ENT SURGERY       GENITOURINARY SURGERY       HC COLP CERVIX/UPPER VAGINA W LOOP ELEC BX CERVIX       HEAD & NECK SURGERY       HYSTERECTOMY  2009    without oopherectomy     HYSTERECTOMY, PAP NO LONGER INDICATED      cervix removed      PE TUBES       PICC INSERTION  10/10/2013    5fr DL PASV PICC, 43cm (1cm external) in the L basilic vein w/ tip in the low SVC.     PICC INSERTION Left 2017    5fr DL BioFlo PICC, 42cm (3cm external) in the L lateral brachial vein w/ tip in the SVC RA junction.     SINUS SURGERY       TUBAL LIGATION       TYMPANOPLASTY         Social History     Socioeconomic History     Marital status:      Spouse name: Not on file     Number of children: Not on file     Years of education: Not on file     Highest education level: Not on file   Occupational History     Not on file   Social Needs     Financial resource strain: Not on file     Food insecurity     Worry: Not on file     Inability: Not on file     Transportation needs     Medical: Not on file     Non-medical: Not on file   Tobacco Use     Smoking  "status: Never Smoker     Smokeless tobacco: Never Used   Substance and Sexual Activity     Alcohol use: Yes     Alcohol/week: 0.0 standard drinks     Comment: few drinks a week     Drug use: No     Sexual activity: Yes     Partners: Male     Birth control/protection: Male Surgical, Female Surgical   Lifestyle     Physical activity     Days per week: Not on file     Minutes per session: Not on file     Stress: Not on file   Relationships     Social connections     Talks on phone: Not on file     Gets together: Not on file     Attends Hoahaoism service: Not on file     Active member of club or organization: Not on file     Attends meetings of clubs or organizations: Not on file     Relationship status: Not on file     Intimate partner violence     Fear of current or ex partner: Not on file     Emotionally abused: Not on file     Physically abused: Not on file     Forced sexual activity: Not on file   Other Topics Concern     Parent/sibling w/ CABG, MI or angioplasty before 65F 55M? Not Asked      Service No     Blood Transfusions No     Caffeine Concern No     Occupational Exposure No     Hobby Hazards No     Sleep Concern No     Stress Concern Yes     Weight Concern No     Special Diet Yes     Comment: IBS diet     Back Care No     Exercise Not Asked     Comment: 1 mile power walk 5 days a week at least.      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  Non smoker. No smokers at home.  Enjoys walking and dancing.  Alcohol--1 to 2 drinks daily.  Few times a year has more than 4 drinks in a day.  No illicits.                         /79   Pulse 69   Ht 1.575 m (5' 2.01\")   Wt 49.7 kg (109 lb 8 oz)   LMP 02/01/2009   SpO2 99%   BMI 20.02 kg/m    Exam:   GENERAL APPEARANCE: Well developed, well nourished, alert, and in no apparent distress.  EYES: PERRL, EOMI  HENT: " Nasal mucosa with no edema and no hyperemia. No nasal polyps.  EARS: Canals clear, TMs scarred and perforated bilaterally, unchanged from previous.  MOUTH: Oral mucosa is moist, without any lesions, no tonsillar enlargement, no oropharyngeal exudate.  NECK: supple, no masses, no thyromegaly.  LYMPHATICS: No significant axillary, cervical, or supraclavicular nodes.  RESP: normal percussion, good air flow throughout.  No crackles. No rhonchi. No wheezes.  CV: Normal S1, S2, regular rhythm, normal rate. No murmur.  No rub. No gallop. No LE edema.   ABDOMEN:  Bowel sounds normal, soft, nontender, no HSM or masses.   MS: extremities normal. (+) clubbing. No cyanosis.  SKIN: no rash on limited exam  NEURO: Mentation intact, speech normal, normal strength and tone, normal gait and stance  PSYCH: mentation appears normal. and affect normal/bright  Results:  Results for NABOR BUENROSTRO (MRN [id number]) as of 1/28/2021 17:38   Ref. Range 1/21/2021 09:49   FVC-Pred Latest Units: L 3.00   FVC-Pre Latest Units: L 3.83   FVC-%Pred-Pre Latest Units: % 127   FEV1-Pre Latest Units: L 2.58   FEV1-%Pred-Pre Latest Units: % 104   FEV1FVC-Pred Latest Units: % 82   FEV1FVC-Pre Latest Units: % 67   FEV1FEV6-Pred Latest Units: % 83   FEV1FEV6-Pre Latest Units: % 68   FEFMax-Pred Latest Units: L/sec 6.50   FEFMax-Pre Latest Units: L/sec 6.33   FEFMax-%Pred-Pre Latest Units: % 97   FEF2575-Pred Latest Units: L/sec 2.56   FEF2575-Pre Latest Units: L/sec 1.61   YPU2084-%Pred-Pre Latest Units: % 62   FIFMax-Pre Latest Units: L/sec 5.61   ExpTime-Pre Latest Units: sec 7.26                 Results as noted above.    PFT Interpretation:  Mild obstructive ventilatory defect (based on FEV1/FVC ratio, shape of flow volume loop and reduced mid flow).  Decreased from previous.  Similar to recent baseline.  Valid Maneuver

## 2021-01-28 NOTE — TELEPHONE ENCOUNTER
ANDREA - Here is patient's response:    I'll check with insurance. Miralax doesn't work - it doesn't give me diarrhea, it just makes the stool that I'm not moving wet, sticky, and bulky and bloats my belly. My problem isn't a low fiber and low fluid diet (I'm high fiber, super hydrated) my peristalsis is dysfunctional. Other laxatives do zero. Amitiza is the only thing that's worked. Sigh. Thanks for looking into this and enduring my venting. I'll let you know what insurance tells me - Valencia

## 2021-01-29 RX ORDER — LUBIPROSTONE 8 UG/1
8 CAPSULE ORAL 2 TIMES DAILY
Qty: 180 CAPSULE | Refills: 3 | Status: SHIPPED | OUTPATIENT
Start: 2021-01-29 | End: 2022-05-19

## 2021-01-29 RX ORDER — LUBIPROSTONE 8 UG/1
CAPSULE ORAL
Status: CANCELLED | OUTPATIENT
Start: 2021-01-29

## 2021-01-29 RX ORDER — LUBIPROSTONE 24 UG/1
24 CAPSULE ORAL 2 TIMES DAILY WITH MEALS
Status: CANCELLED | OUTPATIENT
Start: 2021-01-29

## 2021-01-29 NOTE — TELEPHONE ENCOUNTER
Please see my chart message and advise.  Thanks    Myc message sent to patient.     Pended both doses of medication.  Please choose dose and complete.  Thanks

## 2021-02-05 ENCOUNTER — MYC MEDICAL ADVICE (OUTPATIENT)
Dept: INTERNAL MEDICINE | Facility: CLINIC | Age: 51
End: 2021-02-05

## 2021-03-03 ENCOUNTER — MYC MEDICAL ADVICE (OUTPATIENT)
Dept: INTERNAL MEDICINE | Facility: CLINIC | Age: 51
End: 2021-03-03

## 2021-03-03 DIAGNOSIS — H04.123 DRY EYES: Primary | ICD-10-CM

## 2021-03-04 NOTE — TELEPHONE ENCOUNTER
Please see patient's mychart message below.    Last virtual visit 10-30-20    Please advise, thanks.

## 2021-03-08 ENCOUNTER — OFFICE VISIT (OUTPATIENT)
Dept: INTERNAL MEDICINE | Facility: CLINIC | Age: 51
End: 2021-03-08
Payer: COMMERCIAL

## 2021-03-08 VITALS
WEIGHT: 109.5 LBS | HEART RATE: 72 BPM | BODY MASS INDEX: 19.4 KG/M2 | HEIGHT: 63 IN | OXYGEN SATURATION: 98 % | SYSTOLIC BLOOD PRESSURE: 125 MMHG | RESPIRATION RATE: 20 BRPM | TEMPERATURE: 97.8 F | DIASTOLIC BLOOD PRESSURE: 75 MMHG

## 2021-03-08 DIAGNOSIS — I95.9 HYPOTENSION, UNSPECIFIED HYPOTENSION TYPE: ICD-10-CM

## 2021-03-08 DIAGNOSIS — R53.82 CHRONIC FATIGUE: Primary | ICD-10-CM

## 2021-03-08 PROCEDURE — 99213 OFFICE O/P EST LOW 20 MIN: CPT | Performed by: INTERNAL MEDICINE

## 2021-03-08 RX ORDER — NICOTINE POLACRILEX 2 MG
LOZENGE BUCCAL
COMMUNITY
End: 2021-08-12

## 2021-03-08 ASSESSMENT — MIFFLIN-ST. JEOR: SCORE: 1091.85

## 2021-03-08 NOTE — PROGRESS NOTES
Assessment & Plan     Chronic fatigue  Etiology still unclear, her immunologist and pulmonary specialists do not really have any explanation, no symptoms of infection. Suggest refer endocrinology to be sure we are not missing any problems.   - ENDOCRINOLOGY ADULT REFERRAL    Hypotension, unspecified hypotension type  Has good BP today, not sure if could be adrenal related though previous labs did not suggest issues. Refer endocrinology.   - ENDOCRINOLOGY ADULT REFERRAL        No follow-ups on file.    Morelia Simental MD  [facility] FLORESITA Birmingham is a 50 year old who presents for the following health issues     HPI       Follow up of fatigue: see previous notes. Her symptoms are not really changing, not improving. She is seeing a therapist to help deal with the mental affects of this, not sure her mood is aggravating it though. She continues to have problems with concentration and focus as described before. Small amounts of activity make her tired.     She is in the process of appealing the social security denial.     She recently has been having lower BP. It has been mostly /60s, rarely up to 120/90, will take 1/2 metoprolol then. It can drop to 80s/50s at times now and that makes her feel even more weak and tired.     Patient Active Problem List   Diagnosis     Goiter     History of systemic steroid therapy     ILD (interstitial lung disease) (H)     Palpitations     Churg-Antonina syndrome (H)     History of eating disorder     Asthma     Anxiety     Chronic fatigue     Mannose-binding lectin deficiency     Benign essential hypertension     Moderate episode of recurrent major depressive disorder (H)     Bronchiectasis without acute exacerbation (H)     Controlled substance agreement signed     Irritable bowel syndrome without diarrhea     Bladder pain     Overactive bladder            Review of Systems   No fever, chills, dyspnea is stable, no new cough, some sleepiness but  "tends to get up early, still some headache, neck soreness, no jaw claudication, no abdominal concerns, no focal neurologic symptoms.       Objective    /75 (BP Location: Left arm, Patient Position: Sitting, Cuff Size: Adult Regular)   Pulse 72   Temp 97.8  F (36.6  C) (Oral)   Resp 20   Ht 1.594 m (5' 2.75\")   Wt 49.7 kg (109 lb 8 oz)   LMP 02/01/2009   SpO2 98%   BMI 19.55 kg/m    Body mass index is 19.55 kg/m .  Physical Exam           Not examined.         "

## 2021-03-18 ENCOUNTER — MYC MEDICAL ADVICE (OUTPATIENT)
Dept: INTERNAL MEDICINE | Facility: CLINIC | Age: 51
End: 2021-03-18

## 2021-03-18 DIAGNOSIS — F39 MOOD DISORDER (H): Primary | ICD-10-CM

## 2021-04-07 ENCOUNTER — VIRTUAL VISIT (OUTPATIENT)
Dept: ENDOCRINOLOGY | Facility: CLINIC | Age: 51
End: 2021-04-07
Payer: COMMERCIAL

## 2021-04-07 DIAGNOSIS — R53.82 CHRONIC FATIGUE: Primary | ICD-10-CM

## 2021-04-07 DIAGNOSIS — Z78.0 MENOPAUSE: ICD-10-CM

## 2021-04-07 PROCEDURE — 99204 OFFICE O/P NEW MOD 45 MIN: CPT | Mod: GT | Performed by: INTERNAL MEDICINE

## 2021-04-07 NOTE — LETTER
"    4/7/2021         RE: Valencia Ye  [address] [facility] [address]        Dear Colleague,    Thank you for referring your patient, Valencia Ye, to the [facility]. Please see a copy of my visit note below.    Valencia is a 50 year old who is being evaluated via a billable video visit.      How would you like to obtain your AVS? Verbally Reviewed  If the video visit is dropped, the invitation should be resent by: Cellphone  Will anyone else be joining your video visit? No      Video Start Time: 3:40 pm    Video-Visit Details    Type of service:  Video Visit    Video End Time: 4:05 pm    Originating Location (pt. Location): home    Distant Location (provider location):  [facility]/home     Platform used for Video Visit:  Parrable        Name: Valencia Ye is a 50 year old woman, seen at the request of Dr. Morelia Simental for evaluation of     Chief Complaint   Patient presents with     Endocrine Problem     Fatigue       HPI:  Recent issues:  Here for evaluation of possible endocrine cause for fatigue, I believe  Reviewed medical history from patient and Epic chart record        Progressive fatigue symptom since 2013  PMHx includes Churg Antonina (C-S) syndrome, COPD and bronchiectasis, depression and anxiety, cerebral infarction  Other symptoms:   Muscle fatigue   Daytime tiredness   Brain \"fog\"    Some upper back pains    No menses since hysterectomy 2009    Chronic use of Prednisone medication since age 16   Began for asthma, then continued when C-S diagnosis   Taking alternating Prednisone dosing 5 mg vs 6 mg daily   Sees Dr. Roshan Gallo/[facility] Rheumatology    3/8/21 Followup evaluation with Dr. STACEY Simental  Concerns about the chronic fatigue and previous hypotension, endocrinology consult placed    FamHx:   Diabetes-   father   Thyroid, Adrenal dis-  none    Recent [facility] labs include:  Lab Results   Component " Value Date    FSH 4.1 2010    ESTROGEN 247 2010    BRENNEN 5.2 10/29/2020    TSH 0.60 2020    T4 1.01 11/10/2016    SG 1.020 10/20/2020     Lab Results   Component Value Date    A1C 5.2 10/02/2019     10/20/2020    POTASSIUM 3.5 10/20/2020    CHLORIDE 106 10/20/2020    CO2 27 10/20/2020    ANIONGAP 6 10/20/2020    GLC 91 10/20/2020    BUN 8 10/20/2020    CR 0.82 10/20/2020    GFRESTIMATED 85 10/20/2020    GFRESTBLACK >90 10/20/2020    BOSSMAN 8.7 10/20/2020    CHOL 125 10/29/2020    TRIG 40 10/29/2020    HDL 83 10/29/2020    LDL 34 10/29/2020    NHDL 42 10/29/2020    UCRR 49 2005         Lives in [place], [place]  Sees Dr. Morelia Simental/[facility] for general medicine evaluations.    PMH/PSH:  Past Medical History:   Diagnosis Date     Anxiety      Aortitis (H)      Asthma     associated with Churg Antonina syndrome     Bronchiectasis (H)      Cerebral infarction (H) 1990    Very small, caused small permanent optical migraine     Chronic sinusitis      Churg-Antonina syndrome (H)     dx      Conductive hearing loss      Depressive disorder      Eustachian tube dysfunction      Goiter      Hearing loss, conductive      Heart rate problem      Hypertension      Hypocalcemia      Immunosuppression (H)      Irritable bowel syndrome      Major depressive disorder      Mannose-binding lectin deficiency      Nonspecific abnormal results of thyroid function study      Osteoporosis      Pericarditis      and      Pneumonia     4/23/10     Recurrent otitis media      Recurrent UTI      Rheumatoid vasculitis (H)      Sinusitis, chronic      Steroid long-term use      Tinnitus      Varicose veins of leg with swelling      Past Surgical History:   Procedure Laterality Date     C/SECTION, CLASSICAL  1998      SECTION       COLONOSCOPY Left 2014    Procedure: COMBINED COLONOSCOPY, SINGLE OR MULTIPLE BIOPSY/POLYPECTOMY BY BIOPSY;  Surgeon: Js Pinedo  Parker DELGADO MD;  Location:  GI     COLONOSCOPY N/A 12/20/2019    Procedure: COLONOSCOPY;  Surgeon: Bryce Pimentel MD;  Location:  GI     ENT SURGERY       GENITOURINARY SURGERY       HC COLP CERVIX/UPPER VAGINA W LOOP ELEC BX CERVIX       HEAD & NECK SURGERY       HYSTERECTOMY  2/27/2009    without oopherectomy     HYSTERECTOMY, PAP NO LONGER INDICATED      cervix removed      PE TUBES       PICC INSERTION  10/10/2013    5fr DL PASV PICC, 43cm (1cm external) in the L basilic vein w/ tip in the low SVC.     PICC INSERTION Left 05/26/2017    5fr DL BioFlo PICC, 42cm (3cm external) in the L lateral brachial vein w/ tip in the SVC RA junction.     SINUS SURGERY       TUBAL LIGATION       TYMPANOPLASTY         Family Hx:  Family History   Problem Relation Age of Onset     Allergies Mother         Seasonal     Alcohol/Drug Mother      Substance Abuse Mother      Depression Mother      C.A.D. Father      Diabetes Father         Type 2     Depression Father      Heart Disease Father      C.A.D. Maternal Grandmother      Arthritis Maternal Grandmother      Musculoskeletal Disorder Maternal Grandmother         MS     Heart Disease Maternal Grandmother      Hypertension Maternal Grandmother      Alcohol/Drug Maternal Grandfather      Substance Abuse Maternal Grandfather      Depression Maternal Grandfather      Asthma Son      Asthma Daughter      Allergies Daughter         Seasonal     Unknown/Adopted Paternal Grandmother      Unknown/Adopted Paternal Grandfather      Cancer Maternal Aunt         breast age 53     Breast Cancer Maternal Aunt         in her 50s     Glaucoma No family hx of      Macular Degeneration No family hx of      Colon Cancer No family hx of          Social Hx:  Social History     Socioeconomic History     Marital status:      Spouse name: Not on file     Number of children: Not on file     Years of education: Not on file     Highest education level: Not on file   Occupational History      Not on file   Social Needs     Financial resource strain: Not on file     Food insecurity     Worry: Not on file     Inability: Not on file     Transportation needs     Medical: Not on file     Non-medical: Not on file   Tobacco Use     Smoking status: Never Smoker     Smokeless tobacco: Never Used   Substance and Sexual Activity     Alcohol use: Yes     Alcohol/week: 0.0 standard drinks     Comment: few drinks a week     Drug use: No     Sexual activity: Yes     Partners: Male     Birth control/protection: Male Surgical, Female Surgical   Lifestyle     Physical activity     Days per week: Not on file     Minutes per session: Not on file     Stress: Not on file   Relationships     Social connections     Talks on phone: Not on file     Gets together: Not on file     Attends Sikhism service: Not on file     Active member of club or organization: Not on file     Attends meetings of clubs or organizations: Not on file     Relationship status: Not on file     Intimate partner violence     Fear of current or ex partner: Not on file     Emotionally abused: Not on file     Physically abused: Not on file     Forced sexual activity: Not on file   Other Topics Concern     Parent/sibling w/ CABG, MI or angioplasty before 65F 55M? Not Asked      Service No     Blood Transfusions No     Caffeine Concern No     Occupational Exposure No     Hobby Hazards No     Sleep Concern No     Stress Concern Yes     Weight Concern No     Special Diet Yes     Comment: IBS diet     Back Care No     Exercise Not Asked     Comment: 1 mile power walk 5 days a week at least.      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  Non smoker. No smokers at home.  Enjoys walking and dancing.  Alcohol--1 to 2 drinks daily.  Few times a year has more than 4 drinks in a day.  No illicits.                           MEDICATIONS:  has a current medication list which includes the following prescription(s): methyl folate, acetylcysteine, albuterol, albuterol sulfate, ascorbic acid, atorvastatin, betaine (trimethylglycine), caprylic acid, ciprodex, b-12, cyclosporine, diazepam, dietary management product, fluticasone, l-glutamine, cvs triple magnesium complex, metoprolol succinate er, fish oil, phosphatidyl choline, prednisone, s-adenosylmethionine, symbicort, tizanidine, vitamin b complex with vitamin c, vitamin d3, and lubiprostone.    ROS:     ROS: 10 point ROS neg other than the symptoms noted above in the HPI.    GENERAL: chronic fatigue as noted, wt stable; denies fevers, chills, night sweats.    HEENT: no dysphagia, odonophagia, diplopia, neck pain  THYROID:  no apparent hyper or hypothyroid symptoms  CV: no chest pain, pressure, palpitations  LUNGS: Some MG; no SOB, cough, wheezing   ABDOMEN: no diarrhea, constipation, abdominal pain  EXTREMITIES: no rashes, ulcers, edema  NEUROLOGY: occasional dizziness; no headaches, denies changes in vision, tingling, extremitiy numbness   MSK: muscle weakness after activity or prolonged standing, some back pains  SKIN: no rashes or lesions  : no menses since hysterectomy 2009, denies hot flashes  PSYCH:  stable mood, no significant anxiety or depression  ENDOCRINE: no heat or cold intolerance    Physical Exam (visual exam)  VS:  no vital signs taken for video visit  CONSTITUTIONAL: healthy, alert and NAD, well dressed, answering questions appropriately  ENT: no nose swelling or nasal discharge, mouth redness or gum changes.  EYES: eyes grossly normal to inspection, conjunctivae and sclerae normal, no exophthalmos or proptosis  THYROID:  no apparent nodules or goiter  LUNGS: no audible wheeze, cough or visible cyanosis, no visible retractions or increased work of breathing  ABDOMEN: abdomen not evaluated  EXTREMITIES: no hand tremors, limited exam  NEUROLOGY: CN grossly intact,  mentation intact and speech normal   SKIN:  no apparent skin lesions, rash, or edema with visualized skin appearance  PSYCH: mentation appears normal, affect normal/bright, judgement and insight intact,   normal speech and appearance well groomed    LABS:    All pertinent notes, labs, and images personally reviewed by me.     A/P:  Encounter Diagnoses   Name Primary?     Chronic fatigue Yes     Menopause        Comments:  Reviewed health history and general endocrinology issues  Fatigue is a common and non-specific symptom with many potential causes  Advised repeating several endocrine related lab test  Reviewed and interpreted tests that I previously ordered.   Ordered appropriate tests for the endocrinology disease management.    Management options discussed and implemented after shared medical decision making with the patient.    Plan:  We discussed the patient's recent symptoms and possible endocrine illnesses that may correlate with symptoms.  Reviewed endocrine organs including the thyroid, adrenal, pituitary, and gonads.   Discussed lab tests used to screen for possible underlying hormone diseases that may relate to condition.    Recommend:  Plan lab appointment at her nearest  clinic   Check several hormone tests and calcium level   Lab orders placed  Monitor for symptom changes  Unable to assess adrenal gland function while on Prednisone medication   Check with rheumatologist regarding the Prednisone dose plan, options for temporary trial of higher dose for symptomatic relief   No thyroid, adrenal, or pituitary imaging needed at this time  Will summarize lab results with FundRazr message when available    Keep scheduled f/u evaluations with pulmonologist, immunologist, cardiologist, PCP  Likely due for followup DEXA scan, will defer to PCP  Addressed patient questions today    Total time spent in with the patient evaluation:  25 min  Additional time spent reviewing pertinent lab tests and chart notes, and  documentation:  20 min    Follow-up:  michael Bernstein MD, MS  Endocrinology  [facility]    CC: CATRACHITO Simental, FAMILIA Gallo, AIME Briscoe           Again, thank you for allowing me to participate in the care of your patient.        Sincerely,        Charbel Bernstein MD

## 2021-04-07 NOTE — PROGRESS NOTES
"Valencia is a 50 year old who is being evaluated via a billable video visit.      How would you like to obtain your AVS? Verbally Reviewed  If the video visit is dropped, the invitation should be resent by: Cellphone  Will anyone else be joining your video visit? No      Video Start Time: 3:40 pm    Video-Visit Details    Type of service:  Video Visit    Video End Time: 4:05 pm    Originating Location (pt. Location): home    Distant Location (provider location):  [facility]/[place]     Platform used for Video Visit:  DDN        Name: Valencia Ye is a 50 year old woman, seen at the request of Dr. Morelia Simental for evaluation of     Chief Complaint   Patient presents with     Endocrine Problem     Fatigue       HPI:  Recent issues:  Here for evaluation of possible endocrine cause for fatigue, I believe  Reviewed medical history from patient and Epic chart record        Progressive fatigue symptom since 2013  PMHx includes Churg Antonina (C-S) syndrome, COPD and bronchiectasis, depression and anxiety, cerebral infarction  Other symptoms:   Muscle fatigue   Daytime tiredness   Brain \"fog\"    Some upper back pains    No menses since hysterectomy 2009    Chronic use of Prednisone medication since age 16   Began for asthma, then continued when C-S diagnosis   Taking alternating Prednisone dosing 5 mg vs 6 mg daily   Sees Dr. Roshan Gallo/[facility] Rheumatology    3/8/21 Followup evaluation with Dr. STACEY Simental  Concerns about the chronic fatigue and previous hypotension, endocrinology consult placed    FamHx:   Diabetes-   father   Thyroid, Adrenal dis-  none    Recent [facility] labs include:  Lab Results   Component Value Date    FSH 4.1 11/29/2010    ESTROGEN 247 11/29/2010    BRENNEN 5.2 10/29/2020    TSH 0.60 09/21/2020    T4 1.01 11/10/2016    SG 1.020 10/20/2020     Lab Results   Component Value Date    A1C 5.2 10/02/2019     10/20/2020    POTASSIUM 3.5 10/20/2020    CHLORIDE " 106 10/20/2020    CO2 27 10/20/2020    ANIONGAP 6 10/20/2020    GLC 91 10/20/2020    BUN 8 10/20/2020    CR 0.82 10/20/2020    GFRESTIMATED 85 10/20/2020    GFRESTBLACK >90 10/20/2020    BOSSMAN 8.7 10/20/2020    CHOL 125 10/29/2020    TRIG 40 10/29/2020    HDL 83 10/29/2020    LDL 34 10/29/2020    NHDL 42 10/29/2020    UCRR 49 2005         Lives in [place], [place]  Sees Dr. Morelia Simental/[facility] for general medicine evaluations.    PMH/PSH:  Past Medical History:   Diagnosis Date     Anxiety      Aortitis (H)      Asthma     associated with Churg Antonina syndrome     Bronchiectasis (H)      Cerebral infarction (H) 1990    Very small, caused small permanent optical migraine     Chronic sinusitis      Churg-Antonina syndrome (H)     1990     Conductive hearing loss      Depressive disorder      Eustachian tube dysfunction      Goiter      Hearing loss, conductive      Heart rate problem      Hypertension      Hypocalcemia      Immunosuppression (H)      Irritable bowel syndrome      Major depressive disorder      Mannose-binding lectin deficiency      Nonspecific abnormal results of thyroid function study      Osteoporosis      Pericarditis      and      Pneumonia     4/23/10     Recurrent otitis media      Recurrent UTI      Rheumatoid vasculitis (H)      Sinusitis, chronic      Steroid long-term use      Tinnitus      Varicose veins of leg with swelling      Past Surgical History:   Procedure Laterality Date     C/SECTION, CLASSICAL        SECTION       COLONOSCOPY Left 2014    Procedure: COMBINED COLONOSCOPY, SINGLE OR MULTIPLE BIOPSY/POLYPECTOMY BY BIOPSY;  Surgeon: Js Pinedo MD;  Location:  GI     COLONOSCOPY N/A 2019    Procedure: COLONOSCOPY;  Surgeon: Bryce Pimentel MD;  Location:  GI     ENT SURGERY       GENITOURINARY SURGERY       HC COLP CERVIX/UPPER VAGINA W LOOP ELEC BX CERVIX       HEAD & NECK SURGERY        HYSTERECTOMY  2/27/2009    without oopherectomy     HYSTERECTOMY, PAP NO LONGER INDICATED      cervix removed      PE TUBES       PICC INSERTION  10/10/2013    5fr DL PASV PICC, 43cm (1cm external) in the L basilic vein w/ tip in the low SVC.     PICC INSERTION Left 05/26/2017    5fr DL BioFlo PICC, 42cm (3cm external) in the L lateral brachial vein w/ tip in the SVC RA junction.     SINUS SURGERY       TUBAL LIGATION       TYMPANOPLASTY         Family Hx:  Family History   Problem Relation Age of Onset     Allergies Mother         Seasonal     Alcohol/Drug Mother      Substance Abuse Mother      Depression Mother      C.A.D. Father      Diabetes Father         Type 2     Depression Father      Heart Disease Father      C.A.D. Maternal Grandmother      Arthritis Maternal Grandmother      Musculoskeletal Disorder Maternal Grandmother         MS     Heart Disease Maternal Grandmother      Hypertension Maternal Grandmother      Alcohol/Drug Maternal Grandfather      Substance Abuse Maternal Grandfather      Depression Maternal Grandfather      Asthma Son      Asthma Daughter      Allergies Daughter         Seasonal     Unknown/Adopted Paternal Grandmother      Unknown/Adopted Paternal Grandfather      Cancer Maternal Aunt         breast age 53     Breast Cancer Maternal Aunt         in her 50s     Glaucoma No family hx of      Macular Degeneration No family hx of      Colon Cancer No family hx of          Social Hx:  Social History     Socioeconomic History     Marital status:      Spouse name: Not on file     Number of children: Not on file     Years of education: Not on file     Highest education level: Not on file   Occupational History     Not on file   Social Needs     Financial resource strain: Not on file     Food insecurity     Worry: Not on file     Inability: Not on file     Transportation needs     Medical: Not on file     Non-medical: Not on file   Tobacco Use     Smoking status: Never Smoker      Smokeless tobacco: Never Used   Substance and Sexual Activity     Alcohol use: Yes     Alcohol/week: 0.0 standard drinks     Comment: few drinks a week     Drug use: No     Sexual activity: Yes     Partners: Male     Birth control/protection: Male Surgical, Female Surgical   Lifestyle     Physical activity     Days per week: Not on file     Minutes per session: Not on file     Stress: Not on file   Relationships     Social connections     Talks on phone: Not on file     Gets together: Not on file     Attends Zoroastrianism service: Not on file     Active member of club or organization: Not on file     Attends meetings of clubs or organizations: Not on file     Relationship status: Not on file     Intimate partner violence     Fear of current or ex partner: Not on file     Emotionally abused: Not on file     Physically abused: Not on file     Forced sexual activity: Not on file   Other Topics Concern     Parent/sibling w/ CABG, MI or angioplasty before 65F 55M? Not Asked      Service No     Blood Transfusions No     Caffeine Concern No     Occupational Exposure No     Hobby Hazards No     Sleep Concern No     Stress Concern Yes     Weight Concern No     Special Diet Yes     Comment: IBS diet     Back Care No     Exercise Not Asked     Comment: 1 mile power walk 5 days a week at least.      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  .  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  Non smoker. No smokers at home.  Enjoys walking and dancing.  Alcohol--1 to 2 drinks daily.  Few times a year has more than 4 drinks in a day.  No illicits.                          MEDICATIONS:  has a current medication list which includes the following prescription(s): methyl folate, acetylcysteine, albuterol, albuterol sulfate, ascorbic acid, atorvastatin, betaine (trimethylglycine), caprylic acid, ciprodex, b-12, cyclosporine, diazepam, dietary  management product, fluticasone, l-glutamine, cvs triple magnesium complex, metoprolol succinate er, fish oil, phosphatidyl choline, prednisone, s-adenosylmethionine, symbicort, tizanidine, vitamin b complex with vitamin c, vitamin d3, and lubiprostone.    ROS:     ROS: 10 point ROS neg other than the symptoms noted above in the HPI.    GENERAL: chronic fatigue as noted, wt stable; denies fevers, chills, night sweats.    HEENT: no dysphagia, odonophagia, diplopia, neck pain  THYROID:  no apparent hyper or hypothyroid symptoms  CV: no chest pain, pressure, palpitations  LUNGS: Some MG; no SOB, cough, wheezing   ABDOMEN: no diarrhea, constipation, abdominal pain  EXTREMITIES: no rashes, ulcers, edema  NEUROLOGY: occasional dizziness; no headaches, denies changes in vision, tingling, extremitiy numbness   MSK: muscle weakness after activity or prolonged standing, some back pains  SKIN: no rashes or lesions  : no menses since hysterectomy 2009, denies hot flashes  PSYCH:  stable mood, no significant anxiety or depression  ENDOCRINE: no heat or cold intolerance    Physical Exam (visual exam)  VS:  no vital signs taken for video visit  CONSTITUTIONAL: healthy, alert and NAD, well dressed, answering questions appropriately  ENT: no nose swelling or nasal discharge, mouth redness or gum changes.  EYES: eyes grossly normal to inspection, conjunctivae and sclerae normal, no exophthalmos or proptosis  THYROID:  no apparent nodules or goiter  LUNGS: no audible wheeze, cough or visible cyanosis, no visible retractions or increased work of breathing  ABDOMEN: abdomen not evaluated  EXTREMITIES: no hand tremors, limited exam  NEUROLOGY: CN grossly intact, mentation intact and speech normal   SKIN:  no apparent skin lesions, rash, or edema with visualized skin appearance  PSYCH: mentation appears normal, affect normal/bright, judgement and insight intact,   normal speech and appearance well groomed    LABS:    All pertinent  notes, labs, and images personally reviewed by me.     A/P:  Encounter Diagnoses   Name Primary?     Chronic fatigue Yes     Menopause        Comments:  Reviewed health history and general endocrinology issues  Fatigue is a common and non-specific symptom with many potential causes  Advised repeating several endocrine related lab test  Reviewed and interpreted tests that I previously ordered.   Ordered appropriate tests for the endocrinology disease management.    Management options discussed and implemented after shared medical decision making with the patient.    Plan:  We discussed the patient's recent symptoms and possible endocrine illnesses that may correlate with symptoms.  Reviewed endocrine organs including the thyroid, adrenal, pituitary, and gonads.   Discussed lab tests used to screen for possible underlying hormone diseases that may relate to condition.    Recommend:  Plan lab appointment at her nearest  clinic   Check several hormone tests and calcium level   Lab orders placed  Monitor for symptom changes  Unable to assess adrenal gland function while on Prednisone medication   Check with rheumatologist regarding the Prednisone dose plan, options for temporary trial of higher dose for symptomatic relief   No thyroid, adrenal, or pituitary imaging needed at this time  Will summarize lab results with Lessno message when available    Keep scheduled f/u evaluations with pulmonologist, immunologist, cardiologist, PCP  Likely due for followup DEXA scan, will defer to PCP  Addressed patient questions today    Total time spent in with the patient evaluation:  25 min  Additional time spent reviewing pertinent lab tests and chart notes, and documentation:  20 min    Follow-up:  michael Bernstein MD, MS  Endocrinology  [facility]    CC: CATRACHITO Simental, FAMILIA Gallo, AIME Briscoe

## 2021-04-08 ENCOUNTER — MYC MEDICAL ADVICE (OUTPATIENT)
Dept: PULMONOLOGY | Facility: CLINIC | Age: 51
End: 2021-04-08

## 2021-04-08 DIAGNOSIS — Z20.822 PERSON UNDER INVESTIGATION FOR COVID-19: ICD-10-CM

## 2021-04-08 DIAGNOSIS — Z20.822 EXPOSURE TO COVID-19 VIRUS: ICD-10-CM

## 2021-04-08 DIAGNOSIS — J47.9 BRONCHIECTASIS WITHOUT ACUTE EXACERBATION (H): Primary | ICD-10-CM

## 2021-04-08 PROCEDURE — U0003 INFECTIOUS AGENT DETECTION BY NUCLEIC ACID (DNA OR RNA); SEVERE ACUTE RESPIRATORY SYNDROME CORONAVIRUS 2 (SARS-COV-2) (CORONAVIRUS DISEASE [COVID-19]), AMPLIFIED PROBE TECHNIQUE, MAKING USE OF HIGH THROUGHPUT TECHNOLOGIES AS DESCRIBED BY CMS-2020-01-R: HCPCS | Performed by: PHYSICIAN ASSISTANT

## 2021-04-08 PROCEDURE — U0005 INFEC AGEN DETEC AMPLI PROBE: HCPCS | Performed by: PHYSICIAN ASSISTANT

## 2021-04-08 NOTE — TELEPHONE ENCOUNTER
Mychart message discussed with Dr. Briscoe. Plan as follows:    Patient to be tested for COVID-19. Advise office if symptoms worsen and results of COVID-19 test.    If patient tests positive for COVID-19, Dr. Briscoe recommends monoclonal therapy but would need to be approved by rheumatology team    Start Augmentin 875mg BID x 2 weeks.     Called patient to discuss plan. Patient had virtual visit and virtual provider was placing order for COVID-19 testing. Confirmed pharmacy to send antibiotic and patient will contact office with COVID-19 test results.    Mary Ware RN

## 2021-04-09 LAB
SARS-COV-2 RNA RESP QL NAA+PROBE: ABNORMAL
SPECIMEN SOURCE: ABNORMAL

## 2021-04-12 ENCOUNTER — TELEPHONE (OUTPATIENT)
Dept: RHEUMATOLOGY | Facility: CLINIC | Age: 51
End: 2021-04-12

## 2021-04-12 NOTE — TELEPHONE ENCOUNTER
Call from LUCILA Hernandez at [facility].  Pt has tested positive for Covid 19 and Pulmonologist, Dr Briscoe has recommended monoclonal therapy for the patient. Pulmonary would like to know Dr Gallo's stance on the monoclonal therapy for this patient.  Patient requested that [facility] call Rheumatology.  Further questions:[phone]    Thank you

## 2021-04-12 NOTE — TELEPHONE ENCOUNTER
Spoke with patient regarding [place] [employer] through [facility] Screening Process. Explained from a pulmonary standpoint, Dr. Briscoe would recommend patient proceed but ultimately up to patient. Patient verbalized understanding.     RN will reach out to Dr. Gallo to confirm okay to proceed from their standpoint per patient request.     Will follow up with patient once provider has responded. RN called Rheumatology team nurse to discuss.    Mary Ware RN

## 2021-04-13 NOTE — TELEPHONE ENCOUNTER
Spoke to [place] and routed her question to Dr. Gallo.    She also sent in [employer] and routed questions to Dr. Gallo.    Khadra Mckenzie MSN, RN  Rheumatology RN Care Coordinator  [facility]

## 2021-04-13 NOTE — TELEPHONE ENCOUNTER
Per chart review: Dr. Gallo's nurse reached out to patient to inform Dr. Sabino corral with proceeding with COVID treatment.    Mary Ware RN

## 2021-04-13 NOTE — TELEPHONE ENCOUNTER
Spoke to [place] and provided the below from Dr. Gallo:    Yes, it is safe to get antibody treatment.    Khadra Mckenzie MSN, RN  Rheumatology RN Care Coordinator   [facility]

## 2021-04-16 ENCOUNTER — MYC MEDICAL ADVICE (OUTPATIENT)
Dept: PULMONOLOGY | Facility: CLINIC | Age: 51
End: 2021-04-16

## 2021-04-16 DIAGNOSIS — J47.9 BRONCHIECTASIS (H): Primary | ICD-10-CM

## 2021-04-16 DIAGNOSIS — J47.9 BRONCHIECTASIS (H): ICD-10-CM

## 2021-04-16 RX ORDER — SULFAMETHOXAZOLE/TRIMETHOPRIM 800-160 MG
1 TABLET ORAL 2 TIMES DAILY
Qty: 14 TABLET | Refills: 0 | Status: SHIPPED | OUTPATIENT
Start: 2021-04-16 | End: 2021-07-19

## 2021-04-16 NOTE — TELEPHONE ENCOUNTER
Patient tested positive for COVID-19 last week, Augmentin was started at this time since patient was experiencing  some respiratory symptoms. She did receive monoclonal antibody therapy.  She mycharted in today reporting severe nausea with Augmentin. She's completed 1 of 2 weeks of antibiotic therapy and reports she has some chest congestion still. She's vesting 1 hour per day (usually vests 30min/day).  Patient occasionally grows staph from her sputum. She states Doxycyline also makes her nauseated. Ceftin is listed as an allergy back in 2011. Plan for her to start bactrim DS 1 tab BID x 1 week per Dr. Briscoe. Pt agrees to plan.

## 2021-04-19 RX ORDER — ALBUTEROL SULFATE 90 UG/1
2 AEROSOL, METERED RESPIRATORY (INHALATION) EVERY 4 HOURS PRN
Qty: 8.5 G | Refills: 3 | Status: SHIPPED | OUTPATIENT
Start: 2021-04-19 | End: 2021-12-06

## 2021-04-24 ENCOUNTER — MYC MEDICAL ADVICE (OUTPATIENT)
Dept: INTERNAL MEDICINE | Facility: CLINIC | Age: 51
End: 2021-04-24

## 2021-04-26 ENCOUNTER — MYC MEDICAL ADVICE (OUTPATIENT)
Dept: INTERNAL MEDICINE | Facility: CLINIC | Age: 51
End: 2021-04-26

## 2021-05-21 ENCOUNTER — TELEPHONE (OUTPATIENT)
Dept: OPHTHALMOLOGY | Facility: CLINIC | Age: 51
End: 2021-05-21

## 2021-05-21 DIAGNOSIS — H04.123 DRY EYE SYNDROME OF BOTH EYES: ICD-10-CM

## 2021-05-21 RX ORDER — CYCLOSPORINE 0.5 MG/ML
1 EMULSION OPHTHALMIC 2 TIMES DAILY
Qty: 5.5 ML | Refills: 4 | Status: SHIPPED | OUTPATIENT
Start: 2021-05-21 | End: 2021-05-21

## 2021-05-21 NOTE — TELEPHONE ENCOUNTER
Medication: cycloSPORINE (RESTASIS) 0.05 % ophthalmic emulsion   Dx: Dry eye syndrome of both eyes    Requested directions: same  Current directions on the medication list: Place 1 drop into both eyes 2 times daily     Last Written Prescription Date:  10/13/20  Last Fill Quantity: 1 box,   # refills: 3    Last Office Visit: 10/13/20  Future Office visit: none    Attending Provider: Randal Logan, OD  Last Clinic Note: 10/13/20  Prescribed Restasis twice each day both eyes. Monitor at follow-up in 2-3 months. If no improvement in symptoms, consider referral for IPL.  Return to clinic in 2-3 months for follow-up.    Refill to pharmacy. Scheduling has been notified to contact the pt for appointment.

## 2021-05-21 NOTE — TELEPHONE ENCOUNTER
Azeb Luque Kaitlin; Jyothi Narayan RN; Griselda Morel   Caller: Unspecified (Today, 12:18 AM)             Spoke with patient and patient declined scheduling as patient has transferred Eye Care to another Provider. Patient does not require Medication refill or appointment.-Per Patient

## 2021-05-21 NOTE — TELEPHONE ENCOUNTER
Spoke with patient and patient declined scheduling as patient has transferred Eye Care to another Provider. Patient does not require Medication refill or appointment.-Per Patient

## 2021-05-21 NOTE — TELEPHONE ENCOUNTER
This nurse called [facility] pharmacy and cancelled cycloSPORINE (RESTASIS) 0.05 % ophthalmic emulsion with pharmacistBianca.

## 2021-06-03 NOTE — PROGRESS NOTES
"Rheumatology Clinic Visit--remote     Valencia Ye MRN# [id number]   YOB: 1970 Age: 50 year old     Date of Visit: 06/04/2021  Primary care provider: Morelia Simental         Assessment and Plan:   #Eosinophilic granulomatosis with polyangiitis (eosinophilia, severe asthma, pericarditis, vasculitic lower extremity rashes, myalgia, fatigue, dx'd age 19): Patient relates stable, chronic fatigue and \"brain fog\" with suboptimal short-term memory; improved myalgia, All symptoms attributable formerly to EGPA are stable with no major flares since the last visit. Respiratory symptoms are essentially absent. Examination today by video shows no rash, synovitis, or altered joint range of motion.    PFTs in January 2021 were normal.  CT the abdomen and pelvis showed minimal amount of fluid around the proximal cecum with enlarged diverticula.  Lungs were normal.    EGPA is currently stable, with no evidence of end-organ insufficiency or systemic involvement with inflammatory disease.  Patient reports that on multiple occasions in the past, she worked with Dr. Butts to try and reduce prednisone dosage.  On each occasion of tapering, she developed flaring symptoms that reflected recurrent EGPA. I continue to think that the risk of precipitating flare is greater than the risk or adverse effects associated with tapering prednisone beyond the current 5.5 mg dose, which is at or below physiologic corticosteroid daily dosage.  I recommend continuing prednisone dose of 5.5 mg every day.    I recommend  UA, CBC with differential, and creatinine should be performed semiannually.  I agree with patient's current practice of monitoring blood pressure several times weekly. I agree with management through Dr. Novak of asthma with ipratropium, Symbicort, and rescue inhaler.    I recommend follow-up in 6 months time.    Adal Galol MD  Staff Rheumatologist, [facility]              Active Problem List:     Patient Active " Problem List    Diagnosis Date Noted     Bladder pain 07/31/2019     Priority: Medium     Overactive bladder 07/31/2019     Priority: Medium     Controlled substance agreement signed 11/13/2017     Priority: Medium     Patient is followed by BRIAN DEE for ongoing prescription of benzodiazepines.  All refills should be approved by this provider, or covering partner.    Medication(s): clonazepam.   Maximum quantity per month: 7.5  Clinic visit frequency required: Q 6  months     Controlled substance agreement on file: Yes       Date(s): 11/13/17  Benzodiazepine use reviewed by psychiatry:  No    Last [facility] website verification:  none   [email or web address]           Bronchiectasis without acute exacerbation (H) 05/22/2017     Priority: Medium     Moderate episode of recurrent major depressive disorder (H) 01/29/2017     Priority: Medium     Benign essential hypertension 10/20/2016     Priority: Medium     Mannose-binding lectin deficiency      Priority: Medium     Irritable bowel syndrome without diarrhea 07/14/2016     Priority: Medium     Chronic fatigue 01/21/2016     Priority: Medium     Anxiety 05/30/2014     Priority: Medium     Asthma 07/16/2013     Priority: Medium     Problem list name updated by automated process. 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 Last seen in 6-, when EGPA was stable. Continued 5.5 mg daily prednisone was recommended.    Interval history 06-:    She continues with stable  fatigue and brain fog. Myalgia has been less due to reduced  "stress recently.    She notes that around midday, she has overwhelming fatigue, and requires midmorning several hour nap. Her brain \"gets going\" in the early afternoon.     Appetite and weight stable; no rash; no joint pain; SOB is suppressed; no recurrence of divertiulitis; IBS continues unchanged.    Several weeks ago, she had a painful spot on the L 4th fingertip. It resolved spontaneously in 2-3 days. She has blister-like lesions on her fingers and her big toes.    Prednisone 5.5 mg daily continues without interruption.    Interval history 12-:    She needs to have a disability form completed attesting to ongoing symptoms, including chronic fatigue, shortness of breath associated with bronchiectasis, myalgia. These symptoms have been gradually worsening for several years. She noted cognitive deficits with reduced information processing. In April 2019, she resigned, and has since focussed on health. Unfortunately, symptoms have not improved.    She has not had symptoms of rash, respiratory compromise/wheezing, joint pain that in the past have signaled presence of a flare of underlying inflammatory condition. She had an episode of diverticulitis in Fall 2020; abd CT showed mild fluid around the ceacum.    She continues prednisone 5 mg daily alternating with 6 mg daily to suppress eosinophilia.    She has tried methotrexate and imuran; these were ineffective for suppressing symptoms.    Prior hx 6-2020    Her condition has been managed Dr. Butts since 2013; I do not have his records to review today.    By her history, She was diagnosed with EGPA as a teenager after symptoms started at age 16. She had severe life-threatening asthma. By 19, she could not go to school or work due to breathting. She had weightl loss, painful toe rashes, and neurologic symptoms with restless legs, optical migraines. At age 19 in 1990, she was admitted to hospital, found to have pericarditis and eosinophilia. Bx skin showed " vasculitis. She was started on prednisone, high dose initially. She has been on prednisone continuously since.    In recent years, she developed bronchiectasis, dx'd in 2013. She has been treated with daily therapeutic vest treatments, intermittent antibiotics.    In May 2018, she developed vertigo and nausea, which persisted for months, now somewhat improved.    She had worked continusously through the illness, until 2019.    She is afflicted with brain fog that causes short-term memory loss, fatigue and muscle pain. The latter is improved with reduced work-related stress. She has chronic sinus and ear problems. She notes dry eyes and reduced tear formation.    She had a respiratory illness in the end of February; she was not tested for COVID. She took an antibiotic and upped her dose of prednisone, and sx cleared.    Asthma is generally under good control. No recent vasculitis rash. The last episode of pericardial fluid episode was in [id number].    She has noted fluctuating blood pressures, with systolics in 80-90; ranging upward to 130-140s.    Patient was seen in ophthalmology on February 25, 2020 in follow-up of C Antonina syndrome.  Assessment was of no ocular manifestations of systemic rheumatic disease.    Patient was seen by Dr. Novak in pulmonology on December 26, 2019 for follow-up of bronchiectasis and eosinophilic granulomatous polyangiitis.  Pulmonary symptoms were under good control with chronic inhaler use, daily vest therapy and Mucomyst fatigue and body aches were noted.  No change in medication was recommended.    She had severe IBS for years. She has noted marked improvement with use of SamE and Amitiza (for approximately 1 year).         Review of Systems:   Unlless listed in HPI:  Constitutional: negative  Skin: negative  Eyes: negative  Ears/Nose/Throat: negative  Respiratory: No shortness of breath, dyspnea on exertion, cough, or hemoptysis  Cardiovascular: negative  Gastrointestinal:  negative  Genitourinary: negative  Musculoskeletal: negative  Neurologic: negative  Psychiatric: negative  Hematologic/Lymphatic/Immunologic: negative  Endocrine: negative          Past Medical History:     Past Medical History:   Diagnosis Date     Anxiety      Aortitis (H)      Asthma     associated with Churg Antonina syndrome     Bronchiectasis (H)      Cerebral infarction (H) 1990    Very small, caused small permanent optical migraine     Chronic sinusitis      Churg-Antonina syndrome (H)     dx      Conductive hearing loss      Depressive disorder      Eustachian tube dysfunction      Goiter      Hearing loss, conductive      Heart rate problem      Hypertension      Hypocalcemia      Immunosuppression (H)      Irritable bowel syndrome      Major depressive disorder      Mannose-binding lectin deficiency      Nonspecific abnormal results of thyroid function study      Osteoporosis      Pericarditis      and      Pneumonia     4/23/10     Recurrent otitis media      Recurrent UTI      Rheumatoid vasculitis (H)      Sinusitis, chronic      Steroid long-term use      Tinnitus      Varicose veins of leg with swelling      Past Surgical History:   Procedure Laterality Date     C/SECTION, CLASSICAL        SECTION       COLONOSCOPY Left 2014    Procedure: COMBINED COLONOSCOPY, SINGLE OR MULTIPLE BIOPSY/POLYPECTOMY BY BIOPSY;  Surgeon: Js Pinedo MD;  Location:  GI     COLONOSCOPY N/A 2019    Procedure: COLONOSCOPY;  Surgeon: Bryce Pimentel MD;  Location:  GI     ENT SURGERY       GENITOURINARY SURGERY       HC COLP CERVIX/UPPER VAGINA W LOOP ELEC BX CERVIX       HEAD & NECK SURGERY       HYSTERECTOMY  2009    without oopherectomy     HYSTERECTOMY, PAP NO LONGER INDICATED      cervix removed      PE TUBES       PICC INSERTION  10/10/2013    5fr DL PASV PICC, 43cm (1cm external) in the L basilic vein w/ tip in the low SVC.     PICC INSERTION  Left 05/26/2017    5fr DL BioFlo PICC, 42cm (3cm external) in the L lateral brachial vein w/ tip in the SVC RA junction.     SINUS SURGERY       TUBAL LIGATION       TYMPANOPLASTY       --Hysterectomy for cervical dysplasia.       Social History:     Social History     Occupational History     Not on file   Tobacco Use     Smoking status: Never Smoker     Smokeless tobacco: Never Used   Substance and Sexual Activity     Alcohol use: Yes     Alcohol/week: 0.0 standard drinks     Comment: few drinks a week     Drug use: No     Sexual activity: Yes     Partners: Male     Birth control/protection: Male Surgical, Female Surgical     Has 5 children, all healthy except for asthma  Worked as  at [employer] for 15 years. On disability since 4-2019.  Drinks 1/2 drink 5 days weekly. Never smoker.       Family History:     Family History   Problem Relation Age of Onset     Allergies Mother         Seasonal     Alcohol/Drug Mother      Substance Abuse Mother      Depression Mother      C.A.D. Father      Diabetes Father         Type 2     Depression Father      Heart Disease Father      C.A.D. Maternal Grandmother      Arthritis Maternal Grandmother      Musculoskeletal Disorder Maternal Grandmother         MS     Heart Disease Maternal Grandmother      Hypertension Maternal Grandmother      Alcohol/Drug Maternal Grandfather      Substance Abuse Maternal Grandfather      Depression Maternal Grandfather      Asthma Son      Asthma Daughter      Allergies Daughter         Seasonal     Unknown/Adopted Paternal Grandmother      Unknown/Adopted Paternal Grandfather      Cancer Maternal Aunt         breast age 53     Breast Cancer Maternal Aunt         in her 50s     Glaucoma No family hx of      Macular Degeneration No family hx of      Colon Cancer No family hx of    MGM with MS; aunt MS         Allergies:     Allergies   Allergen Reactions     Colistimethate Anaphylaxis     Colymycin M [Colistimethate Sodium] Anaphylaxis      Tamiflu [Oseltamivir]      Gums Blister up     Azithromycin Palpitations     Heart palpitation  Heart palpitation     Clindamycin Rash            Medications:     Current Outpatient Medications   Medication Sig Dispense Refill     acetylcysteine (MUCOMYST) 20 % neb solution TAKE 4ML VIA NEBULIZER TWICE A DAY WITH ALBUTEROL SOLUTION. DISCARD OPEN BOTTLE AFTER 96 HOURS. 200 mL 9     albuterol (PROAIR HFA/PROVENTIL HFA/VENTOLIN HFA) 108 (90 Base) MCG/ACT inhaler INHALE 2 PUFFS INTO THE LUNGS EVERY 4 HOURS AS NEEDED FOR SHORTNESS OF BREATH / DYSPNEA OR WHEEZING 8.5 g 3     albuterol (PROVENTIL) (2.5 MG/3ML) 0.083% neb solution TAKE 1 VIAL VIA NEBULIZER TWICE A DAY. 180 mL 11     ALBUTEROL SULFATE HFA IN Inhale 2 puffs into the lungs 4 times daily as needed (chest tightness)       Ascorbic Acid (VITAMIN C PO) Take 800 mg by mouth       atorvastatin (LIPITOR) 10 MG tablet Take 0.5 tablets (5 mg) by mouth daily 45 tablet 3     Betaine, Trimethylglycine, (TMG, TRIMETHYLGLYCINE, PO) Take 1,000 mg by mouth       Caprylic Acid LIQD 1 capsule daily       ciprofloxacin-dexamethasone (CIPRODEX) 0.3-0.1 % otic suspension SHAKE LIQUID AND INSTILL 3 DROPS INTO AFFECTED EARS TWICE A DAY AS NEEDED. 7.5 mL 9     diazepam (VALIUM) 2 MG tablet TAKE 1-2 TABLETS (2-4 MG) BY MOUTH EVERY 6 HOURS AS NEEDED FOR VERTIGO 20 tablet 0     fluticasone (FLONASE) 50 MCG/ACT nasal spray SHAKE LIQUID AND USE 2 SPRAYS IN EACH NOSTRIL EVERY DAY. 48 mL 3     L-Glutamine 500 MG TABS Take 500 mg by mouth 3 times daily       lubiprostone (AMITIZA) 8 MCG capsule Take 1 capsule (8 mcg) by mouth 2 times daily 180 capsule 3     Magnesium ([employer]S TRIPLE MAGNESIUM COMPLEX) 400 MG CAPS Take 1 capsule by mouth 2 times daily       metoprolol succinate ER (TOPROL-XL) 25 MG 24 hr tablet TAKE 1/2 TABLET(12.5 MG) BY MOUTH DAILY. Hold if BP is low 45 tablet 3     Omega-3 Fatty Acids (FISH OIL) 1200 MG capsule Take 4 capsules (4.8 g) by mouth daily       Phosphatidyl  Choline 65 MG TABS Take 2 capsules by mouth daily       predniSONE (DELTASONE) 1 MG tablet Take 5 tabs daily, alternating with 6 tabs daily 590 tablet 5     S-Adenosylmethionine (SAME PO) Take 600 mg by mouth daily Patient is taking 800 mg       sertraline (ZOLOFT) 25 MG tablet 25 mg PATIENT TAKES 1/2 TABLET DAILY       SYMBICORT 160-4.5 MCG/ACT Inhaler INHALE 2 PUFFS BY MOUTH TWICE A DAY. 30.6 Inhaler 3     tiZANidine (ZANAFLEX) 2 MG tablet Take 1-2 tablets (2-4 mg) by mouth 3 times daily as needed for muscle spasms 45 tablet 1     vitamin D3 (CHOLECALCIFEROL) 2000 units (50 mcg) tablet Take 2 tablets by mouth daily       5-Methyltetrahydrofolate (METHYL FOLATE) POWD Take 1,000 mg by mouth daily       Cyanocobalamin (B-12) 5000 MCG SUBL        Dietary Management Product (VB6 P5P PO)        sulfamethoxazole-trimethoprim (BACTRIM DS) 800-160 MG tablet Take 1 tablet by mouth 2 times daily (Patient not taking: Reported on 6/4/2021) 14 tablet 0     vitamin B complex with vitamin C (VITAMIN  B COMPLEX) TABS tablet Take 1 tablet by mouth daily              Physical Exam:   Last menstrual period 02/01/2009, not currently breastfeeding.  Wt Readings from Last 4 Encounters:   03/08/21 49.7 kg (109 lb 8 oz)   01/21/21 49.7 kg (109 lb 8 oz)   09/21/20 50.6 kg (111 lb 9.6 oz)   08/07/20 50 kg (110 lb 4.8 oz)       Constitutional: well-developed, appearing stated age; cooperative  Eyes: nl EOM, PERRLA, vision, conjunctiva, sclera  ENT: nl external ears, nose, hearing, lips, teeth, gums, throat  No mucous membrane lesions, normal saliva pool  Neck: no thyroid enlargement  MS: No hand/finger joint visible swelling; intact hand joint ROM  Skin: no nail pitting, alopecia, rash, nodules or lesions  Neuro: nl cranial nerves  Psych: nl judgement, orientation, memory, affect.         Data:     No results found for any visits on 06/04/21.    Recent Labs   Lab Test 10/02/19  1428 02/28/19  1604 02/26/19  0637 07/05/18  1805 02/19/18  1628   06/09/17  1756  03/10/17  1835 11/10/16  1812 12/23/15  2227   WBC 8.5  --   --   --  6.1  --  6.7   < > 20.8* 8.8 8.0   RBC 4.01  --   --   --  3.96  --  3.85   < > 4.04 3.68* 4.02   HGB 13.6  --   --   --  13.0  --  12.7   < > 13.6 12.4 13.4   HCT 40.4  --   --   --  39.3  --  38.5   < > 39.6 37.2 39.0   *  --   --   --  99  --  100   < > 98 101* 97   RDW 12.7  --   --   --  13.1  --  12.1   < > 12.0 12.4 12.5     --   --   --  283  --  284   < > 292 276 275   ALBUMIN  --   --   --   --   --   --   --   --  3.7 4.0 3.8   CRP  --  <2.9 <2.9 <2.9  --   --  <2.9   < >  --  <2.9  --    BUN 14  --   --  19 8   < >  --    < > 14 14 11    < > = values in this interval not displayed.      Recent Labs   Lab Test 03/12/19 07/05/18  1805 11/10/16  1812   TSH 1.540 0.56 0.90   T4  --   --  1.01     Hemoglobin   Date Value Ref Range Status   10/20/2020 13.5 11.7 - 15.7 g/dL Final   06/29/2020 12.9 11.7 - 15.7 g/dL Final   10/02/2019 13.6 11.7 - 15.7 g/dL Final     Urea Nitrogen   Date Value Ref Range Status   10/20/2020 8 7 - 30 mg/dL Final   09/21/2020 12 7 - 30 mg/dL Final   10/02/2019 14 7 - 30 mg/dL Final     Sed Rate   Date Value Ref Range Status   09/21/2020 30 (H) 0 - 20 mm/h Final     Comment:     Results confirmed by repeat test   02/28/2019 6 0 - 20 mm/h Final   07/05/2018 6 0 - 20 mm/h Final     CRP Cardiac Risk   Date Value Ref Range Status   02/04/2008 8.4 mg/L Final     Comment:     Reference Values:   Low Risk:           <1.0 mg/L   Average Risk:       1.0-3.0 mg/L   High Risk:          >3.0 mg/L   Acute Inflammation: >8.0 mg/L     CRP Inflammation   Date Value Ref Range Status   09/21/2020 44.0 (H) 0.0 - 8.0 mg/L Final   02/28/2019 <2.9 0.0 - 8.0 mg/L Final   02/26/2019 <2.9 0.0 - 8.0 mg/L Final     AST   Date Value Ref Range Status   10/20/2020 13 0 - 45 U/L Final   03/12/2019 24 0 - 40 IU/L Final   03/10/2017 17 0 - 45 U/L Final     Comment:     Specimen is hemolyzed which can falsely elevate  AST. Analysis of a   non-hemolyzed   specimen may result in a lower value.       Neutrophil Cytoplasmic IgG Antibody   Date Value Ref Range Status   02/17/2009 <1:20  Final     Comment:     Reference range: <1:20  (Note)  TEST INFORMATION: Anti-Neutrophil Cyto Ab, IgG  Neutrophil Cytoplasmic Antibodies (C-ANCA = granular  cytoplasmic staining, P-ANCA = perinuclear staining) are  found in the serum of over 90% of patients with certain  necrotizing systemic vasculitides, and usually in less  than 5% of patients with collagen vascular disease or  arthritis.                  ANCA'S IN VASCULITIC SYNDROMES                         Approx %      Approx % of                         Pos ANCA    patients demonstrating  ----------------------------------------------------------                (combined patterns)   C Pattern   P Pattern  Wegener's granulomatosis:  -Active Generalized   85 - 92        85 - 90       2 - 5  -Limited forms        60 - 67        85 - 90       2 - 5  -Inactive             30 - 35        85 - 90       2 - 5  Idiopathic Crescentic  Glomerulonephritis      80            some*     majority*  Polyarteritis Nodosa    50             86           14  Churg - Antonina         50             80           20  SLE                   less than 10     0    greater than 90  Rheumatoid Arthritis  less than 5      0    greater than 90  Sjogren's Syndrome        25           0    greater than 90  ----------------------------------------------------------  *No quantitation in the literature.  Performed by [facility],  [address] [place] [id number] [phone]  [email or web address], Remy Watkins MD - Lab. Director                                                  08/19/2008 < 1:20  Final     Comment:     Reference range: < 1:20  (Note)  TEST INFORMATION: Anti-Neutrophil Cyto Ab, IgG  Neutrophil Cytoplasmic Antibodies (C-ANCA = granular  cytoplasmic staining, P-ANCA = perinuclear staining) are  found in the serum  of over 90% of patients with certain  necrotizing systemic vasculitides, and usually in less  than 5% of patients with collagen vascular disease or  arthritis.                  ANCA'S IN VASCULITIC SYNDROMES                         Approx %      Approx % of                         Pos ANCA    patients demonstrating  ----------------------------------------------------------                (combined patterns)   C Pattern   P Pattern  Wegener's granulomatosis:  -Active Generalized   85 - 92        85 - 90       2 - 5  -Limited forms        60 - 67        85 - 90       2 - 5  -Inactive             30 - 35        85 - 90       2 - 5  Idiopathic Crescentic  Glomerulonephritis      80            some*     majority*  Polyarteritis Nodosa    50             86           14  Churg - Antonina         50             80           20  SLE                   less than 10     0    greater than 90  Rheumatoid Arthritis  less than 5      0    greater than 90  Sjogren's Syndrome        25           0    greater than 90  ----------------------------------------------------------  *No quantitation in the literature.  Performed by [facility],  [address] [place] [address] [phone]  [email or web address],  Remy Watkins MD - Lab. Director                                              01/24/2008 < 1:20  Final     Comment:     Reference range: < 1:20  (Note)  TEST INFORMATION: Anti-Neutrophil Cyto Ab, IgG  Neutrophil Cytoplasmic Antibodies (C-ANCA = granular  cytoplasmic staining, P-ANCA = perinuclear staining) are  found in the serum of over 90% of patients with certain  necrotizing systemic vasculitides, and usually in less  than 5% of patients with collagen vascular disease or  arthritis.                  ANCA'S IN VASCULITIC SYNDROMES                         Approx %      Approx % of                         Pos ANCA    patients demonstrating  ----------------------------------------------------------                (combined  patterns)   C Pattern   P Pattern  Wegener's granulomatosis:  -Active Generalized   85 - 92 85 - 90       2 - 5  -Limited forms        60 - 67 85 - 90       2 - 5  -Inactive             30 - 35        85 - 90       2 - 5  Idiopathic Crescentic  Glomerulonephritis      80            some*     majority*  Polyarteritis Nodosa    50             86           14  Churg - Antonina         50             80           20  SLE                   less than 10     0    greater than 90  Rheumatoid Arthritis  less than 5      0    greater than 90  Sjogren's Syndrome        25           0    greater than 90  ----------------------------------------------------------  *No quantitation in the literature.  Performed by [facility],  [address] [place] [id number] [phone]  [email or web address],  Remy Watkins MD - Lab. Director                                                Albumin   Date Value Ref Range Status   10/20/2020 3.9 3.4 - 5.0 g/dL Final   03/10/2017 3.7 3.4 - 5.0 g/dL Final   11/10/2016 4.0 3.4 - 5.0 g/dL Final     Alkaline Phosphatase   Date Value Ref Range Status   10/20/2020 54 40 - 150 U/L Final   03/10/2017 48 40 - 150 U/L Final   11/10/2016 42 40 - 150 U/L Final     ALT   Date Value Ref Range Status   10/20/2020 17 0 - 50 U/L Final   03/12/2019 29 0 - 32 IU/L Final   06/09/2017 47 0 - 50 U/L Final     Rheumatoid Factor   Date Value Ref Range Status   07/05/2018 <20 <20 IU/mL Final     Recent Labs   Lab Test 10/20/20  0155 09/21/20  1609 06/29/20  1501 10/02/19  1428 03/12/19 07/05/18  1805 06/09/17  1756 06/09/17  1756 03/10/17  1835 03/10/17  1835 11/10/16  1812   WBC 14.0*  --  9.3 8.5  --   --    < > 6.7   < > 20.8* 8.8   HGB 13.5  --  12.9 13.6  --   --    < > 12.7   < > 13.6 12.4   HCT 41.3  --  39.1 40.4  --   --    < > 38.5   < > 39.6 37.2     --  101* 101*  --   --    < > 100   < > 98 101*     --  298 320  --   --    < > 284   < > 292 276   BUN 8 12  --  14  --  19   < >   --    < > 14 14   TSH  --  0.60  --   --  1.540 0.56  --   --   --   --  0.90   AST 13  --   --   --  24  --   --   --   --  17 6   ALT 17  --   --   --  29  --   --  47   < > 18 14   ALKPHOS 54  --   --   --   --   --   --   --   --  48 42    < > = values in this interval not displayed.     RHEUM RESULTS Latest Ref Rng & Units 6/29/2020 9/21/2020 10/20/2020   COMPLEMENT C3 76 - 169 mg/dL - - -   COMPLEMENT C4 15 - 50 mg/dL - - -   SED RATE 0 - 20 mm/h - 30(H) -   CRP, INFLAMMATION 0.0 - 8.0 mg/L - 44.0(H) -   CK TOTAL 30 - 225 U/L - 62 -   RHEUMATOID FACTOR <20 IU/mL - - -   JOSE SCREEN BY EIA <1.0 - - -   AST 0 - 45 U/L - - 13   ALT 0 - 50 U/L - - 17   ALBUMIN 3.4 - 5.0 g/dL - - 3.9   WBC 4.0 - 11.0 10e9/L 9.3 - 14.0(H)   RBC 3.8 - 5.2 10e12/L 3.88 - 4.12   HGB 11.7 - 15.7 g/dL 12.9 - 13.5   HCT 35.0 - 47.0 % 39.1 - 41.3   MCV 78 - 100 fl 101(H) - 100   MCHC 31.5 - 36.5 g/dL 33.0 - 32.7   RDW 10.0 - 15.0 % 12.2 - 12.5    - 450 10e9/L 298 - 261   CREATININE 0.52 - 1.04 mg/dL 0.74 0.68 0.82   GFR ESTIMATE, IF BLACK >60 mL/min/[1.73:m2] >90 >90 >90   GFR ESTIMATE >60 mL/min/[1.73:m2] >90 >90 85    - 1,620 mg/dL - - -   HEPATITIS C ANTIBODY NEG - - -       Rheumatoid Factor   Date Value Ref Range Status   07/05/2018 <20 <20 IU/mL Final   ,  ,  ,  ,  ,   SSA (RO) Antibody IgG   Date Value Ref Range Status   02/17/2009 2  Final     Comment:     Reference range: 0 to 40  Unit: AU/mL  (Note)  REFERENCE INTERVALS: SSA (Ro) (WESTLEY) Ab, IgG   29 AU/mL or Less ............. Negative   30 - 40 AU/mL ................ Equivocal   41 AU/mL or Greater .......... Positive    SSA (Ro) antibody is seen in 70-75% of Sjogren syndrome  cases, 30-40% of systemic lupus erythematosus (SLE) and  5-10% of progressive systemic sclerosis (PSS).  Performed by [facility],  [address] [place] [id number] [phone]  [email or web address], Remy Watkins MD- Lab. Director     Scleroderma Antibody IgG   Date Value Ref Range Status    02/17/2009 0  Final     Comment:     Reference range: 0 to 40  Unit: AU/mL  (Note)  REFERENCE INTERVALS: Scleroderma (Scl-70) (WESTLEY) Ab, IgG   29 AU/mL or Less ............. Negative   30 - 40 AU/mL ................ Equivocal   41 AU/mL or Greater .......... Positive    Scleroderma (Scl-70) antibody is seen in 20-60% of  patients with scleroderma and is considered diagnostic  and specific for scleroderma if it is the only WESTLEY  antibody present. Scl-70 is also seen in approximately  25% of progressive systemic sclerosis (PSS).  Performed by [facility],  [address] [place] [id number] [phone]  [email or web address], Remy Watkins MD - Lab. Director   ,   JOSE interpretation   Date Value Ref Range Status   02/28/2019 Negative NEG^Negative Final     Comment:                                        Reference range:  <1:40  NEGATIVE  1:40 - 1:80  BORDERLINE POSITIVE  >1:80 POSITIVE       ,   JOSE Screen by EIA   Date Value Ref Range Status   11/10/2016 <1.0  Interpretation:  Negative   <1.0 Final   ,  ,  ,  ,  ,  ,  ,  ,   Hep B Surface Agn   Date Value Ref Range Status   03/16/2005 Negative NEG Final   ,  ,  ,  ,  ,  ,  ,  ,  ,  ,  ,   Neutrophil Cytoplasmic IgG Antibody   Date Value Ref Range Status   02/17/2009 <1:20  Final     Comment:     Reference range: <1:20  (Note)  TEST INFORMATION: Anti-Neutrophil Cyto Ab, IgG  Neutrophil Cytoplasmic Antibodies (C-ANCA = granular  cytoplasmic staining, P-ANCA = perinuclear staining) are  found in the serum of over 90% of patients with certain  necrotizing systemic vasculitides, and usually in less  than 5% of patients with collagen vascular disease or  arthritis.                  ANCA'S IN VASCULITIC SYNDROMES                         Approx %      Approx % of                         Pos ANCA    patients demonstrating  ----------------------------------------------------------                (combined patterns)   C Pattern   P Pattern  Wegener's granulomatosis:  -Active  Generalized   85 - 92 85 - 90       2 - 5  -Limited forms        60 - 67        85 - 90       2 - 5  -Inactive             30 - 35        85 - 90       2 - 5  Idiopathic Crescentic  Glomerulonephritis      80            some*     majority*  Polyarteritis Nodosa    50             86           14  Churg - Antonina         50             80           20  SLE                   less than 10     0    greater than 90  Rheumatoid Arthritis  less than 5      0    greater than 90  Sjogren's Syndrome        25           0    greater than 90  ----------------------------------------------------------  *No quantitation in the literature.  Performed by [facility],  [address] [place] [id number] [phone]  [email or web address], Remy Watkins MD - Lab. Director                                                      IGG   Date Value Ref Range Status   10/04/2013 [id number] - 1,620 mg/dL Final     Reviewed Rheumatology lab flowsheet    Valencia is a 50 year old who is being evaluated via a billable video visit.      How would you like to obtain your AVS? MyChart    Will anyone else be joining your video visit? No      Video Start Time: 12:08 PM  Video-Visit Details    Type of service:  Video Visit    Video End Time:12:31 PM    Originating Location (pt. Location): Home    Distant Location (provider location):  [facility] [place]     Platform used for Video Visit: Zentric

## 2021-06-04 ENCOUNTER — VIRTUAL VISIT (OUTPATIENT)
Dept: RHEUMATOLOGY | Facility: CLINIC | Age: 51
End: 2021-06-04
Attending: INTERNAL MEDICINE
Payer: COMMERCIAL

## 2021-06-04 DIAGNOSIS — M30.1 EOSINOPHILIC GRANULOMATOSIS WITH POLYANGIITIS (EGPA) WITH LUNG INVOLVEMENT (H): Primary | ICD-10-CM

## 2021-06-04 DIAGNOSIS — D72.18 EOSINOPHILIC GRANULOMATOSIS WITH POLYANGIITIS (EGPA) WITH LUNG INVOLVEMENT (H): Primary | ICD-10-CM

## 2021-06-04 PROCEDURE — 99213 OFFICE O/P EST LOW 20 MIN: CPT | Mod: 95 | Performed by: INTERNAL MEDICINE

## 2021-06-04 RX ORDER — SERTRALINE HYDROCHLORIDE 25 MG/1
25 TABLET, FILM COATED ORAL
COMMUNITY
Start: 2021-05-20 | End: 2022-05-19

## 2021-06-04 NOTE — PATIENT INSTRUCTIONS
1. Eosinophilic granulomatous polyangiitis: Symptoms of brain fog, cognitive slowing, fatigue, diffuse myalgia remain stable or improved. I agree with continued use of low-dose prednisone, and reserving use of immunomodulatory therapy such as Nucala for recurrence of eosinophilia associated symptoms or disease.    Plan:  1.  Continue prednisone 5 mg every other day alternating with 6 mg every other day.  2.  Check urinalysis, creatinine, CBC with differential, inflammatory markers at your convenience.

## 2021-06-04 NOTE — LETTER
"6/4/2021       RE: Valencia Ye  [address] [facility] [address]     Dear Colleague,    Thank you for referring your patient, Valencia Ye, to the [facility] RHEUMATOLOGY [facility] [place] at [facility]. Please see a copy of my visit note below.    Rheumatology Clinic Visit--remote     Valencia Ye MRN# [id number]   YOB: 1970 Age: 50 year old     Date of Visit: 06/04/2021  Primary care provider: Morelia Simental         Assessment and Plan:   #Eosinophilic granulomatosis with polyangiitis (eosinophilia, severe asthma, pericarditis, vasculitic lower extremity rashes, myalgia, fatigue, dx'd age 19): Patient relates stable, chronic fatigue and \"brain fog\" with suboptimal short-term memory; improved myalgia, All symptoms attributable formerly to EGPA are stable with no major flares since the last visit. Respiratory symptoms are essentially absent. Examination today by video shows no rash, synovitis, or altered joint range of motion.    PFTs in January 2021 were normal.  CT the abdomen and pelvis showed minimal amount of fluid around the proximal cecum with enlarged diverticula.  Lungs were normal.    EGPA is currently stable, with no evidence of end-organ insufficiency or systemic involvement with inflammatory disease.  Patient reports that on multiple occasions in the past, she worked with Dr. Butts to try and reduce prednisone dosage.  On each occasion of tapering, she developed flaring symptoms that reflected recurrent EGPA. I continue to think that the risk of precipitating flare is greater than the risk or adverse effects associated with tapering prednisone beyond the current 5.5 mg dose, which is at or below physiologic corticosteroid daily dosage.  I recommend continuing prednisone dose of 5.5 mg every day.    I recommend  UA, CBC with differential, and creatinine should be performed semiannually.  I agree with " patient's current practice of monitoring blood pressure several times weekly. I agree with management through Dr. Novak of asthma with ipratropium, Symbicort, and rescue inhaler.    I recommend follow-up in 6 months time.    Adal Gallo MD  Staff Rheumatologist, [facility]              Active Problem List:     Patient Active Problem List    Diagnosis Date Noted     Bladder pain 07/31/2019     Priority: Medium     Overactive bladder 07/31/2019     Priority: Medium     Controlled substance agreement signed 11/13/2017     Priority: Medium     Patient is followed by BRIAN DEE for ongoing prescription of benzodiazepines.  All refills should be approved by this provider, or covering partner.    Medication(s): clonazepam.   Maximum quantity per month: 7.5  Clinic visit frequency required: Q 6  months     Controlled substance agreement on file: Yes       Date(s): 11/13/17  Benzodiazepine use reviewed by psychiatry:  No    Last [facility] website verification:  none   [email or web address]           Bronchiectasis without acute exacerbation (H) 05/22/2017     Priority: Medium     Moderate episode of recurrent major depressive disorder (H) 01/29/2017     Priority: Medium     Benign essential hypertension 10/20/2016     Priority: Medium     Mannose-binding lectin deficiency      Priority: Medium     Irritable bowel syndrome without diarrhea 07/14/2016     Priority: Medium     Chronic fatigue 01/21/2016     Priority: Medium     Anxiety 05/30/2014     Priority: Medium     Asthma 07/16/2013     Priority: Medium     Problem list name updated by automated process. 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 Last seen in 6-, when EGPA was stable. Continued 5.5 mg daily prednisone was recommended.    Interval history 06-:    She continues with stable  fatigue and brain fog. Myalgia has been less due to reduced stress recently.    She notes that around midday, she has overwhelming fatigue, and requires midmorning several hour nap. Her brain \"gets going\" in the early afternoon.     Appetite and weight stable; no rash; no joint pain; SOB is suppressed; no recurrence of divertiulitis; IBS continues unchanged.    Several weeks ago, she had a painful spot on the L 4th fingertip. It resolved spontaneously in 2-3 days. She has blister-like lesions on her fingers and her big toes.    Prednisone 5.5 mg daily continues without interruption.    Interval history 12-:    She needs to have a disability form completed attesting to ongoing symptoms, including chronic fatigue, shortness of breath associated with bronchiectasis, myalgia. These symptoms have been gradually worsening for several years. She noted cognitive deficits with reduced information processing. In April 2019, she resigned, and has since focussed on health. Unfortunately, symptoms have not improved.    She has not had symptoms of rash, respiratory compromise/wheezing, joint pain that in the past have signaled presence of a flare of underlying inflammatory condition. She had an episode of diverticulitis in Fall 2020; abd CT showed mild fluid around the ceacum.    She continues prednisone 5 mg daily alternating with 6 mg daily to suppress eosinophilia.    She has tried methotrexate and imuran; these were ineffective for suppressing symptoms.    Prior hx 6-2020    Her condition has been managed Dr. Butts since 2013; I do not have his records to review today.    By her history, She was diagnosed with EGPA as a teenager after symptoms started " at age 16. She had severe life-threatening asthma. By 19, she could not go to school or work due to breathting. She had weightl loss, painful toe rashes, and neurologic symptoms with restless legs, optical migraines. At age 19 in 1990, she was admitted to hospital, found to have pericarditis and eosinophilia. Bx skin showed vasculitis. She was started on prednisone, high dose initially. She has been on prednisone continuously since.    In recent years, she developed bronchiectasis, dx'd in 2013. She has been treated with daily therapeutic vest treatments, intermittent antibiotics.    In May 2018, she developed vertigo and nausea, which persisted for months, now somewhat improved.    She had worked continusously through the illness, until 2019.    She is afflicted with brain fog that causes short-term memory loss, fatigue and muscle pain. The latter is improved with reduced work-related stress. She has chronic sinus and ear problems. She notes dry eyes and reduced tear formation.    She had a respiratory illness in the end of February; she was not tested for COVID. She took an antibiotic and upped her dose of prednisone, and sx cleared.    Asthma is generally under good control. No recent vasculitis rash. The last episode of pericardial fluid episode was in [id number].    She has noted fluctuating blood pressures, with systolics in 80-90; ranging upward to 130-140s.    Patient was seen in ophthalmology on February 25, 2020 in follow-up of C Antonina syndrome.  Assessment was of no ocular manifestations of systemic rheumatic disease.    Patient was seen by Dr. Novak in pulmonology on December 26, 2019 for follow-up of bronchiectasis and eosinophilic granulomatous polyangiitis.  Pulmonary symptoms were under good control with chronic inhaler use, daily vest therapy and Mucomyst fatigue and body aches were noted.  No change in medication was recommended.    She had severe IBS for years. She has noted marked improvement  with use of SamE and Amitiza (for approximately 1 year).         Review of Systems:   Unlless listed in HPI:  Constitutional: negative  Skin: negative  Eyes: negative  Ears/Nose/Throat: negative  Respiratory: No shortness of breath, dyspnea on exertion, cough, or hemoptysis  Cardiovascular: negative  Gastrointestinal: negative  Genitourinary: negative  Musculoskeletal: negative  Neurologic: negative  Psychiatric: negative  Hematologic/Lymphatic/Immunologic: negative  Endocrine: negative          Past Medical History:     Past Medical History:   Diagnosis Date     Anxiety      Aortitis (H)      Asthma     associated with Churg Antonina syndrome     Bronchiectasis (H)      Cerebral infarction (H) 1990    Very small, caused small permanent optical migraine     Chronic sinusitis      Churg-Antonina syndrome (H)     1990     Conductive hearing loss      Depressive disorder      Eustachian tube dysfunction      Goiter      Hearing loss, conductive      Heart rate problem      Hypertension      Hypocalcemia      Immunosuppression (H)      Irritable bowel syndrome      Major depressive disorder      Mannose-binding lectin deficiency      Nonspecific abnormal results of thyroid function study      Osteoporosis      Pericarditis      and      Pneumonia     4/23/10     Recurrent otitis media      Recurrent UTI      Rheumatoid vasculitis (H)      Sinusitis, chronic      Steroid long-term use      Tinnitus      Varicose veins of leg with swelling      Past Surgical History:   Procedure Laterality Date     C/SECTION, CLASSICAL  1998      SECTION       COLONOSCOPY Left 2014    Procedure: COMBINED COLONOSCOPY, SINGLE OR MULTIPLE BIOPSY/POLYPECTOMY BY BIOPSY;  Surgeon: Js Pinedo MD;  Location:  GI     COLONOSCOPY N/A 2019    Procedure: COLONOSCOPY;  Surgeon: Bryce Pimentel MD;  Location:  GI     ENT SURGERY       GENITOURINARY SURGERY        COLP CERVIX/UPPER  VAGINA W LOOP ELEC BX CERVIX       HEAD & NECK SURGERY       HYSTERECTOMY  2/27/2009    without oopherectomy     HYSTERECTOMY, PAP NO LONGER INDICATED      cervix removed      PE TUBES       PICC INSERTION  10/10/2013    5fr DL PASV PICC, 43cm (1cm external) in the L basilic vein w/ tip in the low SVC.     PICC INSERTION Left 05/26/2017    5fr DL BioFlo PICC, 42cm (3cm external) in the L lateral brachial vein w/ tip in the SVC RA junction.     SINUS SURGERY       TUBAL LIGATION       TYMPANOPLASTY       --Hysterectomy for cervical dysplasia.       Social History:     Social History     Occupational History     Not on file   Tobacco Use     Smoking status: Never Smoker     Smokeless tobacco: Never Used   Substance and Sexual Activity     Alcohol use: Yes     Alcohol/week: 0.0 standard drinks     Comment: few drinks a week     Drug use: No     Sexual activity: Yes     Partners: Male     Birth control/protection: Male Surgical, Female Surgical     Has 5 children, all healthy except for asthma  Worked as  at the [employer] for 15 years. On disability since 4-2019.  Drinks 1/2 drink 5 days weekly. Never smoker.       Family History:     Family History   Problem Relation Age of Onset     Allergies Mother         Seasonal     Alcohol/Drug Mother      Substance Abuse Mother      Depression Mother      C.A.D. Father      Diabetes Father         Type 2     Depression Father      Heart Disease Father      C.A.D. Maternal Grandmother      Arthritis Maternal Grandmother      Musculoskeletal Disorder Maternal Grandmother         MS     Heart Disease Maternal Grandmother      Hypertension Maternal Grandmother      Alcohol/Drug Maternal Grandfather      Substance Abuse Maternal Grandfather      Depression Maternal Grandfather      Asthma Son      Asthma Daughter      Allergies Daughter         Seasonal     Unknown/Adopted Paternal Grandmother      Unknown/Adopted Paternal Grandfather      Cancer Maternal Aunt         breast  age 53     Breast Cancer Maternal Aunt         in her 50s     Glaucoma No family hx of      Macular Degeneration No family hx of      Colon Cancer No family hx of    MGM with MS; aunt MS         Allergies:     Allergies   Allergen Reactions     Colistimethate Anaphylaxis     Colymycin M [Colistimethate Sodium] Anaphylaxis     Tamiflu [Oseltamivir]      Gums Blister up     Azithromycin Palpitations     Heart palpitation  Heart palpitation     Clindamycin Rash            Medications:     Current Outpatient Medications   Medication Sig Dispense Refill     acetylcysteine (MUCOMYST) 20 % neb solution TAKE 4ML VIA NEBULIZER TWICE A DAY WITH ALBUTEROL SOLUTION. DISCARD OPEN BOTTLE AFTER 96 HOURS. 200 mL 9     albuterol (PROAIR HFA/PROVENTIL HFA/VENTOLIN HFA) 108 (90 Base) MCG/ACT inhaler INHALE 2 PUFFS INTO THE LUNGS EVERY 4 HOURS AS NEEDED FOR SHORTNESS OF BREATH / DYSPNEA OR WHEEZING 8.5 g 3     albuterol (PROVENTIL) (2.5 MG/3ML) 0.083% neb solution TAKE 1 VIAL VIA NEBULIZER TWICE A DAY. 180 mL 11     ALBUTEROL SULFATE HFA IN Inhale 2 puffs into the lungs 4 times daily as needed (chest tightness)       Ascorbic Acid (VITAMIN C PO) Take 800 mg by mouth       atorvastatin (LIPITOR) 10 MG tablet Take 0.5 tablets (5 mg) by mouth daily 45 tablet 3     Betaine, Trimethylglycine, (TMG, TRIMETHYLGLYCINE, PO) Take 1,000 mg by mouth       Caprylic Acid LIQD 1 capsule daily       ciprofloxacin-dexamethasone (CIPRODEX) 0.3-0.1 % otic suspension SHAKE LIQUID AND INSTILL 3 DROPS INTO AFFECTED EARS TWICE A DAY AS NEEDED. 7.5 mL 9     diazepam (VALIUM) 2 MG tablet TAKE 1-2 TABLETS (2-4 MG) BY MOUTH EVERY 6 HOURS AS NEEDED FOR VERTIGO 20 tablet 0     fluticasone (FLONASE) 50 MCG/ACT nasal spray SHAKE LIQUID AND USE 2 SPRAYS IN EACH NOSTRIL EVERY DAY. 48 mL 3     L-Glutamine 500 MG TABS Take 500 mg by mouth 3 times daily       lubiprostone (AMITIZA) 8 MCG capsule Take 1 capsule (8 mcg) by mouth 2 times daily 180 capsule 3     Magnesium  (CVS TRIPLE MAGNESIUM COMPLEX) 400 MG CAPS Take 1 capsule by mouth 2 times daily       metoprolol succinate ER (TOPROL-XL) 25 MG 24 hr tablet TAKE 1/2 TABLET(12.5 MG) BY MOUTH DAILY. Hold if BP is low 45 tablet 3     Omega-3 Fatty Acids (FISH OIL) 1200 MG capsule Take 4 capsules (4.8 g) by mouth daily       Phosphatidyl Choline 65 MG TABS Take 2 capsules by mouth daily       predniSONE (DELTASONE) 1 MG tablet Take 5 tabs daily, alternating with 6 tabs daily 590 tablet 5     S-Adenosylmethionine (SAME PO) Take 600 mg by mouth daily Patient is taking 800 mg       sertraline (ZOLOFT) 25 MG tablet 25 mg PATIENT TAKES 1/2 TABLET DAILY       SYMBICORT 160-4.5 MCG/ACT Inhaler INHALE 2 PUFFS BY MOUTH TWICE A DAY. 30.6 Inhaler 3     tiZANidine (ZANAFLEX) 2 MG tablet Take 1-2 tablets (2-4 mg) by mouth 3 times daily as needed for muscle spasms 45 tablet 1     vitamin D3 (CHOLECALCIFEROL) 2000 units (50 mcg) tablet Take 2 tablets by mouth daily       5-Methyltetrahydrofolate (METHYL FOLATE) POWD Take 1,000 mg by mouth daily       Cyanocobalamin (B-12) 5000 MCG SUBL        Dietary Management Product (VB6 P5P PO)        sulfamethoxazole-trimethoprim (BACTRIM DS) 800-160 MG tablet Take 1 tablet by mouth 2 times daily (Patient not taking: Reported on 6/4/2021) 14 tablet 0     vitamin B complex with vitamin C (VITAMIN  B COMPLEX) TABS tablet Take 1 tablet by mouth daily              Physical Exam:   Last menstrual period 02/01/2009, not currently breastfeeding.  Wt Readings from Last 4 Encounters:   03/08/21 49.7 kg (109 lb 8 oz)   01/21/21 49.7 kg (109 lb 8 oz)   09/21/20 50.6 kg (111 lb 9.6 oz)   08/07/20 50 kg (110 lb 4.8 oz)       Constitutional: well-developed, appearing stated age; cooperative  Eyes: nl EOM, PERRLA, vision, conjunctiva, sclera  ENT: nl external ears, nose, hearing, lips, teeth, gums, throat  No mucous membrane lesions, normal saliva pool  Neck: no thyroid enlargement  MS: No hand/finger joint visible swelling;  intact hand joint ROM  Skin: no nail pitting, alopecia, rash, nodules or lesions  Neuro: nl cranial nerves  Psych: nl judgement, orientation, memory, affect.         Data:     No results found for any visits on 06/04/21.    Recent Labs   Lab Test 10/02/19  1428 02/28/19  1604 02/26/19  0637 07/05/18  1805 02/19/18  1626  06/09/17  1756  03/10/17  1835 11/10/16  1812 12/23/15  2227   WBC 8.5  --   --   --  6.1  --  6.7   < > 20.8* 8.8 8.0   RBC 4.01  --   --   --  3.96  --  3.85   < > 4.04 3.68* 4.02   HGB 13.6  --   --   --  13.0  --  12.7   < > 13.6 12.4 13.4   HCT 40.4  --   --   --  39.3  --  38.5   < > 39.6 37.2 39.0   *  --   --   --  99  --  100   < > 98 101* 97   RDW 12.7  --   --   --  13.1  --  12.1   < > 12.0 12.4 12.5     --   --   --  283  --  284   < > 292 276 275   ALBUMIN  --   --   --   --   --   --   --   --  3.7 4.0 3.8   CRP  --  <2.9 <2.9 <2.9  --   --  <2.9   < >  --  <2.9  --    BUN 14  --   --  19 8   < >  --    < > 14 14 11    < > = values in this interval not displayed.      Recent Labs   Lab Test 03/12/19 07/05/18  1805 11/10/16  1812   TSH 1.540 0.56 0.90   T4  --   --  1.01     Hemoglobin   Date Value Ref Range Status   10/20/2020 13.5 11.7 - 15.7 g/dL Final   06/29/2020 12.9 11.7 - 15.7 g/dL Final   10/02/2019 13.6 11.7 - 15.7 g/dL Final     Urea Nitrogen   Date Value Ref Range Status   10/20/2020 8 7 - 30 mg/dL Final   09/21/2020 12 7 - 30 mg/dL Final   10/02/2019 14 7 - 30 mg/dL Final     Sed Rate   Date Value Ref Range Status   09/21/2020 30 (H) 0 - 20 mm/h Final     Comment:     Results confirmed by repeat test   02/28/2019 6 0 - 20 mm/h Final   07/05/2018 6 0 - 20 mm/h Final     CRP Cardiac Risk   Date Value Ref Range Status   02/04/2008 8.4 mg/L Final     Comment:     Reference Values:   Low Risk:           <1.0 mg/L   Average Risk:       1.0-3.0 mg/L   High Risk:          >3.0 mg/L   Acute Inflammation: >8.0 mg/L     CRP Inflammation   Date Value Ref Range Status    09/21/2020 44.0 (H) 0.0 - 8.0 mg/L Final   02/28/2019 <2.9 0.0 - 8.0 mg/L Final   02/26/2019 <2.9 0.0 - 8.0 mg/L Final     AST   Date Value Ref Range Status   10/20/2020 13 0 - 45 U/L Final   03/12/2019 24 0 - 40 IU/L Final   03/10/2017 17 0 - 45 U/L Final     Comment:     Specimen is hemolyzed which can falsely elevate AST. Analysis of a   non-hemolyzed   specimen may result in a lower value.       Neutrophil Cytoplasmic IgG Antibody   Date Value Ref Range Status   02/17/2009 <1:20  Final     Comment:     Reference range: <1:20  (Note)  TEST INFORMATION: Anti-Neutrophil Cyto Ab, IgG  Neutrophil Cytoplasmic Antibodies (C-ANCA = granular  cytoplasmic staining, P-ANCA = perinuclear staining) are  found in the serum of over 90% of patients with certain  necrotizing systemic vasculitides, and usually in less  than 5% of patients with collagen vascular disease or  arthritis.                  ANCA'S IN VASCULITIC SYNDROMES                         Approx %      Approx % of                         Pos ANCA    patients demonstrating  ----------------------------------------------------------                (combined patterns)   C Pattern   P Pattern  Wegener's granulomatosis:  -Active Generalized   85 - 92        85 - 90       2 - 5  -Limited forms        60 - 67        85 - 90       2 - 5  -Inactive             30 - 35        85 - 90       2 - 5  Idiopathic Crescentic  Glomerulonephritis      80            some*     majority*  Polyarteritis Nodosa    50             86           14  Churg - Antonina         50             80           20  SLE                   less than 10     0    greater than 90  Rheumatoid Arthritis  less than 5      0    greater than 90  Sjogren's Syndrome        25           0    greater than 90  ----------------------------------------------------------  *No quantitation in the literature.  Performed by [facility],  [facility] [place] [address] [phone]  [email or web address], Remy Watkins,  MD - Lab. Director                                                  08/19/2008 < 1:20  Final     Comment:     Reference range: < 1:20  (Note)  TEST INFORMATION: Anti-Neutrophil Cyto Ab, IgG  Neutrophil Cytoplasmic Antibodies (C-ANCA = granular  cytoplasmic staining, P-ANCA = perinuclear staining) are  found in the serum of over 90% of patients with certain  necrotizing systemic vasculitides, and usually in less  than 5% of patients with collagen vascular disease or  arthritis.                  ANCA'S IN VASCULITIC SYNDROMES                         Approx %      Approx % of                         Pos ANCA    patients demonstrating  ----------------------------------------------------------                (combined patterns)   C Pattern   P Pattern  Wegener's granulomatosis:  -Active Generalized   85 - 92        85 - 90       2 - 5  -Limited forms        60 - 67        85 - 90       2 - 5  -Inactive             30 - 35        85 - 90       2 - 5  Idiopathic Crescentic  Glomerulonephritis      80            some*     majority*  Polyarteritis Nodosa    50             86           14  Churg - Antonina         50             80           20  SLE                   less than 10     0    greater than 90  Rheumatoid Arthritis  less than 5      0    greater than 90  Sjogren's Syndrome        25           0    greater than 90  ----------------------------------------------------------  *No quantitation in the literature.  Performed by [facility],  [address] [place] [id number] [phone]  [email or web address],  Remy Watkins MD - Lab. Director                                              01/24/2008 < 1:20  Final     Comment:     Reference range: < 1:20  (Note)  TEST INFORMATION: Anti-Neutrophil Cyto Ab, IgG  Neutrophil Cytoplasmic Antibodies (C-ANCA = granular  cytoplasmic staining, P-ANCA = perinuclear staining) are  found in the serum of over 90% of patients with certain  necrotizing systemic vasculitides, and  usually in less  than 5% of patients with collagen vascular disease or  arthritis.                  ANCA'S IN VASCULITIC SYNDROMES                         Approx %      Approx % of                         Pos ANCA    patients demonstrating  ----------------------------------------------------------                (combined patterns)   C Pattern   P Pattern  Wegener's granulomatosis:  -Active Generalized   85 - 92 85 - 90       2 - 5  -Limited forms        60 - 67 85 - 90       2 - 5  -Inactive             30 - 35 85 - 90       2 - 5  Idiopathic Crescentic  Glomerulonephritis      80            some*     majority*  Polyarteritis Nodosa    50             86           14  Churg - Antonina         50             80           20  SLE                   less than 10     0    greater than 90  Rheumatoid Arthritis  less than 5      0    greater than 90  Sjogren's Syndrome        25           0    greater than 90  ----------------------------------------------------------  *No quantitation in the literature.  Performed by [facility],  [address] [place] [address] [phone]  [email or web address],  Remy Watkins MD - Lab. Director                                                Albumin   Date Value Ref Range Status   10/20/2020 3.9 3.4 - 5.0 g/dL Final   03/10/2017 3.7 3.4 - 5.0 g/dL Final   11/10/2016 4.0 3.4 - 5.0 g/dL Final     Alkaline Phosphatase   Date Value Ref Range Status   10/20/2020 54 40 - 150 U/L Final   03/10/2017 48 40 - 150 U/L Final   11/10/2016 42 40 - 150 U/L Final     ALT   Date Value Ref Range Status   10/20/2020 17 0 - 50 U/L Final   03/12/2019 29 0 - 32 IU/L Final   06/09/2017 47 0 - 50 U/L Final     Rheumatoid Factor   Date Value Ref Range Status   07/05/2018 <20 <20 IU/mL Final     Recent Labs   Lab Test 10/20/20  0155 09/21/20  1609 06/29/20  1501 10/02/19  1428 03/12/19 07/05/18  1805 06/09/17  1756 06/09/17  1756 03/10/17  1835 03/10/17  1835 11/10/16  1812   WBC 14.0*   --  9.3 8.5  --   --    < > 6.7   < > 20.8* 8.8   HGB 13.5  --  12.9 13.6  --   --    < > 12.7   < > 13.6 12.4   HCT 41.3  --  39.1 40.4  --   --    < > 38.5   < > 39.6 37.2     --  101* 101*  --   --    < > 100   < > 98 101*     --  298 320  --   --    < > 284   < > 292 276   BUN 8 12  --  14  --  19   < >  --    < > 14 14   TSH  --  0.60  --   --  1.540 0.56  --   --   --   --  0.90   AST 13  --   --   --  24  --   --   --   --  17 6   ALT 17  --   --   --  29  --   --  47   < > 18 14   ALKPHOS 54  --   --   --   --   --   --   --   --  48 42    < > = values in this interval not displayed.     RHEUM RESULTS Latest Ref Rng & Units 6/29/2020 9/21/2020 10/20/2020   COMPLEMENT C3 76 - 169 mg/dL - - -   COMPLEMENT C4 15 - 50 mg/dL - - -   SED RATE 0 - 20 mm/h - 30(H) -   CRP, INFLAMMATION 0.0 - 8.0 mg/L - 44.0(H) -   CK TOTAL 30 - 225 U/L - 62 -   RHEUMATOID FACTOR <20 IU/mL - - -   JOSE SCREEN BY EIA <1.0 - - -   AST 0 - 45 U/L - - 13   ALT 0 - 50 U/L - - 17   ALBUMIN 3.4 - 5.0 g/dL - - 3.9   WBC 4.0 - 11.0 10e9/L 9.3 - 14.0(H)   RBC 3.8 - 5.2 10e12/L 3.88 - 4.12   HGB 11.7 - 15.7 g/dL 12.9 - 13.5   HCT 35.0 - 47.0 % 39.1 - 41.3   MCV 78 - 100 fl 101(H) - 100   MCHC 31.5 - 36.5 g/dL 33.0 - 32.7   RDW 10.0 - 15.0 % 12.2 - 12.5    - 450 10e9/L 298 - 261   CREATININE 0.52 - 1.04 mg/dL 0.74 0.68 0.82   GFR ESTIMATE, IF BLACK >60 mL/min/[1.73:m2] >90 >90 >90   GFR ESTIMATE >60 mL/min/[1.73:m2] >90 >90 85    - 1,620 mg/dL - - -   HEPATITIS C ANTIBODY NEG - - -       Rheumatoid Factor   Date Value Ref Range Status   07/05/2018 <20 <20 IU/mL Final   ,  ,  ,  ,  ,   SSA (RO) Antibody IgG   Date Value Ref Range Status   02/17/2009 2  Final     Comment:     Reference range: 0 to 40  Unit: AU/mL  (Note)  REFERENCE INTERVALS: SSA (Ro) (WESTLEY) Ab, IgG   29 AU/mL or Less ............. Negative   30 - 40 AU/mL ................ Equivocal   41 AU/mL or Greater .......... Positive    SSA (Ro) antibody is seen in  70-75% of Sjogren syndrome  cases, 30-40% of systemic lupus erythematosus (SLE) and  5-10% of progressive systemic sclerosis (PSS).  Performed by [facility],  [address] [facility] [place] [address] [phone]  [email or web address], Remy Watkins MD- Lab. Director     Scleroderma Antibody IgG   Date Value Ref Range Status   02/17/2009 0  Final     Comment:     Reference range: 0 to 40  Unit: AU/mL  (Note)  REFERENCE INTERVALS: Scleroderma (Scl-70) (WESTLEY) Ab, IgG   29 AU/mL or Less ............. Negative   30 - 40 AU/mL ................ Equivocal   41 AU/mL or Greater .......... Positive    Scleroderma (Scl-70) antibody is seen in 20-60% of  patients with scleroderma and is considered diagnostic  and specific for scleroderma if it is the only WESTLEY  antibody present. Scl-70 is also seen in approximately  25% of progressive systemic sclerosis (PSS).  Performed by [facility],  [address] [facility] [place] [address] [phone]  [email or web address], Remy Watkins MD - Lab. Director   ,   JOSE interpretation   Date Value Ref Range Status   02/28/2019 Negative NEG^Negative Final     Comment:                                        Reference range:  <1:40  NEGATIVE  1:40 - 1:80  BORDERLINE POSITIVE  >1:80 POSITIVE       ,   JOSE Screen by EIA   Date Value Ref Range Status   11/10/2016 <1.0  Interpretation:  Negative   <1.0 Final   ,  ,  ,  ,  ,  ,  ,  ,   Hep B Surface Agn   Date Value Ref Range Status   03/16/2005 Negative NEG Final   ,  ,  ,  ,  ,  ,  ,  ,  ,  ,  ,   Neutrophil Cytoplasmic IgG Antibody   Date Value Ref Range Status   02/17/2009 <1:20  Final     Comment:     Reference range: <1:20  (Note)  TEST INFORMATION: Anti-Neutrophil Cyto Ab, IgG  Neutrophil Cytoplasmic Antibodies (C-ANCA = granular  cytoplasmic staining, P-ANCA = perinuclear staining) are  found in the serum of over 90% of patients with certain  necrotizing systemic vasculitides, and usually in less  than 5% of patients with collagen vascular disease  or  arthritis.                  ANCA'S IN VASCULITIC SYNDROMES                         Approx %      Approx % of                         Pos ANCA    patients demonstrating  ----------------------------------------------------------                (combined patterns)   C Pattern   P Pattern  Wegener's granulomatosis:  -Active Generalized   85 - 92 85 - 90       2 - 5  -Limited forms        60 - 67 85 - 90       2 - 5  -Inactive             30 - 35        85 - 90       2 - 5  Idiopathic Crescentic  Glomerulonephritis      80            some*     majority*  Polyarteritis Nodosa    50             86           14  Churg - Antonina         50             80           20  SLE                   less than 10     0    greater than 90  Rheumatoid Arthritis  less than 5      0    greater than 90  Sjogren's Syndrome        25           0    greater than 90  ----------------------------------------------------------  *No quantitation in the literature.  Performed by [facility],  [address] [place] [id number] [phone]  [email or web address], Remy Watkins MD - Lab. Director                                                      IGG   Date Value Ref Range Status   10/04/2013 854 695 - 1,620 mg/dL Final     Reviewed Rheumatology lab flowsheet    Valencia is a 50 year old who is being evaluated via a billable video visit.      How would you like to obtain your AVS? MyChart    Will anyone else be joining your video visit? No      Video Start Time: 12:08 PM  Video-Visit Details    Type of service:  Video Visit    Video End Time:12:31 PM    Originating Location (pt. Location): Home    Distant Location (provider location):  [facility] [place]     Platform used for Video Visit: Well        Again, thank you for allowing me to participate in the care of your patient.      Sincerely,    Adal Gallo MD

## 2021-06-15 DIAGNOSIS — M30.1 EOSINOPHILIC GRANULOMATOSIS WITH POLYANGIITIS (EGPA) WITH LUNG INVOLVEMENT (H): ICD-10-CM

## 2021-06-15 DIAGNOSIS — R53.82 CHRONIC FATIGUE: ICD-10-CM

## 2021-06-15 DIAGNOSIS — D72.18 EOSINOPHILIC GRANULOMATOSIS WITH POLYANGIITIS (EGPA) WITH LUNG INVOLVEMENT (H): ICD-10-CM

## 2021-06-15 DIAGNOSIS — Z78.0 MENOPAUSE: ICD-10-CM

## 2021-06-15 LAB
ALBUMIN UR-MCNC: NEGATIVE MG/DL
APPEARANCE UR: CLEAR
BASOPHILS # BLD AUTO: 0.1 10E9/L (ref 0–0.2)
BASOPHILS NFR BLD AUTO: 0.7 %
BILIRUB UR QL STRIP: NEGATIVE
COLOR UR AUTO: YELLOW
DIFFERENTIAL METHOD BLD: ABNORMAL
EOSINOPHIL # BLD AUTO: 0.1 10E9/L (ref 0–0.7)
EOSINOPHIL NFR BLD AUTO: 1.2 %
ERYTHROCYTE [DISTWIDTH] IN BLOOD BY AUTOMATED COUNT: 12.5 % (ref 10–15)
GLUCOSE UR STRIP-MCNC: NEGATIVE MG/DL
HCT VFR BLD AUTO: 38.3 % (ref 35–47)
HGB BLD-MCNC: 12.8 G/DL (ref 11.7–15.7)
HGB UR QL STRIP: ABNORMAL
KETONES UR STRIP-MCNC: NEGATIVE MG/DL
LEUKOCYTE ESTERASE UR QL STRIP: NEGATIVE
LYMPHOCYTES # BLD AUTO: 1.3 10E9/L (ref 0.8–5.3)
LYMPHOCYTES NFR BLD AUTO: 16.5 %
MCH RBC QN AUTO: 33.9 PG (ref 26.5–33)
MCHC RBC AUTO-ENTMCNC: 33.4 G/DL (ref 31.5–36.5)
MCV RBC AUTO: 101 FL (ref 78–100)
MONOCYTES # BLD AUTO: 0.5 10E9/L (ref 0–1.3)
MONOCYTES NFR BLD AUTO: 6.3 %
NEUTROPHILS # BLD AUTO: 6.1 10E9/L (ref 1.6–8.3)
NEUTROPHILS NFR BLD AUTO: 75.3 %
NITRATE UR QL: NEGATIVE
PH UR STRIP: 7 PH (ref 5–7)
PLATELET # BLD AUTO: 259 10E9/L (ref 150–450)
RBC # BLD AUTO: 3.78 10E12/L (ref 3.8–5.2)
RBC #/AREA URNS AUTO: ABNORMAL /HPF
SOURCE: ABNORMAL
SP GR UR STRIP: 1.01 (ref 1–1.03)
UROBILINOGEN UR STRIP-ACNC: 0.2 EU/DL (ref 0.2–1)
WBC # BLD AUTO: 8.1 10E9/L (ref 4–11)
WBC #/AREA URNS AUTO: ABNORMAL /HPF

## 2021-06-15 PROCEDURE — 82040 ASSAY OF SERUM ALBUMIN: CPT | Performed by: INTERNAL MEDICINE

## 2021-06-15 PROCEDURE — 36415 COLL VENOUS BLD VENIPUNCTURE: CPT | Performed by: INTERNAL MEDICINE

## 2021-06-15 PROCEDURE — 84450 TRANSFERASE (AST) (SGOT): CPT | Performed by: INTERNAL MEDICINE

## 2021-06-15 PROCEDURE — 84443 ASSAY THYROID STIM HORMONE: CPT | Performed by: INTERNAL MEDICINE

## 2021-06-15 PROCEDURE — 84439 ASSAY OF FREE THYROXINE: CPT | Performed by: INTERNAL MEDICINE

## 2021-06-15 PROCEDURE — 81001 URINALYSIS AUTO W/SCOPE: CPT | Performed by: INTERNAL MEDICINE

## 2021-06-15 PROCEDURE — 82565 ASSAY OF CREATININE: CPT | Performed by: INTERNAL MEDICINE

## 2021-06-15 PROCEDURE — 85025 COMPLETE CBC W/AUTO DIFF WBC: CPT | Performed by: INTERNAL MEDICINE

## 2021-06-15 PROCEDURE — 84305 ASSAY OF SOMATOMEDIN: CPT | Performed by: INTERNAL MEDICINE

## 2021-06-15 PROCEDURE — 82310 ASSAY OF CALCIUM: CPT | Performed by: INTERNAL MEDICINE

## 2021-06-15 PROCEDURE — 84460 ALANINE AMINO (ALT) (SGPT): CPT | Performed by: INTERNAL MEDICINE

## 2021-06-15 PROCEDURE — 83001 ASSAY OF GONADOTROPIN (FSH): CPT | Performed by: INTERNAL MEDICINE

## 2021-06-16 LAB
ALBUMIN SERPL-MCNC: 3.9 G/DL (ref 3.4–5)
ALT SERPL W P-5'-P-CCNC: 28 U/L (ref 0–50)
AST SERPL W P-5'-P-CCNC: 21 U/L (ref 0–45)
CALCIUM SERPL-MCNC: 9 MG/DL (ref 8.5–10.1)
CREAT SERPL-MCNC: 0.79 MG/DL (ref 0.52–1.04)
FSH SERPL-ACNC: 6.2 IU/L
GFR SERPL CREATININE-BSD FRML MDRD: 88 ML/MIN/{1.73_M2}
T4 FREE SERPL-MCNC: 0.86 NG/DL (ref 0.76–1.46)
TSH SERPL DL<=0.005 MIU/L-ACNC: 0.66 MU/L (ref 0.4–4)

## 2021-06-18 LAB — IGF-I BLD-MCNC: 102 NG/ML (ref 60–240)

## 2021-07-14 ENCOUNTER — MYC MEDICAL ADVICE (OUTPATIENT)
Dept: INTERNAL MEDICINE | Facility: CLINIC | Age: 51
End: 2021-07-14

## 2021-07-19 ENCOUNTER — VIRTUAL VISIT (OUTPATIENT)
Dept: INTERNAL MEDICINE | Facility: CLINIC | Age: 51
End: 2021-07-19
Payer: COMMERCIAL

## 2021-07-19 DIAGNOSIS — G44.209 TENSION HEADACHE: Primary | ICD-10-CM

## 2021-07-19 PROCEDURE — 99213 OFFICE O/P EST LOW 20 MIN: CPT | Mod: 95 | Performed by: INTERNAL MEDICINE

## 2021-07-19 NOTE — PROGRESS NOTES
Valencia is a 50 year old who is being evaluated via a billable video visit.      How would you like to obtain your AVS? MyChart  If the video visit is dropped, the invitation should be resent by: Text to cell phone: [phone]  Will anyone else be joining your video visit? No    Video Start Time: 12:44    Assessment & Plan     Tension headache  Sounds like she is having significant myofascial headache, could be triggering episodes of migraine.  Her x-ray did not show significant degenerative changes less than 2 years ago.  Recommend she try using her tizanidine at bedtime when she is having an episode of bad headache, trial of heat and stretch, watch posture and position.  Recommend referral to physical therapy at the neurology clinic so they can do myofascial therapy and see if they can reduce the frequency and severity of her episodes.  If not, would consider referral to pain clinic.  - Adult Neurology Referral; Future        Return in about 3 months (around 10/19/2021) for Physical Exam.    Morelia Simental MD  [facility]    Teresa Birmingham is a 50 year old who presents for the following health issues     HPI     Complaints of episodic headache:  She reports for the last 1-1/2 or more years she has had episodes of headaches lasting for several days.  She has paid a little more attention over the last year and realizes she is having episodes of this every 3 to 6 weeks where she will have some dull headache or mild soreness in the trapezius muscles, then will start awakening with significant headache that then last for 3 to 5 days.  It will decrease a little if she uses ibuprofen but that does tend to cause GI upset, occasionally causes mild nausea but they are little different in character than her previous migraine headaches.  There is a steady pressure-like pain at the back of the head, occasionally shooting pains that are fairly brief that come forward.  She does not feel like there is  been any specific trigger other than they may be more likely to occur after she has been more active for few days.  She does not have any numbness or tingling in her arms or legs.    Patient Active Problem List   Diagnosis     Goiter     History of systemic steroid therapy     ILD (interstitial lung disease) (H)     Palpitations     Churg-Antonina syndrome (H)     History of eating disorder     Asthma     Anxiety     Chronic fatigue     Mannose-binding lectin deficiency     Benign essential hypertension     Moderate episode of recurrent major depressive disorder (H)     Bronchiectasis without acute exacerbation (H)     Controlled substance agreement signed     Irritable bowel syndrome without diarrhea     Bladder pain     Overactive bladder     Current Outpatient Medications   Medication Sig Dispense Refill     5-Methyltetrahydrofolate (METHYL FOLATE) POWD Take 1,000 mg by mouth daily       acetylcysteine (MUCOMYST) 20 % neb solution TAKE 4ML VIA NEBULIZER TWICE A DAY WITH ALBUTEROL SOLUTION. DISCARD OPEN BOTTLE AFTER 96 HOURS. 200 mL 9     albuterol (PROAIR HFA/PROVENTIL HFA/VENTOLIN HFA) 108 (90 Base) MCG/ACT inhaler INHALE 2 PUFFS INTO THE LUNGS EVERY 4 HOURS AS NEEDED FOR SHORTNESS OF BREATH / DYSPNEA OR WHEEZING 8.5 g 3     albuterol (PROVENTIL) (2.5 MG/3ML) 0.083% neb solution TAKE 1 VIAL VIA NEBULIZER TWICE A DAY. 180 mL 11     ALBUTEROL SULFATE HFA IN Inhale 2 puffs into the lungs 4 times daily as needed (chest tightness)       Ascorbic Acid (VITAMIN C PO) Take 800 mg by mouth       atorvastatin (LIPITOR) 10 MG tablet Take 0.5 tablets (5 mg) by mouth daily 45 tablet 3     Betaine, Trimethylglycine, (TMG, TRIMETHYLGLYCINE, PO) Take 1,000 mg by mouth       Caprylic Acid LIQD 1 capsule daily       ciprofloxacin-dexamethasone (CIPRODEX) 0.3-0.1 % otic suspension SHAKE LIQUID AND INSTILL 3 DROPS INTO AFFECTED EARS TWICE A DAY AS NEEDED. 7.5 mL 9     Cyanocobalamin (B-12) 5000 MCG SUBL        diazepam (VALIUM) 2 MG  tablet TAKE 1-2 TABLETS (2-4 MG) BY MOUTH EVERY 6 HOURS AS NEEDED FOR VERTIGO 20 tablet 0     Dietary Management Product (VB6 P5P PO)        fluticasone (FLONASE) 50 MCG/ACT nasal spray SHAKE LIQUID AND USE 2 SPRAYS IN EACH NOSTRIL EVERY DAY. 48 mL 3     L-Glutamine 500 MG TABS Take 500 mg by mouth 3 times daily       lubiprostone (AMITIZA) 8 MCG capsule Take 1 capsule (8 mcg) by mouth 2 times daily 180 capsule 3     metoprolol succinate ER (TOPROL-XL) 25 MG 24 hr tablet TAKE 1/2 TABLET(12.5 MG) BY MOUTH DAILY. Hold if BP is low 45 tablet 3     Omega-3 Fatty Acids (FISH OIL) 1200 MG capsule Take 4 capsules (4.8 g) by mouth daily       Phosphatidyl Choline 65 MG TABS Take 2 capsules by mouth daily       predniSONE (DELTASONE) 1 MG tablet Take 5 tabs daily, alternating with 6 tabs daily 590 tablet 5     S-Adenosylmethionine (SAME PO) Take 600 mg by mouth daily Patient is taking 800 mg       sertraline (ZOLOFT) 25 MG tablet 25 mg PATIENT TAKES 1/2 TABLET DAILY       SYMBICORT 160-4.5 MCG/ACT Inhaler INHALE 2 PUFFS BY MOUTH TWICE A DAY. 30.6 Inhaler 3     tiZANidine (ZANAFLEX) 2 MG tablet Take 1-2 tablets (2-4 mg) by mouth 3 times daily as needed for muscle spasms 45 tablet 1     vitamin D3 (CHOLECALCIFEROL) 2000 units (50 mcg) tablet Take 2 tablets by mouth daily       Magnesium ([employer] TRIPLE MAGNESIUM COMPLEX) 400 MG CAPS Take 1 capsule by mouth 2 times daily       sulfamethoxazole-trimethoprim (BACTRIM DS) 800-160 MG tablet Take 1 tablet by mouth 2 times daily (Patient not taking: Reported on 6/4/2021) 14 tablet 0     vitamin B complex with vitamin C (VITAMIN  B COMPLEX) TABS tablet Take 1 tablet by mouth daily (Patient not taking: Reported on 7/19/2021)              Review of Systems   No focal neurologic symptoms      Objective           Vitals:  No vitals were obtained today due to virtual visit.    Physical Exam   GENERAL: Healthy, alert and no distress  EYES: Eyes grossly normal to inspection.  No discharge or  erythema, or obvious scleral/conjunctival abnormalities.  RESP: No audible wheeze, cough, or visible cyanosis.  No visible retractions or increased work of breathing.    SKIN: Visible skin clear. No significant rash, abnormal pigmentation or lesions.  NEURO: Cranial nerves grossly intact.  Mentation and speech appropriate for age.  PSYCH: Mentation appears normal, affect normal/bright, judgement and insight intact, normal speech and appearance well-groomed.                Video-Visit Details    Type of service:  Video Visit    Video End Time:1:00    Originating Location (pt. Location): Home    Distant Location (provider location):  [facility]     Platform used for Video Visit: [email or web address]

## 2021-07-23 ENCOUNTER — TRANSFERRED RECORDS (OUTPATIENT)
Dept: HEALTH INFORMATION MANAGEMENT | Facility: CLINIC | Age: 51
End: 2021-07-23

## 2021-07-23 NOTE — TELEPHONE ENCOUNTER
See her Ammado message.     Last OV on 6/10/19.    Vicky 45 Transitions Follow Up Call    2021    Patient: Hilaria Wilkerson  Patient : 1970   MRN: [id number]  Reason for Admission: 2021 - 2021 Hypertensive emergency, CHF, Med noncompliance. Discharge Date: 21 RARS: Readmission Risk Score: 17  NR  CT        Dr Jaspreet Christopher (Internal Medicine) on 2021; 11:15AM. No show. Care Transitions Follow Up Call    Needs to be reviewed by the provider   Additional needs identified to be addressed with provider: No  none             Method of communication with provider : none      Care Transition Nurse (CTN) contacted the patient by telephone to follow up after admission. Verified name and  with patient as identifiers. Addressed changes since last contact: Eve Paris reports today's home /100. States he has all his meds and taking as directed. Has c/o freq urination d/t torsemide. No acute pain complaints. States occasional HA and tylenol effective for relief. Discussed follow-up appointments. If no appointment was previously scheduled, appointment scheduling offered: No show to hosp scheduled appt. Is follow up appointment scheduled within 7 days of discharge? No.    Advance Care Planning:   Does patient have an Advance Directive: NR.     CTN reviewed discharge instructions, medical action plan and red flags with patient and discussed any barriers to care and/or understanding of plan of care after discharge. Discussed appropriate site of care based on symptoms and resources available to patient including: When to call 911. The patient agrees to contact the PCP office for questions related to their healthcare. Patients top risk factors for readmission: medical condition-Medication compliance. Interventions to address risk factors: Pt restates understanding to monitor daily BP and report trending high/lows to physician. Non-[facility] follow up appointment(s):     CTN provided contact information for future needs.  Plan for follow-up call in 7-10 days based on severity of symptoms and risk factors. Plan for next call: symptom management-BP review. Care Transitions Subsequent and Final Call    Subsequent and Final Calls  Do you have any ongoing symptoms?: No  Have your medications changed?: No  Do you have any questions related to your medications?: No  Do you currently have any active services?: No  Do you have any needs or concerns that I can assist you with?: No  Identified Barriers: Lack of Education  Care Transitions Interventions  Other Interventions: Follow Up  No future appointments.     Maria Fernanda Prado RN

## 2021-07-24 ENCOUNTER — HEALTH MAINTENANCE LETTER (OUTPATIENT)
Age: 51
End: 2021-07-24

## 2021-07-27 ENCOUNTER — IMMUNIZATION (OUTPATIENT)
Dept: NURSING | Facility: CLINIC | Age: 51
End: 2021-07-27
Payer: COMMERCIAL

## 2021-07-27 PROCEDURE — 91300 PR COVID VAC PFIZER DIL RECON 30 MCG/0.3 ML IM: CPT

## 2021-07-27 PROCEDURE — 0001A PR COVID VAC PFIZER DIL RECON 30 MCG/0.3 ML IM: CPT

## 2021-08-12 ENCOUNTER — OFFICE VISIT (OUTPATIENT)
Dept: PULMONOLOGY | Facility: CLINIC | Age: 51
End: 2021-08-12
Attending: INTERNAL MEDICINE
Payer: COMMERCIAL

## 2021-08-12 VITALS
BODY MASS INDEX: 20.24 KG/M2 | WEIGHT: 110 LBS | SYSTOLIC BLOOD PRESSURE: 119 MMHG | HEIGHT: 62 IN | OXYGEN SATURATION: 97 % | HEART RATE: 67 BPM | DIASTOLIC BLOOD PRESSURE: 76 MMHG

## 2021-08-12 DIAGNOSIS — J47.9 BRONCHIECTASIS WITHOUT ACUTE EXACERBATION (H): Primary | ICD-10-CM

## 2021-08-12 DIAGNOSIS — R00.2 PALPITATIONS: ICD-10-CM

## 2021-08-12 LAB
EXPTIME-PRE: 6.12 SEC
FEF2575-%PRED-PRE: 68 %
FEF2575-PRE: 1.76 L/SEC
FEF2575-PRED: 2.56 L/SEC
FEFMAX-%PRED-PRE: 93 %
FEFMAX-PRE: 6.08 L/SEC
FEFMAX-PRED: 6.5 L/SEC
FEV1-%PRED-PRE: 113 %
FEV1-PRE: 2.79 L
FEV1FEV6-PRE: 69 %
FEV1FEV6-PRED: 83 %
FEV1FVC-PRE: 69 %
FEV1FVC-PRED: 82 %
FIFMAX-PRE: 5.41 L/SEC
FVC-%PRED-PRE: 135 %
FVC-PRE: 4.06 L
FVC-PRED: 3 L

## 2021-08-12 PROCEDURE — 94375 RESPIRATORY FLOW VOLUME LOOP: CPT | Performed by: INTERNAL MEDICINE

## 2021-08-12 PROCEDURE — 99213 OFFICE O/P EST LOW 20 MIN: CPT | Mod: 25 | Performed by: INTERNAL MEDICINE

## 2021-08-12 RX ORDER — METOPROLOL SUCCINATE 25 MG/1
50 TABLET, EXTENDED RELEASE ORAL DAILY
Qty: 45 TABLET | Refills: 3 | COMMUNITY
Start: 2021-08-12 | End: 2021-11-09

## 2021-08-12 RX ORDER — LEUCOVORIN, FOLIC ACID, LEVOMEFOLATE MAGNESIUM, FERROUS CYSTEINE GLYCINATE, 1,2-DOCOSAHEXANOYL-SN-GLYCERO-3-PHOSPHOSERINE CALCIUM, 1,2-ICOSAPENTOYL-SN-GLYCERO-3-PHOSPHOSERINE CALCIUM, PHOSPHATIDYL SERINE, PYRIDOXAL 5-PHOSPHATE, FLAVIN ADENINE DINUCLEOTIDE, NADH, COBAMAMIDE, COCARBOXYLASE (THIAMINE PYROPHOSPHATE), MAGNESIUM ASCORBATE, ZINC ASCORBATE, MAGNESIUM L-THREONATE AND BETAINE 2.5; 1; 7; 13.6; 6.4; 800; 12; 25; 25; 25; 50; 25; 24; 1; 1; 500; 1.83; 3.67 MG/1; MG/1; MG/1; MG/1; MG/1; UG/1; MG/1; UG/1; UG/1; UG/1; UG/1; UG/1; MG/1; MG/1; MG/1; UG/1; MG/1; MG/1
1 CAPSULE, DELAYED RELEASE PELLETS ORAL DAILY
Status: ON HOLD | COMMUNITY
Start: 2021-08-12 | End: 2022-06-22

## 2021-08-12 RX ORDER — METOPROLOL SUCCINATE 25 MG/1
50 TABLET, EXTENDED RELEASE ORAL DAILY
Qty: 45 TABLET | Refills: 3 | COMMUNITY
Start: 2021-08-12 | End: 2021-08-12

## 2021-08-12 ASSESSMENT — MIFFLIN-ST. JEOR: SCORE: 1082.21

## 2021-08-12 NOTE — LETTER
"    8/12/2021         RE: Valencia Ye  [address] [facility] [address]        Dear Colleague,    Thank you for referring your patient, Valencia Ye, to the [facility] [place]. Please see a copy of my visit note below.    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  Despite an episode of Covid in April of this year and probably an episode in February of last year as well, the patient appears to be doing well.  She has very good exercise tolerance.  She is oxygenating well.  PFTs are in the high normal range, similar to her recent baseline.  She does not appear to be having an exacerbation of her bronchiectasis at this time.  She will continue her current airway clearance therapy with albuterol and Mucomyst.  Defer to rheumatology for management of her Churg-Antonina.    Follow-up in 4 months with PFTs.    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   The patient had a Covid infection in April.  She presented with fever, nasal congestion and a large volume of thick clear nasal drainage.  She reported \"raspy\" breathing with wheezing and a mild cough but minimal sputum production.  No chest pain.  No shortness of breath.  Intermittent fever.  She was treated with monoclonal antibody along with oral antibiotics to prevent a bronchiectasis exacerbation.  She was initially treated with Augmentin but switched to doxycycline for GI intolerance.  She briefly lost her sense of taste and smell.  Most of her symptoms " resolved after a week but wheezing took a couple of weeks to resolve.  She has felt well since.  Currently breathing is comfortable at rest and with all activity.  She walks 3 miles with her dog every day.  Rare cough.  Minimal white-clear sputum.  No hemoptysis.  No chest pain.  She is doing vest therapy once daily.  No recent fever, chills or night sweats.    Review of systems:  Appetite is good  Chronic sinus congestion and ear pain at baseline.  Occasional palpitations  No nausea or vomiting.  She reports symptoms of irritable bowel.  Baseline body aches.  Rash after last Covid vaccine.  She was told not to get her second vaccine until at least 90 days after receiving monoclonal antibody.  The remainder of a complete review of systems is otherwise negative except as noted in the history of present illness.        Current Outpatient Medications   Medication     acetylcysteine (MUCOMYST) 20 % neb solution     albuterol (PROAIR HFA/PROVENTIL HFA/VENTOLIN HFA) 108 (90 Base) MCG/ACT inhaler     albuterol (PROVENTIL) (2.5 MG/3ML) 0.083% neb solution     atorvastatin (LIPITOR) 10 MG tablet     ciprofloxacin-dexamethasone (CIPRODEX) 0.3-0.1 % otic suspension     diazepam (VALIUM) 2 MG tablet     EnLyte (ENLYTE) CAPS capsule     fluticasone (FLONASE) 50 MCG/ACT nasal spray     lubiprostone (AMITIZA) 8 MCG capsule     metoprolol succinate ER (TOPROL-XL) 25 MG 24 hr tablet     predniSONE (DELTASONE) 1 MG tablet     sertraline (ZOLOFT) 25 MG tablet     SYMBICORT 160-4.5 MCG/ACT Inhaler     tiZANidine (ZANAFLEX) 2 MG tablet     Ascorbic Acid (VITAMIN C PO)     Caprylic Acid LIQD     L-Glutamine 500 MG TABS     Omega-3 Fatty Acids (FISH OIL) 1200 MG capsule     Phosphatidyl Choline 65 MG TABS     vitamin D3 (CHOLECALCIFEROL) 2000 units (50 mcg) tablet     No current facility-administered medications for this visit.     Allergies   Allergen Reactions     Colistimethate Anaphylaxis     Colymycin M [Colistimethate Sodium]  Anaphylaxis     Tamiflu [Oseltamivir]      Gums Blister up     Azithromycin Palpitations     Heart palpitation  Heart palpitation     Clindamycin Rash     Past Medical History:   Diagnosis Date     Anxiety      Aortitis (H)      Asthma     associated with Churg Antonina syndrome     Bronchiectasis (H)      Cerebral infarction (H) 1990    Very small, caused small permanent optical migraine     Chronic sinusitis      Churg-Antonina syndrome (H)     dx      Conductive hearing loss      Depressive disorder      Eustachian tube dysfunction      Goiter      Hearing loss, conductive      Heart rate problem      Hypertension      Hypocalcemia      Immunosuppression (H)      Irritable bowel syndrome      Major depressive disorder      Mannose-binding lectin deficiency      Nonspecific abnormal results of thyroid function study      Osteoporosis      Pericarditis      and      Pneumonia     4/23/10     Recurrent otitis media      Recurrent UTI      Rheumatoid vasculitis (H)      Sinusitis, chronic      Steroid long-term use      Tinnitus      Varicose veins of leg with swelling        Past Surgical History:   Procedure Laterality Date     C/SECTION, CLASSICAL        SECTION       COLONOSCOPY Left 2014    Procedure: COMBINED COLONOSCOPY, SINGLE OR MULTIPLE BIOPSY/POLYPECTOMY BY BIOPSY;  Surgeon: Js Pinedo MD;  Location:  GI     COLONOSCOPY N/A 2019    Procedure: COLONOSCOPY;  Surgeon: Bryce Pimentel MD;  Location:  GI     ENT SURGERY       GENITOURINARY SURGERY       HC COLP CERVIX/UPPER VAGINA W LOOP ELEC BX CERVIX       HEAD & NECK SURGERY       HYSTERECTOMY  2009    without oopherectomy     HYSTERECTOMY, PAP NO LONGER INDICATED      cervix removed      PE TUBES       PICC INSERTION  10/10/2013    5fr DL PASV PICC, 43cm (1cm external) in the L basilic vein w/ tip in the low SVC.     PICC INSERTION Left 2017    5fr DL BioFlo PICC, 42cm (3cm  external) in the L lateral brachial vein w/ tip in the SVC RA junction.     SINUS SURGERY       TUBAL LIGATION       TYMPANOPLASTY         Social History     Socioeconomic History     Marital status:      Spouse name: Not on file     Number of children: Not on file     Years of education: Not on file     Highest education level: Not on file   Occupational History     Not on file   Tobacco Use     Smoking status: Never Smoker     Smokeless tobacco: Never Used   Substance and Sexual Activity     Alcohol use: Yes     Alcohol/week: 0.0 standard drinks     Comment: few drinks a week     Drug use: No     Sexual activity: Yes     Partners: Male     Birth control/protection: Male Surgical, Female Surgical   Other Topics Concern     Parent/sibling w/ CABG, MI or angioplasty before 65F 55M? Not Asked      Service No     Blood Transfusions No     Caffeine Concern No     Occupational Exposure No     Hobby Hazards No     Sleep Concern No     Stress Concern Yes     Weight Concern No     Special Diet Yes     Comment: IBS diet     Back Care No     Exercise Not Asked     Comment: 1 mile power walk 5 days a week at least.      Bike Helmet Not Asked     Seat Belt Yes     Self-Exams Yes   Social History Narrative    [employer] in Radiology Department.  .  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  Non smoker. No smokers at home.  Enjoys walking and dancing.  Alcohol--1 to 2 drinks daily.  Few times a year has more than 4 drinks in a day.  No illicits.                     Social Determinants of Health     Financial Resource Strain:      Difficulty of Paying Living Expenses:    Food Insecurity:      Worried About Running Out of Food in the Last Year:      Ran Out of Food in the Last Year:    Transportation Needs:      Lack of Transportation (Medical):      Lack of Transportation (Non-Medical):    Physical Activity:      Days of Exercise per Week:      Minutes of Exercise per Session:    Stress:      Feeling of  "Stress :    Social Connections:      Frequency of Communication with Friends and Family:      Frequency of Social Gatherings with Friends and Family:      Attends [employer] Services:      Active Member of Clubs or Organizations:      Attends Club or Organization Meetings:      Marital Status:    Intimate Partner Violence:      Fear of Current or Ex-Partner:      Emotionally Abused:      Physically Abused:      Sexually Abused:        ROS Pulmonary    A complete ROS was otherwise negative except as noted in the HPI.  /76   Pulse 67   Ht 1.575 m (5' 2\")   Wt 49.9 kg (110 lb)   LMP 02/01/2009   SpO2 97%   BMI 20.12 kg/m    Exam:   GENERAL APPEARANCE: Well developed, well nourished, alert, and in no apparent distress.  EYES: PERRL, EOMI  HENT: Nasal mucosa with no edema and no hyperemia. No nasal polyps.  EARS: Canals clear, TMs with previous perforation and erythema, chronic and unchanged  MOUTH: Oral mucosa is moist, without any lesions, no tonsillar enlargement, no oropharyngeal exudate.  NECK: supple, no masses, no thyromegaly.  LYMPHATICS: No significant axillary, cervical, or supraclavicular nodes.  RESP: normal percussion, good air flow throughout.  No crackles. No rhonchi. No wheezes.  CV: Normal S1, S2, regular rhythm, normal rate. No murmur.  No rub. No gallop. No LE edema.   ABDOMEN:  Bowel sounds normal, soft, nontender, no HSM or masses.   MS: extremities normal. No clubbing. No cyanosis.  SKIN: no rash on limited exam  NEURO: Mentation intact, speech normal, normal strength and tone, normal gait and stance  PSYCH: mentation appears normal. and affect normal/bright  Results:  Recent Results (from the past 168 hour(s))   General PFT Lab (Please always keep checked)    Collection Time: 08/12/21 11:25 AM   Result Value Ref Range    FVC-Pred 3.00 L    FVC-Pre 4.06 L    FVC-%Pred-Pre 135 %    FEV1-Pre 2.79 L    FEV1-%Pred-Pre 113 %    FEV1FVC-Pred 82 %    FEV1FVC-Pre 69 %    FEFMax-Pred 6.50 L/sec    " FEFMax-Pre 6.08 L/sec    FEFMax-%Pred-Pre 93 %    FEF2575-Pred 2.56 L/sec    FEF2575-Pre 1.76 L/sec    [id number]-%Pred-Pre 68 %    ExpTime-Pre 6.12 sec    FIFMax-Pre 5.41 L/sec    FEV1FEV6-Pred 83 %    FEV1FEV6-Pre 69 %                   Results as noted above.    PFT Interpretation:  Normal spirometry.  Increased from previous.  Similar to recent baseline.  Valid Maneuver                        Again, thank you for allowing me to participate in the care of your patient.        Sincerely,        Dagoberto Briscoe MD

## 2021-08-12 NOTE — PATIENT INSTRUCTIONS
Cystic Fibrosis Self-Care Plan    RECOMMENDATIONS:   Continue current medication, exercise, nebs and vest therapy.           [facility] Nurse line:  Jennifer Ma Mary [phone]     [facility] Fax Number:      [phone]                  MRN: [id number]   Clinic Date: August 12, 2021   Patient: Valencia Ye     Annual Studies:   IGG   Date Value Ref Range Status   10/04/2013 854 695 - 1,620 mg/dL Final     No results found for: INS  There are no preventive care reminders to display for this patient.    Pulmonary Function Tests  FEV1: amount of air you can blow out in 1 second  FVC: total amount of air you can take in and blow out    Your Goals:         PFT Latest Ref Rng & Units 8/12/2021   FVC L 4.06   FEV1 L 2.79   FVC% % 135   FEV1% % 113          Airway Clearance: The Most Important Way to Keep Your Lungs Healthy  Vest Settings:    Hill-[place] Frequencies: 8, 9, 10 Pressure 10 Then, Frequencies 18, 19, 20 Pressure 6      RespirTech: Quick Start with Pressure of     Do each frequency for 5 minutes; Deflate vest after each frequency & cough 3 times before beginning the next setting.    Vest and Neb Therapy should be done 1-2 times/day.

## 2021-08-14 NOTE — PROGRESS NOTES
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  Despite an episode of Covid in April of this year and probably an episode in February of last year as well, the patient appears to be doing well.  She has very good exercise tolerance.  She is oxygenating well.  PFTs are in the high normal range, similar to her recent baseline.  She does not appear to be having an exacerbation of her bronchiectasis at this time.  She will continue her current airway clearance therapy with albuterol and Mucomyst.  Defer to rheumatology for management of her Churg-Antonina.    Follow-up in 4 months with PFTs.    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   The patient had a Covid infection in April.  She presented with fever, nasal congestion and a large volume of thick clear nasal drainage.  She reported \"raspy\" breathing with wheezing and a mild cough but minimal sputum production.  No chest pain.  No shortness of breath.  Intermittent fever.  She was treated with monoclonal antibody along with oral antibiotics to prevent a bronchiectasis exacerbation.  She was initially treated with Augmentin but switched to doxycycline for GI intolerance.  She briefly lost her sense of taste and smell.  Most of her symptoms resolved after a week but wheezing took a couple of weeks to resolve.  She has felt well since.  Currently breathing is comfortable at rest and with all activity.  She walks 3 miles with her dog every day.  Rare cough.  Minimal white-clear sputum.  No hemoptysis.  No chest pain.  She is doing vest therapy " once daily.  No recent fever, chills or night sweats.    Review of systems:  Appetite is good  Chronic sinus congestion and ear pain at baseline.  Occasional palpitations  No nausea or vomiting.  She reports symptoms of irritable bowel.  Baseline body aches.  Rash after last Covid vaccine.  She was told not to get her second vaccine until at least 90 days after receiving monoclonal antibody.  The remainder of a complete review of systems is otherwise negative except as noted in the history of present illness.        Current Outpatient Medications   Medication     acetylcysteine (MUCOMYST) 20 % neb solution     albuterol (PROAIR HFA/PROVENTIL HFA/VENTOLIN HFA) 108 (90 Base) MCG/ACT inhaler     albuterol (PROVENTIL) (2.5 MG/3ML) 0.083% neb solution     atorvastatin (LIPITOR) 10 MG tablet     ciprofloxacin-dexamethasone (CIPRODEX) 0.3-0.1 % otic suspension     diazepam (VALIUM) 2 MG tablet     EnLyte (ENLYTE) CAPS capsule     fluticasone (FLONASE) 50 MCG/ACT nasal spray     lubiprostone (AMITIZA) 8 MCG capsule     metoprolol succinate ER (TOPROL-XL) 25 MG 24 hr tablet     predniSONE (DELTASONE) 1 MG tablet     sertraline (ZOLOFT) 25 MG tablet     SYMBICORT 160-4.5 MCG/ACT Inhaler     tiZANidine (ZANAFLEX) 2 MG tablet     Ascorbic Acid (VITAMIN C PO)     Caprylic Acid LIQD     L-Glutamine 500 MG TABS     Omega-3 Fatty Acids (FISH OIL) 1200 MG capsule     Phosphatidyl Choline 65 MG TABS     vitamin D3 (CHOLECALCIFEROL) 2000 units (50 mcg) tablet     No current facility-administered medications for this visit.     Allergies   Allergen Reactions     Colistimethate Anaphylaxis     Colymycin M [Colistimethate Sodium] Anaphylaxis     Tamiflu [Oseltamivir]      Gums Blister up     Azithromycin Palpitations     Heart palpitation  Heart palpitation     Clindamycin Rash     Past Medical History:   Diagnosis Date     Anxiety      Aortitis (H)      Asthma     associated with Churg Antonina syndrome     Bronchiectasis (H)       Cerebral infarction (H) 1990    Very small, caused small permanent optical migraine     Chronic sinusitis      Churg-Antonina syndrome (H)     dx      Conductive hearing loss      Depressive disorder      Eustachian tube dysfunction      Goiter      Hearing loss, conductive      Heart rate problem      Hypertension      Hypocalcemia      Immunosuppression (H)      Irritable bowel syndrome      Major depressive disorder      Mannose-binding lectin deficiency      Nonspecific abnormal results of thyroid function study      Osteoporosis      Pericarditis      and      Pneumonia     4/23/10     Recurrent otitis media      Recurrent UTI      Rheumatoid vasculitis (H)      Sinusitis, chronic      Steroid long-term use      Tinnitus      Varicose veins of leg with swelling        Past Surgical History:   Procedure Laterality Date     C/SECTION, CLASSICAL        SECTION       COLONOSCOPY Left 2014    Procedure: COMBINED COLONOSCOPY, SINGLE OR MULTIPLE BIOPSY/POLYPECTOMY BY BIOPSY;  Surgeon: Js Pinedo MD;  Location:  GI     COLONOSCOPY N/A 2019    Procedure: COLONOSCOPY;  Surgeon: Bryce Pimentel MD;  Location:  GI     ENT SURGERY       GENITOURINARY SURGERY       HC COLP CERVIX/UPPER VAGINA W LOOP ELEC BX CERVIX       HEAD & NECK SURGERY       HYSTERECTOMY  2009    without oopherectomy     HYSTERECTOMY, PAP NO LONGER INDICATED      cervix removed      PE TUBES       PICC INSERTION  10/10/2013    5fr DL PASV PICC, 43cm (1cm external) in the L basilic vein w/ tip in the low SVC.     PICC INSERTION Left 2017    5fr DL BioFlo PICC, 42cm (3cm external) in the L lateral brachial vein w/ tip in the SVC RA junction.     SINUS SURGERY       TUBAL LIGATION       TYMPANOPLASTY         Social History     Socioeconomic History     Marital status:      Spouse name: Not on file     Number of children: Not on file     Years of education: Not on file      Highest education level: Not on file   Occupational History     Not on file   Tobacco Use     Smoking status: Never Smoker     Smokeless tobacco: Never Used   Substance and Sexual Activity     Alcohol use: Yes     Alcohol/week: 0.0 standard drinks     Comment: few drinks a week     Drug use: No     Sexual activity: Yes     Partners: Male     Birth control/protection: Male Surgical, Female Surgical   Other Topics Concern     Parent/sibling w/ CABG, MI or angioplasty before 65F 55M? Not Asked      Service No     Blood Transfusions No     Caffeine Concern No     Occupational Exposure No     Hobby Hazards No     Sleep Concern No     Stress Concern Yes     Weight Concern No     Special Diet Yes     Comment: IBS diet     Back Care No     Exercise Not Asked     Comment: 1 mile power walk 5 days a week at least.      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  Non smoker. No smokers at home.  Enjoys walking and dancing.  Alcohol--1 to 2 drinks daily.  Few times a year has more than 4 drinks in a day.  No illicits.                     Social Determinants of Health     Financial Resource Strain:      Difficulty of Paying Living Expenses:    Food Insecurity:      Worried About Running Out of Food in the Last Year:      Ran Out of Food in the Last Year:    Transportation Needs:      Lack of Transportation (Medical):      Lack of Transportation (Non-Medical):    Physical Activity:      Days of Exercise per Week:      Minutes of Exercise per Session:    Stress:      Feeling of Stress :    Social Connections:      Frequency of Communication with Friends and Family:      Frequency of Social Gatherings with Friends and Family:      Attends [employer] Services:      Active Member of Clubs or Organizations:      Attends Club or Organization Meetings:      Marital Status:    Intimate  "Partner Violence:      Fear of Current or Ex-Partner:      Emotionally Abused:      Physically Abused:      Sexually Abused:        ROS Pulmonary    A complete ROS was otherwise negative except as noted in the HPI.  /76   Pulse 67   Ht 1.575 m (5' 2\")   Wt 49.9 kg (110 lb)   LMP 02/01/2009   SpO2 97%   BMI 20.12 kg/m    Exam:   GENERAL APPEARANCE: Well developed, well nourished, alert, and in no apparent distress.  EYES: PERRL, EOMI  HENT: Nasal mucosa with no edema and no hyperemia. No nasal polyps.  EARS: Canals clear, TMs with previous perforation and erythema, chronic and unchanged  MOUTH: Oral mucosa is moist, without any lesions, no tonsillar enlargement, no oropharyngeal exudate.  NECK: supple, no masses, no thyromegaly.  LYMPHATICS: No significant axillary, cervical, or supraclavicular nodes.  RESP: normal percussion, good air flow throughout.  No crackles. No rhonchi. No wheezes.  CV: Normal S1, S2, regular rhythm, normal rate. No murmur.  No rub. No gallop. No LE edema.   ABDOMEN:  Bowel sounds normal, soft, nontender, no HSM or masses.   MS: extremities normal. No clubbing. No cyanosis.  SKIN: no rash on limited exam  NEURO: Mentation intact, speech normal, normal strength and tone, normal gait and stance  PSYCH: mentation appears normal. and affect normal/bright  Results:  Recent Results (from the past 168 hour(s))   General PFT Lab (Please always keep checked)    Collection Time: 08/12/21 11:25 AM   Result Value Ref Range    FVC-Pred 3.00 L    FVC-Pre 4.06 L    FVC-%Pred-Pre 135 %    FEV1-Pre 2.79 L    FEV1-%Pred-Pre 113 %    FEV1FVC-Pred 82 %    FEV1FVC-Pre 69 %    FEFMax-Pred 6.50 L/sec    FEFMax-Pre 6.08 L/sec    FEFMax-%Pred-Pre 93 %    FEF2575-Pred 2.56 L/sec    FEF2575-Pre 1.76 L/sec    [id number]-%Pred-Pre 68 %    ExpTime-Pre 6.12 sec    FIFMax-Pre 5.41 L/sec    FEV1FEV6-Pred 83 %    FEV1FEV6-Pre 69 %                   Results as noted above.    PFT Interpretation:  Normal " spirometry.  Increased from previous.  Similar to recent baseline.  Valid Maneuver

## 2021-08-17 ENCOUNTER — IMMUNIZATION (OUTPATIENT)
Dept: NURSING | Facility: CLINIC | Age: 51
End: 2021-08-17
Attending: INTERNAL MEDICINE
Payer: COMMERCIAL

## 2021-08-17 PROCEDURE — 0002A PR COVID VAC PFIZER DIL RECON 30 MCG/0.3 ML IM: CPT

## 2021-08-17 PROCEDURE — 91300 PR COVID VAC PFIZER DIL RECON 30 MCG/0.3 ML IM: CPT

## 2021-08-28 DIAGNOSIS — E78.49 ALPHA-LIPOPROTEINEMIA: ICD-10-CM

## 2021-08-30 RX ORDER — ATORVASTATIN CALCIUM 10 MG/1
TABLET, FILM COATED ORAL
Qty: 45 TABLET | Refills: 0 | Status: SHIPPED | OUTPATIENT
Start: 2021-08-30 | End: 2021-11-29

## 2021-09-06 ENCOUNTER — MYC MEDICAL ADVICE (OUTPATIENT)
Dept: INTERNAL MEDICINE | Facility: CLINIC | Age: 51
End: 2021-09-06

## 2021-09-09 ENCOUNTER — MYC MEDICAL ADVICE (OUTPATIENT)
Dept: INTERNAL MEDICINE | Facility: CLINIC | Age: 51
End: 2021-09-09

## 2021-09-09 DIAGNOSIS — M62.838 MUSCLE SPASM: Primary | ICD-10-CM

## 2021-09-17 DIAGNOSIS — G25.81 RESTLESS LEGS SYNDROME (RLS): Primary | ICD-10-CM

## 2021-09-22 ENCOUNTER — LAB (OUTPATIENT)
Dept: LAB | Facility: CLINIC | Age: 51
End: 2021-09-22
Payer: COMMERCIAL

## 2021-09-22 DIAGNOSIS — G25.81 RESTLESS LEGS SYNDROME (RLS): ICD-10-CM

## 2021-09-22 PROCEDURE — 36415 COLL VENOUS BLD VENIPUNCTURE: CPT

## 2021-09-22 PROCEDURE — 83550 IRON BINDING TEST: CPT

## 2021-09-22 PROCEDURE — 82728 ASSAY OF FERRITIN: CPT

## 2021-09-23 ENCOUNTER — MYC MEDICAL ADVICE (OUTPATIENT)
Dept: INTERNAL MEDICINE | Facility: CLINIC | Age: 51
End: 2021-09-23

## 2021-09-23 LAB
FERRITIN SERPL-MCNC: 51 NG/ML (ref 8–252)
IRON SATN MFR SERPL: 37 % (ref 15–46)
IRON SERPL-MCNC: 114 UG/DL (ref 35–180)
TIBC SERPL-MCNC: 312 UG/DL (ref 240–430)

## 2021-10-04 ENCOUNTER — TELEPHONE (OUTPATIENT)
Dept: INTERNAL MEDICINE | Facility: CLINIC | Age: 51
End: 2021-10-04
Payer: COMMERCIAL

## 2021-10-04 NOTE — TELEPHONE ENCOUNTER
Fax received from [facility] to do work related activities for review and signature.  Put in Dr. Simental's in basket.

## 2021-10-05 NOTE — TELEPHONE ENCOUNTER
Patient advised. She said they send this form to all of her doctors so Dr Simental just needs to fill it in as best she can.  Paperwork signed and faxed.

## 2021-10-05 NOTE — TELEPHONE ENCOUNTER
Please advise the patient that I would not be able to provide much information on this form as much of it is out of my area of expertise.  Usually I would recommend seeing an occupational health specialist for a functional assessment.  They apparently want the form back tomorrow so I will complete what I can but it will probably be quite incomplete.  If she is interested in seeing an occupational health specialist, I would have her call her referrals department for information on that type of specialist.

## 2021-10-15 ENCOUNTER — TRANSFERRED RECORDS (OUTPATIENT)
Dept: HEALTH INFORMATION MANAGEMENT | Facility: CLINIC | Age: 51
End: 2021-10-15
Payer: COMMERCIAL

## 2021-11-06 DIAGNOSIS — R00.2 PALPITATIONS: ICD-10-CM

## 2021-11-09 RX ORDER — METOPROLOL SUCCINATE 25 MG/1
TABLET, EXTENDED RELEASE ORAL
Qty: 45 TABLET | Refills: 3 | Status: SHIPPED | OUTPATIENT
Start: 2021-11-09 | End: 2023-12-15

## 2021-11-09 NOTE — TELEPHONE ENCOUNTER
Routing refill request to provider for review/approval because:  Medication is reported/historical  Mikhail Hancock RN, BSN

## 2021-11-10 NOTE — TELEPHONE ENCOUNTER
Duplicate request for medical assessment, re-faxed completed document.     [place] [facility]  11/10/21 5:35 PM

## 2021-11-24 DIAGNOSIS — E78.49 ALPHA-LIPOPROTEINEMIA: ICD-10-CM

## 2021-11-29 RX ORDER — ATORVASTATIN CALCIUM 10 MG/1
TABLET, FILM COATED ORAL
Qty: 45 TABLET | Refills: 0 | Status: SHIPPED | OUTPATIENT
Start: 2021-11-29 | End: 2022-03-10

## 2021-12-03 ENCOUNTER — VIRTUAL VISIT (OUTPATIENT)
Dept: RHEUMATOLOGY | Facility: CLINIC | Age: 51
End: 2021-12-03
Attending: INTERNAL MEDICINE
Payer: COMMERCIAL

## 2021-12-03 DIAGNOSIS — D72.18 EOSINOPHILIC GRANULOMATOSIS WITH POLYANGIITIS (EGPA) WITH LUNG INVOLVEMENT (H): Primary | ICD-10-CM

## 2021-12-03 DIAGNOSIS — M30.1 EOSINOPHILIC GRANULOMATOSIS WITH POLYANGIITIS (EGPA) WITH LUNG INVOLVEMENT (H): Primary | ICD-10-CM

## 2021-12-03 DIAGNOSIS — J47.1 BRONCHIECTASIS WITH (ACUTE) EXACERBATION (H): ICD-10-CM

## 2021-12-03 PROCEDURE — 99214 OFFICE O/P EST MOD 30 MIN: CPT | Mod: GT | Performed by: INTERNAL MEDICINE

## 2021-12-03 RX ORDER — PREDNISONE 1 MG/1
TABLET ORAL
Qty: 590 TABLET | Refills: 5 | Status: SHIPPED | OUTPATIENT
Start: 2021-12-03 | End: 2022-10-20

## 2021-12-03 RX ORDER — FERROUS SULFATE 325(65) MG
325 TABLET ORAL
COMMUNITY
Start: 2021-10-15 | End: 2022-02-17

## 2021-12-03 ASSESSMENT — PAIN SCALES - GENERAL: PAINLEVEL: NO PAIN (0)

## 2021-12-03 NOTE — PATIENT INSTRUCTIONS
1. Eosinophilic granulomatous polyangiitis: Symptoms of brain fog, cognitive slowing, fatigue, diffuse myalgia remain stable or improved. I agree with continued use of low-dose prednisone.  2. Debilitating fatigue:     Plan:  1.  Continue prednisone 5 mg every other day alternating with 6 mg every other day.  2.  Check urinalysis, creatinine, CBC with differential, inflammatory markers in blood every 6 months.  3. Echocardiogram

## 2021-12-03 NOTE — LETTER
"12/3/2021       RE: Valencia Ye  [address] [facility] [address]     Dear Colleague,    Thank you for referring your patient, Valencia Ye, to the [facility] RHEUMATOLOGY CLINIC [place] at [facility]. Please see a copy of my visit note below.    Rheumatology Clinic Visit     Valencia Ye MRN# [id number]   YOB: 1970 Age: 51 year old     Date of Visit: 12/03/2021  Primary care provider: Morelia Simental         Assessment and Plan:   #Eosinophilic granulomatosis with polyangiitis (eosinophilia, severe asthma, pericarditis, vasculitic lower extremity rashes, myalgia, fatigue, dx'd age 19): Patient relates stable, chronic, often debilitating fatigue and \"brain fog\" with suboptimal short-term memory. All symptoms attributable formerly to EGPA are stable with no major flares since the last visit. Respiratory symptoms are essentially absent. Examination today by video shows no rash, synovitis, or altered joint range of motion.    Lab evaluation in Corrie 15, 2021 showed creatinine, transaminases, TSH, and CBC all normal; nl eosinophils.  Urinalysis was clear.  Iron saturation was 37% with normal TIBC and Fe levels. PFTs in January 2021 were normal.  CT the abdomen and pelvis showed minimal amount of fluid around the proximal cecum with enlarged diverticula.  Lungs were normal.    EGPA is currently stable, with no evidence of end-organ insufficiency or systemic involvement with inflammatory disease.  Patient reports that on multiple occasions in the past, she worked with Dr. Butts to try and reduce prednisone dosage.  On each occasion of tapering, she developed flaring symptoms that reflected recurrent EGPA. I continue to think that the risk of precipitating flare is greater than the risk of adverse effects associated with tapering prednisone beyond the current 5.5 mg dose, which is at or below physiologic corticosteroid " daily dosage.  I recommend continuing prednisone dose of 5.5 mg every day.    I recommend  UA, CBC with differential, and creatinine should be performed semiannually.  I agree with patient's current practice of monitoring blood pressure several times weekly. I agree with management through Dr. Novak of asthma with ipratropium, Symbicort, and rescue inhaler.    # Fatigue: We reviewed potential contributors such as medications, stress, disrupted sleep, metabolic disturbance, major organ (kidney, heart, liver) dysfunction.  I have low suspicion that any of these are major contributors.  I do recommend repeat echocardiogram, as most recent study was performed in 2017, and EGPA can affect the heart.  Patient relates that iron deficiency has been identified by neurology, and that she has been using low-dose oral iron replacement for several months.  I discussed that oral iron administration is slow and inconsistent in its ability to replenish iron stores.  If iron deficiency remains a viable hypothesis, progression to a 2 dose course of intravenous iron may be in order.  Patient will review with her neurologist.    I recommend follow-up in 6 months time.    Adal Gallo MD  Staff Rheumatologist, [facility]              Active Problem List:     Patient Active Problem List    Diagnosis Date Noted     Bladder pain 07/31/2019     Priority: Medium     Overactive bladder 07/31/2019     Priority: Medium     Controlled substance agreement signed 11/13/2017     Priority: Medium     Patient is followed by BRIAN DEE for ongoing prescription of benzodiazepines.  All refills should be approved by this provider, or covering partner.    Medication(s): clonazepam.   Maximum quantity per month: 7.5  Clinic visit frequency required: Q 6  months     Controlled substance agreement on file: Yes       Date(s): 11/13/17  Benzodiazepine use reviewed by psychiatry:  No    Last [facility] website verification:  none    "[email or web address]           Bronchiectasis without acute exacerbation (H) 05/22/2017     Priority: Medium     Moderate episode of recurrent major depressive disorder (H) 01/29/2017     Priority: Medium     Benign essential hypertension 10/20/2016     Priority: Medium     Mannose-binding lectin deficiency      Priority: Medium     Irritable bowel syndrome without diarrhea 07/14/2016     Priority: Medium     Chronic fatigue 01/21/2016     Priority: Medium     Anxiety 05/30/2014     Priority: Medium     Asthma 07/16/2013     Priority: Medium     Problem list name updated by automated process. 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 Last seen in 6-, when EGPA was stable. Continued 5.5 mg daily prednisone was recommended.    Interval history 12-    She saw neurology regarding chronic pain. Migraines and low iron were diagnosed. Her HA is improved with use of tyramine.    She continues with intermittent severe fatigue. At least several days per week, she spends the whole day in bed with increased somnolence. She has already been checked for apnea and sleep disturbance. She notes association of fatigue with marked psychological stimulation.    She notes leg swelling, worse as the day goes on.    She has normal diet, does not drink, exercises with dog-walking (but this is very draining). She does not have enough energy to try recommended yoga.    Several months ago, she had a \"bubble\" on a fingertip, lasting days/week, remitted spontaneously.    No other symptoms " "previously associated with EGPA.    She had COVID twice, most recently in 4-2021; s/p Pfizer vaccination.    prednisone 5.5 mg daily continues without interruption.    Interval history 06-:    She continues with stable  fatigue and brain fog. Myalgia has been less due to reduced stress recently.    She notes that around midday, she has overwhelming fatigue, and requires midmorning several hour nap. Her brain \"gets going\" in the early afternoon.     Appetite and weight stable; no rash; no joint pain; SOB is suppressed; no recurrence of divertiulitis; IBS continues unchanged.    Several weeks ago, she had a painful spot on the L 4th fingertip. It resolved spontaneously in 2-3 days. She has blister-like lesions on her fingers and her big toes.    Prednisone 5.5 mg daily continues without interruption.    Prior hx 6-2020    Her condition has been managed Dr. Butts since 2013; I do not have his records to review today.    By her history, She was diagnosed with EGPA as a teenager after symptoms started at age 16. She had severe life-threatening asthma. By 19, she could not go to school or work due to breathting. She had weightl loss, painful toe rashes, and neurologic symptoms with restless legs, optical migraines. At age 19 in 1990, she was admitted to hospital, found to have pericarditis and eosinophilia. Bx skin showed vasculitis. She was started on prednisone, high dose initially. She has been on prednisone continuously since.    In recent years, she developed bronchiectasis, dx'd in 2013. She has been treated with daily therapeutic vest treatments, intermittent antibiotics.    In May 2018, she developed vertigo and nausea, which persisted for months, now somewhat improved.    She had worked continusously through the illness, until 2019.    She is afflicted with brain fog that causes short-term memory loss, fatigue and muscle pain. The latter is improved with reduced work-related stress. She has chronic sinus " and ear problems. She notes dry eyes and reduced tear formation.    She had a respiratory illness in the end of February; she was not tested for COVID. She took an antibiotic and upped her dose of prednisone, and sx cleared.    Asthma is generally under good control. No recent vasculitis rash. The last episode of pericardial fluid episode was in [id number].    She has noted fluctuating blood pressures, with systolics in 80-90; ranging upward to 130-140s.    Patient was seen in ophthalmology on February 25, 2020 in follow-up of C Antonina syndrome.  Assessment was of no ocular manifestations of systemic rheumatic disease.    Patient was seen by Dr. Novak in pulmonology on December 26, 2019 for follow-up of bronchiectasis and eosinophilic granulomatous polyangiitis.  Pulmonary symptoms were under good control with chronic inhaler use, daily vest therapy and Mucomyst fatigue and body aches were noted.  No change in medication was recommended.    She had severe IBS for years. She has noted marked improvement with use of SamE and Amitiza (for approximately 1 year).         Review of Systems:   Unlless listed in HPI:  Constitutional: negative  Skin: negative  Eyes: negative  Ears/Nose/Throat: negative  Respiratory: No shortness of breath, dyspnea on exertion, cough, or hemoptysis  Cardiovascular: negative  Gastrointestinal: negative  Genitourinary: negative  Musculoskeletal: negative  Neurologic: negative  Psychiatric: negative  Hematologic/Lymphatic/Immunologic: negative  Endocrine: negative          Past Medical History:     Past Medical History:   Diagnosis Date     Anxiety      Aortitis (H)      Asthma     associated with Churg Antonina syndrome     Bronchiectasis (H)      Cerebral infarction (H) August 1990    Very small, caused small permanent optical migraine     Chronic sinusitis      Churg-Antonina syndrome (H)     dx 1990     Conductive hearing loss      Depressive disorder      Eustachian tube dysfunction       Goiter      Hearing loss, conductive      Heart rate problem      Hypertension      Hypocalcemia      Immunosuppression (H)      Irritable bowel syndrome      Major depressive disorder      Mannose-binding lectin deficiency      Nonspecific abnormal results of thyroid function study      Osteoporosis      Pericarditis      and      Pneumonia     4/23/10     Recurrent otitis media      Recurrent UTI      Rheumatoid vasculitis (H)      Sinusitis, chronic      Steroid long-term use      Tinnitus      Varicose veins of leg with swelling      Past Surgical History:   Procedure Laterality Date     C/SECTION, CLASSICAL        SECTION       COLONOSCOPY Left 2014    Procedure: COMBINED COLONOSCOPY, SINGLE OR MULTIPLE BIOPSY/POLYPECTOMY BY BIOPSY;  Surgeon: Js Pinedo MD;  Location:  GI     COLONOSCOPY N/A 2019    Procedure: COLONOSCOPY;  Surgeon: Bryce Pimentel MD;  Location:  GI     ENT SURGERY       GENITOURINARY SURGERY       HC COLP CERVIX/UPPER VAGINA W LOOP ELEC BX CERVIX       HEAD & NECK SURGERY       HYSTERECTOMY  2009    without oopherectomy     HYSTERECTOMY, PAP NO LONGER INDICATED      cervix removed      PE TUBES       PICC INSERTION  10/10/2013    5fr DL PASV PICC, 43cm (1cm external) in the L basilic vein w/ tip in the low SVC.     PICC INSERTION Left 2017    5fr DL BioFlo PICC, 42cm (3cm external) in the L lateral brachial vein w/ tip in the SVC RA junction.     SINUS SURGERY       TUBAL LIGATION       TYMPANOPLASTY       --Hysterectomy for cervical dysplasia.       Social History:     Social History     Occupational History     Not on file   Tobacco Use     Smoking status: Never Smoker     Smokeless tobacco: Never Used   Substance and Sexual Activity     Alcohol use: Yes     Alcohol/week: 0.0 standard drinks     Comment: few drinks a week     Drug use: No     Sexual activity: Yes     Partners: Male     Birth control/protection: Male  Surgical, Female Surgical     Has 5 children, all healthy except for asthma  Worked as  at the [employer] for 15 years. On disability since 4-2019.  Drinks 1/2 drink 5 days weekly. Never smoker.       Family History:     Family History   Problem Relation Age of Onset     Allergies Mother         Seasonal     Alcohol/Drug Mother      Substance Abuse Mother      Depression Mother      C.A.D. Father      Diabetes Father         Type 2     Depression Father      Heart Disease Father      C.A.D. Maternal Grandmother      Arthritis Maternal Grandmother      Musculoskeletal Disorder Maternal Grandmother         MS     Heart Disease Maternal Grandmother      Hypertension Maternal Grandmother      Alcohol/Drug Maternal Grandfather      Substance Abuse Maternal Grandfather      Depression Maternal Grandfather      Asthma Son      Asthma Daughter      Allergies Daughter         Seasonal     Unknown/Adopted Paternal Grandmother      Unknown/Adopted Paternal Grandfather      Cancer Maternal Aunt         breast age 53     Breast Cancer Maternal Aunt         in her 50s     Glaucoma No family hx of      Macular Degeneration No family hx of      Colon Cancer No family hx of    MGM with MS; aunt MS         Allergies:     Allergies   Allergen Reactions     Colistimethate Anaphylaxis     Colymycin M [Colistimethate Sodium] Anaphylaxis     Tamiflu [Oseltamivir]      Gums Blister up     Azithromycin Palpitations     Heart palpitation  Heart palpitation     Clindamycin Rash            Medications:     Current Outpatient Medications   Medication Sig Dispense Refill     acetylcysteine (MUCOMYST) 20 % neb solution TAKE 4ML VIA NEBULIZER TWICE A DAY WITH ALBUTEROL SOLUTION. DISCARD OPEN BOTTLE AFTER 96 HOURS. 200 mL 9     albuterol (PROAIR HFA/PROVENTIL HFA/VENTOLIN HFA) 108 (90 Base) MCG/ACT inhaler INHALE 2 PUFFS INTO THE LUNGS EVERY 4 HOURS AS NEEDED FOR SHORTNESS OF BREATH / DYSPNEA OR WHEEZING 8.5 g 3     albuterol (PROVENTIL) (2.5  MG/3ML) 0.083% neb solution TAKE 1 VIAL VIA NEBULIZER TWICE A DAY. 180 mL 11     Ascorbic Acid (VITAMIN C PO) Take 800 mg by mouth       atorvastatin (LIPITOR) 10 MG tablet TAKE 1/2 TABLET BY MOUTH EVERY DAY 45 tablet 0     Caprylic Acid LIQD 1 capsule daily       ciprofloxacin-dexamethasone (CIPRODEX) 0.3-0.1 % otic suspension SHAKE LIQUID AND INSTILL 3 DROPS INTO AFFECTED EARS TWICE A DAY AS NEEDED. 7.5 mL 9     diazepam (VALIUM) 2 MG tablet TAKE 1-2 TABLETS (2-4 MG) BY MOUTH EVERY 6 HOURS AS NEEDED FOR VERTIGO 20 tablet 0     EnLyte (ENLYTE) CAPS capsule Take 1 capsule by mouth daily       ferrous sulfate (FEROSUL) 325 (65 Fe) MG tablet 325 mg        fluticasone (FLONASE) 50 MCG/ACT nasal spray SHAKE LIQUID AND USE 2 SPRAYS IN EACH NOSTRIL EVERY DAY 48 mL 3     L-Glutamine 500 MG TABS Take 500 mg by mouth 3 times daily       lubiprostone (AMITIZA) 8 MCG capsule Take 1 capsule (8 mcg) by mouth 2 times daily 180 capsule 3     metoprolol succinate ER (TOPROL-XL) 25 MG 24 hr tablet TAKE 1/2 TABLET(12.5 MG) BY MOUTH DAILY. HOLD IF BP IS LOW 45 tablet 3     Omega-3 Fatty Acids (FISH OIL) 1200 MG capsule Take 4 capsules (4.8 g) by mouth daily       Phosphatidyl Choline 65 MG TABS Take 2 capsules by mouth daily       predniSONE (DELTASONE) 1 MG tablet Take 5 tabs daily, alternating with 6 tabs daily 590 tablet 5     sertraline (ZOLOFT) 25 MG tablet 25 mg PATIENT TAKES 1/2 TABLET DAILY       SYMBICORT 160-4.5 MCG/ACT Inhaler INHALE 2 PUFFS BY MOUTH TWICE A DAY. 30.6 Inhaler 3     tiZANidine (ZANAFLEX) 2 MG tablet TAKE 1-2 TABLETS (2-4 MG) BY MOUTH 3 TIMES DAILY AS NEEDED FOR MUSCLE SPASMS 45 tablet 11     vitamin D3 (CHOLECALCIFEROL) 2000 units (50 mcg) tablet Take 2 tablets by mouth daily              Physical Exam:   Last menstrual period 02/01/2009, not currently breastfeeding.  Wt Readings from Last 4 Encounters:   08/12/21 49.9 kg (110 lb)   03/08/21 49.7 kg (109 lb 8 oz)   01/21/21 49.7 kg (109 lb 8 oz)   09/21/20  50.6 kg (111 lb 9.6 oz)       Constitutional: well-developed, appearing stated age; cooperative  Eyes: nl EOM, PERRLA, vision, conjunctiva, sclera  ENT: nl external ears, nose, hearing, lips, teeth, gums, throat  No mucous membrane lesions, normal saliva pool  Neck: no thyroid enlargement  MS: No hand/finger joint visible swelling; intact hand joint ROM  Skin: no nail pitting, alopecia, rash, nodules or lesions  Neuro: nl cranial nerves  Psych: nl judgement, orientation, memory, affect.         Data:     No results found for any visits on 12/03/21.    Recent Labs   Lab Test 10/02/19  1428 02/28/19  1604 02/26/19  0637 07/05/18  1805 02/19/18  1626  06/09/17  1756  03/10/17  1835 11/10/16  1812 12/23/15  2227   WBC 8.5  --   --   --  6.1  --  6.7   < > 20.8* 8.8 8.0   RBC 4.01  --   --   --  3.96  --  3.85   < > 4.04 3.68* 4.02   HGB 13.6  --   --   --  13.0  --  12.7   < > 13.6 12.4 13.4   HCT 40.4  --   --   --  39.3  --  38.5   < > 39.6 37.2 39.0   *  --   --   --  99  --  100   < > 98 101* 97   RDW 12.7  --   --   --  13.1  --  12.1   < > 12.0 12.4 12.5     --   --   --  283  --  284   < > 292 276 275   ALBUMIN  --   --   --   --   --   --   --   --  3.7 4.0 3.8   CRP  --  <2.9 <2.9 <2.9  --   --  <2.9   < >  --  <2.9  --    BUN 14  --   --  19 8   < >  --    < > 14 14 11    < > = values in this interval not displayed.      Recent Labs   Lab Test 03/12/19 07/05/18  1805 11/10/16  1812   TSH 1.540 0.56 0.90   T4  --   --  1.01     Hemoglobin   Date Value Ref Range Status   06/15/2021 12.8 11.7 - 15.7 g/dL Final   10/20/2020 13.5 11.7 - 15.7 g/dL Final   06/29/2020 12.9 11.7 - 15.7 g/dL Final     Urea Nitrogen   Date Value Ref Range Status   10/20/2020 8 7 - 30 mg/dL Final   09/21/2020 12 7 - 30 mg/dL Final   10/02/2019 14 7 - 30 mg/dL Final     Sed Rate   Date Value Ref Range Status   09/21/2020 30 (H) 0 - 20 mm/h Final     Comment:     Results confirmed by repeat test   02/28/2019 6 0 - 20 mm/h  Final   07/05/2018 6 0 - 20 mm/h Final     CRP Cardiac Risk   Date Value Ref Range Status   02/04/2008 8.4 mg/L Final     Comment:     Reference Values:   Low Risk:           <1.0 mg/L   Average Risk:       1.0-3.0 mg/L   High Risk:          >3.0 mg/L   Acute Inflammation: >8.0 mg/L     CRP Inflammation   Date Value Ref Range Status   09/21/2020 44.0 (H) 0.0 - 8.0 mg/L Final   02/28/2019 <2.9 0.0 - 8.0 mg/L Final   02/26/2019 <2.9 0.0 - 8.0 mg/L Final     AST   Date Value Ref Range Status   06/15/2021 21 0 - 45 U/L Final   10/20/2020 13 0 - 45 U/L Final   03/12/2019 24 0 - 40 IU/L Final     Neutrophil Cytoplasmic IgG Antibody   Date Value Ref Range Status   02/17/2009 <1:20  Final     Comment:     Reference range: <1:20  (Note)  TEST INFORMATION: Anti-Neutrophil Cyto Ab, IgG  Neutrophil Cytoplasmic Antibodies (C-ANCA = granular  cytoplasmic staining, P-ANCA = perinuclear staining) are  found in the serum of over 90% of patients with certain  necrotizing systemic vasculitides, and usually in less  than 5% of patients with collagen vascular disease or  arthritis.                  ANCA'S IN VASCULITIC SYNDROMES                         Approx %      Approx % of                         Pos ANCA    patients demonstrating  ----------------------------------------------------------                (combined patterns)   C Pattern   P Pattern  Wegener's granulomatosis:  -Active Generalized   85 - 92 85 - 90       2 - 5  -Limited forms        60 - 67 85 - 90       2 - 5  -Inactive             30 - 35        85 - 90       2 - 5  Idiopathic Crescentic  Glomerulonephritis      80            some*     majority*  Polyarteritis Nodosa    50             86           14  Churg - Antnoina         50             80           20  SLE                   less than 10     0    greater than 90  Rheumatoid Arthritis  less than 5      0    greater than 90  Sjogren's Syndrome        25           0    greater than  90  ----------------------------------------------------------  *No quantitation in the literature.  Performed by [facility],  [address] [facility] [id number] [phone]  [email or web address], Remy Watkins MD - Lab. Director                                                  08/19/2008 < 1:20  Final     Comment:     Reference range: < 1:20  (Note)  TEST INFORMATION: Anti-Neutrophil Cyto Ab, IgG  Neutrophil Cytoplasmic Antibodies (C-ANCA = granular  cytoplasmic staining, P-ANCA = perinuclear staining) are  found in the serum of over 90% of patients with certain  necrotizing systemic vasculitides, and usually in less  than 5% of patients with collagen vascular disease or  arthritis.                  ANCA'S IN VASCULITIC SYNDROMES                         Approx %      Approx % of                         Pos ANCA    patients demonstrating  ----------------------------------------------------------                (combined patterns)   C Pattern   P Pattern  Wegener's granulomatosis:  -Active Generalized   85 - 92 85 - 90       2 - 5  -Limited forms        60 - 67 85 - 90       2 - 5  -Inactive             30 - 35        85 - 90       2 - 5  Idiopathic Crescentic  Glomerulonephritis      80            some*     majority*  Polyarteritis Nodosa    50             86           14  Churg - Antonina         50             80           20  SLE                   less than 10     0    greater than 90  Rheumatoid Arthritis  less than 5      0    greater than 90  Sjogren's Syndrome        25           0    greater than 90  ----------------------------------------------------------  *No quantitation in the literature.  Performed by [facility],  [address] [facility] [place] [id number] [phone]  [email or web address],  Remy Watkins MD - Lab. Director                                              01/24/2008 < 1:20  Final     Comment:     Reference range: < 1:20  (Note)  TEST INFORMATION: Anti-Neutrophil Cyto Ab,  IgG  Neutrophil Cytoplasmic Antibodies (C-ANCA = granular  cytoplasmic staining, P-ANCA = perinuclear staining) are  found in the serum of over 90% of patients with certain  necrotizing systemic vasculitides, and usually in less  than 5% of patients with collagen vascular disease or  arthritis.                  ANCA'S IN VASCULITIC SYNDROMES                         Approx %      Approx % of                         Pos ANCA    patients demonstrating  ----------------------------------------------------------                (combined patterns)   C Pattern   P Pattern  Wegener's granulomatosis:  -Active Generalized   85 - 92 85 - 90       2 - 5  -Limited forms        60 - 67 85 - 90       2 - 5  -Inactive             30 - 35 85 - 90       2 - 5  Idiopathic Crescentic  Glomerulonephritis      80            some*     majority*  Polyarteritis Nodosa    50             86           14  Churg - Antonina         50             80           20  SLE                   less than 10     0    greater than 90  Rheumatoid Arthritis  less than 5      0    greater than 90  Sjogren's Syndrome        25           0    greater than 90  ----------------------------------------------------------  *No quantitation in the literature.  Performed by [facility],  [address] [place] [address] [phone]  [email or web address],  Remy Watkins MD - Lab. Director                                                Albumin   Date Value Ref Range Status   06/15/2021 3.9 3.4 - 5.0 g/dL Final   10/20/2020 3.9 3.4 - 5.0 g/dL Final   03/10/2017 3.7 3.4 - 5.0 g/dL Final     Alkaline Phosphatase   Date Value Ref Range Status   10/20/2020 54 40 - 150 U/L Final   03/10/2017 48 40 - 150 U/L Final   11/10/2016 42 40 - 150 U/L Final     ALT   Date Value Ref Range Status   06/15/2021 28 0 - 50 U/L Final   10/20/2020 17 0 - 50 U/L Final   03/12/2019 29 0 - 32 IU/L Final     Rheumatoid Factor   Date Value Ref Range Status   07/05/2018 <20  <20 IU/mL Final     Recent Labs   Lab Test 06/15/21  1622 10/20/20  0155 09/21/20  1609 06/29/20  1501 10/02/19  1428 03/12/19  0000 03/21/17  1730 03/10/17  1835 11/10/16  1812   WBC 8.1 14.0*  --  9.3 8.5  --    < > 20.8* 8.8   HGB 12.8 13.5  --  12.9 13.6  --    < > 13.6 12.4   HCT 38.3 41.3  --  39.1 40.4  --    < > 39.6 37.2   * 100  --  101* 101*  --    < > 98 101*    261  --  298 320  --    < > 292 276   BUN  --  8 12  --  14  --    < > 14 14   TSH 0.66  --  0.60  --   --  1.540   < >  --  0.90   AST 21 13  --   --   --  24  --  17 6   ALT 28 17  --   --   --  29   < > 18 14   ALKPHOS  --  54  --   --   --   --   --  48 42    < > = values in this interval not displayed.     RHEUM RESULTS Latest Ref Rng & Units 1/3/2005 1/24/2005 3/10/2005   ALBUMIN 3.4 - 5.0 g/dL - - -   ALT 0 - 50 U/L - - -   AST 0 - 45 U/L - - -   ANCA - - - -   JOSE INTERPRETATION NEG:Negative - - -   COMPLEMENT C3 76 - 169 mg/dL - - -   COMPLEMENT C4 15 - 50 mg/dL - - -   CK TOTAL 30 - 225 U/L - - -   CREATININE 0.52 - 1.04 mg/dL - - -   CRP 0.0 - 8.0 mg/L - - -   RENUKA <1.0 - - -   GFR ESTIMATE, IF BLACK >60 mL/min/[1.73:m2] - - -   GFR ESTIMATE >60 mL/min/[1.73:m2] - - -   HEMATOCRIT 35.0 - 47.0 % 37.6 34.9(L) 38.0   HEMOGLOBIN 11.7 - 15.7 g/dL 12.9 12.1 13.0   HEPBANG NEG - - -   HCVAB NEG - - -    - 1,620 mg/dL - - -   WBC 4.0 - 11.0 10e9/L 7.0 5.2 9.3   RBC 3.8 - 5.2 10e12/L 3.53(L) 3.26(L) 3.56(L)   RDW 10.0 - 15.0 % 16.3(H) 15.2(H) 14.1   MCHC 31.5 - 36.5 g/dL 34.2 34.5 34.3   MCV 78 - 100 fl 107(H) 107(H) 107(H)   PLATELET COUNT 150 - 450 10e9/L 313 278 276   RHEUMATOID FACTOR <20 IU/mL - - -   ESR 0 - 20 mm/h 3 9 19       Rheumatoid Factor   Date Value Ref Range Status   07/05/2018 <20 <20 IU/mL Final   ,  ,  ,  ,  ,   SSA (RO) Antibody IgG   Date Value Ref Range Status   02/17/2009 2  Final     Comment:     Reference range: 0 to 40  Unit: AU/mL  (Note)  REFERENCE INTERVALS: SSA (Ro) (WESTLEY) Ab, IgG   29 AU/mL or  Less ............. Negative   30 - 40 AU/mL ................ Equivocal   41 AU/mL or Greater .......... Positive    SSA (Ro) antibody is seen in 70-75% of Sjogren syndrome  cases, 30-40% of systemic lupus erythematosus (SLE) and  5-10% of progressive systemic sclerosis (PSS).  Performed by [facility],  [address] [facility] [place] [address] [phone]  www.[employer], Remy Watkins MD- Lab. Director     Scleroderma Antibody IgG   Date Value Ref Range Status   02/17/2009 0  Final     Comment:     Reference range: 0 to 40  Unit: AU/mL  (Note)  REFERENCE INTERVALS: Scleroderma (Scl-70) (WESTLEY) Ab, IgG   29 AU/mL or Less ............. Negative   30 - 40 AU/mL ................ Equivocal   41 AU/mL or Greater .......... Positive    Scleroderma (Scl-70) antibody is seen in 20-60% of  patients with scleroderma and is considered diagnostic  and specific for scleroderma if it is the only WESTLEY  antibody present. Scl-70 is also seen in approximately  25% of progressive systemic sclerosis (PSS).  Performed by [facility],  [address] [place] [place] [address] [phone]  www.[employer], Remy Watkins MD - Lab. Director   ,   JOSE interpretation   Date Value Ref Range Status   02/28/2019 Negative NEG^Negative Final     Comment:                                        Reference range:  <1:40  NEGATIVE  1:40 - 1:80  BORDERLINE POSITIVE  >1:80 POSITIVE       ,   JOSE Screen by EIA   Date Value Ref Range Status   11/10/2016 <1.0  Interpretation:  Negative   <1.0 Final   ,  ,  ,  ,  ,  ,  ,  ,   Hep B Surface Agn   Date Value Ref Range Status   03/16/2005 Negative NEG Final   ,  ,  ,  ,  ,  ,  ,  ,  ,  ,  ,   Neutrophil Cytoplasmic IgG Antibody   Date Value Ref Range Status   02/17/2009 <1:20  Final     Comment:     Reference range: <1:20  (Note)  TEST INFORMATION: Anti-Neutrophil Cyto Ab, IgG  Neutrophil Cytoplasmic Antibodies (C-ANCA = granular  cytoplasmic staining, P-ANCA = perinuclear staining) are  found in the serum of  over 90% of patients with certain  necrotizing systemic vasculitides, and usually in less  than 5% of patients with collagen vascular disease or  arthritis.                  ANCA'S IN VASCULITIC SYNDROMES                         Approx %      Approx % of                         Pos ANCA    patients demonstrating  ----------------------------------------------------------                (combined patterns)   C Pattern   P Pattern  Wegener's granulomatosis:  -Active Generalized   85 - 92        85 - 90       2 - 5  -Limited forms        60 - 67        85 - 90       2 - 5  -Inactive             30 - 35        85 - 90       2 - 5  Idiopathic Crescentic  Glomerulonephritis      80            some*     majority*  Polyarteritis Nodosa    50             86           14  Churg - Antonina         50             80           20  SLE                   less than 10     0    greater than 90  Rheumatoid Arthritis  less than 5      0    greater than 90  Sjogren's Syndrome        25           0    greater than 90  ----------------------------------------------------------  *No quantitation in the literature.  Performed by [facility],  [address] [place] [id number] [phone]  www.[employer], Remy Watkins MD - Lab. Director                                                      IGG   Date Value Ref Range Status   10/04/2013 [id number] - 1,620 mg/dL Final       Valencia is a 51 year old who is being evaluated via a billable video visit.      How would you like to obtain your AVS? MyChart  If the video visit is dropped, the invitation should be resent by: Send to e-mail at: [email or web address]  Will anyone else be joining your video visit? No      Video Start Time: 9:51 AM  Video-Visit Details    Type of service:  Video Visit    Video End Time:10:22 AM    Originating Location (pt. Location): Home    Distant Location (provider location):  [facility] [place]     Platform used for Video Visit:  [employer]      Again, thank you for allowing me to participate in the care of your patient.      Sincerely,    Adal Gallo MD

## 2021-12-03 NOTE — PROGRESS NOTES
"Rheumatology Clinic Visit     Valencia Ye MRN# [id number]   YOB: 1970 Age: 51 year old     Date of Visit: 12/03/2021  Primary care provider: Morelia Simental         Assessment and Plan:   #Eosinophilic granulomatosis with polyangiitis (eosinophilia, severe asthma, pericarditis, vasculitic lower extremity rashes, myalgia, fatigue, dx'd age 19): Patient relates stable, chronic, often debilitating fatigue and \"brain fog\" with suboptimal short-term memory. All symptoms attributable formerly to EGPA are stable with no major flares since the last visit. Respiratory symptoms are essentially absent. Examination today by video shows no rash, synovitis, or altered joint range of motion.    Lab evaluation in Corrie 15, 2021 showed creatinine, transaminases, TSH, and CBC all normal; nl eosinophils.  Urinalysis was clear.  Iron saturation was 37% with normal TIBC and Fe levels. PFTs in January 2021 were normal.  CT the abdomen and pelvis showed minimal amount of fluid around the proximal cecum with enlarged diverticula.  Lungs were normal.    EGPA is currently stable, with no evidence of end-organ insufficiency or systemic involvement with inflammatory disease.  Patient reports that on multiple occasions in the past, she worked with Dr. Butts to try and reduce prednisone dosage.  On each occasion of tapering, she developed flaring symptoms that reflected recurrent EGPA. I continue to think that the risk of precipitating flare is greater than the risk of adverse effects associated with tapering prednisone beyond the current 5.5 mg dose, which is at or below physiologic corticosteroid daily dosage.  I recommend continuing prednisone dose of 5.5 mg every day.    I recommend  UA, CBC with differential, and creatinine should be performed semiannually.  I agree with patient's current practice of monitoring blood pressure several times weekly. I agree with management through Dr. Novak of asthma with ipratropium, " Symbicort, and rescue inhaler.    # Fatigue: We reviewed potential contributors such as medications, stress, disrupted sleep, metabolic disturbance, major organ (kidney, heart, liver) dysfunction.  I have low suspicion that any of these are major contributors.  I do recommend repeat echocardiogram, as most recent study was performed in 2017, and EGPA can affect the heart.  Patient relates that iron deficiency has been identified by neurology, and that she has been using low-dose oral iron replacement for several months.  I discussed that oral iron administration is slow and inconsistent in its ability to replenish iron stores.  If iron deficiency remains a viable hypothesis, progression to a 2 dose course of intravenous iron may be in order.  Patient will review with her neurologist.    I recommend follow-up in 6 months time.    Adal Gallo MD  [facility]              Active Problem List:     Patient Active Problem List    Diagnosis Date Noted     Bladder pain 07/31/2019     Priority: Medium     Overactive bladder 07/31/2019     Priority: Medium     Controlled substance agreement signed 11/13/2017     Priority: Medium     Patient is followed by BRIAN DEE for ongoing prescription of benzodiazepines.  All refills should be approved by this provider, or covering partner.    Medication(s): clonazepam.   Maximum quantity per month: 7.5  Clinic visit frequency required: Q 6  months     Controlled substance agreement on file: Yes       Date(s): 11/13/17  Benzodiazepine use reviewed by psychiatry:  No    Last [facility] website verification:  none   [email or web address]           Bronchiectasis without acute exacerbation (H) 05/22/2017     Priority: Medium     Moderate episode of recurrent major depressive disorder (H) 01/29/2017     Priority: Medium     Benign essential hypertension 10/20/2016     Priority: Medium     Mannose-binding lectin deficiency      Priority: Medium     Irritable bowel  "syndrome without diarrhea 07/14/2016     Priority: Medium     Chronic fatigue 01/21/2016     Priority: Medium     Anxiety 05/30/2014     Priority: Medium     Asthma 07/16/2013     Priority: Medium     Problem list name updated by automated process. 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 Last seen in 6-, when EGPA was stable. Continued 5.5 mg daily prednisone was recommended.    Interval history 12-    She saw neurology regarding chronic pain. Migraines and low iron were diagnosed. Her HA is improved with use of tyramine.    She continues with intermittent severe fatigue. At least several days per week, she spends the whole day in bed with increased somnolence. She has already been checked for apnea and sleep disturbance. She notes association of fatigue with marked psychological stimulation.    She notes leg swelling, worse as the day goes on.    She has normal diet, does not drink, exercises with dog-walking (but this is very draining). She does not have enough energy to try recommended yoga.    Several months ago, she had a \"bubble\" on a fingertip, lasting days/week, remitted spontaneously.    No other symptoms previously associated with EGPA.    She had COVID twice, most recently in 4-2021; s/p Pfizer vaccination.    prednisone 5.5 mg daily continues without interruption.    Interval history 06-:    She continues with stable  fatigue and brain fog. Myalgia has been less due to reduced stress recently.    She notes that around midday, she has overwhelming fatigue, " "and requires midmorning several hour nap. Her brain \"gets going\" in the early afternoon.     Appetite and weight stable; no rash; no joint pain; SOB is suppressed; no recurrence of divertiulitis; IBS continues unchanged.    Several weeks ago, she had a painful spot on the L 4th fingertip. It resolved spontaneously in 2-3 days. She has blister-like lesions on her fingers and her big toes.    Prednisone 5.5 mg daily continues without interruption.    Prior hx 6-2020    Her condition has been managed Dr. Butts since 2013; I do not have his records to review today.    By her history, She was diagnosed with EGPA as a teenager after symptoms started at age 16. She had severe life-threatening asthma. By 19, she could not go to school or work due to breathting. She had weightl loss, painful toe rashes, and neurologic symptoms with restless legs, optical migraines. At age 19 in 1990, she was admitted to hospital, found to have pericarditis and eosinophilia. Bx skin showed vasculitis. She was started on prednisone, high dose initially. She has been on prednisone continuously since.    In recent years, she developed bronchiectasis, dx'd in 2013. She has been treated with daily therapeutic vest treatments, intermittent antibiotics.    In May 2018, she developed vertigo and nausea, which persisted for months, now somewhat improved.    She had worked continusously through the illness, until 2019.    She is afflicted with brain fog that causes short-term memory loss, fatigue and muscle pain. The latter is improved with reduced work-related stress. She has chronic sinus and ear problems. She notes dry eyes and reduced tear formation.    She had a respiratory illness in the end of February; she was not tested for COVID. She took an antibiotic and upped her dose of prednisone, and sx cleared.    Asthma is generally under good control. No recent vasculitis rash. The last episode of pericardial fluid episode was in 1746-2047.    She " has noted fluctuating blood pressures, with systolics in 80-90; ranging upward to 130-140s.    Patient was seen in ophthalmology on February 25, 2020 in follow-up of C Antonina syndrome.  Assessment was of no ocular manifestations of systemic rheumatic disease.    Patient was seen by Dr. Novak in pulmonology on December 26, 2019 for follow-up of bronchiectasis and eosinophilic granulomatous polyangiitis.  Pulmonary symptoms were under good control with chronic inhaler use, daily vest therapy and Mucomyst fatigue and body aches were noted.  No change in medication was recommended.    She had severe IBS for years. She has noted marked improvement with use of SamE and Amitiza (for approximately 1 year).         Review of Systems:   Unlless listed in HPI:  Constitutional: negative  Skin: negative  Eyes: negative  Ears/Nose/Throat: negative  Respiratory: No shortness of breath, dyspnea on exertion, cough, or hemoptysis  Cardiovascular: negative  Gastrointestinal: negative  Genitourinary: negative  Musculoskeletal: negative  Neurologic: negative  Psychiatric: negative  Hematologic/Lymphatic/Immunologic: negative  Endocrine: negative          Past Medical History:     Past Medical History:   Diagnosis Date     Anxiety      Aortitis (H)      Asthma     associated with Churg Antonina syndrome     Bronchiectasis (H)      Cerebral infarction (H) August 1990    Very small, caused small permanent optical migraine     Chronic sinusitis      Churg-Antonina syndrome (H)     dx 1990     Conductive hearing loss      Depressive disorder      Eustachian tube dysfunction      Goiter      Hearing loss, conductive      Heart rate problem      Hypertension      Hypocalcemia      Immunosuppression (H)      Irritable bowel syndrome      Major depressive disorder      Mannose-binding lectin deficiency 2014     Nonspecific abnormal results of thyroid function study      Osteoporosis      Pericarditis     1990 and 1992     Pneumonia     4/23/10      Recurrent otitis media      Recurrent UTI      Rheumatoid vasculitis (H)      Sinusitis, chronic      Steroid long-term use      Tinnitus      Varicose veins of leg with swelling      Past Surgical History:   Procedure Laterality Date     C/SECTION, CLASSICAL        SECTION       COLONOSCOPY Left 2014    Procedure: COMBINED COLONOSCOPY, SINGLE OR MULTIPLE BIOPSY/POLYPECTOMY BY BIOPSY;  Surgeon: Js Pinedo MD;  Location:  GI     COLONOSCOPY N/A 2019    Procedure: COLONOSCOPY;  Surgeon: Bryce Pimentel MD;  Location:  GI     ENT SURGERY       GENITOURINARY SURGERY       HC COLP CERVIX/UPPER VAGINA W LOOP ELEC BX CERVIX       HEAD & NECK SURGERY       HYSTERECTOMY  2009    without oopherectomy     HYSTERECTOMY, PAP NO LONGER INDICATED      cervix removed      PE TUBES       PICC INSERTION  10/10/2013    5fr DL PASV PICC, 43cm (1cm external) in the L basilic vein w/ tip in the low SVC.     PICC INSERTION Left 2017    5fr DL BioFlo PICC, 42cm (3cm external) in the L lateral brachial vein w/ tip in the SVC RA junction.     SINUS SURGERY       TUBAL LIGATION       TYMPANOPLASTY       --Hysterectomy for cervical dysplasia.       Social History:     Social History     Occupational History     Not on file   Tobacco Use     Smoking status: Never Smoker     Smokeless tobacco: Never Used   Substance and Sexual Activity     Alcohol use: Yes     Alcohol/week: 0.0 standard drinks     Comment: few drinks a week     Drug use: No     Sexual activity: Yes     Partners: Male     Birth control/protection: Male Surgical, Female Surgical     Has 5 children, all healthy except for asthma  Worked as  at the  for 15 years. On disability since .  Drinks 1/2 drink 5 days weekly. Never smoker.       Family History:     Family History   Problem Relation Age of Onset     Allergies Mother         Seasonal     Alcohol/Drug Mother      Substance Abuse Mother       Depression Mother      C.A.D. Father      Diabetes Father         Type 2     Depression Father      Heart Disease Father      C.A.D. Maternal Grandmother      Arthritis Maternal Grandmother      Musculoskeletal Disorder Maternal Grandmother         MS     Heart Disease Maternal Grandmother      Hypertension Maternal Grandmother      Alcohol/Drug Maternal Grandfather      Substance Abuse Maternal Grandfather      Depression Maternal Grandfather      Asthma Son      Asthma Daughter      Allergies Daughter         Seasonal     Unknown/Adopted Paternal Grandmother      Unknown/Adopted Paternal Grandfather      Cancer Maternal Aunt         breast age 53     Breast Cancer Maternal Aunt         in her 50s     Glaucoma No family hx of      Macular Degeneration No family hx of      Colon Cancer No family hx of    MGM with MS; aunt MS         Allergies:     Allergies   Allergen Reactions     Colistimethate Anaphylaxis     Colymycin M [Colistimethate Sodium] Anaphylaxis     Tamiflu [Oseltamivir]      Gums Blister up     Azithromycin Palpitations     Heart palpitation  Heart palpitation     Clindamycin Rash            Medications:     Current Outpatient Medications   Medication Sig Dispense Refill     acetylcysteine (MUCOMYST) 20 % neb solution TAKE 4ML VIA NEBULIZER TWICE A DAY WITH ALBUTEROL SOLUTION. DISCARD OPEN BOTTLE AFTER 96 HOURS. 200 mL 9     albuterol (PROAIR HFA/PROVENTIL HFA/VENTOLIN HFA) 108 (90 Base) MCG/ACT inhaler INHALE 2 PUFFS INTO THE LUNGS EVERY 4 HOURS AS NEEDED FOR SHORTNESS OF BREATH / DYSPNEA OR WHEEZING 8.5 g 3     albuterol (PROVENTIL) (2.5 MG/3ML) 0.083% neb solution TAKE 1 VIAL VIA NEBULIZER TWICE A DAY. 180 mL 11     Ascorbic Acid (VITAMIN C PO) Take 800 mg by mouth       atorvastatin (LIPITOR) 10 MG tablet TAKE 1/2 TABLET BY MOUTH EVERY DAY 45 tablet 0     Caprylic Acid LIQD 1 capsule daily       ciprofloxacin-dexamethasone (CIPRODEX) 0.3-0.1 % otic suspension SHAKE LIQUID AND INSTILL 3 DROPS INTO  AFFECTED EARS TWICE A DAY AS NEEDED. 7.5 mL 9     diazepam (VALIUM) 2 MG tablet TAKE 1-2 TABLETS (2-4 MG) BY MOUTH EVERY 6 HOURS AS NEEDED FOR VERTIGO 20 tablet 0     EnLyte (ENLYTE) CAPS capsule Take 1 capsule by mouth daily       ferrous sulfate (FEROSUL) 325 (65 Fe) MG tablet 325 mg        fluticasone (FLONASE) 50 MCG/ACT nasal spray SHAKE LIQUID AND USE 2 SPRAYS IN EACH NOSTRIL EVERY DAY 48 mL 3     L-Glutamine 500 MG TABS Take 500 mg by mouth 3 times daily       lubiprostone (AMITIZA) 8 MCG capsule Take 1 capsule (8 mcg) by mouth 2 times daily 180 capsule 3     metoprolol succinate ER (TOPROL-XL) 25 MG 24 hr tablet TAKE 1/2 TABLET(12.5 MG) BY MOUTH DAILY. HOLD IF BP IS LOW 45 tablet 3     Omega-3 Fatty Acids (FISH OIL) 1200 MG capsule Take 4 capsules (4.8 g) by mouth daily       Phosphatidyl Choline 65 MG TABS Take 2 capsules by mouth daily       predniSONE (DELTASONE) 1 MG tablet Take 5 tabs daily, alternating with 6 tabs daily 590 tablet 5     sertraline (ZOLOFT) 25 MG tablet 25 mg PATIENT TAKES 1/2 TABLET DAILY       SYMBICORT 160-4.5 MCG/ACT Inhaler INHALE 2 PUFFS BY MOUTH TWICE A DAY. 30.6 Inhaler 3     tiZANidine (ZANAFLEX) 2 MG tablet TAKE 1-2 TABLETS (2-4 MG) BY MOUTH 3 TIMES DAILY AS NEEDED FOR MUSCLE SPASMS 45 tablet 11     vitamin D3 (CHOLECALCIFEROL) 2000 units (50 mcg) tablet Take 2 tablets by mouth daily              Physical Exam:   Last menstrual period 02/01/2009, not currently breastfeeding.  Wt Readings from Last 4 Encounters:   08/12/21 49.9 kg (110 lb)   03/08/21 49.7 kg (109 lb 8 oz)   01/21/21 49.7 kg (109 lb 8 oz)   09/21/20 50.6 kg (111 lb 9.6 oz)       Constitutional: well-developed, appearing stated age; cooperative  Eyes: nl EOM, PERRLA, vision, conjunctiva, sclera  ENT: nl external ears, nose, hearing, lips, teeth, gums, throat  No mucous membrane lesions, normal saliva pool  Neck: no thyroid enlargement  MS: No hand/finger joint visible swelling; intact hand joint ROM  Skin: no  nail pitting, alopecia, rash, nodules or lesions  Neuro: nl cranial nerves  Psych: nl judgement, orientation, memory, affect.         Data:     No results found for any visits on 12/03/21.    Recent Labs   Lab Test 10/02/19  1428 02/28/19  1604 02/26/19  0637 07/05/18  1805 02/19/18  1626  06/09/17  1756  03/10/17  1835 11/10/16  1812 12/23/15  2227   WBC 8.5  --   --   --  6.1  --  6.7   < > 20.8* 8.8 8.0   RBC 4.01  --   --   --  3.96  --  3.85   < > 4.04 3.68* 4.02   HGB 13.6  --   --   --  13.0  --  12.7   < > 13.6 12.4 13.4   HCT 40.4  --   --   --  39.3  --  38.5   < > 39.6 37.2 39.0   *  --   --   --  99  --  100   < > 98 101* 97   RDW 12.7  --   --   --  13.1  --  12.1   < > 12.0 12.4 12.5     --   --   --  283  --  284   < > 292 276 275   ALBUMIN  --   --   --   --   --   --   --   --  3.7 4.0 3.8   CRP  --  <2.9 <2.9 <2.9  --   --  <2.9   < >  --  <2.9  --    BUN 14  --   --  19 8   < >  --    < > 14 14 11    < > = values in this interval not displayed.      Recent Labs   Lab Test 03/12/19 07/05/18  1805 11/10/16  1812   TSH 1.540 0.56 0.90   T4  --   --  1.01     Hemoglobin   Date Value Ref Range Status   06/15/2021 12.8 11.7 - 15.7 g/dL Final   10/20/2020 13.5 11.7 - 15.7 g/dL Final   06/29/2020 12.9 11.7 - 15.7 g/dL Final     Urea Nitrogen   Date Value Ref Range Status   10/20/2020 8 7 - 30 mg/dL Final   09/21/2020 12 7 - 30 mg/dL Final   10/02/2019 14 7 - 30 mg/dL Final     Sed Rate   Date Value Ref Range Status   09/21/2020 30 (H) 0 - 20 mm/h Final     Comment:     Results confirmed by repeat test   02/28/2019 6 0 - 20 mm/h Final   07/05/2018 6 0 - 20 mm/h Final     CRP Cardiac Risk   Date Value Ref Range Status   02/04/2008 8.4 mg/L Final     Comment:     Reference Values:   Low Risk:           <1.0 mg/L   Average Risk:       1.0-3.0 mg/L   High Risk:          >3.0 mg/L   Acute Inflammation: >8.0 mg/L     CRP Inflammation   Date Value Ref Range Status   09/21/2020 44.0 (H) 0.0 - 8.0  mg/L Final   02/28/2019 <2.9 0.0 - 8.0 mg/L Final   02/26/2019 <2.9 0.0 - 8.0 mg/L Final     AST   Date Value Ref Range Status   06/15/2021 21 0 - 45 U/L Final   10/20/2020 13 0 - 45 U/L Final   03/12/2019 24 0 - 40 IU/L Final     Neutrophil Cytoplasmic IgG Antibody   Date Value Ref Range Status   02/17/2009 <1:20  Final     Comment:     Reference range: <1:20  (Note)  TEST INFORMATION: Anti-Neutrophil Cyto Ab, IgG  Neutrophil Cytoplasmic Antibodies (C-ANCA = granular  cytoplasmic staining, P-ANCA = perinuclear staining) are  found in the serum of over 90% of patients with certain  necrotizing systemic vasculitides, and usually in less  than 5% of patients with collagen vascular disease or  arthritis.                  ANCA'S IN VASCULITIC SYNDROMES                         Approx %      Approx % of                         Pos ANCA    patients demonstrating  ----------------------------------------------------------                (combined patterns)   C Pattern   P Pattern  Wegener's granulomatosis:  -Active Generalized   85 - 92        85 - 90       2 - 5  -Limited forms        60 - 67 85 - 90       2 - 5  -Inactive             30 - 35        85 - 90       2 - 5  Idiopathic Crescentic  Glomerulonephritis      80            some*     majority*  Polyarteritis Nodosa    50             86           14  Churg - Antonina         50             80           20  SLE                   less than 10     0    greater than 90  Rheumatoid Arthritis  less than 5      0    greater than 90  Sjogren's Syndrome        25           0    greater than 90  ----------------------------------------------------------  *No quantitation in the literature.  Performed by [email or web address],  [facility] [address] [phone]  [email or web address], Remy Watkins MD - Lab. Director                                                  08/19/2008 < 1:20  Final     Comment:     Reference range: < 1:20  (Note)  TEST INFORMATION:  Anti-Neutrophil Cyto Ab, IgG  Neutrophil Cytoplasmic Antibodies (C-ANCA = granular  cytoplasmic staining, P-ANCA = perinuclear staining) are  found in the serum of over 90% of patients with certain  necrotizing systemic vasculitides, and usually in less  than 5% of patients with collagen vascular disease or  arthritis.                  ANCA'S IN VASCULITIC SYNDROMES                         Approx %      Approx % of                         Pos ANCA    patients demonstrating  ----------------------------------------------------------                (combined patterns)   C Pattern   P Pattern  Wegener's granulomatosis:  -Active Generalized   85 - 92 85 - 90       2 - 5  -Limited forms        60 - 67 85 - 90       2 - 5  -Inactive             30 - 35        85 - 90       2 - 5  Idiopathic Crescentic  Glomerulonephritis      80            some*     majority*  Polyarteritis Nodosa    50             86           14  Churg - Antonina         50             80           20  SLE                   less than 10     0    greater than 90  Rheumatoid Arthritis  less than 5      0    greater than 90  Sjogren's Syndrome        25           0    greater than 90  ----------------------------------------------------------  *No quantitation in the literature.  Performed by [employer],  [address] [place] [id number] [phone]  [email or web address],  Remy Watkins MD - Lab. Director                                              01/24/2008 < 1:20  Final     Comment:     Reference range: < 1:20  (Note)  TEST INFORMATION: Anti-Neutrophil Cyto Ab, IgG  Neutrophil Cytoplasmic Antibodies (C-ANCA = granular  cytoplasmic staining, P-ANCA = perinuclear staining) are  found in the serum of over 90% of patients with certain  necrotizing systemic vasculitides, and usually in less  than 5% of patients with collagen vascular disease or  arthritis.                  ANCA'S IN VASCULITIC SYNDROMES                         Approx %       Approx % of                         Pos ANCA    patients demonstrating  ----------------------------------------------------------                (combined patterns)   C Pattern   P Pattern  Wegener's granulomatosis:  -Active Generalized   85 - 92 85 - 90       2 - 5  -Limited forms        60 - 67 85 - 90       2 - 5  -Inactive             30 - 35 85 - 90       2 - 5  Idiopathic Crescentic  Glomerulonephritis      80            some*     majority*  Polyarteritis Nodosa    50             86           14  Churg - Antonina         50             80           20  SLE                   less than 10     0    greater than 90  Rheumatoid Arthritis  less than 5      0    greater than 90  Sjogren's Syndrome        25           0    greater than 90  ----------------------------------------------------------  *No quantitation in the literature.  Performed by [email or web address],  [address] [place] [address] [phone]  [email or web address],  Remy Watkins MD - Lab. Director                                                Albumin   Date Value Ref Range Status   06/15/2021 3.9 3.4 - 5.0 g/dL Final   10/20/2020 3.9 3.4 - 5.0 g/dL Final   03/10/2017 3.7 3.4 - 5.0 g/dL Final     Alkaline Phosphatase   Date Value Ref Range Status   10/20/2020 54 40 - 150 U/L Final   03/10/2017 48 40 - 150 U/L Final   11/10/2016 42 40 - 150 U/L Final     ALT   Date Value Ref Range Status   06/15/2021 28 0 - 50 U/L Final   10/20/2020 17 0 - 50 U/L Final   03/12/2019 29 0 - 32 IU/L Final     Rheumatoid Factor   Date Value Ref Range Status   07/05/2018 <20 <20 IU/mL Final     Recent Labs   Lab Test 06/15/21  1622 10/20/20  0155 09/21/20  1609 06/29/20  1501 10/02/19  1428 03/12/19  0000 03/21/17  1730 03/10/17  1835 11/10/16  1812   WBC 8.1 14.0*  --  9.3 8.5  --    < > 20.8* 8.8   HGB 12.8 13.5  --  12.9 13.6  --    < > 13.6 12.4   HCT 38.3 41.3  --  39.1 40.4  --    < > 39.6 37.2   * 100  --  101* 101*  --    < > 98 101*     261  --  298 320  --    < > 292 276   BUN  --  8 12  --  14  --    < > 14 14   TSH 0.66  --  0.60  --   --  1.540   < >  --  0.90   AST 21 13  --   --   --  24  --  17 6   ALT 28 17  --   --   --  29   < > 18 14   ALKPHOS  --  54  --   --   --   --   --  48 42    < > = values in this interval not displayed.     RHEUM RESULTS Latest Ref Rng & Units 1/3/2005 1/24/2005 3/10/2005   ALBUMIN 3.4 - 5.0 g/dL - - -   ALT 0 - 50 U/L - - -   AST 0 - 45 U/L - - -   ANCA - - - -   JOSE INTERPRETATION NEG:Negative - - -   COMPLEMENT C3 76 - 169 mg/dL - - -   COMPLEMENT C4 15 - 50 mg/dL - - -   CK TOTAL 30 - 225 U/L - - -   CREATININE 0.52 - 1.04 mg/dL - - -   CRP 0.0 - 8.0 mg/L - - -   RENUKA <1.0 - - -   GFR ESTIMATE, IF BLACK >60 mL/min/[1.73:m2] - - -   GFR ESTIMATE >60 mL/min/[1.73:m2] - - -   HEMATOCRIT 35.0 - 47.0 % 37.6 34.9(L) 38.0   HEMOGLOBIN 11.7 - 15.7 g/dL 12.9 12.1 13.0   HEPBANG NEG - - -   HCVAB NEG - - -    - 1,620 mg/dL - - -   WBC 4.0 - 11.0 10e9/L 7.0 5.2 9.3   RBC 3.8 - 5.2 10e12/L 3.53(L) 3.26(L) 3.56(L)   RDW 10.0 - 15.0 % 16.3(H) 15.2(H) 14.1   MCHC 31.5 - 36.5 g/dL 34.2 34.5 34.3   MCV 78 - 100 fl 107(H) 107(H) 107(H)   PLATELET COUNT 150 - 450 10e9/L 313 278 276   RHEUMATOID FACTOR <20 IU/mL - - -   ESR 0 - 20 mm/h 3 9 19       Rheumatoid Factor   Date Value Ref Range Status   07/05/2018 <20 <20 IU/mL Final   ,  ,  ,  ,  ,   SSA (RO) Antibody IgG   Date Value Ref Range Status   02/17/2009 2  Final     Comment:     Reference range: 0 to 40  Unit: AU/mL  (Note)  REFERENCE INTERVALS: SSA (Ro) (WESTLEY) Ab, IgG   29 AU/mL or Less ............. Negative   30 - 40 AU/mL ................ Equivocal   41 AU/mL or Greater .......... Positive    SSA (Ro) antibody is seen in 70-75% of Sjogren syndrome  cases, 30-40% of systemic lupus erythematosus (SLE) and  5-10% of progressive systemic sclerosis (PSS).  Performed by [facility],  [address] [place] [id number] [phone]  [email or web address]  JOSE RAMON Watkins MD- Lab. Director     Scleroderma Antibody IgG   Date Value Ref Range Status   02/17/2009 0  Final     Comment:     Reference range: 0 to 40  Unit: AU/mL  (Note)  REFERENCE INTERVALS: Scleroderma (Scl-70) (WESTLEY) Ab, IgG   29 AU/mL or Less ............. Negative   30 - 40 AU/mL ................ Equivocal   41 AU/mL or Greater .......... Positive    Scleroderma (Scl-70) antibody is seen in 20-60% of  patients with scleroderma and is considered diagnostic  and specific for scleroderma if it is the only WESTLEY  antibody present. Scl-70 is also seen in approximately  25% of progressive systemic sclerosis (PSS).  Performed by [facility],  [address] [place] [address] [phone]  [email or web address], Remy Watkins MD - Lab. Director   ,   JOSE interpretation   Date Value Ref Range Status   02/28/2019 Negative NEG^Negative Final     Comment:                                        Reference range:  <1:40  NEGATIVE  1:40 - 1:80  BORDERLINE POSITIVE  >1:80 POSITIVE       ,   JOSE Screen by EIA   Date Value Ref Range Status   11/10/2016 <1.0  Interpretation:  Negative   <1.0 Final   ,  ,  ,  ,  ,  ,  ,  ,   Hep B Surface Agn   Date Value Ref Range Status   03/16/2005 Negative NEG Final   ,  ,  ,  ,  ,  ,  ,  ,  ,  ,  ,   Neutrophil Cytoplasmic IgG Antibody   Date Value Ref Range Status   02/17/2009 <1:20  Final     Comment:     Reference range: <1:20  (Note)  TEST INFORMATION: Anti-Neutrophil Cyto Ab, IgG  Neutrophil Cytoplasmic Antibodies (C-ANCA = granular  cytoplasmic staining, P-ANCA = perinuclear staining) are  found in the serum of over 90% of patients with certain  necrotizing systemic vasculitides, and usually in less  than 5% of patients with collagen vascular disease or  arthritis.                  ANCA'S IN VASCULITIC SYNDROMES                         Approx %      Approx % of                         Pos ANCA    patients demonstrating  ----------------------------------------------------------                 (combined patterns)   C Pattern   P Pattern  Wegener's granulomatosis:  -Active Generalized   85 - 92 85 - 90       2 - 5  -Limited forms        60 - 67        85 - 90       2 - 5  -Inactive             30 - 35        85 - 90       2 - 5  Idiopathic Crescentic  Glomerulonephritis      80            some*     majority*  Polyarteritis Nodosa    50             86           14  Churg - Antonina         50             80           20  SLE                   less than 10     0    greater than 90  Rheumatoid Arthritis  less than 5      0    greater than 90  Sjogren's Syndrome        25           0    greater than 90  ----------------------------------------------------------  *No quantitation in the literature.  Performed by [facility],  [address] [place] [id number] [phone]  [email or web address], Remy Watkins MD - Lab. Director                                                      IGG   Date Value Ref Range Status   10/04/2013 854 695 - 1,620 mg/dL Final       Valencia is a 51 year old who is being evaluated via a billable video visit.      How would you like to obtain your AVS? MyChart  If the video visit is dropped, the invitation should be resent by: Send to e-mail at: [email or web address]  Will anyone else be joining your video visit? No      Video Start Time: 9:51 AM  Video-Visit Details    Type of service:  Video Visit    Video End Time:10:22 AM    Originating Location (pt. Location): Home    Distant Location (provider location):  [facility] [place]     Platform used for Video Visit: [email or web address]

## 2021-12-06 DIAGNOSIS — J47.9 BRONCHIECTASIS (H): ICD-10-CM

## 2021-12-06 RX ORDER — ALBUTEROL SULFATE 90 UG/1
AEROSOL, METERED RESPIRATORY (INHALATION)
Qty: 18 G | Refills: 3 | Status: SHIPPED | OUTPATIENT
Start: 2021-12-06 | End: 2023-01-23

## 2021-12-08 ENCOUNTER — LAB (OUTPATIENT)
Dept: LAB | Facility: CLINIC | Age: 51
End: 2021-12-08
Payer: COMMERCIAL

## 2021-12-08 DIAGNOSIS — M30.1 EOSINOPHILIC GRANULOMATOSIS WITH POLYANGIITIS (EGPA) WITH LUNG INVOLVEMENT (H): ICD-10-CM

## 2021-12-08 DIAGNOSIS — D72.18 EOSINOPHILIC GRANULOMATOSIS WITH POLYANGIITIS (EGPA) WITH LUNG INVOLVEMENT (H): ICD-10-CM

## 2021-12-08 LAB
ALBUMIN UR-MCNC: NEGATIVE MG/DL
APPEARANCE UR: CLEAR
BACTERIA #/AREA URNS HPF: NORMAL /HPF
BASOPHILS # BLD AUTO: 0.1 10E3/UL (ref 0–0.2)
BASOPHILS NFR BLD AUTO: 1 %
BILIRUB UR QL STRIP: NEGATIVE
COLOR UR AUTO: YELLOW
EOSINOPHIL # BLD AUTO: 0.1 10E3/UL (ref 0–0.7)
EOSINOPHIL NFR BLD AUTO: 1 %
ERYTHROCYTE [DISTWIDTH] IN BLOOD BY AUTOMATED COUNT: 12.7 % (ref 10–15)
GLUCOSE UR STRIP-MCNC: NEGATIVE MG/DL
HCT VFR BLD AUTO: 41.1 % (ref 35–47)
HGB BLD-MCNC: 13.6 G/DL (ref 11.7–15.7)
HGB UR QL STRIP: ABNORMAL
KETONES UR STRIP-MCNC: NEGATIVE MG/DL
LEUKOCYTE ESTERASE UR QL STRIP: NEGATIVE
LYMPHOCYTES # BLD AUTO: 1 10E3/UL (ref 0.8–5.3)
LYMPHOCYTES NFR BLD AUTO: 11 %
MCH RBC QN AUTO: 34 PG (ref 26.5–33)
MCHC RBC AUTO-ENTMCNC: 33.1 G/DL (ref 31.5–36.5)
MCV RBC AUTO: 103 FL (ref 78–100)
MONOCYTES # BLD AUTO: 0.5 10E3/UL (ref 0–1.3)
MONOCYTES NFR BLD AUTO: 5 %
NEUTROPHILS # BLD AUTO: 7.3 10E3/UL (ref 1.6–8.3)
NEUTROPHILS NFR BLD AUTO: 82 %
NITRATE UR QL: NEGATIVE
PH UR STRIP: 6 [PH] (ref 5–7)
PLATELET # BLD AUTO: 306 10E3/UL (ref 150–450)
RBC # BLD AUTO: 4 10E6/UL (ref 3.8–5.2)
RBC #/AREA URNS AUTO: NORMAL /HPF
SP GR UR STRIP: 1.02 (ref 1–1.03)
UROBILINOGEN UR STRIP-ACNC: 0.2 E.U./DL
WBC # BLD AUTO: 9 10E3/UL (ref 4–11)
WBC #/AREA URNS AUTO: NORMAL /HPF

## 2021-12-08 PROCEDURE — 84460 ALANINE AMINO (ALT) (SGPT): CPT

## 2021-12-08 PROCEDURE — 84450 TRANSFERASE (AST) (SGOT): CPT

## 2021-12-08 PROCEDURE — 85025 COMPLETE CBC W/AUTO DIFF WBC: CPT

## 2021-12-08 PROCEDURE — 82565 ASSAY OF CREATININE: CPT

## 2021-12-08 PROCEDURE — 36415 COLL VENOUS BLD VENIPUNCTURE: CPT

## 2021-12-08 PROCEDURE — 81001 URINALYSIS AUTO W/SCOPE: CPT

## 2021-12-08 PROCEDURE — 82040 ASSAY OF SERUM ALBUMIN: CPT

## 2021-12-09 LAB
ALBUMIN SERPL-MCNC: 3.7 G/DL (ref 3.4–5)
ALT SERPL W P-5'-P-CCNC: 25 U/L (ref 0–50)
AST SERPL W P-5'-P-CCNC: 14 U/L (ref 0–45)
CREAT SERPL-MCNC: 0.83 MG/DL (ref 0.52–1.04)
GFR SERPL CREATININE-BSD FRML MDRD: 83 ML/MIN/1.73M2

## 2022-01-08 ENCOUNTER — HEALTH MAINTENANCE LETTER (OUTPATIENT)
Age: 52
End: 2022-01-08

## 2022-01-13 DIAGNOSIS — M30.1 CHURG-STRAUSS SYNDROME (H): ICD-10-CM

## 2022-01-13 DIAGNOSIS — D72.18 CHURG-STRAUSS SYNDROME (H): ICD-10-CM

## 2022-01-13 RX ORDER — BUDESONIDE AND FORMOTEROL FUMARATE DIHYDRATE 160; 4.5 UG/1; UG/1
AEROSOL RESPIRATORY (INHALATION)
Qty: 30.6 G | Refills: 3 | Status: SHIPPED | OUTPATIENT
Start: 2022-01-13 | End: 2023-03-06

## 2022-01-14 DIAGNOSIS — J47.1 BRONCHIECTASIS WITH (ACUTE) EXACERBATION (H): ICD-10-CM

## 2022-01-18 RX ORDER — PREDNISONE 1 MG/1
TABLET ORAL
Qty: 495 TABLET | Refills: 7 | OUTPATIENT
Start: 2022-01-18

## 2022-01-20 ENCOUNTER — IMMUNIZATION (OUTPATIENT)
Dept: NURSING | Facility: CLINIC | Age: 52
End: 2022-01-20
Payer: COMMERCIAL

## 2022-01-20 PROCEDURE — 0053A COVID-19,PF,PFIZER (12+ YRS): CPT

## 2022-01-20 PROCEDURE — 91305 COVID-19,PF,PFIZER (12+ YRS): CPT

## 2022-01-24 DIAGNOSIS — J47.9 BRONCHIECTASIS WITHOUT COMPLICATION (H): ICD-10-CM

## 2022-01-24 DIAGNOSIS — J47.1 BRONCHIECTASIS WITH (ACUTE) EXACERBATION (H): ICD-10-CM

## 2022-01-25 RX ORDER — ACETYLCYSTEINE 200 MG/ML
SOLUTION ORAL; RESPIRATORY (INHALATION)
Qty: 800 ML | Refills: 2 | Status: SHIPPED | OUTPATIENT
Start: 2022-01-25 | End: 2023-02-16

## 2022-01-27 RX ORDER — PREDNISONE 1 MG/1
TABLET ORAL
Qty: 495 TABLET | Refills: 7 | OUTPATIENT
Start: 2022-01-27

## 2022-01-27 NOTE — TELEPHONE ENCOUNTER
Placed call to pharmacy to confirm that patient has refills available on prednisone order placed on 12/3/21. No further action is needed at this time on this refill request which was automatically generated by pharmacy system for an unknown reason. .  Pratibha Rodriguez RN  [facility]

## 2022-01-27 NOTE — TELEPHONE ENCOUNTER
Sorry, I do not understand what action, if any, I should take in response to this Rx response note

## 2022-02-09 ENCOUNTER — TRANSFERRED RECORDS (OUTPATIENT)
Dept: HEALTH INFORMATION MANAGEMENT | Facility: CLINIC | Age: 52
End: 2022-02-09
Payer: COMMERCIAL

## 2022-02-17 ENCOUNTER — OFFICE VISIT (OUTPATIENT)
Dept: PULMONOLOGY | Facility: CLINIC | Age: 52
End: 2022-02-17
Attending: INTERNAL MEDICINE
Payer: COMMERCIAL

## 2022-02-17 VITALS
HEIGHT: 62 IN | BODY MASS INDEX: 20.61 KG/M2 | OXYGEN SATURATION: 100 % | DIASTOLIC BLOOD PRESSURE: 73 MMHG | SYSTOLIC BLOOD PRESSURE: 133 MMHG | WEIGHT: 112 LBS | HEART RATE: 67 BPM | TEMPERATURE: 98.1 F

## 2022-02-17 DIAGNOSIS — J47.9 BRONCHIECTASIS WITHOUT ACUTE EXACERBATION (H): Primary | ICD-10-CM

## 2022-02-17 LAB
EXPTIME-PRE: 8.21 SEC
FEF2575-%PRED-PRE: 64 %
FEF2575-PRE: 1.62 L/SEC
FEF2575-PRED: 2.52 L/SEC
FEFMAX-%PRED-PRE: 95 %
FEFMAX-PRE: 6.18 L/SEC
FEFMAX-PRED: 6.47 L/SEC
FEV1-%PRED-PRE: 108 %
FEV1-PRE: 2.65 L
FEV1FEV6-PRE: 67 %
FEV1FEV6-PRED: 82 %
FEV1FVC-PRE: 67 %
FEV1FVC-PRED: 82 %
FIFMAX-PRE: 4.8 L/SEC
FVC-%PRED-PRE: 133 %
FVC-PRE: 3.97 L
FVC-PRED: 2.98 L

## 2022-02-17 PROCEDURE — G0463 HOSPITAL OUTPT CLINIC VISIT: HCPCS | Mod: 25

## 2022-02-17 PROCEDURE — 99207 PR RESPIRATORY FLOW VOLUME LOOP: CPT

## 2022-02-17 PROCEDURE — 99207 PR NON-BILLABLE SERV PER CHARTING: CPT | Mod: GC | Performed by: INTERNAL MEDICINE

## 2022-02-17 ASSESSMENT — PAIN SCALES - GENERAL: PAINLEVEL: NO PAIN (0)

## 2022-02-17 NOTE — PROGRESS NOTES
[facility]       SUBJECTIVE:  The patient was seen and examined by Dagoberto Briscoe MD and Taylor Torres MD.  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   Doing very well since last visit. Says her breathing is very well, is using vest therapy once daily, rarely more than that. Only using PRN meds rarely too. Denies cough, fevers/chills, exacerbations, recent illnesses, chest pain, ENT symptoms. Does yoga 3 times a week, 1 hour each time. Is able to tolerate it, feels a bit out of shape due to winter but nothing significant. Able to tolerate PFTs well.  Up to date on COVID, flu and pneumo shots.    Current Outpatient Medications   Medication     acetylcysteine (MUCOMYST) 20 % neb solution     albuterol (PROAIR HFA/PROVENTIL HFA/VENTOLIN HFA) 108 (90 Base) MCG/ACT inhaler     albuterol (PROVENTIL) (2.5 MG/3ML) 0.083% neb solution     Ascorbic Acid (VITAMIN C PO)     atorvastatin (LIPITOR) 10 MG tablet     Caprylic Acid LIQD     ciprofloxacin-dexamethasone (CIPRODEX) 0.3-0.1 % otic suspension     diazepam (VALIUM) 2 MG tablet     EnLyte (ENLYTE) CAPS capsule     ferrous sulfate (FEROSUL) 325 (65 Fe) MG tablet     fluticasone (FLONASE) 50 MCG/ACT nasal spray     L-Glutamine 500 MG TABS     lubiprostone (AMITIZA) 8 MCG capsule     metoprolol succinate ER (TOPROL-XL) 25 MG 24 hr tablet     Omega-3 Fatty Acids (FISH OIL) 1200 MG capsule     Phosphatidyl Choline 65 MG TABS     predniSONE (DELTASONE) 1 MG tablet     sertraline (ZOLOFT) 25 MG tablet     SYMBICORT 160-4.5 MCG/ACT Inhaler     tiZANidine (ZANAFLEX) 2 MG tablet     vitamin D3 (CHOLECALCIFEROL) 2000 units (50 mcg) tablet     No current facility-administered medications for this visit.          Allergies   Allergen Reactions     Colistimethate Anaphylaxis     Colymycin M [Colistimethate Sodium] Anaphylaxis     Tamiflu [Oseltamivir]       Gums Blister up     Azithromycin Palpitations     Heart palpitation  Heart palpitation     Clindamycin Rash     Review of systems:  Appetite is good  Blepharitis but has this chronic problem  No abdominal pain, no nausea/vomiting  The remainder of a complete review of systems is otherwise negative except as noted in the history of present illness.    OBJECTIVE:    LMP 02/01/2009    Wt Readings from Last 1 Encounters:   08/12/21 49.9 kg (110 lb)     Physical Examination:  GENERAL APPEARANCE: Well developed, well nourished, alert, and in no apparent distress.  EYES: blepharitis of right upper lid  HENT: Nasal mucosa with no edema and no hyperemia. No nasal polyps.  EARS: Canals clear, TMs with previous perforation and erythema, chronic and unchanged  MOUTH: Oral mucosa is moist, without any lesions, no tonsillar enlargement, no oropharyngeal exudate.  NECK: supple, no masses, no thyromegaly.  LYMPHATICS: No significant axillary, cervical, or supraclavicular nodes.  RESP: normal percussion, good air flow throughout.  No crackles. No rhonchi. No wheezes.  CV: Normal S1, S2, regular rhythm, normal rate. No murmur.  No rub. No gallop. No LE edema.   ABDOMEN:  Bowel sounds normal, soft, nontender, no HSM or masses.   MS: extremities normal. No clubbing. No cyanosis.  SKIN: no rash on limited exam  NEURO: Mentation intact, speech normal, normal strength and tone, normal gait and stance  PSYCH: mentation appears normal. and affect normal/bright    RESULTS  Results for NABOR BUENROSTRO (MRN [id number]) as of 2/17/2022 11:01   Ref. Range 2/17/2022 10:18   FVC-Pred Latest Units: L 2.98   FVC-Pre Latest Units: L 3.97   FVC-%Pred-Pre Latest Units: % 133   FEV1-Pre Latest Units: L 2.65   FEV1-%Pred-Pre Latest Units: % 108   FEV1FVC-Pred Latest Units: % 82   FEV1FVC-Pre Latest Units: % 67   FEV1FEV6-Pred Latest Units: % 82   FEV1FEV6-Pre Latest Units: % 67   FEFMax-Pred Latest Units: L/sec 6.47   FEFMax-Pre Latest Units: L/sec  6.18   FEFMax-%Pred-Pre Latest Units: % 95   FEF2575-Pred Latest Units: L/sec 2.52   FEF2575-Pre Latest Units: L/sec 1.62   GQL8598-%Pred-Pre Latest Units: % 64   FIFMax-Pre Latest Units: L/sec 4.80   ExpTime-Pre Latest Units: sec 8.21   PFT GENERAL LAB TESTING Unknown Rpt     PFT interpretation  Normal spirometry.  Similar to baseline.  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  Patient appears to be doing well.  She has very good exercise tolerance.  She is oxygenating well.  PFTs are in the high normal range, similar to her recent baseline.  She does not appear to be having an exacerbation of her bronchiectasis at this time. She will continue her current airway clearance therapy with albuterol, Mucomyst and daily Vest. Defer to rheumatology for management of her Churg-Antonina.    RTC in 6 months with PFTs.  Pt was seen and plan of care discussed with Dr Briscoe.    Taylor Torres MD  Internal Medicine PGY-2

## 2022-02-17 NOTE — NURSING NOTE
Chief Complaint   Patient presents with     Follow Up     NON CF     Vitals were taken and medications were reconciled.   Michelle Beck RMA  11:27 AM

## 2022-02-21 DIAGNOSIS — Z11.1 TUBERCULOSIS SCREENING: Primary | ICD-10-CM

## 2022-02-22 ENCOUNTER — TELEPHONE (OUTPATIENT)
Dept: PULMONOLOGY | Facility: CLINIC | Age: 52
End: 2022-02-22

## 2022-02-22 ENCOUNTER — LAB (OUTPATIENT)
Dept: LAB | Facility: CLINIC | Age: 52
End: 2022-02-22
Payer: COMMERCIAL

## 2022-02-22 DIAGNOSIS — Z11.1 TUBERCULOSIS SCREENING: ICD-10-CM

## 2022-02-22 PROCEDURE — 36415 COLL VENOUS BLD VENIPUNCTURE: CPT

## 2022-02-22 PROCEDURE — 86481 TB AG RESPONSE T-CELL SUSP: CPT

## 2022-02-22 NOTE — TELEPHONE ENCOUNTER
Talked with patient and scheduled 6 month follow up with Dr. Briscoe on 8/18/2022 in addition to testing prior. Details confirmed with patient.

## 2022-02-23 LAB
QUANTIFERON MITOGEN: 10 IU/ML
QUANTIFERON NIL TUBE: 0.04 IU/ML
QUANTIFERON TB1 TUBE: 0.08 IU/ML
QUANTIFERON TB2 TUBE: 0.09

## 2022-02-24 LAB
GAMMA INTERFERON BACKGROUND BLD IA-ACNC: 0.04 IU/ML
M TB IFN-G BLD-IMP: NEGATIVE
M TB IFN-G CD4+ BCKGRND COR BLD-ACNC: 9.96 IU/ML
MITOGEN IGNF BCKGRD COR BLD-ACNC: 0.04 IU/ML
MITOGEN IGNF BCKGRD COR BLD-ACNC: 0.05 IU/ML

## 2022-03-08 ENCOUNTER — HOSPITAL ENCOUNTER (OUTPATIENT)
Dept: CARDIOLOGY | Facility: CLINIC | Age: 52
Discharge: HOME | End: 2022-03-08
Attending: INTERNAL MEDICINE | Admitting: INTERNAL MEDICINE
Payer: COMMERCIAL

## 2022-03-08 DIAGNOSIS — M30.1 EOSINOPHILIC GRANULOMATOSIS WITH POLYANGIITIS (EGPA) WITH LUNG INVOLVEMENT (H): ICD-10-CM

## 2022-03-08 DIAGNOSIS — D72.18 EOSINOPHILIC GRANULOMATOSIS WITH POLYANGIITIS (EGPA) WITH LUNG INVOLVEMENT (H): ICD-10-CM

## 2022-03-08 LAB — LVEF ECHO: NORMAL

## 2022-03-08 PROCEDURE — 93306 TTE W/DOPPLER COMPLETE: CPT

## 2022-03-08 PROCEDURE — 93306 TTE W/DOPPLER COMPLETE: CPT | Mod: 26 | Performed by: INTERNAL MEDICINE

## 2022-03-10 DIAGNOSIS — E78.49 ALPHA-LIPOPROTEINEMIA: ICD-10-CM

## 2022-03-10 RX ORDER — ATORVASTATIN CALCIUM 10 MG/1
TABLET, FILM COATED ORAL
Qty: 45 TABLET | Refills: 0 | Status: SHIPPED | OUTPATIENT
Start: 2022-03-10 | End: 2022-05-19 | Stop reason: SINTOL

## 2022-03-10 NOTE — TELEPHONE ENCOUNTER
Pending Prescriptions:                       Disp   Refills    atorvastatin (LIPITOR) 10 MG tablet [Pharm*45 tab*0        Sig: TAKE 1/2 TABLET BY MOUTH EVERY DAY    Routing refill request to provider for review/approval because:  Labs not current:  lipid    Please advise, thanks.

## 2022-03-31 ENCOUNTER — APPOINTMENT (OUTPATIENT)
Dept: GENERAL RADIOLOGY | Facility: CLINIC | Age: 52
End: 2022-03-31
Attending: EMERGENCY MEDICINE
Payer: COMMERCIAL

## 2022-03-31 ENCOUNTER — HOSPITAL ENCOUNTER (EMERGENCY)
Facility: CLINIC | Age: 52
Discharge: HOME | End: 2022-03-31
Attending: EMERGENCY MEDICINE | Admitting: EMERGENCY MEDICINE
Payer: COMMERCIAL

## 2022-03-31 VITALS
HEART RATE: 71 BPM | OXYGEN SATURATION: 98 % | SYSTOLIC BLOOD PRESSURE: 124 MMHG | DIASTOLIC BLOOD PRESSURE: 82 MMHG | WEIGHT: 105 LBS | TEMPERATURE: 97.6 F | RESPIRATION RATE: 18 BRPM | BODY MASS INDEX: 19.2 KG/M2

## 2022-03-31 DIAGNOSIS — M30.1 EOSINOPHILIC GRANULOMATOSIS WITH POLYANGIITIS (EGPA) (H): ICD-10-CM

## 2022-03-31 DIAGNOSIS — R07.9 CHEST PAIN, UNSPECIFIED TYPE: ICD-10-CM

## 2022-03-31 DIAGNOSIS — D72.18 EOSINOPHILIC GRANULOMATOSIS WITH POLYANGIITIS (EGPA) (H): ICD-10-CM

## 2022-03-31 LAB
ANION GAP SERPL CALCULATED.3IONS-SCNC: 5 MMOL/L (ref 3–14)
BASOPHILS # BLD AUTO: 0.1 10E3/UL (ref 0–0.2)
BASOPHILS NFR BLD AUTO: 1 %
BUN SERPL-MCNC: 19 MG/DL (ref 7–30)
CALCIUM SERPL-MCNC: 8.9 MG/DL (ref 8.5–10.1)
CHLORIDE BLD-SCNC: 104 MMOL/L (ref 94–109)
CO2 SERPL-SCNC: 29 MMOL/L (ref 20–32)
CREAT SERPL-MCNC: 0.85 MG/DL (ref 0.52–1.04)
D DIMER PPP FEU-MCNC: 0.34 UG/ML FEU (ref 0–0.5)
EOSINOPHIL # BLD AUTO: 0.1 10E3/UL (ref 0–0.7)
EOSINOPHIL NFR BLD AUTO: 1 %
ERYTHROCYTE [DISTWIDTH] IN BLOOD BY AUTOMATED COUNT: 12.8 % (ref 10–15)
GFR SERPL CREATININE-BSD FRML MDRD: 84 ML/MIN/1.73M2
GLUCOSE BLD-MCNC: 105 MG/DL (ref 70–99)
HCT VFR BLD AUTO: 39.2 % (ref 35–47)
HGB BLD-MCNC: 12.9 G/DL (ref 11.7–15.7)
HOLD SPECIMEN: NORMAL
IMM GRANULOCYTES # BLD: 0 10E3/UL
IMM GRANULOCYTES NFR BLD: 0 %
LYMPHOCYTES # BLD AUTO: 1.2 10E3/UL (ref 0.8–5.3)
LYMPHOCYTES NFR BLD AUTO: 11 %
MCH RBC QN AUTO: 34 PG (ref 26.5–33)
MCHC RBC AUTO-ENTMCNC: 32.9 G/DL (ref 31.5–36.5)
MCV RBC AUTO: 103 FL (ref 78–100)
MONOCYTES # BLD AUTO: 0.7 10E3/UL (ref 0–1.3)
MONOCYTES NFR BLD AUTO: 7 %
NEUTROPHILS # BLD AUTO: 8.7 10E3/UL (ref 1.6–8.3)
NEUTROPHILS NFR BLD AUTO: 80 %
NRBC # BLD AUTO: 0 10E3/UL
NRBC BLD AUTO-RTO: 0 /100
PLATELET # BLD AUTO: 280 10E3/UL (ref 150–450)
POTASSIUM BLD-SCNC: 3.8 MMOL/L (ref 3.4–5.3)
RBC # BLD AUTO: 3.79 10E6/UL (ref 3.8–5.2)
SODIUM SERPL-SCNC: 138 MMOL/L (ref 133–144)
TROPONIN I SERPL HS-MCNC: 7 NG/L
WBC # BLD AUTO: 10.8 10E3/UL (ref 4–11)

## 2022-03-31 PROCEDURE — 99285 EMERGENCY DEPT VISIT HI MDM: CPT | Mod: 25

## 2022-03-31 PROCEDURE — 71046 X-RAY EXAM CHEST 2 VIEWS: CPT

## 2022-03-31 PROCEDURE — 36415 COLL VENOUS BLD VENIPUNCTURE: CPT | Performed by: EMERGENCY MEDICINE

## 2022-03-31 PROCEDURE — 84484 ASSAY OF TROPONIN QUANT: CPT | Performed by: EMERGENCY MEDICINE

## 2022-03-31 PROCEDURE — 85379 FIBRIN DEGRADATION QUANT: CPT | Performed by: EMERGENCY MEDICINE

## 2022-03-31 PROCEDURE — 85025 COMPLETE CBC W/AUTO DIFF WBC: CPT | Performed by: EMERGENCY MEDICINE

## 2022-03-31 PROCEDURE — 80048 BASIC METABOLIC PNL TOTAL CA: CPT | Performed by: EMERGENCY MEDICINE

## 2022-03-31 PROCEDURE — 93005 ELECTROCARDIOGRAM TRACING: CPT

## 2022-03-31 ASSESSMENT — ENCOUNTER SYMPTOMS
SHORTNESS OF BREATH: 0
PALPITATIONS: 1

## 2022-03-31 NOTE — ED PROVIDER NOTES
History   Chief Complaint:  Chest Pain       The history is provided by the patient.      Valencia Ye is a 51 year old female with history of bronchiectasis, Churg-Antonina syndrome, and cerebral infarction who presents with chest pain. Earlier this month on 03/08 Valencia had an echocardiogram with results outlined below. Valencia described an achy, pulsating  chest pain when she breaths. This pain is reminiscent when she had pneumonia. She says that this onset around 1650 today suddenly. No rash on her hands or melanonychia under nails present, which are typical with her Churg-Antonina syndrome exacerbations. She denies history of PE or DVT. Confirms intermittent history of hypertension.     Echocardiogram Complete 03/08:  Interpretation Summary     The visual ejection fraction is 60-65%.  Normal transthoracic echocardiogram. Compared to the prior study dated  6/20/17, there have been no changes.    Review of Systems   Respiratory: Negative for shortness of breath.    Cardiovascular: Positive for chest pain and palpitations. Negative for leg swelling.   All other systems reviewed and are negative.    Allergies:  Colistimethate  Colymycin M [Colistimethate Sodium]  Tamiflu [Oseltamivir]  Azithromycin  Clindamycin    Medications:  Acetylcysteine   Albuterol   Ascorbic Acid   Atorvastatin   Caprylic Acid LIQD   Diazepam   EnLyte   Fluticasone   L-Glutamine 500 MG TABS  lubiprostone   Metoprolol succinate ER   Omega-3 Fatty Acids   Phosphatidyl Choline 65 MG TABS  Prednisone    Sertraline   Symbicort   tizanidine   Vitamin D3     Past Medical History:     Anxiety   Aortitis    Asthma   Bronchiectasis   Cerebral infarction   Chronic sinusitis   Churg-Antonina syndrome   Conductive hearing loss   Depressive disorder   Eustachian tube dysfunction   Goiter   Hearing loss, conductive   Heart rate problem   Hypertension   Hypocalcemia   Immunosuppression (H)   Irritable bowel syndrome   Major depressive  disorder   Mannose-binding lectin deficiency   Nonspecific abnormal results of thyroid function study   Osteoporosis   Pericarditis   Pneumonia   Recurrent otitis media   Recurrent UTI   Rheumatoid vasculitis   Sinusitis, chronic   Steroid long-term use   Tinnitus   Varicose veins of leg with swelling  ILD   Over active bladder    Past Surgical History:    C section  Colonoscopy  Genitourinary surgery  Head and neck surgery  Hysterectomy  Cervix removed  PE tubes  PICC insertion  Tubal ligation  Sinus surgery  Colposcopy cervix/upper vagina     Family History:    Allergies  Alcohol/drug dependence  Substance abuse  Depression  CAD  Diabetes  MS  Heart disease  Asthma    Social History:  Patient unaccompanied   PCP: Morelia Simental     Physical Exam     Patient Vitals for the past 24 hrs:   BP Temp Temp src Pulse Resp SpO2 Weight   03/31/22 1706 (!) 162/91 97.6  F (36.4  C) Tympanic 67 16 100 % 47.6 kg (105 lb)       Physical Exam    General: Patient is alert and cooperative.  HENT:  Normal nose, oropharynx. Moist oral mucosa.  Eyes: EOMI. Normal conjunctiva.  Neck:  Normal range of motion and appearance.   Cardiovascular:  Normal rate, regular rhythm and normal heart sounds.   Pulmonary/Chest:  Effort normal. No wheezing or crackles.  Abdominal: Soft. No distension or tenderness.     Musculoskeletal: Normal range of motion. No edema or tenderness.   Neurological: oriented, normal strength, sensation, and coordination.   Skin: Warm and dry. No rash or bruising.   Psychiatric: Normal mood and affect. Normal behavior and judgement.    Emergency Department Course   ECG  ECG obtained at 1714, ECG read at 1725  Normal sinus rhythm. ST and T wave abnormality, consider anterolateral ischemia. Abnormal ECG.    No significant change as compared to prior, dated 02/29/19.  Rate 69 bpm. IA interval 154 ms. QRS duration 88 ms. QT/QTc 396/424 ms. P-R-T axes 74 66 20.     Imaging:  XR Chest 2 Views   Final Result   IMPRESSION: Heart  size and pulmonary vascularity normal. The lungs are clear.        Report per radiology    Laboratory:  Labs Ordered and Resulted from Time of ED Arrival to Time of ED Departure   BASIC METABOLIC PANEL - Abnormal       Result Value    Sodium 138      Potassium 3.8      Chloride 104      Carbon Dioxide (CO2) 29      Anion Gap 5      Urea Nitrogen 19      Creatinine 0.85      Calcium 8.9      Glucose 105 (*)     GFR Estimate 84     CBC WITH PLATELETS AND DIFFERENTIAL - Abnormal    WBC Count 10.8      RBC Count 3.79 (*)     Hemoglobin 12.9      Hematocrit 39.2       (*)     MCH 34.0 (*)     MCHC 32.9      RDW 12.8      Platelet Count 280      % Neutrophils 80      % Lymphocytes 11      % Monocytes 7      % Eosinophils 1      % Basophils 1      % Immature Granulocytes 0      NRBCs per 100 WBC 0      Absolute Neutrophils 8.7 (*)     Absolute Lymphocytes 1.2      Absolute Monocytes 0.7      Absolute Eosinophils 0.1      Absolute Basophils 0.1      Absolute Immature Granulocytes 0.0      Absolute NRBCs 0.0     D DIMER QUANTITATIVE - Normal    D-Dimer Quantitative 0.34     TROPONIN I - Normal    Troponin I High Sensitivity 7          Emergency Department Course:             Reviewed:  I reviewed nursing notes, vitals, past medical history and Care Everywhere    Assessments/Consults:  ED Course as of 03/31/22 1839   Thu Mar 31, 2022   1719 Obtained history and examined the patient as noted above.   1835 Rechecked the patient and updated them on test results.        Disposition:  The patient was discharged to home.     Impression & Plan     CMS Diagnoses: None    Medical Decision Making:  Valencia Ye presents with chest pain.  The work up in the Emergency Department is negative.  The differential diagnosis of chest pain is broad and includes life threatening etiologies such as Acute coronary syndrome, Myocardial infarction, Pulmonary Embolism, and Acute Aortic Dissection.  Other causes may include pneumonia,  pneumothorax, pericarditis, pleurisy, and esophageal spasm.  No serious etiology for the chest pain was detected today during this visit.      Close follow up with primary care is indicated should the pain continue, as further work up may be performed; this was made clear to Valencia, who understands.      Diagnosis:    ICD-10-CM    1. Chest pain, unspecified type  R07.9    2. Eosinophilic granulomatosis with polyangiitis (EGPA) (H)  M30.1     D72.18          Scribe Disclosure:  I, Terence Jones, am serving as a scribe at 5:13 PM on 3/31/2022 to document services personally performed by Vicente Robles MD based on my observations and the provider's statements to me.           Vicente Robles MD  03/31/22 2121

## 2022-03-31 NOTE — ED TRIAGE NOTES
Pt. presents to ED with complaints of chest pain that started about 30 mins ago. Pt. Reports it on the L side and in between her shoulder blades. A & O x 4, independent. Pt. Reports her heartbeat feels strong that intermittently causes SOB and intermittent lightheadedness. Denies N/V, syncope, sweating. Hypertensive, OVSS on RA. Pt. Reports she has EGPA, cardiac ischemia, bronchiectasis (uses vest machine).

## 2022-04-01 LAB
ATRIAL RATE - MUSE: 69 BPM
DIASTOLIC BLOOD PRESSURE - MUSE: NORMAL MMHG
INTERPRETATION ECG - MUSE: NORMAL
P AXIS - MUSE: 74 DEGREES
PR INTERVAL - MUSE: 154 MS
QRS DURATION - MUSE: 88 MS
QT - MUSE: 396 MS
QTC - MUSE: 424 MS
R AXIS - MUSE: 66 DEGREES
SYSTOLIC BLOOD PRESSURE - MUSE: NORMAL MMHG
T AXIS - MUSE: 20 DEGREES
VENTRICULAR RATE- MUSE: 69 BPM

## 2022-04-10 NOTE — LETTER
2/17/2022         RE: Valencia Ye  [address] [facility] [id number]        Dear Colleague,    Thank you for referring your patient, Valencia Ye, to the [facility] [place]. Please see a copy of my visit note below.                         [facility]       SUBJECTIVE:  The patient was seen and examined by Dagoberto Briscoe MD and Taylor Torres MD.  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   Doing very well since last visit. Says her breathing is very well, is using vest therapy once daily, rarely more than that. Only using PRN meds rarely too. Denies cough, fevers/chills, exacerbations, recent illnesses, chest pain, ENT symptoms. Does yoga 3 times a week, 1 hour each time. Is able to tolerate it, feels a bit out of shape due to winter but nothing significant. Able to tolerate PFTs well.  Up to date on COVID, flu and pneumo shots.    Current Outpatient Medications   Medication     acetylcysteine (MUCOMYST) 20 % neb solution     albuterol (PROAIR HFA/PROVENTIL HFA/VENTOLIN HFA) 108 (90 Base) MCG/ACT inhaler     albuterol (PROVENTIL) (2.5 MG/3ML) 0.083% neb solution     Ascorbic Acid (VITAMIN C PO)     atorvastatin (LIPITOR) 10 MG tablet     Caprylic Acid LIQD     ciprofloxacin-dexamethasone (CIPRODEX) 0.3-0.1 % otic suspension     diazepam (VALIUM) 2 MG tablet     EnLyte (ENLYTE) CAPS capsule     ferrous sulfate (FEROSUL) 325 (65 Fe) MG tablet     fluticasone (FLONASE) 50 MCG/ACT nasal spray     L-Glutamine 500 MG TABS     lubiprostone (AMITIZA) 8 MCG capsule     metoprolol succinate ER (TOPROL-XL) 25 MG 24 hr tablet     Omega-3 Fatty Acids (FISH OIL) 1200 MG capsule     Phosphatidyl Choline 65 MG TABS     predniSONE (DELTASONE) 1 MG tablet     sertraline (ZOLOFT) 25 MG tablet     SYMBICORT 160-4.5 MCG/ACT Inhaler     tiZANidine  "Encounter Date: 4/10/2022       History     Chief Complaint   Patient presents with    Leg Pain     I am having pain in my left leg and my right leg.  It feels like vein popping.  I am hurting in my left knee.  I have been trying to workout and jogging and started getting numbness and pressure in my left knee.      This is a 36-year-old black female with a history of morbid obesity and family history of rheumatoid arthritis who presents to the emergency department with multiple concerns.  The patient relates that for approximately 1 year she has been experiencing bilateral lower extremity pain, describing pains as sharp and "popping" mostly located in anterior thigh regions. Pain began gradually one year ago when she was starting a new exercise plan. Pain is exacerbated by ambulation and flexion of knee. She says that over the last several months she has also begun to develop veins that "poke out" above the knees. She is also experiencing worsening of chronic left knee pain over the last several months. 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 She denies previous knee injury or imaging. Pt denies fever, recent illness, vomiting, diarrhea, discoloration to legs, or sob.         Review of patient's allergies indicates:  No Known Allergies  Past Medical History:   Diagnosis Date    Carpal tunnel syndrome      Past Surgical History:   Procedure Laterality Date     SECTION       No family history on file.  Social History     Tobacco Use    Smoking status: Never Smoker   Substance Use Topics    Alcohol use: No    Drug use: No     Review of Systems   Constitutional: Negative.    Respiratory: Negative.    Cardiovascular: Negative.    Musculoskeletal: Positive for arthralgias, back pain (low back) and gait problem. Negative for joint swelling.   Skin: Negative.    Neurological: Negative for numbness.       Physical Exam     Initial Vitals " "[04/10/22 1113]   BP Pulse Resp Temp SpO2   130/78 77 18 98.5 °F (36.9 °C) 99 %      MAP       --         Physical Exam    Nursing note and vitals reviewed.  Constitutional: She appears well-developed and well-nourished. She is active. No distress.   HENT:   Head: Normocephalic and atraumatic.   Eyes: EOM are normal. Pupils are equal, round, and reactive to light.   Neck: Neck supple.   Normal range of motion.  Cardiovascular: Normal rate.   Pulmonary/Chest: No respiratory distress.   Musculoskeletal:      Cervical back: Normal range of motion and neck supple.      Comments: Trace bilateral lower extremity edema. No obvious swelling, no redness, no increased warmth. There are small varicosities noted just above the knees. The left knee appears normal; no crepitation, no laxity. Mild tenderness to palpation anteriorly. Pt demonstrates limited rom of left knee with flexion due to leg size and mild pain. Distally nv intact with 2+ dorsalis pedis pulses.      Neurological: She is alert and oriented to person, place, and time. GCS score is 15. GCS eye subscore is 4. GCS verbal subscore is 5. GCS motor subscore is 6.   Skin: Skin is warm and dry. Capillary refill takes less than 2 seconds.   Psychiatric: She has a normal mood and affect. Her behavior is normal. Thought content normal.         ED Course   Procedures  Labs Reviewed - No data to display       Imaging Results    None          Medications   dexamethasone injection 8 mg (8 mg Intramuscular Given 4/10/22 1148)                 ED Course as of 04/10/22 1202   Sun Apr 10, 2022   1157 No exam findings concerning for DVT, pt nonsmoker, no OCPs. Review of emr reveals partial workup in the past for likely RA, however pt admits to no pcp f/u in "a long time". Had discussion at length regarding pt symptoms relative to her weight. She was very receptive to this and grateful for the support and motivation. I also encouraged and gave contact info for f/u with a local " (ZANAFLEX) 2 MG tablet     vitamin D3 (CHOLECALCIFEROL) 2000 units (50 mcg) tablet     No current facility-administered medications for this visit.          Allergies   Allergen Reactions     Colistimethate Anaphylaxis     Colymycin M [Colistimethate Sodium] Anaphylaxis     Tamiflu [Oseltamivir]      Gums Blister up     Azithromycin Palpitations     Heart palpitation  Heart palpitation     Clindamycin Rash     Review of systems:  Appetite is good  Blepharitis but has this chronic problem  No abdominal pain, no nausea/vomiting  The remainder of a complete review of systems is otherwise negative except as noted in the history of present illness.    OBJECTIVE:    LMP 02/01/2009    Wt Readings from Last 1 Encounters:   08/12/21 49.9 kg (110 lb)     Physical Examination:  GENERAL APPEARANCE: Well developed, well nourished, alert, and in no apparent distress.  EYES: blepharitis of right upper lid  HENT: Nasal mucosa with no edema and no hyperemia. No nasal polyps.  EARS: Canals clear, TMs with previous perforation and erythema, chronic and unchanged  MOUTH: Oral mucosa is moist, without any lesions, no tonsillar enlargement, no oropharyngeal exudate.  NECK: supple, no masses, no thyromegaly.  LYMPHATICS: No significant axillary, cervical, or supraclavicular nodes.  RESP: normal percussion, good air flow throughout.  No crackles. No rhonchi. No wheezes.  CV: Normal S1, S2, regular rhythm, normal rate. No murmur.  No rub. No gallop. No LE edema.   ABDOMEN:  Bowel sounds normal, soft, nontender, no HSM or masses.   MS: extremities normal. No clubbing. No cyanosis.  SKIN: no rash on limited exam  NEURO: Mentation intact, speech normal, normal strength and tone, normal gait and stance  PSYCH: mentation appears normal. and affect normal/bright    RESULTS  Results for NABOR BUENROSTRO (MRN [id number]) as of 2/17/2022 11:01   Ref. Range 2/17/2022 10:18   FVC-Pred Latest Units: L 2.98   FVC-Pre Latest Units: L 3.97    FVC-%Pred-Pre Latest Units: % 133   FEV1-Pre Latest Units: L 2.65   FEV1-%Pred-Pre Latest Units: % 108   FEV1FVC-Pred Latest Units: % 82   FEV1FVC-Pre Latest Units: % 67   FEV1FEV6-Pred Latest Units: % 82   FEV1FEV6-Pre Latest Units: % 67   FEFMax-Pred Latest Units: L/sec 6.47   FEFMax-Pre Latest Units: L/sec 6.18   FEFMax-%Pred-Pre Latest Units: % 95   FEF2575-Pred Latest Units: L/sec 2.52   FEF2575-Pre Latest Units: L/sec 1.62   SDD4028-%Pred-Pre Latest Units: % 64   FIFMax-Pre Latest Units: L/sec 4.80   ExpTime-Pre Latest Units: sec 8.21   PFT GENERAL LAB TESTING Unknown Rpt     PFT interpretation  Normal spirometry.  Similar to baseline.  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  Patient appears to be doing well.  She has very good exercise tolerance.  She is oxygenating well.  PFTs are in the high normal range, similar to her recent baseline.  She does not appear to be having an exacerbation of her bronchiectasis at this time. She will continue her current airway clearance therapy with albuterol, Mucomyst and daily Vest. Defer to rheumatology for management of her Churg-Antonina.    RTC in 6 months with PFTs.  Pt was seen and plan of care discussed with Dr Briscoe.    Taylor Torres MD  Internal Medicine PGY-2      Again, thank you for allowing me to participate in the care of your patient.        Sincerely,        Dagoberto Briscoe MD     primary care physician and referral to ortho for chronic knee pain.  [CB]      ED Course User Index  [CB] Korin Sun NP             Clinical Impression:   Final diagnoses:  [M25.562, G89.29] Chronic pain of left knee (Primary)  [I83.93] Varicose veins of both lower extremities, unspecified whether complicated          ED Disposition Condition    Discharge Stable        ED Prescriptions     Medication Sig Dispense Start Date End Date Auth. Provider    predniSONE (DELTASONE) 10 MG tablet Take 1 tablet (10 mg total) by mouth once daily. for 5 days 5 tablet 4/10/2022 4/15/2022 Korin Sun NP        Follow-up Information     Follow up With Specialties Details Why Contact Info    Kashif Brown,  Family Medicine Schedule an appointment as soon as possible for a visit in 2 days for re-evaluation of today's complaint [address]   [address]  [facility] [address]  [phone]             Korin Sun NP  04/10/22 2946

## 2022-04-14 DIAGNOSIS — J47.9 BRONCHIECTASIS WITHOUT COMPLICATION (H): ICD-10-CM

## 2022-04-15 RX ORDER — ALBUTEROL SULFATE 0.83 MG/ML
SOLUTION RESPIRATORY (INHALATION)
Qty: 150 ML | Refills: 11 | Status: SHIPPED | OUTPATIENT
Start: 2022-04-15 | End: 2023-05-08

## 2022-05-07 DIAGNOSIS — H92.09 EAR PAIN, UNSPECIFIED LATERALITY: ICD-10-CM

## 2022-05-09 NOTE — TELEPHONE ENCOUNTER
Pending Prescriptions:                       Disp   Refills    ciprofloxacin-dexamethasone (CIPRODEX) 0.3*7.5 mL 10       Sig: INSTILL 3 DROPS INTO AFFECTED EARS 2 TIMES DAILY AS           NEEDED    Routing refill request to provider for review/approval because:  Drug not on the [facility] refill protocol     Please advise, thanks.  Routed to covering provider(s).

## 2022-05-10 RX ORDER — CIPROFLOXACIN AND DEXAMETHASONE 3; 1 MG/ML; MG/ML
SUSPENSION/ DROPS AURICULAR (OTIC)
Qty: 7.5 ML | Refills: 10 | Status: SHIPPED | OUTPATIENT
Start: 2022-05-10 | End: 2023-03-17

## 2022-05-19 ENCOUNTER — OFFICE VISIT (OUTPATIENT)
Dept: INTERNAL MEDICINE | Facility: CLINIC | Age: 52
End: 2022-05-19
Payer: COMMERCIAL

## 2022-05-19 VITALS
WEIGHT: 109.8 LBS | DIASTOLIC BLOOD PRESSURE: 82 MMHG | HEART RATE: 87 BPM | TEMPERATURE: 98 F | SYSTOLIC BLOOD PRESSURE: 118 MMHG | RESPIRATION RATE: 16 BRPM | OXYGEN SATURATION: 98 % | BODY MASS INDEX: 19.45 KG/M2 | HEIGHT: 63 IN

## 2022-05-19 DIAGNOSIS — J45.20 MILD INTERMITTENT ASTHMA WITHOUT COMPLICATION: ICD-10-CM

## 2022-05-19 DIAGNOSIS — Z12.31 ENCOUNTER FOR SCREENING MAMMOGRAM FOR BREAST CANCER: ICD-10-CM

## 2022-05-19 DIAGNOSIS — F41.9 ANXIETY: ICD-10-CM

## 2022-05-19 DIAGNOSIS — J47.9 BRONCHIECTASIS WITHOUT ACUTE EXACERBATION (H): ICD-10-CM

## 2022-05-19 DIAGNOSIS — Z78.0 ASYMPTOMATIC POSTMENOPAUSAL STATUS: ICD-10-CM

## 2022-05-19 DIAGNOSIS — I10 BENIGN ESSENTIAL HYPERTENSION: ICD-10-CM

## 2022-05-19 DIAGNOSIS — Z92.241 HISTORY OF SYSTEMIC STEROID THERAPY: ICD-10-CM

## 2022-05-19 DIAGNOSIS — R53.82 CHRONIC FATIGUE: ICD-10-CM

## 2022-05-19 DIAGNOSIS — G89.4 PAIN SYNDROME, CHRONIC: ICD-10-CM

## 2022-05-19 DIAGNOSIS — Z00.00 ENCOUNTER FOR ROUTINE ADULT HEALTH EXAMINATION WITHOUT ABNORMAL FINDINGS: Primary | ICD-10-CM

## 2022-05-19 DIAGNOSIS — F33.1 MODERATE EPISODE OF RECURRENT MAJOR DEPRESSIVE DISORDER (H): ICD-10-CM

## 2022-05-19 PROCEDURE — 99213 OFFICE O/P EST LOW 20 MIN: CPT | Mod: 25 | Performed by: INTERNAL MEDICINE

## 2022-05-19 PROCEDURE — 96127 BRIEF EMOTIONAL/BEHAV ASSMT: CPT | Mod: 59 | Performed by: INTERNAL MEDICINE

## 2022-05-19 PROCEDURE — 99396 PREV VISIT EST AGE 40-64: CPT | Performed by: INTERNAL MEDICINE

## 2022-05-19 RX ORDER — PSEUDOEPHEDRINE HCL 120 MG
TABLET, EXTENDED RELEASE ORAL
COMMUNITY
End: 2022-05-19

## 2022-05-19 ASSESSMENT — PATIENT HEALTH QUESTIONNAIRE - PHQ9
SUM OF ALL RESPONSES TO PHQ QUESTIONS 1-9: 18
SUM OF ALL RESPONSES TO PHQ QUESTIONS 1-9: 18
10. IF YOU CHECKED OFF ANY PROBLEMS, HOW DIFFICULT HAVE THESE PROBLEMS MADE IT FOR YOU TO DO YOUR WORK, TAKE CARE OF THINGS AT HOME, OR GET ALONG WITH OTHER PEOPLE: EXTREMELY DIFFICULT

## 2022-05-19 ASSESSMENT — ANXIETY QUESTIONNAIRES
GAD7 TOTAL SCORE: 16
3. WORRYING TOO MUCH ABOUT DIFFERENT THINGS: MORE THAN HALF THE DAYS
4. TROUBLE RELAXING: NEARLY EVERY DAY
7. FEELING AFRAID AS IF SOMETHING AWFUL MIGHT HAPPEN: NEARLY EVERY DAY
5. BEING SO RESTLESS THAT IT IS HARD TO SIT STILL: NEARLY EVERY DAY
GAD7 TOTAL SCORE: 16
6. BECOMING EASILY ANNOYED OR IRRITABLE: SEVERAL DAYS
7. FEELING AFRAID AS IF SOMETHING AWFUL MIGHT HAPPEN: NEARLY EVERY DAY
2. NOT BEING ABLE TO STOP OR CONTROL WORRYING: MORE THAN HALF THE DAYS
8. IF YOU CHECKED OFF ANY PROBLEMS, HOW DIFFICULT HAVE THESE MADE IT FOR YOU TO DO YOUR WORK, TAKE CARE OF THINGS AT HOME, OR GET ALONG WITH OTHER PEOPLE?: EXTREMELY DIFFICULT
GAD7 TOTAL SCORE: 16
1. FEELING NERVOUS, ANXIOUS, OR ON EDGE: MORE THAN HALF THE DAYS

## 2022-05-19 ASSESSMENT — ENCOUNTER SYMPTOMS
ARTHRALGIAS: 0
PARESTHESIAS: 0
HEADACHES: 1
BREAST MASS: 0
ABDOMINAL PAIN: 1
SHORTNESS OF BREATH: 0
JOINT SWELLING: 0
FREQUENCY: 0
FEVER: 0
CONSTIPATION: 0
SORE THROAT: 0
NERVOUS/ANXIOUS: 1
MYALGIAS: 1
DIARRHEA: 1
DYSURIA: 0
COUGH: 0
HEMATOCHEZIA: 0
PALPITATIONS: 1
CHILLS: 0
HEARTBURN: 0
EYE PAIN: 1
DIZZINESS: 1
WEAKNESS: 0
NAUSEA: 0
HEMATURIA: 0

## 2022-05-19 NOTE — PROGRESS NOTES
SUBJECTIVE:   CC: Valencia Ye is an 51 year old woman who presents for preventive health visit.       Patient has been advised of split billing requirements and indicates understanding: Yes  Healthy Habits:     Getting at least 3 servings of Calcium per day:  Yes    Bi-annual eye exam:  Yes    Dental care twice a year:  Yes    Sleep apnea or symptoms of sleep apnea:  Daytime drowsiness    Diet:  Carbohydrate counting, Gluten-free/reduced and Other    Frequency of exercise:  6-7 days/week    Duration of exercise:  Greater than 60 minutes    Taking medications regularly:  No    Barriers to taking medications:  Side effects    Medication side effects:  Muscle aches and Other    PHQ-2 Total Score: 2    Additional concerns today:  No    Problems:  1.  Bronchiectasis, interstitial lung disease, asthma, Churg-Antonina: She reports her lung symptoms are doing very well.  She follows with pulmonary regularly.  2.  Hypertension: She reports she has been mostly running 110-120s/60s.  She occasionally drops 80s/50s.  She is only taking metoprolol low dose if her blood pressure is getting up to the 120-130 range.  3.  Depression and anxiety: Stable, sees psychiatry next week.  She is having evaluation to see if she actually has bipolar 2 disorder.  4.  Chronic fatigue: This is still one of the biggest problems for her.  She is very limited in how much activity she can do.  She has been trying to do yoga for her chronic pain which seems to help.  She cannot do it as long or as frequent as she would like because of the fatigue.  Physical therapy was not helpful for her pain.  She has been trying to do some adjustments with her diet including decreasing histamine and tyramine high foods, eating beef kidney.  She feels that this diet has been helpful with pain and irritable bowel.   She is continuing to appeal Social Security disability denial.  She has not been able to work for many years.  When she was working, it was  very difficult due to both pain and the fatigue.  5.  Alpha lipoproteinemia: She had wanted to try statin, because problems with dry eyes and significant muscle Cramps so she has discontinued it.      Patient Active Problem List   Diagnosis     Goiter     History of systemic steroid therapy     ILD (interstitial lung disease) (H)     Palpitations     Churg-Antonina syndrome (H)     History of eating disorder     Asthma     Anxiety     Chronic fatigue     Mannose-binding lectin deficiency     Benign essential hypertension     Moderate episode of recurrent major depressive disorder (H)     Bronchiectasis without acute exacerbation (H)     Controlled substance agreement signed     Irritable bowel syndrome without diarrhea     Bladder pain     Overactive bladder     Current Outpatient Medications   Medication Sig Dispense Refill     acetylcysteine (MUCOMYST) 20 % neb solution TAKE 4ML VIA NEBULIZER TWICE A DAY WITH ALBUTEROL SOLUTION. DISCARD OPEN BOTTLE AFTER 96 HOURS. 800 mL 2     albuterol (PROAIR HFA/PROVENTIL HFA/VENTOLIN HFA) 108 (90 Base) MCG/ACT inhaler INHALE 2 PUFFS INTO THE LUNGS EVERY 4 HOURS AS NEEDED FOR SHORTNESS OF BREATH/WHEEZE 18 g 3     albuterol (PROVENTIL) (2.5 MG/3ML) 0.083% neb solution TAKE 1 VIAL VIA NEBULIZER TWICE A DAY. 150 mL 11     Ascorbic Acid (VITAMIN C PO) Take 800 mg by mouth       Barberry-Oreg Grape-Goldenseal (BERBERINE COMPLEX) 200-200-50 MG CAPS        Bioflavonoid Products (QUERCETIN COMPLEX IMMUNE PO)        Bromelains (BROMELAIN PO)        Caprylic Acid LIQD 1 capsule daily       ciprofloxacin-dexamethasone (CIPRODEX) 0.3-0.1 % otic suspension INSTILL 3 DROPS INTO AFFECTED EARS 2 TIMES DAILY AS NEEDED 7.5 mL 10     diazepam (VALIUM) 2 MG tablet TAKE 1-2 TABLETS (2-4 MG) BY MOUTH EVERY 6 HOURS AS NEEDED FOR VERTIGO 20 tablet 0     EnLyte (ENLYTE) CAPS capsule Take 1 capsule by mouth daily       fluticasone (FLONASE) 50 MCG/ACT nasal spray SHAKE LIQUID AND USE 2 SPRAYS IN EACH  NOSTRIL EVERY DAY 48 mL 3     L-Glutamine 500 MG TABS Take 500 mg by mouth 3 times daily       metoprolol succinate ER (TOPROL-XL) 25 MG 24 hr tablet TAKE 1/2 TABLET(12.5 MG) BY MOUTH DAILY. HOLD IF BP IS LOW 45 tablet 3     Omega-3 Fatty Acids (FISH OIL) 1200 MG capsule Take 4 capsules (4.8 g) by mouth daily       OREGANO PO        Phosphatidyl Choline 65 MG TABS Take 2 capsules by mouth daily       predniSONE (DELTASONE) 1 MG tablet Take 5 tabs daily, alternating with 6 tabs daily 590 tablet 5     Probiotic Product (PROBIOTIC DAILY PO)        SYMBICORT 160-4.5 MCG/ACT Inhaler TAKE 2 PUFFS BY MOUTH TWICE A DAY 30.6 g 3     tiZANidine (ZANAFLEX) 2 MG tablet TAKE 1-2 TABLETS (2-4 MG) BY MOUTH 3 TIMES DAILY AS NEEDED FOR MUSCLE SPASMS 45 tablet 11     UNABLE TO FIND MEDICATION NAME: beef kidney capsules       vitamin D3 (CHOLECALCIFEROL) 2000 units (50 mcg) tablet Take 2 tablets by mouth daily          Today's PHQ-2 Score:   PHQ-2 ( 1999 Pfizer) 5/19/2022   Q1: Little interest or pleasure in doing things 1   Q2: Feeling down, depressed or hopeless 1   PHQ-2 Score 2   PHQ-2 Total Score (12-17 Years)- Positive if 3 or more points; Administer PHQ-A if positive -   Q1: Little interest or pleasure in doing things Several days   Q2: Feeling down, depressed or hopeless Several days   PHQ-2 Score 2       Abuse: Current or Past (Physical, Sexual or Emotional) - No  Do you feel safe in your environment? Yes    Have you ever done Advance Care Planning? (For example, a Health Directive, POLST, or a discussion with a medical provider or your loved ones about your wishes): No, advance care planning information given to patient to review.  Patient declined advance care planning discussion at this time.    Social History     Tobacco Use     Smoking status: Never Smoker     Smokeless tobacco: Never Used   Substance Use Topics     Alcohol use: Not Currently     If you drink alcohol do you typically have >3 drinks per day or >7 drinks  per week? No    Alcohol Use 5/19/2022   Prescreen: >3 drinks/day or >7 drinks/week? Not Applicable       Reviewed orders with patient.  Reviewed health maintenance and updated orders accordingly - Yes      Breast Cancer Screening:    FHS-7:   Breast CA Risk Assessment (FHS-7) 5/19/2022   Did any of your first-degree relatives have breast or ovarian cancer? No   Did any of your relatives have bilateral breast cancer? No       Mammogram Screening: Recommended annual mammography  Pertinent mammograms are reviewed under the imaging tab.    History of abnormal Pap smear: Status post benign hysterectomy. Health Maintenance and Surgical History updated.  PAP / HPV 5/28/2014 4/18/2011 3/24/2010   PAP (Historical) NIL NIL NIL     Reviewed and updated as needed this visit by clinical staff      Reviewed and updated as needed this visit by Provider      Review of Systems   Constitutional: Negative for chills and fever.   HENT: Positive for ear pain and hearing loss. Negative for congestion and sore throat.    Eyes: Positive for pain. Negative for visual disturbance.   Respiratory: Negative for cough and shortness of breath.    Cardiovascular: Positive for palpitations and peripheral edema. Negative for chest pain.   Gastrointestinal: Positive for abdominal pain and diarrhea. Negative for constipation, heartburn, hematochezia and nausea.   Breasts:  Positive for tenderness. Negative for breast mass and discharge.   Genitourinary: Negative for dysuria, frequency, genital sores, hematuria, pelvic pain, urgency, vaginal bleeding and vaginal discharge.   Musculoskeletal: Positive for myalgias. Negative for arthralgias and joint swelling.   Skin: Negative for rash.   Neurological: Positive for dizziness and headaches. Negative for weakness and paresthesias.   Psychiatric/Behavioral: Positive for mood changes. The patient is nervous/anxious.      Palpitations may be early beats, infrequent, no associated symptoms.  Edema is  "mild.  Other symptoms stated are stable    OBJECTIVE:   /82   Pulse 87   Temp 98  F (36.7  C) (Oral)   Resp 16   Ht 1.588 m (5' 2.5\")   Wt 49.8 kg (109 lb 12.8 oz)   LMP 02/01/2009 (LMP Unknown)   SpO2 98%   BMI 19.76 kg/m    Physical Exam    HEENT: extraocular movements are intact, pupils equal and reactive to light and accommodation, TMs clear  NECK: Neck supple. No adenopathy. Thyroid symmetric, normal size,, Carotids without bruits.  PULMONARY: clear to auscultation  CARDIAC: regular rate and rhythm and no murmurs, clicks, or gallops  PULSES: 2/2 throughout  BACK: no spinal or CVAT  ABDOMINAL: Soft, nontender.  Normal bowel sounds.  No hepatosplenomegaly or abnormal masses    PHQ 6/10/2019 10/2/2019 5/19/2022   PHQ-9 Total Score 9 12 18   Q9: Thoughts of better off dead/self-harm past 2 weeks Not at all Not at all Not at all     ROSIBEL-7 SCORE 10/2/2019 5/5/2020 5/19/2022   Total Score - - 16 (severe anxiety)   Total Score 3 3 16           ASSESSMENT/PLAN:   (Z00.00) Encounter for routine adult health examination without abnormal findings  (primary encounter diagnosis)  Comment: She declines COVID booster today, discussed recommendations, it does cause significant side effects so she may delay.  Plan:       (J47.9) Bronchiectasis without acute exacerbation (H)  Comment: Doing fairly well  Plan: Continue to follow pulmonary    (F33.1) Moderate episode of recurrent major depressive disorder (H)  Comment: She has had increased symptoms but is working with psychiatry  Plan: [place] BEHAV ASSMT W/SCORE & DOCD/STAND INSTRUMENT            (I10) Benign essential hypertension  Comment: It is not clear that she truly has hypertension at this time.  Advised I would not recommend treating unless her blood pressure is consistently greater than 140/90  Plan:     (R53.82) Chronic fatigue  Comment: Chronic and stable, multifactorial,  Plan: Continue resting as needed, try to keep up on exercise    (J45.20) Mild " "intermittent asthma without complication  Comment: Well-controlled  Plan:     (F41.9) Anxiety  Comment: Increase, follow-up with psychiatry  Plan: [place] BEHAV ASSMT W/SCORE & DOCD/STAND INSTRUMENT            (G89.4) Pain syndrome, chronic  Comment: She is managing  Plan: Follow-up neurology    (Z12.31) Encounter for screening mammogram for breast cancer  Comment:   Plan: *MA Screening Digital Bilateral            (Z78.0) Asymptomatic postmenopausal status  Comment:   Plan: DX Hip/Pelvis/Spine            (Z92.241) History of systemic steroid therapy  Comment:   Plan: DX Hip/Pelvis/Spine              Patient has been advised of split billing requirements and indicates understanding: Yes    COUNSELING:  Reviewed preventive health counseling, as reflected in patient instructions    Estimated body mass index is 19.76 kg/m  as calculated from the following:    Height as of this encounter: 1.588 m (5' 2.5\").    Weight as of this encounter: 49.8 kg (109 lb 12.8 oz).        She reports that she has never smoked. She has never used smokeless tobacco.      Counseling Resources:  ATP IV Guidelines  Pooled Cohorts Equation Calculator  Breast Cancer Risk Calculator  BRCA-Related Cancer Risk Assessment: FHS-7 Tool  FRAX Risk Assessment  ICSI Preventive Guidelines  Dietary Guidelines for Americans, 2010  [employer]'s MyPlate  ASA Prophylaxis  Lung CA Screening    Morelia Simental MD  [facility]  Answers for HPI/ROS submitted by the patient on 5/19/2022  If you checked off any problems, how difficult have these problems made it for you to do your work, take care of things at home, or get along with other people?: Extremely difficult  PHQ9 TOTAL SCORE: 18  ROSIBEL 7 TOTAL SCORE: 16      "

## 2022-05-22 PROBLEM — G89.4 PAIN SYNDROME, CHRONIC: Status: ACTIVE | Noted: 2022-05-22

## 2022-05-22 PROBLEM — E78.49 ALPHA-LIPOPROTEINEMIA: Status: ACTIVE | Noted: 2022-05-22

## 2022-05-22 ASSESSMENT — ASTHMA QUESTIONNAIRES: ACT_TOTALSCORE: 25

## 2022-06-02 ENCOUNTER — TELEPHONE (OUTPATIENT)
Dept: PULMONOLOGY | Facility: CLINIC | Age: 52
End: 2022-06-02
Payer: COMMERCIAL

## 2022-06-02 DIAGNOSIS — J47.9 BRONCHIECTASIS (H): Primary | ICD-10-CM

## 2022-06-02 NOTE — CONFIDENTIAL NOTE
Patient had a cold 1 week ago which has now settled into her chest despite increasing her nebs/airway clearance.     Per Dr. Briscoe:   1. Augmentin 875 BID x 2 weeks

## 2022-06-13 ENCOUNTER — ANCILLARY PROCEDURE (OUTPATIENT)
Dept: BONE DENSITY | Facility: CLINIC | Age: 52
End: 2022-06-13
Attending: INTERNAL MEDICINE
Payer: COMMERCIAL

## 2022-06-13 ENCOUNTER — ANCILLARY PROCEDURE (OUTPATIENT)
Dept: MAMMOGRAPHY | Facility: CLINIC | Age: 52
End: 2022-06-13
Attending: INTERNAL MEDICINE
Payer: COMMERCIAL

## 2022-06-13 DIAGNOSIS — Z92.241 HISTORY OF SYSTEMIC STEROID THERAPY: ICD-10-CM

## 2022-06-13 DIAGNOSIS — Z78.0 ASYMPTOMATIC POSTMENOPAUSAL STATUS: ICD-10-CM

## 2022-06-13 DIAGNOSIS — Z12.31 ENCOUNTER FOR SCREENING MAMMOGRAM FOR BREAST CANCER: ICD-10-CM

## 2022-06-13 PROCEDURE — 77085 DXA BONE DENSITY AXL VRT FX: CPT | Performed by: INTERNAL MEDICINE

## 2022-06-13 PROCEDURE — 77067 SCR MAMMO BI INCL CAD: CPT | Mod: TC | Performed by: RADIOLOGY

## 2022-06-22 ENCOUNTER — ANESTHESIA EVENT (OUTPATIENT)
Dept: SURGERY | Facility: CLINIC | Age: 52
DRG: 338 | End: 2022-06-22
Payer: COMMERCIAL

## 2022-06-22 ENCOUNTER — APPOINTMENT (OUTPATIENT)
Dept: CT IMAGING | Facility: CLINIC | Age: 52
DRG: 338 | End: 2022-06-22
Attending: EMERGENCY MEDICINE
Payer: COMMERCIAL

## 2022-06-22 ENCOUNTER — HOSPITAL ENCOUNTER (INPATIENT)
Facility: CLINIC | Age: 52
LOS: 2 days | Discharge: HOME | DRG: 338 | End: 2022-06-24
Attending: EMERGENCY MEDICINE | Admitting: SURGERY
Payer: COMMERCIAL

## 2022-06-22 ENCOUNTER — ANESTHESIA (OUTPATIENT)
Dept: SURGERY | Facility: CLINIC | Age: 52
DRG: 338 | End: 2022-06-22
Payer: COMMERCIAL

## 2022-06-22 DIAGNOSIS — K35.201 ACUTE APPENDICITIS WITH PERFORATION AND GENERALIZED PERITONITIS, WITHOUT ABSCESS OR GANGRENE: Primary | ICD-10-CM

## 2022-06-22 DIAGNOSIS — K35.209 ACUTE APPENDICITIS WITH GENERALIZED PERITONITIS, UNSPECIFIED WHETHER ABSCESS PRESENT, UNSPECIFIED WHETHER GANGRENE PRESENT, UNSPECIFIED WHETHER PERFORATION PRESENT: ICD-10-CM

## 2022-06-22 LAB
ANION GAP SERPL CALCULATED.3IONS-SCNC: 6 MMOL/L (ref 3–14)
BASOPHILS # BLD AUTO: 0.1 10E3/UL (ref 0–0.2)
BASOPHILS NFR BLD AUTO: 0 %
BUN SERPL-MCNC: 10 MG/DL (ref 7–30)
CALCIUM SERPL-MCNC: 8 MG/DL (ref 8.5–10.1)
CHLORIDE BLD-SCNC: 106 MMOL/L (ref 94–109)
CO2 SERPL-SCNC: 23 MMOL/L (ref 20–32)
CREAT SERPL-MCNC: 0.64 MG/DL (ref 0.52–1.04)
EOSINOPHIL # BLD AUTO: 0.1 10E3/UL (ref 0–0.7)
EOSINOPHIL NFR BLD AUTO: 1 %
ERYTHROCYTE [DISTWIDTH] IN BLOOD BY AUTOMATED COUNT: 13.2 % (ref 10–15)
GFR SERPL CREATININE-BSD FRML MDRD: >90 ML/MIN/1.73M2
GLUCOSE BLD-MCNC: 85 MG/DL (ref 70–99)
HCT VFR BLD AUTO: 40.3 % (ref 35–47)
HGB BLD-MCNC: 13.5 G/DL (ref 11.7–15.7)
IMM GRANULOCYTES # BLD: 0.1 10E3/UL
IMM GRANULOCYTES NFR BLD: 0 %
LIPASE SERPL-CCNC: 61 U/L (ref 73–393)
LYMPHOCYTES # BLD AUTO: 1.2 10E3/UL (ref 0.8–5.3)
LYMPHOCYTES NFR BLD AUTO: 7 %
MCH RBC QN AUTO: 33.4 PG (ref 26.5–33)
MCHC RBC AUTO-ENTMCNC: 33.5 G/DL (ref 31.5–36.5)
MCV RBC AUTO: 100 FL (ref 78–100)
MONOCYTES # BLD AUTO: 1.3 10E3/UL (ref 0–1.3)
MONOCYTES NFR BLD AUTO: 7 %
NEUTROPHILS # BLD AUTO: 14.5 10E3/UL (ref 1.6–8.3)
NEUTROPHILS NFR BLD AUTO: 85 %
NRBC # BLD AUTO: 0 10E3/UL
NRBC BLD AUTO-RTO: 0 /100
PLATELET # BLD AUTO: 340 10E3/UL (ref 150–450)
POTASSIUM BLD-SCNC: 3.5 MMOL/L (ref 3.4–5.3)
RBC # BLD AUTO: 4.04 10E6/UL (ref 3.8–5.2)
SARS-COV-2 RNA RESP QL NAA+PROBE: POSITIVE
SODIUM SERPL-SCNC: 135 MMOL/L (ref 133–144)
WBC # BLD AUTO: 17.2 10E3/UL (ref 4–11)

## 2022-06-22 PROCEDURE — 250N000011 HC RX IP 250 OP 636: Performed by: EMERGENCY MEDICINE

## 2022-06-22 PROCEDURE — 250N000013 HC RX MED GY IP 250 OP 250 PS 637: Performed by: SURGERY

## 2022-06-22 PROCEDURE — 36415 COLL VENOUS BLD VENIPUNCTURE: CPT | Performed by: EMERGENCY MEDICINE

## 2022-06-22 PROCEDURE — 96361 HYDRATE IV INFUSION ADD-ON: CPT

## 2022-06-22 PROCEDURE — 999N000141 HC STATISTIC PRE-PROCEDURE NURSING ASSESSMENT: Performed by: SURGERY

## 2022-06-22 PROCEDURE — 87075 CULTR BACTERIA EXCEPT BLOOD: CPT | Performed by: SURGERY

## 2022-06-22 PROCEDURE — 250N000011 HC RX IP 250 OP 636: Performed by: ANESTHESIOLOGY

## 2022-06-22 PROCEDURE — 250N000011 HC RX IP 250 OP 636

## 2022-06-22 PROCEDURE — 710N000010 HC RECOVERY PHASE 1, LEVEL 2, PER MIN: Performed by: SURGERY

## 2022-06-22 PROCEDURE — 87077 CULTURE AEROBIC IDENTIFY: CPT | Performed by: SURGERY

## 2022-06-22 PROCEDURE — 87070 CULTURE OTHR SPECIMN AEROBIC: CPT | Performed by: SURGERY

## 2022-06-22 PROCEDURE — 85025 COMPLETE CBC W/AUTO DIFF WBC: CPT | Performed by: EMERGENCY MEDICINE

## 2022-06-22 PROCEDURE — 250N000009 HC RX 250

## 2022-06-22 PROCEDURE — 258N000001 HC RX 258: Performed by: SURGERY

## 2022-06-22 PROCEDURE — 360N000076 HC SURGERY LEVEL 3, PER MIN: Performed by: SURGERY

## 2022-06-22 PROCEDURE — 272N000001 HC OR GENERAL SUPPLY STERILE: Performed by: SURGERY

## 2022-06-22 PROCEDURE — 258N000003 HC RX IP 258 OP 636: Performed by: EMERGENCY MEDICINE

## 2022-06-22 PROCEDURE — 258N000003 HC RX IP 258 OP 636: Performed by: ANESTHESIOLOGY

## 2022-06-22 PROCEDURE — 250N000009 HC RX 250: Performed by: EMERGENCY MEDICINE

## 2022-06-22 PROCEDURE — 87205 SMEAR GRAM STAIN: CPT | Performed by: SURGERY

## 2022-06-22 PROCEDURE — 96365 THER/PROPH/DIAG IV INF INIT: CPT | Mod: 59

## 2022-06-22 PROCEDURE — 87015 SPECIMEN INFECT AGNT CONCNTJ: CPT | Performed by: SURGERY

## 2022-06-22 PROCEDURE — 120N000001 HC R&B MED SURG/OB

## 2022-06-22 PROCEDURE — 250N000011 HC RX IP 250 OP 636: Performed by: SURGERY

## 2022-06-22 PROCEDURE — 88304 TISSUE EXAM BY PATHOLOGIST: CPT | Mod: TC | Performed by: SURGERY

## 2022-06-22 PROCEDURE — U0003 INFECTIOUS AGENT DETECTION BY NUCLEIC ACID (DNA OR RNA); SEVERE ACUTE RESPIRATORY SYNDROME CORONAVIRUS 2 (SARS-COV-2) (CORONAVIRUS DISEASE [COVID-19]), AMPLIFIED PROBE TECHNIQUE, MAKING USE OF HIGH THROUGHPUT TECHNOLOGIES AS DESCRIBED BY CMS-2020-01-R: HCPCS | Performed by: EMERGENCY MEDICINE

## 2022-06-22 PROCEDURE — 74177 CT ABD & PELVIS W/CONTRAST: CPT

## 2022-06-22 PROCEDURE — 96376 TX/PRO/DX INJ SAME DRUG ADON: CPT

## 2022-06-22 PROCEDURE — 0DTJ4ZZ RESECTION OF APPENDIX, PERCUTANEOUS ENDOSCOPIC APPROACH: ICD-10-PCS | Performed by: SURGERY

## 2022-06-22 PROCEDURE — 96375 TX/PRO/DX INJ NEW DRUG ADDON: CPT

## 2022-06-22 PROCEDURE — 99285 EMERGENCY DEPT VISIT HI MDM: CPT | Mod: 25

## 2022-06-22 PROCEDURE — 80048 BASIC METABOLIC PNL TOTAL CA: CPT | Performed by: EMERGENCY MEDICINE

## 2022-06-22 PROCEDURE — 83690 ASSAY OF LIPASE: CPT | Performed by: EMERGENCY MEDICINE

## 2022-06-22 PROCEDURE — 44970 LAPAROSCOPY APPENDECTOMY: CPT | Performed by: SURGERY

## 2022-06-22 PROCEDURE — 370N000017 HC ANESTHESIA TECHNICAL FEE, PER MIN: Performed by: SURGERY

## 2022-06-22 PROCEDURE — C9803 HOPD COVID-19 SPEC COLLECT: HCPCS

## 2022-06-22 PROCEDURE — 258N000003 HC RX IP 258 OP 636: Performed by: SURGERY

## 2022-06-22 PROCEDURE — 250N000009 HC RX 250: Performed by: SURGERY

## 2022-06-22 RX ORDER — SODIUM CHLORIDE, SODIUM LACTATE, POTASSIUM CHLORIDE, CALCIUM CHLORIDE 600; 310; 30; 20 MG/100ML; MG/100ML; MG/100ML; MG/100ML
INJECTION, SOLUTION INTRAVENOUS CONTINUOUS
Status: DISCONTINUED | OUTPATIENT
Start: 2022-06-22 | End: 2022-06-22 | Stop reason: HOSPADM

## 2022-06-22 RX ORDER — LABETALOL HYDROCHLORIDE 5 MG/ML
10 INJECTION, SOLUTION INTRAVENOUS
Status: DISCONTINUED | OUTPATIENT
Start: 2022-06-22 | End: 2022-06-22 | Stop reason: HOSPADM

## 2022-06-22 RX ORDER — OXYCODONE HYDROCHLORIDE 5 MG/1
10 TABLET ORAL EVERY 4 HOURS PRN
Status: DISCONTINUED | OUTPATIENT
Start: 2022-06-22 | End: 2022-06-24 | Stop reason: HOSPADM

## 2022-06-22 RX ORDER — NALOXONE HYDROCHLORIDE 0.4 MG/ML
0.2 INJECTION, SOLUTION INTRAMUSCULAR; INTRAVENOUS; SUBCUTANEOUS
Status: DISCONTINUED | OUTPATIENT
Start: 2022-06-22 | End: 2022-06-22

## 2022-06-22 RX ORDER — PIPERACILLIN SODIUM, TAZOBACTAM SODIUM 3; .375 G/15ML; G/15ML
3.38 INJECTION, POWDER, LYOPHILIZED, FOR SOLUTION INTRAVENOUS EVERY 6 HOURS
Status: DISCONTINUED | OUTPATIENT
Start: 2022-06-22 | End: 2022-06-22 | Stop reason: RX

## 2022-06-22 RX ORDER — NALOXONE HYDROCHLORIDE 0.4 MG/ML
0.4 INJECTION, SOLUTION INTRAMUSCULAR; INTRAVENOUS; SUBCUTANEOUS
Status: DISCONTINUED | OUTPATIENT
Start: 2022-06-22 | End: 2022-06-22

## 2022-06-22 RX ORDER — KETOROLAC TROMETHAMINE 30 MG/ML
15 INJECTION, SOLUTION INTRAMUSCULAR; INTRAVENOUS EVERY 6 HOURS PRN
Status: DISCONTINUED | OUTPATIENT
Start: 2022-06-22 | End: 2022-06-22 | Stop reason: HOSPADM

## 2022-06-22 RX ORDER — HYDROMORPHONE HCL IN WATER/PF 6 MG/30 ML
0.2 PATIENT CONTROLLED ANALGESIA SYRINGE INTRAVENOUS EVERY 5 MIN PRN
Status: DISCONTINUED | OUTPATIENT
Start: 2022-06-22 | End: 2022-06-22 | Stop reason: HOSPADM

## 2022-06-22 RX ORDER — DEXAMETHASONE SODIUM PHOSPHATE 4 MG/ML
INJECTION, SOLUTION INTRA-ARTICULAR; INTRALESIONAL; INTRAMUSCULAR; INTRAVENOUS; SOFT TISSUE PRN
Status: DISCONTINUED | OUTPATIENT
Start: 2022-06-22 | End: 2022-06-22

## 2022-06-22 RX ORDER — FENTANYL CITRATE 50 UG/ML
25 INJECTION, SOLUTION INTRAMUSCULAR; INTRAVENOUS
Status: DISCONTINUED | OUTPATIENT
Start: 2022-06-22 | End: 2022-06-22 | Stop reason: HOSPADM

## 2022-06-22 RX ORDER — FENTANYL CITRATE 50 UG/ML
25 INJECTION, SOLUTION INTRAMUSCULAR; INTRAVENOUS EVERY 5 MIN PRN
Status: DISCONTINUED | OUTPATIENT
Start: 2022-06-22 | End: 2022-06-22 | Stop reason: HOSPADM

## 2022-06-22 RX ORDER — KETOROLAC TROMETHAMINE 15 MG/ML
15 INJECTION, SOLUTION INTRAMUSCULAR; INTRAVENOUS EVERY 6 HOURS
Status: COMPLETED | OUTPATIENT
Start: 2022-06-22 | End: 2022-06-23

## 2022-06-22 RX ORDER — HYDROMORPHONE HCL IN WATER/PF 6 MG/30 ML
0.4 PATIENT CONTROLLED ANALGESIA SYRINGE INTRAVENOUS
Status: DISCONTINUED | OUTPATIENT
Start: 2022-06-22 | End: 2022-06-24 | Stop reason: HOSPADM

## 2022-06-22 RX ORDER — SODIUM CHLORIDE 9 MG/ML
INJECTION, SOLUTION INTRAVENOUS CONTINUOUS
Status: DISCONTINUED | OUTPATIENT
Start: 2022-06-22 | End: 2022-06-24 | Stop reason: HOSPADM

## 2022-06-22 RX ORDER — ACETAMINOPHEN 325 MG/1
650 TABLET ORAL EVERY 4 HOURS PRN
Status: DISCONTINUED | OUTPATIENT
Start: 2022-06-25 | End: 2022-06-24 | Stop reason: HOSPADM

## 2022-06-22 RX ORDER — LIDOCAINE 40 MG/G
CREAM TOPICAL
Status: DISCONTINUED | OUTPATIENT
Start: 2022-06-22 | End: 2022-06-22 | Stop reason: HOSPADM

## 2022-06-22 RX ORDER — HYDROMORPHONE HCL IN WATER/PF 6 MG/30 ML
0.2 PATIENT CONTROLLED ANALGESIA SYRINGE INTRAVENOUS
Status: DISCONTINUED | OUTPATIENT
Start: 2022-06-22 | End: 2022-06-24 | Stop reason: HOSPADM

## 2022-06-22 RX ORDER — TIZANIDINE 2 MG/1
2-4 TABLET ORAL 3 TIMES DAILY PRN
Status: DISCONTINUED | OUTPATIENT
Start: 2022-06-22 | End: 2022-06-24 | Stop reason: HOSPADM

## 2022-06-22 RX ORDER — HYDROXYZINE HYDROCHLORIDE 25 MG/1
25 TABLET, FILM COATED ORAL EVERY 6 HOURS PRN
Status: DISCONTINUED | OUTPATIENT
Start: 2022-06-22 | End: 2022-06-24 | Stop reason: HOSPADM

## 2022-06-22 RX ORDER — NALOXONE HYDROCHLORIDE 0.4 MG/ML
0.4 INJECTION, SOLUTION INTRAMUSCULAR; INTRAVENOUS; SUBCUTANEOUS
Status: DISCONTINUED | OUTPATIENT
Start: 2022-06-22 | End: 2022-06-24 | Stop reason: HOSPADM

## 2022-06-22 RX ORDER — ONDANSETRON 4 MG/1
4 TABLET, ORALLY DISINTEGRATING ORAL EVERY 6 HOURS PRN
Status: DISCONTINUED | OUTPATIENT
Start: 2022-06-22 | End: 2022-06-24 | Stop reason: HOSPADM

## 2022-06-22 RX ORDER — PROCHLORPERAZINE MALEATE 5 MG
10 TABLET ORAL EVERY 6 HOURS PRN
Status: DISCONTINUED | OUTPATIENT
Start: 2022-06-22 | End: 2022-06-24 | Stop reason: HOSPADM

## 2022-06-22 RX ORDER — ERTAPENEM 1 G/1
1 INJECTION, POWDER, LYOPHILIZED, FOR SOLUTION INTRAMUSCULAR; INTRAVENOUS ONCE
Status: COMPLETED | OUTPATIENT
Start: 2022-06-22 | End: 2022-06-22

## 2022-06-22 RX ORDER — ALBUTEROL SULFATE 90 UG/1
2 AEROSOL, METERED RESPIRATORY (INHALATION)
Status: DISCONTINUED | OUTPATIENT
Start: 2022-06-22 | End: 2022-06-24 | Stop reason: HOSPADM

## 2022-06-22 RX ORDER — BUPIVACAINE HYDROCHLORIDE AND EPINEPHRINE 5; 5 MG/ML; UG/ML
INJECTION, SOLUTION PERINEURAL PRN
Status: DISCONTINUED | OUTPATIENT
Start: 2022-06-22 | End: 2022-06-22 | Stop reason: HOSPADM

## 2022-06-22 RX ORDER — KETAMINE HYDROCHLORIDE 10 MG/ML
INJECTION INTRAMUSCULAR; INTRAVENOUS PRN
Status: DISCONTINUED | OUTPATIENT
Start: 2022-06-22 | End: 2022-06-22

## 2022-06-22 RX ORDER — SODIUM CHLORIDE 9 MG/ML
INJECTION, SOLUTION INTRAVENOUS CONTINUOUS
Status: DISCONTINUED | OUTPATIENT
Start: 2022-06-22 | End: 2022-06-22

## 2022-06-22 RX ORDER — METOPROLOL TARTRATE 1 MG/ML
5 INJECTION, SOLUTION INTRAVENOUS EVERY 6 HOURS
Status: DISCONTINUED | OUTPATIENT
Start: 2022-06-23 | End: 2022-06-23

## 2022-06-22 RX ORDER — ONDANSETRON 2 MG/ML
4 INJECTION INTRAMUSCULAR; INTRAVENOUS EVERY 6 HOURS PRN
Status: DISCONTINUED | OUTPATIENT
Start: 2022-06-22 | End: 2022-06-24 | Stop reason: HOSPADM

## 2022-06-22 RX ORDER — ONDANSETRON 2 MG/ML
4 INJECTION INTRAMUSCULAR; INTRAVENOUS EVERY 30 MIN PRN
Status: DISCONTINUED | OUTPATIENT
Start: 2022-06-22 | End: 2022-06-22

## 2022-06-22 RX ORDER — ENOXAPARIN SODIUM 100 MG/ML
40 INJECTION SUBCUTANEOUS EVERY 24 HOURS
Status: DISCONTINUED | OUTPATIENT
Start: 2022-06-23 | End: 2022-06-24 | Stop reason: HOSPADM

## 2022-06-22 RX ORDER — ACETAMINOPHEN 325 MG/1
975 TABLET ORAL EVERY 8 HOURS
Status: DISCONTINUED | OUTPATIENT
Start: 2022-06-22 | End: 2022-06-24 | Stop reason: HOSPADM

## 2022-06-22 RX ORDER — ONDANSETRON 4 MG/1
4 TABLET, ORALLY DISINTEGRATING ORAL EVERY 30 MIN PRN
Status: DISCONTINUED | OUTPATIENT
Start: 2022-06-22 | End: 2022-06-22 | Stop reason: HOSPADM

## 2022-06-22 RX ORDER — PROPOFOL 10 MG/ML
INJECTION, EMULSION INTRAVENOUS PRN
Status: DISCONTINUED | OUTPATIENT
Start: 2022-06-22 | End: 2022-06-22

## 2022-06-22 RX ORDER — MORPHINE SULFATE 4 MG/ML
4 INJECTION, SOLUTION INTRAMUSCULAR; INTRAVENOUS
Status: COMPLETED | OUTPATIENT
Start: 2022-06-22 | End: 2022-06-22

## 2022-06-22 RX ORDER — ONDANSETRON 2 MG/ML
4 INJECTION INTRAMUSCULAR; INTRAVENOUS EVERY 30 MIN PRN
Status: DISCONTINUED | OUTPATIENT
Start: 2022-06-22 | End: 2022-06-22 | Stop reason: HOSPADM

## 2022-06-22 RX ORDER — LIDOCAINE HYDROCHLORIDE 10 MG/ML
INJECTION, SOLUTION INFILTRATION; PERINEURAL PRN
Status: DISCONTINUED | OUTPATIENT
Start: 2022-06-22 | End: 2022-06-22

## 2022-06-22 RX ORDER — IOPAMIDOL 755 MG/ML
500 INJECTION, SOLUTION INTRAVASCULAR ONCE
Status: COMPLETED | OUTPATIENT
Start: 2022-06-22 | End: 2022-06-22

## 2022-06-22 RX ORDER — MEPERIDINE HYDROCHLORIDE 25 MG/ML
12.5 INJECTION INTRAMUSCULAR; INTRAVENOUS; SUBCUTANEOUS
Status: DISCONTINUED | OUTPATIENT
Start: 2022-06-22 | End: 2022-06-22 | Stop reason: HOSPADM

## 2022-06-22 RX ORDER — LIDOCAINE 40 MG/G
CREAM TOPICAL
Status: DISCONTINUED | OUTPATIENT
Start: 2022-06-22 | End: 2022-06-24 | Stop reason: HOSPADM

## 2022-06-22 RX ORDER — NALOXONE HYDROCHLORIDE 0.4 MG/ML
0.2 INJECTION, SOLUTION INTRAMUSCULAR; INTRAVENOUS; SUBCUTANEOUS
Status: DISCONTINUED | OUTPATIENT
Start: 2022-06-22 | End: 2022-06-24 | Stop reason: HOSPADM

## 2022-06-22 RX ORDER — OXYCODONE HYDROCHLORIDE 5 MG/1
5 TABLET ORAL EVERY 4 HOURS PRN
Status: DISCONTINUED | OUTPATIENT
Start: 2022-06-22 | End: 2022-06-24 | Stop reason: HOSPADM

## 2022-06-22 RX ORDER — OXYCODONE HYDROCHLORIDE 5 MG/1
5 TABLET ORAL EVERY 4 HOURS PRN
Status: DISCONTINUED | OUTPATIENT
Start: 2022-06-22 | End: 2022-06-22 | Stop reason: HOSPADM

## 2022-06-22 RX ORDER — HYDRALAZINE HYDROCHLORIDE 20 MG/ML
2.5-5 INJECTION INTRAMUSCULAR; INTRAVENOUS EVERY 10 MIN PRN
Status: DISCONTINUED | OUTPATIENT
Start: 2022-06-22 | End: 2022-06-22 | Stop reason: HOSPADM

## 2022-06-22 RX ADMIN — TAZOBACTAM SODIUM AND PIPERACILLIN SODIUM 3.38 G: 375; 3 INJECTION, SOLUTION INTRAVENOUS at 23:46

## 2022-06-22 RX ADMIN — MORPHINE SULFATE 4 MG: 4 INJECTION INTRAVENOUS at 16:16

## 2022-06-22 RX ADMIN — ONDANSETRON 4 MG: 2 INJECTION INTRAMUSCULAR; INTRAVENOUS at 16:15

## 2022-06-22 RX ADMIN — FENTANYL CITRATE 25 MCG: 50 INJECTION, SOLUTION INTRAMUSCULAR; INTRAVENOUS at 20:47

## 2022-06-22 RX ADMIN — SUGAMMADEX 200 MG: 100 INJECTION, SOLUTION INTRAVENOUS at 20:26

## 2022-06-22 RX ADMIN — PROPOFOL 110 MG: 10 INJECTION, EMULSION INTRAVENOUS at 19:33

## 2022-06-22 RX ADMIN — FENTANYL CITRATE 25 MCG: 50 INJECTION, SOLUTION INTRAMUSCULAR; INTRAVENOUS at 20:57

## 2022-06-22 RX ADMIN — IOPAMIDOL 50 ML: 755 INJECTION, SOLUTION INTRAVENOUS at 16:43

## 2022-06-22 RX ADMIN — MORPHINE SULFATE 4 MG: 4 INJECTION INTRAVENOUS at 17:32

## 2022-06-22 RX ADMIN — LIDOCAINE HYDROCHLORIDE 40 MG: 10 INJECTION, SOLUTION INFILTRATION; PERINEURAL at 19:33

## 2022-06-22 RX ADMIN — FENTANYL CITRATE 25 MCG: 50 INJECTION, SOLUTION INTRAMUSCULAR; INTRAVENOUS at 21:08

## 2022-06-22 RX ADMIN — KETOROLAC TROMETHAMINE 15 MG: 15 INJECTION, SOLUTION INTRAMUSCULAR; INTRAVENOUS at 23:26

## 2022-06-22 RX ADMIN — ACETAMINOPHEN 975 MG: 325 TABLET, FILM COATED ORAL at 23:26

## 2022-06-22 RX ADMIN — SODIUM CHLORIDE, POTASSIUM CHLORIDE, SODIUM LACTATE AND CALCIUM CHLORIDE: 600; 310; 30; 20 INJECTION, SOLUTION INTRAVENOUS at 18:52

## 2022-06-22 RX ADMIN — SODIUM CHLORIDE 1000 ML: 9 INJECTION, SOLUTION INTRAVENOUS at 16:15

## 2022-06-22 RX ADMIN — MORPHINE SULFATE 4 MG: 4 INJECTION INTRAVENOUS at 16:56

## 2022-06-22 RX ADMIN — ONDANSETRON 4 MG: 2 INJECTION INTRAMUSCULAR; INTRAVENOUS at 23:26

## 2022-06-22 RX ADMIN — Medication 20 MG: at 19:33

## 2022-06-22 RX ADMIN — FENTANYL CITRATE 50 MCG: 50 INJECTION, SOLUTION INTRAMUSCULAR; INTRAVENOUS at 20:01

## 2022-06-22 RX ADMIN — FENTANYL CITRATE 25 MCG: 50 INJECTION, SOLUTION INTRAMUSCULAR; INTRAVENOUS at 20:52

## 2022-06-22 RX ADMIN — ERTAPENEM SODIUM 1 G: 1 INJECTION, POWDER, LYOPHILIZED, FOR SOLUTION INTRAMUSCULAR; INTRAVENOUS at 17:32

## 2022-06-22 RX ADMIN — FENTANYL CITRATE 100 MCG: 50 INJECTION, SOLUTION INTRAMUSCULAR; INTRAVENOUS at 19:33

## 2022-06-22 RX ADMIN — DEXAMETHASONE SODIUM PHOSPHATE 4 MG: 4 INJECTION, SOLUTION INTRA-ARTICULAR; INTRALESIONAL; INTRAMUSCULAR; INTRAVENOUS; SOFT TISSUE at 19:33

## 2022-06-22 RX ADMIN — SODIUM CHLORIDE 54 ML: 9 INJECTION, SOLUTION INTRAVENOUS at 16:43

## 2022-06-22 RX ADMIN — SODIUM CHLORIDE: 9 INJECTION, SOLUTION INTRAVENOUS at 23:05

## 2022-06-22 RX ADMIN — ROCURONIUM BROMIDE 40 MG: 50 INJECTION, SOLUTION INTRAVENOUS at 19:33

## 2022-06-22 RX ADMIN — ONDANSETRON 4 MG: 2 INJECTION INTRAMUSCULAR; INTRAVENOUS at 19:33

## 2022-06-22 RX ADMIN — SODIUM CHLORIDE, POTASSIUM CHLORIDE, SODIUM LACTATE AND CALCIUM CHLORIDE: 600; 310; 30; 20 INJECTION, SOLUTION INTRAVENOUS at 20:11

## 2022-06-22 RX ADMIN — MIDAZOLAM 1 MG: 1 INJECTION INTRAMUSCULAR; INTRAVENOUS at 19:32

## 2022-06-22 RX ADMIN — MIDAZOLAM 1 MG: 1 INJECTION INTRAMUSCULAR; INTRAVENOUS at 19:26

## 2022-06-22 RX ADMIN — FENTANYL CITRATE 100 MCG: 50 INJECTION, SOLUTION INTRAMUSCULAR; INTRAVENOUS at 19:48

## 2022-06-22 ASSESSMENT — ENCOUNTER SYMPTOMS
VOMITING: 1
SHORTNESS OF BREATH: 0
DIFFICULTY URINATING: 0
ABDOMINAL PAIN: 1
DIARRHEA: 1
FEVER: 0
COUGH: 0
BLOOD IN STOOL: 0
HEMATURIA: 0
CHILLS: 0
DYSURIA: 0

## 2022-06-22 ASSESSMENT — ACTIVITIES OF DAILY LIVING (ADL): ADLS_ACUITY_SCORE: 35

## 2022-06-22 NOTE — ED TRIAGE NOTES
Pt presents with generalized abd pain that is worse in RUQ and RLQ. Has vomited x2 and has 2 episodes of diarrhea. C/o chills. Denies sick contacts.      Triage Assessment     Row Name 06/22/22 6339       Triage Assessment (Adult)    Airway WDL WDL       Cognitive/Neuro/Behavioral WDL    Cognitive/Neuro/Behavioral WDL WDL

## 2022-06-22 NOTE — ED PROVIDER NOTES
History   Chief Complaint:  Abdominal Pain and Vomiting     The history is provided by the patient.      Valencia Ye is a 51 year old female who presents with right sided abdominal pain. The patient reports that she had onset of abdominal pain yesterday and today has had two episodes of diarrhea and vomiting. She denies any blood in her urine, shortness of breath, or chest pain. She denies a history of kidney stones, gallbladder, or appendix surgery.     Review of Systems   Constitutional: Negative for chills and fever.   Respiratory: Negative for cough and shortness of breath.    Cardiovascular: Negative for chest pain.   Gastrointestinal: Positive for abdominal pain (right), diarrhea and vomiting. Negative for blood in stool.   Genitourinary: Negative for difficulty urinating, dysuria, hematuria and urgency.   All other systems reviewed and are negative.    Allergies:  Colistimethate  Colistimethate Sodium  Tamiflu  Azithromycin  Clindamycin    Medications:  Augmentin   Mucomyst neb solution  Quercetin complex immune   Bromelin  Zanaflex   Deltasone  Toprol-XL  Flonase  Enlyte  Valium   Ciprodex    Past Medical History:     Alpha-lipoproteinemia  IBS  Bronchiectasis with acute exacerbation  Depression  Hypertension   Mannose-binding lectin deficiency  Anxiety   Asthma  Churg-Antonina syndrome  Interstitial lung disease  Goiter    Past Surgical History:    Tympanoplasty   Tubal ligation  PICC insertion  Hysterectomy  Had and neck surgery   Genitourinary surgery   Colonoscopy   section     Family History:    Heart disease  Depression  Type 2 diabetes  CAD  Substance abuse  Allergies    Social History:  The patient presents to the ED with a family member.  Tobacco Use: never used.    Physical Exam     Patient Vitals for the past 24 hrs:   BP Temp Temp src Pulse Resp SpO2 Height Weight   22 2232 114/70 97.8  F (36.6  C) Oral 76 16 97 % -- --   22 2130 111/67 -- -- 73 14 100 % -- --  "  06/22/22 2115 114/70 -- -- 75 15 100 % -- --   06/22/22 2110 122/72 -- -- 83 13 100 % -- --   06/22/22 2105 129/79 -- -- 86 13 100 % -- --   06/22/22 2100 133/82 -- -- 89 25 100 % -- --   06/22/22 2055 134/76 -- -- 89 19 100 % -- --   06/22/22 2050 137/76 -- -- 89 12 100 % -- --   06/22/22 2045 (!) 164/89 -- -- 108 23 100 % -- --   06/22/22 2040 (!) 134/125 -- -- 111 17 100 % -- --   06/22/22 2034 128/86 99.2  F (37.3  C) Temporal 110 24 99 % -- --   06/22/22 1908 117/72 100  F (37.8  C) Temporal 70 16 98 % 1.575 m (5' 2\") 49 kg (108 lb)   06/22/22 1840 -- -- -- -- 16 -- -- --   06/22/22 1800 129/77 -- -- 68 -- 97 % -- --   06/22/22 1745 -- -- -- -- -- 99 % -- --   06/22/22 1730 136/83 -- -- 71 16 100 % -- --   06/22/22 1715 (!) 146/82 -- -- 70 -- 100 % -- --   06/22/22 1700 -- -- -- -- 16 99 % -- --   06/22/22 1630 -- -- -- -- -- 100 % -- --   06/22/22 1615 -- -- -- -- -- 100 % -- --   06/22/22 1519 (!) 159/79 97.7  F (36.5  C) Temporal 81 16 100 % -- --       Physical Exam    Constitutional: Patient's position of comfort is bent over the bed with a pillow under her right abdomen. Nontoxic.  Head: Atraumatic.  Eyes: Conjunctivae are normal. No scleral icterus.  Cardiovascular: Normal rate, regular rhythm, normal heart sounds and intact distal perfusion.   Pulmonary/Chest: Breath sounds normal. No respiratory distress.  Abdominal: Examined after pain meds and able to get into bed has soft abd with no distention and slight tenderness to palpation of bilateral lower quadrants.  Faint rebound but no guarding.   Musculoskeletal: Normal range of motion. No edema or tenderness.   Neurological: Alert and orientated to person, place, and time. No observable focal neuro deficit  Skin: Warm and dry. No rash noted. Not diaphoretic.     Emergency Department Course   ECG  ECG results from 03/31/22   EKG 12 lead     Value    Systolic Blood Pressure     Diastolic Blood Pressure     Ventricular Rate 69    Atrial Rate 69    WY " Interval 154    QRS Duration 88        QTc 424    P Axis 74    R AXIS 66    T Axis 20    Interpretation ECG      Sinus rhythm  ST & T wave abnormality, consider anterolateral ischemia  Abnormal ECG  When compared with ECG of 28-FEB-2019 14:28,  T wave inversion more evident in Inferior leads  Confirmed by - EMERGENCY ROOM, PHYSICIAN ([id number]),  GHAZAL ROCHE ([id number]) on 4/1/2022 7:40:48 AM       *Note: Due to a large number of results and/or encounters for the requested time period, some results have not been displayed. A complete set of results can be found in Results Review.       Imaging:  CT Abdomen Pelvis w Contrast   Final Result   IMPRESSION:    1.  Acute appendicitis. Mild distal small bowel ileus.   2.  Old left ventricular infarction.        Report per radiology    Laboratory:  Labs Ordered and Resulted from Time of ED Arrival to Time of ED Departure   BASIC METABOLIC PANEL - Abnormal       Result Value    Sodium 135      Potassium 3.5      Chloride 106      Carbon Dioxide (CO2) 23      Anion Gap 6      Urea Nitrogen 10      Creatinine 0.64      Calcium 8.0 (*)     Glucose 85      GFR Estimate >90     LIPASE - Abnormal    Lipase 61 (*)    CBC WITH PLATELETS AND DIFFERENTIAL - Abnormal    WBC Count 17.2 (*)     RBC Count 4.04      Hemoglobin 13.5      Hematocrit 40.3            MCH 33.4 (*)     MCHC 33.5      RDW 13.2      Platelet Count 340      % Neutrophils 85      % Lymphocytes 7      % Monocytes 7      % Eosinophils 1      % Basophils 0      % Immature Granulocytes 0      NRBCs per 100 WBC 0      Absolute Neutrophils 14.5 (*)     Absolute Lymphocytes 1.2      Absolute Monocytes 1.3      Absolute Eosinophils 0.1      Absolute Basophils 0.1      Absolute Immature Granulocytes 0.1      Absolute NRBCs 0.0          Emergency Department Course:     Reviewed:  I reviewed nursing notes, vitals and past medical history    Assessments:  1600 I obtained history and examined the patient as noted  above.   1733 I rechecked the patient and explained findings.     Consults:  1730 I spoke with Dr. Hannah of the surgery service from [facility] regarding patient's presentation, findings, and plan of care.    Interventions:  1615 0.9% sodium chloride BOLUS 1L IV   1615 Zofran 4 mg IV  1616 Morphine 4 mg IV  1656 Morphine 4 mg IV  1732 Morphine 4 mg IV  1814 Invanz 1 g IV    Disposition:  The patient was transferred to the OR.     Impression & Plan         Medical Decision Making:  Pt has localized right >left abdominal pain with CT confirming appendicitis and elev WBC and exam consistent.  After pain meds much more comfortable and discussed with surgery that will be taking to the OR.      Diagnosis:    ICD-10-CM    1. Acute appendicitis with generalized peritonitis, unspecified whether abscess present, unspecified whether gangrene present, unspecified whether perforation present  K35.20        Discharge Medications:  Current Discharge Medication List          Scribe Disclosure:  I, Fred Douglassnway, am serving as a scribe at 3:57 PM on 6/22/2022 to document services personally performed by Randal Frank MD based on my observations and the provider's statements to me.      Randal Frank MD  06/22/22 0054

## 2022-06-22 NOTE — ANESTHESIA PREPROCEDURE EVALUATION
Anesthesia Pre-Procedure Evaluation    Patient: Valencia Ye   MRN: [id number] : 1970        Procedure : Procedure(s):  APPENDECTOMY, LAPAROSCOPIC          Past Medical History:   Diagnosis Date     Anxiety      Aortitis (H)      Asthma     associated with Churg Antonina syndrome     Bronchiectasis (H)      Cerebral infarction (H) 1990    Very small, caused small permanent optical migraine     Chronic sinusitis      Churg-Antonina syndrome (H)     dx      Conductive hearing loss      Depressive disorder      Eustachian tube dysfunction      Goiter      Hearing loss, conductive      Heart rate problem      Hypertension      Hypocalcemia      Immunosuppression (H)      Irritable bowel syndrome      Major depressive disorder      Mannose-binding lectin deficiency      Nonspecific abnormal results of thyroid function study      Osteoporosis      Pericarditis      and      Pneumonia     4/23/10     Recurrent otitis media      Recurrent UTI      Rheumatoid vasculitis (H)      Sinusitis, chronic      Steroid long-term use      Tinnitus      Varicose veins of leg with swelling       Past Surgical History:   Procedure Laterality Date     C/SECTION, CLASSICAL        SECTION       COLONOSCOPY Left 2014    Procedure: COMBINED COLONOSCOPY, SINGLE OR MULTIPLE BIOPSY/POLYPECTOMY BY BIOPSY;  Surgeon: Js Pinedo MD;  Location:  GI     COLONOSCOPY N/A 2019    Procedure: COLONOSCOPY;  Surgeon: Bryce Pimentel MD;  Location:  GI     ENT SURGERY       GENITOURINARY SURGERY       HEAD & NECK SURGERY       HYSTERECTOMY  2009    without oopherectomy     HYSTERECTOMY, PAP NO LONGER INDICATED      cervix removed      PE TUBES       PICC INSERTION  10/10/2013    5fr DL PASV PICC, 43cm (1cm external) in the L basilic vein w/ tip in the low SVC.     PICC INSERTION Left 2017    5fr DL BioFlo PICC, 42cm (3cm external) in the L lateral brachial vein  w/ tip in the SVC RA junction.     SINUS SURGERY       TUBAL LIGATION       TYMPANOPLASTY       ZZHC COLP CERVIX/UPPER VAGINA W LOOP ELEC BX CERVIX        Allergies   Allergen Reactions     Colistimethate Anaphylaxis     Colymycin M [Colistimethate Sodium] Anaphylaxis     Tamiflu [Oseltamivir]      Gums Blister up     Azithromycin Palpitations     Heart palpitation  Heart palpitation     Clindamycin Rash      Social History     Tobacco Use     Smoking status: Never Smoker     Smokeless tobacco: Never Used   Substance Use Topics     Alcohol use: Not Currently      Wt Readings from Last 1 Encounters:   05/19/22 49.8 kg (109 lb 12.8 oz)        Anesthesia Evaluation   Pt has had prior anesthetic. Type: General.        ROS/MED HX  ENT/Pulmonary:     (+) Moderate Persistent, asthma Treatment: Inhaled steroids, Inhaler prn and Nebulizer prn,  recent URI,     Neurologic:     (+) CVA,     Cardiovascular:     (+) hypertension-----    METS/Exercise Tolerance:     Hematologic: Comments: Lab Test        06/22/22 03/31/22 12/08/21                       1605          1722          1345          WBC          17.2*        10.8         9.0           HGB          13.5         12.9         13.6          MCV          100          103*         103*          PLT          340          280          306            Lab Test        06/22/22     03/31/22     12/08/21     06/15/21     10/20/20                       1707          1722          1345          1622          0155          NA           135          138           --           --          139           POTASSIUM    3.5          3.8           --           --          3.5           CHLORIDE     106          104           --           --          106           CO2          23           29            --           --          27            BUN          10           19            --           --          8             CR           0.64         0.85         0.83         0.79          0.82          ANIONGAP     6            5             --           --          6             BOSSMAN          8.0*         8.9           --          9.0          8.7           GLC          85           105*          --           --          91                  Musculoskeletal: Comment: Rheumatoid vasculitis  (+) arthritis,     GI/Hepatic:  - neg GI/hepatic ROS   (+) Inflammatory bowel disease,     Renal/Genitourinary:  - neg Renal ROS     Endo:  - neg endo ROS   (+) thyroid problem,  Thyroid disease - Other, Chronic steroid usage for Other.     Psychiatric/Substance Use:     (+) psychiatric history anxiety and depression     Infectious Disease:  - neg infectious disease ROS     Malignancy:  - neg malignancy ROS     Other:            Physical Exam    Airway        Mallampati: I   TM distance: > 3 FB   Neck ROM: full   Mouth opening: > 3 cm    Respiratory Devices and Support         Dental  no notable dental history         Cardiovascular   cardiovascular exam normal          Pulmonary   pulmonary exam normal                OUTSIDE LABS:  CBC:   Lab Results   Component Value Date    WBC 17.2 (H) 06/22/2022    WBC 10.8 03/31/2022    HGB 13.5 06/22/2022    HGB 12.9 03/31/2022    HCT 40.3 06/22/2022    HCT 39.2 03/31/2022     06/22/2022     03/31/2022     BMP:   Lab Results   Component Value Date     06/22/2022     03/31/2022    POTASSIUM 3.5 06/22/2022    POTASSIUM 3.8 03/31/2022    CHLORIDE 106 06/22/2022    CHLORIDE 104 03/31/2022    CO2 23 06/22/2022    CO2 29 03/31/2022    BUN 10 06/22/2022    BUN 19 03/31/2022    CR 0.64 06/22/2022    CR 0.85 03/31/2022    GLC 85 06/22/2022     (H) 03/31/2022     COAGS: No results found for: PTT, INR, FIBR  POC:   Lab Results   Component Value Date     (H) 12/24/2015    HCG Negative 12/12/2010     HEPATIC:   Lab Results   Component Value Date    ALBUMIN 3.7 12/08/2021    PROTTOTAL 7.4 10/20/2020    ALT 25 12/08/2021    AST 14 12/08/2021     ALKPHOS 54 10/20/2020    BILITOTAL 0.5 10/20/2020     OTHER:   Lab Results   Component Value Date    LACT 2.2 (H) 03/10/2017    A1C 5.2 10/02/2019    BOSSMAN 8.0 (L) 06/22/2022    PHOS 4.4 07/05/2018    MAG 2.3 02/26/2019    LIPASE 61 (L) 06/22/2022    TSH 0.66 06/15/2021    T4 0.86 06/15/2021    T3 69 09/14/2010    CRP 44.0 (H) 09/21/2020    SED 30 (H) 09/21/2020       Anesthesia Plan    ASA Status:  3      Anesthesia Type: General.     - Airway: ETT   Induction: Intravenous, Propofol.   Maintenance: Balanced.        Consents    Anesthesia Plan(s) and associated risks, benefits, and realistic alternatives discussed. Questions answered and patient/representative(s) expressed understanding.    - Discussed:     - Discussed with:  Patient      - Extended Intubation/Ventilatory Support Discussed: No.      - Patient is DNR/DNI Status: No    Use of blood products discussed: Yes.     - Discussed with: Patient.     - Consented: consented to blood products            Reason for refusal: other.     Postoperative Care    Pain management: IV analgesics.   PONV prophylaxis: Ondansetron (or other 5HT-3), Dexamethasone or Solumedrol     Comments:                Vicente Araujo MD

## 2022-06-22 NOTE — H&P
[facility]  General Surgery Consult    Valencia Ye MRN# [id number]   Age: 51 year old YOB: 1970     HPI:  The patient has been experiencing abdominal pain for the past 1 day. The pain started generalized and is currently in the right abdomen. The pain associated with chills, nausea and vomiting.  Negative for associated fever and anorexia.     Similar pain in the past:    Yes she had had abdominal pains in the past but they always went away quickly  Diarrhea, now or in the past:   Yes - today  Colonoscopy:   Yes  Last in 2019- had diverticulosis. Reports she had cecal diverticulitis in the past and symptoms today are similar.    She has Churg-Antonina with pulmonary disease/bronchiectasis without significant pulmonary symptoms currently but does do vest therapy each evening. Usually okay if she has to miss one treatment. Follows closely with pulmonology and rheumatology. Had a TTE 3/2022 which was normal    She tested positive for COIVD-19 today. She had a bad cold about 3 weeks ago now and has recovered is now asymptomatic but she was not tested for covid-19 at that time. She reports she is vaccinated and boosted.    History is obtained from the patient.    Review Of Systems:  The 10 point review of systems is negative other than noted in the HPI.    PMH:  Past Medical History:   Diagnosis Date     Anxiety      Aortitis (H)      Asthma     associated with Churg Antonina syndrome     Bronchiectasis (H)      Cerebral infarction (H) August 1990    Very small, caused small permanent optical migraine     Chronic sinusitis      Churg-Antonina syndrome (H)     dx 1990     Conductive hearing loss      Depressive disorder      Eustachian tube dysfunction      Goiter      Hearing loss, conductive      Heart rate problem      Hypertension      Hypocalcemia      Immunosuppression (H)      Irritable bowel syndrome      Major depressive disorder      Mannose-binding lectin deficiency 2014      Nonspecific abnormal results of thyroid function study      Osteoporosis      Pericarditis      and      Pneumonia     4/23/10     Recurrent otitis media      Recurrent UTI      Rheumatoid vasculitis (H)      Sinusitis, chronic      Steroid long-term use      Tinnitus      Varicose veins of leg with swelling        PSH:  Past Surgical History:   Procedure Laterality Date     C/SECTION, CLASSICAL        SECTION       COLONOSCOPY Left 2014    Procedure: COMBINED COLONOSCOPY, SINGLE OR MULTIPLE BIOPSY/POLYPECTOMY BY BIOPSY;  Surgeon: Js Pinedo MD;  Location:  GI     COLONOSCOPY N/A 2019    Procedure: COLONOSCOPY;  Surgeon: Bryce Pimentel MD;  Location:  GI     ENT SURGERY       GENITOURINARY SURGERY       HEAD & NECK SURGERY       HYSTERECTOMY  2009    without oopherectomy     HYSTERECTOMY, PAP NO LONGER INDICATED      cervix removed      PE TUBES       PICC INSERTION  10/10/2013    5fr DL PASV PICC, 43cm (1cm external) in the L basilic vein w/ tip in the low SVC.     PICC INSERTION Left 2017    5fr DL BioFlo PICC, 42cm (3cm external) in the L lateral brachial vein w/ tip in the SVC RA junction.     SINUS SURGERY       TUBAL LIGATION       TYMPANOPLASTY       ZZHC COLP CERVIX/UPPER VAGINA W LOOP ELEC BX CERVIX     she reports her prior hysterectomy () and tubal ligation had to be converted to open due to adhesions    Allergies:  Allergies   Allergen Reactions     Colistimethate Anaphylaxis     Colymycin M [Colistimethate Sodium] Anaphylaxis     Tamiflu [Oseltamivir]      Gums Blister up     Azithromycin Palpitations     Heart palpitation  Heart palpitation     Clindamycin Rash       Home Medications:  Current Outpatient Medications   Medication Sig Dispense Refill     acetylcysteine (MUCOMYST) 20 % neb solution TAKE 4ML VIA NEBULIZER TWICE A DAY WITH ALBUTEROL SOLUTION. DISCARD OPEN BOTTLE AFTER 96 HOURS. 800 mL 2     albuterol (PROAIR  HFA/PROVENTIL HFA/VENTOLIN HFA) 108 (90 Base) MCG/ACT inhaler INHALE 2 PUFFS INTO THE LUNGS EVERY 4 HOURS AS NEEDED FOR SHORTNESS OF BREATH/WHEEZE 18 g 3     albuterol (PROVENTIL) (2.5 MG/3ML) 0.083% neb solution TAKE 1 VIAL VIA NEBULIZER TWICE A DAY. 150 mL 11     amoxicillin-clavulanate (AUGMENTIN) 875-125 MG tablet Take 1 tablet by mouth 2 times daily 28 tablet 0     Ascorbic Acid (VITAMIN C PO) Take 800 mg by mouth       Barberry-Oreg Grape-Goldenseal (BERBERINE COMPLEX) 200-200-50 MG CAPS        Bioflavonoid Products (QUERCETIN COMPLEX IMMUNE PO)        Bromelains (BROMELAIN PO)        Caprylic Acid LIQD 1 capsule daily       ciprofloxacin-dexamethasone (CIPRODEX) 0.3-0.1 % otic suspension INSTILL 3 DROPS INTO AFFECTED EARS 2 TIMES DAILY AS NEEDED 7.5 mL 10     diazepam (VALIUM) 2 MG tablet TAKE 1-2 TABLETS (2-4 MG) BY MOUTH EVERY 6 HOURS AS NEEDED FOR VERTIGO 20 tablet 0     EnLyte (ENLYTE) CAPS capsule Take 1 capsule by mouth daily       fluticasone (FLONASE) 50 MCG/ACT nasal spray SHAKE LIQUID AND USE 2 SPRAYS IN EACH NOSTRIL EVERY DAY 48 mL 3     L-Glutamine 500 MG TABS Take 500 mg by mouth 3 times daily       metoprolol succinate ER (TOPROL-XL) 25 MG 24 hr tablet TAKE 1/2 TABLET(12.5 MG) BY MOUTH DAILY. HOLD IF BP IS LOW 45 tablet 3     Omega-3 Fatty Acids (FISH OIL) 1200 MG capsule Take 4 capsules (4.8 g) by mouth daily       OREGANO PO        Phosphatidyl Choline 65 MG TABS Take 2 capsules by mouth daily       predniSONE (DELTASONE) 1 MG tablet Take 5 tabs daily, alternating with 6 tabs daily 590 tablet 5     Probiotic Product (PROBIOTIC DAILY PO)        SYMBICORT 160-4.5 MCG/ACT Inhaler TAKE 2 PUFFS BY MOUTH TWICE A DAY 30.6 g 3     tiZANidine (ZANAFLEX) 2 MG tablet TAKE 1-2 TABLETS (2-4 MG) BY MOUTH 3 TIMES DAILY AS NEEDED FOR MUSCLE SPASMS 45 tablet 11     UNABLE TO FIND MEDICATION NAME: beef kidney capsules       vitamin D3 (CHOLECALCIFEROL) 2000 units (50 mcg) tablet Take 2 tablets by mouth daily          Social History:  Social History     Tobacco Use     Smoking status: Never Smoker     Smokeless tobacco: Never Used   Vaping Use     Vaping Use: Never used   Substance Use Topics     Alcohol use: Not Currently     Drug use: No       Family History:  Family History   Problem Relation Age of Onset     Allergies Mother         Seasonal     Alcohol/Drug Mother      Substance Abuse Mother      Depression Mother      C.A.D. Father      Diabetes Father         Type 2     Depression Father      Heart Disease Father      C.A.D. Maternal Grandmother      Arthritis Maternal Grandmother      Musculoskeletal Disorder Maternal Grandmother         MS     Heart Disease Maternal Grandmother      Hypertension Maternal Grandmother      Alcohol/Drug Maternal Grandfather      Substance Abuse Maternal Grandfather      Depression Maternal Grandfather      Asthma Son      Asthma Daughter      Allergies Daughter         Seasonal     Unknown/Adopted Paternal Grandmother      Unknown/Adopted Paternal Grandfather      Cancer Maternal Aunt         breast age 53     Breast Cancer Maternal Aunt         in her 50s     Glaucoma No family hx of      Macular Degeneration No family hx of      Colon Cancer No family hx of          Physical Exam:  /77   Pulse 68   Temp 97.7  F (36.5  C) (Temporal)   Resp 16   LMP 02/01/2009 (LMP Unknown)   SpO2 97%   General appearance: Resting Comfortably in bed, no apparent distress  Eyes: conjunctiva clear, pupils equally round and reactive.   Lungs: breathing comfortably on RA  Heart: regular rate   Abdomen: flat.  Prior pfannenstiel incision. Tender RLQ  Extremities: Without clubbing, cyanosis, edema  Neurologic: Grossly intact times four extremities, alert and oriented times three  Psychiatric: Mood and affect are appropriate  Skin: Without lesions or rashes      Labs Reviewed:  Lab Results   Component Value Date    WBC 17.2 06/22/2022    WBC 8.1 06/15/2021     Lab Results   Component Value Date     HGB 13.5 06/22/2022    HGB 12.8 06/15/2021     Lab Results   Component Value Date     06/22/2022     06/15/2021     Last Basic Metabolic Panel:  Lab Results   Component Value Date     06/22/2022     10/20/2020      Lab Results   Component Value Date    POTASSIUM 3.5 06/22/2022    POTASSIUM 3.5 10/20/2020     Lab Results   Component Value Date    CHLORIDE 106 06/22/2022    CHLORIDE 106 10/20/2020     Lab Results   Component Value Date    BOSSMAN 8.0 06/22/2022    BOSSMAN 9.0 06/15/2021     Lab Results   Component Value Date    CO2 23 06/22/2022    CO2 27 10/20/2020     Lab Results   Component Value Date    BUN 10 06/22/2022    BUN 8 10/20/2020     Lab Results   Component Value Date    CR 0.64 06/22/2022    CR 0.79 06/15/2021     Lab Results   Component Value Date    GLC 85 06/22/2022    GLC 91 10/20/2020       Radiology:  All imaging studies reviewed by me.    Results for orders placed or performed during the hospital encounter of 06/22/22   CT Abdomen Pelvis w Contrast    Narrative    EXAM: CT ABDOMEN PELVIS W CONTRAST  LOCATION: [facility]  DATE/TIME: 6/22/2022 4:40 PM    INDICATION: R sided abd pain  COMPARISON: 10/20/2020  TECHNIQUE: CT scan of the abdomen and pelvis was performed following injection of IV contrast. Multiplanar reformats were obtained. Dose reduction techniques were used.  CONTRAST: 50mL Isovue 370    FINDINGS:   LOWER CHEST: Prominent subendocardial hypodensity within the left ventricle, primarily the anterolateral wall and apex, similar to prior exam and compatible with old infarct. Small hiatal hernia.    HEPATOBILIARY: Normal.    PANCREAS: Normal.    SPLEEN: Normal.    ADRENAL GLANDS: Normal.    KIDNEYS/BLADDER: Normal.    BOWEL: Fluid-filled, dilated, and inflamed appendix measures 12 mm caliber. There are 2 appendicoliths, one of which measuring 6 mm appears the appendiceal base. There is a small amount of free fluid in the right lower quadrant  extending into the pelvis.   Moderate inflammatory change in the right lower quadrant as well. There are multiple mildly distended fluid-filled loops of small bowel in the lower abdomen and upper pelvis compatible with mild ileus. No free air, abscess, or evidence for farzad   obstruction. Moderate distal colonic diverticulosis.    LYMPH NODES: Normal.    VASCULATURE: Unremarkable.    PELVIC ORGANS: Prior hysterectomy. There is a 2.5 cm right ovarian simple cyst requiring no follow-up.    MUSCULOSKELETAL: Normal.      Impression    IMPRESSION:   1.  Acute appendicitis. Mild distal small bowel ileus.  2.  Old left ventricular infarction.     *Note: Due to a large number of results and/or encounters for the requested time period, some results have not been displayed. A complete set of results can be found in Results Review.         ASSESSMENT/PLAN:  The patient's history, physical exam, laboratory and imaging studies are suspicious for acute appendicitis.  I have offered the patient a laparoscopic appendectomy.    Due to the free fluid in the right lower quadrant we discussed I think she likely has ruptured.    We have had a detailed discussion of nature of appendicitis, the procedure, its risks, benefits, alternatives, recovery, postop limitations, anesthesia, bleeding,  DVT, postoperative infections, injury to adjacent organs and structures, open conversion, bowel resection, prolonged convalescence in the event of gangrene or perforation of the appendix, abdominal wall hernia.   All questions have been answered to the best of my ability.     COVID-19 testing done due to pandemic and was positive prior to surgery. She had a bad cold about 3 weeks ago now -has recovered is asymptomatic but she was not tested for covid-19 at that time but presumably her cold 3 weeks ago was COVID-19. She reports she is vaccinated and boosted.     Given her comorbidities and likely ruptured appendicitis, will plan to admit her after  surgery with a medicine consult. I don't think she requires stress dose steroids being on 5mg of prednisone daily but will monitor for adrenal insufficiency.    She elects to proceed.        Fany Hannah MD    Time spent with the patient, reviewing the EMR, reviewing laboratory and imaging studies, more than 50% of which was counseling and coordinating care:  60 minutes.

## 2022-06-23 LAB
ANION GAP SERPL CALCULATED.3IONS-SCNC: 3 MMOL/L (ref 3–14)
BASOPHILS # BLD AUTO: 0 10E3/UL (ref 0–0.2)
BASOPHILS NFR BLD AUTO: 0 %
BUN SERPL-MCNC: 8 MG/DL (ref 7–30)
CALCIUM SERPL-MCNC: 7.6 MG/DL (ref 8.5–10.1)
CHLORIDE BLD-SCNC: 109 MMOL/L (ref 94–109)
CO2 SERPL-SCNC: 25 MMOL/L (ref 20–32)
CREAT SERPL-MCNC: 0.73 MG/DL (ref 0.52–1.04)
EOSINOPHIL # BLD AUTO: 0 10E3/UL (ref 0–0.7)
EOSINOPHIL NFR BLD AUTO: 0 %
ERYTHROCYTE [DISTWIDTH] IN BLOOD BY AUTOMATED COUNT: 13.5 % (ref 10–15)
GFR SERPL CREATININE-BSD FRML MDRD: >90 ML/MIN/1.73M2
GLUCOSE BLD-MCNC: 141 MG/DL (ref 70–99)
GLUCOSE BLDC GLUCOMTR-MCNC: 132 MG/DL (ref 70–99)
GRAM STAIN RESULT: NORMAL
HCT VFR BLD AUTO: 33.3 % (ref 35–47)
HGB BLD-MCNC: 10.9 G/DL (ref 11.7–15.7)
IMM GRANULOCYTES # BLD: 0.1 10E3/UL
IMM GRANULOCYTES NFR BLD: 1 %
LYMPHOCYTES # BLD AUTO: 0.9 10E3/UL (ref 0.8–5.3)
LYMPHOCYTES NFR BLD AUTO: 5 %
MCH RBC QN AUTO: 33.4 PG (ref 26.5–33)
MCHC RBC AUTO-ENTMCNC: 32.7 G/DL (ref 31.5–36.5)
MCV RBC AUTO: 102 FL (ref 78–100)
MONOCYTES # BLD AUTO: 1.2 10E3/UL (ref 0–1.3)
MONOCYTES NFR BLD AUTO: 7 %
NEUTROPHILS # BLD AUTO: 15.4 10E3/UL (ref 1.6–8.3)
NEUTROPHILS NFR BLD AUTO: 87 %
NRBC # BLD AUTO: 0 10E3/UL
NRBC BLD AUTO-RTO: 0 /100
PLATELET # BLD AUTO: 241 10E3/UL (ref 150–450)
POTASSIUM BLD-SCNC: 4.3 MMOL/L (ref 3.4–5.3)
RBC # BLD AUTO: 3.26 10E6/UL (ref 3.8–5.2)
SODIUM SERPL-SCNC: 137 MMOL/L (ref 133–144)
WBC # BLD AUTO: 17.7 10E3/UL (ref 4–11)

## 2022-06-23 PROCEDURE — 258N000003 HC RX IP 258 OP 636: Performed by: SURGERY

## 2022-06-23 PROCEDURE — 82310 ASSAY OF CALCIUM: CPT | Performed by: SURGERY

## 2022-06-23 PROCEDURE — 85025 COMPLETE CBC W/AUTO DIFF WBC: CPT | Performed by: SURGERY

## 2022-06-23 PROCEDURE — 999N000157 HC STATISTIC RCP TIME EA 10 MIN

## 2022-06-23 PROCEDURE — 250N000009 HC RX 250: Performed by: INTERNAL MEDICINE

## 2022-06-23 PROCEDURE — 120N000001 HC R&B MED SURG/OB

## 2022-06-23 PROCEDURE — 94640 AIRWAY INHALATION TREATMENT: CPT

## 2022-06-23 PROCEDURE — 250N000013 HC RX MED GY IP 250 OP 250 PS 637: Performed by: SURGERY

## 2022-06-23 PROCEDURE — 99222 1ST HOSP IP/OBS MODERATE 55: CPT | Performed by: INTERNAL MEDICINE

## 2022-06-23 PROCEDURE — 36415 COLL VENOUS BLD VENIPUNCTURE: CPT | Performed by: SURGERY

## 2022-06-23 PROCEDURE — 250N000013 HC RX MED GY IP 250 OP 250 PS 637: Performed by: INTERNAL MEDICINE

## 2022-06-23 PROCEDURE — 250N000012 HC RX MED GY IP 250 OP 636 PS 637: Performed by: SURGERY

## 2022-06-23 PROCEDURE — 250N000011 HC RX IP 250 OP 636: Performed by: SURGERY

## 2022-06-23 RX ORDER — FLUTICASONE PROPIONATE 50 MCG
2 SPRAY, SUSPENSION (ML) NASAL DAILY
Status: DISCONTINUED | OUTPATIENT
Start: 2022-06-23 | End: 2022-06-24 | Stop reason: HOSPADM

## 2022-06-23 RX ORDER — ACETYLCYSTEINE 200 MG/ML
4 SOLUTION ORAL; RESPIRATORY (INHALATION) 2 TIMES DAILY
Status: DISCONTINUED | OUTPATIENT
Start: 2022-06-23 | End: 2022-06-24 | Stop reason: HOSPADM

## 2022-06-23 RX ORDER — ALBUTEROL SULFATE 0.83 MG/ML
2.5 SOLUTION RESPIRATORY (INHALATION) 2 TIMES DAILY
Status: DISCONTINUED | OUTPATIENT
Start: 2022-06-23 | End: 2022-06-24 | Stop reason: HOSPADM

## 2022-06-23 RX ADMIN — SODIUM CHLORIDE: 9 INJECTION, SOLUTION INTRAVENOUS at 13:37

## 2022-06-23 RX ADMIN — FLUTICASONE PROPIONATE 2 SPRAY: 50 SPRAY, METERED NASAL at 15:44

## 2022-06-23 RX ADMIN — PREDNISONE 5.5 MG: 1 TABLET ORAL at 08:22

## 2022-06-23 RX ADMIN — TAZOBACTAM SODIUM AND PIPERACILLIN SODIUM 3.38 G: 375; 3 INJECTION, SOLUTION INTRAVENOUS at 16:56

## 2022-06-23 RX ADMIN — ACETAMINOPHEN 975 MG: 325 TABLET, FILM COATED ORAL at 15:44

## 2022-06-23 RX ADMIN — TAZOBACTAM SODIUM AND PIPERACILLIN SODIUM 3.38 G: 375; 3 INJECTION, SOLUTION INTRAVENOUS at 23:33

## 2022-06-23 RX ADMIN — FLUTICASONE FUROATE AND VILANTEROL TRIFENATATE 1 PUFF: 200; 25 POWDER RESPIRATORY (INHALATION) at 08:24

## 2022-06-23 RX ADMIN — ENOXAPARIN SODIUM 40 MG: 40 INJECTION SUBCUTANEOUS at 15:44

## 2022-06-23 RX ADMIN — ALBUTEROL SULFATE 2 PUFF: 90 AEROSOL, METERED RESPIRATORY (INHALATION) at 08:55

## 2022-06-23 RX ADMIN — ALBUTEROL SULFATE 2.5 MG: 2.5 SOLUTION RESPIRATORY (INHALATION) at 19:09

## 2022-06-23 RX ADMIN — ACETAMINOPHEN 975 MG: 325 TABLET, FILM COATED ORAL at 06:41

## 2022-06-23 RX ADMIN — TAZOBACTAM SODIUM AND PIPERACILLIN SODIUM 3.38 G: 375; 3 INJECTION, SOLUTION INTRAVENOUS at 04:59

## 2022-06-23 RX ADMIN — ACETYLCYSTEINE 4 ML: 200 INHALANT RESPIRATORY (INHALATION) at 19:09

## 2022-06-23 RX ADMIN — TAZOBACTAM SODIUM AND PIPERACILLIN SODIUM 3.38 G: 375; 3 INJECTION, SOLUTION INTRAVENOUS at 11:08

## 2022-06-23 RX ADMIN — KETOROLAC TROMETHAMINE 15 MG: 15 INJECTION, SOLUTION INTRAMUSCULAR; INTRAVENOUS at 16:56

## 2022-06-23 RX ADMIN — KETOROLAC TROMETHAMINE 15 MG: 15 INJECTION, SOLUTION INTRAMUSCULAR; INTRAVENOUS at 04:58

## 2022-06-23 RX ADMIN — KETOROLAC TROMETHAMINE 15 MG: 15 INJECTION, SOLUTION INTRAMUSCULAR; INTRAVENOUS at 11:08

## 2022-06-23 RX ADMIN — ACETAMINOPHEN 975 MG: 325 TABLET, FILM COATED ORAL at 22:29

## 2022-06-23 RX ADMIN — FLUTICASONE FUROATE AND VILANTEROL TRIFENATATE 1 PUFF: 200; 25 POWDER RESPIRATORY (INHALATION) at 19:22

## 2022-06-23 ASSESSMENT — ACTIVITIES OF DAILY LIVING (ADL)
ADLS_ACUITY_SCORE: 24

## 2022-06-23 NOTE — PROGRESS NOTES
Care Management Discharge Note    Discharge Date: 06/24/2022    Discharge Disposition: Home    Additional Information:  Pt has a [facility] listed and is identified as a Service Bundle #4. Per chart review, no needs or assessment needed at this time. Please consult CM/SW if discharge needs should arise.    Karime Garcia RN BSN   Inpatient Care Coordination  [facility]   Phone [phone]

## 2022-06-23 NOTE — OP NOTE
General Surgery Operative Note      Pre-operative diagnosis: acute appendicitis   Post-operative diagnosis: acute perforated appendicitis with localized peritoneal contamination    Procedure: laparoscopic appendectomy   Surgeon: Fany Hannah MD   Assistant(s): NONE   Anesthesia: general    Estimated blood loss:  Complications: 5 cc  none   Specimens: ID Type Source Tests Collected by Time Destination   1 : appendix Tissue Appendix SURGICAL PATHOLOGY EXAM Fany Hannah MD 6/22/2022  7:58 PM    A :  Peritoneal Fluid Peritoneum ANAEROBIC BACTERIAL CULTURE ROUTINE, GRAM STAIN, AEROBIC BACTERIAL CULTURE ROUTINE Fany Hannah MD 6/22/2022  7:57 PM         INDICATION FOR PROCEDURE: This is a 51 year old female who presented with abdominal pain of 1 days duration. CT scan of the abdomen was consistent with acute appendicitis with some free fluid in the pelvis, which I discussed with her likely represented perforated appendicitis. We discussed operative management of appendicitis including risks and benefits, and the patient agreed to proceed.    DESCRIPTION OF PROCEDURE:  The patient was placed on the table in supine position.  General endotracheal anesthesia was induced and the abdomen was prepped and draped in standard sterile fashion.  A 5mm visiport trocar was placed in the left upper quadrant. Pneumoperitoneum was established and the abdomen was surveyed. A 5 mm trocar was placed in the suprapubic region, and a 12mm trocar was placed  in supraumbilical position under direct visualization.  The patient was placed in Trendelenburg and right side up.  There were some omental adhesions to the infraumbilical midline that I took down with cautery. The appendix was identified in the right lower quadrant.  It appeared very dilated. There was copious murky-yellow fluid throughout the abdomen.  Lateral attachments were taken down with cautery. Some adhesions were swept down. A perforation site was identified. A  window was created between the appendix base and the mesoappendix using a [place] dissector.  The base of the appendix was ligated and divided with the Endo-REBECCA stapler.  The mesoappendix was ligated and divided with a reload of the Endo-REBECCA stapler.  The appendix was passed into an Endocatch bag.  The right lower quadrant was inspected for hemostasis.  Hemostasis was assured.  Peritoneal fluid cultures were aspirated and sent for culture. We irrigated with sterile saline and aspirated the effluent.    The ports were all removed and the endocatch bag was removed from the abdomen and the 12mm port site fascia was closed with multiple 0 vicryl sutures.   The skin of all 3 incisions was anesthetized with local anesthetic and closed with interrupted 4-0 Vicryl subcuticular sutures and Steri-Strips.  The patient tolerated the procedure well.  Sponge and instrument counts were correct.    FINDINGS: perforated appendicitis with murky fluid throughout the abdomen.    Fany Hannah MD

## 2022-06-23 NOTE — PHARMACY-ADMISSION MEDICATION HISTORY
Admission medication history interview status for this patient is complete. See [facility] admission navigator for allergy information, prior to admission medications and immunization status.     Medication history interview done, indicate source(s): Patient  Medication history resources (including written lists, pill bottles, clinic record):None      Changes made to PTA medication list:  Added: probiotic  Changed: prednisone  Reported as Not Taking: none  Removed: Numerous dietary supplements per patient request    Actions taken by pharmacist (provider contacted, etc):None     Additional medication history information:None    Medication reconciliation/reorder completed by provider prior to medication history?  n   (Y/N)     For patients on insulin therapy:   Do you use sliding scale insulin based on blood sugars?   What is your pre-meal insulin coverage?    Do you typically eat three meals a day?   How many times do you check your blood glucose per day?   How many episodes of hypoglycemia do you typically have per month?   Do you have a Continuous Glucose Monitor (CGM)?      Prior to Admission medications    Medication Sig Last Dose Taking? Auth Provider Long Term End Date   acetylcysteine (MUCOMYST) 20 % neb solution TAKE 4ML VIA NEBULIZER TWICE A DAY WITH ALBUTEROL SOLUTION. DISCARD OPEN BOTTLE AFTER 96 HOURS. 6/21/2022 at Unknown time Yes Dagoberto Briscoe MD     albuterol (PROAIR HFA/PROVENTIL HFA/VENTOLIN HFA) 108 (90 Base) MCG/ACT inhaler INHALE 2 PUFFS INTO THE LUNGS EVERY 4 HOURS AS NEEDED FOR SHORTNESS OF BREATH/WHEEZE 6/22/2022 at am Yes Dagoberto Briscoe MD Yes    albuterol (PROVENTIL) (2.5 MG/3ML) 0.083% neb solution TAKE 1 VIAL VIA NEBULIZER TWICE A DAY. 6/21/2022 at Unknown time Yes Dagoberto Briscoe MD Yes    ciprofloxacin-dexamethasone (CIPRODEX) 0.3-0.1 % otic suspension INSTILL 3 DROPS INTO AFFECTED EARS 2 TIMES DAILY AS NEEDED  Yes Morelia Simental MD     diazepam (VALIUM) 2 MG tablet  TAKE 1-2 TABLETS (2-4 MG) BY MOUTH EVERY 6 HOURS AS NEEDED FOR VERTIGO More than a month at Unknown time Yes Keren Waller MD     fluticasone (FLONASE) 50 MCG/ACT nasal spray SHAKE LIQUID AND USE 2 SPRAYS IN EACH NOSTRIL EVERY DAY 6/21/2022 at Unknown time Yes Keren Waller MD     metoprolol succinate ER (TOPROL-XL) 25 MG 24 hr tablet TAKE 1/2 TABLET(12.5 MG) BY MOUTH DAILY. HOLD IF BP IS LOW  Patient taking differently: Take 12.5 mg by mouth daily as needed (depending on daily BP check) Past Week at Unknown time Yes Morelia Simental MD Yes    Omega-3 Fatty Acids (FISH OIL) 1200 MG capsule Take 4 capsules (4.8 g) by mouth daily 6/21/2022 at Unknown time Yes Dagoberto Briscoe MD     predniSONE (DELTASONE) 1 MG tablet Take 5 tabs daily, alternating with 6 tabs daily  Patient taking differently: Take 5.5 mg by mouth daily Take 5 tabs daily, alternating with 6 tabs daily 6/22/2022 at am Yes Adal Gallo MD No    Probiotic Product (PROBIOTIC DAILY PO) Take 1 capsule by mouth daily  Yes Reported, Patient     SYMBICORT 160-4.5 MCG/ACT Inhaler TAKE 2 PUFFS BY MOUTH TWICE A DAY 6/22/2022 at am Yes Dagoberto Briscoe MD Yes    tiZANidine (ZANAFLEX) 2 MG tablet TAKE 1-2 TABLETS (2-4 MG) BY MOUTH 3 TIMES DAILY AS NEEDED FOR MUSCLE SPASMS  Yes Morleia Simental MD     vitamin D3 (CHOLECALCIFEROL) 2000 units (50 mcg) tablet Take 2 tablets by mouth daily  Yes Reported, Patient     VITAMIN K PO Take 1 capsule by mouth daily  Yes Reported, Patient

## 2022-06-23 NOTE — ANESTHESIA CARE TRANSFER NOTE
Patient: Valencia Ye    Procedure: Procedure(s):  APPENDECTOMY, LAPAROSCOPIC       Diagnosis: Appendicitis [K37]  Diagnosis Additional Information: No value filed.    Anesthesia Type:   General     Note:    Oropharynx: spontaneously breathing  Level of Consciousness: awake  Oxygen Supplementation: face mask  Level of Supplemental Oxygen (L/min / FiO2): 6l  Independent Airway: airway patency satisfactory and stable  Dentition: dentition unchanged  Vital Signs Stable: post-procedure vital signs reviewed and stable  Report to RN Given: handoff report given  Patient transferred to: PACU  Comments: Pt to PACU isolation #13, VSS, report to RN  Handoff Report: Identifed the Patient, Identified the Reponsible Provider, Reviewed the pertinent medical history, Discussed the surgical course, Reviewed Intra-OP anesthesia mangement and issues during anesthesia, Set expectations for post-procedure period and Allowed opportunity for questions and acknowledgement of understanding      Vitals:  Vitals Value Taken Time   BP     Temp     Pulse 108 06/22/22 2037   Resp 14 06/22/22 2037   SpO2 100 % 06/22/22 2037   Vitals shown include unvalidated device data.    Electronically Signed By: SUNNY Crocker CRNA  June 22, 2022  8:39 PM

## 2022-06-23 NOTE — PROGRESS NOTES
"[facility]   General Surgery Progress Note          Assessment and Plan:   Assessment:   POD#1 s/p laparoscopic appendectomy for perforated appendicitis  Postoperative ileus as expected  Peritoneal fluid cultures pending      Plan:   -Can start sips of clears this morning  -IV ABX: Zosyn  -leukocytosis persistent - recheck tomorrow  -Increase activity as tolerated  -VTE PPX: Lovenox, PCDs  -Pain Mgmt: tylenol, iv dilaudid, oxycodone prn  -Await return of bowel function  -appreciate hospitalist assistance with management of medical problems       Staff addendum  Doing overall quite well. No gas but feels movement and not too much bloating. Tolerating sips of clear liquids. Advance once passing gas. Discussed with her discharge once wbc trending down, remains afebrile, passing gas and tolerating diet. 1-2 days more likely  Fany Hannah MD          Interval History:   Resting in bed, feeling better today. Has some soreness as expected. Up in room, voiding independently, no flatus yet. Not much of an appetite. She did hear some \"gurgling\" in her abdomen.         Physical Exam:   Blood pressure 102/60, pulse 64, temperature 97.8  F (36.6  C), temperature source Oral, resp. rate 18, height 1.575 m (5' 2\"), weight 49 kg (108 lb), last menstrual period 02/01/2009, SpO2 97 %, not currently breastfeeding.    I/O last 3 completed shifts:  In: 1300 [I.V.:1300]  Out: -     Abdomen: soft, ND, +RLQ tenderness, +BS  Inc(s) - clean, dry, intact with steristrips, no erythema            Data:     Recent Labs   Lab Test 06/23/22  0711 06/22/22  1605 03/31/22  1722   HGB 10.9* 13.5 12.9   WBC 17.7* 17.2* 10.8          Nav Campbell PA-C    "

## 2022-06-23 NOTE — ANESTHESIA POSTPROCEDURE EVALUATION
Patient: Valencia Ye    Procedure: Procedure(s):  APPENDECTOMY, LAPAROSCOPIC       Anesthesia Type:  General    Note:     Postop Pain Control: Uneventful            Sign Out: Well controlled pain   PONV: No   Neuro/Psych: Uneventful            Sign Out: Acceptable/Baseline neuro status   Airway/Respiratory: Uneventful            Sign Out: Acceptable/Baseline resp. status   CV/Hemodynamics: Uneventful            Sign Out: Acceptable CV status; No obvious hypovolemia; No obvious fluid overload   Other NRE: NONE   DID A NON-ROUTINE EVENT OCCUR? No           Last vitals:  Vitals Value Taken Time   /66 06/22/22 2205   Temp 99.2  F (37.3  C) 06/22/22 2034   Pulse 78 06/22/22 2207   Resp 8 06/22/22 2207   SpO2 100 % 06/22/22 2207   Vitals shown include unvalidated device data.    Electronically Signed By: Vicente Araujo MD  June 22, 2022  10:09 PM

## 2022-06-23 NOTE — ANESTHESIA PROCEDURE NOTES
Airway       Patient location during procedure: OR       Procedure Start/Stop Times: 6/22/2022 7:35 PM  Staff -        Anesthesiologist:  Vicente Araujo MD       CRNA: Aime Hawkins APRN CRNA       Performed By: CRNA  Consent for Airway        Urgency: elective  Indications and Patient Condition       Indications for airway management: dylan-procedural       Induction type:intravenous       Mask difficulty assessment: 1 - vent by mask    Final Airway Details       Final airway type: endotracheal airway       Successful airway: ETT - single  Endotracheal Airway Details        ETT size (mm): 7.0       Cuffed: yes       Successful intubation technique: direct laryngoscopy       DL Blade Type: MAC 3       Grade View of Cords: 2       Adjucts: stylet       Position: Right       Measured from: lips       Secured at (cm): 22       Bite block used: Oral Airway    Post intubation assessment        Placement verified by: capnometry and equal breath sounds        Number of attempts at approach: 1       Number of other approaches attempted: 0       Secured with: plastic tape       Ease of procedure: easy       Dentition: Intact and Unchanged    Medication(s) Administered   Medication Administration Time: 6/22/2022 7:35 PM

## 2022-06-23 NOTE — PLAN OF CARE
ROOM # 203    Living Situation (if not independent, order SW consult):  Facility name:Lives with   : Jimmie    Activity level at baseline: Independent  Activity level on admit: SBA    Who will be transporting you at discharge: Jimmie    Patient registered to observation; given Patient Bill of Rights; given the opportunity to ask questions about observation status and their plan of care.  Patient has been oriented to the observation room, bathroom and call light is in place.    Discussed discharge goals and expectations with patient/family.

## 2022-06-23 NOTE — CONSULTS
[facility]  Hospitalist Consult Note  Name: Valencia Ye    MRN: [id number]  YOB: 1970    Age: 51 year old  Date of admission: 6/22/2022  Primary care provider: Morelia Simental     Requesting Physician:  Dr. Hannah  Reason for consult:  Medical co-management    Valencia Ye is a 51 year old female with history of bronchiectasis and pseudomonal colonization, asthma with Churg-Antonina syndrome, anxiety and conductive hearing loss who was admitted on 6/22 with perforated appendicitis.  She is status post laparoscopic appendectomy on 6/22 and remains admitted for IV antibiotics and gradual diet advancement postoperatively.  I was asked to consult to help with medical comanagement.           Assessment and Plan:   1.  Acute appendicitis with perforation: Management as per general surgery.  Remains on IV antibiotics, Lovenox for DVT prophylaxis and multimodal pain regimen.  -- Trend white blood count  -- Diet, pain control, DVT prophylaxis deferred to primary surgical team    2.  History of asthma, Churg-Antonina syndrome and bronchiectasis: Follows with pulmonary medicine at the [facility].  --Restarting Mucomyst and scheduled albuterol as well as home controller inhaler  --She will hold off on using her respiratory vibration vest in the short-term as she expects this would cause significant pain in her abdomen  -- Per her request I did reach out via staff message to her primary pulmonologist (Dr. Briscoe) to see if he has any additional recommendations as she is not using her vest therapy but frankly I think she will do well in the short-term if we simply continue her usual nebs, incentive spirometry, mobilize her etc.  --She declines my offer for flutter valve as she reports this was ineffective in the past.  -- Remains on low-dose chronic prednisone.    3.  History of hypertension: It sounds that this may be more whitecoat hypertension per her report.  Only takes  metoprolol on an as-needed basis.    4.  COVID-19 positivity: Suspect her actual infection was in late May and likely not contagious anymore.  That said, she did not have any positive test at that time so out of an abundance of caution we will keep her on isolation for the time being.  Currently no indication for any COVID specific/targeted treatments.  DVT prophylaxis is being provided.                 History of Present Illness:   Valencia Ye is a 51 year old female with history of bronchiectasis and pseudomonal colonization, asthma with Churg-Antonina syndrome, anxiety and conductive hearing loss who was admitted on 6/22 with perforated appendicitis.  She is status post laparoscopic appendectomy on 6/22 and remains admitted for IV antibiotics and gradual diet advancement postoperatively.  I was asked to consult to help with medical comanagement.    Incidentally, she was found to be positive for COVID-19 on testing here but she notes that she believes she was actually infected with this around a month ago in late May.  She also describes 2 prior infections with COVID-19 with each subsequent bout becoming less severe.  She is vaccinated and boosted with the Pfizer vaccine.  She currently denies any respiratory symptoms but notes that normally she does vest therapy once a day for her bronchiectasis in addition to Mucomyst and albuterol nebs.  She follows with pulmonary medicine at the [facility].    Currently she denies any respiratory complaints.  Abdominal pain is starting to gradually improve.  She is taking only sips of water and is declining to take any other clear liquids just yet.              Past Medical History:     Past Medical History:   Diagnosis Date     Anxiety      Aortitis (H)      Asthma     associated with Churg Antonina syndrome     Bronchiectasis (H)      Cerebral infarction (H) August 1990    Very small, caused small permanent optical migraine     Chronic sinusitis       Churg-Antonina syndrome (H)     dx      Conductive hearing loss      Depressive disorder      Eustachian tube dysfunction      Goiter      Hearing loss, conductive      Heart rate problem      Hypertension      Hypocalcemia      Immunosuppression (H)      Irritable bowel syndrome      Major depressive disorder      Mannose-binding lectin deficiency      Nonspecific abnormal results of thyroid function study      Osteoporosis      Pericarditis      and      Pneumonia     4/23/10     Recurrent otitis media      Recurrent UTI      Rheumatoid vasculitis (H)      Sinusitis, chronic      Steroid long-term use      Tinnitus      Varicose veins of leg with swelling              Past Surgical History:     Past Surgical History:   Procedure Laterality Date     C/SECTION, CLASSICAL  1998      SECTION       COLONOSCOPY Left 2014    Procedure: COMBINED COLONOSCOPY, SINGLE OR MULTIPLE BIOPSY/POLYPECTOMY BY BIOPSY;  Surgeon: Js Pinedo MD;  Location:  GI     COLONOSCOPY N/A 2019    Procedure: COLONOSCOPY;  Surgeon: Bryce Pimentel MD;  Location:  GI     ENT SURGERY       GENITOURINARY SURGERY       HEAD & NECK SURGERY       HYSTERECTOMY  2009    without oopherectomy     HYSTERECTOMY, PAP NO LONGER INDICATED      cervix removed      PE TUBES       PICC INSERTION  10/10/2013    5fr DL PASV PICC, 43cm (1cm external) in the L basilic vein w/ tip in the low SVC.     PICC INSERTION Left 2017    5fr DL BioFlo PICC, 42cm (3cm external) in the L lateral brachial vein w/ tip in the SVC RA junction.     SINUS SURGERY       TUBAL LIGATION       TYMPANOPLASTY       ZZHC COLP CERVIX/UPPER VAGINA W LOOP ELEC BX CERVIX                 Social History:     Social History     Tobacco Use     Smoking status: Never Smoker     Smokeless tobacco: Never Used   Substance Use Topics     Alcohol use: Not Currently             Family History:     Family History   Problem Relation Age of  "Onset     Allergies Mother         Seasonal     Alcohol/Drug Mother      Substance Abuse Mother      Depression Mother      C.A.D. Father      Diabetes Father         Type 2     Depression Father      Heart Disease Father      C.A.D. Maternal Grandmother      Arthritis Maternal Grandmother      Musculoskeletal Disorder Maternal Grandmother         MS     Heart Disease Maternal Grandmother      Hypertension Maternal Grandmother      Alcohol/Drug Maternal Grandfather      Substance Abuse Maternal Grandfather      Depression Maternal Grandfather      Asthma Son      Asthma Daughter      Allergies Daughter         Seasonal     Unknown/Adopted Paternal Grandmother      Unknown/Adopted Paternal Grandfather      Cancer Maternal Aunt         breast age 53     Breast Cancer Maternal Aunt         in her 50s     Glaucoma No family hx of      Macular Degeneration No family hx of      Colon Cancer No family hx of              Allergies:     Allergies   Allergen Reactions     Colistimethate Anaphylaxis     Colymycin M [Colistimethate Sodium] Anaphylaxis     Tamiflu [Oseltamivir]      Gums Blister up     Azithromycin Palpitations     Heart palpitation  Heart palpitation     Clindamycin Rash             Medications:       acetaminophen  975 mg Oral Q8H     enoxaparin ANTICOAGULANT  40 mg Subcutaneous Q24H     fluticasone-vilanterol  1 puff Inhalation Daily     ketorolac  15 mg Intravenous Q6H     metoprolol  5 mg Intravenous Q6H     piperacillin-tazobactam  3.375 g Intravenous Q6H     predniSONE  5.5 mg Oral Daily     sodium chloride (PF)  3 mL Intracatheter Q8H             Review of Systems:   A comprehensive greater than 10 system review of systems was carried out.  Pertinent positives and negatives are noted above.  Otherwise negative for contributory info.            Physical Exam:   Blood pressure 102/60, pulse 64, temperature 97.8  F (36.6  C), temperature source Oral, resp. rate 18, height 1.575 m (5' 2\"), weight 49 kg (108 " lb), last menstrual period 02/01/2009, SpO2 97 %, not currently breastfeeding.  Exam:  General: Alert, awake, no acute distress.  Well-appearing.  HEENT: NC/AT, eyes anicteric, external occular movements intact, face symmetric.  Dentition WNL, MM moist.  Cardiac: RRR, S1, S2.  No murmurs appreciated.  Pulmonary: Normal chest rise, normal work of breathing.  Lungs CTA BL  Extremities: no deformities.  Warm, well perfused.  Skin: no rashes or lesions noted.  Warm and Dry.  Neuro: No focal deficits noted.  Speech clear.  Coordination and strength grossly normal.  Psych: Appropriate affect.           Data:   Imaging:    Recent Results (from the past 24 hour(s))   CT Abdomen Pelvis w Contrast    Narrative    EXAM: CT ABDOMEN PELVIS W CONTRAST  LOCATION: [facility]  DATE/TIME: 6/22/2022 4:40 PM    INDICATION: R sided abd pain  COMPARISON: 10/20/2020  TECHNIQUE: CT scan of the abdomen and pelvis was performed following injection of IV contrast. Multiplanar reformats were obtained. Dose reduction techniques were used.  CONTRAST: 50mL Isovue 370    FINDINGS:   LOWER CHEST: Prominent subendocardial hypodensity within the left ventricle, primarily the anterolateral wall and apex, similar to prior exam and compatible with old infarct. Small hiatal hernia.    HEPATOBILIARY: Normal.    PANCREAS: Normal.    SPLEEN: Normal.    ADRENAL GLANDS: Normal.    KIDNEYS/BLADDER: Normal.    BOWEL: Fluid-filled, dilated, and inflamed appendix measures 12 mm caliber. There are 2 appendicoliths, one of which measuring 6 mm appears the appendiceal base. There is a small amount of free fluid in the right lower quadrant extending into the pelvis.   Moderate inflammatory change in the right lower quadrant as well. There are multiple mildly distended fluid-filled loops of small bowel in the lower abdomen and upper pelvis compatible with mild ileus. No free air, abscess, or evidence for farzad   obstruction. Moderate distal  colonic diverticulosis.    LYMPH NODES: Normal.    VASCULATURE: Unremarkable.    PELVIC ORGANS: Prior hysterectomy. There is a 2.5 cm right ovarian simple cyst requiring no follow-up.    MUSCULOSKELETAL: Normal.      Impression    IMPRESSION:   1.  Acute appendicitis. Mild distal small bowel ileus.  2.  Old left ventricular infarction.       EKG/Telemetry: No EKG  Recent Labs   Lab 06/23/22  0711 06/22/22  1605   WBC 17.7* 17.2*   HGB 10.9* 13.5   HCT 33.3* 40.3   * 100    340     Recent Labs   Lab 06/23/22  0711 06/23/22  0640 06/22/22  1707     --  135   POTASSIUM 4.3  --  3.5   CHLORIDE 109  --  106   CO2 25  --  23   ANIONGAP 3  --  6   * 132* 85   BUN 8  --  10   CR 0.73  --  0.64   GFRESTIMATED >90  --  >90   BOSSMAN 7.6*  --  8.0*

## 2022-06-23 NOTE — PLAN OF CARE
"End of shift summary- [id number]   Dx: Appendicitis w/ perf  A/O: Alert and Oriented x4  Diet: Clear liquid  Fluids: has Normal Saline 0.9% running at 75 mL per hour.  Transfer: are independent with no assistive devices   Bathroom: Voiding well. Bowel audible  Pain: complaing of 4/10 generalized pain.  Tylenol and Toradol - scheduled given for pain.  Telemetry Monitoring: No  Treatment: Zosyn, IVF, Clears, Nebs, Lovenox   Discharge Plans: tbd - from home. Hopeful in next few days     RT to do nebs. HEPPA filter ordered and placed in room per RT    Blood pressure 101/58, pulse 60, temperature 98.1  F (36.7  C), temperature source Oral, resp. rate 16, height 1.575 m (5' 2\"), weight 49 kg (108 lb), last menstrual period 02/01/2009, SpO2 100 %, not currently breastfeeding.      "

## 2022-06-23 NOTE — PROGRESS NOTES
RT paged about receiving proper filter for mucomyst neb. Pt wants that and the albuterol one together

## 2022-06-23 NOTE — PLAN OF CARE
"INPATIENT NOTE: [id number]     PRIMARY PROBLEM: Acute appendicitis with perforation and generalized peritonitis        Vital signs:  Temp: 98.1  F (36.7  C) Temp src: Oral BP: 92/51 Pulse: 68   Resp: 16 SpO2: 98 % O2 Device: None (Room air) Oxygen Delivery: 2 LPM Height: 157.5 cm (5' 2\") Weight: 49 kg (108 lb)  Estimated body mass index is 19.75 kg/m  as calculated from the following:    Height as of this encounter: 1.575 m (5' 2\").    Weight as of this encounter: 49 kg (108 lb).        Orientation: Alert and Oriented x4  Neuro: Intact   Pain status: complaining of 5/10 pain in their abdomen Tylenol and Toradol given for pain.   Resp: Lung sounds are Clear. Denies any SOB.   Cardiac: WNL, Denies any Chest Pain   GI: Bowels are active x 4 Quads. Last BM 6/22/23  : Voiding with no concern    Skin: WNL   LDA: Peripheral IV   Infusions: Patient has Normal Saline 0.9% running at 100 mL per hour.   Diet: NPO  Activity: are SBA with Gait Belt  Isolation:   Patient is on Airborne precuations for COVID 19.    Consults: Surgery  Major Shift Event: pt is on Capno. Pt was given her Scheduled Zosyn. Pt is on scheduled.      Will continue to monitor and provide cares.     John Paul Westfall RN    "

## 2022-06-24 ENCOUNTER — MYC MEDICAL ADVICE (OUTPATIENT)
Dept: INTERNAL MEDICINE | Facility: CLINIC | Age: 52
End: 2022-06-24

## 2022-06-24 ENCOUNTER — TELEPHONE (OUTPATIENT)
Dept: INTERNAL MEDICINE | Facility: CLINIC | Age: 52
End: 2022-06-24

## 2022-06-24 VITALS
OXYGEN SATURATION: 98 % | WEIGHT: 108 LBS | BODY MASS INDEX: 19.88 KG/M2 | TEMPERATURE: 98.1 F | HEART RATE: 80 BPM | SYSTOLIC BLOOD PRESSURE: 112 MMHG | RESPIRATION RATE: 18 BRPM | HEIGHT: 62 IN | DIASTOLIC BLOOD PRESSURE: 72 MMHG

## 2022-06-24 LAB
ERYTHROCYTE [DISTWIDTH] IN BLOOD BY AUTOMATED COUNT: 13.9 % (ref 10–15)
GLUCOSE BLDC GLUCOMTR-MCNC: 104 MG/DL (ref 70–99)
HCT VFR BLD AUTO: 31.9 % (ref 35–47)
HGB BLD-MCNC: 10.3 G/DL (ref 11.7–15.7)
MCH RBC QN AUTO: 33.1 PG (ref 26.5–33)
MCHC RBC AUTO-ENTMCNC: 32.3 G/DL (ref 31.5–36.5)
MCV RBC AUTO: 103 FL (ref 78–100)
PATH REPORT.COMMENTS IMP SPEC: NORMAL
PATH REPORT.COMMENTS IMP SPEC: NORMAL
PATH REPORT.FINAL DX SPEC: NORMAL
PATH REPORT.GROSS SPEC: NORMAL
PATH REPORT.MICROSCOPIC SPEC OTHER STN: NORMAL
PATH REPORT.RELEVANT HX SPEC: NORMAL
PHOTO IMAGE: NORMAL
PLATELET # BLD AUTO: 224 10E3/UL (ref 150–450)
RBC # BLD AUTO: 3.11 10E6/UL (ref 3.8–5.2)
WBC # BLD AUTO: 10.8 10E3/UL (ref 4–11)

## 2022-06-24 PROCEDURE — 258N000003 HC RX IP 258 OP 636: Performed by: SURGERY

## 2022-06-24 PROCEDURE — 250N000013 HC RX MED GY IP 250 OP 250 PS 637: Performed by: SURGERY

## 2022-06-24 PROCEDURE — 85027 COMPLETE CBC AUTOMATED: CPT | Performed by: SURGERY

## 2022-06-24 PROCEDURE — 999N000157 HC STATISTIC RCP TIME EA 10 MIN

## 2022-06-24 PROCEDURE — 88304 TISSUE EXAM BY PATHOLOGIST: CPT | Mod: 26 | Performed by: PATHOLOGY

## 2022-06-24 PROCEDURE — 250N000011 HC RX IP 250 OP 636: Performed by: SURGERY

## 2022-06-24 PROCEDURE — 94640 AIRWAY INHALATION TREATMENT: CPT

## 2022-06-24 PROCEDURE — 36415 COLL VENOUS BLD VENIPUNCTURE: CPT | Performed by: SURGERY

## 2022-06-24 PROCEDURE — 250N000012 HC RX MED GY IP 250 OP 636 PS 637: Performed by: SURGERY

## 2022-06-24 PROCEDURE — 94640 AIRWAY INHALATION TREATMENT: CPT | Mod: 76

## 2022-06-24 RX ADMIN — FLUTICASONE FUROATE AND VILANTEROL TRIFENATATE 1 PUFF: 200; 25 POWDER RESPIRATORY (INHALATION) at 08:19

## 2022-06-24 RX ADMIN — ACETAMINOPHEN 975 MG: 325 TABLET, FILM COATED ORAL at 08:36

## 2022-06-24 RX ADMIN — ALBUTEROL SULFATE 2 PUFF: 90 AEROSOL, METERED RESPIRATORY (INHALATION) at 08:19

## 2022-06-24 RX ADMIN — TAZOBACTAM SODIUM AND PIPERACILLIN SODIUM 3.38 G: 375; 3 INJECTION, SOLUTION INTRAVENOUS at 05:45

## 2022-06-24 RX ADMIN — PREDNISONE 5.5 MG: 1 TABLET ORAL at 08:36

## 2022-06-24 RX ADMIN — SODIUM CHLORIDE: 9 INJECTION, SOLUTION INTRAVENOUS at 03:29

## 2022-06-24 ASSESSMENT — ACTIVITIES OF DAILY LIVING (ADL)
ADLS_ACUITY_SCORE: 24

## 2022-06-24 NOTE — DISCHARGE INSTRUCTIONS
HOME CARE FOLLOWING APPENDECTOMY  STACEY Jerez, HORTENCIA Cordova R. O Donnell, J. Shaheen    INCISIONAL CARE:  Replace the bandage over your incision(s) until all drainage stops, or if more comfortable to have in place.  If present, leave the steri-strips (white paper tapes) in place for 14 days after surgery.  If Dermabond (a type of skin glue) is present, leave in place until it wears/flakes off (2-3 weeks).     BATHING:  OK to shower 48 hours after surgery.  Avoid baths for 1 week after surgery.  You may wash your hair at any time.  Gently pat your incision dry after bathing.  Do not apply lotions, creams, or ointments to incisions.    ACTIVITY:  Light Activity -- you may immediately be up and about as tolerated.  Walking is encouraged, increase as tolerated.  Driving/Light Work-- when comfortable and off narcotic pain medications.  Strenuous Work/Activity -- limit lifting to 20 pounds for 2 weeks.  Progressively increase with time.  Active Sports (running, biking, etc.) -- cautiously resume after 2 weeks.    DISCOMFORT:  Local anesthetic placed at surgery should provide relief for 4-8 hours.  Begin taking pain pills before discomfort is severe.  Take the pain medication with some food, when possible, to minimize side effects.  Intermittent use of ice packs may help during the first 1-3 weeks after surgery.  Expect gradual improvement.    - Tylenol (acetaminophen) 500-1000mg every 6 hours (max 4,000mg per day)  Over-the-counter anti-inflammatory medications (i.e. Ibuprofen/Advil/Motrin or Naprosyn/Aleve) may be used per package instructions.  DO NOT TAKE these Anti-inflammatory medications if your primary physician has advised against doing so, or if you have acid reflux, ulcer, or bleeding disorder, or take blood-thinner medications.  Call your primary physician or the surgery office if you have medication questions.  You may have decreased energy level for 1-2 weeks after surgery related  "to your recovery.    DIET:  Start with liquids and gradually resume your regular diet as tolerated.  Consider eating yogurt or taking a probiotic to help your \"gut hubert\" (good bacteria in the bowel/colon) return to normal while taking or after receiving antibiotics.  Drink plenty of fluids.  While taking pain medications, consider use of a stool softener, increase your fiber in your diet, or add a fiber supplement (like Metamucil, Citrucel) to help prevent constipation - a possible side effect of pain medications.    NAUSEA:  If nauseated from the anesthetic/pain meds; rest in bed, get up cautiously with assistance, and drink clear liquids (juice, tea, broth).    FOLLOW-UP AFTER SURGERY:  -Our office will contact you approximately 2-3 weeks after surgery to check on your progress and answer any questions you may have.  If you are doing well, you will not need to return for an office appointment.  If any concerns are identified over the phone, we will help you make an appointment to see a provider.    -If you have not received a phone call, have any questions or concerns, or would like to be seen, please call us at [phone].  We are located at: [address], [address]; [place] [address]    -CONTACT US IF THE FOLLOWING DEVELOPS:   1. A fever that is above 101     2. Increased redness, warmth, drainage, bleeding, or swelling.   3. Pain that is not relieved by rest/ice and your prescription.   4.  Increasing pain after 48 hours.   5. Drainage that is thick, cloudy, yellow, green or white.   6. Any other questions or concerns.      FREQUENTLY ASKED QUESTIONS:    Q:  How should my incision look?    A:  Normally your incision will appear slightly swollen with light redness directly along the incision itself as it heals.  It may feel like a bump or ridge as the healing/scarring happens, and over time (3-4 months) this bump or ridge feeling should slowly go away.  In general, clear or pink watery drainage can " be normal at first as your incision heals, but should decrease over time.    Q:  How do I know if my incision is infected?  A:  Look at your incision for signs of infection, like redness around the incision spreading to surrounding skin, or drainage of cloudy or foul-smelling drainage.  If you feel warm, check your temperature to see if you are running a fever.    **If any of these things occur, please notify the nurse at our office.  We may need you to come into the office for an incision check.      Q:  How do I take care of my incision?  A:  If you have a dressing in place - Starting the day after surgery, replace the dressing 1-2 times a day until there is no further drainage from the incision.  At that time, a dressing is no longer needed.  Try to minimize tape on the skin if irritation is occurring at the tape sites.  If you have significant irritation from tape on the skin, please call the office to discuss other method of dressing your incision.    Small pieces of tape called  steri-strips  may be present directly overlying your incision; these may be removed 10 days after surgery unless otherwise specified by your surgeon.  If these tapes start to loosen at the ends, you may trim them back until they fall off or are removed.    A:  If you had  Dermabond  tissue glue used as a dressing (this causes your incision to look shiny with a clear covering over it) - This type of dressing wears off with time and does not require more dressings over the top unless it is draining around the glue as it wears off.  Do not apply ointments or lotions over the incisions until the glue has completely worn off.    Q:  There is a piece of tape or a sticky  lead  still on my skin.  Can I remove this?  A:  Sometimes the sticky  leads  used for monitoring during surgery or for evaluation in the emergency department are not all removed while you are in the hospital.  These sometimes have a tab or metal dot on them.  You can easily  remove these on your own, like taking off a band-aid.  If there is a gel substance under the  lead , simply wipe/clean it off with a washcloth or paper towel.      Q:  What can I do to minimize constipation (very hard stools, or lack of stools)?  A:  Stay well hydrated.  Increase your dietary fiber intake or take a fiber supplement -with plenty of water.  Walk around frequently.  You may consider an over-the-counter stool-softener.  Your Pharmacist can assist you with choosing one that is stocked at your pharmacy.  Constipation is also one of the most common side effects of pain medication.  If you are using pain medication, be pro-active and try to PREVENT problems with constipation by taking the steps above BEFORE constipation becomes a problem.    Q:  What do I do if I need more pain medications?  A:  Call the office to receive refills.  Be aware that certain pain meds cannot be called into a pharmacy and actually require a paper prescription.  A change may be made in your pain med as you progress thru your recovery period or if you have side effects to certain meds.    --Pain meds are NOT refilled after 5pm on weekdays, and NOT AT ALL on the weekends, so please look ahead to prevent problems.    Q:  Why am I having a hard time sleeping now that I am at home?  A:  Many medications you receive while you are in the hospital can impact your sleep for a number of days after your surgery/hospitalization.  Decreased level of activity and naps during the day may also make sleeping at night difficult.  Try to minimize day-time naps, and get up frequently during the day to walk around your home during your recovery time.  Sleep aides may be of some help, but are not recommended for long-term use.      Q:  I am having some back discomfort.  What should I do?  A:  This may be related to certain positioning that was required for your surgery, extended periods of time in bed, or other changes in your overall activity level.   You may try ice, heat, acetaminophen, or ibuprofen to treat this temporarily.  Note that many pain medications have acetaminophen in them and would state this on the prescription bottle.  Be sure not to exceed the maximum of 4000mg per day of acetaminophen.     **If the pain you are having does not resolve, is severe, or is a flare of back pain you have had on other occasions prior to surgery, please contact your primary physician for further recommendations or for an appointment to be examined at their office.    Q:  Why am I having headaches?  A:  Headaches can be caused by many things:  caffeine withdrawal, use of pain meds, dehydration, high blood pressure, lack of sleep, over-activity/exhaustion, flare-up of usual migraine headaches.  If you feel this is related to muscle tension (a band-like feeling around the head, or a pressure at the low-back of the head) you may try ice or heat to this area.  You may need to drink more fluids (try electrolyte drink like Gatorade), rest, or take your usual migraine medications.   **If your headaches do not resolve, worsen, are accompanied by other symptoms, or if your blood pressure is high, please call your primary physician for recommendation and/or examination.    Q:  I am unable to urinate.  What do I do?  A:  A small percentage of people can have difficulty urinating initially after surgery.  This includes being able to urinate only a very small amount at a time and feeling discomfort or pressure in the very low abdomen.  This is called  urinary retention , and is actually an urgent situation.  Proceed to your nearest Emergency department for evaluation (not an Urgent Care Center).  Sometimes the bladder does not work correctly after certain medications you receive during surgery, or related to certain procedures.  You may need to have a catheter placed until your bladder recovers.  When planning to go to an Emergency department, it may help to call the ER to let them  know you are coming in for this problem after a surgery.  This may help you get in quicker to be evaluated.  **If you have symptoms of a urinary tract infection, please contact your primary physician for the proper evaluation and treatment.        If you have other questions, please call the office Monday thru Friday between 8am and 4:30pm to discuss with the Nurse or Physician Assistant.  #[phone]    There is a surgeon ON CALL on weekday evenings and over the weekend in case of urgent need only, and may be contacted at the same number.    If you are having an emergency, call 911 or proceed to your nearest emergency department.

## 2022-06-24 NOTE — TELEPHONE ENCOUNTER
IP F/U    Date: 6/24/2022  Diagnosis: acute appendicitis with perforation and generalized peritonitis  Is patient active in care coordination? No  Was patient in TCU? No    Next 5 appointments (look out 90 days)    Aug 04, 2022  4:30 PM  (Arrive by 4:10 PM)  Provider Visit with Morelia Simental MD  [facility] ([facility] ) [address] [facility] [id number] [phone]        ED / Discharge Outreach Protocol    Patient Contact    Attempt # 1    Was call answered?  No.  Unable to leave message.    Please call patient to see if virtual appointment above is appropriate  Also sent Al Detalt message but patient isn't consistent about checking messages

## 2022-06-24 NOTE — PROGRESS NOTES
Patient complaining of poor urine output  -Check bladder scan for retention  -Monitor output for next 8 hours

## 2022-06-24 NOTE — PLAN OF CARE
Shift from [id number]     Inpatient Progress Note:  For complete assessment see flow sheet documentation.      Orientation: A/O x4  Neuro: Intact  Pain status: Tenderness in RLQ and LLQ but did not require PRN medications  Activity: Independent  Peripheral edema: None   Resp: WDL  Cardiac: WDL  GI: Abdominal tenderness; s/p appendectomy: C/D/I  : Continent; c/o oliguria. Bladder scanned the pt and had 16 mL for residual  Skin: WDL with the exception of incisions which are C/D/I  LDA: Right PIV infusing at 75  Infusions: 0.9 NaCl at 75   Pertinent Labs: CBC and BG needing to be obtained  Diet: Clear liquid diet  Consults: General surgery following  Discharge Plan: Advance diet and discharge

## 2022-06-24 NOTE — PLAN OF CARE
Patient's After Visit Summary was reviewed with patient.  Patient verbalized understanding of After Visit Summary, recommended follow up and was given an opportunity to ask questions.   Discharge medications sent home with patient/family: YES, Augmentin.   Discharged with spouse.    Discharged in stable condition with spouse as ride. AVS thoroughly reviewed.

## 2022-06-24 NOTE — PLAN OF CARE
/72  Pulse 80  Temp 98.1  F (Oral)  Resp 18  SpO2 98% on RA     Reports mild abdominal tenderness. Scheduled Tylenol given. IVF continued. Patient voided 300mL x 2 prior to discharge; feels bladder is emptying sufficiently. Tolerated scrambled eggs and chicken noodle soup without increased pain or nausea. BS active x 4. Reports passing gas. COVID precautions maintained. Patient eager to discharge;  will provide ride.

## 2022-06-24 NOTE — CONSULTS
"Hospital Medicine   Perioperative Consult Note  6/24/2022 8:14 AM   Brief    Mrs. Katie Ye is a 50 yo F with history of bronchiectasis and pseudomonal colonization, asthma with Churg-Antonina syndrome, anxiety and conductive hearing loss who was admitted on 6/22 with perforated appendicitis.  She is status post laparoscopic appendectomy on 6/22 and remains admitted for IV antibiotics and gradual diet advancement postoperatively.    Doing rather well.  Endorses decreased urinary output overnight (still able to void just smaller amounts).  Pain under control.  No significant n/v.       Exam:  /63 (BP Location: Left arm)   Pulse 72   Temp 98.2  F (36.8  C) (Oral)   Resp 16   Ht 1.575 m (5' 2\")   Wt 49 kg (108 lb)   LMP 02/01/2009 (LMP Unknown)   SpO2 99%   BMI 19.75 kg/m     Not examined given stability and to expedite appropriate/timely disposition.      MDM:    10.8>10.3<224  WBC peaked at 17.7, zain 10.8 (at time of discharge)  -132    A/P:    Primary medical issue:  1.  History of asthma, Churg-Antonina syndrome and bronchiectasis: Follows with pulmonary medicine at the [facility].  -- Continue Mucomyst and scheduled albuterol as well as home controller inhaler.  --She will hold off on using her respiratory vibration vest in the short-term as she expects this would cause significant pain in her abdomen.  -- Per her request we did reach out via staff message to her primary pulmonologist (Dr. Briscoe) to see if he has any additional recommendations as she is not using her vest therapy but frankly I think she will do well in the short-term if we simply continue her usual nebs, incentive spirometry, mobilize her etc.  --She declines my offer for flutter valve as she reports this was ineffective in the past.  -- Remains on low-dose chronic prednisone.    Primary surgical issue:  2.  Acute appendicitis with perforation: Management as per general surgery.  Remains on IV antibiotics and " Nurse Kevin Franco has been advised per Martha Carrillo. Will also forward message to Dr Lamona Angelucci' and his nurse Saira as well. transitioning to Augmentin for 4 more days per ACS. Lovenox for DVT prophylaxis and multimodal pain regimen.  -- Trend white blood count -- as noted above.   -- Diet, pain control, DVT prophylaxis deferred to primary surgical team     Chronic medical issues:  3.  History of hypertension: It sounds that this may be more whitecoat hypertension per her report.  Only takes metoprolol on an as-needed basis.     4.  COVID-19 positivity: Suspect her actual infection was in late May and likely not contagious anymore.  That said, she did not have any positive test at that time so out of an abundance of caution we will keep her on isolation for the time being.  Currently no indication for any COVID specific/targeted treatments.  DVT prophylaxis is being provided.    Medically stable for discharge when deemed medically appropriate per surgery.     SUNNY Martinez CNP

## 2022-06-24 NOTE — PLAN OF CARE
PRIMARY DIAGNOSIS: s/p Lap appy  1. Stable vital signs Yes  2. Tolerating diet: Clears  3. Pain controlled with oral pain medications:  Yes  4. Positive bowel sounds:  Yes  5. Voiding without difficulty:  Yes  6. Able to ambulate:  Yes  7. Provider specific discharge goals met:  No    Discharge Planner Nurse   Safe discharge environment identified: Yes  Barriers to discharge: Yes       Entered by: Mily Holcomb RN 06/23/2022 10:36 PM    VSS, pt is A&Ox4, up independently in room, tolerating oral pain meds, IVF @ 75, COVID precautions, IV zosyn, incisions C/D/I, will continue to monitor

## 2022-06-24 NOTE — PROGRESS NOTES
"[facility]   General Surgery Progress Note          Assessment and Plan:   Assessment:   POD#2 s/p laparoscopic appendectomy for perforated appendicitis  Postoperative ileus resolving  Peritoneal fluid cultures so far negative  Leukocytosis resolved, AF  Incidental covid-19 positive on admission       Plan:   -ADAT  -IV ABX: Zosyn - transition to augmentin x 4 days on discharge  -Increase activity as tolerated  -VTE PPX: Lovenox, PCDs  -Pain Mgmt: tylenol, iv dilaudid, oxycodone prn  -appreciate hospitalist assistance with management of medical problems  Discharge later today if tolerates diet advancement and UOP okay               Interval History:   Resting in bed, feeling better today. She would like to eat and discharge to home. Reported urinating in small amounts overnight. Up and moving, pain well controlled. Doing well with clears, no nausea. Passing gas         Physical Exam:   Blood pressure 111/63, pulse 72, temperature 98.2  F (36.8  C), temperature source Oral, resp. rate 16, height 1.575 m (5' 2\"), weight 49 kg (108 lb), last menstrual period 02/01/2009, SpO2 99 %, not currently breastfeeding.    I/O last 3 completed shifts:  In: 1401 [I.V.:1401]  Out: -     Abdomen: soft, ND, +RLQ tenderness  Inc(s) - clean, dry, intact with steristrips, no erythema            Data:     Recent Labs   Lab Test 06/23/22  0711 06/22/22  1605 03/31/22  1722   HGB 10.9* 13.5 12.9   WBC 17.7* 17.2* 10.8          Fany Hannah MD    "

## 2022-06-27 ENCOUNTER — PATIENT OUTREACH (OUTPATIENT)
Dept: CARE COORDINATION | Facility: CLINIC | Age: 52
End: 2022-06-27

## 2022-06-27 NOTE — PROGRESS NOTES
Clinic Care Coordination Contact  [facility]: Post-Discharge Note  SITUATION                                                      Admission:    Admission Date: 06/22/22   Reason for Admission: Abdominal pain  Discharge:   Discharge Date: 06/24/22  Discharge Diagnosis: cute appendicitis with generalized peritonitis, unspecified whether abscess present, unspecified whether gangrene present, unspecified whether perforation present (K35.20)    BACKGROUND                                                      Per hospital discharge summary and inpatient provider notes:  Valencia Ye underwent laparoscopic appendectomy without complications. Intraoperative findings included perforated appendicitis with murky fluid throughout the abdomen. The patient had a routine hospital course and experienced postop ileus as expected. Hospitalist service was consulted for medical management due to history of asthma, Churg-Antonina syndrome and bronchiectasis. By POD #2, she had advanced to a regular diet and was transitioned successfully to oral pain medications. She was ambulating independently and voiding spontaneously. By day of discharge, she remained afebrile, was tolerating an oral diet, had adequate pain control on oral medications and was ambulating independently thus medically appropriate for discharge to home with oral antibiotics.       ASSESSMENT      Enrollment  Primary Care Care Coordination Status: Declined    Discharge Assessment  How are you doing now that you are home?: Pt notes they are still feeling pretty tired  How are your symptoms? (Red Flag symptoms escalate to triage hotline per guidelines): Improved  Do you feel your condition is stable enough to be safe at home until your provider visit?: Yes  Does the patient have their discharge instructions? : Yes  Does the patient have questions regarding their discharge instructions? : No  Were you started on any new medications or were there changes to any  of your previous medications? : Yes  Does the patient have all of their medications?: Yes  Do you have questions regarding any of your medications? : No  Do you have all of your needed medical supplies or equipment (DME)?  (i.e. oxygen tank, CPAP, cane, etc.): Yes  Discharge follow-up appointment scheduled within 14 calendar days? : No  Discharge Follow Up Appointment Date: 07/26/22  Discharge Follow Up Appointment Scheduled with?: Primary Care Provider              Care Management   Community Health Worker Initial Outreach    CHW Initial Information Gathering:  Referral Source: IP Handoff  Preferred Hospital: [facility]  [phone]  Current living arrangement:: Not Assessed  Community Resources: None  Informal Support system:: Spouse  No PCP office visit in Past Year: No       Patient accepts CC: No, Pt declined Care Coordination. Patient will be sent Care Coordination introduction letter for future reference.     6-27, CHW:    Writer was able to connect with the Pt and introduce self/care coordination.    Pt noted no questions or concerns. Writer encouraged Pt to reach out if anything changes in the future: Pt voiced understanding.    PLAN                                                      Outpatient Plan:       Follow up with primary care provider, Morelia Simental, within 2 weeks, for   hospital follow- up. No follow up labs or test are needed.      Future Appointments   Date Time Provider Department Center   7/26/2022  3:30 PM Morelia Simental MD [facility]   9/8/2022 10:00 AM [facility]   9/8/2022 10:30 AM Dagoberto Briscoe MD [facility]         For any urgent concerns, please contact our 24 hour nurse triage line: [phone] ([phone])         MARINA Navas. Public Health  Community Health Worker  [facility]  Clinic Care Coordination  [phone]

## 2022-06-27 NOTE — LETTER
[facility] CARE COORDINATION  [facility]  June 27, 2022    Valencia RAMAN Ye  [address] [facility] [address]      Dear Valencia,    I am a clinic community health worker who works with Morelia Simental MD with the [facility]. I wanted to thank you for spending the time to talk with me.  Below is a description of clinic care coordination and how I can further assist you.       The clinic care coordination team is made up of a registered nurse, , financial resource worker and community health worker who understand the health care system. The goal of clinic care coordination is to help you manage your health and improve access to the health care system. Our team works alongside your provider to assist you in determining your health and social needs. We can help you obtain health care and community resources, providing you with necessary information and education. We can work with you through any barriers and develop a care plan that helps coordinate and strengthen the communication between you and your care team.    Please feel free to contact me with any questions or concerns regarding care coordination and what we can offer.      We are focused on providing you with the highest-quality healthcare experience possible.    Sincerely,       MARINA Navas. Public Health  Community Health Worker  [facility]  Clinic Care Coordination  [phone]

## 2022-06-27 NOTE — DISCHARGE SUMMARY
Surgery Discharge Summary    Valencia Ye MRN# [id number]   YOB: 1970 Age: 51 year old     Date of Admission:  6/22/2022  Date of Discharge:  6/24/2022 10:30 AM  Admitting Physician:  Fany Hannah MD  Discharging Service:  General Surgery   Primary Provider: Morelia Simental    Discharge Diagnosis:   Principle Diagnosis:  Acute appendicitis with generalized peritonitis, unspecified whether abscess present, unspecified whether gangrene present, unspecified whether perforation present [K35.20]  Acute appendicitis with perforation and generalized peritonitis [K35.20]    Secondary diagnosis:  Incidental covid-19 positive on admission    Brief HPI: Valencia Ye is a 51 year old year-old female who presented with abdominal pain of 1 days duration. CT scan of the abdomen was consistent with acute appendicitis with some free fluid in the pelvis, which I discussed with her likely represented perforated appendicitis. We discussed operative management of appendicitis including risks and benefits, and the patient agreed to proceed.     Hospital Course: Valencia Ye underwent laparoscopic appendectomy without complications. Intraoperative findings included perforated appendicitis with murky fluid throughout the abdomen. The patient had a routine hospital course and experienced postop ileus as expected. Hospitalist service was consulted for medical management due to history of asthma, Churg-Antonina syndrome and bronchiectasis. By POD #2, she had advanced to a regular diet and was transitioned successfully to oral pain medications. She was ambulating independently and voiding spontaneously. By day of discharge, she remained afebrile, was tolerating an oral diet, had adequate pain control on oral medications and was ambulating independently thus medically appropriate for discharge to home with oral antibiotics.    Inpatient Consultations: Consultation during this admission received from  "internal medicine    Procedures:   Procedure(s):  APPENDECTOMY, LAPAROSCOPIC     Disposition:   Discharged to home     Discharge Condition  Discharge condition: Stable   Discharge vitals: Blood pressure 112/72, pulse 80, temperature 98.1  F (36.7  C), temperature source Oral, resp. rate 18, height 1.575 m (5' 2\"), weight 49 kg (108 lb), last menstrual period 02/01/2009, SpO2 98 %, not currently breastfeeding.     Discharge Medications:   Discharge Medication List as of 6/24/2022  9:55 AM      START taking these medications    Details   amoxicillin-clavulanate (AUGMENTIN) 875-125 MG tablet Take 1 tablet by mouth 2 times daily for 4 days, Disp-8 tablet, R-0, E-Prescribe         CONTINUE these medications which have NOT CHANGED    Details   acetylcysteine (MUCOMYST) 20 % neb solution TAKE 4ML VIA NEBULIZER TWICE A DAY WITH ALBUTEROL SOLUTION. DISCARD OPEN BOTTLE AFTER 96 HOURS., Disp-800 mL, R-2, E-Prescribe      albuterol (PROAIR HFA/PROVENTIL HFA/VENTOLIN HFA) 108 (90 Base) MCG/ACT inhaler INHALE 2 PUFFS INTO THE LUNGS EVERY 4 HOURS AS NEEDED FOR SHORTNESS OF BREATH/WHEEZE, Disp-18 g, R-3, E-Prescribe      albuterol (PROVENTIL) (2.5 MG/3ML) 0.083% neb solution TAKE 1 VIAL VIA NEBULIZER TWICE A DAY., Disp-150 mL, R-11, E-Prescribe      ciprofloxacin-dexamethasone (CIPRODEX) 0.3-0.1 % otic suspension INSTILL 3 DROPS INTO AFFECTED EARS 2 TIMES DAILY AS NEEDED, Disp-7.5 mL, R-10, E-Prescribe      diazepam (VALIUM) 2 MG tablet TAKE 1-2 TABLETS (2-4 MG) BY MOUTH EVERY 6 HOURS AS NEEDED FOR VERTIGO, Disp-20 tablet, R-0, E-PrescribeNot to exceed 5 additional fills before 02/27/2021      fluticasone (FLONASE) 50 MCG/ACT nasal spray SHAKE LIQUID AND USE 2 SPRAYS IN EACH NOSTRIL EVERY DAY, Disp-48 mL, R-3, E-Prescribe      metoprolol succinate ER (TOPROL-XL) 25 MG 24 hr tablet TAKE 1/2 TABLET(12.5 MG) BY MOUTH DAILY. HOLD IF BP IS LOW, Disp-45 tablet, R-3, E-Prescribe      Omega-3 Fatty Acids (FISH OIL) 1200 MG capsule Take 4 " capsules (4.8 g) by mouth daily, Historical      predniSONE (DELTASONE) 1 MG tablet Take 5 tabs daily, alternating with 6 tabs daily, Disp-590 tablet, R-5, E-Prescribe      Probiotic Product (PROBIOTIC DAILY PO) Take 1 capsule by mouth daily, Historical      SYMBICORT 160-4.5 MCG/ACT Inhaler TAKE 2 PUFFS BY MOUTH TWICE A DAY, Disp-30.6 g, R-3, E-Prescribe      tiZANidine (ZANAFLEX) 2 MG tablet TAKE 1-2 TABLETS (2-4 MG) BY MOUTH 3 TIMES DAILY AS NEEDED FOR MUSCLE SPASMS, Disp-45 tablet, R-11, E-Prescribe      vitamin D3 (CHOLECALCIFEROL) 2000 units (50 mcg) tablet Take 2 tablets by mouth daily, Historical      VITAMIN K PO Take 1 capsule by mouth daily, Historical             Discharge Instructions:   HOME CARE FOLLOWING APPENDECTOMY  STACEY Jerez, HORTENCIA Cordova, JOSE RAMON Dave, RADHA Hannah    INCISIONAL CARE:  Replace the bandage over your incision(s) until all drainage stops, or if more comfortable to have in place.  If present, leave the steri-strips (white paper tapes) in place for 14 days after surgery.  If Dermabond (a type of skin glue) is present, leave in place until it wears/flakes off (2-3 weeks).     BATHING:  OK to shower 48 hours after surgery.  Avoid baths for 1 week after surgery.  You may wash your hair at any time.  Gently pat your incision dry after bathing.  Do not apply lotions, creams, or ointments to incisions.    ACTIVITY:  Light Activity -- you may immediately be up and about as tolerated.  Walking is encouraged, increase as tolerated.  Driving/Light Work-- when comfortable and off narcotic pain medications.  Strenuous Work/Activity -- limit lifting to 20 pounds for 2 weeks.  Progressively increase with time.  Active Sports (running, biking, etc.) -- cautiously resume after 2 weeks.    FOLLOW-UP AFTER SURGERY:  -Our office will contact you approximately 2-3 weeks after surgery to check on your progress and answer any questions you may have.  If you are doing well, you  will not need to return for an office appointment.  If any concerns are identified over the phone, we will help you make an appointment to see a provider.    -If you have not received a phone call, have any questions or concerns, or would like to be seen, please call us at [phone].  We are located at: [address], [address]; [place] [address]    -CONTACT US IF THE FOLLOWING DEVELOPS:   1. A fever that is above 101     2. Increased redness, warmth, drainage, bleeding, or swelling.   3. Pain that is not relieved by rest/ice and your prescription.   4.  Increasing pain after 48 hours.   5. Drainage that is thick, cloudy, yellow, green or white.       Follow-up Appointments     Follow-up and recommended labs and tests       Follow up with primary care provider, Morelia Simental, within 2 weeks, for   hospital follow- up. No follow up labs or test are needed.           After Care Instructions     Activity      Your activity upon discharge: per surgery discharge instruction         Diet      Follow this diet upon discharge: Regular             Referrals     Future Labs/Procedures    Primary Care - Care Coordination Referral     Process Instructions:    Services are provided by a Care Coordinator for people with complex needs   such as: medical, social, or financial troubles.  The Care Coordinator   works with the patient and their Primary Care Provider to determine health   goals, obtain resources, achieve outcomes, and develop care plans that   help coordinate the patient's care.     Comments:           I did not personally see or examine the patient on the day of discharge.  Summary was completed by chart review.   Mily Patino PA-C

## 2022-06-29 LAB — BACTERIA PRT CULT: ABNORMAL

## 2022-06-30 LAB
BACTERIA PRT CULT: ABNORMAL
BACTERIA PRT CULT: ABNORMAL

## 2022-07-05 ENCOUNTER — TELEPHONE (OUTPATIENT)
Dept: SURGERY | Facility: CLINIC | Age: 52
End: 2022-07-05

## 2022-07-05 NOTE — TELEPHONE ENCOUNTER
Attempted to call patient for post op check. No answer.  A message was left for patient to call back if they had any questions or concerns.     Nav Campbell PA-C

## 2022-07-06 NOTE — TELEPHONE ENCOUNTER
S/p laparoscopic appendectomy ( perforated)  Procedure date: 6/22/22  Surgeon: Dr. Hannah    I reached out to patient at the request of Nav Campbell PA-C, as he is out of the office.      Patient underwent laparoscopic appendectomy by Dr. Hannah for perforated appendicitis with localized peritoneal contamination on 6/22/22.   She was discharged on 6/24/22 with 4 days of Augmentin      Patient reports that a number of weeks prior to surgery she noted that RLQ was more bloated and that she noted asymmetry of abdomen with the right side being larger than the left.  She reports this has not gone away after her surgery.  Wondering what this might be related to?    She is doing well.  No abdominal pain.  Initially has some loose stool after surgery which has now firmed up and she is having regular BM's.    Denies fever/chills and N/V.  Eating and drinking fine.  Denies firm mass in RLQ.  She completed abx.      We discussed CT showed dilated, fluid filled appendix,inflammation from perforation and gas was used during her surgery to inflate abdomen.  All of these may cause some stretching of the muscle and loss of muscle tone in the area.  It may take a few weeks for muscle tone to normalize.      She will monitor the area and call if any increasing abdominal pain, No BM's, or fever/chills.  She has hospital follow up with PCP scheduled on 7/26/22.     Patient verbalized understanding and will call if further needs or questions.

## 2022-07-06 NOTE — TELEPHONE ENCOUNTER
Patient called back she has concerns about her lower abd area enlarged, she has had bowel movements regularly. You can reach her at [phone]. She will be gone from 10:30- 12  Azeb

## 2022-07-18 ENCOUNTER — NURSE TRIAGE (OUTPATIENT)
Dept: INTERNAL MEDICINE | Facility: CLINIC | Age: 52
End: 2022-07-18

## 2022-07-18 NOTE — TELEPHONE ENCOUNTER
"S-(situation): Pt called to report eye discharge    B-(background): Pt noted 2 weeks ago had a \"tralazian s/p?\" on her left eye.     A-(assessment): Pt noted had been trying to contact eye doctor though no success, Pt noted today the eye lid had opened up and started to drain clear to white fluid. Pt denies change in vision or eye sclera.     R-(recommendations): Pt was recommended to be seen, though phone disconnected before scheduling.      Called # [phone]     Pt called back and scheduled.   Future Appointments   Date Time Provider Department Center   7/19/2022 11:30 AM Delmi Jones APRN CNP [facility]   7/26/2022  3:30 PM [facility]   9/8/2022 10:00 AM [facility]   9/8/2022 10:30 AM Dagoberto Briscoe MD [facility]         Hosea Ty RN   [facility] Triage        Answer Assessment - Initial Assessment Questions  1. EYE DISCHARGE: \"Is the discharge in one or both eyes?\" \"What color is it?\" \"How much is there?\" \"When did the discharge start?\"       Left eye, clear to white  2. REDNESS OF SCLERA: \"Is the redness in one or both eyes?\" \"When did the redness start?\"       none  3. EYELIDS: \"Are the eyelids red or swollen?\" If so, ask: \"How much?\"       Left eye lid  4. VISION: \"Is there any difficulty seeing clearly?\"       none  5. PAIN: \"Is there any pain? If so, ask: \"How bad is it?\" (Scale 1-10; or mild, moderate, severe)     - MILD (1-3): doesn't interfere with normal activities      - MODERATE (4-7): interferes with normal activities or awakens from sleep     - SEVERE (8-10): excruciating pain, unable to do any normal activities        no  6. CONTACT LENS: \"Do you wear contacts?\"      no  7. OTHER SYMPTOMS: \"Do you have any other symptoms?\" (e.g., fever, runny nose, cough)      no  8. PREGNANCY: \"Is there any chance you are pregnant?\" \"When was your last menstrual period?\"      no    Protocols used: EYE - PUS OR [id number]      "

## 2022-07-22 NOTE — TELEPHONE ENCOUNTER
See [employer]le[employer] message. BP is not really elevated, but she states it has been lower.         BP Readings from Last 3 Encounters:   08/12/21 119/76   03/08/21 125/75   01/21/21 125/79      10

## 2022-07-26 ENCOUNTER — VIRTUAL VISIT (OUTPATIENT)
Dept: INTERNAL MEDICINE | Facility: CLINIC | Age: 52
End: 2022-07-26
Payer: COMMERCIAL

## 2022-07-26 DIAGNOSIS — K35.201 ACUTE APPENDICITIS WITH PERFORATION AND GENERALIZED PERITONITIS, WITHOUT ABSCESS OR GANGRENE: Primary | ICD-10-CM

## 2022-07-26 DIAGNOSIS — H00.13 CHALAZION OF RIGHT EYE, UNSPECIFIED EYELID: ICD-10-CM

## 2022-07-26 PROCEDURE — 99213 OFFICE O/P EST LOW 20 MIN: CPT | Mod: 95 | Performed by: INTERNAL MEDICINE

## 2022-07-26 ASSESSMENT — PATIENT HEALTH QUESTIONNAIRE - PHQ9
10. IF YOU CHECKED OFF ANY PROBLEMS, HOW DIFFICULT HAVE THESE PROBLEMS MADE IT FOR YOU TO DO YOUR WORK, TAKE CARE OF THINGS AT HOME, OR GET ALONG WITH OTHER PEOPLE: EXTREMELY DIFFICULT
SUM OF ALL RESPONSES TO PHQ QUESTIONS 1-9: 18
SUM OF ALL RESPONSES TO PHQ QUESTIONS 1-9: 18

## 2022-07-26 NOTE — PROGRESS NOTES
Valencia is a 51 year old who is being evaluated via a billable video visit.      How would you like to obtain your AVS? MyChart  If the video visit is dropped, the invitation should be resent by: Send to e-mail at: [email or web address]  Will anyone else be joining your video visit? No        Assessment & Plan     Acute appendicitis with perforation and generalized peritonitis, without abscess or gangrene  Has healed well, bowels back to normal, no evidence of any persistent infection.    Chalazion of right eye, unspecified eyelid  Working with eye doctor.        Return in about 10 months (around 5/20/2023) for Wellness visit.    Morelia Simental MD  [facility] FLORESITA Birmingham is a 51 year old, presenting for the following health issues:  No chief complaint on file.      [facility]       Hospital Follow-up Visit:    Hospital/Nursing Home/IP Rehab Facility: [facility]  Date of Admission: 06/22/2022  Date of Discharge: 06/24/2022  Reason(s) for Admission: Acute appendicitis with perforation    Was your hospitalization related to COVID-19? No   Problems taking medications regularly:  None  Medication changes since discharge: None  Problems adhering to non-medication therapy:  None    Summary of hospitalization:  [facility] discharge summary reviewed  Diagnostic Tests/Treatments reviewed.  Follow up needed: none  Other Healthcare Providers Involved in Patient s Care:         Surgical follow-up appointment - .  Update since discharge: improved.  She had a little bit of variability in your bowels at first but her bowel movements are now back to normal.  She had been having some bowel issues prior to the episode.  Her incision is healing well, Steri-Strips are coming off.    Plan of care communicated with patient     Other concerns:  She has a large chalazion but did see the eye doctor yesterday, being taken care of.    Otherwise she is stable    Patient Active Problem  List   Diagnosis     Goiter     History of systemic steroid therapy     ILD (interstitial lung disease) (H)     Palpitations     Churg-Antonina syndrome (H)     History of eating disorder     Mild intermittent asthma without complication     Anxiety     Chronic fatigue     Mannose-binding lectin deficiency     Benign essential hypertension     Moderate episode of recurrent major depressive disorder (H)     Bronchiectasis without acute exacerbation (H)     Controlled substance agreement signed     Irritable bowel syndrome without diarrhea     Bladder pain     Overactive bladder     Pain syndrome, chronic     Alpha-lipoproteinemia     Current Outpatient Medications   Medication Sig Dispense Refill     acetylcysteine (MUCOMYST) 20 % neb solution TAKE 4ML VIA NEBULIZER TWICE A DAY WITH ALBUTEROL SOLUTION. DISCARD OPEN BOTTLE AFTER 96 HOURS. 800 mL 2     albuterol (PROAIR HFA/PROVENTIL HFA/VENTOLIN HFA) 108 (90 Base) MCG/ACT inhaler INHALE 2 PUFFS INTO THE LUNGS EVERY 4 HOURS AS NEEDED FOR SHORTNESS OF BREATH/WHEEZE 18 g 3     albuterol (PROVENTIL) (2.5 MG/3ML) 0.083% neb solution TAKE 1 VIAL VIA NEBULIZER TWICE A DAY. 150 mL 11     ciprofloxacin-dexamethasone (CIPRODEX) 0.3-0.1 % otic suspension INSTILL 3 DROPS INTO AFFECTED EARS 2 TIMES DAILY AS NEEDED 7.5 mL 10     diazepam (VALIUM) 2 MG tablet TAKE 1-2 TABLETS (2-4 MG) BY MOUTH EVERY 6 HOURS AS NEEDED FOR VERTIGO 20 tablet 0     fluticasone (FLONASE) 50 MCG/ACT nasal spray SHAKE LIQUID AND USE 2 SPRAYS IN EACH NOSTRIL EVERY DAY 48 mL 3     metoprolol succinate ER (TOPROL-XL) 25 MG 24 hr tablet TAKE 1/2 TABLET(12.5 MG) BY MOUTH DAILY. HOLD IF BP IS LOW (Patient taking differently: Take 12.5 mg by mouth daily as needed (depending on daily BP check)) 45 tablet 3     Omega-3 Fatty Acids (FISH OIL) 1200 MG capsule Take 4 capsules (4.8 g) by mouth daily       predniSONE (DELTASONE) 1 MG tablet Take 5 tabs daily, alternating with 6 tabs daily (Patient taking differently:  Take 5.5 mg by mouth daily Take 5 tabs daily, alternating with 6 tabs daily) 590 tablet 5     Probiotic Product (PROBIOTIC DAILY PO) Take 1 capsule by mouth daily       SYMBICORT 160-4.5 MCG/ACT Inhaler TAKE 2 PUFFS BY MOUTH TWICE A DAY 30.6 g 3     tiZANidine (ZANAFLEX) 2 MG tablet TAKE 1-2 TABLETS (2-4 MG) BY MOUTH 3 TIMES DAILY AS NEEDED FOR MUSCLE SPASMS 45 tablet 11     vitamin D3 (CHOLECALCIFEROL) 2000 units (50 mcg) tablet Take 2 tablets by mouth daily       VITAMIN K PO Take 1 capsule by mouth daily          Review of Systems   No fever, chills, abdominal pain, diarrhea, constipation      Objective           Vitals:  No vitals were obtained today due to virtual visit.    Physical Exam   GENERAL: Healthy, alert and no distress  EYES: Eyes grossly normal to inspection.  No discharge or erythema, or obvious scleral/conjunctival abnormalities.  RESP: No audible wheeze, cough, or visible cyanosis.  No visible retractions or increased work of breathing.    SKIN: Visible skin clear. No significant rash, abnormal pigmentation or lesions.  NEURO: Cranial nerves grossly intact.  Mentation and speech appropriate for age.  PSYCH: Mentation appears normal, affect normal/bright, judgement and insight intact, normal speech and appearance well-groomed.                Video-Visit Details    Video Start Time: 3:56    Type of service:  Video Visit    Video End Time: 4:05    Originating Location (pt. Location): Home    Distant Location (provider location):  [facility]     Platform used for Video Visit: [employer]    .  ..  Answers for HPI/ROS submitted by the patient on 7/26/2022  If you checked off any problems, how difficult have these problems made it for you to do your work, take care of things at home, or get along with other people?: Extremely difficult  PHQ9 TOTAL SCORE: 18

## 2022-08-06 PROBLEM — K35.201 ACUTE APPENDICITIS WITH PERFORATION AND GENERALIZED PERITONITIS: Status: RESOLVED | Noted: 2022-06-22 | Resolved: 2022-08-06

## 2022-10-17 DIAGNOSIS — M62.830 SPASM OF MUSCLE OF LOWER BACK: ICD-10-CM

## 2022-10-18 RX ORDER — TIZANIDINE 2 MG/1
TABLET ORAL
Qty: 45 TABLET | Refills: 11 | Status: SHIPPED | OUTPATIENT
Start: 2022-10-18 | End: 2023-12-15

## 2022-10-18 NOTE — TELEPHONE ENCOUNTER
Pending Prescriptions:                       Disp   Refills    tiZANidine (ZANAFLEX) 2 MG tablet [Pharmac*45 tab*11       Sig: TAKE 1-2 TABLETS BY MOUTH 3 TIMES DAILY AS NEEDED FOR           MUSCLE SPASMS    Routing refill request to provider for review/approval because:  Drug not on the [facility] refill protocol

## 2022-10-20 ENCOUNTER — OFFICE VISIT (OUTPATIENT)
Dept: PULMONOLOGY | Facility: CLINIC | Age: 52
End: 2022-10-20
Attending: INTERNAL MEDICINE
Payer: COMMERCIAL

## 2022-10-20 VITALS
HEIGHT: 62 IN | BODY MASS INDEX: 20.8 KG/M2 | SYSTOLIC BLOOD PRESSURE: 129 MMHG | OXYGEN SATURATION: 96 % | DIASTOLIC BLOOD PRESSURE: 82 MMHG | HEART RATE: 68 BPM | WEIGHT: 113 LBS

## 2022-10-20 DIAGNOSIS — J47.9 BRONCHIECTASIS WITHOUT COMPLICATION (H): ICD-10-CM

## 2022-10-20 DIAGNOSIS — J47.9 BRONCHIECTASIS WITHOUT ACUTE EXACERBATION (H): ICD-10-CM

## 2022-10-20 DIAGNOSIS — J84.9 ILD (INTERSTITIAL LUNG DISEASE) (H): Primary | ICD-10-CM

## 2022-10-20 DIAGNOSIS — M30.1 CHURG-STRAUSS SYNDROME (H): ICD-10-CM

## 2022-10-20 DIAGNOSIS — J47.9 BRONCHIECTASIS (H): Primary | ICD-10-CM

## 2022-10-20 DIAGNOSIS — D72.18 CHURG-STRAUSS SYNDROME (H): ICD-10-CM

## 2022-10-20 LAB
EXPTIME-PRE: 7.3 SEC
FEF2575-%PRED-PRE: 54 %
FEF2575-PRE: 1.35 L/SEC
FEF2575-PRED: 2.48 L/SEC
FEFMAX-%PRED-PRE: 89 %
FEFMAX-PRE: 5.76 L/SEC
FEFMAX-PRED: 6.43 L/SEC
FEV1-%PRED-PRE: 102 %
FEV1-PRE: 2.47 L
FEV1FEV6-PRE: 66 %
FEV1FEV6-PRED: 82 %
FEV1FVC-PRE: 66 %
FEV1FVC-PRED: 82 %
FIFMAX-PRE: 4.45 L/SEC
FVC-%PRED-PRE: 126 %
FVC-PRE: 3.75 L
FVC-PRED: 2.96 L

## 2022-10-20 PROCEDURE — G0463 HOSPITAL OUTPT CLINIC VISIT: HCPCS | Mod: 25

## 2022-10-20 PROCEDURE — 94375 RESPIRATORY FLOW VOLUME LOOP: CPT | Performed by: INTERNAL MEDICINE

## 2022-10-20 PROCEDURE — 99213 OFFICE O/P EST LOW 20 MIN: CPT | Mod: 25 | Performed by: INTERNAL MEDICINE

## 2022-10-20 RX ORDER — PREDNISONE 1 MG/1
5.5 TABLET ORAL DAILY
Qty: 590 TABLET | Refills: 5 | COMMUNITY
Start: 2022-10-20 | End: 2023-04-13

## 2022-10-20 ASSESSMENT — PAIN SCALES - GENERAL: PAINLEVEL: NO PAIN (0)

## 2022-10-20 NOTE — PROGRESS NOTES
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  Patient reports very good exercise tolerance.  Minimal cough or sputum.  PFTs are in the high normal range although slightly below her recent past.  The change does not appear to be clinically significant.  She does not appear to be having an exacerbation at this time.  She will continue her current vest therapy, nebs and yoga for airway clearance.  Healthcare maintenance: Patient is up-to-date on influenza vaccination.  I have encouraged her to receive the most recent COVID booster which is supposed to have greater activity against some of the current COVID variants.    I will see the patient in follow-up in 6 months with PFTs or in the interim as needed.    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    Since her last visit, she had an episode of COVID with fairly mild symptoms not requiring treatment.  In June she had a perforated appendicitis with a laparoscopic appendectomy.    Currently, she is feeling well.  Breathing is comfortable at rest and with all usual activity.  Exercise tolerance is at baseline.  No cough.  No sputum.  She is doing vest therapy once daily.  She does yoga 5-6 times per week.  She notes intermittent migratory chest pain which is longstanding, intermittent and unchanged.  No fever, chills or night sweats.    Review of systems:  Appetite is good  Chronic ear pain and tenderness at baseline.  Occasional palpitations at baseline.  No nausea, vomiting, diarrhea or abdominal pain  Diffuse body aches  attributed to fibromyalgia at baseline.  Remainder of a complete review of systems is otherwise negative except as noted in history of present illness.      Current Outpatient Medications   Medication     acetylcysteine (MUCOMYST) 20 % neb solution     albuterol (PROAIR HFA/PROVENTIL HFA/VENTOLIN HFA) 108 (90 Base) MCG/ACT inhaler     albuterol (PROVENTIL) (2.5 MG/3ML) 0.083% neb solution     ciprofloxacin-dexamethasone (CIPRODEX) 0.3-0.1 % otic suspension     diazepam (VALIUM) 2 MG tablet     fluticasone (FLONASE) 50 MCG/ACT nasal spray     metoprolol succinate ER (TOPROL-XL) 25 MG 24 hr tablet     Omega-3 Fatty Acids (FISH OIL) 1200 MG capsule     predniSONE (DELTASONE) 1 MG tablet     Probiotic Product (PROBIOTIC DAILY PO)     SYMBICORT 160-4.5 MCG/ACT Inhaler     tiZANidine (ZANAFLEX) 2 MG tablet     vitamin D3 (CHOLECALCIFEROL) 2000 units (50 mcg) tablet     VITAMIN K PO     No current facility-administered medications for this visit.     Allergies   Allergen Reactions     Colistimethate Anaphylaxis     Colymycin M [Colistimethate Sodium] Anaphylaxis     Tamiflu [Oseltamivir]      Gums Blister up     Azithromycin Palpitations     Heart palpitation  Heart palpitation     Clindamycin Rash     Past Medical History:   Diagnosis Date     Anxiety      Aortitis (H)      Asthma     associated with Churg Antonina syndrome     Bronchiectasis (H)      Cerebral infarction (H) August 1990    Very small, caused small permanent optical migraine     Chronic sinusitis      Churg-Antonina syndrome (H)     dx 1990     Conductive hearing loss      Depressive disorder      Eustachian tube dysfunction      Goiter      Hearing loss, conductive      Heart rate problem      Hypertension      Hypocalcemia      Immunosuppression (H)      Irritable bowel syndrome      Major depressive disorder      Mannose-binding lectin deficiency 2014     Nonspecific abnormal results of thyroid function study      Osteoporosis      Pericarditis     1990  and      Pneumonia     4/23/10     Recurrent otitis media      Recurrent UTI      Rheumatoid vasculitis (H)      Sinusitis, chronic      Steroid long-term use      Tinnitus      Varicose veins of leg with swelling        Past Surgical History:   Procedure Laterality Date     C/SECTION, CLASSICAL        SECTION       COLONOSCOPY Left 2014    Procedure: COMBINED COLONOSCOPY, SINGLE OR MULTIPLE BIOPSY/POLYPECTOMY BY BIOPSY;  Surgeon: Js Pinedo MD;  Location:  GI     COLONOSCOPY N/A 2019    Procedure: COLONOSCOPY;  Surgeon: Bryce Pimentel MD;  Location:  GI     ENT SURGERY       GENITOURINARY SURGERY       HEAD & NECK SURGERY       HYSTERECTOMY  2009    without oopherectomy     HYSTERECTOMY, PAP NO LONGER INDICATED      cervix removed      LAPAROSCOPIC APPENDECTOMY N/A 2022    Procedure: APPENDECTOMY, LAPAROSCOPIC;  Surgeon: Fany Hannah MD;  Location: [facility] OR     PE TUBES       PICC INSERTION  10/10/2013    5fr DL PASV PICC, 43cm (1cm external) in the L basilic vein w/ tip in the low SVC.     PICC INSERTION Left 2017    5fr DL BioFlo PICC, 42cm (3cm external) in the L lateral brachial vein w/ tip in the SVC RA junction.     SINUS SURGERY       TUBAL LIGATION       TYMPANOPLASTY       ZZHC COLP CERVIX/UPPER VAGINA W LOOP ELEC BX CERVIX         Social History     Socioeconomic History     Marital status:      Spouse name: Not on file     Number of children: Not on file     Years of education: Not on file     Highest education level: Not on file   Occupational History     Not on file   Tobacco Use     Smoking status: Never     Smokeless tobacco: Never   Vaping Use     Vaping Use: Never used   Substance and Sexual Activity     Alcohol use: Not Currently     Drug use: No     Sexual activity: Yes     Partners: Male     Birth control/protection: Male Surgical, Female Surgical   Other Topics Concern     Parent/sibling w/ CABG, MI or angioplasty  "before 65F 55M? Not Asked      Service No     Blood Transfusions No     Caffeine Concern No     Occupational Exposure No     Hobby Hazards No     Sleep Concern No     Stress Concern Yes     Weight Concern No     Special Diet Yes     Comment: IBS diet     Back Care No     Exercise Not Asked     Comment: 1 mile power walk 5 days a week at least.      Bike Helmet Not Asked     Seat Belt Yes     Self-Exams Yes   Social History Narrative    [employer] in Radiology Department.  .  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  Non smoker. No smokers at home.  Enjoys walking and dancing.  Alcohol--1 to 2 drinks daily.  Few times a year has more than 4 drinks in a day.  No illicits.                     Social Determinants of Health     Financial Resource Strain: Not on file   Food Insecurity: Not on file   Transportation Needs: Not on file   Physical Activity: Not on file   Stress: Not on file   Social Connections: Not on file   Intimate Partner Violence: Not on file   Housing Stability: Not on file       ROS Pulmonary    A complete ROS was otherwise negative except as noted in the HPI.  /82 (BP Location: Right arm, Patient Position: Chair, Cuff Size: Adult Regular)   Pulse 68   Ht 1.575 m (5' 2\")   Wt 51.3 kg (113 lb)   LMP 02/01/2009 (LMP Unknown)   SpO2 96%   BMI 20.67 kg/m    Exam:   GENERAL APPEARANCE: Well developed, well nourished, alert, and in no apparent distress.  EYES: PERRL, EOMI  HENT: Nasal mucosa with no edema and no hyperemia. No nasal polyps.  EARS: Canals clear, TMs scarred bilat  MOUTH: Oral mucosa is moist, without any lesions, no tonsillar enlargement, no oropharyngeal exudate.  NECK: supple, no masses, no thyromegaly.  LYMPHATICS: No significant axillary, cervical, or supraclavicular nodes.  RESP: normal percussion, good air flow throughout.  No crackles. No rhonchi. No wheezes.  CV: Normal S1, S2, regular rhythm, normal rate. No murmur.  No rub. No gallop. No LE edema. "   ABDOMEN:  Bowel sounds normal, soft, nontender, no HSM or masses.   MS: extremities normal. No clubbing. No cyanosis.  SKIN: no rash on limited exam  NEURO: Mentation intact, speech normal, normal strength and tone, normal gait and stance  PSYCH: mentation appears normal. and affect normal/bright  Results:  Recent Results (from the past 168 hour(s))   General PFT Lab (Please always keep checked)    Collection Time: 10/20/22 10:48 AM   Result Value Ref Range    FVC-Pred 2.96 L    FVC-Pre 3.75 L    FVC-%Pred-Pre 126 %    FEV1-Pre 2.47 L    FEV1-%Pred-Pre 102 %    FEV1FVC-Pred 82 %    FEV1FVC-Pre 66 %    FEFMax-Pred 6.43 L/sec    FEFMax-Pre 5.76 L/sec    FEFMax-%Pred-Pre 89 %    FEF2575-Pred 2.48 L/sec    FEF2575-Pre 1.35 L/sec    BJR4274-%Pred-Pre 54 %    ExpTime-Pre 7.30 sec    FIFMax-Pre 4.45 L/sec    FEV1FEV6-Pred 82 %    FEV1FEV6-Pre 66 %                   Results as noted above.    PFT Interpretation:  Normal spirometry.  Decreased from previous.  Below recent best.   Valid Maneuver

## 2022-10-20 NOTE — LETTER
10/20/2022         RE: Valencia Ye  [address] [facility] [address]        Dear Colleague,    Thank you for referring your patient, Valencia Ye, to the [facility]. Please see a copy of my visit note below.    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  Patient reports very good exercise tolerance.  Minimal cough or sputum.  PFTs are in the high normal range although slightly below her recent past.  The change does not appear to be clinically significant.  She does not appear to be having an exacerbation at this time.  She will continue her current vest therapy, nebs and yoga for airway clearance.  Healthcare maintenance: Patient is up-to-date on influenza vaccination.  I have encouraged her to receive the most recent COVID booster which is supposed to have greater activity against some of the current COVID variants.    I will see the patient in follow-up in 6 months with PFTs or in the interim as needed.    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    Since her last visit, she had an episode of COVID with fairly mild symptoms not requiring treatment.  In June she had a perforated appendicitis with a laparoscopic appendectomy.    Currently, she is feeling well.  Breathing is comfortable at rest and with all usual activity.  Exercise tolerance is at baseline.  No cough.  No sputum.  She is doing vest therapy once daily.  She does yoga 5-6 times per week.  She notes intermittent  migratory chest pain which is longstanding, intermittent and unchanged.  No fever, chills or night sweats.    Review of systems:  Appetite is good  Chronic ear pain and tenderness at baseline.  Occasional palpitations at baseline.  No nausea, vomiting, diarrhea or abdominal pain  Diffuse body aches attributed to fibromyalgia at baseline.  Remainder of a complete review of systems is otherwise negative except as noted in history of present illness.      Current Outpatient Medications   Medication     acetylcysteine (MUCOMYST) 20 % neb solution     albuterol (PROAIR HFA/PROVENTIL HFA/VENTOLIN HFA) 108 (90 Base) MCG/ACT inhaler     albuterol (PROVENTIL) (2.5 MG/3ML) 0.083% neb solution     ciprofloxacin-dexamethasone (CIPRODEX) 0.3-0.1 % otic suspension     diazepam (VALIUM) 2 MG tablet     fluticasone (FLONASE) 50 MCG/ACT nasal spray     metoprolol succinate ER (TOPROL-XL) 25 MG 24 hr tablet     Omega-3 Fatty Acids (FISH OIL) 1200 MG capsule     predniSONE (DELTASONE) 1 MG tablet     Probiotic Product (PROBIOTIC DAILY PO)     SYMBICORT 160-4.5 MCG/ACT Inhaler     tiZANidine (ZANAFLEX) 2 MG tablet     vitamin D3 (CHOLECALCIFEROL) 2000 units (50 mcg) tablet     VITAMIN K PO     No current facility-administered medications for this visit.     Allergies   Allergen Reactions     Colistimethate Anaphylaxis     Colymycin M [Colistimethate Sodium] Anaphylaxis     Tamiflu [Oseltamivir]      Gums Blister up     Azithromycin Palpitations     Heart palpitation  Heart palpitation     Clindamycin Rash     Past Medical History:   Diagnosis Date     Anxiety      Aortitis (H)      Asthma     associated with Churg Antonina syndrome     Bronchiectasis (H)      Cerebral infarction (H) August 1990    Very small, caused small permanent optical migraine     Chronic sinusitis      Churg-Antonina syndrome (H)     dx 1990     Conductive hearing loss      Depressive disorder      Eustachian tube dysfunction      Goiter      Hearing loss,  conductive      Heart rate problem      Hypertension      Hypocalcemia      Immunosuppression (H)      Irritable bowel syndrome      Major depressive disorder      Mannose-binding lectin deficiency      Nonspecific abnormal results of thyroid function study      Osteoporosis      Pericarditis      and      Pneumonia     4/23/10     Recurrent otitis media      Recurrent UTI      Rheumatoid vasculitis (H)      Sinusitis, chronic      Steroid long-term use      Tinnitus      Varicose veins of leg with swelling        Past Surgical History:   Procedure Laterality Date     C/SECTION, CLASSICAL        SECTION       COLONOSCOPY Left 2014    Procedure: COMBINED COLONOSCOPY, SINGLE OR MULTIPLE BIOPSY/POLYPECTOMY BY BIOPSY;  Surgeon: Js Pinedo MD;  Location:  GI     COLONOSCOPY N/A 2019    Procedure: COLONOSCOPY;  Surgeon: Bryce Pimentel MD;  Location:  GI     ENT SURGERY       GENITOURINARY SURGERY       HEAD & NECK SURGERY       HYSTERECTOMY  2009    without oopherectomy     HYSTERECTOMY, PAP NO LONGER INDICATED      cervix removed      LAPAROSCOPIC APPENDECTOMY N/A 2022    Procedure: APPENDECTOMY, LAPAROSCOPIC;  Surgeon: Fany Hannah MD;  Location: [facility] OR     PE TUBES       PICC INSERTION  10/10/2013    5fr DL PASV PICC, 43cm (1cm external) in the L basilic vein w/ tip in the low SVC.     PICC INSERTION Left 2017    5fr DL BioFlo PICC, 42cm (3cm external) in the L lateral brachial vein w/ tip in the SVC RA junction.     SINUS SURGERY       TUBAL LIGATION       TYMPANOPLASTY       ZZHC COLP CERVIX/UPPER VAGINA W LOOP ELEC BX CERVIX         Social History     Socioeconomic History     Marital status:      Spouse name: Not on file     Number of children: Not on file     Years of education: Not on file     Highest education level: Not on file   Occupational History     Not on file   Tobacco Use     Smoking status: Never     Smokeless  "tobacco: Never   Vaping Use     Vaping Use: Never used   Substance and Sexual Activity     Alcohol use: Not Currently     Drug use: No     Sexual activity: Yes     Partners: Male     Birth control/protection: Male Surgical, Female Surgical   Other Topics Concern     Parent/sibling w/ CABG, MI or angioplasty before 65F 55M? Not Asked      Service No     Blood Transfusions No     Caffeine Concern No     Occupational Exposure No     Hobby Hazards No     Sleep Concern No     Stress Concern Yes     Weight Concern No     Special Diet Yes     Comment: IBS diet     Back Care No     Exercise Not Asked     Comment: 1 mile power walk 5 days a week at least.      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  Non smoker. No smokers at home.  Enjoys walking and dancing.  Alcohol--1 to 2 drinks daily.  Few times a year has more than 4 drinks in a day.  No illicits.                     Social Determinants of Health     Financial Resource Strain: Not on file   Food Insecurity: Not on file   Transportation Needs: Not on file   Physical Activity: Not on file   Stress: Not on file   Social Connections: Not on file   Intimate Partner Violence: Not on file   Housing Stability: Not on file       ROS Pulmonary    A complete ROS was otherwise negative except as noted in the HPI.  /82 (BP Location: Right arm, Patient Position: Chair, Cuff Size: Adult Regular)   Pulse 68   Ht 1.575 m (5' 2\")   Wt 51.3 kg (113 lb)   LMP 02/01/2009 (LMP Unknown)   SpO2 96%   BMI 20.67 kg/m    Exam:   GENERAL APPEARANCE: Well developed, well nourished, alert, and in no apparent distress.  EYES: PERRL, EOMI  HENT: Nasal mucosa with no edema and no hyperemia. No nasal polyps.  EARS: Canals clear, TMs scarred bilat  MOUTH: Oral mucosa is moist, without any lesions, no tonsillar enlargement, no oropharyngeal " exudate.  NECK: supple, no masses, no thyromegaly.  LYMPHATICS: No significant axillary, cervical, or supraclavicular nodes.  RESP: normal percussion, good air flow throughout.  No crackles. No rhonchi. No wheezes.  CV: Normal S1, S2, regular rhythm, normal rate. No murmur.  No rub. No gallop. No LE edema.   ABDOMEN:  Bowel sounds normal, soft, nontender, no HSM or masses.   MS: extremities normal. No clubbing. No cyanosis.  SKIN: no rash on limited exam  NEURO: Mentation intact, speech normal, normal strength and tone, normal gait and stance  PSYCH: mentation appears normal. and affect normal/bright  Results:  Recent Results (from the past 168 hour(s))   General PFT Lab (Please always keep checked)    Collection Time: 10/20/22 10:48 AM   Result Value Ref Range    FVC-Pred 2.96 L    FVC-Pre 3.75 L    FVC-%Pred-Pre 126 %    FEV1-Pre 2.47 L    FEV1-%Pred-Pre 102 %    FEV1FVC-Pred 82 %    FEV1FVC-Pre 66 %    FEFMax-Pred 6.43 L/sec    FEFMax-Pre 5.76 L/sec    FEFMax-%Pred-Pre 89 %    FEF2575-Pred 2.48 L/sec    FEF2575-Pre 1.35 L/sec    [id number]-%Pred-Pre 54 %    ExpTime-Pre 7.30 sec    FIFMax-Pre 4.45 L/sec    FEV1FEV6-Pred 82 %    FEV1FEV6-Pre 66 %                   Results as noted above.    PFT Interpretation:  Normal spirometry.  Decreased from previous.  Below recent best.   Valid Maneuver        Sincerely,    Dagoberto Briscoe MD

## 2022-10-20 NOTE — NURSING NOTE
Chief Complaint   Patient presents with     Follow Up     Non CF Bronch pt      Vitals were taken and medications were reconciled.     Michelle Beck RMA  11:51 AM

## 2022-11-10 ENCOUNTER — MYC MEDICAL ADVICE (OUTPATIENT)
Dept: RHEUMATOLOGY | Facility: CLINIC | Age: 52
End: 2022-11-10

## 2022-11-10 DIAGNOSIS — D72.18 EOSINOPHILIC GRANULOMATOSIS WITH POLYANGIITIS (EGPA) WITH LUNG INVOLVEMENT (H): Primary | ICD-10-CM

## 2022-11-10 DIAGNOSIS — M30.1 EOSINOPHILIC GRANULOMATOSIS WITH POLYANGIITIS (EGPA) WITH LUNG INVOLVEMENT (H): Primary | ICD-10-CM

## 2022-11-11 RX ORDER — PREDNISONE 1 MG/1
1 TABLET ORAL DAILY
Qty: 90 TABLET | Refills: 1 | Status: SHIPPED | OUTPATIENT
Start: 2022-11-11 | End: 2022-12-23

## 2022-11-11 RX ORDER — PREDNISONE 5 MG/1
5 TABLET ORAL DAILY
Qty: 30 TABLET | Refills: 2 | Status: SHIPPED | OUTPATIENT
Start: 2022-11-11 | End: 2024-05-02

## 2022-11-11 NOTE — TELEPHONE ENCOUNTER
Will route to Dr. Gallo for review.    Pratibha Tilley, BSN, RN  RN Care Coordinator Rheumatology

## 2022-11-14 NOTE — TELEPHONE ENCOUNTER
"See MyChart messages.  Patient taking 5.5mg of prednisone per day. Recently refills for 1mg and 5mg tablets were called in.  Last office notes dated 12/3/21 state: \"Continue prednisone 5 mg every other day alternating with 6 mg every other day.\" Patient is asking if you want her on 5.5mg per day or alternating 5mg and 6mg every other day or could she just take 5mg daily?  Next OV with you scheduled 4/10/23. Please advise.  Thank you!    Jayde Fink RN    "

## 2022-11-15 NOTE — TELEPHONE ENCOUNTER
I recommend making a trial of prednisone 5 mg daily.  Alert rheumatology if significant change in symptoms occurs with the slight reduction in dose After at least 2 to 3 weeks

## 2022-11-19 ENCOUNTER — TELEPHONE (OUTPATIENT)
Dept: PULMONOLOGY | Facility: CLINIC | Age: 52
End: 2022-11-19

## 2022-11-19 NOTE — TELEPHONE ENCOUNTER
Patient Contacted for the patient to call back and schedule the following:    Appointment type: RT[facility]  Provider: Rachna  Return date: April 2023  Specialty phone number: [phone]  Additional appointment(s) needed: PFT  Additonal Notes: NA

## 2022-12-01 ENCOUNTER — OFFICE VISIT (OUTPATIENT)
Dept: PULMONOLOGY | Facility: CLINIC | Age: 52
End: 2022-12-01
Attending: INTERNAL MEDICINE
Payer: COMMERCIAL

## 2022-12-01 ENCOUNTER — ANCILLARY PROCEDURE (OUTPATIENT)
Dept: GENERAL RADIOLOGY | Facility: CLINIC | Age: 52
End: 2022-12-01
Attending: INTERNAL MEDICINE
Payer: COMMERCIAL

## 2022-12-01 ENCOUNTER — LAB (OUTPATIENT)
Dept: LAB | Facility: CLINIC | Age: 52
End: 2022-12-01
Payer: COMMERCIAL

## 2022-12-01 VITALS
SYSTOLIC BLOOD PRESSURE: 124 MMHG | HEART RATE: 66 BPM | BODY MASS INDEX: 20.97 KG/M2 | WEIGHT: 113.98 LBS | OXYGEN SATURATION: 97 % | HEIGHT: 62 IN | DIASTOLIC BLOOD PRESSURE: 76 MMHG

## 2022-12-01 DIAGNOSIS — M30.1 CHURG-STRAUSS SYNDROME (H): ICD-10-CM

## 2022-12-01 DIAGNOSIS — R07.9 CHEST PAIN, UNSPECIFIED TYPE: Primary | ICD-10-CM

## 2022-12-01 DIAGNOSIS — J47.9 BRONCHIECTASIS (H): Primary | ICD-10-CM

## 2022-12-01 DIAGNOSIS — D72.18 CHURG-STRAUSS SYNDROME (H): ICD-10-CM

## 2022-12-01 DIAGNOSIS — R07.9 CHEST PAIN, UNSPECIFIED TYPE: ICD-10-CM

## 2022-12-01 DIAGNOSIS — R07.9 CHEST PAIN: Primary | ICD-10-CM

## 2022-12-01 DIAGNOSIS — J47.9 BRONCHIECTASIS (H): ICD-10-CM

## 2022-12-01 LAB
BASOPHILS # BLD AUTO: 0.1 10E3/UL (ref 0–0.2)
BASOPHILS NFR BLD AUTO: 1 %
CREAT UR-MCNC: 23.1 MG/DL
CRP SERPL-MCNC: <3 MG/L
EOSINOPHIL # BLD AUTO: 0.4 10E3/UL (ref 0–0.7)
EOSINOPHIL NFR BLD AUTO: 4 %
ERYTHROCYTE [DISTWIDTH] IN BLOOD BY AUTOMATED COUNT: 12.8 % (ref 10–15)
ERYTHROCYTE [SEDIMENTATION RATE] IN BLOOD BY WESTERGREN METHOD: 6 MM/HR (ref 0–30)
EXPTIME-PRE: 7.31 SEC
FEF2575-%PRED-PRE: 49 %
FEF2575-PRE: 1.22 L/SEC
FEF2575-PRED: 2.48 L/SEC
FEFMAX-%PRED-PRE: 90 %
FEFMAX-PRE: 5.8 L/SEC
FEFMAX-PRED: 6.43 L/SEC
FEV1-%PRED-PRE: 97 %
FEV1-PRE: 2.34 L
FEV1FEV6-PRE: 64 %
FEV1FEV6-PRED: 82 %
FEV1FVC-PRE: 63 %
FEV1FVC-PRED: 82 %
FIFMAX-PRE: 4.6 L/SEC
FVC-%PRED-PRE: 125 %
FVC-PRE: 3.71 L
FVC-PRED: 2.96 L
HCT VFR BLD AUTO: 39.1 % (ref 35–47)
HGB BLD-MCNC: 13.1 G/DL (ref 11.7–15.7)
IMM GRANULOCYTES # BLD: 0 10E3/UL
IMM GRANULOCYTES NFR BLD: 0 %
LYMPHOCYTES # BLD AUTO: 1.4 10E3/UL (ref 0.8–5.3)
LYMPHOCYTES NFR BLD AUTO: 17 %
MCH RBC QN AUTO: 32.8 PG (ref 26.5–33)
MCHC RBC AUTO-ENTMCNC: 33.5 G/DL (ref 31.5–36.5)
MCV RBC AUTO: 98 FL (ref 78–100)
MICROALBUMIN UR-MCNC: <12 MG/L
MICROALBUMIN/CREAT UR: NORMAL MG/G{CREAT}
MONOCYTES # BLD AUTO: 0.6 10E3/UL (ref 0–1.3)
MONOCYTES NFR BLD AUTO: 8 %
NEUTROPHILS # BLD AUTO: 5.7 10E3/UL (ref 1.6–8.3)
NEUTROPHILS NFR BLD AUTO: 70 %
NRBC # BLD AUTO: 0 10E3/UL
NRBC BLD AUTO-RTO: 0 /100
NT-PROBNP SERPL-MCNC: 130 PG/ML (ref 0–900)
PLATELET # BLD AUTO: 330 10E3/UL (ref 150–450)
RBC # BLD AUTO: 4 10E6/UL (ref 3.8–5.2)
TROPONIN T SERPL HS-MCNC: 10 NG/L
WBC # BLD AUTO: 8.2 10E3/UL (ref 4–11)

## 2022-12-01 PROCEDURE — 36415 COLL VENOUS BLD VENIPUNCTURE: CPT | Performed by: PATHOLOGY

## 2022-12-01 PROCEDURE — 84484 ASSAY OF TROPONIN QUANT: CPT | Performed by: PATHOLOGY

## 2022-12-01 PROCEDURE — 85025 COMPLETE CBC W/AUTO DIFF WBC: CPT | Performed by: PATHOLOGY

## 2022-12-01 PROCEDURE — 99215 OFFICE O/P EST HI 40 MIN: CPT | Mod: 25 | Performed by: INTERNAL MEDICINE

## 2022-12-01 PROCEDURE — 85652 RBC SED RATE AUTOMATED: CPT | Performed by: PATHOLOGY

## 2022-12-01 PROCEDURE — 71046 X-RAY EXAM CHEST 2 VIEWS: CPT | Mod: GC | Performed by: RADIOLOGY

## 2022-12-01 PROCEDURE — 94375 RESPIRATORY FLOW VOLUME LOOP: CPT | Performed by: INTERNAL MEDICINE

## 2022-12-01 PROCEDURE — 99417 PROLNG OP E/M EACH 15 MIN: CPT | Performed by: INTERNAL MEDICINE

## 2022-12-01 PROCEDURE — 86140 C-REACTIVE PROTEIN: CPT | Performed by: PATHOLOGY

## 2022-12-01 PROCEDURE — 82043 UR ALBUMIN QUANTITATIVE: CPT | Performed by: INTERNAL MEDICINE

## 2022-12-01 PROCEDURE — G0463 HOSPITAL OUTPT CLINIC VISIT: HCPCS | Mod: 25

## 2022-12-01 PROCEDURE — 83880 ASSAY OF NATRIURETIC PEPTIDE: CPT | Performed by: PATHOLOGY

## 2022-12-01 ASSESSMENT — PAIN SCALES - GENERAL: PAINLEVEL: NO PAIN (0)

## 2022-12-01 NOTE — LETTER
12/1/2022         RE: Valencia Ye  [address] [facility] [id number]        Dear Colleague,    Thank you for referring your patient, Valencia Ye, to the [facility]. Please see a copy of my visit note below.    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  The episodes of pain are quite severe but lasting only seconds to minutes with complete resolution in between.  The timing and character of the pain do not suggest a pulmonary etiology.  Although it does not sound like cardiac pain, it is important to rule out cardiac etiologies.  An EKG obtained today and reviewed by me showed flipped T's in 3 and V3 and V4.  No ST changes and no significant change from previous EKG making ischemia unlikely.  Troponin and BNP were ordered but suspicion for a cardiac etiology is low.  Worsening symptoms have been associated with worsened reflux, hiatal hernia and dysphagia.  I suspect that this represents an esophageal problem, possibly esophageal spasm or esophagitis.  I discussed the patient briefly with the GI consultants who recommended an EGD and a trial of PPI and possibly manometry depending on the findings of the EGD.  I mentioned her concern regarding IBS and it was felt that this was not a significant contraindication to a PPI.  I have called the patient and recommended that she initiate omeprazole 40 mg daily and have submitted an order for an EGD.  Depending on the findings of the EGD and her response to omeprazole, manometry or GI referral will be considered.  In addition, I have messaged her rheumatologist to be  "sure that we are not missing something related to her EGPA.    The potential etiologies and plan of care were discussed at length with the patient during the clinic visit.  I also called her after my discussion with the gastroenterologist recommending she initiate omeprazole and informing her of the plan for EGD.    Follow-up in pulmonary clinic as previously scheduled or in the interim depending on the findings of the above-noted exams and her clinical course.  I, Dagoberto Briscoe, have spent 70 minutes on the day of the visit to review the chart, interview and examine the patient, review labs and imaging, formulate a plan, document and submit orders. Time documented is excluding time spent for PFT interpretation.     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    The patient presents complaining of \"severe lung pain\".  The pain started about 4 weeks ago.  At that time she also noted increased symptoms of her hiatal hernia with heartburn and reflux.  She describes the pain as a cramping pain in her chest involving her entire chest.  Episodes last 30 seconds to 5 minutes and are quite severe.  In between episodes the pain resolves entirely.  She has up to 5 episodes per day but some days he has none at all.  The pain is nonpleuritic.  She cannot identify any precipitating events.  It is not related to exertion.  It resolves spontaneously but she has not identified any relieving factors.  No recent fever, chills or night sweats.  She reports increased hiatal hernia symptoms with increased reflux and heartburn somewhat increased in the past 6 months but especially accelerated in the past 4 weeks.  In the past she has used \"bile salts\".  She is reluctant to use a PPI due to to her IBS.  She also notes things get " "\"trapped in her esophagus\" she feels that solids sometimes gets stuck in the midesophagus.  This is somewhat new and getting worse.    Breathing is comfortable at rest.  Dyspnea with moderate to heavy exertion at baseline.  She does yoga daily.  She reports a rare cough productive of minimal clear to white sputum, no recent change in the cough or sputum.  No fever, chills or night sweats.    Review of systems:  Appetite is decreased related to her heartburn.  No ear pain, sore throat, sinus pain or rhinorrhea  No visual changes  Occasional palpitations, longstanding and unchanged.  No nausea or vomiting.  Stools are variable which she attributes to her IBS.  No's known sick exposures  The remainder of a complete review of systems is otherwise negative except as noted in history of present illness.              Current Outpatient Medications   Medication     acetylcysteine (MUCOMYST) 20 % neb solution     albuterol (PROAIR HFA/PROVENTIL HFA/VENTOLIN HFA) 108 (90 Base) MCG/ACT inhaler     albuterol (PROVENTIL) (2.5 MG/3ML) 0.083% neb solution     ciprofloxacin-dexamethasone (CIPRODEX) 0.3-0.1 % otic suspension     fluticasone (FLONASE) 50 MCG/ACT nasal spray     metoprolol succinate ER (TOPROL-XL) 25 MG 24 hr tablet     Omega-3 Fatty Acids (FISH OIL) 1200 MG capsule     predniSONE (DELTASONE) 1 MG tablet     predniSONE (DELTASONE) 1 MG tablet     predniSONE (DELTASONE) 5 MG tablet     Probiotic Product (PROBIOTIC DAILY PO)     SYMBICORT 160-4.5 MCG/ACT Inhaler     tiZANidine (ZANAFLEX) 2 MG tablet     vitamin D3 (CHOLECALCIFEROL) 2000 units (50 mcg) tablet     VITAMIN K PO     No current facility-administered medications for this visit.     Allergies   Allergen Reactions     Colistimethate Anaphylaxis     Colymycin M [Colistimethate Sodium] Anaphylaxis     Tamiflu [Oseltamivir]      Gums Blister up     Azithromycin Palpitations     Heart palpitation  Heart palpitation     Clindamycin Rash     Past Medical History: "   Diagnosis Date     Anxiety      Aortitis (H)      Asthma     associated with Churg Antonina syndrome     Bronchiectasis (H)      Cerebral infarction (H) 1990    Very small, caused small permanent optical migraine     Chronic sinusitis      Churg-Antonina syndrome (H)     dx      Conductive hearing loss      Depressive disorder      Eustachian tube dysfunction      Goiter      Hearing loss, conductive      Heart rate problem      Hypertension      Hypocalcemia      Immunosuppression (H)      Infection due to 2019 novel coronavirus 2022     Irritable bowel syndrome      Major depressive disorder      Mannose-binding lectin deficiency      Nonspecific abnormal results of thyroid function study      Osteoporosis      Pericarditis      and      Pneumonia     4/23/10     Recurrent otitis media      Recurrent UTI      Rheumatoid vasculitis (H)      Sinusitis, chronic      Steroid long-term use      Tinnitus      Varicose veins of leg with swelling        Past Surgical History:   Procedure Laterality Date     C/SECTION, CLASSICAL        SECTION       COLONOSCOPY Left 2014    Procedure: COMBINED COLONOSCOPY, SINGLE OR MULTIPLE BIOPSY/POLYPECTOMY BY BIOPSY;  Surgeon: Js Pinedo MD;  Location:  GI     COLONOSCOPY N/A 2019    Procedure: COLONOSCOPY;  Surgeon: Bryce Pimentel MD;  Location:  GI     ENT SURGERY       GENITOURINARY SURGERY       HEAD & NECK SURGERY       HYSTERECTOMY  2009    without oopherectomy     HYSTERECTOMY, PAP NO LONGER INDICATED      cervix removed      LAPAROSCOPIC APPENDECTOMY N/A 2022    Procedure: APPENDECTOMY, LAPAROSCOPIC;  Surgeon: Fany Hannah MD;  Location: [facility] OR     PE TUBES       PICC INSERTION  10/10/2013    5fr DL PASV PICC, 43cm (1cm external) in the L basilic vein w/ tip in the low SVC.     PICC INSERTION Left 2017    5fr DL BioFlo PICC, 42cm (3cm external) in the L lateral brachial vein w/ tip in  "the SVC RA junction.     SINUS SURGERY       TUBAL LIGATION       TYMPANOPLASTY       ZZHC COLP CERVIX/UPPER VAGINA W LOOP ELEC BX CERVIX         Social History     Socioeconomic History     Marital status:      Spouse name: Not on file     Number of children: Not on file     Years of education: Not on file     Highest education level: Not on file   Occupational History     Not on file   Tobacco Use     Smoking status: Never     Smokeless tobacco: Never   Vaping Use     Vaping Use: Never used   Substance and Sexual Activity     Alcohol use: Not Currently     Drug use: No     Sexual activity: Yes     Partners: Male     Birth control/protection: Male Surgical, Female Surgical   Other Topics Concern     Parent/sibling w/ CABG, MI or angioplasty before 65F 55M? Not Asked      Service No     Blood Transfusions No     Caffeine Concern No     Occupational Exposure No     Hobby Hazards No     Sleep Concern No     Stress Concern Yes     Weight Concern No     Special Diet Yes     Comment: IBS diet     Back Care No     Exercise Not Asked     Comment: 1 mile power walk 5 days a week at least.      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  Non smoker. No smokers at home.  Enjoys walking and dancing.  Alcohol--1 to 2 drinks daily.  Few times a year has more than 4 drinks in a day.  No illicits.                     Social Determinants of Health     Financial Resource Strain: Not on file   Food Insecurity: Not on file   Transportation Needs: Not on file   Physical Activity: Not on file   Stress: Not on file   Social Connections: Not on file   Intimate Partner Violence: Not on file   Housing Stability: Not on file         /76 (BP Location: Right arm, Patient Position: Sitting, Cuff Size: Adult Regular)   Pulse 66   Ht 1.57 m (5' 1.81\")   Wt 51.7 kg (113 lb 15.7 oz)   " LMP 02/01/2009 (LMP Unknown)   SpO2 97%   BMI 20.97 kg/m    Exam:   GENERAL APPEARANCE: Well developed, well nourished, alert, and in no apparent distress.  EYES: PERRL, EOMI  HENT: Nasal mucosa with no edema and no hyperemia. No nasal polyps.  EARS: Canals clear, TMs scarred and perforated, unchanged from previous.  MOUTH: Oral mucosa is moist, without any lesions, no tonsillar enlargement, no oropharyngeal exudate.  NECK: supple, no masses, no thyromegaly.  LYMPHATICS: No significant axillary, cervical, or supraclavicular nodes.  RESP: normal percussion, good air flow throughout.  No crackles. No rhonchi. No wheezes.  CV: Normal S1, S2, regular rhythm, normal rate. No murmur.  No rub. No gallop. No LE edema.   ABDOMEN:  Bowel sounds normal, soft, nontender, no HSM or masses.   MS: extremities normal. No clubbing. No cyanosis.  SKIN: no rash on limited exam  NEURO: Mentation intact, speech normal, normal strength and tone, normal gait and stance  PSYCH: mentation appears normal. and affect normal/bright  Results:  Recent Results (from the past 168 hour(s))   General PFT Lab (Please always keep checked)    Collection Time: 12/01/22  2:34 PM   Result Value Ref Range    FVC-Pred 2.96 L    FVC-Pre 3.71 L    FVC-%Pred-Pre 125 %    FEV1-Pre 2.34 L    FEV1-%Pred-Pre 97 %    FEV1FVC-Pred 82 %    FEV1FVC-Pre 63 %    FEFMax-Pred 6.43 L/sec    FEFMax-Pre 5.80 L/sec    FEFMax-%Pred-Pre 90 %    FEF2575-Pred 2.48 L/sec    FEF2575-Pre 1.22 L/sec    JEL9082-%Pred-Pre 49 %    ExpTime-Pre 7.31 sec    FIFMax-Pre 4.60 L/sec    FEV1FEV6-Pred 82 %    FEV1FEV6-Pre 64 %   Troponin T, High Sensitivity    Collection Time: 12/01/22  4:31 PM   Result Value Ref Range    Troponin T, High Sensitivity 10 <=14 ng/L   N terminal pro BNP outpatient    Collection Time: 12/01/22  4:31 PM   Result Value Ref Range    N Terminal Pro BNP Outpatient 130 0 - 900 pg/mL   CRP inflammation    Collection Time: 12/01/22  4:31 PM   Result Value Ref Range    CRP  Inflammation <3.00 <5.00 mg/L   Erythrocyte sedimentation rate auto    Collection Time: 12/01/22  4:31 PM   Result Value Ref Range    Erythrocyte Sedimentation Rate 6 0 - 30 mm/hr   CBC with platelets and differential    Collection Time: 12/01/22  4:31 PM   Result Value Ref Range    WBC Count 8.2 4.0 - 11.0 10e3/uL    RBC Count 4.00 3.80 - 5.20 10e6/uL    Hemoglobin 13.1 11.7 - 15.7 g/dL    Hematocrit 39.1 35.0 - 47.0 %    MCV 98 78 - 100 fL    MCH 32.8 26.5 - 33.0 pg    MCHC 33.5 31.5 - 36.5 g/dL    RDW 12.8 10.0 - 15.0 %    Platelet Count 330 150 - 450 10e3/uL    % Neutrophils 70 %    % Lymphocytes 17 %    % Monocytes 8 %    % Eosinophils 4 %    % Basophils 1 %    % Immature Granulocytes 0 %    NRBCs per 100 WBC 0 <1 /100    Absolute Neutrophils 5.7 1.6 - 8.3 10e3/uL    Absolute Lymphocytes 1.4 0.8 - 5.3 10e3/uL    Absolute Monocytes 0.6 0.0 - 1.3 10e3/uL    Absolute Eosinophils 0.4 0.0 - 0.7 10e3/uL    Absolute Basophils 0.1 0.0 - 0.2 10e3/uL    Absolute Immature Granulocytes 0.0 <=0.4 10e3/uL    Absolute NRBCs 0.0 10e3/uL                 Results as noted above.    PFT Interpretation:  Mild obstructive ventilatory defect.  Decreased from previous.  Below recent best.   Valid Maneuver            Again, thank you for allowing me to participate in the care of your patient.        Sincerely,        Dagoberto Briscoe MD

## 2022-12-01 NOTE — NURSING NOTE
Chief Complaint   Patient presents with     Follow Up     Non CF Bronch intense Lung Pain     Vitals were taken and medications were reconciled.     Michelle Beck RMA  3:21 PM

## 2022-12-01 NOTE — PROGRESS NOTES
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  The episodes of pain are quite severe but lasting only seconds to minutes with complete resolution in between.  The timing and character of the pain do not suggest a pulmonary etiology.  Although it does not sound like cardiac pain, it is important to rule out cardiac etiologies.  An EKG obtained today and reviewed by me showed flipped T's in 3 and V3 and V4.  No ST changes and no significant change from previous EKG making ischemia unlikely.  Troponin and BNP were ordered but suspicion for a cardiac etiology is low.  Worsening symptoms have been associated with worsened reflux, hiatal hernia and dysphagia.  I suspect that this represents an esophageal problem, possibly esophageal spasm or esophagitis.  I discussed the patient briefly with the GI consultants who recommended an EGD and a trial of PPI and possibly manometry depending on the findings of the EGD.  I mentioned her concern regarding IBS and it was felt that this was not a significant contraindication to a PPI.  I have called the patient and recommended that she initiate omeprazole 40 mg daily and have submitted an order for an EGD.  Depending on the findings of the EGD and her response to omeprazole, manometry or GI referral will be considered.  In addition, I have messaged her rheumatologist to be sure that we are not missing something related to her EGPA.    The potential etiologies and plan of care were discussed at length with the patient during the clinic visit.  I also called her after my discussion with the gastroenterologist recommending she initiate omeprazole and informing her of the  "plan for EGD.    Follow-up in pulmonary clinic as previously scheduled or in the interim depending on the findings of the above-noted exams and her clinical course.  I, Dagoberto Briscoe, have spent 70 minutes on the day of the visit to review the chart, interview and examine the patient, review labs and imaging, formulate a plan, document and submit orders. Time documented is excluding time spent for PFT interpretation.     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    The patient presents complaining of \"severe lung pain\".  The pain started about 4 weeks ago.  At that time she also noted increased symptoms of her hiatal hernia with heartburn and reflux.  She describes the pain as a cramping pain in her chest involving her entire chest.  Episodes last 30 seconds to 5 minutes and are quite severe.  In between episodes the pain resolves entirely.  She has up to 5 episodes per day but some days he has none at all.  The pain is nonpleuritic.  She cannot identify any precipitating events.  It is not related to exertion.  It resolves spontaneously but she has not identified any relieving factors.  No recent fever, chills or night sweats.  She reports increased hiatal hernia symptoms with increased reflux and heartburn somewhat increased in the past 6 months but especially accelerated in the past 4 weeks.  In the past she has used \"bile salts\".  She is reluctant to use a PPI due to to her IBS.  She also notes things get \"trapped in her esophagus\" she feels that solids sometimes gets stuck in the midesophagus.  This is somewhat new and getting worse.    Breathing is comfortable at rest.  Dyspnea with moderate to heavy exertion at baseline.  She does yoga daily.  She reports a rare cough productive of minimal clear to white " sputum, no recent change in the cough or sputum.  No fever, chills or night sweats.    Review of systems:  Appetite is decreased related to her heartburn.  No ear pain, sore throat, sinus pain or rhinorrhea  No visual changes  Occasional palpitations, longstanding and unchanged.  No nausea or vomiting.  Stools are variable which she attributes to her IBS.  No's known sick exposures  The remainder of a complete review of systems is otherwise negative except as noted in history of present illness.              Current Outpatient Medications   Medication     acetylcysteine (MUCOMYST) 20 % neb solution     albuterol (PROAIR HFA/PROVENTIL HFA/VENTOLIN HFA) 108 (90 Base) MCG/ACT inhaler     albuterol (PROVENTIL) (2.5 MG/3ML) 0.083% neb solution     ciprofloxacin-dexamethasone (CIPRODEX) 0.3-0.1 % otic suspension     fluticasone (FLONASE) 50 MCG/ACT nasal spray     metoprolol succinate ER (TOPROL-XL) 25 MG 24 hr tablet     Omega-3 Fatty Acids (FISH OIL) 1200 MG capsule     predniSONE (DELTASONE) 1 MG tablet     predniSONE (DELTASONE) 1 MG tablet     predniSONE (DELTASONE) 5 MG tablet     Probiotic Product (PROBIOTIC DAILY PO)     SYMBICORT 160-4.5 MCG/ACT Inhaler     tiZANidine (ZANAFLEX) 2 MG tablet     vitamin D3 (CHOLECALCIFEROL) 2000 units (50 mcg) tablet     VITAMIN K PO     No current facility-administered medications for this visit.     Allergies   Allergen Reactions     Colistimethate Anaphylaxis     Colymycin M [Colistimethate Sodium] Anaphylaxis     Tamiflu [Oseltamivir]      Gums Blister up     Azithromycin Palpitations     Heart palpitation  Heart palpitation     Clindamycin Rash     Past Medical History:   Diagnosis Date     Anxiety      Aortitis (H)      Asthma     associated with Churg Antonina syndrome     Bronchiectasis (H)      Cerebral infarction (H) 08/1990    Very small, caused small permanent optical migraine     Chronic sinusitis      Churg-Antonina syndrome (H)     dx 1990     Conductive hearing loss       Depressive disorder      Eustachian tube dysfunction      Goiter      Hearing loss, conductive      Heart rate problem      Hypertension      Hypocalcemia      Immunosuppression (H)      Infection due to 2019 novel coronavirus 2022     Irritable bowel syndrome      Major depressive disorder      Mannose-binding lectin deficiency      Nonspecific abnormal results of thyroid function study      Osteoporosis      Pericarditis      and      Pneumonia     4/23/10     Recurrent otitis media      Recurrent UTI      Rheumatoid vasculitis (H)      Sinusitis, chronic      Steroid long-term use      Tinnitus      Varicose veins of leg with swelling        Past Surgical History:   Procedure Laterality Date     C/SECTION, CLASSICAL        SECTION       COLONOSCOPY Left 2014    Procedure: COMBINED COLONOSCOPY, SINGLE OR MULTIPLE BIOPSY/POLYPECTOMY BY BIOPSY;  Surgeon: Js Pinedo MD;  Location:  GI     COLONOSCOPY N/A 2019    Procedure: COLONOSCOPY;  Surgeon: Bryce Pimentel MD;  Location:  GI     ENT SURGERY       GENITOURINARY SURGERY       HEAD & NECK SURGERY       HYSTERECTOMY  2009    without oopherectomy     HYSTERECTOMY, PAP NO LONGER INDICATED      cervix removed      LAPAROSCOPIC APPENDECTOMY N/A 2022    Procedure: APPENDECTOMY, LAPAROSCOPIC;  Surgeon: Fany Hannah MD;  Location: [facility] OR     PE TUBES       PICC INSERTION  10/10/2013    5fr DL PASV PICC, 43cm (1cm external) in the L basilic vein w/ tip in the low SVC.     PICC INSERTION Left 2017    5fr DL BioFlo PICC, 42cm (3cm external) in the L lateral brachial vein w/ tip in the SVC RA junction.     SINUS SURGERY       TUBAL LIGATION       TYMPANOPLASTY       ZZHC COLP CERVIX/UPPER VAGINA W LOOP ELEC BX CERVIX         Social History     Socioeconomic History     Marital status:      Spouse name: Not on file     Number of children: Not on file     Years of education: Not on  "file     Highest education level: Not on file   Occupational History     Not on file   Tobacco Use     Smoking status: Never     Smokeless tobacco: Never   Vaping Use     Vaping Use: Never used   Substance and Sexual Activity     Alcohol use: Not Currently     Drug use: No     Sexual activity: Yes     Partners: Male     Birth control/protection: Male Surgical, Female Surgical   Other Topics Concern     Parent/sibling w/ CABG, MI or angioplasty before 65F 55M? Not Asked      Service No     Blood Transfusions No     Caffeine Concern No     Occupational Exposure No     Hobby Hazards No     Sleep Concern No     Stress Concern Yes     Weight Concern No     Special Diet Yes     Comment: IBS diet     Back Care No     Exercise Not Asked     Comment: 1 mile power walk 5 days a week at least.      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  Non smoker. No smokers at home.  Enjoys walking and dancing.  Alcohol--1 to 2 drinks daily.  Few times a year has more than 4 drinks in a day.  No illicits.                     Social Determinants of Health     Financial Resource Strain: Not on file   Food Insecurity: Not on file   Transportation Needs: Not on file   Physical Activity: Not on file   Stress: Not on file   Social Connections: Not on file   Intimate Partner Violence: Not on file   Housing Stability: Not on file         /76 (BP Location: Right arm, Patient Position: Sitting, Cuff Size: Adult Regular)   Pulse 66   Ht 1.57 m (5' 1.81\")   Wt 51.7 kg (113 lb 15.7 oz)   LMP 02/01/2009 (LMP Unknown)   SpO2 97%   BMI 20.97 kg/m    Exam:   GENERAL APPEARANCE: Well developed, well nourished, alert, and in no apparent distress.  EYES: PERRL, EOMI  HENT: Nasal mucosa with no edema and no hyperemia. No nasal polyps.  EARS: Canals clear, TMs scarred and perforated, unchanged from " previous.  MOUTH: Oral mucosa is moist, without any lesions, no tonsillar enlargement, no oropharyngeal exudate.  NECK: supple, no masses, no thyromegaly.  LYMPHATICS: No significant axillary, cervical, or supraclavicular nodes.  RESP: normal percussion, good air flow throughout.  No crackles. No rhonchi. No wheezes.  CV: Normal S1, S2, regular rhythm, normal rate. No murmur.  No rub. No gallop. No LE edema.   ABDOMEN:  Bowel sounds normal, soft, nontender, no HSM or masses.   MS: extremities normal. No clubbing. No cyanosis.  SKIN: no rash on limited exam  NEURO: Mentation intact, speech normal, normal strength and tone, normal gait and stance  PSYCH: mentation appears normal. and affect normal/bright  Results:  Recent Results (from the past 168 hour(s))   General PFT Lab (Please always keep checked)    Collection Time: 12/01/22  2:34 PM   Result Value Ref Range    FVC-Pred 2.96 L    FVC-Pre 3.71 L    FVC-%Pred-Pre 125 %    FEV1-Pre 2.34 L    FEV1-%Pred-Pre 97 %    FEV1FVC-Pred 82 %    FEV1FVC-Pre 63 %    FEFMax-Pred 6.43 L/sec    FEFMax-Pre 5.80 L/sec    FEFMax-%Pred-Pre 90 %    FEF2575-Pred 2.48 L/sec    FEF2575-Pre 1.22 L/sec    MCU5717-%Pred-Pre 49 %    ExpTime-Pre 7.31 sec    FIFMax-Pre 4.60 L/sec    FEV1FEV6-Pred 82 %    FEV1FEV6-Pre 64 %   Troponin T, High Sensitivity    Collection Time: 12/01/22  4:31 PM   Result Value Ref Range    Troponin T, High Sensitivity 10 <=14 ng/L   N terminal pro BNP outpatient    Collection Time: 12/01/22  4:31 PM   Result Value Ref Range    N Terminal Pro BNP Outpatient 130 0 - 900 pg/mL   CRP inflammation    Collection Time: 12/01/22  4:31 PM   Result Value Ref Range    CRP Inflammation <3.00 <5.00 mg/L   Erythrocyte sedimentation rate auto    Collection Time: 12/01/22  4:31 PM   Result Value Ref Range    Erythrocyte Sedimentation Rate 6 0 - 30 mm/hr   CBC with platelets and differential    Collection Time: 12/01/22  4:31 PM   Result Value Ref Range    WBC Count 8.2 4.0 - 11.0  10e3/uL    RBC Count 4.00 3.80 - 5.20 10e6/uL    Hemoglobin 13.1 11.7 - 15.7 g/dL    Hematocrit 39.1 35.0 - 47.0 %    MCV 98 78 - 100 fL    MCH 32.8 26.5 - 33.0 pg    MCHC 33.5 31.5 - 36.5 g/dL    RDW 12.8 10.0 - 15.0 %    Platelet Count 330 150 - 450 10e3/uL    % Neutrophils 70 %    % Lymphocytes 17 %    % Monocytes 8 %    % Eosinophils 4 %    % Basophils 1 %    % Immature Granulocytes 0 %    NRBCs per 100 WBC 0 <1 /100    Absolute Neutrophils 5.7 1.6 - 8.3 10e3/uL    Absolute Lymphocytes 1.4 0.8 - 5.3 10e3/uL    Absolute Monocytes 0.6 0.0 - 1.3 10e3/uL    Absolute Eosinophils 0.4 0.0 - 0.7 10e3/uL    Absolute Basophils 0.1 0.0 - 0.2 10e3/uL    Absolute Immature Granulocytes 0.0 <=0.4 10e3/uL    Absolute NRBCs 0.0 10e3/uL                 Results as noted above.    PFT Interpretation:  Mild obstructive ventilatory defect.  Decreased from previous.  Below recent best.   Valid Maneuver

## 2022-12-05 DIAGNOSIS — M30.1 CHURG-STRAUSS SYNDROME (H): ICD-10-CM

## 2022-12-05 DIAGNOSIS — R07.9 CHEST PAIN, UNSPECIFIED TYPE: ICD-10-CM

## 2022-12-05 DIAGNOSIS — D72.18 CHURG-STRAUSS SYNDROME (H): ICD-10-CM

## 2022-12-05 LAB
ATRIAL RATE - MUSE: 65 BPM
DIASTOLIC BLOOD PRESSURE - MUSE: NORMAL MMHG
INTERPRETATION ECG - MUSE: NORMAL
P AXIS - MUSE: 68 DEGREES
PR INTERVAL - MUSE: 162 MS
QRS DURATION - MUSE: 92 MS
QT - MUSE: 412 MS
QTC - MUSE: 428 MS
R AXIS - MUSE: 61 DEGREES
SYSTOLIC BLOOD PRESSURE - MUSE: NORMAL MMHG
T AXIS - MUSE: 17 DEGREES
VENTRICULAR RATE- MUSE: 65 BPM

## 2022-12-09 ENCOUNTER — TELEPHONE (OUTPATIENT)
Dept: GASTROENTEROLOGY | Facility: CLINIC | Age: 52
End: 2022-12-09

## 2022-12-09 NOTE — TELEPHONE ENCOUNTER
Screening Questions  BLUE  KIND OF PREP RED  LOCATION [review exclusion criteria] GREEN  SEDATION TYPE    Y Are you active on mychart?   Dagoberto Briscoe MD  Ordering/Referring Provider?    [employer]/HP  What type of coverage do you have?  N Have you had a positive covid test in the last 14 days?    19.9  1. BMI  [BMI 40+ - review exclusion criteria]    SELF   2. Are you able to give consent for your medical care? [IF NO,RN REVIEW]        N  3. Are you taking any prescription pain medications on a routine schedule?        N 3a. EXTENDED PREP What kind of prescription?     N 4. Do you have any chemical dependencies such as alcohol, street drugs, or methadone?    Y - BOTH  5. Do you have any history of post-traumatic stress syndrome, severe anxiety or history of psychosis?      **If yes 3- 5 , please schedule with MAC sedation.**          IF YES TO ANY 6 - 10 - HOSPITAL SETTING ONLY.     N 6.   Do you need assistance transferring?     N 7.   Have you had a heart or lung transplant?    N 8.   Are you currently on dialysis?   N 9.   Do you use daily home oxygen?   N 10. Do you take nitroglycerin?   10a. N If yes, how often?     11. [FEMALES]   Are you currently pregnant?     11a.  If yes, how many weeks? [ Greater than 12 weeks, OR NEEDED]    N 12. [review exclusion criteria]  Do you have any implantable devices in your body (pacemaker, defib, LVAD)?    N 13. Do you have Pulmonary Hypertension?             *NEED PAC APPT AT [facility]*     N 14. In the past 6 months, have you had any heart related issues including cardiomyopathy or heart attack?     N 14a. If yes, did it require cardiac stenting if so when?     N 15. Have you had a stroke or Transient ischemic attack (TIA - aka  mini stroke ) within 6 months?      N 16. Do you have mod to severe Obstructive Sleep Apnea?  [Hospital only - Ok at [place]]    N 17. Do you have SEVERE AND UNCONTROLLED asthma?              *NEED PAC APPT AT [facility]*     N 18. Are you currently  "taking any blood thinners?     18a. If yes, inform patient to \"follow up w/ ordering provider for bridging instructions.\"    N 19. Do you take the medication Phentermine?    19a. If yes, \"Hold for 7 days before procedure.  Please consult your prescribing provider if you have questions about holding this medication.\"     N  20. Do you have chronic kidney disease?      N  21. Do you have a diagnosis of diabetes?     N  22. On a regular basis do you go 3-5 days between bowel movements?     23. Preferred LOCAL Pharmacy for Pre Prescription    [ LIST ONLY ONE PHARMACY]          [facility] [id number] [place] [facility] [address]        - CLOSING REMINDERS -    Informed patient they will need an adult    Cannot take any type of public or medical transportation alone    Conscious Sedation- Needs  for 6 hours after the procedure       MAC/General-Needs  for 24 hours after procedure    Pre-Procedure Covid test to be completed [[facility] PCR Testing Required]    Confirmed Nurse will call to complete assessment       - SCHEDULING DETAILS -    Additional comments:  SHEREE CEBALLOS   Surgeon   02/02/2023Date of Procedure  MAC - PTSD - ANXIETY  Sedation Type     Y - PT WILL SCHEDULE WITH PCP  PAC / Pre-op Required   Location  [facility]       Type of Procedure Scheduled  Upper Endoscopy [EGD]              "

## 2022-12-13 DIAGNOSIS — R07.9 CHEST PAIN, UNSPECIFIED TYPE: Primary | ICD-10-CM

## 2022-12-13 DIAGNOSIS — J47.9 BRONCHIECTASIS WITHOUT COMPLICATION (H): ICD-10-CM

## 2022-12-13 DIAGNOSIS — J84.9 ILD (INTERSTITIAL LUNG DISEASE) (H): ICD-10-CM

## 2022-12-14 ENCOUNTER — LAB (OUTPATIENT)
Dept: LAB | Facility: CLINIC | Age: 52
End: 2022-12-14
Payer: COMMERCIAL

## 2022-12-14 DIAGNOSIS — R07.9 CHEST PAIN, UNSPECIFIED TYPE: ICD-10-CM

## 2022-12-14 DIAGNOSIS — J47.9 BRONCHIECTASIS WITHOUT COMPLICATION (H): ICD-10-CM

## 2022-12-14 DIAGNOSIS — J84.9 ILD (INTERSTITIAL LUNG DISEASE) (H): ICD-10-CM

## 2022-12-14 LAB
ANION GAP SERPL CALCULATED.3IONS-SCNC: 12 MMOL/L (ref 7–15)
BUN SERPL-MCNC: 20.5 MG/DL (ref 6–20)
CALCIUM SERPL-MCNC: 9.2 MG/DL (ref 8.6–10)
CHLORIDE SERPL-SCNC: 105 MMOL/L (ref 98–107)
CREAT SERPL-MCNC: 0.71 MG/DL (ref 0.51–0.95)
CRP SERPL-MCNC: <3 MG/L
DEPRECATED HCO3 PLAS-SCNC: 23 MMOL/L (ref 22–29)
ERYTHROCYTE [SEDIMENTATION RATE] IN BLOOD BY WESTERGREN METHOD: 8 MM/HR (ref 0–30)
GFR SERPL CREATININE-BSD FRML MDRD: >90 ML/MIN/1.73M2
GLUCOSE SERPL-MCNC: 91 MG/DL (ref 70–99)
POTASSIUM SERPL-SCNC: 4.4 MMOL/L (ref 3.4–5.3)
SODIUM SERPL-SCNC: 140 MMOL/L (ref 136–145)

## 2022-12-14 PROCEDURE — 80048 BASIC METABOLIC PNL TOTAL CA: CPT

## 2022-12-14 PROCEDURE — 36415 COLL VENOUS BLD VENIPUNCTURE: CPT

## 2022-12-14 PROCEDURE — 86140 C-REACTIVE PROTEIN: CPT

## 2022-12-14 PROCEDURE — 85652 RBC SED RATE AUTOMATED: CPT

## 2022-12-17 ENCOUNTER — MYC MEDICAL ADVICE (OUTPATIENT)
Dept: INTERNAL MEDICINE | Facility: CLINIC | Age: 52
End: 2022-12-17

## 2022-12-19 NOTE — TELEPHONE ENCOUNTER
Patient Quality Outreach    Patient is due for the following:   Asthma  -  ACT needed and Asthma follow-up visit    Next Steps:   Patient has upcoming appointment, these items will be addressed at that time.    Type of outreach:          Questions for provider review:    None     Floyd Vega MA  Chart routed to none.

## 2022-12-23 ENCOUNTER — MYC MEDICAL ADVICE (OUTPATIENT)
Dept: RHEUMATOLOGY | Facility: CLINIC | Age: 52
End: 2022-12-23

## 2022-12-23 DIAGNOSIS — D72.18 EOSINOPHILIC GRANULOMATOSIS WITH POLYANGIITIS (EGPA) WITH LUNG INVOLVEMENT (H): ICD-10-CM

## 2022-12-23 DIAGNOSIS — M30.1 EOSINOPHILIC GRANULOMATOSIS WITH POLYANGIITIS (EGPA) WITH LUNG INVOLVEMENT (H): ICD-10-CM

## 2022-12-23 RX ORDER — PREDNISONE 1 MG/1
TABLET ORAL
Qty: 165 TABLET | Refills: 4 | Status: SHIPPED | OUTPATIENT
Start: 2022-12-23 | End: 2023-04-13

## 2022-12-23 NOTE — TELEPHONE ENCOUNTER
"Patient has been taking 5.5mg prednisone consistently since recommendations to decrease to 5mg daily on 11/10/22.  She states she has been unable to decrease as her body \"gets angry\".  She has had increased fatigue, nausea, and headaches since the decrease.  She is asking for a new RX of 1mg tablets only with sig 5.5mg per day.  She is not interested in taking a 5mg tablet at this time as she states it is too confusing with a taper.  RX pended if appropriate to sign.     Jayde Fink RN    "

## 2023-01-08 ENCOUNTER — MYC MEDICAL ADVICE (OUTPATIENT)
Dept: PULMONOLOGY | Facility: CLINIC | Age: 53
End: 2023-01-08

## 2023-01-08 DIAGNOSIS — J47.9 BRONCHIECTASIS WITHOUT COMPLICATION (H): ICD-10-CM

## 2023-01-08 DIAGNOSIS — J84.9 ILD (INTERSTITIAL LUNG DISEASE) (H): Primary | ICD-10-CM

## 2023-01-09 RX ORDER — AMOXICILLIN AND CLAVULANATE POTASSIUM 500; 125 MG/1; MG/1
1 TABLET, FILM COATED ORAL 2 TIMES DAILY
Qty: 28 TABLET | Refills: 0 | Status: SHIPPED | OUTPATIENT
Start: 2023-01-09 | End: 2023-01-23

## 2023-01-09 NOTE — TELEPHONE ENCOUNTER
Patient reported ~5 days of a head cold last week that has now settled in lungs. Doing extra vest treatments. Feels crackles in lungs. Coughing up yellow sputum. Denies fevers. Per Dr. Briscoe, treat with Augmentin 500-125 BID x14 days.  Magdalena Calix RN

## 2023-01-10 ENCOUNTER — TELEPHONE (OUTPATIENT)
Dept: INTERNAL MEDICINE | Facility: CLINIC | Age: 53
End: 2023-01-10

## 2023-01-10 NOTE — TELEPHONE ENCOUNTER
Patient Quality Outreach    Patient is due for the following:   Asthma  -  ACT needed and Asthma follow-up visit    Next Steps:   Patient has upcoming appointment, these items will be addressed at that time.    Type of outreach:    none      Questions for provider review:    None     Floyd Vega MA  Chart routed to none.

## 2023-01-11 ENCOUNTER — TELEPHONE (OUTPATIENT)
Dept: PULMONOLOGY | Facility: CLINIC | Age: 53
End: 2023-01-11

## 2023-01-11 DIAGNOSIS — J47.9 BRONCHIECTASIS WITHOUT ACUTE EXACERBATION (H): ICD-10-CM

## 2023-01-11 DIAGNOSIS — J84.9 ILD (INTERSTITIAL LUNG DISEASE) (H): Primary | ICD-10-CM

## 2023-01-11 RX ORDER — PREDNISONE 10 MG/1
TABLET ORAL
Qty: 30 TABLET | Refills: 0 | Status: SHIPPED | OUTPATIENT
Start: 2023-01-11 | End: 2023-01-26

## 2023-01-11 NOTE — TELEPHONE ENCOUNTER
Patient called stating she thinks her symptoms are moving in the right direction since starting antibiotics, it is thinning out and lighter in color. However patient is reporting ongoing inflammation feeling in her lungs. Is requesting prednisone.    Discussed with Dr. Briscoe  Start: prednisone 30mg x5, 20mg x5, 10mg x5 then back down to home dose.    Patient verbalized understanding and agreement in plan.    Magdalena Calix RN

## 2023-01-19 ENCOUNTER — TELEPHONE (OUTPATIENT)
Dept: GASTROENTEROLOGY | Facility: CLINIC | Age: 53
End: 2023-01-19

## 2023-01-19 NOTE — TELEPHONE ENCOUNTER
"Attempted to contact patient for pre assessment questions.  No answer.     Left message to return call to [phone] #4    Patient scheduled for Upper endoscopy (EGD) on 2/2/23.     Discuss Covid policy.     Pre op exam scheduled: Yes 2/1/23 with SUNNY Soriano CNP. Please ensure pt is aware this is REQUIRED prior to procedure.    Arrival time: 0905    Facility location: [facility]; [address] [place] [address] [place] [address]    Sedation type: MAC    Anticoagulations? No    Electronic implanted devices? No    Diabetic? No    Indication for procedure: Pain, possibly suggestive of esophageal spasm vs esophagitis.    \"I also have gastroparesis as a result of SIBO from long term hardcore antibiotic use\" per [employer] msg received 9/9/16    Clear liquid diet only starting 24 hours prior to arrival. NPO at midnight.    Prep instructions previously sent via [employer]     Delmi Colon RN  Endoscopy Procedure Pre Assessment RN    "

## 2023-01-21 DIAGNOSIS — J47.9 BRONCHIECTASIS (H): ICD-10-CM

## 2023-01-23 RX ORDER — ALBUTEROL SULFATE 90 UG/1
AEROSOL, METERED RESPIRATORY (INHALATION)
Qty: 18 G | Refills: 3 | Status: SHIPPED | OUTPATIENT
Start: 2023-01-23 | End: 2023-07-14

## 2023-01-24 NOTE — TELEPHONE ENCOUNTER
Pre assessment questions completed for upcoming Upper endoscopy (EGD) procedure scheduled on 2.2.2023    COVID policy reviewed.     Pre op exam scheduled: Yes 2/1/23 with SUNNY Soriano CNP    Reviewed procedural arrival time 0905 and facility location [facility]; [address] [place] [address]    Designated  policy reviewed. Instructed to have someone stay 24 hours post procedure.     Anticoagulation/blood thinners? No    Electronic implanted devices? No    Diabetic? No    Reviewed procedure prep instructions.     Patient verbalized understanding and had no questions or concerns at this time.    Ashley Hart, RN  Endoscopy Procedure Pre Assessment RN

## 2023-02-01 ENCOUNTER — OFFICE VISIT (OUTPATIENT)
Dept: INTERNAL MEDICINE | Facility: CLINIC | Age: 53
End: 2023-02-01
Payer: COMMERCIAL

## 2023-02-01 VITALS
HEART RATE: 73 BPM | SYSTOLIC BLOOD PRESSURE: 132 MMHG | HEIGHT: 61 IN | DIASTOLIC BLOOD PRESSURE: 74 MMHG | RESPIRATION RATE: 18 BRPM | OXYGEN SATURATION: 99 % | WEIGHT: 112 LBS | TEMPERATURE: 97.8 F | BODY MASS INDEX: 21.14 KG/M2

## 2023-02-01 DIAGNOSIS — D72.18 CHURG-STRAUSS SYNDROME (H): ICD-10-CM

## 2023-02-01 DIAGNOSIS — Z01.818 PRE-OP EXAM: Primary | ICD-10-CM

## 2023-02-01 DIAGNOSIS — M30.1 CHURG-STRAUSS SYNDROME (H): ICD-10-CM

## 2023-02-01 DIAGNOSIS — J47.9 BRONCHIECTASIS WITHOUT ACUTE EXACERBATION (H): ICD-10-CM

## 2023-02-01 DIAGNOSIS — K44.9 HIATAL HERNIA: ICD-10-CM

## 2023-02-01 DIAGNOSIS — I10 BENIGN ESSENTIAL HYPERTENSION: ICD-10-CM

## 2023-02-01 DIAGNOSIS — F33.1 MODERATE EPISODE OF RECURRENT MAJOR DEPRESSIVE DISORDER (H): ICD-10-CM

## 2023-02-01 LAB
ANION GAP SERPL CALCULATED.3IONS-SCNC: 14 MMOL/L (ref 7–15)
BUN SERPL-MCNC: 9.6 MG/DL (ref 6–20)
CALCIUM SERPL-MCNC: 9.2 MG/DL (ref 8.6–10)
CHLORIDE SERPL-SCNC: 104 MMOL/L (ref 98–107)
CREAT SERPL-MCNC: 0.69 MG/DL (ref 0.51–0.95)
DEPRECATED HCO3 PLAS-SCNC: 24 MMOL/L (ref 22–29)
ERYTHROCYTE [DISTWIDTH] IN BLOOD BY AUTOMATED COUNT: 13 % (ref 10–15)
GFR SERPL CREATININE-BSD FRML MDRD: >90 ML/MIN/1.73M2
GLUCOSE SERPL-MCNC: 92 MG/DL (ref 70–99)
HCT VFR BLD AUTO: 39 % (ref 35–47)
HGB BLD-MCNC: 13 G/DL (ref 11.7–15.7)
MCH RBC QN AUTO: 32.8 PG (ref 26.5–33)
MCHC RBC AUTO-ENTMCNC: 33.3 G/DL (ref 31.5–36.5)
MCV RBC AUTO: 99 FL (ref 78–100)
PLATELET # BLD AUTO: 279 10E3/UL (ref 150–450)
POTASSIUM SERPL-SCNC: 4 MMOL/L (ref 3.4–5.3)
RBC # BLD AUTO: 3.96 10E6/UL (ref 3.8–5.2)
SODIUM SERPL-SCNC: 142 MMOL/L (ref 136–145)
WBC # BLD AUTO: 4.8 10E3/UL (ref 4–11)

## 2023-02-01 PROCEDURE — 36415 COLL VENOUS BLD VENIPUNCTURE: CPT

## 2023-02-01 PROCEDURE — 85027 COMPLETE CBC AUTOMATED: CPT

## 2023-02-01 PROCEDURE — 99214 OFFICE O/P EST MOD 30 MIN: CPT

## 2023-02-01 PROCEDURE — 80048 BASIC METABOLIC PNL TOTAL CA: CPT

## 2023-02-01 NOTE — H&P (VIEW-ONLY)
[facility]  [address]  [address]  [facility] [address]  Phone: [phone]  Primary Provider: Morelia Simental  Pre-op Performing Provider: JUDITH HANNA      PREOPERATIVE EVALUATION:  Today's date: 2/1/2023    Valencia Ye is a 52 year old female who presents for a preoperative evaluation.    Surgical Information:  Surgery/Procedure: ESOPHAGOGASTRODUODENOSCOPY (EGD)  Surgery Location: [facility]  Surgeon: Dr. Rosa  Surgery Date: 2/2/23  Time of Surgery: TBD  Where patient plans to recover: At home with family  Fax number for surgical facility: Note does not need to be faxed, will be available electronically in Epic.    Type of Anesthesia Anticipated: to be determined    Assessment & Plan     The proposed surgical procedure is considered LOW risk.    (Z01.818) Pre-op exam  (primary encounter diagnosis)  Plan: CBC with platelets, Basic metabolic panel  (Ca,        Cl, CO2, Creat, Gluc, K, Na, BUN)            (K44.9) Hiatal hernia  Comment: See HPI for details. Pt having EGD done tomorrow on 2/2/23.    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 Currently managed with Albuterol neb solution BID, Prednisone daily, and Symbicort BID.       (I10) Benign essential hypertension  Comment: BP at goal today at 132/74. Continue Metoprolol 12.5MG daily.      (F33.1) Moderate episode of recurrent major depressive disorder (H)  Comment: Managed well at this time. Follows with Psychology routinely.     Risks and Recommendations:  The patient has the following additional risks and recommendations for perioperative complications:   - No identified additional risk factors other than previously addressed    Medication Instructions:  Patient is to take all scheduled medications on the day of surgery    RECOMMENDATION:  APPROVAL GIVEN to proceed with  proposed procedure, without further diagnostic evaluation.        Subjective     HPI related to upcoming procedure:       Pt has history of Churg-Antonina syndrome) and bronchiectasis. She last saw Pulmonology on 12/1/22 for episodic severe chest pain of unclear etiology.     Per pulmonology note on 12/1/22:   Worsening symptoms have been associated with worsened reflux, hiatal hernia and dysphagia.  I suspect that this represents an esophageal problem, possibly esophageal spasm or esophagitis.  I discussed the patient briefly with the GI consultants who recommended an EGD and a trial of PPI and possibly manometry depending on the findings of the EGD.    I mentioned her concern regarding IBS and it was felt that this was not a significant contraindication to a PPI.  I have called the patient and recommended that she initiate omeprazole 40 mg daily and have submitted an order for an EGD.  Depending on the findings of the EGD and her response to omeprazole, manometry or GI referral will be considered.  In addition, I have messaged her rheumatologist to be sure that we are not missing something related to her EGPA.        1. No - Have you ever had a heart attack or stroke?  2. No - Have you ever had surgery on your heart or blood vessels, such as a stent, coronary (heart) bypass, or surgery on an artery in the head, neck, heart, or legs?  3. No - Do you have chest pain when you are physically active?  4. No - Do you have a history of heart failure?  5. No - Do you currently have a cold, bronchitis, or symptoms of other respiratory (head and chest) infections?  6. No - Do you have a cough, shortness of breath, or wheezing?  7. No - Do you or anyone in your family have a history of blood clots?  8. No - Do you or anyone in your family have a serious bleeding problem, such as long-lasting bleeding after surgeries or cuts?  9. No - Have you ever had anemia or been told to take iron pills?  10. No - Have you had any abnormal  blood loss such as black, tarry or bloody stools, or abnormal vaginal bleeding?  11. No - Have you ever had a blood transfusion?  12. Yes - Are you willing to have a blood transfusion if it is medically needed before, during, or after your surgery?  13. No - Have you or anyone in your family ever had problems with anesthesia (sedation for surgery)?  14. No - Do you have sleep apnea, excessive snoring, or daytime drowsiness?   15. No - Do you have any artifical heart valves or other implanted medical devices, such as a pacemaker, defibrillator, or continuous glucose monitor?  16. No - Do you have any artifical joints?  17. No - Are you allergic to latex?  18. No - Is there any chance that you may be pregnant?      Health Care Directive:  Patient does not have a Health Care Directive or Living Will: Discussed advance care planning with patient; however, patient declined at this time.    Preoperative Review of :   reviewed - no record of controlled substances prescribed.        Review of Systems  CONSTITUTIONAL: NEGATIVE for fever, chills, change in weight  ENT/MOUTH: NEGATIVE for ear, mouth and throat problems  RESP: NEGATIVE for significant cough or SOB  CV: NEGATIVE for chest pain, palpitations or peripheral edema  CV: POSITIVE for palpitations and NEGATIVE for chest pain/chest pressure  NEURO: NEGATIVE for weakness, dizziness or paresthesias  HEME/ALLERGY/IMMUNE: NEGATIVE for bleeding problems    Patient Active Problem List    Diagnosis Date Noted     Pain syndrome, chronic 05/22/2022     Priority: Medium     Alpha-lipoproteinemia 05/22/2022     Priority: Medium     Bladder pain 07/31/2019     Priority: Medium     Overactive bladder 07/31/2019     Priority: Medium     Controlled substance agreement signed 11/13/2017     Priority: Medium     Patient is followed by BRIAN DEE for ongoing prescription of benzodiazepines.  All refills should be approved by this provider, or covering partner.    Medication(s):  clonazepam.   Maximum quantity per month: 7.5  Clinic visit frequency required: Q 6  months     Controlled substance agreement on file: Yes       Date(s): 11/13/17  Benzodiazepine use reviewed by psychiatry:  No    Last [facility] website verification:  none   [email or web address]           Bronchiectasis without acute exacerbation (H) 05/22/2017     Priority: Medium     Moderate episode of recurrent major depressive disorder (H) 01/29/2017     Priority: Medium     Benign essential hypertension 10/20/2016     Priority: Medium     Mannose-binding lectin deficiency      Priority: Medium     Irritable bowel syndrome without diarrhea 07/14/2016     Priority: Medium     Chronic fatigue 01/21/2016     Priority: Medium     Anxiety 05/30/2014     Priority: Medium     Mild intermittent asthma without complication 07/16/2013     Priority: Medium     History of eating disorder 06/16/2013     Priority: Medium     Churg-Antonina syndrome (H) 10/10/2012     Priority: Medium     Palpitations 10/04/2012     Priority: Medium     ILD (interstitial lung disease) (H) 06/08/2012     Priority: Medium     Goiter 10/14/2011     Priority: Medium     History of systemic steroid therapy 10/14/2011     Priority: Medium      Past Medical History:   Diagnosis Date     Anxiety      Aortitis (H)      Asthma     associated with Churg Antonina syndrome     Bronchiectasis (H)      Cerebral infarction (H) 08/1990    Very small, caused small permanent optical migraine     Chronic sinusitis      Churg-Antonina syndrome (H)     dx 1990     Conductive hearing loss      Depressive disorder      Eustachian tube dysfunction      Goiter      Hearing loss, conductive      Heart rate problem      Hypertension      Hypocalcemia      Immunosuppression (H)      Infection due to 2019 novel coronavirus 04/2022     Irritable bowel syndrome      Major depressive disorder      Mannose-binding lectin deficiency 2014     Nonspecific abnormal results of thyroid function  study      Osteoporosis      Pericarditis      and      Pneumonia     4/23/10     Recurrent otitis media      Recurrent UTI      Rheumatoid vasculitis (H)      Sinusitis, chronic      Steroid long-term use      Tinnitus      Varicose veins of leg with swelling      Past Surgical History:   Procedure Laterality Date     C/SECTION, CLASSICAL  1998      SECTION       COLONOSCOPY Left 2014    Procedure: COMBINED COLONOSCOPY, SINGLE OR MULTIPLE BIOPSY/POLYPECTOMY BY BIOPSY;  Surgeon: Js Pinedo MD;  Location:  GI     COLONOSCOPY N/A 2019    Procedure: COLONOSCOPY;  Surgeon: Bryce Pimentel MD;  Location:  GI     ENT SURGERY       GENITOURINARY SURGERY       HEAD & NECK SURGERY       HYSTERECTOMY  2009    without oopherectomy     HYSTERECTOMY, PAP NO LONGER INDICATED      cervix removed      LAPAROSCOPIC APPENDECTOMY N/A 2022    Procedure: APPENDECTOMY, LAPAROSCOPIC;  Surgeon: Fany Hannah MD;  Location: [facility] OR     PE TUBES       PICC INSERTION  10/10/2013    5fr DL PASV PICC, 43cm (1cm external) in the L basilic vein w/ tip in the low SVC.     PICC INSERTION Left 2017    5fr DL BioFlo PICC, 42cm (3cm external) in the L lateral brachial vein w/ tip in the SVC RA junction.     SINUS SURGERY       TUBAL LIGATION       TYMPANOPLASTY       ZZHC COLP CERVIX/UPPER VAGINA W LOOP ELEC BX CERVIX       Current Outpatient Medications   Medication Sig Dispense Refill     acetylcysteine (MUCOMYST) 20 % neb solution TAKE 4ML VIA NEBULIZER TWICE A DAY WITH ALBUTEROL SOLUTION. DISCARD OPEN BOTTLE AFTER 96 HOURS. 800 mL 2     albuterol (PROAIR HFA/PROVENTIL HFA/VENTOLIN HFA) 108 (90 Base) MCG/ACT inhaler INHALE 2 PUFFS INTO THE LUNGS EVERY 4 HOURS AS NEEDED FOR SHORTNESS OF BREATH/WHEEZE 18 g 3     albuterol (PROVENTIL) (2.5 MG/3ML) 0.083% neb solution TAKE 1 VIAL VIA NEBULIZER TWICE A DAY. 150 mL 11     ciprofloxacin-dexamethasone (CIPRODEX) 0.3-0.1 % otic  "suspension INSTILL 3 DROPS INTO AFFECTED EARS 2 TIMES DAILY AS NEEDED 7.5 mL 10     fluticasone (FLONASE) 50 MCG/ACT nasal spray SHAKE LIQUID AND USE 2 SPRAYS IN EACH NOSTRIL EVERY DAY 48 mL 3     metoprolol succinate ER (TOPROL-XL) 25 MG 24 hr tablet TAKE 1/2 TABLET(12.5 MG) BY MOUTH DAILY. HOLD IF BP IS LOW (Patient taking differently: Take 12.5 mg by mouth daily as needed (depending on daily BP check)) 45 tablet 3     Omega-3 Fatty Acids (FISH OIL) 1200 MG capsule Take 4 capsules (4.8 g) by mouth daily       predniSONE (DELTASONE) 1 MG tablet Take 5.5mg daily 165 tablet 4     predniSONE (DELTASONE) 1 MG tablet Take 5.5 tablets (5.5 mg) by mouth daily 590 tablet 5     predniSONE (DELTASONE) 5 MG tablet Take 1 tablet (5 mg) by mouth daily 30 tablet 2     Probiotic Product (PROBIOTIC DAILY PO) Take 1 capsule by mouth daily       SYMBICORT 160-4.5 MCG/ACT Inhaler TAKE 2 PUFFS BY MOUTH TWICE A DAY 30.6 g 3     tiZANidine (ZANAFLEX) 2 MG tablet TAKE 1-2 TABLETS BY MOUTH 3 TIMES DAILY AS NEEDED FOR MUSCLE SPASMS 45 tablet 11     vitamin D3 (CHOLECALCIFEROL) 2000 units (50 mcg) tablet Take 2 tablets by mouth daily       VITAMIN K PO Take 1 capsule by mouth daily         Allergies   Allergen Reactions     Colistimethate Anaphylaxis     Colymycin M [Colistimethate Sodium] Anaphylaxis     Tamiflu [Oseltamivir]      Gums Blister up     Azithromycin Palpitations     Heart palpitation  Heart palpitation     Clindamycin Rash        Social History     Tobacco Use     Smoking status: Never     Smokeless tobacco: Never   Substance Use Topics     Alcohol use: Not Currently     History   Drug Use No         Objective     /74   Pulse 73   Temp 97.8  F (36.6  C)   Resp 18   Ht 1.549 m (5' 1\")   Wt 50.8 kg (112 lb)   LMP 02/01/2009 (LMP Unknown)   SpO2 99%   Breastfeeding No   BMI 21.16 kg/m      Physical Exam  Constitutional:       General: She is not in acute distress.     Appearance: Normal appearance. She is not " ill-appearing, toxic-appearing or diaphoretic.   HENT:      Head: Normocephalic and atraumatic.   Eyes:      Conjunctiva/sclera: Conjunctivae normal.   Cardiovascular:      Rate and Rhythm: Normal rate and regular rhythm.      Heart sounds: Normal heart sounds.   Pulmonary:      Effort: Pulmonary effort is normal.      Breath sounds: Normal breath sounds.   Skin:     General: Skin is warm and dry.   Neurological:      Mental Status: She is alert and oriented to person, place, and time.   Psychiatric:         Mood and Affect: Mood normal.         Behavior: Behavior normal.         Thought Content: Thought content normal.         Judgment: Judgment normal.     Recent Labs   Lab Test 12/14/22  1412 12/01/22  1631 06/24/22  0708 06/23/22  0711   HGB  --  13.1 10.3* 10.9*   PLT  --  330 224 241     --   --  137   POTASSIUM 4.4  --   --  4.3   CR 0.71  --   --  0.73        Diagnostics:  Labs pending at this time.  Results will be reviewed when available.   No EKG this visit, completed in the last 90 days.    Revised Cardiac Risk Index (RCRI):  The patient has the following serious cardiovascular risks for perioperative complications:   - No serious cardiac risks = 0 points     RCRI Interpretation: 0 points: Class I (very low risk - 0.4% complication rate)         Signed Electronically by: SUNNY Thornton CNP  Copy of this evaluation report is provided to requesting physician.

## 2023-02-01 NOTE — PROGRESS NOTES
[facility]  [address]  [address]  [facility] [address]  Phone: [phone]  Primary Provider: Morelia Simental  Pre-op Performing Provider: JUDITH HANNA      PREOPERATIVE EVALUATION:  Today's date: 2/1/2023    Valencia Ye is a 52 year old female who presents for a preoperative evaluation.    Surgical Information:  Surgery/Procedure: ESOPHAGOGASTRODUODENOSCOPY (EGD)  Surgery Location: [facility]  Surgeon: Dr. Rosa  Surgery Date: 2/2/23  Time of Surgery: TBD  Where patient plans to recover: At home with family  Fax number for surgical facility: Note does not need to be faxed, will be available electronically in Epic.    Type of Anesthesia Anticipated: to be determined    Assessment & Plan     The proposed surgical procedure is considered LOW risk.    (Z01.818) Pre-op exam  (primary encounter diagnosis)  Plan: CBC with platelets, Basic metabolic panel  (Ca,        Cl, CO2, Creat, Gluc, K, Na, BUN)            (K44.9) Hiatal hernia  Comment: See HPI for details. Pt having EGD done tomorrow on 2/2/23.    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 Currently managed with Albuterol neb solution BID, Prednisone daily, and Symbicort BID.       (I10) Benign essential hypertension  Comment: BP at goal today at 132/74. Continue Metoprolol 12.5MG daily.      (F33.1) Moderate episode of recurrent major depressive disorder (H)  Comment: Managed well at this time. Follows with Psychology routinely.     Risks and Recommendations:  The patient has the following additional risks and recommendations for perioperative complications:   - No identified additional risk factors other than previously addressed    Medication Instructions:  Patient is to take all scheduled medications on the day of surgery    RECOMMENDATION:  APPROVAL GIVEN to proceed with  proposed procedure, without further diagnostic evaluation.        Subjective     HPI related to upcoming procedure:       Pt has history of Churg-Antonina syndrome) and bronchiectasis. She last saw Pulmonology on 12/1/22 for episodic severe chest pain of unclear etiology.     Per pulmonology note on 12/1/22:   Worsening symptoms have been associated with worsened reflux, hiatal hernia and dysphagia.  I suspect that this represents an esophageal problem, possibly esophageal spasm or esophagitis.  I discussed the patient briefly with the GI consultants who recommended an EGD and a trial of PPI and possibly manometry depending on the findings of the EGD.    I mentioned her concern regarding IBS and it was felt that this was not a significant contraindication to a PPI.  I have called the patient and recommended that she initiate omeprazole 40 mg daily and have submitted an order for an EGD.  Depending on the findings of the EGD and her response to omeprazole, manometry or GI referral will be considered.  In addition, I have messaged her rheumatologist to be sure that we are not missing something related to her EGPA.        1. No - Have you ever had a heart attack or stroke?  2. No - Have you ever had surgery on your heart or blood vessels, such as a stent, coronary (heart) bypass, or surgery on an artery in the head, neck, heart, or legs?  3. No - Do you have chest pain when you are physically active?  4. No - Do you have a history of heart failure?  5. No - Do you currently have a cold, bronchitis, or symptoms of other respiratory (head and chest) infections?  6. No - Do you have a cough, shortness of breath, or wheezing?  7. No - Do you or anyone in your family have a history of blood clots?  8. No - Do you or anyone in your family have a serious bleeding problem, such as long-lasting bleeding after surgeries or cuts?  9. No - Have you ever had anemia or been told to take iron pills?  10. No - Have you had any abnormal  blood loss such as black, tarry or bloody stools, or abnormal vaginal bleeding?  11. No - Have you ever had a blood transfusion?  12. Yes - Are you willing to have a blood transfusion if it is medically needed before, during, or after your surgery?  13. No - Have you or anyone in your family ever had problems with anesthesia (sedation for surgery)?  14. No - Do you have sleep apnea, excessive snoring, or daytime drowsiness?   15. No - Do you have any artifical heart valves or other implanted medical devices, such as a pacemaker, defibrillator, or continuous glucose monitor?  16. No - Do you have any artifical joints?  17. No - Are you allergic to latex?  18. No - Is there any chance that you may be pregnant?      Health Care Directive:  Patient does not have a Health Care Directive or Living Will: Discussed advance care planning with patient; however, patient declined at this time.    Preoperative Review of :   reviewed - no record of controlled substances prescribed.        Review of Systems  CONSTITUTIONAL: NEGATIVE for fever, chills, change in weight  ENT/MOUTH: NEGATIVE for ear, mouth and throat problems  RESP: NEGATIVE for significant cough or SOB  CV: NEGATIVE for chest pain, palpitations or peripheral edema  CV: POSITIVE for palpitations and NEGATIVE for chest pain/chest pressure  NEURO: NEGATIVE for weakness, dizziness or paresthesias  HEME/ALLERGY/IMMUNE: NEGATIVE for bleeding problems    Patient Active Problem List    Diagnosis Date Noted     Pain syndrome, chronic 05/22/2022     Priority: Medium     Alpha-lipoproteinemia 05/22/2022     Priority: Medium     Bladder pain 07/31/2019     Priority: Medium     Overactive bladder 07/31/2019     Priority: Medium     Controlled substance agreement signed 11/13/2017     Priority: Medium     Patient is followed by BRIAN DEE for ongoing prescription of benzodiazepines.  All refills should be approved by this provider, or covering partner.    Medication(s):  clonazepam.   Maximum quantity per month: 7.5  Clinic visit frequency required: Q 6  months     Controlled substance agreement on file: Yes       Date(s): 11/13/17  Benzodiazepine use reviewed by psychiatry:  No    Last [facility] website verification:  none   [email or web address]           Bronchiectasis without acute exacerbation (H) 05/22/2017     Priority: Medium     Moderate episode of recurrent major depressive disorder (H) 01/29/2017     Priority: Medium     Benign essential hypertension 10/20/2016     Priority: Medium     Mannose-binding lectin deficiency      Priority: Medium     Irritable bowel syndrome without diarrhea 07/14/2016     Priority: Medium     Chronic fatigue 01/21/2016     Priority: Medium     Anxiety 05/30/2014     Priority: Medium     Mild intermittent asthma without complication 07/16/2013     Priority: Medium     History of eating disorder 06/16/2013     Priority: Medium     Churg-Antonina syndrome (H) 10/10/2012     Priority: Medium     Palpitations 10/04/2012     Priority: Medium     ILD (interstitial lung disease) (H) 06/08/2012     Priority: Medium     Goiter 10/14/2011     Priority: Medium     History of systemic steroid therapy 10/14/2011     Priority: Medium      Past Medical History:   Diagnosis Date     Anxiety      Aortitis (H)      Asthma     associated with Churg Antonina syndrome     Bronchiectasis (H)      Cerebral infarction (H) 08/1990    Very small, caused small permanent optical migraine     Chronic sinusitis      Churg-Antonina syndrome (H)     dx 1990     Conductive hearing loss      Depressive disorder      Eustachian tube dysfunction      Goiter      Hearing loss, conductive      Heart rate problem      Hypertension      Hypocalcemia      Immunosuppression (H)      Infection due to 2019 novel coronavirus 04/2022     Irritable bowel syndrome      Major depressive disorder      Mannose-binding lectin deficiency 2014     Nonspecific abnormal results of thyroid function  study      Osteoporosis      Pericarditis      and      Pneumonia     4/23/10     Recurrent otitis media      Recurrent UTI      Rheumatoid vasculitis (H)      Sinusitis, chronic      Steroid long-term use      Tinnitus      Varicose veins of leg with swelling      Past Surgical History:   Procedure Laterality Date     C/SECTION, CLASSICAL  1998      SECTION       COLONOSCOPY Left 2014    Procedure: COMBINED COLONOSCOPY, SINGLE OR MULTIPLE BIOPSY/POLYPECTOMY BY BIOPSY;  Surgeon: Js Pinedo MD;  Location:  GI     COLONOSCOPY N/A 2019    Procedure: COLONOSCOPY;  Surgeon: Bryce Pimentel MD;  Location:  GI     ENT SURGERY       GENITOURINARY SURGERY       HEAD & NECK SURGERY       HYSTERECTOMY  2009    without oopherectomy     HYSTERECTOMY, PAP NO LONGER INDICATED      cervix removed      LAPAROSCOPIC APPENDECTOMY N/A 2022    Procedure: APPENDECTOMY, LAPAROSCOPIC;  Surgeon: Fany Hannah MD;  Location: [facility] OR     PE TUBES       PICC INSERTION  10/10/2013    5fr DL PASV PICC, 43cm (1cm external) in the L basilic vein w/ tip in the low SVC.     PICC INSERTION Left 2017    5fr DL BioFlo PICC, 42cm (3cm external) in the L lateral brachial vein w/ tip in the SVC RA junction.     SINUS SURGERY       TUBAL LIGATION       TYMPANOPLASTY       ZZHC COLP CERVIX/UPPER VAGINA W LOOP ELEC BX CERVIX       Current Outpatient Medications   Medication Sig Dispense Refill     acetylcysteine (MUCOMYST) 20 % neb solution TAKE 4ML VIA NEBULIZER TWICE A DAY WITH ALBUTEROL SOLUTION. DISCARD OPEN BOTTLE AFTER 96 HOURS. 800 mL 2     albuterol (PROAIR HFA/PROVENTIL HFA/VENTOLIN HFA) 108 (90 Base) MCG/ACT inhaler INHALE 2 PUFFS INTO THE LUNGS EVERY 4 HOURS AS NEEDED FOR SHORTNESS OF BREATH/WHEEZE 18 g 3     albuterol (PROVENTIL) (2.5 MG/3ML) 0.083% neb solution TAKE 1 VIAL VIA NEBULIZER TWICE A DAY. 150 mL 11     ciprofloxacin-dexamethasone (CIPRODEX) 0.3-0.1 % otic  "suspension INSTILL 3 DROPS INTO AFFECTED EARS 2 TIMES DAILY AS NEEDED 7.5 mL 10     fluticasone (FLONASE) 50 MCG/ACT nasal spray SHAKE LIQUID AND USE 2 SPRAYS IN EACH NOSTRIL EVERY DAY 48 mL 3     metoprolol succinate ER (TOPROL-XL) 25 MG 24 hr tablet TAKE 1/2 TABLET(12.5 MG) BY MOUTH DAILY. HOLD IF BP IS LOW (Patient taking differently: Take 12.5 mg by mouth daily as needed (depending on daily BP check)) 45 tablet 3     Omega-3 Fatty Acids (FISH OIL) 1200 MG capsule Take 4 capsules (4.8 g) by mouth daily       predniSONE (DELTASONE) 1 MG tablet Take 5.5mg daily 165 tablet 4     predniSONE (DELTASONE) 1 MG tablet Take 5.5 tablets (5.5 mg) by mouth daily 590 tablet 5     predniSONE (DELTASONE) 5 MG tablet Take 1 tablet (5 mg) by mouth daily 30 tablet 2     Probiotic Product (PROBIOTIC DAILY PO) Take 1 capsule by mouth daily       SYMBICORT 160-4.5 MCG/ACT Inhaler TAKE 2 PUFFS BY MOUTH TWICE A DAY 30.6 g 3     tiZANidine (ZANAFLEX) 2 MG tablet TAKE 1-2 TABLETS BY MOUTH 3 TIMES DAILY AS NEEDED FOR MUSCLE SPASMS 45 tablet 11     vitamin D3 (CHOLECALCIFEROL) 2000 units (50 mcg) tablet Take 2 tablets by mouth daily       VITAMIN K PO Take 1 capsule by mouth daily         Allergies   Allergen Reactions     Colistimethate Anaphylaxis     Colymycin M [Colistimethate Sodium] Anaphylaxis     Tamiflu [Oseltamivir]      Gums Blister up     Azithromycin Palpitations     Heart palpitation  Heart palpitation     Clindamycin Rash        Social History     Tobacco Use     Smoking status: Never     Smokeless tobacco: Never   Substance Use Topics     Alcohol use: Not Currently     History   Drug Use No         Objective     /74   Pulse 73   Temp 97.8  F (36.6  C)   Resp 18   Ht 1.549 m (5' 1\")   Wt 50.8 kg (112 lb)   LMP 02/01/2009 (LMP Unknown)   SpO2 99%   Breastfeeding No   BMI 21.16 kg/m      Physical Exam  Constitutional:       General: She is not in acute distress.     Appearance: Normal appearance. She is not " ill-appearing, toxic-appearing or diaphoretic.   HENT:      Head: Normocephalic and atraumatic.   Eyes:      Conjunctiva/sclera: Conjunctivae normal.   Cardiovascular:      Rate and Rhythm: Normal rate and regular rhythm.      Heart sounds: Normal heart sounds.   Pulmonary:      Effort: Pulmonary effort is normal.      Breath sounds: Normal breath sounds.   Skin:     General: Skin is warm and dry.   Neurological:      Mental Status: She is alert and oriented to person, place, and time.   Psychiatric:         Mood and Affect: Mood normal.         Behavior: Behavior normal.         Thought Content: Thought content normal.         Judgment: Judgment normal.     Recent Labs   Lab Test 12/14/22  1412 12/01/22  1631 06/24/22  0708 06/23/22  0711   HGB  --  13.1 10.3* 10.9*   PLT  --  330 224 241     --   --  137   POTASSIUM 4.4  --   --  4.3   CR 0.71  --   --  0.73        Diagnostics:  Labs pending at this time.  Results will be reviewed when available.   No EKG this visit, completed in the last 90 days.    Revised Cardiac Risk Index (RCRI):  The patient has the following serious cardiovascular risks for perioperative complications:   - No serious cardiac risks = 0 points     RCRI Interpretation: 0 points: Class I (very low risk - 0.4% complication rate)         Signed Electronically by: SUNNY Thornton CNP  Copy of this evaluation report is provided to requesting physician.

## 2023-02-02 ENCOUNTER — ANESTHESIA (OUTPATIENT)
Dept: GASTROENTEROLOGY | Facility: CLINIC | Age: 53
End: 2023-02-02
Payer: COMMERCIAL

## 2023-02-02 ENCOUNTER — ANESTHESIA EVENT (OUTPATIENT)
Dept: GASTROENTEROLOGY | Facility: CLINIC | Age: 53
End: 2023-02-02
Payer: COMMERCIAL

## 2023-02-02 ENCOUNTER — HOSPITAL ENCOUNTER (OUTPATIENT)
Facility: CLINIC | Age: 53
Discharge: HOME | End: 2023-02-02
Attending: INTERNAL MEDICINE | Admitting: INTERNAL MEDICINE
Payer: COMMERCIAL

## 2023-02-02 VITALS
DIASTOLIC BLOOD PRESSURE: 83 MMHG | SYSTOLIC BLOOD PRESSURE: 137 MMHG | RESPIRATION RATE: 12 BRPM | HEART RATE: 78 BPM | OXYGEN SATURATION: 99 %

## 2023-02-02 LAB — UPPER GI ENDOSCOPY: NORMAL

## 2023-02-02 PROCEDURE — 43239 EGD BIOPSY SINGLE/MULTIPLE: CPT | Performed by: INTERNAL MEDICINE

## 2023-02-02 PROCEDURE — 43248 EGD GUIDE WIRE INSERTION: CPT | Performed by: INTERNAL MEDICINE

## 2023-02-02 PROCEDURE — 999N000010 HC STATISTIC ANES STAT CODE-CRNA PER MINUTE: Performed by: INTERNAL MEDICINE

## 2023-02-02 PROCEDURE — 370N000017 HC ANESTHESIA TECHNICAL FEE, PER MIN: Performed by: INTERNAL MEDICINE

## 2023-02-02 PROCEDURE — 88305 TISSUE EXAM BY PATHOLOGIST: CPT | Mod: TC | Performed by: INTERNAL MEDICINE

## 2023-02-02 PROCEDURE — 88305 TISSUE EXAM BY PATHOLOGIST: CPT | Mod: 26 | Performed by: PATHOLOGY

## 2023-02-02 PROCEDURE — 250N000009 HC RX 250: Performed by: NURSE ANESTHETIST, CERTIFIED REGISTERED

## 2023-02-02 PROCEDURE — 43245 EGD DILATE STRICTURE: CPT | Performed by: INTERNAL MEDICINE

## 2023-02-02 PROCEDURE — 250N000011 HC RX IP 250 OP 636: Performed by: NURSE ANESTHETIST, CERTIFIED REGISTERED

## 2023-02-02 PROCEDURE — 258N000003 HC RX IP 258 OP 636: Performed by: NURSE ANESTHETIST, CERTIFIED REGISTERED

## 2023-02-02 RX ORDER — LIDOCAINE 40 MG/G
CREAM TOPICAL
Status: DISCONTINUED | OUTPATIENT
Start: 2023-02-02 | End: 2023-02-02 | Stop reason: HOSPADM

## 2023-02-02 RX ORDER — NALOXONE HYDROCHLORIDE 0.4 MG/ML
0.2 INJECTION, SOLUTION INTRAMUSCULAR; INTRAVENOUS; SUBCUTANEOUS
Status: DISCONTINUED | OUTPATIENT
Start: 2023-02-02 | End: 2023-02-02 | Stop reason: HOSPADM

## 2023-02-02 RX ORDER — NALOXONE HYDROCHLORIDE 0.4 MG/ML
0.4 INJECTION, SOLUTION INTRAMUSCULAR; INTRAVENOUS; SUBCUTANEOUS
Status: DISCONTINUED | OUTPATIENT
Start: 2023-02-02 | End: 2023-02-02 | Stop reason: HOSPADM

## 2023-02-02 RX ORDER — ONDANSETRON 2 MG/ML
4 INJECTION INTRAMUSCULAR; INTRAVENOUS
Status: DISCONTINUED | OUTPATIENT
Start: 2023-02-02 | End: 2023-02-02 | Stop reason: HOSPADM

## 2023-02-02 RX ORDER — PROPOFOL 10 MG/ML
INJECTION, EMULSION INTRAVENOUS CONTINUOUS PRN
Status: DISCONTINUED | OUTPATIENT
Start: 2023-02-02 | End: 2023-02-02

## 2023-02-02 RX ORDER — ONDANSETRON 4 MG/1
4 TABLET, ORALLY DISINTEGRATING ORAL EVERY 6 HOURS PRN
Status: DISCONTINUED | OUTPATIENT
Start: 2023-02-02 | End: 2023-02-02 | Stop reason: HOSPADM

## 2023-02-02 RX ORDER — ONDANSETRON 2 MG/ML
4 INJECTION INTRAMUSCULAR; INTRAVENOUS EVERY 6 HOURS PRN
Status: DISCONTINUED | OUTPATIENT
Start: 2023-02-02 | End: 2023-02-02 | Stop reason: HOSPADM

## 2023-02-02 RX ORDER — LIDOCAINE HYDROCHLORIDE 20 MG/ML
INJECTION, SOLUTION INFILTRATION; PERINEURAL PRN
Status: DISCONTINUED | OUTPATIENT
Start: 2023-02-02 | End: 2023-02-02

## 2023-02-02 RX ORDER — ONDANSETRON 2 MG/ML
INJECTION INTRAMUSCULAR; INTRAVENOUS PRN
Status: DISCONTINUED | OUTPATIENT
Start: 2023-02-02 | End: 2023-02-02

## 2023-02-02 RX ORDER — SODIUM CHLORIDE, SODIUM LACTATE, POTASSIUM CHLORIDE, CALCIUM CHLORIDE 600; 310; 30; 20 MG/100ML; MG/100ML; MG/100ML; MG/100ML
INJECTION, SOLUTION INTRAVENOUS CONTINUOUS PRN
Status: DISCONTINUED | OUTPATIENT
Start: 2023-02-02 | End: 2023-02-02

## 2023-02-02 RX ORDER — PROCHLORPERAZINE MALEATE 10 MG
10 TABLET ORAL EVERY 6 HOURS PRN
Status: DISCONTINUED | OUTPATIENT
Start: 2023-02-02 | End: 2023-02-02 | Stop reason: HOSPADM

## 2023-02-02 RX ORDER — FLUMAZENIL 0.1 MG/ML
0.2 INJECTION, SOLUTION INTRAVENOUS
Status: DISCONTINUED | OUTPATIENT
Start: 2023-02-02 | End: 2023-02-02 | Stop reason: HOSPADM

## 2023-02-02 RX ORDER — PROPOFOL 10 MG/ML
INJECTION, EMULSION INTRAVENOUS PRN
Status: DISCONTINUED | OUTPATIENT
Start: 2023-02-02 | End: 2023-02-02

## 2023-02-02 RX ADMIN — TOPICAL ANESTHETIC 1 SPRAY: 200 SPRAY DENTAL; PERIODONTAL at 11:11

## 2023-02-02 RX ADMIN — PROPOFOL 20 MG: 10 INJECTION, EMULSION INTRAVENOUS at 11:11

## 2023-02-02 RX ADMIN — LIDOCAINE HYDROCHLORIDE 30 MG: 20 INJECTION, SOLUTION INFILTRATION; PERINEURAL at 11:11

## 2023-02-02 RX ADMIN — PROPOFOL 20 MG: 10 INJECTION, EMULSION INTRAVENOUS at 11:24

## 2023-02-02 RX ADMIN — PROPOFOL 125 MCG/KG/MIN: 10 INJECTION, EMULSION INTRAVENOUS at 11:11

## 2023-02-02 RX ADMIN — ONDANSETRON 4 MG: 2 INJECTION INTRAMUSCULAR; INTRAVENOUS at 11:11

## 2023-02-02 RX ADMIN — PROPOFOL 20 MG: 10 INJECTION, EMULSION INTRAVENOUS at 11:16

## 2023-02-02 RX ADMIN — SODIUM CHLORIDE, POTASSIUM CHLORIDE, SODIUM LACTATE AND CALCIUM CHLORIDE: 600; 310; 30; 20 INJECTION, SOLUTION INTRAVENOUS at 11:07

## 2023-02-02 ASSESSMENT — ACTIVITIES OF DAILY LIVING (ADL)
ADLS_ACUITY_SCORE: 33
ADLS_ACUITY_SCORE: 35

## 2023-02-02 NOTE — ANESTHESIA POSTPROCEDURE EVALUATION
Patient: Valencia Ye    Procedure: Procedure(s):  ESOPHAGOGASTRODUODENOSCOPY, WITH BIOPSY  ESOPHAGOGASTRODUODENOSCOPY, WITH BALLOON DILATION OF LESS THAN 30 MILLIMETERS       Anesthesia Type:  MAC    Note:     Postop Pain Control: Uneventful            Sign Out: Well controlled pain   PONV: No   Neuro/Psych: Uneventful            Sign Out: Acceptable/Baseline neuro status   Airway/Respiratory: Uneventful            Sign Out: Acceptable/Baseline resp. status   CV/Hemodynamics: Uneventful            Sign Out: Acceptable CV status; No obvious hypovolemia; No obvious fluid overload   Other NRE:    DID A NON-ROUTINE EVENT OCCUR? No           Last vitals:  Vitals Value Taken Time   /83 02/02/23 1140   Temp     Pulse 78 02/02/23 1155   Resp 12 02/02/23 1155   SpO2 99 % 02/02/23 1155       Electronically Signed By: Myriam Ruff MD, MD  February 2, 2023  12:47 PM

## 2023-02-02 NOTE — PRE-PROCEDURE
I have seen and evaluated the patient today.  There are no significant changes in the patient's history or physical exam from the prior date of service 2/1/23.  The patient is stable and appropriate to undergo the proposed endoscopic procedure today.  Mo Rosa MD

## 2023-02-02 NOTE — ANESTHESIA PREPROCEDURE EVALUATION
Anesthesia Pre-Procedure Evaluation    Patient: Valencia Ye   MRN: [id number] : 1970        Procedure : Procedure(s):  ESOPHAGOGASTRODUODENOSCOPY (EGD)          Past Medical History:   Diagnosis Date     Anxiety      Aortitis (H)      Asthma     associated with Churg Antonina syndrome     Bronchiectasis (H)      Cerebral infarction (H) 1990    Very small, caused small permanent optical migraine     Chronic sinusitis      Churg-Antonina syndrome (H)     dx      Conductive hearing loss      Depressive disorder      Eustachian tube dysfunction      Goiter      Hearing loss, conductive      Heart rate problem      Hypertension      Hypocalcemia      Immunosuppression (H)      Infection due to 2019 novel coronavirus 2022     Irritable bowel syndrome      Major depressive disorder      Mannose-binding lectin deficiency      Nonspecific abnormal results of thyroid function study      Osteoporosis      Pericarditis      and      Pneumonia     4/23/10     Recurrent otitis media      Recurrent UTI      Rheumatoid vasculitis (H)      Sinusitis, chronic      Steroid long-term use      Tinnitus      Varicose veins of leg with swelling       Past Surgical History:   Procedure Laterality Date     C/SECTION, CLASSICAL        SECTION       COLONOSCOPY Left 2014    Procedure: COMBINED COLONOSCOPY, SINGLE OR MULTIPLE BIOPSY/POLYPECTOMY BY BIOPSY;  Surgeon: Js Pinedo MD;  Location:  GI     COLONOSCOPY N/A 2019    Procedure: COLONOSCOPY;  Surgeon: Bryce Pimentel MD;  Location:  GI     ENT SURGERY       GENITOURINARY SURGERY       HEAD & NECK SURGERY       HYSTERECTOMY  2009    without oopherectomy     HYSTERECTOMY, PAP NO LONGER INDICATED      cervix removed      LAPAROSCOPIC APPENDECTOMY N/A 2022    Procedure: APPENDECTOMY, LAPAROSCOPIC;  Surgeon: Fany Hannah MD;  Location:  OR     PE TUBES       PICC INSERTION  10/10/2013    5fr  DL PASV PICC, 43cm (1cm external) in the L basilic vein w/ tip in the low SVC.     PICC INSERTION Left 05/26/2017    5fr DL BioFlo PICC, 42cm (3cm external) in the L lateral brachial vein w/ tip in the SVC RA junction.     SINUS SURGERY       TUBAL LIGATION       TYMPANOPLASTY       ZZHC COLP CERVIX/UPPER VAGINA W LOOP ELEC BX CERVIX        Allergies   Allergen Reactions     Colistimethate Anaphylaxis     Colymycin M [Colistimethate Sodium] Anaphylaxis     Tamiflu [Oseltamivir]      Gums Blister up     Azithromycin Palpitations     Heart palpitation  Heart palpitation     Clindamycin Rash      Social History     Tobacco Use     Smoking status: Never     Smokeless tobacco: Never   Substance Use Topics     Alcohol use: Not Currently      Wt Readings from Last 1 Encounters:   02/01/23 50.8 kg (112 lb)        Anesthesia Evaluation   Pt has had prior anesthetic.     No history of anesthetic complications       ROS/MED HX  ENT/Pulmonary: Comment: Churg-Antonina with bronchiectasis    (+) asthma recent URI,  (-) sleep apnea   Neurologic:    (-) no CVA   Cardiovascular:     (+) hypertension----- (-) CAD   METS/Exercise Tolerance:     Hematologic:       Musculoskeletal:       GI/Hepatic:     (+) hiatal hernia, Inflammatory bowel disease,  (-) GERD   Renal/Genitourinary:       Endo:     (+) thyroid problem, hypothyroidism, Chronic steroid usage for Other.  (-) Type II DM   Psychiatric/Substance Use:       Infectious Disease:       Malignancy:       Other:      (+) , H/O Chronic Pain,        Physical Exam    Airway        Mallampati: II   TM distance: > 3 FB   Neck ROM: full   Mouth opening: > 3 cm    Respiratory Devices and Support         Dental           Cardiovascular   cardiovascular exam normal          Pulmonary   pulmonary exam normal                OUTSIDE LABS:  CBC:   Lab Results   Component Value Date    WBC 4.8 02/01/2023    WBC 8.2 12/01/2022    HGB 13.0 02/01/2023    HGB 13.1 12/01/2022    HCT 39.0 02/01/2023     HCT 39.1 12/01/2022     02/01/2023     12/01/2022     BMP:   Lab Results   Component Value Date     02/01/2023     12/14/2022    POTASSIUM 4.0 02/01/2023    POTASSIUM 4.4 12/14/2022    CHLORIDE 104 02/01/2023    CHLORIDE 105 12/14/2022    CO2 24 02/01/2023    CO2 23 12/14/2022    BUN 9.6 02/01/2023    BUN 20.5 (H) 12/14/2022    CR 0.69 02/01/2023    CR 0.71 12/14/2022    GLC 92 02/01/2023    GLC 91 12/14/2022     COAGS: No results found for: PTT, INR, FIBR  POC:   Lab Results   Component Value Date     (H) 12/24/2015    HCG Negative 12/12/2010     HEPATIC:   Lab Results   Component Value Date    ALBUMIN 3.7 12/08/2021    PROTTOTAL 7.4 10/20/2020    ALT 25 12/08/2021    AST 14 12/08/2021    ALKPHOS 54 10/20/2020    BILITOTAL 0.5 10/20/2020     OTHER:   Lab Results   Component Value Date    LACT 2.2 (H) 03/10/2017    A1C 5.2 10/02/2019    BOSSMAN 9.2 02/01/2023    PHOS 4.4 07/05/2018    MAG 2.3 02/26/2019    LIPASE 61 (L) 06/22/2022    TSH 0.66 06/15/2021    T4 0.86 06/15/2021    T3 69 09/14/2010    CRP 44.0 (H) 09/21/2020    SED 8 12/14/2022       Anesthesia Plan    ASA Status:  2   NPO Status:  NPO Appropriate    Anesthesia Type: MAC.     - Reason for MAC: straight local not clinically adequate              Consents    Anesthesia Plan(s) and associated risks, benefits, and realistic alternatives discussed. Questions answered and patient/representative(s) expressed understanding.    - Discussed:     - Discussed with:  Patient         Postoperative Care    Pain management: Multi-modal analgesia.   PONV prophylaxis: Ondansetron (or other 5HT-3), Background Propofol Infusion     Comments:                Myriam Ruff MD, MD

## 2023-02-02 NOTE — ANESTHESIA CARE TRANSFER NOTE
Patient: Valencia Ye    Procedure: Procedure(s):  ESOPHAGOGASTRODUODENOSCOPY, WITH BIOPSY  ESOPHAGOGASTRODUODENOSCOPY, WITH BALLOON DILATION OF LESS THAN 30 MILLIMETERS       Diagnosis: Chest pain, unspecified type [R07.9]  Diagnosis Additional Information: No value filed.    Anesthesia Type:   MAC     Note:    Oropharynx: oropharynx clear of all foreign objects  Level of Consciousness: awake and drowsy  Oxygen Supplementation: nasal cannula  Level of Supplemental Oxygen (L/min / FiO2): 2  Independent Airway: airway patency satisfactory and stable  Dentition: dentition unchanged  Vital Signs Stable: post-procedure vital signs reviewed and stable  Report to RN Given: handoff report given  Patient transferred to: PACU  Comments: To ENDO PACU: Ar[place] easily/VSS  Report to RN  Handoff Report: Identifed the Patient, Identified the Reponsible Provider, Reviewed the pertinent medical history, Discussed the surgical course, Reviewed Intra-OP anesthesia mangement and issues during anesthesia, Set expectations for post-procedure period and Allowed opportunity for questions and acknowledgement of understanding      Vitals:  Vitals Value Taken Time   /84 02/02/23 1135   Temp     Pulse 76 02/02/23 1137   Resp 17 02/02/23 1137   SpO2 100 % 02/02/23 1137   Vitals shown include unvalidated device data.    Electronically Signed By: SUNNY Lynne CRNA  February 2, 2023  11:38 AM

## 2023-02-03 LAB
PATH REPORT.COMMENTS IMP SPEC: NORMAL
PATH REPORT.COMMENTS IMP SPEC: NORMAL
PATH REPORT.FINAL DX SPEC: NORMAL
PATH REPORT.GROSS SPEC: NORMAL
PATH REPORT.MICROSCOPIC SPEC OTHER STN: NORMAL
PATH REPORT.RELEVANT HX SPEC: NORMAL
PHOTO IMAGE: NORMAL

## 2023-02-16 DIAGNOSIS — J47.9 BRONCHIECTASIS WITHOUT COMPLICATION (H): ICD-10-CM

## 2023-02-16 RX ORDER — ACETYLCYSTEINE 200 MG/ML
SOLUTION ORAL; RESPIRATORY (INHALATION)
Qty: 800 ML | Refills: 1 | Status: SHIPPED | OUTPATIENT
Start: 2023-02-16 | End: 2024-03-04

## 2023-03-04 DIAGNOSIS — M30.1 CHURG-STRAUSS SYNDROME (H): ICD-10-CM

## 2023-03-04 DIAGNOSIS — D72.18 CHURG-STRAUSS SYNDROME (H): ICD-10-CM

## 2023-03-06 RX ORDER — BUDESONIDE AND FORMOTEROL FUMARATE DIHYDRATE 160; 4.5 UG/1; UG/1
AEROSOL RESPIRATORY (INHALATION)
Qty: 30.6 G | Refills: 3 | Status: SHIPPED | OUTPATIENT
Start: 2023-03-06 | End: 2024-01-25

## 2023-03-16 ENCOUNTER — HOSPITAL ENCOUNTER (EMERGENCY)
Facility: CLINIC | Age: 53
Discharge: HOME | End: 2023-03-16
Attending: EMERGENCY MEDICINE | Admitting: EMERGENCY MEDICINE
Payer: COMMERCIAL

## 2023-03-16 VITALS
OXYGEN SATURATION: 98 % | HEART RATE: 80 BPM | RESPIRATION RATE: 16 BRPM | SYSTOLIC BLOOD PRESSURE: 138 MMHG | DIASTOLIC BLOOD PRESSURE: 90 MMHG | TEMPERATURE: 98.5 F

## 2023-03-16 DIAGNOSIS — H00.16 CHALAZION OF LEFT EYE, UNSPECIFIED EYELID: ICD-10-CM

## 2023-03-16 DIAGNOSIS — S05.02XA ABRASION OF LEFT CORNEA, INITIAL ENCOUNTER: ICD-10-CM

## 2023-03-16 PROCEDURE — 99283 EMERGENCY DEPT VISIT LOW MDM: CPT

## 2023-03-16 RX ORDER — TETRACAINE HYDROCHLORIDE 5 MG/ML
SOLUTION OPHTHALMIC
Status: DISCONTINUED
Start: 2023-03-16 | End: 2023-03-16 | Stop reason: HOSPADM

## 2023-03-16 RX ORDER — ERYTHROMYCIN 5 MG/G
0.5 OINTMENT OPHTHALMIC 4 TIMES DAILY
Qty: 10 G | Refills: 0 | Status: SHIPPED | OUTPATIENT
Start: 2023-03-16 | End: 2023-03-21

## 2023-03-16 RX ORDER — TETRACAINE HYDROCHLORIDE 5 MG/ML
2 SOLUTION OPHTHALMIC ONCE
Status: DISCONTINUED | OUTPATIENT
Start: 2023-03-16 | End: 2023-03-16 | Stop reason: HOSPADM

## 2023-03-16 ASSESSMENT — VISUAL ACUITY
OD: 20/25;WITH CORRECTIVE LENSES
OS: 20/40;WITH CORRECTIVE LENSES

## 2023-03-16 ASSESSMENT — ENCOUNTER SYMPTOMS
HEADACHES: 0
FEVER: 0
EYE PAIN: 1
EYE REDNESS: 1

## 2023-03-16 NOTE — ED PROVIDER NOTES
History     Chief Complaint:  Eye Problem       HPI   Valencia Ye is a 52 year old female with a history of Churg-Antonina syndrome, interstitial lung disease and asthma presenting with eye complaint.  Patient reports a history of chalazions.  Yesterday she notes what she thought was a developing chalazion and did warm compresses throughout the day though notes having increasing eye pain as well as increasing eyelid swelling.  No reported fever, cough, no rhinorrhea, sore throat, headache, significant blurry vision or significant pain with eye movement.  No known trauma.  Patient expresses concerns for periorbital cellulitis as she notes her son has had this in the past. No contacts.      Independent Historian:   None    Review of External Notes: 2/2/23 gastro note     ROS:  Review of Systems   Constitutional: Negative for fever.   Eyes: Positive for pain and redness.   Neurological: Negative for headaches.   All other systems reviewed and are negative.      Allergies:  Colistimethate  Colymycin M [Colistimethate Sodium]  Tamiflu [Oseltamivir]  Azithromycin  Clindamycin     Medications:    erythromycin (ROMYCIN) 5 MG/GM ophthalmic ointment  acetylcysteine (MUCOMYST) 20 % neb solution  albuterol (PROAIR HFA/PROVENTIL HFA/VENTOLIN HFA) 108 (90 Base) MCG/ACT inhaler  albuterol (PROVENTIL) (2.5 MG/3ML) 0.083% neb solution  ciprofloxacin-dexamethasone (CIPRODEX) 0.3-0.1 % otic suspension  fluticasone (FLONASE) 50 MCG/ACT nasal spray  metoprolol succinate ER (TOPROL-XL) 25 MG 24 hr tablet  NONFORMULARY  Omega-3 Fatty Acids (FISH OIL) 1200 MG capsule  predniSONE (DELTASONE) 1 MG tablet  predniSONE (DELTASONE) 1 MG tablet  predniSONE (DELTASONE) 5 MG tablet  Probiotic Product (PROBIOTIC DAILY PO)  SYMBICORT 160-4.5 MCG/ACT Inhaler  tiZANidine (ZANAFLEX) 2 MG tablet  vitamin D3 (CHOLECALCIFEROL) 2000 units (50 mcg) tablet  VITAMIN K PO        Past Medical History:    Past Medical History:   Diagnosis Date      Anxiety      Aortitis (H)      Asthma      Bronchiectasis (H)      Cerebral infarction (H) 1990     Chronic sinusitis      Churg-Antonina syndrome (H)      Conductive hearing loss      Depressive disorder      Eustachian tube dysfunction      Goiter      Hearing loss, conductive      Heart rate problem      Hypertension      Hypocalcemia      Immunosuppression (H)      Infection due to 2019 novel coronavirus 2022     Irritable bowel syndrome      Major depressive disorder      Mannose-binding lectin deficiency      Nonspecific abnormal results of thyroid function study      Osteoporosis      Pericarditis      Pneumonia      Recurrent otitis media      Recurrent UTI      Rheumatoid vasculitis (H)      Sinusitis, chronic      Steroid long-term use      Tinnitus      Varicose veins of leg with swelling        Past Surgical History:    Past Surgical History:   Procedure Laterality Date     C/SECTION, CLASSICAL        SECTION       COLONOSCOPY Left 2014    Procedure: COMBINED COLONOSCOPY, SINGLE OR MULTIPLE BIOPSY/POLYPECTOMY BY BIOPSY;  Surgeon: Js Pinedo MD;  Location:  GI     COLONOSCOPY N/A 2019    Procedure: COLONOSCOPY;  Surgeon: Bryce Pimentel MD;  Location:  GI     ENT SURGERY       ESOPHAGOSCOPY, GASTROSCOPY, DUODENOSCOPY (EGD), COMBINED N/A 2023    Procedure: ESOPHAGOGASTRODUODENOSCOPY, WITH BIOPSY;  Surgeon: Mo Rosa MD;  Location:  GI     ESOPHAGOSCOPY, GASTROSCOPY, DUODENOSCOPY (EGD), DILATATION, COMBINED N/A 2023    Procedure: ESOPHAGOGASTRODUODENOSCOPY, WITH DILATION;  Surgeon: Mo Rosa MD;  Location:  GI     GENITOURINARY SURGERY       HEAD & NECK SURGERY       HYSTERECTOMY  2009    without oopherectomy     HYSTERECTOMY, PAP NO LONGER INDICATED      cervix removed      LAPAROSCOPIC APPENDECTOMY N/A 2022    Procedure: APPENDECTOMY, LAPAROSCOPIC;  Surgeon: Fany Hannah MD;  Location:  [facility] OR     PE TUBES       PICC INSERTION  10/10/2013    5fr DL PASV PICC, 43cm (1cm external) in the L basilic vein w/ tip in the low SVC.     PICC INSERTION Left 05/26/2017    5fr DL BioFlo PICC, 42cm (3cm external) in the L lateral brachial vein w/ tip in the SVC RA junction.     SINUS SURGERY       TUBAL LIGATION       TYMPANOPLASTY       ZZHC COLP CERVIX/UPPER VAGINA W LOOP ELEC BX CERVIX          Family History:    family history includes Alcohol/Drug in her maternal grandfather and mother; Allergies in her daughter and mother; Arthritis in her maternal grandmother; Asthma in her daughter and son; Breast Cancer in her maternal aunt; C.A.D. in her father and maternal grandmother; Cancer in her maternal aunt; Depression in her father, maternal grandfather, and mother; Diabetes in her father; Heart Disease in her father and maternal grandmother; Hypertension in her maternal grandmother; Musculoskeletal Disorder in her maternal grandmother; Substance Abuse in her maternal grandfather and mother; Unknown/Adopted in her paternal grandfather and paternal grandmother.    Social History:   reports that she has never smoked. She has never used smokeless tobacco. She reports that she does not currently use alcohol. She reports that she does not use drugs.  PCP: Morelia Simental     Physical Exam     Patient Vitals for the past 24 hrs:   BP Temp Temp src Pulse Resp SpO2   03/16/23 0430 (!) 138/90 98.5  F (36.9  C) Oral 80 16 98 %        Physical Exam  General: Resting comfortably   Head:  The scalp, face, and head appear normal  Eye: mild L. Scleral injection; R. Sclera normal  - EOMI  - No foreign body with eversion of the lids  - + fluorescein uptake at 6 o'clock position to suggest abrasion, and no Abiodun sign  -chalazion noted to upper eyelid; mild upper eyelid swelling; no significant periorbital warmth/erythema  ENT:    The nose is normal  Neck:  Normal range of motion  MSK:  Moves all extremities equally  Skin:  No rash  or lesions noted.  Neuro: Speech is normal and fluent  Psych: Awake. Alert.  Normal affect.                  Emergency Department Course       Emergency Department Course & Assessments:             Interventions:  Medications   tetracaine (PONTOCAINE) 0.5 % ophthalmic solution 2 drop (has no administration in time range)   fluorescein (FUL-HUYEN) ophthalmic strip 1 strip (has no administration in time range)   tetracaine (PONTOCAINE) 0.5 % ophthalmic solution (has no administration in time range)   fluorescein (FUL-HUYEN) 1 MG ophthalmic strip (has no administration in time range)        Social Determinants of Health affecting care:   None    Disposition:  The patient was discharged to home.     Impression & Plan        Medical Decision Making:  Patient is a 52-year-old female presenting with eye concern.  She is nontoxic, in no significant distress on arrival.  She does have concern for chalazion on exam.  I did recommend warm compresses to the area.  Patient also appears to have a concomitant corneal abrasion.  There is no evidence to suggest orbital/periorbital cellulitis, endophthalmitis, globe rupture or other serious pathology at this point in time.  I will initiate erythromycin ointment with plans for close ophthalmology follow-up as patient follows with ophtho. Recommended monitoring for fever, increasing eye redness/warmth, worsening headache or should symptoms worsen to represent.  NSAIDS/tylenol PRN.     Diagnosis:    ICD-10-CM    1. Abrasion of left cornea, initial encounter  S05.02XA       2. Chalazion of left eye, unspecified eyelid  H00.16            Discharge Medications:  Discharge Medication List as of 3/16/2023  5:00 AM      START taking these medications    Details   erythromycin (ROMYCIN) 5 MG/GM ophthalmic ointment Place 0.5 inches Into the left eye 4 times daily for 5 daysDisp-10 g, [id number]                3/16/2023   Rachelle Guzámn, Rachelle Garza,   03/16/23 6901

## 2023-03-16 NOTE — ED TRIAGE NOTES
Pt arrives with left swollen, red eye lid. Started yesterday evening. Painful to open eye. No change in vision     Triage Assessment     Row Name 03/16/23 0429       Triage Assessment (Adult)    Airway WDL WDL       Respiratory WDL    Respiratory WDL WDL       Skin Circulation/Temperature WDL    Skin Circulation/Temperature WDL WDL       Cardiac WDL    Cardiac WDL WDL       Peripheral/Neurovascular WDL    Peripheral Neurovascular WDL WDL       Cognitive/Neuro/Behavioral WDL    Cognitive/Neuro/Behavioral WDL WDL

## 2023-03-17 ENCOUNTER — OFFICE VISIT (OUTPATIENT)
Dept: INTERNAL MEDICINE | Facility: CLINIC | Age: 53
End: 2023-03-17
Payer: COMMERCIAL

## 2023-03-17 VITALS
HEIGHT: 61 IN | RESPIRATION RATE: 18 BRPM | DIASTOLIC BLOOD PRESSURE: 61 MMHG | BODY MASS INDEX: 21.41 KG/M2 | SYSTOLIC BLOOD PRESSURE: 115 MMHG | HEART RATE: 95 BPM | TEMPERATURE: 97.5 F | OXYGEN SATURATION: 98 % | WEIGHT: 113.4 LBS

## 2023-03-17 DIAGNOSIS — I10 BENIGN ESSENTIAL HYPERTENSION: ICD-10-CM

## 2023-03-17 DIAGNOSIS — D72.18 CHURG-STRAUSS SYNDROME (H): ICD-10-CM

## 2023-03-17 DIAGNOSIS — F41.9 ANXIETY: ICD-10-CM

## 2023-03-17 DIAGNOSIS — D72.18 EOSINOPHILIC MYOCARDITIS: ICD-10-CM

## 2023-03-17 DIAGNOSIS — J45.20 MILD INTERMITTENT ASTHMA WITHOUT COMPLICATION: ICD-10-CM

## 2023-03-17 DIAGNOSIS — J31.0 CHRONIC RHINITIS: ICD-10-CM

## 2023-03-17 DIAGNOSIS — I40.1 EOSINOPHILIC MYOCARDITIS: ICD-10-CM

## 2023-03-17 DIAGNOSIS — H92.09 EAR PAIN, UNSPECIFIED LATERALITY: ICD-10-CM

## 2023-03-17 DIAGNOSIS — M30.1 CHURG-STRAUSS SYNDROME (H): ICD-10-CM

## 2023-03-17 DIAGNOSIS — F33.1 MODERATE EPISODE OF RECURRENT MAJOR DEPRESSIVE DISORDER (H): Primary | ICD-10-CM

## 2023-03-17 PROBLEM — K57.90 DIVERTICULOSIS: Status: ACTIVE | Noted: 2023-03-17

## 2023-03-17 PROBLEM — R42 VERTIGO: Status: ACTIVE | Noted: 2018-05-17

## 2023-03-17 PROCEDURE — 99214 OFFICE O/P EST MOD 30 MIN: CPT

## 2023-03-17 RX ORDER — CIPROFLOXACIN AND DEXAMETHASONE 3; 1 MG/ML; MG/ML
SUSPENSION/ DROPS AURICULAR (OTIC)
Qty: 7.5 ML | Refills: 10 | Status: SHIPPED | OUTPATIENT
Start: 2023-03-17 | End: 2024-03-26

## 2023-03-17 RX ORDER — FLUTICASONE PROPIONATE 50 MCG
2 SPRAY, SUSPENSION (ML) NASAL DAILY
Qty: 48 ML | Refills: 3 | Status: SHIPPED | OUTPATIENT
Start: 2023-03-17 | End: 2024-04-12

## 2023-03-17 ASSESSMENT — ASTHMA QUESTIONNAIRES
QUESTION_2 LAST FOUR WEEKS HOW OFTEN HAVE YOU HAD SHORTNESS OF BREATH: NOT AT ALL
ACT_TOTALSCORE: 25
QUESTION_5 LAST FOUR WEEKS HOW WOULD YOU RATE YOUR ASTHMA CONTROL: COMPLETELY CONTROLLED
QUESTION_3 LAST FOUR WEEKS HOW OFTEN DID YOUR ASTHMA SYMPTOMS (WHEEZING, COUGHING, SHORTNESS OF BREATH, CHEST TIGHTNESS OR PAIN) WAKE YOU UP AT NIGHT OR EARLIER THAN USUAL IN THE MORNING: NOT AT ALL
QUESTION_4 LAST FOUR WEEKS HOW OFTEN HAVE YOU USED YOUR RESCUE INHALER OR NEBULIZER MEDICATION (SUCH AS ALBUTEROL): NOT AT ALL
QUESTION_1 LAST FOUR WEEKS HOW MUCH OF THE TIME DID YOUR ASTHMA KEEP YOU FROM GETTING AS MUCH DONE AT WORK, SCHOOL OR AT HOME: NONE OF THE TIME
ACT_TOTALSCORE: 25

## 2023-03-17 ASSESSMENT — PATIENT HEALTH QUESTIONNAIRE - PHQ9
SUM OF ALL RESPONSES TO PHQ QUESTIONS 1-9: 15
SUM OF ALL RESPONSES TO PHQ QUESTIONS 1-9: 15
10. IF YOU CHECKED OFF ANY PROBLEMS, HOW DIFFICULT HAVE THESE PROBLEMS MADE IT FOR YOU TO DO YOUR WORK, TAKE CARE OF THINGS AT HOME, OR GET ALONG WITH OTHER PEOPLE: EXTREMELY DIFFICULT

## 2023-03-17 ASSESSMENT — PAIN SCALES - GENERAL: PAINLEVEL: NO PAIN (0)

## 2023-03-17 NOTE — PROGRESS NOTES
"  Assessment & Plan     (F33.1) Moderate episode of recurrent major depressive disorder (H)  (primary encounter diagnosis)  Comment: Follows with Psychiatry and psychology with [facility].     (I10) Benign essential hypertension  Comment: BP well controlled. Only uses Metoprolol 12.5MG PRN for high BP readings.      (M30.1,  D72.18) Churg-Antonina syndrome (H)  Comment:   Follows with [facility] routinely. Medication regimen includes Symbicort, Albuterol neb BID, Prednisone and Symbicort BID.    (F41.9) Anxiety  Comment:  Follows with Psychiatry and psychology with [facility].     (J45.20) Mild intermittent asthma without complication  Comment: Asthma well controlled at this time per patient report.       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 Per Cardiology note, imaging done in 2019 showed evidence of eosinophilic myocarditis. She will get in touch with Cardiology for routine visit within the year.       30 minutes spent on the date of the encounter doing chart review, history and exam, documentation and further activities per the note         No follow-ups on file.    SUNNY Thornton [facility] FLORESITA [place] is a 52 year old presenting for the following health issues:  Establish Care, Consult (Chronic conditions ), and Recheck Medication (Discuss supplements)      HPI       Objective    /61   Pulse 95   Temp 97.5  F (36.4  C) (Tympanic)   Resp 18   Ht 1.549 m (5' 1\")   Wt 51.4 kg (113 lb 6.4 oz)   LMP 02/01/2009 (LMP Unknown)   SpO2 98%   Breastfeeding No   BMI 21.43 kg/m    Body mass index is 21.43 kg/m .      Physical Exam  Constitutional:       General: She is not in acute " distress.     Appearance: Normal appearance. She is not ill-appearing, toxic-appearing or diaphoretic.   HENT:      Head: Normocephalic and atraumatic.   Eyes:      Conjunctiva/sclera: Conjunctivae normal.   Cardiovascular:      Rate and Rhythm: Normal rate and regular rhythm.      Heart sounds: Normal heart sounds.   Pulmonary:      Effort: Pulmonary effort is normal.      Breath sounds: Normal breath sounds.   Skin:     General: Skin is warm and dry.   Neurological:      Mental Status: She is alert and oriented to person, place, and time.   Psychiatric:         Mood and Affect: Mood normal.         Behavior: Behavior normal.         Thought Content: Thought content normal.         Judgment: Judgment normal.

## 2023-03-17 NOTE — LETTER
My Asthma Action Plan    Name: Valencia Ye   YOB: 1970  Date: 3/17/2023   My doctor: SUNNY Thornton CNP   My clinic: [facility]        My Control Medicine: Symbicort, Albuterol neb BID  My Rescue Medicine: Albuterol nebulizer solution BID  Albuterol (Proair/Ventolin/Proventil HFA) 2-4 puffs EVERY 4 HOURS as needed. Use a spacer if recommended by your provider.  My Oral Steroid Medicine: Prednisone My Asthma Severity:   Mild Persistent  Know your asthma triggers: upper respiratory infections  None            GREEN ZONE   Good Control    I feel good    No cough or wheeze    Can work, sleep and play without asthma symptoms       Take your asthma control medicine every day.     1. If exercise triggers your asthma, take your rescue medication    15 minutes before exercise or sports, and    During exercise if you have asthma symptoms  2. Spacer to use with inhaler: If you have a spacer, make sure to use it with your inhaler             YELLOW ZONE Getting Worse  I have ANY of these:    I do not feel good    Cough or wheeze    Chest feels tight    Wake up at night   1. Keep taking your Green Zone medications  2. Start taking your rescue medicine:    every 20 minutes for up to 1 hour. Then every 4 hours for 24-48 hours.  3. If you stay in the Yellow Zone for more than 12-24 hours, contact your doctor.  4. If you do not return to the Green Zone in 12-24 hours or you get worse, start taking your oral steroid medicine if prescribed by your provider.           RED ZONE Medical Alert - Get Help  I have ANY of these:    I feel awful    Medicine is not helping    Breathing getting harder    Trouble walking or talking    Nose opens wide to breathe       1. Take your rescue medicine NOW  2. If your provider has prescribed an oral steroid medicine, start taking it NOW  3. Call your doctor NOW  4. If you are still in the Red Zone after 20 minutes and you have not reached your  doctor:    Take your rescue medicine again and    Call 911 or go to the emergency room right away    See your regular doctor within 2 weeks of an Emergency Room or Urgent Care visit for follow-up treatment.          Annual Reminders:  Meet with Asthma Educator,  Flu Shot in the Fall, consider Pneumonia Vaccination for patients with asthma (aged 19 and older).    Pharmacy:    [id number] - [place], [place] - [address] [facility] [address] [facility] [address] [facility] - [place], [place] - [address]  [facility] [id number] [place] [facility] [place] [place] - [address] [place], [place] - [address] [place] [address] [phone]    Electronically signed by SUNNY Thornton CNP   Date: 03/17/23                      Asthma Triggers  How To Control Things That Make Your Asthma Worse    Triggers are things that make your asthma worse.  Look at the list below to help you find your triggers and what you can do about them.  You can help prevent asthma flare-ups by staying away from your triggers.      Trigger                                                          What you can do   Cigarette Smoke  Tobacco smoke can make asthma worse. Do not allow smoking in your home, car or around you.  Be sure no one smokes at a child s day care or school.  If you smoke, ask your health care provider for ways to help you quit.  Ask family members to quit too.  Ask your health care provider for a referral to Quit Plan to help you quit smoking, or call 0-743-642-PLAN.     Colds, Flu, Bronchitis  These are common triggers of asthma. Wash your hands often.  Don t touch your eyes, nose or mouth.  Get a flu shot every year.     Dust Mites  These are tiny bugs that live in cloth or carpet. They are too small to see. Wash sheets and blankets in hot water every week.   Encase pillows and mattress in dust mite proof covers.  Avoid having carpet if you can. If you have carpet, vacuum weekly.   Use a dust mask  and HEPA vacuum.   Pollen and Outdoor Mold  Some people are allergic to trees, grass, or weed pollen, or molds. Try to keep your windows closed.  Limit time out doors when pollen count is high.   Ask you health care provider about taking medicine during allergy season.     Animal Dander  Some people are allergic to skin flakes, urine or saliva from pets with fur or feathers. Keep pets with fur or feathers out of your home.    If you can t keep the pet outdoors, then keep the pet out of your bedroom.  Keep the bedroom door closed.  Keep pets off cloth furniture and away from stuffed toys.     Mice, Rats, and Cockroaches   Some people are allergic to the waste from these pests.   Cover food and garbage.  Clean up spills and food crumbs.  Store grease in the refrigerator.   Keep food out of the bedroom.   Indoor Mold  This can be a trigger if your home has high moisture. Fix leaking faucets, pipes, or other sources of water.   Clean moldy surfaces.  Dehumidify basement if it is damp and smelly.   Smoke, Strong Odors, and Sprays  These can reduce air quality. Stay away from strong odors and sprays, such as perfume, powder, hair spray, paints, smoke incense, paint, cleaning products, candles and new carpet.   Exercise or Sports  Some people with asthma have this trigger. Be active!  Ask your doctor about taking medicine before sports or exercise to prevent symptoms.    Warm up for 5-10 minutes before and after sports or exercise.     Other Triggers of Asthma  Cold air:  Cover your nose and mouth with a scarf.  Sometimes laughing or crying can be a trigger.  Some medicines and food can trigger asthma.

## 2023-03-17 NOTE — PATIENT INSTRUCTIONS
Daily calcium need total is 1200-1500mg a day from the diet and supplements. Calcium citrate is easier to digest.     Oscal is available over the counter. You should try to find Oscal that has about 600mg of calcium and take it once in the morning    Please let me know if you have any questions.    Thanks!  SUNNY Thornton, [facility]

## 2023-04-13 ENCOUNTER — OFFICE VISIT (OUTPATIENT)
Dept: PULMONOLOGY | Facility: CLINIC | Age: 53
End: 2023-04-13
Attending: INTERNAL MEDICINE
Payer: COMMERCIAL

## 2023-04-13 VITALS
WEIGHT: 112 LBS | BODY MASS INDEX: 20.61 KG/M2 | OXYGEN SATURATION: 96 % | SYSTOLIC BLOOD PRESSURE: 145 MMHG | HEART RATE: 69 BPM | HEIGHT: 62 IN | DIASTOLIC BLOOD PRESSURE: 79 MMHG

## 2023-04-13 DIAGNOSIS — J47.9 BRONCHIECTASIS WITHOUT COMPLICATION (H): ICD-10-CM

## 2023-04-13 DIAGNOSIS — D72.18 CHURG-STRAUSS SYNDROME (H): Primary | ICD-10-CM

## 2023-04-13 DIAGNOSIS — M30.1 CHURG-STRAUSS SYNDROME (H): Primary | ICD-10-CM

## 2023-04-13 LAB
EXPTIME-PRE: 9.69 SEC
FEF2575-%PRED-PRE: 44 %
FEF2575-PRE: 1.1 L/SEC
FEF2575-PRED: 2.48 L/SEC
FEFMAX-%PRED-PRE: 85 %
FEFMAX-PRE: 5.52 L/SEC
FEFMAX-PRED: 6.43 L/SEC
FEV1-%PRED-PRE: 95 %
FEV1-PRE: 2.29 L
FEV1FEV6-PRE: 65 %
FEV1FEV6-PRED: 82 %
FEV1FVC-PRE: 62 %
FEV1FVC-PRED: 82 %
FIFMAX-PRE: 4.82 L/SEC
FVC-%PRED-PRE: 124 %
FVC-PRE: 3.7 L
FVC-PRED: 2.96 L

## 2023-04-13 PROCEDURE — 99215 OFFICE O/P EST HI 40 MIN: CPT | Mod: 25 | Performed by: INTERNAL MEDICINE

## 2023-04-13 PROCEDURE — G0463 HOSPITAL OUTPT CLINIC VISIT: HCPCS | Performed by: INTERNAL MEDICINE

## 2023-04-13 PROCEDURE — 94375 RESPIRATORY FLOW VOLUME LOOP: CPT | Performed by: INTERNAL MEDICINE

## 2023-04-13 ASSESSMENT — PAIN SCALES - GENERAL: PAINLEVEL: NO PAIN (0)

## 2023-04-13 NOTE — NURSING NOTE
Chief Complaint   Patient presents with     Follow Up     NON CF Bronch      Vitals were taken and medications were reconciled.     Michelle Beck RMA  11:54 AM

## 2023-04-13 NOTE — LETTER
4/13/2023         RE: Valencia Ye  [address] [facility] [id number]        Dear Colleague,    Thank you for referring your patient, Valencia Ye, to the [facility] FOR LUNG [facility]ENCE AND [facility] [place]. Please see a copy of my visit note below.    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  At the last visit, the patient complained of chest pain, as noted, EGD revealed Schatzki's ring which was dilated.  PPI was recommended but the patient elected to use apple cider vinegar and bile salts with resolution of her symptoms.  Further evaluation of recurrent pain.  Patient is feeling well today.  Exercise tolerance is at baseline.  Oxygenation adequate at 96%.  Although FEV1 and FVC remain in the normal range, there has been a gradual progressive decline since 2019 with FEV1 dropping from 3.01-2.29L  and FVC dropping from 4.10-3.70L.  The change remains largely asymptomatic.  Etiology of the decline is unclear.  She has had multiple COVID infections during that interval.  Progression of eosinophilic granuloma with polyangiitis need to be considered.  We will pursue chest CT and reviewed with patient rheumatologist.  For now, she will continue her current vest therapy, prednisone and Symbicort inhaler.    Follow-up in 4 months with PFTs.  Chest CT in the interim.    I, Dagoberto Briscoe, have spent 50 minutes on the day of the visit to review the chart, interview and examine the patient, review labs and imaging, formulate a plan, document and submit orders. Time documented is excluding time spent for PFT interpretation.    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    At last visit, she presented with chest pain.  There was concern for reflux or esophageal spasm.  EGD revealed a Schatzki's ring which was dilated.  PPI was recommended but the patient was reluctant and used apple cider vinegar and bile salts instead with relief.    The patient had a head cold with some respiratory involvement in January treated with Augmentin and a prednisone burst with resolution.    Breathing is comfortable at rest and with all activity.  She does yoga 4-5 times per week.  No cough.  No sputum.  No fever, chills or night sweats.  She is doing vest therapy once daily.    She notes occasional \"wheeze and crackles\" which he attributes to the change in season and is relieved with inhaler.     Review of systems:  Appetite is at baseline.  No sore throat or sinus pain.  Ear concerns at baseline.  No nausea, vomiting or abdominal pain.  IBS with variable stools  Diffuse body aches attributed to fibromyalgia at baseline.  Easy bruising, increased recently.  The remainder of a complete review of systems is otherwise negative except as noted in history of present illness.          Current Outpatient Medications   Medication    acetylcysteine (MUCOMYST) 20 % neb solution    albuterol (PROAIR HFA/PROVENTIL HFA/VENTOLIN HFA) 108 (90 Base) MCG/ACT inhaler    albuterol (PROVENTIL) (2.5 MG/3ML) 0.083% neb solution    ciprofloxacin-dexamethasone (CIPRODEX) 0.3-0.1 % otic suspension    fluticasone (FLONASE) 50 MCG/ACT nasal spray    metoprolol succinate ER (TOPROL-XL) 25 MG 24 hr tablet    NONFORMULARY    Omega-3 Fatty Acids (FISH OIL) 1200 MG capsule    predniSONE (DELTASONE) 1 MG tablet    predniSONE (DELTASONE) 1 MG tablet    predniSONE (DELTASONE) 5 MG tablet    Probiotic Product (PROBIOTIC DAILY PO)    SYMBICORT 160-4.5 MCG/ACT Inhaler    tiZANidine (ZANAFLEX) 2 MG " tablet    vitamin D3 (CHOLECALCIFEROL) 2000 units (50 mcg) tablet    VITAMIN K PO     No current facility-administered medications for this visit.     Allergies   Allergen Reactions    Colistimethate Anaphylaxis    Colymycin M [Colistimethate Sodium] Anaphylaxis    Tamiflu [Oseltamivir]      Gums Blister up    Azithromycin Palpitations     Heart palpitation  Heart palpitation    Clindamycin Rash     Past Medical History:   Diagnosis Date    Anxiety     Aortitis (H)     Asthma     associated with Churg Antonina syndrome    Bronchiectasis (H)     Cerebral infarction (H) 1990    Very small, caused small permanent optical migraine    Chronic sinusitis     Churg-Antonina syndrome (H)     dx     Conductive hearing loss     Depressive disorder     Eustachian tube dysfunction     Goiter     Hearing loss, conductive     Heart rate problem     Hypertension     Hypocalcemia     Immunosuppression (H)     Infection due to 2019 novel coronavirus 2022    Irritable bowel syndrome     Major depressive disorder     Mannose-binding lectin deficiency     Nonspecific abnormal results of thyroid function study     Osteoporosis     Pericarditis      and     Pneumonia     4/23/10    Recurrent otitis media     Recurrent UTI     Rheumatoid vasculitis (H)     Sinusitis, chronic     Steroid long-term use     Tinnitus     Varicose veins of leg with swelling        Past Surgical History:   Procedure Laterality Date    C/SECTION, CLASSICAL       SECTION      COLONOSCOPY Left 2014    Procedure: COMBINED COLONOSCOPY, SINGLE OR MULTIPLE BIOPSY/POLYPECTOMY BY BIOPSY;  Surgeon: Js Pinedo MD;  Location:  GI    COLONOSCOPY N/A 2019    Procedure: COLONOSCOPY;  Surgeon: Bryce Pimentel MD;  Location:  GI    ENT SURGERY      ESOPHAGOSCOPY, GASTROSCOPY, DUODENOSCOPY (EGD), COMBINED N/A 2023    Procedure: ESOPHAGOGASTRODUODENOSCOPY, WITH BIOPSY;  Surgeon: Mo Rosa,  MD;  Location:  GI    ESOPHAGOSCOPY, GASTROSCOPY, DUODENOSCOPY (EGD), DILATATION, COMBINED N/A 2/2/2023    Procedure: ESOPHAGOGASTRODUODENOSCOPY, WITH DILATION;  Surgeon: Mo Rosa MD;  Location:  GI    GENITOURINARY SURGERY      HEAD & NECK SURGERY      HYSTERECTOMY  2/27/2009    without oopherectomy    HYSTERECTOMY, PAP NO LONGER INDICATED      cervix removed     LAPAROSCOPIC APPENDECTOMY N/A 6/22/2022    Procedure: APPENDECTOMY, LAPAROSCOPIC;  Surgeon: Fany Hannah MD;  Location: [facility] OR    PE TUBES      PICC INSERTION  10/10/2013    5fr DL PASV PICC, 43cm (1cm external) in the L basilic vein w/ tip in the low SVC.    PICC INSERTION Left 05/26/2017    5fr DL BioFlo PICC, 42cm (3cm external) in the L lateral brachial vein w/ tip in the SVC RA junction.    SINUS SURGERY      TUBAL LIGATION      TYMPANOPLASTY      ZZHC COLP CERVIX/UPPER VAGINA W LOOP ELEC BX CERVIX         Social History     Socioeconomic History    Marital status:      Spouse name: Not on file    Number of children: Not on file    Years of education: Not on file    Highest education level: Not on file   Occupational History    Not on file   Tobacco Use    Smoking status: Never     Passive exposure: Never    Smokeless tobacco: Never   Vaping Use    Vaping status: Never Used     Passive vaping exposure: Yes   Substance and Sexual Activity    Alcohol use: Not Currently    Drug use: No    Sexual activity: Yes     Partners: Male     Birth control/protection: Male Surgical, Female Surgical   Other Topics Concern    Parent/sibling w/ CABG, MI or angioplasty before 65F 55M? Not Asked     Service No    Blood Transfusions No    Caffeine Concern No    Occupational Exposure No    Hobby Hazards No    Sleep Concern No    Stress Concern Yes    Weight Concern No    Special Diet Yes     Comment: IBS diet    Back Care No    Exercise Not Asked     Comment: 1 mile power walk 5 days a week at least.     Bike Helmet Not Asked     "Seat Belt Yes    Self-Exams Yes   Social History Narrative    [employer] in Radiology Department.  .  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  Non smoker. No smokers at home.  Enjoys walking and dancing.  Alcohol--1 to 2 drinks daily.  Few times a year has more than 4 drinks in a day.  No illicits.                     Social Determinants of Health     Financial Resource Strain: Not on file   Food Insecurity: Not on file   Transportation Needs: Not on file   Physical Activity: Not on file   Stress: Not on file   Social Connections: Not on file   Intimate Partner Violence: Not on file   Housing Stability: Not on file       ROS Pulmonary    A complete ROS was otherwise negative except as noted in the HPI.  BP (!) 145/79 (BP Location: Right arm, Patient Position: Sitting, Cuff Size: Adult Small)   Pulse 69   Ht 1.575 m (5' 2\")   Wt 50.8 kg (112 lb)   LMP 02/01/2009 (LMP Unknown)   SpO2 96%   BMI 20.49 kg/m    Exam:   GENERAL APPEARANCE: Well developed, well nourished, alert, and in no apparent distress.  EYES: PERRL, EOMI  HENT: Nasal mucosa with green discharge, edema and no hyperemia. No nasal polyps.  EARS: Canals clear, TMs with erythema, scarring and scant drainage  MOUTH: Oral mucosa is moist, without any lesions, no tonsillar enlargement, no oropharyngeal exudate.  NECK: supple, no masses, no thyromegaly.  LYMPHATICS: No significant axillary, cervical, or supraclavicular nodes.  RESP: normal percussion, good air flow throughout.  No crackles. No rhonchi. Faint scattered wheezes.  CV: Normal S1, S2, regular rhythm, normal rate. No murmur.  No rub. No gallop. No LE edema.   ABDOMEN:  Bowel sounds normal, soft, nontender, no HSM or masses.   MS: extremities normal. (+)clubbing. No cyanosis.  SKIN: no rash on limited exam  NEURO: Mentation intact, speech normal, normal strength and tone, normal gait and stance  PSYCH: mentation appears normal. and affect normal/bright    Results:  Recent Results " (from the past 168 hour(s))   General PFT Lab (Please always keep checked)    Collection Time: 04/13/23 10:57 AM   Result Value Ref Range    FVC-Pred 2.96 L    FVC-Pre 3.70 L    FVC-%Pred-Pre 124 %    FEV1-Pre 2.29 L    FEV1-%Pred-Pre 95 %    FEV1FVC-Pred 82 %    FEV1FVC-Pre 62 %    FEFMax-Pred 6.43 L/sec    FEFMax-Pre 5.52 L/sec    FEFMax-%Pred-Pre 85 %    FEF2575-Pred 2.48 L/sec    FEF2575-Pre 1.10 L/sec    IDG3055-%Pred-Pre 44 %    ExpTime-Pre 9.69 sec    FIFMax-Pre 4.82 L/sec    FEV1FEV6-Pred 82 %    FEV1FEV6-Pre 65 %            Results as noted above.    PFT Interpretation:  Very mild obstructive ventilatory defect.  Decreased from previous.  Below recent best.   Valid Maneuver          Dagoberto Briscoe MD

## 2023-04-13 NOTE — PATIENT INSTRUCTIONS
Chest CT  Continue current medication, exercise, nebs and vest therapy.     [facility] Nurse line:  [place]  [phone]     [facility] Fax Number:    [phone]         Cystic Fibrosis Respiratory Therapists:   Kaylee Miller              [phone]          Barbi Ruiz   [phone]

## 2023-04-13 NOTE — PROGRESS NOTES
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  At the last visit, the patient complained of chest pain, as noted, EGD revealed Schatzki's ring which was dilated.  PPI was recommended but the patient elected to use apple cider vinegar and bile salts with resolution of her symptoms.  Further evaluation of recurrent pain.  Patient is feeling well today.  Exercise tolerance is at baseline.  Oxygenation adequate at 96%.  Although FEV1 and FVC remain in the normal range, there has been a gradual progressive decline since 2019 with FEV1 dropping from 3.01-2.29L  and FVC dropping from 4.10-3.70L.  The change remains largely asymptomatic.  Etiology of the decline is unclear.  She has had multiple COVID infections during that interval.  Progression of eosinophilic granuloma with polyangiitis need to be considered.  We will pursue chest CT and reviewed with patient rheumatologist.  For now, she will continue her current vest therapy, prednisone and Symbicort inhaler.    Follow-up in 4 months with PFTs.  Chest CT in the interim.    I, Dagoberto Briscoe, have spent 50 minutes on the day of the visit to review the chart, interview and examine the patient, review labs and imaging, formulate a plan, document and submit orders. Time documented is excluding time spent for PFT interpretation.    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    At last visit, she presented with chest pain.  There was concern for reflux or esophageal spasm.  EGD revealed a Schatzki's ring which was dilated.  PPI was  "recommended but the patient was reluctant and used apple cider vinegar and bile salts instead with relief.    The patient had a head cold with some respiratory involvement in January treated with Augmentin and a prednisone burst with resolution.    Breathing is comfortable at rest and with all activity.  She does yoga 4-5 times per week.  No cough.  No sputum.  No fever, chills or night sweats.  She is doing vest therapy once daily.    She notes occasional \"wheeze and crackles\" which he attributes to the change in season and is relieved with inhaler.     Review of systems:  Appetite is at baseline.  No sore throat or sinus pain.  Ear concerns at baseline.  No nausea, vomiting or abdominal pain.  IBS with variable stools  Diffuse body aches attributed to fibromyalgia at baseline.  Easy bruising, increased recently.  The remainder of a complete review of systems is otherwise negative except as noted in history of present illness.          Current Outpatient Medications   Medication     acetylcysteine (MUCOMYST) 20 % neb solution     albuterol (PROAIR HFA/PROVENTIL HFA/VENTOLIN HFA) 108 (90 Base) MCG/ACT inhaler     albuterol (PROVENTIL) (2.5 MG/3ML) 0.083% neb solution     ciprofloxacin-dexamethasone (CIPRODEX) 0.3-0.1 % otic suspension     fluticasone (FLONASE) 50 MCG/ACT nasal spray     metoprolol succinate ER (TOPROL-XL) 25 MG 24 hr tablet     NONFORMULARY     Omega-3 Fatty Acids (FISH OIL) 1200 MG capsule     predniSONE (DELTASONE) 1 MG tablet     predniSONE (DELTASONE) 1 MG tablet     predniSONE (DELTASONE) 5 MG tablet     Probiotic Product (PROBIOTIC DAILY PO)     SYMBICORT 160-4.5 MCG/ACT Inhaler     tiZANidine (ZANAFLEX) 2 MG tablet     vitamin D3 (CHOLECALCIFEROL) 2000 units (50 mcg) tablet     VITAMIN K PO     No current facility-administered medications for this visit.     Allergies   Allergen Reactions     Colistimethate Anaphylaxis     Colymycin M [Colistimethate Sodium] Anaphylaxis     Tamiflu " [Oseltamivir]      Gums Blister up     Azithromycin Palpitations     Heart palpitation  Heart palpitation     Clindamycin Rash     Past Medical History:   Diagnosis Date     Anxiety      Aortitis (H)      Asthma     associated with Churg Antonina syndrome     Bronchiectasis (H)      Cerebral infarction (H) 1990    Very small, caused small permanent optical migraine     Chronic sinusitis      Churg-Antonina syndrome (H)     dx      Conductive hearing loss      Depressive disorder      Eustachian tube dysfunction      Goiter      Hearing loss, conductive      Heart rate problem      Hypertension      Hypocalcemia      Immunosuppression (H)      Infection due to 2019 novel coronavirus 2022     Irritable bowel syndrome      Major depressive disorder      Mannose-binding lectin deficiency      Nonspecific abnormal results of thyroid function study      Osteoporosis      Pericarditis      and      Pneumonia     4/23/10     Recurrent otitis media      Recurrent UTI      Rheumatoid vasculitis (H)      Sinusitis, chronic      Steroid long-term use      Tinnitus      Varicose veins of leg with swelling        Past Surgical History:   Procedure Laterality Date     C/SECTION, CLASSICAL        SECTION       COLONOSCOPY Left 2014    Procedure: COMBINED COLONOSCOPY, SINGLE OR MULTIPLE BIOPSY/POLYPECTOMY BY BIOPSY;  Surgeon: Js Pinedo MD;  Location:  GI     COLONOSCOPY N/A 2019    Procedure: COLONOSCOPY;  Surgeon: Bryce Pimentel MD;  Location:  GI     ENT SURGERY       ESOPHAGOSCOPY, GASTROSCOPY, DUODENOSCOPY (EGD), COMBINED N/A 2023    Procedure: ESOPHAGOGASTRODUODENOSCOPY, WITH BIOPSY;  Surgeon: Mo Rosa MD;  Location: [facility]     ESOPHAGOSCOPY, GASTROSCOPY, DUODENOSCOPY (EGD), DILATATION, COMBINED N/A 2023    Procedure: ESOPHAGOGASTRODUODENOSCOPY, WITH DILATION;  Surgeon: Mo Rosa MD;  Location: [facility]      GENITOURINARY SURGERY       HEAD & NECK SURGERY       HYSTERECTOMY  2/27/2009    without oopherectomy     HYSTERECTOMY, PAP NO LONGER INDICATED      cervix removed      LAPAROSCOPIC APPENDECTOMY N/A 6/22/2022    Procedure: APPENDECTOMY, LAPAROSCOPIC;  Surgeon: Fany Hannah MD;  Location: [facility] OR     PE TUBES       PICC INSERTION  10/10/2013    5fr DL PASV PICC, 43cm (1cm external) in the L basilic vein w/ tip in the low SVC.     PICC INSERTION Left 05/26/2017    5fr DL BioFlo PICC, 42cm (3cm external) in the L lateral brachial vein w/ tip in the SVC RA junction.     SINUS SURGERY       TUBAL LIGATION       TYMPANOPLASTY       ZZHC COLP CERVIX/UPPER VAGINA W LOOP ELEC BX CERVIX         Social History     Socioeconomic History     Marital status:      Spouse name: Not on file     Number of children: Not on file     Years of education: Not on file     Highest education level: Not on file   Occupational History     Not on file   Tobacco Use     Smoking status: Never     Passive exposure: Never     Smokeless tobacco: Never   Vaping Use     Vaping status: Never Used     Passive vaping exposure: Yes   Substance and Sexual Activity     Alcohol use: Not Currently     Drug use: No     Sexual activity: Yes     Partners: Male     Birth control/protection: Male Surgical, Female Surgical   Other Topics Concern     Parent/sibling w/ CABG, MI or angioplasty before 65F 55M? Not Asked      Service No     Blood Transfusions No     Caffeine Concern No     Occupational Exposure No     Hobby Hazards No     Sleep Concern No     Stress Concern Yes     Weight Concern No     Special Diet Yes     Comment: IBS diet     Back Care No     Exercise Not Asked     Comment: 1 mile power walk 5 days a week at least.      Bike Helmet Not Asked     Seat Belt Yes     Self-Exams Yes   Social History Narrative    [employer] in Radiology Department.  .  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  Non smoker. No smokers at home.   "Enjoys walking and dancing.  Alcohol--1 to 2 drinks daily.  Few times a year has more than 4 drinks in a day.  No illicits.                     Social Determinants of Health     Financial Resource Strain: Not on file   Food Insecurity: Not on file   Transportation Needs: Not on file   Physical Activity: Not on file   Stress: Not on file   Social Connections: Not on file   Intimate Partner Violence: Not on file   Housing Stability: Not on file       ROS Pulmonary    A complete ROS was otherwise negative except as noted in the HPI.  BP (!) 145/79 (BP Location: Right arm, Patient Position: Sitting, Cuff Size: Adult Small)   Pulse 69   Ht 1.575 m (5' 2\")   Wt 50.8 kg (112 lb)   LMP 02/01/2009 (LMP Unknown)   SpO2 96%   BMI 20.49 kg/m    Exam:   GENERAL APPEARANCE: Well developed, well nourished, alert, and in no apparent distress.  EYES: PERRL, EOMI  HENT: Nasal mucosa with green discharge, edema and no hyperemia. No nasal polyps.  EARS: Canals clear, TMs with erythema, scarring and scant drainage  MOUTH: Oral mucosa is moist, without any lesions, no tonsillar enlargement, no oropharyngeal exudate.  NECK: supple, no masses, no thyromegaly.  LYMPHATICS: No significant axillary, cervical, or supraclavicular nodes.  RESP: normal percussion, good air flow throughout.  No crackles. No rhonchi. Faint scattered wheezes.  CV: Normal S1, S2, regular rhythm, normal rate. No murmur.  No rub. No gallop. No LE edema.   ABDOMEN:  Bowel sounds normal, soft, nontender, no HSM or masses.   MS: extremities normal. (+)clubbing. No cyanosis.  SKIN: no rash on limited exam  NEURO: Mentation intact, speech normal, normal strength and tone, normal gait and stance  PSYCH: mentation appears normal. and affect normal/bright    Results:  Recent Results (from the past 168 hour(s))   General PFT Lab (Please always keep checked)    Collection Time: 04/13/23 10:57 AM   Result Value Ref Range    FVC-Pred 2.96 L    FVC-Pre 3.70 L    FVC-%Pred-Pre " 124 %    FEV1-Pre 2.29 L    FEV1-%Pred-Pre 95 %    FEV1FVC-Pred 82 %    FEV1FVC-Pre 62 %    FEFMax-Pred 6.43 L/sec    FEFMax-Pre 5.52 L/sec    FEFMax-%Pred-Pre 85 %    FEF2575-Pred 2.48 L/sec    FEF2575-Pre 1.10 L/sec    [id number]-%Pred-Pre 44 %    ExpTime-Pre 9.69 sec    FIFMax-Pre 4.82 L/sec    FEV1FEV6-Pred 82 %    FEV1FEV6-Pre 65 %                 Results as noted above.    PFT Interpretation:  Very mild obstructive ventilatory defect.  Decreased from previous.  Below recent best.   Valid Maneuver

## 2023-04-17 ENCOUNTER — ANCILLARY PROCEDURE (OUTPATIENT)
Dept: GENERAL RADIOLOGY | Facility: CLINIC | Age: 53
End: 2023-04-17
Attending: INTERNAL MEDICINE
Payer: COMMERCIAL

## 2023-04-17 ENCOUNTER — OFFICE VISIT (OUTPATIENT)
Dept: INTERNAL MEDICINE | Facility: CLINIC | Age: 53
End: 2023-04-17
Payer: COMMERCIAL

## 2023-04-17 ENCOUNTER — MYC MEDICAL ADVICE (OUTPATIENT)
Dept: INTERNAL MEDICINE | Facility: CLINIC | Age: 53
End: 2023-04-17

## 2023-04-17 VITALS
SYSTOLIC BLOOD PRESSURE: 102 MMHG | OXYGEN SATURATION: 99 % | DIASTOLIC BLOOD PRESSURE: 64 MMHG | BODY MASS INDEX: 22.08 KG/M2 | HEART RATE: 88 BPM | HEIGHT: 62 IN | TEMPERATURE: 97.2 F | WEIGHT: 120 LBS | RESPIRATION RATE: 12 BRPM

## 2023-04-17 DIAGNOSIS — H65.493 OTHER CHRONIC NONSUPPURATIVE OTITIS MEDIA OF BOTH EARS: Primary | ICD-10-CM

## 2023-04-17 DIAGNOSIS — M25.562 LEFT KNEE PAIN, UNSPECIFIED CHRONICITY: ICD-10-CM

## 2023-04-17 PROCEDURE — 73562 X-RAY EXAM OF KNEE 3: CPT | Mod: TC | Performed by: RADIOLOGY

## 2023-04-17 PROCEDURE — 99214 OFFICE O/P EST MOD 30 MIN: CPT | Performed by: INTERNAL MEDICINE

## 2023-04-17 ASSESSMENT — PAIN SCALES - GENERAL: PAINLEVEL: NO PAIN (0)

## 2023-04-17 NOTE — PROGRESS NOTES
Assessment & Plan     Other chronic nonsuppurative otitis media of both ears  Refer to ENT   Continue Ciprodex PRN   - Adult ENT  Referral; Future    Left knee pain, unspecified chronicity  Assess X rays, clinically OA of the tibiopatellar joint   - XR Knee Left 3 Views; Future             See Patient Instructions    MD CATRACHITO Magallanes [facility] FLORESITA [place] is a 52 year old, presenting for the following health issues:  Ear Problem and Knee Pain (Left knee pain walking up stairs and doing yoga)        4/17/2023    10:49 AM   Additional Questions   Roomed by Bekah WINSTON     History of Present Illness       Reason for visit:  Stuffy ears and knee pain  Symptom onset:  3-4 weeks ago  Symptoms include:  Stuffy ears, impaired hearing, knee pain on stairs and at yoga  Symptom intensity:  Moderate  Symptom progression:  Worsening  Had these symptoms before:  Yes  Has tried/received treatment for these symptoms:  Yes  Previous treatment was successful:  No  What makes it worse:  Using leg other than walking or sitting  What makes it better:  No    She eats 2-3 servings of fruits and vegetables daily.She consumes 0 sweetened beverage(s) daily.She exercises with enough effort to increase her heart rate 30 to 60 minutes per day.  She exercises with enough effort to increase her heart rate 5 days per week.   She is taking medications regularly.       Concern for chronic ear pressure, pain, ringing. Has chronic TMs perforations. Has episodes of recurrent otitis media. Uses PRN Ciprodex drops.   Symptoms have been more frequent. No current acute pain, no sore throat, fever, chills.   Has seen ENT in the past. Has had tympanoplasty , but had recurrent infections.   No ear drainage now.   Has complaints of left knee grinding, pressure when walking up or down stairs. No swelling, no redness. Symptoms for several months.         Review of Systems   Constitutional, HEENT, cardiovascular,  "pulmonary, gi and gu systems are negative, except as otherwise noted.      Objective    /64 (BP Location: Left arm, Cuff Size: Adult Regular)   Pulse 88   Temp 97.2  F (36.2  C) (Tympanic)   Resp 12   Ht 1.575 m (5' 2\")   Wt 54.4 kg (120 lb)   LMP 02/01/2009 (LMP Unknown)   SpO2 99%   BMI 21.95 kg/m    Body mass index is 21.95 kg/m .  Physical Exam   GENERAL: healthy, alert and no distress  HENT: ear canals normal, chronic TMs perforations,  nose and mouth without ulcers or lesions  NECK: no adenopathy, no asymmetry, masses, or scars and thyroid normal to palpation  MS: no gross musculoskeletal defects noted, no edema, left knee has normal ROM, grinding in sensations, crepitus in the lower patellar - tibial area with flexion/ extension, no instability     Orders Only on 04/13/2023   Component Date Value Ref Range Status     FVC-Pred 04/13/2023 2.96  L Preliminary     FVC-Pre 04/13/2023 3.70  L Preliminary     FVC-%Pred-Pre 04/13/2023 124  % Preliminary     FEV1-Pre 04/13/2023 2.29  L Preliminary     FEV1-%Pred-Pre 04/13/2023 95  % Preliminary     FEV1FVC-Pred 04/13/2023 82  % Preliminary     FEV1FVC-Pre 04/13/2023 62  % Preliminary     FEFMax-Pred 04/13/2023 6.43  L/sec Preliminary     FEFMax-Pre 04/13/2023 5.52  L/sec Preliminary     FEFMax-%Pred-Pre 04/13/2023 85  % Preliminary     FEF2575-Pred 04/13/2023 2.48  L/sec Preliminary     FEF2575-Pre 04/13/2023 1.10  L/sec Preliminary     OZS6097-%Pred-Pre 04/13/2023 44  % Preliminary     ExpTime-Pre 04/13/2023 9.69  sec Preliminary     FIFMax-Pre 04/13/2023 4.82  L/sec Preliminary     FEV1FEV6-Pred 04/13/2023 82  % Preliminary     FEV1FEV6-Pre 04/13/2023 65  % Preliminary                   "

## 2023-04-18 ENCOUNTER — MYC MEDICAL ADVICE (OUTPATIENT)
Dept: INTERNAL MEDICINE | Facility: CLINIC | Age: 53
End: 2023-04-18
Payer: COMMERCIAL

## 2023-04-19 NOTE — TELEPHONE ENCOUNTER
Tapan Mckinnon- I sent patient a [employer] message letting her know we do not do retro referrals. Also she has an open acess plan which does not require referrals. I told her to call her insurance to see if this clinic is in network. If not in network patient will have to pay out of pocket or use her out of network benefits.    Joselin WINSTON-referral coordinator

## 2023-04-19 NOTE — PROGRESS NOTES
"Rheumatology Clinic Visit     Valencia Ye MRN# [id number]   YOB: 1970 Age: 52 year old     Date of Visit: 04/21/2023  Primary care provider: Morelia Simental         Assessment and Plan:     # Eosinophilic granulomatosis with polyangiitis (eosinophilia, severe asthma, pericarditis, vasculitic lower extremity rashes, myalgia, fatigue, dx'd age 19): Patient relates stable, chronic fatigue and \"brain fog\" with suboptimal short-term memory. All symptoms attributable formerly to EGPA are stable with no major flares since the last visit. Respiratory symptoms are  absent. Examination today shows no rash, synovitis, or altered joint range of motion.  Lungs are clear to auscultation without wheezes.    Data: Blood work on February 1, 2023 showed creatinine, calcium, CBC normal; CRP was undetectable in December 2022.  PFT 2023:  FEV1 and FVC were in the normal range, a decline of FEV1 from 3.01 to 2.29 L, and FVC falling from 4.1 to 3.7 L had occurred over the course of 4 years.    EGPA remains stable, with no evidence of active systemic involvement with inflammatory disease.  Patient reports on each occasion of tapering, she developed flaring symptoms that reflected recurrent EGPA.  I continue to think that the risk of precipitating flare is greater than the risk of adverse effects associated with tapering prednisone beyond the current 5 mg dose, which is roughly at the level of expected physiologic corticosteroid endogenous production.  I recommend continuing prednisone dose of 5 mg every day.    I recommend  UA, CBC with differential, and creatinine should be performed semiannually.     I agree with plan for chest CT to assess for changes In PFT's. I think it unlikely, in the absence of associated symptoms, signs, or other markers of disease activity, that gradual PFT changes are causally related to EGPA.       I recommend follow-up in 6-9 months time.      Ovidio Mayberry, Medical Student    I was present " with the medical student who took the history and acted as a scribe. I have verified the history, reviewed imaging and laboratory data, and personally performed the physical exam. I formulated the assessment and plan.    Adal Gallo M.D.  Staff Rheumatologist,  Health  Pager [phone]                Active Problem List:     Patient Active Problem List    Diagnosis Date Noted     Diverticulosis 03/17/2023     Priority: Medium     Pain syndrome, chronic 05/22/2022     Priority: Medium     Alpha-lipoproteinemia 05/22/2022     Priority: Medium     Bladder pain 07/31/2019     Priority: Medium     Overactive bladder 07/31/2019     Priority: Medium     Vertigo 05/17/2018     Priority: Medium     Controlled substance agreement signed 11/13/2017     Priority: Medium     Patient is followed by BRIAN DEE for ongoing prescription of benzodiazepines.  All refills should be approved by this provider, or covering partner.    Medication(s): clonazepam.   Maximum quantity per month: 7.5  Clinic visit frequency required: Q 6  months     Controlled substance agreement on file: Yes       Date(s): 11/13/17  Benzodiazepine use reviewed by psychiatry:  No    Last [facility] website verification:  none   [email or web address]           Bronchiectasis without acute exacerbation (H) 05/22/2017     Priority: Medium     Moderate episode of recurrent major depressive disorder (H) 01/29/2017     Priority: Medium     Benign essential hypertension 10/20/2016     Priority: Medium     Mannose-binding lectin deficiency      Priority: Medium     Irritable bowel syndrome without diarrhea 07/14/2016     Priority: Medium     Chronic fatigue 01/21/2016     Priority: Medium     Anxiety 05/30/2014     Priority: Medium     Mild intermittent asthma without complication 07/16/2013     Priority: Medium     History of eating disorder 06/16/2013     Priority: Medium     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 Last seen in 12-, when EGPA was stable. Continued 5.5 mg daily prednisone was recommended.    Interval history April 20, 2023    Patient was seen by Dr. Novak in pulmonology on April 13, 2023.  Impression was of an asymptomatic woman with bronchiectasis and eosinophilic granulomatosis with polyangiitis whose former gastroesophageal reflux associated with a Schatzki's ring had abated significantly with use of apple cider vinegar and bile salts.  Pulmonary function tests were reviewed and revealed that although FEV1 and FVC were in the normal range, a decline of FEV1 from 3.01 to 2.29 L, and FVC following from 4.1 to 3.7 L had occurred over the course of 4 years.  Recurrent COVID-19 infections during that interval were noted.  Imaging of the chest and follow-up PFTs in 4 months were recommended.    Valencia Wilson says that her EGPA has been stable and not had any flares since her last appointment.  She has gone down on prednisone from 5.5mg to 5mg.  She has had migraine/tension headaches that are located on the back of head to temples.  She has not felt any changes in her breathing but has been concerned about the gradual decrease of her PFTs.  She has also had dry eyes for the past two years and more recently developed phlebitis in her finger and foot that began two weeks ago and has since resolved.    She had Left knee pain going up stairs and was seen in primary care.  An Xray 4/17 showed mild patellofemoral joint narrowing.       Interval history 12-    She saw neurology regarding chronic pain. Migraines and low iron were diagnosed. Her HA is improved with use of tyramine.    She continues  "with intermittent severe fatigue. At least several days per week, she spends the whole day in bed with increased somnolence. She has already been checked for apnea and sleep disturbance. She notes association of fatigue with marked psychological stimulation.    She notes leg swelling, worse as the day goes on.    She has normal diet, does not drink, exercises with dog-walking (but this is very draining). She does not have enough energy to try recommended yoga.    Several months ago, she had a \"bubble\" on a fingertip, lasting days/week, remitted spontaneously.    No other symptoms previously associated with EGPA.    She had COVID twice, most recently in 4-2021; s/p Pfizer vaccination.    prednisone 5.5 mg daily continues without interruption.    Interval history 06-:    She continues with stable  fatigue and brain fog. Myalgia has been less due to reduced stress recently.    She notes that around midday, she has overwhelming fatigue, and requires midmorning several hour nap. Her brain \"gets going\" in the early afternoon.     Appetite and weight stable; no rash; no joint pain; SOB is suppressed; no recurrence of divertiulitis; IBS continues unchanged.    Several weeks ago, she had a painful spot on the L 4th fingertip. It resolved spontaneously in 2-3 days. She has blister-like lesions on her fingers and her big toes.    Prednisone 5.5 mg daily continues without interruption.    Prior hx 6-2020    Her condition has been managed Dr. Butts since 2013; I do not have his records to review today.    By her history, She was diagnosed with EGPA as a teenager after symptoms started at age 16. She had severe life-threatening asthma. By 19, she could not go to school or work due to breathting. She had weightl loss, painful toe rashes, and neurologic symptoms with restless legs, optical migraines. At age 19 in 1990, she was admitted to hospital, found to have pericarditis and eosinophilia. Bx skin showed vasculitis. She " was started on prednisone, high dose initially. She has been on prednisone continuously since.    In recent years, she developed bronchiectasis, dx'd in 2013. She has been treated with daily therapeutic vest treatments, intermittent antibiotics.    In May 2018, she developed vertigo and nausea, which persisted for months, now somewhat improved.    She had worked continusously through the illness, until 2019.    She is afflicted with brain fog that causes short-term memory loss, fatigue and muscle pain. The latter is improved with reduced work-related stress. She has chronic sinus and ear problems. She notes dry eyes and reduced tear formation.    She had a respiratory illness in the end of February; she was not tested for COVID. She took an antibiotic and upped her dose of prednisone, and sx cleared.    Asthma is generally under good control. No recent vasculitis rash. The last episode of pericardial fluid episode was in [id number].    She has noted fluctuating blood pressures, with systolics in 80-90; ranging upward to 130-140s.    Patient was seen in ophthalmology on February 25, 2020 in follow-up of C Antonina syndrome.  Assessment was of no ocular manifestations of systemic rheumatic disease.    Patient was seen by Dr. Novak in pulmonology on December 26, 2019 for follow-up of bronchiectasis and eosinophilic granulomatous polyangiitis.  Pulmonary symptoms were under good control with chronic inhaler use, daily vest therapy and Mucomyst fatigue and body aches were noted.  No change in medication was recommended.    She had severe IBS for years. She has noted marked improvement with use of SamE and Amitiza (for approximately 1 year).         Review of Systems:   Unlless listed in HPI:  Constitutional: negative  Skin: negative  Eyes: negative  Ears/Nose/Throat: negative  Respiratory: No shortness of breath, dyspnea on exertion, cough, or hemoptysis  Cardiovascular: negative  Gastrointestinal:  negative  Genitourinary: negative  Musculoskeletal: negative  Neurologic: negative  Psychiatric: negative  Hematologic/Lymphatic/Immunologic: negative  Endocrine: negative          Past Medical History:     Past Medical History:   Diagnosis Date     Anxiety      Aortitis (H)      Asthma     associated with Churg Antonina syndrome     Bronchiectasis (H)      Cerebral infarction (H) 1990    Very small, caused small permanent optical migraine     Chronic sinusitis      Churg-Antonina syndrome (H)     dx      Conductive hearing loss      Depressive disorder      Eustachian tube dysfunction      Goiter      Hearing loss, conductive      Heart rate problem      Hypertension      Hypocalcemia      Immunosuppression (H)      Infection due to 2019 novel coronavirus 2022, , 2022     Irritable bowel syndrome      Major depressive disorder      Mannose-binding lectin deficiency      Nonspecific abnormal results of thyroid function study      Osteoporosis      Pericarditis      and      Pneumonia     4/23/10     Recurrent otitis media      Recurrent UTI      Rheumatoid vasculitis (H)      Sinusitis, chronic      Steroid long-term use      Tinnitus      Varicose veins of leg with swelling      Past Surgical History:   Procedure Laterality Date     C/SECTION, CLASSICAL        SECTION       COLONOSCOPY Left 2014    Procedure: COMBINED COLONOSCOPY, SINGLE OR MULTIPLE BIOPSY/POLYPECTOMY BY BIOPSY;  Surgeon: Js Pinedo MD;  Location:  GI     COLONOSCOPY N/A 2019    Procedure: COLONOSCOPY;  Surgeon: Bryce Pimentel MD;  Location:  GI     ENT SURGERY       ESOPHAGOSCOPY, GASTROSCOPY, DUODENOSCOPY (EGD), COMBINED N/A 2023    Procedure: ESOPHAGOGASTRODUODENOSCOPY, WITH BIOPSY;  Surgeon: Mo Rosa MD;  Location:  GI     ESOPHAGOSCOPY, GASTROSCOPY, DUODENOSCOPY (EGD), DILATATION, COMBINED N/A 2023    Procedure:  ESOPHAGOGASTRODUODENOSCOPY, WITH DILATION;  Surgeon: Mo Rosa MD;  Location:  GI     GENITOURINARY SURGERY       HEAD & NECK SURGERY       HYSTERECTOMY  2/27/2009    without oopherectomy     HYSTERECTOMY, PAP NO LONGER INDICATED      cervix removed      LAPAROSCOPIC APPENDECTOMY N/A 6/22/2022    Procedure: APPENDECTOMY, LAPAROSCOPIC;  Surgeon: Fany Hannah MD;  Location: [facility] OR     PE TUBES       PICC INSERTION  10/10/2013    5fr DL PASV PICC, 43cm (1cm external) in the L basilic vein w/ tip in the low SVC.     PICC INSERTION Left 05/26/2017    5fr DL BioFlo PICC, 42cm (3cm external) in the L lateral brachial vein w/ tip in the SVC RA junction.     SINUS SURGERY       TUBAL LIGATION       TYMPANOPLASTY       ZZHC COLP CERVIX/UPPER VAGINA W LOOP ELEC BX CERVIX       --Hysterectomy for cervical dysplasia.       Social History:     Social History     Occupational History     Not on file   Tobacco Use     Smoking status: Never     Passive exposure: Never     Smokeless tobacco: Never   Vaping Use     Vaping status: Never Used     Passive vaping exposure: Yes   Substance and Sexual Activity     Alcohol use: Not Currently     Drug use: No     Sexual activity: Yes     Partners: Male     Birth control/protection: Male Surgical, Female Surgical     Has 5 children, all healthy except for asthma  Worked as  at the [employer] for 15 years. On disability since 4-2019.  Drinks 1/2 drink 5 days weekly. Never smoker.       Family History:     Family History   Problem Relation Age of Onset     Allergies Mother         Seasonal     Alcohol/Drug Mother      Substance Abuse Mother      Depression Mother      C.A.D. Father      Diabetes Father         Type 2     Depression Father      Heart Disease Father      Breast Cancer Maternal Grandmother      C.A.D. Maternal Grandmother      Arthritis Maternal Grandmother      Musculoskeletal Disorder Maternal Grandmother         MS     Heart Disease Maternal  Grandmother      Hypertension Maternal Grandmother      Alcohol/Drug Maternal Grandfather      Substance Abuse Maternal Grandfather      Depression Maternal Grandfather      Unknown/Adopted Paternal Grandmother      Unknown/Adopted Paternal Grandfather      Asthma Daughter      Allergies Daughter         Seasonal     Asthma Son      Cancer Maternal Aunt         breast age 53     Breast Cancer Maternal Aunt         in her 50s     Glaucoma No family hx of      Macular Degeneration No family hx of      Colon Cancer No family hx of    MGM with MS; aunt MS         Allergies:     Allergies   Allergen Reactions     Colistimethate Anaphylaxis     Colymycin M [Colistimethate Sodium] Anaphylaxis     Tamiflu [Oseltamivir]      Gums Blister up     Azithromycin Palpitations     Heart palpitation  Heart palpitation     Clindamycin Rash            Medications:     Current Outpatient Medications   Medication Sig Dispense Refill     acetylcysteine (MUCOMYST) 20 % neb solution NEBULIZE THE CONTENTS OF ONE VIAL (4 ML) TWO TIMES A  mL 1     albuterol (PROAIR HFA/PROVENTIL HFA/VENTOLIN HFA) 108 (90 Base) MCG/ACT inhaler INHALE 2 PUFFS INTO THE LUNGS EVERY 4 HOURS AS NEEDED FOR SHORTNESS OF BREATH/WHEEZE 18 g 3     albuterol (PROVENTIL) (2.5 MG/3ML) 0.083% neb solution TAKE 1 VIAL VIA NEBULIZER TWICE A DAY. 150 mL 11     ciprofloxacin-dexamethasone (CIPRODEX) 0.3-0.1 % otic suspension INSTILL 3 DROPS INTO AFFECTED EARS 2 TIMES DAILY AS NEEDED 7.5 mL 10     fluticasone (FLONASE) 50 MCG/ACT nasal spray Spray 2 sprays into both nostrils daily 48 mL 3     metoprolol succinate ER (TOPROL-XL) 25 MG 24 hr tablet TAKE 1/2 TABLET(12.5 MG) BY MOUTH DAILY. HOLD IF BP IS LOW (Patient taking differently: Take 12.5 mg by mouth daily as needed (depending on daily BP check)) 45 tablet 3     NONFORMULARY Take 20 mg by mouth daily lithium orotate--used for anxiety       Omega-3 Fatty Acids (FISH OIL) 1200 MG capsule Take 4 capsules (4.8 g) by mouth  daily       predniSONE (DELTASONE) 1 MG tablet Take 5 tabs daily for 30 days 300 tablet 3     predniSONE (DELTASONE) 5 MG tablet Take 1 tablet (5 mg) by mouth daily 30 tablet 2     Probiotic Product (PROBIOTIC DAILY PO) Take 1 capsule by mouth daily       SYMBICORT 160-4.5 MCG/ACT Inhaler TAKE 2 PUFFS BY MOUTH TWICE A DAY 30.6 g 3     tiZANidine (ZANAFLEX) 2 MG tablet TAKE 1-2 TABLETS BY MOUTH 3 TIMES DAILY AS NEEDED FOR MUSCLE SPASMS 45 tablet 11     vitamin D3 (CHOLECALCIFEROL) 2000 units (50 mcg) tablet Take 2 tablets by mouth daily       VITAMIN K PO Take 1 capsule by mouth daily              Physical Exam:   Blood pressure 113/69, pulse 75, weight 52.8 kg (116 lb 8 oz), last menstrual period 02/01/2009, SpO2 98 %, not currently breastfeeding.  Wt Readings from Last 4 Encounters:   04/20/23 52.8 kg (116 lb 8 oz)   04/17/23 54.4 kg (120 lb)   04/13/23 50.8 kg (112 lb)   03/17/23 51.4 kg (113 lb 6.4 oz)       Constitutional: well-developed, appearing stated age; cooperative  Eyes: nl EOM, PERRLA, vision, conjunctiva, sclera  ENT: nl external ears, nose, hearing, lips, teeth, gums, throat  No mucous membrane lesions, normal saliva pool  Neck: no thyroid enlargement  MS: No hand/finger joint visible swelling; intact hand joint ROM  Skin: no nail pitting, alopecia, rash, nodules or lesions  Neuro: nl cranial nerves  Psych: nl judgement, orientation, memory, affect.         Data:     No results found for any visits on 04/20/23.    Recent Labs   Lab Test 10/02/19  1428 02/28/19  1604 02/26/19  0637 07/05/18  1805 02/19/18  1626  06/09/17  1756  03/10/17  1835 11/10/16  1812 12/23/15  2227   WBC 8.5  --   --   --  6.1  --  6.7   < > 20.8* 8.8 8.0   RBC 4.01  --   --   --  3.96  --  3.85   < > 4.04 3.68* 4.02   HGB 13.6  --   --   --  13.0  --  12.7   < > 13.6 12.4 13.4   HCT 40.4  --   --   --  39.3  --  38.5   < > 39.6 37.2 39.0   *  --   --   --  99  --  100   < > 98 101* 97   RDW 12.7  --   --   --  13.1  --   12.1   < > 12.0 12.4 12.5     --   --   --  283  --  284   < > 292 276 275   ALBUMIN  --   --   --   --   --   --   --   --  3.7 4.0 3.8   CRP  --  <2.9 <2.9 <2.9  --   --  <2.9   < >  --  <2.9  --    BUN 14  --   --  19 8   < >  --    < > 14 14 11    < > = values in this interval not displayed.      Recent Labs   Lab Test 03/12/19 07/05/18  1805 11/10/16  1812   TSH 1.540 0.56 0.90   T4  --   --  1.01     Hemoglobin   Date Value Ref Range Status   02/01/2023 13.0 11.7 - 15.7 g/dL Final   12/01/2022 13.1 11.7 - 15.7 g/dL Final   06/24/2022 10.3 (L) 11.7 - 15.7 g/dL Final   06/15/2021 12.8 11.7 - 15.7 g/dL Final   10/20/2020 13.5 11.7 - 15.7 g/dL Final   06/29/2020 12.9 11.7 - 15.7 g/dL Final     Urea Nitrogen   Date Value Ref Range Status   02/01/2023 9.6 6.0 - 20.0 mg/dL Final   12/14/2022 20.5 (H) 6.0 - 20.0 mg/dL Final   06/23/2022 8 7 - 30 mg/dL Final   06/22/2022 10 7 - 30 mg/dL Final   03/31/2022 19 7 - 30 mg/dL Final   10/20/2020 8 7 - 30 mg/dL Final   09/21/2020 12 7 - 30 mg/dL Final   10/02/2019 14 7 - 30 mg/dL Final     Sed Rate   Date Value Ref Range Status   09/21/2020 30 (H) 0 - 20 mm/h Final     Comment:     Results confirmed by repeat test   02/28/2019 6 0 - 20 mm/h Final   07/05/2018 6 0 - 20 mm/h Final     Erythrocyte Sedimentation Rate   Date Value Ref Range Status   12/14/2022 8 0 - 30 mm/hr Final   12/01/2022 6 0 - 30 mm/hr Final     CRP Cardiac Risk   Date Value Ref Range Status   02/04/2008 8.4 mg/L Final     Comment:     Reference Values:   Low Risk:           <1.0 mg/L   Average Risk:       1.0-3.0 mg/L   High Risk:          >3.0 mg/L   Acute Inflammation: >8.0 mg/L     CRP Inflammation   Date Value Ref Range Status   09/21/2020 44.0 (H) 0.0 - 8.0 mg/L Final   02/28/2019 <2.9 0.0 - 8.0 mg/L Final   02/26/2019 <2.9 0.0 - 8.0 mg/L Final     AST   Date Value Ref Range Status   12/08/2021 14 0 - 45 U/L Final   06/15/2021 21 0 - 45 U/L Final   10/20/2020 13 0 - 45 U/L Final   03/12/2019 24  0 - 40 IU/L Final     Neutrophil Cytoplasmic IgG Antibody   Date Value Ref Range Status   02/17/2009 <1:20  Final     Comment:     Reference range: <1:20  (Note)  TEST INFORMATION: Anti-Neutrophil Cyto Ab, IgG  Neutrophil Cytoplasmic Antibodies (C-ANCA = granular  cytoplasmic staining, P-ANCA = perinuclear staining) are  found in the serum of over 90% of patients with certain  necrotizing systemic vasculitides, and usually in less  than 5% of patients with collagen vascular disease or  arthritis.                  ANCA'S IN VASCULITIC SYNDROMES                         Approx %      Approx % of                         Pos ANCA    patients demonstrating  ----------------------------------------------------------                (combined patterns)   C Pattern   P Pattern  Wegener's granulomatosis:  -Active Generalized   85 - 92        85 - 90       2 - 5  -Limited forms        60 - 67        85 - 90       2 - 5  -Inactive             30 - 35        85 - 90       2 - 5  Idiopathic Crescentic  Glomerulonephritis      80            some*     majority*  Polyarteritis Nodosa    50             86           14  Churg - Antonina         50             80           20  SLE                   less than 10     0    greater than 90  Rheumatoid Arthritis  less than 5      0    greater than 90  Sjogren's Syndrome        25           0    greater than 90  ----------------------------------------------------------  *No quantitation in the literature.  Performed by [facility],  [address] [place] [id number] [phone]  [email or web address], Remy Watkins MD - Lab. Director                                                  08/19/2008 < 1:20  Final     Comment:     Reference range: < 1:20  (Note)  TEST INFORMATION: Anti-Neutrophil Cyto Ab, IgG  Neutrophil Cytoplasmic Antibodies (C-ANCA = granular  cytoplasmic staining, P-ANCA = perinuclear staining) are  found in the serum of over 90% of patients with certain  necrotizing systemic  vasculitides, and usually in less  than 5% of patients with collagen vascular disease or  arthritis.                  ANCA'S IN VASCULITIC SYNDROMES                         Approx %      Approx % of                         Pos ANCA    patients demonstrating  ----------------------------------------------------------                (combined patterns)   C Pattern   P Pattern  Wegener's granulomatosis:  -Active Generalized   85 - 92        85 - 90       2 - 5  -Limited forms        60 - 67 85 - 90       2 - 5  -Inactive             30 - 35        85 - 90       2 - 5  Idiopathic Crescentic  Glomerulonephritis      80            some*     majority*  Polyarteritis Nodosa    50             86           14  Churg - Antonina         50             80           20  SLE                   less than 10     0    greater than 90  Rheumatoid Arthritis  less than 5      0    greater than 90  Sjogren's Syndrome        25           0    greater than 90  ----------------------------------------------------------  *No quantitation in the literature.  Performed by [facility],  [address] [place] [id number] [phone]  [email or web address],  Remy Watkins MD - Lab. Director                                              01/24/2008 < 1:20  Final     Comment:     Reference range: < 1:20  (Note)  TEST INFORMATION: Anti-Neutrophil Cyto Ab, IgG  Neutrophil Cytoplasmic Antibodies (C-ANCA = granular  cytoplasmic staining, P-ANCA = perinuclear staining) are  found in the serum of over 90% of patients with certain  necrotizing systemic vasculitides, and usually in less  than 5% of patients with collagen vascular disease or  arthritis.                  ANCA'S IN VASCULITIC SYNDROMES                         Approx %      Approx % of                         Pos ANCA    patients demonstrating  ----------------------------------------------------------                (combined patterns)   C Pattern   P Pattern  Wegener's  granulomatosis:  -Active Generalized   85 - 92 85 - 90       2 - 5  -Limited forms        60 - 67        85 - 90       2 - 5  -Inactive             30 - 35        85 - 90       2 - 5  Idiopathic Crescentic  Glomerulonephritis      80            some*     majority*  Polyarteritis Nodosa    50             86           14  Churg - Antonina         50             80           20  SLE                   less than 10     0    greater than 90  Rheumatoid Arthritis  less than 5      0    greater than 90  Sjogren's Syndrome        25           0    greater than 90  ----------------------------------------------------------  *No quantitation in the literature.  Performed by [facility],  [address] [place] [id number] [phone]  [email or web address],  Remy Watkins MD - Lab. Director                                                Albumin   Date Value Ref Range Status   12/08/2021 3.7 3.4 - 5.0 g/dL Final   06/15/2021 3.9 3.4 - 5.0 g/dL Final   10/20/2020 3.9 3.4 - 5.0 g/dL Final   03/10/2017 3.7 3.4 - 5.0 g/dL Final     Alkaline Phosphatase   Date Value Ref Range Status   10/20/2020 54 40 - 150 U/L Final   03/10/2017 48 40 - 150 U/L Final   11/10/2016 42 40 - 150 U/L Final     ALT   Date Value Ref Range Status   12/08/2021 25 0 - 50 U/L Final   06/15/2021 28 0 - 50 U/L Final   10/20/2020 17 0 - 50 U/L Final   03/12/2019 29 0 - 32 IU/L Final     Rheumatoid Factor   Date Value Ref Range Status   07/05/2018 <20 <20 IU/mL Final     Recent Labs   Lab Test 02/01/23  0901 12/14/22  1412 12/01/22  1631 06/24/22  0708 06/23/22  0711 03/31/22  1722 12/08/21  1345 06/15/21  1622 10/20/20  0155 09/21/20  1609 10/02/19  1428 03/12/19  0000 03/21/17  1730 03/10/17  1835 11/10/16  1812   WBC 4.8  --  8.2 10.8 17.7*   < > 9.0 8.1 14.0*  --    < >  --    < > 20.8* 8.8   HGB 13.0  --  13.1 10.3* 10.9*   < > 13.6 12.8 13.5  --    < >  --    < > 13.6 12.4   HCT 39.0  --  39.1 31.9* 33.3*   < > 41.1 38.3 41.3  --    < >  --    < >  39.6 37.2   MCV 99  --  98 103* 102*   < > 103* 101* 100  --    < >  --    < > 98 101*     --  330 224 241   < > 306 259 261  --    < >  --    < > 292 276   BUN 9.6 20.5*  --   --  8   < >  --   --  8 12   < >  --    < > 14 14   TSH  --   --   --   --   --   --   --  0.66  --  0.60  --  1.540   < >  --  0.90   AST  --   --   --   --   --   --  14 21 13  --   --  24  --  17 6   ALT  --   --   --   --   --   --  25 28 17  --   --  29   < > 18 14   ALKPHOS  --   --   --   --   --   --   --   --  54  --   --   --   --  48 42    < > = values in this interval not displayed.         Latest Ref Rng & Units 12/1/2022     4:31 PM 12/14/2022     2:12 PM 2/1/2023     9:01 AM   RHEUM RESULTS   Creatinine 0.51 - 0.95 mg/dL  0.71   0.69     CRP Inflammation <5.00 mg/L <3.00   <3.00      GFR Estimate >60 mL/min/1.73m2  >90   >90     Hematocrit 35.0 - 47.0 % 39.1    39.0     Hemoglobin 11.7 - 15.7 g/dL 13.1    13.0     WBC 4.0 - 11.0 10e3/uL 8.2    4.8     RBC Count 3.80 - 5.20 10e6/uL 4.00    3.96     RDW 10.0 - 15.0 % 12.8    13.0     MCHC 31.5 - 36.5 g/dL 33.5    33.3     MCV 78 - 100 fL 98    99     Platelet Count 150 - 450 10e3/uL 330    279     Sed Rate 0 - 30 mm/hr 6   8          Rheumatoid Factor   Date Value Ref Range Status   07/05/2018 <20 <20 IU/mL Final   ,  ,  ,  ,  ,   SSA (RO) Antibody IgG   Date Value Ref Range Status   02/17/2009 2  Final     Comment:     Reference range: 0 to 40  Unit: AU/mL  (Note)  REFERENCE INTERVALS: SSA (Ro) (WESTLEY) Ab, IgG   29 AU/mL or Less ............. Negative   30 - 40 AU/mL ................ Equivocal   41 AU/mL or Greater .......... Positive    SSA (Ro) antibody is seen in 70-75% of Sjogren syndrome  cases, 30-40% of systemic lupus erythematosus (SLE) and  5-10% of progressive systemic sclerosis (PSS).  Performed by "[facility]  [address] [place] [id number] [phone]  www.[email or web address], Remy Watkins MD- Lab. Director     Scleroderma Antibody IgG   Date Value Ref  Range Status   02/17/2009 0  Final     Comment:     Reference range: 0 to 40  Unit: AU/mL  (Note)  REFERENCE INTERVALS: Scleroderma (Scl-70) (WESTLEY) Ab, IgG   29 AU/mL or Less ............. Negative   30 - 40 AU/mL ................ Equivocal   41 AU/mL or Greater .......... Positive    Scleroderma (Scl-70) antibody is seen in 20-60% of  patients with scleroderma and is considered diagnostic  and specific for scleroderma if it is the only WESTLEY  antibody present. Scl-70 is also seen in approximately  25% of progressive systemic sclerosis (PSS).  Performed by [facility],  [address] [place] [address] [phone]  www.[email or web address], Remy Watkins MD - Lab. Director   ,   JOSE interpretation   Date Value Ref Range Status   02/28/2019 Negative NEG^Negative Final     Comment:                                        Reference range:  <1:40  NEGATIVE  1:40 - 1:80  BORDERLINE POSITIVE  >1:80 POSITIVE       ,   JOSE Screen by EIA   Date Value Ref Range Status   11/10/2016 <1.0  Interpretation:  Negative   <1.0 Final   ,  ,  ,  ,  ,  ,  ,  ,   Hep B Surface Agn   Date Value Ref Range Status   03/16/2005 Negative NEG Final   ,  ,  ,  ,  ,  ,  ,  ,  ,  ,  ,   Neutrophil Cytoplasmic IgG Antibody   Date Value Ref Range Status   02/17/2009 <1:20  Final     Comment:     Reference range: <1:20  (Note)  TEST INFORMATION: Anti-Neutrophil Cyto Ab, IgG  Neutrophil Cytoplasmic Antibodies (C-ANCA = granular  cytoplasmic staining, P-ANCA = perinuclear staining) are  found in the serum of over 90% of patients with certain  necrotizing systemic vasculitides, and usually in less  than 5% of patients with collagen vascular disease or  arthritis.                  ANCA'S IN VASCULITIC SYNDROMES                         Approx %      Approx % of                         Pos ANCA    patients demonstrating  ----------------------------------------------------------                (combined patterns)   C Pattern   P Pattern  Wegener's  granulomatosis:  -Active Generalized   85 - 92 85 - 90       2 - 5  -Limited forms        60 - 67        85 - 90       2 - 5  -Inactive             30 - 35        85 - 90       2 - 5  Idiopathic Crescentic  Glomerulonephritis      80            some*     majority*  Polyarteritis Nodosa    50             86           14  Churg - Antonina         50             80           20  SLE                   less than 10     0    greater than 90  Rheumatoid Arthritis  less than 5      0    greater than 90  Sjogren's Syndrome        25           0    greater than 90  ----------------------------------------------------------  *No quantitation in the literature.  Performed by [facility],  [address] [place] [address] [phone]  www.[employer], Remy Watkins MD - Lab. Director                                                      IGG   Date Value Ref Range Status   10/04/2013 854 425 - 1,620 mg/dL Final

## 2023-04-20 ENCOUNTER — OFFICE VISIT (OUTPATIENT)
Dept: RHEUMATOLOGY | Facility: CLINIC | Age: 53
End: 2023-04-20
Payer: COMMERCIAL

## 2023-04-20 VITALS
DIASTOLIC BLOOD PRESSURE: 69 MMHG | WEIGHT: 116.5 LBS | BODY MASS INDEX: 21.31 KG/M2 | HEART RATE: 75 BPM | OXYGEN SATURATION: 98 % | SYSTOLIC BLOOD PRESSURE: 113 MMHG

## 2023-04-20 DIAGNOSIS — D72.18 EOSINOPHILIC GRANULOMATOSIS WITH POLYANGIITIS (EGPA) WITH LUNG INVOLVEMENT (H): Primary | ICD-10-CM

## 2023-04-20 DIAGNOSIS — M30.1 EOSINOPHILIC GRANULOMATOSIS WITH POLYANGIITIS (EGPA) WITH LUNG INVOLVEMENT (H): Primary | ICD-10-CM

## 2023-04-20 PROCEDURE — 99214 OFFICE O/P EST MOD 30 MIN: CPT | Performed by: INTERNAL MEDICINE

## 2023-04-20 RX ORDER — PREDNISONE 1 MG/1
TABLET ORAL
Qty: 300 TABLET | Refills: 3 | Status: SHIPPED | OUTPATIENT
Start: 2023-04-20 | End: 2024-03-28

## 2023-04-20 NOTE — PATIENT INSTRUCTIONS
1. Eosinophilic granulomatous polyangiitis: Symptoms of brain fog, cognitive slowing, fatigue, diffuse myalgia remain stable or improved. Cause of subtle decline in PFTs remains unclear. I agree with continued use of low-dose prednisone, no change; chest CT and bloodwork to determine next steps.    Plan:  1.  Continue prednisone 5 mg every day  2.  Check urinalysis, creatinine, CBC with differential, inflammatory markers in blood every 6 months.  3. Echocardiogram in 4 2024.

## 2023-04-25 ENCOUNTER — LAB (OUTPATIENT)
Dept: LAB | Facility: CLINIC | Age: 53
End: 2023-04-25
Payer: COMMERCIAL

## 2023-04-25 DIAGNOSIS — M30.1 EOSINOPHILIC GRANULOMATOSIS WITH POLYANGIITIS (EGPA) WITH LUNG INVOLVEMENT (H): ICD-10-CM

## 2023-04-25 DIAGNOSIS — D72.18 EOSINOPHILIC GRANULOMATOSIS WITH POLYANGIITIS (EGPA) WITH LUNG INVOLVEMENT (H): ICD-10-CM

## 2023-04-25 LAB
ALBUMIN SERPL BCG-MCNC: 4.6 G/DL (ref 3.5–5.2)
ALBUMIN UR-MCNC: NEGATIVE MG/DL
ALP SERPL-CCNC: 51 U/L (ref 35–104)
ALT SERPL W P-5'-P-CCNC: 11 U/L (ref 10–35)
ANION GAP SERPL CALCULATED.3IONS-SCNC: 13 MMOL/L (ref 7–15)
APPEARANCE UR: CLEAR
AST SERPL W P-5'-P-CCNC: 20 U/L (ref 10–35)
BACTERIA #/AREA URNS HPF: ABNORMAL /HPF
BASOPHILS # BLD AUTO: 0.1 10E3/UL (ref 0–0.2)
BASOPHILS NFR BLD AUTO: 1 %
BILIRUB SERPL-MCNC: <0.2 MG/DL
BILIRUB UR QL STRIP: NEGATIVE
BUN SERPL-MCNC: 11.2 MG/DL (ref 6–20)
CALCIUM SERPL-MCNC: 9.8 MG/DL (ref 8.6–10)
CHLORIDE SERPL-SCNC: 104 MMOL/L (ref 98–107)
COLOR UR AUTO: YELLOW
CREAT SERPL-MCNC: 0.87 MG/DL (ref 0.51–0.95)
CRP SERPL-MCNC: <3 MG/L
DEPRECATED HCO3 PLAS-SCNC: 25 MMOL/L (ref 22–29)
EOSINOPHIL # BLD AUTO: 0.4 10E3/UL (ref 0–0.7)
EOSINOPHIL NFR BLD AUTO: 4 %
ERYTHROCYTE [DISTWIDTH] IN BLOOD BY AUTOMATED COUNT: 13.7 % (ref 10–15)
ERYTHROCYTE [SEDIMENTATION RATE] IN BLOOD BY WESTERGREN METHOD: 8 MM/HR (ref 0–30)
GFR SERPL CREATININE-BSD FRML MDRD: 81 ML/MIN/1.73M2
GLUCOSE SERPL-MCNC: 92 MG/DL (ref 70–99)
GLUCOSE UR STRIP-MCNC: NEGATIVE MG/DL
HCT VFR BLD AUTO: 38 % (ref 35–47)
HGB BLD-MCNC: 12.8 G/DL (ref 11.7–15.7)
HGB UR QL STRIP: NEGATIVE
IMM GRANULOCYTES # BLD: 0 10E3/UL
IMM GRANULOCYTES NFR BLD: 0 %
KETONES UR STRIP-MCNC: NEGATIVE MG/DL
LEUKOCYTE ESTERASE UR QL STRIP: NEGATIVE
LYMPHOCYTES # BLD AUTO: 1.3 10E3/UL (ref 0.8–5.3)
LYMPHOCYTES NFR BLD AUTO: 14 %
MCH RBC QN AUTO: 33.2 PG (ref 26.5–33)
MCHC RBC AUTO-ENTMCNC: 33.7 G/DL (ref 31.5–36.5)
MCV RBC AUTO: 99 FL (ref 78–100)
MONOCYTES # BLD AUTO: 0.6 10E3/UL (ref 0–1.3)
MONOCYTES NFR BLD AUTO: 7 %
NEUTROPHILS # BLD AUTO: 7.1 10E3/UL (ref 1.6–8.3)
NEUTROPHILS NFR BLD AUTO: 75 %
NITRATE UR QL: NEGATIVE
PH UR STRIP: 7 [PH] (ref 5–7)
PLATELET # BLD AUTO: 391 10E3/UL (ref 150–450)
POTASSIUM SERPL-SCNC: 4 MMOL/L (ref 3.4–5.3)
PROT SERPL-MCNC: 6.9 G/DL (ref 6.4–8.3)
RBC # BLD AUTO: 3.85 10E6/UL (ref 3.8–5.2)
RBC #/AREA URNS AUTO: ABNORMAL /HPF
SODIUM SERPL-SCNC: 142 MMOL/L (ref 136–145)
SP GR UR STRIP: 1.01 (ref 1–1.03)
SQUAMOUS #/AREA URNS AUTO: ABNORMAL /LPF
UROBILINOGEN UR STRIP-ACNC: 0.2 E.U./DL
WBC # BLD AUTO: 9.4 10E3/UL (ref 4–11)
WBC #/AREA URNS AUTO: ABNORMAL /HPF

## 2023-04-25 PROCEDURE — 36415 COLL VENOUS BLD VENIPUNCTURE: CPT

## 2023-04-25 PROCEDURE — 81001 URINALYSIS AUTO W/SCOPE: CPT

## 2023-04-25 PROCEDURE — 85025 COMPLETE CBC W/AUTO DIFF WBC: CPT

## 2023-04-25 PROCEDURE — 85652 RBC SED RATE AUTOMATED: CPT

## 2023-04-25 PROCEDURE — 80053 COMPREHEN METABOLIC PANEL: CPT

## 2023-04-25 PROCEDURE — 86140 C-REACTIVE PROTEIN: CPT

## 2023-04-26 ENCOUNTER — HOSPITAL ENCOUNTER (OUTPATIENT)
Dept: CT IMAGING | Facility: CLINIC | Age: 53
Discharge: HOME | End: 2023-04-26
Attending: INTERNAL MEDICINE | Admitting: INTERNAL MEDICINE
Payer: COMMERCIAL

## 2023-04-26 DIAGNOSIS — D72.18 CHURG-STRAUSS SYNDROME (H): ICD-10-CM

## 2023-04-26 DIAGNOSIS — M30.1 CHURG-STRAUSS SYNDROME (H): ICD-10-CM

## 2023-04-26 PROCEDURE — 71250 CT THORAX DX C-: CPT

## 2023-05-01 ENCOUNTER — DOCUMENTATION ONLY (OUTPATIENT)
Dept: PULMONOLOGY | Facility: CLINIC | Age: 53
End: 2023-05-01
Payer: COMMERCIAL

## 2023-05-08 DIAGNOSIS — J47.9 BRONCHIECTASIS WITHOUT COMPLICATION (H): ICD-10-CM

## 2023-05-08 RX ORDER — ALBUTEROL SULFATE 0.83 MG/ML
SOLUTION RESPIRATORY (INHALATION)
Qty: 150 ML | Refills: 11 | Status: SHIPPED | OUTPATIENT
Start: 2023-05-08 | End: 2024-05-13

## 2023-06-06 ENCOUNTER — TELEPHONE (OUTPATIENT)
Dept: OTHER | Facility: CLINIC | Age: 53
End: 2023-06-06
Payer: COMMERCIAL

## 2023-06-06 NOTE — TELEPHONE ENCOUNTER
Pt referred to [facility] by SUNNY Soriano for personal history of thrombophlebitis.    Pt needs to be scheduled for NEW VASCULAR PATIENT consult with any Vascular Medicine provider.  Will route to scheduling to coordinate an appointment at next available.    Appt note: Ref by SUNNY Soriano for personal history of thrombophlebitis; notes in Epic.    ALFREDA PerezN, RN-[facility]

## 2023-06-08 ENCOUNTER — OFFICE VISIT (OUTPATIENT)
Dept: OTHER | Facility: CLINIC | Age: 53
End: 2023-06-08
Attending: PHYSICIAN ASSISTANT
Payer: COMMERCIAL

## 2023-06-08 VITALS
WEIGHT: 111.4 LBS | BODY MASS INDEX: 20.5 KG/M2 | DIASTOLIC BLOOD PRESSURE: 88 MMHG | OXYGEN SATURATION: 99 % | HEIGHT: 62 IN | SYSTOLIC BLOOD PRESSURE: 139 MMHG | HEART RATE: 79 BPM

## 2023-06-08 DIAGNOSIS — Z86.72 PERSONAL HISTORY OF THROMBOPHLEBITIS: ICD-10-CM

## 2023-06-08 DIAGNOSIS — M30.1 CHURG-STRAUSS SYNDROME (H): Primary | ICD-10-CM

## 2023-06-08 DIAGNOSIS — D72.18 CHURG-STRAUSS SYNDROME (H): Primary | ICD-10-CM

## 2023-06-08 PROCEDURE — G0463 HOSPITAL OUTPT CLINIC VISIT: HCPCS

## 2023-06-08 PROCEDURE — 99205 OFFICE O/P NEW HI 60 MIN: CPT | Performed by: PHYSICIAN ASSISTANT

## 2023-06-08 NOTE — PROGRESS NOTES
"Patient is here to discuss consult    /82 (BP Location: Right arm, Patient Position: Chair, Cuff Size: Adult Small)   Pulse 82   Ht 5' 2\" (1.575 m)   Wt 111 lb 6.4 oz (50.5 kg)   LMP 02/01/2009 (LMP Unknown)   SpO2 99%   BMI 20.38 kg/m      Questions patient would like addressed today are: N/A.    Refills are needed: No    Has homecare services and agency name:  Hannah BARRETT    "

## 2023-06-08 NOTE — PROGRESS NOTES
[facility] INITIAL VASCULAR MEDICINE CONSULT      PRIMARY HEALTH CARE PROVIDER:  Abril Burk      REFERRING HEALTH CARE PROVIDER;  No ref. 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 She is followed closely by Rheumatology and maintained on Prednisone 5 mg daily. She has had flares of vasculitis rash on her hands and feet in the past, but the rash is more petechiae and very different than her present symptoms on her feet. Over the past few years she would rarely get a pain in her hand/finger, followed by an inflamed superficial vein, and then a massive bruise. Now, since seeing her Rheumatologist last, she developed an enlarged vein on the arch of her right foot followed by bruising and then the next week a pain in her 3rd toe on the left followed by an inflamed vein and bruising on the dorsum of her foot. She denies any trauma to her feet. She is very concerned about the increased frequency of these lesions. Her platelet count a few months ago was normal as well as ESR, CRP, and CMP.        PAST MEDICAL HISTORY  Past Medical History:   Diagnosis Date     Anxiety      Aortitis (H)      Asthma     associated with Churg Antonina syndrome     Bronchiectasis (H)      Cerebral infarction (H) 08/1990    Very small, caused small permanent optical migraine     Chronic sinusitis      Churg-Antonina syndrome (H)     dx 1990     Conductive hearing loss      Depressive disorder      Eustachian tube dysfunction      Goiter      Hearing loss, conductive      Heart rate problem      Hypertension      Hypocalcemia      Immunosuppression (H)      Infection due to 2019 novel coronavirus 04/2022 Feb 2020, April 20211, June 2022     Irritable bowel syndrome      Major depressive disorder      Mannose-binding lectin deficiency 2014     Nonspecific  abnormal results of thyroid function study      Osteoporosis      Pericarditis     1990 and 1992     Pneumonia     4/23/10     Recurrent otitis media      Recurrent UTI      Rheumatoid vasculitis (H)      Sinusitis, chronic      Steroid long-term use      Tinnitus      Varicose veins of leg with swelling        CURRENT MEDICATIONS  acetylcysteine (MUCOMYST) 20 % neb solution, NEBULIZE THE CONTENTS OF ONE VIAL (4 ML) TWO TIMES A DAY  albuterol (PROAIR HFA/PROVENTIL HFA/VENTOLIN HFA) 108 (90 Base) MCG/ACT inhaler, INHALE 2 PUFFS INTO THE LUNGS EVERY 4 HOURS AS NEEDED FOR SHORTNESS OF BREATH/WHEEZE  albuterol (PROVENTIL) (2.5 MG/3ML) 0.083% neb solution, TAKE 1 VIAL VIA NEBULIZER TWICE A DAY.  ciprofloxacin-dexamethasone (CIPRODEX) 0.3-0.1 % otic suspension, INSTILL 3 DROPS INTO AFFECTED EARS 2 TIMES DAILY AS NEEDED  fluticasone (FLONASE) 50 MCG/ACT nasal spray, Spray 2 sprays into both nostrils daily  metoprolol succinate ER (TOPROL-XL) 25 MG 24 hr tablet, TAKE 1/2 TABLET(12.5 MG) BY MOUTH DAILY. HOLD IF BP IS LOW (Patient taking differently: Take 12.5 mg by mouth daily as needed (depending on daily BP check))  NONFORMULARY, Take 20 mg by mouth daily lithium orotate--used for anxiety  Omega-3 Fatty Acids (FISH OIL) 1200 MG capsule, Take 4 capsules (4.8 g) by mouth daily  predniSONE (DELTASONE) 1 MG tablet, Take 5 tabs daily for 30 days  Probiotic Product (PROBIOTIC DAILY PO), Take 1 capsule by mouth daily  SYMBICORT 160-4.5 MCG/ACT Inhaler, TAKE 2 PUFFS BY MOUTH TWICE A DAY  tiZANidine (ZANAFLEX) 2 MG tablet, TAKE 1-2 TABLETS BY MOUTH 3 TIMES DAILY AS NEEDED FOR MUSCLE SPASMS  vitamin D3 (CHOLECALCIFEROL) 2000 units (50 mcg) tablet, Take 2 tablets by mouth daily  VITAMIN K PO, Take 1 capsule by mouth daily  predniSONE (DELTASONE) 5 MG tablet, Take 1 tablet (5 mg) by mouth daily (Patient not taking: Reported on 6/8/2023)    No current facility-administered medications on file prior to visit.      PAST SURGICAL  HISTORY:  Past Surgical History:   Procedure Laterality Date     C/SECTION, CLASSICAL  1998      SECTION       COLONOSCOPY Left 2014    Procedure: COMBINED COLONOSCOPY, SINGLE OR MULTIPLE BIOPSY/POLYPECTOMY BY BIOPSY;  Surgeon: Js Pinedo MD;  Location:  GI     COLONOSCOPY N/A 2019    Procedure: COLONOSCOPY;  Surgeon: Bryce Pimentel MD;  Location:  GI     ENT SURGERY       ESOPHAGOSCOPY, GASTROSCOPY, DUODENOSCOPY (EGD), COMBINED N/A 2023    Procedure: ESOPHAGOGASTRODUODENOSCOPY, WITH BIOPSY;  Surgeon: Mo Rosa MD;  Location:  GI     ESOPHAGOSCOPY, GASTROSCOPY, DUODENOSCOPY (EGD), DILATATION, COMBINED N/A 2023    Procedure: ESOPHAGOGASTRODUODENOSCOPY, WITH DILATION;  Surgeon: Mo Rosa MD;  Location:  GI     GENITOURINARY SURGERY       HEAD & NECK SURGERY       HYSTERECTOMY  2009    without oopherectomy     HYSTERECTOMY, PAP NO LONGER INDICATED      cervix removed      LAPAROSCOPIC APPENDECTOMY N/A 2022    Procedure: APPENDECTOMY, LAPAROSCOPIC;  Surgeon: Fany Hannah MD;  Location: [facility] OR     PE TUBES       PICC INSERTION  10/10/2013    5fr DL PASV PICC, 43cm (1cm external) in the L basilic vein w/ tip in the low SVC.     PICC INSERTION Left 2017    5fr DL BioFlo PICC, 42cm (3cm external) in the L lateral brachial vein w/ tip in the SVC RA junction.     SINUS SURGERY       TUBAL LIGATION       TYMPANOPLASTY       ZZHC COLP CERVIX/UPPER VAGINA W LOOP ELEC BX CERVIX         ALLERGIES     Allergies   Allergen Reactions     Colistimethate Anaphylaxis     Colymycin M [Colistimethate Sodium] Anaphylaxis     Tamiflu [Oseltamivir]      Gums Blister up     Azithromycin Palpitations     Heart palpitation  Heart palpitation     Clindamycin Rash       FAMILY HISTORY  Family History   Problem Relation Age of Onset     Allergies Mother         Seasonal     Alcohol/Drug Mother      Substance Abuse Mother       Depression Mother      C.A.D. Father      Diabetes Father         Type 2     Depression Father      Heart Disease Father      Breast Cancer Maternal Grandmother      C.A.D. Maternal Grandmother      Arthritis Maternal Grandmother      Musculoskeletal Disorder Maternal Grandmother         MS     Heart Disease Maternal Grandmother      Hypertension Maternal Grandmother      Alcohol/Drug Maternal Grandfather      Substance Abuse Maternal Grandfather      Depression Maternal Grandfather      Unknown/Adopted Paternal Grandmother      Unknown/Adopted Paternal Grandfather      Asthma Daughter      Allergies Daughter         Seasonal     Asthma Son      Cancer Maternal Aunt         breast age 53     Breast Cancer Maternal Aunt         in her 50s     Glaucoma No family hx of      Macular Degeneration No family hx of      Colon Cancer No family hx of        SOCIAL HISTORY  Social History     Socioeconomic History     Marital status:      Spouse name: Not on file     Number of children: Not on file     Years of education: Not on file     Highest education level: Not on file   Occupational History     Not on file   Tobacco Use     Smoking status: Never     Passive exposure: Never     Smokeless tobacco: Never   Vaping Use     Vaping status: Never Used     Passive vaping exposure: Yes   Substance and Sexual Activity     Alcohol use: Not Currently     Drug use: No   Social History Narrative     in Radiology Department.  .  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  Non smoker. No smokers at home.  Enjoys walking and dancing.  Alcohol--1 to 2 drinks daily.  Few times a year has more than 4 drinks in a day.  No illicits.           ROS:   General: No change in weight, sleep or appetite.  Normal energy.  No fever or chills  Eyes: Negative for vision changes or eye problems  ENT: No problems with ears, nose or throat.  No difficulty swallowing.  Resp: No coughing, wheezing or shortness of breath  CV: No  "chest pains or palpitations  GI: No nausea, vomiting,  heartburn, abdominal pain, diarrhea, constipation or change in bowel habits  : No urinary frequency or dysuria, bladder or kidney problems  Musculoskeletal: No significant muscle or joint pains  Neurologic: No headaches, numbness, tingling, weakness, problems with balance or coordination  Psychiatric: No problems with anxiety, depression or mental health  Heme/immune/allergy: No history of bleeding or clotting problems or anemia.  No allergies or immune system problems  Endocrine: No history of thyroid disease, diabetes or other endocrine disorders  Skin: No rashes,worrisome lesions or skin problems  Vascular:  No claudication, lifestyle limiting or otherwise; no ischemic rest pain; no non-healing ulcers. No weakness, No loss of sensation     EXAM:  /88 (BP Location: Left arm, Patient Position: Chair, Cuff Size: Adult Small)   Pulse 79   Ht 5' 2\" (1.575 m)   Wt 111 lb 6.4 oz (50.5 kg)   LMP 02/01/2009 (LMP Unknown)   SpO2 99%   BMI 20.38 kg/m    In general, the patient is a pleasant female in no apparent distress.    HEENT: NC/AT.  PERRLA.  EOMI.  Sclerae white, not injected  Dentition intact.    Heart: RRR. Normal S1, S2 splits physiologically.   Lungs: CTA.  No ronchi, wheezes, rales.    Abdomen: Soft, nontender, nondistended.   Extremities: No clubbing, cyanosis, or edema. Palpable pedal pulses. She has livido reticularis as well as some spider veins. No wounds. She has bruising around her 3rd/4th toes on left foot.       Labs:  LIPID RESULTS:  Lab Results   Component Value Date    CHOL 125 10/29/2020    HDL 83 10/29/2020    LDL 34 10/29/2020    TRIG 40 10/29/2020    CHOLHDLRATIO 2.1 01/11/2012       LIVER ENZYME RESULTS:  Lab Results   Component Value Date    AST 20 04/25/2023    AST 21 06/15/2021    ALT 11 04/25/2023    ALT 28 06/15/2021       CBC RESULTS:  Lab Results   Component Value Date    WBC 9.4 04/25/2023    WBC 8.1 06/15/2021    RBC " 3.85 04/25/2023    RBC 3.78 (L) 06/15/2021    HGB 12.8 04/25/2023    HGB 12.8 06/15/2021    HCT 38.0 04/25/2023    HCT 38.3 06/15/2021    MCV 99 04/25/2023     (H) 06/15/2021    MCH 33.2 (H) 04/25/2023    MCH 33.9 (H) 06/15/2021    MCHC 33.7 04/25/2023    MCHC 33.4 06/15/2021    RDW 13.7 04/25/2023    RDW 12.5 06/15/2021     04/25/2023     06/15/2021       BMP RESULTS:  Lab Results   Component Value Date     04/25/2023     10/20/2020    POTASSIUM 4.0 04/25/2023    POTASSIUM 4.3 06/23/2022    POTASSIUM 3.5 10/20/2020    CHLORIDE 104 04/25/2023    CHLORIDE 109 06/23/2022    CHLORIDE 106 10/20/2020    CO2 25 04/25/2023    CO2 25 06/23/2022    CO2 27 10/20/2020    ANIONGAP 13 04/25/2023    ANIONGAP 3 06/23/2022    ANIONGAP 6 10/20/2020    GLC 92 04/25/2023     (H) 06/24/2022     (H) 06/23/2022    GLC 91 10/20/2020    BUN 11.2 04/25/2023    BUN 8 06/23/2022    BUN 8 10/20/2020    CR 0.87 04/25/2023    CR 0.79 06/15/2021    GFRESTIMATED 81 04/25/2023    GFRESTIMATED 88 06/15/2021    GFRESTBLACK >90 06/15/2021    BOSSMAN 9.8 04/25/2023    BOSSMAN 9.0 06/15/2021        A1C RESULTS:  Lab Results   Component Value Date    A1C 5.2 10/02/2019       THYROID RESULTS:  Lab Results   Component Value Date    TSH 0.66 06/15/2021       Procedures:   None      Assessment and Plan:   Recurrent inflammation of distal small veins followed by bruising in 51 yo female with underlying Eosinophilic granulomatosis with polyangiitis     -Her EGPA has been stable as of late.   -These sore/bruised spots are different than her vasculitic rashes she has had.   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  -CBC with diff to recheck platelets, etc  -CMP  -ESR, CRP, IgE, ANCA, C3, C4    Will also obtain venous competency study to further evaluate her veins as these lesions  seem to start with inflammation of a distal vein.     Will have her follow-up when all testing is completed. Advised that she also [place] back with Rheumatology again to inform them of these bruises/inflammed and painful veins.        Discussed patient, exam findings, and plan with Dr. Molina and Dr. Hamilton of Vascular Medicine.     Alanna Stephenson PA-C      60 minutes spent on the date of the encounter doing chart review, history and exam, documentation, and further activities as noted above.

## 2023-06-12 NOTE — TELEPHONE ENCOUNTER
FUTURE VISIT INFORMATION:      FUTURE VISIT INFORMATION:    Date: 8/15/23    Time: 10:15 AM    [place] [facility] [place] INFORMATION:    Referring provider:     Referring providers clinic:     Reason for visit/diagnosis:  Other chronic nonsuppurative otitis media of both ears    RECORDS REQUESTED FROM:       Clinic name Comments Records Status Imaging Status   [facility] Medicine 4/17/23 OV with Merlin Haro MD Epic    Imaging MR brain + MRA brain 2/18/19  CT temporal 1/22/19 [facility] Pacs   Procedure 1/10/2008 1) Eustachian tuboplasty. 2) Tympanomastoidectomy with facial nerve monitoring with Dr Jerald Capone MD [facility] 1/22/19 OV with Nissen, Rick L, MD  1/22/19 Audiogram    11/16/18 notes with Dr Mejia  6/3/15 notes with Dr Arnold Escalante

## 2023-06-22 ENCOUNTER — ANCILLARY PROCEDURE (OUTPATIENT)
Dept: ULTRASOUND IMAGING | Facility: CLINIC | Age: 53
End: 2023-06-22
Attending: PHYSICIAN ASSISTANT
Payer: COMMERCIAL

## 2023-06-22 ENCOUNTER — LAB (OUTPATIENT)
Dept: LAB | Facility: CLINIC | Age: 53
End: 2023-06-22
Attending: PHYSICIAN ASSISTANT
Payer: COMMERCIAL

## 2023-06-22 DIAGNOSIS — M30.1 CHURG-STRAUSS SYNDROME (H): ICD-10-CM

## 2023-06-22 DIAGNOSIS — Z86.72 PERSONAL HISTORY OF THROMBOPHLEBITIS: ICD-10-CM

## 2023-06-22 DIAGNOSIS — D72.18 CHURG-STRAUSS SYNDROME (H): ICD-10-CM

## 2023-06-22 LAB
BASOPHILS # BLD AUTO: 0.1 10E3/UL (ref 0–0.2)
BASOPHILS NFR BLD AUTO: 1 %
CRP SERPL-MCNC: <3 MG/L
EOSINOPHIL # BLD AUTO: 0.3 10E3/UL (ref 0–0.7)
EOSINOPHIL NFR BLD AUTO: 3 %
ERYTHROCYTE [DISTWIDTH] IN BLOOD BY AUTOMATED COUNT: 13.1 % (ref 10–15)
ERYTHROCYTE [SEDIMENTATION RATE] IN BLOOD BY WESTERGREN METHOD: 8 MM/HR (ref 0–30)
HCT VFR BLD AUTO: 37.8 % (ref 35–47)
HGB BLD-MCNC: 12.3 G/DL (ref 11.7–15.7)
IMM GRANULOCYTES # BLD: 0 10E3/UL
IMM GRANULOCYTES NFR BLD: 0 %
LYMPHOCYTES # BLD AUTO: 1.2 10E3/UL (ref 0.8–5.3)
LYMPHOCYTES NFR BLD AUTO: 13 %
MCH RBC QN AUTO: 32.9 PG (ref 26.5–33)
MCHC RBC AUTO-ENTMCNC: 32.5 G/DL (ref 31.5–36.5)
MCV RBC AUTO: 101 FL (ref 78–100)
MONOCYTES # BLD AUTO: 0.4 10E3/UL (ref 0–1.3)
MONOCYTES NFR BLD AUTO: 5 %
NEUTROPHILS # BLD AUTO: 7.2 10E3/UL (ref 1.6–8.3)
NEUTROPHILS NFR BLD AUTO: 78 %
PLATELET # BLD AUTO: 321 10E3/UL (ref 150–450)
RBC # BLD AUTO: 3.74 10E6/UL (ref 3.8–5.2)
WBC # BLD AUTO: 9.2 10E3/UL (ref 4–11)

## 2023-06-22 PROCEDURE — 85613 RUSSELL VIPER VENOM DILUTED: CPT

## 2023-06-22 PROCEDURE — 86160 COMPLEMENT ANTIGEN: CPT

## 2023-06-22 PROCEDURE — 93970 EXTREMITY STUDY: CPT | Performed by: SURGERY

## 2023-06-22 PROCEDURE — 86160 COMPLEMENT ANTIGEN: CPT | Mod: 59

## 2023-06-22 PROCEDURE — 36415 COLL VENOUS BLD VENIPUNCTURE: CPT

## 2023-06-22 PROCEDURE — 85730 THROMBOPLASTIN TIME PARTIAL: CPT

## 2023-06-22 PROCEDURE — 86036 ANCA SCREEN EACH ANTIBODY: CPT

## 2023-06-22 PROCEDURE — 86037 ANCA TITER EACH ANTIBODY: CPT

## 2023-06-22 PROCEDURE — 85652 RBC SED RATE AUTOMATED: CPT

## 2023-06-22 PROCEDURE — 85025 COMPLETE CBC W/AUTO DIFF WBC: CPT

## 2023-06-22 PROCEDURE — 86146 BETA-2 GLYCOPROTEIN ANTIBODY: CPT

## 2023-06-22 PROCEDURE — 86140 C-REACTIVE PROTEIN: CPT

## 2023-06-22 PROCEDURE — 86147 CARDIOLIPIN ANTIBODY EA IG: CPT

## 2023-06-22 PROCEDURE — 82785 ASSAY OF IGE: CPT

## 2023-06-22 PROCEDURE — 85390 FIBRINOLYSINS SCREEN I&R: CPT | Performed by: PATHOLOGY

## 2023-06-23 LAB
ANCA AB PATTERN SER IF-IMP: NORMAL
C-ANCA TITR SER IF: NORMAL {TITER}
C3 SERPL-MCNC: 70 MG/DL (ref 81–157)
C4 SERPL-MCNC: 15 MG/DL (ref 13–39)
DRVVT SCREEN RATIO: 0.75
IGE SERPL-ACNC: 40 KU/L (ref 0–114)
INR PPP: 1.1 (ref 0.85–1.15)
LA PPP-IMP: NEGATIVE
LUPUS INTERPRETATION: NORMAL
PTT RATIO: 1.14
THROMBIN TIME: 16.8 SECONDS (ref 13–19)

## 2023-06-25 LAB
B2 GLYCOPROT1 IGG SERPL IA-ACNC: <0.8 U/ML
B2 GLYCOPROT1 IGM SERPL IA-ACNC: <2.4 U/ML
CARDIOLIPIN IGG SER IA-ACNC: <2 GPL-U/ML
CARDIOLIPIN IGG SER IA-ACNC: NEGATIVE
CARDIOLIPIN IGM SER IA-ACNC: 3 MPL-U/ML
CARDIOLIPIN IGM SER IA-ACNC: NEGATIVE

## 2023-06-28 ENCOUNTER — VIRTUAL VISIT (OUTPATIENT)
Dept: OTHER | Facility: CLINIC | Age: 53
End: 2023-06-28
Attending: PHYSICIAN ASSISTANT
Payer: COMMERCIAL

## 2023-06-28 DIAGNOSIS — Z86.72 PERSONAL HISTORY OF THROMBOPHLEBITIS: ICD-10-CM

## 2023-06-28 PROCEDURE — 99213 OFFICE O/P EST LOW 20 MIN: CPT | Mod: GT | Performed by: PHYSICIAN ASSISTANT

## 2023-06-28 NOTE — PROGRESS NOTES
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 She is followed closely by Rheumatology and maintained on Prednisone 5 mg daily. She has had flares of vasculitis rash on her hands and feet in the past, but the rash is more petechiae and very different than her present symptoms on her feet. Over the past few years she would rarely get a pain in her hand/finger, followed by an inflamed superficial vein, and then a massive bruise. Now, since seeing her Rheumatologist last, she developed an enlarged vein on the arch of her right foot followed by bruising and then the next week a pain in her 3rd toe on the left followed by an inflamed vein and bruising on the dorsum of her foot. She denies any trauma to her feet. She is very concerned about the increased frequency of these lesions. Her platelet count a few months ago was normal as well as ESR, CRP, and CMP.    She underwent venous competency to evaluate her deep and superficial veins better given her report of inflammation of veins followed by bruising. She also had several labs undertaken for further work-up. She presents now to review those results. She states all the lesions she had previously are now resolved.       PAST MEDICAL HISTORY  Past Medical History:   Diagnosis Date     Anxiety      Aortitis (H)      Asthma     associated with Churg Antonina syndrome     Bronchiectasis (H)      Cerebral infarction (H) 08/1990    Very small, caused small permanent optical migraine     Chronic sinusitis      Churg-Antonina syndrome (H)     dx 1990     Conductive hearing loss      Depressive disorder      Eustachian tube dysfunction      Goiter      Hearing loss, conductive      Heart rate problem      Hypertension       Hypocalcemia      Immunosuppression (H)      Infection due to 2019 novel coronavirus 2022, , 2022     Irritable bowel syndrome      Major depressive disorder      Mannose-binding lectin deficiency      Nonspecific abnormal results of thyroid function study      Osteoporosis      Pericarditis      and      Pneumonia     4/23/10     Recurrent otitis media      Recurrent UTI      Rheumatoid vasculitis (H)      Sinusitis, chronic      Steroid long-term use      Tinnitus      Varicose veins of leg with swelling        PAST SURGICAL HISTORY:  Past Surgical History:   Procedure Laterality Date     C/SECTION, CLASSICAL        SECTION       COLONOSCOPY Left 2014    Procedure: COMBINED COLONOSCOPY, SINGLE OR MULTIPLE BIOPSY/POLYPECTOMY BY BIOPSY;  Surgeon: Js Pinedo MD;  Location:  GI     COLONOSCOPY N/A 2019    Procedure: COLONOSCOPY;  Surgeon: Bryce Pimentel MD;  Location:  GI     ENT SURGERY       ESOPHAGOSCOPY, GASTROSCOPY, DUODENOSCOPY (EGD), COMBINED N/A 2023    Procedure: ESOPHAGOGASTRODUODENOSCOPY, WITH BIOPSY;  Surgeon: Mo Rosa MD;  Location:  GI     ESOPHAGOSCOPY, GASTROSCOPY, DUODENOSCOPY (EGD), DILATATION, COMBINED N/A 2023    Procedure: ESOPHAGOGASTRODUODENOSCOPY, WITH DILATION;  Surgeon: Mo Rosa MD;  Location:  GI     GENITOURINARY SURGERY       HEAD & NECK SURGERY       HYSTERECTOMY  2009    without oopherectomy     HYSTERECTOMY, PAP NO LONGER INDICATED      cervix removed      LAPAROSCOPIC APPENDECTOMY N/A 2022    Procedure: APPENDECTOMY, LAPAROSCOPIC;  Surgeon: Fany Hannah MD;  Location: [facility] OR     PE TUBES       PICC INSERTION  10/10/2013    5fr DL PASV PICC, 43cm (1cm external) in the L basilic vein w/ tip in the low SVC.     PICC INSERTION Left 2017    5fr DL BioFlo PICC, 42cm (3cm external) in the L lateral brachial vein w/ tip in the  SVC RA junction.     SINUS SURGERY       TUBAL LIGATION       TYMPANOPLASTY       ZZHC COLP CERVIX/UPPER VAGINA W LOOP ELEC BX CERVIX           CURRENT MEDICATIONS  acetylcysteine (MUCOMYST) 20 % neb solution, NEBULIZE THE CONTENTS OF ONE VIAL (4 ML) TWO TIMES A DAY  albuterol (PROAIR HFA/PROVENTIL HFA/VENTOLIN HFA) 108 (90 Base) MCG/ACT inhaler, INHALE 2 PUFFS INTO THE LUNGS EVERY 4 HOURS AS NEEDED FOR SHORTNESS OF BREATH/WHEEZE  albuterol (PROVENTIL) (2.5 MG/3ML) 0.083% neb solution, TAKE 1 VIAL VIA NEBULIZER TWICE A DAY.  ciprofloxacin-dexamethasone (CIPRODEX) 0.3-0.1 % otic suspension, INSTILL 3 DROPS INTO AFFECTED EARS 2 TIMES DAILY AS NEEDED  fluticasone (FLONASE) 50 MCG/ACT nasal spray, Spray 2 sprays into both nostrils daily  metoprolol succinate ER (TOPROL-XL) 25 MG 24 hr tablet, TAKE 1/2 TABLET(12.5 MG) BY MOUTH DAILY. HOLD IF BP IS LOW (Patient taking differently: Take 12.5 mg by mouth daily as needed (depending on daily BP check))  NONFORMULARY, Take 20 mg by mouth daily lithium orotate--used for anxiety  Omega-3 Fatty Acids (FISH OIL) 1200 MG capsule, Take 4 capsules (4.8 g) by mouth daily  predniSONE (DELTASONE) 1 MG tablet, Take 5 tabs daily for 30 days  Probiotic Product (PROBIOTIC DAILY PO), Take 1 capsule by mouth daily  SYMBICORT 160-4.5 MCG/ACT Inhaler, TAKE 2 PUFFS BY MOUTH TWICE A DAY  tiZANidine (ZANAFLEX) 2 MG tablet, TAKE 1-2 TABLETS BY MOUTH 3 TIMES DAILY AS NEEDED FOR MUSCLE SPASMS  vitamin D3 (CHOLECALCIFEROL) 2000 units (50 mcg) tablet, Take 2 tablets by mouth daily  VITAMIN K PO, Take 1 capsule by mouth daily  predniSONE (DELTASONE) 5 MG tablet, Take 1 tablet (5 mg) by mouth daily (Patient not taking: Reported on 6/8/2023)    No current facility-administered medications on file prior to visit.      ALLERGIES     Allergies   Allergen Reactions     Colistimethate Anaphylaxis     Colymycin M [Colistimethate Sodium] Anaphylaxis     Tamiflu [Oseltamivir]      Gums Blister up     Azithromycin  Palpitations     Heart palpitation  Heart palpitation     Clindamycin Rash       FAMILY HISTORY  Family History   Problem Relation Age of Onset     Allergies Mother         Seasonal     Alcohol/Drug Mother      Substance Abuse Mother      Depression Mother      C.A.D. Father      Diabetes Father         Type 2     Depression Father      Heart Disease Father      Breast Cancer Maternal Grandmother      C.A.D. Maternal Grandmother      Arthritis Maternal Grandmother      Musculoskeletal Disorder Maternal Grandmother         MS     Heart Disease Maternal Grandmother      Hypertension Maternal Grandmother      Alcohol/Drug Maternal Grandfather      Substance Abuse Maternal Grandfather      Depression Maternal Grandfather      Unknown/Adopted Paternal Grandmother      Unknown/Adopted Paternal Grandfather      Asthma Daughter      Allergies Daughter         Seasonal     Asthma Son      Cancer Maternal Aunt         breast age 53     Breast Cancer Maternal Aunt         in her 50s     Glaucoma No family hx of      Macular Degeneration No family hx of      Colon Cancer No family hx of        SOCIAL HISTORY  Social History     Socioeconomic History     Marital status:      Spouse name: Not on file     Number of children: Not on file     Years of education: Not on file     Highest education level: Not on file   Occupational History     Not on file   Tobacco Use     Smoking status: Never     Passive exposure: Never     Smokeless tobacco: Never   Vaping Use     Vaping Use: Never used   Substance and Sexual Activity     Alcohol use: Not Currently     Drug use: No   Social History Narrative    [employer] in [employer] Department.  .  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  Non smoker. No smokers at home.  Enjoys walking and dancing.  Alcohol--1 to 2 drinks daily.  Few times a year has more than 4 drinks in a day.  No illicits.       ROS:   Complete ROS negative other than what is stated in HPI.     EXAM:  LMP  02/01/2009 (LMP Unknown)   In general, the patient is a pleasant female in no apparent distress.    Unable to do a full exam as this was a telephone visit.     Labs:  LIPID RESULTS:  Lab Results   Component Value Date    CHOL 125 10/29/2020    HDL 83 10/29/2020    LDL 34 10/29/2020    TRIG 40 10/29/2020    CHOLHDLRATIO 2.1 01/11/2012       LIVER ENZYME RESULTS:  Lab Results   Component Value Date    AST 20 04/25/2023    AST 21 06/15/2021    ALT 11 04/25/2023    ALT 28 06/15/2021       CBC RESULTS:  Lab Results   Component Value Date    WBC 9.2 06/22/2023    WBC 8.1 06/15/2021    RBC 3.74 (L) 06/22/2023    RBC 3.78 (L) 06/15/2021    HGB 12.3 06/22/2023    HGB 12.8 06/15/2021    HCT 37.8 06/22/2023    HCT 38.3 06/15/2021     (H) 06/22/2023     (H) 06/15/2021    MCH 32.9 06/22/2023    MCH 33.9 (H) 06/15/2021    MCHC 32.5 06/22/2023    MCHC 33.4 06/15/2021    RDW 13.1 06/22/2023    RDW 12.5 06/15/2021     06/22/2023     06/15/2021       BMP RESULTS:  Lab Results   Component Value Date     04/25/2023     10/20/2020    POTASSIUM 4.0 04/25/2023    POTASSIUM 4.3 06/23/2022    POTASSIUM 3.5 10/20/2020    CHLORIDE 104 04/25/2023    CHLORIDE 109 06/23/2022    CHLORIDE 106 10/20/2020    CO2 25 04/25/2023    CO2 25 06/23/2022    CO2 27 10/20/2020    ANIONGAP 13 04/25/2023    ANIONGAP 3 06/23/2022    ANIONGAP 6 10/20/2020    GLC 92 04/25/2023     (H) 06/24/2022     (H) 06/23/2022    GLC 91 10/20/2020    BUN 11.2 04/25/2023    BUN 8 06/23/2022    BUN 8 10/20/2020    CR 0.87 04/25/2023    CR 0.79 06/15/2021    GFRESTIMATED 81 04/25/2023    GFRESTIMATED 88 06/15/2021    GFRESTBLACK >90 06/15/2021    BOSSMAN 9.8 04/25/2023    BOSSMAN 9.0 06/15/2021        A1C RESULTS:  Lab Results   Component Value Date    A1C 5.2 10/02/2019       THYROID RESULTS:  Lab Results   Component Value Date    TSH 0.66 06/15/2021         Procedures:   BILATERAL LOWER EXTREMITY VENOUS DUPLEX FOR VENOUS  INSUFFICIENCY  TECHNICAL SUMMARY 6/22/23     A duplex ultrasound study using color flow was performed to evaluate the bilateral lower extremity veins for valvular incompetence with the patient in a steep reversed trendelenberg.      RIGHT:     The deep veins demonstrate phasic flow, compress, and respond to augmentations.  There is no evidence of DVT.  The proximal femoral vein is incompetent and free of thrombus. The remaining deep veins are competent and free of thrombus.      The GSV demonstrates phasic flow, compresses, and responds to augmentations from the saphenofemoral junction to the ankle with no evidence of thrombus. The greater saphenous vein measures 9.2 mm at the saphenofemoral junction, 4.0 mm in the proximal thigh, and 3.7 mm at the knee. The GSV is incompetent from distal thigh to the mid calf with the greatest reflux time of 5873 milliseconds.      The AASV is competent ( 1.5 mm) draining into the saphenofemoral junction.     The Giacomini vein is competent ( 1.0 mm) communicating with the small saphenous vein at the knee level.      The SSV demonstrates phasic flow, compresses, and responds to augmentations from the popliteal space to the ankle.  No reflux or thrombus is seen. The saphenopopliteal junction is competent (2.0 mm).      Perforators: There is no evidence of incompetent  veins at any level.     LEFT:     The deep veins demonstrate phasic flow, compress, and respond to augmentations.  There is no reflux or DVT.       The GSV demonstrates phasic flow, compresses, and responds to augmentations from the saphenofemoral junction to the ankle with no evidence of thrombus. The greater saphenous vein measures 6.9 mm at the saphenofemoral junction, 4.4 mm in the proximal thigh, and 4.7 mm at the knee. The GSV is incompetent in the proximal calf reflux time of 6417 milliseconds.       The AASV is competent ( 3.2 mm) draining into the saphenofemoral junction.     The Giacomini vein is  competent ( 0.9 mm) communicating with the small saphenous vein at the knee level.      The SSV demonstrates phasic flow, compresses, and responds to augmentations from the popliteal space to the ankle.  No reflux or thrombus is seen. The saphenopopliteal junction is absent.      Perforators: There is no evidence of incompetent  veins at any level.      FINAL SUMMARY:  Right lower extremity:  1.  No deep vein thrombosis  2.  Right proximal femoral vein incompetence  3.  Right great saphenous vein incompetence from the distal thigh to the mid calf  4.  No incompetent perforating veins  5.  Compressible vein noted an area of pain on medial foot     Left lower extremity:  1.  No deep vein thrombosis or deep vein valve incompetence  2.  The left great saphenous vein is competent except at the proximal calf  3.  No incompetent  veins    Assessment and Plan:   Recurrent inflammation of distal small veins followed by bruising in 51 yo female with underlying eosinophilic granulomatosis with polyangiitis      -Her EGPA has been stable as of late.   -These sore/bruised spots are different than her vasculitic rashes she has had in association with her EGPA.   -She does have Livido Reticularis on exam - development of APLA's was ruled out by recent labs.   -C3 is low and has been low. ? If these lesions are due to urticarial vasculitis or another rare vasculitis.  -Venous competency with some underlying venous insufficieny but no superficial thrombophlebitis and no DVT.       Recommendations:   -She presently has no bruises/lesions. Instructed her to go see Dermatology next time one arises for a biopsy for a more definitive answer on what this may be.   -Advised that she also [place] back with Rheumatology again to inform them of these bruises/inflammed and painful lesions.  -She does have some underlying venous insufficieny. She is asymptomatic in regards to this and has compression socks at home she can  use. She should wear thigh high compression (20-30 mmHg).  -Follow-up with Vascular Medicine as needed.         Alanna Stephenson PA-C      Telephone Visit Details    Start Time: 1:20 pm  End Time: 1:31 pm    Originating Location (patient location): Home  Distant Location (provider location): [facility]    This visit is being conducted as a virtual visit due to the emphasis on mitigation of the COVID-19 virus pandemic. The clinician has decided that the risk of an in-office visit outweighs the benefit for this patient.         25 minutes spent on the date of the encounter doing chart review, history and exam, documentation, and further activities as noted above.

## 2023-07-02 ENCOUNTER — HEALTH MAINTENANCE LETTER (OUTPATIENT)
Age: 53
End: 2023-07-02

## 2023-07-07 ENCOUNTER — TELEPHONE (OUTPATIENT)
Dept: OTHER | Facility: CLINIC | Age: 53
End: 2023-07-07
Payer: COMMERCIAL

## 2023-07-07 DIAGNOSIS — L98.9 SKIN LESION: Primary | ICD-10-CM

## 2023-07-07 NOTE — TELEPHONE ENCOUNTER
[facility] VASCULAR [facility] CENTER    Who is the name of the provider?:  Alanna Stephenson    What is the location you see this provider at/preferred location?: [place]  Person calling / Facility: Valencia Ye  Phone number:  [phone]  Nurse call back needed:  ?     Reason for call:  Patient seeking assistance with a biopsy for a thrombophlebitis on right great toe. Patient advised that getting a biopsy was discussed with Alanna during LOV and that it would need to be taken soon after this happens.    Pharmacy location:  n/a  Outside Imaging: n/a   Can we leave a detailed message on this number?  YES

## 2023-07-07 NOTE — TELEPHONE ENCOUNTER
I returned call to Valencia and she states she has a lesion on her right great toe and was told by Alanna to call once it came back for dermatology referral for biopsy. Referral placed.    Arlin SHERIDAN, RN    [facility]  Vascular [facility]  Office: [phone]  Fax: [phone]

## 2023-07-13 DIAGNOSIS — J47.9 BRONCHIECTASIS (H): ICD-10-CM

## 2023-07-14 RX ORDER — ALBUTEROL SULFATE 90 UG/1
AEROSOL, METERED RESPIRATORY (INHALATION)
Qty: 8.5 G | Refills: 3 | Status: SHIPPED | OUTPATIENT
Start: 2023-07-14 | End: 2024-01-15

## 2023-07-17 DIAGNOSIS — Z01.10 ENCOUNTER FOR HEARING TEST: Primary | ICD-10-CM

## 2023-08-01 ENCOUNTER — HOSPITAL ENCOUNTER (OUTPATIENT)
Dept: MAMMOGRAPHY | Facility: CLINIC | Age: 53
Discharge: HOME | End: 2023-08-01
Payer: COMMERCIAL

## 2023-08-01 DIAGNOSIS — Z12.31 VISIT FOR SCREENING MAMMOGRAM: ICD-10-CM

## 2023-08-01 PROCEDURE — 77067 SCR MAMMO BI INCL CAD: CPT

## 2023-08-02 ENCOUNTER — MYC MEDICAL ADVICE (OUTPATIENT)
Dept: INTERNAL MEDICINE | Facility: CLINIC | Age: 53
End: 2023-08-02
Payer: COMMERCIAL

## 2023-08-02 DIAGNOSIS — M30.1 CHURG-STRAUSS SYNDROME (H): ICD-10-CM

## 2023-08-02 DIAGNOSIS — G89.4 PAIN SYNDROME, CHRONIC: Primary | ICD-10-CM

## 2023-08-02 DIAGNOSIS — D72.18 CHURG-STRAUSS SYNDROME (H): ICD-10-CM

## 2023-08-02 NOTE — TELEPHONE ENCOUNTER
Please see my chart message and advise.  Thanks    Referral pended.  Please review and sign if appropriate.

## 2023-08-03 NOTE — TELEPHONE ENCOUNTER
Sent [employer] message to patient with referral info.    Sharif Wilkes, Triage RN Myah Garcia  11:49 AM 8/3/2023

## 2023-08-04 ENCOUNTER — TELEPHONE (OUTPATIENT)
Dept: INTERNAL MEDICINE | Facility: CLINIC | Age: 53
End: 2023-08-04
Payer: COMMERCIAL

## 2023-08-04 DIAGNOSIS — M30.1 CHURG-STRAUSS SYNDROME (H): ICD-10-CM

## 2023-08-04 DIAGNOSIS — D72.18 CHURG-STRAUSS SYNDROME (H): ICD-10-CM

## 2023-08-04 DIAGNOSIS — G89.4 PAIN SYNDROME, CHRONIC: Primary | ICD-10-CM

## 2023-08-04 NOTE — TELEPHONE ENCOUNTER
Patient received referral to [facility] ,  stopped in today she would prefer that it is for [facility] . Provider please advise .    EDILBERTO Singh LPN

## 2023-08-15 ENCOUNTER — PRE VISIT (OUTPATIENT)
Dept: OTOLARYNGOLOGY | Facility: CLINIC | Age: 53
End: 2023-08-15

## 2023-08-17 ENCOUNTER — OFFICE VISIT (OUTPATIENT)
Dept: PULMONOLOGY | Facility: CLINIC | Age: 53
End: 2023-08-17
Attending: PHYSICIAN ASSISTANT
Payer: COMMERCIAL

## 2023-08-17 VITALS
SYSTOLIC BLOOD PRESSURE: 126 MMHG | OXYGEN SATURATION: 100 % | BODY MASS INDEX: 20.61 KG/M2 | WEIGHT: 112 LBS | DIASTOLIC BLOOD PRESSURE: 71 MMHG | HEIGHT: 62 IN | HEART RATE: 61 BPM

## 2023-08-17 DIAGNOSIS — M30.1 CHURG-STRAUSS SYNDROME (H): ICD-10-CM

## 2023-08-17 DIAGNOSIS — J84.9 ILD (INTERSTITIAL LUNG DISEASE) (H): Primary | ICD-10-CM

## 2023-08-17 DIAGNOSIS — J47.0 BRONCHIECTASIS WITH ACUTE LOWER RESPIRATORY INFECTION (H): ICD-10-CM

## 2023-08-17 DIAGNOSIS — D72.18 CHURG-STRAUSS SYNDROME (H): ICD-10-CM

## 2023-08-17 LAB
EXPTIME-PRE: 10.42 SEC
FEF2575-%PRED-PRE: 40 %
FEF2575-PRE: 1.01 L/SEC
FEF2575-PRED: 2.48 L/SEC
FEFMAX-%PRED-PRE: 80 %
FEFMAX-PRE: 5.15 L/SEC
FEFMAX-PRED: 6.43 L/SEC
FEV1-%PRED-PRE: 90 %
FEV1-PRE: 2.18 L
FEV1FEV6-PRE: 65 %
FEV1FEV6-PRED: 82 %
FEV1FVC-PRE: 61 %
FEV1FVC-PRED: 82 %
FIFMAX-PRE: 5 L/SEC
FVC-%PRED-PRE: 120 %
FVC-PRE: 3.55 L
FVC-PRED: 2.96 L

## 2023-08-17 PROCEDURE — 87205 SMEAR GRAM STAIN: CPT

## 2023-08-17 PROCEDURE — G0463 HOSPITAL OUTPT CLINIC VISIT: HCPCS | Performed by: INTERNAL MEDICINE

## 2023-08-17 PROCEDURE — 87070 CULTURE OTHR SPECIMN AEROBIC: CPT

## 2023-08-17 PROCEDURE — 87107 FUNGI IDENTIFICATION MOLD: CPT

## 2023-08-17 PROCEDURE — 87102 FUNGUS ISOLATION CULTURE: CPT

## 2023-08-17 PROCEDURE — 99215 OFFICE O/P EST HI 40 MIN: CPT | Mod: 25 | Performed by: INTERNAL MEDICINE

## 2023-08-17 PROCEDURE — 94375 RESPIRATORY FLOW VOLUME LOOP: CPT | Performed by: INTERNAL MEDICINE

## 2023-08-17 ASSESSMENT — PAIN SCALES - GENERAL: PAINLEVEL: NO PAIN (0)

## 2023-08-17 NOTE — LETTER
8/17/2023         RE: Valencia Ye  [address] [facility] [address]        Dear Colleague,    Thank you for referring your patient, Valencia Ye, to the [facility] FOR LUNG SCIENCE AND [facility] [place]. Please see a copy of my visit note below.    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  Following her last visit, chest CMP was obtained to evaluate gradual progressive decline in PFTs.  CT was reviewed by me at that time and again today with the patient and compared to the previous scan from 2014.  There is no acute infiltrate, very mild bronchiectasis most prominent in the lower lobes with little or no progression since 2014.  The patient does note increased dyspnea on exertion, most notably during her yoga which is relieved with albuterol.  She also notes slight increase in sputum production.  Exam is unremarkable.  She is oxygenating well at 100%.  PFTs with very mild obstructive ventilatory defect but decreased from previous with gradually progressive decline since August 2021.  The patient does not appear to be having an infectious exacerbation of her bronchiectasis but sputum culture will be obtained.  Etiology of declining PFTs is unclear.  CT findings do not explain the change.  PFTs with lung volumes, diffusing capacity and bronchodilator testing will be obtained.  Is possible that this represents progression of asthma.  Intensification of her asthma treatment may be considered depending on the results of the PFTs and bronchodilator studies.  Recent serologic testing and CBC suggest that this is not progression of her EGPA.      Follow-up in 4 to 6 weeks with pulm medicine PFTs including bronchodilator  studies.  Sputum culture today.  I, Dagoberto Briscoe, have spent 45 minutes on the day of the visit to review the chart, interview and examine the patient, review labs and imaging, formulate a plan, document and submit orders. Time documented is excluding time spent for PFT interpretation. .    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  The patient had an episode of fever and chills 3 weeks ago.  Fever 100.3 with rigors.  Treated with Tylenol.  Resolved without recurrence in less than 24 hours.  Of note, 2 of her sons had sick exposures at the time.    Patient notes some increase in dyspnea on exertion, relieved with albuterol inhaler.  She finds if she is better able to do yoga if she uses her metered-dose inhaler prior.  She notes a very distinct improvement with the use of the albuterol.  This has only been the case for about the past 4 months.  Breathing is comfortable at rest.  Increased dyspnea on exertion, relieved with albuterol.  Cough productive of yellow sputum in the morning with vest therapy this is new in the past couple of months.  Prior to that she had very little sputum.  No hemoptysis.  No chest pain.  No fever, chills or night sweats.  She does her vest therapy every evening.  She also does yoga 5 times per week.    Review of systems:  Appetite is fair, recently decreased.  Chronic ear problems including infection and drainage, similar to baseline  No nausea, vomiting or abdominal pain.  Episodic thrombophlebitis in the hands and feet.  Recent bruising of the left elbow which resolved overnight.  Remainder of a complete review of systems is otherwise negative except as noted in history of present illness.      Current Outpatient Medications   Medication    acetylcysteine (MUCOMYST) 20 %  neb solution    albuterol (PROAIR HFA/PROVENTIL HFA/VENTOLIN HFA) 108 (90 Base) MCG/ACT inhaler    albuterol (PROVENTIL) (2.5 MG/3ML) 0.083% neb solution    ciprofloxacin-dexamethasone (CIPRODEX) 0.3-0.1 % otic suspension    fluticasone (FLONASE) 50 MCG/ACT nasal spray    metoprolol succinate ER (TOPROL-XL) 25 MG 24 hr tablet    NONFORMULARY    Omega-3 Fatty Acids (FISH OIL) 1200 MG capsule    predniSONE (DELTASONE) 1 MG tablet    predniSONE (DELTASONE) 5 MG tablet    Probiotic Product (PROBIOTIC DAILY PO)    SYMBICORT 160-4.5 MCG/ACT Inhaler    tiZANidine (ZANAFLEX) 2 MG tablet    vitamin D3 (CHOLECALCIFEROL) 2000 units (50 mcg) tablet    VITAMIN K PO     No current facility-administered medications for this visit.     Allergies   Allergen Reactions    Colistimethate Anaphylaxis    Colymycin M [Colistimethate Sodium] Anaphylaxis    Tamiflu [Oseltamivir]      Gums Blister up    Azithromycin Palpitations     Heart palpitation  Heart palpitation    Clindamycin Rash     Past Medical History:   Diagnosis Date    Anxiety     Aortitis (H)     Asthma     associated with Churg Antonina syndrome    Bronchiectasis (H)     Cerebral infarction (H) 08/1990    Very small, caused small permanent optical migraine    Chronic sinusitis     Churg-Antonina syndrome (H)     dx 1990    Conductive hearing loss     Depressive disorder     Eustachian tube dysfunction     Goiter     Hearing loss, conductive     Heart rate problem     Hypertension     Hypocalcemia     Immunosuppression (H)     Infection due to 2019 novel coronavirus 04/2022 Feb 2020, April 20211, June 2022    Irritable bowel syndrome     Major depressive disorder     Mannose-binding lectin deficiency 2014    Nonspecific abnormal results of thyroid function study     Osteoporosis     Pericarditis     1990 and 1992    Pneumonia     4/23/10    Recurrent otitis media     Recurrent UTI     Rheumatoid vasculitis (H)     Sinusitis, chronic     Steroid long-term use     Tinnitus      Varicose veins of leg with swelling        Past Surgical History:   Procedure Laterality Date    C/SECTION, CLASSICAL  1998     SECTION      COLONOSCOPY Left 2014    Procedure: COMBINED COLONOSCOPY, SINGLE OR MULTIPLE BIOPSY/POLYPECTOMY BY BIOPSY;  Surgeon: Js Pinedo MD;  Location:  GI    COLONOSCOPY N/A 2019    Procedure: COLONOSCOPY;  Surgeon: Bryce Pimentel MD;  Location:  GI    ENT SURGERY      ESOPHAGOSCOPY, GASTROSCOPY, DUODENOSCOPY (EGD), COMBINED N/A 2023    Procedure: ESOPHAGOGASTRODUODENOSCOPY, WITH BIOPSY;  Surgeon: Mo Rosa MD;  Location:  GI    ESOPHAGOSCOPY, GASTROSCOPY, DUODENOSCOPY (EGD), DILATATION, COMBINED N/A 2023    Procedure: ESOPHAGOGASTRODUODENOSCOPY, WITH DILATION;  Surgeon: Mo Rosa MD;  Location:  GI    GENITOURINARY SURGERY      HEAD & NECK SURGERY      HYSTERECTOMY  2009    without oopherectomy    HYSTERECTOMY, PAP NO LONGER INDICATED      cervix removed     LAPAROSCOPIC APPENDECTOMY N/A 2022    Procedure: APPENDECTOMY, LAPAROSCOPIC;  Surgeon: Fany Hannah MD;  Location: [facility] OR    PE TUBES      PICC INSERTION  10/10/2013    5fr DL PASV PICC, 43cm (1cm external) in the L basilic vein w/ tip in the low SVC.    PICC INSERTION Left 2017    5fr DL BioFlo PICC, 42cm (3cm external) in the L lateral brachial vein w/ tip in the SVC RA junction.    SINUS SURGERY      TUBAL LIGATION      TYMPANOPLASTY      ZZHC COLP CERVIX/UPPER VAGINA W LOOP ELEC BX CERVIX         Social History     Socioeconomic History    Marital status:      Spouse name: Not on file    Number of children: Not on file    Years of education: Not on file    Highest education level: Not on file   Occupational History    Not on file   Tobacco Use    Smoking status: Never     Passive exposure: Never    Smokeless tobacco: Never   Vaping Use    Vaping Use: Never used   Substance and Sexual Activity    Alcohol  "use: Not Currently    Drug use: No    Sexual activity: Yes     Partners: Male     Birth control/protection: Male Surgical, Female Surgical   Other Topics Concern    Parent/sibling w/ CABG, MI or angioplasty before 65F 55M? Not Asked     Service No    Blood Transfusions No    Caffeine Concern No    Occupational Exposure No    Hobby Hazards No    Sleep Concern No    Stress Concern Yes    Weight Concern No    Special Diet Yes     Comment: IBS diet    Back Care No    Exercise Not Asked     Comment: 1 mile power walk 5 days a week at least.     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  .  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  Non smoker. No smokers at home.  Enjoys walking and dancing.  Alcohol--1 to 2 drinks daily.  Few times a year has more than 4 drinks in a day.  No illicits.                     Social Determinants of Health     Financial Resource Strain: Not on file   Food Insecurity: Not on file   Transportation Needs: Not on file   Physical Activity: Not on file   Stress: Not on file   Social Connections: Not on file   Intimate Partner Violence: Not on file   Housing Stability: Not on file         /71   Pulse 61   Ht 1.585 m (5' 2.4\")   Wt 50.8 kg (112 lb)   LMP 02/01/2009 (LMP Unknown)   SpO2 100%   BMI 20.22 kg/m    Exam:   GENERAL APPEARANCE: Well developed, well nourished, alert, and in no apparent distress.  EYES: PERRL, EOMI  HENT: Nasal mucosa with edema and no hyperemia. No nasal polyps.  EARS: Canals clear, TMs perforated bilaterally with small amount of clear drainage.  MOUTH: Oral mucosa is moist, without any lesions, no tonsillar enlargement, no oropharyngeal exudate.  NECK: supple, no masses, no thyromegaly.  LYMPHATICS: No significant axillary, cervical, or supraclavicular nodes.  RESP: normal percussion, good air flow throughout.  No crackles. No rhonchi. No wheezes.  CV: Normal S1, " S2, regular rhythm, normal rate. No murmur.  No rub. No gallop. No LE edema.   ABDOMEN:  Bowel sounds normal, soft, nontender, no HSM or masses.   MS: extremities normal. No clubbing. No cyanosis.  SKIN: no rash on limited exam  NEURO: Mentation intact, speech normal, normal strength and tone, normal gait and stance  PSYCH: mentation appears normal. and affect normal/bright  Results:  Recent Results (from the past 168 hour(s))   General PFT Lab (Please always keep checked)    Collection Time: 08/17/23 10:57 AM   Result Value Ref Range    FVC-Pred 2.96 L    FVC-Pre 3.55 L    FVC-%Pred-Pre 120 %    FEV1-Pre 2.18 L    FEV1-%Pred-Pre 90 %    FEV1FVC-Pred 82 %    FEV1FVC-Pre 61 %    FEFMax-Pred 6.43 L/sec    FEFMax-Pre 5.15 L/sec    FEFMax-%Pred-Pre 80 %    FEF2575-Pred 2.48 L/sec    FEF2575-Pre 1.01 L/sec    RAI8049-%Pred-Pre 40 %    ExpTime-Pre 10.42 sec    FIFMax-Pre 5.00 L/sec    FEV1FEV6-Pred 82 %    FEV1FEV6-Pre 65 %   Respiratory Aerobic Bacterial Culture with Gram Stain    Collection Time: 08/17/23  3:30 PM    Specimen: Expectorate; Sputum   Result Value Ref Range    Culture Culture in progress     Culture 3+ Normal hubert to date     Gram Stain Result <10 Squamous epithelial cells/low power field     Gram Stain Result >25 PMNs/low power field     Gram Stain Result 3+ Mixed hubert                      Results as noted above.    PFT Interpretation:  Very mild obstructive ventilatory defect.  Decreased from previous.  Below recent best.   Valid Maneuver         Dagoberto Briscoe MD

## 2023-08-17 NOTE — PATIENT INSTRUCTIONS
Continue current medication.  Bring a sputum culture when you can.    [facility] Nurse line:  Martina BELTRE  [phone]     [facility] Fax Number:     [phone]         Cystic Fibrosis Respiratory Therapists:   Kaylee Miller              [phone]          Barbi Ruiz  [phone]

## 2023-08-17 NOTE — NURSING NOTE
Chief Complaint   Patient presents with    Follow Up     NON CF bronch follow up      Vitals were taken and medications were reconciled.     Michelle Beck RMA  11:46 AM

## 2023-08-18 ENCOUNTER — LAB (OUTPATIENT)
Dept: LAB | Facility: CLINIC | Age: 53
End: 2023-08-18
Payer: COMMERCIAL

## 2023-08-18 DIAGNOSIS — M30.1 CHURG-STRAUSS SYNDROME (H): ICD-10-CM

## 2023-08-18 DIAGNOSIS — J84.9 ILD (INTERSTITIAL LUNG DISEASE) (H): ICD-10-CM

## 2023-08-18 DIAGNOSIS — D72.18 CHURG-STRAUSS SYNDROME (H): ICD-10-CM

## 2023-08-18 DIAGNOSIS — J47.0 BRONCHIECTASIS WITH ACUTE LOWER RESPIRATORY INFECTION (H): ICD-10-CM

## 2023-08-20 NOTE — PROGRESS NOTES
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  Following her last visit, chest CMP was obtained to evaluate gradual progressive decline in PFTs.  CT was reviewed by me at that time and again today with the patient and compared to the previous scan from 2014.  There is no acute infiltrate, very mild bronchiectasis most prominent in the lower lobes with little or no progression since 2014.  The patient does note increased dyspnea on exertion, most notably during her yoga which is relieved with albuterol.  She also notes slight increase in sputum production.  Exam is unremarkable.  She is oxygenating well at 100%.  PFTs with very mild obstructive ventilatory defect but decreased from previous with gradually progressive decline since August 2021.  The patient does not appear to be having an infectious exacerbation of her bronchiectasis but sputum culture will be obtained.  Etiology of declining PFTs is unclear.  CT findings do not explain the change.  PFTs with lung volumes, diffusing capacity and bronchodilator testing will be obtained.  Is possible that this represents progression of asthma.  Intensification of her asthma treatment may be considered depending on the results of the PFTs and bronchodilator studies.  Recent serologic testing and CBC suggest that this is not progression of her EGPA.      Follow-up in 4 to 6 weeks with pulm medicine PFTs including bronchodilator studies.  Sputum culture today.  IDagoberto, have spent 45 minutes on the day of the visit to review the chart, interview and examine the patient, review labs and imaging, formulate a plan, document and submit orders. Time documented is excluding time spent for PFT interpretation. .    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  The patient had an episode of fever and chills 3 weeks ago.  Fever 100.3 with rigors.  Treated with Tylenol.  Resolved without recurrence in less than 24 hours.  Of note, 2 of her sons had sick exposures at the time.    Patient notes some increase in dyspnea on exertion, relieved with albuterol inhaler.  She finds if she is better able to do yoga if she uses her metered-dose inhaler prior.  She notes a very distinct improvement with the use of the albuterol.  This has only been the case for about the past 4 months.  Breathing is comfortable at rest.  Increased dyspnea on exertion, relieved with albuterol.  Cough productive of yellow sputum in the morning with vest therapy this is new in the past couple of months.  Prior to that she had very little sputum.  No hemoptysis.  No chest pain.  No fever, chills or night sweats.  She does her vest therapy every evening.  She also does yoga 5 times per week.    Review of systems:  Appetite is fair, recently decreased.  Chronic ear problems including infection and drainage, similar to baseline  No nausea, vomiting or abdominal pain.  Episodic thrombophlebitis in the hands and feet.  Recent bruising of the left elbow which resolved overnight.  Remainder of a complete review of systems is otherwise negative except as noted in history of present illness.      Current Outpatient Medications   Medication    acetylcysteine (MUCOMYST) 20 % neb solution    albuterol (PROAIR HFA/PROVENTIL HFA/VENTOLIN HFA) 108 (90 Base) MCG/ACT inhaler    albuterol (PROVENTIL) (2.5 MG/3ML) 0.083% neb solution    ciprofloxacin-dexamethasone (CIPRODEX) 0.3-0.1 % otic suspension    fluticasone (FLONASE) 50 MCG/ACT nasal spray    metoprolol succinate ER (TOPROL-XL)  25 MG 24 hr tablet    NONFORMULARY    Omega-3 Fatty Acids (FISH OIL) 1200 MG capsule    predniSONE (DELTASONE) 1 MG tablet    predniSONE (DELTASONE) 5 MG tablet    Probiotic Product (PROBIOTIC DAILY PO)    SYMBICORT 160-4.5 MCG/ACT Inhaler    tiZANidine (ZANAFLEX) 2 MG tablet    vitamin D3 (CHOLECALCIFEROL) 2000 units (50 mcg) tablet    VITAMIN K PO     No current facility-administered medications for this visit.     Allergies   Allergen Reactions    Colistimethate Anaphylaxis    Colymycin M [Colistimethate Sodium] Anaphylaxis    Tamiflu [Oseltamivir]      Gums Blister up    Azithromycin Palpitations     Heart palpitation  Heart palpitation    Clindamycin Rash     Past Medical History:   Diagnosis Date    Anxiety     Aortitis (H)     Asthma     associated with Churg Antonina syndrome    Bronchiectasis (H)     Cerebral infarction (H) 1990    Very small, caused small permanent optical migraine    Chronic sinusitis     Churg-Antonina syndrome (H)     dx     Conductive hearing loss     Depressive disorder     Eustachian tube dysfunction     Goiter     Hearing loss, conductive     Heart rate problem     Hypertension     Hypocalcemia     Immunosuppression (H)     Infection due to 2019 novel coronavirus 2022, , 2022    Irritable bowel syndrome     Major depressive disorder     Mannose-binding lectin deficiency     Nonspecific abnormal results of thyroid function study     Osteoporosis     Pericarditis      and     Pneumonia     4/23/10    Recurrent otitis media     Recurrent UTI     Rheumatoid vasculitis (H)     Sinusitis, chronic     Steroid long-term use     Tinnitus     Varicose veins of leg with swelling        Past Surgical History:   Procedure Laterality Date    C/SECTION, CLASSICAL  1998     SECTION      COLONOSCOPY Left 2014    Procedure: COMBINED COLONOSCOPY, SINGLE OR MULTIPLE BIOPSY/POLYPECTOMY BY BIOPSY;  Surgeon: Js Pinedo MD;   Location:  GI    COLONOSCOPY N/A 12/20/2019    Procedure: COLONOSCOPY;  Surgeon: Bryce Pimentel MD;  Location:  GI    ENT SURGERY      ESOPHAGOSCOPY, GASTROSCOPY, DUODENOSCOPY (EGD), COMBINED N/A 2/2/2023    Procedure: ESOPHAGOGASTRODUODENOSCOPY, WITH BIOPSY;  Surgeon: Mo Rosa MD;  Location:  GI    ESOPHAGOSCOPY, GASTROSCOPY, DUODENOSCOPY (EGD), DILATATION, COMBINED N/A 2/2/2023    Procedure: ESOPHAGOGASTRODUODENOSCOPY, WITH DILATION;  Surgeon: Mo Rosa MD;  Location:  GI    GENITOURINARY SURGERY      HEAD & NECK SURGERY      HYSTERECTOMY  2/27/2009    without oopherectomy    HYSTERECTOMY, PAP NO LONGER INDICATED      cervix removed     LAPAROSCOPIC APPENDECTOMY N/A 6/22/2022    Procedure: APPENDECTOMY, LAPAROSCOPIC;  Surgeon: Fany Hannah MD;  Location: [facility] OR    PE TUBES      PICC INSERTION  10/10/2013    5fr DL PASV PICC, 43cm (1cm external) in the L basilic vein w/ tip in the low SVC.    PICC INSERTION Left 05/26/2017    5fr DL BioFlo PICC, 42cm (3cm external) in the L lateral brachial vein w/ tip in the SVC RA junction.    SINUS SURGERY      TUBAL LIGATION      TYMPANOPLASTY      ZZHC COLP CERVIX/UPPER VAGINA W LOOP ELEC BX CERVIX         Social History     Socioeconomic History    Marital status:      Spouse name: Not on file    Number of children: Not on file    Years of education: Not on file    Highest education level: Not on file   Occupational History    Not on file   Tobacco Use    Smoking status: Never     Passive exposure: Never    Smokeless tobacco: Never   Vaping Use    Vaping Use: Never used   Substance and Sexual Activity    Alcohol use: Not Currently    Drug use: No    Sexual activity: Yes     Partners: Male     Birth control/protection: Male Surgical, Female Surgical   Other Topics Concern    Parent/sibling w/ CABG, MI or angioplasty before 65F 55M? Not Asked     Service No    Blood Transfusions No    Caffeine Concern No     "Occupational Exposure No    Hobby Hazards No    Sleep Concern No    Stress Concern Yes    Weight Concern No    Special Diet Yes     Comment: IBS diet    Back Care No    Exercise Not Asked     Comment: 1 mile power walk 5 days a week at least.     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  Non smoker. No smokers at home.  Enjoys walking and dancing.  Alcohol--1 to 2 drinks daily.  Few times a year has more than 4 drinks in a day.  No illicits.                     Social Determinants of Health     Financial Resource Strain: Not on file   Food Insecurity: Not on file   Transportation Needs: Not on file   Physical Activity: Not on file   Stress: Not on file   Social Connections: Not on file   Intimate Partner Violence: Not on file   Housing Stability: Not on file         /71   Pulse 61   Ht 1.585 m (5' 2.4\")   Wt 50.8 kg (112 lb)   LMP 02/01/2009 (LMP Unknown)   SpO2 100%   BMI 20.22 kg/m    Exam:   GENERAL APPEARANCE: Well developed, well nourished, alert, and in no apparent distress.  EYES: PERRL, EOMI  HENT: Nasal mucosa with edema and no hyperemia. No nasal polyps.  EARS: Canals clear, TMs perforated bilaterally with small amount of clear drainage.  MOUTH: Oral mucosa is moist, without any lesions, no tonsillar enlargement, no oropharyngeal exudate.  NECK: supple, no masses, no thyromegaly.  LYMPHATICS: No significant axillary, cervical, or supraclavicular nodes.  RESP: normal percussion, good air flow throughout.  No crackles. No rhonchi. No wheezes.  CV: Normal S1, S2, regular rhythm, normal rate. No murmur.  No rub. No gallop. No LE edema.   ABDOMEN:  Bowel sounds normal, soft, nontender, no HSM or masses.   MS: extremities normal. No clubbing. No cyanosis.  SKIN: no rash on limited exam  NEURO: Mentation intact, speech normal, normal strength and tone, normal gait " and stance  PSYCH: mentation appears normal. and affect normal/bright  Results:  Recent Results (from the past 168 hour(s))   General PFT Lab (Please always keep checked)    Collection Time: 08/17/23 10:57 AM   Result Value Ref Range    FVC-Pred 2.96 L    FVC-Pre 3.55 L    FVC-%Pred-Pre 120 %    FEV1-Pre 2.18 L    FEV1-%Pred-Pre 90 %    FEV1FVC-Pred 82 %    FEV1FVC-Pre 61 %    FEFMax-Pred 6.43 L/sec    FEFMax-Pre 5.15 L/sec    FEFMax-%Pred-Pre 80 %    FEF2575-Pred 2.48 L/sec    FEF2575-Pre 1.01 L/sec    VOW0749-%Pred-Pre 40 %    ExpTime-Pre 10.42 sec    FIFMax-Pre 5.00 L/sec    FEV1FEV6-Pred 82 %    FEV1FEV6-Pre 65 %   Respiratory Aerobic Bacterial Culture with Gram Stain    Collection Time: 08/17/23  3:30 PM    Specimen: Expectorate; Sputum   Result Value Ref Range    Culture Culture in progress     Culture 3+ Normal hubert to date     Gram Stain Result <10 Squamous epithelial cells/low power field     Gram Stain Result >25 PMNs/low power field     Gram Stain Result 3+ Mixed hubert                      Results as noted above.    PFT Interpretation:  Very mild obstructive ventilatory defect.  Decreased from previous.  Below recent best.   Valid Maneuver

## 2023-08-21 LAB
BACTERIA SPT CULT: NORMAL
GRAM STAIN RESULT: NORMAL

## 2023-08-28 ASSESSMENT — PAIN SCALES - PAIN ENJOYMENT GENERAL ACTIVITY SCALE (PEG): AVG_PAIN_PASTWEEK: 4

## 2023-08-28 ASSESSMENT — ANXIETY QUESTIONNAIRES
4. TROUBLE RELAXING: NEARLY EVERY DAY
7. FEELING AFRAID AS IF SOMETHING AWFUL MIGHT HAPPEN: SEVERAL DAYS
3. WORRYING TOO MUCH ABOUT DIFFERENT THINGS: NEARLY EVERY DAY
IF YOU CHECKED OFF ANY PROBLEMS ON THIS QUESTIONNAIRE, HOW DIFFICULT HAVE THESE PROBLEMS MADE IT FOR YOU TO DO YOUR WORK, TAKE CARE OF THINGS AT HOME, OR GET ALONG WITH OTHER PEOPLE: EXTREMELY DIFFICULT
5. BEING SO RESTLESS THAT IT IS HARD TO SIT STILL: NEARLY EVERY DAY
2. NOT BEING ABLE TO STOP OR CONTROL WORRYING: NEARLY EVERY DAY
GAD7 TOTAL SCORE: 16
6. BECOMING EASILY ANNOYED OR IRRITABLE: NOT AT ALL
1. FEELING NERVOUS, ANXIOUS, OR ON EDGE: NEARLY EVERY DAY
GAD7 TOTAL SCORE: 16

## 2023-08-29 ENCOUNTER — OFFICE VISIT (OUTPATIENT)
Dept: PALLIATIVE MEDICINE | Facility: CLINIC | Age: 53
End: 2023-08-29
Payer: COMMERCIAL

## 2023-08-29 VITALS — HEART RATE: 97 BPM | OXYGEN SATURATION: 97 % | SYSTOLIC BLOOD PRESSURE: 152 MMHG | DIASTOLIC BLOOD PRESSURE: 87 MMHG

## 2023-08-29 DIAGNOSIS — M30.1 CHURG-STRAUSS SYNDROME (H): ICD-10-CM

## 2023-08-29 DIAGNOSIS — G89.4 PAIN SYNDROME, CHRONIC: ICD-10-CM

## 2023-08-29 DIAGNOSIS — D72.18 CHURG-STRAUSS SYNDROME (H): ICD-10-CM

## 2023-08-29 PROCEDURE — 99205 OFFICE O/P NEW HI 60 MIN: CPT | Performed by: NURSE PRACTITIONER

## 2023-08-29 NOTE — PATIENT INSTRUCTIONS
I will reach out to my partner, Dr. Vicente Yeung at the [facility].  Consider trying a topical CBD/THC to help with muscle spasms.  Could consider low dose naltrexone. Naltrexone is a medication originally approved as a treatment for opioid addiction and alcoholism. It is a mu-opioid antagonist that is a combination of two distinct shapes known as isomers. The research suggests that the L isomer binds to opioid receptors, while the D isomer binds to immune cells. Naltrexone binds to opioid receptors on cells in the body. Using naltrexone in low doses causes the receptors to be blocked intermittently and the receptor sites respond with an increased production of endorphins. Endorphins are  natural, endogenous opioids  best known for relieving pain, enhancing a sense of well-being, and regulating the immune system.  Increased endorphin release:  May lessen pain.  Downregulates inflammatory cytokines.  Stimulates tissue healing.  Reduces death of the cells that produce myelin in the brain, thereby protecting the covering of nerves.      Integrative Medicine Options  Free apps for meditation, guided imagery and music  Calm, Insight, Headspace, Relax Meditation, [employer], Mindfulness   Mediation, Simply Being, Breathing Zone, Healing through Breath  Go to [employer] or Google Play and search in search bar.    Integrative Therapies in the Community  Please use this as a resource for finding integrative therapy practitioners in your area. In addition to the resources listed here, you can ask friends and families for referrals, look people up on line and see if they have reviews from past patients/clients or ask the practitioner for a patient you could call. Additionally, the [facility] has the Office of Unlicensed  Complementary and Alternative Health Care Practice. More information can be found at [email or web address]/hop/ocap/geninfo.[email or web address]    Healing Touch/ Reiki  Healing Touch:  https://Mind[employer].Saharey/htp-home  Reiki: www.reiki.org  Guided Imagery: www.[employer]  Breathwork: www.drweil.Saharey  Healing Music: www.musicalreflections.com    Acupuncture  The national certification website has a search engine where you search by zip code. This is the one search engine that will recommend individuals are nationally board certified. Web site: www.[facility].org    [facility]:  [phone]  Insurance Coverage:  Please check with your insurance plan to determine available coverage and benefits covered by a Licensed Acupuncturist, including, but not limited to out of pocket costs, conditions covered and visit limits. HSA and FSA are also accepted.  ----------------------------------------------------------------

## 2023-08-29 NOTE — PROGRESS NOTES
[facility]     Date of visit: 8/29/2023   Consultation: Valencia Ye is a 52 year old female who has a past medical history of Anxiety, Aortitis (H), Asthma, Bronchiectasis (H), Cerebral infarction (H) (08/1990), Chronic sinusitis, Churg-Antonina syndrome (H), Conductive hearing loss, Depressive disorder, Eustachian tube dysfunction, Goiter, Hearing loss, conductive, Heart rate problem, Hypertension, Hypocalcemia, Immunosuppression (H), Infection due to 2019 novel coronavirus (04/2022), Irritable bowel syndrome, Major depressive disorder, Mannose-binding lectin deficiency (2014), Nonspecific abnormal results of thyroid function study, Osteoporosis, Pericarditis, Pneumonia, Recurrent otitis media, Recurrent UTI, Rheumatoid vasculitis (H), Sinusitis, chronic, Steroid long-term use, Tinnitus, and Varicose veins of leg with swelling. Valencia is being seen today at the request of Abril Burk for evaluation of her pain issues and recommendations for management.     Referral information:   Valencia has not been seen at a pain clinic in the past.     Review of Electronic Chart: I have reviewed available medical information in the patient's electronic medical record including relevant provider notes, laboratory work, and imaging.   ____________________________________________________________    Valencia reports:  She is here today interested in exploring options for treating widespread muscle spasms and pain/headaches.   -Tried sumatriptan and felt terrible for the next 2 day, felt like lit up all of her nerves. Offered gabapentin, not interested. Has met with neurology, however   prefers a functional medicine approach. She goes to yoga 5 days a week to help with pain and with mental health. Takes supplements; notes that she often co-treats her anxiety and depression along with her pain. Tends to be very sensitive to psychoactive substances. Currently using lithium orotate and  atomic/methylated  "thiamine benfoteine. Tends to take supplements for at least 90 days to provide for an opportunity to discover meaningful information about how her body reacts. Takes care of mental health/whole person care by meeting with a therapist once a week and taking part in past-life regression therapy.   - Reports that she has rolling cycle of torticollis symptoms that make it hard for her to extend her neck. Was taking tizanidine for muscle spasms, but then felt like she could get into a cycle of rebound muscle spasm as it wore off. Tried delta 9 and has found this to very helpful at a microdose of 1/8 of a 10 mg gummie a few times per day. Feels like it helps her to focus, feels like it makes her feel slightly mellow, but not out of it. Does seem to help pain a little bit and reduces her muscle spasms.    - Has not tried acupuncture, interested in exploring.  Reflects on the effects of trauma as a child, tends to try to get through things by minimizing her symptoms.  As a result, lives with chronic depression and PTSD.  - Sleep is very difficult as she has worse pain when she wakes up. Struggles to find comfortable positions, and then once she has a position that provides her comfort and rest for a while, it will fail to continue provide relief and she starts over.   - Has participated in PT - treated mainly with myofascial release which was helpful, but by the time she reached her car after the treatment a terrible rebound spam and stiffness would occur.  - Has underlying autoimmune disease (Churg-Antonina Disease), feels that thi is currently in control and not having active flares.    Pain description:  Location: across eyes, ears, \"triangle\" at back of neck to shoulder to mid back, lower back and anterolateral thighs.   Quality: cramping, dull, aching, throbbing, pressure   Duration: AM, worse in the AM as she gets so stiff overnight.   Severity/Intensity (0 = No pain to 10 = Worst pain imaginable)   Now: 5, Average: 5, " "Best: 4, Worst: 5  Aggravating factors include: lying down, sitting, relaxation  Relieving factors include: walking, exercise, medication    Current pain medications:  Tizanidine   \"Not sure when i last took ibuprofen or acetaminophen - i avoid taking them\"    Review of [place] Prescription Monitoring Program ():   No concern for abuse or misuse of controlled medications based on this report. Last viewed on 8/29/2023. No controlled medications are being prescribed.    PAIN MANAGEMENT TREATMENT HISTORY  1. MEDICATIONS: Does not want to treat pain with medications.  Opiates: None  NSAIDS:Ibuprofen   Muscle Relaxants: Tizanidine   Anti-migraine mediations: Sumatriptan (Imitrex) tablets SE- did not tolerate  Anti-depressants: Bupropion Citalopram, Fluoxetine, Sertraline: Reports not tolerating any of these well  Sleep aids: Clonazepam   Neuropathics: not tried  Topicals: Other gels, creams, patches or ointments   Adjuvant pain medications: Delta 9 THC  2. PHYSICAL THERAPY:   Tried, helpful, but immediate rebound spasms and pain  3. PAIN PSYCHOLOGY:   Has not tried  4. SURGERY:   No pain related surgeries  5. INJECTIONS:   Not interested in trying  6. COMPLEMENTARY THERAPY:  Chiropractic: Not interested in trying  Acupuncture/acupressure:Has not tried  Interested in trying  Yoga, mindfulness, counseling, exercise    Past Medical History:  She  has a past medical history of Anxiety, Aortitis (H), Asthma, Bronchiectasis (H), Cerebral infarction (H) (08/1990), Chronic sinusitis, Churg-Antonina syndrome (H), Conductive hearing loss, Depressive disorder, Eustachian tube dysfunction, Goiter, Hearing loss, conductive, Heart rate problem, Hypertension, Hypocalcemia, Immunosuppression (H), Infection due to 2019 novel coronavirus (04/2022), Irritable bowel syndrome, Major depressive disorder, Mannose-binding lectin deficiency (2014), Nonspecific abnormal results of thyroid function study, Osteoporosis, Pericarditis, Pneumonia, " Recurrent otitis media, Recurrent UTI, Rheumatoid vasculitis (H), Sinusitis, chronic, Steroid long-term use, Tinnitus, and Varicose veins of leg with swelling.   Past Surgical History:  She  has a past surgical history that includes  section; Hysterectomy (2009); COLPOSCOPY,LOOP ELECTRD CERVIX EXCIS; tubal ligation; picc insertion (10/10/2013); Colonoscopy (Left, 2014); hysterectomy, pap no longer indicated; picc insertion (Left, 2017); Abdomen surgery; Head and neck surgery; sinus surgery; ENT surgery; pe tubes; Tympanoplasty; c/section, classical (); Colonoscopy (N/A, 2019); Laparoscopic appendectomy (N/A, 2022); Esophagoscopy, gastroscopy, duodenoscopy (EGD), combined (N/A, 2023); and Esophagoscopy, gastroscopy, duodenoscopy (EGD), dilatation, combined (N/A, 2023).   Social History:  Social History     Tobacco Use    Smoking status: Never     Passive exposure: Never    Smokeless tobacco: Never   Vaping Use    Vaping Use: Never used   Substance Use Topics    Alcohol use: Not Currently    Drug use: No      Social History     Social History Narrative    Retired  in a Radiology Department.  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 No smokers at home.  Enjoys walking and dancing.  Alcohol-abstains. No illicits.        Family History   Problem Relation Age of Onset    Allergies Mother         Seasonal    Alcohol/Drug Mother     Substance Abuse Mother     Depression Mother     C.A.D. Father     Diabetes Father         Type 2    Depression Father     Heart Disease Father     Breast Cancer Maternal Grandmother     C.A.D. Maternal Grandmother     Arthritis Maternal Grandmother     Musculoskeletal Disorder Maternal Grandmother         MS    Heart Disease Maternal Grandmother     Hypertension Maternal Grandmother     Alcohol/Drug Maternal Grandfather     Substance Abuse Maternal Grandfather     Depression Maternal Grandfather      Unknown/Adopted Paternal Grandmother     Unknown/Adopted Paternal Grandfather     Asthma Daughter     Allergies Daughter         Seasonal    Asthma Son     Cancer Maternal Aunt         breast age 53    Breast Cancer Maternal Aunt         in her 50s    Glaucoma No family hx of     Macular Degeneration No family hx of     Colon Cancer No family hx of      Medications and Allergies reviewed.    OBJECTIVE  Vitals:    08/29/23 1256   BP: (!) 152/87   Pulse: 97   SpO2: 97%     Constitutional: Well developed, well nourished, appears stated age. No acute distress.  Gait is normal, steady. Needs to change position frequently for comfort.  HEENT: Head atraumatic, normocephalic. Eyes without conjunctival injection or jaundice. Neck supple.   Skin: No obvious rash, lesions, or petechiae of exposed skin.   Extremities: Peripheral pulses intact. No clubbing, cyanosis, or edema. Moves all extremities.  Psychiatric/mental status: Alert, without lethargy or stupor. Speech fluent. Appropriate affect. Mood normal. Able to follow commands without difficulty.   Musculoskeletal exam: Normal bulk and tone. Unremarkable spinal curvature.     ASSESSMENT AND PLAN:  Pain syndrome, chronic  Churg-Antonina syndrome   She is seeking a functional medicine approach. I have placed a referral to acupuncture and provided her with links that she can follow if she needs to find a different location. Briefly discussed low dose naltrexone as a treatment option for her to consider. She could also try topical THC/CBD for muscle spasms. Given her interest in treatment of symptoms with supplements, and dietary changes, she may find more expertise in this area by meeting with my colleague Vicente Yeung MD at the [place] location. I will reach out to him to inquire if he is accepting new patients.   - Acupuncture Referral      Lisa Galan, CNP-BC, PMGT-BC, AP-PMN  [facility]          BILLING TIME DOCUMENTATION:    The total TIME spent on this patient on the date of the encounter/appointment was 66 minutes.      TOTAL TIME includes:   Time spent preparing to see the patient by reviewing available medical information in the patient's medical record, including relevant provider notes, laboratory work, and imaging 8 minutes  Time spent face to face (or over the phone) with the patient 41 minutes  Time spent ordering tests, medications, procedures and referrals 0 minutes  Time spent Referring and communicating with other healthcare professionals 2 minutes  Time spent documenting clinical information in Epic 15 minutes

## 2023-09-15 LAB — BACTERIA SPT CULT: ABNORMAL

## 2023-09-19 ENCOUNTER — OFFICE VISIT (OUTPATIENT)
Dept: PULMONOLOGY | Facility: CLINIC | Age: 53
End: 2023-09-19
Payer: COMMERCIAL

## 2023-09-19 DIAGNOSIS — M30.1 CHURG-STRAUSS SYNDROME (H): ICD-10-CM

## 2023-09-19 DIAGNOSIS — D72.18 CHURG-STRAUSS SYNDROME (H): ICD-10-CM

## 2023-09-19 DIAGNOSIS — J84.9 ILD (INTERSTITIAL LUNG DISEASE) (H): ICD-10-CM

## 2023-09-19 DIAGNOSIS — J47.0 BRONCHIECTASIS WITH ACUTE LOWER RESPIRATORY INFECTION (H): ICD-10-CM

## 2023-09-19 PROCEDURE — 94729 DIFFUSING CAPACITY: CPT | Performed by: INTERNAL MEDICINE

## 2023-09-19 PROCEDURE — 94060 EVALUATION OF WHEEZING: CPT | Performed by: INTERNAL MEDICINE

## 2023-09-19 PROCEDURE — 94726 PLETHYSMOGRAPHY LUNG VOLUMES: CPT | Performed by: INTERNAL MEDICINE

## 2023-09-28 ENCOUNTER — OFFICE VISIT (OUTPATIENT)
Dept: PULMONOLOGY | Facility: CLINIC | Age: 53
End: 2023-09-28
Attending: INTERNAL MEDICINE
Payer: COMMERCIAL

## 2023-09-28 VITALS
BODY MASS INDEX: 21.14 KG/M2 | SYSTOLIC BLOOD PRESSURE: 131 MMHG | HEART RATE: 66 BPM | HEIGHT: 62 IN | WEIGHT: 114.86 LBS | DIASTOLIC BLOOD PRESSURE: 80 MMHG | OXYGEN SATURATION: 98 %

## 2023-09-28 DIAGNOSIS — J47.9 BRONCHIECTASIS (H): Primary | ICD-10-CM

## 2023-09-28 DIAGNOSIS — D72.18 CHURG-STRAUSS SYNDROME (H): ICD-10-CM

## 2023-09-28 DIAGNOSIS — J45.20 MILD INTERMITTENT ASTHMA WITHOUT COMPLICATION: ICD-10-CM

## 2023-09-28 DIAGNOSIS — J47.9 BRONCHIECTASIS WITHOUT COMPLICATION (H): ICD-10-CM

## 2023-09-28 DIAGNOSIS — M30.1 CHURG-STRAUSS SYNDROME (H): ICD-10-CM

## 2023-09-28 DIAGNOSIS — J84.9 ILD (INTERSTITIAL LUNG DISEASE) (H): Primary | ICD-10-CM

## 2023-09-28 LAB
EXPTIME-PRE: 9.1 SEC
FEF2575-%PRED-PRE: 41 %
FEF2575-PRE: 1.02 L/SEC
FEF2575-PRED: 2.46 L/SEC
FEFMAX-%PRED-PRE: 81 %
FEFMAX-PRE: 5.23 L/SEC
FEFMAX-PRED: 6.46 L/SEC
FEV1-%PRED-PRE: 93 %
FEV1-PRE: 2.27 L
FEV1FEV6-PRE: 65 %
FEV1FEV6-PRED: 82 %
FEV1FVC-PRE: 60 %
FEV1FVC-PRED: 81 %
FIFMAX-PRE: 5.1 L/SEC
FVC-%PRED-PRE: 126 %
FVC-PRE: 3.78 L
FVC-PRED: 2.98 L

## 2023-09-28 PROCEDURE — G0463 HOSPITAL OUTPT CLINIC VISIT: HCPCS | Performed by: INTERNAL MEDICINE

## 2023-09-28 PROCEDURE — 94375 RESPIRATORY FLOW VOLUME LOOP: CPT | Performed by: INTERNAL MEDICINE

## 2023-09-28 PROCEDURE — 99213 OFFICE O/P EST LOW 20 MIN: CPT | Mod: 25 | Performed by: INTERNAL MEDICINE

## 2023-09-28 ASSESSMENT — PAIN SCALES - GENERAL: PAINLEVEL: NO PAIN (0)

## 2023-09-28 NOTE — LETTER
9/28/2023         RE: Valencia Ye  [address] [facility] [address]        Dear Colleague,    Thank you for referring your patient, Valencia Ye, to the [facility] [place]. Please see a copy of my visit note below.    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   There has been a very gradual decline in FEV1 over the past couple of years even though it remains in the normal range.  This is largely asymptomatic, but a slight increase in dyspnea on heavy exertion which is relieved with albuterol.  In mid September, patient completed full PFTs including bronchodilator response and lung volumes.  There was no significant bronchodilator response.  Residual volume is increased suggestive of air trapping.  The decline in FEV1 may represent inadequately controlled asthma.  With the presence of air trapping, I recommended the addition of an antimuscarinic agent.  We will try a trial of Brezatri, 2 puffs twice daily to replace the current Symbicort.  With the slight increase in sputum production, could consider a trial of antibiotics although the patient does not otherwise appear to be infected.  Continue current airway clearance therapy.  Continue current exercise.  The patient had received her influenza vaccine for this flu season.  Follow-up in 3 months with PFTs.  PFTs with follow-up.                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     Over the past few visits, the patient has had a gradual decline in FEV1 which has been currently  asymptomatic other than a possible slight worsening of her dyspnea on exertion.  Breathing is comfortable at rest.  The patient did note some increase in dyspnea when doing yoga but this improved with pretreatment with albuterol.  She reports occasional cough with white-yellow sputum, mostly during her vest therapy, slightly increased from previous.  No hemoptysis.  No chest pain.  Vest therapy once daily in the evening.  She had does yoga 5 times per week walks her dog daily.  No fever, chills or night sweats.    Review of systems:  Appetite is fair, decreased recently although weight is stable.  Ear pain at baseline no sinus pain, sore throat or rhinorrhea  Occasional palpitations, longstanding unchanged.  No nausea, vomiting or abdominal pain.  Variable stools attributed to IBS, similar to baseline.  Remainder of complete review of systems is otherwise negative except as noted in history of present illness.          Current Outpatient Medications   Medication    budeson-glycopyrrol-formoterol (BREZTRI AEROSPHERE) 160-9-4.8 MCG/ACT AERO inhaler    acetylcysteine (MUCOMYST) 20 % neb solution    albuterol (PROAIR HFA/PROVENTIL HFA/VENTOLIN HFA) 108 (90 Base) MCG/ACT inhaler    albuterol (PROVENTIL) (2.5 MG/3ML) 0.083% neb solution    ciprofloxacin-dexamethasone (CIPRODEX) 0.3-0.1 % otic suspension    fluticasone (FLONASE) 50 MCG/ACT nasal spray    metoprolol succinate ER (TOPROL-XL) 25 MG 24 hr tablet    NONFORMULARY    Omega-3 Fatty Acids (FISH OIL) 1200 MG capsule    predniSONE (DELTASONE) 1 MG tablet    predniSONE (DELTASONE) 5 MG tablet    Probiotic Product (PROBIOTIC DAILY PO)    SYMBICORT 160-4.5 MCG/ACT Inhaler    tiZANidine (ZANAFLEX) 2 MG tablet    vitamin D3 (CHOLECALCIFEROL) 2000 units (50 mcg) tablet    VITAMIN K PO     No current facility-administered medications for this visit.     Allergies   Allergen Reactions    Colistimethate Anaphylaxis    Colymycin M [Colistimethate Sodium] Anaphylaxis    Tamiflu  [Oseltamivir]      Gums Blister up    Azithromycin Palpitations     Heart palpitation  Heart palpitation    Clindamycin Rash     Past Medical History:   Diagnosis Date    Anxiety     Aortitis (H)     Asthma     associated with Churg Antonina syndrome    Bronchiectasis (H)     Cerebral infarction (H) 1990    Very small, caused small permanent optical migraine    Chronic sinusitis     Churg-Antonina syndrome (H)     dx     Conductive hearing loss     Depressive disorder     Eustachian tube dysfunction     Goiter     Hearing loss, conductive     Heart rate problem     Hypertension     Hypocalcemia     Immunosuppression (H)     Infection due to 2019 novel coronavirus 2022, , 2022    Irritable bowel syndrome     Major depressive disorder     Mannose-binding lectin deficiency     Nonspecific abnormal results of thyroid function study     Osteoporosis     Pericarditis      and     Pneumonia     4/23/10    Recurrent otitis media     Recurrent UTI     Rheumatoid vasculitis (H)     Sinusitis, chronic     Steroid long-term use     Tinnitus     Varicose veins of leg with swelling        Past Surgical History:   Procedure Laterality Date    C/SECTION, CLASSICAL       SECTION      COLONOSCOPY Left 2014    Procedure: COMBINED COLONOSCOPY, SINGLE OR MULTIPLE BIOPSY/POLYPECTOMY BY BIOPSY;  Surgeon: Js Pinedo MD;  Location:  GI    COLONOSCOPY N/A 2019    Procedure: COLONOSCOPY;  Surgeon: Bryce Pimentel MD;  Location:  GI    ENT SURGERY      ESOPHAGOSCOPY, GASTROSCOPY, DUODENOSCOPY (EGD), COMBINED N/A 2023    Procedure: ESOPHAGOGASTRODUODENOSCOPY, WITH BIOPSY;  Surgeon: Mo Rosa MD;  Location:  GI    ESOPHAGOSCOPY, GASTROSCOPY, DUODENOSCOPY (EGD), DILATATION, COMBINED N/A 2023    Procedure: ESOPHAGOGASTRODUODENOSCOPY, WITH DILATION;  Surgeon: Mo Rosa MD;  Location:  GI    GENITOURINARY  SURGERY      HEAD & NECK SURGERY      HYSTERECTOMY  2/27/2009    without oopherectomy    HYSTERECTOMY, PAP NO LONGER INDICATED      cervix removed     LAPAROSCOPIC APPENDECTOMY N/A 6/22/2022    Procedure: APPENDECTOMY, LAPAROSCOPIC;  Surgeon: Fany Hannah MD;  Location: [facility] OR    PE TUBES      PICC INSERTION  10/10/2013    5fr DL PASV PICC, 43cm (1cm external) in the L basilic vein w/ tip in the low SVC.    PICC INSERTION Left 05/26/2017    5fr DL BioFlo PICC, 42cm (3cm external) in the L lateral brachial vein w/ tip in the SVC RA junction.    SINUS SURGERY      TUBAL LIGATION      TYMPANOPLASTY      ZZHC COLP CERVIX/UPPER VAGINA W LOOP ELEC BX CERVIX         Social History     Socioeconomic History    Marital status:      Spouse name: Not on file    Number of children: Not on file    Years of education: Not on file    Highest education level: Not on file   Occupational History    Not on file   Tobacco Use    Smoking status: Never     Passive exposure: Never    Smokeless tobacco: Never   Vaping Use    Vaping Use: Never used   Substance and Sexual Activity    Alcohol use: Not Currently    Drug use: No    Sexual activity: Yes     Partners: Male     Birth control/protection: Male Surgical, Female Surgical   Other Topics Concern    Parent/sibling w/ CABG, MI or angioplasty before 65F 55M? Not Asked     Service No    Blood Transfusions No    Caffeine Concern No    Occupational Exposure No    Hobby Hazards No    Sleep Concern No    Stress Concern Yes    Weight Concern No    Special Diet Yes     Comment: IBS diet    Back Care No    Exercise Not Asked     Comment: 1 mile power walk 5 days a week at least.     Bike Helmet Not Asked    Seat Belt Yes    Self-Exams Yes   Social History Narrative    Retired  in a Radiology Department.  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 No smokers at home.  Enjoys walking and dancing.  Alcohol-abstains. No illicits.     Social  "Determinants of Health     Financial Resource Strain: Not on file   Food Insecurity: Not on file   Transportation Needs: Not on file   Physical Activity: Not on file   Stress: Not on file   Social Connections: Not on file   Interpersonal Safety: Not on file   Housing Stability: Not on file         /80 (BP Location: Right arm, Patient Position: Chair)   Pulse 66   Ht 1.58 m (5' 2.21\")   Wt 52.1 kg (114 lb 13.8 oz)   LMP 02/01/2009 (LMP Unknown)   SpO2 98%   BMI 20.87 kg/m    Exam:   GENERAL APPEARANCE: Well developed, well nourished, alert, and in no apparent distress.  EYES: PERRL, EOMI  HENT: Nasal mucosa with no edema and no hyperemia. No nasal polyps.  EARS: Canals clear, TMs bilateral scarring, unchanged  MOUTH: Oral mucosa is moist, without any lesions, no tonsillar enlargement, no oropharyngeal exudate.  NECK: supple, no masses, no thyromegaly.  LYMPHATICS: No significant axillary, cervical, or supraclavicular nodes.  RESP: normal percussion, good air flow throughout.  No crackles. No rhonchi. No wheezes.  CV: Normal S1, S2, regular rhythm, normal rate. No murmur.  No rub. No gallop. No LE edema.   ABDOMEN:  Bowel sounds normal, soft, nontender, no HSM or masses.   MS: extremities normal. No clubbing. No cyanosis.  SKIN: no rash on limited exam  NEURO: Mentation intact, speech normal, normal strength and tone, normal gait and stance  PSYCH: mentation appears normal. and affect normal/bright  Results:  Recent Results (from the past 168 hour(s))   General PFT Lab (Please always keep checked)    Collection Time: 09/28/23  9:55 AM   Result Value Ref Range    FVC-Pred 2.98 L    FVC-Pre 3.78 L    FVC-%Pred-Pre 126 %    FEV1-Pre 2.27 L    FEV1-%Pred-Pre 93 %    FEV1FVC-Pred 81 %    FEV1FVC-Pre 60 %    FEFMax-Pred 6.46 L/sec    FEFMax-Pre 5.23 L/sec    FEFMax-%Pred-Pre 81 %    FEF2575-Pred 2.46 L/sec    FEF2575-Pre 1.02 L/sec    OAH9737-%Pred-Pre 41 %    ExpTime-Pre 9.10 sec    FIFMax-Pre 5.10 L/sec    " FEV1FEV6-Pred 82 %    FEV1FEV6-Pre 65 %                   Results as noted above.    PFT Interpretation:  Very mild obstructive ventilatory defect, based on reduced FEV1 FVC ratio, shape of the flow-volume loop and reduced mid flow.  Unchanged from previous.   Below recent best.   Valid Maneuver        Again, thank you for allowing me to participate in the care of your patient.      Sincerely,    Dagoberto Briscoe MD

## 2023-09-28 NOTE — PATIENT INSTRUCTIONS
Breztri 2 puffs twice daily  Stop Symbicort  Otherwise continue current medication, nebs and vest therapy.     [facility] Nurse line:  Martina BELTRE  [phone]     [facility] Fax Number:     [phone]         Cystic Fibrosis Respiratory Therapists:   Kaylee Miller              [phone]          Barbi Ruiz  [phone]

## 2023-09-28 NOTE — NURSING NOTE
Chief Complaint   Patient presents with    Follow Up     Bronch follow up      Vitals were taken and medications were reconciled.     Michelle Beck RMA  10:35 AM

## 2023-09-30 NOTE — PROGRESS NOTES
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   There has been a very gradual decline in FEV1 over the past couple of years even though it remains in the normal range.  This is largely asymptomatic, but a slight increase in dyspnea on heavy exertion which is relieved with albuterol.  In mid September, patient completed full PFTs including bronchodilator response and lung volumes.  There was no significant bronchodilator response.  Residual volume is increased suggestive of air trapping.  The decline in FEV1 may represent inadequately controlled asthma.  With the presence of air trapping, I recommended the addition of an antimuscarinic agent.  We will try a trial of Brezatri, 2 puffs twice daily to replace the current Symbicort.  With the slight increase in sputum production, could consider a trial of antibiotics although the patient does not otherwise appear to be infected.  Continue current airway clearance therapy.  Continue current exercise.  The patient had received her influenza vaccine for this flu season.  Follow-up in 3 months with PFTs.  PFTs with follow-up.                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     Over the past few visits, the patient has had a gradual decline in FEV1 which has been currently asymptomatic other than a possible slight worsening of her dyspnea on exertion.  Breathing is comfortable at rest.  The patient did note some increase in dyspnea when doing yoga but this improved with pretreatment with albuterol.  She reports occasional cough with white-yellow sputum, mostly during her  vest therapy, slightly increased from previous.  No hemoptysis.  No chest pain.  Vest therapy once daily in the evening.  She had does yoga 5 times per week walks her dog daily.  No fever, chills or night sweats.    Review of systems:  Appetite is fair, decreased recently although weight is stable.  Ear pain at baseline no sinus pain, sore throat or rhinorrhea  Occasional palpitations, longstanding unchanged.  No nausea, vomiting or abdominal pain.  Variable stools attributed to IBS, similar to baseline.  Remainder of complete review of systems is otherwise negative except as noted in history of present illness.          Current Outpatient Medications   Medication    budeson-glycopyrrol-formoterol (BREZTRI AEROSPHERE) 160-9-4.8 MCG/ACT AERO inhaler    acetylcysteine (MUCOMYST) 20 % neb solution    albuterol (PROAIR HFA/PROVENTIL HFA/VENTOLIN HFA) 108 (90 Base) MCG/ACT inhaler    albuterol (PROVENTIL) (2.5 MG/3ML) 0.083% neb solution    ciprofloxacin-dexamethasone (CIPRODEX) 0.3-0.1 % otic suspension    fluticasone (FLONASE) 50 MCG/ACT nasal spray    metoprolol succinate ER (TOPROL-XL) 25 MG 24 hr tablet    NONFORMULARY    Omega-3 Fatty Acids (FISH OIL) 1200 MG capsule    predniSONE (DELTASONE) 1 MG tablet    predniSONE (DELTASONE) 5 MG tablet    Probiotic Product (PROBIOTIC DAILY PO)    SYMBICORT 160-4.5 MCG/ACT Inhaler    tiZANidine (ZANAFLEX) 2 MG tablet    vitamin D3 (CHOLECALCIFEROL) 2000 units (50 mcg) tablet    VITAMIN K PO     No current facility-administered medications for this visit.     Allergies   Allergen Reactions    Colistimethate Anaphylaxis    Colymycin M [Colistimethate Sodium] Anaphylaxis    Tamiflu [Oseltamivir]      Gums Blister up    Azithromycin Palpitations     Heart palpitation  Heart palpitation    Clindamycin Rash     Past Medical History:   Diagnosis Date    Anxiety     Aortitis (H)     Asthma     associated with Churg Antonina syndrome    Bronchiectasis (H)     Cerebral infarction (H)  1990    Very small, caused small permanent optical migraine    Chronic sinusitis     Churg-Antonina syndrome (H)     dx     Conductive hearing loss     Depressive disorder     Eustachian tube dysfunction     Goiter     Hearing loss, conductive     Heart rate problem     Hypertension     Hypocalcemia     Immunosuppression (H)     Infection due to 2019 novel coronavirus 2022, , 2022    Irritable bowel syndrome     Major depressive disorder     Mannose-binding lectin deficiency     Nonspecific abnormal results of thyroid function study     Osteoporosis     Pericarditis      and     Pneumonia     4/23/10    Recurrent otitis media     Recurrent UTI     Rheumatoid vasculitis (H)     Sinusitis, chronic     Steroid long-term use     Tinnitus     Varicose veins of leg with swelling        Past Surgical History:   Procedure Laterality Date    C/SECTION, CLASSICAL       SECTION      COLONOSCOPY Left 2014    Procedure: COMBINED COLONOSCOPY, SINGLE OR MULTIPLE BIOPSY/POLYPECTOMY BY BIOPSY;  Surgeon: Js Pinedo MD;  Location:  GI    COLONOSCOPY N/A 2019    Procedure: COLONOSCOPY;  Surgeon: Bryce Pimentel MD;  Location:  GI    ENT SURGERY      ESOPHAGOSCOPY, GASTROSCOPY, DUODENOSCOPY (EGD), COMBINED N/A 2023    Procedure: ESOPHAGOGASTRODUODENOSCOPY, WITH BIOPSY;  Surgeon: Mo Rosa MD;  Location:  GI    ESOPHAGOSCOPY, GASTROSCOPY, DUODENOSCOPY (EGD), DILATATION, COMBINED N/A 2023    Procedure: ESOPHAGOGASTRODUODENOSCOPY, WITH DILATION;  Surgeon: Mo Rosa MD;  Location:  GI    GENITOURINARY SURGERY      HEAD & NECK SURGERY      HYSTERECTOMY  2009    without oopherectomy    HYSTERECTOMY, PAP NO LONGER INDICATED      cervix removed     LAPAROSCOPIC APPENDECTOMY N/A 2022    Procedure: APPENDECTOMY, LAPAROSCOPIC;  Surgeon: Fany Hannah MD;  Location:  OR    PE TUBES       PICC INSERTION  10/10/2013    5fr DL PASV PICC, 43cm (1cm external) in the L basilic vein w/ tip in the low SVC.    PICC INSERTION Left 05/26/2017    5fr DL BioFlo PICC, 42cm (3cm external) in the L lateral brachial vein w/ tip in the SVC RA junction.    SINUS SURGERY      TUBAL LIGATION      TYMPANOPLASTY      ZZHC COLP CERVIX/UPPER VAGINA W LOOP ELEC BX CERVIX         Social History     Socioeconomic History    Marital status:      Spouse name: Not on file    Number of children: Not on file    Years of education: Not on file    Highest education level: Not on file   Occupational History    Not on file   Tobacco Use    Smoking status: Never     Passive exposure: Never    Smokeless tobacco: Never   Vaping Use    Vaping Use: Never used   Substance and Sexual Activity    Alcohol use: Not Currently    Drug use: No    Sexual activity: Yes     Partners: Male     Birth control/protection: Male Surgical, Female Surgical   Other Topics Concern    Parent/sibling w/ CABG, MI or angioplasty before 65F 55M? Not Asked     Service No    Blood Transfusions No    Caffeine Concern No    Occupational Exposure No    Hobby Hazards No    Sleep Concern No    Stress Concern Yes    Weight Concern No    Special Diet Yes     Comment: IBS diet    Back Care No    Exercise Not Asked     Comment: 1 mile power walk 5 days a week at least.     Bike Helmet Not Asked    Seat Belt Yes    Self-Exams Yes   Social History Narrative    Retired  in a Radiology Department.  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 No smokers at home.  Enjoys walking and dancing.  Alcohol-abstains. No illicits.     Social Determinants of Health     Financial Resource Strain: Not on file   Food Insecurity: Not on file   Transportation Needs: Not on file   Physical Activity: Not on file   Stress: Not on file   Social Connections: Not on file   Interpersonal Safety: Not on file   Housing Stability: Not on file         BP  "131/80 (BP Location: Right arm, Patient Position: Chair)   Pulse 66   Ht 1.58 m (5' 2.21\")   Wt 52.1 kg (114 lb 13.8 oz)   LMP 02/01/2009 (LMP Unknown)   SpO2 98%   BMI 20.87 kg/m    Exam:   GENERAL APPEARANCE: Well developed, well nourished, alert, and in no apparent distress.  EYES: PERRL, EOMI  HENT: Nasal mucosa with no edema and no hyperemia. No nasal polyps.  EARS: Canals clear, TMs bilateral scarring, unchanged  MOUTH: Oral mucosa is moist, without any lesions, no tonsillar enlargement, no oropharyngeal exudate.  NECK: supple, no masses, no thyromegaly.  LYMPHATICS: No significant axillary, cervical, or supraclavicular nodes.  RESP: normal percussion, good air flow throughout.  No crackles. No rhonchi. No wheezes.  CV: Normal S1, S2, regular rhythm, normal rate. No murmur.  No rub. No gallop. No LE edema.   ABDOMEN:  Bowel sounds normal, soft, nontender, no HSM or masses.   MS: extremities normal. No clubbing. No cyanosis.  SKIN: no rash on limited exam  NEURO: Mentation intact, speech normal, normal strength and tone, normal gait and stance  PSYCH: mentation appears normal. and affect normal/bright  Results:  Recent Results (from the past 168 hour(s))   General PFT Lab (Please always keep checked)    Collection Time: 09/28/23  9:55 AM   Result Value Ref Range    FVC-Pred 2.98 L    FVC-Pre 3.78 L    FVC-%Pred-Pre 126 %    FEV1-Pre 2.27 L    FEV1-%Pred-Pre 93 %    FEV1FVC-Pred 81 %    FEV1FVC-Pre 60 %    FEFMax-Pred 6.46 L/sec    FEFMax-Pre 5.23 L/sec    FEFMax-%Pred-Pre 81 %    FEF2575-Pred 2.46 L/sec    FEF2575-Pre 1.02 L/sec    CJL3052-%Pred-Pre 41 %    ExpTime-Pre 9.10 sec    FIFMax-Pre 5.10 L/sec    FEV1FEV6-Pred 82 %    FEV1FEV6-Pre 65 %                   Results as noted above.    PFT Interpretation:  Very mild obstructive ventilatory defect, based on reduced FEV1 FVC ratio, shape of the flow-volume loop and reduced mid flow.  Unchanged from previous.   Below recent best.   Valid Maneuver   "

## 2023-10-06 LAB
DLCOUNC-%PRED-PRE: 135 %
DLCOUNC-PRE: 26.4 ML/MIN/MMHG
DLCOUNC-PRED: 19.51 ML/MIN/MMHG
ERV-%PRED-PRE: 101 %
ERV-PRE: 1.09 L
ERV-PRED: 1.08 L
EXPTIME-PRE: 10.69 SEC
FEF2575-%PRED-POST: 42 %
FEF2575-%PRED-PRE: 40 %
FEF2575-POST: 1.03 L/SEC
FEF2575-PRE: 0.99 L/SEC
FEF2575-PRED: 2.44 L/SEC
FEFMAX-%PRED-PRE: 87 %
FEFMAX-PRE: 5.61 L/SEC
FEFMAX-PRED: 6.4 L/SEC
FEV1-%PRED-PRE: 95 %
FEV1-PRE: 2.28 L
FEV1FEV6-PRE: 65 %
FEV1FEV6-PRED: 82 %
FEV1FVC-PRE: 60 %
FEV1FVC-PRED: 82 %
FEV1SVC-PRE: 61 %
FEV1SVC-PRED: 69 %
FIFMAX-PRE: 4.94 L/SEC
FRCPLETH-%PRED-PRE: 158 %
FRCPLETH-PRE: 3.89 L
FRCPLETH-PRED: 2.46 L
FVC-%PRED-PRE: 128 %
FVC-PRE: 3.79 L
FVC-PRED: 2.94 L
IC-%PRED-PRE: 122 %
IC-PRE: 2.65 L
IC-PRED: 2.16 L
RVPLETH-%PRED-PRE: 196 %
RVPLETH-PRE: 2.79 L
RVPLETH-PRED: 1.42 L
TLCPLETH-%PRED-PRE: 135 %
TLCPLETH-PRE: 6.54 L
TLCPLETH-PRED: 4.84 L
VA-%PRED-PRE: 102 %
VA-PRE: 4.57 L
VC-%PRED-PRE: 108 %
VC-PRE: 3.75 L
VC-PRED: 3.45 L

## 2023-10-25 ENCOUNTER — MYC MEDICAL ADVICE (OUTPATIENT)
Dept: INTERNAL MEDICINE | Facility: CLINIC | Age: 53
End: 2023-10-25
Payer: COMMERCIAL

## 2023-10-25 DIAGNOSIS — R42 VERTIGO: Primary | ICD-10-CM

## 2023-10-31 ENCOUNTER — THERAPY VISIT (OUTPATIENT)
Dept: PHYSICAL THERAPY | Facility: CLINIC | Age: 53
End: 2023-10-31
Attending: PHYSICIAN ASSISTANT
Payer: COMMERCIAL

## 2023-10-31 DIAGNOSIS — R42 VERTIGO: ICD-10-CM

## 2023-10-31 PROCEDURE — 97161 PT EVAL LOW COMPLEX 20 MIN: CPT | Mod: GP | Performed by: PHYSICAL THERAPIST

## 2023-10-31 PROCEDURE — 95992 CANALITH REPOSITIONING PROC: CPT | Mod: GP | Performed by: PHYSICAL THERAPIST

## 2023-10-31 NOTE — PROGRESS NOTES
PHYSICAL THERAPY EVALUATION  Type of Visit: Evaluation    See electronic medical record for Abuse and Falls Screening details.    Subjective   Presenting condition or subjective complaint:     VESTIBULAR EVALUATION  Relevant medical history:   Pt familiar with this clinic and this therapist, dx and txt (+) s/s R BPPV 09/2020.   Date of onset: 10/24/23      Reports sxs of vertigo fully resolved after txt for BPPV in 2020 and returned when turning over in bed last week. Describes a 'rolling' sensation that resolves after a few moments. Sxs recur tilting head back or when going to yoga. Feels it effects her L side. Has been modifying positions to avoid sxs.     Other: constant tinnitus, manages migraines with diet and yoga to minimize stress, no longer working.     Patient goals for therapy:  resolve vertigo    Objective   Cognitive Status Examination  Orientation: Oriented to person, place and time   Level of Consciousness: Alert  Follows Commands and Answers Questions: 100% of the time  Personal Safety and Judgement: Intact  Memory: Intact    BED MOBILITY: Independent    TRANSFERS: Independent    GAIT:   Over short distances indoors without AD: maintains good path, speed without deviations or instability    Cervicogenic Screen    Neck ROM Normal     Infrared Goggle Exam Vestibular Suppressant in Last 24 Hours? No  Exam Completed With: Infrared goggles   Spontaneous Nystagmus Negative   Gaze Evoked Nystagmus Negative   Head Shake Horizontal Nystagmus Negative   Positional Testing    Supine Head-Hanging Test     Left Right   August-Hallpike  Upbeating R torsional  Delayed onset short duration nystagmus, associated veritgo     Sidelying Test     [facility] Supine Roll Test     [facility] Forward Roll Test     [place] and [place]an Test -  Sitting Erect    [place] and [place]an Test - Seated, Head Bent 60 Degrees Forward    [place] and Lean Test - Seated, Head Bent Backwards       BPPV Canal(s): R Posterior  BPPV Type: Canalithasis      Assessment & Plan    CLINICAL IMPRESSIONS  Medical Diagnosis: Vertigo    Treatment Diagnosis: (+) s/s R BPPV   Impression/Assessment: Patient is a 53 year old female reporting short duration vertigo with positional changes.  The following significant findings have been identified:  (+) s/s R BPPV with positional testing . These impairments interfere with their ability to perform  functional head and body positional changes, participation in yoga without modifications, bed mobility  as compared to previous level of function.     Clinical Decision Making (Complexity):  Clinical Presentation: Stable/Uncomplicated  Clinical Presentation Rationale: based on medical and personal factors listed in PT evaluation  Clinical Decision Making (Complexity): Low complexity    PLAN OF CARE  Treatment Interventions:  Interventions: Canalith Repositioning    Long Term Goals     PT Goal 1  Goal Identifier: BPPV  Goal Description: Pt will demonstrate (-) s/s of BPPV throughout positional testing of all canals without limitations in bed mobility and transfers d/t dizziness  Goal Progress: at [facility], 10/31/23: (+) s/s R BPPV  Target Date: 11/28/23      Frequency of Treatment: 1x/week  Duration of Treatment: 4 weeks    Recommended Referrals to Other Professionals:   Education Assessment:   Education Comments: results of assessment, PT POC    Risks and benefits of evaluation/treatment have been explained.   Patient/Family/caregiver agrees with Plan of Care.     Evaluation Time:     PT Eval, Low Complexity Minutes (50599): 15       Signing Clinician: Taniya Snow PT

## 2023-11-20 ENCOUNTER — MYC MEDICAL ADVICE (OUTPATIENT)
Dept: PULMONOLOGY | Facility: CLINIC | Age: 53
End: 2023-11-20
Payer: COMMERCIAL

## 2023-11-20 DIAGNOSIS — J47.0 BRONCHIECTASIS WITH ACUTE LOWER RESPIRATORY INFECTION (H): Primary | ICD-10-CM

## 2023-11-21 DIAGNOSIS — J47.0 BRONCHIECTASIS WITH ACUTE LOWER RESPIRATORY INFECTION (H): Primary | ICD-10-CM

## 2023-11-21 NOTE — PROGRESS NOTES
Patient sent [employer] message with symptoms of wheezing, mild chest tightness, crackles, increased sputum (yellow/clear) and shortness of breath on exertion.     At last visit, patient was switched from Symbicort to Breztri. Patient doesn't feel this has helped with symptoms. When patient notices the wheezing and crackles, albuterol has been effective.     Patient denies fever, cold/flu/COVID symptoms, or shortness of breath at rest.     Last culture on file in August. Grew normal hubert. Patient performs vest therapy when completing daily neb in the evening.    RN discussed with Dr. Briscoe. Plan to repeat sputum culture and review prior to treatment.     RN placed order for sputum culture and informed patient. Will follow results.     LUCILA Hernandez

## 2023-11-24 PROCEDURE — 87205 SMEAR GRAM STAIN: CPT | Performed by: INTERNAL MEDICINE

## 2023-11-25 ENCOUNTER — LAB (OUTPATIENT)
Dept: LAB | Facility: CLINIC | Age: 53
End: 2023-11-25
Payer: COMMERCIAL

## 2023-11-25 DIAGNOSIS — J47.0 BRONCHIECTASIS WITH ACUTE LOWER RESPIRATORY INFECTION (H): ICD-10-CM

## 2023-11-27 LAB
BACTERIA SPT CULT: NORMAL
GRAM STAIN RESULT: NORMAL

## 2023-11-30 RX ORDER — AMOXICILLIN AND CLAVULANATE POTASSIUM 500; 125 MG/1; MG/1
1 TABLET, FILM COATED ORAL 2 TIMES DAILY
Qty: 28 TABLET | Refills: 0 | Status: SHIPPED | OUTPATIENT
Start: 2023-11-30 | End: 2023-12-14

## 2023-11-30 NOTE — TELEPHONE ENCOUNTER
Patient sent [employer] message reporting two weeks of wheezing, increased sputum (clear/yellow), and chest crackles. Albuterol helpful with chest crackles but switching to Breztri has not improved symptoms. Patient also reports shortness of breath with activity but not at rest. Some mild chest tightness. Performs nebulizer treatment every evening before bed.     Reviewed symptoms with Dr. Briscoe and plan to have patient collect sputum culture to review prior to treatment.    After sputum culture resulted (normal hubert), RN followed up with patient. Symptoms persisted and no improvement but also not worsening.     Reviewed with Dr. Briscoe and plan to treat with Augmentin 500-125mg 1 tab BID x 14 days.    Patient verbalized understanding and OK with plan. RN sent antibiotic to patient's preferred pharmacy.    LUCILA Hernandez

## 2023-12-07 ENCOUNTER — OFFICE VISIT (OUTPATIENT)
Dept: RHEUMATOLOGY | Facility: CLINIC | Age: 53
End: 2023-12-07
Payer: COMMERCIAL

## 2023-12-07 VITALS
OXYGEN SATURATION: 97 % | WEIGHT: 118 LBS | HEIGHT: 62 IN | SYSTOLIC BLOOD PRESSURE: 105 MMHG | DIASTOLIC BLOOD PRESSURE: 67 MMHG | HEART RATE: 65 BPM | BODY MASS INDEX: 21.71 KG/M2

## 2023-12-07 DIAGNOSIS — D72.18 EOSINOPHILIC GRANULOMATOSIS WITH POLYANGIITIS (EGPA) WITH LUNG INVOLVEMENT (H): Primary | ICD-10-CM

## 2023-12-07 DIAGNOSIS — M30.1 EOSINOPHILIC GRANULOMATOSIS WITH POLYANGIITIS (EGPA) WITH LUNG INVOLVEMENT (H): Primary | ICD-10-CM

## 2023-12-07 PROCEDURE — 99214 OFFICE O/P EST MOD 30 MIN: CPT | Performed by: INTERNAL MEDICINE

## 2023-12-07 ASSESSMENT — PAIN SCALES - GENERAL: PAINLEVEL: MODERATE PAIN (4)

## 2023-12-07 NOTE — PROGRESS NOTES
"Rheumatology Clinic Visit     Valencia Ye MRN# [id number]   YOB: 1970 Age: 53 year old     Date of Visit: 12/07/2023  Primary care provider: Morelia Simental         Assessment and Plan:       # Eosinophilic granulomatosis with polyangiitis (eosinophilia, severe asthma, pericarditis, vasculitic lower extremity rashes, myalgia, fatigue, dx'd age 19): Patient relates reduced chronic fatigue and \"brain fog\" with suboptimal short-term memory. All symptoms attributable formerly to EGPA are stable with no major flares since the last visit. Examination today shows no rash, synovitis, or altered joint range of motion.  Lungs are clear to auscultation without wheezes.    Data: Blood work on February 1, 2023 showed creatinine, calcium, CBC normal; CRP was undetectable in December 2022.  PFT 2023:  FEV1 and FVC were in the normal range, a decline of FEV1 from 3.01 to 2.29 L, and FVC falling from 4.1 to 3.7 L had occurred over the course of 4 years.    EGPA remains stable, with no evidence of active systemic involvement with inflammatory disease.  Patient reports on each occasion of tapering, she developed flaring symptoms that reflected recurrent EGPA.  I continue to think that the risk of precipitating flare is greater than the risk of adverse effects associated with tapering prednisone beyond the current 5 mg dose, which is roughly at the level of expected physiologic corticosteroid endogenous production.  I recommend continuing prednisone dose of 5 mg every day.    I recommend  UA, CBC with differential, and creatinine should be performed semiannually.       Plan:  1.  Continue prednisone 5 mg every day  2.  Check urinalysis, creatinine, CBC with differential, inflammatory markers in blood every 6 months.  3. Echocardiogram in 3 2024.    I recommend follow-up in 6-9 months time.    Adal Gallo M.D.  Staff Rheumatologist, [facility]  Pager [phone]    Orders Placed This Encounter   Procedures    " Routine UA with Micro Reflex to Culture    Creatinine    CRP inflammation    Echocardiogram Complete    CBC with platelets differential               Active Problem List:     Patient Active Problem List    Diagnosis Date Noted    Diverticulosis 03/17/2023     Priority: Medium    Pain syndrome, chronic 05/22/2022     Priority: Medium    Alpha-lipoproteinemia 05/22/2022     Priority: Medium    Bladder pain 07/31/2019     Priority: Medium    Overactive bladder 07/31/2019     Priority: Medium    Vertigo 05/17/2018     Priority: Medium    Controlled substance agreement signed 11/13/2017     Priority: Medium     Patient is followed by BRIAN DEE for ongoing prescription of benzodiazepines.  All refills should be approved by this provider, or covering partner.    Medication(s): clonazepam.   Maximum quantity per month: 7.5  Clinic visit frequency required: Q 6  months     Controlled substance agreement on file: Yes       Date(s): 11/13/17  Benzodiazepine use reviewed by psychiatry:  No    Last [facility] website verification:  none   [email or web address]          Bronchiectasis (H) 05/22/2017     Priority: Medium    Moderate episode of recurrent major depressive disorder (H) 01/29/2017     Priority: Medium    Benign essential hypertension 10/20/2016     Priority: Medium    Mannose-binding lectin deficiency      Priority: Medium    Irritable bowel syndrome without diarrhea 07/14/2016     Priority: Medium    Chronic fatigue 01/21/2016     Priority: Medium    Anxiety 05/30/2014     Priority: Medium    Mild intermittent asthma without complication 07/16/2013     Priority: Medium    History of eating disorder 06/16/2013     Priority: Medium    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 Last seen in 4-2023, when EGPA was stable. Continued 5.5 mg daily prednisone was recommended.      Interval history December 7, 2023]    Patient was seen by Dr. Novak in pulmonary in September 2023.  Impression was of gradually declining FEV1, although within the normal range over several years, associated with subtle dyspnea on exertion.  Plan was to repeat PFTs in 3 months.  No change in therapy was recommended.  No recurrence of petechial rashes or splinter hemorrhages  Today, she reports several week history of cough, sputum prod, wheezes  She was started on augmentin by Dr. Novak; she feels better.  She has not had \"flare\" symptoms of EGPA.    Former L knee discomffort is improved with frequent yoga. +occasional \"rubbing\"    Prednisone 5 mg daily no interruption    Interval history April 20, 2023    Patient was seen by Dr. Novak in pulmonology on April 13, 2023.  Impression was of an asymptomatic woman with bronchiectasis and eosinophilic granulomatosis with polyangiitis whose former gastroesophageal reflux associated with a Schatzki's ring had abated significantly with use of apple cider vinegar and bile salts.  Pulmonary function tests were reviewed and revealed that although FEV1 and FVC were in the normal range, a decline of FEV1 from 3.01 to 2.29 L, and FVC following from 4.1 to 3.7 L had occurred over the course of 4 years.  Recurrent COVID-19 infections during that interval were noted.  Imaging of the chest and follow-up PFTs in 4 months were recommended.    Valencia Wilson says that her EGPA has been stable and not had any flares since her last appointment.  She has gone down on prednisone from 5.5mg to 5mg.  She has had migraine/tension headaches that are located on the back of head to temples.  She has not felt any changes in her breathing but has been concerned about the gradual decrease of her " "PFTs.  She has also had dry eyes for the past two years and more recently developed phlebitis in her finger and foot that began two weeks ago and has since resolved.    She had Left knee pain going up stairs and was seen in primary care.  An Xray 4/17 showed mild patellofemoral joint narrowing.       Interval history 12-    She saw neurology regarding chronic pain. Migraines and low iron were diagnosed. Her HA is improved with use of tyramine.    She continues with intermittent severe fatigue. At least several days per week, she spends the whole day in bed with increased somnolence. She has already been checked for apnea and sleep disturbance. She notes association of fatigue with marked psychological stimulation.    She notes leg swelling, worse as the day goes on.    She has normal diet, does not drink, exercises with dog-walking (but this is very draining). She does not have enough energy to try recommended yoga.    Several months ago, she had a \"bubble\" on a fingertip, lasting days/week, remitted spontaneously.    No other symptoms previously associated with EGPA.    She had COVID twice, most recently in 4-2021; s/p Pfizer vaccination.    prednisone 5.5 mg daily continues without interruption.    Interval history 06-:    She continues with stable  fatigue and brain fog. Myalgia has been less due to reduced stress recently.    She notes that around midday, she has overwhelming fatigue, and requires midmorning several hour nap. Her brain \"gets going\" in the early afternoon.     Appetite and weight stable; no rash; no joint pain; SOB is suppressed; no recurrence of divertiulitis; IBS continues unchanged.    Several weeks ago, she had a painful spot on the L 4th fingertip. It resolved spontaneously in 2-3 days. She has blister-like lesions on her fingers and her big toes.    Prednisone 5.5 mg daily continues without interruption.    Prior hx 6-2020    Her condition has been managed Dr. Butts since " 2013; I do not have his records to review today.    By her history, She was diagnosed with EGPA as a teenager after symptoms started at age 16. She had severe life-threatening asthma. By 19, she could not go to school or work due to breathting. She had weightl loss, painful toe rashes, and neurologic symptoms with restless legs, optical migraines. At age 19 in 1990, she was admitted to hospital, found to have pericarditis and eosinophilia. Bx skin showed vasculitis. She was started on prednisone, high dose initially. She has been on prednisone continuously since.    In recent years, she developed bronchiectasis, dx'd in 2013. She has been treated with daily therapeutic vest treatments, intermittent antibiotics.    In May 2018, she developed vertigo and nausea, which persisted for months, now somewhat improved.    She had worked continusously through the illness, until 2019.    She is afflicted with brain fog that causes short-term memory loss, fatigue and muscle pain. The latter is improved with reduced work-related stress. She has chronic sinus and ear problems. She notes dry eyes and reduced tear formation.    She had a respiratory illness in the end of February; she was not tested for COVID. She took an antibiotic and upped her dose of prednisone, and sx cleared.    Asthma is generally under good control. No recent vasculitis rash. The last episode of pericardial fluid episode was in [id number].    She has noted fluctuating blood pressures, with systolics in 80-90; ranging upward to 130-140s.    Patient was seen in ophthalmology on February 25, 2020 in follow-up of C Antonina syndrome.  Assessment was of no ocular manifestations of systemic rheumatic disease.    Patient was seen by Dr. Novak in pulmonology on December 26, 2019 for follow-up of bronchiectasis and eosinophilic granulomatous polyangiitis.  Pulmonary symptoms were under good control with chronic inhaler use, daily vest therapy and Mucomyst fatigue  and body aches were noted.  No change in medication was recommended.    She had severe IBS for years. She has noted marked improvement with use of SamE and Amitiza (for approximately 1 year).         Review of Systems:     Unlless listed in HPI:  Constitutional: negative  Skin: negative  Eyes: negative  Ears/Nose/Throat: negative  Respiratory: No shortness of breath, dyspnea on exertion, cough, or hemoptysis  Cardiovascular: negative  Gastrointestinal: negative  Genitourinary: negative  Musculoskeletal: negative  Neurologic: negative  Psychiatric: negative  Hematologic/Lymphatic/Immunologic: negative  Endocrine: negative          Past Medical History:     Past Medical History:   Diagnosis Date    Anxiety     Aortitis (H24)     Asthma     associated with Churg Antonina syndrome    Bronchiectasis (H)     Cerebral infarction (H) 1990    Very small, caused small permanent optical migraine    Chronic sinusitis     Churg-Antonina syndrome (H)     1990    Conductive hearing loss     Depressive disorder     Eustachian tube dysfunction     Goiter     Hearing loss, conductive     Heart rate problem     Hypertension     Hypocalcemia     Immunosuppression (H24)     Infection due to 2019 novel coronavirus 2022, , 2022    Irritable bowel syndrome     Major depressive disorder     Mannose-binding lectin deficiency     Nonspecific abnormal results of thyroid function study     Osteoporosis     Pericarditis      and     Pneumonia     4/23/10    Recurrent otitis media     Recurrent UTI     Rheumatoid vasculitis (H)     Sinusitis, chronic     Steroid long-term use     Tinnitus     Varicose veins of leg with swelling      Past Surgical History:   Procedure Laterality Date    C/SECTION, CLASSICAL  1998     SECTION      COLONOSCOPY Left 2014    Procedure: COMBINED COLONOSCOPY, SINGLE OR MULTIPLE BIOPSY/POLYPECTOMY BY BIOPSY;  Surgeon: Js Pinedo MD;   Location:  GI    COLONOSCOPY N/A 12/20/2019    Procedure: COLONOSCOPY;  Surgeon: Bryce Pimentel MD;  Location:  GI    ENT SURGERY      ESOPHAGOSCOPY, GASTROSCOPY, DUODENOSCOPY (EGD), COMBINED N/A 2/2/2023    Procedure: ESOPHAGOGASTRODUODENOSCOPY, WITH BIOPSY;  Surgeon: Mo Rosa MD;  Location:  GI    ESOPHAGOSCOPY, GASTROSCOPY, DUODENOSCOPY (EGD), DILATATION, COMBINED N/A 2/2/2023    Procedure: ESOPHAGOGASTRODUODENOSCOPY, WITH DILATION;  Surgeon: Mo Rosa MD;  Location:  GI    GENITOURINARY SURGERY      HEAD & NECK SURGERY      HYSTERECTOMY  2/27/2009    without oopherectomy    HYSTERECTOMY, PAP NO LONGER INDICATED      cervix removed     LAPAROSCOPIC APPENDECTOMY N/A 6/22/2022    Procedure: APPENDECTOMY, LAPAROSCOPIC;  Surgeon: Fany Hannah MD;  Location: [facility] OR    PE TUBES      PICC INSERTION  10/10/2013    5fr DL PASV PICC, 43cm (1cm external) in the L basilic vein w/ tip in the low SVC.    PICC INSERTION Left 05/26/2017    5fr DL BioFlo PICC, 42cm (3cm external) in the L lateral brachial vein w/ tip in the SVC RA junction.    SINUS SURGERY      TUBAL LIGATION      TYMPANOPLASTY      ZZHC COLP CERVIX/UPPER VAGINA W LOOP ELEC BX CERVIX       --Hysterectomy for cervical dysplasia.       Social History:     Social History     Occupational History    Not on file   Tobacco Use    Smoking status: Never     Passive exposure: Never    Smokeless tobacco: Never   Vaping Use    Vaping Use: Never used   Substance and Sexual Activity    Alcohol use: Not Currently    Drug use: No    Sexual activity: Yes     Partners: Male     Birth control/protection: Male Surgical, Female Surgical     Has 5 children, all healthy except for asthma  Worked as  at the  for 15 years. On disability since 4-2019.  Drinks 1/2 drink 5 days weekly. Never smoker.       Family History:     Family History   Problem Relation Age of Onset    Allergies Mother         Seasonal     Alcohol/Drug Mother     Substance Abuse Mother     Depression Mother     C.A.D. Father     Diabetes Father         Type 2    Depression Father     Heart Disease Father     Breast Cancer Maternal Grandmother     C.A.D. Maternal Grandmother     Arthritis Maternal Grandmother     Musculoskeletal Disorder Maternal Grandmother         MS    Heart Disease Maternal Grandmother     Hypertension Maternal Grandmother     Alcohol/Drug Maternal Grandfather     Substance Abuse Maternal Grandfather     Depression Maternal Grandfather     Unknown/Adopted Paternal Grandmother     Unknown/Adopted Paternal Grandfather     Asthma Daughter     Allergies Daughter         Seasonal    Asthma Son     Cancer Maternal Aunt         breast age 53    Breast Cancer Maternal Aunt         in her 50s    Glaucoma No family hx of     Macular Degeneration No family hx of     Colon Cancer No family hx of    MGM with MS; aunt MS         Allergies:     Allergies   Allergen Reactions    Colistimethate Anaphylaxis    Colymycin M [Colistimethate Sodium] Anaphylaxis    Tamiflu [Oseltamivir]      Gums Blister up    Azithromycin Palpitations     Heart palpitation  Heart palpitation    Clindamycin Rash            Medications:     Current Outpatient Medications   Medication Sig Dispense Refill    acetylcysteine (MUCOMYST) 20 % neb solution NEBULIZE THE CONTENTS OF ONE VIAL (4 ML) TWO TIMES A  mL 1    albuterol (PROAIR HFA/PROVENTIL HFA/VENTOLIN HFA) 108 (90 Base) MCG/ACT inhaler INHALE 2 PUFFS INTO THE LUNGS EVERY 4 HOURS AS NEEDED FOR SHORTNESS OF BREATH/WHEEZE 8.5 g 3    albuterol (PROVENTIL) (2.5 MG/3ML) 0.083% neb solution TAKE 1 VIAL VIA NEBULIZER TWICE A DAY. 150 mL 11    amoxicillin-clavulanate (AUGMENTIN) 500-125 MG tablet Take 1 tablet by mouth 2 times daily for 14 days 28 tablet 0    budeson-glycopyrrol-formoterol (BREZTRI AEROSPHERE) 160-9-4.8 MCG/ACT AERO inhaler Inhale 2 puffs into the lungs 2 times daily 10.7 g 11     "ciprofloxacin-dexamethasone (CIPRODEX) 0.3-0.1 % otic suspension INSTILL 3 DROPS INTO AFFECTED EARS 2 TIMES DAILY AS NEEDED 7.5 mL 10    fluticasone (FLONASE) 50 MCG/ACT nasal spray Spray 2 sprays into both nostrils daily 48 mL 3    NONFORMULARY Take 20 mg by mouth daily lithium orotate--used for anxiety      Omega-3 Fatty Acids (FISH OIL) 1200 MG capsule Take 4 capsules (4.8 g) by mouth daily      predniSONE (DELTASONE) 5 MG tablet Take 1 tablet (5 mg) by mouth daily 30 tablet 2    Probiotic Product (PROBIOTIC DAILY PO) Take 1 capsule by mouth daily      vitamin D3 (CHOLECALCIFEROL) 2000 units (50 mcg) tablet Take 2 tablets by mouth daily      VITAMIN K PO Take 1 capsule by mouth daily      metoprolol succinate ER (TOPROL-XL) 25 MG 24 hr tablet TAKE 1/2 TABLET(12.5 MG) BY MOUTH DAILY. HOLD IF BP IS LOW (Patient taking differently: Take 12.5 mg by mouth daily as needed (depending on daily BP check)) 45 tablet 3    predniSONE (DELTASONE) 1 MG tablet Take 5 tabs daily for 30 days 300 tablet 3    SYMBICORT 160-4.5 MCG/ACT Inhaler TAKE 2 PUFFS BY MOUTH TWICE A DAY 30.6 g 3    tiZANidine (ZANAFLEX) 2 MG tablet TAKE 1-2 TABLETS BY MOUTH 3 TIMES DAILY AS NEEDED FOR MUSCLE SPASMS 45 tablet 11            Physical Exam:   Blood pressure 105/67, pulse 65, height 1.58 m (5' 2.21\"), weight 53.5 kg (118 lb), last menstrual period 02/01/2009, SpO2 97%, not currently breastfeeding.  Wt Readings from Last 4 Encounters:   12/07/23 53.5 kg (118 lb)   09/28/23 52.1 kg (114 lb 13.8 oz)   08/17/23 50.8 kg (112 lb)   06/08/23 50.5 kg (111 lb 6.4 oz)       Constitutional: well-developed, appearing stated age; cooperative  Eyes: nl EOM, PERRLA, vision, conjunctiva, sclera  ENT: nl external ears, nose, hearing, lips, teeth, gums, throat  No mucous membrane lesions, normal saliva pool  Neck: no thyroid enlargement  MS: No hand/finger joint visible swelling; intact hand joint ROM  Skin: no nail pitting, alopecia, rash, nodules or " lesions  Neuro: nl cranial nerves  Psych: nl judgement, orientation, memory, affect.         Data:     No results found for any visits on 12/07/23.    Recent Labs   Lab Test 10/02/19  1428 02/28/19  1604 02/26/19  0637 07/05/18  1805 02/19/18  1626  06/09/17  1756  03/10/17  1835 11/10/16  1812 12/23/15  2227   WBC 8.5  --   --   --  6.1  --  6.7   < > 20.8* 8.8 8.0   RBC 4.01  --   --   --  3.96  --  3.85   < > 4.04 3.68* 4.02   HGB 13.6  --   --   --  13.0  --  12.7   < > 13.6 12.4 13.4   HCT 40.4  --   --   --  39.3  --  38.5   < > 39.6 37.2 39.0   *  --   --   --  99  --  100   < > 98 101* 97   RDW 12.7  --   --   --  13.1  --  12.1   < > 12.0 12.4 12.5     --   --   --  283  --  284   < > 292 276 275   ALBUMIN  --   --   --   --   --   --   --   --  3.7 4.0 3.8   CRP  --  <2.9 <2.9 <2.9  --   --  <2.9   < >  --  <2.9  --    BUN 14  --   --  19 8   < >  --    < > 14 14 11    < > = values in this interval not displayed.      Recent Labs   Lab Test 03/12/19 07/05/18  1805 11/10/16  1812   TSH 1.540 0.56 0.90   T4  --   --  1.01     Hemoglobin   Date Value Ref Range Status   06/22/2023 12.3 11.7 - 15.7 g/dL Final   04/25/2023 12.8 11.7 - 15.7 g/dL Final   02/01/2023 13.0 11.7 - 15.7 g/dL Final   06/15/2021 12.8 11.7 - 15.7 g/dL Final   10/20/2020 13.5 11.7 - 15.7 g/dL Final   06/29/2020 12.9 11.7 - 15.7 g/dL Final     Urea Nitrogen   Date Value Ref Range Status   04/25/2023 11.2 6.0 - 20.0 mg/dL Final   02/01/2023 9.6 6.0 - 20.0 mg/dL Final   12/14/2022 20.5 (H) 6.0 - 20.0 mg/dL Final   06/23/2022 8 7 - 30 mg/dL Final   06/22/2022 10 7 - 30 mg/dL Final   03/31/2022 19 7 - 30 mg/dL Final   10/20/2020 8 7 - 30 mg/dL Final   09/21/2020 12 7 - 30 mg/dL Final   10/02/2019 14 7 - 30 mg/dL Final     Sed Rate   Date Value Ref Range Status   09/21/2020 30 (H) 0 - 20 mm/h Final     Comment:     Results confirmed by repeat test   02/28/2019 6 0 - 20 mm/h Final   07/05/2018 6 0 - 20 mm/h Final     Erythrocyte  Sedimentation Rate   Date Value Ref Range Status   06/22/2023 8 0 - 30 mm/hr Final   04/25/2023 8 0 - 30 mm/hr Final   12/14/2022 8 0 - 30 mm/hr Final     CRP Cardiac Risk   Date Value Ref Range Status   02/04/2008 8.4 mg/L Final     Comment:     Reference Values:   Low Risk:           <1.0 mg/L   Average Risk:       1.0-3.0 mg/L   High Risk:          >3.0 mg/L   Acute Inflammation: >8.0 mg/L     CRP Inflammation   Date Value Ref Range Status   09/21/2020 44.0 (H) 0.0 - 8.0 mg/L Final   02/28/2019 <2.9 0.0 - 8.0 mg/L Final   02/26/2019 <2.9 0.0 - 8.0 mg/L Final     AST   Date Value Ref Range Status   04/25/2023 20 10 - 35 U/L Final   12/08/2021 14 0 - 45 U/L Final   06/15/2021 21 0 - 45 U/L Final   10/20/2020 13 0 - 45 U/L Final   03/12/2019 24 0 - 40 IU/L Final     Neutrophil Cytoplasmic IgG Antibody   Date Value Ref Range Status   02/17/2009 <1:20  Final     Comment:     Reference range: <1:20  (Note)  TEST INFORMATION: Anti-Neutrophil Cyto Ab, IgG  Neutrophil Cytoplasmic Antibodies (C-ANCA = granular  cytoplasmic staining, P-ANCA = perinuclear staining) are  found in the serum of over 90% of patients with certain  necrotizing systemic vasculitides, and usually in less  than 5% of patients with collagen vascular disease or  arthritis.                  ANCA'S IN VASCULITIC SYNDROMES                         Approx %      Approx % of                         Pos ANCA    patients demonstrating  ----------------------------------------------------------                (combined patterns)   C Pattern   P Pattern  Wegener's granulomatosis:  -Active Generalized   85 - 92 85 - 90       2 - 5  -Limited forms        60 - 67 85 - 90       2 - 5  -Inactive             30 - 35 85 - 90       2 - 5  Idiopathic Crescentic  Glomerulonephritis      80            some*     majority*  Polyarteritis Nodosa    50             86           14  Churg - Antonina         50             80           20  SLE                    less than 10     0    greater than 90  Rheumatoid Arthritis  less than 5      0    greater than 90  Sjogren's Syndrome        25           0    greater than 90  ----------------------------------------------------------  *No quantitation in the literature.  Performed by [facility],  [address] [facility] – [place] [id number] [phone]  [email or web address], Remy Watkins MD - Lab. Director                                                  08/19/2008 < 1:20  Final     Comment:     Reference range: < 1:20  (Note)  TEST INFORMATION: Anti-Neutrophil Cyto Ab, IgG  Neutrophil Cytoplasmic Antibodies (C-ANCA = granular  cytoplasmic staining, P-ANCA = perinuclear staining) are  found in the serum of over 90% of patients with certain  necrotizing systemic vasculitides, and usually in less  than 5% of patients with collagen vascular disease or  arthritis.                  ANCA'S IN VASCULITIC SYNDROMES                         Approx %      Approx % of                         Pos ANCA    patients demonstrating  ----------------------------------------------------------                (combined patterns)   C Pattern   P Pattern  Wegener's granulomatosis:  -Active Generalized   85 - 92        85 - 90       2 - 5  -Limited forms        60 - 67        85 - 90       2 - 5  -Inactive             30 - 35        85 - 90       2 - 5  Idiopathic Crescentic  Glomerulonephritis      80            some*     majority*  Polyarteritis Nodosa    50             86           14  Churg - Antonina         50             80           20  SLE                   less than 10     0    greater than 90  Rheumatoid Arthritis  less than 5      0    greater than 90  Sjogren's Syndrome        25           0    greater than 90  ----------------------------------------------------------  *No quantitation in the literature.  Performed by [facility],  [address] [facility] – [place], [place] [id number] [phone]  [email or web address],  Remy Watkins MD - Lab. Director                                               01/24/2008 < 1:20  Final     Comment:     Reference range: < 1:20  (Note)  TEST INFORMATION: Anti-Neutrophil Cyto Ab, IgG  Neutrophil Cytoplasmic Antibodies (C-ANCA = granular  cytoplasmic staining, P-ANCA = perinuclear staining) are  found in the serum of over 90% of patients with certain  necrotizing systemic vasculitides, and usually in less  than 5% of patients with collagen vascular disease or  arthritis.                  ANCA'S IN VASCULITIC SYNDROMES                         Approx %      Approx % of                         Pos ANCA    patients demonstrating  ----------------------------------------------------------                (combined patterns)   C Pattern   P Pattern  Wegener's granulomatosis:  -Active Generalized   85 - 92 85 - 90       2 - 5  -Limited forms        60 - 67 85 - 90       2 - 5  -Inactive             30 - 35        85 - 90       2 - 5  Idiopathic Crescentic  Glomerulonephritis      80            some*     majority*  Polyarteritis Nodosa    50             86           14  Churg - Antonina         50             80           20  SLE                   less than 10     0    greater than 90  Rheumatoid Arthritis  less than 5      0    greater than 90  Sjogren's Syndrome        25           0    greater than 90  ----------------------------------------------------------  *No quantitation in the literature.  Performed by [facility],  [address] [place] [id number] [phone]  [email or web address],  Remy Watkins MD - Lab. Director                                                Albumin   Date Value Ref Range Status   04/25/2023 4.6 3.5 - 5.2 g/dL Final   12/08/2021 3.7 3.4 - 5.0 g/dL Final   06/15/2021 3.9 3.4 - 5.0 g/dL Final   10/20/2020 3.9 3.4 - 5.0 g/dL Final   03/10/2017 3.7 3.4 - 5.0 g/dL Final     Alkaline Phosphatase   Date Value Ref Range Status   04/25/2023 51 35 - 104 U/L Final   10/20/2020 54 40 - 150 U/L Final   03/10/2017  48 40 - 150 U/L Final   11/10/2016 42 40 - 150 U/L Final     ALT   Date Value Ref Range Status   04/25/2023 11 10 - 35 U/L Final   12/08/2021 25 0 - 50 U/L Final   06/15/2021 28 0 - 50 U/L Final   10/20/2020 17 0 - 50 U/L Final   03/12/2019 29 0 - 32 IU/L Final     Rheumatoid Factor   Date Value Ref Range Status   07/05/2018 <20 <20 IU/mL Final     Recent Labs   Lab Test 06/22/23  1405 04/25/23  1436 02/01/23  0901 12/14/22  1412 03/31/22  1722 12/08/21  1345 06/15/21  1622 10/20/20  0155 09/21/20  1609 10/02/19  1428 03/12/19  0000 03/21/17  1730 03/10/17  1835   WBC 9.2 9.4 4.8  --    < > 9.0 8.1 14.0*  --    < >  --    < > 20.8*   HGB 12.3 12.8 13.0  --    < > 13.6 12.8 13.5  --    < >  --    < > 13.6   HCT 37.8 38.0 39.0  --    < > 41.1 38.3 41.3  --    < >  --    < > 39.6   * 99 99  --    < > 103* 101* 100  --    < >  --    < > 98    391 279  --    < > 306 259 261  --    < >  --    < > 292   BUN  --  11.2 9.6 20.5*   < >  --   --  8 12   < >  --    < > 14   TSH  --   --   --   --   --   --  0.66  --  0.60  --  1.540   < >  --    AST  --  20  --   --   --  14 21 13  --   --  24  --  17   ALT  --  11  --   --   --  25 28 17  --   --  29   < > 18   ALKPHOS  --  51  --   --   --   --   --  54  --   --   --   --  48    < > = values in this interval not displayed.         Latest Ref Rng & Units 2/1/2023     9:01 AM 4/25/2023     2:36 PM 6/22/2023     2:05 PM   RHEUM RESULTS   Albumin 3.5 - 5.2 g/dL  4.6     ALT 10 - 35 U/L  11     AST 10 - 35 U/L  20     Complement C3 81 - 157 mg/dL   70    Complement C4 13 - 39 mg/dL   15    Creatinine 0.51 - 0.95 mg/dL 0.69  0.87     CRP Inflammation <5.00 mg/L  <3.00  <3.00    GFR Estimate >60 mL/min/1.73m2 >90  81     Hematocrit 35.0 - 47.0 % 39.0  38.0  37.8    Hemoglobin 11.7 - 15.7 g/dL 13.0  12.8  12.3    WBC 4.0 - 11.0 10e3/uL 4.8  9.4  9.2    RBC Count 3.80 - 5.20 10e6/uL 3.96  3.85  3.74    RDW 10.0 - 15.0 % 13.0  13.7  13.1    MCHC 31.5 - 36.5 g/dL 33.3   33.7  32.5    MCV 78 - 100 fL 99  99  101    Platelet Count 150 - 450 10e3/uL 279  391  321    Sed Rate 0 - 30 mm/hr  8  8        Rheumatoid Factor   Date Value Ref Range Status   07/05/2018 <20 <20 IU/mL Final   ,  ,  ,  ,  ,   SSA (RO) Antibody IgG   Date Value Ref Range Status   02/17/2009 2  Final     Comment:     Reference range: 0 to 40  Unit: AU/mL  (Note)  REFERENCE INTERVALS: SSA (Ro) (WESTLEY) Ab, IgG   29 AU/mL or Less ............. Negative   30 - 40 AU/mL ................ Equivocal   41 AU/mL or Greater .......... Positive    SSA (Ro) antibody is seen in 70-75% of Sjogren syndrome  cases, 30-40% of systemic lupus erythematosus (SLE) and  5-10% of progressive systemic sclerosis (PSS).  Performed by [facility],  [address] [facility] [facility] [id number] [phone]  [email or web address], Remy Watkins MD- Lab. Director     Scleroderma Antibody IgG   Date Value Ref Range Status   02/17/2009 0  Final     Comment:     Reference range: 0 to 40  Unit: AU/mL  (Note)  REFERENCE INTERVALS: Scleroderma (Scl-70) (WESTLEY) Ab, IgG   29 AU/mL or Less ............. Negative   30 - 40 AU/mL ................ Equivocal   41 AU/mL or Greater .......... Positive    Scleroderma (Scl-70) antibody is seen in 20-60% of  patients with scleroderma and is considered diagnostic  and specific for scleroderma if it is the only WESTLEY  antibody present. Scl-70 is also seen in approximately  25% of progressive systemic sclerosis (PSS).  Performed by [facility],  [address] [facility] [place] [id number] [phone]  [email or web address], Remy Watkins MD - Lab. Director   ,   JOSE interpretation   Date Value Ref Range Status   02/28/2019 Negative NEG^Negative Final     Comment:                                        Reference range:  <1:40  NEGATIVE  1:40 - 1:80  BORDERLINE POSITIVE  >1:80 POSITIVE       ,   JOSE Screen by EIA   Date Value Ref Range Status   11/10/2016 <1.0  Interpretation:  Negative   <1.0 Final   ,  ,  ,  ,  ,  ,  ,  ,   Hep B Surface  Agn   Date Value Ref Range Status   03/16/2005 Negative NEG Final   ,  ,  ,  ,  ,  ,  ,  ,  ,  ,  ,   Neutrophil Cytoplasmic IgG Antibody   Date Value Ref Range Status   02/17/2009 <1:20  Final     Comment:     Reference range: <1:20  (Note)  TEST INFORMATION: Anti-Neutrophil Cyto Ab, IgG  Neutrophil Cytoplasmic Antibodies (C-ANCA = granular  cytoplasmic staining, P-ANCA = perinuclear staining) are  found in the serum of over 90% of patients with certain  necrotizing systemic vasculitides, and usually in less  than 5% of patients with collagen vascular disease or  arthritis.                  ANCA'S IN VASCULITIC SYNDROMES                         Approx %      Approx % of                         Pos ANCA    patients demonstrating  ----------------------------------------------------------                (combined patterns)   C Pattern   P Pattern  Wegener's granulomatosis:  -Active Generalized   85 - 92 85 - 90       2 - 5  -Limited forms        60 - 67        85 - 90       2 - 5  -Inactive             30 - 35        85 - 90       2 - 5  Idiopathic Crescentic  Glomerulonephritis      80            some*     majority*  Polyarteritis Nodosa    50             86           14  Churg - Antonina         50             80           20  SLE                   less than 10     0    greater than 90  Rheumatoid Arthritis  less than 5      0    greater than 90  Sjogren's Syndrome        25           0    greater than 90  ----------------------------------------------------------  *No quantitation in the literature.  Performed by [facility],  [address] [place] [id number] [phone]  [email or web address], Remy Watkins MD - Lab. Director                                                      IGG   Date Value Ref Range Status   10/04/2013 854 695 - 1,620 mg/dL Final

## 2023-12-07 NOTE — NURSING NOTE
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      JOSE F Douglas    "

## 2023-12-07 NOTE — PATIENT INSTRUCTIONS
1. Eosinophilic granulomatous polyangiitis: Symptoms of brain fog, cognitive slowing, fatigue, diffuse myalgia remain iimproved. Cause of subtle decline in PFTs remains unclear. I agree with continued use of low-dose prednisone, no change; follow up with Pulmonary    Plan:  1.  Continue prednisone 5 mg every day  2.  Check urinalysis, creatinine, CBC with differential, inflammatory markers in blood every 6 months.  3. Echocardiogram in 3 2024.

## 2023-12-15 ENCOUNTER — OFFICE VISIT (OUTPATIENT)
Dept: INTERNAL MEDICINE | Facility: CLINIC | Age: 53
End: 2023-12-15
Attending: INTERNAL MEDICINE
Payer: COMMERCIAL

## 2023-12-15 VITALS
BODY MASS INDEX: 20.38 KG/M2 | DIASTOLIC BLOOD PRESSURE: 68 MMHG | OXYGEN SATURATION: 95 % | RESPIRATION RATE: 20 BRPM | HEIGHT: 63 IN | TEMPERATURE: 97.6 F | HEART RATE: 87 BPM | SYSTOLIC BLOOD PRESSURE: 108 MMHG | WEIGHT: 115 LBS

## 2023-12-15 DIAGNOSIS — M62.830 SPASM OF MUSCLE OF LOWER BACK: ICD-10-CM

## 2023-12-15 DIAGNOSIS — R00.2 PALPITATIONS: ICD-10-CM

## 2023-12-15 DIAGNOSIS — R21 RASH AND NONSPECIFIC SKIN ERUPTION: ICD-10-CM

## 2023-12-15 DIAGNOSIS — N89.8 VAGINAL DRYNESS: ICD-10-CM

## 2023-12-15 DIAGNOSIS — Z00.00 ENCOUNTER FOR PREVENTIVE HEALTH EXAMINATION: Primary | ICD-10-CM

## 2023-12-15 DIAGNOSIS — G89.4 PAIN SYNDROME, CHRONIC: ICD-10-CM

## 2023-12-15 DIAGNOSIS — Z13.220 SCREENING FOR HYPERLIPIDEMIA: ICD-10-CM

## 2023-12-15 DIAGNOSIS — D72.18 EOSINOPHILIC GRANULOMATOSIS WITH POLYANGIITIS (EGPA) WITH LUNG INVOLVEMENT (H): ICD-10-CM

## 2023-12-15 DIAGNOSIS — Z13.1 SCREENING FOR DIABETES MELLITUS: ICD-10-CM

## 2023-12-15 DIAGNOSIS — M30.1 EOSINOPHILIC GRANULOMATOSIS WITH POLYANGIITIS (EGPA) WITH LUNG INVOLVEMENT (H): ICD-10-CM

## 2023-12-15 LAB
ALBUMIN UR-MCNC: NEGATIVE MG/DL
ANION GAP SERPL CALCULATED.3IONS-SCNC: 11 MMOL/L (ref 7–15)
APPEARANCE UR: CLEAR
BACTERIA #/AREA URNS HPF: ABNORMAL /HPF
BASOPHILS # BLD AUTO: 0.1 10E3/UL (ref 0–0.2)
BASOPHILS NFR BLD AUTO: 1 %
BILIRUB UR QL STRIP: NEGATIVE
BUN SERPL-MCNC: 13.2 MG/DL (ref 6–20)
CALCIUM SERPL-MCNC: 9.4 MG/DL (ref 8.6–10)
CHLORIDE SERPL-SCNC: 103 MMOL/L (ref 98–107)
CHOLEST SERPL-MCNC: 236 MG/DL
COLOR UR AUTO: YELLOW
CREAT SERPL-MCNC: 0.81 MG/DL (ref 0.51–0.95)
CREAT UR-MCNC: 24.5 MG/DL
CRP SERPL-MCNC: <3 MG/L
DEPRECATED HCO3 PLAS-SCNC: 26 MMOL/L (ref 22–29)
EGFRCR SERPLBLD CKD-EPI 2021: 87 ML/MIN/1.73M2
EOSINOPHIL # BLD AUTO: 0.7 10E3/UL (ref 0–0.7)
EOSINOPHIL NFR BLD AUTO: 11 %
ERYTHROCYTE [DISTWIDTH] IN BLOOD BY AUTOMATED COUNT: 13 % (ref 10–15)
ESTRADIOL SERPL-MCNC: 58 PG/ML
FASTING STATUS PATIENT QL REPORTED: NO
FSH SERPL IRP2-ACNC: 26.3 MIU/ML
GLUCOSE SERPL-MCNC: 92 MG/DL (ref 70–99)
GLUCOSE UR STRIP-MCNC: NEGATIVE MG/DL
HCT VFR BLD AUTO: 39.8 % (ref 35–47)
HDLC SERPL-MCNC: 131 MG/DL
HGB BLD-MCNC: 13.6 G/DL (ref 11.7–15.7)
HGB UR QL STRIP: NEGATIVE
IMM GRANULOCYTES # BLD: 0 10E3/UL
IMM GRANULOCYTES NFR BLD: 0 %
KETONES UR STRIP-MCNC: NEGATIVE MG/DL
LDLC SERPL CALC-MCNC: 91 MG/DL
LEUKOCYTE ESTERASE UR QL STRIP: NEGATIVE
LYMPHOCYTES # BLD AUTO: 1.1 10E3/UL (ref 0.8–5.3)
LYMPHOCYTES NFR BLD AUTO: 18 %
MCH RBC QN AUTO: 34 PG (ref 26.5–33)
MCHC RBC AUTO-ENTMCNC: 34.2 G/DL (ref 31.5–36.5)
MCV RBC AUTO: 100 FL (ref 78–100)
MICROALBUMIN UR-MCNC: <12 MG/L
MICROALBUMIN/CREAT UR: NORMAL MG/G{CREAT}
MONOCYTES # BLD AUTO: 0.5 10E3/UL (ref 0–1.3)
MONOCYTES NFR BLD AUTO: 8 %
NEUTROPHILS # BLD AUTO: 3.8 10E3/UL (ref 1.6–8.3)
NEUTROPHILS NFR BLD AUTO: 62 %
NITRATE UR QL: NEGATIVE
NONHDLC SERPL-MCNC: 105 MG/DL
PH UR STRIP: 7.5 [PH] (ref 5–7)
PLATELET # BLD AUTO: 313 10E3/UL (ref 150–450)
POTASSIUM SERPL-SCNC: 4 MMOL/L (ref 3.4–5.3)
RBC # BLD AUTO: 4 10E6/UL (ref 3.8–5.2)
RBC #/AREA URNS AUTO: ABNORMAL /HPF
SODIUM SERPL-SCNC: 140 MMOL/L (ref 135–145)
SP GR UR STRIP: 1.01 (ref 1–1.03)
SQUAMOUS #/AREA URNS AUTO: ABNORMAL /LPF
TRIGL SERPL-MCNC: 70 MG/DL
UROBILINOGEN UR STRIP-ACNC: 0.2 E.U./DL
WBC # BLD AUTO: 6.2 10E3/UL (ref 4–11)
WBC #/AREA URNS AUTO: ABNORMAL /HPF

## 2023-12-15 PROCEDURE — 80048 BASIC METABOLIC PNL TOTAL CA: CPT

## 2023-12-15 PROCEDURE — 82670 ASSAY OF TOTAL ESTRADIOL: CPT

## 2023-12-15 PROCEDURE — 82043 UR ALBUMIN QUANTITATIVE: CPT

## 2023-12-15 PROCEDURE — 80061 LIPID PANEL: CPT

## 2023-12-15 PROCEDURE — 86140 C-REACTIVE PROTEIN: CPT

## 2023-12-15 PROCEDURE — 36415 COLL VENOUS BLD VENIPUNCTURE: CPT

## 2023-12-15 PROCEDURE — 85025 COMPLETE CBC W/AUTO DIFF WBC: CPT

## 2023-12-15 PROCEDURE — 81001 URINALYSIS AUTO W/SCOPE: CPT

## 2023-12-15 PROCEDURE — 99214 OFFICE O/P EST MOD 30 MIN: CPT | Mod: 25

## 2023-12-15 PROCEDURE — 83001 ASSAY OF GONADOTROPIN (FSH): CPT

## 2023-12-15 PROCEDURE — 82570 ASSAY OF URINE CREATININE: CPT

## 2023-12-15 PROCEDURE — 99396 PREV VISIT EST AGE 40-64: CPT

## 2023-12-15 RX ORDER — TIZANIDINE 2 MG/1
TABLET ORAL
Qty: 45 TABLET | Refills: 11 | Status: SHIPPED | OUTPATIENT
Start: 2023-12-15 | End: 2024-02-08

## 2023-12-15 RX ORDER — METOPROLOL SUCCINATE 25 MG/1
12.5 TABLET, EXTENDED RELEASE ORAL DAILY PRN
Qty: 135 TABLET | Refills: 3 | Status: SHIPPED | OUTPATIENT
Start: 2023-12-15 | End: 2024-02-08

## 2023-12-15 RX ORDER — ESTRADIOL 0.1 MG/G
2 CREAM VAGINAL
Qty: 42.5 G | Refills: 3 | Status: SHIPPED | OUTPATIENT
Start: 2023-12-18 | End: 2024-05-24

## 2023-12-15 ASSESSMENT — ENCOUNTER SYMPTOMS
CHILLS: 0
PARESTHESIAS: 0
CONSTIPATION: 0
HEARTBURN: 1
BREAST MASS: 0
SORE THROAT: 0
SHORTNESS OF BREATH: 0
HEMATURIA: 0
DIZZINESS: 1
HEADACHES: 1
FREQUENCY: 0
ABDOMINAL PAIN: 0
JOINT SWELLING: 0
EYE PAIN: 0
MYALGIAS: 1
COUGH: 0
ARTHRALGIAS: 0
NAUSEA: 1
WEAKNESS: 0
FEVER: 0
HEMATOCHEZIA: 0
DYSURIA: 0
PALPITATIONS: 0
NERVOUS/ANXIOUS: 1
DIARRHEA: 1

## 2023-12-15 ASSESSMENT — PATIENT HEALTH QUESTIONNAIRE - PHQ9
10. IF YOU CHECKED OFF ANY PROBLEMS, HOW DIFFICULT HAVE THESE PROBLEMS MADE IT FOR YOU TO DO YOUR WORK, TAKE CARE OF THINGS AT HOME, OR GET ALONG WITH OTHER PEOPLE: EXTREMELY DIFFICULT
SUM OF ALL RESPONSES TO PHQ QUESTIONS 1-9: 19
SUM OF ALL RESPONSES TO PHQ QUESTIONS 1-9: 19

## 2023-12-15 ASSESSMENT — ASTHMA QUESTIONNAIRES: ACT_TOTALSCORE: 22

## 2023-12-15 NOTE — PATIENT INSTRUCTIONS
Your bone scan shows you have Osteopenia (low bone density) but not Osteoporosis. In order to keep this from turning into Osteoporosis, we recommend taking a daily calcium supplement with vitamin D.        Daily calcium need total is 1200-1500mg a day from the diet and supplements. Calcium citrate is easier to digest.   Vitamin D 2000 IU daily recommended as this helps you absorb the calcium best.    Oscal-D (Calcium with Vitamin D) is available over the counter. You should try to find Oscal-D that has about 600mg of calcium and take it once in the morning and once in the evening. (If you are not able to find this exact brand, please ask the pharmacist and they will be able to find something for you that is similar).     If you are already taking a Calcium-Vitamin D supplement daily, please continue taking this.    Please let me know if you have any questions.    Thanks!  SUNNY Thornton, CNP   [facility]

## 2023-12-15 NOTE — PROGRESS NOTES
SUBJECTIVE:   Valencia is a 53 year old, presenting for the following:  Physical        12/15/2023     7:14 AM   Additional Questions   Roomed by Nani       Healthy Habits:     Getting at least 3 servings of Calcium per day:  Yes    Bi-annual eye exam:  Yes    Dental care twice a year:  Yes    Sleep apnea or symptoms of sleep apnea:  None    Diet:  Other    Frequency of exercise:  4-5 days/week    Duration of exercise:  45-60 minutes    Taking medications regularly:  Yes    Medication side effects:  Other    Additional concerns today:  No      Today's PHQ-9 Score:       12/15/2023     7:11 AM   PHQ-9 SCORE   PHQ-9 Total Score Lucia 19 (Moderately severe depression)   PHQ-9 Total Score 19       Social History     Tobacco Use     Smoking status: Never     Passive exposure: Never     Smokeless tobacco: Never   Substance Use Topics     Alcohol use: Not Currently             12/15/2023     7:15 AM   Alcohol Use   Prescreen: >3 drinks/day or >7 drinks/week? Not Applicable     Reviewed orders with patient.  Reviewed health maintenance and updated orders accordingly - Yes      Breast Cancer Screenin/19/2022     7:44 AM 2022     4:06 PM   Breast CA Risk Assessment (FHS-7)   Do you have a family history of breast, colon, or ovarian cancer? Yes No / Unknown         Mammogram Screening: Recommended annual mammography  Pertinent mammograms are reviewed under the imaging tab.    History of abnormal Pap smear: Status post benign hysterectomy. Health Maintenance and Surgical History updated.      2014    12:00 AM 2011    12:00 AM 3/24/2010    12:00 AM   PAP / HPV   PAP (Historical) NIL  NIL  NIL      Reviewed and updated as needed this visit by clinical staff   Tobacco  Allergies  Meds   Med Hx  Surg Hx  Fam Hx  Soc Hx        Reviewed and updated as needed this visit by Provider                     Review of Systems   Constitutional:  Negative for chills and fever.   HENT:  Positive for hearing  "loss. Negative for congestion, ear pain and sore throat.    Eyes:  Negative for pain and visual disturbance.   Respiratory:  Negative for cough and shortness of breath.    Cardiovascular:  Positive for peripheral edema. Negative for chest pain and palpitations.   Gastrointestinal:  Positive for diarrhea, heartburn and nausea. Negative for abdominal pain, constipation and hematochezia.   Breasts:  Positive for tenderness. Negative for breast mass and discharge.   Genitourinary:  Negative for dysuria, frequency, genital sores, hematuria, pelvic pain, urgency, vaginal bleeding and vaginal discharge.   Musculoskeletal:  Positive for myalgias. Negative for arthralgias and joint swelling.   Skin:  Positive for rash.   Neurological:  Positive for dizziness and headaches. Negative for weakness and paresthesias.   Psychiatric/Behavioral:  Positive for mood changes. The patient is nervous/anxious.           OBJECTIVE:   /68   Pulse 87   Temp 97.6  F (36.4  C) (Oral)   Resp 20   Ht 1.588 m (5' 2.5\")   Wt 52.2 kg (115 lb)   LMP 02/01/2009 (LMP Unknown)   SpO2 95%   BMI 20.70 kg/m          Physical Exam  Constitutional:       General: She is not in acute distress.     Appearance: Normal appearance. She is not ill-appearing, toxic-appearing or diaphoretic.   HENT:      Head: Normocephalic and atraumatic.   Eyes:      Conjunctiva/sclera: Conjunctivae normal.   Cardiovascular:      Rate and Rhythm: Normal rate and regular rhythm.      Heart sounds: Normal heart sounds.   Pulmonary:      Effort: Pulmonary effort is normal.      Breath sounds: Normal breath sounds.   Skin:     General: Skin is warm and dry.   Neurological:      Mental Status: She is alert and oriented to person, place, and time.   Psychiatric:         Mood and Affect: Mood normal.         Behavior: Behavior normal.         Thought Content: Thought content normal.         Judgment: Judgment normal.       Diagnostic Test Results:  Labs reviewed in " Epic    ASSESSMENT/PLAN:   (Z00.00) Encounter for preventive health examination  (primary encounter diagnosis)  Comment: presents for annual visit  Plan:       (R21) Rash and nonspecific skin eruption  Comment:   Plan: Adult Dermatology  Referral            (N89.8) Vaginal dryness  Comment: Pt endorses recent symptoms of vaginal dryness and hot flashes- will check hormone levels today and send Rx for estradiol cream. She had hysterectomy many years ago.   Plan: Estradiol, Follicle stimulating hormone,         estradiol (ESTRACE) 0.1 MG/GM vaginal cream            (Z13.1) Screening for diabetes mellitus  Comment:   Plan: Albumin Random Urine Quantitative with Creat         Ratio, Basic metabolic panel  (Ca, Cl, CO2,         Creat, Gluc, K, Na, BUN)            (M62.830) Spasm of muscle of lower back  Comment:   Plan: tiZANidine (ZANAFLEX) 2 MG tablet            (R00.2) Palpitations  Comment:   Plan: metoprolol succinate ER (TOPROL XL) 25 MG 24 hr        tablet            (Z13.220) Screening for hyperlipidemia  Comment:   Plan: Lipid panel reflex to direct LDL Fasting            (G89.4) Pain syndrome, chronic  Comment: Saw [facility] pain clinic and discussed potential of starting Naltrexone- also talked about functional medicine and dietary changes, acupuncture, etc  Plan:     (M30.1,  D72.18) 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 Symptoms are currently controlled- she denies any exacerbation of cough, wheezing, SOB.  Plan:     Patient has been advised of split billing requirements and indicates understanding: Yes      COUNSELING:  Reviewed preventive health counseling, as reflected in patient instructions       Regular exercise       Healthy diet/nutrition       Osteoporosis prevention/bone health       Colorectal Cancer Screening        She reports that she has never smoked. She has  never been exposed to tobacco smoke. She has never used smokeless tobacco.          SUNNY Thornton [facility]  Answers submitted by the patient for this visit:  Patient Health Questionnaire (Submitted on 12/15/2023)  If you checked off any problems, how difficult have these problems made it for you to do your work, take care of things at home, or get along with other people?: Extremely difficult  PHQ9 TOTAL SCORE: 19

## 2023-12-18 ENCOUNTER — MYC MEDICAL ADVICE (OUTPATIENT)
Dept: RHEUMATOLOGY | Facility: CLINIC | Age: 53
End: 2023-12-18
Payer: COMMERCIAL

## 2023-12-18 DIAGNOSIS — M30.1 EOSINOPHILIC GRANULOMATOSIS WITH POLYANGIITIS (EGPA) WITH LUNG INVOLVEMENT (H): Primary | ICD-10-CM

## 2023-12-18 DIAGNOSIS — D72.18 EOSINOPHILIC GRANULOMATOSIS WITH POLYANGIITIS (EGPA) WITH LUNG INVOLVEMENT (H): Primary | ICD-10-CM

## 2023-12-19 NOTE — TELEPHONE ENCOUNTER
See MyChart from the patient.  She is concerned about the eosinophil count.  Patient would like to go back to her previous prednisone dose of 5mg day alternating with 6mg per day.  Also wanting weekly checks of the eosinophils unitl the count goes down..     Jayde Fink RN

## 2023-12-20 NOTE — TELEPHONE ENCOUNTER
Thank you for noting that absolute eosinophil count is higher than it was in 6-23. However, it is still within normal range. I do not recommend changing prednisone dose based on this result. I recommend rechecking CBC with platelets and differential after January 15 to determine whether a trend towards abnormal eosinophilia persists.

## 2023-12-20 NOTE — TELEPHONE ENCOUNTER
Called and spoke with the patient.  She is a little nervous waiting until after Jan 15th and wonders if she could check it in maybe 2 weeks??  Please advise.    Jayde Fink RN

## 2024-01-12 ENCOUNTER — HOSPITAL ENCOUNTER (OUTPATIENT)
Dept: CARDIOLOGY | Facility: CLINIC | Age: 54
Discharge: HOME | End: 2024-01-12
Attending: INTERNAL MEDICINE | Admitting: INTERNAL MEDICINE
Payer: COMMERCIAL

## 2024-01-12 DIAGNOSIS — M30.1 EOSINOPHILIC GRANULOMATOSIS WITH POLYANGIITIS (EGPA) WITH LUNG INVOLVEMENT (H): ICD-10-CM

## 2024-01-12 DIAGNOSIS — D72.18 EOSINOPHILIC GRANULOMATOSIS WITH POLYANGIITIS (EGPA) WITH LUNG INVOLVEMENT (H): ICD-10-CM

## 2024-01-12 LAB — LVEF ECHO: NORMAL

## 2024-01-12 PROCEDURE — 93306 TTE W/DOPPLER COMPLETE: CPT

## 2024-01-12 PROCEDURE — 93306 TTE W/DOPPLER COMPLETE: CPT | Mod: 26 | Performed by: INTERNAL MEDICINE

## 2024-01-13 DIAGNOSIS — J47.9 BRONCHIECTASIS (H): ICD-10-CM

## 2024-01-15 ENCOUNTER — LAB (OUTPATIENT)
Dept: LAB | Facility: CLINIC | Age: 54
End: 2024-01-15
Payer: COMMERCIAL

## 2024-01-15 DIAGNOSIS — D72.18 EOSINOPHILIC GRANULOMATOSIS WITH POLYANGIITIS (EGPA) WITH LUNG INVOLVEMENT (H): ICD-10-CM

## 2024-01-15 DIAGNOSIS — M30.1 EOSINOPHILIC GRANULOMATOSIS WITH POLYANGIITIS (EGPA) WITH LUNG INVOLVEMENT (H): ICD-10-CM

## 2024-01-15 LAB
BASOPHILS # BLD AUTO: 0.1 10E3/UL (ref 0–0.2)
BASOPHILS NFR BLD AUTO: 1 %
EOSINOPHIL # BLD AUTO: 0.7 10E3/UL (ref 0–0.7)
EOSINOPHIL NFR BLD AUTO: 7 %
ERYTHROCYTE [DISTWIDTH] IN BLOOD BY AUTOMATED COUNT: 12.7 % (ref 10–15)
HCT VFR BLD AUTO: 37.8 % (ref 35–47)
HGB BLD-MCNC: 13.1 G/DL (ref 11.7–15.7)
IMM GRANULOCYTES # BLD: 0 10E3/UL
IMM GRANULOCYTES NFR BLD: 0 %
LYMPHOCYTES # BLD AUTO: 1.5 10E3/UL (ref 0.8–5.3)
LYMPHOCYTES NFR BLD AUTO: 16 %
MCH RBC QN AUTO: 34.2 PG (ref 26.5–33)
MCHC RBC AUTO-ENTMCNC: 34.7 G/DL (ref 31.5–36.5)
MCV RBC AUTO: 99 FL (ref 78–100)
MONOCYTES # BLD AUTO: 0.7 10E3/UL (ref 0–1.3)
MONOCYTES NFR BLD AUTO: 8 %
NEUTROPHILS # BLD AUTO: 6.3 10E3/UL (ref 1.6–8.3)
NEUTROPHILS NFR BLD AUTO: 68 %
PLATELET # BLD AUTO: 324 10E3/UL (ref 150–450)
RBC # BLD AUTO: 3.83 10E6/UL (ref 3.8–5.2)
WBC # BLD AUTO: 9.2 10E3/UL (ref 4–11)

## 2024-01-15 PROCEDURE — 36415 COLL VENOUS BLD VENIPUNCTURE: CPT

## 2024-01-15 PROCEDURE — 85025 COMPLETE CBC W/AUTO DIFF WBC: CPT

## 2024-01-15 RX ORDER — ALBUTEROL SULFATE 90 UG/1
AEROSOL, METERED RESPIRATORY (INHALATION)
Qty: 8.5 G | Refills: 3 | Status: SHIPPED | OUTPATIENT
Start: 2024-01-15 | End: 2024-09-19

## 2024-01-15 NOTE — RESULT ENCOUNTER NOTE
Blood counts show stable, normal numbers of eosinophils. I recommend no change in chronic medication; please recheck CBC with differential in another 3 months. Please let me know if you have questions.    Sincerely,    Adal Gallo MD  Rheumatologist,  [facility]

## 2024-01-16 ENCOUNTER — TELEPHONE (OUTPATIENT)
Dept: RHEUMATOLOGY | Facility: CLINIC | Age: 54
End: 2024-01-16
Payer: COMMERCIAL

## 2024-01-16 ENCOUNTER — TELEPHONE (OUTPATIENT)
Dept: PULMONOLOGY | Facility: CLINIC | Age: 54
End: 2024-01-16

## 2024-01-16 NOTE — TELEPHONE ENCOUNTER
Patient sent [employer]Â® message with following symptoms:    Patient reported previous symptoms starting 11/30.     Currently having continued chest crackles, chest tightness, and recent mild asthma attack (two nights ago--very unusual per patient report). Previously treated with two separate rounds of Augmentin x 14 days (11/30-12/14 and then 12/22-11/05).     Patient reports symptoms improved while on Augmentin. Sputum now white/yellow. Uses rescue inhaler 1-2 daily (change from previously zero). Airway clearance daily and added extra on days really feeling the crackle symptoms.    RN discussed with Dr. Briscoe and plan as follows:    See patient sooner 1/18 instead of 1/25 appointment. Labs: CBC with diff, IGE, sed rate, CRP along with PFT and Chest Xray.     RN reached out to patient with this plan. Will work with our  to get this set.     LUCILA Hernandez

## 2024-01-16 NOTE — TELEPHONE ENCOUNTER
----- Message from Adal Gallo MD sent at 1/15/2024 11:51 AM CST -----  Blood counts show stable, normal numbers of eosinophils. I recommend no change in chronic medication; please recheck CBC with differential in another 3 months. Please let me know if you have questions.    Sincerely,    Adal Gallo MD  Rheumatologist, [facility]

## 2024-01-16 NOTE — TELEPHONE ENCOUNTER
"Patient called and made aware of the results.  She states this answer is not acceptable to her.  She states \"just because the results are in the normal range, it is higher than her normal range and something is brewing\".  She would like the scientific reasoning behind the response and is requesting a call back from you.     Jayde Fink RN    "

## 2024-01-16 NOTE — TELEPHONE ENCOUNTER
Thanks Jayde.  I recommend a clinic visit so that I can better understand patient's concerns.  Please offer to place patient on cancellation waitlist.

## 2024-01-17 ENCOUNTER — OFFICE VISIT (OUTPATIENT)
Dept: INTERNAL MEDICINE | Facility: CLINIC | Age: 54
End: 2024-01-17
Payer: COMMERCIAL

## 2024-01-17 VITALS
TEMPERATURE: 97.1 F | SYSTOLIC BLOOD PRESSURE: 126 MMHG | DIASTOLIC BLOOD PRESSURE: 82 MMHG | WEIGHT: 110.4 LBS | RESPIRATION RATE: 18 BRPM | HEIGHT: 63 IN | BODY MASS INDEX: 19.56 KG/M2 | OXYGEN SATURATION: 98 % | HEART RATE: 82 BPM

## 2024-01-17 DIAGNOSIS — I10 BENIGN ESSENTIAL HYPERTENSION: ICD-10-CM

## 2024-01-17 DIAGNOSIS — F41.0 ANXIETY ATTACK: Primary | ICD-10-CM

## 2024-01-17 DIAGNOSIS — R00.2 PALPITATIONS: ICD-10-CM

## 2024-01-17 PROCEDURE — 99214 OFFICE O/P EST MOD 30 MIN: CPT | Performed by: INTERNAL MEDICINE

## 2024-01-17 RX ORDER — HYDROXYZINE HYDROCHLORIDE 25 MG/1
25-50 TABLET, FILM COATED ORAL 3 TIMES DAILY PRN
Qty: 90 TABLET | Refills: 0 | Status: SHIPPED | OUTPATIENT
Start: 2024-01-17 | End: 2024-02-08

## 2024-01-17 ASSESSMENT — PATIENT HEALTH QUESTIONNAIRE - PHQ9
10. IF YOU CHECKED OFF ANY PROBLEMS, HOW DIFFICULT HAVE THESE PROBLEMS MADE IT FOR YOU TO DO YOUR WORK, TAKE CARE OF THINGS AT HOME, OR GET ALONG WITH OTHER PEOPLE: SOMEWHAT DIFFICULT
SUM OF ALL RESPONSES TO PHQ QUESTIONS 1-9: 14
SUM OF ALL RESPONSES TO PHQ QUESTIONS 1-9: 14

## 2024-01-17 NOTE — TELEPHONE ENCOUNTER
LM for patient to call back.  Appt 1/18 at 11am is on hold for her. She is she available to come in?    Jayde Fink RN

## 2024-01-17 NOTE — PROGRESS NOTES
Assessment & Plan     Anxiety attack  Palpitations  - hydrOXYzine HCl (ATARAX) 25 MG tablet; Take 1-2 tablets (25-50 mg) by mouth 3 times daily as needed for anxiety    Benign essential hypertension    Patient comes in today with concerns of palpitations and elevated blood pressure.  Patient admits to being under a tremendous amount of stress related to intermittently caring for her grandchild/parent abandonment and not getting good sleep well-child was with her at home.  Spectrum of symptoms most likely secondary to anxiety attacks secondary to stressors as mentioned above.  Patient does have metoprolol on hand the patient uses as needed at half a tablet per day for high blood pressure.   Follows up with a therapist weekly.  We discussed on trial of hydroxyzine medication as needed for anxiety concerns.  Patient cautioned on drowsiness/sleepiness with the medication.  Has not tolerated SSRI medications well in the past but if symptoms are still persistent even after the stressors are improved, patient will benefit from a daily medication for the underlying anxiety concerns other than SSRI.  This was discussed with the patient.            Depression Screening Follow Up        1/17/2024     9:11 AM   PHQ   PHQ-9 Total Score 14   Q9: Thoughts of better off dead/self-harm past 2 weeks Several days   F/U: Thoughts of suicide or self-harm No   F/U: Safety concerns No                 Follow Up  Elevated PHQ-9 score.  Patient endorses that the depression symptoms are actually better and the suicidal ideation is more related to feeling like a burden but no specific ideation or intent to harm oneself or others.    Teresa Birmingham is a 53 year old, presenting for the following health issues:  Hypertension (Palpitations hypertension started two weeks ago BP was 152/92 )        1/17/2024     9:23 AM   Additional Questions   Roomed by Priyank   Accompanied by Self     History of Present Illness       Reason for visit:   "Palpitations hypertension  Symptom onset:  1-2 weeks ago  Symptoms include:  Uncomfortably fast heart  Symptom intensity:  Severe  Symptom progression:  Staying the same  Had these symptoms before:  Yes  Has tried/received treatment for these symptoms:  No  What makes it worse:  Stress  What makes it better:  Less stress and metoprolol    She eats 4 or more servings of fruits and vegetables daily.She consumes 0 sweetened beverage(s) daily.She exercises with enough effort to increase her heart rate 30 to 60 minutes per day.  She exercises with enough effort to increase her heart rate 5 days per week.   She is taking medications regularly.     Review of Systems  Constitutional, HEENT, cardiovascular, pulmonary, gi and gu systems are negative, except as otherwise noted.        Objective    /82 (BP Location: Right arm, Patient Position: Sitting, Cuff Size: Adult Regular)   Pulse 82   Temp 97.1  F (36.2  C) (Tympanic)   Resp 18   Ht 1.588 m (5' 2.5\")   Wt 50.1 kg (110 lb 6.4 oz)   LMP 02/01/2009 (LMP Unknown)   SpO2 98%   BMI 19.87 kg/m    Body mass index is 19.87 kg/m .        Physical Exam   GENERAL: alert and no distress.  RESP: lungs clear to auscultation - no rales, rhonchi or wheezes  CV: regular rate and rhythm, normal S1 S2  MS: no gross musculoskeletal defects noted, no edema  NEURO: Normal strength and tone, mentation intact and speech normal  PSYCH: mentation appears normal, affect normal.              Signed Electronically by: Padmini Renee MD    "

## 2024-01-18 NOTE — TELEPHONE ENCOUNTER
Patient called and she is unable to come today.  She will wait until her next appt.     Jayde Fink RN

## 2024-01-25 ENCOUNTER — OFFICE VISIT (OUTPATIENT)
Dept: PULMONOLOGY | Facility: CLINIC | Age: 54
End: 2024-01-25
Attending: INTERNAL MEDICINE
Payer: COMMERCIAL

## 2024-01-25 ENCOUNTER — LAB (OUTPATIENT)
Dept: LAB | Facility: CLINIC | Age: 54
End: 2024-01-25
Attending: INTERNAL MEDICINE
Payer: COMMERCIAL

## 2024-01-25 ENCOUNTER — ANCILLARY PROCEDURE (OUTPATIENT)
Dept: GENERAL RADIOLOGY | Facility: CLINIC | Age: 54
End: 2024-01-25
Attending: INTERNAL MEDICINE
Payer: COMMERCIAL

## 2024-01-25 VITALS
OXYGEN SATURATION: 98 % | DIASTOLIC BLOOD PRESSURE: 78 MMHG | HEIGHT: 63 IN | HEART RATE: 70 BPM | BODY MASS INDEX: 19.72 KG/M2 | WEIGHT: 111.3 LBS | SYSTOLIC BLOOD PRESSURE: 130 MMHG | RESPIRATION RATE: 17 BRPM

## 2024-01-25 DIAGNOSIS — J45.20 MILD INTERMITTENT ASTHMA WITHOUT COMPLICATION: ICD-10-CM

## 2024-01-25 DIAGNOSIS — J84.9 ILD (INTERSTITIAL LUNG DISEASE) (H): ICD-10-CM

## 2024-01-25 DIAGNOSIS — M30.1 CHURG-STRAUSS SYNDROME (H): ICD-10-CM

## 2024-01-25 DIAGNOSIS — J47.9 BRONCHIECTASIS WITHOUT ACUTE EXACERBATION (H): ICD-10-CM

## 2024-01-25 DIAGNOSIS — J47.0 BRONCHIECTASIS WITH ACUTE LOWER RESPIRATORY INFECTION (H): ICD-10-CM

## 2024-01-25 DIAGNOSIS — D72.18 CHURG-STRAUSS SYNDROME (H): ICD-10-CM

## 2024-01-25 DIAGNOSIS — D72.18 CHURG-STRAUSS SYNDROME (H): Primary | ICD-10-CM

## 2024-01-25 DIAGNOSIS — J47.9 BRONCHIECTASIS WITHOUT COMPLICATION (H): Primary | ICD-10-CM

## 2024-01-25 DIAGNOSIS — M30.1 CHURG-STRAUSS SYNDROME (H): Primary | ICD-10-CM

## 2024-01-25 LAB
BASOPHILS # BLD AUTO: 0.1 10E3/UL (ref 0–0.2)
BASOPHILS NFR BLD AUTO: 1 %
CRP SERPL-MCNC: <3 MG/L
EOSINOPHIL # BLD AUTO: 0.5 10E3/UL (ref 0–0.7)
EOSINOPHIL NFR BLD AUTO: 6 %
ERYTHROCYTE [DISTWIDTH] IN BLOOD BY AUTOMATED COUNT: 13.1 % (ref 10–15)
ERYTHROCYTE [SEDIMENTATION RATE] IN BLOOD BY WESTERGREN METHOD: 11 MM/HR (ref 0–30)
EXPTIME-PRE: 8.11 SEC
FEF2575-%PRED-PRE: 34 %
FEF2575-PRE: 0.83 L/SEC
FEF2575-PRED: 2.44 L/SEC
FEFMAX-%PRED-PRE: 64 %
FEFMAX-PRE: 4.15 L/SEC
FEFMAX-PRED: 6.45 L/SEC
FEV1-%PRED-PRE: 76 %
FEV1-PRE: 1.84 L
FEV1FEV6-PRE: 63 %
FEV1FEV6-PRED: 82 %
FEV1FVC-PRE: 59 %
FEV1FVC-PRED: 81 %
FIFMAX-PRE: 4.54 L/SEC
FVC-%PRED-PRE: 104 %
FVC-PRE: 3.1 L
FVC-PRED: 2.97 L
HCT VFR BLD AUTO: 38.3 % (ref 35–47)
HGB BLD-MCNC: 13.2 G/DL (ref 11.7–15.7)
IMM GRANULOCYTES # BLD: 0 10E3/UL
IMM GRANULOCYTES NFR BLD: 0 %
LYMPHOCYTES # BLD AUTO: 0.8 10E3/UL (ref 0.8–5.3)
LYMPHOCYTES NFR BLD AUTO: 10 %
MCH RBC QN AUTO: 33.6 PG (ref 26.5–33)
MCHC RBC AUTO-ENTMCNC: 34.5 G/DL (ref 31.5–36.5)
MCV RBC AUTO: 98 FL (ref 78–100)
MONOCYTES # BLD AUTO: 0.5 10E3/UL (ref 0–1.3)
MONOCYTES NFR BLD AUTO: 6 %
NEUTROPHILS # BLD AUTO: 6.4 10E3/UL (ref 1.6–8.3)
NEUTROPHILS NFR BLD AUTO: 77 %
NRBC # BLD AUTO: 0 10E3/UL
NRBC BLD AUTO-RTO: 0 /100
PLATELET # BLD AUTO: 373 10E3/UL (ref 150–450)
RBC # BLD AUTO: 3.93 10E6/UL (ref 3.8–5.2)
WBC # BLD AUTO: 8.3 10E3/UL (ref 4–11)

## 2024-01-25 PROCEDURE — 87102 FUNGUS ISOLATION CULTURE: CPT | Performed by: INTERNAL MEDICINE

## 2024-01-25 PROCEDURE — 99213 OFFICE O/P EST LOW 20 MIN: CPT | Performed by: INTERNAL MEDICINE

## 2024-01-25 PROCEDURE — 82785 ASSAY OF IGE: CPT | Performed by: INTERNAL MEDICINE

## 2024-01-25 PROCEDURE — 85652 RBC SED RATE AUTOMATED: CPT | Performed by: PATHOLOGY

## 2024-01-25 PROCEDURE — 86140 C-REACTIVE PROTEIN: CPT | Performed by: PATHOLOGY

## 2024-01-25 PROCEDURE — 87205 SMEAR GRAM STAIN: CPT | Performed by: INTERNAL MEDICINE

## 2024-01-25 PROCEDURE — 71046 X-RAY EXAM CHEST 2 VIEWS: CPT | Mod: GC | Performed by: RADIOLOGY

## 2024-01-25 PROCEDURE — 36415 COLL VENOUS BLD VENIPUNCTURE: CPT | Performed by: PATHOLOGY

## 2024-01-25 PROCEDURE — 94375 RESPIRATORY FLOW VOLUME LOOP: CPT | Performed by: INTERNAL MEDICINE

## 2024-01-25 PROCEDURE — 99215 OFFICE O/P EST HI 40 MIN: CPT | Mod: 25 | Performed by: INTERNAL MEDICINE

## 2024-01-25 PROCEDURE — 99000 SPECIMEN HANDLING OFFICE-LAB: CPT | Performed by: PATHOLOGY

## 2024-01-25 PROCEDURE — 85025 COMPLETE CBC W/AUTO DIFF WBC: CPT | Performed by: PATHOLOGY

## 2024-01-25 ASSESSMENT — PAIN SCALES - GENERAL: PAINLEVEL: NO PAIN (0)

## 2024-01-25 NOTE — PROGRESS NOTES
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    At the last couple of visits, a mild decline in FEV1 was noted over the past year.  At that time it was largely asymptomatic but now in the past few months, the patient reports increased cough, increased sputum production, increased chest congestion, wheezes and a slight increase in dyspnea on exertion.  Symptoms have not improved with 2 courses of Augmentin.  Sputum cultures have been unrevealing.  The patient reports current symptoms are distinctly different than her presenting symptoms with the eosinophilic granuloma with polyangiitis.  Chest CT in April was unrevealing.  Chest x-ray today with no acute infiltrate and no significant change from previous.  Labs today with normal CRP.  Normal sedimentation rate.  Normal white blood cell count.  There has been a slight increase in her eosinophil count compared with previous although still within normal limits.  IgE is pending.  PFTs with mild obstructive ventilatory defect, significantly decreased from previous.  Etiology of the patient's worsening respiratory symptoms and PFTs is unclear although this may represent progression in her asthma.  Sputum culture will be obtained and identified organisms will be treated accordingly.  Once culture results are available, a trial of prednisone will be initiated, likely 20 mg daily for 5 days, 15 mg daily for 5 days, 10 mg daily for 5 days then back to her current 5 mg daily.    Follow-up in 1 month with PFTs to reassess the above-noted concerns.    Dagoberto Briscoe MD     I, Dagoberto Briscoe, have spent 50 minutes on the day of the visit to review the chart, interview and examine the patient, review labs and imaging, formulate a plan,  document and submit orders. Time documented is excluding time spent for PFT interpretation.       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      Since her last visit, the patient has received 2 courses of Augmentin for increased respiratory symptoms including wheezing, chest tightness and increased sputum.  Augmentin was prescribed 11/21 and 12/22.  The patient reports minimal relief with antibiotics.  She reports ongoing crackle and wheeze.  Persistent increased cough from baseline.  Increased sputum production and chest congestion.  It is often difficult to expectorate sputum but when she is able to bring it up she has clear-yellow-white sputum.  No hemoptysis.  She reports periodic chest pain anterior, left lateral and upper back.  Pain comes and goes, unpredictable, nonpleuritic, lasts a few minutes to half hour.  No fever, chills or night sweats.  She is doing vest therapy for 30 minutes each evening with moderate relief of respiratory symptoms.  Breathing is comfortable at rest.  She still tolerating yoga 5 days/week but is unable to do hot yoga due to dyspnea and reports increased shortness of breath with humidity such as in the shower.  She does note some increase in dyspnea on exertion even with her usual yoga.  She reports a recent bumpy rash on her shoulders back and neck in the past few days which is improving without intervention.  She does not know of any new exposures precipitating the rash.  She does note marked stress due to mental health issues in her daughter impacting her ability to care for her 4-month-old granddaughter.  5 pound weight loss, possibly related to increased stress.  Review of systems:  Appetite is decreased  Recent increased ear symptoms relieved with eardrops.  No sinus pain, sore throat or  "rhinorrhea  Occasional palpitations, attributed to anxiety.  Occasional nausea.  No vomiting.  Loose stool.  Reflux adequately controlled with \"digestive enzymes\"  The remainder of a complete review of systems otherwise negative except as noted in the history of present illness.      Current Outpatient Medications   Medication    acetylcysteine (MUCOMYST) 20 % neb solution    albuterol (PROAIR HFA/PROVENTIL HFA/VENTOLIN HFA) 108 (90 Base) MCG/ACT inhaler    albuterol (PROVENTIL) (2.5 MG/3ML) 0.083% neb solution    budeson-glycopyrrol-formoterol (BREZTRI AEROSPHERE) 160-9-4.8 MCG/ACT AERO inhaler    ciprofloxacin-dexamethasone (CIPRODEX) 0.3-0.1 % otic suspension    estradiol (ESTRACE) 0.1 MG/GM vaginal cream    fluticasone (FLONASE) 50 MCG/ACT nasal spray    hydrOXYzine HCl (ATARAX) 25 MG tablet    metoprolol succinate ER (TOPROL XL) 25 MG 24 hr tablet    NONFORMULARY    Omega-3 Fatty Acids (FISH OIL) 1200 MG capsule    predniSONE (DELTASONE) 1 MG tablet    predniSONE (DELTASONE) 5 MG tablet    Probiotic Product (PROBIOTIC DAILY PO)    tiZANidine (ZANAFLEX) 2 MG tablet    vitamin D3 (CHOLECALCIFEROL) 2000 units (50 mcg) tablet    VITAMIN K PO     No current facility-administered medications for this visit.     Allergies   Allergen Reactions    Colistimethate Anaphylaxis    Colymycin M [Colistimethate Sodium] Anaphylaxis    Tamiflu [Oseltamivir]      Gums Blister up    Azithromycin Palpitations     Heart palpitation  Heart palpitation    Clindamycin Rash     Past Medical History:   Diagnosis Date    Anxiety     Aortitis (H24)     Asthma     associated with Churg Antonina syndrome    Bronchiectasis (H)     Cerebral infarction (H) 08/1990    Very small, caused small permanent optical migraine    Chronic sinusitis     Churg-Antonina syndrome (H)     dx 1990    Conductive hearing loss     Depressive disorder     Eustachian tube dysfunction     Goiter     Hearing loss, conductive     Heart rate problem     Hypertension     " Hypocalcemia     Immunosuppression (H24)     Infection due to 2019 novel coronavirus 2022, , 2022    Irritable bowel syndrome     Major depressive disorder     Mannose-binding lectin deficiency     Nonspecific abnormal results of thyroid function study     Osteoporosis     Pericarditis      and     Pneumonia     4/23/10    Recurrent otitis media     Recurrent UTI     Rheumatoid vasculitis (H)     Sinusitis, chronic     Steroid long-term use     Tinnitus     Varicose veins of leg with swelling        Past Surgical History:   Procedure Laterality Date    C/SECTION, CLASSICAL       SECTION      COLONOSCOPY Left 2014    Procedure: COMBINED COLONOSCOPY, SINGLE OR MULTIPLE BIOPSY/POLYPECTOMY BY BIOPSY;  Surgeon: Js Pinedo MD;  Location:  GI    COLONOSCOPY N/A 2019    Procedure: COLONOSCOPY;  Surgeon: Bryce Pimentel MD;  Location:  GI    ENT SURGERY      ESOPHAGOSCOPY, GASTROSCOPY, DUODENOSCOPY (EGD), COMBINED N/A 2023    Procedure: ESOPHAGOGASTRODUODENOSCOPY, WITH BIOPSY;  Surgeon: Mo Rosa MD;  Location:  GI    ESOPHAGOSCOPY, GASTROSCOPY, DUODENOSCOPY (EGD), DILATATION, COMBINED N/A 2023    Procedure: ESOPHAGOGASTRODUODENOSCOPY, WITH DILATION;  Surgeon: Mo Rosa MD;  Location:  GI    GENITOURINARY SURGERY      HEAD & NECK SURGERY      HYSTERECTOMY  2009    without oopherectomy    HYSTERECTOMY, PAP NO LONGER INDICATED      cervix removed     LAPAROSCOPIC APPENDECTOMY N/A 2022    Procedure: APPENDECTOMY, LAPAROSCOPIC;  Surgeon: Fany Hannah MD;  Location: [facility] OR    PE TUBES      PICC INSERTION  10/10/2013    5fr DL PASV PICC, 43cm (1cm external) in the L basilic vein w/ tip in the low SVC.    PICC INSERTION Left 2017    5fr DL BioFlo PICC, 42cm (3cm external) in the L lateral brachial vein w/ tip in the SVC RA junction.    SINUS SURGERY      TUBAL LIGATION       TYMPANOPLASTY      ZZHC COLP CERVIX/UPPER VAGINA W LOOP ELEC BX CERVIX         Social History     Socioeconomic History    Marital status:      Spouse name: Not on file    Number of children: Not on file    Years of education: Not on file    Highest education level: Not on file   Occupational History    Not on file   Tobacco Use    Smoking status: Never     Passive exposure: Never    Smokeless tobacco: Never   Vaping Use    Vaping Use: Never used   Substance and Sexual Activity    Alcohol use: Not Currently    Drug use: No    Sexual activity: Yes     Partners: Male     Birth control/protection: Male Surgical, Female Surgical   Other Topics Concern    Parent/sibling w/ CABG, MI or angioplasty before 65F 55M? Not Asked     Service No    Blood Transfusions No    Caffeine Concern No    Occupational Exposure No    Hobby Hazards No    Sleep Concern No    Stress Concern Yes    Weight Concern No    Special Diet Yes     Comment: IBS diet    Back Care No    Exercise Not Asked     Comment: 1 mile power walk 5 days a week at least.     Bike Helmet Not Asked    Seat Belt Yes    Self-Exams Yes   Social History Narrative    Retired  in a Radiology Department.  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 No smokers at home.  Enjoys walking and dancing.  Alcohol-abstains. No illicits.     Social Determinants of Health     Financial Resource Strain: Low Risk  (1/17/2024)    Financial Resource Strain     Within the past 12 months, have you or your family members you live with been unable to get utilities (heat, electricity) when it was really needed?: No   Food Insecurity: Low Risk  (1/17/2024)    Food Insecurity     Within the past 12 months, did you worry that your food would run out before you got money to buy more?: No     Within the past 12 months, did the food you bought just not last and you didn t have money to get more?: No   Transportation Needs: Low Risk  (1/17/2024)     "Transportation Needs     Within the past 12 months, has lack of transportation kept you from medical appointments, getting your medicines, non-medical meetings or appointments, work, or from getting things that you need?: No   Physical Activity: Not on file   Stress: Not on file   Social Connections: Not on file   Interpersonal Safety: Low Risk  (12/15/2023)    Interpersonal Safety     Do you feel physically and emotionally safe where you currently live?: Yes     Within the past 12 months, have you been hit, slapped, kicked or otherwise physically hurt by someone?: No     Within the past 12 months, have you been humiliated or emotionally abused in other ways by your partner or ex-partner?: No   Housing Stability: Low Risk  (1/17/2024)    Housing Stability     Do you have housing? : Yes     Are you worried about losing your housing?: No       ROS Pulmonary    A complete ROS was otherwise negative except as noted in the HPI.  /78   Pulse 70   Resp 17   Ht 1.588 m (5' 2.5\")   Wt 50.5 kg (111 lb 4.8 oz)   LMP 02/01/2009 (LMP Unknown)   SpO2 98%   BMI 20.03 kg/m    Exam:   GENERAL APPEARANCE: Well developed, well nourished, alert, and in no apparent distress.  EYES: PERRL, EOMI  HENT: Nasal mucosa with o edema and hyperemia. No nasal polyps.  EARS: Canals clear, TMs perforated (chronic)  MOUTH: Oral mucosa is moist, without any lesions, no tonsillar enlargement, no oropharyngeal exudate.  NECK: supple, no masses, no thyromegaly.  LYMPHATICS: No significant axillary, cervical, or supraclavicular nodes.  RESP: normal percussion, good air flow throughout.  No crackles. No rhonchi. Faint left midlung wheezes. Diffuse wheeze with forced exhalation.  CV: Normal S1, S2, regular rhythm, normal rate. No murmur.  No rub. No gallop. No LE edema.   ABDOMEN:  Bowel sounds normal, soft, nontender, no HSM or masses.   MS: extremities normal. No clubbing. No cyanosis.  SKIN: minimal erythematous papules left " neck/back  PSYCH: mentation appears normal. and affect normal/bright  Results:  Recent Results (from the past 168 hour(s))   Erythrocyte sedimentation rate auto    Collection Time: 01/25/24  9:24 AM   Result Value Ref Range    Erythrocyte Sedimentation Rate 11 0 - 30 mm/hr   CRP inflammation    Collection Time: 01/25/24  9:24 AM   Result Value Ref Range    CRP Inflammation <3.00 <5.00 mg/L   CBC with platelets and differential    Collection Time: 01/25/24  9:24 AM   Result Value Ref Range    WBC Count 8.3 4.0 - 11.0 10e3/uL    RBC Count 3.93 3.80 - 5.20 10e6/uL    Hemoglobin 13.2 11.7 - 15.7 g/dL    Hematocrit 38.3 35.0 - 47.0 %    MCV 98 78 - 100 fL    MCH 33.6 (H) 26.5 - 33.0 pg    MCHC 34.5 31.5 - 36.5 g/dL    RDW 13.1 10.0 - 15.0 %    Platelet Count 373 150 - 450 10e3/uL    % Neutrophils 77 %    % Lymphocytes 10 %    % Monocytes 6 %    % Eosinophils 6 %    % Basophils 1 %    % Immature Granulocytes 0 %    NRBCs per 100 WBC 0 <1 /100    Absolute Neutrophils 6.4 1.6 - 8.3 10e3/uL    Absolute Lymphocytes 0.8 0.8 - 5.3 10e3/uL    Absolute Monocytes 0.5 0.0 - 1.3 10e3/uL    Absolute Eosinophils 0.5 0.0 - 0.7 10e3/uL    Absolute Basophils 0.1 0.0 - 0.2 10e3/uL    Absolute Immature Granulocytes 0.0 <=0.4 10e3/uL    Absolute NRBCs 0.0 10e3/uL   General PFT Lab (Please always keep checked)    Collection Time: 01/25/24  9:30 AM   Result Value Ref Range    FVC-Pred 2.97 L    FVC-Pre 3.10 L    FVC-%Pred-Pre 104 %    FEV1-Pre 1.84 L    FEV1-%Pred-Pre 76 %    FEV1FVC-Pred 81 %    FEV1FVC-Pre 59 %    FEFMax-Pred 6.45 L/sec    FEFMax-Pre 4.15 L/sec    FEFMax-%Pred-Pre 64 %    FEF2575-Pred 2.44 L/sec    FEF2575-Pre 0.83 L/sec    HWD8602-%Pred-Pre 34 %    ExpTime-Pre 8.11 sec    FIFMax-Pre 4.54 L/sec    FEV1FEV6-Pred 82 %    FEV1FEV6-Pre 63 %                   Results as noted above.    PFT Interpretation:  Mild obstructive ventilatory defect.  Decreased from previous.  Below recent best.   Valid Maneuver

## 2024-01-25 NOTE — LETTER
1/25/2024         RE: Valencia Ye  [address] [facility] [id number]        Dear Colleague,    Thank you for referring your patient, Valencia Ye, to the [facility] [place]. Please see a copy of my visit note below.    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    At the last couple of visits, a mild decline in FEV1 was noted over the past year.  At that time it was largely asymptomatic but now in the past few months, the patient reports increased cough, increased sputum production, increased chest congestion, wheezes and a slight increase in dyspnea on exertion.  Symptoms have not improved with 2 courses of Augmentin.  Sputum cultures have been unrevealing.  The patient reports current symptoms are distinctly different than her presenting symptoms with the eosinophilic granuloma with polyangiitis.  Chest CT in April was unrevealing.  Chest x-ray today with no acute infiltrate and no significant change from previous.  Labs today with normal CRP.  Normal sedimentation rate.  Normal white blood cell count.  There has been a slight increase in her eosinophil count compared with previous although still within normal limits.  IgE is pending.  PFTs with mild obstructive ventilatory defect, significantly decreased from previous.  Etiology of the patient's worsening respiratory symptoms and PFTs is unclear although this may represent progression in her asthma.  Sputum culture will be obtained and identified organisms will be treated accordingly.  Once culture results are available, a trial of prednisone will be initiated, likely 20 mg daily for 5 days, 15 mg daily for 5 days, 10 mg daily for 5 days then  back to her current 5 mg daily.    Follow-up in 1 month with PFTs to reassess the above-noted concerns.    Dagoberto Briscoe MD     I, Dagoberto Briscoe, have spent 50 minutes on the day of the visit to review the chart, interview and examine the patient, review labs and imaging, formulate a plan, document and submit orders. Time documented is excluding time spent for PFT interpretation.       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      Since her last visit, the patient has received 2 courses of Augmentin for increased respiratory symptoms including wheezing, chest tightness and increased sputum.  Augmentin was prescribed 11/21 and 12/22.  The patient reports minimal relief with antibiotics.  She reports ongoing crackle and wheeze.  Persistent increased cough from baseline.  Increased sputum production and chest congestion.  It is often difficult to expectorate sputum but when she is able to bring it up she has clear-yellow-white sputum.  No hemoptysis.  She reports periodic chest pain anterior, left lateral and upper back.  Pain comes and goes, unpredictable, nonpleuritic, lasts a few minutes to half hour.  No fever, chills or night sweats.  She is doing vest therapy for 30 minutes each evening with moderate relief of respiratory symptoms.  Breathing is comfortable at rest.  She still tolerating yoga 5 days/week but is unable to do hot yoga due to dyspnea and reports increased shortness of breath with humidity such as in the shower.  She does note some increase in dyspnea on exertion even with her usual yoga.  She reports a recent bumpy rash on her shoulders back and neck in the past few days which is improving without intervention.  She does not know of any new exposures precipitating the rash.  She does note marked stress due to mental  "health issues in her daughter impacting her ability to care for her 4-month-old granddaughter.  5 pound weight loss, possibly related to increased stress.  Review of systems:  Appetite is decreased  Recent increased ear symptoms relieved with eardrops.  No sinus pain, sore throat or rhinorrhea  Occasional palpitations, attributed to anxiety.  Occasional nausea.  No vomiting.  Loose stool.  Reflux adequately controlled with \"digestive enzymes\"  The remainder of a complete review of systems otherwise negative except as noted in the history of present illness.      Current Outpatient Medications   Medication    acetylcysteine (MUCOMYST) 20 % neb solution    albuterol (PROAIR HFA/PROVENTIL HFA/VENTOLIN HFA) 108 (90 Base) MCG/ACT inhaler    albuterol (PROVENTIL) (2.5 MG/3ML) 0.083% neb solution    budeson-glycopyrrol-formoterol (BREZTRI AEROSPHERE) 160-9-4.8 MCG/ACT AERO inhaler    ciprofloxacin-dexamethasone (CIPRODEX) 0.3-0.1 % otic suspension    estradiol (ESTRACE) 0.1 MG/GM vaginal cream    fluticasone (FLONASE) 50 MCG/ACT nasal spray    hydrOXYzine HCl (ATARAX) 25 MG tablet    metoprolol succinate ER (TOPROL XL) 25 MG 24 hr tablet    NONFORMULARY    Omega-3 Fatty Acids (FISH OIL) 1200 MG capsule    predniSONE (DELTASONE) 1 MG tablet    predniSONE (DELTASONE) 5 MG tablet    Probiotic Product (PROBIOTIC DAILY PO)    tiZANidine (ZANAFLEX) 2 MG tablet    vitamin D3 (CHOLECALCIFEROL) 2000 units (50 mcg) tablet    VITAMIN K PO     No current facility-administered medications for this visit.     Allergies   Allergen Reactions    Colistimethate Anaphylaxis    Colymycin M [Colistimethate Sodium] Anaphylaxis    Tamiflu [Oseltamivir]      Gums Blister up    Azithromycin Palpitations     Heart palpitation  Heart palpitation    Clindamycin Rash     Past Medical History:   Diagnosis Date    Anxiety     Aortitis (H24)     Asthma     associated with Churg Antonina syndrome    Bronchiectasis (H)     Cerebral infarction (H) 08/1990    " Very small, caused small permanent optical migraine    Chronic sinusitis     Churg-Antonina syndrome (H)     dx     Conductive hearing loss     Depressive disorder     Eustachian tube dysfunction     Goiter     Hearing loss, conductive     Heart rate problem     Hypertension     Hypocalcemia     Immunosuppression (H24)     Infection due to 2019 novel coronavirus 2022, , 2022    Irritable bowel syndrome     Major depressive disorder     Mannose-binding lectin deficiency     Nonspecific abnormal results of thyroid function study     Osteoporosis     Pericarditis      and     Pneumonia     4/23/10    Recurrent otitis media     Recurrent UTI     Rheumatoid vasculitis (H)     Sinusitis, chronic     Steroid long-term use     Tinnitus     Varicose veins of leg with swelling        Past Surgical History:   Procedure Laterality Date    C/SECTION, CLASSICAL       SECTION      COLONOSCOPY Left 2014    Procedure: COMBINED COLONOSCOPY, SINGLE OR MULTIPLE BIOPSY/POLYPECTOMY BY BIOPSY;  Surgeon: Js Pinedo MD;  Location:  GI    COLONOSCOPY N/A 2019    Procedure: COLONOSCOPY;  Surgeon: Bryce Pimentel MD;  Location:  GI    ENT SURGERY      ESOPHAGOSCOPY, GASTROSCOPY, DUODENOSCOPY (EGD), COMBINED N/A 2023    Procedure: ESOPHAGOGASTRODUODENOSCOPY, WITH BIOPSY;  Surgeon: Mo Rosa MD;  Location:  GI    ESOPHAGOSCOPY, GASTROSCOPY, DUODENOSCOPY (EGD), DILATATION, COMBINED N/A 2023    Procedure: ESOPHAGOGASTRODUODENOSCOPY, WITH DILATION;  Surgeon: Mo Rosa MD;  Location:  GI    GENITOURINARY SURGERY      HEAD & NECK SURGERY      HYSTERECTOMY  2009    without oopherectomy    HYSTERECTOMY, PAP NO LONGER INDICATED      cervix removed     LAPAROSCOPIC APPENDECTOMY N/A 2022    Procedure: APPENDECTOMY, LAPAROSCOPIC;  Surgeon: Fany Hannah MD;  Location: [facility] OR    PE TUBES      PICC  INSERTION  10/10/2013    5fr DL PASV PICC, 43cm (1cm external) in the L basilic vein w/ tip in the low SVC.    PICC INSERTION Left 05/26/2017    5fr DL BioFlo PICC, 42cm (3cm external) in the L lateral brachial vein w/ tip in the SVC RA junction.    SINUS SURGERY      TUBAL LIGATION      TYMPANOPLASTY      ZZHC COLP CERVIX/UPPER VAGINA W LOOP ELEC BX CERVIX         Social History     Socioeconomic History    Marital status:      Spouse name: Not on file    Number of children: Not on file    Years of education: Not on file    Highest education level: Not on file   Occupational History    Not on file   Tobacco Use    Smoking status: Never     Passive exposure: Never    Smokeless tobacco: Never   Vaping Use    Vaping Use: Never used   Substance and Sexual Activity    Alcohol use: Not Currently    Drug use: No    Sexual activity: Yes     Partners: Male     Birth control/protection: Male Surgical, Female Surgical   Other Topics Concern    Parent/sibling w/ CABG, MI or angioplasty before 65F 55M? Not Asked     Service No    Blood Transfusions No    Caffeine Concern No    Occupational Exposure No    Hobby Hazards No    Sleep Concern No    Stress Concern Yes    Weight Concern No    Special Diet Yes     Comment: IBS diet    Back Care No    Exercise Not Asked     Comment: 1 mile power walk 5 days a week at least.     Bike Helmet Not Asked    Seat Belt Yes    Self-Exams Yes   Social History Narrative    Retired  in a Radiology Department.  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 No smokers at home.  Enjoys walking and dancing.  Alcohol-abstains. No illicits.     Social Determinants of Health     Financial Resource Strain: Low Risk  (1/17/2024)    Financial Resource Strain     Within the past 12 months, have you or your family members you live with been unable to get utilities (heat, electricity) when it was really needed?: No   Food Insecurity: Low Risk  (1/17/2024)     "Food Insecurity     Within the past 12 months, did you worry that your food would run out before you got money to buy more?: No     Within the past 12 months, did the food you bought just not last and you didn t have money to get more?: No   Transportation Needs: Low Risk  (1/17/2024)    Transportation Needs     Within the past 12 months, has lack of transportation kept you from medical appointments, getting your medicines, non-medical meetings or appointments, work, or from getting things that you need?: No   Physical Activity: Not on file   Stress: Not on file   Social Connections: Not on file   Interpersonal Safety: Low Risk  (12/15/2023)    Interpersonal Safety     Do you feel physically and emotionally safe where you currently live?: Yes     Within the past 12 months, have you been hit, slapped, kicked or otherwise physically hurt by someone?: No     Within the past 12 months, have you been humiliated or emotionally abused in other ways by your partner or ex-partner?: No   Housing Stability: Low Risk  (1/17/2024)    Housing Stability     Do you have housing? : Yes     Are you worried about losing your housing?: No       ROS Pulmonary    A complete ROS was otherwise negative except as noted in the HPI.  /78   Pulse 70   Resp 17   Ht 1.588 m (5' 2.5\")   Wt 50.5 kg (111 lb 4.8 oz)   LMP 02/01/2009 (LMP Unknown)   SpO2 98%   BMI 20.03 kg/m    Exam:   GENERAL APPEARANCE: Well developed, well nourished, alert, and in no apparent distress.  EYES: PERRL, EOMI  HENT: Nasal mucosa with o edema and hyperemia. No nasal polyps.  EARS: Canals clear, TMs perforated (chronic)  MOUTH: Oral mucosa is moist, without any lesions, no tonsillar enlargement, no oropharyngeal exudate.  NECK: supple, no masses, no thyromegaly.  LYMPHATICS: No significant axillary, cervical, or supraclavicular nodes.  RESP: normal percussion, good air flow throughout.  No crackles. No rhonchi. Faint left midlung wheezes. Diffuse wheeze with " forced exhalation.  CV: Normal S1, S2, regular rhythm, normal rate. No murmur.  No rub. No gallop. No LE edema.   ABDOMEN:  Bowel sounds normal, soft, nontender, no HSM or masses.   MS: extremities normal. No clubbing. No cyanosis.  SKIN: minimal erythematous papules left neck/back  PSYCH: mentation appears normal. and affect normal/bright  Results:  Recent Results (from the past 168 hour(s))   Erythrocyte sedimentation rate auto    Collection Time: 01/25/24  9:24 AM   Result Value Ref Range    Erythrocyte Sedimentation Rate 11 0 - 30 mm/hr   CRP inflammation    Collection Time: 01/25/24  9:24 AM   Result Value Ref Range    CRP Inflammation <3.00 <5.00 mg/L   CBC with platelets and differential    Collection Time: 01/25/24  9:24 AM   Result Value Ref Range    WBC Count 8.3 4.0 - 11.0 10e3/uL    RBC Count 3.93 3.80 - 5.20 10e6/uL    Hemoglobin 13.2 11.7 - 15.7 g/dL    Hematocrit 38.3 35.0 - 47.0 %    MCV 98 78 - 100 fL    MCH 33.6 (H) 26.5 - 33.0 pg    MCHC 34.5 31.5 - 36.5 g/dL    RDW 13.1 10.0 - 15.0 %    Platelet Count 373 150 - 450 10e3/uL    % Neutrophils 77 %    % Lymphocytes 10 %    % Monocytes 6 %    % Eosinophils 6 %    % Basophils 1 %    % Immature Granulocytes 0 %    NRBCs per 100 WBC 0 <1 /100    Absolute Neutrophils 6.4 1.6 - 8.3 10e3/uL    Absolute Lymphocytes 0.8 0.8 - 5.3 10e3/uL    Absolute Monocytes 0.5 0.0 - 1.3 10e3/uL    Absolute Eosinophils 0.5 0.0 - 0.7 10e3/uL    Absolute Basophils 0.1 0.0 - 0.2 10e3/uL    Absolute Immature Granulocytes 0.0 <=0.4 10e3/uL    Absolute NRBCs 0.0 10e3/uL   General PFT Lab (Please always keep checked)    Collection Time: 01/25/24  9:30 AM   Result Value Ref Range    FVC-Pred 2.97 L    FVC-Pre 3.10 L    FVC-%Pred-Pre 104 %    FEV1-Pre 1.84 L    FEV1-%Pred-Pre 76 %    FEV1FVC-Pred 81 %    FEV1FVC-Pre 59 %    FEFMax-Pred 6.45 L/sec    FEFMax-Pre 4.15 L/sec    FEFMax-%Pred-Pre 64 %    FEF2575-Pred 2.44 L/sec    FEF2575-Pre 0.83 L/sec    VCW2318-%Pred-Pre 34 %     ExpTime-Pre 8.11 sec    FIFMax-Pre 4.54 L/sec    FEV1FEV6-Pred 82 %    FEV1FEV6-Pre 63 %                   Results as noted above.    PFT Interpretation:  Mild obstructive ventilatory defect.  Decreased from previous.  Below recent best.   Valid Maneuver       Again, thank you for allowing me to participate in the care of your patient.        Sincerely,        Dagoberto Briscoe MD

## 2024-01-25 NOTE — NURSING NOTE
Chief Complaint   Patient presents with    RECHECK     Return Bronchiectas    Medications reviewed and vital signs taken.   Joy Scanlon, CMA

## 2024-01-25 NOTE — PATIENT INSTRUCTIONS
We will monitor your sputum culture and if there are no concerning organisms, we will contact you Monday with a Prednisone burst.  Otherwise continue current medication, nebs and vest therapy.     [facility] Nurse line:  Martina BELTRE  [phone]     [facility] Fax Number:     [phone]        Cyst[facility] Fibrosis Respiratory Therapists:   Kaylee Miller              [phone]          Barbi Ruiz  [phone]

## 2024-01-26 ENCOUNTER — TELEPHONE (OUTPATIENT)
Dept: INTERNAL MEDICINE | Facility: CLINIC | Age: 54
End: 2024-01-26
Payer: COMMERCIAL

## 2024-01-26 LAB
IGE SERPL-ACNC: 19 KU/L (ref 0–114)
IGE SERPL-ACNC: 19 KU/L (ref 0–114)

## 2024-01-26 NOTE — TELEPHONE ENCOUNTER
Received via fax, MERARY from [facility]. There is no MERARY signed in media allowing us to forward the notes they are asking for. Patient was called and a message was left asking her to either come into the office an complete a MERARY or she can locate it on her MYCHART.     Forms at TW station.

## 2024-01-27 LAB
BACTERIA SPT CULT: NORMAL
GRAM STAIN RESULT: NORMAL

## 2024-01-29 ENCOUNTER — TELEPHONE (OUTPATIENT)
Dept: PULMONOLOGY | Facility: CLINIC | Age: 54
End: 2024-01-29
Payer: COMMERCIAL

## 2024-01-29 NOTE — TELEPHONE ENCOUNTER
Left Voicemail (1st Attempt) for the patient to call back and schedule the following:    Appointment type: [facility]  Provider: GABBY  Return date: 04/25/2024  Specialty phone number: [phone]  Additional appointment(s) needed: CF LOOP  Additonal Notes:  Use manual scheduling and offer afternoon slots beginning 4/25, do not leave gaps in schedule. Per Rosita Campo, able to schedule in held clinic.

## 2024-01-31 NOTE — TELEPHONE ENCOUNTER
Patient was called again today. It was stated on her voice mail that no information can be release from us to [facility] w/o a signed MERARY form.   She can do this via her J[employer]inli and that option was stated on her voice mail as well.     The original form will be placed in PCP faxed bin for future review. No action has been taken at this time.

## 2024-02-01 ENCOUNTER — TELEPHONE (OUTPATIENT)
Dept: PULMONOLOGY | Facility: CLINIC | Age: 54
End: 2024-02-01
Payer: COMMERCIAL

## 2024-02-01 NOTE — TELEPHONE ENCOUNTER
Patient Contacted for the patient to call back and schedule the following:    Appointment type: [facility]  Provider: GABBY  Return date: 5/2/24  Specialty phone number: [phone]  Additional appointment(s) needed: N/A  Additonal Notes: N/A

## 2024-02-03 ENCOUNTER — MYC MEDICAL ADVICE (OUTPATIENT)
Dept: INTERNAL MEDICINE | Facility: CLINIC | Age: 54
End: 2024-02-03
Payer: COMMERCIAL

## 2024-02-05 ENCOUNTER — TELEPHONE (OUTPATIENT)
Dept: PULMONOLOGY | Facility: CLINIC | Age: 54
End: 2024-02-05
Payer: COMMERCIAL

## 2024-02-05 ENCOUNTER — HOSPITAL ENCOUNTER (EMERGENCY)
Facility: CLINIC | Age: 54
Discharge: HOME | End: 2024-02-06
Attending: EMERGENCY MEDICINE | Admitting: EMERGENCY MEDICINE
Payer: COMMERCIAL

## 2024-02-05 DIAGNOSIS — F41.0 PANIC ATTACK: ICD-10-CM

## 2024-02-05 DIAGNOSIS — J47.0 BRONCHIECTASIS WITH ACUTE LOWER RESPIRATORY INFECTION (H): Primary | ICD-10-CM

## 2024-02-05 LAB
ANION GAP SERPL CALCULATED.3IONS-SCNC: 13 MMOL/L (ref 7–15)
BASOPHILS # BLD AUTO: 0.1 10E3/UL (ref 0–0.2)
BASOPHILS NFR BLD AUTO: 2 %
BUN SERPL-MCNC: 15.3 MG/DL (ref 6–20)
CALCIUM SERPL-MCNC: 9.5 MG/DL (ref 8.6–10)
CHLORIDE SERPL-SCNC: 103 MMOL/L (ref 98–107)
CREAT SERPL-MCNC: 0.7 MG/DL (ref 0.51–0.95)
DEPRECATED HCO3 PLAS-SCNC: 24 MMOL/L (ref 22–29)
EGFRCR SERPLBLD CKD-EPI 2021: >90 ML/MIN/1.73M2
EOSINOPHIL # BLD AUTO: 0.6 10E3/UL (ref 0–0.7)
EOSINOPHIL NFR BLD AUTO: 9 %
ERYTHROCYTE [DISTWIDTH] IN BLOOD BY AUTOMATED COUNT: 12.7 % (ref 10–15)
GLUCOSE SERPL-MCNC: 91 MG/DL (ref 70–99)
HCT VFR BLD AUTO: 40.5 % (ref 35–47)
HGB BLD-MCNC: 13.7 G/DL (ref 11.7–15.7)
IMM GRANULOCYTES # BLD: 0 10E3/UL
IMM GRANULOCYTES NFR BLD: 0 %
LYMPHOCYTES # BLD AUTO: 1.9 10E3/UL (ref 0.8–5.3)
LYMPHOCYTES NFR BLD AUTO: 28 %
MCH RBC QN AUTO: 33.5 PG (ref 26.5–33)
MCHC RBC AUTO-ENTMCNC: 33.8 G/DL (ref 31.5–36.5)
MCV RBC AUTO: 99 FL (ref 78–100)
MONOCYTES # BLD AUTO: 0.7 10E3/UL (ref 0–1.3)
MONOCYTES NFR BLD AUTO: 10 %
NEUTROPHILS # BLD AUTO: 3.6 10E3/UL (ref 1.6–8.3)
NEUTROPHILS NFR BLD AUTO: 51 %
NRBC # BLD AUTO: 0 10E3/UL
NRBC BLD AUTO-RTO: 0 /100
PLATELET # BLD AUTO: 331 10E3/UL (ref 150–450)
POTASSIUM SERPL-SCNC: 3.9 MMOL/L (ref 3.4–5.3)
RBC # BLD AUTO: 4.09 10E6/UL (ref 3.8–5.2)
SODIUM SERPL-SCNC: 140 MMOL/L (ref 135–145)
TROPONIN T SERPL HS-MCNC: 14 NG/L
TSH SERPL DL<=0.005 MIU/L-ACNC: 3.72 UIU/ML (ref 0.3–4.2)
WBC # BLD AUTO: 6.9 10E3/UL (ref 4–11)

## 2024-02-05 PROCEDURE — 250N000013 HC RX MED GY IP 250 OP 250 PS 637: Performed by: EMERGENCY MEDICINE

## 2024-02-05 PROCEDURE — 99284 EMERGENCY DEPT VISIT MOD MDM: CPT

## 2024-02-05 PROCEDURE — 84484 ASSAY OF TROPONIN QUANT: CPT | Performed by: EMERGENCY MEDICINE

## 2024-02-05 PROCEDURE — 93005 ELECTROCARDIOGRAM TRACING: CPT

## 2024-02-05 PROCEDURE — 85025 COMPLETE CBC W/AUTO DIFF WBC: CPT | Performed by: EMERGENCY MEDICINE

## 2024-02-05 PROCEDURE — 36415 COLL VENOUS BLD VENIPUNCTURE: CPT | Performed by: EMERGENCY MEDICINE

## 2024-02-05 PROCEDURE — 84443 ASSAY THYROID STIM HORMONE: CPT | Performed by: EMERGENCY MEDICINE

## 2024-02-05 PROCEDURE — 80048 BASIC METABOLIC PNL TOTAL CA: CPT | Performed by: EMERGENCY MEDICINE

## 2024-02-05 RX ORDER — LORAZEPAM 0.5 MG/1
0.5 TABLET ORAL EVERY 8 HOURS PRN
Qty: 8 TABLET | Refills: 0 | Status: SHIPPED | OUTPATIENT
Start: 2024-02-05

## 2024-02-05 RX ORDER — ACETAMINOPHEN 325 MG/1
975 TABLET ORAL ONCE
Status: COMPLETED | OUTPATIENT
Start: 2024-02-05 | End: 2024-02-05

## 2024-02-05 RX ORDER — LORAZEPAM 0.5 MG/1
0.5 TABLET ORAL ONCE
Status: COMPLETED | OUTPATIENT
Start: 2024-02-05 | End: 2024-02-05

## 2024-02-05 RX ORDER — PREDNISONE 10 MG/1
TABLET ORAL
Qty: 23 TABLET | Refills: 0 | Status: SHIPPED | OUTPATIENT
Start: 2024-02-05 | End: 2024-02-20

## 2024-02-05 RX ORDER — DOXYCYCLINE HYCLATE 100 MG
100 TABLET ORAL 2 TIMES DAILY
Qty: 20 TABLET | Refills: 0 | Status: SHIPPED | OUTPATIENT
Start: 2024-02-05 | End: 2024-02-15

## 2024-02-05 RX ORDER — ONDANSETRON 4 MG/1
4 TABLET, ORALLY DISINTEGRATING ORAL ONCE
Status: DISCONTINUED | OUTPATIENT
Start: 2024-02-05 | End: 2024-02-06 | Stop reason: HOSPADM

## 2024-02-05 RX ADMIN — ACETAMINOPHEN 975 MG: 325 TABLET, FILM COATED ORAL at 22:30

## 2024-02-05 RX ADMIN — LORAZEPAM 0.5 MG: 0.5 TABLET ORAL at 22:31

## 2024-02-05 ASSESSMENT — ACTIVITIES OF DAILY LIVING (ADL)
ADLS_ACUITY_SCORE: 35
ADLS_ACUITY_SCORE: 33

## 2024-02-05 NOTE — TELEPHONE ENCOUNTER
Per Dr. Briscoe, recent culture with no growth. Plan to treat with Doxycycline 100mg BID x10 days and prednisone burst/taper (20mg x5, 15mg x5, 10mg x5, back to baseline of 5mg daily)    Patient in agreement with this plan.    Prescriptions sent to local [facility] in [place]. Instructed patient to call back at end of week to provide symptom update.    Magdalena Calix RN

## 2024-02-06 VITALS
DIASTOLIC BLOOD PRESSURE: 89 MMHG | HEART RATE: 66 BPM | SYSTOLIC BLOOD PRESSURE: 141 MMHG | TEMPERATURE: 98.5 F | OXYGEN SATURATION: 96 % | RESPIRATION RATE: 20 BRPM

## 2024-02-06 PROBLEM — F43.22 ADJUSTMENT DISORDER WITH ANXIETY: Status: ACTIVE | Noted: 2024-02-06

## 2024-02-06 LAB
ATRIAL RATE - MUSE: 59 BPM
DIASTOLIC BLOOD PRESSURE - MUSE: NORMAL MMHG
INTERPRETATION ECG - MUSE: NORMAL
P AXIS - MUSE: 58 DEGREES
PR INTERVAL - MUSE: 160 MS
QRS DURATION - MUSE: 86 MS
QT - MUSE: 416 MS
QTC - MUSE: 411 MS
R AXIS - MUSE: 68 DEGREES
SYSTOLIC BLOOD PRESSURE - MUSE: NORMAL MMHG
T AXIS - MUSE: -11 DEGREES
VENTRICULAR RATE- MUSE: 59 BPM

## 2024-02-06 NOTE — CONSULTS
"Diagnostic Evaluation Consultation  Crisis Assessment    Patient Name: Valencia Ye  Age:  53 year old  Legal Sex: female  Gender Identity: female  Pronouns:   Race: Choose not to Answer  Ethnicity: Choose not to answer  Language: English      Patient was assessed: In person, Virtual: T[facility] Crisis Assessment Start Time: 2333 Crisis Assessment Stop Time: 2348  Patient location: [facility] EMERGENCY DEPT                                 Referral Data and Chief Complaint  Valencia Ye presents to the ED with family/friends. Patient is presenting to the ED for the following concerns: Anxiety (panic attacks).   Factors that make the mental health crisis life threatening or complex are:  Pt presents for sudden onset of panic attacks, often waking her in the middle of the night. Pt reports she has several other medical issues going on, including: sudden rapid weight loss, hair loss, and heart palpitations. Pt reports she is seeing a provider for her health concerns, as she engages in many structured, healthy activites on a daily basis. Pt reports, \"I have never been more calm or relaxed in my life, I don't know why this is happening. They are considering my thyroid. I have so many things I enjoy and do every day.\" Pt denies SI/SIB/SA/HI and denies plans, means, or intent to harm herself or others. Pt reports she is in need of a medication provider, \"I need help getting a medication to help me get back to sleep after a panic attack.\" Pt adds, \"I was never suicidial, I made the comment about how can you live when you experience these. They are awful. I am hopeful this will get resolved, I have absolutely no desire to end my life.\" Pt presents as calm, oriented, engaged, and insightful..      Informed Consent and Assessment Methods  Explained the crisis assessment process, including applicable information disclosures and limits to confidentiality, assessed understanding of the process, and " obtained consent to proceed with the assessment.  Assessment methods included conducting a formal interview with patient, review of medical records, collaboration with medical staff, and obtaining relevant collateral information from family and community providers when available.  : done     Patient response to interventions: acceptance expressed, verbalizes understanding  Coping skills were attempted to reduce the crisis:  Talk to family, therapist, son, and engaged in coping skills     History of the Crisis   Pt reports history of PTSD. Pt denies history of ED visits or hospializations for mental health.    Brief Psychosocial History  Family:  , Children yes  Support System:  Partner, Sibling(s)  Employment Status:  disabled  Source of Income:  disability  Financial Environmental Concerns:  none  Current Hobbies:  outdoor activities, group/social activities, meditation, exercise/fitness, care of plants, gardening, arts/crafts, interaction with pets, cooking/baking, reading  Barriers in Personal Life:  other (see comments) (denies)    Significant Clinical History  Current Anxiety Symptoms:  panic attack  Current Depression/Trauma:     Current Somatic Symptoms:     Current Psychosis/Thought Disturbance:     Current Eating Symptoms:     Chemical Use History:  Alcohol: None  Benzodiazepines: None  Opiates: None  Cocaine: None  Marijuana: None  Other Use: None   Past diagnosis:  PTSD  Family history:  No known history of mental health or chemical health concerns  Past treatment:  Individual therapy, Psychiatric Medication Management  Details of most recent treatment:  Pt reports she sees a therapist weekly on Wednesday's.  Other relevant history:  Pt reports several chronic medical conditions related to her lung functioning and an autoimmune disease. Pt reports she is in loving and supportive marriage. Pt reports she has hobbies and friends and sees her family often. Pt reports she has a son with  disabilites.       Collateral Information  Is there collateral information: No     Collateral information name, relationship, phone number:       What happened today:       What is different about patient's functioning:       Concern about alcohol/drug use:      What do you think the patient needs:      Has patient made comments about wanting to kill themselves/others:      If d/c is recommended, can they take part in safety/aftercare planning:       Additional collateral information:        Risk Assessment  [place] Suicide Severity Rating Scale Full Clinical Version:  Suicidal Ideation  Q1 Wish to be Dead (Lifetime): No  Q2 Non-Specific Active Suicidal Thoughts (Lifetime): No     Suicidal Behavior (Lifetime)  Actual Attempt (Lifetime): No  Has subject engaged in non-suicidal self-injurious behavior? (Lifetime): No  Interrupted Attempts (Lifetime): No  Aborted or Self-Interrupted Attempt (Lifetime): No  Preparatory Acts or Behavior (Lifetime): No    [place] Suicide Severity Rating Scale Recent:   Suicidal Ideation (Recent)  Q1 Wished to be Dead (Past Month): no  Q2 Suicidal Thoughts (Past Month): no  Q3 Suicidal Thought Method: no  Q4 Suicidal Intent without Specific Plan: no  Q5 Suicide Intent with Specific Plan: no  Level of Risk per Screen: low risk     Suicidal Behavior (Recent)  Actual Attempt (Past 3 Months): No  Has subject engaged in non-suicidal self-injurious behavior? (Past 3 Months): No  Interrupted Attempts (Past 3 Months): No  Aborted or Self-Interrupted Attempt (Past 3 Months): No  Preparatory Acts or Behavior (Past 3 Months): No    Environmental or Psychosocial Events: other life stressors  Protective Factors: Protective Factors: strong bond to family unit, community support, or employment, intact marriage or domestic partnership, responsibilities and duties to others, including pets and children, lives in a responsibly safe and stable environment, good treatment engagement, good impulse control,  help seeking, supportive ongoing medical and mental health care relationships, able to access care without barriers, sense of importance of health and wellness, sense of belonging, sense of self-efficacy and/or positive self-esteem, constructive use of leisure time, enjoyable activities, resilience, reality testing ability, cultural, spiritual , or Latter day beliefs associated with meaning and value in life, optimistic outlook - identification of future goals, good problem-solving, coping, and conflict resolution skills    Does the patient have thoughts of harming others? Feels Like Hurting Others: no  Previous Attempt to Hurt Others: no  Is the patient engaging in sexually inappropriate behavior?: no    Is the patient engaging in sexually inappropriate behavior?  no        Mental Status Exam   Affect: Appropriate  Appearance: Appropriate  Attention Span/Concentration: Attentive  Eye Contact: Engaged    Fund of Knowledge: Appropriate   Language /Speech Content: Fluent  Language /Speech Volume: Normal  Language /Speech Rate/Productions: Normal  Recent Memory: Intact  Remote Memory: Intact  Mood: Normal  Orientation to Person: Yes   Orientation to Place: Yes  Orientation to Time of Day: Yes  Orientation to Date: Yes     Situation (Do they understand why they are here?): Yes  Psychomotor Behavior: Normal  Thought Content: Clear  Thought Form: Intact     Mini-Cog Assessment  Number of Words Recalled:    Clock-Drawing Test:     Three Item Recall:    Mini-Cog Total Score:       Medication  Psychotropic medications:   Medication Orders - Psychiatric (From admission, onward)      Start     Dose/Rate Route Frequency Ordered Stop    02/05/24 0000  LORazepam (ATIVAN) 0.5 MG tablet         0.5 mg Oral EVERY 8 HOURS PRN 02/05/24 9984               Current Care Team  Patient Care Team:  Abril Burk APRN CNP as PCP - General (Internal Medicine)  Carrie Lopez MD as MD (Internal Medicine)  Dagoberto Briscoe MD  as MD (Pulmonary Disease)  Bekah Alford MD as MD (Ophthalmology)  Marlyn Flores APRN CNS as Nurse Practitioner (Psychiatry)  Randal Logan OD as MD (Optometry)  Sunil Butts MD as MD (Pediatrics)  Suze Laughlin MD as MD (Urology)  Dagoberto Briscoe MD as Assigned Pulmonology Provider  Adal Gallo MD as Assigned Rheumatology Provider  Viviane Freitas AuD as Audiologist (Audiology)  Nissen, Rick L, MD as MD (Otolaryngology)  Alanna Stephenson PA-C as Assigned Heart and Vascular Provider  Lisa Galan NP as Nurse Practitioner (Nurse Practitioner)  Shruti Garcia as Other (see comments) (Acupuncture)  Abril Burk, APRN CNP as Assigned PCP    Diagnosis  Patient Active Problem List   Diagnosis Code    Goiter E04.9    History of systemic steroid therapy Z92.241    ILD (interstitial lung disease) (H) J84.9    Palpitations R00.2    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, 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    History of eating disorder Z86.59    Mild intermittent asthma without complication J45.20    Anxiety F41.9    Chronic fatigue R53.82    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    Benign essential hypertension I10    Moderate episode of recurrent major depressive disorder (H) F33.1    Bronchiectasis (H) J47.9    Controlled substance agreement signed Z79.899    Irritable bowel syndrome without diarrhea K58.9    Bladder pain R39.89    Overactive bladder N32.81    Pain syndrome, chronic G89.4    Alpha-lipoproteinemia E78.49    Diverticulosis K57.90    Vertigo R42    Adjustment disorder with anxiety F43.22       Primary Problem This Admission  Active Hospital Problems    *Adjustment disorder with anxiety        Clinical Summary and Substantiation of Recommendations   After therapeutic assessment, intervention and aftercare planning by ED care team and LMHP and in consultation with attending provider, the patient's circumstances and mental state were appropriate for outpatient management. It is the  recommendation of this clinician that pt discharge with OP MH support. A this time the pt is not presenting as an acute risk to self or others due to the following factors: Pt presents for sudden onset of panic attacks among other various health symptoms. Pt made a remark of how she can live like this, however, pt denies SI/SIB/SA/HI and denies plans, means, or intent to harm herself or others. Pt reports she has hope and serene in her life and feels fulfilled. Pt requested a medication appointment for the resulting sleep issues with panic attacks. Pt was scheduled and information provided in AVS. Pt has therapy Wednesday and feels safe to return home.                          Patient coping skills attempted to reduce the crisis:  Talk to family, therapist, son, and engaged in coping skills    Disposition  Recommended disposition: Individual Therapy, Medication Management        Reviewed case and recommendations with attending provider. Attending Name: Dr. Bowden       Attending concurs with disposition: yes       Patient and/or validated legal guardian concurs with disposition:   yes       Final disposition:  discharge    Legal status on admission:      Assessment Details   Total duration spent with the patient: 15 min     CPT code(s) utilized: Non-Billable    AMMY Mena, LADC, Psychotherapist  [facility] - Triage & Transition Services  Callback: [phone]

## 2024-02-06 NOTE — ED PROVIDER NOTES
"    History     Chief Complaint:  Panic Attack       HPI   Valencia Ye is a 53 year old female who presents with panic attacks that have been ongoing since January. The patient reports that there are a lot of physical symptoms that accompany her panic attacks. She notes experiencing \"heart pounding,\" hypertension, nausea, trouble eating, and sobbing episodes. She was given hydroxyzine for her panic attacks and reports that it took her \"to a dark place.\" She has since been using Tizanidine again. She notes waking up a few nights ago with a sensation that she describes as her \"head opening up to the universe.\" She notes being in a place of complete terror and gasping for air. She reports that she is very sensitive to psychoactive things which makes her concerned about taking medications. She woke up this morning at 0233 with a panic attack nightmare as well. She is scared to go back to sleep because of the nightmares she has been experiencing. She would really like to be able to sleep. She does currently have a headache.    Of note, the patient's  notes that she has had a psychiatrist in the past and been prescribed different medications.     Independent Historian:    The patient's  aided in the history noted above.    Medications:    Albuterol   Atarax   Toprol   Deltasone   Zanaflex   Vitamin D    Past Medical History:    Anxiety   Diverticulosis   Eating disorder  Churg-Antonina syndrome  Depression   Palpitations   Hypertension   Immunosuppression   Pericarditis   UTI  Osteoporosis     Past Surgical History:     section  x2  Colonoscopy x2  ENT surgery   EGD x2  Genitourinary surgery   Head and neck surgery   Hysterectomy   Appendectomy   PE tubes  PICC insertion x2  Tubal ligation   Tympanoplasty   Cervix removal      Physical Exam   Patient Vitals for the past 24 hrs:   BP Temp Temp src Pulse Resp SpO2   24 2240 (!) 165/93 -- -- 59 20 98 %   24 2110 (!) 182/110 -- -- 84 " 20 98 %   02/05/24 1837 (!) 182/105 98.5  F (36.9  C) Temporal 94 20 98 %      Physical Exam  General: Patient is awake, alert  Neck: Normal range of motion.   CV: Regular rate and rhythm.   Resp: Lungs are clear without wheezes or rales. No respiratory distress.   GI: Abdomen is soft, no rigidity, guarding, or rebound. No distension. No tenderness to palpation in any quadrant.    MS: Normal tone.   Skin: No rash or lesions noted. Normal capillary refill noted  Neuro: Speech is pressured. Face is symmetric. Moving all extremities.   Psych: Anxious.  Appropriate interactions.    Emergency Department Course   ECG  ECG taken at 2124, ECG read at 2225  Sinus bradycardia   Abnormal ECG   Rate 59 bpm. TN interval 160 ms. QRS duration 86 ms. QT/QTc 416/411 ms. P-R-T axes 58 68 -11.    Laboratory:  Labs Ordered and Resulted from Time of ED Arrival to Time of ED Departure   CBC WITH PLATELETS AND DIFFERENTIAL - Abnormal       Result Value    WBC Count 6.9      RBC Count 4.09      Hemoglobin 13.7      Hematocrit 40.5      MCV 99      MCH 33.5 (*)     MCHC 33.8      RDW 12.7      Platelet Count 331      % Neutrophils 51      % Lymphocytes 28      % Monocytes 10      % Eosinophils 9      % Basophils 2      % Immature Granulocytes 0      NRBCs per 100 WBC 0      Absolute Neutrophils 3.6      Absolute Lymphocytes 1.9      Absolute Monocytes 0.7      Absolute Eosinophils 0.6      Absolute Basophils 0.1      Absolute Immature Granulocytes 0.0      Absolute NRBCs 0.0     BASIC METABOLIC PANEL - Normal    Sodium 140      Potassium 3.9      Chloride 103      Carbon Dioxide (CO2) 24      Anion Gap 13      Urea Nitrogen 15.3      Creatinine 0.70      GFR Estimate >90      Calcium 9.5      Glucose 91     TROPONIN T, HIGH SENSITIVITY - Normal    Troponin T, High Sensitivity 14     TSH WITH FREE T4 REFLEX - Normal    TSH 3.72        Emergency Department Course & Assessments:    PSS-3      Date and Time Over the past 2 weeks have you felt  down, depressed, or hopeless? Over the past 2 weeks have you had thoughts of killing yourself? Have you ever attempted to kill yourself? When did this last happen? User   02/05/24 1838 yes yes no -- [facility]          C-SSRS ([place])      Date and Time Q1 Wished to be Dead (Past Month) Q2 Suicidal Thoughts (Past Month) Q3 Suicidal Thought Method Q4 Suicidal Intent without Specific Plan Q5 Suicide Intent with Specific Plan Q6 Suicide Behavior (Lifetime) Within the Past 3 Months? RETIRED: Level of Risk per Screen Screening Not Complete User   02/05/24 1838 no yes no no no -- -- -- -- [facility]                Suicide assessment completed by mental health (D.E.C., LCSW, etc.)    Interventions:  Medications   ondansetron (ZOFRAN ODT) ODT tab 4 mg (has no administration in time range)   LORazepam (ATIVAN) tablet 0.5 mg (0.5 mg Oral $Given 2/5/24 2231)   acetaminophen (TYLENOL) tablet 975 mg (975 mg Oral $Given 2/5/24 2230)        Assessments:  2211 I obtained history and examined the patient as noted above.   2354 I rechecked and updated the patient. She feels much better and feels comfortable going home. I deemed the patient safe to discharge home.    Independent Interpretation (X-rays, CTs, rhythm strip):  None    Consultations/Discussion of Management or Tests:  2349 I spoke with [facility] about the patient's presentation, findings, and plan of care. They believe it is acceptable for her to go home.       Social Determinants of Health affecting care:  Stress/Adjustment Disorders     Disposition:  The patient was discharged to home.     Impression & Plan    CMS Diagnoses: None    Medical Decision Making:  Patient is a 53-year-old female with past medical history of anxiety, PTSD and depression who presents to the emergency department today with significant anxiety and panic attacks.  Patient was also worried about hypertension and palpitations.  EKG was performed which shows no signs of ischemia nor dysrhythmia.  Troponin is not  significantly elevated.  Very low suspicion for significant dysrhythmia, ACS, significant myocarditis or pericarditis.  The remainder of the patient's medical workup was reassuring.  However clinically the patient is experiencing significant panic attacks that wake her from sleep.  She was treated with Ativan in the emergency department with significant improvement of her anxiety.  She was evaluated by her mental health assessment team.  They did not feel that she required inpatient psychiatric treatment at this time.  However they were able to arrange outpatient psychiatry appointment for medication initiation.  Will give her a short course of Ativan so she can make it to her appointment on Wednesday.  We discussed reasons to return to the emergency department.  All questions were answered patient be discharged home in stable condition.    Diagnosis:    ICD-10-CM    1. Panic attack  F41.0          Discharge Medications:  New Prescriptions    LORAZEPAM (ATIVAN) 0.5 MG TABLET    Take 1 tablet (0.5 mg) by mouth every 8 hours as needed for anxiety      Scribe Disclosure:  Fredrick CLEMENTE, am serving as a scribe at 10:06 PM on 2/5/2024 to document services personally performed by Mo Bowden MD based on my observations and the provider's statements to me.               oM Bowden MD  02/06/24 0102

## 2024-02-06 NOTE — ED TRIAGE NOTES
"Presents to triage with c/o increasing panic attacks over the past month. Patient has hx of mild panic attacks but over the past month they have been increasing in severity and frequency. Patient was given hydroxyzine by her PCP but she states it took her to \"a dark place\" and requested that it be added as an allergy for her. She states panic attacks have been waking her out of her sleep and she is fearful of falling asleep because of it. Endorses some suicidal ideation which she states is because of the panic attacks. She denies any plan or intent to harm herself, denies any previous suicide attempts.         "

## 2024-02-06 NOTE — DISCHARGE INSTRUCTIONS
Aftercare Plan  Date: Wednesday, 2/7/2024  Time: 1:00 pm - 2:00 pm  Provider: Nani VILLALOBOS  CNP  Location: [facility], [address] [place] [address], [address], [place] [address]  Phone: [phone]  Type: Telepsychiatry      Patient Instructions  Please fill New Patient Form by using following link. All forms need to be completed 24 hours prior to the appointment date/time by going to [email or web address] Please call us on [phone] 24 hours prior to your scheduled appointment to confirm that you are able to attend. We will provide you information about how to log into video call software when you call.        If I am feeling unsafe or I am in a crisis, I will:   Contact my established care providers   Call the National Suicide Prevention Lifeline: [phone]   Go to the nearest emergency room   Call 911     Warning signs that I or other people might notice when a crisis is developing for me:     I am having increasing suicidal thoughts that turn to plans with intent or means  I am having additional urges to self-harm    My emotions are of hopelessness; feeling like there's no way out.  Rage or anger.  Engaging in risky activities without thinking  Withdrawing from family/friends  Dramatic mood swings  Drastic personality changes   Use of alcohol or drugs  Postings on social media  Neglect of personal hygiene or cares     Things I am able to do on my own to cope or help me feel better:    Spending quality time with loved ones  Staying hydrated  Eating balanced meals  Going for a walk every day  Take care of daily responsibilities/needs  Focus on positive self-talk vs negative self-talk    Things that I am able to do with others to cope or help me better:   Music  Deep breathing  Meditations  Journal  Self-regulate  Self check-in  Ask for help  Exercise    Things I can use or do for distraction:   Reach out to/spend time with family, friends  Shower  Exercise  Chores or do a  project  Listen to music  Watch movie/TV  Listening to music  Journaling  Reading a book  Meditating  Call a friend    Changes I can make to support my mental health and wellness:    -I will abstain from all mood altering chemicals not currently prescribed to me   -I will attend scheduled mental health therapy and psychiatric appointments and follow all   recommendations  -I will commit to 30 minutes of self care daily - this can be as simple as taking a shower, going for a walk, cooking a meal, read, writing, etc  -I will practice square breathing when I begin to feel anxious - in breath through the nose for the count   of 4 and the first line on the square. Out breath through the mouth for the count of 4 for the second line   of the square. Repeat to complete the square. Repeat the square as many times as needed.  - I will use distraction skills of: going for walks, watching TV, spending time outside, calling a friend or family member  -Use community resources, including hotline numbers, [facility] crisis and support meetings  -Maintain a daily schedule/routine  -Practice deep breathing skills  -Download a meditation idalia and spend 15-20 minutes per day mediating/relaxing. Some apps to   download include: Calm, Headspace and Insight Timer. All 3 of these apps have free version    Reduce Extreme Emotion  QUICKLY:  Changing Your Body Chemistry      T:  Change your body Temperature to change your autonomic nervous system   Use Ice Water to calm yourself down FAST   Put your face in a bowl of ice water (this is the best way; have the person keep his/her face in ice water for 30-45 seconds - initial research is showing that the longer s/he can hold her/his face in the water, the better the response), or   Splash ice water on your face, or hold an ice pack on your face      I:  Intensely exercise to calm down a body revved up by emotion   Examples: running, walking fast, jumping, playing basketball, weight lifting, swimming,  calisthenics, etc.   Engage in exercises that DO NOT include violent behaviors. Exercises that utilize violent behaviors tend to function as  behavioral rehearsal,  and rather than calming the person down, may actually  rev  the person up more, increasing the likelihood of violence, and lessening the likelihood that they will  burn off  energy     P:  Progressively relax your muscles   Starting with your hands, moving to your forearms, upper arms, shoulders, neck, forehead, eyes, cheeks and lips, tongue and teeth, chest, upper back, stomach, buttocks, thighs, calves, ankles, feet   Tense (10 seconds,   of the way), then relax each muscle (all the way)   Notice the tension   Notice the difference when relaxed (by tensing first, and then relaxing, you are able to get a more thorough relaxation than by simply relaxing)      P: Paced breathing to relax   The standard technique is to begin with counting the number of steps one takes for a typical inhale, then counting the steps one takes for a typical exhale, and then lengthening the amount of steps for the exhalation by one or two steps.  OR  Repeat this pattern for 1-2 minutes  Inhale for four (4) seconds   Exhale for six (6) to eight (8) seconds   Research demonstrated that one can change one's overall level of anxiety by doing this exercise for even a few minutes per day      People in my life that I can ask for help:   Family  Friends  Providers    Your [facility] has a mental health crisis team you can call 24/7:   [facility] Crisis Line Number: [phone]  [facility] Crisis: [phone] - Call the crisis line for immediate mental health support, 24 hours a day.   [facility] Crisis Line Number: [phone]  [facility] Crisis Line Number: [phone]  [facility] Crisis Line Number: [phone]   [facility] Crisis Line Number: [phone]  [place]: [phone]  [place]: [phone]  [facility] Crisis Number: [phone]  [facility] Crisis: [phone]  [facility] Crisis: 1-770.900.5151    Other things that are important when I'm in crisis:   Ask for help    Additional resources and information:     Mental Health Apps  My3  https://Doppelgames.org/    VirtualHopeBox  https://[employer]/apps/virtual-hope-box/       Professionals or Agencies I Can Contact During A Crisis:       Crisis Lines  Call or Text 764 - National Suicide and Crisis Lifeline    Crisis Text Line  Text 641853  You will be connected with a trained live crisis counselor to provide support.    The [employer] Project (LGBTQ Youth Crisis Line)  1.626.354.2672  text START to 982-790    National Mora on Mental Illness (ULYSSES)  848.779.8322 or 3.781.ULYSSES.HELPS    National Suicide Prevention Lifeline at 9-295-877-RCJR (6423)     Throughout  [place]: call **CRISIS (**909213)     Crisis Text Line: is available for free, 24/7 by texting [place] to 113355    MPGomatic.com  Fast Tracker  Linking people to mental health and substance use disorder resources  My Online Camp.org     [place] Mental Health Warm Line  Peer to peer support  Monday thru Saturday, 12 pm to 10 pm  939.181.6143 or 1.130.429.2733  Text \"Support\" to 61807     National Mora on Mental Illness (www.[place].ulysses.org): 806.866.5127 or 434-687-7023     [facility] Phone (free remote counseling): [phone] Web address:   https://Progressus.org/     www.[employer] (filter for insurance, gender preference, etc.)    [facility]   [phone]  Intake appointment will be virtual, following appointments can be in person or virtual.   **IMMEDIATE OPENINGS**    [facility]  [phone]  *offers individual therapy, medication management and Mental Health Case Workers; can self refer    [facility]  [phone]  *Immediate Openings    [facility]  [phone]  *Immediate Openings    [facility]  [phone]  *Immediate Openings    Please follow up with scheduled providers to ensure all necessary paperwork is filled out prior to your   scheduled telehealth appointments.     Coordinators from [facility] will be calling within two business days to ensure   that you have the resources you may need or provide assistance with scheduling (Phone number: [phone].).    Remember: give the referrals 3 sessions prior to calling it quits. Do you trust them? Do you feel   understood? Do you think they can help? Check in with yourself after each session    Please reach out to the [facility]([phone]) regarding further mental health appointment needs for this emergency department visit.    [facility] SCHEDULING:  Today you were seen by a licensed mental health professional through [facility], [facility] ([facility])  for a crisis assessment in the Emergency Department at [facility].  It is recommended that you follow up with your estabished providers (psychiatrist, menta health therapist, and/or primary care doctor - as relevant) as soon as possible. Coordinators from [facility] will be calling you in the next 24-48 hours to ensure that you have the resources you need.  You can so contact [facility] coordinators directly at [phone].     [facility] maintains an extensive network of licensed behavioral health providers to connect patients with the services they need.  We do not charge providers a fee to participate in our referral network.  We match patients with providers based on a patient s specific needs, insurance coverage, and location.  Our first effort will be to refer you to a provider within your care system, and will utilize providers outside your care system as needed.

## 2024-02-08 ENCOUNTER — OFFICE VISIT (OUTPATIENT)
Dept: INTERNAL MEDICINE | Facility: CLINIC | Age: 54
End: 2024-02-08
Payer: COMMERCIAL

## 2024-02-08 VITALS
TEMPERATURE: 97.2 F | SYSTOLIC BLOOD PRESSURE: 118 MMHG | BODY MASS INDEX: 19 KG/M2 | DIASTOLIC BLOOD PRESSURE: 72 MMHG | HEIGHT: 63 IN | HEART RATE: 114 BPM | WEIGHT: 107.2 LBS | OXYGEN SATURATION: 97 % | RESPIRATION RATE: 18 BRPM

## 2024-02-08 DIAGNOSIS — M30.1 CHURG-STRAUSS SYNDROME (H): ICD-10-CM

## 2024-02-08 DIAGNOSIS — F41.9 ANXIETY: Primary | ICD-10-CM

## 2024-02-08 DIAGNOSIS — D72.18 CHURG-STRAUSS SYNDROME (H): ICD-10-CM

## 2024-02-08 DIAGNOSIS — I10 BENIGN ESSENTIAL HYPERTENSION: ICD-10-CM

## 2024-02-08 PROCEDURE — 99214 OFFICE O/P EST MOD 30 MIN: CPT | Performed by: INTERNAL MEDICINE

## 2024-02-08 RX ORDER — VENLAFAXINE HYDROCHLORIDE 37.5 MG/1
37.5 CAPSULE, EXTENDED RELEASE ORAL DAILY
COMMUNITY
Start: 2024-02-07

## 2024-02-08 RX ORDER — METOPROLOL TARTRATE 25 MG/1
12.5 TABLET, FILM COATED ORAL 2 TIMES DAILY PRN
Qty: 60 TABLET | Refills: 1 | Status: SHIPPED | OUTPATIENT
Start: 2024-02-08 | End: 2024-05-14

## 2024-02-08 NOTE — PROGRESS NOTES
Assessment & Plan     Anxiety  Recently started on Effexor at 37.5 mg daily.  Following up with the mental health team as well as psychotherapy.  Follow-up visit with the team in around 3 weeks.  Patient has also been provided for Ativan to be used every 8 hours as needed.      Benign essential hypertension  Does report having episodes of elevated blood pressure.  Takes extended release metoprolol as needed for blood pressure elevation.  We discussed on doing the regular metoprolol at 12.5 mg twice daily as needed if blood pressures greater than 130/80.    - metoprolol tartrate (LOPRESSOR) 25 MG tablet; Take 0.5 tablets (12.5 mg) by mouth 2 times daily as needed (for high blood pressure >130/80)          MED REC REQUIRED  Post Medication Reconciliation Status: Completed        Teresa Birmingham is a 53 year old, presenting for the following health issues:  ER F/U        2/8/2024     1:04 PM   Additional Questions   Roomed by Priyank   Accompanied by Self     HPI       ED/UC Followup:    Facility:  [facility] Emergency Dept  Date of visit: 2/5/2024  Reason for visit: Panic Attack   Current Status: Improved    Patient presented to the emergency room around 3 days ago due to concerns of panic attacks.  Patient had physical symptoms including a heart pounding sensation, nausea, elevated blood pressure.  Underwent cardiac workup in the emergency room which was reassuring.  Started on Effexor medication by the mental health team.  Follows up with a psychotherapist.  Overall, has been tolerating the Effexor medication well.  Also has Ativan that patient uses every 8 hours as needed for anxiety concerns.  Did not feel good on the hydroxyzine medication,stopped taking the medication.   Patient shares concerns about any possibility of underlying cardiac causes of the symptoms  From symptomatology and recent lab work, Symptoms most likely due to anxiety concerns.  Recent echo completed around 3 weeks ago was  "overall reassuring.      Review of Systems  Constitutional, HEENT, cardiovascular, pulmonary, gi and gu systems are negative, except as otherwise noted.      Objective    /72 (BP Location: Right arm, Patient Position: Sitting, Cuff Size: Adult Regular)   Pulse 114   Temp 97.2  F (36.2  C) (Tympanic)   Resp 18   Ht 1.588 m (5' 2.5\")   Wt 48.6 kg (107 lb 3.2 oz)   LMP 02/01/2009 (LMP Unknown)   SpO2 97%   BMI 19.29 kg/m    Body mass index is 19.29 kg/m .  Physical Exam   GENERAL: alert and no distress  RESP: lungs clear to auscultation - no rales, rhonchi or wheezes  CV: regular rate and rhythm, normal S1 S2  MS: no gross musculoskeletal defects noted, no edema  NEURO: Normal strength and tone, mentation intact and speech normal            Signed Electronically by: Padmini Renee MD    "

## 2024-02-09 NOTE — TELEPHONE ENCOUNTER
Contacted patient to check in on symptoms since starting doxycycline and prednisone. Patient reports feeling much better, decrease in crackles in her lungs, decrease in cough, and decrease in sputum. Patient reports being able to do yoga and keep up.    Instructed patient to contact our office with any new or worsening symptoms.    Magdalena Calix RN

## 2024-02-12 ENCOUNTER — MYC MEDICAL ADVICE (OUTPATIENT)
Dept: INTERNAL MEDICINE | Facility: CLINIC | Age: 54
End: 2024-02-12
Payer: COMMERCIAL

## 2024-02-12 NOTE — TELEPHONE ENCOUNTER
Please see patient's mychart message below.      Please advise, thanks.    Sharif Wilkes, Triage RN Bern Frost  3:24 PM 2/12/2024

## 2024-02-13 NOTE — TELEPHONE ENCOUNTER
If symptoms of palpitations are not improved, do recommend ED follow-up for an evaluation sooner.   On 03/04/20, Huong CARVAJAL, scribed the services personally performed by Dr. Natanael Vásquez MD.    Chief Concern:    Chief Complaint   Patient presents with   • Rash       Patient presents today accompanied by sister, Charu  and child     History of Present Illness  Julieta Anton is a very pleasant 27 year old female who presents to the Dermatology clinic, for evaluation of the above chief concern(s).     Rash  Duration: started February 2020. Started with Dry itchy patch under breasts and armpit. Was given Prednisone and Hydrocortisone cream. Noticed improvement with those treatments, but shortly after taking them, she got a new rash that spread to trunk and extremities and more recently starting on her neck. Rash will get progressively worse throughout the day   Last visit: first visit   Interim History: first visit   Affected Areas: trunk and extremities   Symptoms: itching and scratching to the point where she bleeds   Exacerbating Factors: hot baths   Current Treatments:   Hydrocortisone as needed -- will subside the itch for a little while   Past Treatments:   - Prednisone 20mg; 3 tabs daily for 5 days   - Cetirizine 10mg tabs daily  - Hydroxyzine 50mg 3x daily.   Discontinued all treatments due to seeing no improvement with treatments   Pets: 4 cats   History of Eczema/Asthma/Allergies: none   Family History: sister, mother and aunt have eczema   Additional Information:  - has tried switching laundry detergents, soaps etc -- saw no improvement. Was using Purex laundry detergent and switched to Tide hypoallergenic detergent   - denies anyone else in the home having similar rash   - denies every having similar rash before  - denies getting rash on face   - tattoo on chest has recently gotten raised and itchy    New prescription medications:  NO  New over-the-counter medications:  NO  Recent travel: NO  Recent illness:  NO         Sun Protection Habits: Yes, wears sunscreen when outdoors for long periods of  time. Uses SPF 40  History of Severe/Blistering Sun Burns: none  Tanning Bed Use:  No    Past Medical History  Reviewed in EPIC, with pertinent problems noted in HPI and below.     Anything patient would like noted from medical record: none     Melanoma: No   Non-melanoma skin cancer: No    ALLERGIES:   Allergen Reactions   • Augmentin [Amoclan] RASH       Social History  Occupation:    Household: fiance and 2 children     Family History  Melanoma: No -- grandmother had some kind of skin cancer unsure of what kind     Review of Systems  CONSTITUTIONAL: No recent fevers or recent illness.   HEME/LYMPH: No easy bleeding, swollen glands.   SKIN: No other skin changes, itching or bleeding skin lesions, except as noted above.   PREGNANCY STATUS: Not Pregnant    Physical Exam, Assessment and Plan  CONSTITUTIONAL: The patient is well-groomed and well-developed.   NEURO: Patient is alert and oriented x3; Normal mood and affect.   EYES: Inspection of conjunctivae and lids unremarkable except as noted in skin exam.   CV: Normal peripheral vasculature - distal extremities warm to the touch and without swelling or varicosities unless noted below.    EXTREMITIES: Inspection +/- palpation performed of the fingernails and toenails with abnormal findings noted below.     SKIN: Examination of the skin included the head (including face, scalp, ears), neck, right upper extremity, left upper extremity, chest, back, abdomen, right lower extremity, left lower extremity, including palms and soles.  Entire exam performed in the presence of a medical assistant chaperone.   Skin exam unremarkable except as noted below.     Eczematous Dermatitis   Description: scattered erythematous and excoriated scaly papules and thin plaques on upper arms, chest, inframammary, lower abdomen, back, thighs and subtly positive dermatographism   Ddx: Favor contact dermatitis to laundry product vs personal care product in patient with eczema  predisposition.   Plan:    - prevention, treatment and testing discussed   - Recommended liberal moisturizing with thick, bland emollients 2 times daily (or more). Dry skin care handout discussed and provided. Samples given: Hand creams/ointments, Healing ointment and Thick bland emollients   -     triamcinolone (ARISTOCORT) 0.1 % cream; Apply BID to all affected areas of body. Avoid face, genitals and skin folds.  -     hydroCORTisone (CORTIZONE) 2.5 % cream; Apply BID under breasts, inner thighs and face   - Recommend bleach baths 1-3x/week. Discussed and handout provided.   - Recommended discontinuing dryer sheets and using all hypoallergenic laundry detergents    - Recommended discontinuing any perfumes or lotions that have perfumes    - Discussed further testing option of patch testing    - Restart using Cetirizine 10mg daily.    - Reevaluate tattoo at f/u for possible tattoo reaction    Return in about 10 weeks (around 5/13/2020) for rash recheck .    I, Dr. Natanael Vásquez, attest that I saw and examined the patient and agree with the above documentation as scribed.  The documentation recorded by the scribe accurately and completely reflects the service(s) I personally performed and the decisions made by me.

## 2024-02-22 LAB — BACTERIA SPT CULT: NO GROWTH

## 2024-02-27 ENCOUNTER — NURSE TRIAGE (OUTPATIENT)
Dept: INTERNAL MEDICINE | Facility: CLINIC | Age: 54
End: 2024-02-27
Payer: COMMERCIAL

## 2024-02-27 NOTE — TELEPHONE ENCOUNTER
Nurse Triage SBAR    Is this a 2nd Level Triage? NO    Situation: Pounding heart sensation since January    Background: Patient saw PCP on 2/8 for anxiety, was at ER for panic attack 2/5    Assessment: Patient endorses heart pounding sensation. Heart rate in the 70's and 80's all day, BP was 123/76 and 127/81. Denies difficulty breathing, skipped beats/palpitations, racing heart. Patient endorses going to psychotherapy and has appointment set up for tomorrow as well     Protocol Recommended Disposition:   See in Office Within 2 Weeks    Recommendation: scheduled patient for appointment with PCP. Reviewed reasons to call back.   Appointments in Next Year      Mar 01, 2024  1:30 PM  (Arrive by 1:10 PM)  Provider Visit with Padmini Renee MD  [facility] ([facility] ) [phone]     May 02, 2024  1:30 PM  (Arrive by 1:15 PM)  Full PFT with  PFL C  [facility] [place] ([facility] ) [phone]     May 02, 2024  2:50 PM  (Arrive by 2:35 PM)  Return Bronchiectas Non CF with Dagoberto Briscoe MD  [facility] [place] ([facility] ) [phone]     Jun 13, 2024  1:00 PM  (Arrive by 12:45 PM)  Return Visit with Adal Gallo MD  [facility] [place] ([facility] ) [phone]                  Does the patient meet one of the following criteria for ADS visit consideration? 16+ years old, with an [facility] PCP     TIP  Providers, please consider if this condition is appropriate for management at one of our Acute and Diagnostic Services sites.     If patient is a good candidate, please use dotphrase <dot>triageresponse and select Refer to ADS to document.Reason for Disposition   Problems with anxiety or stress    Additional Information   Negative: Passed out (i.e., lost  consciousness, collapsed and was not responding)   Negative: Shock suspected (e.g., cold/pale/clammy skin, too weak to stand, low BP, rapid pulse)   Negative: Difficult to awaken or acting confused (e.g., disoriented, slurred speech)   Negative: Visible sweat on face or sweat dripping down face   Negative: Unable to walk, or can only walk with assistance (e.g., requires support)   Negative: Received SHOCK from implantable cardiac defibrillator and has persisting symptoms (i.e., palpitations, lightheadedness)   Negative: Dizziness, lightheadedness, or weakness and heart beating very rapidly (e.g., > 140 / minute)   Negative: Dizziness, lightheadedness, or weakness and heart beating very slowly (e.g., < 50 / minute)   Negative: Sounds like a life-threatening emergency to the triager   Negative: Difficulty breathing   Negative: Dizziness, lightheadedness, or weakness   Negative: Heart beating very rapidly (e.g., > 140 / minute) and present now  (Exception: During exercise.)   Negative: Heart beating very slowly (e.g., < 50 / minute)  (Exception: Athlete and heart rate normal for caller.)   Negative: New or worsened shortness of breath with activity (dyspnea on exertion)   Negative: Patient sounds very sick or weak to the triager   Negative: Wearing a 'Holter monitor' or 'cardiac event monitor'   Negative: Received SHOCK from implantable cardiac defibrillator (and now feels well)   Negative: Heart beating very rapidly (e.g., > 140 / minute) and not present now  (Exception: During exercise.)   Negative: Skipped or extra beat(s) and increases with exercise or exertion   Negative: Skipped or extra beat(s) and occurs 4 or more times per minute   Negative: History of heart disease (i.e., heart attack, bypass surgery, angina, angioplasty)  (Exception: Brief heartbeat symptoms that went away and now feels well.)   Negative: Age > 60 years  (Exception: Brief heartbeat symptoms that went away and now feels well.)   Negative:  Taking water pill (i.e., diuretic) or heart medication (e.g., digoxin)   Negative: Patient wants to be seen   Negative: Heart rhythm alert (e.g., 'you have irregular heartbeat') from personal wearable device (e.g., Apple Watch)   Negative: History of hyperthyroidism or taking thyroid medication   Negative: Substance use (drug use) or misuse, known or suspected   Negative: ADHD and taking stimulant medication   Negative: Palpitations and no improvement after following Care Advice    Protocols used: Heart Rate and Heartbeat [id number]

## 2024-03-01 ENCOUNTER — OFFICE VISIT (OUTPATIENT)
Dept: INTERNAL MEDICINE | Facility: CLINIC | Age: 54
End: 2024-03-01
Payer: COMMERCIAL

## 2024-03-01 ENCOUNTER — ORDERS ONLY (AUTO-RELEASED) (OUTPATIENT)
Dept: INTERNAL MEDICINE | Facility: CLINIC | Age: 54
End: 2024-03-01
Payer: COMMERCIAL

## 2024-03-01 VITALS
DIASTOLIC BLOOD PRESSURE: 74 MMHG | WEIGHT: 108 LBS | SYSTOLIC BLOOD PRESSURE: 120 MMHG | BODY MASS INDEX: 19.14 KG/M2 | TEMPERATURE: 97.2 F | RESPIRATION RATE: 16 BRPM | HEART RATE: 91 BPM | OXYGEN SATURATION: 98 % | HEIGHT: 63 IN

## 2024-03-01 DIAGNOSIS — R00.2 PALPITATIONS: ICD-10-CM

## 2024-03-01 DIAGNOSIS — M30.1 CHURG-STRAUSS SYNDROME (H): ICD-10-CM

## 2024-03-01 DIAGNOSIS — R00.2 PALPITATIONS: Primary | ICD-10-CM

## 2024-03-01 DIAGNOSIS — F41.9 ANXIETY: ICD-10-CM

## 2024-03-01 DIAGNOSIS — D72.18 CHURG-STRAUSS SYNDROME (H): ICD-10-CM

## 2024-03-01 PROCEDURE — 99214 OFFICE O/P EST MOD 30 MIN: CPT | Performed by: INTERNAL MEDICINE

## 2024-03-01 ASSESSMENT — PAIN SCALES - GENERAL: PAINLEVEL: NO PAIN (0)

## 2024-03-01 ASSESSMENT — PATIENT HEALTH QUESTIONNAIRE - PHQ9
SUM OF ALL RESPONSES TO PHQ QUESTIONS 1-9: 20
10. IF YOU CHECKED OFF ANY PROBLEMS, HOW DIFFICULT HAVE THESE PROBLEMS MADE IT FOR YOU TO DO YOUR WORK, TAKE CARE OF THINGS AT HOME, OR GET ALONG WITH OTHER PEOPLE: EXTREMELY DIFFICULT
SUM OF ALL RESPONSES TO PHQ QUESTIONS 1-9: 20

## 2024-03-01 NOTE — PROGRESS NOTES
"  Assessment & Plan     Palpitations  - ZIO PATCH MAIL OUT; Future    Anxiety  Churg-Antonina syndrome (H)    Overall, anxiety symptoms have improved.  Patient continues on Effexor medication.  Has been feeling heartbeat pounding sensation.  Complete Zio patch.  Order placed.  Recent echocardiogram that was completed on 1/12/2024 reviewed.  This was ordered by the rheumatology team, patient follows up with the rheumatology team for the Churg-Antonina syndrome.                  Teresa Birmingham is a 53 year old, presenting for the following health issues:  Follow Up        3/1/2024     1:20 PM   Additional Questions   Roomed by Priyank   Accompanied by Self     History of Present Illness       Reason for visit:  Continued pounding sensation in chest, painless    She eats 2-3 servings of fruits and vegetables daily.She consumes 0 sweetened beverage(s) daily.She exercises with enough effort to increase her heart rate 30 to 60 minutes per day.  She exercises with enough effort to increase her heart rate 5 days per week.   She is taking medications regularly.                 Review of Systems  Constitutional, HEENT, cardiovascular, pulmonary, gi and gu systems are negative, except as otherwise noted.      Objective    /74 (BP Location: Right arm, Patient Position: Sitting, Cuff Size: Adult Regular)   Pulse 91   Temp 97.2  F (36.2  C) (Tympanic)   Resp 16   Ht 1.588 m (5' 2.5\")   Wt 49 kg (108 lb)   LMP 02/01/2009 (LMP Unknown)   SpO2 98%   BMI 19.44 kg/m    Body mass index is 19.44 kg/m .  Physical Exam   GENERAL: alert and no distress  RESP: lungs clear to auscultation - no rales, rhonchi or wheezes  CV: regular rate and rhythm, normal S1 S2  MS: no gross musculoskeletal defects noted, no edema  NEURO: Normal strength and tone, mentation intact and speech normal  PSYCH: mentation appears normal, affect normal/bright            Signed Electronically by: Padmini Renee MD    "

## 2024-03-04 DIAGNOSIS — J47.9 BRONCHIECTASIS WITHOUT COMPLICATION (H): ICD-10-CM

## 2024-03-04 RX ORDER — ACETYLCYSTEINE 200 MG/ML
SOLUTION ORAL; RESPIRATORY (INHALATION)
Qty: 800 ML | Refills: 1 | Status: SHIPPED | OUTPATIENT
Start: 2024-03-04

## 2024-03-14 DIAGNOSIS — J47.9 BRONCHIECTASIS WITHOUT ACUTE EXACERBATION (H): Primary | ICD-10-CM

## 2024-03-14 RX ORDER — PREDNISONE 5 MG/1
TABLET ORAL
Qty: 45 TABLET | Refills: 0 | Status: SHIPPED | OUTPATIENT
Start: 2024-03-14 | End: 2024-03-29

## 2024-03-23 DIAGNOSIS — M30.1 EOSINOPHILIC GRANULOMATOSIS WITH POLYANGIITIS (EGPA) WITH LUNG INVOLVEMENT (H): ICD-10-CM

## 2024-03-23 DIAGNOSIS — H92.09 EAR PAIN, UNSPECIFIED LATERALITY: ICD-10-CM

## 2024-03-23 DIAGNOSIS — D72.18 EOSINOPHILIC GRANULOMATOSIS WITH POLYANGIITIS (EGPA) WITH LUNG INVOLVEMENT (H): ICD-10-CM

## 2024-03-25 PROCEDURE — 93244 EXT ECG>48HR<7D REV&INTERPJ: CPT | Performed by: INTERNAL MEDICINE

## 2024-03-26 RX ORDER — CIPROFLOXACIN AND DEXAMETHASONE 3; 1 MG/ML; MG/ML
SUSPENSION/ DROPS AURICULAR (OTIC)
Qty: 7.5 ML | Refills: 10 | Status: SHIPPED | OUTPATIENT
Start: 2024-03-26

## 2024-03-26 NOTE — TELEPHONE ENCOUNTER
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 She uses prescription to prevent any infection. Many years ago ENT prescribed it, Dr. Simental was maintaining it as chronic medication.She is currently not having acute symptoms.      Nichole GAYTAN

## 2024-03-26 NOTE — TELEPHONE ENCOUNTER
Please call patient to clarify the reason for need of antibiotic otic solution  If patient is having symptoms, do recommend starting an e-visit.

## 2024-03-28 RX ORDER — PREDNISONE 1 MG/1
TABLET ORAL
Qty: 150 TABLET | Refills: 2 | Status: SHIPPED | OUTPATIENT
Start: 2024-03-28 | End: 2024-07-31

## 2024-03-28 NOTE — TELEPHONE ENCOUNTER
PREDNISONE 1 MG TABLET       Sig: Take 5 tabs daily for 30 days    Last Written Prescription Date:  4-20-23  Last Fill Quantity: 300,   # refills: 3  Last Office Visit : 12-7-23  Future Office visit:  6-13-24    Routing refill request to provider for review/approval because:  Clarify direction: daily for 30 days

## 2024-04-09 DIAGNOSIS — M30.1 EOSINOPHILIC GRANULOMATOSIS WITH POLYANGIITIS (EGPA) WITH LUNG INVOLVEMENT (H): ICD-10-CM

## 2024-04-09 DIAGNOSIS — D72.18 EOSINOPHILIC GRANULOMATOSIS WITH POLYANGIITIS (EGPA) WITH LUNG INVOLVEMENT (H): ICD-10-CM

## 2024-04-10 RX ORDER — PREDNISONE 5 MG/1
TABLET ORAL
Qty: 45 TABLET | OUTPATIENT
Start: 2024-04-10

## 2024-04-12 DIAGNOSIS — J31.0 CHRONIC RHINITIS: ICD-10-CM

## 2024-04-12 RX ORDER — FLUTICASONE PROPIONATE 50 MCG
2 SPRAY, SUSPENSION (ML) NASAL DAILY
Qty: 48 ML | Refills: 0 | Status: SHIPPED | OUTPATIENT
Start: 2024-04-12 | End: 2024-06-25

## 2024-04-18 ENCOUNTER — TRANSFERRED RECORDS (OUTPATIENT)
Dept: HEALTH INFORMATION MANAGEMENT | Facility: CLINIC | Age: 54
End: 2024-04-18
Payer: COMMERCIAL

## 2024-04-18 LAB
ALT SERPL-CCNC: 9 IU/L (ref 5–35)
AST SERPL-CCNC: 17 U/L (ref 5–34)
CREATININE (EXTERNAL): 0.67 MG/DL (ref 0.5–1.3)

## 2024-05-01 ENCOUNTER — MYC MEDICAL ADVICE (OUTPATIENT)
Dept: INTERNAL MEDICINE | Facility: CLINIC | Age: 54
End: 2024-05-01
Payer: COMMERCIAL

## 2024-05-02 ENCOUNTER — OFFICE VISIT (OUTPATIENT)
Dept: PULMONOLOGY | Facility: CLINIC | Age: 54
End: 2024-05-02
Attending: INTERNAL MEDICINE
Payer: COMMERCIAL

## 2024-05-02 VITALS — HEART RATE: 86 BPM | OXYGEN SATURATION: 97 % | DIASTOLIC BLOOD PRESSURE: 81 MMHG | SYSTOLIC BLOOD PRESSURE: 147 MMHG

## 2024-05-02 DIAGNOSIS — D72.18 CHURG-STRAUSS SYNDROME (H): ICD-10-CM

## 2024-05-02 DIAGNOSIS — D72.18 EOSINOPHILIC GRANULOMATOSIS WITH POLYANGIITIS (EGPA) WITH LUNG INVOLVEMENT (H): ICD-10-CM

## 2024-05-02 DIAGNOSIS — J45.20 MILD INTERMITTENT ASTHMA WITHOUT COMPLICATION: Primary | ICD-10-CM

## 2024-05-02 DIAGNOSIS — J45.20 MILD INTERMITTENT ASTHMA WITHOUT COMPLICATION: ICD-10-CM

## 2024-05-02 DIAGNOSIS — J47.0 BRONCHIECTASIS WITH ACUTE LOWER RESPIRATORY INFECTION (H): ICD-10-CM

## 2024-05-02 DIAGNOSIS — M30.1 EOSINOPHILIC GRANULOMATOSIS WITH POLYANGIITIS (EGPA) WITH LUNG INVOLVEMENT (H): ICD-10-CM

## 2024-05-02 DIAGNOSIS — M30.1 CHURG-STRAUSS SYNDROME (H): ICD-10-CM

## 2024-05-02 LAB
EXPTIME-PRE: 7.19 SEC
FEF2575-%PRED-PRE: 56 %
FEF2575-PRE: 1.39 L/SEC
FEF2575-PRED: 2.43 L/SEC
FEFMAX-%PRED-PRE: 91 %
FEFMAX-PRE: 5.87 L/SEC
FEFMAX-PRED: 6.44 L/SEC
FEV1-%PRED-PRE: 105 %
FEV1-PRE: 2.55 L
FEV1FEV6-PRE: 67 %
FEV1FEV6-PRED: 82 %
FEV1FVC-PRE: 66 %
FEV1FVC-PRED: 81 %
FIFMAX-PRE: 4.61 L/SEC
FVC-%PRED-PRE: 131 %
FVC-PRE: 3.89 L
FVC-PRED: 2.96 L

## 2024-05-02 PROCEDURE — 94375 RESPIRATORY FLOW VOLUME LOOP: CPT | Performed by: INTERNAL MEDICINE

## 2024-05-02 PROCEDURE — 99213 OFFICE O/P EST LOW 20 MIN: CPT | Performed by: INTERNAL MEDICINE

## 2024-05-02 PROCEDURE — 99215 OFFICE O/P EST HI 40 MIN: CPT | Mod: 25 | Performed by: INTERNAL MEDICINE

## 2024-05-02 RX ORDER — MULTIVIT-MINERALS/FOLIC ACID 200 MCG
1 TABLET,CHEWABLE ORAL DAILY
COMMUNITY

## 2024-05-02 RX ORDER — PREDNISONE 5 MG/1
5 TABLET ORAL DAILY
Status: SHIPPED
Start: 2024-05-02

## 2024-05-02 RX ORDER — MAGNESIUM GLUCONATE 30 MG(550)
5 TABLET ORAL DAILY
COMMUNITY

## 2024-05-02 RX ORDER — MAGNESIUM HYDROXIDE 400 MG/5ML
3 SUSPENSION, ORAL (FINAL DOSE FORM) ORAL DAILY
COMMUNITY

## 2024-05-02 NOTE — PATIENT INSTRUCTIONS
Continue current medication, exercise, nebs and vest therapy.       [facility] Nurse line:  [place]   [phone]     [facility] Fax Number:     [phone]        Cyst[facility] Fibrosis Respiratory Therapists:   Kaylee Miller              [phone]          Barbi Ruiz  [phone]

## 2024-05-02 NOTE — PROGRESS NOTES
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      Pulmonary/bronchiectasis/asthma: The patient previously had a gradual decline in PFTs, progressing over 2-3 years.  Extensive evaluation including laboratory testing and chest CT was unrevealing.  In the last 3 months, she had recurrent episodes of chest tightness which resolved with prednisone but recurred rapidly after it was completed most recently, in April she had a burst but a much slower taper, with this, patient's respiratory symptoms have returned nearly to baseline with marked improvement in her exercise tolerance.  She is also had a marked improvement in PFTs, returning almost to her previous test from a few years ago.  Is unclear why the patient had increased frequency of \"asthma exacerbations\".  If they continue to recur frequently, a trial of Biologics could be considered.  Would pressure reviewed with asthma focus colleagues.  For now, continue prednisone 5 mg daily and current inhaled bronchodilators and steroids.  Continue once daily vest therapy with albuterol and Mucomyst.    Eosinophilic granuloma with polyangiitis: Patient was recently evaluated by an outside rheumatologist for a second opinion.  No changes in management were recommended.    Hiatal hernia: Patient reports increased hiatal hernia symptoms.  She was encouraged to follow-up with her primary care physician.      Hypertension: Blood pressure is elevated in clinic today.  She reports it is generally well-controlled at home.  I encouraged her to continue monitoring at home and contact her primary care physician if persistently elevated.    Follow-up in 4 months with PFTs or in the interim if worsening respiratory symptoms.      I, Dagoberto Briscoe, have spent 42 " minutes on the day of the visit to review the chart, interview and examine the patient, review labs and imaging, formulate a plan, document and submit orders. Time documented is excluding time spent for PFT interpretation.     The longitudinal plan of care for the diagnosis(es)/condition(s) as documented were addressed during this visit. Due to the added complexity in care, I will continue to support Valencia in the subsequent management and with ongoing continuity of care.  Dagoberto Briscoe MD       Pulmonary HPI    The patient was seen and examined by Dagoberto Briscoe MD     The patient has had recurrent episodes of chest tightness and wheezing over the past few months.  She received a burst of prednisone in January and then a burst of prednisone along with doxycycline in February.  Each time symptoms improved but recurred again once she was at her baseline prednisone dose.  In April she received a prednisone burst to 20 mg daily and a very slow taper.  With this she has had marked improvement although not quite back to baseline.  She completed her taper recently.  She remains on 5 mg daily which is close to her baseline chronic dose.  Breathing is comfortable at rest.  Marked improvement in dyspnea on exertion.  Now tolerating yoga without significant difficulty.  No cough.  No sputum.  No fever, chills or night sweats.  Vest therapy once daily.    Review of systems:  Increased hiatal hernia symptoms of chest pain, heartburn esophageal spasm and dysphagia.  Appetite is fair  Occasional palpitation at baseline  No nausea or vomiting.  Abdominal discomfort and loose stools chronic and at baseline.  Easy bruising, attributed to recent prednisone burst  Remainder of complete review of systems is otherwise negative except as noted in history of present illness.      Current Outpatient Medications   Medication Sig Dispense Refill    acetylcysteine (MUCOMYST) 20 % neb solution NEBULIZE THE CONTENTS OF ONE VIAL  (4 ML) TWO TIMES A DAY. 800 mL 1    albuterol (PROAIR HFA/PROVENTIL HFA/VENTOLIN HFA) 108 (90 Base) MCG/ACT inhaler INHALE 2 PUFFS INTO THE LUNGS EVERY 4 HOURS AS NEEDED FOR SHORTNESS OF BREATH/WHEEZE 8.5 g 3    albuterol (PROVENTIL) (2.5 MG/3ML) 0.083% neb solution TAKE 1 VIAL VIA NEBULIZER TWICE A DAY. 150 mL 11    budeson-glycopyrrol-formoterol (BREZTRI AEROSPHERE) 160-9-4.8 MCG/ACT AERO inhaler Inhale 2 puffs into the lungs 2 times daily 10.7 g 11    ciprofloxacin-dexAMETHasone (CIPRODEX) 0.3-0.1 % otic suspension INSTILL 3 DROPS INTO AFFECTED EARS 2 TIMES DAILY AS NEEDED 7.5 mL 10    estradiol (ESTRACE) 0.1 MG/GM vaginal cream Place 2 g vaginally twice a week 42.5 g 3    fluticasone (FLONASE) 50 MCG/ACT nasal spray INSTILL 2 SPRAYS INTO BOTH NOSTRILS DAILY 48 mL 0    LORazepam (ATIVAN) 0.5 MG tablet Take 1 tablet (0.5 mg) by mouth every 8 hours as needed for anxiety 8 tablet 0    metoprolol tartrate (LOPRESSOR) 25 MG tablet Take 0.5 tablets (12.5 mg) by mouth 2 times daily as needed (for high blood pressure >130/80) 60 tablet 1    Omega-3 Fatty Acids (FISH OIL) 1200 MG capsule Take 4 capsules (4.8 g) by mouth daily      predniSONE (DELTASONE) 1 MG tablet TAKE 5 TABS DAILY FOR 30 DAYS 150 tablet 2    predniSONE (DELTASONE) 5 MG tablet Take 1 tablet (5 mg) by mouth daily Total dose 5.5mg daily      venlafaxine (EFFEXOR XR) 37.5 MG 24 hr capsule Take 37.5 mg by mouth daily      vitamin D3 (CHOLECALCIFEROL) 2000 units (50 mcg) tablet Take 2 tablets by mouth daily 5000-65983 untis daily      VITAMIN K PO Take 1 capsule by mouth daily      cod liver oil CAPS capsule Take 1 capsule by mouth daily      Glucosamine-Chondroit-Vit C-Mn (GLUCOSAMINE CHONDROITINOITIN COMPLEX) CAPS Take 3 capsules by mouth daily      glutamine 500 MG TABS Take 1 tablet by mouth daily as needed (GI symptoms)      Iodine, Kelp, (KELP PO) Take 2 capsules by mouth daily      Potassium Gluconate 595 (99 K) MG TABS Take 5 g by mouth daily       Probiotic Product ([employer] PROBIOTIC MAXIMUM STRENGTH) CAPS Take 1 capsule by mouth daily       No current facility-administered medications for this visit.     Allergies   Allergen Reactions    Colistimethate Anaphylaxis    Colymycin M [Colistimethate Sodium] Anaphylaxis    Tamiflu [Oseltamivir]      Gums Blister up    Hydroxyzine     Azithromycin Palpitations     Heart palpitation  Heart palpitation    Clindamycin Rash    Tizanidine Palpitations     Past Medical History:   Diagnosis Date    Anxiety     Aortitis (H24)     Asthma     associated with Churg Antonina syndrome    Bronchiectasis (H)     Cerebral infarction (H) 1990    Very small, caused small permanent optical migraine    Chronic sinusitis     Churg-Antonina syndrome (H)     dx     Conductive hearing loss     Depressive disorder     Eustachian tube dysfunction     Goiter     Hearing loss, conductive     Heart rate problem     Hypertension     Hypocalcemia     Immunosuppression (H24)     Infection due to 2019 novel coronavirus 2022, , 2022    Irritable bowel syndrome     Major depressive disorder     Mannose-binding lectin deficiency (H24)     Nonspecific abnormal results of thyroid function study     Osteoporosis     Pericarditis      and     Pneumonia     4/23/10    Recurrent otitis media     Recurrent UTI     Rheumatoid vasculitis (H)     Sinusitis, chronic     Steroid long-term use     Tinnitus     Varicose veins of leg with swelling        Past Surgical History:   Procedure Laterality Date    C/SECTION, CLASSICAL       SECTION      COLONOSCOPY Left 2014    Procedure: COMBINED COLONOSCOPY, SINGLE OR MULTIPLE BIOPSY/POLYPECTOMY BY BIOPSY;  Surgeon: Js Pinedo MD;  Location:  GI    COLONOSCOPY N/A 2019    Procedure: COLONOSCOPY;  Surgeon: Bryce Pimentel MD;  Location:  GI    ENT SURGERY      ESOPHAGOSCOPY, GASTROSCOPY, DUODENOSCOPY (EGD), COMBINED N/A 2023     Procedure: ESOPHAGOGASTRODUODENOSCOPY, WITH BIOPSY;  Surgeon: Mo Rosa MD;  Location:  GI    ESOPHAGOSCOPY, GASTROSCOPY, DUODENOSCOPY (EGD), DILATATION, COMBINED N/A 2/2/2023    Procedure: ESOPHAGOGASTRODUODENOSCOPY, WITH DILATION;  Surgeon: Mo Rosa MD;  Location:  GI    GENITOURINARY SURGERY      HEAD & NECK SURGERY      HYSTERECTOMY  2/27/2009    without oopherectomy    HYSTERECTOMY, PAP NO LONGER INDICATED      cervix removed     LAPAROSCOPIC APPENDECTOMY N/A 6/22/2022    Procedure: APPENDECTOMY, LAPAROSCOPIC;  Surgeon: Fany Hannah MD;  Location: [facility] OR    PE TUBES      PICC INSERTION  10/10/2013    5fr DL PASV PICC, 43cm (1cm external) in the L basilic vein w/ tip in the low SVC.    PICC INSERTION Left 05/26/2017    5fr DL BioFlo PICC, 42cm (3cm external) in the L lateral brachial vein w/ tip in the SVC RA junction.    SINUS SURGERY      TUBAL LIGATION      TYMPANOPLASTY      ZZHC COLP CERVIX/UPPER VAGINA W LOOP ELEC BX CERVIX         Social History     Socioeconomic History    Marital status:      Spouse name: Not on file    Number of children: Not on file    Years of education: Not on file    Highest education level: Not on file   Occupational History    Not on file   Tobacco Use    Smoking status: Never     Passive exposure: Never    Smokeless tobacco: Never   Vaping Use    Vaping status: Never Used   Substance and Sexual Activity    Alcohol use: Not Currently    Drug use: No    Sexual activity: Yes     Partners: Male     Birth control/protection: Male Surgical, Female Surgical   Other Topics Concern    Parent/sibling w/ CABG, MI or angioplasty before 65F 55M? Not Asked     Service No    Blood Transfusions No    Caffeine Concern No    Occupational Exposure No    Hobby Hazards No    Sleep Concern No    Stress Concern Yes    Weight Concern No    Special Diet Yes     Comment: IBS diet    Back Care No    Exercise Not Asked     Comment: 1 mile power  walk 5 days a week at least.     Bike Helmet Not Asked    Seat Belt Yes    Self-Exams Yes   Social History Narrative    Retired  in a Radiology Department.  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 No smokers at home.  Enjoys walking and dancing.  Alcohol-abstains. No illicits.     Social Determinants of Health     Financial Resource Strain: Low Risk  (1/17/2024)    Financial Resource Strain     Within the past 12 months, have you or your family members you live with been unable to get utilities (heat, electricity) when it was really needed?: No   Food Insecurity: Low Risk  (1/17/2024)    Food Insecurity     Within the past 12 months, did you worry that your food would run out before you got money to buy more?: No     Within the past 12 months, did the food you bought just not last and you didn t have money to get more?: No   Transportation Needs: Low Risk  (1/17/2024)    Transportation Needs     Within the past 12 months, has lack of transportation kept you from medical appointments, getting your medicines, non-medical meetings or appointments, work, or from getting things that you need?: No   Physical Activity: Not on file   Stress: Not on file   Social Connections: Not on file   Interpersonal Safety: Low Risk  (12/15/2023)    Interpersonal Safety     Do you feel physically and emotionally safe where you currently live?: Yes     Within the past 12 months, have you been hit, slapped, kicked or otherwise physically hurt by someone?: No     Within the past 12 months, have you been humiliated or emotionally abused in other ways by your partner or ex-partner?: No   Housing Stability: Low Risk  (1/17/2024)    Housing Stability     Do you have housing? : Yes     Are you worried about losing your housing?: No       ROS Pulmonary    A complete ROS was otherwise negative except as noted in the HPI.  BP (!) 147/81   Pulse 86   LMP 02/01/2009 (LMP Unknown)   SpO2 97%   Exam:    GENERAL APPEARANCE: Well developed, well nourished, alert, and in no apparent distress.  EYES: PERRL, EOMI  HENT: Nasal mucosa with no edema and no hyperemia. No nasal polyps.  EARS: Canals clear, TMs scarred bilaterally  MOUTH: Oral mucosa is moist, without any lesions, no tonsillar enlargement, no oropharyngeal exudate.  NECK: supple, no masses, no thyromegaly.  LYMPHATICS: No significant axillary, cervical, or supraclavicular nodes.  RESP: normal percussion, good air flow throughout.  No crackles. No rhonchi. No wheezes.  CV: Normal S1, S2, regular rhythm, normal rate. No murmur.  No rub. No gallop. No LE edema.   ABDOMEN:  Bowel sounds normal, soft, nontender, no HSM or masses.   MS: extremities normal. No clubbing. No cyanosis.  SKIN: no rash on limited exam  NEURO: Mentation intact, speech normal, normal strength and tone, normal gait and stance  PSYCH: mentation appears normal. and affect normal/bright  Results:  Recent Results (from the past 168 hour(s))   General PFT Lab (Please always keep checked)    Collection Time: 05/02/24  1:14 PM   Result Value Ref Range    FVC-Pred 2.96 L    FVC-Pre 3.89 L    FVC-%Pred-Pre 131 %    FEV1-Pre 2.55 L    FEV1-%Pred-Pre 105 %    FEV1FVC-Pred 81 %    FEV1FVC-Pre 66 %    FEFMax-Pred 6.44 L/sec    FEFMax-Pre 5.87 L/sec    FEFMax-%Pred-Pre 91 %    FEF2575-Pred 2.43 L/sec    FEF2575-Pre 1.39 L/sec    JYN9957-%Pred-Pre 56 %    ExpTime-Pre 7.19 sec    FIFMax-Pre 4.61 L/sec    FEV1FEV6-Pred 82 %    FEV1FEV6-Pre 67 %                 Results as noted above.    PFT Interpretation:  Mild obstructive ventilatory defect based on the reduced FEV1/FVC ratio and the shape of the flow-volume loops.    Increased from previous.  Similar to recent best.  Valid Maneuver

## 2024-05-02 NOTE — LETTER
"    5/2/2024         RE: Valencia Ye  [address] [facility] [address]        Dear Colleague,    Thank you for referring your patient, Valencia Ye, to the [facility] [place]. Please see a copy of my visit note below.    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      Pulmonary/bronchiectasis/asthma: The patient previously had a gradual decline in PFTs, progressing over 2-3 years.  Extensive evaluation including laboratory testing and chest CT was unrevealing.  In the last 3 months, she had recurrent episodes of chest tightness which resolved with prednisone but recurred rapidly after it was completed most recently, in April she had a burst but a much slower taper, with this, patient's respiratory symptoms have returned nearly to baseline with marked improvement in her exercise tolerance.  She is also had a marked improvement in PFTs, returning almost to her previous test from a few years ago.  Is unclear why the patient had increased frequency of \"asthma exacerbations\".  If they continue to recur frequently, a trial of Biologics could be considered.  Would pressure reviewed with asthma focus colleagues.  For now, continue prednisone 5 mg daily and current inhaled bronchodilators and steroids.  Continue once daily vest therapy with albuterol and Mucomyst.    Eosinophilic granuloma with polyangiitis: Patient was recently evaluated by an outside rheumatologist for a second opinion.  No changes in management were recommended.    Hiatal hernia: Patient reports increased hiatal hernia symptoms.  She was encouraged to follow-up with her primary care physician.      Hypertension: Blood pressure is elevated in clinic " today.  She reports it is generally well-controlled at home.  I encouraged her to continue monitoring at home and contact her primary care physician if persistently elevated.    Follow-up in 4 months with PFTs or in the interim if worsening respiratory symptoms.      I, Dagoberto Briscoe, have spent 42 minutes on the day of the visit to review the chart, interview and examine the patient, review labs and imaging, formulate a plan, document and submit orders. Time documented is excluding time spent for PFT interpretation.     The longitudinal plan of care for the diagnosis(es)/condition(s) as documented were addressed during this visit. Due to the added complexity in care, I will continue to support Valencia in the subsequent management and with ongoing continuity of care.  Dagoberto Briscoe MD       Pulmonary HPI    The patient was seen and examined by Dagoberto Briscoe MD     The patient has had recurrent episodes of chest tightness and wheezing over the past few months.  She received a burst of prednisone in January and then a burst of prednisone along with doxycycline in February.  Each time symptoms improved but recurred again once she was at her baseline prednisone dose.  In April she received a prednisone burst to 20 mg daily and a very slow taper.  With this she has had marked improvement although not quite back to baseline.  She completed her taper recently.  She remains on 5 mg daily which is close to her baseline chronic dose.  Breathing is comfortable at rest.  Marked improvement in dyspnea on exertion.  Now tolerating yoga without significant difficulty.  No cough.  No sputum.  No fever, chills or night sweats.  Vest therapy once daily.    Review of systems:  Increased hiatal hernia symptoms of chest pain, heartburn esophageal spasm and dysphagia.  Appetite is fair  Occasional palpitation at baseline  No nausea or vomiting.  Abdominal discomfort and loose stools chronic and at baseline.  Easy  bruising, attributed to recent prednisone burst  Remainder of complete review of systems is otherwise negative except as noted in history of present illness.      Current Outpatient Medications   Medication Sig Dispense Refill     acetylcysteine (MUCOMYST) 20 % neb solution NEBULIZE THE CONTENTS OF ONE VIAL (4 ML) TWO TIMES A DAY. 800 mL 1     albuterol (PROAIR HFA/PROVENTIL HFA/VENTOLIN HFA) 108 (90 Base) MCG/ACT inhaler INHALE 2 PUFFS INTO THE LUNGS EVERY 4 HOURS AS NEEDED FOR SHORTNESS OF BREATH/WHEEZE 8.5 g 3     albuterol (PROVENTIL) (2.5 MG/3ML) 0.083% neb solution TAKE 1 VIAL VIA NEBULIZER TWICE A DAY. 150 mL 11     budeson-glycopyrrol-formoterol (BREZTRI AEROSPHERE) 160-9-4.8 MCG/ACT AERO inhaler Inhale 2 puffs into the lungs 2 times daily 10.7 g 11     ciprofloxacin-dexAMETHasone (CIPRODEX) 0.3-0.1 % otic suspension INSTILL 3 DROPS INTO AFFECTED EARS 2 TIMES DAILY AS NEEDED 7.5 mL 10     estradiol (ESTRACE) 0.1 MG/GM vaginal cream Place 2 g vaginally twice a week 42.5 g 3     fluticasone (FLONASE) 50 MCG/ACT nasal spray INSTILL 2 SPRAYS INTO BOTH NOSTRILS DAILY 48 mL 0     LORazepam (ATIVAN) 0.5 MG tablet Take 1 tablet (0.5 mg) by mouth every 8 hours as needed for anxiety 8 tablet 0     metoprolol tartrate (LOPRESSOR) 25 MG tablet Take 0.5 tablets (12.5 mg) by mouth 2 times daily as needed (for high blood pressure >130/80) 60 tablet 1     Omega-3 Fatty Acids (FISH OIL) 1200 MG capsule Take 4 capsules (4.8 g) by mouth daily       predniSONE (DELTASONE) 1 MG tablet TAKE 5 TABS DAILY FOR 30 DAYS 150 tablet 2     predniSONE (DELTASONE) 5 MG tablet Take 1 tablet (5 mg) by mouth daily Total dose 5.5mg daily       venlafaxine (EFFEXOR XR) 37.5 MG 24 hr capsule Take 37.5 mg by mouth daily       vitamin D3 (CHOLECALCIFEROL) 2000 units (50 mcg) tablet Take 2 tablets by mouth daily 5000-71688 untis daily       VITAMIN K PO Take 1 capsule by mouth daily       cod liver oil CAPS capsule Take 1 capsule by mouth daily        Glucosamine-Chondroit-Vit C-Mn (GLUCOSAMINE CHONDROITINOITIN COMPLEX) CAPS Take 3 capsules by mouth daily       glutamine 500 MG TABS Take 1 tablet by mouth daily as needed (GI symptoms)       Iodine, Kelp, (KELP PO) Take 2 capsules by mouth daily       Potassium Gluconate 595 (99 K) MG TABS Take 5 g by mouth daily       Probiotic Product (CVS PROBIOTIC MAXIMUM STRENGTH) CAPS Take 1 capsule by mouth daily       No current facility-administered medications for this visit.     Allergies   Allergen Reactions     Colistimethate Anaphylaxis     Colymycin M [Colistimethate Sodium] Anaphylaxis     Tamiflu [Oseltamivir]      Gums Blister up     Hydroxyzine      Azithromycin Palpitations     Heart palpitation  Heart palpitation     Clindamycin Rash     Tizanidine Palpitations     Past Medical History:   Diagnosis Date     Anxiety      Aortitis (H24)      Asthma     associated with Churg Antonina syndrome     Bronchiectasis (H)      Cerebral infarction (H) 1990    Very small, caused small permanent optical migraine     Chronic sinusitis      Churg-Antonina syndrome (H)     1990     Conductive hearing loss      Depressive disorder      Eustachian tube dysfunction      Goiter      Hearing loss, conductive      Heart rate problem      Hypertension      Hypocalcemia      Immunosuppression (H24)      Infection due to 2019 novel coronavirus 2022, , 2022     Irritable bowel syndrome      Major depressive disorder      Mannose-binding lectin deficiency (H24)      Nonspecific abnormal results of thyroid function study      Osteoporosis      Pericarditis      and      Pneumonia     4/23/10     Recurrent otitis media      Recurrent UTI      Rheumatoid vasculitis (H)      Sinusitis, chronic      Steroid long-term use      Tinnitus      Varicose veins of leg with swelling        Past Surgical History:   Procedure Laterality Date     C/SECTION, CLASSICAL  1998      SECTION        COLONOSCOPY Left 12/19/2014    Procedure: COMBINED COLONOSCOPY, SINGLE OR MULTIPLE BIOPSY/POLYPECTOMY BY BIOPSY;  Surgeon: Js Pinedo MD;  Location:  GI     COLONOSCOPY N/A 12/20/2019    Procedure: COLONOSCOPY;  Surgeon: Bryce Pimentel MD;  Location:  GI     ENT SURGERY       ESOPHAGOSCOPY, GASTROSCOPY, DUODENOSCOPY (EGD), COMBINED N/A 2/2/2023    Procedure: ESOPHAGOGASTRODUODENOSCOPY, WITH BIOPSY;  Surgeon: Mo Rosa MD;  Location:  GI     ESOPHAGOSCOPY, GASTROSCOPY, DUODENOSCOPY (EGD), DILATATION, COMBINED N/A 2/2/2023    Procedure: ESOPHAGOGASTRODUODENOSCOPY, WITH DILATION;  Surgeon: Mo Rosa MD;  Location:  GI     GENITOURINARY SURGERY       HEAD & NECK SURGERY       HYSTERECTOMY  2/27/2009    without oopherectomy     HYSTERECTOMY, PAP NO LONGER INDICATED      cervix removed      LAPAROSCOPIC APPENDECTOMY N/A 6/22/2022    Procedure: APPENDECTOMY, LAPAROSCOPIC;  Surgeon: Fany Hannah MD;  Location: [facility] OR     PE TUBES       PICC INSERTION  10/10/2013    5fr DL PASV PICC, 43cm (1cm external) in the L basilic vein w/ tip in the low SVC.     PICC INSERTION Left 05/26/2017    5fr DL BioFlo PICC, 42cm (3cm external) in the L lateral brachial vein w/ tip in the SVC RA junction.     SINUS SURGERY       TUBAL LIGATION       TYMPANOPLASTY       ZZHC COLP CERVIX/UPPER VAGINA W LOOP ELEC BX CERVIX         Social History     Socioeconomic History     Marital status:      Spouse name: Not on file     Number of children: Not on file     Years of education: Not on file     Highest education level: Not on file   Occupational History     Not on file   Tobacco Use     Smoking status: Never     Passive exposure: Never     Smokeless tobacco: Never   Vaping Use     Vaping status: Never Used   Substance and Sexual Activity     Alcohol use: Not Currently     Drug use: No     Sexual activity: Yes     Partners: Male     Birth control/protection: Male Surgical,  Female Surgical   Other Topics Concern     Parent/sibling w/ CABG, MI or angioplasty before 65F 55M? Not Asked      Service No     Blood Transfusions No     Caffeine Concern No     Occupational Exposure No     Hobby Hazards No     Sleep Concern No     Stress Concern Yes     Weight Concern No     Special Diet Yes     Comment: IBS diet     Back Care No     Exercise Not Asked     Comment: 1 mile power walk 5 days a week at least.      Bike Helmet Not Asked     Seat Belt Yes     Self-Exams Yes   Social History Narrative    Retired  in a Radiology Department.  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 No smokers at home.  Enjoys walking and dancing.  Alcohol-abstains. No illicits.     Social Determinants of Health     Financial Resource Strain: Low Risk  (1/17/2024)    Financial Resource Strain      Within the past 12 months, have you or your family members you live with been unable to get utilities (heat, electricity) when it was really needed?: No   Food Insecurity: Low Risk  (1/17/2024)    Food Insecurity      Within the past 12 months, did you worry that your food would run out before you got money to buy more?: No      Within the past 12 months, did the food you bought just not last and you didn t have money to get more?: No   Transportation Needs: Low Risk  (1/17/2024)    Transportation Needs      Within the past 12 months, has lack of transportation kept you from medical appointments, getting your medicines, non-medical meetings or appointments, work, or from getting things that you need?: No   Physical Activity: Not on file   Stress: Not on file   Social Connections: Not on file   Interpersonal Safety: Low Risk  (12/15/2023)    Interpersonal Safety      Do you feel physically and emotionally safe where you currently live?: Yes      Within the past 12 months, have you been hit, slapped, kicked or otherwise physically hurt by someone?: No      Within the past 12  months, have you been humiliated or emotionally abused in other ways by your partner or ex-partner?: No   Housing Stability: Low Risk  (1/17/2024)    Housing Stability      Do you have housing? : Yes      Are you worried about losing your housing?: No       ROS Pulmonary    A complete ROS was otherwise negative except as noted in the HPI.  BP (!) 147/81   Pulse 86   LMP 02/01/2009 (LMP Unknown)   SpO2 97%   Exam:   GENERAL APPEARANCE: Well developed, well nourished, alert, and in no apparent distress.  EYES: PERRL, EOMI  HENT: Nasal mucosa with no edema and no hyperemia. No nasal polyps.  EARS: Canals clear, TMs scarred bilaterally  MOUTH: Oral mucosa is moist, without any lesions, no tonsillar enlargement, no oropharyngeal exudate.  NECK: supple, no masses, no thyromegaly.  LYMPHATICS: No significant axillary, cervical, or supraclavicular nodes.  RESP: normal percussion, good air flow throughout.  No crackles. No rhonchi. No wheezes.  CV: Normal S1, S2, regular rhythm, normal rate. No murmur.  No rub. No gallop. No LE edema.   ABDOMEN:  Bowel sounds normal, soft, nontender, no HSM or masses.   MS: extremities normal. No clubbing. No cyanosis.  SKIN: no rash on limited exam  NEURO: Mentation intact, speech normal, normal strength and tone, normal gait and stance  PSYCH: mentation appears normal. and affect normal/bright  Results:  Recent Results (from the past 168 hour(s))   General PFT Lab (Please always keep checked)    Collection Time: 05/02/24  1:14 PM   Result Value Ref Range    FVC-Pred 2.96 L    FVC-Pre 3.89 L    FVC-%Pred-Pre 131 %    FEV1-Pre 2.55 L    FEV1-%Pred-Pre 105 %    FEV1FVC-Pred 81 %    FEV1FVC-Pre 66 %    FEFMax-Pred 6.44 L/sec    FEFMax-Pre 5.87 L/sec    FEFMax-%Pred-Pre 91 %    FEF2575-Pred 2.43 L/sec    FEF2575-Pre 1.39 L/sec    POA9565-%Pred-Pre 56 %    ExpTime-Pre 7.19 sec    FIFMax-Pre 4.61 L/sec    FEV1FEV6-Pred 82 %    FEV1FEV6-Pre 67 %                 Results as noted above.    PFT  Interpretation:  Mild obstructive ventilatory defect based on the reduced FEV1/FVC ratio and the shape of the flow-volume loops.    Increased from previous.  Similar to recent best.  Valid Maneuver                      Again, thank you for allowing me to participate in the care of your patient.        Sincerely,        Dagoberto Briscoe MD

## 2024-05-02 NOTE — PROGRESS NOTES
Valencia Ye comes into clinic today at the request of Dr. Dagoberto Briscoe Ordering Provider for spirometry       Jose Gil, RRT

## 2024-05-02 NOTE — NURSING NOTE
Chief Complaint   Patient presents with    General Visit     F/u     Vitals were taken and medications were reconciled.     ALEXA Workman

## 2024-05-03 NOTE — TELEPHONE ENCOUNTER
Sent [employer] message to patient.    Sharif Wilkes, Triage RN [facility] [place]  10:42 AM 5/3/2024

## 2024-05-08 ENCOUNTER — OFFICE VISIT (OUTPATIENT)
Dept: OBGYN | Facility: CLINIC | Age: 54
End: 2024-05-08
Payer: COMMERCIAL

## 2024-05-08 VITALS
HEIGHT: 63 IN | WEIGHT: 107 LBS | BODY MASS INDEX: 18.96 KG/M2 | DIASTOLIC BLOOD PRESSURE: 70 MMHG | SYSTOLIC BLOOD PRESSURE: 128 MMHG

## 2024-05-08 DIAGNOSIS — N95.1 SYMPTOMATIC MENOPAUSAL OR FEMALE CLIMACTERIC STATES: Primary | ICD-10-CM

## 2024-05-08 DIAGNOSIS — Z90.710 H/O ABDOMINAL HYSTERECTOMY: ICD-10-CM

## 2024-05-08 PROCEDURE — 99203 OFFICE O/P NEW LOW 30 MIN: CPT | Performed by: OBSTETRICS & GYNECOLOGY

## 2024-05-08 RX ORDER — NORETHINDRONE ACETATE AND ETHINYL ESTRADIOL .5; 2.5 MG/1; UG/1
1 TABLET ORAL AT BEDTIME
Qty: 90 TABLET | Refills: 3 | Status: SHIPPED | OUTPATIENT
Start: 2024-05-08

## 2024-05-08 NOTE — NURSING NOTE
"Chief Complaint   Patient presents with    Menopausal Sx     Hot flash symptoms, increase in feeling tired        Initial /70 (BP Location: Right arm, Patient Position: Sitting, Cuff Size: Adult Regular)   Ht 1.588 m (5' 2.5\")   Wt 48.5 kg (107 lb)   LMP 2009 (LMP Unknown)   BMI 19.26 kg/m   Estimated body mass index is 19.26 kg/m  as calculated from the following:    Height as of this encounter: 1.588 m (5' 2.5\").    Weight as of this encounter: 48.5 kg (107 lb).  BP completed using cuff size: regular    Questioned patient about current smoking habits.  Pt. has never smoked.          The following HM Due: NONE    Bobby Luo CMA                "

## 2024-05-08 NOTE — PROGRESS NOTES
Assessment & Plan     Symptomatic menopausal or female climacteric states (Chronic w/ exacerbation)  - Pt is s/p hysterectomy w/o endometriosis noted, but would prefer to try combined estradiol and progestin HRT  - Rx sent to pharm norethindrone-eth estradiol (FEMHRT LOW DOSE) 0.5-2.5 MG-MCG tablet; Take 1 tablet by mouth at bedtime  - Can try for 4-6 weeks and if sx's adequately controlled can maintain this dose. If not adequate, she can let me know and will increase to the FemHRT 1/5 dose (can take 2 tabs of current Rx at bedtime until she runs out and then change to 1/5 dose)  - Pt knows to do monthly BSE and annual mammos.  - Can get Rx's of HRT from PCP if they feel comfortable managing it. Otherwise can request from me.    H/O abdominal hysterectomy  - Done for Hx TEO 3 on LEEP w/ pos margins, normal follow-up since.    Review of the result(s) of each unique test - 2/27/2009 Path from [facility] - benign uterus and Cx              No follow-ups on file.    Teresa Birmingham is a 53 year old, presenting for the following health issues:  Menopausal Sx (Hot flash symptoms, increase in feeling tired )    Pt here for hot flashes. She is s/p a [facility] 2/27/2009 for TEO 3 on LEEP w/ pos margins. I reviewed the op note and intra-op there wasn't anything unusual noted nor endometriosis. Pathology showed no residual dysplasia and no evidence of endometriosis. She did well until for the past year she has had hot flashes and it has gotten really bad in last couple months. She also has vag dryness that vaginal estrogen cream is not helping. She had normal mammo 8/1/2023. She has no VB or discharge. She is interested in HRT. In particular she would prefer estrogen/progestin combo because she has read that some Pt's get better results with both rather than just the estrogen. She has no Hx DVT or breast/liver disease.                       Objective    /70 (BP Location: Right arm, Patient Position: Sitting, Cuff Size: Adult  "Regular)   Ht 1.588 m (5' 2.5\")   Wt 48.5 kg (107 lb)   LMP 02/01/2009 (LMP Unknown)   BMI 19.26 kg/m    Body mass index is 19.26 kg/m .  Physical Exam  Constitutional:       General: She is not in acute distress.     Appearance: Normal appearance. She is normal weight. She is not ill-appearing.   Neurological:      Mental Status: She is alert.                    Signed Electronically by: Surya Bradford MD    "

## 2024-05-11 DIAGNOSIS — I10 BENIGN ESSENTIAL HYPERTENSION: ICD-10-CM

## 2024-05-11 DIAGNOSIS — J47.9 BRONCHIECTASIS WITHOUT COMPLICATION (H): ICD-10-CM

## 2024-05-13 RX ORDER — ALBUTEROL SULFATE 0.83 MG/ML
SOLUTION RESPIRATORY (INHALATION)
Qty: 180 ML | Refills: 11 | Status: SHIPPED | OUTPATIENT
Start: 2024-05-13

## 2024-05-14 ENCOUNTER — MYC MEDICAL ADVICE (OUTPATIENT)
Dept: INTERNAL MEDICINE | Facility: CLINIC | Age: 54
End: 2024-05-14
Payer: COMMERCIAL

## 2024-05-14 RX ORDER — METOPROLOL TARTRATE 25 MG/1
12.5 TABLET, FILM COATED ORAL 2 TIMES DAILY PRN
Qty: 90 TABLET | Refills: 1 | Status: SHIPPED | OUTPATIENT
Start: 2024-05-14

## 2024-05-15 ENCOUNTER — OFFICE VISIT (OUTPATIENT)
Dept: URGENT CARE | Facility: URGENT CARE | Age: 54
End: 2024-05-15
Payer: COMMERCIAL

## 2024-05-15 VITALS
TEMPERATURE: 98 F | HEART RATE: 78 BPM | OXYGEN SATURATION: 98 % | SYSTOLIC BLOOD PRESSURE: 148 MMHG | DIASTOLIC BLOOD PRESSURE: 84 MMHG | RESPIRATION RATE: 20 BRPM

## 2024-05-15 DIAGNOSIS — R42 VERTIGO: Primary | ICD-10-CM

## 2024-05-15 PROCEDURE — 99213 OFFICE O/P EST LOW 20 MIN: CPT | Performed by: FAMILY MEDICINE

## 2024-05-15 NOTE — TELEPHONE ENCOUNTER
Advised patient of provider recommendation via telephone. Patient again asks if physical therapy referral can be made as last time this occurred Dr. Simental placed the referral and patient was treated for vertigo- she would really like to avoid an ER visit for her vertigo. Writer strongly encouraged patient be seen in ER per provider recommendation, patient declined at this time. Please advise, can patient be seen for an appointment for referral?    Nichole GAYTAN

## 2024-05-15 NOTE — PROGRESS NOTES
SUBJECTIVE:  Chief Complaint   Patient presents with    Ear Problem     Ears pain and feels dizzy      Valenciacarmencita Ye is a 53 year old female who presents with a chief complaint of ear pain and dizziness.    Had similar vertigo before, underlying chronic ear issues.  Was doing yoga and thinks one of the poses caused the dislodgement of the crystals.    Woke up this morning with spinning sensation, this was worse in the morning.  Feels like off balance.  This was bad enough and ended up throwing up.  This will occur once a year.  Did well with physical therapy and would like referral.    Denies any headache or weakness.  Has side effect with meclizine    Past Medical History:   Diagnosis Date    Anxiety     Aortitis (H24)     Asthma     associated with Churg Antonina syndrome    Bronchiectasis (H)     Cerebral infarction (H) 08/1990    Very small, caused small permanent optical migraine    Chronic sinusitis     Churg-Antonina syndrome (H)     dx 1990    Conductive hearing loss     Depressive disorder     Eustachian tube dysfunction     Goiter     Hearing loss, conductive     Heart rate problem     Hypertension     Hypocalcemia     Immunosuppression (H24)     Infection due to 2019 novel coronavirus 04/2022 Feb 2020, April 20211, June 2022    Irritable bowel syndrome     Major depressive disorder     Mannose-binding lectin deficiency (H24) 2014    Nonspecific abnormal results of thyroid function study     Osteoporosis     Pericarditis     1990 and 1992    Pneumonia     4/23/10    Recurrent otitis media     Recurrent UTI     Rheumatoid vasculitis (H)     Sinusitis, chronic     Steroid long-term use     Tinnitus     Varicose veins of leg with swelling      Current Outpatient Medications   Medication Sig Dispense Refill    acetylcysteine (MUCOMYST) 20 % neb solution NEBULIZE THE CONTENTS OF ONE VIAL (4 ML) TWO TIMES A DAY. 800 mL 1    albuterol (PROAIR HFA/PROVENTIL HFA/VENTOLIN HFA) 108 (90 Base) MCG/ACT inhaler  INHALE 2 PUFFS INTO THE LUNGS EVERY 4 HOURS AS NEEDED FOR SHORTNESS OF BREATH/WHEEZE 8.5 g 3    albuterol (PROVENTIL) (2.5 MG/3ML) 0.083% neb solution TAKE 1 VIAL VIA NEBULIZER TWICE A DAY. 180 mL 11    budeson-glycopyrrol-formoterol (BREZTRI AEROSPHERE) 160-9-4.8 MCG/ACT AERO inhaler Inhale 2 puffs into the lungs 2 times daily 10.7 g 11    ciprofloxacin-dexAMETHasone (CIPRODEX) 0.3-0.1 % otic suspension INSTILL 3 DROPS INTO AFFECTED EARS 2 TIMES DAILY AS NEEDED 7.5 mL 10    cod liver oil CAPS capsule Take 1 capsule by mouth daily      fluticasone (FLONASE) 50 MCG/ACT nasal spray INSTILL 2 SPRAYS INTO BOTH NOSTRILS DAILY 48 mL 0    Glucosamine-Chondroit-Vit C-Mn (GLUCOSAMINE CHONDROITINOITIN COMPLEX) CAPS Take 3 capsules by mouth daily      glutamine 500 MG TABS Take 1 tablet by mouth daily as needed (GI symptoms)      Iodine, Kelp, (KELP PO) Take 2 capsules by mouth daily      LORazepam (ATIVAN) 0.5 MG tablet Take 1 tablet (0.5 mg) by mouth every 8 hours as needed for anxiety 8 tablet 0    metoprolol tartrate (LOPRESSOR) 25 MG tablet TAKE 0.5 TABLETS (12.5 MG) BY MOUTH 2 TIMES DAILY AS NEEDED (FOR HIGH BLOOD PRESSURE >130/80) 90 tablet 1    norethindrone-eth estradiol (FEMHRT LOW DOSE) 0.5-2.5 MG-MCG tablet Take 1 tablet by mouth at bedtime 90 tablet 3    Omega-3 Fatty Acids (FISH OIL) 1200 MG capsule Take 4 capsules (4.8 g) by mouth daily      Potassium Gluconate 595 (99 K) MG TABS Take 5 g by mouth daily      predniSONE (DELTASONE) 1 MG tablet TAKE 5 TABS DAILY FOR 30 DAYS 150 tablet 2    predniSONE (DELTASONE) 5 MG tablet Take 1 tablet (5 mg) by mouth daily Total dose 5.5mg daily      Probiotic Product ([employer]S PROBIOTIC MAXIMUM STRENGTH) CAPS Take 1 capsule by mouth daily      venlafaxine (EFFEXOR XR) 37.5 MG 24 hr capsule Take 37.5 mg by mouth daily      vitamin D3 (CHOLECALCIFEROL) 2000 units (50 mcg) tablet Take 2 tablets by mouth daily [id number] untis daily      VITAMIN K PO Take 1 capsule by mouth daily       estradiol (ESTRACE) 0.1 MG/GM vaginal cream Place 2 g vaginally twice a week (Patient not taking: Reported on 5/8/2024) 42.5 g 3     Social History     Tobacco Use    Smoking status: Never     Passive exposure: Never    Smokeless tobacco: Never   Substance Use Topics    Alcohol use: Not Currently       ROS:  Review of systems negative except as stated above.    EXAM:   BP (!) 148/84   Pulse 78   Temp 98  F (36.7  C)   Resp 20   LMP 02/01/2009 (LMP Unknown)   SpO2 98%   GENERAL APPEARANCE: healthy, alert and no distress  EARS: bilateral ear canals normal, bilateral TM with perforation, no erythema or drainage  EXTREMITIES: peripheral pulses normal  PSYCH:alert, affect bright      ASSESSMENT/PLAN:  (R42) Vertigo  (primary encounter diagnosis)  Comment: Benign positional vertigo  Plan: Physical Therapy  Referral            Patient with recurrent vertigo, similar symptoms and has underlying chronic ear symptoms.  No concerns for acute neurologic etiology.  Referral placed for vestibular physical therapy.    Follow up with primary provider if any further concerns.    Justin Diego MD  May 15, 2024 6:54 PM

## 2024-05-15 NOTE — TELEPHONE ENCOUNTER
Patient calling again today looking for referral to Physical Therapy to help her. She currently is only able to lay on her left side, feels like she is on a ship and is extremely nausea and vomiting from the dizziness.    Patient states this is the same symptoms she had when her ear crystals were out of place previously    Please place referral so she can make appointment     Callback number [phone]

## 2024-05-15 NOTE — TELEPHONE ENCOUNTER
Needs to follow-up in the ED for acute symptoms.    As for the hormone replacement therapy, I do not prescribe HRT.  Do recommend follow-up with OB/GYN.

## 2024-05-15 NOTE — TELEPHONE ENCOUNTER
Patient called a second time. Has had vomiting, bed spins, room looks tilted when she tries to walk.  Patient states it feels like previous episodes of loose ear crystals. NEISHA Perdue R.N.

## 2024-05-16 ENCOUNTER — THERAPY VISIT (OUTPATIENT)
Dept: PHYSICAL THERAPY | Facility: CLINIC | Age: 54
End: 2024-05-16
Attending: FAMILY MEDICINE
Payer: COMMERCIAL

## 2024-05-16 DIAGNOSIS — R42 VERTIGO: ICD-10-CM

## 2024-05-16 PROCEDURE — 97161 PT EVAL LOW COMPLEX 20 MIN: CPT | Mod: GP | Performed by: PHYSICAL THERAPIST

## 2024-05-16 PROCEDURE — 95992 CANALITH REPOSITIONING PROC: CPT | Mod: GP | Performed by: PHYSICAL THERAPIST

## 2024-05-16 NOTE — PROGRESS NOTES
"PHYSICAL THERAPY EVALUATION  Type of Visit: Evaluation    See electronic medical record for Abuse and Falls Screening details.    Subjective       Presenting condition or subjective complaint: Vestibular  Per chart review: \"Had similar vertigo before, underlying chronic ear issues.  Was doing yoga and thinks one of the poses caused the dislodgement of the crystals. Woke up this morning with spinning sensation, this was worse in the morning.  Feels like off balance.  This was bad enough and ended up throwing up.  This will occur once a year.  Did well with physical therapy and would like referral.\"    Date of onset: 05/11/24    Relevant medical history:   Anxiety, aortitis, asthma, CVA-   Very small, caused small permanent optical migraine, Churg-Antonina syndrome, conductive hearing loss, Eustachian tube dysfunction, HTN, recurrent otitis media  Dates & types of surgery: multiple please ask if need to know    Prior diagnostic imaging/testing results: MRI     Prior therapy history for the same diagnosis, illness or injury: Yes Vestibular    Prior Level of Function  Transfers: Independent  Ambulation: Independent  ADL: Independent  IADL: Driving, Finances, Housekeeping, Laundry, Meal preparation, Medication management, Work    Living Environment  Social support: With a significant other or spouse   Type of home: House   Stairs to enter the home:         Ramp: No   Stairs inside the home: Yes 15 Is there a railing: Yes   Help at home: Other  Equipment owned:       Employment:      Hobbies/Interests:  Yoga 5-6x per week    Patient goals for therapy: Resolve vertigo    Pain assessment: Pain present, general      Objective      SENSATION:  No changes per patient report       VESTIBULAR EVALUATION  ADDITIONAL HISTORY:  Patient notes she has been careful with her movements, avoiding bending over and looking up. She was doing a bridge in yoga and got hit with dizziness. She has had some intermittent dizziness that last couple " of days but yesterday she had a major episode, vomited and had to stay propped up because she hasn't been able to lie down. She slept propped up.    Description of symptoms: Constant dizziness; Off balance; Nausea or vomiting; I feel like I am spinning; Attacks of dizziness; I feel like the room is spinning; Light-headedness  Dizzy attacks:   Start: a few days ago but yesterday was worst ever   Last attack: yesterday morning into evening   Frequency of occurrences: roughly annually   Length of attack: until i get help  Difficulty hearing: Both ears  Noise in ears? Yes high pitched ringing and static constantly, ebs and flows normally  Alleviates symptoms: vestibulae therapy  Worsens symptoms:  Looking up, bending over, yoga, loading   Activities that bring on symptoms: Bending over; Walking in the dark; Other     She normally goes to yoga 5-6x per week.     Pertinent visual history: Glasses, progressive bifocal   Pertinent history of current vestibular problem: Migraines   DHI: Total Score: 68    Cervicogenic Screen    Neck ROM WFL for positional testing   Vertebral Artery Test    Alar Ligament Test    Transverse Ligament Test    Distraction    Neck Torsion Test (head still, body rotating)    Neck Torsion Test (head and body rotating)         Infrared Goggle Exam Vestibular Suppressant in Last 24 Hours? No  Exam Completed With: Infrared goggles   Spontaneous Nystagmus    Gaze Evoked Nystagmus    Head Shake Horizontal Nystagmus    Positional Testing    Supine Head-Hanging Test     Left Right   August-Hallpike Upbeating L torsional, delayed onset dizziness, long duration nystagmus Downbeating, woozy, less intense, long duration   Sidelying Test     [facility] Supine Roll Test Upbeating L torsional, woozy, long duration nystagmus, possible more left horizontal nystagmus, strongest dizziness Downbeating, woozy, less intense, long duration   [facility] Forward Roll Test     [place] and Lean Test -  Sitting Erect    [place] and Lean Test -  Seated, Head Bent 60 Degrees Forward    [place] and Lean Test - Seated, Head Bent Backwards       BPPV Canal(s): L Posterior  BPPV Type: Canalithasis  Probable right sided involvement, left side more symptomatic today - treat then reassess.    Assessment & Plan   CLINICAL IMPRESSIONS  Medical Diagnosis: Vertigo (R42)    Treatment Diagnosis: s/s BPPV   Impression/Assessment: Patient is a 53 year old female with dizziness complaints.  The following significant findings have been identified: Impaired balance, Decreased activity tolerance, Dizziness, and Disequilibrium . These impairments interfere with their ability to perform self care tasks, recreational activities, and community mobility as compared to previous level of function.     Clinical Decision Making (Complexity):  Clinical Presentation: Stable/Uncomplicated  Clinical Presentation Rationale: based on medical and personal factors listed in PT evaluation  Clinical Decision Making (Complexity): Low complexity    PLAN OF CARE  Treatment Interventions:  Interventions: Gait Training, Manual Therapy, Neuromuscular Re-education, Therapeutic Activity, Therapeutic Exercise, Self-Care/Home Management, Canalith Repositioning    Long Term Goals     PT Goal 1  Goal Identifier: DHI  Goal Description: Patient will complete the DHI with a score of <20/100 to demonstrate decreased perception of handicap and improved quality of life.  Rationale: to maximize safety and independence with performance of ADLs and functional tasks;to maximize safety and independence within the home;to maximize safety and independence with self cares;to maximize safety and independence within the community  Goal Progress: Eval: 68/100  Target Date: 06/13/24  PT Goal 2  Goal Identifier: Position changes  Goal Description: Patient will deny dizziness with change of body position for independent bed mobility and transfers for return to daily activities without limitation.  Rationale: to maximize safety and  independence with performance of ADLs and functional tasks;to maximize safety and independence within the home;to maximize safety and independence with self cares  Goal Progress: Eval: dizziness with positional testing bilaterally, worse on the left  Target Date: 06/13/24      Frequency of Treatment: 2x per week, decreasing in frequency as indicated  Duration of Treatment: 4 weeks    Recommended Referrals to Other Professionals:   Education Assessment:   Learner/Method: Patient;Demonstration;Pictures/Video  Education Comments: Exam findings, POC, mechanism and treatment of BPPV    Risks and benefits of evaluation/treatment have been explained.   Patient/Family/caregiver agrees with Plan of Care.     Evaluation Time:     PT Shell Low Complexity Minutes (30484): 20       Signing Clinician: Opal Rivera PT

## 2024-05-21 ENCOUNTER — THERAPY VISIT (OUTPATIENT)
Dept: PHYSICAL THERAPY | Facility: CLINIC | Age: 54
End: 2024-05-21
Attending: INTERNAL MEDICINE
Payer: COMMERCIAL

## 2024-05-21 DIAGNOSIS — R42 VERTIGO: Primary | ICD-10-CM

## 2024-05-21 PROCEDURE — 95992 CANALITH REPOSITIONING PROC: CPT | Mod: GP | Performed by: PHYSICAL THERAPIST

## 2024-05-22 ENCOUNTER — THERAPY VISIT (OUTPATIENT)
Dept: PHYSICAL THERAPY | Facility: CLINIC | Age: 54
End: 2024-05-22
Attending: FAMILY MEDICINE
Payer: COMMERCIAL

## 2024-05-22 DIAGNOSIS — R42 VERTIGO: Primary | ICD-10-CM

## 2024-05-22 PROCEDURE — 95992 CANALITH REPOSITIONING PROC: CPT | Mod: GP | Performed by: PHYSICAL THERAPIST

## 2024-05-24 ENCOUNTER — OFFICE VISIT (OUTPATIENT)
Dept: INTERNAL MEDICINE | Facility: CLINIC | Age: 54
End: 2024-05-24
Payer: COMMERCIAL

## 2024-05-24 VITALS
TEMPERATURE: 97.4 F | HEART RATE: 77 BPM | RESPIRATION RATE: 20 BRPM | SYSTOLIC BLOOD PRESSURE: 114 MMHG | BODY MASS INDEX: 18.85 KG/M2 | HEIGHT: 63 IN | WEIGHT: 106.4 LBS | OXYGEN SATURATION: 99 % | DIASTOLIC BLOOD PRESSURE: 82 MMHG

## 2024-05-24 DIAGNOSIS — B37.0 THRUSH: ICD-10-CM

## 2024-05-24 DIAGNOSIS — K14.6 SORENESS OF TONGUE: Primary | ICD-10-CM

## 2024-05-24 PROCEDURE — 83921 ORGANIC ACID SINGLE QUANT: CPT | Performed by: PHYSICIAN ASSISTANT

## 2024-05-24 PROCEDURE — 36415 COLL VENOUS BLD VENIPUNCTURE: CPT | Performed by: PHYSICIAN ASSISTANT

## 2024-05-24 PROCEDURE — 99213 OFFICE O/P EST LOW 20 MIN: CPT | Performed by: PHYSICIAN ASSISTANT

## 2024-05-24 PROCEDURE — 82607 VITAMIN B-12: CPT | Performed by: PHYSICIAN ASSISTANT

## 2024-05-24 RX ORDER — CLOTRIMAZOLE 10 MG/1
10 LOZENGE ORAL
Qty: 70 LOZENGE | Refills: 0 | Status: SHIPPED | OUTPATIENT
Start: 2024-05-24 | End: 2024-06-07

## 2024-05-25 LAB — VIT B12 SERPL-MCNC: 1207 PG/ML (ref 232–1245)

## 2024-05-29 LAB — METHYLMALONATE SERPL-SCNC: 0.21 UMOL/L (ref 0–0.4)

## 2024-05-30 ENCOUNTER — THERAPY VISIT (OUTPATIENT)
Dept: PHYSICAL THERAPY | Facility: CLINIC | Age: 54
End: 2024-05-30
Attending: FAMILY MEDICINE
Payer: COMMERCIAL

## 2024-05-30 DIAGNOSIS — R42 VERTIGO: Primary | ICD-10-CM

## 2024-05-30 PROCEDURE — 97750 PHYSICAL PERFORMANCE TEST: CPT | Mod: GP | Performed by: PHYSICAL THERAPIST

## 2024-06-17 ENCOUNTER — MYC MEDICAL ADVICE (OUTPATIENT)
Dept: INTERNAL MEDICINE | Facility: CLINIC | Age: 54
End: 2024-06-17
Payer: COMMERCIAL

## 2024-06-18 ENCOUNTER — THERAPY VISIT (OUTPATIENT)
Dept: PHYSICAL THERAPY | Facility: CLINIC | Age: 54
End: 2024-06-18
Attending: FAMILY MEDICINE
Payer: COMMERCIAL

## 2024-06-18 DIAGNOSIS — R42 VERTIGO: Primary | ICD-10-CM

## 2024-06-18 PROCEDURE — 97112 NEUROMUSCULAR REEDUCATION: CPT | Mod: GP,59 | Performed by: PHYSICAL THERAPIST

## 2024-06-18 PROCEDURE — 95992 CANALITH REPOSITIONING PROC: CPT | Mod: GP | Performed by: PHYSICAL THERAPIST

## 2024-06-20 NOTE — TELEPHONE ENCOUNTER
Patient is calling requesting a covering provider please write referral.    Please see mychart message from patient and advise

## 2024-06-21 ENCOUNTER — TELEPHONE (OUTPATIENT)
Dept: INTERNAL MEDICINE | Facility: CLINIC | Age: 54
End: 2024-06-21

## 2024-06-21 DIAGNOSIS — H72.93 TYMPANIC MEMBRANE PERFORATION, BILATERAL: ICD-10-CM

## 2024-06-21 DIAGNOSIS — R42 VERTIGO: Primary | ICD-10-CM

## 2024-06-21 NOTE — TELEPHONE ENCOUNTER
"S-(situation): patient requesting referral to otolaryngology at [facility]    B-(background): patient sent the following Editoriallyt message: \"Hello -     I would like to request a referral to be seen by otolaryngology at [facility]. I have permanently perforated eardrums and chronic vertigo requiring PT and I d like to do more with regular maintenance as well as discuss the possibility of vestibular surgery to treat the chronic vertigo.     Thank you.\"    A-(assessment): patient calls multiple times for update    R-(recommendations): routing telephone encounter to provider covering \"M\"    Summer RN 4:11 PM June 21, 2024   [facility]      "

## 2024-06-21 NOTE — TELEPHONE ENCOUNTER
I do not cover M for Dr Renee.(Pts last name trinidad Oswald )  Please forward to appropriate provider

## 2024-06-21 NOTE — TELEPHONE ENCOUNTER
Advised patient of provider recommendation via telephone.     Summer RN 4:37 PM June 21, 2024   [facility]

## 2024-06-21 NOTE — TELEPHONE ENCOUNTER
Turned to telephone encounter and routing to provider. Closing encounter. Please address the telephone encounter for further updates.     Summer RN 4:11 PM June 21, 2024   [facility]

## 2024-06-21 NOTE — TELEPHONE ENCOUNTER
Patient calling to check on status of referral.     Informed we will give her a call back once referral has been placed.       Rachelle Rodriguez RN  [facility]

## 2024-06-24 ENCOUNTER — THERAPY VISIT (OUTPATIENT)
Dept: PHYSICAL THERAPY | Facility: CLINIC | Age: 54
End: 2024-06-24
Attending: FAMILY MEDICINE
Payer: COMMERCIAL

## 2024-06-24 DIAGNOSIS — R42 VERTIGO: Primary | ICD-10-CM

## 2024-06-24 PROCEDURE — 95992 CANALITH REPOSITIONING PROC: CPT | Mod: GP | Performed by: PHYSICAL THERAPIST

## 2024-06-24 PROCEDURE — 97112 NEUROMUSCULAR REEDUCATION: CPT | Mod: GP,59 | Performed by: PHYSICAL THERAPIST

## 2024-06-25 DIAGNOSIS — J31.0 CHRONIC RHINITIS: ICD-10-CM

## 2024-06-25 RX ORDER — FLUTICASONE PROPIONATE 50 MCG
2 SPRAY, SUSPENSION (ML) NASAL DAILY
Qty: 16 ML | Refills: 2 | Status: SHIPPED | OUTPATIENT
Start: 2024-06-25

## 2024-07-02 ENCOUNTER — THERAPY VISIT (OUTPATIENT)
Dept: PHYSICAL THERAPY | Facility: CLINIC | Age: 54
End: 2024-07-02
Attending: FAMILY MEDICINE
Payer: COMMERCIAL

## 2024-07-02 DIAGNOSIS — R42 VERTIGO: Primary | ICD-10-CM

## 2024-07-02 PROCEDURE — 97112 NEUROMUSCULAR REEDUCATION: CPT | Mod: GP,59 | Performed by: PHYSICAL THERAPIST

## 2024-07-02 PROCEDURE — 95992 CANALITH REPOSITIONING PROC: CPT | Mod: GP | Performed by: PHYSICAL THERAPIST

## 2024-07-02 NOTE — PATIENT INSTRUCTIONS
Physical therapy notes 7/2/24  Keep in mind the goal is a slow/small push into symptoms rather than a big flare in symptoms    After an increase in symptoms, find a spot to focus on and do 1 minute of  yoga breathing     Standing in the [place] with eyes closed, shift in a [place] to change the pressure in your feet (think front toes, left toes, left outside of foot, left heel, both heels, right heel, right outside of foot, right toes, both feet toes)   Repeat 5 times to the left then 5 times to the right  Standing in the corner, tap the wall at the height of your hips, alternating sides.  Repeat 10 times each direction  Progress to faster head movement as able  Start to work down from the wedge in a sleeping position, challenge lying on your back or your right side  Try thread the needle and stay in forward fold a little longer    Track symptoms in  migraine crys

## 2024-07-16 ENCOUNTER — THERAPY VISIT (OUTPATIENT)
Dept: PHYSICAL THERAPY | Facility: CLINIC | Age: 54
End: 2024-07-16
Attending: FAMILY MEDICINE
Payer: COMMERCIAL

## 2024-07-16 DIAGNOSIS — R42 VERTIGO: Primary | ICD-10-CM

## 2024-07-16 PROCEDURE — 97112 NEUROMUSCULAR REEDUCATION: CPT | Mod: GP | Performed by: PHYSICAL THERAPIST

## 2024-07-20 NOTE — PROGRESS NOTES
Valencia Ye is a 53 year old female  Chief Complaint: Vertigo. Initially diagnosed with BPPV in May of this year and referred to PT, but PT was of no help. Long Hx of vertigo. Was evaluated at the  with VNG and MRI all were not conclusive. Symptoms of positional vertigo, lasting minutes. Positional changes not specific - any change can precipitate vertigo. No spontaneous onset vertigo, no fluctuating hearing loss or tinnitus. Had Rt mastoidectomy by DR. Capone years ago. Bilateral TM perforations for years.  History of Present Illness  Churg-Antonina syndrome  Location:vestibular  Quality:motion intolerance  Severity:moderate to severe  Duration:years    Past Medical History -   Patient Active Problem List   Diagnosis    Goiter    History of systemic steroid therapy    ILD (interstitial lung disease) (H)    Palpitations    Churg-Antonina syndrome (H)    History of eating disorder    Mild intermittent asthma without complication    Anxiety    Chronic fatigue    Mannose-binding lectin deficiency (H24)    Benign essential hypertension    Moderate episode of recurrent major depressive disorder (H)    Bronchiectasis (H)    Controlled substance agreement signed    Irritable bowel syndrome without diarrhea    Bladder pain    Overactive bladder    Pain syndrome, chronic    Alpha-lipoproteinemia    Diverticulosis    Vertigo    Adjustment disorder with anxiety    Symptomatic menopausal or female climacteric states    H/O abdominal hysterectomy       Current Medications -   Current Outpatient Medications:     acetylcysteine (MUCOMYST) 20 % neb solution, NEBULIZE THE CONTENTS OF ONE VIAL (4 ML) TWO TIMES A DAY., Disp: 800 mL, Rfl: 1    albuterol (PROAIR HFA/PROVENTIL HFA/VENTOLIN HFA) 108 (90 Base) MCG/ACT inhaler, INHALE 2 PUFFS INTO THE LUNGS EVERY 4 HOURS AS NEEDED FOR SHORTNESS OF BREATH/WHEEZE, Disp: 8.5 g, Rfl: 3    albuterol (PROVENTIL) (2.5 MG/3ML) 0.083% neb solution, TAKE 1 VIAL VIA NEBULIZER TWICE A DAY.,  Disp: 180 mL, Rfl: 11    budeson-glycopyrrol-formoterol (BREZTRI AEROSPHERE) 160-9-4.8 MCG/ACT AERO inhaler, Inhale 2 puffs into the lungs 2 times daily, Disp: 10.7 g, Rfl: 11    ciprofloxacin-dexAMETHasone (CIPRODEX) 0.3-0.1 % otic suspension, INSTILL 3 DROPS INTO AFFECTED EARS 2 TIMES DAILY AS NEEDED, Disp: 7.5 mL, Rfl: 10    cod liver oil CAPS capsule, Take 1 capsule by mouth daily, Disp: , Rfl:     fluticasone (FLONASE) 50 MCG/ACT nasal spray, INSTILL 2 SPRAYS INTO BOTH NOSTRILS DAILY., Disp: 16 mL, Rfl: 2    Glucosamine-Chondroit-Vit C-Mn (GLUCOSAMINE CHONDROITINOITIN COMPLEX) CAPS, Take 3 capsules by mouth daily, Disp: , Rfl:     Iodine, Kelp, (KELP PO), Take 2 capsules by mouth daily, Disp: , Rfl:     LORazepam (ATIVAN) 0.5 MG tablet, Take 1 tablet (0.5 mg) by mouth every 8 hours as needed for anxiety, Disp: 8 tablet, Rfl: 0    metoprolol tartrate (LOPRESSOR) 25 MG tablet, TAKE 0.5 TABLETS (12.5 MG) BY MOUTH 2 TIMES DAILY AS NEEDED (FOR HIGH BLOOD PRESSURE >130/80), Disp: 90 tablet, Rfl: 1    norethindrone-eth estradiol (FEMHRT LOW DOSE) 0.5-2.5 MG-MCG tablet, Take 1 tablet by mouth at bedtime, Disp: 90 tablet, Rfl: 3    Omega-3 Fatty Acids (FISH OIL) 1200 MG capsule, Take 4 capsules (4.8 g) by mouth daily, Disp: , Rfl:     Potassium Gluconate 595 (99 K) MG TABS, Take 5 g by mouth daily, Disp: , Rfl:     predniSONE (DELTASONE) 1 MG tablet, TAKE 5 TABS DAILY FOR 30 DAYS, Disp: 150 tablet, Rfl: 2    predniSONE (DELTASONE) 5 MG tablet, Take 1 tablet (5 mg) by mouth daily Total dose 5.5mg daily, Disp: , Rfl:     Probiotic Product ([employer] PROBIOTIC MAXIMUM STRENGTH) CAPS, Take 1 capsule by mouth daily, Disp: , Rfl:     venlafaxine (EFFEXOR XR) 37.5 MG 24 hr capsule, Take 37.5 mg by mouth daily, Disp: , Rfl:     vitamin D3 (CHOLECALCIFEROL) 2000 units (50 mcg) tablet, Take 2 tablets by mouth daily 5000-78505 untis daily, Disp: , Rfl:     VITAMIN K PO, Take 1 capsule by mouth daily, Disp: , Rfl:     Allergies -    Allergies   Allergen Reactions    Colistimethate Anaphylaxis    Colymycin M [Colistimethate Sodium] Anaphylaxis    Tamiflu [Oseltamivir]      Gums Blister up    Hydroxyzine     Azithromycin Palpitations     Heart palpitation  Heart palpitation    Clindamycin Rash    Tizanidine Palpitations       Social History -   Social History     Socioeconomic History    Marital status:    Tobacco Use    Smoking status: Never     Passive exposure: Never    Smokeless tobacco: Never   Vaping Use    Vaping status: Never Used   Substance and Sexual Activity    Alcohol use: Not Currently    Drug use: No    Sexual activity: Yes     Partners: Male     Birth control/protection: Male Surgical, Female Surgical     Comment: CARMELA   Other Topics Concern     Service No    Blood Transfusions No    Caffeine Concern No    Occupational Exposure No    Hobby Hazards No    Sleep Concern No    Stress Concern Yes    Weight Concern No    Special Diet Yes     Comment: IBS diet    Back Care No    Seat Belt Yes    Self-Exams Yes   Social History Narrative    Retired  in a Radiology Department.  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 No smokers at home.  Enjoys walking and dancing.  Alcohol-abstains. No illicits.     Social Determinants of Health     Financial Resource Strain: Low Risk  (1/17/2024)    Financial Resource Strain     Within the past 12 months, have you or your family members you live with been unable to get utilities (heat, electricity) when it was really needed?: No   Food Insecurity: Low Risk  (1/17/2024)    Food Insecurity     Within the past 12 months, did you worry that your food would run out before you got money to buy more?: No     Within the past 12 months, did the food you bought just not last and you didn t have money to get more?: No   Transportation Needs: Low Risk  (1/17/2024)    Transportation Needs     Within the past 12 months, has lack of transportation kept you from  "medical appointments, getting your medicines, non-medical meetings or appointments, work, or from getting things that you need?: No   Interpersonal Safety: Low Risk  (12/15/2023)    Interpersonal Safety     Do you feel physically and emotionally safe where you currently live?: Yes     Within the past 12 months, have you been hit, slapped, kicked or otherwise physically hurt by someone?: No     Within the past 12 months, have you been humiliated or emotionally abused in other ways by your partner or ex-partner?: No   Housing Stability: Low Risk  (1/17/2024)    Housing Stability     Do you have housing? : Yes     Are you worried about losing your housing?: No       Family History -   Family History   Problem Relation Age of Onset    Allergies Mother         Seasonal    Alcohol/Drug Mother     Substance Abuse Mother     Depression Mother     C.A.D. Father     Diabetes Father         Type 2    Depression Father     Heart Disease Father     Breast Cancer Maternal Grandmother     C.A.D. Maternal Grandmother     Arthritis Maternal Grandmother     Musculoskeletal Disorder Maternal Grandmother         MS    Heart Disease Maternal Grandmother     Hypertension Maternal Grandmother     Alcohol/Drug Maternal Grandfather     Substance Abuse Maternal Grandfather     Depression Maternal Grandfather     Unknown/Adopted Paternal Grandmother     Unknown/Adopted Paternal Grandfather     Asthma Daughter     Allergies Daughter         Seasonal    Asthma Son     Cancer Maternal Aunt         breast age 53    Breast Cancer Maternal Aunt         in her 50s    Glaucoma No family hx of     Macular Degeneration No family hx of     Colon Cancer No family hx of        Review of Systems:   !.  Weight Loss: No   2. Difficulty Breathing: No   3. Difficulty Swallowing: No   4. Pain: No    Physical Exam  B/P: Data Unavailable, T: Data Unavailable, P: Data Unavailable, R: Data Unavailable  Vitals: /71   Pulse 74   Ht 1.588 m (5' 2.5\")   Wt " 49.4 kg (109 lb)   LMP 02/01/2009 (LMP Unknown)   SpO2 97%   BMI 19.62 kg/m    BMI= Body mass index is 19.62 kg/m .    General  Appearance - Normal  Head/Face/Scalp:    Skin - Normal    Facial Palpation - Normal    Facial Strength - Normal  Ears:    Pinna - Normal    Canal - Normal   Tympanic membrane -bilateral central perforations  Nose:    External - Normal    Septum - Normal    Turbinates - Normal    Middle meatus - Normal  Oral Cavity:    Lips - Normal    Floor of Mouth - Normal    Gingiva - Normal    Tongue - Normal    Buccal - Normal    Palate - Normal  Nasopharynx:    Oropharynx:    Tonsils - Normal    Tongue base - Normal    Soft palate - Normal    Posterior pharyngeal wall - Normal  Hypopharynx:  Larynx:    Epiglottis -     Aryepiglottic folds -     Arytenoids -     False vocal cords -     True vocal cords -  Neck Masses - No  Neck lymphatics - no lymphadenopathy  Thyroid - Normal  Salivary glands - Normal    Audiogram from today - bilateral conductive hearing loss    Speech Reception Threshold:    RIGHT: 25 dB HL    LEFT:   15 dB HL  Word Recognition Score:     RIGHT: 100% at 65 dB HL using NU-6 recorded word list.    LEFT:   96% at 55 dB HL using NU-6 recorded word list.  Radiology - not applicable   Reports:   View films:  Procedures - not applicable  Patient Education:     A/P - Valencia RAMAN Ye is a 53 year old female  Medical Decision Making: Bilateral conductive hearing loss  Bilateral TM perforations  Vertigo - motion intolerance  Will try scopolamine transdermal to alleviate symptoms

## 2024-07-24 ENCOUNTER — OFFICE VISIT (OUTPATIENT)
Dept: OTOLARYNGOLOGY | Facility: CLINIC | Age: 54
End: 2024-07-24
Payer: COMMERCIAL

## 2024-07-24 ENCOUNTER — OFFICE VISIT (OUTPATIENT)
Dept: AUDIOLOGY | Facility: CLINIC | Age: 54
End: 2024-07-24
Payer: COMMERCIAL

## 2024-07-24 VITALS
OXYGEN SATURATION: 97 % | BODY MASS INDEX: 19.31 KG/M2 | SYSTOLIC BLOOD PRESSURE: 120 MMHG | WEIGHT: 109 LBS | HEART RATE: 74 BPM | HEIGHT: 63 IN | DIASTOLIC BLOOD PRESSURE: 71 MMHG

## 2024-07-24 DIAGNOSIS — H72.93 PERFORATED TYMPANIC MEMBRANE ON EXAMINATION, BILATERAL: Primary | ICD-10-CM

## 2024-07-24 DIAGNOSIS — H90.0 CONDUCTIVE HEARING LOSS, BILATERAL: Primary | ICD-10-CM

## 2024-07-24 DIAGNOSIS — H72.93 TYMPANIC MEMBRANE PERFORATION, BILATERAL: ICD-10-CM

## 2024-07-24 DIAGNOSIS — H93.13 TINNITUS OF BOTH EARS: ICD-10-CM

## 2024-07-24 DIAGNOSIS — H90.0 CONDUCTIVE HEARING LOSS, BILATERAL: ICD-10-CM

## 2024-07-24 DIAGNOSIS — R42 VERTIGO: ICD-10-CM

## 2024-07-24 PROCEDURE — 99203 OFFICE O/P NEW LOW 30 MIN: CPT | Performed by: OTOLARYNGOLOGY

## 2024-07-24 PROCEDURE — 92557 COMPREHENSIVE HEARING TEST: CPT | Performed by: AUDIOLOGIST

## 2024-07-24 PROCEDURE — 92550 TYMPANOMETRY & REFLEX THRESH: CPT | Performed by: AUDIOLOGIST

## 2024-07-24 RX ORDER — SCOLOPAMINE TRANSDERMAL SYSTEM 1 MG/1
1 PATCH, EXTENDED RELEASE TRANSDERMAL
Qty: 10 PATCH | Refills: 0 | Status: SHIPPED | OUTPATIENT
Start: 2024-07-24 | End: 2024-08-23

## 2024-07-24 NOTE — PROGRESS NOTES
AUDIOLOGY REPORT    SUBJECTIVE:  Valencia Ye is a 53 year old female who was seen in the Audiology Clinic at the [facility] for audiologic evaluation, referred by Onel Metzger M.D. The patient has been seen previously in a [facility] clinic on 1/22/2019 for assessment and results indicated asymmetrical conductive hearing loss bilaterally. The patient reports a history of chronic ear infections with numerous ear surgeries. She was notes greater difficulty with dizziness and is questioning if it is vestibulare migraines. The patient denies  bilateral otalgia and bilateral drainage.  The patient notes little difficulty with communication in a variety of listening situations.   .    OBJECTIVE:    Otoscopic exam indicates ears are clear of cerumen bilaterally. The patient has known eardrum perforations bilaterally    Pure Tone Thresholds assessed using conventional audiometry with good  reliability from 250-8000 Hz bilaterally using insert earphones and circumaural headphones     RIGHT:  borderline-normal sloping to mild conductive hearing loss    LEFT:    normal sloping to moderate-severe conductive hearing loss    Tympanogram:    RIGHT: large ear canal volume consistent with tympanic membrane perforation    LEFT:   Could not seal    Reflexes (reported by stimulus ear):  RIGHT: Ipsilateral is present at normal levels  RIGHT: Contralateral is CNT   LEFT:   Ipsilateral is CNT  LEFT:   Contralateral is absent at frequencies tested      Speech Reception Threshold:    RIGHT: 25 dB HL    LEFT:   15 dB HL  Word Recognition Score:     RIGHT: 100% at 65 dB HL using NU-6 recorded word list.    LEFT:   96% at 55 dB HL using NU-6 recorded word list.      ASSESSMENT:     ICD-10-CM    1. Conductive hearing loss, bilateral  H90.0           Compared to patient's previous audiogram dated 1/22/2019, hearing has improved 10-25 dB at 2000 Hz bilaterally.Today s results were discussed with the patient  in detail.     PLAN:  Patient was counseled regarding hearing loss and impact on communication.  It is recommended that the patient see Dr. Abdul for medical evaluation of her ears and hearing loss.  Please call this clinic with questions regarding these results or recommendations.        Lakesha FLORES-[facility], #[id number]

## 2024-07-24 NOTE — LETTER
7/24/2024      Valencia Ye  [address] [facility] [address]      Dear Colleague,    Thank you for referring your patient, Valencia Ye, to the [facility]. Please see a copy of my visit note below.    Valencia Ye is a 53 year old female  Chief Complaint: Vertigo. Initially diagnosed with BPPV in May of this year and referred to PT, but PT was of no help. Long Hx of vertigo. Was evaluated at the  with VNG and MRI all were not conclusive. Symptoms of positional vertigo, lasting minutes. Positional changes not specific - any change can precipitate vertigo. No spontaneous onset vertigo, no fluctuating hearing loss or tinnitus. Had Rt mastoidectomy by DR. Capone years ago. Bilateral TM perforations for years.  History of Present Illness  Churg-Antonina syndrome  Location:vestibular  Quality:motion intolerance  Severity:moderate to severe  Duration:years    Past Medical History -   Patient Active Problem List   Diagnosis     Goiter     History of systemic steroid therapy     ILD (interstitial lung disease) (H)     Palpitations     Churg-Antonina syndrome (H)     History of eating disorder     Mild intermittent asthma without complication     Anxiety     Chronic fatigue     Mannose-binding lectin deficiency (H24)     Benign essential hypertension     Moderate episode of recurrent major depressive disorder (H)     Bronchiectasis (H)     Controlled substance agreement signed     Irritable bowel syndrome without diarrhea     Bladder pain     Overactive bladder     Pain syndrome, chronic     Alpha-lipoproteinemia     Diverticulosis     Vertigo     Adjustment disorder with anxiety     Symptomatic menopausal or female climacteric states     H/O abdominal hysterectomy       Current Medications -   Current Outpatient Medications:      acetylcysteine (MUCOMYST) 20 % neb solution, NEBULIZE THE CONTENTS OF ONE VIAL (4 ML) TWO TIMES A DAY., Disp: 800 mL, Rfl: 1     albuterol  (PROAIR HFA/PROVENTIL HFA/VENTOLIN HFA) 108 (90 Base) MCG/ACT inhaler, INHALE 2 PUFFS INTO THE LUNGS EVERY 4 HOURS AS NEEDED FOR SHORTNESS OF BREATH/WHEEZE, Disp: 8.5 g, Rfl: 3     albuterol (PROVENTIL) (2.5 MG/3ML) 0.083% neb solution, TAKE 1 VIAL VIA NEBULIZER TWICE A DAY., Disp: 180 mL, Rfl: 11     budeson-glycopyrrol-formoterol (BREZTRI AEROSPHERE) 160-9-4.8 MCG/ACT AERO inhaler, Inhale 2 puffs into the lungs 2 times daily, Disp: 10.7 g, Rfl: 11     ciprofloxacin-dexAMETHasone (CIPRODEX) 0.3-0.1 % otic suspension, INSTILL 3 DROPS INTO AFFECTED EARS 2 TIMES DAILY AS NEEDED, Disp: 7.5 mL, Rfl: 10     cod liver oil CAPS capsule, Take 1 capsule by mouth daily, Disp: , Rfl:      fluticasone (FLONASE) 50 MCG/ACT nasal spray, INSTILL 2 SPRAYS INTO BOTH NOSTRILS DAILY., Disp: 16 mL, Rfl: 2     Glucosamine-Chondroit-Vit C-Mn (GLUCOSAMINE CHONDROITINOITIN COMPLEX) CAPS, Take 3 capsules by mouth daily, Disp: , Rfl:      Iodine, Kelp, (KELP PO), Take 2 capsules by mouth daily, Disp: , Rfl:      LORazepam (ATIVAN) 0.5 MG tablet, Take 1 tablet (0.5 mg) by mouth every 8 hours as needed for anxiety, Disp: 8 tablet, Rfl: 0     metoprolol tartrate (LOPRESSOR) 25 MG tablet, TAKE 0.5 TABLETS (12.5 MG) BY MOUTH 2 TIMES DAILY AS NEEDED (FOR HIGH BLOOD PRESSURE >130/80), Disp: 90 tablet, Rfl: 1     norethindrone-eth estradiol (FEMHRT LOW DOSE) 0.5-2.5 MG-MCG tablet, Take 1 tablet by mouth at bedtime, Disp: 90 tablet, Rfl: 3     Omega-3 Fatty Acids (FISH OIL) 1200 MG capsule, Take 4 capsules (4.8 g) by mouth daily, Disp: , Rfl:      Potassium Gluconate 595 (99 K) MG TABS, Take 5 g by mouth daily, Disp: , Rfl:      predniSONE (DELTASONE) 1 MG tablet, TAKE 5 TABS DAILY FOR 30 DAYS, Disp: 150 tablet, Rfl: 2     predniSONE (DELTASONE) 5 MG tablet, Take 1 tablet (5 mg) by mouth daily Total dose 5.5mg daily, Disp: , Rfl:      Probiotic Product ([employer] PROBIOTIC MAXIMUM STRENGTH) CAPS, Take 1 capsule by mouth daily, Disp: , Rfl:      venlafaxine  (EFFEXOR XR) 37.5 MG 24 hr capsule, Take 37.5 mg by mouth daily, Disp: , Rfl:      vitamin D3 (CHOLECALCIFEROL) 2000 units (50 mcg) tablet, Take 2 tablets by mouth daily [id number] untis daily, Disp: , Rfl:      VITAMIN K PO, Take 1 capsule by mouth daily, Disp: , Rfl:     Allergies -   Allergies   Allergen Reactions     Colistimethate Anaphylaxis     Colymycin M [Colistimethate Sodium] Anaphylaxis     Tamiflu [Oseltamivir]      Gums Blister up     Hydroxyzine      Azithromycin Palpitations     Heart palpitation  Heart palpitation     Clindamycin Rash     Tizanidine Palpitations       Social History -   Social History     Socioeconomic History     Marital status:    Tobacco Use     Smoking status: Never     Passive exposure: Never     Smokeless tobacco: Never   Vaping Use     Vaping status: Never Used   Substance and Sexual Activity     Alcohol use: Not Currently     Drug use: No     Sexual activity: Yes     Partners: Male     Birth control/protection: Male Surgical, Female Surgical     Comment: CARMELA   Other Topics Concern      Service No     Blood Transfusions No     Caffeine Concern No     Occupational Exposure No     Hobby Hazards No     Sleep Concern No     Stress Concern Yes     Weight Concern No     Special Diet Yes     Comment: IBS diet     Back Care No     Seat Belt Yes     Self-Exams Yes   Social History Narrative    Retired  in a Radiology Department.  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 No smokers at home.  Enjoys walking and dancing.  Alcohol-abstains. No illicits.     Social Determinants of Health     Financial Resource Strain: Low Risk  (1/17/2024)    Financial Resource Strain      Within the past 12 months, have you or your family members you live with been unable to get utilities (heat, electricity) when it was really needed?: No   Food Insecurity: Low Risk  (1/17/2024)    Food Insecurity      Within the past 12 months, did you worry that  your food would run out before you got money to buy more?: No      Within the past 12 months, did the food you bought just not last and you didn t have money to get more?: No   Transportation Needs: Low Risk  (1/17/2024)    Transportation Needs      Within the past 12 months, has lack of transportation kept you from medical appointments, getting your medicines, non-medical meetings or appointments, work, or from getting things that you need?: No   Interpersonal Safety: Low Risk  (12/15/2023)    Interpersonal Safety      Do you feel physically and emotionally safe where you currently live?: Yes      Within the past 12 months, have you been hit, slapped, kicked or otherwise physically hurt by someone?: No      Within the past 12 months, have you been humiliated or emotionally abused in other ways by your partner or ex-partner?: No   Housing Stability: Low Risk  (1/17/2024)    Housing Stability      Do you have housing? : Yes      Are you worried about losing your housing?: No       Family History -   Family History   Problem Relation Age of Onset     Allergies Mother         Seasonal     Alcohol/Drug Mother      Substance Abuse Mother      Depression Mother      C.A.D. Father      Diabetes Father         Type 2     Depression Father      Heart Disease Father      Breast Cancer Maternal Grandmother      C.A.D. Maternal Grandmother      Arthritis Maternal Grandmother      Musculoskeletal Disorder Maternal Grandmother         MS     Heart Disease Maternal Grandmother      Hypertension Maternal Grandmother      Alcohol/Drug Maternal Grandfather      Substance Abuse Maternal Grandfather      Depression Maternal Grandfather      Unknown/Adopted Paternal Grandmother      Unknown/Adopted Paternal Grandfather      Asthma Daughter      Allergies Daughter         Seasonal     Asthma Son      Cancer Maternal Aunt         breast age 53     Breast Cancer Maternal Aunt         in her 50s     Glaucoma No family hx of      Macular  "Degeneration No family hx of      Colon Cancer No family hx of        Review of Systems:   !.  Weight Loss: No   2. Difficulty Breathing: No   3. Difficulty Swallowing: No   4. Pain: No    Physical Exam  B/P: Data Unavailable, T: Data Unavailable, P: Data Unavailable, R: Data Unavailable  Vitals: /71   Pulse 74   Ht 1.588 m (5' 2.5\")   Wt 49.4 kg (109 lb)   LMP 02/01/2009 (LMP Unknown)   SpO2 97%   BMI 19.62 kg/m    BMI= Body mass index is 19.62 kg/m .    General  Appearance - Normal  Head/Face/Scalp:    Skin - Normal    Facial Palpation - Normal    Facial Strength - Normal  Ears:    Pinna - Normal    Canal - Normal   Tympanic membrane -bilateral central perforations  Nose:    External - Normal    Septum - Normal    Turbinates - Normal    Middle meatus - Normal  Oral Cavity:    Lips - Normal    Floor of Mouth - Normal    Gingiva - Normal    Tongue - Normal    Buccal - Normal    Palate - Normal  Nasopharynx:    Oropharynx:    Tonsils - Normal    Tongue base - Normal    Soft palate - Normal    Posterior pharyngeal wall - Normal  Hypopharynx:  Larynx:    Epiglottis -     Aryepiglottic folds -     Arytenoids -     False vocal cords -     True vocal cords -  Neck Masses - No  Neck lymphatics - no lymphadenopathy  Thyroid - Normal  Salivary glands - Normal    Audiogram from today - bilateral conductive hearing loss    Speech Reception Threshold:    RIGHT: 25 dB HL    LEFT:   15 dB HL  Word Recognition Score:     RIGHT: 100% at 65 dB HL using NU-6 recorded word list.    LEFT:   96% at 55 dB HL using NU-6 recorded word list.  Radiology - not applicable   Reports:   View films:  Procedures - not applicable  Patient Education:     A/P - Valencia RAMAN Ye is a 53 year old female  Medical Decision Making: Bilateral conductive hearing loss  Bilateral TM perforations  Vertigo - motion intolerance  Will try scopolamine transdermal to alleviate symptoms      Again, thank you for allowing me to participate in the care " of your patient.        Sincerely,        Landen Abdul MD

## 2024-07-24 NOTE — NURSING NOTE
Chief Complaint   Patient presents with    Ent Problem     Vertigo [R42]  Tympanic membrane perforation, bilateral [H72.93]  ref'd by Onel Metzger MD, pt made appt, [place] location          Vitals:    07/24/24 1330   BP: 120/71   Pulse: 74   SpO2: 97%   Weight: 49.9 kg (110 lb)     Wt Readings from Last 1 Encounters:   07/24/24 49.9 kg (110 lb)   Ashley Salinas MA

## 2024-07-27 DIAGNOSIS — D72.18 EOSINOPHILIC GRANULOMATOSIS WITH POLYANGIITIS (EGPA) WITH LUNG INVOLVEMENT (H): ICD-10-CM

## 2024-07-27 DIAGNOSIS — M30.1 EOSINOPHILIC GRANULOMATOSIS WITH POLYANGIITIS (EGPA) WITH LUNG INVOLVEMENT (H): ICD-10-CM

## 2024-07-31 RX ORDER — PREDNISONE 1 MG/1
5 TABLET ORAL DAILY
Qty: 150 TABLET | Refills: 2 | Status: SHIPPED | OUTPATIENT
Start: 2024-07-31

## 2024-07-31 NOTE — TELEPHONE ENCOUNTER
predniSONE (DELTASONE) 1 MG tablet 150 tablet 2 3/28/2024       TAKE 5 TABS DAILY FOR 30 DAYS     Last Office Visit : 12/7/23-I recommend follow-up in 6-9 months time.   Future Office visit:  0    Routing refill request to provider for review/approval because:  Script is written for 30 days  ??

## 2024-08-01 ENCOUNTER — MYC MEDICAL ADVICE (OUTPATIENT)
Dept: INTERNAL MEDICINE | Facility: CLINIC | Age: 54
End: 2024-08-01
Payer: COMMERCIAL

## 2024-08-01 NOTE — TELEPHONE ENCOUNTER
Spoke with patient, scheduled her to see Dr. Víctor Sainz 8/5 arriving at 9:40 a.m.  NEISHA Perdue R.N.

## 2024-08-23 ENCOUNTER — MYC MEDICAL ADVICE (OUTPATIENT)
Dept: INTERNAL MEDICINE | Facility: CLINIC | Age: 54
End: 2024-08-23
Payer: COMMERCIAL

## 2024-08-23 NOTE — TELEPHONE ENCOUNTER
Please see patient's mychart message below.    Should patient be seen in clinic or ok for referral?  If appropriate, please place referral.    Last office visit 3/1/24    Please advise, thanks.

## 2024-08-27 NOTE — TELEPHONE ENCOUNTER
Will need follow-up for referral.  Preferably, patient will need office visit follow-up for physical examination.  Please use provider approval slot so that patient can be seen sooner.

## 2024-08-28 ENCOUNTER — MYC MEDICAL ADVICE (OUTPATIENT)
Dept: INTERNAL MEDICINE | Facility: CLINIC | Age: 54
End: 2024-08-28
Payer: COMMERCIAL

## 2024-08-28 ENCOUNTER — TELEPHONE (OUTPATIENT)
Dept: INTERNAL MEDICINE | Facility: CLINIC | Age: 54
End: 2024-08-28
Payer: COMMERCIAL

## 2024-08-28 NOTE — TELEPHONE ENCOUNTER
Reason for Call:  Appointment Request    Patient requesting this type of appt:  referral    Requested provider: Padmini Renee    Reason patient unable to be scheduled:  pt is scheduled 8/29 at  9:40 in person and would like to change it to a video visit because she is not able to come in person    When does patient want to be seen/preferred time:  8/29 9:40 am    Comments: PT is wanting to switch 8/29 in person aptt to video and first video appt is showing as 8/30    Could we send this information to you in [employer] or would you prefer to receive a phone call?:   Patient would like to be contacted via [employer]    Call taken on 8/28/2024 at 12:14 PM by Delbert Narayan

## 2024-08-28 NOTE — TELEPHONE ENCOUNTER
Dr Renee, is this appropriate to change to VV?    Patient is looking for a GI referral for hiatal hernia.  Last in person visit was 3-1-2024.

## 2024-08-29 ENCOUNTER — VIRTUAL VISIT (OUTPATIENT)
Dept: INTERNAL MEDICINE | Facility: CLINIC | Age: 54
End: 2024-08-29
Payer: COMMERCIAL

## 2024-08-29 DIAGNOSIS — F41.9 ANXIETY: ICD-10-CM

## 2024-08-29 DIAGNOSIS — K21.9 GASTROESOPHAGEAL REFLUX DISEASE, UNSPECIFIED WHETHER ESOPHAGITIS PRESENT: Primary | ICD-10-CM

## 2024-08-29 DIAGNOSIS — K44.9 HIATAL HERNIA: ICD-10-CM

## 2024-08-29 DIAGNOSIS — K58.9 IRRITABLE BOWEL SYNDROME WITHOUT DIARRHEA: ICD-10-CM

## 2024-08-29 PROCEDURE — 99214 OFFICE O/P EST MOD 30 MIN: CPT | Mod: 95 | Performed by: INTERNAL MEDICINE

## 2024-08-29 RX ORDER — FAMOTIDINE 20 MG/1
20 TABLET, FILM COATED ORAL 2 TIMES DAILY
Qty: 30 TABLET | Refills: 0 | Status: SHIPPED | OUTPATIENT
Start: 2024-08-29 | End: 2024-09-18

## 2024-08-29 ASSESSMENT — ANXIETY QUESTIONNAIRES
3. WORRYING TOO MUCH ABOUT DIFFERENT THINGS: SEVERAL DAYS
6. BECOMING EASILY ANNOYED OR IRRITABLE: SEVERAL DAYS
5. BEING SO RESTLESS THAT IT IS HARD TO SIT STILL: NEARLY EVERY DAY
GAD7 TOTAL SCORE: 9
GAD7 TOTAL SCORE: 9
1. FEELING NERVOUS, ANXIOUS, OR ON EDGE: SEVERAL DAYS
7. FEELING AFRAID AS IF SOMETHING AWFUL MIGHT HAPPEN: SEVERAL DAYS
IF YOU CHECKED OFF ANY PROBLEMS ON THIS QUESTIONNAIRE, HOW DIFFICULT HAVE THESE PROBLEMS MADE IT FOR YOU TO DO YOUR WORK, TAKE CARE OF THINGS AT HOME, OR GET ALONG WITH OTHER PEOPLE: SOMEWHAT DIFFICULT
GAD7 TOTAL SCORE: 9
8. IF YOU CHECKED OFF ANY PROBLEMS, HOW DIFFICULT HAVE THESE MADE IT FOR YOU TO DO YOUR WORK, TAKE CARE OF THINGS AT HOME, OR GET ALONG WITH OTHER PEOPLE?: SOMEWHAT DIFFICULT
2. NOT BEING ABLE TO STOP OR CONTROL WORRYING: SEVERAL DAYS
7. FEELING AFRAID AS IF SOMETHING AWFUL MIGHT HAPPEN: SEVERAL DAYS
4. TROUBLE RELAXING: SEVERAL DAYS

## 2024-08-29 ASSESSMENT — ASTHMA QUESTIONNAIRES
ACT_TOTALSCORE: 24
ACT_TOTALSCORE: 24
QUESTION_4 LAST FOUR WEEKS HOW OFTEN HAVE YOU USED YOUR RESCUE INHALER OR NEBULIZER MEDICATION (SUCH AS ALBUTEROL): ONCE A WEEK OR LESS
QUESTION_2 LAST FOUR WEEKS HOW OFTEN HAVE YOU HAD SHORTNESS OF BREATH: NOT AT ALL
QUESTION_1 LAST FOUR WEEKS HOW MUCH OF THE TIME DID YOUR ASTHMA KEEP YOU FROM GETTING AS MUCH DONE AT WORK, SCHOOL OR AT HOME: NONE OF THE TIME
QUESTION_3 LAST FOUR WEEKS HOW OFTEN DID YOUR ASTHMA SYMPTOMS (WHEEZING, COUGHING, SHORTNESS OF BREATH, CHEST TIGHTNESS OR PAIN) WAKE YOU UP AT NIGHT OR EARLIER THAN USUAL IN THE MORNING: NOT AT ALL
QUESTION_5 LAST FOUR WEEKS HOW WOULD YOU RATE YOUR ASTHMA CONTROL: COMPLETELY CONTROLLED

## 2024-08-29 ASSESSMENT — PATIENT HEALTH QUESTIONNAIRE - PHQ9
10. IF YOU CHECKED OFF ANY PROBLEMS, HOW DIFFICULT HAVE THESE PROBLEMS MADE IT FOR YOU TO DO YOUR WORK, TAKE CARE OF THINGS AT HOME, OR GET ALONG WITH OTHER PEOPLE: EXTREMELY DIFFICULT
SUM OF ALL RESPONSES TO PHQ QUESTIONS 1-9: 16
SUM OF ALL RESPONSES TO PHQ QUESTIONS 1-9: 16

## 2024-08-29 NOTE — PROGRESS NOTES
Valencia is a 53 year old who is being evaluated via a billable video visit.    How would you like to obtain your AVS? MyChart  If the video visit is dropped, the invitation should be resent by: Text to cell phone: [phone]  Will anyone else be joining your video visit? No      Assessment & Plan     Gastroesophageal reflux disease, unspecified whether esophagitis present  Has been having reflux symptoms for around 1 year which has been well controlled with digestive supplements. Has been having increased symptoms with increased heartburns and epigastric pain and sour taste in the mouth.  Discussed with the patient on trial of PPI medication.  Patient would like to hold off on that for now due to concerns of side effects with medications.  Can use Pepcid medication as needed for symptoms.  - famotidine (PEPCID) 20 MG tablet; Take 1 tablet (20 mg) by mouth 2 times daily.  - Adult GI  Referral - Consult Only; Future    Hiatal hernia  Upper GI endoscopy completed on 2/2023 with hiatal hernia noted.  Patient requesting for GI referral for further evaluation of ongoing worsening reflux symptoms.  - famotidine (PEPCID) 20 MG tablet; Take 1 tablet (20 mg) by mouth 2 times daily.  - Adult GI  Referral - Consult Only; Future    Irritable bowel syndrome without diarrhea  Encouraged to continue with dietary restrictions and avoidance of trigger foods.    Anxiety  Continues on Effexor medication.  Follows up with therapist once weekly.  Has felt the symptoms have improved and would like to consider weaning.  Will be following up on this with the prescribing team.        Subjective   Valencia is a 53 year old, presenting for the following health issues:  Referral        8/29/2024     8:38 AM   Additional Questions   Roomed by Priyank   Accompanied by Self     History of Present Illness       Reason for visit:  Increased reflux and hiatal hernia symptoms    She eats 4 or more servings of fruits and vegetables  daily.She consumes 0 sweetened beverage(s) daily.She exercises with enough effort to increase her heart rate 30 to 60 minutes per day.  She exercises with enough effort to increase her heart rate 5 days per week.   She is taking medications regularly.     Has been having reflux symptoms for around 1 year which has been well controlled with digestive supplements. Has been having increased symptoms with increased heartburns and epigastric pain and sour taste in the mouth.  Has not tried antacid for the symptoms.  Follows up with therapist weekly. Continues on effexor at 37.5mg.    Review of Systems  Constitutional, HEENT, cardiovascular, pulmonary, gi and gu systems are negative, except as otherwise noted.      Objective           Vitals:  No vitals were obtained today due to virtual visit.    Physical Exam   GENERAL: alert and no distress  EYES: Eyes grossly normal to inspection.  No discharge or erythema, or obvious scleral/conjunctival abnormalities.  RESP: No audible wheeze, cough, or visible cyanosis.    SKIN: Visible skin clear. No significant rash, abnormal pigmentation or lesions.  NEURO: Cranial nerves grossly intact.  Mentation and speech appropriate for age.  PSYCH: Appropriate affect, tone, and pace of words          Video-Visit Details    Type of service:  Video Visit   Originating Location (pt. Location): Home    Distant Location (provider location):  On-site  Platform used for Video Visit: Jennifer  Signed Electronically by: Padmini Renee MD

## 2024-09-04 ENCOUNTER — TRANSCRIBE ORDERS (OUTPATIENT)
Dept: OTHER | Age: 54
End: 2024-09-04

## 2024-09-04 DIAGNOSIS — K21.9 GASTROESOPHAGEAL REFLUX DISEASE, UNSPECIFIED WHETHER ESOPHAGITIS PRESENT: Primary | ICD-10-CM

## 2024-09-04 DIAGNOSIS — K44.9 HIATAL HERNIA: ICD-10-CM

## 2024-09-05 ENCOUNTER — PATIENT OUTREACH (OUTPATIENT)
Dept: ONCOLOGY | Facility: CLINIC | Age: 54
End: 2024-09-05
Payer: COMMERCIAL

## 2024-09-05 DIAGNOSIS — K44.9 HIATAL HERNIA: Primary | ICD-10-CM

## 2024-09-05 NOTE — PROGRESS NOTES
New Patient Oncology Nurse Navigator Note     Referring provider: Dr. Padmini Renee    Referring Clinic/Organization: [facility]  Referred to: Thoracic Surgery  Requested provider (if applicable): First available - did not specify   Referral Received: 09/04/24       Evaluation for :   Diagnosis   K21.9 (ICD-10-CM) - Gastroesophageal reflux disease, unspecified whether esophagitis present   K44.9 (ICD-10-CM) - Hiatal hernia     Clinical History (per Nurse review of records provided):      02/02/2023 Upper GI Endoscopy (bookmarked) showed:   Impression:               - Widely patent Schatzki ring. Dilated to 19mm                             without significant change.                             - Esophagus appears normal. Biopsies taken to                             evaluate for EoE given chest pain and dysphagia.                             - 2 cm hiatal hernia.                             - Normal stomach.                             - Normal duodenal bulb, first portion of the                             duodenum, second portion of the duodenum and area                             of the papilla.     04/26/2023 CT Chest w/o contrast (bookmarked) showed:   IMPRESSION:   1.  No acute abnormality.  2.  Stable several pulmonary nodules and presumed scarring at the  lateral left chest base as compared to 3/28/2014.     Clinical Assessment / Barriers to Care (Per Nurse):  Tobacco History    Smoking Status  Never     Records Location: [facility]   Records Needed: None  Additional testing needed prior to consult: CT Chest    ALFREDA GaonaN, RN, OCN  [facility] Oncology Nurse Navigator  [phone] / [phone]

## 2024-09-17 ENCOUNTER — TRANSFERRED RECORDS (OUTPATIENT)
Dept: HEALTH INFORMATION MANAGEMENT | Facility: CLINIC | Age: 54
End: 2024-09-17
Payer: COMMERCIAL

## 2024-09-17 ENCOUNTER — MYC MEDICAL ADVICE (OUTPATIENT)
Dept: INTERNAL MEDICINE | Facility: CLINIC | Age: 54
End: 2024-09-17
Payer: COMMERCIAL

## 2024-09-17 LAB
ALT SERPL-CCNC: 17 IU/L (ref 6–32)
AST SERPL-CCNC: 20 U/L (ref 10–35)
CREATININE (EXTERNAL): 0.8 MG/DL (ref 0.5–1.05)

## 2024-09-18 ENCOUNTER — IMMUNIZATION (OUTPATIENT)
Dept: INTERNAL MEDICINE | Facility: CLINIC | Age: 54
End: 2024-09-18
Payer: COMMERCIAL

## 2024-09-18 DIAGNOSIS — Z23 NEED FOR PROPHYLACTIC VACCINATION AND INOCULATION AGAINST INFLUENZA: Primary | ICD-10-CM

## 2024-09-18 DIAGNOSIS — K44.9 HIATAL HERNIA: ICD-10-CM

## 2024-09-18 DIAGNOSIS — K21.9 GASTROESOPHAGEAL REFLUX DISEASE, UNSPECIFIED WHETHER ESOPHAGITIS PRESENT: ICD-10-CM

## 2024-09-18 PROCEDURE — 90471 IMMUNIZATION ADMIN: CPT

## 2024-09-18 PROCEDURE — 90656 IIV3 VACC NO PRSV 0.5 ML IM: CPT

## 2024-09-18 RX ORDER — FAMOTIDINE 20 MG/1
20 TABLET, FILM COATED ORAL 2 TIMES DAILY
Qty: 180 TABLET | Refills: 0 | Status: SHIPPED | OUTPATIENT
Start: 2024-09-18

## 2024-09-19 DIAGNOSIS — J47.9 BRONCHIECTASIS (H): ICD-10-CM

## 2024-09-19 RX ORDER — ALBUTEROL SULFATE 90 UG/1
AEROSOL, METERED RESPIRATORY (INHALATION)
Qty: 18 G | Refills: 11 | Status: SHIPPED | OUTPATIENT
Start: 2024-09-19

## 2024-09-20 NOTE — TELEPHONE ENCOUNTER
RECORDS STATUS - ALL OTHER DIAGNOSIS      RECORDS RECEIVED FROM: [facility]   NOTES STATUS DETAILS   OFFICE NOTE from referring provider Epic 8/29/2024 - Dr. Padmini Renee   OFFICE NOTE from medical oncologist Epic 4/18/2011 - Dr. Latricia Werner   OFFICE NOTE from Pulmonology  [facility] 5/2/2024 - Dr. Dagoberto Briscoe    OPERATIVE REPORT [facility] 2/2/2023 -   ESOPHAGOGASTRODUODENOSCOPY, WITH BIOPSY (Mouth)  ESOPHAGOGASTRODUODENOSCOPY, WITH DILATION (Esophagus)   MEDICATION LIST [facility]    LABS     PATHOLOGY REPORTS [facility] 2/2/2023 - [id number]    ANYTHING RELATED TO DIAGNOSIS Epic 5/24/2024   IMAGING (NEED IMAGES & REPORT)     CT SCANS PACS CT Chest: 9/26/2024, 4/26/2023  CT Abdomen Pelvis: 6/22/2022, 10/20/2020   XRAYS PACS Xray Chest: 1/25/2024-2/10/2020

## 2024-09-25 ENCOUNTER — OFFICE VISIT (OUTPATIENT)
Dept: INTERNAL MEDICINE | Facility: CLINIC | Age: 54
End: 2024-09-25
Payer: COMMERCIAL

## 2024-09-25 VITALS
DIASTOLIC BLOOD PRESSURE: 74 MMHG | OXYGEN SATURATION: 97 % | TEMPERATURE: 97.9 F | WEIGHT: 114.3 LBS | BODY MASS INDEX: 20.25 KG/M2 | RESPIRATION RATE: 20 BRPM | HEART RATE: 86 BPM | HEIGHT: 63 IN | SYSTOLIC BLOOD PRESSURE: 122 MMHG

## 2024-09-25 DIAGNOSIS — K21.9 GASTROESOPHAGEAL REFLUX DISEASE, UNSPECIFIED WHETHER ESOPHAGITIS PRESENT: ICD-10-CM

## 2024-09-25 DIAGNOSIS — K14.6 SORENESS OF TONGUE: Primary | ICD-10-CM

## 2024-09-25 DIAGNOSIS — R53.83 OTHER FATIGUE: ICD-10-CM

## 2024-09-25 DIAGNOSIS — R74.01 TRANSAMINITIS: ICD-10-CM

## 2024-09-25 DIAGNOSIS — F41.9 ANXIETY: ICD-10-CM

## 2024-09-25 LAB
ALBUMIN SERPL BCG-MCNC: 4.5 G/DL (ref 3.5–5.2)
ALP SERPL-CCNC: 49 U/L (ref 40–150)
ALT SERPL W P-5'-P-CCNC: 201 U/L (ref 0–50)
ANION GAP SERPL CALCULATED.3IONS-SCNC: 9 MMOL/L (ref 7–15)
AST SERPL W P-5'-P-CCNC: 76 U/L (ref 0–45)
BILIRUB SERPL-MCNC: <0.2 MG/DL
BUN SERPL-MCNC: 20.3 MG/DL (ref 6–20)
CALCIUM SERPL-MCNC: 9.3 MG/DL (ref 8.8–10.4)
CHLORIDE SERPL-SCNC: 104 MMOL/L (ref 98–107)
CREAT SERPL-MCNC: 0.77 MG/DL (ref 0.51–0.95)
EGFRCR SERPLBLD CKD-EPI 2021: >90 ML/MIN/1.73M2
GLUCOSE SERPL-MCNC: 90 MG/DL (ref 70–99)
HCO3 SERPL-SCNC: 27 MMOL/L (ref 22–29)
POTASSIUM SERPL-SCNC: 4.2 MMOL/L (ref 3.4–5.3)
PROT SERPL-MCNC: 6.6 G/DL (ref 6.4–8.3)
SODIUM SERPL-SCNC: 140 MMOL/L (ref 135–145)
TSH SERPL DL<=0.005 MIU/L-ACNC: 0.89 UIU/ML (ref 0.3–4.2)

## 2024-09-25 PROCEDURE — 36415 COLL VENOUS BLD VENIPUNCTURE: CPT | Performed by: INTERNAL MEDICINE

## 2024-09-25 PROCEDURE — 82550 ASSAY OF CK (CPK): CPT | Performed by: INTERNAL MEDICINE

## 2024-09-25 PROCEDURE — 84443 ASSAY THYROID STIM HORMONE: CPT | Performed by: INTERNAL MEDICINE

## 2024-09-25 PROCEDURE — 87340 HEPATITIS B SURFACE AG IA: CPT | Performed by: INTERNAL MEDICINE

## 2024-09-25 PROCEDURE — 99214 OFFICE O/P EST MOD 30 MIN: CPT | Performed by: INTERNAL MEDICINE

## 2024-09-25 PROCEDURE — 80053 COMPREHEN METABOLIC PANEL: CPT | Performed by: INTERNAL MEDICINE

## 2024-09-25 RX ORDER — LIDOCAINE HYDROCHLORIDE 20 MG/ML
15 SOLUTION OROPHARYNGEAL
Qty: 100 ML | Refills: 0 | Status: SHIPPED | OUTPATIENT
Start: 2024-09-25

## 2024-09-25 ASSESSMENT — PAIN SCALES - GENERAL: PAINLEVEL: NO PAIN (0)

## 2024-09-25 NOTE — PROGRESS NOTES
Assessment & Plan     Soreness of tongue  Symptoms of soreness of the tongue specifically with use of toothpaste.  Patient does not endorse burning sensation or soreness of the tongue with foods.  Overt skin changes  were not noted on on examination in office today.  Recent vitamin B12 check within normal limits.  Discussed with the patient on use of lidocaine swish and spit to help with the symptoms.  Proceed with lab work including CMP.  Can follow-up with pain management clinic head and neck team.    - Pain Management  Referral; Future  - TSH with free T4 reflex; Future  - Comprehensive metabolic panel (BMP + Alb, Alk Phos, ALT, AST, Total. Bili, TP); Future  - lidocaine, viscous, (XYLOCAINE) 2 % solution; Swish and spit 15 mLs in mouth every 3 hours as needed for pain. ; Max 8 doses/24 hour period.  - TSH with free T4 reflex  - Comprehensive metabolic panel (BMP + Alb, Alk Phos, ALT, AST, Total. Bili, TP)    Other fatigue  Endorses symptoms of fatigue which have been increasing over time.  Has had evaluation for sleep apnea which has been reassuring.  Has been up-to-date with regular visits with rheumatology for underlying eosinophilic granulomatosis with polyangiitis.  Complete lab work.  - TSH with free T4 reflex; Future  - Comprehensive metabolic panel (BMP + Alb, Alk Phos, ALT, AST, Total. Bili, TP); Future  - TSH with free T4 reflex  - Comprehensive metabolic panel (BMP + Alb, Alk Phos, ALT, AST, Total. Bili, TP)    Gastroesophageal reflux disease, unspecified whether esophagitis present  Over-the-counter herbal supplement which has been helpful with symptom control.  Will be completing CT imaging.    Anxiety  Overall, symptoms have been stable.  Patient continues on Effexor at 37.5 mg daily.          31 minutes spent by me on the date of the encounter doing chart review, history and exam, documentation and further activities per the note        Teresa Birmingham is a 53 year old, presenting for  "the following health issues:  Mouth Problem        9/25/2024     3:40 PM   Additional Questions   Roomed by Chocoi   Accompanied by Self     History of Present Illness       Reason for visit:  Unresolved mouth symptoms    She eats 2-3 servings of fruits and vegetables daily.She consumes 0 sweetened beverage(s) daily.She exercises with enough effort to increase her heart rate 30 to 60 minutes per day.  She exercises with enough effort to increase her heart rate 4 days per week.   She is taking medications regularly.                 Review of Systems  Constitutional, HEENT, cardiovascular, pulmonary, gi and gu systems are negative, except as otherwise noted.      Objective    /74 (BP Location: Right arm, Patient Position: Sitting, Cuff Size: Adult Regular)   Pulse 86   Temp 97.9  F (36.6  C) (Tympanic)   Resp 20   Ht 1.588 m (5' 2.5\")   Wt 51.8 kg (114 lb 4.8 oz)   LMP 02/01/2009 (LMP Unknown)   SpO2 97%   BMI 20.57 kg/m    Body mass index is 20.57 kg/m .  Physical Exam   GENERAL: alert and no distress  RESP: lungs clear to auscultation - no rales, rhonchi or wheezes  CV: regular rate and rhythm, normal S1 S2  MS: no gross musculoskeletal defects noted, no edema  NEURO: Normal strength and tone, mentation intact and speech normal  PSYCH: mentation appears normal, affect normal.            Signed Electronically by: Padmini Renee MD    "

## 2024-09-26 ENCOUNTER — HOSPITAL ENCOUNTER (OUTPATIENT)
Dept: CT IMAGING | Facility: CLINIC | Age: 54
Discharge: HOME | End: 2024-09-26
Attending: CLINICAL NURSE SPECIALIST | Admitting: CLINICAL NURSE SPECIALIST
Payer: COMMERCIAL

## 2024-09-26 ENCOUNTER — TELEPHONE (OUTPATIENT)
Dept: INTERNAL MEDICINE | Facility: CLINIC | Age: 54
End: 2024-09-26
Payer: COMMERCIAL

## 2024-09-26 DIAGNOSIS — D72.18 CHURG-STRAUSS SYNDROME (H): ICD-10-CM

## 2024-09-26 DIAGNOSIS — K44.9 HIATAL HERNIA: ICD-10-CM

## 2024-09-26 DIAGNOSIS — M30.1 CHURG-STRAUSS SYNDROME (H): ICD-10-CM

## 2024-09-26 PROCEDURE — 71250 CT THORAX DX C-: CPT

## 2024-09-26 RX ORDER — BUDESONIDE AND FORMOTEROL FUMARATE DIHYDRATE 160; 4.5 UG/1; UG/1
2 AEROSOL RESPIRATORY (INHALATION)
Qty: 30.6 G | Refills: 3 | Status: SHIPPED | OUTPATIENT
Start: 2024-09-26

## 2024-09-26 NOTE — TELEPHONE ENCOUNTER
General Call    Contacts       Contact Date/Time Type Contact Phone/Fax    09/26/2024 08:45 AM CDT Phone (Incoming) Valencia Oswald (Self) [phone] (M)    09/26/2024 12:06 PM CDT Phone (Incoming) Valencia Oswald (Self) [phone] (M)          Reason for Call:  Pt calling back to check in on return call from the clinic in regards to recent test results.    What are your questions or concerns:  Pt is very concerned about results & wants to speak to someone urgently. Called this morning but has not heard back from anyone yet.     Date of last appointment with provider: 09/25/2024    Could we send this information to you in [employer] [facility] [place] or would you prefer to receive a phone call?:   Patient would prefer a phone call   Okay to leave a detailed message?: Yes at Cell number on file:    Telephone Information:   Mobile [phone]

## 2024-09-26 NOTE — TELEPHONE ENCOUNTER
Test Results        Who ordered the test:  Dr Renee    Type of test: Lab    Date of test:  9-25-24    Where was the test performed:  [facility]    What are your questions/concerns?:  please call to go over results, she is very worried    Could we send this information to you in [employer] or would you prefer to receive a phone call?:   Patient would prefer a phone call or [employer]  Okay to leave a detailed message?: Yes at Home number on file [phone] (home)

## 2024-09-27 ENCOUNTER — APPOINTMENT (OUTPATIENT)
Dept: ULTRASOUND IMAGING | Facility: CLINIC | Age: 54
End: 2024-09-27
Attending: EMERGENCY MEDICINE
Payer: COMMERCIAL

## 2024-09-27 ENCOUNTER — VIRTUAL VISIT (OUTPATIENT)
Dept: INTERNAL MEDICINE | Facility: CLINIC | Age: 54
End: 2024-09-27
Payer: COMMERCIAL

## 2024-09-27 ENCOUNTER — APPOINTMENT (OUTPATIENT)
Dept: CT IMAGING | Facility: CLINIC | Age: 54
End: 2024-09-27
Attending: EMERGENCY MEDICINE
Payer: COMMERCIAL

## 2024-09-27 ENCOUNTER — HOSPITAL ENCOUNTER (EMERGENCY)
Facility: CLINIC | Age: 54
Discharge: HOME | End: 2024-09-27
Attending: EMERGENCY MEDICINE | Admitting: EMERGENCY MEDICINE
Payer: COMMERCIAL

## 2024-09-27 VITALS
OXYGEN SATURATION: 97 % | HEART RATE: 67 BPM | BODY MASS INDEX: 21.22 KG/M2 | HEIGHT: 62 IN | WEIGHT: 115.3 LBS | RESPIRATION RATE: 14 BRPM | SYSTOLIC BLOOD PRESSURE: 137 MMHG | DIASTOLIC BLOOD PRESSURE: 97 MMHG | TEMPERATURE: 97.3 F

## 2024-09-27 DIAGNOSIS — K21.9 GASTROESOPHAGEAL REFLUX DISEASE, UNSPECIFIED WHETHER ESOPHAGITIS PRESENT: ICD-10-CM

## 2024-09-27 DIAGNOSIS — R53.83 OTHER FATIGUE: ICD-10-CM

## 2024-09-27 DIAGNOSIS — R53.83 OTHER FATIGUE: Primary | ICD-10-CM

## 2024-09-27 DIAGNOSIS — F41.9 ANXIETY: ICD-10-CM

## 2024-09-27 DIAGNOSIS — K59.00 OBSTIPATION: ICD-10-CM

## 2024-09-27 DIAGNOSIS — R74.01 TRANSAMINITIS: Primary | ICD-10-CM

## 2024-09-27 DIAGNOSIS — K29.80 DUODENITIS: ICD-10-CM

## 2024-09-27 LAB
ALBUMIN SERPL BCG-MCNC: 4.4 G/DL (ref 3.5–5.2)
ALBUMIN UR-MCNC: NEGATIVE MG/DL
ALP SERPL-CCNC: 241 U/L (ref 40–150)
ALT SERPL W P-5'-P-CCNC: 18 U/L (ref 0–50)
ANION GAP SERPL CALCULATED.3IONS-SCNC: 14 MMOL/L (ref 7–15)
APPEARANCE UR: CLEAR
AST SERPL W P-5'-P-CCNC: 41 U/L (ref 0–45)
BASOPHILS # BLD AUTO: 0.1 10E3/UL (ref 0–0.2)
BASOPHILS NFR BLD AUTO: 1 %
BILIRUB SERPL-MCNC: 0.2 MG/DL
BILIRUB UR QL STRIP: NEGATIVE
BUN SERPL-MCNC: 15.4 MG/DL (ref 6–20)
CALCIUM SERPL-MCNC: 9.3 MG/DL (ref 8.8–10.4)
CHLORIDE SERPL-SCNC: 106 MMOL/L (ref 98–107)
CK SERPL-CCNC: 123 U/L (ref 26–192)
COLOR UR AUTO: YELLOW
CREAT SERPL-MCNC: 0.27 MG/DL (ref 0.51–0.95)
D DIMER PPP FEU-MCNC: <0.27 UG/ML FEU (ref 0–0.5)
EGFRCR SERPLBLD CKD-EPI 2021: >90 ML/MIN/1.73M2
EOSINOPHIL # BLD AUTO: 0.3 10E3/UL (ref 0–0.7)
EOSINOPHIL NFR BLD AUTO: 4 %
ERYTHROCYTE [DISTWIDTH] IN BLOOD BY AUTOMATED COUNT: 13.1 % (ref 10–15)
GLUCOSE SERPL-MCNC: 137 MG/DL (ref 70–99)
GLUCOSE UR STRIP-MCNC: NEGATIVE MG/DL
HBV SURFACE AG SERPL QL IA: NONREACTIVE
HCO3 SERPL-SCNC: 19 MMOL/L (ref 22–29)
HCT VFR BLD AUTO: 39 % (ref 35–47)
HGB BLD-MCNC: 13.2 G/DL (ref 11.7–15.7)
HGB UR QL STRIP: NEGATIVE
IMM GRANULOCYTES # BLD: 0 10E3/UL
IMM GRANULOCYTES NFR BLD: 0 %
KETONES UR STRIP-MCNC: NEGATIVE MG/DL
LEUKOCYTE ESTERASE UR QL STRIP: NEGATIVE
LIPASE SERPL-CCNC: 23 U/L (ref 13–60)
LYMPHOCYTES # BLD AUTO: 1.9 10E3/UL (ref 0.8–5.3)
LYMPHOCYTES NFR BLD AUTO: 25 %
MCH RBC QN AUTO: 33 PG (ref 26.5–33)
MCHC RBC AUTO-ENTMCNC: 33.8 G/DL (ref 31.5–36.5)
MCV RBC AUTO: 98 FL (ref 78–100)
MONOCYTES # BLD AUTO: 0.6 10E3/UL (ref 0–1.3)
MONOCYTES NFR BLD AUTO: 7 %
NEUTROPHILS # BLD AUTO: 4.9 10E3/UL (ref 1.6–8.3)
NEUTROPHILS NFR BLD AUTO: 63 %
NITRATE UR QL: NEGATIVE
NRBC # BLD AUTO: 0 10E3/UL
NRBC BLD AUTO-RTO: 0 /100
NT-PROBNP SERPL-MCNC: 140 PG/ML (ref 0–900)
PH UR STRIP: 7 [PH] (ref 5–7)
PLATELET # BLD AUTO: 365 10E3/UL (ref 150–450)
POTASSIUM SERPL-SCNC: 4.4 MMOL/L (ref 3.4–5.3)
PROT SERPL-MCNC: 6.4 G/DL (ref 6.4–8.3)
RBC # BLD AUTO: 4 10E6/UL (ref 3.8–5.2)
RBC URINE: <1 /HPF
SODIUM SERPL-SCNC: 139 MMOL/L (ref 135–145)
SP GR UR STRIP: 1.01 (ref 1–1.03)
TROPONIN T SERPL HS-MCNC: 11 NG/L
TROPONIN T SERPL HS-MCNC: 14 NG/L
UROBILINOGEN UR STRIP-MCNC: NORMAL MG/DL
WBC # BLD AUTO: 7.8 10E3/UL (ref 4–11)
WBC URINE: <1 /HPF

## 2024-09-27 PROCEDURE — 83690 ASSAY OF LIPASE: CPT | Performed by: EMERGENCY MEDICINE

## 2024-09-27 PROCEDURE — 250N000009 HC RX 250: Performed by: EMERGENCY MEDICINE

## 2024-09-27 PROCEDURE — 85004 AUTOMATED DIFF WBC COUNT: CPT | Performed by: EMERGENCY MEDICINE

## 2024-09-27 PROCEDURE — 76705 ECHO EXAM OF ABDOMEN: CPT

## 2024-09-27 PROCEDURE — 85379 FIBRIN DEGRADATION QUANT: CPT | Performed by: EMERGENCY MEDICINE

## 2024-09-27 PROCEDURE — 82040 ASSAY OF SERUM ALBUMIN: CPT | Performed by: EMERGENCY MEDICINE

## 2024-09-27 PROCEDURE — G2211 COMPLEX E/M VISIT ADD ON: HCPCS | Mod: 95 | Performed by: INTERNAL MEDICINE

## 2024-09-27 PROCEDURE — 84484 ASSAY OF TROPONIN QUANT: CPT | Performed by: EMERGENCY MEDICINE

## 2024-09-27 PROCEDURE — 83880 ASSAY OF NATRIURETIC PEPTIDE: CPT | Performed by: EMERGENCY MEDICINE

## 2024-09-27 PROCEDURE — 99214 OFFICE O/P EST MOD 30 MIN: CPT | Mod: 95 | Performed by: INTERNAL MEDICINE

## 2024-09-27 PROCEDURE — 250N000011 HC RX IP 250 OP 636: Performed by: EMERGENCY MEDICINE

## 2024-09-27 PROCEDURE — 81001 URINALYSIS AUTO W/SCOPE: CPT | Performed by: EMERGENCY MEDICINE

## 2024-09-27 PROCEDURE — 93005 ELECTROCARDIOGRAM TRACING: CPT

## 2024-09-27 PROCEDURE — 36415 COLL VENOUS BLD VENIPUNCTURE: CPT | Performed by: EMERGENCY MEDICINE

## 2024-09-27 PROCEDURE — 99285 EMERGENCY DEPT VISIT HI MDM: CPT | Mod: 25

## 2024-09-27 PROCEDURE — 74177 CT ABD & PELVIS W/CONTRAST: CPT

## 2024-09-27 RX ORDER — IOPAMIDOL 755 MG/ML
500 INJECTION, SOLUTION INTRAVASCULAR ONCE
Status: COMPLETED | OUTPATIENT
Start: 2024-09-27 | End: 2024-09-27

## 2024-09-27 RX ORDER — SUCRALFATE 1 G/1
1 TABLET ORAL 4 TIMES DAILY PRN
Qty: 60 TABLET | Refills: 0 | Status: SHIPPED | OUTPATIENT
Start: 2024-09-27

## 2024-09-27 RX ADMIN — IOPAMIDOL 58 ML: 755 INJECTION, SOLUTION INTRAVENOUS at 19:56

## 2024-09-27 RX ADMIN — SODIUM CHLORIDE 54 ML: 9 INJECTION, SOLUTION INTRAVENOUS at 19:56

## 2024-09-27 ASSESSMENT — COLUMBIA-SUICIDE SEVERITY RATING SCALE - C-SSRS
1. IN THE PAST MONTH, HAVE YOU WISHED YOU WERE DEAD OR WISHED YOU COULD GO TO SLEEP AND NOT WAKE UP?: NO
2. HAVE YOU ACTUALLY HAD ANY THOUGHTS OF KILLING YOURSELF IN THE PAST MONTH?: NO
6. HAVE YOU EVER DONE ANYTHING, STARTED TO DO ANYTHING, OR PREPARED TO DO ANYTHING TO END YOUR LIFE?: NO

## 2024-09-27 ASSESSMENT — ACTIVITIES OF DAILY LIVING (ADL)
ADLS_ACUITY_SCORE: 35

## 2024-09-27 NOTE — ED TRIAGE NOTES
Patient ambulatory reporting increased breathlessness due to abdominal fullness for the last month. ABCs intact without intervention.

## 2024-09-27 NOTE — ED PROVIDER NOTES
"  Emergency Department Note      History of Present Illness     Chief Complaint   Shortness of Breath and Abdominal Pain      SEBASTIAN Ye is a 53 year old female with a history of hypertension who presents with complaints of shortness of breath and abdominal pain. The patient reports that for the last few months, she has been having significant abdominal pain. She frequently feels as though she is bloated or full, and has cramping upper abdominal pain that radiates to her shoulders. When she eats, she develops pulsating pain in her upper left abdomen that moves throughout her abdomen, and she notes that she has been unable to eat solids. She also reports persistent breathlessness and fatigue, and describes feeling as though \"a balloon is in her chest.\" This worsens upon exertion.  She notes diarrhea, but this is not acute. She denies any acute edema in her lower extremities, She denies any history of PE, DVT, or ulcers. She denies alcohol usage or smoking. Her primary care provider referred her to the ED for evaluation, and the patient reports that she is scheduled for an ultrasound on Wednesday.    Independent Historian   None    Review of External Notes   I reviewed medical records from: Virtual visit from earlier today, 2024 for similar symptoms with associated labs and imaging     Past Medical History   Medications:    Albuterol   Atarax   Toprol   Deltasone   Zanaflex   Vitamin D     Past Medical History:    Anxiety   Diverticulosis   Eating disorder  Churg-Antonina syndrome  Depression   Palpitations   Hypertension   Immunosuppression   Pericarditis   UTI  Osteoporosis      Past Surgical History:     section  x2  Colonoscopy x2  ENT surgery   EGD x2  Genitourinary surgery   Head and neck surgery   Hysterectomy   Appendectomy   PE tubes  PICC insertion x2  Tubal ligation   Tympanoplasty   Cervix removal    Physical Exam     Patient Vitals for the past 24 hrs:   BP Temp Pulse Resp SpO2 " "Height Weight   09/27/24 2227 (!) 137/97 -- 67 -- 97 % -- --   09/27/24 1525 (!) 155/96 97.3  F (36.3  C) 79 14 100 % 1.575 m (5' 2\") 52.3 kg (115 lb 4.8 oz)     Physical Exam  Constitutional: Well developed, nontox appearance  Head: Atraumatic.   Neck:  no stridor  Eyes: no scleral icterus  Cardiovascular: RRR, 2+ bilat radial pulses  Pulmonary/Chest: nml resp effort, Clear BS bilat  Abdominal: ND, soft,  Mild epigastric tenderness, no rebound or guarding   Ext: Warm, well perfused, no edema  Neurological: A&O, symmetric facies, moves ext x4  Skin: Skin is warm and dry.   Psychiatric: Behavior is normal. Thought content normal.   Nursing note and vitals reviewed.    Diagnostics     Lab Results   Labs Ordered and Resulted from Time of ED Arrival to Time of ED Departure   COMPREHENSIVE METABOLIC PANEL - Abnormal       Result Value    Sodium 139      Potassium 4.4      Carbon Dioxide (CO2) 19 (*)     Anion Gap 14      Urea Nitrogen 15.4      Creatinine 0.27 (*)     GFR Estimate >90      Calcium 9.3      Chloride 106      Glucose 137 (*)     Alkaline Phosphatase 241 (*)     AST 41      ALT 18      Protein Total 6.4      Albumin 4.4      Bilirubin Total 0.2     LIPASE - Normal    Lipase 23     D DIMER QUANTITATIVE - Normal    D-Dimer Quantitative <0.27     ROUTINE UA WITH MICROSCOPIC REFLEX TO CULTURE - Normal    Color Urine Yellow      Appearance Urine Clear      Glucose Urine Negative      Bilirubin Urine Negative      Ketones Urine Negative      Specific Gravity Urine 1.014      Blood Urine Negative      pH Urine 7.0      Protein Albumin Urine Negative      Urobilinogen Urine Normal      Nitrite Urine Negative      Leukocyte Esterase Urine Negative      RBC Urine <1      WBC Urine <1     TROPONIN T, HIGH SENSITIVITY - Normal    Troponin T, High Sensitivity 14     NT PROBNP INPATIENT - Normal    N terminal Pro BNP Inpatient 140     TROPONIN T, HIGH SENSITIVITY - Normal    Troponin T, High Sensitivity 11     CBC WITH " PLATELETS AND DIFFERENTIAL    WBC Count 7.8      RBC Count 4.00      Hemoglobin 13.2      Hematocrit 39.0      MCV 98      MCH 33.0      MCHC 33.8      RDW 13.1      Platelet Count 365      % Neutrophils 63      % Lymphocytes 25      % Monocytes 7      % Eosinophils 4      % Basophils 1      % Immature Granulocytes 0      NRBCs per 100 WBC 0      Absolute Neutrophils 4.9      Absolute Lymphocytes 1.9      Absolute Monocytes 0.6      Absolute Eosinophils 0.3      Absolute Basophils 0.1      Absolute Immature Granulocytes 0.0      Absolute NRBCs 0.0         Imaging   CT Abdomen Pelvis w Contrast   Final Result   IMPRESSION:    1.  Mild obstipation.      2.  Mild circumferential thickening involving the proximal duodenum raises the possibility for duodenitis with no evidence for an underlying ulcer.      3.  The appendix is surgically absent.      US Abdomen Limited   Final Result   IMPRESSION:   Normal limited abdominal ultrasound.          EKG   ECG results from 09/27/24   EKG 12-lead, tracing only     Value    Systolic Blood Pressure     Diastolic Blood Pressure     Ventricular Rate 71    Atrial Rate 71    AR Interval 158    QRS Duration 76        QTc 421    P Axis 71    R AXIS 65    T Axis -19    Interpretation ECG      Sinus rhythm  Septal infarct , age undetermined  T wave abnormality, consider inferior ischemia  T wave abnormality, consider anterolateral ischemia  No change compared to 2/5/24  Read by Dr Bryant. 1813       *Note: Due to a large number of results and/or encounters for the requested time period, some results have not been displayed. A complete set of results can be found in Results Review.        Independent Interpretation   Independent interpretations: EKG significant for normal sinus rhythm without acute ischemic findings comparison to previous on 2/5/2024     ED Course      Medications Administered   Medications   CT scan flush (54 mLs Intravenous $Given 9/27/24 1956)   iopamidol  (ISOVUE-370) solution 500 mL (58 mLs Intravenous $Given 9/27/24 1956)       Procedures   Procedures     Discussion of Management   None    ED Course   ED Course as of 09/28/24 0207   Fri Sep 27, 2024   1726 I initially assessed the patient and obtained the history and physical exam.        Additional Documentation  None    Medical Decision Making / Diagnosis     CMS Diagnoses: None    MIPS       None    MDM   53 year old female presenting w/ shortness of breath, abdominal discomfort     Differential diagnosis includes hepatitis, pancreatitis, biliary colic, cholelithiasis, choledocholithiasis, peptic ulcer disease, gastritis, malignancy, cardiac etiology, PE.  EKG interpretation as noted above.  Labs significant for  elevated alk phos otherwise normal transaminases, normal CBC normal.  Imaging significant for normal right upper quadrant ultrasound with CT demonstrating possible obstipation and duodenitis.  Given otherwise reassuring workup, I feel the patient is safe for discharge.  She would likely benefit from follow-up with gastroenterology for further evaluation including possible EGD for better assessment of possible duodenitis.  Patient was counseled on a bowel cleanout at home.  At this time I feel the pt is safe for discharge.  Recommendations given regarding follow up with PCP and return to the emergency department as needed for new or worsening symptoms.  Pt counseled on all results, disposition and diagnosis.  They are understanding and agreeable to plan. Patient discharged in stable condition.    Disposition   The patient was discharged.     Diagnosis     ICD-10-CM    1. Duodenitis  K29.80 Adult GI  Referral - Consult Only     Adult GI  Referral - Procedure Only      2. Obstipation  K59.00 Adult GI  Referral - Consult Only     Adult GI  Referral - Procedure Only           Discharge Medications   Discharge Medication List as of 9/27/2024 10:27 PM            [place]  Disclosure:  I, Estefany Pinedo, am serving as a scribe at 5:30 PM on 9/27/2024 to document services personally performed by Mo Bryant MD based on my observations and the provider's statements to me.        Mo Bryant MD  09/28/24 0207

## 2024-09-27 NOTE — PROGRESS NOTES
Valencia is a 53 year old who is being evaluated via a billable video visit.    How would you like to obtain your AVS? MyChart  If the video visit is dropped, the invitation should be resent by: Text to cell phone: [phone]  Will anyone else be joining your video visit? No      Assessment & Plan     Transaminitis  Mild transaminitis noted on blood work.  Patient had blood work completed with rheumatology team around 10 days ago and AST/ALT enzymes were within normal limits.  Differentials discussed with the patient, will be completing hepatitis panel.  CT scan chest reviewed, no significant findings noted on upper abdominal examination.  Discussed with the patient on completion of abdominal ultrasound.  On further discussion with senior physician in office regarding patient's blood work and progressive worsening of fatigue symptoms, recommendation to complete mononucleosis workup.  Follow-up call placed to the patient on the day of the visit to discuss mononucleosis workup.  Patient willing to proceed with the workup.  She then shared increasing epigastric abdominal pain with radiation to the back.  Patient advised to head to the nearest emergency room as soon as possible.  - US Abdomen Complete; Future  - Hepatitis B surface antigen; Future  - Hepatitis C Screen Reflex to HCV RNA Quant and Genotype; Future  - EBV Capsid Antibody IgM; Future  - Mononucleosis screen; Future  - Comprehensive metabolic panel (BMP + Alb, Alk Phos, ALT, AST, Total. Bili, TP); Future    Gastroesophageal reflux disease, unspecified whether esophagitis present  Have discussed with the patient to discontinue alginic/seaweed and mastic gum supplements due to concerns of transaminitis on blood work.  She would like to avoid acid altering medications.  Willing to try sucralfate medication instead.  Prescription completed.  - sucralfate (CARAFATE) 1 GM tablet; Take 1 tablet (1 g) by mouth 4 times daily as needed for nausea (abdominal  discomfort/nausea/reflux).    Anxiety  Continue with Effexor medication at the current dose.    Other fatigue  - EBV Capsid Antibody IgM; Future  - Mononucleosis screen; Future    On further discussion with senior physician in office regarding patient's blood work and progressive worsening of fatigue symptoms, recommendation to complete mononucleosis workup  Follow-up call placed to the patient on the day of the visit to discuss mononucleosis workup.  Patient willing to proceed with the workup.  She then shared increasing epigastric abdominal pain with radiation to the back.  Patient advised to head to the nearest emergency room as soon as possible          Subjective   Valencia is a 53 year old, presenting for the following health issues:  Follow Up    History of Present Illness       Reason for visit:  Unresolved mouth symptoms    She eats 2-3 servings of fruits and vegetables daily.She consumes 0 sweetened beverage(s) daily.She exercises with enough effort to increase her heart rate 30 to 60 minutes per day.  She exercises with enough effort to increase her heart rate 4 days per week.   She is taking medications regularly.  Mild transaminitis noted on blood work.  Patient had blood work completed with rheumatology team around 10 days ago and AST/ALT enzymes were within normal limits.differentials discussed with the patient including hepatitis workup.  New medications or over-the-counter supplements were explored with the patient and patient has started using mastic gum and alginic acid with seaweed supplements that patient recently started for epigastric discomfort/acid reflux concerns.              Review of Systems  Constitutional, HEENT, cardiovascular, pulmonary, gi and gu systems are negative, except as otherwise noted.      Objective           Vitals:  No vitals were obtained today due to virtual visit.    Physical Exam   GENERAL: alert and no distress  EYES: Eyes grossly normal to inspection.  No discharge or  erythema, or obvious scleral/conjunctival abnormalities.  RESP: No audible wheeze, cough, or visible cyanosis.    SKIN: Visible skin clear. No significant rash, abnormal pigmentation or lesions.  NEURO: Cranial nerves grossly intact.  Mentation and speech appropriate for age.  PSYCH: Appropriate affect, tone, and pace of words          Video-Visit Details    Type of service:  Video Visit   Originating Location (pt. Location): Home    Distant Location (provider location):  On-site  Platform used for Video Visit: Doximjarred  Signed Electronically by: Padmini Renee MD

## 2024-09-28 NOTE — DISCHARGE INSTRUCTIONS
Constipation  There are two steps to managing constipation:  First cleaning out the stool that is present & second, a maintenance plan to prevent the stool from building up again.    To remove the current stool burden, do a mini-clean-out at home.  Give 1 square of chewable Ex-Lax (15 mg senna) once.  Mix 10 capfuls of Miralax with 32-48 ounces of Gatorade (shake vigorously then refrigerate for 15 minutes to dissolve completely). Drink Miralax/Gatorade mixture over the next 2-4 hours. (You can given half of the liquid to drink and refrigerate the rest they are ready to drink it, but try to drink the full amount in 2 hours.)  This plan will cause several stools for at least a few hours, do this on a day when you can stay home and be near a bathroom. You can repeat it the following day if needed if one day does not produce very soft stool output.    After this, to prevent constipation, start a Constipation maintenance plan (i.e.daily plan):  Miralax 17 grams (=1 capful) per day    This is a safe medication that you can easily adjust at home to achieve the desired effect.   To decrease the dose for loose stools, decrease by 1/2 capful per day no more often than every 3 days as needed.  To increase the dose for hard stool, you can increase by 1 full capful per day every day as needed.  Give Miralax daily to prevent constipation for a minimum of 3-6 months    Discharge Instructions  Constipation  Constipation can cause severe cramping pain and your provider thinks this might be the cause of your abdominal pain (belly pain) today.  People usually recognize that they are constipated because they have difficulty having bowel movements, are not having bowel movements frequently enough, or are not having large enough bowel movements. Sometimes, especially in children or older people, you do not recognize that you are constipated until it becomes severe. The most common causes of constipation are a lack of exercise and not  eating enough fruits, vegetables, and whole grains. Constipation can also be a side effect of medications, such as narcotics, or may be caused by a disease of the digestive system.    Generally, every Emergency Department visit should have a follow-up clinic visit with either a primary or a specialty clinic/provider. Please follow-up as instructed by your emergency provider today. Sometimes, chronic constipation requires further testing to determine the cause. If you are over 50 years old, you may need a colonoscopy if you have not had one before.     Return to the Emergency Department if:  Your abdominal pain worsens or does not improve after a bowel movement.  You become very weak.  You get a temperature above 102oF or as directed by your provider.  You have blood in your stools (bright red or black, tarry stools).  You keep vomiting (throwing up) or cannot drink liquids.  Your see blood when you vomit.  Your stomach gets bloated or bigger.  You have new symptoms or anything that worries you.    What can I do to help myself?  If your provider gave you a cathartic medication, like magnesium citrate or GoLytely  (polyethylene glycol), you can expect to have cramps and gas pains after taking it. You can expect to have a number of bowel movements and even diarrhea (loose or watery stools) in the course of clearing your bowels.  You will know your bowels have been cleaned out after you pass clear liquid. The cramps and gas should let up after you have emptied your bowels. You may want to wait until morning to take this type of medication so you aren t up in the night.   Sometimes instead of cathartics, we recommend laxatives like milk of magnesia to move your bowels more slowly, or an enema to help the bowels to move. Read and follow the package directions, or follow your provider s instructions.  Once you have become very constipated, it takes time for your bowels to return to normal and you need to be very careful to  prevent becoming constipated again. Take a laxative if you do not move your bowels at least every two days.     Eat foods that have a lot of fiber. Good choices are fruits, vegetables, prune juice, apple juice, and high fiber cereal. Limit dairy products such as milk and cheese, since these can make constipation worse.   Drink plenty of water.   When you feel the need to go to the bathroom, go to the bathroom. Do not hold it.  Miralax , Metamucil , Colace , Senna or fiber supplements can be used daily.  Miralax  daily is often the best choice for children.  If you were given a prescription for medicine here today, be sure to read all of the information (including the package insert) that comes with your prescription.  This will include important information about the medicine, its side effects, and any warnings that you need to know about.  The pharmacist who fills the prescription can provide more information and answer questions you may have about the medicine.  If you have questions or concerns that the pharmacist cannot address, please call or return to the Emergency Department.   Remember that you can always come back to the Emergency Department if you are not able to see your regular provider in the amount of time listed above, if you get any new symptoms, or if there is anything that worries you.    Discharge Instructions  Abdominal Pain    Abdominal pain (belly pain) can be caused by many things. Your evaluation today does not show the exact cause for your pain. Your provider today has decided that it is unlikely your pain is due to a life threatening problem, or a problem requiring surgery or hospital admission. Sometimes those problems cannot be found right away, so it is very important that you follow up as directed.  Sometimes only the changes which occur over time allow the cause of your pain to be found.    Generally, every Emergency Department visit should have a follow-up clinic visit with either a  primary or a specialty clinic/provider. Please follow-up as instructed by your emergency provider today. With abdominal pain, we often recommend very close follow-up, such as the following day.    ADULTS:  Return to the Emergency Department right away if:    You get an oral temperature above 102oF or as directed by your provider.  You have blood in your stools. This may be bright red or appear as black, tarry stools.    You keep vomiting (throwing up) or cannot drink liquids.  You see blood when you vomit.   You cannot have a bowel movement or you cannot pass gas.  Your stomach gets bloated or bigger.  Your skin or the whites of your eyes look yellow.  You faint.  You have bloody, frequent or painful urination (peeing).  You have new symptoms or anything that worries you.    CHILDREN:  Return to the Emergency Department right away if your child has any of the above-listed symptoms or the following:    Pushes your hand away or screams/cries when his/her belly is touched.  You notice your child is very fussy or weak.  Your child is very tired and is too tired to eat or drink.  Your child is dehydrated.  Signs of dehydration can be:  Significant change in the amount of wet diapers/urine.  Your infant or child starts to have dry mouth and lips, or no saliva (spit) or tears.    PREGNANT WOMEN:  Return to the Emergency Department right away if you have any of the above-listed symptoms or the following:    You have bleeding, leaking fluid or passing tissue from the vagina.  You have worse pain or cramping, or pain in your shoulder or back.  You have vomiting that will not stop.  You have a temperature of 100oF or more.  Your baby is not moving as much as usual.  You faint.  You get a bad headache with or without eye problems and abdominal pain.  You have a seizure.  You have unusual discharge from your vagina and abdominal pain.    Abdominal pain is pretty common during pregnancy.  Your pain may or may not be related to  "your pregnancy. You should follow-up closely with your OB provider so they can evaluate you and your baby.  Until you follow-up with your regular provider, do the following:     Avoid sex and do not put anything in your vagina.  Drink clear fluids.  Only take medications approved by your provider.    MORE INFORMATION:    Appendicitis:  A possible cause of abdominal pain in any person who still has their appendix is acute appendicitis. Appendicitis is often hard to diagnose.  Testing does not always rule out early appendicitis or other causes of abdominal pain. Close follow-up with your provider and re-evaluations may be needed to figure out the reason for your abdominal pain.    Follow-up:  It is very important that you make an appointment with your clinic and go to the appointment.  If you do not follow-up with your primary provider, it may result in missing an important development which could result in permanent injury or disability and/or lasting pain.  If there is any problem keeping your appointment, call your provider or return to the Emergency Department.    Medications:  Take your medications as directed by your provider today.  Before using over-the-counter medications, ask your provider and make sure to take the medications as directed.  If you have any questions about medications, ask your provider.    Diet:  Resume your normal diet as much as possible, but do not eat fried, fatty or spicy foods while you have pain.  Do not drink alcohol or have caffeine.  Do not smoke tobacco.    Probiotics: If you have been given an antibiotic, you may want to also take a probiotic pill or eat yogurt with live cultures. Probiotics have \"good bacteria\" to help your intestines stay healthy. Studies have shown that probiotics help prevent diarrhea (loose stools) and other intestine problems (including C. diff infection) when you take antibiotics. You can buy these without a prescription in the pharmacy section of the " store.     If you were given a prescription for medicine here today, be sure to read all of the information (including the package insert) that comes with your prescription.  This will include important information about the medicine, its side effects, and any warnings that you need to know about.  The pharmacist who fills the prescription can provide more information and answer questions you may have about the medicine.  If you have questions or concerns that the pharmacist cannot address, please call or return to the Emergency Department.       Remember that you can always come back to the Emergency Department if you are not able to see your regular provider in the amount of time listed above, if you get any new symptoms, or if there is anything that worries you.

## 2024-09-30 ENCOUNTER — HOSPITAL ENCOUNTER (OUTPATIENT)
Facility: CLINIC | Age: 54
End: 2024-09-30
Attending: INTERNAL MEDICINE | Admitting: INTERNAL MEDICINE
Payer: COMMERCIAL

## 2024-09-30 ENCOUNTER — TELEPHONE (OUTPATIENT)
Dept: GASTROENTEROLOGY | Facility: CLINIC | Age: 54
End: 2024-09-30
Payer: COMMERCIAL

## 2024-09-30 ENCOUNTER — PATIENT OUTREACH (OUTPATIENT)
Dept: INTERNAL MEDICINE | Facility: CLINIC | Age: 54
End: 2024-09-30
Payer: COMMERCIAL

## 2024-09-30 ENCOUNTER — TELEPHONE (OUTPATIENT)
Dept: PULMONOLOGY | Facility: CLINIC | Age: 54
End: 2024-09-30
Payer: COMMERCIAL

## 2024-09-30 DIAGNOSIS — J47.9 BRONCHIECTASIS WITHOUT COMPLICATION (H): Primary | ICD-10-CM

## 2024-09-30 DIAGNOSIS — D72.18 CHURG-STRAUSS SYNDROME (H): ICD-10-CM

## 2024-09-30 DIAGNOSIS — M30.1 CHURG-STRAUSS SYNDROME (H): ICD-10-CM

## 2024-09-30 LAB
ATRIAL RATE - MUSE: 71 BPM
DIASTOLIC BLOOD PRESSURE - MUSE: NORMAL MMHG
INTERPRETATION ECG - MUSE: NORMAL
P AXIS - MUSE: 71 DEGREES
PR INTERVAL - MUSE: 158 MS
QRS DURATION - MUSE: 76 MS
QT - MUSE: 388 MS
QTC - MUSE: 421 MS
R AXIS - MUSE: 65 DEGREES
SYSTOLIC BLOOD PRESSURE - MUSE: NORMAL MMHG
T AXIS - MUSE: -19 DEGREES
VENTRICULAR RATE- MUSE: 71 BPM

## 2024-09-30 NOTE — TELEPHONE ENCOUNTER
"Endoscopy Scheduling Screen    Have you had any respiratory illness or flu-like symptoms in the last 10 days?  No    What is your communication preference for Instructions and/or Bowel Prep?   MyChart    What insurance is in the chart?  Other:   [employer]    Ordering/Referring Provider: CHERYL ESTEVEZ    (If ordering provider performs procedure, schedule with ordering provider unless otherwise instructed. )    BMI: Estimated body mass index is 21.09 kg/m  as calculated from the following:    Height as of 9/27/24: 1.575 m (5' 2\").    Weight as of 9/27/24: 52.3 kg (115 lb 4.8 oz).     Sedation Ordered  moderate sedation.   If patient BMI > 50 do not schedule in ASC.    If patient BMI > 45 do not schedule at [facility].    Are you taking methadone or Suboxone?  NO, No RN review required.    Have you been diagnosed and are being treated for severe PTSD or severe anxiety?  NO, No RN review required.    Are you taking any prescription medications for pain 3 or more times per week?   NO, No RN review required.    Do you have a history of malignant hyperthermia?  No    (Females) Are you currently pregnant?   No     Have you been diagnosed or told you have pulmonary hypertension?   No    Do you have an LVAD?  No    Have you been told you have moderate to severe sleep apnea?  No.    Have you been told you have COPD, asthma, or any other lung disease?  Yes     What breathing problems do you have?  COPD ASTHMA    Do you use home oxygen?  No    Have your breathing problems required an ED visit or hospitalization in the last year?  No.    Do you have any heart conditions?  Yes     In the past year, have you had any hospitalizations for heart related issues including cardiomyopathy, heart attack, or stent placement?  No    Do you have any implantable devices in your body (pacemaker, ICD)?  No    Do you take nitroglycerine?  No    Have you ever had or are you waiting for an organ transplant?  No. Continue scheduling, no site " "restrictions.    Have you had a stroke or transient ischemic attack (TIA aka \"mini stroke\" in the last 6 months?   No    Have you been diagnosed with or been told you have cirrhosis of the liver?   No.    Are you currently on dialysis?   No    Do you need assistance transferring?   No    BMI: Estimated body mass index is 21.09 kg/m  as calculated from the following:    Height as of 9/27/24: 1.575 m (5' 2\").    Weight as of 9/27/24: 52.3 kg (115 lb 4.8 oz).     Is patients BMI > 40 and scheduling location [facility]?  No    Do you take an injectable or oral medication for weight loss or diabetes (excluding insulin)?  No    Do you take the medication Naltrexone?  No    Do you take blood thinners?  No       Prep   Are you currently on dialysis or do you have chronic kidney disease?  No    Do you have a diagnosis of diabetes?  No    Do you have a diagnosis of cystic fibrosis (CF)?  No    On a regular basis do you go 3 -5 days between bowel movements?  No    BMI > 40?  No    Preferred Pharmacy:    [facility] [id number] [place] [address]  Phone: [phone] Fax: [phone]      Final Scheduling Details     Procedure scheduled  Upper endoscopy (EGD)    Surgeon:  BOB     Date of procedure:  10/21/24     Pre-OP / PAC:   No - Not required for this site.    Location  [facility] - Patient preference.    Sedation   Moderate Sedation - Per order.      Patient Reminders:   You will receive a call from a Nurse to review instructions and health history.  This assessment must be completed prior to your procedure.  Failure to complete the Nurse assessment may result in the procedure being cancelled.      On the day of your procedure, please designate an adult(s) who can drive you home stay with you for the next 24 hours. The medicines used in the exam will make you sleepy. You will not be able to drive.      You cannot take public transportation, ride share services, or non-medical taxi " service without a responsible caregiver.  Medical transport services are allowed with the requirement that a responsible caregiver will receive you at your destination.  We require that drivers and caregivers are confirmed prior to your procedure.

## 2024-09-30 NOTE — TELEPHONE ENCOUNTER
"ED / Discharge Outreach Protocol    Patient Contact    Attempt # 1    Was call answered?  Yes.  \"May I please speak with <patient name>\"  Is patient available?   Yes      Transitions of Care Outreach  Chief Complaint   Patient presents with    Hospital F/U     Duodenitis, Obstipation       Patient states she was told by primary care provider to do labs today, but patient says she was unable to get in today and has labs that will be done tomorrow (10/1/2024). Patient wanted heads up to be sent to primary care provider to see if this was okay. Advised patient clinic will call patient back if any further info from primary care provider, otherwise plan to do labs tomorrow and follow up with primary care provider later this week.     Most Recent Admission Date: 9/27/2024   Most Recent Admission Diagnosis:      Most Recent Discharge Date: 9/27/2024   Most Recent Discharge Diagnosis: Duodenitis - K29.80  Obstipation - K59.00     Transitions of Care Assessment    Discharge Assessment  How are you doing now that you are home?: Feeling the same.  How are your symptoms? (Red Flag symptoms escalate to triage hotline per guidelines): Unchanged  Do you know how to contact your clinic care team if you have future questions or changes to your health status? : Yes  Does the patient have their discharge instructions? : Yes  Does the patient have questions regarding their discharge instructions? : No  Were you started on any new medications or were there changes to any of your previous medications? : No  Does the patient have all of their medications?: Yes  Do you have questions regarding any of your medications? : No  Do you have all of your needed medical supplies or equipment (DME)?  (i.e. oxygen tank, CPAP, cane, etc.): Yes    Follow up Plan     Discharge Follow-Up  Discharge follow up appointment scheduled in alignment with recommended follow up timeframe or Transitions of Risk Category? (Low = within 30 days; Moderate= within 14 " days; High= within 7 days): Yes  Discharge Follow Up Appointment Date: 10/02/24  Discharge Follow Up Appointment Scheduled with?: Primary Care Provider    Future Appointments   Date Time Provider Department Center   10/1/2024 12:45 PM [facility]   10/2/2024  5:00 PM Padmini Renee MD [facility]   10/4/2024 11:30 AM [facility]   11/14/2024  9:00 AM [facility]   11/14/2024 10:30 AM Dagoberto Briscoe MD [facility]       Outpatient Plan as outlined on AVS reviewed with patient.    For any urgent concerns, please contact our 24 hour nurse triage line: [phone] ([phone])       Karime Wilkes RN

## 2024-10-01 ENCOUNTER — TELEPHONE (OUTPATIENT)
Dept: GASTROENTEROLOGY | Facility: CLINIC | Age: 54
End: 2024-10-01

## 2024-10-02 ENCOUNTER — VIRTUAL VISIT (OUTPATIENT)
Dept: INTERNAL MEDICINE | Facility: CLINIC | Age: 54
End: 2024-10-02
Payer: COMMERCIAL

## 2024-10-02 ENCOUNTER — PRE VISIT (OUTPATIENT)
Dept: SURGERY | Facility: CLINIC | Age: 54
End: 2024-10-02
Payer: COMMERCIAL

## 2024-10-02 ENCOUNTER — MYC MEDICAL ADVICE (OUTPATIENT)
Dept: INTERNAL MEDICINE | Facility: CLINIC | Age: 54
End: 2024-10-02

## 2024-10-02 DIAGNOSIS — J45.20 MILD INTERMITTENT ASTHMA WITHOUT COMPLICATION: ICD-10-CM

## 2024-10-02 DIAGNOSIS — K59.00 OBSTIPATION: ICD-10-CM

## 2024-10-02 DIAGNOSIS — K21.9 GASTROESOPHAGEAL REFLUX DISEASE, UNSPECIFIED WHETHER ESOPHAGITIS PRESENT: Primary | ICD-10-CM

## 2024-10-02 PROCEDURE — 99214 OFFICE O/P EST MOD 30 MIN: CPT | Mod: 95 | Performed by: INTERNAL MEDICINE

## 2024-10-02 NOTE — TELEPHONE ENCOUNTER
PA Initiation    Medication: BUDESONIDE-FORMOTEROL FUMARATE 160-4.5 MCG/ACT IN AERO  Insurance Company: [employer] - Phone [phone] Fax [phone]  Pharmacy Filling the Rx: [facility] [id number] [place] [facility] - [place] - [address]  Filling Pharmacy Phone: [phone]  Filling Pharmacy Fax: [phone]  Start Date: 10/2/2024

## 2024-10-02 NOTE — PROGRESS NOTES
Valencia is a 54 year old who is being evaluated via a billable video visit.    How would you like to obtain your AVS? MyChart  If the video visit is dropped, the invitation should be resent by: Text to cell phone: [phone]  Will anyone else be joining your video visit? No      Assessment & Plan     Gastroesophageal reflux disease, unspecified whether esophagitis present  Duodenitis noted on most recent CT scan that was completed in the emergency room on 9/27/2024.  Patient has an upcoming EGD procedure with the GI team on 10/21/2024.  Would like to get the procedure done sooner, willing to proceed with a referral to [facility].  Encouraged to continue with sucralfate medication in the meantime to help with the GERD symptoms.    - Adult GI  Referral - Consult Only; Future    Obstipation  Has been having adequate bowel movements.  Mild intermittent asthma without complication-overall, symptoms have been stable.  Continue with current inhaler regimen.    Duodenitis noted on most recent CT scan that was completed in the emergency room on 9/27/2024.  Patient has an upcoming EGD procedure with the GI team on 10/21/2024.  Would like to get the procedure done sooner, willing to proceed with a referral to [facility].  Encouraged to continue with sucralfate medication in the meantime to help with the GERD symptoms.        MED REC REQUIRED  Post Medication Reconciliation Status: Completed        Subjective   Valencia is a 54 year old, presenting for the following health issues:  Hospital F/U      10/2/2024    11:55 AM   Additional Questions   Roomed by Shannan Ayala MA   Accompanied by self         10/2/2024    11:55 AM   Patient Reported Additional Medications   Patient reports taking the following new medications none     HPI     Visit completed today for follow-up after patient presented to the emergency room last week due to concerns of increased abdominal discomfort.  CT scan completed and duodenitis and  obstipation noted.  Overall, patient has felt that symptoms have been stable but still persistent.  Does have adequate bowel movements daily.  Continues to have ongoing GERD symptoms, currently not using any medications.  Has an upcoming EGD procedure with GI team on 10/21/2024.  Would like to get the procedure done sooner and willing to proceed with a referral with an external team.  Elevated alkaline phosphatase levels noted on lab work with improved AST/ALT levels.        Review of Systems  Constitutional, HEENT, cardiovascular, pulmonary, gi and gu systems are negative, except as otherwise noted.      Objective    Vitals - Patient Reported  Systolic (Patient Reported): 110  Diastolic (Patient Reported): 61  Blood pressure taken with manual cuff (will exclude from quality measure): Yes  Pulse (Patient Reported): 75  Pain Score: Mild Pain (3)  Pain Loc: Abdomen        Physical Exam   GENERAL: alert and no distress  EYES: Eyes grossly normal to inspection.  No discharge or erythema, or obvious scleral/conjunctival abnormalities.  RESP: No audible wheeze, cough, or visible cyanosis.    SKIN: Visible skin clear. No significant rash, abnormal pigmentation or lesions.  NEURO: Cranial nerves grossly intact.  Mentation and speech appropriate for age.  PSYCH: Appropriate affect, tone, and pace of words          Video-Visit Details    Type of service:  Video Visit   Originating Location (pt. Location): Home    Distant Location (provider location):  On-site  Platform used for Video Visit: Jennifer  Signed Electronically by: Padmini Renee MD

## 2024-10-04 ENCOUNTER — TELEPHONE (OUTPATIENT)
Dept: GASTROENTEROLOGY | Facility: CLINIC | Age: 54
End: 2024-10-04

## 2024-10-04 NOTE — TELEPHONE ENCOUNTER
PRIOR AUTHORIZATION DENIED    Medication: BUDESONIDE-FORMOTEROL FUMARATE 160-4.5 MCG/ACT IN AERO  Insurance Company: [employer] - Phone [phone] Fax [phone]  Denial Date: 10/2/2024  Denial Reason(s):       Appeal Information:       Patient Notified: NO

## 2024-10-04 NOTE — TELEPHONE ENCOUNTER
Caller: Valencia    Reason for Reschedule/Cancellation   (please be detailed, any staff messages or encounters to note?): Going to external location      Prior to reschedule please review:  Ordering Provider:     Mo Bryant MD     Sedation Determined: CSA  Does patient have any ASC Exclusions, please identify?: N      Notes on Cancelled Procedure:  Procedure: Upper Endoscopy [EGD]   Date: 10/21/2024  Location: [facility]; [facility] [address] [place] [address]  Surgeon: Jacque      Rescheduled: No,         Did you cancel or rescheduled an EUS procedure? No.

## 2024-10-07 ENCOUNTER — MYC MEDICAL ADVICE (OUTPATIENT)
Dept: INTERNAL MEDICINE | Facility: CLINIC | Age: 54
End: 2024-10-07
Payer: COMMERCIAL

## 2024-10-07 DIAGNOSIS — K21.9 GASTROESOPHAGEAL REFLUX DISEASE, UNSPECIFIED WHETHER ESOPHAGITIS PRESENT: Primary | ICD-10-CM

## 2024-10-07 NOTE — TELEPHONE ENCOUNTER
Patient calling regarding this message.  Patient stated they need the referral to be for procedure. Stated they are going to cancel the procedure if they don't get the new referral today for Procedure for Endoscopy.      See virtual visit from 10/2/24    Referral pended.    [facility] fax number [phone]  Reena - send to-call with questions at [phone] until 330.  Will need to call Reena with verbal ok for procedure.    Please send a myc message with response.

## 2024-10-09 DIAGNOSIS — J47.9 BRONCHIECTASIS WITHOUT COMPLICATION (H): Primary | ICD-10-CM

## 2024-10-09 RX ORDER — FLUTICASONE FUROATE AND VILANTEROL 200; 25 UG/1; UG/1
1 POWDER RESPIRATORY (INHALATION) DAILY
Qty: 60 EACH | Refills: 11 | Status: SHIPPED | OUTPATIENT
Start: 2024-10-09

## 2024-10-09 RX ORDER — BUDESONIDE, GLYCOPYRROLATE, AND FORMOTEROL FUMARATE 160; 9; 4.8 UG/1; UG/1; UG/1
AEROSOL, METERED RESPIRATORY (INHALATION)
Qty: 10.7 G | Refills: 8 | Status: SHIPPED | OUTPATIENT
Start: 2024-10-09 | End: 2024-10-09 | Stop reason: ALTCHOICE

## 2024-10-09 NOTE — TELEPHONE ENCOUNTER
Ok to trial Breo per Dr. Briscoe.    Marcelle Miles, PharmD  [facility] Pharmacist  [facility]

## 2024-10-09 NOTE — TELEPHONE ENCOUNTER
Clinic Navigator sent a fitmob message to inform the Pt that Pt is on the wait list for a sooner appointment. Clinic Navigator will reach out to the Pt via phone call or Astrapihart when a sooner appointment becomes available.

## 2024-10-11 RX ORDER — MULTIVITAMIN WITH IRON
1 TABLET ORAL DAILY
COMMUNITY

## 2024-10-11 RX ORDER — UBIDECARENONE 100 MG
100 CAPSULE ORAL DAILY
COMMUNITY

## 2024-10-14 NOTE — TELEPHONE ENCOUNTER
REFERRAL INFORMATION:  Referring Provider:  Padmini Renee MD   Referring Clinic:    [facility]     Reason for Visit/Diagnosis: K21.9 (ICD-10-CM) - Gastroesophageal reflux disease, unspecified whether esophagitis present ,   K29.80 (ICD-10-CM) - Duodenitis   K59.00 (ICD-10-CM) - Obstipation        FUTURE VISIT INFORMATION:  Appointment Date: 11/12/24  Appointment Time:      NOTES STATUS DETAILS   OFFICE NOTE from Referring Provider Internal 10/2/24-josselyn Ordaz in Epic   OFFICE NOTE from Other Specialist N/A    HOSPITAL DISCHARGE SUMMARY/  ED VISITS Internal ED 9/27/24-Dr. Bryant-[facility]   OPERATIVE REPORT Internal 6/22/22-APPENDECTOMY, LAPAROSCOPIC -Dr. Hannah   MEDICATION LIST Internal         ENDOSCOPY  Internal Scheduled 10/17/24  2/2/23   COLONOSCOPY Internal 12/20/19 12/19/14   ERCP N/A    EUS N/A    STOOL TESTING N/A    PERTINENT LABS Internal    PATHOLOGY REPORTS (RELATED) Internal 2/2/23-EGD  12/19/14-Colonoscopy   IMAGING (CT, MRI, EGD, MRCP, Small Bowel Follow Through/SBT, MR/CT Enterography) Internal 9/27/24, 6/22/22-CT abd pel    9/27/24-US abdomen    9/26/24, 4/26/23-CT chest    1/25/24-XR chest

## 2024-10-15 ENCOUNTER — OFFICE VISIT (OUTPATIENT)
Dept: INTERNAL MEDICINE | Facility: CLINIC | Age: 54
End: 2024-10-15
Payer: COMMERCIAL

## 2024-10-15 ENCOUNTER — MYC MEDICAL ADVICE (OUTPATIENT)
Dept: INTERNAL MEDICINE | Facility: CLINIC | Age: 54
End: 2024-10-15

## 2024-10-15 VITALS
HEIGHT: 62 IN | SYSTOLIC BLOOD PRESSURE: 120 MMHG | BODY MASS INDEX: 21.94 KG/M2 | RESPIRATION RATE: 13 BRPM | DIASTOLIC BLOOD PRESSURE: 70 MMHG | TEMPERATURE: 97.6 F | WEIGHT: 119.2 LBS | OXYGEN SATURATION: 99 % | HEART RATE: 72 BPM

## 2024-10-15 DIAGNOSIS — M30.1 CHURG-STRAUSS SYNDROME (H): ICD-10-CM

## 2024-10-15 DIAGNOSIS — J47.9 BRONCHIECTASIS WITHOUT COMPLICATION (H): ICD-10-CM

## 2024-10-15 DIAGNOSIS — Z01.818 PREOP GENERAL PHYSICAL EXAM: Primary | ICD-10-CM

## 2024-10-15 DIAGNOSIS — I10 BENIGN ESSENTIAL HYPERTENSION: ICD-10-CM

## 2024-10-15 DIAGNOSIS — F33.1 MODERATE EPISODE OF RECURRENT MAJOR DEPRESSIVE DISORDER (H): ICD-10-CM

## 2024-10-15 DIAGNOSIS — J84.9 ILD (INTERSTITIAL LUNG DISEASE) (H): ICD-10-CM

## 2024-10-15 DIAGNOSIS — D72.18 CHURG-STRAUSS SYNDROME (H): ICD-10-CM

## 2024-10-15 DIAGNOSIS — R93.3 ABNORMAL FINDING ON GI TRACT IMAGING: ICD-10-CM

## 2024-10-15 PROCEDURE — 99214 OFFICE O/P EST MOD 30 MIN: CPT | Performed by: PHYSICIAN ASSISTANT

## 2024-10-15 NOTE — PATIENT INSTRUCTIONS
How to Take Your Medication Before Surgery  Preoperative Medication Instructions   Antiplatelet or Anticoagulation Medication Instructions   - Patient is on no antiplatelet or anticoagulation medications.    Additional Medication Instructions  Take all scheduled medications on the day of surgery EXCEPT for modifications listed below:  Do not take vitamins or supplements the morning of procedure   - Beta Blockers: Continue taking on the day of surgery.   - SSRIs, SNRIs, TCAs, Antipsychotics: Continue without modification.    - LABA, inhaled corticosteroid, long-acting anticholinergics: Continue without modification.   - rescue Inhaler: Continue PRN. Bring to hospital on the day of surgery.     Take usual prescription medications with a sip of water the morning of procedure   Patient Education   Preparing for Your Surgery  For Adults  Getting started  In most cases, a nurse will call to review your health history and instructions. They will give you an arrival time based on your scheduled surgery time. Please be ready to share:  Your doctor's clinic name and phone number  Your medical, surgical, and anesthesia history  A list of allergies and sensitivities  A list of medicines, including herbal treatments and over-the-counter drugs  Whether the patient has a legal guardian (ask how to send us the papers in advance)  Note: You may not receive a call if you were seen at our PAC (Preoperative Assessment Center).  Please tell us if you're pregnant--or if there's any chance you might be pregnant. Some surgeries may injure a fetus (unborn baby), so they require a pregnancy test. Surgeries that are safe for a fetus don't always need a test, and you can choose whether to have one.   Preparing for surgery  Within 10 to 30 days of surgery: Have a pre-op exam (sometimes called an H&P, or History and Physical). This can be done at a clinic or pre-operative center.  If you're having a , you may not need this exam. Talk to  your care team.  At your pre-op exam, talk to your care team about all medicines you take. (This includes CBD oil and any drugs, such as THC, marijuana, and other forms of cannabis.) If you need to stop any medicine before surgery, ask when to start taking it again.  This is for your safety. Many medicines and drugs can make you bleed too much during surgery. Some change how well surgery (anesthesia) drugs work.  Call your insurance company to let them know you're having surgery. (If you don't have insurance, call [phone].)  Call your clinic if there's any change in your health. This includes a scrape or scratch near the surgery site, or any signs of a cold (sore throat, runny nose, cough, rash, fever).  Eating and drinking guidelines  For your safety: Unless your surgeon tells you otherwise, follow the guidelines below.  Eat and drink as normal until 8 hours before you arrive for surgery. After that, no food or milk. You can spit out gum when you arrive.  Drink clear liquids until 2 hours before you arrive. These are liquids you can see through, like water, Gatorade, and Propel Water. They also include plain black coffee and tea (no cream or milk).  No alcohol for 24 hours before you arrive. The night before surgery, stop any drinks that contain THC.  If your care team tells you to take medicine on the morning of surgery, it's okay to take it with a sip of water. No other medicines or drugs are allowed (including CBD oil)--follow your care team's instructions.  If you have questions the day of surgery, call your hospital or surgery center.   Preventing infection  Shower or bathe the night before and the morning of surgery. Follow the instructions your clinic gave you. (If no instructions, use regular soap.)  Don't shave or clip hair near your surgery site. We'll remove the hair if needed.  Don't smoke or vape the morning of surgery. No chewing tobacco for 6 hours before you arrive. A nicotine patch is okay.  You may spit out nicotine gum when you arrive.  For some surgeries, the surgeon will tell you to fully quit smoking and nicotine.  We will make every effort to keep you safe from infection. We will:  Clean our hands often with soap and water (or an alcohol-based hand rub).  Clean the skin at your surgery site with a special soap that kills germs.  Give you a special gown to keep you warm. (Cold raises the risk of infection.)  Wear hair covers, masks, gowns, and gloves during surgery.  Give antibiotic medicine, if prescribed. Not all surgeries need this medicine.  What to bring on the day of surgery  Photo ID and insurance card  Copy of your health care directive, if you have one  Glasses and hearing aids (bring cases)  You can't wear contacts during surgery  Inhaler and eye drops, if you use them (tell us about these when you arrive)  CPAP machine or breathing device, if you use them  A few personal items, if spending the night  If you have . . .  A pacemaker, ICD (cardiac defibrillator), or other implant: Bring the ID card.  An implanted stimulator: Bring the remote control.  A legal guardian: Bring a copy of the certified (court-stamped) guardianship papers.  Please remove any jewelry, including body piercings. Leave jewelry and other valuables at home.  If you're going home the day of surgery  You must have a responsible adult drive you home. They should stay with you overnight as well.  If you don't have someone to stay with you, and you aren't safe to go home alone, we may keep you overnight. Insurance often won't pay for this.  After surgery  If it's hard to control your pain or you need more pain medicine, please call your surgeon's office.  Questions?   If you have any questions for your care team, list them here:    ____________________________________________________________________________________________________________________________________________________________________________________________________________________________________________________________  For informational purposes only. Not to replace the advice of your health care provider. Copyright   2003, 2019 [facility]. All rights reserved. Clinically reviewed by Luke Fortune MD. [employer] [id number] - REV 08/24.

## 2024-10-15 NOTE — PROGRESS NOTES
Preoperative Evaluation  [address] [address]  Phone: [phone]  Primary Provider: Padmini Renee MD  Pre-op Performing Provider: Azeb Werner PA-C  Oct 15, 2024             10/10/2024   Surgical Information   What procedure is being done? Endoscopy   Facility or Hospital where procedure/surgery will be performed: [facility]   Who is doing the procedure / surgery? Dillon Guillen   Date of surgery / procedure: October 17, 2024   Time of surgery / procedure: 8:00am arrival   Where do you plan to recover after surgery? at home with family        Fax number for surgical facility: Note does not need to be faxed, will be available electronically in Epic.    Assessment & Plan     The proposed surgical procedure is considered LOW risk.    Preop general physical exam      Abnormal finding on GI tract imaging      ILD (interstitial lung disease) (H)      Bronchiectasis without complication (H)      Churg-Antonina syndrome (H)      Benign essential hypertension      Moderate episode of recurrent major depressive disorder (H)              - No identified additional risk factors other than previously addressed    Preoperative Medication Instructions  Antiplatelet or Anticoagulation Medication Instructions   - Patient is on no antiplatelet or anticoagulation medications.    Additional Medication Instructions  Take all scheduled medications on the day of surgery EXCEPT for modifications listed below:  Do not take vitamins or supplements the morning of procedure   - Beta Blockers: Continue taking on the day of surgery.   - SSRIs, SNRIs, TCAs, Antipsychotics: Continue without modification.    - LABA, inhaled corticosteroid, long-acting anticholinergics: Continue without modification.   - rescue Inhaler: Continue PRN. Bring to hospital on the day of surgery.    Recommendation  Approval given to proceed with proposed procedure, without further  diagnostic evaluation.    Teresa Birmingham is a 54 year old, presenting for the following:  Pre-Op Exam        HPI related to upcoming procedure: Endoscopy        10/10/2024   Pre-Op Questionnaire   Have you ever had a heart attack or stroke? No   Have you ever had surgery on your heart or blood vessels, such as a stent placement, a coronary artery bypass, or surgery on an artery in your head, neck, heart, or legs? No   Do you have chest pain with activity? No   Do you have a history of heart failure? No   Do you currently have a cold, bronchitis or symptoms of other infection? No   Do you have a cough, shortness of breath, or wheezing? (!) YES   Some shortness of breath with current gi issues    Do you or anyone in your family have previous history of blood clots? No   Do you or does anyone in your family have a serious bleeding problem such as prolonged bleeding following surgeries or cuts? No   Have you ever had problems with anemia or been told to take iron pills? No   Have you had any abnormal blood loss such as black, tarry or bloody stools, or abnormal vaginal bleeding? No   Have you ever had a blood transfusion? No   Are you willing to have a blood transfusion if it is medically needed before, during, or after your surgery? Yes   Have you or any of your relatives ever had problems with anesthesia? No   Do you have sleep apnea, excessive snoring or daytime drowsiness? No   Do you have any artifical heart valves or other implanted medical devices like a pacemaker, defibrillator, or continuous glucose monitor? No   Do you have artificial joints? No   Are you allergic to latex? No        Health Care Directive  Patient does not have a Health Care Directive or Living Will:     Preoperative Review of    reviewed - controlled substances reflected in medication list.      Status of Chronic Conditions:  See problem list for active medical problems.  Problems all longstanding and stable, except as  noted/documented.  See ROS for pertinent symptoms related to these conditions.    DEPRESSION - Patient has a long history of Depression of moderate severity requiring medication for control with recent symptoms being stable..Current symptoms of depression include none.     HYPERTENSION - Patient has longstanding history of HTN , currently denies any symptoms referable to elevated blood pressure. Specifically denies chest pain, palpitations, dyspnea, orthopnea, PND or peripheral edema. Blood pressure readings have been in normal range. Current medication regimen is as listed below. Patient denies any side effects of medication.     Patient Active Problem List    Diagnosis Date Noted    Symptomatic menopausal or female climacteric states 05/08/2024     Priority: Medium    H/O abdominal hysterectomy 05/08/2024     Priority: Medium    Adjustment disorder with anxiety 02/06/2024     Priority: Medium    Diverticulosis 03/17/2023     Priority: Medium    Pain syndrome, chronic 05/22/2022     Priority: Medium    Alpha-lipoproteinemia 05/22/2022     Priority: Medium    Bladder pain 07/31/2019     Priority: Medium    Overactive bladder 07/31/2019     Priority: Medium    Vertigo 05/17/2018     Priority: Medium    Controlled substance agreement signed 11/13/2017     Priority: Medium     Patient is followed by BRIAN DEE for ongoing prescription of benzodiazepines.  All refills should be approved by this provider, or covering partner.    Medication(s): clonazepam.   Maximum quantity per month: 7.5  Clinic visit frequency required: Q 6  months     Controlled substance agreement on file: Yes       Date(s): 11/13/17  Benzodiazepine use reviewed by psychiatry:  No    Last [facility] website verification:  none   [email or web address]          Bronchiectasis (H) 05/22/2017     Priority: Medium    Moderate episode of recurrent major depressive disorder (H) 01/29/2017     Priority: Medium    Benign essential hypertension 10/20/2016      Priority: Medium    Mannose-binding lectin deficiency (H)      Priority: Medium    Irritable bowel syndrome without diarrhea 2016     Priority: Medium    Chronic fatigue 2016     Priority: Medium    Anxiety 2014     Priority: Medium    Mild intermittent asthma without complication 2013     Priority: Medium    History of eating disorder 2013     Priority: Medium    Churg-Antonina syndrome (H) 10/10/2012     Priority: Medium    Palpitations 10/04/2012     Priority: Medium    ILD (interstitial lung disease) (H) 2012     Priority: Medium    Goiter 10/14/2011     Priority: Medium    History of systemic steroid therapy 10/14/2011     Priority: Medium      Past Medical History:   Diagnosis Date    Anxiety     Aortitis (H)     Asthma     associated with Churg Antonina syndrome    Bronchiectasis (H)     Cerebral infarction (H) 1990    Very small, caused small permanent optical migraine    Chronic sinusitis     Churg-Antnoina syndrome (H)     dx     Conductive hearing loss     Depressive disorder     Eustachian tube dysfunction     Goiter     Hearing loss, conductive     Heart rate problem     Hypertension     Hypocalcemia     Immunosuppression (H)     Infection due to 2019 novel coronavirus 2022, , 2022    Irritable bowel syndrome     Major depressive disorder     Mannose-binding lectin deficiency (H)     Nonspecific abnormal results of thyroid function study     Osteoporosis     Pericarditis      and     Pneumonia     4/23/10    Recurrent otitis media     Recurrent UTI     Rheumatoid vasculitis (H)     Sinusitis, chronic     Steroid long-term use     Tinnitus     Varicose veins of leg with swelling      Past Surgical History:   Procedure Laterality Date    C/SECTION, CLASSICAL  1998     SECTION      COLONOSCOPY Left 2014    Procedure: COMBINED COLONOSCOPY, SINGLE OR MULTIPLE BIOPSY/POLYPECTOMY BY BIOPSY;  Surgeon: Shahida  Js DELGADO MD;  Location:  GI    COLONOSCOPY N/A 12/20/2019    Procedure: COLONOSCOPY;  Surgeon: Bryce Pimentel MD;  Location:  GI    ENT SURGERY      ESOPHAGOSCOPY, GASTROSCOPY, DUODENOSCOPY (EGD), COMBINED N/A 02/02/2023    Procedure: ESOPHAGOGASTRODUODENOSCOPY, WITH BIOPSY;  Surgeon: Mo Rosa MD;  Location:  GI    ESOPHAGOSCOPY, GASTROSCOPY, DUODENOSCOPY (EGD), DILATATION, COMBINED N/A 02/02/2023    Procedure: ESOPHAGOGASTRODUODENOSCOPY, WITH DILATION;  Surgeon: Mo Rosa MD;  Location:  GI    GENITOURINARY SURGERY      HEAD & NECK SURGERY      HYSTERECTOMY TOTAL ABDOMINAL  02/27/2009    without oopherectomy for TEO 3 on LEEP w/ pos margins - Path all benign    HYSTERECTOMY, PAP NO LONGER INDICATED      cervix removed     LAPAROSCOPIC APPENDECTOMY N/A 06/22/2022    Procedure: APPENDECTOMY, LAPAROSCOPIC;  Surgeon: Fany Hannah MD;  Location: [facility] OR    PE TUBES      PICC INSERTION  10/10/2013    5fr DL PASV PICC, 43cm (1cm external) in the L basilic vein w/ tip in the low SVC.    PICC INSERTION Left 05/26/2017    5fr DL BioFlo PICC, 42cm (3cm external) in the L lateral brachial vein w/ tip in the SVC RA junction.    SINUS SURGERY      TUBAL LIGATION      TYMPANOPLASTY      ZZHC COLP CERVIX/UPPER VAGINA W LOOP ELEC BX CERVIX       Current Outpatient Medications   Medication Sig Dispense Refill    acetylcysteine (MUCOMYST) 20 % neb solution NEBULIZE THE CONTENTS OF ONE VIAL (4 ML) TWO TIMES A DAY. 800 mL 1    albuterol (PROAIR HFA/PROVENTIL HFA/VENTOLIN HFA) 108 (90 Base) MCG/ACT inhaler INHALE 2 PUFFS INTO THE LUNGS EVERY 4 HOURS AS NEEDED FOR SHORTNESS OF BREATH/WHEEZE 18 g 11    albuterol (PROVENTIL) (2.5 MG/3ML) 0.083% neb solution TAKE 1 VIAL VIA NEBULIZER TWICE A DAY. 180 mL 11    ciprofloxacin-dexAMETHasone (CIPRODEX) 0.3-0.1 % otic suspension INSTILL 3 DROPS INTO AFFECTED EARS 2 TIMES DAILY AS NEEDED 7.5 mL 10    co-enzyme Q-10 100 MG CAPS  capsule Take 100 mg by mouth daily.      cod liver oil CAPS capsule Take 1 capsule by mouth daily      fluticasone (FLONASE) 50 MCG/ACT nasal spray INSTILL 2 SPRAYS INTO BOTH NOSTRILS DAILY. 16 mL 2    fluticasone-vilanterol (BREO ELLIPTA) 200-25 MCG/ACT inhaler Inhale 1 puff into the lungs daily. 60 each 11    Glucosamine-Chondroit-Vit C-Mn (GLUCOSAMINE CHONDROITINOITIN COMPLEX) CAPS Take 3 capsules by mouth daily      Iodine, Kelp, (KELP PO) Take 2 capsules by mouth daily      LORazepam (ATIVAN) 0.5 MG tablet Take 1 tablet (0.5 mg) by mouth every 8 hours as needed for anxiety 8 tablet 0    magnesium 250 MG tablet Take 1 tablet by mouth daily.      metoprolol tartrate (LOPRESSOR) 25 MG tablet TAKE 0.5 TABLETS (12.5 MG) BY MOUTH 2 TIMES DAILY AS NEEDED (FOR HIGH BLOOD PRESSURE >130/80) 90 tablet 1    norethindrone-eth estradiol (FEMHRT LOW DOSE) 0.5-2.5 MG-MCG tablet Take 1 tablet by mouth at bedtime 90 tablet 3    Omega-3 Fatty Acids (FISH OIL) 1200 MG capsule Take 4 capsules (4.8 g) by mouth daily      Potassium Gluconate 595 (99 K) MG TABS Take 5 g by mouth daily      predniSONE (DELTASONE) 1 MG tablet Take 5 tablets (5 mg) by mouth daily For more refills,schedule an appointment (Patient taking differently: Take 5 mg by mouth daily. For more refills,schedule an appointment  5.5) 150 tablet 2    predniSONE (DELTASONE) 5 MG tablet Take 1 tablet (5 mg) by mouth daily Total dose 5.5mg daily      Probiotic Product ([employer] PROBIOTIC MAXIMUM STRENGTH) CAPS Take 1 capsule by mouth daily      sucralfate (CARAFATE) 1 GM tablet Take 1 tablet (1 g) by mouth 4 times daily as needed for nausea (abdominal discomfort/nausea/reflux). 60 tablet 0    venlafaxine (EFFEXOR XR) 37.5 MG 24 hr capsule Take 37.5 mg by mouth daily      vitamin D3 (CHOLECALCIFEROL) 2000 units (50 mcg) tablet Take 2 tablets by mouth daily [id number] untis daily      VITAMIN K PO Take 1 capsule by mouth daily         Allergies   Allergen Reactions     "Colistimethate Anaphylaxis    Colymycin M [Colistimethate Sodium] Anaphylaxis    Tamiflu [Oseltamivir]      Gums Blister up    Hydroxyzine     Azithromycin Palpitations    Clindamycin Rash    Tizanidine Other (See Comments)     Severe panic attack        Social History     Tobacco Use    Smoking status: Never     Passive exposure: Never    Smokeless tobacco: Never   Substance Use Topics    Alcohol use: Not Currently       History   Drug Use No               Objective    /70 (BP Location: Left arm, Patient Position: Sitting, Cuff Size: Adult Regular)   Pulse 72   Temp 97.6  F (36.4  C) (Temporal)   Resp 13   Ht 1.575 m (5' 2\")   Wt 54.1 kg (119 lb 3.2 oz)   LMP 02/01/2009 (LMP Unknown)   SpO2 99%   BMI 21.80 kg/m     Estimated body mass index is 21.8 kg/m  as calculated from the following:    Height as of this encounter: 1.575 m (5' 2\").    Weight as of this encounter: 54.1 kg (119 lb 3.2 oz).  Physical Exam  GENERAL: alert and no distress  HENT: normal cephalic/atraumatic, ear canals and TM's old scarring and perforations noted, oropharynx clear, and oral mucous membranes moist  NECK: no adenopathy, no asymmetry, masses, or scars  RESP: lungs clear to auscultation - no rales, rhonchi or wheezes  CV: regular rates and rhythm and normal S1 S2, no S3 or S4  MS: no gross musculoskeletal defects noted, no edema    Recent Labs   Lab Test 09/27/24  1819 09/25/24  1630 02/05/24  2234   HGB 13.2  --  13.7     --  331    140 140   POTASSIUM 4.4 4.2 3.9   CR 0.27* 0.77 0.70        Diagnostics  No labs were ordered during this visit.   No EKG required for low risk surgery (cataract, skin procedure, breast biopsy, etc).    Revised Cardiac Risk Index (RCRI)  The patient has the following serious cardiovascular risks for perioperative complications:   - No serious cardiac risks = 0 points     RCRI Interpretation: 0 points: Class I (very low risk - 0.4% complication rate)         Signed Electronically by: " Azeb Werner PA-C  A copy of this evaluation report is provided to the requesting physician.

## 2024-10-16 NOTE — TELEPHONE ENCOUNTER
We received another message from the patient stating she got another letter and it is covered after all.

## 2024-10-17 ENCOUNTER — MYC MEDICAL ADVICE (OUTPATIENT)
Dept: INTERNAL MEDICINE | Facility: CLINIC | Age: 54
End: 2024-10-17
Payer: COMMERCIAL

## 2024-10-17 ENCOUNTER — ANESTHESIA (OUTPATIENT)
Dept: SURGERY | Facility: CLINIC | Age: 54
End: 2024-10-17
Payer: COMMERCIAL

## 2024-10-17 ENCOUNTER — NURSE TRIAGE (OUTPATIENT)
Dept: INTERNAL MEDICINE | Facility: CLINIC | Age: 54
End: 2024-10-17
Payer: COMMERCIAL

## 2024-10-17 ENCOUNTER — HOSPITAL ENCOUNTER (OUTPATIENT)
Facility: CLINIC | Age: 54
Discharge: HOME | End: 2024-10-17
Attending: INTERNAL MEDICINE | Admitting: INTERNAL MEDICINE
Payer: COMMERCIAL

## 2024-10-17 ENCOUNTER — MYC MEDICAL ADVICE (OUTPATIENT)
Dept: PULMONOLOGY | Facility: CLINIC | Age: 54
End: 2024-10-17
Payer: COMMERCIAL

## 2024-10-17 ENCOUNTER — ANESTHESIA EVENT (OUTPATIENT)
Dept: SURGERY | Facility: CLINIC | Age: 54
End: 2024-10-17
Payer: COMMERCIAL

## 2024-10-17 VITALS
RESPIRATION RATE: 16 BRPM | HEART RATE: 78 BPM | TEMPERATURE: 96.5 F | HEIGHT: 62 IN | DIASTOLIC BLOOD PRESSURE: 86 MMHG | OXYGEN SATURATION: 100 % | SYSTOLIC BLOOD PRESSURE: 146 MMHG | BODY MASS INDEX: 21.16 KG/M2 | WEIGHT: 115 LBS

## 2024-10-17 DIAGNOSIS — J47.0 BRONCHIECTASIS WITH ACUTE LOWER RESPIRATORY INFECTION (H): Primary | ICD-10-CM

## 2024-10-17 LAB — UPPER GI ENDOSCOPY: NORMAL

## 2024-10-17 PROCEDURE — 88305 TISSUE EXAM BY PATHOLOGIST: CPT | Mod: TC | Performed by: INTERNAL MEDICINE

## 2024-10-17 PROCEDURE — 88305 TISSUE EXAM BY PATHOLOGIST: CPT | Mod: 26 | Performed by: PATHOLOGY

## 2024-10-17 PROCEDURE — 250N000011 HC RX IP 250 OP 636: Performed by: NURSE ANESTHETIST, CERTIFIED REGISTERED

## 2024-10-17 PROCEDURE — 999N000141 HC STATISTIC PRE-PROCEDURE NURSING ASSESSMENT: Performed by: INTERNAL MEDICINE

## 2024-10-17 PROCEDURE — 250N000009 HC RX 250: Performed by: NURSE ANESTHETIST, CERTIFIED REGISTERED

## 2024-10-17 PROCEDURE — 710N000012 HC RECOVERY PHASE 2, PER MINUTE: Performed by: INTERNAL MEDICINE

## 2024-10-17 PROCEDURE — 258N000003 HC RX IP 258 OP 636: Performed by: NURSE ANESTHETIST, CERTIFIED REGISTERED

## 2024-10-17 PROCEDURE — 360N000075 HC SURGERY LEVEL 2, PER MIN: Performed by: INTERNAL MEDICINE

## 2024-10-17 PROCEDURE — 370N000017 HC ANESTHESIA TECHNICAL FEE, PER MIN: Performed by: INTERNAL MEDICINE

## 2024-10-17 PROCEDURE — 272N000001 HC OR GENERAL SUPPLY STERILE: Performed by: INTERNAL MEDICINE

## 2024-10-17 RX ORDER — EPINEPHRINE 1 MG/ML
0.1 INJECTION, SOLUTION, CONCENTRATE INTRAVENOUS
Status: CANCELLED | OUTPATIENT
Start: 2024-10-17

## 2024-10-17 RX ORDER — ONDANSETRON 2 MG/ML
4 INJECTION INTRAMUSCULAR; INTRAVENOUS
Status: DISCONTINUED | OUTPATIENT
Start: 2024-10-17 | End: 2024-10-17 | Stop reason: HOSPADM

## 2024-10-17 RX ORDER — NALOXONE HYDROCHLORIDE 0.4 MG/ML
0.2 INJECTION, SOLUTION INTRAMUSCULAR; INTRAVENOUS; SUBCUTANEOUS
Status: CANCELLED | OUTPATIENT
Start: 2024-10-17

## 2024-10-17 RX ORDER — FLUMAZENIL 0.1 MG/ML
0.2 INJECTION, SOLUTION INTRAVENOUS
Status: CANCELLED | OUTPATIENT
Start: 2024-10-17 | End: 2024-10-17

## 2024-10-17 RX ORDER — ONDANSETRON 2 MG/ML
INJECTION INTRAMUSCULAR; INTRAVENOUS PRN
Status: DISCONTINUED | OUTPATIENT
Start: 2024-10-17 | End: 2024-10-17

## 2024-10-17 RX ORDER — DEXAMETHASONE SODIUM PHOSPHATE 4 MG/ML
4 INJECTION, SOLUTION INTRA-ARTICULAR; INTRALESIONAL; INTRAMUSCULAR; INTRAVENOUS; SOFT TISSUE
Status: CANCELLED | OUTPATIENT
Start: 2024-10-17

## 2024-10-17 RX ORDER — PROPOFOL 10 MG/ML
INJECTION, EMULSION INTRAVENOUS PRN
Status: DISCONTINUED | OUTPATIENT
Start: 2024-10-17 | End: 2024-10-17

## 2024-10-17 RX ORDER — NALOXONE HYDROCHLORIDE 0.4 MG/ML
0.4 INJECTION, SOLUTION INTRAMUSCULAR; INTRAVENOUS; SUBCUTANEOUS
Status: CANCELLED | OUTPATIENT
Start: 2024-10-17

## 2024-10-17 RX ORDER — PROPOFOL 10 MG/ML
INJECTION, EMULSION INTRAVENOUS CONTINUOUS PRN
Status: DISCONTINUED | OUTPATIENT
Start: 2024-10-17 | End: 2024-10-17

## 2024-10-17 RX ORDER — GLYCOPYRROLATE 0.2 MG/ML
INJECTION, SOLUTION INTRAMUSCULAR; INTRAVENOUS PRN
Status: DISCONTINUED | OUTPATIENT
Start: 2024-10-17 | End: 2024-10-17

## 2024-10-17 RX ORDER — ONDANSETRON 2 MG/ML
4 INJECTION INTRAMUSCULAR; INTRAVENOUS EVERY 30 MIN PRN
Status: CANCELLED | OUTPATIENT
Start: 2024-10-17

## 2024-10-17 RX ORDER — FLUMAZENIL 0.1 MG/ML
0.2 INJECTION, SOLUTION INTRAVENOUS
Status: CANCELLED | OUTPATIENT
Start: 2024-10-17

## 2024-10-17 RX ORDER — FENTANYL CITRATE 50 UG/ML
25 INJECTION, SOLUTION INTRAMUSCULAR; INTRAVENOUS
Status: CANCELLED | OUTPATIENT
Start: 2024-10-17

## 2024-10-17 RX ORDER — FENTANYL CITRATE 50 UG/ML
50-100 INJECTION, SOLUTION INTRAMUSCULAR; INTRAVENOUS EVERY 5 MIN PRN
Status: CANCELLED | OUTPATIENT
Start: 2024-10-17

## 2024-10-17 RX ORDER — LIDOCAINE 40 MG/G
CREAM TOPICAL
Status: DISCONTINUED | OUTPATIENT
Start: 2024-10-17 | End: 2024-10-17 | Stop reason: HOSPADM

## 2024-10-17 RX ORDER — SODIUM CHLORIDE, SODIUM LACTATE, POTASSIUM CHLORIDE, CALCIUM CHLORIDE 600; 310; 30; 20 MG/100ML; MG/100ML; MG/100ML; MG/100ML
INJECTION, SOLUTION INTRAVENOUS CONTINUOUS PRN
Status: DISCONTINUED | OUTPATIENT
Start: 2024-10-17 | End: 2024-10-17

## 2024-10-17 RX ORDER — ATROPINE SULFATE 0.1 MG/ML
1 INJECTION INTRAVENOUS
Status: CANCELLED | OUTPATIENT
Start: 2024-10-17

## 2024-10-17 RX ORDER — OXYCODONE HYDROCHLORIDE 5 MG/1
10 TABLET ORAL EVERY 4 HOURS PRN
Status: CANCELLED | OUTPATIENT
Start: 2024-10-17

## 2024-10-17 RX ORDER — OXYCODONE HYDROCHLORIDE 5 MG/1
5 TABLET ORAL EVERY 4 HOURS PRN
Status: CANCELLED | OUTPATIENT
Start: 2024-10-17

## 2024-10-17 RX ORDER — ONDANSETRON 4 MG/1
4 TABLET, ORALLY DISINTEGRATING ORAL EVERY 30 MIN PRN
Status: CANCELLED | OUTPATIENT
Start: 2024-10-17

## 2024-10-17 RX ORDER — DIPHENHYDRAMINE HYDROCHLORIDE 50 MG/ML
25-50 INJECTION INTRAMUSCULAR; INTRAVENOUS
Status: CANCELLED | OUTPATIENT
Start: 2024-10-17

## 2024-10-17 RX ORDER — NALOXONE HYDROCHLORIDE 0.4 MG/ML
0.1 INJECTION, SOLUTION INTRAMUSCULAR; INTRAVENOUS; SUBCUTANEOUS
Status: CANCELLED | OUTPATIENT
Start: 2024-10-17

## 2024-10-17 RX ORDER — SIMETHICONE 40MG/0.6ML
133 SUSPENSION, DROPS(FINAL DOSAGE FORM)(ML) ORAL
Status: CANCELLED | OUTPATIENT
Start: 2024-10-17

## 2024-10-17 RX ADMIN — PROPOFOL 20 MG: 10 INJECTION, EMULSION INTRAVENOUS at 10:24

## 2024-10-17 RX ADMIN — TOPICAL ANESTHETIC 1 SPRAY: 200 SPRAY DENTAL; PERIODONTAL at 10:24

## 2024-10-17 RX ADMIN — GLYCOPYRROLATE 0.2 MG: 0.2 INJECTION, SOLUTION INTRAMUSCULAR; INTRAVENOUS at 10:24

## 2024-10-17 RX ADMIN — ONDANSETRON 4 MG: 2 INJECTION INTRAMUSCULAR; INTRAVENOUS at 10:24

## 2024-10-17 RX ADMIN — PROPOFOL 20 MG: 10 INJECTION, EMULSION INTRAVENOUS at 10:29

## 2024-10-17 RX ADMIN — SODIUM CHLORIDE, POTASSIUM CHLORIDE, SODIUM LACTATE AND CALCIUM CHLORIDE: 600; 310; 30; 20 INJECTION, SOLUTION INTRAVENOUS at 10:24

## 2024-10-17 RX ADMIN — PROPOFOL 150 MCG/KG/MIN: 10 INJECTION, EMULSION INTRAVENOUS at 10:24

## 2024-10-17 ASSESSMENT — ACTIVITIES OF DAILY LIVING (ADL)
ADLS_ACUITY_SCORE: 33

## 2024-10-17 NOTE — ANESTHESIA CARE TRANSFER NOTE
Patient: Valencia Ye    Procedure: Procedure(s):  ESOPHAGOGASTRODUODENOSCOPY with DUODENAL BIOPSY       Diagnosis: Abnormal finding on GI tract imaging [R93.3]  Diagnosis Additional Information: No value filed.    Anesthesia Type:   MAC     Note:    Oropharynx: oropharynx clear of all foreign objects and spontaneously breathing  Level of Consciousness: awake  Oxygen Supplementation: room air    Independent Airway: airway patency satisfactory and stable  Dentition: dentition unchanged  Vital Signs Stable: post-procedure vital signs reviewed and stable  Report to RN Given: handoff report given  Patient transferred to: Phase II    Handoff Report: Identifed the Patient, Identified the Reponsible Provider, Reviewed the pertinent medical history, Discussed the surgical course, Reviewed Intra-OP anesthesia mangement and issues during anesthesia, Set expectations for post-procedure period and Allowed opportunity for questions and acknowledgement of understanding      Vitals:  Vitals Value Taken Time   BP     Temp     Pulse     Resp     SpO2         Electronically Signed By: SUNNY Mueller CRNA  October 17, 2024  10:50 AM

## 2024-10-17 NOTE — TELEPHONE ENCOUNTER
"Nurse Triage SBAR    Is this a 2nd Level Triage? YES, LICENSED PRACTITIONER REVIEW IS REQUIRED    Situation: Called patient to triage symptoms sent in [email or web address].     Background: pt sent the following [employer] message: \"Hello- My breathlessness has increased- inhalers and nebs do not change it, and it does not feel the way my asthma and COPD feel when they are out of control. It is particularly bad if I try to do things that involve leaning forward (loading the , putting dogs  collars on). It was so bad yesterday that I had to cut my online therapy session short because talking for that long made me so breathless I felt light headed. It waxes and wanes during the day, inexplicably. Some trips up the stairs may leave me gasping, other trips, mildly puffing. It is usually worse after eating - and I don t eat enough to make me so full that I d feel breathless (I m mainly eating smoothies because of the ongoing gut issues)The exhaustion is overwhelming. It is worse than it was two weeks ago. The cheilitis briefly returned - cracked, bleeding, crusty, then quickly resolved. I get pain throughout my abdomen- some stabbing, some dull. The endoscopy was normal, pathology is pending.\"    Assessment: Patient states that the shortness of breath has been ongoing for 3-4 weeks now and is slowing ramping up. Oxygen saturation was \"normal\" when checked at the clinic  last time and at the endoscopy clinic today. States that abdomen is painful from reflux. Denies fever, chest pain, dizziness or any other symptoms.     Protocol Recommended Disposition:   Go to ED Now    Recommendation: Advised patient to go to ED but pt refused, saying that she's been in ED many times and doesn't think this will solve her problem. She would like to ask Dr. Renee's advice, order for further labs or referral to a specialist.     Informed pt that Dr. Renee is out but message will be sent to covering provider. Pt verbalized understanding. " "    Routing to covering provider for \"A\"  Routed to provider    Does the patient meet one of the following criteria for ADS visit consideration? 16+ years old, with an [facility] PCP     TIP  Providers, please consider if this condition is appropriate for management at one of our Acute and Diagnostic Services sites.     If patient is a good candidate, please use dotphrase <dot>triageresponse and select Refer to ADS to document.        Reason for Disposition   MODERATE difficulty breathing (e.g., speaks in phrases, SOB even at rest, pulse 100-120) of new-onset or worse than normal    Additional Information   Negative: SEVERE difficulty breathing (e.g., struggling for each breath, speaks in single words, pulse > 120)   Negative: Breathing stopped and hasn't returned   Negative: Choking on something   Negative: Bluish (or gray) lips or face   Negative: Difficult to awaken or acting confused (e.g., disoriented, slurred speech)   Negative: Passed out (i.e., fainted, collapsed and was not responding)   Negative: Wheezing started suddenly after medicine, an allergic food, or bee sting   Negative: Stridor (harsh sound while breathing in)   Negative: Slow, shallow and weak breathing   Negative: Sounds like a life-threatening emergency to the triager   Negative: Chest pain   Negative: Wheezing (high pitched whistling sound) and previous asthma attacks or use of asthma medicines   Negative: Difficulty breathing and within 14 days of COVID-19 Exposure   Negative: Difficulty breathing and only present when coughing   Negative: Difficulty breathing and only from stuffy nose   Negative: Difficulty breathing and only from stuffy nose or runny nose from common cold    Answer Assessment - Initial Assessment Questions  1. RESPIRATORY STATUS: \"Describe your breathing?\" (e.g., wheezing, shortness of breath, unable to speak, severe coughing)       \"Breathlessness has increased- inhalers and nebs do not change it, and it does not feel the way " "my asthma and COPD feel when they are out of control. It is particularly bad if I try to do things that involve leaning forward (loading the , putting dogs' collars on). It was so bad yesterday that I had to cut my online therapy session short because talking for that long made me so breathless I felt light headed. It waxes and wanes during the day, inexplicably. Some trips up the stairs may leave me gasping, other trips, mildly puffing. It is usually worse after eating. The exhaustion is overwhelming. It is worse than it was two weeks ago.\"  2. ONSET: \"When did this breathing problem begin?\"       Ongoing for 3-4 weeks now and is slowly ramping up.   3. PATTERN \"Does the difficult breathing come and go, or has it been constant since it started?\"       Comes and goes during the day.   4. SEVERITY: \"How bad is your breathing?\" (e.g., mild, moderate, severe)     - MILD: No SOB at rest, mild SOB with walking, speaks normally in sentences, can lie down, no retractions, pulse < 100.     - MODERATE: SOB at rest, SOB with minimal exertion and prefers to sit, cannot lie down flat, speaks in phrases, mild retractions, audible wheezing, pulse 100-120.     - SEVERE: Very SOB at rest, speaks in single words, struggling to breathe, sitting hunched forward, retractions, pulse > 120       Moderate.   7. LUNG HISTORY: \"Do you have any history of lung disease?\"  (e.g., pulmonary embolus, asthma, emphysema)      Asthma, COPD  8. CAUSE: \"What do you think is causing the breathing problem?\"       Unsure.   9. OTHER SYMPTOMS: \"Do you have any other symptoms? (e.g., dizziness, runny nose, cough, chest pain, fever)      Gastric symptom,  no other symptoms.   10. O2 SATURATION MONITOR:  \"Do you use an oxygen saturation monitor (pulse oximeter) at home?\" If Yes, ask: \"What is your reading (oxygen level) today?\" \"What is your usual oxygen saturation reading?\" (e.g., 95%)        Normal when checked at the clinic and at the endoscopy " "clinic today.   11. PREGNANCY: \"Is there any chance you are pregnant?\" \"When was your last menstrual period?\"        No.  12. TRAVEL: \"Have you traveled out of the country in the last month?\" (e.g., travel history, exposures)        No.    Protocols used: Breathing Difficulty-A-[place]    "

## 2024-10-17 NOTE — TELEPHONE ENCOUNTER
"Spoke with patient and advised of Adrianna's message below.  Patient states she doesn't know what else the ED can do since her issue has been on-going.  She reports she had an EGD today and vital signs were being monitored & \"no one was excited about my breathing\".    Reports she will not go to the ED today as recommended by Adrianna unless she gets worse.  Requesting to schedule an appointment.    A virtual appointment scheduled 10/22/24.  "

## 2024-10-17 NOTE — ANESTHESIA PREPROCEDURE EVALUATION
Anesthesia Pre-Procedure Evaluation    Patient: Valencia Ye   MRN: [id number] : 1970        Procedure : Procedure(s):  ESOPHAGOGASTRODUODENOSCOPY          Past Medical History:   Diagnosis Date    Anxiety     Aortitis (H)     Asthma     associated with Churg Antonina syndrome    Bronchiectasis (H)     Cerebral infarction (H) 1990    Very small, caused small permanent optical migraine    Chronic sinusitis     Churg-Antonina syndrome (H)     dx     Conductive hearing loss     Depressive disorder     Eustachian tube dysfunction     Goiter     Hearing loss, conductive     Heart rate problem     Hypertension     Hypocalcemia     Immunosuppression (H)     Infection due to 2019 novel coronavirus 2022, , 2022    Irritable bowel syndrome     Major depressive disorder     Mannose-binding lectin deficiency (H)     Nonspecific abnormal results of thyroid function study     Osteoporosis     Pericarditis      and     Pneumonia     4/23/10    Recurrent otitis media     Recurrent UTI     Rheumatoid vasculitis (H)     Sinusitis, chronic     Steroid long-term use     Tinnitus     Varicose veins of leg with swelling       Past Surgical History:   Procedure Laterality Date    C/SECTION, CLASSICAL       SECTION      COLONOSCOPY Left 2014    Procedure: COMBINED COLONOSCOPY, SINGLE OR MULTIPLE BIOPSY/POLYPECTOMY BY BIOPSY;  Surgeon: Js Pinedo MD;  Location:  GI    COLONOSCOPY N/A 2019    Procedure: COLONOSCOPY;  Surgeon: Bryce Pimentel MD;  Location:  GI    ENT SURGERY      ESOPHAGOSCOPY, GASTROSCOPY, DUODENOSCOPY (EGD), COMBINED N/A 2023    Procedure: ESOPHAGOGASTRODUODENOSCOPY, WITH BIOPSY;  Surgeon: Mo Rosa MD;  Location:  GI    ESOPHAGOSCOPY, GASTROSCOPY, DUODENOSCOPY (EGD), DILATATION, COMBINED N/A 2023    Procedure: ESOPHAGOGASTRODUODENOSCOPY, WITH DILATION;  Surgeon: Shelley  Mo Zavala MD;  Location:  GI    GENITOURINARY SURGERY      HEAD & NECK SURGERY      HYSTERECTOMY TOTAL ABDOMINAL  02/27/2009    without oopherectomy for TEO 3 on LEEP w/ pos margins - Path all benign    HYSTERECTOMY, PAP NO LONGER INDICATED      cervix removed     LAPAROSCOPIC APPENDECTOMY N/A 06/22/2022    Procedure: APPENDECTOMY, LAPAROSCOPIC;  Surgeon: Fany Hannah MD;  Location: [facility] OR    PE TUBES      PICC INSERTION  10/10/2013    5fr DL PASV PICC, 43cm (1cm external) in the L basilic vein w/ tip in the low SVC.    PICC INSERTION Left 05/26/2017    5fr DL BioFlo PICC, 42cm (3cm external) in the L lateral brachial vein w/ tip in the SVC RA junction.    SINUS SURGERY      TUBAL LIGATION      TYMPANOPLASTY      ZZHC COLP CERVIX/UPPER VAGINA W LOOP ELEC BX CERVIX        Allergies   Allergen Reactions    Colistimethate Anaphylaxis    Colymycin M [Colistimethate Sodium] Anaphylaxis    Tamiflu [Oseltamivir]      Gums Blister up    Hydroxyzine     Azithromycin Palpitations    Clindamycin Rash    Tizanidine Other (See Comments)     Severe panic attack      Social History     Tobacco Use    Smoking status: Never     Passive exposure: Never    Smokeless tobacco: Never   Substance Use Topics    Alcohol use: Not Currently      Wt Readings from Last 1 Encounters:   10/17/24 52.2 kg (115 lb)        Anesthesia Evaluation   Pt has had prior anesthetic. Type: General.    No history of anesthetic complications       ROS/MED HX  ENT/Pulmonary:     (+)                      asthma        recent URI,          Neurologic:     (+)          CVA,  without deficits,                    Cardiovascular:     (+)  hypertension- -   -  - -                                      METS/Exercise Tolerance:     Hematologic:  - neg hematologic  ROS     Musculoskeletal:  - neg musculoskeletal ROS     GI/Hepatic:  - neg GI/hepatic ROS     Renal/Genitourinary:  - neg Renal ROS     Endo:     (+)          thyroid problem, hypothyroidism,  Chronic steroid usage for Asthma.         Psychiatric/Substance Use:  - neg psychiatric ROS     Infectious Disease:  - neg infectious disease ROS     Malignancy:  - neg malignancy ROS     Other:  - neg other ROS          Physical Exam    Airway        Mallampati: I   TM distance: > 3 FB   Neck ROM: full   Mouth opening: > 3 cm    Respiratory Devices and Support         Dental  no notable dental history         Cardiovascular   cardiovascular exam normal          Pulmonary   pulmonary exam normal                OUTSIDE LABS:  CBC:   Lab Results   Component Value Date    WBC 7.8 09/27/2024    WBC 6.9 02/05/2024    HGB 13.2 09/27/2024    HGB 13.7 02/05/2024    HCT 39.0 09/27/2024    HCT 40.5 02/05/2024     09/27/2024     02/05/2024     BMP:   Lab Results   Component Value Date     09/27/2024     09/25/2024    POTASSIUM 4.4 09/27/2024    POTASSIUM 4.2 09/25/2024    CHLORIDE 106 09/27/2024    CHLORIDE 104 09/25/2024    CO2 19 (L) 09/27/2024    CO2 27 09/25/2024    BUN 15.4 09/27/2024    BUN 20.3 (H) 09/25/2024    CR 0.27 (L) 09/27/2024    CR 0.77 09/25/2024     (H) 09/27/2024    GLC 90 09/25/2024     COAGS:   Lab Results   Component Value Date    INR 1.10 06/22/2023     POC:   Lab Results   Component Value Date     (H) 12/24/2015    HCG Negative 12/12/2010     HEPATIC:   Lab Results   Component Value Date    ALBUMIN 4.4 09/27/2024    PROTTOTAL 6.4 09/27/2024    ALT 18 09/27/2024    AST 41 09/27/2024    ALKPHOS 241 (H) 09/27/2024    BILITOTAL 0.2 09/27/2024     OTHER:   Lab Results   Component Value Date    LACT 2.2 (H) 03/10/2017    A1C 5.2 10/02/2019    BOSSMAN 9.3 09/27/2024    PHOS 4.4 07/05/2018    MAG 2.3 02/26/2019    LIPASE 23 09/27/2024    TSH 0.89 09/25/2024    T4 0.86 06/15/2021    T3 69 09/14/2010    CRP 44.0 (H) 09/21/2020    SED 11 01/25/2024       Anesthesia Plan    ASA Status:  3    NPO Status:  NPO Appropriate    Anesthesia Type: MAC.              Consents    Anesthesia  Plan(s) and associated risks, benefits, and realistic alternatives discussed. Questions answered and patient/representative(s) expressed understanding.     - Discussed:     - Discussed with:  Patient            Postoperative Care    Pain management: IV analgesics, Oral pain medications, Multi-modal analgesia.   PONV prophylaxis: Ondansetron (or other 5HT-3), Dexamethasone or Solumedrol     Comments:               Bryce Bar MD    I have reviewed the pertinent notes and labs in the chart from the past 30 days and (re)examined the patient.  Any updates or changes from those notes are reflected in this note.               # Hypertension: Noted on problem list             # Financial/Environmental Concerns:    # Asthma: noted on problem list

## 2024-10-17 NOTE — ANESTHESIA POSTPROCEDURE EVALUATION
Patient: Valencia Ye    Procedure: Procedure(s):  ESOPHAGOGASTRODUODENOSCOPY with DUODENAL BIOPSY       Anesthesia Type:  MAC    Note:     Postop Pain Control: Uneventful            Sign Out: Well controlled pain   PONV: No   Neuro/Psych: Uneventful            Sign Out: Acceptable/Baseline neuro status   Airway/Respiratory: Uneventful            Sign Out: Acceptable/Baseline resp. status   CV/Hemodynamics: Uneventful            Sign Out: Acceptable CV status   Other NRE: NONE   DID A NON-ROUTINE EVENT OCCUR? No           Last vitals:  Vitals Value Taken Time   /88 10/17/24 1100   Temp 96.5  F (35.8  C) 10/17/24 1047   Pulse 84 10/17/24 1100   Resp 16 10/17/24 1050   SpO2 98 % 10/17/24 1101   Vitals shown include unfiled device data.    Electronically Signed By: Bryce Bar MD  October 17, 2024  11:13 AM

## 2024-10-18 NOTE — TELEPHONE ENCOUNTER
Dr. Briscoe reviewed patient's [employer] messages with this RN.  Increased breathlessness, fatigue, and overall decline in GI status.  Patient is scheduled to see Dr. Briscoe on 11/14.      is recommending for patient to perform the following tests:  -flow volume loop  -VBG  -Cardiac Echo    Follow up call to patient, discussed Dr. Briscoe recommendation, patient in agreement. Orders placed.  Discussed with patient that I will send her a [employer] message with scheduling numbers, patient may set up  appts closer to home.    ALFREDA NixonN, RN  RN Care Coordinator [facility]

## 2024-10-19 ENCOUNTER — LAB (OUTPATIENT)
Dept: LAB | Facility: CLINIC | Age: 54
End: 2024-10-19
Payer: COMMERCIAL

## 2024-10-19 DIAGNOSIS — J47.0 BRONCHIECTASIS WITH ACUTE LOWER RESPIRATORY INFECTION (H): ICD-10-CM

## 2024-10-19 LAB
BASE EXCESS BLDV CALC-SCNC: 3.9 MMOL/L (ref -3–3)
HCO3 BLDV-SCNC: 30 MMOL/L (ref 21–28)
O2/TOTAL GAS SETTING VFR VENT: 0 %
OXYHGB MFR BLDV: 17 % (ref 70–75)
PCO2 BLDV: 48 MM HG (ref 40–50)
PH BLDV: 7.4 [PH] (ref 7.32–7.43)
PO2 BLDV: 15 MM HG (ref 25–47)
SAO2 % BLDV: 17.7 % (ref 70–75)

## 2024-10-19 PROCEDURE — 36415 COLL VENOUS BLD VENIPUNCTURE: CPT

## 2024-10-19 PROCEDURE — 82805 BLOOD GASES W/O2 SATURATION: CPT

## 2024-10-20 DIAGNOSIS — K21.9 GASTROESOPHAGEAL REFLUX DISEASE, UNSPECIFIED WHETHER ESOPHAGITIS PRESENT: ICD-10-CM

## 2024-10-21 ENCOUNTER — HOSPITAL ENCOUNTER (OUTPATIENT)
Dept: RESPIRATORY THERAPY | Facility: CLINIC | Age: 54
Discharge: HOME | End: 2024-10-21
Attending: INTERNAL MEDICINE | Admitting: INTERNAL MEDICINE
Payer: COMMERCIAL

## 2024-10-21 DIAGNOSIS — M30.1 EOSINOPHILIC GRANULOMATOSIS WITH POLYANGIITIS (EGPA) WITH LUNG INVOLVEMENT (H): ICD-10-CM

## 2024-10-21 DIAGNOSIS — J47.0 BRONCHIECTASIS WITH ACUTE LOWER RESPIRATORY INFECTION (H): ICD-10-CM

## 2024-10-21 DIAGNOSIS — D72.18 EOSINOPHILIC GRANULOMATOSIS WITH POLYANGIITIS (EGPA) WITH LUNG INVOLVEMENT (H): ICD-10-CM

## 2024-10-21 DIAGNOSIS — J45.20 MILD INTERMITTENT ASTHMA WITHOUT COMPLICATION: ICD-10-CM

## 2024-10-21 PROCEDURE — 94060 EVALUATION OF WHEEZING: CPT

## 2024-10-21 PROCEDURE — 999N000157 HC STATISTIC RCP TIME EA 10 MIN

## 2024-10-21 PROCEDURE — 94729 DIFFUSING CAPACITY: CPT

## 2024-10-21 PROCEDURE — 250N000009 HC RX 250: Performed by: INTERNAL MEDICINE

## 2024-10-21 PROCEDURE — 94726 PLETHYSMOGRAPHY LUNG VOLUMES: CPT

## 2024-10-21 RX ORDER — ALBUTEROL SULFATE 0.83 MG/ML
2.5 SOLUTION RESPIRATORY (INHALATION) ONCE
Status: COMPLETED | OUTPATIENT
Start: 2024-10-21 | End: 2024-10-21

## 2024-10-21 RX ADMIN — ALBUTEROL SULFATE 2.5 MG: 2.5 SOLUTION RESPIRATORY (INHALATION) at 07:23

## 2024-10-21 NOTE — PROGRESS NOTES
RESPIRATORY CARE NOTE    Complete Pulmonary Function Test completed by patient.  Good patient effort and cooperation. Albuterol 2.5 mg neb given for bronchodilation.  The results of this test meet the ATS standards for acceptability and repeatability. PT returned to baseline and left in no distress.    Janeen Hackett, RT  10/21/2024

## 2024-10-22 ENCOUNTER — VIRTUAL VISIT (OUTPATIENT)
Dept: INTERNAL MEDICINE | Facility: CLINIC | Age: 54
End: 2024-10-22
Payer: COMMERCIAL

## 2024-10-22 DIAGNOSIS — R74.8 ELEVATED ALKALINE PHOSPHATASE LEVEL: ICD-10-CM

## 2024-10-22 DIAGNOSIS — R53.83 OTHER FATIGUE: Primary | ICD-10-CM

## 2024-10-22 DIAGNOSIS — R06.02 SHORTNESS OF BREATH: ICD-10-CM

## 2024-10-22 DIAGNOSIS — J45.20 MILD INTERMITTENT ASTHMA WITHOUT COMPLICATION: ICD-10-CM

## 2024-10-22 DIAGNOSIS — F41.9 ANXIETY: ICD-10-CM

## 2024-10-22 PROCEDURE — G2211 COMPLEX E/M VISIT ADD ON: HCPCS | Mod: 95 | Performed by: INTERNAL MEDICINE

## 2024-10-22 PROCEDURE — 99214 OFFICE O/P EST MOD 30 MIN: CPT | Mod: 95 | Performed by: INTERNAL MEDICINE

## 2024-10-22 RX ORDER — SUCRALFATE 1 G/1
TABLET ORAL
Qty: 360 TABLET | Refills: 1 | Status: SHIPPED | OUTPATIENT
Start: 2024-10-22 | End: 2024-11-05

## 2024-10-22 NOTE — PROGRESS NOTES
Valencia is a 54 year old who is being evaluated via a billable video visit.    How would you like to obtain your AVS? MyChart  If the video visit is dropped, the invitation should be resent by: Text to cell phone: [phone]  Will anyone else be joining your video visit? No      Assessment & Plan     Other fatigue  Shortness of breath  Mild intermittent asthma without complication  Continues to have symptoms of shortness of breath.  Has been using inhaler regimen, feels like underlying asthma has been stable.  Will be completing echo, completed pulmonary function test.  Overall, has not had significant heartburn symptoms despite the fact that meals have also triggered the shortness of breath concerns.  Has completed EGD with [facility] and  EGD results have been reassuring with pending biopsy.  Have discussed with the patient on trial of sucralfate medication to help with the symptoms, patient prefers to hold off on that for now.  Continue holding off on GI supplements.  Complete echo as recommended.  Await EGD biopsy results.      Anxiety  Overall, symptoms have been stable.  Continue Effexor at the current dose.    Elevated alkaline phosphatase level  Discussed with the patient on proceeding with completion of lab work.                Subjective   Valencia is a 54 year old, presenting for the following health issues:  Follow Up    History of Present Illness       Reason for visit:  Continued and worsening breathlessness, with severe fatigue, abdominal pain and bloating    She eats 2-3 servings of fruits and vegetables daily.She consumes 0 sweetened beverage(s) daily.She exercises with enough effort to increase her heart rate 9 or less minutes per day.  She exercises with enough effort to increase her heart rate 3 or less days per week.   She is taking medications regularly.     Completed endoscopy with [facility], awaiting biopsy results.  Will be completing ECHO for the exertional dyspnea,mostly worse after meals. Eats  small meals.  Completed PFT.  Currently,not using any antacids and does not feel that heart burns have been a concern.  Has been using inhalers but feels that minimal improvement in symptoms.      Review of Systems  Constitutional, HEENT, cardiovascular, pulmonary, gi and gu systems are negative, except as otherwise noted.      Objective           Vitals:  No vitals were obtained today due to virtual visit.    Physical Exam   GENERAL: alert and no distress  EYES: Eyes grossly normal to inspection.  No discharge or erythema, or obvious scleral/conjunctival abnormalities.  RESP: No audible wheeze, cough, or visible cyanosis.    SKIN: Visible skin clear. No significant rash, abnormal pigmentation or lesions.  NEURO: Cranial nerves grossly intact.  Mentation and speech appropriate for age.  PSYCH: Appropriate affect, tone, and pace of words          Video-Visit Details    Type of service:  Video Visit   Originating Location (pt. Location): Home    Distant Location (provider location):  On-site  Platform used for Video Visit: Jennifer  Signed Electronically by: Padmini Renee MD

## 2024-10-23 ENCOUNTER — LAB (OUTPATIENT)
Dept: LAB | Facility: CLINIC | Age: 54
End: 2024-10-23
Payer: COMMERCIAL

## 2024-10-23 DIAGNOSIS — R74.01 TRANSAMINITIS: ICD-10-CM

## 2024-10-23 DIAGNOSIS — R06.00 DYSPNEA: Primary | ICD-10-CM

## 2024-10-23 DIAGNOSIS — R53.83 OTHER FATIGUE: ICD-10-CM

## 2024-10-23 DIAGNOSIS — J45.20 MILD INTERMITTENT ASTHMA WITHOUT COMPLICATION: ICD-10-CM

## 2024-10-23 LAB
ALBUMIN SERPL BCG-MCNC: 4.2 G/DL (ref 3.5–5.2)
ALP SERPL-CCNC: 58 U/L (ref 40–150)
ALT SERPL W P-5'-P-CCNC: 17 U/L (ref 0–50)
ANION GAP SERPL CALCULATED.3IONS-SCNC: 11 MMOL/L (ref 7–15)
AST SERPL W P-5'-P-CCNC: 21 U/L (ref 0–45)
BILIRUB SERPL-MCNC: 0.2 MG/DL
BUN SERPL-MCNC: 20.9 MG/DL (ref 6–20)
CALCIUM SERPL-MCNC: 9.2 MG/DL (ref 8.8–10.4)
CHLORIDE SERPL-SCNC: 105 MMOL/L (ref 98–107)
CREAT SERPL-MCNC: 0.91 MG/DL (ref 0.51–0.95)
DLCOCOR-%PRED-PRE: 131 %
DLCOCOR-PRE: 26.14 ML/MIN/MMHG
DLCOUNC-%PRED-PRE: 130 %
DLCOUNC-PRE: 25.97 ML/MIN/MMHG
DLCOUNC-PRED: 19.92 ML/MIN/MMHG
EGFRCR SERPLBLD CKD-EPI 2021: 76 ML/MIN/1.73M2
ERV-%PRED-PRE: 106 %
ERV-PRE: 1.19 L
ERV-PRED: 1.11 L
EXPTIME-PRE: 7.43 SEC
FEF2575-%PRED-POST: 54 %
FEF2575-%PRED-PRE: 45 %
FEF2575-POST: 1.34 L/SEC
FEF2575-PRE: 1.11 L/SEC
FEF2575-PRED: 2.44 L/SEC
FEFMAX-%PRED-PRE: 79 %
FEFMAX-PRE: 5.17 L/SEC
FEFMAX-PRED: 6.51 L/SEC
FEV1-%PRED-PRE: 93 %
FEV1-PRE: 2.28 L
FEV1FEV6-PRE: 66 %
FEV1FEV6-PRED: 82 %
FEV1FVC-PRE: 64 %
FEV1FVC-PRED: 81 %
FEV1SVC-PRE: 65 %
FEV1SVC-PRED: 75 %
FIFMAX-PRE: 4.79 L/SEC
FRCPLETH-%PRED-PRE: 130 %
FRCPLETH-PRE: 3.44 L
FRCPLETH-PRED: 2.64 L
FVC-%PRED-PRE: 118 %
FVC-PRE: 3.57 L
FVC-PRED: 3.02 L
GLUCOSE SERPL-MCNC: 92 MG/DL (ref 70–99)
HCO3 SERPL-SCNC: 24 MMOL/L (ref 22–29)
HCV AB SERPL QL IA: NONREACTIVE
IC-%PRED-PRE: 102 %
IC-PRE: 2.29 L
IC-PRED: 2.23 L
MONOCYTES NFR BLD AUTO: NEGATIVE %
POTASSIUM SERPL-SCNC: 4.6 MMOL/L (ref 3.4–5.3)
PROT SERPL-MCNC: 6.4 G/DL (ref 6.4–8.3)
RVPLETH-%PRED-PRE: 130 %
RVPLETH-PRE: 2.25 L
RVPLETH-PRED: 1.73 L
SODIUM SERPL-SCNC: 140 MMOL/L (ref 135–145)
TLCPLETH-%PRED-PRE: 120 %
TLCPLETH-PRE: 5.73 L
TLCPLETH-PRED: 4.77 L
VA-%PRED-PRE: 121 %
VA-PRE: 5.62 L
VC-%PRED-PRE: 107 %
VC-PRE: 3.48 L
VC-PRED: 3.25 L

## 2024-10-23 PROCEDURE — 36415 COLL VENOUS BLD VENIPUNCTURE: CPT

## 2024-10-23 PROCEDURE — 86665 EPSTEIN-BARR CAPSID VCA: CPT

## 2024-10-23 PROCEDURE — 86803 HEPATITIS C AB TEST: CPT

## 2024-10-23 PROCEDURE — 86308 HETEROPHILE ANTIBODY SCREEN: CPT

## 2024-10-23 PROCEDURE — 80053 COMPREHEN METABOLIC PANEL: CPT

## 2024-10-25 LAB
EBV VCA IGM SER IA-ACNC: <10 U/ML
EBV VCA IGM SER IA-ACNC: NORMAL

## 2024-10-29 ENCOUNTER — HOSPITAL ENCOUNTER (OUTPATIENT)
Facility: CLINIC | Age: 54
End: 2024-10-29
Attending: INTERNAL MEDICINE | Admitting: INTERNAL MEDICINE
Payer: COMMERCIAL

## 2024-10-29 ENCOUNTER — HOSPITAL ENCOUNTER (EMERGENCY)
Facility: CLINIC | Age: 54
Discharge: HOME | End: 2024-10-29
Attending: EMERGENCY MEDICINE | Admitting: EMERGENCY MEDICINE
Payer: COMMERCIAL

## 2024-10-29 ENCOUNTER — ANCILLARY PROCEDURE (OUTPATIENT)
Dept: CARDIOLOGY | Facility: CLINIC | Age: 54
End: 2024-10-29
Attending: INTERNAL MEDICINE
Payer: COMMERCIAL

## 2024-10-29 VITALS
HEIGHT: 63 IN | DIASTOLIC BLOOD PRESSURE: 90 MMHG | TEMPERATURE: 98.5 F | BODY MASS INDEX: 20.38 KG/M2 | HEART RATE: 62 BPM | SYSTOLIC BLOOD PRESSURE: 147 MMHG | OXYGEN SATURATION: 99 % | RESPIRATION RATE: 18 BRPM | WEIGHT: 115 LBS

## 2024-10-29 DIAGNOSIS — R06.00 DYSPNEA: ICD-10-CM

## 2024-10-29 DIAGNOSIS — R06.02 SHORTNESS OF BREATH: ICD-10-CM

## 2024-10-29 DIAGNOSIS — J45.20 MILD INTERMITTENT ASTHMA WITHOUT COMPLICATION: ICD-10-CM

## 2024-10-29 LAB
ANION GAP SERPL CALCULATED.3IONS-SCNC: 11 MMOL/L (ref 7–15)
BASOPHILS # BLD AUTO: 0.1 10E3/UL (ref 0–0.2)
BASOPHILS NFR BLD AUTO: 1 %
BUN SERPL-MCNC: 16.1 MG/DL (ref 6–20)
CALCIUM SERPL-MCNC: 9.4 MG/DL (ref 8.8–10.4)
CHLORIDE SERPL-SCNC: 106 MMOL/L (ref 98–107)
CREAT SERPL-MCNC: 0.75 MG/DL (ref 0.51–0.95)
EGFRCR SERPLBLD CKD-EPI 2021: >90 ML/MIN/1.73M2
EOSINOPHIL # BLD AUTO: 0.2 10E3/UL (ref 0–0.7)
EOSINOPHIL NFR BLD AUTO: 2 %
ERYTHROCYTE [DISTWIDTH] IN BLOOD BY AUTOMATED COUNT: 13.1 % (ref 10–15)
GLUCOSE SERPL-MCNC: 86 MG/DL (ref 70–99)
HCO3 SERPL-SCNC: 25 MMOL/L (ref 22–29)
HCT VFR BLD AUTO: 36.4 % (ref 35–47)
HGB BLD-MCNC: 12.8 G/DL (ref 11.7–15.7)
HOLD SPECIMEN: NORMAL
IMM GRANULOCYTES # BLD: 0 10E3/UL
IMM GRANULOCYTES NFR BLD: 0 %
LVEF ECHO: NORMAL
LYMPHOCYTES # BLD AUTO: 1.9 10E3/UL (ref 0.8–5.3)
LYMPHOCYTES NFR BLD AUTO: 16 %
MCH RBC QN AUTO: 33.4 PG (ref 26.5–33)
MCHC RBC AUTO-ENTMCNC: 35.2 G/DL (ref 31.5–36.5)
MCV RBC AUTO: 95 FL (ref 78–100)
MONOCYTES # BLD AUTO: 0.7 10E3/UL (ref 0–1.3)
MONOCYTES NFR BLD AUTO: 6 %
NEUTROPHILS # BLD AUTO: 8.6 10E3/UL (ref 1.6–8.3)
NEUTROPHILS NFR BLD AUTO: 75 %
NRBC # BLD AUTO: 0 10E3/UL
NRBC BLD AUTO-RTO: 0 /100
PLATELET # BLD AUTO: 328 10E3/UL (ref 150–450)
POTASSIUM SERPL-SCNC: 4.1 MMOL/L (ref 3.4–5.3)
RBC # BLD AUTO: 3.83 10E6/UL (ref 3.8–5.2)
SODIUM SERPL-SCNC: 142 MMOL/L (ref 135–145)
TROPONIN T SERPL HS-MCNC: 10 NG/L
TROPONIN T SERPL HS-MCNC: 9 NG/L
WBC # BLD AUTO: 11.5 10E3/UL (ref 4–11)

## 2024-10-29 PROCEDURE — 93306 TTE W/DOPPLER COMPLETE: CPT | Performed by: INTERNAL MEDICINE

## 2024-10-29 PROCEDURE — 99284 EMERGENCY DEPT VISIT MOD MDM: CPT

## 2024-10-29 PROCEDURE — 36415 COLL VENOUS BLD VENIPUNCTURE: CPT | Performed by: STUDENT IN AN ORGANIZED HEALTH CARE EDUCATION/TRAINING PROGRAM

## 2024-10-29 PROCEDURE — 85004 AUTOMATED DIFF WBC COUNT: CPT | Performed by: STUDENT IN AN ORGANIZED HEALTH CARE EDUCATION/TRAINING PROGRAM

## 2024-10-29 PROCEDURE — 84484 ASSAY OF TROPONIN QUANT: CPT | Performed by: STUDENT IN AN ORGANIZED HEALTH CARE EDUCATION/TRAINING PROGRAM

## 2024-10-29 PROCEDURE — 82947 ASSAY GLUCOSE BLOOD QUANT: CPT | Performed by: STUDENT IN AN ORGANIZED HEALTH CARE EDUCATION/TRAINING PROGRAM

## 2024-10-29 PROCEDURE — 84484 ASSAY OF TROPONIN QUANT: CPT | Performed by: EMERGENCY MEDICINE

## 2024-10-29 PROCEDURE — 85025 COMPLETE CBC W/AUTO DIFF WBC: CPT | Performed by: EMERGENCY MEDICINE

## 2024-10-29 PROCEDURE — 93005 ELECTROCARDIOGRAM TRACING: CPT

## 2024-10-29 PROCEDURE — 80048 BASIC METABOLIC PNL TOTAL CA: CPT | Performed by: STUDENT IN AN ORGANIZED HEALTH CARE EDUCATION/TRAINING PROGRAM

## 2024-10-29 PROCEDURE — 80048 BASIC METABOLIC PNL TOTAL CA: CPT | Performed by: EMERGENCY MEDICINE

## 2024-10-29 ASSESSMENT — ACTIVITIES OF DAILY LIVING (ADL)
ADLS_ACUITY_SCORE: 0
ADLS_ACUITY_SCORE: 0

## 2024-10-29 ASSESSMENT — COLUMBIA-SUICIDE SEVERITY RATING SCALE - C-SSRS
6. HAVE YOU EVER DONE ANYTHING, STARTED TO DO ANYTHING, OR PREPARED TO DO ANYTHING TO END YOUR LIFE?: NO
2. HAVE YOU ACTUALLY HAD ANY THOUGHTS OF KILLING YOURSELF IN THE PAST MONTH?: NO
1. IN THE PAST MONTH, HAVE YOU WISHED YOU WERE DEAD OR WISHED YOU COULD GO TO SLEEP AND NOT WAKE UP?: NO

## 2024-10-30 ENCOUNTER — TELEPHONE (OUTPATIENT)
Dept: INTERNAL MEDICINE | Facility: CLINIC | Age: 54
End: 2024-10-30
Payer: COMMERCIAL

## 2024-10-30 LAB
ATRIAL RATE - MUSE: 78 BPM
DIASTOLIC BLOOD PRESSURE - MUSE: NORMAL MMHG
INTERPRETATION ECG - MUSE: NORMAL
P AXIS - MUSE: 62 DEGREES
PR INTERVAL - MUSE: 156 MS
QRS DURATION - MUSE: 70 MS
QT - MUSE: 360 MS
QTC - MUSE: 410 MS
R AXIS - MUSE: 66 DEGREES
SYSTOLIC BLOOD PRESSURE - MUSE: NORMAL MMHG
T AXIS - MUSE: -5 DEGREES
VENTRICULAR RATE- MUSE: 78 BPM

## 2024-10-30 NOTE — ED PROVIDER NOTES
Emergency Department Note      History of Present Illness     Chief Complaint   Shortness of Breath      HPI   Valencia Z Katie Ye is a 54 year old female with a history of bronchiectasis, ILD, and hypertension who presents to the ED with her  for evaluation of shortness of breath. The patient states she started to experience shortness of breath today during a scheduled echocardiogram. The shortness of breath worsened with time and became near syncopal which prompted her presentation here. She has since improved to her baseline. No wheezing or crackles. States she has been having episodes of shortness of breath with exertion, after eating, and in the morning after her hospital admission earlier this month. She has been unable to eat much, sleep lying down, or participate in Yoga like she used to due to her symptoms. States she has never had shortness of breath as bad as today. She has not been able to find relief with inhalers or nebulizers. She has been seen by a Cardiologist and Pulmonologist and is working with them to find the cause. Notes she does wear an Apple Watch that detects oxygen levels and her lowest is 96%.     Independent Historian   None    Review of External Notes   I reviewed the office visit note from 10/22/24.    Past Medical History     Medical History and Problem List   ILD  Churg-ju syndrome  Asthma  Anxiety  Chronic fatigue  HTN  Depression  Bronchiectasis  IBS  Chronic pain syndrome  Diverticulosis  Vertigo  Pericarditis  Pneumonia     Medications   Scoplamine  Albuterol  Breo ellipta  Fluticasone  Ativan  Lopressor  Femhrt 0.5/2.5  Carafate  Effexor     Surgical History    section  Unspecified ENT surgery  Unspecified genitourinary surgery  Hysterectomy  Appendectomy  PE tubes  PICC insertion  Sinus surgery  Tubal ligation  Tympanostomy    Physical Exam     Patient Vitals for the past 24 hrs:   BP Temp Temp src Pulse Resp SpO2 Height Weight   10/29/24 2248 (!) 147/90  "-- -- 62 -- 99 % -- --   10/29/24 2158 (!) 149/84 -- -- -- 18 98 % -- --   10/29/24 1912 139/73 98.5  F (36.9  C) Temporal 89 18 99 % 1.6 m (5' 3\") 52.2 kg (115 lb)     Physical Exam  Eye:  Pupils are equal, round, and reactive.  Extraocular movements intact.    ENT:  No rhinorrhea.  Moist mucus membranes.  Normal tongue and tonsil.    Cardiac:  Regular rate and rhythm.  No murmurs, gallops, or rubs.    Pulmonary:  Clear to auscultation bilaterally.  No wheezes, rales, or rhonchi.    Abdomen:  Positive bowel sounds.  Abdomen is soft and non-distended, without focal tenderness.    Musculoskeletal:  Normal movement of all extremities without evidence for deficit.    Skin:  Warm and dry without rashes.    Neurologic:  Non-focal exam without asymmetric weakness or numbness.     Psychiatric:  Normal affect with appropriate interaction with examiner.    Diagnostics     Lab Results   Labs Ordered and Resulted from Time of ED Arrival to Time of ED Departure   CBC WITH PLATELETS AND DIFFERENTIAL - Abnormal       Result Value    WBC Count 11.5 (*)     RBC Count 3.83      Hemoglobin 12.8      Hematocrit 36.4      MCV 95      MCH 33.4 (*)     MCHC 35.2      RDW 13.1      Platelet Count 328      % Neutrophils 75      % Lymphocytes 16      % Monocytes 6      % Eosinophils 2      % Basophils 1      % Immature Granulocytes 0      NRBCs per 100 WBC 0      Absolute Neutrophils 8.6 (*)     Absolute Lymphocytes 1.9      Absolute Monocytes 0.7      Absolute Eosinophils 0.2      Absolute Basophils 0.1      Absolute Immature Granulocytes 0.0      Absolute NRBCs 0.0     BASIC METABOLIC PANEL - Normal    Sodium 142      Potassium 4.1      Chloride 106      Carbon Dioxide (CO2) 25      Anion Gap 11      Urea Nitrogen 16.1      Creatinine 0.75      GFR Estimate >90      Calcium 9.4      Glucose 86     TROPONIN T, HIGH SENSITIVITY - Normal    Troponin T, High Sensitivity 10     TROPONIN T, HIGH SENSITIVITY - Normal    Troponin T, High " Sensitivity 9         Imaging   No orders to display       EKG   ECG taken at 1924, ECG read at 2248  Sinus rhythm with occasional premature ventricular complexes  Septal infarct, age undetermined  T wave abnormality, consider inferolateral ischemia   Premature ventricular complexes are now Present  as compared to prior, dated 9/27/24.  Rate 78 bpm. LA interval 156 ms. QRS duration 70 ms. QT/QTc 360/410 ms. P-R-T axes 62 66 -5.    Independent Interpretation   None    ED Course      Medications Administered   Medications - No data to display    Procedures   Procedures     Discussion of Management   None    ED Course   ED Course as of 10/30/24 1403   Tue Oct 29, 2024   2248 I obtained history and performed a physical exam as noted above.        Additional Documentation  None    Medical Decision Making / Diagnosis     CMS Diagnoses: None    MIPS       None    [facility]   Valencia CAMARGO Katie Ye is a 54 year old female with a complicated history of dyspnea, working with gastroenterology, pulmonology, and cardiology for these issues, presenting to us because of increasing shortness of breath.  She notes that her breathing typically feels more short after having a meal.  She ate a fairly large meal around 5:00 and then went in for a echocardiogram which was scheduled for 6:00.  During the study, she felt increasing labored breathing and feeling as though she could pass out.  This then prompted her to come to the hospital.    The patient has labs that are otherwise unremarkable.  Her lungs are clear.  Her vital signs are normal.  She has resolution of symptoms which is typically the case several hours after a meal.  At this point, I do not feel she is dealing with a more life-threatening process considering her normal EKG and troponins.  She has had significant imaging and workup for this and I do not feel we need to repeat imaging at this time as her symptoms have resolved.  I also feel she is low risk for pulmonary embolism and  we will hold off on a D-dimer at this point considering her lack of tachypnea, hypoxia, and symptoms being similar to prior.  She will follow-up with her echocardiogram results through her cardiologist and primary team.  She will otherwise return to us for any worsening of condition or other emergent concerns.    Disposition   The patient was discharged.     Diagnosis     ICD-10-CM    1. Shortness of breath  R06.02            Discharge Medications   Discharge Medication List as of 10/29/2024 10:58 PM            Scribe Disclosure:  I, Elva Owens, am serving as a scribe at 10:43 PM on 10/29/2024 to document services personally performed by Trierweiler, Chad A, MD based on my observations and the provider's statements to me.        Trierweiler, Chad A, MD  10/30/24 1037

## 2024-10-30 NOTE — TELEPHONE ENCOUNTER
Reason for Call:  Appointment Request    Patient requesting this type of appt:  Hospital/ED Follow-Up     Requested provider: Padmini Renee    Reason patient unable to be scheduled: Not within requested timeframe    When does patient want to be seen/preferred time: 3-7 days    Comments: Discharged from [facility] 10/29/24. Shortness of breath. Was told to follow up with PCP in 1 week     Could we send this information to you in [facility] or would you prefer to receive a phone call?:   No preference   Okay to leave a detailed message?: Yes at Home number on file [phone] (home)    Call taken on 10/30/2024 at 1:54 PM by Lashell Herzog

## 2024-10-30 NOTE — ED TRIAGE NOTES
States progressive SOB over past several days, being followed up by Cardiology and Pulmonologist.    Triage Assessment (Adult)       Row Name 10/29/24 8517          Triage Assessment    Airway WDL WDL        Respiratory WDL    Respiratory WDL X  states feels hard to get breaths in

## 2024-11-01 ENCOUNTER — TELEPHONE (OUTPATIENT)
Dept: INTERNAL MEDICINE | Facility: CLINIC | Age: 54
End: 2024-11-01
Payer: COMMERCIAL

## 2024-11-01 NOTE — TELEPHONE ENCOUNTER
Patient was called and informed of MD reply. Patient expressed an understanding and had no further questions at this time

## 2024-11-01 NOTE — TELEPHONE ENCOUNTER
Labs, ECHO and recent ED visit reviewed.  Do not feel need for any follow up testing before scheduled visit next week. If patient feels symptoms are worse over the weekend, do recommend ED.

## 2024-11-01 NOTE — TELEPHONE ENCOUNTER
Valencia Oswald MRN: [id number]      Date: 11/5/2024 Status: Scheduled     Time: 1:30 PM Length: 30     Visit Type: ED/HOSP FOLLOW UP [2891] Copay: $0.00     Provider: Padmini Renee MD Department: [facility]     Encounter #: [id number] Notes: hosp f/up     Call to patient. States symptoms have not improved and she is short of breath more often. She had an episode today after bending over. Patient asking if there is any testing Dr. Renee wants before the 11/5/24 appointment. Advised there was no testing advised per ER. Advised will send message to provider and will call her back only if Dr. Renee would like her to have anything done before appointment. No call back needed if no testing needed.

## 2024-11-01 NOTE — TELEPHONE ENCOUNTER
General Call      Reason for Call: severe restlessness.  Symptoms worse today.    Requesting EKG; blood testing?  Wants to know if Víctor Chatterjee at [facility]  wants to do more testing on Tuesdays appt beforehand?    Please contact patient today.  Thank you.    What are your questions or concerns:  yes    Date of last appointment with provider: Tuesday    Could we send this information to you in [id number] or would you prefer to receive a phone call?:   Patient would prefer a phone call   Okay to leave a detailed message?: Yes at Cell number on file:    Telephone Information:   Mobile [phone]

## 2024-11-04 NOTE — TELEPHONE ENCOUNTER
Patient Name: Adam Munroe  MRN:[id number]  :1943    Referring Physician:   Alfonzo Fuller MD  Fax: [phone]      Chief Complaint   Patient presents with    Follow-up     Annual follow-up    Office Visit     Pt states might get impact on some needed test , EKG due to stress he had on last trip to [place].     Denies SOB,chest pain,dizziness.      SUBJECTIVE:     HISTORY OF PRESENT ILLNESS:  Adam Munroe is a 81 year old male presenting for a follow up. He was initially referred by Dr. Alfonzo Fuller. Agatston CAC score is >400.    Patient does not recall the reason for getting a calcium score done however patient likely had CT scan done for lung cancer and showed some calcification.  His calcium score was 560 which places him at 61 percentile for his age.    Previously, the patient underwent a stress test in 2018, which showed no signs of ischemia.    Today, he is doing well from a cardiac perspective.  He just flew in last night from [place].  He was able to walk without any limitation.  Blood pressure in the office is normal this morning.    He is asymptomatic and denies  chest pain, shortness of breath, or palpitations. Denies any orthopnea, PND, or peripheral edema. Denies any dizziness, lightheadedness, or syncope. He denies any bleeding complications such as unusual bruising, epistaxis, hematuria, hematochezia, melena. He denies any muscle aches or pains. The patient has been tolerating their medication regimen.     He has no family history of premature coronary disease.     He smokes a pack and a half a day for the past 60 years.  He still actively smoke.    EKG performed in the office today reviewed which showed sinus rhythm.    I  personally reviewed past medical history, medications, allergies, past surgical history, family history and social history.  It is as described below otherwise as described in HPI.       PAST MEDICAL HISTORY:  Past Medical History:   RE: call to schedule an appt  Received: Today       Olimpia Carson Katherine J, RN                   Patient says that her PCP has taken over prescribing her meds.            Diagnosis Date    BPH (benign prostatic hyperplasia)     Essential (primary) hypertension     Hematuria 2/22/2022    High cholesterol        MEDICATIONS:  Current Outpatient Medications   Medication Sig    fluticasone-salmeterol 250-50 MCG/ACT inhaler Inhale 1 puff into the lungs in the morning and 1 puff in the evening.    lisinopril-hydroCHLOROthiazide (ZESTORETIC) 20-12.5 MG per tablet Take 1 tablet by mouth daily.    atorvastatin (LIPITOR) 40 MG tablet Take 1 tablet by mouth daily.    albuterol 108 (90 Base) MCG/ACT inhaler Inhale 2 puffs into the lungs every 4 hours as needed for Shortness of Breath or Wheezing.    fluticasone (FLONASE) 50 MCG/ACT nasal spray Spray 2 sprays in each nostril daily.    acetaminophen (TYLENOL) 325 MG tablet Take 2 tablets by mouth every 4 hours as needed for Pain.    finasteride (PROSCAR) 5 MG tablet Take 5 mg by mouth daily.     No current facility-administered medications for this visit.       ALLERGIES:  ALLERGIES:  No Known Allergies    PAST SURGICAL HISTORY:  Past Surgical History:   Procedure Laterality Date    Open access colonoscopy      Other surgical history  06/10/2022    Aquablation    Transurethral elec-surg prostatectom         FAMILY HISTORY:  Family History   Problem Relation Age of Onset    Natural Causes Mother     Natural Causes Father        SOCIAL HISTORY:  Social History     Tobacco Use    Smoking status: Every Day     Current packs/day: 1.50     Types: Cigarettes    Smokeless tobacco: Never    Tobacco comments:     Not ready to quit smoking.   Vaping Use    Vaping status: never used    Passive vaping exposure: Yes   Substance Use Topics    Alcohol use: Yes     Comment: 1-2 per month    Drug use: Never       Review of Systems   Constitutional: Negative.   HENT: Negative.     Eyes: Negative.    Cardiovascular:         AS PER HPI   Respiratory:          AS PER HPI   Endocrine: Negative.    Hematologic/Lymphatic: Negative.    Skin: Negative.    Musculoskeletal:  Negative.    Gastrointestinal: Negative.    Neurological: Negative.    Psychiatric/Behavioral: Negative.     Allergic/Immunologic: Negative.        OBJECTIVE:     Vitals:    11/04/24 0914 11/04/24 0920   BP: 112/58 108/58   BP Location: LUE - Left upper extremity LUE - Left upper extremity   Patient Position: Sitting Standing   Cuff Size: Regular Regular   Pulse: 90    Temp: 96.2 °F (35.7 °C)    TempSrc: Temporal    SpO2: 99%    Weight: 78 kg (171 lb 15.3 oz)          Physical Exam  Constitutional:       General: He is not in acute distress.     Appearance: He is well-developed. He is not diaphoretic.   HENT:      Head: Atraumatic.      Nose: Nose normal.      Mouth/Throat:      Mouth: Mucous membranes are moist.      Neck: Neck supple.   Eyes:      General: No scleral icterus.     Conjunctiva/sclera: Conjunctivae normal.   Neck:      Vascular: No carotid bruit or JVD.   Cardiovascular:      Rate and Rhythm: Normal rate and regular rhythm.      Pulses: Normal pulses.      Heart sounds: S1 normal and S2 normal. No murmur heard.     No friction rub. No gallop.   Pulmonary:      Effort: Pulmonary effort is normal.      Breath sounds: Normal breath sounds.   Abdominal:      General: Bowel sounds are normal.      Palpations: Abdomen is soft.      Tenderness: There is no abdominal tenderness.   Musculoskeletal:         General: No tenderness.   Skin:     General: Skin is warm and moist.   Neurological:      Mental Status: He is alert and oriented to person, place, and time.      Motor: No weakness.      Deep Tendon Reflexes: Reflexes are normal and symmetric.   Psychiatric:         Attention and Perception: Attention normal.         Speech: Speech normal.         Behavior: Behavior normal.         Cognition and Memory: Cognition normal.         DIAGNOSTIC STUDIES:     Labs:  CBC:   Recent Labs   Lab 08/13/24  0813   WBC 9.4   RBC 5.31   HGB 16.6   HCT 50.2   MCV 94.5   MCHC 33.1   RDW-CV 12.7      Lymphocytes,  Percent 26     CMP:  Recent Labs   Lab 08/13/24  0813   Sodium 140   Potassium 4.6   Chloride 106   Carbon Dioxide 31   BUN 9   Creatinine 1.12   Glucose 120*   Albumin 3.9   GOT/AST 18   GPT 24   Alkaline Phosphatase 103   Bilirubin, Total 1.3*     COAGULATION STUDIES: No results for input(s): \"PT\", \"PTT\", \"INR\" in the last 8765 hours.  TSH:    Lab Results   Component Value Date    TSH 1.280 06/30/2023     HbA1c: Last Lab A1C:  Hemoglobin A1C (%)   Date Value   08/13/2024 5.9 (H)       Last Point of Care A1C:  No results found for: \"LKJUZOVI6D\"  LIPID PANEL:   Recent Labs   Lab 08/13/24  0813   Cholesterol 138   Triglycerides 94   HDL 56   CALCLDL 63     NT-PRONBP: No results for input(s): \"NTPROB\" in the last 8765 hours.    Imaging:  Results for orders placed or performed in visit on 11/04/24   Electrocardiogram 12-Lead   Result Value Ref Range    Ventricular Rate EKG/Min (BPM) 73     Atrial Rate (BPM) 73     VT-Interval (MSEC) 140     QRS-Interval (MSEC) 86     QT-Interval (MSEC) 388     QTc 427     P Axis (Degrees) 68     R Axis (Degrees) -3     T Axis (Degrees) 90     REPORT TEXT       Normal sinus rhythm  Nonspecific ST and T wave abnormality  unchanged  No change from last ECG  Confirmed by FRANCIA ORANTES MD ([id number]) on 11/4/2024 9:34:28 AM       CT HEART CALCIUM SCORING - 9/9/23:  IMPRESSION:  1. Calcium score is calculated at 550 which is at 61st percentile compared  to subjects with similar age, gender and ethnicity.  2. Mildly dilated ascending aorta.  ASSESSMENT AND PLAN:     1. Mixed hyperlipidemia    2. Primary hypertension    3. Agatston coronary artery calcium score greater than 400    4. Tobacco use disorder, continuous          Plan   -Adam Munroe is a 80 year old male presenting to establish cardiac care. He was referred by Dr. Alfonzo Fuller. Agatston CAC score is >400.   -Discussed with patient in detail during previous visit and education provided regarding coronary calcium score.  Patient  understand that it is a telltale sign for coronary atherosclerosis however does not evaluate for severity of the stenosis as well as does not provide information regarding soft plaque.  -Patient is at 61 percentile for his age.  He is currently asymptomatic.  At this time, recommend clinical monitoring.  No ischemic work-up indicated.  Recommend aggressive lifestyle modification and daily exercise for primary prevention of cardiovascular event.  -The patient’s BP controlled is hypertensive in the office today, 108/58 mmHg. The patient records his/her BP at home and it is usually 120s mmHg systolic. Continue lisinopril-hydrochlorothiazide 20-12.5 mg qd.  Recommend to monitor blood pressure diary and to call the office if blood pressure is elevated.  -I reviewed the lipid panel from 6/30/23: , HDL 55, TG 88, LDL 57. LDL is at goal. Continue atorvastatin 40 mg qd.   -I recommend 30 minutes of daily aerobic walking, gradually increasing the pace for optimal cardiovascular benefit.  -Smoking cessation education provided.  Risk of continuing smoking explained to the patient in detail.  He understands and will try.  -Recommend salt restriction of 2 g/day.  -Recommend daily weight check and call the office for weight gains of 2-3 pounds in 24-28 hours.  -Limit the amount of sugar sweetened drinks like soda pop and juice.      Orders Placed This Encounter    Electrocardiogram 12-Lead       Return in about 1 year (around 11/4/2025).       On 11/04/24, Malathi VICTORIA scribed the services personally performed by Ever Ponce MD.     The documentation recorded by the scribe accurately and completely reflects the service(s) I personally performed and the decisions made by me.    Ever Ponce MD  Cardiology  Portions of this  note may have been created using the Dragon voice recognition system.  Errors in content may be related to improper recognition of the system.  Effort to review and correct the note has been made  but irregularities may still be present.  Medication reconciliation was done but medications not prescribed by me may be inaccurate

## 2024-11-05 ENCOUNTER — OFFICE VISIT (OUTPATIENT)
Dept: INTERNAL MEDICINE | Facility: CLINIC | Age: 54
End: 2024-11-05
Payer: COMMERCIAL

## 2024-11-05 VITALS
HEIGHT: 63 IN | BODY MASS INDEX: 21.48 KG/M2 | TEMPERATURE: 97.1 F | WEIGHT: 121.2 LBS | RESPIRATION RATE: 18 BRPM | SYSTOLIC BLOOD PRESSURE: 137 MMHG | OXYGEN SATURATION: 99 % | HEART RATE: 78 BPM | DIASTOLIC BLOOD PRESSURE: 78 MMHG

## 2024-11-05 DIAGNOSIS — I10 BENIGN ESSENTIAL HYPERTENSION: ICD-10-CM

## 2024-11-05 DIAGNOSIS — R06.09 EXERTIONAL DYSPNEA: Primary | ICD-10-CM

## 2024-11-05 DIAGNOSIS — F41.9 ANXIETY: ICD-10-CM

## 2024-11-05 DIAGNOSIS — D72.18 CHURG-STRAUSS SYNDROME (H): ICD-10-CM

## 2024-11-05 DIAGNOSIS — M30.1 CHURG-STRAUSS SYNDROME (H): ICD-10-CM

## 2024-11-05 PROCEDURE — 99214 OFFICE O/P EST MOD 30 MIN: CPT | Performed by: INTERNAL MEDICINE

## 2024-11-05 NOTE — PROGRESS NOTES
Assessment & Plan     Exertional dyspnea  Patient has been having persistent symptoms of exertional dyspnea with the most recent emergency department visit around 1 week ago.  Has completed pulmonary function test as recommended by pulmonology that showed mild obstructive changes.  Recently completed echocardiogram as well.  Would like to proceed with cardiology referral for further discussion on her persisting symptoms.  - Adult Cardiology Eval  Referral; Future    Churg-Antonina syndrome (H)  Follows up with rheumatology, eosinophil count has been within normal range.      Benign essential hypertension  Blood pressure reviewed, within target.  Continue current therapy.    Anxiety  Overall, symptoms have been stable.  Patient does not feel that the underlying anxiety is contributing to the ongoing dyspnea symptoms.  Continue Effexor at the current dose.        MED REC REQUIRED  Post Medication Reconciliation Status: Completed        Subjective   Valencia is a 54 year old, presenting for the following health issues:  ER F/U        11/5/2024     1:12 PM   Additional Questions   Roomed by Priyank   Accompanied by Self     HPI       ED/UC Followup:    Facility:  [facility]  Date of visit: 10/29/2024  Reason for visit: Shortness of Breath   Current Status: Stable    Patient has been having persistent symptoms of exertional dyspnea with the most recent emergency department visit around 1 week ago.  Has completed pulmonary function test as recommended by pulmonology that showed mild obstructive changes.  Recently completed echocardiogram as well.  Would like to proceed with cardiology referral for further discussion on her persisting symptoms.      Review of Systems  Constitutional, HEENT, cardiovascular, pulmonary, gi and gu systems are negative, except as otherwise noted.      Objective    /78 (BP Location: Right arm, Patient Position: Sitting, Cuff Size: Adult Regular)   Pulse  "78   Temp 97.1  F (36.2  C) (Tympanic)   Resp 18   Ht 1.6 m (5' 3\")   Wt 55 kg (121 lb 3.2 oz)   LMP 02/01/2009 (LMP Unknown)   SpO2 99%   BMI 21.47 kg/m    Body mass index is 21.47 kg/m .  Physical Exam   GENERAL: alert and no distress  RESP: lungs clear to auscultation - no rales, rhonchi or wheezes  CV: regular rate and rhythm, normal S1 S2  ABDOMEN: soft, nontender, no hepatosplenomegaly, no masses   MS: no gross musculoskeletal defects noted, no edema  NEURO: Normal strength and tone, mentation intact and speech normal  PSYCH: mentation appears normal, affect normal.            Signed Electronically by: Padmini Renee MD    "

## 2024-11-12 ENCOUNTER — OFFICE VISIT (OUTPATIENT)
Dept: GASTROENTEROLOGY | Facility: CLINIC | Age: 54
End: 2024-11-12
Payer: COMMERCIAL

## 2024-11-12 ENCOUNTER — PRE VISIT (OUTPATIENT)
Dept: GASTROENTEROLOGY | Facility: CLINIC | Age: 54
End: 2024-11-12

## 2024-11-12 VITALS
HEART RATE: 84 BPM | HEIGHT: 63 IN | DIASTOLIC BLOOD PRESSURE: 96 MMHG | OXYGEN SATURATION: 98 % | WEIGHT: 123.3 LBS | BODY MASS INDEX: 21.85 KG/M2 | SYSTOLIC BLOOD PRESSURE: 151 MMHG

## 2024-11-12 DIAGNOSIS — R14.0 BLOATING: Primary | ICD-10-CM

## 2024-11-12 DIAGNOSIS — K14.6 BURNING TONGUE: ICD-10-CM

## 2024-11-12 DIAGNOSIS — K59.04 CHRONIC IDIOPATHIC CONSTIPATION: ICD-10-CM

## 2024-11-12 DIAGNOSIS — K29.80 DUODENITIS: ICD-10-CM

## 2024-11-12 DIAGNOSIS — R10.84 ABDOMINAL PAIN, GENERALIZED: ICD-10-CM

## 2024-11-12 RX ORDER — LUBIPROSTONE 24 UG/1
24 CAPSULE ORAL 2 TIMES DAILY WITH MEALS
Qty: 180 CAPSULE | Refills: 3 | Status: SHIPPED | OUTPATIENT
Start: 2024-11-12

## 2024-11-12 ASSESSMENT — PAIN SCALES - GENERAL: PAINLEVEL_OUTOF10: NO PAIN (0)

## 2024-11-12 NOTE — PROGRESS NOTES
GI CLINIC VISIT    CC/REFERRING MD:  Mo Bryant  REASON FOR CONSULTATION:   Valencia Ye is a 54 year old female who I was asked to see in consultation at the request of Dr. Mo Bryant for   Chief Complaint   Patient presents with    New Patient       ASSESSMENT/PLAN:  1. Duodenitis  2. Chronic idiopathic constipation  3. Bloating (Primary)  4. Abdominal pain, generalized  5. Burning tongue    54 year old female with ILD, anxiety, depression, overactive bladder, mannose-binding lectin deficiency, eosinophilic granulomatosis with polyangiitis, IBS, asthma, GERD with hx of Schatzki ring dilated in 2023 who has been having increased abdominal bloating, pains, worsening reflux and burning mouth.     She presented to the ER with abdominal pain and shortness of breath in September. She had a CT scan that showed duodenitis. She was having increased diarrhea around that time that has continued. She is having alternating diarrhea and constipation, usually ranging from BSC type 4 (small and incomplete) to BSC type 7 stools. She has had to splint her pelvic floor to have a complete BM.     She had a follow up EGD 10/17/24 with 1 cm HH, normal esophagus, stomach, duodenum. Biopsies of the duodenum were normal. She is scheduled for her screening colonoscopy in January.     She has had increased abdominal bloating and has has pains throughout the abdomen, mostly localized to the LUQ, some in the RUQ and down the sides bilaterally. She has stuck to pureed foods, not due to any dysphagia but because she feels her stomach digests it better.     She has had a burning mouth, with normal appearing tongue and mucosa. Also having chelitis symptoms.     We discussed treatment of her constipation to help with gas, bloating, abdominal pains, fullness, increased reflux. Recommend restarting Amitiza at 24 mcg BID. She will continue her magnesium supplementation as well, and switch out mag glycinate for mag oxide  400 mg 1-2 times daily. Will re-evaluate in 2-3 months and will consider NM GES, esophagram if needed at that time. We discussed pelvic floor PT but she declines at this time. She uses psyllium husk which I encourage her to continue.    To follow up on duodenitis will order stool studies as below, and also an MRE to evaluate full small bowel for inflammation.     Will check the following labs: CBC, RBC folate, ferritin, iron studies, B12, B6, fecal elastase, fecal fat, enteric panel, ova and parasite.     She would like to proceed with lab workup for the burning mouth and chelitis rather than start any tx with magic mouthwash at this time.    I don't suspect her small hiatal hernia would contribute to her shortness of breath, and she will continue following up with her pulmonology and cardiology teams.     Summary of plan:   - Try using a toilet stool when sitting on the toilet to bring your knees above the level of your hips   - Amitiza 24 mcg twice daily  - Magnesium oxide 400 mg once or twice a day  - Keep incorporating the psyllium husk   - MRE (MR Enterography ordered): [phone]   - Labs ordered to check some more B vitamins, folate, iron, magnesium and CBC- [phone]    - Stool studies ordered to check pancreatic enzymes, infection -they will give you these at the lab  - Follow up in 2-3 months       - Vitamin B12; Future  - Iron and iron binding capacity; Future  - Ferritin; Future  - RBC Folate; Future  - CBC with platelets differential; Future  - Vitamin B6; Future  - Elastase Fecal; Future  - Fat Stool Qual Random Collection; Future  - Enteric Bacteria and Virus Panel by [facility] Stool; Future  - Ova and Parasite Exam Routine; Future  - MR Enterography wo and w Contrast; Future  - Adult GI Clinic Follow-Up Order (Blank); Future    Specialty Problems          Gastroenterology Diagnoses    Irritable bowel syndrome without diarrhea        Bladder pain        Diverticulosis           RTC 2-3 months    Thank  you for this consultation. It was a pleasure to participate in the care of this patient; please contact us with any further questions.  A total of 41 face-to-face minutes was spent with this patient, >50% of which was counseling regarding the above delineated issues. An additional 23 minutes was spent on the date of the encounter doing chart review, documentation, and further activities as noted above.    Stefanie Ibarra PA-C  Division of Gastroenterology, Hepatology and Nutrition  [facility]    ---------------------------------------------------------------------------------------------------  HPI:  Valencia Ye is a 54 year old female with past medical history significant for ILD, anxiety, depression, overactive bladder, mannose-binding lectin deficiency, eosinophilic granulomatosis with polyangiitis, IBS, asthma, Schatzki ring dilated in 2023 who presents for GERD, duodenitis, obstipation seen on CT. Duodenitis was seen on a CT scan in the ER on 9/27/24.     Since had EGD to follow up on this through [facility] 10/17/24 with 1 cm HH, normal esophagus, stomach, duodenum. Biopsies of the duodenum were normal.     Valencia reports she has been experiencing new breathlessness, which was the main reason she went to ER in September. Seeing cardiology soon and has seen her pulmonologist as well.     She has had worsening reflux, sore tongue, and chelitis intermittently. Saw her primary provider about her tongue, dx with thrush but pt didn't feel it was thrush. Tx did not significantly help. Steroid cream does seem to help at the angle of her mouth but not using regularly. Feeling acidic sensation in her mouth. Toothpaste is burning her mouth.     Also having intermittent abdominal pain in the LUQ, which she describes as nagging, throbbing. Sometimes RUQ pain, sharp pains radiating down the sides bilaterally, pain b/w shoulder blades and across diaphragm. Some BSC type 1 at times, but usually type 4-7, most of  the time feeling incomplete, thin, sometimes oily. Has had abdominal bloating. Doesn't usually feel complete after bowel movements. Having to do some perineal splinting with bowel movements since her had kids. Does a lot of yoga, no pelvic floor therapy in the past. Was taking some extra magnesium (mag complex with carbonate, citrate and oxide plus separate glycinate, and mag citrate few scoops per day), bile salts, apple cider vinegar which helped reflux and constipation. This summer this all seemed to stop working. Was on Amitiza for a while, recently restarted her old rx at 24 mcg 1-2 times per day which helps.    Eating mostly pureed foods. Eats some nuts. Soups, yogurt. Denies trouble swallowing. Big difference in abd pain when she eats mostly pureed, less abdominal pain. Some upper abd fullness. Does have some early satiety.     Has had some trouble emptying her bladder too.     Family history - maternal grandmother and her siblings: many with GI cancers. One with gallbladder cancer. One with colon cancer, almost all 10 with some type of gut cancer  NSAID use- steroid use, no advil or ibuprofen other than few times per year   Hx abd surgeries- Hx lap appy 2022, c/s, abdominal open hysterectomy with tubal, noted adhesions during this surgery  Hx colonoscopy - 12/2019, 12/2014  EGD- 10/2024, 2/2023       ROS:    A 10 point review of systems was performed and is negative except as noted in the HPI.     PERTINENT PAST MEDICAL HISTORY:  Past Medical History:   Diagnosis Date    Anxiety     Aortitis (H)     Asthma     associated with Churg Antonina syndrome    Bronchiectasis (H)     Cerebral infarction (H) 08/1990    Very small, caused small permanent optical migraine    Chronic sinusitis     Churg-Antonina syndrome (H)     dx 1990    Conductive hearing loss     Depressive disorder     Eustachian tube dysfunction     Goiter     Hearing loss, conductive     Heart rate problem     Hypertension     Hypocalcemia      Immunosuppression (H)     Infection due to 2019 novel coronavirus 2022, , 2022    Irritable bowel syndrome     Major depressive disorder     Mannose-binding lectin deficiency (H)     Nonspecific abnormal results of thyroid function study     Osteoporosis     Pericarditis      and     Pneumonia     4/23/10    Recurrent otitis media     Recurrent UTI     Rheumatoid vasculitis (H)     Sinusitis, chronic     Steroid long-term use     Tinnitus     Varicose veins of leg with swelling        PREVIOUS SURGERIES:  Past Surgical History:   Procedure Laterality Date    C/SECTION, CLASSICAL       SECTION      COLONOSCOPY Left 2014    Procedure: COMBINED COLONOSCOPY, SINGLE OR MULTIPLE BIOPSY/POLYPECTOMY BY BIOPSY;  Surgeon: Js Pinedo MD;  Location:  GI    COLONOSCOPY N/A 2019    Procedure: COLONOSCOPY;  Surgeon: Bryce Pimentel MD;  Location:  GI    ENT SURGERY      ESOPHAGOSCOPY, GASTROSCOPY, DUODENOSCOPY (EGD), COMBINED N/A 2023    Procedure: ESOPHAGOGASTRODUODENOSCOPY, WITH BIOPSY;  Surgeon: Mo Rosa MD;  Location:  GI    ESOPHAGOSCOPY, GASTROSCOPY, DUODENOSCOPY (EGD), COMBINED N/A 10/17/2024    Procedure: ESOPHAGOGASTRODUODENOSCOPY with DUODENAL BIOPSY;  Surgeon: Dillon Guillen MD;  Location:  OR    ESOPHAGOSCOPY, GASTROSCOPY, DUODENOSCOPY (EGD), DILATATION, COMBINED N/A 2023    Procedure: ESOPHAGOGASTRODUODENOSCOPY, WITH DILATION;  Surgeon: Mo Rosa MD;  Location: [facility]    GENITOURINARY SURGERY      HEAD & NECK SURGERY      HYSTERECTOMY TOTAL ABDOMINAL  2009    without oopherectomy for TEO 3 on LEEP w/ pos margins - Path all benign    HYSTERECTOMY, PAP NO LONGER INDICATED      cervix removed     LAPAROSCOPIC APPENDECTOMY N/A 2022    Procedure: APPENDECTOMY, LAPAROSCOPIC;  Surgeon: Fany Hannah MD;  Location:  OR    PE TUBES      PICC INSERTION  10/10/2013     5fr DL PASV PICC, 43cm (1cm external) in the L basilic vein w/ tip in the low SVC.    PICC INSERTION Left 05/26/2017    5fr DL BioFlo PICC, 42cm (3cm external) in the L lateral brachial vein w/ tip in the SVC RA junction.    SINUS SURGERY      TUBAL LIGATION      TYMPANOPLASTY      ZZHC COLP CERVIX/UPPER VAGINA W LOOP ELEC BX CERVIX         PREVIOUS ENDOSCOPY:    EGD 10/2024  Impression:               - Normal esophagus.                             - 1 cm hiatal hernia.                             - Normal stomach.                             - Normal duodenal bulb, first portion of the                             duodenum and second portion of the duodenum.                             Biopsied.   Recommendation:           - Discharge patient to home (ambulatory).                             - Await pathology results.     A. Duodenum, biopsy:  -Small intestinal mucosa with no significant histopathologic abnormalities.  -Normal villous architecture identified and no prominence in intraepithelial lymphocytes seen.  -Negative for luminal organisms.  -Negative for dysplasia or malignancy.    ALLERGIES:     Allergies   Allergen Reactions    Colistimethate Anaphylaxis    Colymycin M [Colistimethate Sodium] Anaphylaxis    Tamiflu [Oseltamivir]      Gums Blister up    Hydroxyzine     Azithromycin Palpitations    Clindamycin Rash    Tizanidine Other (See Comments)     Severe panic attack       FAMILY HISTORY:  Family History   Problem Relation Age of Onset    Allergies Mother         Seasonal    Alcohol/Drug Mother     Substance Abuse Mother     Depression Mother     C.A.D. Father     Diabetes Father         Type 2    Depression Father     Heart Disease Father     Breast Cancer Maternal Grandmother     C.A.D. Maternal Grandmother     Arthritis Maternal Grandmother     Musculoskeletal Disorder Maternal Grandmother         MS    Heart Disease Maternal Grandmother     Hypertension Maternal Grandmother     Alcohol/Drug Maternal  "Grandfather     Substance Abuse Maternal Grandfather     Depression Maternal Grandfather     Unknown/Adopted Paternal Grandmother     Unknown/Adopted Paternal Grandfather     Asthma Daughter     Allergies Daughter         Seasonal    Asthma Son     Cancer Maternal Aunt         breast age 53    Breast Cancer Maternal Aunt         in her 50s    Glaucoma No family hx of     Macular Degeneration No family hx of     Colon Cancer No family hx of        Past/family/social history reviewed and no changes    PHYSICAL EXAMINATION:  Vitals BP (!) 151/96   Pulse 84   Ht 1.594 m (5' 2.75\")   Wt 55.9 kg (123 lb 4.8 oz)   LMP 02/01/2009 (LMP Unknown)   SpO2 98%   BMI 22.02 kg/m     Wt   Wt Readings from Last 2 Encounters:   11/12/24 55.9 kg (123 lb 4.8 oz)   11/05/24 55 kg (121 lb 3.2 oz)      Gen: Pt sitting up on exam table in NAD, interactive and cooperative on exam  Eyes: sclerae anicteric, no injection  ENT:  OP clear, MMM, no plaques or abnormalities seen  Cardiac: RRR, nl S1, S2, no murmurs, rubs or gallops  Resp: Clear on anterior exam  GI: Normoactive BS, abd soft, flat, nontender throughout all quadrants, no organomegaly or masses palpated  Skin: Warm, dry, no jaundice, nails appear healthy  Lymph: no submandibular, no cervical, and no supraclavicular LAD  Neuro: alert, oriented, answers questions appropriately      "

## 2024-11-12 NOTE — PATIENT INSTRUCTIONS
It was a pleasure meeting with you today and discussing your healthcare plan. Below is a summary of what we covered:    - Try using a toilet stool when sitting on the toilet to bring your knees above the level of your hips   - Amitiza 24 mcg twice daily  - Magnesium oxide 400 mg once or twice a day  - Keep incorporating the psyllium husk   - MRE (MR Enterography ordered): [phone]   - Labs ordered to check some more B vitamins, folate, iron, magnesium and CBC- [phone]    - Stool studies ordered to check pancreatic enzymes, infection -they will give you these at the lab  - Follow up in 2-3 months     Please see below for any additional questions and scheduling guidelines.    Sign up for [employer]: [employer] patient portal serves as a secure platform for accessing your medical records from the [facility]. Additionally, [employer] facilitates easy, timely, and secure messaging with your care team. If you have not signed up, you may do so by using the provided code or calling [phone].    Coordinating your care after your visit:  There are multiple options for scheduling your follow-up care based on your provider's recommendation.    How do I schedule a follow-up clinic appointment:   After your appointment, you may receive scheduling assistance with the Clinic Coordinators by having a seat in the waiting room and a Clinic Coordinator will call you up to schedule.  Virtual visits or after you leave the clinic:  Your provider has placed a follow-up order in the [employer] portal for scheduling your return appointment. A member of the scheduling team will contact you to schedule.  [employer] Scheduling: Timely scheduling through [employer] is advised to ensure appointment availability.   Call to schedule: You may schedule your follow-up appointment(s) by calling [phone], option 1.    How do I schedule my endoscopy or colonoscopy procedure:  If a procedure, such as a colonoscopy or upper endoscopy was  ordered by your provider, the scheduling team will contact you to schedule this procedure. Or you may choose to call to schedule at   [phone], option 2.  Please allow 20-30 minutes when scheduling a procedure.    How do I get my blood work done? To get your blood work done, you need to schedule a lab appointment at an [facility]. There are multiple ways to schedule:   At the clinic: The Clinic Coordinator you meet after your visit can help you schedule a lab appointment.   [employer] scheduling: [employer] offers online lab scheduling at all [facility] laboratory locations.   Call to schedule: You can call [phone] to schedule your lab appointment.    How do I schedule my imaging study: To schedule imaging studies, such as CT scans, ultrasounds, MRIs, or X-rays, contact Imaging Services at [phone].    How do I schedule a referral to another doctor: If your provider recommended a referral to another specialist(s), the referral order was placed by your provider. You will receive a phone call to schedule this referral, or you may choose to call the number attached to the referral to self-schedule.    For Post-Visit Question(s):  For any inquiries following today's visit:  Please utilize [employer] messaging and allow 48 hours for reply or contact the Call Center during normal business hours at [phone], option 3.  For Emergent After-hours questions, contact the On-Call GI Fellow through the [facility]  at [phone].  In addition, you may contact your Nurse directly using the provided contact information.    Test Results:  Test results will be accessible via [employer] in compliance with the 21st Century Cures Act. This means that your results will be available to you at the same time as your provider. Often you may see your results before your provider does. Results are reviewed by staff within two weeks with communication follow-up. Results may be released in the  patient portal prior to your care team review.    Prescription Refill(s):  Medication prescribed by your provider will be addressed during your visit. For future refills, please coordinate with your pharmacy. If you have not had a recent clinic visit or routine labs, for your safety, your provider may not be able to refill your prescription.

## 2024-11-12 NOTE — LETTER
11/12/2024      Valencia Ye  [address] [facility] [id number]      Dear Colleague,    Thank you for referring your patient, Valencia Ye, to the [facility] [place]. Please see a copy of my visit note below.    GI CLINIC VISIT    CC/REFERRING MD:  Mo Bryant  REASON FOR CONSULTATION:   Valencia Ye is a 54 year old female who I was asked to see in consultation at the request of Dr. Mo Bryant for   Chief Complaint   Patient presents with     New Patient       ASSESSMENT/PLAN:  1. Duodenitis  2. Chronic idiopathic constipation  3. Bloating (Primary)  4. Abdominal pain, generalized  5. Burning tongue    54 year old female with ILD, anxiety, depression, overactive bladder, mannose-binding lectin deficiency, eosinophilic granulomatosis with polyangiitis, IBS, asthma, GERD with hx of Schatzki ring dilated in 2023 who has been having increased abdominal bloating, pains, worsening reflux and burning mouth.     She presented to the ER with abdominal pain and shortness of breath in September. She had a CT scan that showed duodenitis. She was having increased diarrhea around that time that has continued. She is having alternating diarrhea and constipation, usually ranging from BSC type 4 (small and incomplete) to BSC type 7 stools. She has had to splint her pelvic floor to have a complete BM.     She had a follow up EGD 10/17/24 with 1 cm HH, normal esophagus, stomach, duodenum. Biopsies of the duodenum were normal. She is scheduled for her screening colonoscopy in January.     She has had increased abdominal bloating and has has pains throughout the abdomen, mostly localized to the LUQ, some in the RUQ and down the sides bilaterally. She has stuck to pureed foods, not due to any dysphagia but because she feels her stomach digests it better.     She has had a burning mouth, with normal appearing tongue and mucosa. Also having chelitis  symptoms.     We discussed treatment of her constipation to help with gas, bloating, abdominal pains, fullness, increased reflux. Recommend restarting Amitiza at 24 mcg BID. She will continue her magnesium supplementation as well, and switch out mag glycinate for mag oxide 400 mg 1-2 times daily. Will re-evaluate in 2-3 months and will consider NM GES, esophagram if needed at that time. We discussed pelvic floor PT but she declines at this time. She uses psyllium husk which I encourage her to continue.    To follow up on duodenitis will order stool studies as below, and also an MRE to evaluate full small bowel for inflammation.     Will check the following labs: CBC, RBC folate, ferritin, iron studies, B12, B6, fecal elastase, fecal fat, enteric panel, ova and parasite.     She would like to proceed with lab workup for the burning mouth and chelitis rather than start any tx with magic mouthwash at this time.    I don't suspect her small hiatal hernia would contribute to her shortness of breath, and she will continue following up with her pulmonology and cardiology teams.     Summary of plan:   - Try using a toilet stool when sitting on the toilet to bring your knees above the level of your hips   - Amitiza 24 mcg twice daily  - Magnesium oxide 400 mg once or twice a day  - Keep incorporating the psyllium husk   - MRE (MR Enterography ordered): [phone]   - Labs ordered to check some more B vitamins, folate, iron, magnesium and CBC- [phone]    - Stool studies ordered to check pancreatic enzymes, infection -they will give you these at the lab  - Follow up in 2-3 months       - Vitamin B12; Future  - Iron and iron binding capacity; Future  - Ferritin; Future  - RBC Folate; Future  - CBC with platelets differential; Future  - Vitamin B6; Future  - Elastase Fecal; Future  - Fat Stool Qual Random Collection; Future  - Enteric Bacteria and Virus Panel by [facility] Stool; Future  - Ova and Parasite Exam Routine;  Future  - MR Enterography wo and w Contrast; Future  - Adult GI Clinic Follow-Up Order (Blank); Future    Specialty Problems          Gastroenterology Diagnoses    Irritable bowel syndrome without diarrhea        Bladder pain        Diverticulosis           RTC 2-3 months    Thank you for this consultation. It was a pleasure to participate in the care of this patient; please contact us with any further questions.  A total of 41 face-to-face minutes was spent with this patient, >50% of which was counseling regarding the above delineated issues. An additional 23 minutes was spent on the date of the encounter doing chart review, documentation, and further activities as noted above.    Stefanie Ibarra PA-C  Division of Gastroenterology, Hepatology and Nutrition  [facility]    ---------------------------------------------------------------------------------------------------  HPI:  Valencia Ye is a 54 year old female with past medical history significant for ILD, anxiety, depression, overactive bladder, mannose-binding lectin deficiency, eosinophilic granulomatosis with polyangiitis, IBS, asthma, Schatzki ring dilated in 2023 who presents for GERD, duodenitis, obstipation seen on CT. Duodenitis was seen on a CT scan in the ER on 9/27/24.     Since had EGD to follow up on this through [facility] 10/17/24 with 1 cm HH, normal esophagus, stomach, duodenum. Biopsies of the duodenum were normal.     Valencia reports she has been experiencing new breathlessness, which was the main reason she went to ER in September. Seeing cardiology soon and has seen her pulmonologist as well.     She has had worsening reflux, sore tongue, and chelitis intermittently. Saw her primary provider about her tongue, dx with thrush but pt didn't feel it was thrush. Tx did not significantly help. Steroid cream does seem to help at the angle of her mouth but not using regularly. Feeling acidic sensation in her mouth. Toothpaste is burning  her mouth.     Also having intermittent abdominal pain in the LUQ, which she describes as nagging, throbbing. Sometimes RUQ pain, sharp pains radiating down the sides bilaterally, pain b/w shoulder blades and across diaphragm. Some BSC type 1 at times, but usually type 4-7, most of the time feeling incomplete, thin, sometimes oily. Has had abdominal bloating. Doesn't usually feel complete after bowel movements. Having to do some perineal splinting with bowel movements since her had kids. Does a lot of yoga, no pelvic floor therapy in the past. Was taking some extra magnesium (mag complex with carbonate, citrate and oxide plus separate glycinate, and mag citrate few scoops per day), bile salts, apple cider vinegar which helped reflux and constipation. This summer this all seemed to stop working. Was on Amitiza for a while, recently restarted her old rx at 24 mcg 1-2 times per day which helps.    Eating mostly pureed foods. Eats some nuts. Soups, yogurt. Denies trouble swallowing. Big difference in abd pain when she eats mostly pureed, less abdominal pain. Some upper abd fullness. Does have some early satiety.     Has had some trouble emptying her bladder too.     Family history - maternal grandmother and her siblings: many with GI cancers. One with gallbladder cancer. One with colon cancer, almost all 10 with some type of gut cancer  NSAID use- steroid use, no advil or ibuprofen other than few times per year   Hx abd surgeries- Hx lap appy 2022, c/s, abdominal open hysterectomy with tubal, noted adhesions during this surgery  Hx colonoscopy - 12/2019, 12/2014  EGD- 10/2024, 2/2023       ROS:    A 10 point review of systems was performed and is negative except as noted in the HPI.     PERTINENT PAST MEDICAL HISTORY:  Past Medical History:   Diagnosis Date     Anxiety      Aortitis (H)      Asthma     associated with Churg Antonina syndrome     Bronchiectasis (H)      Cerebral infarction (H) 08/1990    Very small,  caused small permanent optical migraine     Chronic sinusitis      Churg-Antonina syndrome (H)     dx      Conductive hearing loss      Depressive disorder      Eustachian tube dysfunction      Goiter      Hearing loss, conductive      Heart rate problem      Hypertension      Hypocalcemia      Immunosuppression (H)      Infection due to 2019 novel coronavirus 2022, , 2022     Irritable bowel syndrome      Major depressive disorder      Mannose-binding lectin deficiency (H)      Nonspecific abnormal results of thyroid function study      Osteoporosis      Pericarditis      and      Pneumonia     4/23/10     Recurrent otitis media      Recurrent UTI      Rheumatoid vasculitis (H)      Sinusitis, chronic      Steroid long-term use      Tinnitus      Varicose veins of leg with swelling        PREVIOUS SURGERIES:  Past Surgical History:   Procedure Laterality Date     C/SECTION, CLASSICAL        SECTION       COLONOSCOPY Left 2014    Procedure: COMBINED COLONOSCOPY, SINGLE OR MULTIPLE BIOPSY/POLYPECTOMY BY BIOPSY;  Surgeon: Js Pinedo MD;  Location:  GI     COLONOSCOPY N/A 2019    Procedure: COLONOSCOPY;  Surgeon: Bryce Pimentel MD;  Location:  GI     ENT SURGERY       ESOPHAGOSCOPY, GASTROSCOPY, DUODENOSCOPY (EGD), COMBINED N/A 2023    Procedure: ESOPHAGOGASTRODUODENOSCOPY, WITH BIOPSY;  Surgeon: Mo Rosa MD;  Location:  GI     ESOPHAGOSCOPY, GASTROSCOPY, DUODENOSCOPY (EGD), COMBINED N/A 10/17/2024    Procedure: ESOPHAGOGASTRODUODENOSCOPY with DUODENAL BIOPSY;  Surgeon: Dillon Guillen MD;  Location: [facility]     ESOPHAGOSCOPY, GASTROSCOPY, DUODENOSCOPY (EGD), DILATATION, COMBINED N/A 2023    Procedure: ESOPHAGOGASTRODUODENOSCOPY, WITH DILATION;  Surgeon: Mo Rosa MD;  Location:  GI     GENITOURINARY SURGERY       HEAD & NECK SURGERY       HYSTERECTOMY TOTAL ABDOMINAL   02/27/2009    without oopherectomy for TEO 3 on LEEP w/ pos margins - Path all benign     HYSTERECTOMY, PAP NO LONGER INDICATED      cervix removed      LAPAROSCOPIC APPENDECTOMY N/A 06/22/2022    Procedure: APPENDECTOMY, LAPAROSCOPIC;  Surgeon: Fany Hannah MD;  Location: [facility] OR     PE TUBES       PICC INSERTION  10/10/2013    5fr DL PASV PICC, 43cm (1cm external) in the L basilic vein w/ tip in the low SVC.     PICC INSERTION Left 05/26/2017    5fr DL BioFlo PICC, 42cm (3cm external) in the L lateral brachial vein w/ tip in the SVC RA junction.     SINUS SURGERY       TUBAL LIGATION       TYMPANOPLASTY       ZZHC COLP CERVIX/UPPER VAGINA W LOOP ELEC BX CERVIX         PREVIOUS ENDOSCOPY:    EGD 10/2024  Impression:               - Normal esophagus.                             - 1 cm hiatal hernia.                             - Normal stomach.                             - Normal duodenal bulb, first portion of the                             duodenum and second portion of the duodenum.                             Biopsied.   Recommendation:           - Discharge patient to home (ambulatory).                             - Await pathology results.     A. Duodenum, biopsy:  -Small intestinal mucosa with no significant histopathologic abnormalities.  -Normal villous architecture identified and no prominence in intraepithelial lymphocytes seen.  -Negative for luminal organisms.  -Negative for dysplasia or malignancy.    ALLERGIES:     Allergies   Allergen Reactions     Colistimethate Anaphylaxis     Colymycin M [Colistimethate Sodium] Anaphylaxis     Tamiflu [Oseltamivir]      Gums Blister up     Hydroxyzine      Azithromycin Palpitations     Clindamycin Rash     Tizanidine Other (See Comments)     Severe panic attack       FAMILY HISTORY:  Family History   Problem Relation Age of Onset     Allergies Mother         Seasonal     Alcohol/Drug Mother      Substance Abuse Mother      Depression Mother      C.A.D. Father  "     Diabetes Father         Type 2     Depression Father      Heart Disease Father      Breast Cancer Maternal Grandmother      C.A.D. Maternal Grandmother      Arthritis Maternal Grandmother      Musculoskeletal Disorder Maternal Grandmother         MS     Heart Disease Maternal Grandmother      Hypertension Maternal Grandmother      Alcohol/Drug Maternal Grandfather      Substance Abuse Maternal Grandfather      Depression Maternal Grandfather      Unknown/Adopted Paternal Grandmother      Unknown/Adopted Paternal Grandfather      Asthma Daughter      Allergies Daughter         Seasonal     Asthma Son      Cancer Maternal Aunt         breast age 53     Breast Cancer Maternal Aunt         in her 50s     Glaucoma No family hx of      Macular Degeneration No family hx of      Colon Cancer No family hx of        Past/family/social history reviewed and no changes    PHYSICAL EXAMINATION:  Vitals BP (!) 151/96   Pulse 84   Ht 1.594 m (5' 2.75\")   Wt 55.9 kg (123 lb 4.8 oz)   LMP 02/01/2009 (LMP Unknown)   SpO2 98%   BMI 22.02 kg/m     Wt   Wt Readings from Last 2 Encounters:   11/12/24 55.9 kg (123 lb 4.8 oz)   11/05/24 55 kg (121 lb 3.2 oz)      Gen: Pt sitting up on exam table in NAD, interactive and cooperative on exam  Eyes: sclerae anicteric, no injection  ENT:  OP clear, MMM, no plaques or abnormalities seen  Cardiac: RRR, nl S1, S2, no murmurs, rubs or gallops  Resp: Clear on anterior exam  GI: Normoactive BS, abd soft, flat, nontender throughout all quadrants, no organomegaly or masses palpated  Skin: Warm, dry, no jaundice, nails appear healthy  Lymph: no submandibular, no cervical, and no supraclavicular LAD  Neuro: alert, oriented, answers questions appropriately        Again, thank you for allowing me to participate in the care of your patient.        Sincerely,        Stefanie Ibarra PA-C  "

## 2024-11-12 NOTE — NURSING NOTE
"Chief Complaint   Patient presents with    New Patient       Vitals:    11/12/24 0910   BP: (!) 151/96   Pulse: 84   SpO2: 98%   Weight: 55.9 kg (123 lb 4.8 oz)   Height: 1.594 m (5' 2.75\")     Blood pressure elevated. Provider notified. Recheck offered.     Body mass index is 22.02 kg/m .    Daxa Anderson MA    "

## 2024-11-13 ENCOUNTER — LAB (OUTPATIENT)
Dept: LAB | Facility: CLINIC | Age: 54
End: 2024-11-13
Payer: COMMERCIAL

## 2024-11-13 DIAGNOSIS — R14.0 BLOATING: ICD-10-CM

## 2024-11-13 DIAGNOSIS — K29.80 DUODENITIS: ICD-10-CM

## 2024-11-13 DIAGNOSIS — K59.04 CHRONIC IDIOPATHIC CONSTIPATION: ICD-10-CM

## 2024-11-13 LAB
BASOPHILS # BLD AUTO: 0.1 10E3/UL (ref 0–0.2)
BASOPHILS NFR BLD AUTO: 1 %
EOSINOPHIL # BLD AUTO: 0.2 10E3/UL (ref 0–0.7)
EOSINOPHIL NFR BLD AUTO: 2 %
ERYTHROCYTE [DISTWIDTH] IN BLOOD BY AUTOMATED COUNT: 12.9 % (ref 10–15)
HCT VFR BLD AUTO: 38.1 % (ref 35–47)
HGB BLD-MCNC: 13.1 G/DL (ref 11.7–15.7)
IMM GRANULOCYTES # BLD: 0 10E3/UL
IMM GRANULOCYTES NFR BLD: 0 %
LYMPHOCYTES # BLD AUTO: 1.3 10E3/UL (ref 0.8–5.3)
LYMPHOCYTES NFR BLD AUTO: 18 %
MCH RBC QN AUTO: 33.5 PG (ref 26.5–33)
MCHC RBC AUTO-ENTMCNC: 34.4 G/DL (ref 31.5–36.5)
MCV RBC AUTO: 97 FL (ref 78–100)
MONOCYTES # BLD AUTO: 0.4 10E3/UL (ref 0–1.3)
MONOCYTES NFR BLD AUTO: 6 %
NEUTROPHILS # BLD AUTO: 5.1 10E3/UL (ref 1.6–8.3)
NEUTROPHILS NFR BLD AUTO: 73 %
PLATELET # BLD AUTO: 370 10E3/UL (ref 150–450)
RBC # BLD AUTO: 3.91 10E6/UL (ref 3.8–5.2)
WBC # BLD AUTO: 7 10E3/UL (ref 4–11)

## 2024-11-13 PROCEDURE — 87507 IADNA-DNA/RNA PROBE TQ 12-25: CPT

## 2024-11-13 PROCEDURE — 87209 SMEAR COMPLEX STAIN: CPT

## 2024-11-13 PROCEDURE — 85025 COMPLETE CBC W/AUTO DIFF WBC: CPT

## 2024-11-13 PROCEDURE — 82747 ASSAY OF FOLIC ACID RBC: CPT | Mod: 90

## 2024-11-13 PROCEDURE — 84207 ASSAY OF VITAMIN B-6: CPT | Mod: 90

## 2024-11-13 PROCEDURE — 83540 ASSAY OF IRON: CPT

## 2024-11-13 PROCEDURE — 82653 EL-1 FECAL QUANTITATIVE: CPT

## 2024-11-13 PROCEDURE — 82607 VITAMIN B-12: CPT

## 2024-11-13 PROCEDURE — 82728 ASSAY OF FERRITIN: CPT

## 2024-11-13 PROCEDURE — 36415 COLL VENOUS BLD VENIPUNCTURE: CPT

## 2024-11-13 PROCEDURE — 87177 OVA AND PARASITES SMEARS: CPT

## 2024-11-13 PROCEDURE — 99000 SPECIMEN HANDLING OFFICE-LAB: CPT

## 2024-11-13 PROCEDURE — 82705 FATS/LIPIDS FECES QUAL: CPT | Mod: 90

## 2024-11-13 PROCEDURE — 83735 ASSAY OF MAGNESIUM: CPT

## 2024-11-13 PROCEDURE — 83550 IRON BINDING TEST: CPT

## 2024-11-14 ENCOUNTER — OFFICE VISIT (OUTPATIENT)
Dept: PULMONOLOGY | Facility: CLINIC | Age: 54
End: 2024-11-14
Attending: INTERNAL MEDICINE
Payer: COMMERCIAL

## 2024-11-14 ENCOUNTER — TELEPHONE (OUTPATIENT)
Dept: PULMONOLOGY | Facility: CLINIC | Age: 54
End: 2024-11-14

## 2024-11-14 VITALS
WEIGHT: 120 LBS | HEART RATE: 83 BPM | BODY MASS INDEX: 21.43 KG/M2 | OXYGEN SATURATION: 98 % | DIASTOLIC BLOOD PRESSURE: 86 MMHG | SYSTOLIC BLOOD PRESSURE: 147 MMHG

## 2024-11-14 DIAGNOSIS — M30.1 CHURG-STRAUSS SYNDROME (H): ICD-10-CM

## 2024-11-14 DIAGNOSIS — D72.18 CHURG-STRAUSS SYNDROME (H): ICD-10-CM

## 2024-11-14 DIAGNOSIS — R06.09 DYSPNEA ON EXERTION: Primary | ICD-10-CM

## 2024-11-14 LAB
ADV 40+41 DNA STL QL NAA+NON-PROBE: NEGATIVE
ASTRO TYP 1-8 RNA STL QL NAA+NON-PROBE: NEGATIVE
C CAYETANENSIS DNA STL QL NAA+NON-PROBE: NEGATIVE
CAMPYLOBACTER DNA SPEC NAA+PROBE: NEGATIVE
CRYPTOSP DNA STL QL NAA+NON-PROBE: NEGATIVE
E COLI O157 DNA STL QL NAA+NON-PROBE: NORMAL
E HISTOLYT DNA STL QL NAA+NON-PROBE: NEGATIVE
EAEC ASTA GENE ISLT QL NAA+PROBE: NEGATIVE
EC STX1+STX2 GENES STL QL NAA+NON-PROBE: NEGATIVE
EPEC EAE GENE STL QL NAA+NON-PROBE: NEGATIVE
ETEC LTA+ST1A+ST1B TOX ST NAA+NON-PROBE: NEGATIVE
FERRITIN SERPL-MCNC: 16 NG/ML (ref 11–328)
FOLATE RBC-MCNC: 720 NG/ML
G LAMBLIA DNA STL QL NAA+NON-PROBE: NEGATIVE
IRON BINDING CAPACITY (ROCHE): 346 UG/DL (ref 240–430)
IRON SATN MFR SERPL: 12 % (ref 15–46)
IRON SERPL-MCNC: 43 UG/DL (ref 37–145)
MAGNESIUM SERPL-MCNC: 2.5 MG/DL (ref 1.7–2.3)
NOROVIRUS GI+II RNA STL QL NAA+NON-PROBE: NEGATIVE
O+P STL MICRO: NEGATIVE
P SHIGELLOIDES DNA STL QL NAA+NON-PROBE: NEGATIVE
RVA RNA STL QL NAA+NON-PROBE: NEGATIVE
SALMONELLA SP RPOD STL QL NAA+PROBE: NEGATIVE
SAPO I+II+IV+V RNA STL QL NAA+NON-PROBE: NEGATIVE
SHIGELLA SP+EIEC IPAH ST NAA+NON-PROBE: NEGATIVE
SPECIMEN TYPE: NORMAL
V CHOLERAE DNA SPEC QL NAA+PROBE: NEGATIVE
VIBRIO DNA SPEC NAA+PROBE: NEGATIVE
VIT B12 SERPL-MCNC: 1024 PG/ML (ref 232–1245)
Y ENTEROCOL DNA STL QL NAA+PROBE: NEGATIVE

## 2024-11-14 PROCEDURE — G2211 COMPLEX E/M VISIT ADD ON: HCPCS | Performed by: INTERNAL MEDICINE

## 2024-11-14 PROCEDURE — 99213 OFFICE O/P EST LOW 20 MIN: CPT | Performed by: INTERNAL MEDICINE

## 2024-11-14 PROCEDURE — 99215 OFFICE O/P EST HI 40 MIN: CPT | Performed by: INTERNAL MEDICINE

## 2024-11-14 ASSESSMENT — PAIN SCALES - GENERAL: PAINLEVEL_OUTOF10: NO PAIN (0)

## 2024-11-14 NOTE — NURSING NOTE
Chief Complaint   Patient presents with    Follow Up     Return Bronchiectas        Vitals were taken, medications reconciled.    Hannah Rodriguez, Clinic Assistant   10:13 AM

## 2024-11-14 NOTE — LETTER
11/14/2024      Valencia Ye  [address] [facility] [address]      Dear Colleague,    Thank you for referring your patient, Valencia Ye, to the [facility] LUNG SCIENCE AND [facility] [place]. Please see a copy of my visit note below.    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    The patient reports progressive breathlessness for the past 2-4 months to the point where she is now short of breath with fairly mild exertion and sometimes prolonged talking.  Evaluation to date has been unrevealing.  Hemoglobin is normal and similar to baseline.  Normal CO2 and pH on VBG.  Echocardiogram on 10/29 with normal left and right ventricular function and no significant valvular disease.  Chest CT from 9/26/2024 reviewed by me and compared with previous with mild scattered bronchiectasis unchanged from previous and scattered small nodules unchanged.  Etiology of the patient's progressive dyspnea is unclear.  No identifiable pulmonary source to date.  Although unlikely, given absence of other etiologies, need to consider PE either acute or chronic.  Given the slowly progressive nature of the symptoms with no acute change, chronic PE does need to be considered.  Will begin with VQ scan which could provide information for either acute or chronic PE.  If indeterminant, may need to consider CT angiogram as well.  Patient is scheduled for cardiac evaluation.  It is possible.  Percent atypical angina.  The patient does not appear to be having an asthma exacerbation or a bronchiectasis exacerbation.  She will continue with her current inhalers and airway clearance therapy.    Eosinophilic granuloma with polyangiitis: It is unlikely that this  "is contributing to worsened respiratory symptoms.    Hiatal hernia: Defer to GI consultants.    Hypertension: Blood pressure is elevated in clinic.  She reports he generally runs 120s/70s at home.  Continue monitoring.    Follow-up in 4 months with PFTs.  VQ scan in the interim.  May need to consider CT angiogram.  Consider shorter interval follow-up depending on clinical course.    I, Dagoberto Briscoe, have spent 45 minutes on the day of the visit to review the chart, interview and examine the patient, review labs and imaging, formulate a plan, document and submit orders. Time documented is excluding time spent for PFT interpretation.     The longitudinal plan of care for the diagnosis(es)/condition(s) as documented were addressed during this visit. Due to the added complexity in care, I will continue to support Valencia in the subsequent management and with ongoing continuity of care.    Dagoberto Briscoe MD  Contact via [email or web address]    Note: Chart documentation done in part with Dragon Voice Recognition software. Although reviewed after completion, some word and grammatical errors may remain. Please consider this when interpreting information in this chart.       Pulmonary HPI    The patient was seen and examined by Dagoberto Briscoe MD     Patient reports feeling breathless.  This has been progressive over the past 2-4 months.  She denies any audible crackles or wheezes which typically occur with her exacerbations.  She has not noted any symptomatic relief with nebs or inhalers.  Wheezes worse with bending over or with upper extremity activity.  She is short of breath walking up any incline.  Very different than baseline.  Initially noted a few months ago when she found she was unable to do \"hot yoga\".  Dyspnea with mild exertion and even occasionally with prolonged talking.  Variable day-to-day.  Possibly worse after a moderate-sized meal.  No cough.  Minimal white sputum.  Pain noted between the shoulder blades and " "below the ribs which seems to be related to eating solid food.  She tends to rely on smoothies.  No lower extremity pain.  Mild ankle swelling.  She did have vertigo over the summer treated with scopolamine and vestibular therapy.  She underwent extensive GI evaluation noted to have hiatal hernia possibly duodenitis on CT.  Recently has noted worsened reflux with abdominal pain and heartburn.    Review of systems:  No ear pain, sore throat, sinus pain or rhinorrhea  Some decrease in visual acuity.  Palpitations, longstanding unchanged.  Occasional nausea.  No emesis.  Alternating diarrhea and constipation similar to her longstanding IBS but \"worse\"  Sore tongue and irritation at the corners of her mouth with workup ongoing.  Unrelieved with chronic use of zinc.  The remainder of a complete review of systems is otherwise negative except as noted in history of present illness.      Current Outpatient Medications   Medication Sig Dispense Refill     acetylcysteine (MUCOMYST) 20 % neb solution NEBULIZE THE CONTENTS OF ONE VIAL (4 ML) TWO TIMES A DAY. 800 mL 1     albuterol (PROAIR HFA/PROVENTIL HFA/VENTOLIN HFA) 108 (90 Base) MCG/ACT inhaler INHALE 2 PUFFS INTO THE LUNGS EVERY 4 HOURS AS NEEDED FOR SHORTNESS OF BREATH/WHEEZE 18 g 11     albuterol (PROVENTIL) (2.5 MG/3ML) 0.083% neb solution TAKE 1 VIAL VIA NEBULIZER TWICE A DAY. 180 mL 11     ciprofloxacin-dexAMETHasone (CIPRODEX) 0.3-0.1 % otic suspension INSTILL 3 DROPS INTO AFFECTED EARS 2 TIMES DAILY AS NEEDED 7.5 mL 10     co-enzyme Q-10 100 MG CAPS capsule Take 100 mg by mouth daily.       cod liver oil CAPS capsule Take 1 capsule by mouth daily       fluticasone (FLONASE) 50 MCG/ACT nasal spray INSTILL 2 SPRAYS INTO BOTH NOSTRILS DAILY. 16 mL 2     fluticasone-vilanterol (BREO ELLIPTA) 200-25 MCG/ACT inhaler Inhale 1 puff into the lungs daily. 60 each 11     Glucosamine-Chondroit-Vit C-Mn (GLUCOSAMINE CHONDROITINOITIN COMPLEX) CAPS Take 3 capsules by mouth daily  "      Iodine, Kelp, (KELP PO) Take 2 capsules by mouth daily       LORazepam (ATIVAN) 0.5 MG tablet Take 1 tablet (0.5 mg) by mouth every 8 hours as needed for anxiety 8 tablet 0     lubiprostone (AMITIZA) 24 MCG capsule Take 1 capsule (24 mcg) by mouth 2 times daily (with meals). 180 capsule 3     magnesium 250 MG tablet Take 1 tablet by mouth daily.       metoprolol tartrate (LOPRESSOR) 25 MG tablet TAKE 0.5 TABLETS (12.5 MG) BY MOUTH 2 TIMES DAILY AS NEEDED (FOR HIGH BLOOD PRESSURE >130/80) 90 tablet 1     norethindrone-eth estradiol (FEMHRT LOW DOSE) 0.5-2.5 MG-MCG tablet Take 1 tablet by mouth at bedtime 90 tablet 3     Omega-3 Fatty Acids (FISH OIL) 1200 MG capsule Take 4 capsules (4.8 g) by mouth daily       Potassium Gluconate 595 (99 K) MG TABS Take 5 g by mouth daily       predniSONE (DELTASONE) 5 MG tablet Take 1 tablet (5 mg) by mouth daily Total dose 5.5mg daily       Probiotic Product ([employer] PROBIOTIC MAXIMUM STRENGTH) CAPS Take 1 capsule by mouth daily       venlafaxine (EFFEXOR XR) 37.5 MG 24 hr capsule Take 37.5 mg by mouth daily       vitamin D3 (CHOLECALCIFEROL) 2000 units (50 mcg) tablet Take 2 tablets by mouth daily [id number] untis daily       VITAMIN K PO Take 1 capsule by mouth daily       No current facility-administered medications for this visit.     Allergies   Allergen Reactions     Colistimethate Anaphylaxis     Colymycin M [Colistimethate Sodium] Anaphylaxis     Tamiflu [Oseltamivir]      Gums Blister up     Hydroxyzine      Azithromycin Palpitations     Clindamycin Rash     Tizanidine Other (See Comments)     Severe panic attack     Past Medical History:   Diagnosis Date     Anxiety      Aortitis (H)      Asthma     associated with Churg Antonina syndrome     Bronchiectasis (H)      Cerebral infarction (H) 08/1990    Very small, caused small permanent optical migraine     Chronic sinusitis      Churg-Antonina syndrome (H)     dx 1990     Conductive hearing loss      Depressive disorder       Eustachian tube dysfunction      Goiter      Hearing loss, conductive      Heart rate problem      Hypertension      Hypocalcemia      Immunosuppression (H)      Infection due to 2019 novel coronavirus 2022, , 2022     Irritable bowel syndrome      Major depressive disorder      Mannose-binding lectin deficiency (H)      Nonspecific abnormal results of thyroid function study      Osteoporosis      Pericarditis      and      Pneumonia     4/23/10     Recurrent otitis media      Recurrent UTI      Rheumatoid vasculitis (H)      Sinusitis, chronic      Steroid long-term use      Tinnitus      Varicose veins of leg with swelling        Past Surgical History:   Procedure Laterality Date     C/SECTION, CLASSICAL        SECTION       COLONOSCOPY Left 2014    Procedure: COMBINED COLONOSCOPY, SINGLE OR MULTIPLE BIOPSY/POLYPECTOMY BY BIOPSY;  Surgeon: Js Pinedo MD;  Location:  GI     COLONOSCOPY N/A 2019    Procedure: COLONOSCOPY;  Surgeon: Bryce Pimentel MD;  Location:  GI     ENT SURGERY       ESOPHAGOSCOPY, GASTROSCOPY, DUODENOSCOPY (EGD), COMBINED N/A 2023    Procedure: ESOPHAGOGASTRODUODENOSCOPY, WITH BIOPSY;  Surgeon: Mo Rosa MD;  Location:  GI     ESOPHAGOSCOPY, GASTROSCOPY, DUODENOSCOPY (EGD), COMBINED N/A 10/17/2024    Procedure: ESOPHAGOGASTRODUODENOSCOPY with DUODENAL BIOPSY;  Surgeon: Dillon Guillen MD;  Location: [facility]     ESOPHAGOSCOPY, GASTROSCOPY, DUODENOSCOPY (EGD), DILATATION, COMBINED N/A 2023    Procedure: ESOPHAGOGASTRODUODENOSCOPY, WITH DILATION;  Surgeon: Mo Rosa MD;  Location:  GI     GENITOURINARY SURGERY       HEAD & NECK SURGERY       HYSTERECTOMY TOTAL ABDOMINAL  2009    without oopherectomy for TEO 3 on LEEP w/ pos margins - Path all benign     HYSTERECTOMY, PAP NO LONGER INDICATED      cervix removed      LAPAROSCOPIC APPENDECTOMY N/A  06/22/2022    Procedure: APPENDECTOMY, LAPAROSCOPIC;  Surgeon: Fany Hannah MD;  Location: [facility] OR     PE TUBES       PICC INSERTION  10/10/2013    5fr DL PASV PICC, 43cm (1cm external) in the L basilic vein w/ tip in the low SVC.     PICC INSERTION Left 05/26/2017    5fr DL BioFlo PICC, 42cm (3cm external) in the L lateral brachial vein w/ tip in the SVC RA junction.     SINUS SURGERY       TUBAL LIGATION       TYMPANOPLASTY       ZZHC COLP CERVIX/UPPER VAGINA W LOOP ELEC BX CERVIX         Social History     Socioeconomic History     Marital status:      Spouse name: Not on file     Number of children: Not on file     Years of education: Not on file     Highest education level: Not on file   Occupational History     Not on file   Tobacco Use     Smoking status: Never     Passive exposure: Never     Smokeless tobacco: Never   Vaping Use     Vaping status: Never Used   Substance and Sexual Activity     Alcohol use: Not Currently     Drug use: No     Sexual activity: Yes     Partners: Male     Birth control/protection: Male Surgical, Female Surgical     Comment: CARMELA   Other Topics Concern     Parent/sibling w/ CABG, MI or angioplasty before 65F 55M? Not Asked      Service No     Blood Transfusions No     Caffeine Concern No     Occupational Exposure No     Hobby Hazards No     Sleep Concern No     Stress Concern Yes     Weight Concern No     Special Diet Yes     Comment: IBS diet     Back Care No     Exercise Not Asked     Comment: 1 mile power walk 5 days a week at least.      Bike Helmet Not Asked     Seat Belt Yes     Self-Exams Yes   Social History Narrative    Retired  in a Radiology Department.  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 No smokers at home.  Enjoys walking and dancing.  Alcohol-abstains. No illicits.     Social Drivers of Health     Financial Resource Strain: Low Risk  (1/17/2024)    Financial Resource Strain      Within the past 12  months, have you or your family members you live with been unable to get utilities (heat, electricity) when it was really needed?: No   Food Insecurity: Low Risk  (1/17/2024)    Food Insecurity      Within the past 12 months, did you worry that your food would run out before you got money to buy more?: No      Within the past 12 months, did the food you bought just not last and you didn t have money to get more?: No   Transportation Needs: Low Risk  (1/17/2024)    Transportation Needs      Within the past 12 months, has lack of transportation kept you from medical appointments, getting your medicines, non-medical meetings or appointments, work, or from getting things that you need?: No   Physical Activity: Not on file   Stress: Not on file   Social Connections: Not on file   Interpersonal Safety: Low Risk  (10/17/2024)    Interpersonal Safety      Do you feel physically and emotionally safe where you currently live?: Yes      Within the past 12 months, have you been hit, slapped, kicked or otherwise physically hurt by someone?: No      Within the past 12 months, have you been humiliated or emotionally abused in other ways by your partner or ex-partner?: No   Housing Stability: Low Risk  (1/17/2024)    Housing Stability      Do you have housing? : Yes      Are you worried about losing your housing?: No         BP (!) 147/86 (BP Location: Right arm, Patient Position: Chair, Cuff Size: Adult Regular)   Pulse 83   Wt 54.4 kg (120 lb)   LMP 02/01/2009 (LMP Unknown)   SpO2 98%   BMI 21.43 kg/m    Exam:   GENERAL APPEARANCE: Well developed, well nourished, alert, and in no apparent distress.  EYES: PERRL, EOMI  HENT: Nasal mucosa with no edema and no hyperemia. No nasal polyps.  EARS: Canals clear, TMs scarred bilaterally  MOUTH: Oral mucosa is moist, without any lesions, no tonsillar enlargement, no oropharyngeal exudate.  NECK: supple, no masses, no thyromegaly.  LYMPHATICS: No significant axillary, cervical, or  supraclavicular nodes.  RESP: normal percussion, good air flow throughout.  No crackles. No rhonchi. No wheezes.  CV: Normal S1, S2, regular rhythm, normal rate. No murmur.  No rub. No gallop.  Trace LE edema.   ABDOMEN:  Bowel sounds normal, soft, nontender, no HSM or masses.   MS: extremities normal. No clubbing. No cyanosis.  SKIN: no rash on limited exam  NEURO: Mentation intact, speech normal, normal strength and tone, normal gait and stance  PSYCH: mentation appears normal. and affect normal/bright  Results:  Recent Results (from the past week)   Vitamin B12    Collection Time: 11/13/24  1:33 PM   Result Value Ref Range    Vitamin B12 1,024 232 - 1,245 pg/mL   Iron and iron binding capacity    Collection Time: 11/13/24  1:33 PM   Result Value Ref Range    Iron 43 37 - 145 ug/dL    Iron Binding Capacity 346 240 - 430 ug/dL    Iron Sat Index 12 (L) 15 - 46 %   Ferritin    Collection Time: 11/13/24  1:33 PM   Result Value Ref Range    Ferritin 16 11 - 328 ng/mL   Magnesium    Collection Time: 11/13/24  1:33 PM   Result Value Ref Range    Magnesium 2.5 (H) 1.7 - 2.3 mg/dL   Enteric Bacteria and Virus Panel by DARIUS Stool    Collection Time: 11/13/24  2:18 PM    Specimen: Per Rectum; Stool   Result Value Ref Range    Campylobacter species Negative Negative    Salmonella species Negative Negative    Vibrio species Negative Negative    Vibrio cholerae Negative Negative    Yersinia enterocolitica Negative Negative    Enteropathogenic E. coli (EPEC) Negative Negative, NA    Shiga-like toxin-producing E. coli (STEC) Negative Negative    Shigella/Enteroinvasive E. coli (EIEC) Negative Negative    Cryptosporidium species Negative Negative    Giardia lamblia Negative Negative    Norovirus Gl/Gll Negative Negative    Rotavirus A Negative Negative    Plesiomonas shigelloides Negative Negative    Enteroaggregative E. coli (EAEC) Negative Negative    Enterotoxigenic E. coli (ETEC) Negative Negative    E. coli O157 NA Negative,  NA    Cyclospora cayetanensis Negative Negative    Entamoeba histolytica Negative Negative    Adenovirus F40/41 Negative Negative    Astrovirus Negative Negative    Sapovirus Negative Negative   CBC with platelets and differential    Collection Time: 11/13/24  3:54 PM   Result Value Ref Range    WBC Count 7.0 4.0 - 11.0 10e3/uL    RBC Count 3.91 3.80 - 5.20 10e6/uL    Hemoglobin 13.1 11.7 - 15.7 g/dL    Hematocrit 38.1 35.0 - 47.0 %    MCV 97 78 - 100 fL    MCH 33.5 (H) 26.5 - 33.0 pg    MCHC 34.4 31.5 - 36.5 g/dL    RDW 12.9 10.0 - 15.0 %    Platelet Count 370 150 - 450 10e3/uL    % Neutrophils 73 %    % Lymphocytes 18 %    % Monocytes 6 %    % Eosinophils 2 %    % Basophils 1 %    % Immature Granulocytes 0 %    Absolute Neutrophils 5.1 1.6 - 8.3 10e3/uL    Absolute Lymphocytes 1.3 0.8 - 5.3 10e3/uL    Absolute Monocytes 0.4 0.0 - 1.3 10e3/uL    Absolute Eosinophils 0.2 0.0 - 0.7 10e3/uL    Absolute Basophils 0.1 0.0 - 0.2 10e3/uL    Absolute Immature Granulocytes 0.0 <=0.4 10e3/uL                   Results as noted above.    PFT Interpretation (10/21/2024):  Mild obstructive ventilatory defect.  No significant bronchodilator response.  Decreased from previous.  Similar to recent baseline.  Valid Maneuver  Normal lung volumes, unchanged from previous.  Normal (elevated) diffusing capacity, unchanged from previous.                    Again, thank you for allowing me to participate in the care of your patient.        Sincerely,        Dagoberto Briscoe MD

## 2024-11-14 NOTE — PATIENT INSTRUCTIONS
V/Q scan ordered  We may need to consider a CT angiogram  Continue current medication.    [facility] Nurse line:  Ravi Hernandez [phone]     [facility] Fax Number:     [phone]        Cystic Fibrosis Respiratory Therapists:   Kaylee Miller              [phone]          Barbi Ruiz  [phone]

## 2024-11-14 NOTE — TELEPHONE ENCOUNTER
Patient Contacted for the patient to call back and schedule the following:    Appointment type: CXR  Provider: Rachna  Return date: 11/19  Specialty phone number: [phone]  Additional appointment(s) needed: cxr  Additonal Notes: prior to or after lung scan

## 2024-11-14 NOTE — PROGRESS NOTES
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    The patient reports progressive breathlessness for the past 2-4 months to the point where she is now short of breath with fairly mild exertion and sometimes prolonged talking.  Evaluation to date has been unrevealing.  Hemoglobin is normal and similar to baseline.  Normal CO2 and pH on VBG.  Echocardiogram on 10/29 with normal left and right ventricular function and no significant valvular disease.  Chest CT from 9/26/2024 reviewed by me and compared with previous with mild scattered bronchiectasis unchanged from previous and scattered small nodules unchanged.  Etiology of the patient's progressive dyspnea is unclear.  No identifiable pulmonary source to date.  Although unlikely, given absence of other etiologies, need to consider PE either acute or chronic.  Given the slowly progressive nature of the symptoms with no acute change, chronic PE does need to be considered.  Will begin with VQ scan which could provide information for either acute or chronic PE.  If indeterminant, may need to consider CT angiogram as well.  Patient is scheduled for cardiac evaluation.  It is possible.  Percent atypical angina.  The patient does not appear to be having an asthma exacerbation or a bronchiectasis exacerbation.  She will continue with her current inhalers and airway clearance therapy.    Eosinophilic granuloma with polyangiitis: It is unlikely that this is contributing to worsened respiratory symptoms.    Hiatal hernia: Defer to GI consultants.    Hypertension: Blood pressure is elevated in clinic.  She reports he generally runs 120s/70s at home.  Continue monitoring.    Follow-up in 4 months with PFTs.  VQ scan in the interim.  May need  "to consider CT angiogram.  Consider shorter interval follow-up depending on clinical course.    I, Dagoberto Briscoe, have spent 45 minutes on the day of the visit to review the chart, interview and examine the patient, review labs and imaging, formulate a plan, document and submit orders. Time documented is excluding time spent for PFT interpretation.     The longitudinal plan of care for the diagnosis(es)/condition(s) as documented were addressed during this visit. Due to the added complexity in care, I will continue to support Valencia in the subsequent management and with ongoing continuity of care.    Dagoberto Briscoe MD  Contact via [employer]    Note: Chart documentation done in part with Dragon Voice Recognition software. Although reviewed after completion, some word and grammatical errors may remain. Please consider this when interpreting information in this chart.       Pulmonary HPI    The patient was seen and examined by Dagoberto Briscoe MD     Patient reports feeling breathless.  This has been progressive over the past 2-4 months.  She denies any audible crackles or wheezes which typically occur with her exacerbations.  She has not noted any symptomatic relief with nebs or inhalers.  Wheezes worse with bending over or with upper extremity activity.  She is short of breath walking up any incline.  Very different than baseline.  Initially noted a few months ago when she found she was unable to do \"hot yoga\".  Dyspnea with mild exertion and even occasionally with prolonged talking.  Variable day-to-day.  Possibly worse after a moderate-sized meal.  No cough.  Minimal white sputum.  Pain noted between the shoulder blades and below the ribs which seems to be related to eating solid food.  She tends to rely on smoothies.  No lower extremity pain.  Mild ankle swelling.  She did have vertigo over the summer treated with scopolamine and vestibular therapy.  She underwent extensive GI evaluation noted to have hiatal " "hernia possibly duodenitis on CT.  Recently has noted worsened reflux with abdominal pain and heartburn.    Review of systems:  No ear pain, sore throat, sinus pain or rhinorrhea  Some decrease in visual acuity.  Palpitations, longstanding unchanged.  Occasional nausea.  No emesis.  Alternating diarrhea and constipation similar to her longstanding IBS but \"worse\"  Sore tongue and irritation at the corners of her mouth with workup ongoing.  Unrelieved with chronic use of zinc.  The remainder of a complete review of systems is otherwise negative except as noted in history of present illness.      Current Outpatient Medications   Medication Sig Dispense Refill    acetylcysteine (MUCOMYST) 20 % neb solution NEBULIZE THE CONTENTS OF ONE VIAL (4 ML) TWO TIMES A DAY. 800 mL 1    albuterol (PROAIR HFA/PROVENTIL HFA/VENTOLIN HFA) 108 (90 Base) MCG/ACT inhaler INHALE 2 PUFFS INTO THE LUNGS EVERY 4 HOURS AS NEEDED FOR SHORTNESS OF BREATH/WHEEZE 18 g 11    albuterol (PROVENTIL) (2.5 MG/3ML) 0.083% neb solution TAKE 1 VIAL VIA NEBULIZER TWICE A DAY. 180 mL 11    ciprofloxacin-dexAMETHasone (CIPRODEX) 0.3-0.1 % otic suspension INSTILL 3 DROPS INTO AFFECTED EARS 2 TIMES DAILY AS NEEDED 7.5 mL 10    co-enzyme Q-10 100 MG CAPS capsule Take 100 mg by mouth daily.      cod liver oil CAPS capsule Take 1 capsule by mouth daily      fluticasone (FLONASE) 50 MCG/ACT nasal spray INSTILL 2 SPRAYS INTO BOTH NOSTRILS DAILY. 16 mL 2    fluticasone-vilanterol (BREO ELLIPTA) 200-25 MCG/ACT inhaler Inhale 1 puff into the lungs daily. 60 each 11    Glucosamine-Chondroit-Vit C-Mn (GLUCOSAMINE CHONDROITINOITIN COMPLEX) CAPS Take 3 capsules by mouth daily      Iodine, Kelp, (KELP PO) Take 2 capsules by mouth daily      LORazepam (ATIVAN) 0.5 MG tablet Take 1 tablet (0.5 mg) by mouth every 8 hours as needed for anxiety 8 tablet 0    lubiprostone (AMITIZA) 24 MCG capsule Take 1 capsule (24 mcg) by mouth 2 times daily (with meals). 180 capsule 3    " magnesium 250 MG tablet Take 1 tablet by mouth daily.      metoprolol tartrate (LOPRESSOR) 25 MG tablet TAKE 0.5 TABLETS (12.5 MG) BY MOUTH 2 TIMES DAILY AS NEEDED (FOR HIGH BLOOD PRESSURE >130/80) 90 tablet 1    norethindrone-eth estradiol (FEMHRT LOW DOSE) 0.5-2.5 MG-MCG tablet Take 1 tablet by mouth at bedtime 90 tablet 3    Omega-3 Fatty Acids (FISH OIL) 1200 MG capsule Take 4 capsules (4.8 g) by mouth daily      Potassium Gluconate 595 (99 K) MG TABS Take 5 g by mouth daily      predniSONE (DELTASONE) 5 MG tablet Take 1 tablet (5 mg) by mouth daily Total dose 5.5mg daily      Probiotic Product ([employer] PROBIOTIC MAXIMUM STRENGTH) CAPS Take 1 capsule by mouth daily      venlafaxine (EFFEXOR XR) 37.5 MG 24 hr capsule Take 37.5 mg by mouth daily      vitamin D3 (CHOLECALCIFEROL) 2000 units (50 mcg) tablet Take 2 tablets by mouth daily [id number] untis daily      VITAMIN K PO Take 1 capsule by mouth daily       No current facility-administered medications for this visit.     Allergies   Allergen Reactions    Colistimethate Anaphylaxis    Colymycin M [Colistimethate Sodium] Anaphylaxis    Tamiflu [Oseltamivir]      Gums Blister up    Hydroxyzine     Azithromycin Palpitations    Clindamycin Rash    Tizanidine Other (See Comments)     Severe panic attack     Past Medical History:   Diagnosis Date    Anxiety     Aortitis (H)     Asthma     associated with Churg Antonina syndrome    Bronchiectasis (H)     Cerebral infarction (H) 08/1990    Very small, caused small permanent optical migraine    Chronic sinusitis     Churg-Antonina syndrome (H)     dx 1990    Conductive hearing loss     Depressive disorder     Eustachian tube dysfunction     Goiter     Hearing loss, conductive     Heart rate problem     Hypertension     Hypocalcemia     Immunosuppression (H)     Infection due to 2019 novel coronavirus 04/2022 Feb 2020, April 20211, June 2022    Irritable bowel syndrome     Major depressive disorder     Mannose-binding lectin  deficiency (H)     Nonspecific abnormal results of thyroid function study     Osteoporosis     Pericarditis      and     Pneumonia     4/23/10    Recurrent otitis media     Recurrent UTI     Rheumatoid vasculitis (H)     Sinusitis, chronic     Steroid long-term use     Tinnitus     Varicose veins of leg with swelling        Past Surgical History:   Procedure Laterality Date    C/SECTION, CLASSICAL  1998     SECTION      COLONOSCOPY Left 2014    Procedure: COMBINED COLONOSCOPY, SINGLE OR MULTIPLE BIOPSY/POLYPECTOMY BY BIOPSY;  Surgeon: Js Pinedo MD;  Location:  GI    COLONOSCOPY N/A 2019    Procedure: COLONOSCOPY;  Surgeon: Bryce Pimentel MD;  Location:  GI    ENT SURGERY      ESOPHAGOSCOPY, GASTROSCOPY, DUODENOSCOPY (EGD), COMBINED N/A 2023    Procedure: ESOPHAGOGASTRODUODENOSCOPY, WITH BIOPSY;  Surgeon: Mo Rosa MD;  Location:  GI    ESOPHAGOSCOPY, GASTROSCOPY, DUODENOSCOPY (EGD), COMBINED N/A 10/17/2024    Procedure: ESOPHAGOGASTRODUODENOSCOPY with DUODENAL BIOPSY;  Surgeon: Dillon Guillen MD;  Location:  OR    ESOPHAGOSCOPY, GASTROSCOPY, DUODENOSCOPY (EGD), DILATATION, COMBINED N/A 2023    Procedure: ESOPHAGOGASTRODUODENOSCOPY, WITH DILATION;  Surgeon: Mo Rosa MD;  Location:  GI    GENITOURINARY SURGERY      HEAD & NECK SURGERY      HYSTERECTOMY TOTAL ABDOMINAL  2009    without oopherectomy for TEO 3 on LEEP w/ pos margins - Path all benign    HYSTERECTOMY, PAP NO LONGER INDICATED      cervix removed     LAPAROSCOPIC APPENDECTOMY N/A 2022    Procedure: APPENDECTOMY, LAPAROSCOPIC;  Surgeon: Fany Hannah MD;  Location: [facility] OR    PE TUBES      PICC INSERTION  10/10/2013    5fr DL PASV PICC, 43cm (1cm external) in the L basilic vein w/ tip in the low SVC.    PICC INSERTION Left 2017    5fr DL BioFlo PICC, 42cm (3cm external) in the L lateral brachial vein w/ tip in the SVC RA  junction.    SINUS SURGERY      TUBAL LIGATION      TYMPANOPLASTY      ZZHC COLP CERVIX/UPPER VAGINA W LOOP ELEC BX CERVIX         Social History     Socioeconomic History    Marital status:      Spouse name: Not on file    Number of children: Not on file    Years of education: Not on file    Highest education level: Not on file   Occupational History    Not on file   Tobacco Use    Smoking status: Never     Passive exposure: Never    Smokeless tobacco: Never   Vaping Use    Vaping status: Never Used   Substance and Sexual Activity    Alcohol use: Not Currently    Drug use: No    Sexual activity: Yes     Partners: Male     Birth control/protection: Male Surgical, Female Surgical     Comment: CARMELA   Other Topics Concern    Parent/sibling w/ CABG, MI or angioplasty before 65F 55M? Not Asked     Service No    Blood Transfusions No    Caffeine Concern No    Occupational Exposure No    Hobby Hazards No    Sleep Concern No    Stress Concern Yes    Weight Concern No    Special Diet Yes     Comment: IBS diet    Back Care No    Exercise Not Asked     Comment: 1 mile power walk 5 days a week at least.     Bike Helmet Not Asked    Seat Belt Yes    Self-Exams Yes   Social History Narrative    Retired  in a Radiology Department.  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 No smokers at home.  Enjoys walking and dancing.  Alcohol-abstains. No illicits.     Social Drivers of Health     Financial Resource Strain: Low Risk  (1/17/2024)    Financial Resource Strain     Within the past 12 months, have you or your family members you live with been unable to get utilities (heat, electricity) when it was really needed?: No   Food Insecurity: Low Risk  (1/17/2024)    Food Insecurity     Within the past 12 months, did you worry that your food would run out before you got money to buy more?: No     Within the past 12 months, did the food you bought just not last and you didn t have money  to get more?: No   Transportation Needs: Low Risk  (1/17/2024)    Transportation Needs     Within the past 12 months, has lack of transportation kept you from medical appointments, getting your medicines, non-medical meetings or appointments, work, or from getting things that you need?: No   Physical Activity: Not on file   Stress: Not on file   Social Connections: Not on file   Interpersonal Safety: Low Risk  (10/17/2024)    Interpersonal Safety     Do you feel physically and emotionally safe where you currently live?: Yes     Within the past 12 months, have you been hit, slapped, kicked or otherwise physically hurt by someone?: No     Within the past 12 months, have you been humiliated or emotionally abused in other ways by your partner or ex-partner?: No   Housing Stability: Low Risk  (1/17/2024)    Housing Stability     Do you have housing? : Yes     Are you worried about losing your housing?: No         BP (!) 147/86 (BP Location: Right arm, Patient Position: Chair, Cuff Size: Adult Regular)   Pulse 83   Wt 54.4 kg (120 lb)   LMP 02/01/2009 (LMP Unknown)   SpO2 98%   BMI 21.43 kg/m    Exam:   GENERAL APPEARANCE: Well developed, well nourished, alert, and in no apparent distress.  EYES: PERRL, EOMI  HENT: Nasal mucosa with no edema and no hyperemia. No nasal polyps.  EARS: Canals clear, TMs scarred bilaterally  MOUTH: Oral mucosa is moist, without any lesions, no tonsillar enlargement, no oropharyngeal exudate.  NECK: supple, no masses, no thyromegaly.  LYMPHATICS: No significant axillary, cervical, or supraclavicular nodes.  RESP: normal percussion, good air flow throughout.  No crackles. No rhonchi. No wheezes.  CV: Normal S1, S2, regular rhythm, normal rate. No murmur.  No rub. No gallop.  Trace LE edema.   ABDOMEN:  Bowel sounds normal, soft, nontender, no HSM or masses.   MS: extremities normal. No clubbing. No cyanosis.  SKIN: no rash on limited exam  NEURO: Mentation intact, speech normal, normal  strength and tone, normal gait and stance  PSYCH: mentation appears normal. and affect normal/bright  Results:  Recent Results (from the past week)   Vitamin B12    Collection Time: 11/13/24  1:33 PM   Result Value Ref Range    Vitamin B12 1,024 232 - 1,245 pg/mL   Iron and iron binding capacity    Collection Time: 11/13/24  1:33 PM   Result Value Ref Range    Iron 43 37 - 145 ug/dL    Iron Binding Capacity 346 240 - 430 ug/dL    Iron Sat Index 12 (L) 15 - 46 %   Ferritin    Collection Time: 11/13/24  1:33 PM   Result Value Ref Range    Ferritin 16 11 - 328 ng/mL   Magnesium    Collection Time: 11/13/24  1:33 PM   Result Value Ref Range    Magnesium 2.5 (H) 1.7 - 2.3 mg/dL   Enteric Bacteria and Virus Panel by [facility] Stool    Collection Time: 11/13/24  2:18 PM    Specimen: Per Rectum; Stool   Result Value Ref Range    Campylobacter species Negative Negative    Salmonella species Negative Negative    Vibrio species Negative Negative    Vibrio cholerae Negative Negative    Yersinia enterocolitica Negative Negative    Enteropathogenic E. coli (EPEC) Negative Negative, NA    Shiga-like toxin-producing E. coli (STEC) Negative Negative    Shigella/Enteroinvasive E. coli (EIEC) Negative Negative    Cryptosporidium species Negative Negative    Giardia lamblia Negative Negative    Norovirus Gl/Gll Negative Negative    Rotavirus A Negative Negative    Plesiomonas shigelloides Negative Negative    Enteroaggregative E. coli (EAEC) Negative Negative    Enterotoxigenic E. coli (ETEC) Negative Negative    E. coli O157 NA Negative, NA    Cyclospora cayetanensis Negative Negative    Entamoeba histolytica Negative Negative    Adenovirus F40/41 Negative Negative    Astrovirus Negative Negative    Sapovirus Negative Negative   CBC with platelets and differential    Collection Time: 11/13/24  3:54 PM   Result Value Ref Range    WBC Count 7.0 4.0 - 11.0 10e3/uL    RBC Count 3.91 3.80 - 5.20 10e6/uL    Hemoglobin 13.1 11.7 - 15.7 g/dL     Hematocrit 38.1 35.0 - 47.0 %    MCV 97 78 - 100 fL    MCH 33.5 (H) 26.5 - 33.0 pg    MCHC 34.4 31.5 - 36.5 g/dL    RDW 12.9 10.0 - 15.0 %    Platelet Count 370 150 - 450 10e3/uL    % Neutrophils 73 %    % Lymphocytes 18 %    % Monocytes 6 %    % Eosinophils 2 %    % Basophils 1 %    % Immature Granulocytes 0 %    Absolute Neutrophils 5.1 1.6 - 8.3 10e3/uL    Absolute Lymphocytes 1.3 0.8 - 5.3 10e3/uL    Absolute Monocytes 0.4 0.0 - 1.3 10e3/uL    Absolute Eosinophils 0.2 0.0 - 0.7 10e3/uL    Absolute Basophils 0.1 0.0 - 0.2 10e3/uL    Absolute Immature Granulocytes 0.0 <=0.4 10e3/uL                   Results as noted above.    PFT Interpretation (10/21/2024):  Mild obstructive ventilatory defect.  No significant bronchodilator response.  Decreased from previous.  Similar to recent baseline.  Valid Maneuver  Normal lung volumes, unchanged from previous.  Normal (elevated) diffusing capacity, unchanged from previous.

## 2024-11-15 LAB — ELASTASE PANC STL-MCNT: >800 UG/G

## 2024-11-16 DIAGNOSIS — I10 BENIGN ESSENTIAL HYPERTENSION: ICD-10-CM

## 2024-11-16 LAB
FAT STL QL: NORMAL
NEUTRAL FAT STL QL: NORMAL

## 2024-11-19 LAB — PYRIDOXAL PHOS SERPL-SCNC: 591.2 NMOL/L

## 2024-11-19 RX ORDER — METOPROLOL TARTRATE 25 MG/1
12.5 TABLET, FILM COATED ORAL 2 TIMES DAILY PRN
Qty: 90 TABLET | Refills: 1 | OUTPATIENT
Start: 2024-11-19

## 2024-11-19 NOTE — TELEPHONE ENCOUNTER
Refill denied as requested.    Thank you,  Sharif Wilkes, Triage RN Myah Garcia  1:03 PM 11/19/2024

## 2024-12-05 ENCOUNTER — HOSPITAL ENCOUNTER (OUTPATIENT)
Dept: MRI IMAGING | Facility: CLINIC | Age: 54
Discharge: HOME | End: 2024-12-05
Attending: STUDENT IN AN ORGANIZED HEALTH CARE EDUCATION/TRAINING PROGRAM
Payer: COMMERCIAL

## 2024-12-05 DIAGNOSIS — K29.80 DUODENITIS: ICD-10-CM

## 2024-12-05 DIAGNOSIS — R14.0 BLOATING: ICD-10-CM

## 2024-12-05 PROCEDURE — 72197 MRI PELVIS W/O & W/DYE: CPT

## 2024-12-05 PROCEDURE — 250N000011 HC RX IP 250 OP 636: Mod: JZ | Performed by: STUDENT IN AN ORGANIZED HEALTH CARE EDUCATION/TRAINING PROGRAM

## 2024-12-05 PROCEDURE — 255N000002 HC RX 255 OP 636: Performed by: STUDENT IN AN ORGANIZED HEALTH CARE EDUCATION/TRAINING PROGRAM

## 2024-12-05 PROCEDURE — 74183 MRI ABD W/O CNTR FLWD CNTR: CPT

## 2024-12-05 PROCEDURE — A9585 GADOBUTROL INJECTION: HCPCS | Performed by: STUDENT IN AN ORGANIZED HEALTH CARE EDUCATION/TRAINING PROGRAM

## 2024-12-05 RX ORDER — GADOBUTROL 604.72 MG/ML
5.5 INJECTION INTRAVENOUS ONCE
Status: COMPLETED | OUTPATIENT
Start: 2024-12-05 | End: 2024-12-05

## 2024-12-05 RX ADMIN — GLUCAGON 1 MG: 1 INJECTION, POWDER, LYOPHILIZED, FOR SOLUTION INTRAMUSCULAR; INTRAVENOUS at 10:57

## 2024-12-05 RX ADMIN — GADOBUTROL 5.5 ML: 604.72 INJECTION INTRAVENOUS at 10:53

## 2024-12-11 ENCOUNTER — OFFICE VISIT (OUTPATIENT)
Dept: CARDIOLOGY | Facility: CLINIC | Age: 54
End: 2024-12-11
Attending: INTERNAL MEDICINE
Payer: COMMERCIAL

## 2024-12-11 VITALS
HEIGHT: 63 IN | WEIGHT: 125.4 LBS | HEART RATE: 75 BPM | BODY MASS INDEX: 22.22 KG/M2 | DIASTOLIC BLOOD PRESSURE: 88 MMHG | SYSTOLIC BLOOD PRESSURE: 144 MMHG | OXYGEN SATURATION: 97 %

## 2024-12-11 DIAGNOSIS — R06.09 EXERTIONAL DYSPNEA: ICD-10-CM

## 2024-12-11 NOTE — LETTER
12/11/2024    Padmini Renee MD  [address] [facility] [address]    RE: Valencia Ye       Dear Colleague,     I had the pleasure of seeing Valencia Ye in the [facility].  HISTORY:    Valencia Ye is a pleasant 54-year-old female with a very complex past medical history.  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  She also has severe irritable bowel syndrome, depression, anxiety, conductive hearing loss, and is here today because of worsening dyspnea.    Valencia reports that she has noticed her dyspnea worsening over several years.  She does very prolonged and vigorous yoga on a regular basis and has noticed that she can do less and less.  She also finds herself getting short of breath with activity such as walking up a flight of steps.  The degree of dyspnea tends to be variable but seems to be worse in recent months.  He is particularly bothered by dyspnea when she leans over to pet her dogs or load the .  She can walk on a flat surface with minimal difficulty but as soon as there is an upslope she is more short of breath.  She states that on a good day she is mildly dyspneic at the top of his stairs and on a bad day she gets short of breath just trying to hold a conversation.  She reports that she is followed carefully by a pulmonologist and has had recent full evaluations and no pulmonary cause of her dyspnea is apparent.  She was told that she had a low iron count and is taking iron replacement now.    Recent echocardiogram is reviewed.  Her ejection fraction is 60 to 65% and there are no significant abnormalities found.  Diastolic function is reported to be  normal.    ECG shows abnormal T waves suggestive of ischemia in the inferior and anterolateral leads.    Examination today is essentially normal.    On September 29 Valencia emergency room with profound shortness of breath began while she was having her echocardiogram.  As it is worse and she felt very lightheaded with near syncope, she had a great deal of difficulty catching her breath but did not have associated wheezing or crackles.  Her Apple Watch demonstrated adequate oxygenation levels not dropping below 96%.      ASSESSMENT/PLAN:    1.  Dyspnea on exertion, unknown etiology.  Pulmonary has done extensive workup and unable to find a pulmonary cause.  CT scan changed.  ECG shows possible ischemia although previous CT angiograms showed a calcium score of 0 in 2018.  She is not having symptoms of angina.  She had a proBNP drawn at the time of her emergency room visit in October and it was completely normal as was serial troponin.  Completely normal EF even though 2018 her E F was diminished.  I will start by arranging a stress echo to exclude ischemia.  I think then if that is normal we will proceed with cardiac MRI and consider cardiopulmonary stress testing at the [facility].  Overall my impression is that this is not very likely to be of cardiac etiology.    Thank you for inviting me to participate in the care of your patient.  Please don't hesitate to call if I can be of further assistance.  60 minutes were spent today reviewing the chart and other records, interviewing and examining the patient, and documenting our visit.    The longitudinal plan of care for the diagnosis(es)/condition(s) as documented were addressed during this visit. Due to the added complexity in care, I will continue to support Valencia in the subsequent management and with ongoing continuity of care.     Chart documentation was completed, in part, with clinovo voice-recognition software. Even though reviewed, some grammatical,  spelling, and word errors may remain.       Orders Placed This Encounter   Procedures     Exercise Stress Echocardiogram     No orders of the defined types were placed in this encounter.    There are no discontinued medications.    10 year ASCVD risk: The ASCVD Risk score (Alexis WILSON, et al., 2019) failed to calculate for the following reasons:    Risk score cannot be calculated because patient has a medical history suggesting prior/existing ASCVD    Encounter Diagnosis   Name Primary?     Exertional dyspnea        CURRENT MEDICATIONS:  Current Outpatient Medications   Medication Sig Dispense Refill     acetylcysteine (MUCOMYST) 20 % neb solution NEBULIZE THE CONTENTS OF ONE VIAL (4 ML) TWO TIMES A DAY. 800 mL 1     albuterol (PROAIR HFA/PROVENTIL HFA/VENTOLIN HFA) 108 (90 Base) MCG/ACT inhaler INHALE 2 PUFFS INTO THE LUNGS EVERY 4 HOURS AS NEEDED FOR SHORTNESS OF BREATH/WHEEZE 18 g 11     albuterol (PROVENTIL) (2.5 MG/3ML) 0.083% neb solution TAKE 1 VIAL VIA NEBULIZER TWICE A DAY. 180 mL 11     ciprofloxacin-dexAMETHasone (CIPRODEX) 0.3-0.1 % otic suspension INSTILL 3 DROPS INTO AFFECTED EARS 2 TIMES DAILY AS NEEDED 7.5 mL 10     cod liver oil CAPS capsule Take 1 capsule by mouth daily       fluticasone (FLONASE) 50 MCG/ACT nasal spray INSTILL 2 SPRAYS INTO BOTH NOSTRILS DAILY. 16 mL 2     fluticasone-vilanterol (BREO ELLIPTA) 200-25 MCG/ACT inhaler Inhale 1 puff into the lungs daily. 60 each 11     Glucosamine-Chondroit-Vit C-Mn (GLUCOSAMINE CHONDROITINOITIN COMPLEX) CAPS Take 3 capsules by mouth daily       Iodine, Kelp, (KELP PO) Take 2 capsules by mouth daily       LORazepam (ATIVAN) 0.5 MG tablet Take 1 tablet (0.5 mg) by mouth every 8 hours as needed for anxiety 8 tablet 0     lubiprostone (AMITIZA) 24 MCG capsule Take 1 capsule (24 mcg) by mouth 2 times daily (with meals). 180 capsule 3     magnesium 250 MG tablet Take 1 tablet by mouth daily.       metoprolol tartrate (LOPRESSOR) 25 MG tablet TAKE 0.5  TABLETS (12.5 MG) BY MOUTH 2 TIMES DAILY AS NEEDED (FOR HIGH BLOOD PRESSURE >130/80) 90 tablet 1     Omega-3 Fatty Acids (FISH OIL) 1200 MG capsule Take 4 capsules (4.8 g) by mouth daily       Potassium Gluconate 595 (99 K) MG TABS Take 5 g by mouth daily       predniSONE (DELTASONE) 5 MG tablet Take 1 tablet (5 mg) by mouth daily Total dose 5.5mg daily       Probiotic Product ([employer] PROBIOTIC MAXIMUM STRENGTH) CAPS Take 1 capsule by mouth daily       venlafaxine (EFFEXOR XR) 37.5 MG 24 hr capsule Take 37.5 mg by mouth. Pt taking every other day       vitamin D3 (CHOLECALCIFEROL) 2000 units (50 mcg) tablet Take 2 tablets by mouth daily 5000-11509 untis daily       VITAMIN K PO Take 1 capsule by mouth daily       co-enzyme Q-10 100 MG CAPS capsule Take 100 mg by mouth daily. (Patient not taking: Reported on 12/11/2024)       norethindrone-eth estradiol (FEMHRT LOW DOSE) 0.5-2.5 MG-MCG tablet Take 1 tablet by mouth at bedtime (Patient not taking: Reported on 12/11/2024) 90 tablet 3       ALLERGIES     Allergies   Allergen Reactions     Colistimethate Anaphylaxis     Colymycin M [Colistimethate Sodium] Anaphylaxis     Tamiflu [Oseltamivir]      Gums Blister up     Hydroxyzine      Azithromycin Palpitations     Clindamycin Rash     Tizanidine Other (See Comments)     Severe panic attack       PAST MEDICAL HISTORY:  Past Medical History:   Diagnosis Date     Anxiety      Aortitis (H)      Asthma     associated with Churg Antonina syndrome     Bronchiectasis (H)      Cerebral infarction (H) 08/1990    Very small, caused small permanent optical migraine     Chronic sinusitis      Churg-Antonina syndrome (H)     dx 1990     Conductive hearing loss      Depressive disorder      Eustachian tube dysfunction      Goiter      Hearing loss, conductive      Heart rate problem      Hypertension      Hypocalcemia      Immunosuppression (H)      Infection due to 2019 novel coronavirus 04/2022 Feb 2020, April 20211, June 2022      Irritable bowel syndrome      Major depressive disorder      Mannose-binding lectin deficiency (H)      Nonspecific abnormal results of thyroid function study      Osteoporosis      Pericarditis      and      Pneumonia     4/23/10     Recurrent otitis media      Recurrent UTI      Rheumatoid vasculitis (H)      Sinusitis, chronic      Steroid long-term use      Tinnitus      Varicose veins of leg with swelling        PAST SURGICAL HISTORY:  Past Surgical History:   Procedure Laterality Date     C/SECTION, CLASSICAL        SECTION       COLONOSCOPY Left 2014    Procedure: COMBINED COLONOSCOPY, SINGLE OR MULTIPLE BIOPSY/POLYPECTOMY BY BIOPSY;  Surgeon: Js Pinedo MD;  Location:  GI     COLONOSCOPY N/A 2019    Procedure: COLONOSCOPY;  Surgeon: Bryce Pimentel MD;  Location:  GI     ENT SURGERY       ESOPHAGOSCOPY, GASTROSCOPY, DUODENOSCOPY (EGD), COMBINED N/A 2023    Procedure: ESOPHAGOGASTRODUODENOSCOPY, WITH BIOPSY;  Surgeon: Mo Rosa MD;  Location:  GI     ESOPHAGOSCOPY, GASTROSCOPY, DUODENOSCOPY (EGD), COMBINED N/A 10/17/2024    Procedure: ESOPHAGOGASTRODUODENOSCOPY with DUODENAL BIOPSY;  Surgeon: Dillon Guillen MD;  Location: [facility]     ESOPHAGOSCOPY, GASTROSCOPY, DUODENOSCOPY (EGD), DILATATION, COMBINED N/A 2023    Procedure: ESOPHAGOGASTRODUODENOSCOPY, WITH DILATION;  Surgeon: Mo Rosa MD;  Location: [facility]     GENITOURINARY SURGERY       HEAD & NECK SURGERY       HYSTERECTOMY TOTAL ABDOMINAL  2009    without oopherectomy for TEO 3 on LEEP w/ pos margins - Path all benign     HYSTERECTOMY, PAP NO LONGER INDICATED      cervix removed      LAPAROSCOPIC APPENDECTOMY N/A 2022    Procedure: APPENDECTOMY, LAPAROSCOPIC;  Surgeon: Fany Hannah MD;  Location: [facility] OR     PE TUBES       PICC INSERTION  10/10/2013    5fr DL PASV PICC, 43cm (1cm external) in the L basilic vein w/ tip in the low  SVC.     PICC INSERTION Left 05/26/2017    5fr DL BioFlo PICC, 42cm (3cm external) in the L lateral brachial vein w/ tip in the SVC RA junction.     SINUS SURGERY       TUBAL LIGATION       TYMPANOPLASTY       ZZHC COLP CERVIX/UPPER VAGINA W LOOP ELEC BX CERVIX         FAMILY HISTORY:  Family History   Problem Relation Age of Onset     Allergies Mother         Seasonal     Alcohol/Drug Mother      Substance Abuse Mother      Depression Mother      C.A.D. Father      Diabetes Father         Type 2     Depression Father      Heart Disease Father      Breast Cancer Maternal Grandmother      C.A.D. Maternal Grandmother      Arthritis Maternal Grandmother      Musculoskeletal Disorder Maternal Grandmother         MS     Heart Disease Maternal Grandmother      Hypertension Maternal Grandmother      Alcohol/Drug Maternal Grandfather      Substance Abuse Maternal Grandfather      Depression Maternal Grandfather      Unknown/Adopted Paternal Grandmother      Unknown/Adopted Paternal Grandfather      Asthma Daughter      Allergies Daughter         Seasonal     Asthma Son      Cancer Maternal Aunt         breast age 53     Breast Cancer Maternal Aunt         in her 50s     Glaucoma No family hx of      Macular Degeneration No family hx of      Colon Cancer No family hx of        SOCIAL HISTORY:  Social History     Socioeconomic History     Marital status:    Tobacco Use     Smoking status: Never     Passive exposure: Never     Smokeless tobacco: Never   Vaping Use     Vaping status: Never Used   Substance and Sexual Activity     Alcohol use: Not Currently     Drug use: No     Sexual activity: Yes     Partners: Male     Birth control/protection: Male Surgical, Female Surgical     Comment: [facility]   Other Topics Concern      Service No     Blood Transfusions No     Caffeine Concern No     Occupational Exposure No     Hobby Hazards No     Sleep Concern No     Stress Concern Yes     Weight Concern No     Special Diet  Yes     Comment: IBS diet     Back Care No     Seat Belt Yes     Self-Exams Yes   Social History Narrative    Retired  in a Radiology Department.  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 No smokers at home.  Enjoys walking and dancing.  Alcohol-abstains. No illicits.     Social Drivers of Health     Financial Resource Strain: Low Risk  (1/17/2024)    Financial Resource Strain      Within the past 12 months, have you or your family members you live with been unable to get utilities (heat, electricity) when it was really needed?: No   Food Insecurity: Low Risk  (1/17/2024)    Food Insecurity      Within the past 12 months, did you worry that your food would run out before you got money to buy more?: No      Within the past 12 months, did the food you bought just not last and you didn t have money to get more?: No   Transportation Needs: Low Risk  (1/17/2024)    Transportation Needs      Within the past 12 months, has lack of transportation kept you from medical appointments, getting your medicines, non-medical meetings or appointments, work, or from getting things that you need?: No   Interpersonal Safety: Low Risk  (10/17/2024)    Interpersonal Safety      Do you feel physically and emotionally safe where you currently live?: Yes      Within the past 12 months, have you been hit, slapped, kicked or otherwise physically hurt by someone?: No      Within the past 12 months, have you been humiliated or emotionally abused in other ways by your partner or ex-partner?: No   Housing Stability: Low Risk  (1/17/2024)    Housing Stability      Do you have housing? : Yes      Are you worried about losing your housing?: No       Review of Systems:  Skin:  Negative     Eyes:  Positive for glasses  ENT:  Positive for vertigo  Respiratory:  Positive for dyspnea on exertion  Cardiovascular:    Positive for, palpitations, edema, fatigue, dizziness  Gastroenterology: Positive for   "  Genitourinary:  Negative    Musculoskeletal:  Negative    Neurologic:  Positive for numbness or tingling of feet  Psychiatric:  Positive for excessive stress, anxiety, depression  Heme/Lymph/Imm:  Negative    Endocrine:  Positive for thyroid disorder    Physical Exam:  Vitals: BP (!) 144/88   Pulse 75   Ht 1.594 m (5' 2.75\")   Wt 56.9 kg (125 lb 6.4 oz)   LMP 02/01/2009 (LMP Unknown)   SpO2 97%   BMI 22.39 kg/m      Constitutional:  cooperative, alert and oriented, well developed, well nourished, in no acute distress        Skin:  warm and dry to the touch, no apparent skin lesions or masses noted        Head:  normocephalic, no masses or lesions        Eyes:  pupils equal and round, conjunctivae and lids unremarkable, sclera white, no xanthalasma, EOMS intact, no nystagmus        ENT:  no pallor or cyanosis, dentition good        Neck:  carotid pulses are full and equal bilaterally, JVP normal, no carotid bruit        Chest:  normal breath sounds, clear to auscultation, normal A-P diameter, normal symmetry, normal respiratory excursion, no use of accessory muscles        Cardiac: regular rhythm, normal S1 and S2, no S3 or S4, apical impulse not displaced       holosystolic murmur, grade 1, apical          Abdomen:  abdomen soft, BS normoactive        Vascular: pulses full and equal                                      Extremities and Muscular Skeletal:  no edema           Neurological:  no gross motor deficits        Psych:  affect appropriate, oriented to time, person and place     Recent Lab Results:  LIPID RESULTS:  Lab Results   Component Value Date    CHOL 236 (H) 12/15/2023    CHOL 125 10/29/2020     12/15/2023    HDL 83 10/29/2020    LDL 91 12/15/2023    LDL 34 10/29/2020    TRIG 70 12/15/2023    TRIG 40 10/29/2020    CHOLHDLRATIO 2.1 01/11/2012       LIVER ENZYME RESULTS:  Lab Results   Component Value Date    AST 21 10/23/2024    AST 21 06/15/2021    ALT 17 10/23/2024    ALT 28 06/15/2021 "       CBC RESULTS:  Lab Results   Component Value Date    WBC 7.0 11/13/2024    WBC 8.1 06/15/2021    RBC 3.91 11/13/2024    RBC 3.78 (L) 06/15/2021    HGB 13.1 11/13/2024    HGB 12.8 06/15/2021    HCT 38.1 11/13/2024    HCT 38.3 06/15/2021    MCV 97 11/13/2024     (H) 06/15/2021    MCH 33.5 (H) 11/13/2024    MCH 33.9 (H) 06/15/2021    MCHC 34.4 11/13/2024    MCHC 33.4 06/15/2021    RDW 12.9 11/13/2024    RDW 12.5 06/15/2021     11/13/2024     06/15/2021       BMP RESULTS:  Lab Results   Component Value Date     10/29/2024     10/20/2020    POTASSIUM 4.1 10/29/2024    POTASSIUM 4.3 06/23/2022    POTASSIUM 3.5 10/20/2020    CHLORIDE 106 10/29/2024    CHLORIDE 109 06/23/2022    CHLORIDE 106 10/20/2020    CO2 25 10/29/2024    CO2 25 06/23/2022    CO2 27 10/20/2020    ANIONGAP 11 10/29/2024    ANIONGAP 3 06/23/2022    ANIONGAP 6 10/20/2020    GLC 86 10/29/2024     (H) 06/24/2022     (H) 06/23/2022    GLC 91 10/20/2020    BUN 16.1 10/29/2024    BUN 8 06/23/2022    BUN 8 10/20/2020    CR 0.75 10/29/2024    CR 0.79 06/15/2021    GFRESTIMATED >90 10/29/2024    GFRESTIMATED 88 06/15/2021    GFRESTBLACK >90 06/15/2021    BOSSMAN 9.4 10/29/2024    BOSSMAN 9.0 06/15/2021        A1C RESULTS:  Lab Results   Component Value Date    A1C 5.2 10/02/2019       INR RESULTS:  Lab Results   Component Value Date    INR 1.10 06/22/2023         Sebas James MD, FACC    CC  Padmini Renee MD  [address] [place] [address]                  Thank you for allowing me to participate in the care of your patient.      Sincerely,     Sebas James MD     [facility]  cc:   Padmini Renee MD  [address] [place] [address]

## 2024-12-11 NOTE — PATIENT INSTRUCTIONS
It was a pleasure seeing you today and thank you for allowing me to be a part of your health care team.  Should   you have any questions regarding your visit or future needs please feel free to reach out to my care team for assistance.      Thank you, Dr. Sebas James        **Nursing: [phone]       **Scheduling: [phone]

## 2024-12-11 NOTE — PROGRESS NOTES
HISTORY:    Valencia Ye is a pleasant 54-year-old female with a very complex past medical history.  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  She also has severe irritable bowel syndrome, depression, anxiety, conductive hearing loss, and is here today because of worsening dyspnea.    Valencia reports that she has noticed her dyspnea worsening over several years.  She does very prolonged and vigorous yoga on a regular basis and has noticed that she can do less and less.  She also finds herself getting short of breath with activity such as walking up a flight of steps.  The degree of dyspnea tends to be variable but seems to be worse in recent months.  He is particularly bothered by dyspnea when she leans over to pet her dogs or load the .  She can walk on a flat surface with minimal difficulty but as soon as there is an upslope she is more short of breath.  She states that on a good day she is mildly dyspneic at the top of his stairs and on a bad day she gets short of breath just trying to hold a conversation.  She reports that she is followed carefully by a pulmonologist and has had recent full evaluations and no pulmonary cause of her dyspnea is apparent.  She was told that she had a low iron count and is taking iron replacement now.    Recent echocardiogram is reviewed.  Her ejection fraction is 60 to 65% and there are no significant abnormalities found.  Diastolic function is reported to be normal.    ECG shows abnormal T waves suggestive of ischemia in the inferior and anterolateral leads.    Examination today is essentially normal.    On September 29 Valencia emergency room with profound shortness of  breath began while she was having her echocardiogram.  As it is worse and she felt very lightheaded with near syncope, she had a great deal of difficulty catching her breath but did not have associated wheezing or crackles.  Her Apple Watch demonstrated adequate oxygenation levels not dropping below 96%.      ASSESSMENT/PLAN:    1.  Dyspnea on exertion, unknown etiology.  Pulmonary has done extensive workup and unable to find a pulmonary cause.  CT scan changed.  ECG shows possible ischemia although previous CT angiograms showed a calcium score of 0 in 2018.  She is not having symptoms of angina.  She had a proBNP drawn at the time of her emergency room visit in October and it was completely normal as was serial troponin.  Completely normal EF even though 2018 her E F was diminished.  I will start by arranging a stress echo to exclude ischemia.  I think then if that is normal we will proceed with cardiac MRI and consider cardiopulmonary stress testing at the [facility].  Overall my impression is that this is not very likely to be of cardiac etiology.    Thank you for inviting me to participate in the care of your patient.  Please don't hesitate to call if I can be of further assistance.  60 minutes were spent today reviewing the chart and other records, interviewing and examining the patient, and documenting our visit.    The longitudinal plan of care for the diagnosis(es)/condition(s) as documented were addressed during this visit. Due to the added complexity in care, I will continue to support Valencia in the subsequent management and with ongoing continuity of care.     Chart documentation was completed, in part, with [employer] voice-recognition software. Even though reviewed, some grammatical, spelling, and word errors may remain.       Orders Placed This Encounter   Procedures    Exercise Stress Echocardiogram     No orders of the defined types were placed in this encounter.    There are no discontinued  medications.    10 year ASCVD risk: The ASCVD Risk score (Alexis DK, et al., 2019) failed to calculate for the following reasons:    Risk score cannot be calculated because patient has a medical history suggesting prior/existing ASCVD    Encounter Diagnosis   Name Primary?    Exertional dyspnea        CURRENT MEDICATIONS:  Current Outpatient Medications   Medication Sig Dispense Refill    acetylcysteine (MUCOMYST) 20 % neb solution NEBULIZE THE CONTENTS OF ONE VIAL (4 ML) TWO TIMES A DAY. 800 mL 1    albuterol (PROAIR HFA/PROVENTIL HFA/VENTOLIN HFA) 108 (90 Base) MCG/ACT inhaler INHALE 2 PUFFS INTO THE LUNGS EVERY 4 HOURS AS NEEDED FOR SHORTNESS OF BREATH/WHEEZE 18 g 11    albuterol (PROVENTIL) (2.5 MG/3ML) 0.083% neb solution TAKE 1 VIAL VIA NEBULIZER TWICE A DAY. 180 mL 11    ciprofloxacin-dexAMETHasone (CIPRODEX) 0.3-0.1 % otic suspension INSTILL 3 DROPS INTO AFFECTED EARS 2 TIMES DAILY AS NEEDED 7.5 mL 10    cod liver oil CAPS capsule Take 1 capsule by mouth daily      fluticasone (FLONASE) 50 MCG/ACT nasal spray INSTILL 2 SPRAYS INTO BOTH NOSTRILS DAILY. 16 mL 2    fluticasone-vilanterol (BREO ELLIPTA) 200-25 MCG/ACT inhaler Inhale 1 puff into the lungs daily. 60 each 11    Glucosamine-Chondroit-Vit C-Mn (GLUCOSAMINE CHONDROITINOITIN COMPLEX) CAPS Take 3 capsules by mouth daily      Iodine, Kelp, (KELP PO) Take 2 capsules by mouth daily      LORazepam (ATIVAN) 0.5 MG tablet Take 1 tablet (0.5 mg) by mouth every 8 hours as needed for anxiety 8 tablet 0    lubiprostone (AMITIZA) 24 MCG capsule Take 1 capsule (24 mcg) by mouth 2 times daily (with meals). 180 capsule 3    magnesium 250 MG tablet Take 1 tablet by mouth daily.      metoprolol tartrate (LOPRESSOR) 25 MG tablet TAKE 0.5 TABLETS (12.5 MG) BY MOUTH 2 TIMES DAILY AS NEEDED (FOR HIGH BLOOD PRESSURE >130/80) 90 tablet 1    Omega-3 Fatty Acids (FISH OIL) 1200 MG capsule Take 4 capsules (4.8 g) by mouth daily      Potassium Gluconate 595 (99 K) MG TABS Take 5  g by mouth daily      predniSONE (DELTASONE) 5 MG tablet Take 1 tablet (5 mg) by mouth daily Total dose 5.5mg daily      Probiotic Product ([employer] PROBIOTIC MAXIMUM STRENGTH) CAPS Take 1 capsule by mouth daily      venlafaxine (EFFEXOR XR) 37.5 MG 24 hr capsule Take 37.5 mg by mouth. Pt taking every other day      vitamin D3 (CHOLECALCIFEROL) 2000 units (50 mcg) tablet Take 2 tablets by mouth daily [id number] untis daily      VITAMIN K PO Take 1 capsule by mouth daily      co-enzyme Q-10 100 MG CAPS capsule Take 100 mg by mouth daily. (Patient not taking: Reported on 12/11/2024)      norethindrone-eth estradiol (FEMHRT LOW DOSE) 0.5-2.5 MG-MCG tablet Take 1 tablet by mouth at bedtime (Patient not taking: Reported on 12/11/2024) 90 tablet 3       ALLERGIES     Allergies   Allergen Reactions    Colistimethate Anaphylaxis    Colymycin M [Colistimethate Sodium] Anaphylaxis    Tamiflu [Oseltamivir]      Gums Blister up    Hydroxyzine     Azithromycin Palpitations    Clindamycin Rash    Tizanidine Other (See Comments)     Severe panic attack       PAST MEDICAL HISTORY:  Past Medical History:   Diagnosis Date    Anxiety     Aortitis (H)     Asthma     associated with Churg Antonina syndrome    Bronchiectasis (H)     Cerebral infarction (H) 08/1990    Very small, caused small permanent optical migraine    Chronic sinusitis     Churg-Antonina syndrome (H)     dx 1990    Conductive hearing loss     Depressive disorder     Eustachian tube dysfunction     Goiter     Hearing loss, conductive     Heart rate problem     Hypertension     Hypocalcemia     Immunosuppression (H)     Infection due to 2019 novel coronavirus 04/2022 Feb 2020, April 20211, June 2022    Irritable bowel syndrome     Major depressive disorder     Mannose-binding lectin deficiency (H) 2014    Nonspecific abnormal results of thyroid function study     Osteoporosis     Pericarditis     1990 and 1992    Pneumonia     4/23/10    Recurrent otitis media     Recurrent  UTI     Rheumatoid vasculitis (H)     Sinusitis, chronic     Steroid long-term use     Tinnitus     Varicose veins of leg with swelling        PAST SURGICAL HISTORY:  Past Surgical History:   Procedure Laterality Date    C/SECTION, CLASSICAL       SECTION      COLONOSCOPY Left 2014    Procedure: COMBINED COLONOSCOPY, SINGLE OR MULTIPLE BIOPSY/POLYPECTOMY BY BIOPSY;  Surgeon: Js Pinedo MD;  Location:  GI    COLONOSCOPY N/A 2019    Procedure: COLONOSCOPY;  Surgeon: Bryce Pimentel MD;  Location:  GI    ENT SURGERY      ESOPHAGOSCOPY, GASTROSCOPY, DUODENOSCOPY (EGD), COMBINED N/A 2023    Procedure: ESOPHAGOGASTRODUODENOSCOPY, WITH BIOPSY;  Surgeon: Mo Rosa MD;  Location:  GI    ESOPHAGOSCOPY, GASTROSCOPY, DUODENOSCOPY (EGD), COMBINED N/A 10/17/2024    Procedure: ESOPHAGOGASTRODUODENOSCOPY with DUODENAL BIOPSY;  Surgeon: Dillon Guillen MD;  Location:  OR    ESOPHAGOSCOPY, GASTROSCOPY, DUODENOSCOPY (EGD), DILATATION, COMBINED N/A 2023    Procedure: ESOPHAGOGASTRODUODENOSCOPY, WITH DILATION;  Surgeon: Mo Rosa MD;  Location:  GI    GENITOURINARY SURGERY      HEAD & NECK SURGERY      HYSTERECTOMY TOTAL ABDOMINAL  2009    without oopherectomy for TEO 3 on LEEP w/ pos margins - Path all benign    HYSTERECTOMY, PAP NO LONGER INDICATED      cervix removed     LAPAROSCOPIC APPENDECTOMY N/A 2022    Procedure: APPENDECTOMY, LAPAROSCOPIC;  Surgeon: Fany Hannah MD;  Location: [facility] OR    PE TUBES      PICC INSERTION  10/10/2013    5fr DL PASV PICC, 43cm (1cm external) in the L basilic vein w/ tip in the low SVC.    PICC INSERTION Left 2017    5fr DL BioFlo PICC, 42cm (3cm external) in the L lateral brachial vein w/ tip in the SVC RA junction.    SINUS SURGERY      TUBAL LIGATION      TYMPANOPLASTY      ZZHC COLP CERVIX/UPPER VAGINA W LOOP ELEC BX CERVIX         FAMILY HISTORY:  Family History   Problem  Relation Age of Onset    Allergies Mother         Seasonal    Alcohol/Drug Mother     Substance Abuse Mother     Depression Mother     C.A.D. Father     Diabetes Father         Type 2    Depression Father     Heart Disease Father     Breast Cancer Maternal Grandmother     C.A.D. Maternal Grandmother     Arthritis Maternal Grandmother     Musculoskeletal Disorder Maternal Grandmother         MS    Heart Disease Maternal Grandmother     Hypertension Maternal Grandmother     Alcohol/Drug Maternal Grandfather     Substance Abuse Maternal Grandfather     Depression Maternal Grandfather     Unknown/Adopted Paternal Grandmother     Unknown/Adopted Paternal Grandfather     Asthma Daughter     Allergies Daughter         Seasonal    Asthma Son     Cancer Maternal Aunt         breast age 53    Breast Cancer Maternal Aunt         in her 50s    Glaucoma No family hx of     Macular Degeneration No family hx of     Colon Cancer No family hx of        SOCIAL HISTORY:  Social History     Socioeconomic History    Marital status:    Tobacco Use    Smoking status: Never     Passive exposure: Never    Smokeless tobacco: Never   Vaping Use    Vaping status: Never Used   Substance and Sexual Activity    Alcohol use: Not Currently    Drug use: No    Sexual activity: Yes     Partners: Male     Birth control/protection: Male Surgical, Female Surgical     Comment: CARMELA   Other Topics Concern     Service No    Blood Transfusions No    Caffeine Concern No    Occupational Exposure No    Hobby Hazards No    Sleep Concern No    Stress Concern Yes    Weight Concern No    Special Diet Yes     Comment: IBS diet    Back Care No    Seat Belt Yes    Self-Exams Yes   Social History Narrative    Retired  in a Radiology Department.  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 No smokers at home.  Enjoys walking and dancing.  Alcohol-abstains. No illicits.     Social Drivers of Health     Financial  "Resource Strain: Low Risk  (1/17/2024)    Financial Resource Strain     Within the past 12 months, have you or your family members you live with been unable to get utilities (heat, electricity) when it was really needed?: No   Food Insecurity: Low Risk  (1/17/2024)    Food Insecurity     Within the past 12 months, did you worry that your food would run out before you got money to buy more?: No     Within the past 12 months, did the food you bought just not last and you didn t have money to get more?: No   Transportation Needs: Low Risk  (1/17/2024)    Transportation Needs     Within the past 12 months, has lack of transportation kept you from medical appointments, getting your medicines, non-medical meetings or appointments, work, or from getting things that you need?: No   Interpersonal Safety: Low Risk  (10/17/2024)    Interpersonal Safety     Do you feel physically and emotionally safe where you currently live?: Yes     Within the past 12 months, have you been hit, slapped, kicked or otherwise physically hurt by someone?: No     Within the past 12 months, have you been humiliated or emotionally abused in other ways by your partner or ex-partner?: No   Housing Stability: Low Risk  (1/17/2024)    Housing Stability     Do you have housing? : Yes     Are you worried about losing your housing?: No       Review of Systems:  Skin:  Negative     Eyes:  Positive for glasses  ENT:  Positive for vertigo  Respiratory:  Positive for dyspnea on exertion  Cardiovascular:    Positive for, palpitations, edema, fatigue, dizziness  Gastroenterology: Positive for    Genitourinary:  Negative    Musculoskeletal:  Negative    Neurologic:  Positive for numbness or tingling of feet  Psychiatric:  Positive for excessive stress, anxiety, depression  Heme/Lymph/Imm:  Negative    Endocrine:  Positive for thyroid disorder    Physical Exam:  Vitals: BP (!) 144/88   Pulse 75   Ht 1.594 m (5' 2.75\")   Wt 56.9 kg (125 lb 6.4 oz)   LMP " 02/01/2009 (LMP Unknown)   SpO2 97%   BMI 22.39 kg/m      Constitutional:  cooperative, alert and oriented, well developed, well nourished, in no acute distress        Skin:  warm and dry to the touch, no apparent skin lesions or masses noted        Head:  normocephalic, no masses or lesions        Eyes:  pupils equal and round, conjunctivae and lids unremarkable, sclera white, no xanthalasma, EOMS intact, no nystagmus        ENT:  no pallor or cyanosis, dentition good        Neck:  carotid pulses are full and equal bilaterally, JVP normal, no carotid bruit        Chest:  normal breath sounds, clear to auscultation, normal A-P diameter, normal symmetry, normal respiratory excursion, no use of accessory muscles        Cardiac: regular rhythm, normal S1 and S2, no S3 or S4, apical impulse not displaced       holosystolic murmur, grade 1, apical          Abdomen:  abdomen soft, BS normoactive        Vascular: pulses full and equal                                      Extremities and Muscular Skeletal:  no edema           Neurological:  no gross motor deficits        Psych:  affect appropriate, oriented to time, person and place     Recent Lab Results:  LIPID RESULTS:  Lab Results   Component Value Date    CHOL 236 (H) 12/15/2023    CHOL 125 10/29/2020     12/15/2023    HDL 83 10/29/2020    LDL 91 12/15/2023    LDL 34 10/29/2020    TRIG 70 12/15/2023    TRIG 40 10/29/2020    CHOLHDLRATIO 2.1 01/11/2012       LIVER ENZYME RESULTS:  Lab Results   Component Value Date    AST 21 10/23/2024    AST 21 06/15/2021    ALT 17 10/23/2024    ALT 28 06/15/2021       CBC RESULTS:  Lab Results   Component Value Date    WBC 7.0 11/13/2024    WBC 8.1 06/15/2021    RBC 3.91 11/13/2024    RBC 3.78 (L) 06/15/2021    HGB 13.1 11/13/2024    HGB 12.8 06/15/2021    HCT 38.1 11/13/2024    HCT 38.3 06/15/2021    MCV 97 11/13/2024     (H) 06/15/2021    MCH 33.5 (H) 11/13/2024    MCH 33.9 (H) 06/15/2021    MCHC 34.4 11/13/2024     MCHC 33.4 06/15/2021    RDW 12.9 11/13/2024    RDW 12.5 06/15/2021     11/13/2024     06/15/2021       BMP RESULTS:  Lab Results   Component Value Date     10/29/2024     10/20/2020    POTASSIUM 4.1 10/29/2024    POTASSIUM 4.3 06/23/2022    POTASSIUM 3.5 10/20/2020    CHLORIDE 106 10/29/2024    CHLORIDE 109 06/23/2022    CHLORIDE 106 10/20/2020    CO2 25 10/29/2024    CO2 25 06/23/2022    CO2 27 10/20/2020    ANIONGAP 11 10/29/2024    ANIONGAP 3 06/23/2022    ANIONGAP 6 10/20/2020    GLC 86 10/29/2024     (H) 06/24/2022     (H) 06/23/2022    GLC 91 10/20/2020    BUN 16.1 10/29/2024    BUN 8 06/23/2022    BUN 8 10/20/2020    CR 0.75 10/29/2024    CR 0.79 06/15/2021    GFRESTIMATED >90 10/29/2024    GFRESTIMATED 88 06/15/2021    GFRESTBLACK >90 06/15/2021    BOSSMAN 9.4 10/29/2024    BOSSMAN 9.0 06/15/2021        A1C RESULTS:  Lab Results   Component Value Date    A1C 5.2 10/02/2019       INR RESULTS:  Lab Results   Component Value Date    INR 1.10 06/22/2023         Sebas James MD, [facility]    CC  Padmini Renee MD  [address] [place] [address]

## 2024-12-13 ENCOUNTER — APPOINTMENT (OUTPATIENT)
Dept: CT IMAGING | Facility: CLINIC | Age: 54
End: 2024-12-13
Attending: EMERGENCY MEDICINE
Payer: COMMERCIAL

## 2024-12-13 ENCOUNTER — HOSPITAL ENCOUNTER (EMERGENCY)
Facility: CLINIC | Age: 54
Discharge: HOME | End: 2024-12-13
Attending: EMERGENCY MEDICINE | Admitting: EMERGENCY MEDICINE
Payer: COMMERCIAL

## 2024-12-13 VITALS
SYSTOLIC BLOOD PRESSURE: 144 MMHG | DIASTOLIC BLOOD PRESSURE: 80 MMHG | OXYGEN SATURATION: 95 % | WEIGHT: 123 LBS | HEART RATE: 112 BPM | TEMPERATURE: 97.3 F | BODY MASS INDEX: 22.63 KG/M2 | HEIGHT: 62 IN

## 2024-12-13 DIAGNOSIS — R11.2 NAUSEA VOMITING AND DIARRHEA: ICD-10-CM

## 2024-12-13 DIAGNOSIS — R19.7 NAUSEA VOMITING AND DIARRHEA: ICD-10-CM

## 2024-12-13 DIAGNOSIS — R10.9 ABDOMINAL PAIN, UNSPECIFIED ABDOMINAL LOCATION: ICD-10-CM

## 2024-12-13 LAB
ALBUMIN SERPL BCG-MCNC: 4.5 G/DL (ref 3.5–5.2)
ALP SERPL-CCNC: 52 U/L (ref 40–150)
ALT SERPL W P-5'-P-CCNC: 17 U/L (ref 0–50)
ANION GAP SERPL CALCULATED.3IONS-SCNC: 15 MMOL/L (ref 7–15)
AST SERPL W P-5'-P-CCNC: 25 U/L (ref 0–45)
BASOPHILS # BLD AUTO: 0 10E3/UL (ref 0–0.2)
BASOPHILS NFR BLD AUTO: 0 %
BILIRUB SERPL-MCNC: 0.5 MG/DL
BUN SERPL-MCNC: 16.7 MG/DL (ref 6–20)
CALCIUM SERPL-MCNC: 9.2 MG/DL (ref 8.8–10.4)
CHLORIDE SERPL-SCNC: 100 MMOL/L (ref 98–107)
CREAT SERPL-MCNC: 0.77 MG/DL (ref 0.51–0.95)
EGFRCR SERPLBLD CKD-EPI 2021: >90 ML/MIN/1.73M2
EOSINOPHIL # BLD AUTO: 0.3 10E3/UL (ref 0–0.7)
EOSINOPHIL NFR BLD AUTO: 2 %
ERYTHROCYTE [DISTWIDTH] IN BLOOD BY AUTOMATED COUNT: 13.1 % (ref 10–15)
GLUCOSE SERPL-MCNC: 98 MG/DL (ref 70–99)
HCO3 SERPL-SCNC: 22 MMOL/L (ref 22–29)
HCT VFR BLD AUTO: 44.5 % (ref 35–47)
HGB BLD-MCNC: 15 G/DL (ref 11.7–15.7)
HOLD SPECIMEN: NORMAL
IMM GRANULOCYTES # BLD: 0.1 10E3/UL
IMM GRANULOCYTES NFR BLD: 0 %
LIPASE SERPL-CCNC: 32 U/L (ref 13–60)
LYMPHOCYTES # BLD AUTO: 0.3 10E3/UL (ref 0.8–5.3)
LYMPHOCYTES NFR BLD AUTO: 2 %
MCH RBC QN AUTO: 32.5 PG (ref 26.5–33)
MCHC RBC AUTO-ENTMCNC: 33.7 G/DL (ref 31.5–36.5)
MCV RBC AUTO: 97 FL (ref 78–100)
MONOCYTES # BLD AUTO: 0.3 10E3/UL (ref 0–1.3)
MONOCYTES NFR BLD AUTO: 2 %
NEUTROPHILS # BLD AUTO: 15.8 10E3/UL (ref 1.6–8.3)
NEUTROPHILS NFR BLD AUTO: 94 %
NRBC # BLD AUTO: 0 10E3/UL
NRBC BLD AUTO-RTO: 0 /100
PLATELET # BLD AUTO: 358 10E3/UL (ref 150–450)
POTASSIUM SERPL-SCNC: 3.9 MMOL/L (ref 3.4–5.3)
PROT SERPL-MCNC: 6.8 G/DL (ref 6.4–8.3)
RBC # BLD AUTO: 4.61 10E6/UL (ref 3.8–5.2)
SODIUM SERPL-SCNC: 137 MMOL/L (ref 135–145)
WBC # BLD AUTO: 16.8 10E3/UL (ref 4–11)

## 2024-12-13 PROCEDURE — 74177 CT ABD & PELVIS W/CONTRAST: CPT

## 2024-12-13 PROCEDURE — 99285 EMERGENCY DEPT VISIT HI MDM: CPT | Mod: 25

## 2024-12-13 PROCEDURE — 83690 ASSAY OF LIPASE: CPT | Performed by: EMERGENCY MEDICINE

## 2024-12-13 PROCEDURE — 96361 HYDRATE IV INFUSION ADD-ON: CPT

## 2024-12-13 PROCEDURE — 250N000011 HC RX IP 250 OP 636: Performed by: EMERGENCY MEDICINE

## 2024-12-13 PROCEDURE — 258N000003 HC RX IP 258 OP 636: Performed by: EMERGENCY MEDICINE

## 2024-12-13 PROCEDURE — 85041 AUTOMATED RBC COUNT: CPT | Performed by: EMERGENCY MEDICINE

## 2024-12-13 PROCEDURE — 96375 TX/PRO/DX INJ NEW DRUG ADDON: CPT

## 2024-12-13 PROCEDURE — 80053 COMPREHEN METABOLIC PANEL: CPT | Performed by: EMERGENCY MEDICINE

## 2024-12-13 PROCEDURE — 96374 THER/PROPH/DIAG INJ IV PUSH: CPT | Mod: 59

## 2024-12-13 PROCEDURE — 36415 COLL VENOUS BLD VENIPUNCTURE: CPT | Performed by: EMERGENCY MEDICINE

## 2024-12-13 PROCEDURE — 85004 AUTOMATED DIFF WBC COUNT: CPT | Performed by: EMERGENCY MEDICINE

## 2024-12-13 RX ORDER — ONDANSETRON 2 MG/ML
4 INJECTION INTRAMUSCULAR; INTRAVENOUS ONCE
Status: COMPLETED | OUTPATIENT
Start: 2024-12-13 | End: 2024-12-13

## 2024-12-13 RX ORDER — KETOROLAC TROMETHAMINE 15 MG/ML
15 INJECTION, SOLUTION INTRAMUSCULAR; INTRAVENOUS ONCE
Status: COMPLETED | OUTPATIENT
Start: 2024-12-13 | End: 2024-12-13

## 2024-12-13 RX ORDER — DIPHENHYDRAMINE HYDROCHLORIDE 50 MG/ML
25 INJECTION INTRAMUSCULAR; INTRAVENOUS ONCE
Status: COMPLETED | OUTPATIENT
Start: 2024-12-13 | End: 2024-12-13

## 2024-12-13 RX ORDER — ONDANSETRON 4 MG/1
4 TABLET, ORALLY DISINTEGRATING ORAL EVERY 8 HOURS PRN
Qty: 10 TABLET | Refills: 0 | Status: SHIPPED | OUTPATIENT
Start: 2024-12-13 | End: 2024-12-16

## 2024-12-13 RX ORDER — IOPAMIDOL 755 MG/ML
500 INJECTION, SOLUTION INTRAVASCULAR ONCE
Status: COMPLETED | OUTPATIENT
Start: 2024-12-13 | End: 2024-12-13

## 2024-12-13 RX ADMIN — PROCHLORPERAZINE EDISYLATE 5 MG: 5 INJECTION INTRAMUSCULAR; INTRAVENOUS at 14:56

## 2024-12-13 RX ADMIN — DIPHENHYDRAMINE HYDROCHLORIDE 25 MG: 50 INJECTION, SOLUTION INTRAMUSCULAR; INTRAVENOUS at 14:55

## 2024-12-13 RX ADMIN — SODIUM CHLORIDE, POTASSIUM CHLORIDE, SODIUM LACTATE AND CALCIUM CHLORIDE 1000 ML: 600; 310; 30; 20 INJECTION, SOLUTION INTRAVENOUS at 15:00

## 2024-12-13 RX ADMIN — IOPAMIDOL 62 ML: 755 INJECTION, SOLUTION INTRAVENOUS at 15:22

## 2024-12-13 RX ADMIN — KETOROLAC TROMETHAMINE 15 MG: 15 INJECTION, SOLUTION INTRAMUSCULAR; INTRAVENOUS at 15:11

## 2024-12-13 RX ADMIN — ONDANSETRON 4 MG: 2 INJECTION INTRAMUSCULAR; INTRAVENOUS at 14:55

## 2024-12-13 ASSESSMENT — COLUMBIA-SUICIDE SEVERITY RATING SCALE - C-SSRS
6. HAVE YOU EVER DONE ANYTHING, STARTED TO DO ANYTHING, OR PREPARED TO DO ANYTHING TO END YOUR LIFE?: NO
1. IN THE PAST MONTH, HAVE YOU WISHED YOU WERE DEAD OR WISHED YOU COULD GO TO SLEEP AND NOT WAKE UP?: NO
2. HAVE YOU ACTUALLY HAD ANY THOUGHTS OF KILLING YOURSELF IN THE PAST MONTH?: NO

## 2024-12-13 ASSESSMENT — ACTIVITIES OF DAILY LIVING (ADL)
ADLS_ACUITY_SCORE: 46

## 2024-12-13 NOTE — ED TRIAGE NOTES
The following hx is provided by EMS:    Patient was at yoga this morning, when she got home started feeling upset stomach, abdominal pain, nausea, vomiting, diarrhea. 300ml IVF and 4mg zofran given. Significant medical history; in and out of hospital recently. . VSS.

## 2024-12-19 ENCOUNTER — TELEPHONE (OUTPATIENT)
Dept: GASTROENTEROLOGY | Facility: CLINIC | Age: 54
End: 2024-12-19
Payer: COMMERCIAL

## 2024-12-19 NOTE — TELEPHONE ENCOUNTER
Left Voicemail (1st Attempt) for the patient to call back and schedule the following:    Appointment type: return   Provider: Stefanie Ibarra   Return date: 2/13/2025  Specialty phone number: [phone]  Additional appointment(s) needed:   Additonal Notes:

## 2024-12-27 ENCOUNTER — HOSPITAL ENCOUNTER (OUTPATIENT)
Dept: CARDIOLOGY | Facility: CLINIC | Age: 54
Discharge: HOME | End: 2024-12-27
Attending: INTERNAL MEDICINE | Admitting: INTERNAL MEDICINE
Payer: COMMERCIAL

## 2024-12-27 DIAGNOSIS — R06.09 EXERTIONAL DYSPNEA: ICD-10-CM

## 2024-12-27 PROCEDURE — 255N000002 HC RX 255 OP 636: Performed by: INTERNAL MEDICINE

## 2024-12-27 PROCEDURE — C8928 TTE W OR W/O FOL W/CON,STRES: HCPCS | Mod: 52

## 2024-12-27 PROCEDURE — 93325 DOPPLER ECHO COLOR FLOW MAPG: CPT | Mod: TC,52

## 2024-12-27 RX ADMIN — HUMAN ALBUMIN MICROSPHERES AND PERFLUTREN 4 ML: 10; .22 INJECTION, SOLUTION INTRAVENOUS at 14:40

## 2024-12-31 ENCOUNTER — TELEPHONE (OUTPATIENT)
Dept: CARDIOLOGY | Facility: CLINIC | Age: 54
End: 2024-12-31
Payer: COMMERCIAL

## 2024-12-31 DIAGNOSIS — R00.2 PALPITATIONS: ICD-10-CM

## 2024-12-31 DIAGNOSIS — M30.1 CHURG-STRAUSS SYNDROME (H): ICD-10-CM

## 2024-12-31 DIAGNOSIS — M30.1 EOSINOPHILIC GRANULOMATOSIS WITH POLYANGIITIS (EGPA) WITH LUNG INVOLVEMENT (H): ICD-10-CM

## 2024-12-31 DIAGNOSIS — R06.09 EXERTIONAL DYSPNEA: Primary | ICD-10-CM

## 2024-12-31 DIAGNOSIS — I10 BENIGN ESSENTIAL HYPERTENSION: ICD-10-CM

## 2024-12-31 DIAGNOSIS — R06.00 DYSPNEA, UNSPECIFIED TYPE: ICD-10-CM

## 2024-12-31 DIAGNOSIS — D72.18 CHURG-STRAUSS SYNDROME (H): ICD-10-CM

## 2024-12-31 DIAGNOSIS — D72.18 EOSINOPHILIC GRANULOMATOSIS WITH POLYANGIITIS (EGPA) WITH LUNG INVOLVEMENT (H): ICD-10-CM

## 2024-12-31 NOTE — TELEPHONE ENCOUNTER
Called pt to review recommendations below. Pt agrees with having Cardiac MRI w/ stress. Placed order, sent message to scheduling.     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  Lets repeat cardiac MRI with stress testing.    Nichole GAYTAN  [facility]

## 2025-01-03 ENCOUNTER — APPOINTMENT (OUTPATIENT)
Dept: CT IMAGING | Facility: CLINIC | Age: 55
End: 2025-01-03
Payer: COMMERCIAL

## 2025-01-03 ENCOUNTER — HOSPITAL ENCOUNTER (OUTPATIENT)
Facility: CLINIC | Age: 55
Setting detail: OBSERVATION
Discharge: HOME | End: 2025-01-05
Attending: EMERGENCY MEDICINE | Admitting: INTERNAL MEDICINE
Payer: COMMERCIAL

## 2025-01-03 DIAGNOSIS — R10.32 ABDOMINAL PAIN, LEFT LOWER QUADRANT: ICD-10-CM

## 2025-01-03 DIAGNOSIS — K52.9 ACUTE COLITIS: ICD-10-CM

## 2025-01-03 DIAGNOSIS — R11.0 NAUSEA: ICD-10-CM

## 2025-01-03 LAB
ALBUMIN SERPL BCG-MCNC: 4.7 G/DL (ref 3.5–5.2)
ALP SERPL-CCNC: 63 U/L (ref 40–150)
ALT SERPL W P-5'-P-CCNC: 20 U/L (ref 0–50)
ANION GAP SERPL CALCULATED.3IONS-SCNC: 17 MMOL/L (ref 7–15)
AST SERPL W P-5'-P-CCNC: 27 U/L (ref 0–45)
BASOPHILS # BLD AUTO: 0 10E3/UL (ref 0–0.2)
BASOPHILS NFR BLD AUTO: 1 %
BILIRUB SERPL-MCNC: 0.3 MG/DL
BUN SERPL-MCNC: 15.2 MG/DL (ref 6–20)
CALCIUM SERPL-MCNC: 9.9 MG/DL (ref 8.8–10.4)
CHLORIDE SERPL-SCNC: 100 MMOL/L (ref 98–107)
CREAT SERPL-MCNC: 0.77 MG/DL (ref 0.51–0.95)
EGFRCR SERPLBLD CKD-EPI 2021: >90 ML/MIN/1.73M2
EOSINOPHIL # BLD AUTO: 0.2 10E3/UL (ref 0–0.7)
EOSINOPHIL NFR BLD AUTO: 2 %
ERYTHROCYTE [DISTWIDTH] IN BLOOD BY AUTOMATED COUNT: 13.2 % (ref 10–15)
GLUCOSE SERPL-MCNC: 88 MG/DL (ref 70–99)
HCO3 SERPL-SCNC: 23 MMOL/L (ref 22–29)
HCT VFR BLD AUTO: 44.1 % (ref 35–47)
HGB BLD-MCNC: 14.9 G/DL (ref 11.7–15.7)
HOLD SPECIMEN: NORMAL
IMM GRANULOCYTES # BLD: 0 10E3/UL
IMM GRANULOCYTES NFR BLD: 0 %
LACTATE SERPL-SCNC: 0.9 MMOL/L (ref 0.7–2)
LIPASE SERPL-CCNC: 34 U/L (ref 13–60)
LYMPHOCYTES # BLD AUTO: 0.7 10E3/UL (ref 0.8–5.3)
LYMPHOCYTES NFR BLD AUTO: 10 %
MCH RBC QN AUTO: 32.5 PG (ref 26.5–33)
MCHC RBC AUTO-ENTMCNC: 33.8 G/DL (ref 31.5–36.5)
MCV RBC AUTO: 96 FL (ref 78–100)
MONOCYTES # BLD AUTO: 0 10E3/UL (ref 0–1.3)
MONOCYTES NFR BLD AUTO: 0 %
NEUTROPHILS # BLD AUTO: 6.2 10E3/UL (ref 1.6–8.3)
NEUTROPHILS NFR BLD AUTO: 87 %
NRBC # BLD AUTO: 0 10E3/UL
NRBC BLD AUTO-RTO: 0 /100
PLATELET # BLD AUTO: 319 10E3/UL (ref 150–450)
POTASSIUM SERPL-SCNC: 3.7 MMOL/L (ref 3.4–5.3)
PROT SERPL-MCNC: 7.1 G/DL (ref 6.4–8.3)
RBC # BLD AUTO: 4.59 10E6/UL (ref 3.8–5.2)
SODIUM SERPL-SCNC: 140 MMOL/L (ref 135–145)
TROPONIN T SERPL HS-MCNC: 12 NG/L
TROPONIN T SERPL HS-MCNC: 9 NG/L
WBC # BLD AUTO: 7.1 10E3/UL (ref 4–11)

## 2025-01-03 PROCEDURE — 36415 COLL VENOUS BLD VENIPUNCTURE: CPT | Performed by: EMERGENCY MEDICINE

## 2025-01-03 PROCEDURE — 96361 HYDRATE IV INFUSION ADD-ON: CPT

## 2025-01-03 PROCEDURE — G0378 HOSPITAL OBSERVATION PER HR: HCPCS

## 2025-01-03 PROCEDURE — 96374 THER/PROPH/DIAG INJ IV PUSH: CPT

## 2025-01-03 PROCEDURE — 250N000011 HC RX IP 250 OP 636: Performed by: INTERNAL MEDICINE

## 2025-01-03 PROCEDURE — 74177 CT ABD & PELVIS W/CONTRAST: CPT

## 2025-01-03 PROCEDURE — 96376 TX/PRO/DX INJ SAME DRUG ADON: CPT

## 2025-01-03 PROCEDURE — 82310 ASSAY OF CALCIUM: CPT | Performed by: EMERGENCY MEDICINE

## 2025-01-03 PROCEDURE — 250N000011 HC RX IP 250 OP 636

## 2025-01-03 PROCEDURE — 83605 ASSAY OF LACTIC ACID: CPT

## 2025-01-03 PROCEDURE — 258N000003 HC RX IP 258 OP 636

## 2025-01-03 PROCEDURE — 99285 EMERGENCY DEPT VISIT HI MDM: CPT | Mod: 25

## 2025-01-03 PROCEDURE — 86140 C-REACTIVE PROTEIN: CPT | Performed by: STUDENT IN AN ORGANIZED HEALTH CARE EDUCATION/TRAINING PROGRAM

## 2025-01-03 PROCEDURE — 93005 ELECTROCARDIOGRAM TRACING: CPT

## 2025-01-03 PROCEDURE — 83690 ASSAY OF LIPASE: CPT | Performed by: EMERGENCY MEDICINE

## 2025-01-03 PROCEDURE — 36415 COLL VENOUS BLD VENIPUNCTURE: CPT

## 2025-01-03 PROCEDURE — 85025 COMPLETE CBC W/AUTO DIFF WBC: CPT | Performed by: EMERGENCY MEDICINE

## 2025-01-03 PROCEDURE — 84484 ASSAY OF TROPONIN QUANT: CPT | Performed by: EMERGENCY MEDICINE

## 2025-01-03 PROCEDURE — 85018 HEMOGLOBIN: CPT | Performed by: EMERGENCY MEDICINE

## 2025-01-03 PROCEDURE — 99222 1ST HOSP IP/OBS MODERATE 55: CPT | Performed by: INTERNAL MEDICINE

## 2025-01-03 PROCEDURE — 250N000009 HC RX 250

## 2025-01-03 PROCEDURE — 96375 TX/PRO/DX INJ NEW DRUG ADDON: CPT

## 2025-01-03 PROCEDURE — 84155 ASSAY OF PROTEIN SERUM: CPT | Performed by: EMERGENCY MEDICINE

## 2025-01-03 RX ORDER — ONDANSETRON 2 MG/ML
4 INJECTION INTRAMUSCULAR; INTRAVENOUS EVERY 30 MIN PRN
Status: DISCONTINUED | OUTPATIENT
Start: 2025-01-03 | End: 2025-01-03

## 2025-01-03 RX ORDER — IOPAMIDOL 755 MG/ML
60 INJECTION, SOLUTION INTRAVASCULAR ONCE
Status: COMPLETED | OUTPATIENT
Start: 2025-01-03 | End: 2025-01-03

## 2025-01-03 RX ORDER — HYDROMORPHONE HYDROCHLORIDE 1 MG/ML
0.5 INJECTION, SOLUTION INTRAMUSCULAR; INTRAVENOUS; SUBCUTANEOUS ONCE
Status: COMPLETED | OUTPATIENT
Start: 2025-01-03 | End: 2025-01-03

## 2025-01-03 RX ORDER — ONDANSETRON 4 MG/1
4 TABLET, ORALLY DISINTEGRATING ORAL EVERY 6 HOURS PRN
Status: DISCONTINUED | OUTPATIENT
Start: 2025-01-03 | End: 2025-01-05 | Stop reason: HOSPADM

## 2025-01-03 RX ORDER — PROCHLORPERAZINE MALEATE 10 MG
10 TABLET ORAL EVERY 6 HOURS PRN
Status: DISCONTINUED | OUTPATIENT
Start: 2025-01-03 | End: 2025-01-05 | Stop reason: HOSPADM

## 2025-01-03 RX ORDER — HYDROMORPHONE HYDROCHLORIDE 1 MG/ML
0.5 INJECTION, SOLUTION INTRAMUSCULAR; INTRAVENOUS; SUBCUTANEOUS
Status: DISCONTINUED | OUTPATIENT
Start: 2025-01-03 | End: 2025-01-04

## 2025-01-03 RX ORDER — ONDANSETRON 2 MG/ML
4 INJECTION INTRAMUSCULAR; INTRAVENOUS EVERY 6 HOURS PRN
Status: DISCONTINUED | OUTPATIENT
Start: 2025-01-03 | End: 2025-01-05 | Stop reason: HOSPADM

## 2025-01-03 RX ADMIN — HYDROMORPHONE HYDROCHLORIDE 0.5 MG: 1 INJECTION, SOLUTION INTRAMUSCULAR; INTRAVENOUS; SUBCUTANEOUS at 23:56

## 2025-01-03 RX ADMIN — IOPAMIDOL 60 ML: 755 INJECTION, SOLUTION INTRAVENOUS at 20:49

## 2025-01-03 RX ADMIN — SODIUM CHLORIDE 1000 ML: 9 INJECTION, SOLUTION INTRAVENOUS at 20:38

## 2025-01-03 RX ADMIN — SODIUM CHLORIDE 1000 ML: 9 INJECTION, SOLUTION INTRAVENOUS at 21:57

## 2025-01-03 RX ADMIN — ONDANSETRON 4 MG: 2 INJECTION, SOLUTION INTRAMUSCULAR; INTRAVENOUS at 20:40

## 2025-01-03 RX ADMIN — SODIUM CHLORIDE 60 ML: 9 INJECTION, SOLUTION INTRAVENOUS at 20:49

## 2025-01-03 RX ADMIN — HYDROMORPHONE HYDROCHLORIDE 0.5 MG: 1 INJECTION, SOLUTION INTRAMUSCULAR; INTRAVENOUS; SUBCUTANEOUS at 20:36

## 2025-01-03 RX ADMIN — ONDANSETRON 4 MG: 2 INJECTION, SOLUTION INTRAMUSCULAR; INTRAVENOUS at 23:55

## 2025-01-03 RX ADMIN — HYDROMORPHONE HYDROCHLORIDE 0.5 MG: 1 INJECTION, SOLUTION INTRAMUSCULAR; INTRAVENOUS; SUBCUTANEOUS at 21:57

## 2025-01-03 ASSESSMENT — ACTIVITIES OF DAILY LIVING (ADL)
ADLS_ACUITY_SCORE: 46

## 2025-01-03 ASSESSMENT — COLUMBIA-SUICIDE SEVERITY RATING SCALE - C-SSRS
2. HAVE YOU ACTUALLY HAD ANY THOUGHTS OF KILLING YOURSELF IN THE PAST MONTH?: NO
1. IN THE PAST MONTH, HAVE YOU WISHED YOU WERE DEAD OR WISHED YOU COULD GO TO SLEEP AND NOT WAKE UP?: NO
6. HAVE YOU EVER DONE ANYTHING, STARTED TO DO ANYTHING, OR PREPARED TO DO ANYTHING TO END YOUR LIFE?: NO

## 2025-01-04 DIAGNOSIS — D72.18 CHURG-STRAUSS SYNDROME (H): Primary | ICD-10-CM

## 2025-01-04 DIAGNOSIS — K52.9 COLITIS: ICD-10-CM

## 2025-01-04 DIAGNOSIS — M30.1 CHURG-STRAUSS SYNDROME (H): Primary | ICD-10-CM

## 2025-01-04 LAB
ADV 40+41 DNA STL QL NAA+NON-PROBE: NEGATIVE
ALBUMIN UR-MCNC: NEGATIVE MG/DL
APPEARANCE UR: CLEAR
ASTRO TYP 1-8 RNA STL QL NAA+NON-PROBE: NEGATIVE
BILIRUB UR QL STRIP: NEGATIVE
C CAYETANENSIS DNA STL QL NAA+NON-PROBE: NEGATIVE
CAMPYLOBACTER DNA SPEC NAA+PROBE: NEGATIVE
COLOR UR AUTO: ABNORMAL
CRP SERPL-MCNC: 3.31 MG/L
CRYPTOSP DNA STL QL NAA+NON-PROBE: NEGATIVE
E COLI O157 DNA STL QL NAA+NON-PROBE: ABNORMAL
E HISTOLYT DNA STL QL NAA+NON-PROBE: NEGATIVE
EAEC ASTA GENE ISLT QL NAA+PROBE: NEGATIVE
EC STX1+STX2 GENES STL QL NAA+NON-PROBE: NEGATIVE
EPEC EAE GENE STL QL NAA+NON-PROBE: NEGATIVE
ETEC LTA+ST1A+ST1B TOX ST NAA+NON-PROBE: NEGATIVE
G LAMBLIA DNA STL QL NAA+NON-PROBE: NEGATIVE
GLUCOSE UR STRIP-MCNC: NEGATIVE MG/DL
HGB UR QL STRIP: NEGATIVE
KETONES UR STRIP-MCNC: 150 MG/DL
LEUKOCYTE ESTERASE UR QL STRIP: NEGATIVE
MUCOUS THREADS #/AREA URNS LPF: PRESENT /LPF
NITRATE UR QL: NEGATIVE
NOROVIRUS GI+II RNA STL QL NAA+NON-PROBE: POSITIVE
P SHIGELLOIDES DNA STL QL NAA+NON-PROBE: NEGATIVE
PH UR STRIP: 5 [PH] (ref 5–7)
RBC URINE: 1 /HPF
RVA RNA STL QL NAA+NON-PROBE: NEGATIVE
SALMONELLA SP RPOD STL QL NAA+PROBE: NEGATIVE
SAPO I+II+IV+V RNA STL QL NAA+NON-PROBE: NEGATIVE
SHIGELLA SP+EIEC IPAH ST NAA+NON-PROBE: NEGATIVE
SP GR UR STRIP: 1.01 (ref 1–1.03)
UROBILINOGEN UR STRIP-MCNC: NORMAL MG/DL
V CHOLERAE DNA SPEC QL NAA+PROBE: NEGATIVE
VIBRIO DNA SPEC NAA+PROBE: NEGATIVE
WBC URINE: 0 /HPF
Y ENTEROCOL DNA STL QL NAA+PROBE: NEGATIVE

## 2025-01-04 PROCEDURE — G0378 HOSPITAL OBSERVATION PER HR: HCPCS

## 2025-01-04 PROCEDURE — 250N000011 HC RX IP 250 OP 636: Performed by: INTERNAL MEDICINE

## 2025-01-04 PROCEDURE — 250N000012 HC RX MED GY IP 250 OP 636 PS 637: Performed by: STUDENT IN AN ORGANIZED HEALTH CARE EDUCATION/TRAINING PROGRAM

## 2025-01-04 PROCEDURE — 250N000013 HC RX MED GY IP 250 OP 250 PS 637: Performed by: INTERNAL MEDICINE

## 2025-01-04 PROCEDURE — 81003 URINALYSIS AUTO W/O SCOPE: CPT | Performed by: EMERGENCY MEDICINE

## 2025-01-04 PROCEDURE — 250N000013 HC RX MED GY IP 250 OP 250 PS 637: Performed by: STUDENT IN AN ORGANIZED HEALTH CARE EDUCATION/TRAINING PROGRAM

## 2025-01-04 PROCEDURE — 99232 SBSQ HOSP IP/OBS MODERATE 35: CPT | Performed by: STUDENT IN AN ORGANIZED HEALTH CARE EDUCATION/TRAINING PROGRAM

## 2025-01-04 PROCEDURE — 96376 TX/PRO/DX INJ SAME DRUG ADON: CPT

## 2025-01-04 PROCEDURE — 250N000011 HC RX IP 250 OP 636: Performed by: STUDENT IN AN ORGANIZED HEALTH CARE EDUCATION/TRAINING PROGRAM

## 2025-01-04 PROCEDURE — 258N000003 HC RX IP 258 OP 636: Performed by: INTERNAL MEDICINE

## 2025-01-04 PROCEDURE — 96375 TX/PRO/DX INJ NEW DRUG ADDON: CPT

## 2025-01-04 PROCEDURE — 87507 IADNA-DNA/RNA PROBE TQ 12-25: CPT | Performed by: STUDENT IN AN ORGANIZED HEALTH CARE EDUCATION/TRAINING PROGRAM

## 2025-01-04 RX ORDER — HYDROMORPHONE HYDROCHLORIDE 1 MG/ML
0.5 INJECTION, SOLUTION INTRAMUSCULAR; INTRAVENOUS; SUBCUTANEOUS
Status: DISCONTINUED | OUTPATIENT
Start: 2025-01-04 | End: 2025-01-05 | Stop reason: HOSPADM

## 2025-01-04 RX ORDER — SODIUM CHLORIDE, SODIUM LACTATE, POTASSIUM CHLORIDE, CALCIUM CHLORIDE 600; 310; 30; 20 MG/100ML; MG/100ML; MG/100ML; MG/100ML
INJECTION, SOLUTION INTRAVENOUS CONTINUOUS
Status: ACTIVE | OUTPATIENT
Start: 2025-01-04 | End: 2025-01-04

## 2025-01-04 RX ORDER — NALOXONE HYDROCHLORIDE 0.4 MG/ML
0.2 INJECTION, SOLUTION INTRAMUSCULAR; INTRAVENOUS; SUBCUTANEOUS
Status: DISCONTINUED | OUTPATIENT
Start: 2025-01-04 | End: 2025-01-05 | Stop reason: HOSPADM

## 2025-01-04 RX ORDER — FLUTICASONE PROPIONATE 50 MCG
2 SPRAY, SUSPENSION (ML) NASAL DAILY
Status: DISCONTINUED | OUTPATIENT
Start: 2025-01-04 | End: 2025-01-05 | Stop reason: HOSPADM

## 2025-01-04 RX ORDER — FLUTICASONE FUROATE AND VILANTEROL 200; 25 UG/1; UG/1
1 POWDER RESPIRATORY (INHALATION) DAILY
Status: DISCONTINUED | OUTPATIENT
Start: 2025-01-04 | End: 2025-01-05 | Stop reason: HOSPADM

## 2025-01-04 RX ORDER — LORAZEPAM 0.5 MG/1
0.5 TABLET ORAL EVERY 8 HOURS PRN
Status: DISCONTINUED | OUTPATIENT
Start: 2025-01-04 | End: 2025-01-05 | Stop reason: HOSPADM

## 2025-01-04 RX ORDER — PREDNISONE 5 MG/1
5 TABLET ORAL DAILY
Status: CANCELLED | OUTPATIENT
Start: 2025-01-04

## 2025-01-04 RX ORDER — HYDROMORPHONE HYDROCHLORIDE 2 MG/1
2-4 TABLET ORAL
Status: DISCONTINUED | OUTPATIENT
Start: 2025-01-04 | End: 2025-01-05 | Stop reason: HOSPADM

## 2025-01-04 RX ORDER — ACETAMINOPHEN 650 MG/1
650 SUPPOSITORY RECTAL EVERY 4 HOURS PRN
Status: DISCONTINUED | OUTPATIENT
Start: 2025-01-04 | End: 2025-01-05 | Stop reason: HOSPADM

## 2025-01-04 RX ORDER — LUBIPROSTONE 24 UG/1
24 CAPSULE ORAL 2 TIMES DAILY WITH MEALS
Status: DISCONTINUED | OUTPATIENT
Start: 2025-01-04 | End: 2025-01-04

## 2025-01-04 RX ORDER — PREDNISONE 1 MG/1
5.5 TABLET ORAL DAILY
COMMUNITY

## 2025-01-04 RX ORDER — VENLAFAXINE HYDROCHLORIDE 37.5 MG/1
37.5 CAPSULE, EXTENDED RELEASE ORAL EVERY OTHER DAY
Status: DISCONTINUED | OUTPATIENT
Start: 2025-01-04 | End: 2025-01-04

## 2025-01-04 RX ORDER — NALOXONE HYDROCHLORIDE 0.4 MG/ML
0.4 INJECTION, SOLUTION INTRAMUSCULAR; INTRAVENOUS; SUBCUTANEOUS
Status: DISCONTINUED | OUTPATIENT
Start: 2025-01-04 | End: 2025-01-05 | Stop reason: HOSPADM

## 2025-01-04 RX ORDER — ACETAMINOPHEN 325 MG/1
650 TABLET ORAL EVERY 4 HOURS PRN
Status: DISCONTINUED | OUTPATIENT
Start: 2025-01-04 | End: 2025-01-05 | Stop reason: HOSPADM

## 2025-01-04 RX ORDER — AMOXICILLIN 250 MG
2 CAPSULE ORAL 2 TIMES DAILY PRN
Status: DISCONTINUED | OUTPATIENT
Start: 2025-01-04 | End: 2025-01-05 | Stop reason: HOSPADM

## 2025-01-04 RX ORDER — AMOXICILLIN 250 MG
1 CAPSULE ORAL 2 TIMES DAILY PRN
Status: DISCONTINUED | OUTPATIENT
Start: 2025-01-04 | End: 2025-01-05 | Stop reason: HOSPADM

## 2025-01-04 RX ADMIN — ACETAMINOPHEN 650 MG: 325 TABLET, FILM COATED ORAL at 16:18

## 2025-01-04 RX ADMIN — PROCHLORPERAZINE EDISYLATE 10 MG: 5 INJECTION INTRAMUSCULAR; INTRAVENOUS at 10:06

## 2025-01-04 RX ADMIN — ACETAMINOPHEN 650 MG: 325 TABLET, FILM COATED ORAL at 09:51

## 2025-01-04 RX ADMIN — FLUTICASONE FUROATE AND VILANTEROL TRIFENATATE 1 PUFF: 200; 25 POWDER RESPIRATORY (INHALATION) at 09:45

## 2025-01-04 RX ADMIN — ONDANSETRON 4 MG: 2 INJECTION, SOLUTION INTRAMUSCULAR; INTRAVENOUS at 08:10

## 2025-01-04 RX ADMIN — HYDROMORPHONE HYDROCHLORIDE 0.5 MG: 1 INJECTION, SOLUTION INTRAMUSCULAR; INTRAVENOUS; SUBCUTANEOUS at 10:06

## 2025-01-04 RX ADMIN — HYDROMORPHONE HYDROCHLORIDE 0.5 MG: 1 INJECTION, SOLUTION INTRAMUSCULAR; INTRAVENOUS; SUBCUTANEOUS at 07:03

## 2025-01-04 RX ADMIN — HYDROMORPHONE HYDROCHLORIDE 0.5 MG: 1 INJECTION, SOLUTION INTRAMUSCULAR; INTRAVENOUS; SUBCUTANEOUS at 03:57

## 2025-01-04 RX ADMIN — PREDNISONE 5.5 MG: 5 TABLET ORAL at 09:43

## 2025-01-04 RX ADMIN — SODIUM CHLORIDE, POTASSIUM CHLORIDE, SODIUM LACTATE AND CALCIUM CHLORIDE: 600; 310; 30; 20 INJECTION, SOLUTION INTRAVENOUS at 08:13

## 2025-01-04 RX ADMIN — FLUTICASONE PROPIONATE 2 SPRAY: 50 SPRAY, METERED NASAL at 09:45

## 2025-01-04 ASSESSMENT — ACTIVITIES OF DAILY LIVING (ADL)
ADLS_ACUITY_SCORE: 40
ADLS_ACUITY_SCORE: 47
ADLS_ACUITY_SCORE: 46
ADLS_ACUITY_SCORE: 40
ADLS_ACUITY_SCORE: 40
ADLS_ACUITY_SCORE: 46
ADLS_ACUITY_SCORE: 47
ADLS_ACUITY_SCORE: 47
ADLS_ACUITY_SCORE: 46
ADLS_ACUITY_SCORE: 41
ADLS_ACUITY_SCORE: 46
ADLS_ACUITY_SCORE: 47
ADLS_ACUITY_SCORE: 40
ADLS_ACUITY_SCORE: 46
ADLS_ACUITY_SCORE: 47
ADLS_ACUITY_SCORE: 40
ADLS_ACUITY_SCORE: 46
ADLS_ACUITY_SCORE: 40
ADLS_ACUITY_SCORE: 46
ADLS_ACUITY_SCORE: 46

## 2025-01-04 NOTE — ED NOTES
"[facility]  ED Nurse Handoff Report    ED Chief complaint: Abdominal Pain      ED Diagnosis:   Final diagnoses:   Acute colitis   Abdominal pain, left lower quadrant   Nausea       Code Status: Full code    Allergies:   Allergies   Allergen Reactions    Colistimethate Anaphylaxis    Colymycin M [Colistimethate Sodium] Anaphylaxis    Tamiflu [Oseltamivir]      Gums Blister up    Hydroxyzine     Azithromycin Palpitations    Clindamycin Rash    Tizanidine Other (See Comments)     Severe panic attack       Patient Story: Arrives with complaint of LLQ abdominal pain that got all of the sudden worse tonight. Was \"achy\" this afternoon. Appendix and uterus have been surgically removed.   Focused Assessment:  See chart  Results for orders placed or performed during the hospital encounter of 01/03/25   CT Abdomen Pelvis w Contrast     Status: None    Narrative    EXAM: CT ABDOMEN PELVIS W CONTRAST  LOCATION: [facility]  DATE: 1/3/2025    INDICATION: LLQ pain and tenderness  COMPARISON: CT abdomen pelvis 12/13/2024  TECHNIQUE: CT scan of the abdomen and pelvis was performed following injection of IV contrast. Multiplanar reformats were obtained. Dose reduction techniques were used.  CONTRAST: 60 mL Isovue 370    FINDINGS:   LOWER CHEST: Mild diffuse wall thickening of the lower esophagus is nonspecific but can be seen with esophagitis. Subendocardial fat deposition of the left ventricular apex appears similar to prior and can be seen as a sequela of prior infarct. Stable   small pulmonary nodules of the lung bases.    HEPATOBILIARY: Normal.    PANCREAS: Normal.    SPLEEN: Normal.    ADRENAL GLANDS: Normal.    KIDNEYS/BLADDER: Normal.    BOWEL: Severe submucosal edema of the mid to distal descending colon with significant surrounding inflammatory stranding and ill-defined fluid. A few colonic diverticula are identified within this segment of bowel which appear enlarged or " hyperenhancing.   Small amount of free fluid in the lower abdomen. No evidence of intra-abdominal abscess. Mild diffuse wall thickening of the sigmoid colon. Appendectomy. Small bowel loops in the lower abdomen are mildly fluid-filled and dilated without a discrete   transition point. Moderate rectal stool burden.    LYMPH NODES: Normal.    VASCULATURE: Normal.    PELVIC ORGANS: Hysterectomy. Stable 2.8 cm left adnexal cyst.    MUSCULOSKELETAL: Normal.      Impression    IMPRESSION:   1.  Severe colitis of the mid to distal descending colon. This may be infectious or inflammatory in etiology or secondary to patient's known vasculitis. There are colonic diverticula within this region of colon which do not appear inflamed; however,   inflammation secondary to diverticular disease is not excluded.  2.  Moderate diffuse wall thickening of the sigmoid colon suggestive of colitis.  3.  Small amount of free fluid in the lower abdomen. No intra-abdominal abscess.  4.  Mildly dilated loops of small bowel in the lower abdomen without a discrete transition point are favored secondary to a reactive ileus.     [place] Draw     Status: None    Narrative    The following orders were created for panel order [place] Draw.  Procedure                               Abnormality         Status                     ---------                               -----------         ------                     Extra Red Top Tube[[id number]]                               Final result               Extra Green Top (Lithium...[[id number]]                      Final result               Extra Purple Top Tube[[id number]]                            Final result                 Please view results for these tests on the individual orders.   Extra Red Top Tube     Status: None   Result Value Ref Range    Hold Specimen jic    Extra Green Top (Lithium Heparin) Tube     Status: None   Result Value Ref Range    Hold Specimen jic    Extra Purple Top Tube      Status: None   Result Value Ref Range    Hold Specimen [facility]    Comprehensive metabolic panel     Status: Abnormal   Result Value Ref Range    Sodium 140 135 - 145 mmol/L    Potassium 3.7 3.4 - 5.3 mmol/L    Carbon Dioxide (CO2) 23 22 - 29 mmol/L    Anion Gap 17 (H) 7 - 15 mmol/L    Urea Nitrogen 15.2 6.0 - 20.0 mg/dL    Creatinine 0.77 0.51 - 0.95 mg/dL    GFR Estimate >90 >60 mL/min/1.73m2    Calcium 9.9 8.8 - 10.4 mg/dL    Chloride 100 98 - 107 mmol/L    Glucose 88 70 - 99 mg/dL    Alkaline Phosphatase 63 40 - 150 U/L    AST 27 0 - 45 U/L    ALT 20 0 - 50 U/L    Protein Total 7.1 6.4 - 8.3 g/dL    Albumin 4.7 3.5 - 5.2 g/dL    Bilirubin Total 0.3 <=1.2 mg/dL   Lipase     Status: Normal   Result Value Ref Range    Lipase 34 13 - 60 U/L   UA with Microscopic reflex to Culture     Status: Abnormal    Specimen: Urine, Midstream   Result Value Ref Range    Color Urine Light Yellow Colorless, Straw, Light Yellow, Yellow    Appearance Urine Clear Clear    Glucose Urine Negative Negative mg/dL    Bilirubin Urine Negative Negative    Ketones Urine 150 (A) Negative mg/dL    Specific Gravity Urine 1.015 1.003 - 1.035    Blood Urine Negative Negative    pH Urine 5.0 5.0 - 7.0    Protein Albumin Urine Negative Negative mg/dL    Urobilinogen Urine Normal Normal, 2.0 mg/dL    Nitrite Urine Negative Negative    Leukocyte Esterase Urine Negative Negative    Mucus Urine Present (A) None Seen /LPF    RBC Urine 1 <=2 /HPF    WBC Urine 0 <=5 /HPF    Narrative    Urine Culture not indicated   CBC with platelets and differential     Status: Abnormal   Result Value Ref Range    WBC Count 7.1 4.0 - 11.0 10e3/uL    RBC Count 4.59 3.80 - 5.20 10e6/uL    Hemoglobin 14.9 11.7 - 15.7 g/dL    Hematocrit 44.1 35.0 - 47.0 %    MCV 96 78 - 100 fL    MCH 32.5 26.5 - 33.0 pg    MCHC 33.8 31.5 - 36.5 g/dL    RDW 13.2 10.0 - 15.0 %    Platelet Count 319 150 - 450 10e3/uL    % Neutrophils 87 %    % Lymphocytes 10 %    % Monocytes 0 %    % Eosinophils 2  %    % Basophils 1 %    % Immature Granulocytes 0 %    NRBCs per 100 WBC 0 <1 /100    Absolute Neutrophils 6.2 1.6 - 8.3 10e3/uL    Absolute Lymphocytes 0.7 (L) 0.8 - 5.3 10e3/uL    Absolute Monocytes 0.0 0.0 - 1.3 10e3/uL    Absolute Eosinophils 0.2 0.0 - 0.7 10e3/uL    Absolute Basophils 0.0 0.0 - 0.2 10e3/uL    Absolute Immature Granulocytes 0.0 <=0.4 10e3/uL    Absolute NRBCs 0.0 10e3/uL   Lactic acid whole blood with 1x repeat in 2 hr when >2     Status: Normal   Result Value Ref Range    Lactic Acid, Initial 0.9 0.7 - 2.0 mmol/L   Troponin T, High Sensitivity     Status: Normal   Result Value Ref Range    Troponin T, High Sensitivity 9 <=14 ng/L   Troponin T, High Sensitivity     Status: Normal   Result Value Ref Range    Troponin T, High Sensitivity 12 <=14 ng/L   CRP inflammation     Status: Normal   Result Value Ref Range    CRP Inflammation 3.31 <5.00 mg/L   EKG 12-lead, tracing only     Status: None (Preliminary result)   Result Value Ref Range    Systolic Blood Pressure  mmHg    Diastolic Blood Pressure  mmHg    Ventricular Rate 112 BPM    Atrial Rate 112 BPM    VT Interval 180 ms    QRS Duration 84 ms     ms    QTc 461 ms    P Axis 49 degrees    R AXIS 69 degrees    T Axis -49 degrees    Interpretation ECG       Sinus tachycardia  ST & T wave abnormality, consider inferior ischemia  ST & T wave abnormality, consider anterolateral ischemia  Abnormal ECG  When compared with ECG of 27-Dec-2024 14:14, (unconfirmed)  Premature ventricular complexes are no longer Present  ST now depressed in Lateral leads     CBC with platelets + differential     Status: Abnormal    Narrative    The following orders were created for panel order CBC with platelets + differential.  Procedure                               Abnormality         Status                     ---------                               -----------         ------                     CBC with platelets and d...[[id number]]  Abnormal            Final  result                 Please view results for these tests on the individual orders.         Treatments and/or interventions provided: meds, labs, scans  Patient's response to treatments and/or interventions: pain improvement, vitally stable    To be done/followed up on inpatient unit:  continued workup of abdominal pain    Does this patient have any cognitive concerns?:  none    Activity level - Baseline/Home:  Independent  Activity Level - Current:   Independent    Patient's Preferred language: English   Needed?: No    Isolation: None  Infection: Not Applicable  Patient tested for COVID 19 prior to admission: NO  Bariatric?: No    Vital Signs:   Vitals:    01/04/25 0600 01/04/25 0700 01/04/25 0800 01/04/25 1300   BP:  101/61 127/77 104/53   BP Location:       Patient Position:       Cuff Size:       Pulse:   100 73   Resp:    14   Temp:       TempSrc:       SpO2: 96%  99% 97%   Weight:       Height:           Cardiac Rhythm:     Was the PSS-3 completed:   Yes  What interventions are required if any?               Family Comments: None  OBS brochure/video discussed/provided to patient/family: Yes              Name of person given brochure if not patient: NA              Relationship to patient: NA    For the majority of the shift this patient's behavior was Green.   Behavioral interventions performed were None.    ED NURSE PHONE NUMBER: *[id number]

## 2025-01-04 NOTE — CONSULTS
"    GASTROENTEROLOGY CONSULTATION      Date of Admission:  1/3/2025          ASSESSMENT AND RECOMMENDATIONS:     This patient presents with acute left-sided abdominal pain since mid-day yesterday.  Prior to that, she reports she had been feeling well.  Her CT suggests significant colitis in the area of the splenic flexure and descending colon.    Her history seems most suggestive of ischemic colitis in the \"watershed\" region.  Her symptoms have improved since admission, although she is still receiving some pain medications.  Although she has some tenderness on her abdominal exam, she otherwise appears to be doing well.  Her white count and CRP are normal.  I suspect that her symptoms will spontaneously resolve.    I do note that her CT in mid-December showed some questionable thickening of the colon in this region, suggestion that there may be some chronicity.  It is conceivable that she has some vascular compromise related to her vasculitis, although this appears to be otherwise under good control.  Other possibilities here include an atypical infection (such as CMV), although she does not appear to be otherwise significantly immunosuppressed.  She has not had antibiotics recently, but I agree with obtaining enteric pathogens and C. difficile stool studies.  IBD seems unlikely given her presentation.  Intestinal involvement with EGPA is also a possibility, although she does not have other signs of this syndrome at present.       RECOMMENDATIONS    1.  I would continue IV hydration and pain control as needed.  I suspect that she will slowly improve, and that she can be discharged relatively soon (when she normal longer needs pain medicines or hydration).  She appears to be tolerating liquids well, I recommend slowly advancing her diet as tolerated.    2.  I would like to arrange a colonoscopy in the near future, when she feels that she is able to adequately take a bowel prep.  This can be arranged as an outpatient, " "perhaps in the next few weeks.  If she has persistent or worsening symptoms, we may perform a colonoscopy while she is still an inpatient.    She has followed with the [facility] gastroenterology group in the past, and will be seeing Ms. Ibarra in early February.  We will arrange for colonoscopy prior to that time.    Thank you for involving us in this patient's care. Please do not hesitate to contact the GI service with any questions or concerns.         Gómez Maier MD  Gastroenterology Fellow  Pager             Chief Complaint:   We were asked by Dr. Chávez to evaluate this patient with recent onset abdominal pain and apparent colitis on CT scan.    History is obtained from the patient and the medical record.          History of Present Illness:   Valencia Ye is a 54 year old female with a history of Churg-Antonina vasculitis (EGPA) who presented to the emergency room yesterday with abdominal pain.    Patient states she was feeling well until mid-day yesterday when she developed severe left-sided abdominal pain.  The pain was constant, but had episodic worsening.  She has not noted any diarrhea or rectal bleeding.  She had a normal bowel movement yesterday.      She reports that she has been feeling well from a GI perspective for many months, aside from an episode in mid-December of nausea, vomiting, diarrhea, and abdominal pain that was apparently attributed to norovirus.  Those symptoms had completely resolved.  She also has a history of abdominal symptoms that she attributes to \"IBS\", but she reports that the symptoms have been well-controlled recently.    She has a history of EGPA with pulmonary involvement.  The symptoms have been under good control recently on low-dose prednisone (5 mg daily) as well as inhalers.    She reports no history of blood clotting issues.  She reports no recent trauma to her abdomen.            Past Medical History:   Reviewed and edited as " appropriate  Past Medical History:   Diagnosis Date    Anxiety     Aortitis (H)     Asthma     associated with Churg Antonina syndrome    Bronchiectasis (H)     Cerebral infarction (H) 1990    Very small, caused small permanent optical migraine    Chronic sinusitis     Churg-Antonina syndrome (H)     dx     Conductive hearing loss     Depressive disorder     Eustachian tube dysfunction     Goiter     Hearing loss, conductive     Heart rate problem     Hypertension     Hypocalcemia     Immunosuppression (H)     Infection due to 2019 novel coronavirus 2022, , 2022    Irritable bowel syndrome     Major depressive disorder     Mannose-binding lectin deficiency (H)     Nonspecific abnormal results of thyroid function study     Osteoporosis     Pericarditis      and     Pneumonia     4/23/10    Recurrent otitis media     Recurrent UTI     Rheumatoid vasculitis (H)     Sinusitis, chronic     Steroid long-term use     Tinnitus     Varicose veins of leg with swelling             Past Surgical History:   Reviewed and edited as appropriate   Past Surgical History:   Procedure Laterality Date    C/SECTION, CLASSICAL       SECTION      COLONOSCOPY Left 2014    Procedure: COMBINED COLONOSCOPY, SINGLE OR MULTIPLE BIOPSY/POLYPECTOMY BY BIOPSY;  Surgeon: Js Pinedo MD;  Location:  GI    COLONOSCOPY N/A 2019    Procedure: COLONOSCOPY;  Surgeon: Bryce Pimentel MD;  Location:  GI    ENT SURGERY      ESOPHAGOSCOPY, GASTROSCOPY, DUODENOSCOPY (EGD), COMBINED N/A 2023    Procedure: ESOPHAGOGASTRODUODENOSCOPY, WITH BIOPSY;  Surgeon: Mo Rosa MD;  Location:  GI    ESOPHAGOSCOPY, GASTROSCOPY, DUODENOSCOPY (EGD), COMBINED N/A 10/17/2024    Procedure: ESOPHAGOGASTRODUODENOSCOPY with DUODENAL BIOPSY;  Surgeon: Dillno Guillen MD;  Location:  OR    ESOPHAGOSCOPY, GASTROSCOPY, DUODENOSCOPY (EGD), DILATATION, COMBINED N/A  02/02/2023    Procedure: ESOPHAGOGASTRODUODENOSCOPY, WITH DILATION;  Surgeon: Mo Rosa MD;  Location:  GI    GENITOURINARY SURGERY      HEAD & NECK SURGERY      HYSTERECTOMY TOTAL ABDOMINAL  02/27/2009    without oopherectomy for TEO 3 on LEEP w/ pos margins - Path all benign    HYSTERECTOMY, PAP NO LONGER INDICATED      cervix removed     LAPAROSCOPIC APPENDECTOMY N/A 06/22/2022    Procedure: APPENDECTOMY, LAPAROSCOPIC;  Surgeon: Fany Hannah MD;  Location: [facility] OR    PE TUBES      PICC INSERTION  10/10/2013    5fr DL PASV PICC, 43cm (1cm external) in the L basilic vein w/ tip in the low SVC.    PICC INSERTION Left 05/26/2017    5fr DL BioFlo PICC, 42cm (3cm external) in the L lateral brachial vein w/ tip in the SVC RA junction.    SINUS SURGERY      TUBAL LIGATION      TYMPANOPLASTY      ZZHC COLP CERVIX/UPPER VAGINA W LOOP ELEC BX CERVIX              Previous Endoscopy:     Results for orders placed or performed during the hospital encounter of 12/20/19   COLONOSCOPY    Collection Time: 12/20/19  7:22 AM   Result Value Ref Range    COLONOSCOPY       [facility]  _______________________________________________________________________________  Patient Name: Valencia ORTIZ Katie Ye   Procedure Date: 12/20/2019 7:22 AM  MRN: [id number]                       Account Number: [id number]  YOB: 1970              Admit Type: Outpatient  Age: 49                               Gender: Female  Attending MD: Bryce Pimentel MD   Total Sedation Time: 19_minutes continuous   bedside 1:1  Instrument Name: 213 - Pediatric Colonoscope   _______________________________________________________________________________     Procedure:                Colonoscopy  Indications:              High risk colon cancer surveillance: Personal                             history of colonic polyps  Providers:                Bryce Pimentel MD (Doctor)  Referring MD:             Morelia Simental,  MD (Referring MD)  Medicines:                Midazolam 2 mg IV, Fentanyl 100 micrograms IV  Complications:            No immediate complications.   _______________________________________________________________________________  Procedure:                Pre-Anesthesia Assessment:                            - Prior to the procedure, a History and Physical                             was performed, and patient medications and                             allergies were reviewed. The patient is competent.                             The risks and benefits of the procedure and the                             sedation options and risks were discussed with the                             patient. All questions were answered and informed                             consent was obtained. Patient identification and                             proposed procedure were verified by the physician                             in the procedure room. Mental Status Examination:                             alert and oriented. Airway Examination: normal                             oropharyngeal airway and neck mobility. Respiratory                              Examination: clear to auscultation. CV Examination:                             normal. Prophylactic Antibiotics: The patient does                             not require prophylactic antibiotics. Prior                             Anticoagulants: The patient has taken no previous                             anticoagulant or antiplatelet agents. ASA Grade                             Assessment: II - A patient with mild systemic                             disease. After reviewing the risks and benefits,                             the patient was deemed in satisfactory condition to                             undergo the procedure. The anesthesia plan was to                             use moderate sedation / analgesia (conscious                             sedation). Immediately  prior to administration of                             medications, the patient was re-assessed for                             adequacy to receive sedatives. The heart rate,                              respiratory rate, oxygen saturations, blood                             pressure, adequacy of pulmonary ventilation, and                             response to care were monitored throughout the                             procedure. The physical status of the patient was                             re-assessed after the procedure.                            After obtaining informed consent, the colonoscope                             was passed under direct vision. Throughout the                             procedure, the patient's blood pressure, pulse, and                             oxygen saturations were monitored continuously. The                             Olympus Pediatric Colonoscope, Model # PCF-H190DL,                             [place] # 213, SN # [id number] was introduced through                             the anus and advanced to the cecum, identified by                             appendiceal orifice and ileocecal valve. The                             colonoscopy w as performed without difficulty. The                             patient tolerated the procedure well. The quality                             of the bowel preparation was good.                                                                                   Findings:       The perianal and digital rectal examinations were normal.       A few small-mouthed diverticula were found in the sigmoid colon.       The exam was otherwise without abnormality on direct and retroflexion        views.                                                                                   Impression:               - Diverticulosis in the sigmoid colon.                            - The examination was otherwise normal on direct                              and retroflexion views.                            - No specimens collected.  Recommendation:           - Repeat colonoscopy in 5 years for surveillance.                                                                                   Procedure Code(s):        --- Professional ---       , Colorectal cancer screening; colonoscopy on individual at high        risk  Diagnosis Code(s):       --- Professional ---       Z86.010, Personal history of colonic polyps    CPT copyright 2018 [employer]. All rights reserved.    The codes documented in this report are preliminary and upon  review may   be revised to meet current compliance requirements.    Electronically signed by Bryce Pimentel MD  ____________________  Bryce Pimentel MD  12/20/2019 7:56:37 AM  I was physically present for the entire viewing portion of the exam.  __________________________Bryce Pimentel MD  Number of Addenda: 0    Note Initiated On: 12/20/2019 7:22 AM  MRN:                      [id number]  Procedure Date:           12/20/2019 7:22:31 AM  Scope Withdrawal Time: 0 hours 8 minutes 19 seconds   Total Procedure Duration: 0 hours 17 minutes 59 seconds   Estimated Blood Loss:       Scope In: 7:33:56 AM  Scope Out: 7:51:55 AM              Social History:   Reviewed and edited as appropriate  Social History     Socioeconomic History    Marital status:      Spouse name: Not on file    Number of children: Not on file    Years of education: Not on file    Highest education level: Not on file   Occupational History    Not on file   Tobacco Use    Smoking status: Never     Passive exposure: Never    Smokeless tobacco: Never   Vaping Use    Vaping status: Never Used   Substance and Sexual Activity    Alcohol use: Not Currently    Drug use: No    Sexual activity: Yes     Partners: Male     Birth control/protection: Male Surgical, Female Surgical     Comment: CARMELA   Other Topics Concern    Parent/sibling w/ CABG, MI or  angioplasty before 65F 55M? Not Asked     Service No    Blood Transfusions No    Caffeine Concern No    Occupational Exposure No    Hobby Hazards No    Sleep Concern No    Stress Concern Yes    Weight Concern No    Special Diet Yes     Comment: IBS diet    Back Care No    Exercise Not Asked     Comment: 1 mile power walk 5 days a week at least.     Bike Helmet Not Asked    Seat Belt Yes    Self-Exams Yes   Social History Narrative    Retired  in a Radiology Department.  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 No smokers at home.  Enjoys walking and dancing.  Alcohol-abstains. No illicits.     Social Drivers of Health     Financial Resource Strain: Low Risk  (1/17/2024)    Financial Resource Strain     Within the past 12 months, have you or your family members you live with been unable to get utilities (heat, electricity) when it was really needed?: No   Food Insecurity: Low Risk  (1/17/2024)    Food Insecurity     Within the past 12 months, did you worry that your food would run out before you got money to buy more?: No     Within the past 12 months, did the food you bought just not last and you didn t have money to get more?: No   Transportation Needs: Low Risk  (1/17/2024)    Transportation Needs     Within the past 12 months, has lack of transportation kept you from medical appointments, getting your medicines, non-medical meetings or appointments, work, or from getting things that you need?: No   Physical Activity: Not on file   Stress: Not on file   Social Connections: Not on file   Interpersonal Safety: Low Risk  (10/17/2024)    Interpersonal Safety     Do you feel physically and emotionally safe where you currently live?: Yes     Within the past 12 months, have you been hit, slapped, kicked or otherwise physically hurt by someone?: No     Within the past 12 months, have you been humiliated or emotionally abused in other ways by your partner or ex-partner?: No  "  Housing Stability: Low Risk  (1/17/2024)    Housing Stability     Do you have housing? : Yes     Are you worried about losing your housing?: No            Family History:   Reviewed and edited as appropriate  No known history of gastrointestinal/liver disease or  gastrointestinal malignancies       Allergies:   Reviewed and edited as appropriate     Allergies   Allergen Reactions    Colistimethate Anaphylaxis    Colymycin M [Colistimethate Sodium] Anaphylaxis    Tamiflu [Oseltamivir]      Gums Blister up    Hydroxyzine     Azithromycin Palpitations    Clindamycin Rash    Tizanidine Other (See Comments)     Severe panic attack            Medications:   (Not in a hospital admission)           Review of Systems:     A complete review of systems was performed and is negative except as noted in the HPI           Physical Exam:   /53   Pulse 73   Temp 98.7  F (37.1  C) (Oral)   Resp 14   Ht 1.575 m (5' 2\")   Wt 54.4 kg (120 lb)   LMP 02/01/2009 (LMP Unknown)   SpO2 97%   BMI 21.95 kg/m    Wt:   Wt Readings from Last 2 Encounters:   01/03/25 54.4 kg (120 lb)   12/13/24 55.8 kg (123 lb)      Constitutional: cooperative, pleasant, not dyspneic/diaphoretic, no acute distress.  She appears comfortable sitting in bed.  Eyes: Sclera anicteric/injected  Ears/nose/mouth/throat: Normal oropharynx without ulcers or exudate, mucus membranes moist  Neck: supple  CV: RRR, No edema  Respiratory: Unlabored breathing  Abdomen: Nondistended.  She has tenderness in all 4 quadrants, but no clear rebound.  Skin: warm, perfused, no jaundice  Neuro: AAO x 3, No asterixis  Psych: Normal affect  MSK: Normal gait         Data:   Labs and imaging below were independently reviewed and interpreted    BMP  Recent Labs   Lab 01/03/25 1947      POTASSIUM 3.7   CHLORIDE 100   BOSSMAN 9.9   CO2 23   BUN 15.2   CR 0.77   GLC 88     CBC  Recent Labs   Lab 01/03/25 1947   WBC 7.1   RBC 4.59   HGB 14.9   HCT 44.1   MCV 96   MCH 32.5   MCHC " 33.8   RDW 13.2        INRNo lab results found in last 7 days.  LFTs  Recent Labs   Lab 01/03/25 1947   ALKPHOS 63   AST 27   ALT 20   BILITOTAL 0.3   PROTTOTAL 7.1   ALBUMIN 4.7      PANC  Recent Labs   Lab 01/03/25 1947   LIPASE 34     CRP Inflammation   Date Value Ref Range Status   01/03/2025 3.31 <5.00 mg/L Final      Abdominal CT 1 - 3 - 25  BOWEL: Severe submucosal edema of the mid to distal descending colon with significant surrounding inflammatory stranding and ill-defined fluid. A few colonic diverticula are identified within this segment of bowel which appear enlarged or hyperenhancing.   Small amount of free fluid in the lower abdomen. No evidence of intra-abdominal abscess. Mild diffuse wall thickening of the sigmoid colon. Appendectomy. Small bowel loops in the lower abdomen are mildly fluid-filled and dilated without a discrete   transition point. Moderate rectal stool burden.\  IMPRESSION:   1.  Severe colitis of the mid to distal descending colon. This may be infectious or inflammatory in etiology or secondary to patient's known vasculitis. There are colonic diverticula within this region of colon which do not appear inflamed; however,   inflammation secondary to diverticular disease is not excluded.  2.  Moderate diffuse wall thickening of the sigmoid colon suggestive of colitis.  3.  Small amount of free fluid in the lower abdomen. No intra-abdominal abscess.  4.  Mildly dilated loops of small bowel in the lower abdomen without a discrete transition point are favored secondary to a reactive ileus.

## 2025-01-04 NOTE — ED TRIAGE NOTES
"Arrives with complaint of LLQ abdominal pain that got all of the sudden worse tonight.  Was \"achy\" this afternoon.  Appendix and uterus have been surgically removed.      Triage Assessment (Adult)       Row Name 01/03/25 1940          Triage Assessment    Airway WDL WDL        Respiratory WDL    Respiratory WDL WDL        Skin Circulation/Temperature WDL    Skin Circulation/Temperature WDL WDL        Cardiac WDL    Cardiac WDL WDL        Peripheral/Neurovascular WDL    Peripheral Neurovascular WDL WDL        Cognitive/Neuro/Behavioral WDL    Cognitive/Neuro/Behavioral WDL WDL        Dickson Coma Scale    Best Eye Response 4-->(E4) spontaneous     Best Motor Response 6-->(M6) obeys commands     Best Verbal Response 5-->(V5) oriented     Dickson Coma Scale Score 15                     "

## 2025-01-04 NOTE — ED PROVIDER NOTES
Emergency Department Note      History of Present Illness     Chief Complaint   Abdominal Pain      HPI   Valencia Z Katie Ye is a 54 year old female who presents to the emergency department for the evaluation of acute onset left lower quadrant abdominal pain.  Patient reports that she has been feeling generalized aches and subjective fevers in the afternoon.  She has had her appendix and uterus surgically removed.  No other abdominal surgeries reported.  Her pain is primarily located to the left lower quadrant and wraps around her lower suprapubic region.  She reports also experiencing some mild nausea but no vomiting.  No reports of constipation or diarrhea.  No urinary symptoms reported.  No chest pain, chest pressure or shortness of breath.    Independent Historian   None    Review of External Notes   None    Past Medical History     Medical History and Problem List   Past Medical History:   Diagnosis Date    Anxiety     Aortitis (H)     Asthma     Bronchiectasis (H)     Cerebral infarction (H) 08/1990    Chronic sinusitis     Churg-Antonina syndrome (H)     Conductive hearing loss     Depressive disorder     Eustachian tube dysfunction     Goiter     Hearing loss, conductive     Heart rate problem     Hypertension     Hypocalcemia     Immunosuppression (H)     Infection due to 2019 novel coronavirus 04/2022    Irritable bowel syndrome     Major depressive disorder     Mannose-binding lectin deficiency (H) 2014    Nonspecific abnormal results of thyroid function study     Osteoporosis     Pericarditis     Pneumonia     Recurrent otitis media     Recurrent UTI     Rheumatoid vasculitis (H)     Sinusitis, chronic     Steroid long-term use     Tinnitus     Varicose veins of leg with swelling        Medications   acetylcysteine (MUCOMYST) 20 % neb solution  albuterol (PROAIR HFA/PROVENTIL HFA/VENTOLIN HFA) 108 (90 Base) MCG/ACT inhaler  albuterol (PROVENTIL) (2.5 MG/3ML) 0.083% neb  solution  ciprofloxacin-dexAMETHasone (CIPRODEX) 0.3-0.1 % otic suspension  co-enzyme Q-10 100 MG CAPS capsule  cod liver oil CAPS capsule  fluticasone (FLONASE) 50 MCG/ACT nasal spray  fluticasone-vilanterol (BREO ELLIPTA) 200-25 MCG/ACT inhaler  Glucosamine-Chondroit-Vit C-Mn (GLUCOSAMINE CHONDROITINOITIN COMPLEX) CAPS  Iodine, Kelp, (KELP PO)  LORazepam (ATIVAN) 0.5 MG tablet  lubiprostone (AMITIZA) 24 MCG capsule  magnesium 250 MG tablet  metoprolol tartrate (LOPRESSOR) 25 MG tablet  norethindrone-eth estradiol (FEMHRT LOW DOSE) 0.5-2.5 MG-MCG tablet  Omega-3 Fatty Acids (FISH OIL) 1200 MG capsule  Potassium Gluconate 595 (99 K) MG TABS  predniSONE (DELTASONE) 5 MG tablet  Probiotic Product ([employer] PROBIOTIC MAXIMUM STRENGTH) CAPS  venlafaxine (EFFEXOR XR) 37.5 MG 24 hr capsule  vitamin D3 (CHOLECALCIFEROL) 2000 units (50 mcg) tablet  VITAMIN K PO        Surgical History   Past Surgical History:   Procedure Laterality Date    C/SECTION, CLASSICAL       SECTION      COLONOSCOPY Left 2014    Procedure: COMBINED COLONOSCOPY, SINGLE OR MULTIPLE BIOPSY/POLYPECTOMY BY BIOPSY;  Surgeon: Js Pinedo MD;  Location:  GI    COLONOSCOPY N/A 2019    Procedure: COLONOSCOPY;  Surgeon: Bryce Pimentel MD;  Location:  GI    ENT SURGERY      ESOPHAGOSCOPY, GASTROSCOPY, DUODENOSCOPY (EGD), COMBINED N/A 2023    Procedure: ESOPHAGOGASTRODUODENOSCOPY, WITH BIOPSY;  Surgeon: Mo Rosa MD;  Location:  GI    ESOPHAGOSCOPY, GASTROSCOPY, DUODENOSCOPY (EGD), COMBINED N/A 10/17/2024    Procedure: ESOPHAGOGASTRODUODENOSCOPY with DUODENAL BIOPSY;  Surgeon: Dillon Guillen MD;  Location:  OR    ESOPHAGOSCOPY, GASTROSCOPY, DUODENOSCOPY (EGD), DILATATION, COMBINED N/A 2023    Procedure: ESOPHAGOGASTRODUODENOSCOPY, WITH DILATION;  Surgeon: Mo Rosa MD;  Location:  GI    GENITOURINARY SURGERY      HEAD & NECK SURGERY      HYSTERECTOMY TOTAL  "ABDOMINAL  02/27/2009    without oopherectomy for TEO 3 on LEEP w/ pos margins - Path all benign    HYSTERECTOMY, PAP NO LONGER INDICATED      cervix removed     LAPAROSCOPIC APPENDECTOMY N/A 06/22/2022    Procedure: APPENDECTOMY, LAPAROSCOPIC;  Surgeon: Fany Hannah MD;  Location: [facility] OR    PE TUBES      PICC INSERTION  10/10/2013    5fr DL PASV PICC, 43cm (1cm external) in the L basilic vein w/ tip in the low SVC.    PICC INSERTION Left 05/26/2017    5fr DL BioFlo PICC, 42cm (3cm external) in the L lateral brachial vein w/ tip in the SVC RA junction.    SINUS SURGERY      TUBAL LIGATION      TYMPANOPLASTY      ZZHC COLP CERVIX/UPPER VAGINA W LOOP ELEC BX CERVIX         Physical Exam     Patient Vitals for the past 24 hrs:   BP Temp Temp src Pulse Resp SpO2 Height Weight   01/03/25 2031 -- 98.7  F (37.1  C) Oral -- -- -- -- --   01/03/25 1939 129/68 99.2  F (37.3  C) Temporal 67 20 94 % 1.575 m (5' 2\") 54.4 kg (120 lb)   01/03/25 1929 (!) 140/85 98.6  F (37  C) Tympanic 115 21 -- 1.575 m (5' 2\") 54.4 kg (120 lb)     Physical Exam  General: Awake, alert, non-toxic.  Head:  Scalp is NC/AT  Eyes:  Conjunctiva normal, PERRL  ENT:  The external nose and ears are normal.     Oropharynx clear, uvula midline.  Neck:  Normal range of motion without rigidity.  CV:  Regular rate and rhythm    No pathologic murmur, rubs, or gallops.  Resp:  Breath sounds are clear bilaterally    Non-labored, no retractions or accessory muscle use  Abdomen: Abdomen is soft, no distension, no masses. No CVA tenderness. Pain and tenderness to palpation of the LLQ.   MS:  No lower extremity edema/swelling. No midline cervical, thoracic, or lumbar tenderness.  Extremities without joint swelling or redness.  Skin:  Warm and dry, No rash or lesions noted.  Neuro:  Alert and oriented.  GCS 15 Moves all extremities normal.  No facial asymmetry. Gait normal.  Psych: Awake. Alert. Normal affect. Appropriate interactions.     Diagnostics     Lab " Results   Labs Ordered and Resulted from Time of ED Arrival to Time of ED Departure   COMPREHENSIVE METABOLIC PANEL - Abnormal       Result Value    Sodium 140      Potassium 3.7      Carbon Dioxide (CO2) 23      Anion Gap 17 (*)     Urea Nitrogen 15.2      Creatinine 0.77      GFR Estimate >90      Calcium 9.9      Chloride 100      Glucose 88      Alkaline Phosphatase 63      AST 27      ALT 20      Protein Total 7.1      Albumin 4.7      Bilirubin Total 0.3     CBC WITH PLATELETS AND DIFFERENTIAL - Abnormal    WBC Count 7.1      RBC Count 4.59      Hemoglobin 14.9      Hematocrit 44.1      MCV 96      MCH 32.5      MCHC 33.8      RDW 13.2      Platelet Count 319      % Neutrophils 87      % Lymphocytes 10      % Monocytes 0      % Eosinophils 2      % Basophils 1      % Immature Granulocytes 0      NRBCs per 100 WBC 0      Absolute Neutrophils 6.2      Absolute Lymphocytes 0.7 (*)     Absolute Monocytes 0.0      Absolute Eosinophils 0.2      Absolute Basophils 0.0      Absolute Immature Granulocytes 0.0      Absolute NRBCs 0.0     LIPASE - Normal    Lipase 34     LACTIC ACID WHOLE BLOOD WITH 1X REPEAT IN 2 HR WHEN >2 - Normal    Lactic Acid, Initial 0.9     ROUTINE UA WITH MICROSCOPIC REFLEX TO CULTURE       Imaging   CT Abdomen Pelvis w Contrast   Final Result   IMPRESSION:    1.  Severe colitis of the mid to distal descending colon. This may be infectious or inflammatory in etiology or secondary to patient's known vasculitis. There are colonic diverticula within this region of colon which do not appear inflamed; however,    inflammation secondary to diverticular disease is not excluded.   2.  Moderate diffuse wall thickening of the sigmoid colon suggestive of colitis.   3.  Small amount of free fluid in the lower abdomen. No intra-abdominal abscess.   4.  Mildly dilated loops of small bowel in the lower abdomen without a discrete transition point are favored secondary to a reactive ileus.             EKG   ECG  results from 01/03/25   EKG 12-lead, tracing only     Value    Systolic Blood Pressure     Diastolic Blood Pressure     Ventricular Rate 112    Atrial Rate 112    AK Interval 180    QRS Duration 84        QTc 461    P Axis 49    R AXIS 69    T Axis -49    Interpretation ECG      Sinus tachycardia  ST & T wave abnormality, consider inferior ischemia  ST & T wave abnormality, consider anterolateral ischemia  Abnormal ECG  When compared with ECG of 27-Dec-2024 14:14, (unconfirmed)  Premature ventricular complexes are no longer Present  ST now depressed in Lateral leads       *Note: Due to a large number of results and/or encounters for the requested time period, some results have not been displayed. A complete set of results can be found in Results Review.        Independent Interpretation   None    ED Course      Medications Administered   Medications   ondansetron (ZOFRAN) injection 4 mg (4 mg Intravenous $Given 1/3/25 2040)   sodium chloride 0.9% BOLUS 1,000 mL (1,000 mLs Intravenous $New Bag 1/3/25 2157)   sodium chloride 0.9% BOLUS 1,000 mL (0 mLs Intravenous Stopped 1/3/25 2156)   HYDROmorphone (PF) (DILAUDID) injection 0.5 mg (0.5 mg Intravenous $Given 1/3/25 2036)   iopamidol (ISOVUE-370) solution 60 mL (60 mLs Intravenous $Given 1/3/25 2049)   Saline Flush - CT (60 mLs Intravenous $Given 1/3/25 2049)   HYDROmorphone (PF) (DILAUDID) injection 0.5 mg (0.5 mg Intravenous $Given 1/3/25 2157)       Procedures   Procedures     Discussion of Management   Admitting Hospitalist, Kvng    ED Course        Additional Documentation  None    Medical Decision Making / Diagnosis     CMS Diagnoses: None    MIPS       None    [facility]   Valencia RAMAN Ye is a 54 year old female who presents emergency department further evaluation of left lower quadrant abdominal pain.  Differential diagnosis includes but is not limited to bowel obstruction, diverticulitis, colitis, bowel perforation, constipation, cholecystitis, or other  abdominal process.  Upon initial examination patient is awake and alert.  Patient has focal tenderness over the left lower quadrant.  Mild nausea reported.  CBC did not reveal any acute leukocytosis, WC count is stable at 7.1.  Hemoglobin is stable at 14.9 no concerns for acute blood loss or anemia.  Chemistry did not reveal any significant electrolyte abnormality and kidney function is stable.  Lactic acid is normal.  Patient received 1 L of IV fluids here in the emergency department out of concern for dehydration.  Abdominal CT was obtained which did reveal severe colitis of the mid to distal descending colon.  However this was thankfully negative for acute diverticulitis, abscess, perforated diverticulum, or acute obstruction.  Patient received multiple doses of IV pain medicine here in the emergency department.  However given the degree of her pain and believe she would benefit from observation admission for pain control and bowel rest.  I discussed this case with the admitting hospitalist who agreed for the admission observation status.  All question concerns were addressed and patient verbalized understanding.  Patient admitted to hospital for further evaluation and workup.        Disposition   The patient was admitted to the hospital.     Diagnosis     ICD-10-CM    1. Acute colitis  K52.9       2. Abdominal pain, left lower quadrant  R10.32       3. Nausea  R11.0            Discharge Medications   New Prescriptions    No medications on file         SUNNY Romero CNP, Casey, APRN CNP  01/03/25 0882

## 2025-01-04 NOTE — PHARMACY-ADMISSION MEDICATION HISTORY
"Pharmacy Intern Admission Medication History    Admission medication history is complete. The information provided in this note is only as accurate as the sources available at the time of the update.    Information Source(s): Patient and CareEverywhere/SureScripts via in-person    Pertinent Information:  The patient reports she stopped taking lubiprostone due to it \"not working and causing nausea\".  The patient states she was tapered off of venlafaxine by her doctor and is no longer taking it.   The patient confirmed she is not taking norethindrone-estradiol tablets.    Changes made to PTA medication list:  Added: None  Deleted:  Cod liver oil capsule  Venlafaxine  Co-enzyme Q-10   Changed:   Prednisone 5 mg daily --> 5.5 mg daily  Vitamin D3 4000 units daily --> 68687-14725 units daily   Marked Ludiprostone and norethindrone-estradiol as \"not taking\"    Allergies reviewed with patient and updates made in EHR: yes    Medication History Completed By: Bharati Martins 1/4/2025 8:58 AM    PTA Med List   Medication Sig Last Dose/Taking    acetylcysteine (MUCOMYST) 20 % neb solution NEBULIZE THE CONTENTS OF ONE VIAL (4 ML) TWO TIMES A DAY. (Patient taking differently: Inhale 4 mLs into the lungs daily.) 1/2/2025 Evening    albuterol (PROAIR HFA/PROVENTIL HFA/VENTOLIN HFA) 108 (90 Base) MCG/ACT inhaler INHALE 2 PUFFS INTO THE LUNGS EVERY 4 HOURS AS NEEDED FOR SHORTNESS OF BREATH/WHEEZE Taking    albuterol (PROVENTIL) (2.5 MG/3ML) 0.083% neb solution TAKE 1 VIAL VIA NEBULIZER TWICE A DAY. (Patient taking differently: Take 2.5 mg by nebulization daily.) 1/2/2025 Evening    ciprofloxacin-dexAMETHasone (CIPRODEX) 0.3-0.1 % otic suspension INSTILL 3 DROPS INTO AFFECTED EARS 2 TIMES DAILY AS NEEDED Taking    fluticasone (FLONASE) 50 MCG/ACT nasal spray INSTILL 2 SPRAYS INTO BOTH NOSTRILS DAILY. Taking    fluticasone-vilanterol (BREO ELLIPTA) 200-25 MCG/ACT inhaler Inhale 1 puff into the lungs daily. 1/2/2025 Evening    " Glucosamine-Chondroit-Vit C-Mn (GLUCOSAMINE CHONDROITINOITIN COMPLEX) CAPS Take 2 capsules by mouth daily. 1/3/2025 Morning    Iodine, Kelp, (KELP PO) Take 2 capsules by mouth daily 1/3/2025 Morning    LORazepam (ATIVAN) 0.5 MG tablet Take 1 tablet (0.5 mg) by mouth every 8 hours as needed for anxiety Taking As Needed    magnesium 250 MG tablet Take 1 tablet by mouth daily. 1/3/2025 Morning    metoprolol tartrate (LOPRESSOR) 25 MG tablet TAKE 0.5 TABLETS (12.5 MG) BY MOUTH 2 TIMES DAILY AS NEEDED (FOR HIGH BLOOD PRESSURE >130/80) Taking As Needed    Omega-3 Fatty Acids (FISH OIL) 1200 MG capsule Take 4 capsules (4.8 g) by mouth daily 1/3/2025    Potassium Gluconate 595 (99 K) MG TABS Take 5 g by mouth daily 1/3/2025    predniSONE (DELTASONE) 1 MG tablet Take 5.5 mg by mouth daily. 1/3/2025 Morning    Probiotic Product ([employer] PROBIOTIC MAXIMUM STRENGTH) CAPS Take 1 capsule by mouth daily 1/3/2025 Morning    VITAMIN D PO Take 10,000-15,000 Units by mouth daily. 1/3/2025    VITAMIN K PO Take 1 capsule by mouth daily. Strength unknown 1/3/2025

## 2025-01-04 NOTE — ED NOTES
"[facility]  ED Nurse Handoff Report    ED Chief complaint: Abdominal Pain      ED Diagnosis:   Final diagnoses:   Acute colitis   Abdominal pain, left lower quadrant   Nausea       Code Status: Full Code    Allergies:   Allergies   Allergen Reactions    Colistimethate Anaphylaxis    Colymycin M [Colistimethate Sodium] Anaphylaxis    Tamiflu [Oseltamivir]      Gums Blister up    Hydroxyzine     Azithromycin Palpitations    Clindamycin Rash    Tizanidine Other (See Comments)     Severe panic attack       Patient Story:   Arrives with complaint of LLQ abdominal pain that got all of the sudden worse tonight. Patient states she went to yoga this morning, was completely fine, then started to feel \"achy\" and \"crampy\" this afternoon. Upon arrival, pain is a 10/10. Appendix and uterus have been surgically removed     Focused Assessment:    Neuro: Alert, oriented x 4  Respiratory:Clear lung sounds, on room air   Cardiology:  NSR Tachycardic,  Heart rates 105-120s  Gastrointestinal: soft, LLQ tender, Dilaudid given with pain relief for short intervals.   Genitourinary/Renal:  Voiding without difficulty. Denies  symptoms.   Musculoskeletal: moves all extremities   Skin: Intact skin   Lines: 20G Left AC    Labs Ordered and Resulted from Time of ED Arrival to Time of ED Departure   COMPREHENSIVE METABOLIC PANEL - Abnormal       Result Value    Sodium 140      Potassium 3.7      Carbon Dioxide (CO2) 23      Anion Gap 17 (*)     Urea Nitrogen 15.2      Creatinine 0.77      GFR Estimate >90      Calcium 9.9      Chloride 100      Glucose 88      Alkaline Phosphatase 63      AST 27      ALT 20      Protein Total 7.1      Albumin 4.7      Bilirubin Total 0.3     CBC WITH PLATELETS AND DIFFERENTIAL - Abnormal    WBC Count 7.1      RBC Count 4.59      Hemoglobin 14.9      Hematocrit 44.1      MCV 96      MCH 32.5      MCHC 33.8      RDW 13.2      Platelet Count 319      % Neutrophils 87      % Lymphocytes 10      % " Monocytes 0      % Eosinophils 2      % Basophils 1      % Immature Granulocytes 0      NRBCs per 100 WBC 0      Absolute Neutrophils 6.2      Absolute Lymphocytes 0.7 (*)     Absolute Monocytes 0.0      Absolute Eosinophils 0.2      Absolute Basophils 0.0      Absolute Immature Granulocytes 0.0      Absolute NRBCs 0.0     LIPASE - Normal    Lipase 34     LACTIC ACID WHOLE BLOOD WITH 1X REPEAT IN 2 HR WHEN >2 - Normal    Lactic Acid, Initial 0.9     TROPONIN T, HIGH SENSITIVITY - Normal    Troponin T, High Sensitivity 9     ROUTINE UA WITH MICROSCOPIC REFLEX TO CULTURE   TROPONIN T, HIGH SENSITIVITY        CT Abdomen Pelvis w Contrast   Final Result   IMPRESSION:    1.  Severe colitis of the mid to distal descending colon. This may be infectious or inflammatory in etiology or secondary to patient's known vasculitis. There are colonic diverticula within this region of colon which do not appear inflamed; however,    inflammation secondary to diverticular disease is not excluded.   2.  Moderate diffuse wall thickening of the sigmoid colon suggestive of colitis.   3.  Small amount of free fluid in the lower abdomen. No intra-abdominal abscess.   4.  Mildly dilated loops of small bowel in the lower abdomen without a discrete transition point are favored secondary to a reactive ileus.               Treatments and/or interventions provided:      Medications   ondansetron (ZOFRAN) injection 4 mg (4 mg Intravenous $Given 1/3/25 2040)   sodium chloride 0.9% BOLUS 1,000 mL (1,000 mLs Intravenous $New Bag 1/3/25 2157)   sodium chloride 0.9% BOLUS 1,000 mL (0 mLs Intravenous Stopped 1/3/25 2156)   HYDROmorphone (PF) (DILAUDID) injection 0.5 mg (0.5 mg Intravenous $Given 1/3/25 2036)   iopamidol (ISOVUE-370) solution 60 mL (60 mLs Intravenous $Given 1/3/25 2049)   Saline Flush - CT (60 mLs Intravenous $Given 1/3/25 2049)   HYDROmorphone (PF) (DILAUDID) injection 0.5 mg (0.5 mg Intravenous $Given 1/3/25 2157)        Patient's  "response to treatments and/or interventions:   Resting comfortably    To be done/followed up on inpatient unit:    See any in-patient orders    Does this patient have any cognitive concerns?:  None    Activity level - Baseline/Home:    Independent    Activity Level - Current:     Independent    Patient's Preferred language: English     Needed?: No    Isolation: None  Infection: Not Applicable  Patient tested for COVID 19 prior to admission: NO    Bariatric?: No    Vital Signs:   Vitals:    01/03/25 1929 01/03/25 1939 01/03/25 2031 01/03/25 2200   BP: (!) 140/85 129/68  133/75   Pulse: 115 67  111   Resp: 21 20  20   Temp: 98.6  F (37  C) 99.2  F (37.3  C) 98.7  F (37.1  C)    TempSrc: Tympanic Temporal Oral    SpO2:  94%  90%   Weight: 54.4 kg (120 lb) 54.4 kg (120 lb)     Height: 1.575 m (5' 2\") 1.575 m (5' 2\")         Cardiac Rhythm:     Was the PSS-3 completed:   Yes    Family Comments:  at bedside.     For the majority of the shift this patient's behavior was Green.   Behavioral interventions performed were     ED NURSE PHONE NUMBER: [phone]        "

## 2025-01-04 NOTE — H&P
[facility]    History and Physical - Hospitalist Service       Date of Admission:  1/3/2025    Assessment & Plan   Valencia Ye is a 54 year old female who presents to the emergency department for the evaluation of acute onset left lower quadrant abdominal pain.  She has hx of churg ju that is well controlled on prednisone.  She denies any travel or eating out or atypical foods.  Patient reports that she has been feeling generalized aches and subjective fevers in the afternoon.  She has had her appendix and uterus surgically removed.  No other abdominal surgeries reported.  Her pain is primarily located to the left lower quadrant and wraps around her lower suprapubic region.  She reports also experiencing some mild nausea but no vomiting.  No reports of constipation or diarrhea.  No urinary symptoms reported.  No chest pain, chest pressure or shortness of breath.    In the ER, she was noted to be afebrile but tachycardic with normal oxygenation and blood pressures.  BMP and CBC are normal.  Troponin x2 are below reference range.  Lipase is 34, lactic acid is 0.9.  UA has 150 ketones and 0 wbc and 1 rbc.  CT of abd/pelvis with contrast revealed, severe colitis of mid-distal descending colon that maybe infectious or inflammatory or could be vasculitis in patient with vasculitis history.  Moderate diffuse wall thickening of sigmoid colon suggestive of colitis.  No intraabdominal abscess and some mild reactive ileus.      Patient has received 2 L of IVF and multiple doses of IV dilaudid and is being admitted for for pain control and supportive therapy.    ## Severe descending and sigmoid colitis  ## Mannose-binding lectin deficiency  Patient presents with severe pain with confirmed colitis, no fever, normal wbc. Mannose binding lectin deficiency causes immunocompromise.  Patient denies prior history of colitis or diverticulitis.     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)  ## Asthma  Continue albuterol MDI and nebs once verified  Continue Mucomyst nebs once verified  Continue Flonase once verified  Continue Prednisone once verified  Continue Breo-ellipita once verified    ## IBS  Hold Lubiprostone in setting of severe colitis    ## HTN  Continue Metoprolol once verified    ## Depression  ## Chronic fatigue  ## Chronic pain syndrome  Continue Effexor once verified  Cont Ativan prn once verified    Diet:  Clear liquid diet  DVT Prophylaxis: Pneumatic Compression Devices  Hamilton Catheter: Not present  Lines: None     Cardiac Monitoring: None  Code Status:  Full    Clinically Significant Risk Factors Present on Admission                   # Hypertension: Noted on problem list               # Financial/Environmental Concerns:    # Asthma: noted on problem list        Disposition Plan     Medically Ready for Discharge: Anticipated Tomorrow           Sohan Calderon MD  Hospitalist Service  [facility]  Securely message with [employer] (more info)  Text page via [employer] Paging/Directory     ______________________________________________________________________    Chief Complaint   Abdominal pain    History is obtained from the patient, electronic health record, and emergency department physician    History of Present Illness   Valencia Ye is a 54 year old female who presents to the emergency department for the evaluation of acute onset left lower quadrant abdominal pain.  She has hx of churg ju that is well controlled on prednisone.  She denies any travel or eating out or atypical foods.   Patient reports that she has been feeling generalized aches and subjective fevers in the afternoon.  She has had her appendix and uterus surgically removed.  No other abdominal surgeries reported.  Her pain is primarily located to the left lower quadrant and wraps around her lower suprapubic region.  She reports also experiencing some mild nausea but no vomiting.  No reports of constipation or diarrhea.  No urinary symptoms reported.  No chest pain, chest pressure or shortness of breath.    In the ER, she was noted to be afebrile but tachycardic with normal oxygenation and blood pressures.  BMP and CBC are normal.  Troponin x2 are below reference range.  Lipase is 34, lactic acid is 0.9.  UA has 150 ketones and 0 wbc and 1 rbc.  CT of abd/pelvis with contrast revealed, severe colitis of mid-distal descending colon that maybe infectious or inflammatory or could be vasculitis in patient with vasculitis history.  Moderate diffuse wall thickening of sigmoid colon suggestive of colitis.  No intraabdominal abscess and some mild reactive ileus.      Patient has received 2 L of IVF and multiple doses of IV dilaudid and is being admitted for for pain control and supportive therapy.    Past Medical History    Past Medical History:   Diagnosis Date    Anxiety     Aortitis (H)     Asthma     associated with Churg Antonina syndrome    Bronchiectasis (H)     Cerebral infarction (H) 08/1990    Very small, caused small permanent optical migraine    Chronic sinusitis     Churg-Antonina syndrome (H)     dx 1990    Conductive hearing loss     Depressive disorder     Eustachian tube dysfunction     Goiter     Hearing loss, conductive     Heart rate problem     Hypertension     Hypocalcemia     Immunosuppression (H)     Infection due to 2019 novel coronavirus 04/2022 Feb 2020, April 20211, June 2022    Irritable bowel syndrome     Major depressive disorder     Mannose-binding lectin deficiency (H) 2014    Nonspecific abnormal results  of thyroid function study     Osteoporosis     Pericarditis      and     Pneumonia     4/23/10    Recurrent otitis media     Recurrent UTI     Rheumatoid vasculitis (H)     Sinusitis, chronic     Steroid long-term use     Tinnitus     Varicose veins of leg with swelling        Past Surgical History   Past Surgical History:   Procedure Laterality Date    C/SECTION, CLASSICAL  1998     SECTION      COLONOSCOPY Left 2014    Procedure: COMBINED COLONOSCOPY, SINGLE OR MULTIPLE BIOPSY/POLYPECTOMY BY BIOPSY;  Surgeon: Js Pinedo MD;  Location:  GI    COLONOSCOPY N/A 2019    Procedure: COLONOSCOPY;  Surgeon: Bryce Pimentel MD;  Location:  GI    ENT SURGERY      ESOPHAGOSCOPY, GASTROSCOPY, DUODENOSCOPY (EGD), COMBINED N/A 2023    Procedure: ESOPHAGOGASTRODUODENOSCOPY, WITH BIOPSY;  Surgeon: Mo Rosa MD;  Location:  GI    ESOPHAGOSCOPY, GASTROSCOPY, DUODENOSCOPY (EGD), COMBINED N/A 10/17/2024    Procedure: ESOPHAGOGASTRODUODENOSCOPY with DUODENAL BIOPSY;  Surgeon: Dillon Guillen MD;  Location:  OR    ESOPHAGOSCOPY, GASTROSCOPY, DUODENOSCOPY (EGD), DILATATION, COMBINED N/A 2023    Procedure: ESOPHAGOGASTRODUODENOSCOPY, WITH DILATION;  Surgeon: Mo Rosa MD;  Location: [facility]    GENITOURINARY SURGERY      HEAD & NECK SURGERY      HYSTERECTOMY TOTAL ABDOMINAL  2009    without oopherectomy for TEO 3 on LEEP w/ pos margins - Path all benign    HYSTERECTOMY, PAP NO LONGER INDICATED      cervix removed     LAPAROSCOPIC APPENDECTOMY N/A 2022    Procedure: APPENDECTOMY, LAPAROSCOPIC;  Surgeon: Fany Hannah MD;  Location: [facility] OR    PE TUBES      PICC INSERTION  10/10/2013    5fr DL PASV PICC, 43cm (1cm external) in the L basilic vein w/ tip in the low SVC.    PICC INSERTION Left 2017    5fr DL BioFlo PICC, 42cm (3cm external) in the L lateral brachial vein w/ tip in the SVC RA junction.    SINUS SURGERY       TUBAL LIGATION      TYMPANOPLASTY      ZZHC COLP CERVIX/UPPER VAGINA W LOOP ELEC BX CERVIX         Prior to Admission Medications   Prior to Admission Medications   Prescriptions Last Dose Informant Patient Reported? Taking?   Glucosamine-Chondroit-Vit C-Mn (GLUCOSAMINE CHONDROITINOITIN COMPLEX) CAPS   Yes No   Sig: Take 3 capsules by mouth daily   Iodine, Kelp, (KELP PO)   Yes No   Sig: Take 2 capsules by mouth daily   LORazepam (ATIVAN) 0.5 MG tablet   No No   Sig: Take 1 tablet (0.5 mg) by mouth every 8 hours as needed for anxiety   Omega-3 Fatty Acids (FISH OIL) 1200 MG capsule   Yes No   Sig: Take 4 capsules (4.8 g) by mouth daily   Potassium Gluconate 595 (99 K) MG TABS   Yes No   Sig: Take 5 g by mouth daily   Probiotic Product ([employer] PROBIOTIC MAXIMUM STRENGTH) CAPS   Yes No   Sig: Take 1 capsule by mouth daily   VITAMIN K PO   Yes No   Sig: Take 1 capsule by mouth daily   acetylcysteine (MUCOMYST) 20 % neb solution   No No   Sig: NEBULIZE THE CONTENTS OF ONE VIAL (4 ML) TWO TIMES A DAY.   albuterol (PROAIR HFA/PROVENTIL HFA/VENTOLIN HFA) 108 (90 Base) MCG/ACT inhaler   No No   Sig: INHALE 2 PUFFS INTO THE LUNGS EVERY 4 HOURS AS NEEDED FOR SHORTNESS OF BREATH/WHEEZE   albuterol (PROVENTIL) (2.5 MG/3ML) 0.083% neb solution   No No   Sig: TAKE 1 VIAL VIA NEBULIZER TWICE A DAY.   ciprofloxacin-dexAMETHasone (CIPRODEX) 0.3-0.1 % otic suspension   No No   Sig: INSTILL 3 DROPS INTO AFFECTED EARS 2 TIMES DAILY AS NEEDED   co-enzyme Q-10 100 MG CAPS capsule   Yes No   Sig: Take 100 mg by mouth daily.   Patient not taking: Reported on 12/11/2024   cod liver oil CAPS capsule   Yes No   Sig: Take 1 capsule by mouth daily   fluticasone (FLONASE) 50 MCG/ACT nasal spray   No No   Sig: INSTILL 2 SPRAYS INTO BOTH NOSTRILS DAILY.   fluticasone-vilanterol (BREO ELLIPTA) 200-25 MCG/ACT inhaler   No No   Sig: Inhale 1 puff into the lungs daily.   lubiprostone (AMITIZA) 24 MCG capsule   No No   Sig: Take 1 capsule (24 mcg) by  mouth 2 times daily (with meals).   magnesium 250 MG tablet   Yes No   Sig: Take 1 tablet by mouth daily.   metoprolol tartrate (LOPRESSOR) 25 MG tablet   No No   Sig: TAKE 0.5 TABLETS (12.5 MG) BY MOUTH 2 TIMES DAILY AS NEEDED (FOR HIGH BLOOD PRESSURE >130/80)   norethindrone-eth estradiol (FEMHRT LOW DOSE) 0.5-2.5 MG-MCG tablet   No No   Sig: Take 1 tablet by mouth at bedtime   Patient not taking: Reported on 12/11/2024   predniSONE (DELTASONE) 5 MG tablet   No No   Sig: Take 1 tablet (5 mg) by mouth daily Total dose 5.5mg daily   venlafaxine (EFFEXOR XR) 37.5 MG 24 hr capsule   Yes No   Sig: Take 37.5 mg by mouth. Pt taking every other day   vitamin D3 (CHOLECALCIFEROL) 2000 units (50 mcg) tablet   Yes No   Sig: Take 2 tablets by mouth daily [id number] untis daily      Facility-Administered Medications: None           Physical Exam   Vital Signs: Temp: 98.7  F (37.1  C) Temp src: Oral BP: 129/68 Pulse: 67   Resp: 20 SpO2: 94 % O2 Device: None (Room air)    Weight: 120 lbs 0 oz    General Appearance: Awake, mild-mod discomfort  Respiratory: CTA  Cardiovascular: RRR  GI: flat, + diffuse tenderness lower abdomen, no guard or rebound  Skin: warm and dry  Other: No edema     Medical Decision Making       70 MINUTES SPENT BY ME on the date of service doing chart review, history, exam, documentation & further activities per the note.      Data     I have personally reviewed the following data over the past 24 hrs:    7.1  \   14.9   / 319     140 100 15.2 /  88   3.7 23 0.77 \     ALT: 20 AST: 27 AP: 63 TBILI: 0.3   ALB: 4.7 TOT PROTEIN: 7.1 LIPASE: 34     Trop: 12 BNP: N/A     Procal: N/A CRP: N/A Lactic Acid: 0.9         Imaging results reviewed over the past 24 hrs:   Recent Results (from the past 24 hours)   CT Abdomen Pelvis w Contrast    Narrative    EXAM: CT ABDOMEN PELVIS W CONTRAST  LOCATION: [facility]  DATE: 1/3/2025    INDICATION: LLQ pain and tenderness  COMPARISON: CT abdomen  pelvis 12/13/2024  TECHNIQUE: CT scan of the abdomen and pelvis was performed following injection of IV contrast. Multiplanar reformats were obtained. Dose reduction techniques were used.  CONTRAST: 60 mL Isovue 370    FINDINGS:   LOWER CHEST: Mild diffuse wall thickening of the lower esophagus is nonspecific but can be seen with esophagitis. Subendocardial fat deposition of the left ventricular apex appears similar to prior and can be seen as a sequela of prior infarct. Stable   small pulmonary nodules of the lung bases.    HEPATOBILIARY: Normal.    PANCREAS: Normal.    SPLEEN: Normal.    ADRENAL GLANDS: Normal.    KIDNEYS/BLADDER: Normal.    BOWEL: Severe submucosal edema of the mid to distal descending colon with significant surrounding inflammatory stranding and ill-defined fluid. A few colonic diverticula are identified within this segment of bowel which appear enlarged or hyperenhancing.   Small amount of free fluid in the lower abdomen. No evidence of intra-abdominal abscess. Mild diffuse wall thickening of the sigmoid colon. Appendectomy. Small bowel loops in the lower abdomen are mildly fluid-filled and dilated without a discrete   transition point. Moderate rectal stool burden.    LYMPH NODES: Normal.    VASCULATURE: Normal.    PELVIC ORGANS: Hysterectomy. Stable 2.8 cm left adnexal cyst.    MUSCULOSKELETAL: Normal.      Impression    IMPRESSION:   1.  Severe colitis of the mid to distal descending colon. This may be infectious or inflammatory in etiology or secondary to patient's known vasculitis. There are colonic diverticula within this region of colon which do not appear inflamed; however,   inflammation secondary to diverticular disease is not excluded.  2.  Moderate diffuse wall thickening of the sigmoid colon suggestive of colitis.  3.  Small amount of free fluid in the lower abdomen. No intra-abdominal abscess.  4.  Mildly dilated loops of small bowel in the lower abdomen without a discrete  transition point are favored secondary to a reactive ileus.

## 2025-01-04 NOTE — PROGRESS NOTES
[facility]    Medicine Progress Note - Hospitalist Service    Date of Admission:  1/3/2025    Assessment & Plan   fabian Ye is a 54 year old female who presents to the emergency department for the evaluation of acute onset left lower quadrant abdominal pain.  She has hx of churg ju that is well controlled on prednisone.  She denies any travel or eating out or atypical foods.  Patient reports that she has been feeling generalized aches and subjective fevers in the afternoon.  She has had her appendix and uterus surgically removed.  No other abdominal surgeries reported.  Her pain is primarily located to the left lower quadrant and wraps around her lower suprapubic region.  She reports also experiencing some mild nausea but no vomiting.  No reports of constipation or diarrhea.  No urinary symptoms reported.  No chest pain, chest pressure or shortness of breath.     In the ER, she was noted to be afebrile but tachycardic with normal oxygenation and blood pressures.  BMP and CBC are normal.  Troponin x2 are below reference range.  Lipase is 34, lactic acid is 0.9.  UA has 150 ketones and 0 wbc and 1 rbc.  CT of abd/pelvis with contrast revealed, severe colitis of mid-distal descending colon that maybe infectious or inflammatory or could be vasculitis in patient with vasculitis history.  Moderate diffuse wall thickening of sigmoid colon suggestive of colitis.  No intraabdominal abscess and some mild reactive ileus.       Patient has received 2 L of IVF and multiple doses of IV dilaudid and is being admitted for for pain control and supportive therapy.     Severe descending and sigmoid colitis  Mannose-binding lectin deficiency  Patient presents with severe pain with confirmed colitis, no fever, normal wbc. Mannose binding lectin deficiency causes immunocompromise.  Patient denies prior history of colitis or diverticulitis.       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)  Asthma  Continue albuterol MDI and nebs  Continue Mucomyst neb  Continue Flonase  Continue Prednisone  Continue Breo-ellipita     IBS  - not taking amitiza      HTN  -Continue Metoprolol once verified     Depression  Chronic fatigue  Chronic pain syndrome  Continue Effexor   Cont Ativan prn       Observation Goals: -diagnostic tests and consults completed and resulted, -vital signs normal or at patient baseline, -tolerating oral intake to maintain hydration, -adequate pain control on oral analgesics, -infection is improving, -returns to baseline functional status, -safe disposition plan has been identified, Nurse to notify provider when observation goals have been met and patient is ready for discharge.  Diet: Clear Liquid Diet    DVT Prophylaxis: Ambulate every shift  Hamilton Catheter: Not present  Lines: None     Cardiac Monitoring: None  Code Status: Full Code      Clinically Significant Risk Factors Present on Admission                   # Hypertension: Noted on problem list               # Financial/Environmental Concerns:    # Asthma: noted on problem list        Social Drivers of Health    Depression: At risk (10/15/2024)    PHQ-2     PHQ-2 Score: 4          Disposition Plan     Medically Ready for Discharge: Anticipated Tomorrow             Fernanda Chávez DO  Hospitalist Service  [facility]  Securely message with [employer] (more info)  Text page via [employer] Paging/Directory   ______________________________________________________________________    Interval History   Assumed care today. Patient in ER bed still. She reports ongoing LLQ  pain and worse nausea. She is anxious about not getting her prednisone today. Discussed plan with  nurse. GI will see later today and suspects vascular issues.    Physical Exam   Vital Signs: Temp: 98.7  F (37.1  C) Temp src: Oral BP: 104/53 Pulse: 73   Resp: 14 SpO2: 97 % O2 Device: None (Room air)    Weight: 120 lbs 0 oz    Constitutional: Awake, alert, cooperative, no apparent distress  Respiratory: Clear to auscultation bilaterally, no crackles or wheezing  Cardiovascular: Regular rate and rhythm, normal S1 and S2, and no murmur noted  GI: no guarding, TTP throughout  Skin/Integumen: No rashes, no cyanosis, no edema  Other:      Medical Decision Making       45 MINUTES SPENT BY ME on the date of service doing chart review, history, exam, documentation & further activities per the note.      Data     I have personally reviewed the following data over the past 24 hrs:    7.1  \   14.9   / 319     140 100 15.2 /  88   3.7 23 0.77 \     ALT: 20 AST: 27 AP: 63 TBILI: 0.3   ALB: 4.7 TOT PROTEIN: 7.1 LIPASE: 34     Trop: 12 BNP: N/A     Procal: N/A CRP: 3.31 Lactic Acid: 0.9         Imaging results reviewed over the past 24 hrs:   Recent Results (from the past 24 hours)   CT Abdomen Pelvis w Contrast    Narrative    EXAM: CT ABDOMEN PELVIS W CONTRAST  LOCATION: [facility]  DATE: 1/3/2025    INDICATION: LLQ pain and tenderness  COMPARISON: CT abdomen pelvis 12/13/2024  TECHNIQUE: CT scan of the abdomen and pelvis was performed following injection of IV contrast. Multiplanar reformats were obtained. Dose reduction techniques were used.  CONTRAST: 60 mL Isovue 370    FINDINGS:   LOWER CHEST: Mild diffuse wall thickening of the lower esophagus is nonspecific but can be seen with esophagitis. Subendocardial fat deposition of the left ventricular apex appears similar to prior and can be seen as a sequela of prior infarct. Stable   small pulmonary nodules of the lung bases.    HEPATOBILIARY: Normal.    PANCREAS: Normal.    SPLEEN: Normal.    ADRENAL GLANDS: Normal.    KIDNEYS/BLADDER:  Normal.    BOWEL: Severe submucosal edema of the mid to distal descending colon with significant surrounding inflammatory stranding and ill-defined fluid. A few colonic diverticula are identified within this segment of bowel which appear enlarged or hyperenhancing.   Small amount of free fluid in the lower abdomen. No evidence of intra-abdominal abscess. Mild diffuse wall thickening of the sigmoid colon. Appendectomy. Small bowel loops in the lower abdomen are mildly fluid-filled and dilated without a discrete   transition point. Moderate rectal stool burden.    LYMPH NODES: Normal.    VASCULATURE: Normal.    PELVIC ORGANS: Hysterectomy. Stable 2.8 cm left adnexal cyst.    MUSCULOSKELETAL: Normal.      Impression    IMPRESSION:   1.  Severe colitis of the mid to distal descending colon. This may be infectious or inflammatory in etiology or secondary to patient's known vasculitis. There are colonic diverticula within this region of colon which do not appear inflamed; however,   inflammation secondary to diverticular disease is not excluded.  2.  Moderate diffuse wall thickening of the sigmoid colon suggestive of colitis.  3.  Small amount of free fluid in the lower abdomen. No intra-abdominal abscess.  4.  Mildly dilated loops of small bowel in the lower abdomen without a discrete transition point are favored secondary to a reactive ileus.

## 2025-01-04 NOTE — PROGRESS NOTES
RECEIVING UNIT ED HANDOFF REVIEW    ED Nurse Handoff Report was reviewed by: Aga Willis RN on January 4, 2025 at 3:42 PM

## 2025-01-05 VITALS
TEMPERATURE: 98.5 F | OXYGEN SATURATION: 99 % | WEIGHT: 120 LBS | HEIGHT: 62 IN | BODY MASS INDEX: 22.08 KG/M2 | SYSTOLIC BLOOD PRESSURE: 127 MMHG | HEART RATE: 93 BPM | RESPIRATION RATE: 16 BRPM | DIASTOLIC BLOOD PRESSURE: 80 MMHG

## 2025-01-05 LAB
ANION GAP SERPL CALCULATED.3IONS-SCNC: 11 MMOL/L (ref 7–15)
ATRIAL RATE - MUSE: 112 BPM
BASOPHILS # BLD AUTO: 0 10E3/UL (ref 0–0.2)
BASOPHILS NFR BLD AUTO: 0 %
BUN SERPL-MCNC: 5.7 MG/DL (ref 6–20)
CALCIUM SERPL-MCNC: 8.8 MG/DL (ref 8.8–10.4)
CHLORIDE SERPL-SCNC: 103 MMOL/L (ref 98–107)
CREAT SERPL-MCNC: 0.69 MG/DL (ref 0.51–0.95)
DIASTOLIC BLOOD PRESSURE - MUSE: NORMAL MMHG
EGFRCR SERPLBLD CKD-EPI 2021: >90 ML/MIN/1.73M2
EOSINOPHIL # BLD AUTO: 0.3 10E3/UL (ref 0–0.7)
EOSINOPHIL NFR BLD AUTO: 3 %
ERYTHROCYTE [DISTWIDTH] IN BLOOD BY AUTOMATED COUNT: 13.7 % (ref 10–15)
GLUCOSE SERPL-MCNC: 114 MG/DL (ref 70–99)
HCO3 SERPL-SCNC: 22 MMOL/L (ref 22–29)
HCT VFR BLD AUTO: 35.6 % (ref 35–47)
HGB BLD-MCNC: 11.9 G/DL (ref 11.7–15.7)
IMM GRANULOCYTES # BLD: 0.1 10E3/UL
IMM GRANULOCYTES NFR BLD: 1 %
INTERPRETATION ECG - MUSE: NORMAL
LYMPHOCYTES # BLD AUTO: 1.1 10E3/UL (ref 0.8–5.3)
LYMPHOCYTES NFR BLD AUTO: 10 %
MCH RBC QN AUTO: 32.5 PG (ref 26.5–33)
MCHC RBC AUTO-ENTMCNC: 33.4 G/DL (ref 31.5–36.5)
MCV RBC AUTO: 97 FL (ref 78–100)
MONOCYTES # BLD AUTO: 0.6 10E3/UL (ref 0–1.3)
MONOCYTES NFR BLD AUTO: 6 %
NEUTROPHILS # BLD AUTO: 8.5 10E3/UL (ref 1.6–8.3)
NEUTROPHILS NFR BLD AUTO: 80 %
NRBC # BLD AUTO: 0 10E3/UL
NRBC BLD AUTO-RTO: 0 /100
P AXIS - MUSE: 49 DEGREES
PLATELET # BLD AUTO: 260 10E3/UL (ref 150–450)
POTASSIUM SERPL-SCNC: 3.9 MMOL/L (ref 3.4–5.3)
PR INTERVAL - MUSE: 180 MS
QRS DURATION - MUSE: 84 MS
QT - MUSE: 338 MS
QTC - MUSE: 461 MS
R AXIS - MUSE: 69 DEGREES
RBC # BLD AUTO: 3.66 10E6/UL (ref 3.8–5.2)
SODIUM SERPL-SCNC: 136 MMOL/L (ref 135–145)
SYSTOLIC BLOOD PRESSURE - MUSE: NORMAL MMHG
T AXIS - MUSE: -49 DEGREES
VENTRICULAR RATE- MUSE: 112 BPM
WBC # BLD AUTO: 10.6 10E3/UL (ref 4–11)

## 2025-01-05 PROCEDURE — 36415 COLL VENOUS BLD VENIPUNCTURE: CPT | Performed by: STUDENT IN AN ORGANIZED HEALTH CARE EDUCATION/TRAINING PROGRAM

## 2025-01-05 PROCEDURE — 80048 BASIC METABOLIC PNL TOTAL CA: CPT | Performed by: STUDENT IN AN ORGANIZED HEALTH CARE EDUCATION/TRAINING PROGRAM

## 2025-01-05 PROCEDURE — 82310 ASSAY OF CALCIUM: CPT | Performed by: STUDENT IN AN ORGANIZED HEALTH CARE EDUCATION/TRAINING PROGRAM

## 2025-01-05 PROCEDURE — 99239 HOSP IP/OBS DSCHRG MGMT >30: CPT | Performed by: STUDENT IN AN ORGANIZED HEALTH CARE EDUCATION/TRAINING PROGRAM

## 2025-01-05 PROCEDURE — 82565 ASSAY OF CREATININE: CPT | Performed by: STUDENT IN AN ORGANIZED HEALTH CARE EDUCATION/TRAINING PROGRAM

## 2025-01-05 PROCEDURE — G0378 HOSPITAL OBSERVATION PER HR: HCPCS

## 2025-01-05 PROCEDURE — 250N000012 HC RX MED GY IP 250 OP 636 PS 637: Performed by: STUDENT IN AN ORGANIZED HEALTH CARE EDUCATION/TRAINING PROGRAM

## 2025-01-05 PROCEDURE — 85004 AUTOMATED DIFF WBC COUNT: CPT | Performed by: STUDENT IN AN ORGANIZED HEALTH CARE EDUCATION/TRAINING PROGRAM

## 2025-01-05 RX ORDER — HYDROMORPHONE HYDROCHLORIDE 2 MG/1
2-4 TABLET ORAL
Qty: 10 TABLET | Refills: 0 | Status: SHIPPED | OUTPATIENT
Start: 2025-01-05

## 2025-01-05 RX ADMIN — PREDNISONE 5.5 MG: 5 TABLET ORAL at 08:08

## 2025-01-05 ASSESSMENT — ACTIVITIES OF DAILY LIVING (ADL)
ADLS_ACUITY_SCORE: 40

## 2025-01-05 NOTE — PROGRESS NOTES
Observation goals  PRIOR TO DISCHARGE       Comments: -diagnostic tests and consults completed and resulted - Not Met  -vital signs normal or at patient baseline - Met  -tolerating oral intake to maintain hydration - Not Met  -adequate pain control on oral analgesics -  Met  -infection is improving - Not Met  -returns to baseline functional status - Not Met  -safe disposition plan has been identified - Not Met  Nurse to notify provider when observation goals have been met and patient is ready for discharge.

## 2025-01-05 NOTE — DISCHARGE SUMMARY
[facility]  Hospitalist Discharge Summary      Date of Admission:  1/3/2025  Date of Discharge:  1/5/2025  Discharging Provider: Fernanda Chávez DO  Discharge Service: Hospitalist Service    Discharge Diagnoses   See below    Clinically Significant Risk Factors          Follow-ups Needed After Discharge       Discharge Disposition   Discharged to home  Condition at discharge: Stable    Hospital Course   fabian Ye is a 54 year old female who presents to the emergency department for the evaluation of acute onset left lower quadrant abdominal pain.  She has hx of churg ju that is well controlled on prednisone.  She denies any travel or eating out or atypical foods.  Patient reports that she has been feeling generalized aches and subjective fevers in the afternoon.  She has had her appendix and uterus surgically removed.  No other abdominal surgeries reported.  Her pain is primarily located to the left lower quadrant and wraps around her lower suprapubic region.  She reports also experiencing some mild nausea but no vomiting.  No reports of constipation or diarrhea.  No urinary symptoms reported.  No chest pain, chest pressure or shortness of breath.     In the ER, she was noted to be afebrile but tachycardic with normal oxygenation and blood pressures.  BMP and CBC are normal.  Troponin x2 are below reference range.  Lipase is 34, lactic acid is 0.9.  UA has 150 ketones and 0 wbc and 1 rbc.  CT of abd/pelvis with contrast revealed, severe colitis of mid-distal descending colon that maybe infectious or inflammatory or could be vasculitis in patient with vasculitis history.  Moderate diffuse wall thickening of sigmoid colon suggestive of colitis.  No intraabdominal abscess and some mild reactive ileus.       Patient has received 2 L of IVF and multiple doses of IV dilaudid and was being admitted for for pain control and supportive therapy.     Severe descending and sigmoid  colitis, suspect ischemic event  Mannose-binding lectin deficiency  Patient presents with severe pain with confirmed colitis, no fever, normal wbc. Mannose binding lectin deficiency causes immunocompromise.  Patient denies prior history of colitis or diverticulitis.       - ongoing pain after admission and asked GI to consult. They recommend ongoing conservative management and they will follow up in clinic with colonoscopy.   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       Fernanda Chávez DO  [facility] EXTENDED RECOVERY AND SHORT STAY  [address] [place] [id number]  Phone: [phone]  ______________________________________________________________________    Physical Exam   Vital Signs: Temp: 98.5  F (36.9  C) Temp src: Oral BP: 127/80 Pulse: 93   Resp: 16 SpO2: 99 % O2 Device: None (Room air)    Weight: 120 lbs 0 oz         Primary Care Physician   Padmini Renee    Discharge Orders      Reason for your hospital stay    You presented for abdominal pain and were found to have inflammation in your colon. True etiology unclear but no concerning labs or other findings right now. Recommend to go home and manage symptoms at home. Monitoring your stool output and for any signs of fevers or infection     Activity    Your activity upon  discharge: activity as tolerated     Diet    Follow this diet upon discharge: Current Diet:Orders Placed This Encounter      Advance as tolerated     Hospital Follow-up with Existing Primary Care Provider (PCP)    Please see details below            Significant Results and Procedures   Results for orders placed or performed during the hospital encounter of 01/03/25   CT Abdomen Pelvis w Contrast    Narrative    EXAM: CT ABDOMEN PELVIS W CONTRAST  LOCATION: [facility]  DATE: 1/3/2025    INDICATION: LLQ pain and tenderness  COMPARISON: CT abdomen pelvis 12/13/2024  TECHNIQUE: CT scan of the abdomen and pelvis was performed following injection of IV contrast. Multiplanar reformats were obtained. Dose reduction techniques were used.  CONTRAST: 60 mL Isovue 370    FINDINGS:   LOWER CHEST: Mild diffuse wall thickening of the lower esophagus is nonspecific but can be seen with esophagitis. Subendocardial fat deposition of the left ventricular apex appears similar to prior and can be seen as a sequela of prior infarct. Stable   small pulmonary nodules of the lung bases.    HEPATOBILIARY: Normal.    PANCREAS: Normal.    SPLEEN: Normal.    ADRENAL GLANDS: Normal.    KIDNEYS/BLADDER: Normal.    BOWEL: Severe submucosal edema of the mid to distal descending colon with significant surrounding inflammatory stranding and ill-defined fluid. A few colonic diverticula are identified within this segment of bowel which appear enlarged or hyperenhancing.   Small amount of free fluid in the lower abdomen. No evidence of intra-abdominal abscess. Mild diffuse wall thickening of the sigmoid colon. Appendectomy. Small bowel loops in the lower abdomen are mildly fluid-filled and dilated without a discrete   transition point. Moderate rectal stool burden.    LYMPH NODES: Normal.    VASCULATURE: Normal.    PELVIC ORGANS: Hysterectomy. Stable 2.8 cm left adnexal cyst.    MUSCULOSKELETAL: Normal.      Impression     IMPRESSION:   1.  Severe colitis of the mid to distal descending colon. This may be infectious or inflammatory in etiology or secondary to patient's known vasculitis. There are colonic diverticula within this region of colon which do not appear inflamed; however,   inflammation secondary to diverticular disease is not excluded.  2.  Moderate diffuse wall thickening of the sigmoid colon suggestive of colitis.  3.  Small amount of free fluid in the lower abdomen. No intra-abdominal abscess.  4.  Mildly dilated loops of small bowel in the lower abdomen without a discrete transition point are favored secondary to a reactive ileus.       *Note: Due to a large number of results and/or encounters for the requested time period, some results have not been displayed. A complete set of results can be found in Results Review.       Discharge Medications   Discharge Medication List as of 1/5/2025 11:07 AM        START taking these medications    Details   HYDROmorphone (DILAUDID) 2 MG tablet Take 1-2 tablets (2-4 mg) by mouth every 3 hours as needed for moderate pain., Disp-10 tablet, R-0, Local Print           CONTINUE these medications which have NOT CHANGED    Details   acetylcysteine (MUCOMYST) 20 % neb solution NEBULIZE THE CONTENTS OF ONE VIAL (4 ML) TWO TIMES A DAY., Disp-800 mL, R-1, E-Prescribe      albuterol (PROAIR HFA/PROVENTIL HFA/VENTOLIN HFA) 108 (90 Base) MCG/ACT inhaler INHALE 2 PUFFS INTO THE LUNGS EVERY 4 HOURS AS NEEDED FOR SHORTNESS OF BREATH/WHEEZE, Disp-18 g, R-11, E-PrescribeDX Code Needed  . - Pharmacy may dispense brand covered by insurance (Proair, or proventil or ventolin or generic albuterol inhaler)      albuterol (PROVENTIL) (2.5 MG/3ML) 0.083% neb solution TAKE 1 VIAL VIA NEBULIZER TWICE A DAY., Disp-180 mL, R-11, E-Prescribe      ciprofloxacin-dexAMETHasone (CIPRODEX) 0.3-0.1 % otic suspension INSTILL 3 DROPS INTO AFFECTED EARS 2 TIMES DAILY AS NEEDED, Disp-7.5 mL, R-10, E-Prescribe       fluticasone (FLONASE) 50 MCG/ACT nasal spray INSTILL 2 SPRAYS INTO BOTH NOSTRILS DAILY., Disp-16 mL, R-2, E-Prescribe      fluticasone-vilanterol (BREO ELLIPTA) 200-25 MCG/ACT inhaler Inhale 1 puff into the lungs daily., Disp-60 each, R-11, E-[id number]/9: please dispense Brand if preferred by insurance. Thanks!      Glucosamine-Chondroit-Vit C-Mn (GLUCOSAMINE CHONDROITINOITIN COMPLEX) CAPS Take 2 capsules by mouth daily., Historical      Iodine, Kelp, (KELP PO) Take 2 capsules by mouth daily, Historical      LORazepam (ATIVAN) 0.5 MG tablet Take 1 tablet (0.5 mg) by mouth every 8 hours as needed for anxiety, Disp-8 tablet, R-0, Local Print      magnesium 250 MG tablet Take 1 tablet by mouth daily., Historical      metoprolol tartrate (LOPRESSOR) 25 MG tablet TAKE 0.5 TABLETS (12.5 MG) BY MOUTH 2 TIMES DAILY AS NEEDED (FOR HIGH BLOOD PRESSURE >130/80), Disp-90 tablet, R-1, E-Prescribe      Omega-3 Fatty Acids (FISH OIL) 1200 MG capsule Take 4 capsules (4.8 g) by mouth daily, Historical      Potassium Gluconate 595 (99 K) MG TABS Take 5 g by mouth daily, Historical      predniSONE (DELTASONE) 1 MG tablet Take 5.5 mg by mouth daily., Historical      Probiotic Product ([employer] PROBIOTIC MAXIMUM STRENGTH) CAPS Take 1 capsule by mouth daily, Historical      VITAMIN D PO Take 10,000-15,000 Units by mouth daily., Historical      VITAMIN K PO Take 1 capsule by mouth daily. Strength unknown, Historical      norethindrone-eth estradiol (FEMHRT LOW DOSE) 0.5-2.5 MG-MCG tablet Take 1 tablet by mouth at bedtime, Disp-90 tablet, R-3, E-Prescribe           STOP taking these medications       lubiprostone (AMITIZA) 24 MCG capsule Comments:   Reason for Stopping:             Allergies   Allergies   Allergen Reactions    Colistimethate Anaphylaxis    Colymycin M [Colistimethate Sodium] Anaphylaxis    Tamiflu [Oseltamivir]      Gums Blister up    Hydroxyzine     Azithromycin Palpitations    Clindamycin Rash    Tizanidine Other (See  Comments)     Severe panic attack

## 2025-01-05 NOTE — PROGRESS NOTES
Observation goals  PRIOR TO DISCHARGE         -diagnostic tests and consults completed and resulted - Not Met  -vital signs normal or at patient baseline - Met  -tolerating oral intake to maintain hydration - Not Met  -adequate pain control on oral analgesics - Met  -infection is improving - Not Met  -returns to baseline functional status - Not Met  -safe disposition plan has been identified - Not Met    Nurse to notify provider when observation goals have been met and patient is ready for discharge.

## 2025-01-05 NOTE — PLAN OF CARE
Goal Outcome Evaluation:      Plan of Care Reviewed With: patient    Top portion must ALWAYS be completed  PRIMARY Concern: Abdominal pain/Nausea/Colitis  SAFETY RISK Concerns (fall risk, behaviors, etc.): none      Aggression Tool Color: green  Isolation/Type: Enteric r/o c-diff  Tests/Procedures for NEXT shift: stool sample collected, result in process.  Consults? (Pending/following, signed-off?) GI following  Where is patient from? (Home, TCU, etc.): Home  Other Important info for NEXT shift: Intermittent nausea. Denies N/V this shift.  Anticipated DC date & active delays: TBD  _________________________________________________________________________  SUMMARY NOTE:                Orientation/Cognitive: AOX4  Observation Goals (Met/ Not Met): not met  Mobility Level/Assist Equipment: Ind  Antibiotics & Plan (IV/po, length of tx left): n/a  Pain Management: Minimal abdominal pain, denied pain meds. Prn tylenol, IV dilaudid.  Tele/VS/O2: VSS on RA ex soft BP. No tele  ABNL Lab/BG: WNL  Diet: Clear liquid  Bowel/Bladder: Continent. Ambulates independently to BR. No BM so far.  Skin Concerns: intact  Drains/Devices: PIV SL   Patient Stated Goal for Today: rest

## 2025-01-05 NOTE — PROGRESS NOTES
MD Notification    Notified Person: MD    Notified Person Name: nedra patricio     Notification Date/Time: 01/04/2025, 23: 30    Notification Interaction:pager    Purpose of Notification: positive I for norovirus in stool    Orders Received:    Comments:

## 2025-01-05 NOTE — PROGRESS NOTES
Cross cover    I was informed of patient testing  + for Norovirus.  She was admitted with colitis 24 hours ago by myself.       Sohan Calderon MD

## 2025-01-05 NOTE — PLAN OF CARE
Discharge instructions discussed and question answered. Discharging home with family. Paper script sent with patient.

## 2025-01-05 NOTE — PLAN OF CARE
PRIMARY Concern: Abdominal pain/Nausea/Colitis  SAFETY RISK Concerns (fall risk, behaviors, etc.): none      Aggression Tool Color: green  Isolation/Type: Enteric for Norovirus   Tests/Procedures for NEXT shift:  Consults? (Pending/following, signed-off?) GI following  Where is patient from? (Home, TCU, etc.): Home  Other Important info for NEXT shift: Intermittent nausea. Denies N/V this shift.  Anticipated DC date & active delays: TBD  _________________________________________________________________________  SUMMARY NOTE:                Orientation/Cognitive: AOX4  Observation Goals (Met/ Not Met): not met  Mobility Level/Assist Equipment: Ind  Antibiotics & Plan (IV/po, length of tx left): n/a  Pain Management: Minimal abdominal pain, denied pain meds. Prn tylenol, IV dilaudid. Available   Tele/VS/O2: VSS on RA. No tele  ABNL Lab/BG: WNL  Diet: Clear liquid  Bowel/Bladder: Continent. Ambulates independently to BR. No BM so far.  Skin Concerns: intact  Drains/Devices: PIV SL   Patient Stated Goal for Today: rest      Observation goals  PRIOR TO DISCHARGE       Comments: -diagnostic tests and consults completed and resulted - Not Met  -vital signs normal or at patient baseline - Met  -tolerating oral intake to maintain hydration - Not Met  -adequate pain control on oral analgesics -  Met  -infection is improving - Not Met  -returns to baseline functional status - Not Met  -safe disposition plan has been identified - Not Met  Nurse to notify provider when observation goals have been met and patient is ready for discharge.

## 2025-01-07 ENCOUNTER — TELEPHONE (OUTPATIENT)
Dept: GASTROENTEROLOGY | Facility: CLINIC | Age: 55
End: 2025-01-07
Payer: COMMERCIAL

## 2025-01-07 ENCOUNTER — PATIENT OUTREACH (OUTPATIENT)
Dept: CARE COORDINATION | Facility: CLINIC | Age: 55
End: 2025-01-07
Payer: COMMERCIAL

## 2025-01-07 NOTE — TELEPHONE ENCOUNTER
"Screening questions completed on 9/30/2024. No changes.      Endoscopy Scheduling Screen    Have you had any respiratory illness or flu-like symptoms in the last 10 days?  No    What is your communication preference for Instructions and/or Bowel Prep?   MyChart    What insurance is in the chart?  Other:  [employer]    Ordering/Referring Provider: DARIUS SALAZAR   (If ordering provider performs procedure, schedule with ordering provider unless otherwise instructed. )    BMI: Estimated body mass index is 21.95 kg/m  as calculated from the following:    Height as of 1/3/25: 1.575 m (5' 2\").    Weight as of 1/3/25: 54.4 kg (120 lb).     Sedation Ordered  MAC/deep sedation.   BMI<= 45 45 < BMI <= 48 48 < BMI < = 50  BMI > 50   No Restrictions No MG ASC  No [facility]  [facility] with exceptions Hospital Only OR Only       Do you have a history of malignant hyperthermia?  No    Final Scheduling Details     Procedure scheduled  Colonoscopy    Surgeon:  LISSETTE     Date of procedure:  1/21/2025     Pre-OP / PAC:   No - Not required for this site.    Location  [facility] - ASC  EARLIEST OPENING    Sedation   MAC/Deep Sedation - Per order.      Patient Reminders:   You will receive a call from a Nurse to review instructions and health history.  This assessment must be completed prior to your procedure.  Failure to complete the Nurse assessment may result in the procedure being cancelled.      On the day of your procedure, please designate an adult(s) who can drive you home stay with you for the next 24 hours. The medicines used in the exam will make you sleepy. You will not be able to drive.      You cannot take public transportation, ride share services, or non-medical taxi service without a responsible caregiver.  Medical transport services are allowed with the requirement that a responsible caregiver will receive you at your destination.  We require that drivers and caregivers are confirmed prior to your procedure.  "

## 2025-01-07 NOTE — PROGRESS NOTES
[facility] Resource Center:   [facility] Resource Center Contact  [facility]/Voicemail     Clinical Data: Post-Discharge Outreach     Outreach attempted x 2.  Left message on patient's voicemail, providing [facility]'s central phone number of [phone] ([phone]) for questions/concerns and/or to schedule an appt with an [facility] provider, if they do not have a PCP.      Plan:  [facility] will do no further outreaches at this time.       Dee Euceda  Community Health Worker  [facility], [facility]  [phone]      *Connected Care Resource Team does NOT follow patient ongoing. Referrals are identified based on internal discharge reports and the outreach is to ensure patient has an understanding of their discharge instructions.

## 2025-01-09 ENCOUNTER — VIRTUAL VISIT (OUTPATIENT)
Dept: INTERNAL MEDICINE | Facility: CLINIC | Age: 55
End: 2025-01-09
Attending: STUDENT IN AN ORGANIZED HEALTH CARE EDUCATION/TRAINING PROGRAM
Payer: COMMERCIAL

## 2025-01-09 DIAGNOSIS — J47.9 BRONCHIECTASIS WITHOUT COMPLICATION (H): ICD-10-CM

## 2025-01-09 DIAGNOSIS — Z09 HOSPITAL DISCHARGE FOLLOW-UP: Primary | ICD-10-CM

## 2025-01-09 DIAGNOSIS — D72.18 CHURG-STRAUSS SYNDROME (H): ICD-10-CM

## 2025-01-09 DIAGNOSIS — I10 BENIGN ESSENTIAL HYPERTENSION: ICD-10-CM

## 2025-01-09 DIAGNOSIS — M30.1 CHURG-STRAUSS SYNDROME (H): ICD-10-CM

## 2025-01-09 DIAGNOSIS — R06.09 EXERTIONAL DYSPNEA: ICD-10-CM

## 2025-01-09 DIAGNOSIS — K52.9 ACUTE COLITIS: ICD-10-CM

## 2025-01-09 NOTE — PROGRESS NOTES
Valencia is a 54 year old who is being evaluated via a billable video visit.    How would you like to obtain your AVS? Daljithart  If the video visit is dropped, the invitation should be resent by: Text to cell phone: [phone]  Will anyone else be joining your video visit? No      Assessment & Plan     Hospital discharge follow-up  Acute colitis  Overall, symptoms have  been stable since discharge.  Patient has been slowly advancing diet as tolerated and has been managing it well without symptoms of nausea/vomiting or bowel movement changes.  Has an upcoming colonoscopy in around 2 weeks.  Continue with current medications.        Benign essential hypertension  Continue monitoring blood pressure at home.  Use of metoprolol medication at half a tablet as needed for blood pressure above target.    Exertional dyspnea  Bronchiectasis without complication (H)  Churg-Antonina syndrome (H)  Has been working with the pulmonary team and the cardiovascular team for further evaluation of ongoing dyspnea symptoms that patient has been having for the past few months.  Will be having cardiac MRI as part of cardiovascular  workup.  Continue with current medications.        MED REC REQUIRED  Post Medication Reconciliation Status: Completed        Subjective   Valencia is a 54 year old, presenting for the following health issues:  Hospital F/U        1/9/2025     2:37 PM   Additional Questions   Roomed by Yeti   Accompanied by Self     HPI     Answers submitted by the patient for this visit:  Patient Health Questionnaire (Submitted on 1/9/2025)  If you checked off any problems, how difficult have these problems made it for you to do your work, take care of things at home, or get along with other people?: Extremely difficult  PHQ9 TOTAL SCORE: 16    Hospital Follow-up Visit:    Hospital/Nursing Home/IP Rehab Facility: [facility]  Date of Admission: 1/3/2025  Date of Discharge: 1/5/2025  Reason(s) for Admission:  left lower quadrant abdominal pain   Was the patient in the ICU or did the patient experience delirium during hospitalization?  No  Do you have any other stressors you would like to discuss with your provider? No    Problems taking medications regularly:  None  Medication changes since discharge: None  Problems adhering to non-medication therapy:  None    Summary of hospitalization:  [facility] discharge summary reviewed  Diagnostic Tests/Treatments reviewed.  Follow up needed: none  Other Healthcare Providers Involved in Patient s Care:         Procedures  Update since discharge: stable.         Plan of care communicated with patient           Presented to the emergency room for evaluation of acute left lower quadrant abdominal pain.  CT abdomen pelvis revealed colitis of mid distal descending colon.  Patient was admitted for IV fluid hydration and pain control.  Overall, pain improved and patient was discharged with a plan to complete colonoscopy on an outpatient basis.  Has a colonoscopy scheduled in 2 weeks.  Has been tolerating diet well with no symptoms of nausea/vomiting/pain/bowel movement changes.      Review of Systems  Constitutional, HEENT, cardiovascular, pulmonary, gi and gu systems are negative, except as otherwise noted.      Objective           Vitals:  No vitals were obtained today due to virtual visit.    Physical Exam   GENERAL: alert and no distress  EYES: Eyes grossly normal to inspection.  No discharge or erythema, or obvious scleral/conjunctival abnormalities.  RESP: No audible wheeze, cough, or visible cyanosis.    SKIN: Visible skin clear. No significant rash, abnormal pigmentation or lesions.  NEURO: Cranial nerves grossly intact.  Mentation and speech appropriate for age.  PSYCH: Appropriate affect, tone, and pace of words          Video-Visit Details    Type of service:  Video Visit     Distant Location (provider location):  On-site  Platform used for Video Visit:  Chencho  Signed Electronically by: Padmini Renee MD

## 2025-01-10 ENCOUNTER — TELEPHONE (OUTPATIENT)
Dept: GASTROENTEROLOGY | Facility: CLINIC | Age: 55
End: 2025-01-10
Payer: COMMERCIAL

## 2025-01-10 DIAGNOSIS — K52.9 COLITIS: Primary | ICD-10-CM

## 2025-01-10 RX ORDER — BISACODYL 5 MG/1
TABLET, DELAYED RELEASE ORAL
Qty: 4 TABLET | Refills: 0 | Status: SHIPPED | OUTPATIENT
Start: 2025-01-10

## 2025-01-10 NOTE — TELEPHONE ENCOUNTER
Pre visit planning completed.      Procedure details:    Patient scheduled for Colonoscopy on 1/21/25.     Arrival time: 0700. Procedure time 0800    Facility location: [facility]; [address] [place] [address], [address] Floor, [place] [address]. Check in location: 5th Floor.    Sedation type: MAC    Pre op exam needed? No.    Indication for procedure: churg-ju syndrome, colitis      Chart review:     Electronic implanted devices? No    Recent diagnosis of diverticulitis within the last 6 weeks? No      Medication review:    Diabetic? No    Anticoagulants? No    Weight loss medication/injectable? No GLP-1 medication per patient's medication list. Nursing to verify with pre-assessment call.    Other medication HOLDING recommendations:  N/A      Prep for procedure:     Bowel prep recommendation: Standard Miralax.   Due to: standard bowel prep    Procedure information and instructions sent via sugar Sandra RN  Endoscopy Procedure Pre Assessment   [phone] option 2

## 2025-01-10 NOTE — TELEPHONE ENCOUNTER
Pre assessment completed for upcoming procedure.   (Please see previous telephone encounter notes for complete details)      Procedure details:    Arrival time and facility location reviewed.    Pre op exam needed? No.    Designated  policy reviewed. Instructed to have someone stay 24  hours post procedure.       Medication review:    Medications reviewed. Please see supporting documentation below. Holding recommendations discussed (if applicable).       Prep for procedure:     Procedure prep instructions reviewed.  - patient states miralax does not do much of anything for her and would like to go with a golytely prep. Rx sent to pharmacy and instructions sent to [facility].       Any additional information needed:  N/A      Patient verbalized understanding and had no questions or concerns at this time.      Magdalena Sandra RN  Endoscopy Procedure Pre Assessment   [phone] option 2

## 2025-01-14 ENCOUNTER — OFFICE VISIT (OUTPATIENT)
Dept: INTERNAL MEDICINE | Facility: CLINIC | Age: 55
End: 2025-01-14
Payer: COMMERCIAL

## 2025-01-14 ENCOUNTER — LAB (OUTPATIENT)
Dept: LAB | Facility: CLINIC | Age: 55
End: 2025-01-14
Payer: COMMERCIAL

## 2025-01-14 ENCOUNTER — TRANSFERRED RECORDS (OUTPATIENT)
Dept: HEALTH INFORMATION MANAGEMENT | Facility: CLINIC | Age: 55
End: 2025-01-14

## 2025-01-14 VITALS
SYSTOLIC BLOOD PRESSURE: 116 MMHG | TEMPERATURE: 97.8 F | WEIGHT: 120.3 LBS | HEIGHT: 62 IN | BODY MASS INDEX: 22.14 KG/M2 | DIASTOLIC BLOOD PRESSURE: 77 MMHG | RESPIRATION RATE: 19 BRPM | HEART RATE: 94 BPM | OXYGEN SATURATION: 100 %

## 2025-01-14 DIAGNOSIS — R10.32 LLQ ABDOMINAL PAIN: ICD-10-CM

## 2025-01-14 DIAGNOSIS — E61.1 IRON DEFICIENCY: ICD-10-CM

## 2025-01-14 DIAGNOSIS — M30.1 CHURG-STRAUSS SYNDROME (H): ICD-10-CM

## 2025-01-14 DIAGNOSIS — F43.22 ADJUSTMENT DISORDER WITH ANXIETY: ICD-10-CM

## 2025-01-14 DIAGNOSIS — Z01.818 PREOP GENERAL PHYSICAL EXAM: Primary | ICD-10-CM

## 2025-01-14 DIAGNOSIS — D72.18 CHURG-STRAUSS SYNDROME (H): ICD-10-CM

## 2025-01-14 DIAGNOSIS — I10 BENIGN ESSENTIAL HYPERTENSION: ICD-10-CM

## 2025-01-14 DIAGNOSIS — K14.6 BURNING TONGUE: ICD-10-CM

## 2025-01-14 DIAGNOSIS — K13.0 ANGULAR CHEILITIS: ICD-10-CM

## 2025-01-14 PROCEDURE — 36415 COLL VENOUS BLD VENIPUNCTURE: CPT

## 2025-01-14 PROCEDURE — 82607 VITAMIN B-12: CPT

## 2025-01-14 PROCEDURE — 84207 ASSAY OF VITAMIN B-6: CPT | Mod: 90

## 2025-01-14 PROCEDURE — 83735 ASSAY OF MAGNESIUM: CPT

## 2025-01-14 PROCEDURE — 83550 IRON BINDING TEST: CPT

## 2025-01-14 PROCEDURE — 99000 SPECIMEN HANDLING OFFICE-LAB: CPT

## 2025-01-14 PROCEDURE — 82728 ASSAY OF FERRITIN: CPT

## 2025-01-14 PROCEDURE — 99214 OFFICE O/P EST MOD 30 MIN: CPT | Performed by: INTERNAL MEDICINE

## 2025-01-14 PROCEDURE — 83540 ASSAY OF IRON: CPT

## 2025-01-14 ASSESSMENT — PATIENT HEALTH QUESTIONNAIRE - PHQ9
SUM OF ALL RESPONSES TO PHQ QUESTIONS 1-9: 21
10. IF YOU CHECKED OFF ANY PROBLEMS, HOW DIFFICULT HAVE THESE PROBLEMS MADE IT FOR YOU TO DO YOUR WORK, TAKE CARE OF THINGS AT HOME, OR GET ALONG WITH OTHER PEOPLE: EXTREMELY DIFFICULT
SUM OF ALL RESPONSES TO PHQ QUESTIONS 1-9: 21

## 2025-01-14 ASSESSMENT — PAIN SCALES - GENERAL: PAINLEVEL_OUTOF10: NO PAIN (0)

## 2025-01-14 NOTE — PROGRESS NOTES
Preoperative Evaluation  [place] [address]  [address]  [facility] [address]  Phone: [phone]  Primary Provider: Padmini Renee MD  Pre-op Performing Provider: Padmini Renee MD  Jan 14, 2025 1/14/2025   Surgical Information   What procedure is being done? colonoscopy   Facility or Hospital where procedure/surgery will be performed: [facility]   Who is doing the procedure / surgery? gastroenterology   Date of surgery / procedure: january 21   Time of surgery / procedure: 700am   Where do you plan to recover after surgery? at home with family     Fax number for surgical facility: Note does not need to be faxed, will be available electronically in Epic.    Assessment & Plan     The proposed surgical procedure is considered LOW risk.    Preop general physical exam  LLQ abdominal pain  Patient had a recent episode of acute colitis and will be completing colonoscopy for further evaluation of the same.  Symptoms currently are improved.  Up-to-date with lab work.    Churg-Antonina syndrome (H)  Will continue with daily prednisone the patient will take with a small sip of water on the morning of procedure.    Benign essential hypertension  Continue monitoring blood pressure at home.  Uses metoprolol medication at half a tablet as needed for blood pressure above target.  If needed on the morning of the procedure, patient can take the metoprolol medication for small sip of water.    Adjustment disorder with anxiety  Currently not on Effexor medication.  Using Ativan medication as needed.  Can take the medication with a small sip of water on the morning of the procedure if needed.          - No identified additional risk factors other than previously addressed         Recommendation  Approval given to proceed with proposed procedure, without further diagnostic evaluation.    Teresa Birmingham is a 54 year old, presenting for the following:  Pre-Op  Exam          1/14/2025     9:17 AM   Additional Questions   Roomed by Shannan Ayala MA   Accompanied by self         1/14/2025     9:17 AM   Patient Reported Additional Medications   Patient reports taking the following new medications None     HPI related to upcoming procedure: Patient had a recent episode of acute colitis and will be completing colonoscopy for further evaluation of the same.  Symptoms currently are improved.         1/14/2025   Pre-Op Questionnaire   Have you ever had a heart attack or stroke? No   Have you ever had surgery on your heart or blood vessels, such as a stent placement, a coronary artery bypass, or surgery on an artery in your head, neck, heart, or legs? No   Do you have chest pain with activity? No   Do you have a history of heart failure? No   Do you currently have a cold, bronchitis or symptoms of other infection? No   Do you have a cough, shortness of breath, or wheezing? (!) YES    Do you or anyone in your family have previous history of blood clots? No   Do you or does anyone in your family have a serious bleeding problem such as prolonged bleeding following surgeries or cuts? No   Have you ever had problems with anemia or been told to take iron pills? (!) YES    Have you had any abnormal blood loss such as black, tarry or bloody stools, or abnormal vaginal bleeding? No   Have you ever had a blood transfusion? No   Are you willing to have a blood transfusion if it is medically needed before, during, or after your surgery? Yes   Have you or any of your relatives ever had problems with anesthesia? No   Do you have sleep apnea, excessive snoring or daytime drowsiness? No   Do you have any artifical heart valves or other implanted medical devices like a pacemaker, defibrillator, or continuous glucose monitor? No   Do you have artificial joints? No   Are you allergic to latex? No     Health Care Directive  Patient does not have a Health Care Directive: Discussed advance care planning  with patient; however, patient declined at this time.    Preoperative Review of    reviewed - controlled substances reflected in medication list.          Patient Active Problem List    Diagnosis Date Noted    Abdominal pain, left lower quadrant 01/03/2025     Priority: Medium    Nausea 01/03/2025     Priority: Medium    Acute colitis 01/03/2025     Priority: Medium    Symptomatic menopausal or female climacteric states 05/08/2024     Priority: Medium    H/O abdominal hysterectomy 05/08/2024     Priority: Medium    Adjustment disorder with anxiety 02/06/2024     Priority: Medium    Diverticulosis 03/17/2023     Priority: Medium    Pain syndrome, chronic 05/22/2022     Priority: Medium    Alpha-lipoproteinemia 05/22/2022     Priority: Medium    Bladder pain 07/31/2019     Priority: Medium    Overactive bladder 07/31/2019     Priority: Medium    Vertigo 05/17/2018     Priority: Medium    Controlled substance agreement signed 11/13/2017     Priority: Medium     Patient is followed by BRIAN DEE for ongoing prescription of benzodiazepines.  All refills should be approved by this provider, or covering partner.    Medication(s): clonazepam.   Maximum quantity per month: 7.5  Clinic visit frequency required: Q 6  months     Controlled substance agreement on file: Yes       Date(s): 11/13/17  Benzodiazepine use reviewed by psychiatry:  No    Last [facility] website verification:  none   [email or web address]          Bronchiectasis (H) 05/22/2017     Priority: Medium    Moderate episode of recurrent major depressive disorder (H) 01/29/2017     Priority: Medium    Benign essential hypertension 10/20/2016     Priority: Medium    Mannose-binding lectin deficiency      Priority: Medium    Irritable bowel syndrome without diarrhea 07/14/2016     Priority: Medium    Chronic fatigue 01/21/2016     Priority: Medium    Anxiety 05/30/2014     Priority: Medium    Mild intermittent asthma without complication 07/16/2013      Priority: Medium    History of eating disorder 2013     Priority: Medium    Churg-Antonina syndrome (H) 10/10/2012     Priority: Medium    Palpitations 10/04/2012     Priority: Medium    ILD (interstitial lung disease) (H) 2012     Priority: Medium    Goiter 10/14/2011     Priority: Medium    History of systemic steroid therapy 10/14/2011     Priority: Medium      Past Medical History:   Diagnosis Date    Anxiety     Aortitis     Asthma     associated with Churg Antonina syndrome    Bronchiectasis (H)     Cerebral infarction (H) 1990    Very small, caused small permanent optical migraine    Chronic sinusitis     Churg-Antonina syndrome (H)     dx     Conductive hearing loss     Depressive disorder     Eustachian tube dysfunction     Goiter     Hearing loss, conductive     Heart rate problem     Hypertension     Hypocalcemia     Immunosuppression     Infection due to 2019 novel coronavirus 2022, , 2022    Irritable bowel syndrome     Major depressive disorder     Mannose-binding lectin deficiency     Nonspecific abnormal results of thyroid function study     Osteoporosis     Pericarditis      and     Pneumonia     4/23/10    Recurrent otitis media     Recurrent UTI     Rheumatoid vasculitis (H)     Sinusitis, chronic     Steroid long-term use     Tinnitus     Varicose veins of leg with swelling      Past Surgical History:   Procedure Laterality Date    C/SECTION, CLASSICAL       SECTION      COLONOSCOPY Left 2014    Procedure: COMBINED COLONOSCOPY, SINGLE OR MULTIPLE BIOPSY/POLYPECTOMY BY BIOPSY;  Surgeon: Js Pinedo MD;  Location:  GI    COLONOSCOPY N/A 2019    Procedure: COLONOSCOPY;  Surgeon: Bryce Pimentel MD;  Location:  GI    ENT SURGERY      ESOPHAGOSCOPY, GASTROSCOPY, DUODENOSCOPY (EGD), COMBINED N/A 2023    Procedure: ESOPHAGOGASTRODUODENOSCOPY, WITH BIOPSY;  Surgeon: Mo Rosa  MD Lucas;  Location:  GI    ESOPHAGOSCOPY, GASTROSCOPY, DUODENOSCOPY (EGD), COMBINED N/A 10/17/2024    Procedure: ESOPHAGOGASTRODUODENOSCOPY with DUODENAL BIOPSY;  Surgeon: Dillon Guillen MD;  Location:  OR    ESOPHAGOSCOPY, GASTROSCOPY, DUODENOSCOPY (EGD), DILATATION, COMBINED N/A 02/02/2023    Procedure: ESOPHAGOGASTRODUODENOSCOPY, WITH DILATION;  Surgeon: Mo Rosa MD;  Location:  GI    GENITOURINARY SURGERY      HEAD & NECK SURGERY      HYSTERECTOMY TOTAL ABDOMINAL  02/27/2009    without oopherectomy for TEO 3 on LEEP w/ pos margins - Path all benign    HYSTERECTOMY, PAP NO LONGER INDICATED      cervix removed     LAPAROSCOPIC APPENDECTOMY N/A 06/22/2022    Procedure: APPENDECTOMY, LAPAROSCOPIC;  Surgeon: Fany Hannah MD;  Location: [facility] OR    PE TUBES      PICC INSERTION  10/10/2013    5fr DL PASV PICC, 43cm (1cm external) in the L basilic vein w/ tip in the low SVC.    PICC INSERTION Left 05/26/2017    5fr DL BioFlo PICC, 42cm (3cm external) in the L lateral brachial vein w/ tip in the SVC RA junction.    SINUS SURGERY      TUBAL LIGATION      TYMPANOPLASTY      ZZHC COLP CERVIX/UPPER VAGINA W LOOP ELEC BX CERVIX       Current Outpatient Medications   Medication Sig Dispense Refill    acetylcysteine (MUCOMYST) 20 % neb solution NEBULIZE THE CONTENTS OF ONE VIAL (4 ML) TWO TIMES A DAY. 800 mL 1    albuterol (PROAIR HFA/PROVENTIL HFA/VENTOLIN HFA) 108 (90 Base) MCG/ACT inhaler INHALE 2 PUFFS INTO THE LUNGS EVERY 4 HOURS AS NEEDED FOR SHORTNESS OF BREATH/WHEEZE 18 g 11    albuterol (PROVENTIL) (2.5 MG/3ML) 0.083% neb solution TAKE 1 VIAL VIA NEBULIZER TWICE A DAY. 180 mL 11    ciprofloxacin-dexAMETHasone (CIPRODEX) 0.3-0.1 % otic suspension INSTILL 3 DROPS INTO AFFECTED EARS 2 TIMES DAILY AS NEEDED 7.5 mL 10    fluticasone (FLONASE) 50 MCG/ACT nasal spray INSTILL 2 SPRAYS INTO BOTH NOSTRILS DAILY. 16 mL 2    fluticasone-vilanterol (BREO ELLIPTA) 200-25 MCG/ACT inhaler Inhale 1 puff  into the lungs daily. 60 each 11    Glucosamine-Chondroit-Vit C-Mn (GLUCOSAMINE CHONDROITINOITIN COMPLEX) CAPS Take 2 capsules by mouth daily.      HYDROmorphone (DILAUDID) 2 MG tablet Take 1-2 tablets (2-4 mg) by mouth every 3 hours as needed for moderate pain. 10 tablet 0    Iodine, Kelp, (KELP PO) Take 2 capsules by mouth daily      LORazepam (ATIVAN) 0.5 MG tablet Take 1 tablet (0.5 mg) by mouth every 8 hours as needed for anxiety 8 tablet 0    magnesium 250 MG tablet Take 1 tablet by mouth daily.      metoprolol tartrate (LOPRESSOR) 25 MG tablet TAKE 0.5 TABLETS (12.5 MG) BY MOUTH 2 TIMES DAILY AS NEEDED (FOR HIGH BLOOD PRESSURE >130/80) 90 tablet 1    Omega-3 Fatty Acids (FISH OIL) 1200 MG capsule Take 4 capsules (4.8 g) by mouth daily      Potassium Gluconate 595 (99 K) MG TABS Take 5 g by mouth daily      predniSONE (DELTASONE) 1 MG tablet Take 5.5 mg by mouth daily.      Probiotic Product ([employer] PROBIOTIC MAXIMUM STRENGTH) CAPS Take 1 capsule by mouth daily      VITAMIN D PO Take 10,000-15,000 Units by mouth daily.      VITAMIN K PO Take 1 capsule by mouth daily. Strength unknown      bisacodyl (DULCOLAX) 5 MG EC tablet Take 2 tablets at 3 pm the day before your procedure. If your procedure is before 11 am, take 2 additional tablets at 11 pm. If your procedure is after 11 am, take 2 additional tablets at 6 am. For additional instructions refer to your colonoscopy prep instructions. (Patient not taking: Reported on 1/14/2025) 4 tablet 0    polyethylene glycol (GOLYTELY) 236 g suspension The night before the exam at 6 pm drink an 8-ounce glass every 15 minutes until the jug is half empty. If you arrive before 11 AM: Drink the other half of the Golytely jug at 11 PM night before procedure. If you arrive after 11 AM: Drink the other half of the Golytely jug at 6 AM day of procedure. For additional instructions refer to your colonoscopy prep instructions. (Patient not taking: Reported on 1/14/2025) 4000 mL 0  "      Allergies   Allergen Reactions    Colistimethate Anaphylaxis    Colymycin M [Colistimethate Sodium] Anaphylaxis    Tamiflu [Oseltamivir]      Gums Blister up    Hydroxyzine     Azithromycin Palpitations    Clindamycin Rash    Tizanidine Other (See Comments)     Severe panic attack        Social History     Tobacco Use    Smoking status: Never     Passive exposure: Never    Smokeless tobacco: Never   Substance Use Topics    Alcohol use: Not Currently       History   Drug Use No             Review of Systems  Constitutional, HEENT, cardiovascular, pulmonary, gi and gu systems are negative, except as otherwise noted.    Objective    /77 (BP Location: Right arm, Patient Position: Sitting, Cuff Size: Adult Regular)   Pulse 94   Temp 97.8  F (36.6  C) (Oral)   Resp 19   Ht 1.575 m (5' 2.01\")   Wt 54.6 kg (120 lb 4.8 oz)   LMP 02/01/2009 (LMP Unknown)   SpO2 100%   Breastfeeding No   BMI 22.00 kg/m     Estimated body mass index is 22 kg/m  as calculated from the following:    Height as of this encounter: 1.575 m (5' 2.01\").    Weight as of this encounter: 54.6 kg (120 lb 4.8 oz).  Physical Exam  GENERAL: alert and no distress  RESP: lungs clear to auscultation - no rales, rhonchi or wheezes  CV: regular rate and rhythm, normal S1 S2  ABDOMEN: soft, nontender, no hepatosplenomegaly, no masses   MS: no gross musculoskeletal defects noted, no edema  NEURO: Normal strength and tone, mentation intact and speech normal  PSYCH: mentation appears normal, affect normal.    Recent Labs   Lab Test 01/05/25  0647 01/03/25  1947   HGB 11.9 14.9    319    140   POTASSIUM 3.9 3.7   CR 0.69 0.77        Diagnostics  No labs were ordered during this visit.   No EKG this visit, completed in the last 90 days.    Revised Cardiac Risk Index (RCRI)  The patient has the following serious cardiovascular risks for perioperative complications:   - No serious cardiac risks = 0 points     RCRI Interpretation: 0 " points: Class I (very low risk - 0.4% complication rate)         Signed Electronically by: Padmini Renee MD  A copy of this evaluation report is provided to the requesting physician.        Answers submitted by the patient for this visit:  Patient Health Questionnaire (Submitted on 1/14/2025)  If you checked off any problems, how difficult have these problems made it for you to do your work, take care of things at home, or get along with other people?: Extremely difficult  PHQ9 TOTAL SCORE: 21

## 2025-01-14 NOTE — PATIENT INSTRUCTIONS
Please take your morning medications with a small sip of water.  Avoid aspirin 7 days before the surgery. Avoid nonsteroidal anti-inflammatory pain medication like ibuprofen, Motrin, or Aleve 7 days before the surgery.  Tylenol can be used for pain.  Avoid any over the counter multivitamins or herbal supplement 7 days before surgery   You can resume these medications after surgery

## 2025-01-15 LAB
FERRITIN SERPL-MCNC: 55 NG/ML (ref 11–328)
IRON BINDING CAPACITY (ROCHE): 271 UG/DL (ref 240–430)
IRON SATN MFR SERPL: 26 % (ref 15–46)
IRON SERPL-MCNC: 71 UG/DL (ref 37–145)
MAGNESIUM SERPL-MCNC: 2.1 MG/DL (ref 1.7–2.3)
VIT B12 SERPL-MCNC: 1037 PG/ML (ref 232–1245)

## 2025-01-16 ENCOUNTER — HOSPITAL ENCOUNTER (OUTPATIENT)
Dept: CARDIOLOGY | Facility: CLINIC | Age: 55
End: 2025-01-16
Attending: INTERNAL MEDICINE
Payer: COMMERCIAL

## 2025-01-16 VITALS — HEART RATE: 114 BPM | SYSTOLIC BLOOD PRESSURE: 122 MMHG | DIASTOLIC BLOOD PRESSURE: 77 MMHG

## 2025-01-16 DIAGNOSIS — M30.1 CHURG-STRAUSS SYNDROME (H): ICD-10-CM

## 2025-01-16 DIAGNOSIS — R06.00 DYSPNEA, UNSPECIFIED TYPE: ICD-10-CM

## 2025-01-16 DIAGNOSIS — R00.2 PALPITATIONS: ICD-10-CM

## 2025-01-16 DIAGNOSIS — R06.09 EXERTIONAL DYSPNEA: ICD-10-CM

## 2025-01-16 DIAGNOSIS — D72.18 CHURG-STRAUSS SYNDROME (H): ICD-10-CM

## 2025-01-16 DIAGNOSIS — I10 BENIGN ESSENTIAL HYPERTENSION: ICD-10-CM

## 2025-01-16 DIAGNOSIS — M30.1 EOSINOPHILIC GRANULOMATOSIS WITH POLYANGIITIS (EGPA) WITH LUNG INVOLVEMENT (H): ICD-10-CM

## 2025-01-16 DIAGNOSIS — D72.18 EOSINOPHILIC GRANULOMATOSIS WITH POLYANGIITIS (EGPA) WITH LUNG INVOLVEMENT (H): ICD-10-CM

## 2025-01-16 PROCEDURE — 250N000011 HC RX IP 250 OP 636: Performed by: INTERNAL MEDICINE

## 2025-01-16 PROCEDURE — A9585 GADOBUTROL INJECTION: HCPCS | Performed by: INTERNAL MEDICINE

## 2025-01-16 PROCEDURE — 255N000002 HC RX 255 OP 636: Performed by: INTERNAL MEDICINE

## 2025-01-16 PROCEDURE — 75563 CARD MRI W/STRESS IMG & DYE: CPT

## 2025-01-16 RX ORDER — GADOBUTROL 604.72 MG/ML
15 INJECTION INTRAVENOUS ONCE
Status: COMPLETED | OUTPATIENT
Start: 2025-01-16 | End: 2025-01-16

## 2025-01-16 RX ORDER — AMINOPHYLLINE 25 MG/ML
50-100 INJECTION, SOLUTION INTRAVENOUS
Status: ACTIVE | OUTPATIENT
Start: 2025-01-16

## 2025-01-16 RX ORDER — CAFFEINE CITRATE 20 MG/ML
60 SOLUTION INTRAVENOUS
Status: ACTIVE | OUTPATIENT
Start: 2025-01-16 | End: 2025-01-16

## 2025-01-16 RX ORDER — ALBUTEROL SULFATE 90 UG/1
2 INHALANT RESPIRATORY (INHALATION) EVERY 5 MIN PRN
Status: ACTIVE | OUTPATIENT
Start: 2025-01-16

## 2025-01-16 RX ORDER — DIAZEPAM 5 MG/1
5 TABLET ORAL EVERY 30 MIN PRN
Status: ACTIVE | OUTPATIENT
Start: 2025-01-16

## 2025-01-16 RX ORDER — NITROGLYCERIN 0.4 MG/1
0.4 TABLET SUBLINGUAL EVERY 5 MIN PRN
Status: ACTIVE | OUTPATIENT
Start: 2025-01-16 | End: 2025-01-16

## 2025-01-16 RX ORDER — CAFFEINE 200 MG
200 TABLET ORAL
Status: ACTIVE | OUTPATIENT
Start: 2025-01-16 | End: 2025-01-16

## 2025-01-16 RX ORDER — LIDOCAINE 40 MG/G
CREAM TOPICAL
OUTPATIENT
Start: 2025-01-16

## 2025-01-16 RX ORDER — REGADENOSON 0.08 MG/ML
0.4 INJECTION, SOLUTION INTRAVENOUS ONCE
Status: COMPLETED | OUTPATIENT
Start: 2025-01-16 | End: 2025-01-16

## 2025-01-16 RX ORDER — LORAZEPAM 0.5 MG/1
0.5 TABLET ORAL EVERY 30 MIN PRN
Status: ACTIVE | OUTPATIENT
Start: 2025-01-16

## 2025-01-16 RX ADMIN — GADOBUTROL 15 ML: 604.72 INJECTION INTRAVENOUS at 15:21

## 2025-01-16 RX ADMIN — REGADENOSON 0.4 MG: 0.08 INJECTION, SOLUTION INTRAVENOUS at 14:55

## 2025-01-17 ENCOUNTER — TELEPHONE (OUTPATIENT)
Dept: CARDIOLOGY | Facility: CLINIC | Age: 55
End: 2025-01-17
Payer: COMMERCIAL

## 2025-01-17 DIAGNOSIS — R06.09 EXERTIONAL DYSPNEA: ICD-10-CM

## 2025-01-17 DIAGNOSIS — R06.00 DYSPNEA, UNSPECIFIED TYPE: Primary | ICD-10-CM

## 2025-01-17 DIAGNOSIS — D72.18 CHURG-STRAUSS SYNDROME (H): ICD-10-CM

## 2025-01-17 DIAGNOSIS — M30.1 CHURG-STRAUSS SYNDROME (H): ICD-10-CM

## 2025-01-17 NOTE — TELEPHONE ENCOUNTER
[facility] Call Center    Phone Message    May a detailed message be left on voicemail: yes     Reason for Call: Other: Pt would like a call back asap to discuss her MRI results as she is concerned     Action Taken: Other: Cardio    Travel Screening: Not Applicable     Date of Service:

## 2025-01-20 ENCOUNTER — ANESTHESIA EVENT (OUTPATIENT)
Dept: SURGERY | Facility: AMBULATORY SURGERY CENTER | Age: 55
End: 2025-01-20
Payer: COMMERCIAL

## 2025-01-20 NOTE — TELEPHONE ENCOUNTER
[facility] Call Center    Phone Message    May a detailed message be left on voicemail: yes    Reason for Call: Patient called to discuss her MRI results. Please call back to go over them.    Thank you!  [facility]

## 2025-01-20 NOTE — ANESTHESIA PREPROCEDURE EVALUATION
Anesthesia Pre-Procedure Evaluation    Patient: Valencia Ye   MRN: [id number] : 1970        Procedure : Procedure(s):  Colonoscopy          Past Medical History:   Diagnosis Date    Anxiety     Aortitis     Asthma     associated with Churg Antonina syndrome    Bronchiectasis (H)     Cerebral infarction (H) 1990    Very small, caused small permanent optical migraine    Chronic sinusitis     Churg-Antonina syndrome (H)     dx     Conductive hearing loss     Depressive disorder     Eustachian tube dysfunction     Goiter     Hearing loss, conductive     Heart rate problem     Hypertension     Hypocalcemia     Immunosuppression     Infection due to 2019 novel coronavirus 2022, , 2022    Irritable bowel syndrome     Major depressive disorder     Mannose-binding lectin deficiency     Nonspecific abnormal results of thyroid function study     Osteoporosis     Pericarditis      and     Pneumonia     4/23/10    Recurrent otitis media     Recurrent UTI     Rheumatoid vasculitis (H)     Sinusitis, chronic     Steroid long-term use     Tinnitus     Varicose veins of leg with swelling       Past Surgical History:   Procedure Laterality Date    C/SECTION, CLASSICAL       SECTION      COLONOSCOPY Left 2014    Procedure: COMBINED COLONOSCOPY, SINGLE OR MULTIPLE BIOPSY/POLYPECTOMY BY BIOPSY;  Surgeon: Js Pinedo MD;  Location:  GI    COLONOSCOPY N/A 2019    Procedure: COLONOSCOPY;  Surgeon: Bryce Pimentel MD;  Location:  GI    ENT SURGERY      ESOPHAGOSCOPY, GASTROSCOPY, DUODENOSCOPY (EGD), COMBINED N/A 2023    Procedure: ESOPHAGOGASTRODUODENOSCOPY, WITH BIOPSY;  Surgeon: Mo Rosa MD;  Location:  GI    ESOPHAGOSCOPY, GASTROSCOPY, DUODENOSCOPY (EGD), COMBINED N/A 10/17/2024    Procedure: ESOPHAGOGASTRODUODENOSCOPY with DUODENAL BIOPSY;  Surgeon: Dillon Guillen MD;  Location:  OR     ESOPHAGOSCOPY, GASTROSCOPY, DUODENOSCOPY (EGD), DILATATION, COMBINED N/A 02/02/2023    Procedure: ESOPHAGOGASTRODUODENOSCOPY, WITH DILATION;  Surgeon: Mo Rosa MD;  Location:  GI    GENITOURINARY SURGERY      HEAD & NECK SURGERY      HYSTERECTOMY TOTAL ABDOMINAL  02/27/2009    without oopherectomy for TEO 3 on LEEP w/ pos margins - Path all benign    HYSTERECTOMY, PAP NO LONGER INDICATED      cervix removed     LAPAROSCOPIC APPENDECTOMY N/A 06/22/2022    Procedure: APPENDECTOMY, LAPAROSCOPIC;  Surgeon: Fany Hannah MD;  Location: [facility] OR    PE TUBES      PICC INSERTION  10/10/2013    5fr DL PASV PICC, 43cm (1cm external) in the L basilic vein w/ tip in the low SVC.    PICC INSERTION Left 05/26/2017    5fr DL BioFlo PICC, 42cm (3cm external) in the L lateral brachial vein w/ tip in the SVC RA junction.    SINUS SURGERY      TUBAL LIGATION      TYMPANOPLASTY      ZZHC COLP CERVIX/UPPER VAGINA W LOOP ELEC BX CERVIX        Allergies   Allergen Reactions    Colistimethate Anaphylaxis    Colymycin M [Colistimethate Sodium] Anaphylaxis    Tamiflu [Oseltamivir]      Gums Blister up    Hydroxyzine     Azithromycin Palpitations    Clindamycin Rash    Tizanidine Other (See Comments)     Severe panic attack      Social History     Tobacco Use    Smoking status: Never     Passive exposure: Never    Smokeless tobacco: Never   Substance Use Topics    Alcohol use: Not Currently      Wt Readings from Last 1 Encounters:   01/14/25 54.6 kg (120 lb 4.8 oz)        Anesthesia Evaluation   Pt has had prior anesthetic. Type: General.    No history of anesthetic complications       ROS/MED HX  ENT/Pulmonary:     (+)                      asthma        recent URI,          Neurologic:     (+)          CVA,  without deficits,                    Cardiovascular: Comment: EF 48% on recent cardiac MRI, findings of eosinophilic pericarditis    (+)  hypertension- -   -  - -                                 Previous cardiac  testing     METS/Exercise Tolerance:     Hematologic:  - neg hematologic  ROS     Musculoskeletal:  - neg musculoskeletal ROS     GI/Hepatic:  - neg GI/hepatic ROS     Renal/Genitourinary:  - neg Renal ROS     Endo:     (+)          thyroid problem, hypothyroidism, Chronic steroid usage for Asthma.         Psychiatric/Substance Use:  - neg psychiatric ROS     Infectious Disease:  - neg infectious disease ROS     Malignancy:  - neg malignancy ROS     Other:  - neg other ROS          Physical Exam    Airway        Mallampati: II       Respiratory Devices and Support         Dental       (+) Minor Abnormalities - some fillings, tiny chips      Cardiovascular          Rhythm and rate: regular     Pulmonary                   OUTSIDE LABS:  CBC:   Lab Results   Component Value Date    WBC 10.6 01/05/2025    WBC 7.1 01/03/2025    HGB 11.9 01/05/2025    HGB 14.9 01/03/2025    HCT 35.6 01/05/2025    HCT 44.1 01/03/2025     01/05/2025     01/03/2025     BMP:   Lab Results   Component Value Date     01/05/2025     01/03/2025    POTASSIUM 3.9 01/05/2025    POTASSIUM 3.7 01/03/2025    CHLORIDE 103 01/05/2025    CHLORIDE 100 01/03/2025    CO2 22 01/05/2025    CO2 23 01/03/2025    BUN 5.7 (L) 01/05/2025    BUN 15.2 01/03/2025    CR 0.69 01/05/2025    CR 0.77 01/03/2025     (H) 01/05/2025    GLC 88 01/03/2025     COAGS:   Lab Results   Component Value Date    INR 1.10 06/22/2023     POC:   Lab Results   Component Value Date     (H) 12/24/2015    HCG Negative 12/12/2010     HEPATIC:   Lab Results   Component Value Date    ALBUMIN 4.7 01/03/2025    PROTTOTAL 7.1 01/03/2025    ALT 20 01/03/2025    AST 27 01/03/2025    ALKPHOS 63 01/03/2025    BILITOTAL 0.3 01/03/2025     OTHER:   Lab Results   Component Value Date    LACT 0.9 01/03/2025    A1C 5.2 10/02/2019    BOSSMAN 8.8 01/05/2025    PHOS 4.4 07/05/2018    MAG 2.1 01/14/2025    LIPASE 34 01/03/2025    TSH 0.89 09/25/2024    T4 0.86 06/15/2021     T3 69 09/14/2010    CRP 44.0 (H) 09/21/2020    SED 11 01/25/2024       Anesthesia Plan    ASA Status:  3    NPO Status:  NPO Appropriate    Anesthesia Type: MAC.     - Reason for MAC: immobility needed              Consents    Anesthesia Plan(s) and associated risks, benefits, and realistic alternatives discussed. Questions answered and patient/representative(s) expressed understanding.     - Discussed:     - Discussed with:  Patient            Postoperative Care            Comments:               Bryce Viera MD    I have reviewed the pertinent notes and labs in the chart from the past 30 days and (re)examined the patient.  Any updates or changes from those notes are reflected in this note.               # Hypertension: Noted on problem list               # Financial/Environmental Concerns:    # Asthma: noted on problem list

## 2025-01-20 NOTE — TELEPHONE ENCOUNTER
Reviewed the results with the patient with Dr. James review orders placed in Epic.  Patient verbalized understanding of results and recommendations and did see her Rheumatologist and labs are stable.       EF slightly lower (now 48% by MRI) with same myocardial scar pattern as in 2018.   The study does not suggest ischemia, and normal angio in 2018.  Her dyspnea pattern is unusual, paroxysmally severe.  The only thing we could still look at from a cardiac perspective would be a cardiopulmonary stress test at the [facility].  Please arrange.    She may need more aggressive treatment for her Churg Antonina (by rheumatology), since I am not seeing any treatable cardiac issues.  I don't think there is likely to be significant benefit to pursuing full GDMT, considering all information including normal pro-BNP and troponin on recent ER presentation with her severe dyspnea.

## 2025-01-21 ENCOUNTER — ANESTHESIA (OUTPATIENT)
Dept: SURGERY | Facility: AMBULATORY SURGERY CENTER | Age: 55
End: 2025-01-21
Payer: COMMERCIAL

## 2025-01-21 ENCOUNTER — HOSPITAL ENCOUNTER (OUTPATIENT)
Facility: AMBULATORY SURGERY CENTER | Age: 55
Discharge: HOME | End: 2025-01-21
Attending: INTERNAL MEDICINE | Admitting: INTERNAL MEDICINE
Payer: COMMERCIAL

## 2025-01-21 VITALS
DIASTOLIC BLOOD PRESSURE: 83 MMHG | RESPIRATION RATE: 16 BRPM | OXYGEN SATURATION: 100 % | HEART RATE: 88 BPM | TEMPERATURE: 97.9 F | SYSTOLIC BLOOD PRESSURE: 134 MMHG

## 2025-01-21 VITALS — HEART RATE: 91 BPM

## 2025-01-21 LAB — COLONOSCOPY: NORMAL

## 2025-01-21 PROCEDURE — 45380 COLONOSCOPY AND BIOPSY: CPT | Mod: 59 | Performed by: INTERNAL MEDICINE

## 2025-01-21 PROCEDURE — 88305 TISSUE EXAM BY PATHOLOGIST: CPT | Mod: TC | Performed by: INTERNAL MEDICINE

## 2025-01-21 PROCEDURE — 45385 COLONOSCOPY W/LESION REMOVAL: CPT | Performed by: INTERNAL MEDICINE

## 2025-01-21 PROCEDURE — 88305 TISSUE EXAM BY PATHOLOGIST: CPT | Mod: 26 | Performed by: PATHOLOGY

## 2025-01-21 RX ORDER — LIDOCAINE HYDROCHLORIDE 20 MG/ML
INJECTION, SOLUTION INFILTRATION; PERINEURAL PRN
Status: DISCONTINUED | OUTPATIENT
Start: 2025-01-21 | End: 2025-01-21

## 2025-01-21 RX ORDER — PROCHLORPERAZINE MALEATE 10 MG
10 TABLET ORAL EVERY 6 HOURS PRN
Status: DISCONTINUED | OUTPATIENT
Start: 2025-01-21 | End: 2025-01-22 | Stop reason: HOSPADM

## 2025-01-21 RX ORDER — NALOXONE HYDROCHLORIDE 0.4 MG/ML
0.2 INJECTION, SOLUTION INTRAMUSCULAR; INTRAVENOUS; SUBCUTANEOUS
Status: DISCONTINUED | OUTPATIENT
Start: 2025-01-21 | End: 2025-01-22 | Stop reason: HOSPADM

## 2025-01-21 RX ORDER — NALOXONE HYDROCHLORIDE 0.4 MG/ML
0.4 INJECTION, SOLUTION INTRAMUSCULAR; INTRAVENOUS; SUBCUTANEOUS
Status: DISCONTINUED | OUTPATIENT
Start: 2025-01-21 | End: 2025-01-22 | Stop reason: HOSPADM

## 2025-01-21 RX ORDER — SODIUM CHLORIDE, SODIUM LACTATE, POTASSIUM CHLORIDE, CALCIUM CHLORIDE 600; 310; 30; 20 MG/100ML; MG/100ML; MG/100ML; MG/100ML
INJECTION, SOLUTION INTRAVENOUS CONTINUOUS PRN
Status: DISCONTINUED | OUTPATIENT
Start: 2025-01-21 | End: 2025-01-21

## 2025-01-21 RX ORDER — PROPOFOL 10 MG/ML
INJECTION, EMULSION INTRAVENOUS CONTINUOUS PRN
Status: DISCONTINUED | OUTPATIENT
Start: 2025-01-21 | End: 2025-01-21

## 2025-01-21 RX ORDER — ONDANSETRON 4 MG/1
4 TABLET, ORALLY DISINTEGRATING ORAL EVERY 6 HOURS PRN
Status: DISCONTINUED | OUTPATIENT
Start: 2025-01-21 | End: 2025-01-22 | Stop reason: HOSPADM

## 2025-01-21 RX ORDER — PROPOFOL 10 MG/ML
INJECTION, EMULSION INTRAVENOUS PRN
Status: DISCONTINUED | OUTPATIENT
Start: 2025-01-21 | End: 2025-01-21

## 2025-01-21 RX ORDER — LIDOCAINE 40 MG/G
CREAM TOPICAL
Status: DISCONTINUED | OUTPATIENT
Start: 2025-01-21 | End: 2025-01-21 | Stop reason: HOSPADM

## 2025-01-21 RX ORDER — FLUMAZENIL 0.1 MG/ML
0.2 INJECTION, SOLUTION INTRAVENOUS
Status: ACTIVE | OUTPATIENT
Start: 2025-01-21 | End: 2025-01-21

## 2025-01-21 RX ORDER — ONDANSETRON 2 MG/ML
4 INJECTION INTRAMUSCULAR; INTRAVENOUS
Status: DISCONTINUED | OUTPATIENT
Start: 2025-01-21 | End: 2025-01-21 | Stop reason: HOSPADM

## 2025-01-21 RX ORDER — ONDANSETRON 2 MG/ML
4 INJECTION INTRAMUSCULAR; INTRAVENOUS EVERY 6 HOURS PRN
Status: DISCONTINUED | OUTPATIENT
Start: 2025-01-21 | End: 2025-01-22 | Stop reason: HOSPADM

## 2025-01-21 RX ADMIN — PROPOFOL 100 MCG/KG/MIN: 10 INJECTION, EMULSION INTRAVENOUS at 08:04

## 2025-01-21 RX ADMIN — PROPOFOL 50 MG: 10 INJECTION, EMULSION INTRAVENOUS at 08:01

## 2025-01-21 RX ADMIN — LIDOCAINE HYDROCHLORIDE 100 MG: 20 INJECTION, SOLUTION INFILTRATION; PERINEURAL at 08:01

## 2025-01-21 RX ADMIN — SODIUM CHLORIDE, SODIUM LACTATE, POTASSIUM CHLORIDE, CALCIUM CHLORIDE: 600; 310; 30; 20 INJECTION, SOLUTION INTRAVENOUS at 08:01

## 2025-01-21 RX ADMIN — PROPOFOL 50 MG: 10 INJECTION, EMULSION INTRAVENOUS at 08:04

## 2025-01-21 NOTE — ANESTHESIA CARE TRANSFER NOTE
Patient: Valencia Ye    Procedure: Procedure(s):  COLONOSCOPY, WITH POLYPECTOMY AND BIOPSY       Diagnosis: Churg-Antonina syndrome (H) [M30.1, D72.18]  Colitis [K52.9]  Diagnosis Additional Information: No value filed.    Anesthesia Type:   MAC     Note:    Oropharynx: oropharynx clear of all foreign objects and spontaneously breathing  Level of Consciousness: awake  Oxygen Supplementation: room air    Independent Airway: airway patency satisfactory and stable  Dentition: dentition unchanged  Vital Signs Stable: post-procedure vital signs reviewed and stable  Report to RN Given: handoff report given  Patient transferred to: Phase II    Handoff Report: Identifed the Patient, Identified the Reponsible Provider, Reviewed the pertinent medical history, Discussed the surgical course, Reviewed Intra-OP anesthesia mangement and issues during anesthesia, Set expectations for post-procedure period and Allowed opportunity for questions and acknowledgement of understanding      Vitals:  Vitals Value Taken Time   BP     Temp     Pulse     Resp     SpO2 100 % 01/21/25 0837   Vitals shown include unfiled device data.    Electronically Signed By: SUNNY Jenkins CRNA  January 21, 2025  8:37 AM

## 2025-01-21 NOTE — H&P
[facility]  [id number]  female  54 year old      Reason for procedure/surgery: follow up colitis on CT - irregular bowel habits    Patient Active Problem List   Diagnosis    Goiter    History of systemic steroid therapy    ILD (interstitial lung disease) (H)    Palpitations    Churg-Antonina syndrome (H)    History of eating disorder    Mild intermittent asthma without complication    Anxiety    Chronic fatigue    Mannose-binding lectin deficiency    Benign essential hypertension    Moderate episode of recurrent major depressive disorder (H)    Bronchiectasis (H)    Controlled substance agreement signed    Irritable bowel syndrome without diarrhea    Bladder pain    Overactive bladder    Pain syndrome, chronic    Alpha-lipoproteinemia    Diverticulosis    Vertigo    Adjustment disorder with anxiety    Symptomatic menopausal or female climacteric states    H/O abdominal hysterectomy    Abdominal pain, left lower quadrant    Nausea    Acute colitis       Past Surgical History:    Past Surgical History:   Procedure Laterality Date    C/SECTION, CLASSICAL       SECTION      COLONOSCOPY Left 2014    Procedure: COMBINED COLONOSCOPY, SINGLE OR MULTIPLE BIOPSY/POLYPECTOMY BY BIOPSY;  Surgeon: Js Pinedo MD;  Location:  GI    COLONOSCOPY N/A 2019    Procedure: COLONOSCOPY;  Surgeon: Bryce Pimentel MD;  Location:  GI    ENT SURGERY      ESOPHAGOSCOPY, GASTROSCOPY, DUODENOSCOPY (EGD), COMBINED N/A 2023    Procedure: ESOPHAGOGASTRODUODENOSCOPY, WITH BIOPSY;  Surgeon: Mo Rosa MD;  Location:  GI    ESOPHAGOSCOPY, GASTROSCOPY, DUODENOSCOPY (EGD), COMBINED N/A 10/17/2024    Procedure: ESOPHAGOGASTRODUODENOSCOPY with DUODENAL BIOPSY;  Surgeon: Dillon Guillen MD;  Location: [facility]    ESOPHAGOSCOPY, GASTROSCOPY, DUODENOSCOPY (EGD), DILATATION, COMBINED N/A 2023    Procedure: ESOPHAGOGASTRODUODENOSCOPY, WITH DILATION;  Surgeon: Shelley  Mo Zavala MD;  Location:  GI    GENITOURINARY SURGERY      HEAD & NECK SURGERY      HYSTERECTOMY TOTAL ABDOMINAL  02/27/2009    without oopherectomy for TEO 3 on LEEP w/ pos margins - Path all benign    HYSTERECTOMY, PAP NO LONGER INDICATED      cervix removed     LAPAROSCOPIC APPENDECTOMY N/A 06/22/2022    Procedure: APPENDECTOMY, LAPAROSCOPIC;  Surgeon: Fany Hannah MD;  Location: [facility] OR    PE TUBES      PICC INSERTION  10/10/2013    5fr DL PASV PICC, 43cm (1cm external) in the L basilic vein w/ tip in the low SVC.    PICC INSERTION Left 05/26/2017    5fr DL BioFlo PICC, 42cm (3cm external) in the L lateral brachial vein w/ tip in the SVC RA junction.    SINUS SURGERY      TUBAL LIGATION      TYMPANOPLASTY      ZZHC COLP CERVIX/UPPER VAGINA W LOOP ELEC BX CERVIX         Past Medical History:   Past Medical History:   Diagnosis Date    Anxiety     Aortitis     Asthma     associated with Churg Antonina syndrome    Bronchiectasis (H)     Cerebral infarction (H) 08/1990    Very small, caused small permanent optical migraine    Chronic sinusitis     Churg-Antonina syndrome (H)     dx 1990    Conductive hearing loss     Depressive disorder     Eustachian tube dysfunction     Goiter     Hearing loss, conductive     Heart rate problem     Hypertension     Hypocalcemia     Immunosuppression     Infection due to 2019 novel coronavirus 04/2022 Feb 2020, April 20211, June 2022    Irritable bowel syndrome     Major depressive disorder     Mannose-binding lectin deficiency 2014    Nonspecific abnormal results of thyroid function study     Osteoporosis     Pericarditis     1990 and 1992    Pneumonia     4/23/10    Recurrent otitis media     Recurrent UTI     Rheumatoid vasculitis (H)     Sinusitis, chronic     Steroid long-term use     Tinnitus     Varicose veins of leg with swelling        Social History:   Social History     Tobacco Use    Smoking status: Never     Passive exposure: Never    Smokeless tobacco:  Never   Substance Use Topics    Alcohol use: Not Currently       Family History:   Family History   Problem Relation Age of Onset    Allergies Mother         Seasonal    Alcohol/Drug Mother     Substance Abuse Mother     Depression Mother     C.A.D. Father     Diabetes Father         Type 2    Depression Father     Heart Disease Father     Breast Cancer Maternal Grandmother     C.A.D. Maternal Grandmother     Arthritis Maternal Grandmother     Musculoskeletal Disorder Maternal Grandmother         MS    Heart Disease Maternal Grandmother     Hypertension Maternal Grandmother     Alcohol/Drug Maternal Grandfather     Substance Abuse Maternal Grandfather     Depression Maternal Grandfather     Unknown/Adopted Paternal Grandmother     Unknown/Adopted Paternal Grandfather     Asthma Daughter     Allergies Daughter         Seasonal    Asthma Son     Cancer Maternal Aunt         breast age 53    Breast Cancer Maternal Aunt         in her 50s    Glaucoma No family hx of     Macular Degeneration No family hx of     Colon Cancer No family hx of        Allergies:   Allergies   Allergen Reactions    Colistimethate Anaphylaxis    Colymycin M [Colistimethate Sodium] Anaphylaxis    Tamiflu [Oseltamivir]      Gums Blister up    Hydroxyzine     Azithromycin Palpitations    Clindamycin Rash    Tizanidine Other (See Comments)     Severe panic attack       Active Medications:   Current Outpatient Medications   Medication Sig Dispense Refill    acetylcysteine (MUCOMYST) 20 % neb solution NEBULIZE THE CONTENTS OF ONE VIAL (4 ML) TWO TIMES A DAY. 800 mL 1    albuterol (PROAIR HFA/PROVENTIL HFA/VENTOLIN HFA) 108 (90 Base) MCG/ACT inhaler INHALE 2 PUFFS INTO THE LUNGS EVERY 4 HOURS AS NEEDED FOR SHORTNESS OF BREATH/WHEEZE 18 g 11    albuterol (PROVENTIL) (2.5 MG/3ML) 0.083% neb solution TAKE 1 VIAL VIA NEBULIZER TWICE A DAY. 180 mL 11    bisacodyl (DULCOLAX) 5 MG EC tablet Take 2 tablets at 3 pm the day before your procedure. If your  procedure is before 11 am, take 2 additional tablets at 11 pm. If your procedure is after 11 am, take 2 additional tablets at 6 am. For additional instructions refer to your colonoscopy prep instructions. 4 tablet 0    fluticasone (FLONASE) 50 MCG/ACT nasal spray INSTILL 2 SPRAYS INTO BOTH NOSTRILS DAILY. 16 mL 2    fluticasone-vilanterol (BREO ELLIPTA) 200-25 MCG/ACT inhaler Inhale 1 puff into the lungs daily. 60 each 11    Glucosamine-Chondroit-Vit C-Mn (GLUCOSAMINE CHONDROITINOITIN COMPLEX) CAPS Take 2 capsules by mouth daily.      HYDROmorphone (DILAUDID) 2 MG tablet Take 1-2 tablets (2-4 mg) by mouth every 3 hours as needed for moderate pain. 10 tablet 0    Iodine, Kelp, (KELP PO) Take 2 capsules by mouth daily      LORazepam (ATIVAN) 0.5 MG tablet Take 1 tablet (0.5 mg) by mouth every 8 hours as needed for anxiety 8 tablet 0    magnesium 250 MG tablet Take 1 tablet by mouth daily.      metoprolol tartrate (LOPRESSOR) 25 MG tablet TAKE 0.5 TABLETS (12.5 MG) BY MOUTH 2 TIMES DAILY AS NEEDED (FOR HIGH BLOOD PRESSURE >130/80) 90 tablet 1    Omega-3 Fatty Acids (FISH OIL) 1200 MG capsule Take 4 capsules (4.8 g) by mouth daily      polyethylene glycol (GOLYTELY) 236 g suspension The night before the exam at 6 pm drink an 8-ounce glass every 15 minutes until the jug is half empty. If you arrive before 11 AM: Drink the other half of the Golytely jug at 11 PM night before procedure. If you arrive after 11 AM: Drink the other half of the Golytely jug at 6 AM day of procedure. For additional instructions refer to your colonoscopy prep instructions. 4000 mL 0    Potassium Gluconate 595 (99 K) MG TABS Take 5 g by mouth daily      predniSONE (DELTASONE) 1 MG tablet Take 5.5 mg by mouth daily.      Probiotic Product ([employer] PROBIOTIC MAXIMUM STRENGTH) CAPS Take 1 capsule by mouth daily      VITAMIN D PO Take 10,000-15,000 Units by mouth daily.      VITAMIN K PO Take 1 capsule by mouth daily. Strength unknown       ciprofloxacin-dexAMETHasone (CIPRODEX) 0.3-0.1 % otic suspension INSTILL 3 DROPS INTO AFFECTED EARS 2 TIMES DAILY AS NEEDED 7.5 mL 10       Systemic Review:   CONSTITUTIONAL: NEGATIVE for fever, chills, change in weight  ENT/MOUTH: NEGATIVE for ear, mouth and throat problems  RESP: NEGATIVE for significant cough or SOB  CV: NEGATIVE for chest pain, palpitations or peripheral edema    Physical Examination:   Vital Signs: LMP 02/01/2009 (LMP Unknown)   GENERAL: healthy, alert and no distress  NECK: no adenopathy, no asymmetry, masses, or scars  RESP: lungs clear to auscultation - no rales, rhonchi or wheezes  CV: regular rate and rhythm, normal S1 S2, no S3 or S4, no murmur, click or rub, no peripheral edema and peripheral pulses strong  ABDOMEN: soft, nontender, no hepatosplenomegaly, no masses and bowel sounds normal  MS: no gross musculoskeletal defects noted, no edema    Plan: Appropriate to proceed as scheduled.      Kris Keller MD  1/21/2025    PCP:  Padmini Renee

## 2025-01-21 NOTE — ANESTHESIA POSTPROCEDURE EVALUATION
Patient: Valencia Ye    Procedure: Procedure(s):  COLONOSCOPY, WITH POLYPECTOMY AND BIOPSY       Anesthesia Type:  MAC    Note:  Disposition: Outpatient   Postop Pain Control: Uneventful            Sign Out: Well controlled pain   PONV: No   Neuro/Psych: Uneventful            Sign Out: Acceptable/Baseline neuro status   Airway/Respiratory: Uneventful            Sign Out: Acceptable/Baseline resp. status   CV/Hemodynamics: Uneventful            Sign Out: Acceptable CV status; No obvious hypovolemia; No obvious fluid overload   Other NRE: NONE   DID A NON-ROUTINE EVENT OCCUR?            Last vitals:  Vitals Value Taken Time   /83 01/21/25 0851   Temp 36.6  C (97.9  F) 01/21/25 0851   Pulse 83 01/21/25 0849   Resp 16 01/21/25 0851   SpO2 100 % 01/21/25 0854   Vitals shown include unfiled device data.    Electronically Signed By: Bryce Viera MD  January 21, 2025  1:51 PM

## 2025-01-24 ENCOUNTER — APPOINTMENT (OUTPATIENT)
Dept: CT IMAGING | Facility: CLINIC | Age: 55
End: 2025-01-24
Attending: EMERGENCY MEDICINE
Payer: COMMERCIAL

## 2025-01-24 ENCOUNTER — HOSPITAL ENCOUNTER (EMERGENCY)
Facility: CLINIC | Age: 55
Discharge: HOME | End: 2025-01-24
Attending: EMERGENCY MEDICINE | Admitting: EMERGENCY MEDICINE
Payer: COMMERCIAL

## 2025-01-24 VITALS
RESPIRATION RATE: 16 BRPM | OXYGEN SATURATION: 97 % | TEMPERATURE: 97.5 F | HEART RATE: 90 BPM | SYSTOLIC BLOOD PRESSURE: 137 MMHG | DIASTOLIC BLOOD PRESSURE: 70 MMHG

## 2025-01-24 DIAGNOSIS — R10.12 LUQ ABDOMINAL PAIN: ICD-10-CM

## 2025-01-24 LAB
ALBUMIN SERPL BCG-MCNC: 4.2 G/DL (ref 3.5–5.2)
ALBUMIN UR-MCNC: NEGATIVE MG/DL
ALP SERPL-CCNC: 55 U/L (ref 40–150)
ALT SERPL W P-5'-P-CCNC: 12 U/L (ref 0–50)
ANION GAP SERPL CALCULATED.3IONS-SCNC: 9 MMOL/L (ref 7–15)
APPEARANCE UR: CLEAR
AST SERPL W P-5'-P-CCNC: 16 U/L (ref 0–45)
BASOPHILS # BLD AUTO: 0.1 10E3/UL (ref 0–0.2)
BASOPHILS NFR BLD AUTO: 1 %
BILIRUB SERPL-MCNC: 0.2 MG/DL
BILIRUB UR QL STRIP: NEGATIVE
BUN SERPL-MCNC: 14.4 MG/DL (ref 6–20)
CALCIUM SERPL-MCNC: 9.2 MG/DL (ref 8.8–10.4)
CHLORIDE SERPL-SCNC: 104 MMOL/L (ref 98–107)
COLOR UR AUTO: NORMAL
CREAT SERPL-MCNC: 0.69 MG/DL (ref 0.51–0.95)
EGFRCR SERPLBLD CKD-EPI 2021: >90 ML/MIN/1.73M2
EOSINOPHIL # BLD AUTO: 0.4 10E3/UL (ref 0–0.7)
EOSINOPHIL NFR BLD AUTO: 4 %
ERYTHROCYTE [DISTWIDTH] IN BLOOD BY AUTOMATED COUNT: 14 % (ref 10–15)
GLUCOSE SERPL-MCNC: 91 MG/DL (ref 70–99)
GLUCOSE UR STRIP-MCNC: NEGATIVE MG/DL
HCO3 SERPL-SCNC: 26 MMOL/L (ref 22–29)
HCT VFR BLD AUTO: 37.9 % (ref 35–47)
HGB BLD-MCNC: 12.9 G/DL (ref 11.7–15.7)
HGB UR QL STRIP: NEGATIVE
HOLD SPECIMEN: NORMAL
HOLD SPECIMEN: NORMAL
IMM GRANULOCYTES # BLD: 0.1 10E3/UL
IMM GRANULOCYTES NFR BLD: 1 %
KETONES UR STRIP-MCNC: NEGATIVE MG/DL
LEUKOCYTE ESTERASE UR QL STRIP: NEGATIVE
LYMPHOCYTES # BLD AUTO: 1.2 10E3/UL (ref 0.8–5.3)
LYMPHOCYTES NFR BLD AUTO: 11 %
MCH RBC QN AUTO: 32.6 PG (ref 26.5–33)
MCHC RBC AUTO-ENTMCNC: 34 G/DL (ref 31.5–36.5)
MCV RBC AUTO: 96 FL (ref 78–100)
MONOCYTES # BLD AUTO: 0.9 10E3/UL (ref 0–1.3)
MONOCYTES NFR BLD AUTO: 8 %
NEUTROPHILS # BLD AUTO: 8.5 10E3/UL (ref 1.6–8.3)
NEUTROPHILS NFR BLD AUTO: 77 %
NITRATE UR QL: NEGATIVE
NRBC # BLD AUTO: 0 10E3/UL
NRBC BLD AUTO-RTO: 0 /100
PH UR STRIP: 6 [PH] (ref 5–7)
PLATELET # BLD AUTO: 320 10E3/UL (ref 150–450)
POTASSIUM SERPL-SCNC: 3.8 MMOL/L (ref 3.4–5.3)
PROT SERPL-MCNC: 6.4 G/DL (ref 6.4–8.3)
RBC # BLD AUTO: 3.96 10E6/UL (ref 3.8–5.2)
SODIUM SERPL-SCNC: 139 MMOL/L (ref 135–145)
SP GR UR STRIP: 1 (ref 1–1.03)
UROBILINOGEN UR STRIP-MCNC: NORMAL MG/DL
WBC # BLD AUTO: 11.1 10E3/UL (ref 4–11)

## 2025-01-24 PROCEDURE — 80053 COMPREHEN METABOLIC PANEL: CPT | Performed by: EMERGENCY MEDICINE

## 2025-01-24 PROCEDURE — 36415 COLL VENOUS BLD VENIPUNCTURE: CPT | Performed by: EMERGENCY MEDICINE

## 2025-01-24 PROCEDURE — 99285 EMERGENCY DEPT VISIT HI MDM: CPT | Mod: 25

## 2025-01-24 PROCEDURE — 81003 URINALYSIS AUTO W/O SCOPE: CPT | Performed by: EMERGENCY MEDICINE

## 2025-01-24 PROCEDURE — 74177 CT ABD & PELVIS W/CONTRAST: CPT

## 2025-01-24 PROCEDURE — 250N000009 HC RX 250: Performed by: EMERGENCY MEDICINE

## 2025-01-24 PROCEDURE — 85004 AUTOMATED DIFF WBC COUNT: CPT | Performed by: EMERGENCY MEDICINE

## 2025-01-24 PROCEDURE — 250N000011 HC RX IP 250 OP 636: Performed by: EMERGENCY MEDICINE

## 2025-01-24 RX ORDER — IOPAMIDOL 755 MG/ML
60 INJECTION, SOLUTION INTRAVASCULAR ONCE
Status: COMPLETED | OUTPATIENT
Start: 2025-01-24 | End: 2025-01-24

## 2025-01-24 RX ADMIN — SODIUM CHLORIDE 60 ML: 9 INJECTION, SOLUTION INTRAVENOUS at 11:33

## 2025-01-24 RX ADMIN — IOPAMIDOL 60 ML: 755 INJECTION, SOLUTION INTRAVENOUS at 11:33

## 2025-01-24 ASSESSMENT — ACTIVITIES OF DAILY LIVING (ADL)
ADLS_ACUITY_SCORE: 46

## 2025-01-24 NOTE — ED PROVIDER NOTES
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  It was felt to be due to ischemic colitis in the watershed region of the colon.  She had a colonoscopy 3 days ago which revealed a polyp that was removed, diverticulosis, but was otherwise normal with no evidence of active colitis.  She notes that starting yesterday she had recurrent left sided abdominal and flank pain which felt similar to her prior episode of pain.  She states that she presented to the emergency department because she \"did not want the pain to get worse\".  No nausea vomiting or diarrhea.  She denies any urinary symptoms.  No right-sided abdominal pain.  No fevers, chest pain or shortness of breath.  She denies any other symptoms at this time.    Independent Historian   None    Review of External Notes   I reviewed the patient's hospital discharge summary dated 1/5/2025:  Patient was admitted for colitis and treated with supportive care and symptoms improved.  Patient has a history of known positive testing for norovirus.    Past Medical History     Medical History and Problem List   Past Medical History:   Diagnosis Date    Anxiety     Aortitis     Asthma     Bronchiectasis (H)     Cerebral infarction (H) 08/1990    Chronic sinusitis     Churg-Antonina syndrome (H)     Conductive hearing loss     Depressive disorder     Eustachian tube dysfunction     Goiter     Hearing loss, conductive     Heart rate problem     Hypertension     Hypocalcemia     Immunosuppression     Infection due to 2019 novel coronavirus 04/2022    Irritable bowel syndrome     Major depressive disorder     Mannose-binding lectin deficiency 2014    Nonspecific abnormal results of thyroid " function study     Osteoporosis     Pericarditis     Pneumonia     Recurrent otitis media     Recurrent UTI     Rheumatoid vasculitis (H)     Sinusitis, chronic     Steroid long-term use     Tinnitus     Varicose veins of leg with swelling        Medications   acetylcysteine (MUCOMYST) 20 % neb solution  albuterol (PROAIR HFA/PROVENTIL HFA/VENTOLIN HFA) 108 (90 Base) MCG/ACT inhaler  albuterol (PROVENTIL) (2.5 MG/3ML) 0.083% neb solution  bisacodyl (DULCOLAX) 5 MG EC tablet  ciprofloxacin-dexAMETHasone (CIPRODEX) 0.3-0.1 % otic suspension  fluticasone (FLONASE) 50 MCG/ACT nasal spray  fluticasone-vilanterol (BREO ELLIPTA) 200-25 MCG/ACT inhaler  Glucosamine-Chondroit-Vit C-Mn (GLUCOSAMINE CHONDROITINOITIN COMPLEX) CAPS  HYDROmorphone (DILAUDID) 2 MG tablet  Iodine, Kelp, (KELP PO)  LORazepam (ATIVAN) 0.5 MG tablet  magnesium 250 MG tablet  metoprolol tartrate (LOPRESSOR) 25 MG tablet  Omega-3 Fatty Acids (FISH OIL) 1200 MG capsule  polyethylene glycol (GOLYTELY) 236 g suspension  Potassium Gluconate 595 (99 K) MG TABS  predniSONE (DELTASONE) 1 MG tablet  Probiotic Product ([employer]S PROBIOTIC MAXIMUM STRENGTH) CAPS  VITAMIN D PO  VITAMIN K PO        Surgical History   Past Surgical History:   Procedure Laterality Date    C/SECTION, CLASSICAL       SECTION      COLONOSCOPY Left 2014    Procedure: COMBINED COLONOSCOPY, SINGLE OR MULTIPLE BIOPSY/POLYPECTOMY BY BIOPSY;  Surgeon: Js Pinedo MD;  Location:  GI    COLONOSCOPY N/A 2019    Procedure: COLONOSCOPY;  Surgeon: Bryce Pimentel MD;  Location:  GI    COLONOSCOPY N/A 2025    Procedure: COLONOSCOPY, WITH POLYPECTOMY AND BIOPSY;  Surgeon: Kris Keller MD;  [place] [facility]    ENT SURGERY      ESOPHAGOSCOPY, GASTROSCOPY, DUODENOSCOPY (EGD), COMBINED N/A 2023    Procedure: ESOPHAGOGASTRODUODENOSCOPY, WITH BIOPSY;  Surgeon: Mo Rosa MD;  Location:  GI    ESOPHAGOSCOPY, GASTROSCOPY,  DUODENOSCOPY (EGD), COMBINED N/A 10/17/2024    Procedure: ESOPHAGOGASTRODUODENOSCOPY with DUODENAL BIOPSY;  Surgeon: Dillon Guillen MD;  Location:  OR    ESOPHAGOSCOPY, GASTROSCOPY, DUODENOSCOPY (EGD), DILATATION, COMBINED N/A 02/02/2023    Procedure: ESOPHAGOGASTRODUODENOSCOPY, WITH DILATION;  Surgeon: Mo Rosa MD;  Location:  GI    GENITOURINARY SURGERY      HEAD & NECK SURGERY      HYSTERECTOMY TOTAL ABDOMINAL  02/27/2009    without oopherectomy for TEO 3 on LEEP w/ pos margins - Path all benign    HYSTERECTOMY, PAP NO LONGER INDICATED      cervix removed     LAPAROSCOPIC APPENDECTOMY N/A 06/22/2022    Procedure: APPENDECTOMY, LAPAROSCOPIC;  Surgeon: Fany Hannah MD;  Location:  OR    PE TUBES      PICC INSERTION  10/10/2013    5fr DL PASV PICC, 43cm (1cm external) in the L basilic vein w/ tip in the low SVC.    PICC INSERTION Left 05/26/2017    5fr DL BioFlo PICC, 42cm (3cm external) in the L lateral brachial vein w/ tip in the SVC RA junction.    SINUS SURGERY      TUBAL LIGATION      TYMPANOPLASTY      ZZHC COLP CERVIX/UPPER VAGINA W LOOP ELEC BX CERVIX         Physical Exam     Patient Vitals for the past 24 hrs:   BP Temp Temp src Pulse Resp SpO2   01/24/25 1410 137/70 -- -- -- 16 --   01/24/25 1155 -- -- -- -- -- 97 %   01/24/25 0835 -- -- -- -- -- 96 %   01/24/25 0800 -- -- -- -- 16 97 %   01/24/25 0745 (!) 153/93 -- -- -- -- --   01/24/25 0740 (!) 164/83 97.5  F (36.4  C) Oral 90 18 98 %     Physical Exam  General: Well appearing, nontoxic. Resting comfortably  Head:  Scalp, face, and head appear normal  Eyes:  Pupils are equal, round    Conjunctivae non-injected and sclerae white  ENT:    The external nose is normal    Pinnae are normal  Neck:  Normal range of motion    There is no rigidity noted    Trachea is in the midline  CV:  Regular rate and rhythm     Normal S1/S2, no S3/S4    No murmur or rub. Radial pulses 2+ bilaterally.  Resp:  Lungs are clear and equal  bilaterally  There is no tachypnea    No increased work of breathing    No rales, wheezing, or rhonchi  GI:  Abdomen is soft, no rigidity or guarding    No distension, or mass    No tenderness or rebound tenderness   MS:  Normal muscular tone  Skin:  No rash or acute skin lesions noted  Neuro:  Awake and alert  Speech is normal and fluent  Moves all extremities spontaneously  Psych: Normal affect. Appropriate interactions.      Diagnostics     Lab Results   Labs Ordered and Resulted from Time of ED Arrival to Time of ED Departure   CBC WITH PLATELETS AND DIFFERENTIAL - Abnormal       Result Value    WBC Count 11.1 (*)     RBC Count 3.96      Hemoglobin 12.9      Hematocrit 37.9      MCV 96      MCH 32.6      MCHC 34.0      RDW 14.0      Platelet Count 320      % Neutrophils 77      % Lymphocytes 11      % Monocytes 8      % Eosinophils 4      % Basophils 1      % Immature Granulocytes 1      NRBCs per 100 WBC 0      Absolute Neutrophils 8.5 (*)     Absolute Lymphocytes 1.2      Absolute Monocytes 0.9      Absolute Eosinophils 0.4      Absolute Basophils 0.1      Absolute Immature Granulocytes 0.1      Absolute NRBCs 0.0     COMPREHENSIVE METABOLIC PANEL - Normal    Sodium 139      Potassium 3.8      Carbon Dioxide (CO2) 26      Anion Gap 9      Urea Nitrogen 14.4      Creatinine 0.69      GFR Estimate >90      Calcium 9.2      Chloride 104      Glucose 91      Alkaline Phosphatase 55      AST 16      ALT 12      Protein Total 6.4      Albumin 4.2      Bilirubin Total 0.2     UA MACROSCOPIC WITH REFLEX TO MICRO AND CULTURE - Normal    Color Urine Straw      Appearance Urine Clear      Glucose Urine Negative      Bilirubin Urine Negative      Ketones Urine Negative      Specific Gravity Urine 1.004      Blood Urine Negative      pH Urine 6.0      Protein Albumin Urine Negative      Urobilinogen Urine Normal      Nitrite Urine Negative      Leukocyte Esterase Urine Negative         Imaging   CT Abdomen Pelvis w  Contrast   Final Result   IMPRESSION:    1.  Improving colitis of the mid to distal descending and proximal sigmoid colon.   2.  Minimal bronchiolitis at the lung bases.   3.  0.4 cm left lower lobe nodule, stable since September 2024. See below for follow-up recommendations.       REFERENCE:   Guidelines for Management of Incidental Pulmonary Nodules Detected on CT Images: From the Fleischner Society 2017.    Guidelines apply to incidental nodules in patients who are 35 years or older.   Guidelines do not apply to lung cancer screening, patients with immunosuppression, or patients with known primary cancer.      SINGLE NODULE   Nodule size <6 mm   Low-risk patients: No follow-up needed.   High-risk patients: Optional follow-up at 12 months.                  Independent Interpretation   None    ED Course      Medications Administered   Medications   iopamidol (ISOVUE-370) solution 60 mL (60 mLs Intravenous $Given 1/24/25 1133)   sodium chloride 0.9 % bag 500 mL for CT scan flush use (60 mLs Intravenous $Given 1/24/25 1133)       Procedures   Procedures     Discussion of Management   See below     ED Course   ED Course as of 01/24/25 1823   Fri Jan 24, 2025   1009 Patient seen and evaluated    1241 Patient updated regarding findings and plan of care.    1322 I discussed the case with [facility] GI PA Anny Woodruff   1351 Case discussed again with Anny DASILVA, gastroenterology.   1401 Patient updated regarding findings and plan of care.        Additional Documentation  None    Medical Decision Making / Diagnosis     CMS Diagnoses: None    MIPS       None    [facility]   Valencia RAMAN Ye is a 54 year old female with history and symptoms as documented above who presents with recurrent mild left-sided abdominal pain.  Patient is well-appearing, hemodynamically stable and afebrile.  Abdominal exam is benign without evidence of peritonitis or acute surgical emergency.  CT scan today reveals persistent versus recurrent mild  inflammation of the left colon however this is decreased from prior CT imaging.  Of note patient had a colonoscopy 3 days ago which was largely unremarkable.  No evidence of any complication at this time.  No intra-abdominal abscess, bowel obstruction, volvulus.  Laboratory studies are reassuring.  Urinalysis negative for UTI.  Given her history I discussed the case with on-call GI who recommended patient start a bowel regimen and follow-up with her outpatient primary care and GI team for ongoing management of her history of colitis.  No indication for hospitalization at this time.  Patient will start taking daily senna.  No other emergent underlying process is detected at this time.  Close return precautions were were provided and patient was discharged in stable condition.    Disposition   The patient was discharged.     Diagnosis     ICD-10-CM    1. LUQ abdominal pain  R10.12            Discharge Medications   Discharge Medication List as of 1/24/2025  2:07 PM            MD Destinee Hines Ryan Clay, MD  01/24/25 8942

## 2025-01-24 NOTE — ED TRIAGE NOTES
Pt reports left sided abdominal pain rated 4/10 that started last night.  Reports recent ischemic colitis earlier this month.     Triage Assessment (Adult)       Row Name 01/24/25 0741          Triage Assessment    Airway WDL WDL        Respiratory WDL    Respiratory WDL WDL        Skin Circulation/Temperature WDL    Skin Circulation/Temperature WDL WDL        Cardiac WDL    Cardiac WDL WDL        Peripheral/Neurovascular WDL    Peripheral Neurovascular WDL WDL        Cognitive/Neuro/Behavioral WDL    Cognitive/Neuro/Behavioral WDL WDL

## 2025-01-28 ENCOUNTER — HOSPITAL ENCOUNTER (OUTPATIENT)
Dept: CARDIOLOGY | Facility: CLINIC | Age: 55
Discharge: HOME | End: 2025-01-28
Attending: INTERNAL MEDICINE
Payer: COMMERCIAL

## 2025-01-28 VITALS
SYSTOLIC BLOOD PRESSURE: 126 MMHG | HEIGHT: 62 IN | HEART RATE: 72 BPM | DIASTOLIC BLOOD PRESSURE: 80 MMHG | BODY MASS INDEX: 22.07 KG/M2 | WEIGHT: 119.93 LBS

## 2025-01-28 DIAGNOSIS — D72.18 CHURG-STRAUSS SYNDROME (H): ICD-10-CM

## 2025-01-28 DIAGNOSIS — R06.00 DYSPNEA, UNSPECIFIED TYPE: ICD-10-CM

## 2025-01-28 DIAGNOSIS — R06.09 EXERTIONAL DYSPNEA: ICD-10-CM

## 2025-01-28 DIAGNOSIS — M30.1 CHURG-STRAUSS SYNDROME (H): ICD-10-CM

## 2025-01-28 PROCEDURE — 94621 CARDIOPULM EXERCISE TESTING: CPT

## 2025-01-29 LAB
CARDIOPULMONARY BLOOD PRESSURE REST: NORMAL MMHG
CARDIOPULMONARY BREATHING RESERVE REST: 88.2
CARDIOPULMONARY BREATHING RESERVE V02MAX: 47
CARDIOPULMONARY CO2 OUTPUT REST: 109 ML/MIN
CARDIOPULMONARY CO2 OUTPUT VO2MAX: 1159 ML/MIN
CARDIOPULMONARY FEV 1.0 (L) ACTUAL: 2.17
CARDIOPULMONARY FEV 1.0 (L) PRECENT: 84 %
CARDIOPULMONARY FEV 1.0 (L) PREDICTED: 2.57
CARDIOPULMONARY FEV 1.0 FVC (%) ACTUAL: 65.6
CARDIOPULMONARY FEV 1.0 FVC (%) PERCENT: 82 %
CARDIOPULMONARY FEV 1.0 FVC (%) PREDICTED: 79.8
CARDIOPULMONARY FUNCTIONAL CAPACITY MAX ML/KG/MIN: 21.3 ML/KG/MIN
CARDIOPULMONARY FUNCTIONAL CAPACITY PERCENT: 76 %
CARDIOPULMONARY FUNCTIONAL CAPACITY PREDICTED: 28 ML/KG/MIN
CARDIOPULMONARY FVC (L) ACTUAL: 3.31
CARDIOPULMONARY FVC (L) PERCENT: 102 %
CARDIOPULMONARY FVC (L) PREDICTED: 3.26
CARDIOPULMONARY HEART RATE REST: 87 BPM
CARDIOPULMONARY MET'S REST: 1.4
CARDIOPULMONARY MINUTE VENTILATION REST: 8.9 L/MIN
CARDIOPULMONARY MINUTE VENTILATION VO2MAX: 40 L/MIN
CARDIOPULMONARY MYOCARDIAC O2 DEMAND MAX: NORMAL
CARDIOPULMONARY OXYGEN CONSUMPTION REST: 4.8 ML/KG/MIN
CARDIOPULMONARY OXYGEN CONSUMPTION VO2MAX: 21.3 ML/KG/MIN
CARDIOPULMONARY OXYGEN PULSE REST: 3 ML/BEAT
CARDIOPULMONARY OXYGEN PULSE VO2MAX: 7.3 ML/BEAT
CARDIOPULMONARY OXYGEN SATURATION- OXIMETRY REST: 100 %
CARDIOPULMONARY OXYGEN SATURATION- OXIMETRY VO2MAX: 100 %
CARDIOPULMONARY PET C02 REST: 30
CARDIOPULMONARY PET C02 VO2MAX: 34
CARDIOPULMONARY PET02 REST: 101
CARDIOPULMONARY PET02 V02 MAX: 104
CARDIOPULMONARY RER: 0.95
CARDIOPULMONARY RESPIRALORY EXCHANGE RATIO VO2MAX: 0.95
CARDIOPULMONARY RESPIRALORY EXCHANGE RATIO: 0.73
CARDIOPULMONARY RESPIRATORY RATE REST: 18 BR/MIN
CARDIOPULMONARY RESPIRATORY RATE VO2MAX: 34 BR/MIN
CARDIOPULMONARY STRESS BASE 1 BP MMHG: NORMAL MMHG
CARDIOPULMONARY STRESS BASE 1 BPA: 135 BPM
CARDIOPULMONARY STRESS BASE 1 SPO2: 100 % SPO2
CARDIOPULMONARY STRESS BASE 1 TIME SEC: 0 SEC
CARDIOPULMONARY STRESS BASE 1 TIME: 1 MINS
CARDIOPULMONARY STRESS BASE 2 BP MMHG: NORMAL MMHG
CARDIOPULMONARY STRESS BASE 2 BPA: 102 BPM
CARDIOPULMONARY STRESS BASE 2 SPO2: 100 % SPO2
CARDIOPULMONARY STRESS BASE 2 TIME SEC: 0 SEC
CARDIOPULMONARY STRESS BASE 2 TIME: 3 MINS
CARDIOPULMONARY STRESS BASE 3 BP MMHG: NORMAL MMHG
CARDIOPULMONARY STRESS BASE 3 BPA: 96 BPM
CARDIOPULMONARY STRESS BASE 3 SPO2: 100 % SPO2
CARDIOPULMONARY STRESS BASE 3 TIME SEC: 0 SEC
CARDIOPULMONARY STRESS BASE 3 TIME: 5 MINS
CARDIOPULMONARY STRESS PHASE 1 BP MMHG: NORMAL MMHG
CARDIOPULMONARY STRESS PHASE 1 BPM: 104 BPM
CARDIOPULMONARY STRESS PHASE 1 SPO2: 100 % SPO2
CARDIOPULMONARY STRESS PHASE 1 TIME SEC: 0 SEC
CARDIOPULMONARY STRESS PHASE 1 TIME: 2 MINS
CARDIOPULMONARY STRESS PHASE 2 BP MMHG: NORMAL MMHG
CARDIOPULMONARY STRESS PHASE 2 BPM: 118 BPM
CARDIOPULMONARY STRESS PHASE 2 SPO2: 100 % SPO2
CARDIOPULMONARY STRESS PHASE 2 TIME SEC: 0 SEC
CARDIOPULMONARY STRESS PHASE 2 TIME: 4 MINS
CARDIOPULMONARY STRESS PHASE 3 BP MMHG: NORMAL MMHG
CARDIOPULMONARY STRESS PHASE 3 BPM: 127 BPM
CARDIOPULMONARY STRESS PHASE 3 SPO2: 100 % SPO2
CARDIOPULMONARY STRESS PHASE 3 TIME SEC: 0 SEC
CARDIOPULMONARY STRESS PHASE 3 TIME: 6 MINS
CARDIOPULMONARY STRESS PHASE 4 BP MMHG: NORMAL MMHG
CARDIOPULMONARY STRESS PHASE 4 BPM: 132 BPM
CARDIOPULMONARY STRESS PHASE 4 SPO2: 100 % SPO2
CARDIOPULMONARY STRESS PHASE 4 TIME SEC: 0 SEC
CARDIOPULMONARY STRESS PHASE 4 TIME: 8 MINS
CARDIOPULMONARY STRESS PHASE 5 BP MMHG: NORMAL MMHG
CARDIOPULMONARY STRESS PHASE 5 BPM: 140 BPM
CARDIOPULMONARY STRESS PHASE 5 SPO2: 100 % SPO2
CARDIOPULMONARY STRESS PHASE 5 TIME SEC: 0 SEC
CARDIOPULMONARY STRESS PHASE 5 TIME: 10 MINS
CARDIOPULMONARY STRESS PHASE 6 BP MMHG: NORMAL MMHG
CARDIOPULMONARY STRESS PHASE 6 BPA: 149 BPM
CARDIOPULMONARY STRESS PHASE 6 SPO2: 100 % SPO2
CARDIOPULMONARY STRESS PHASE 6 TIME SEC: 0 SEC
CARDIOPULMONARY STRESS PHASE 6 TIME: 12 MINS
CARDIOPULMONARY STRESS PHASE 7 BP MMHG: NORMAL MMHG
CARDIOPULMONARY STRESS PHASE 7 BPM: 154 BPM
CARDIOPULMONARY STRESS PHASE 7 SPO2: 100 % SPO2
CARDIOPULMONARY STRESS PHASE 7 TIME SEC: 0 SEC
CARDIOPULMONARY STRESS PHASE 7 TIME: 14 MINS
CARDIOPULMONARY STRESS PHASE 8 BP MMHG: NORMAL MMHG
CARDIOPULMONARY STRESS PHASE 8 BPA: 156 BPM
CARDIOPULMONARY STRESS PHASE 8 SPO2: 100 % SPO2
CARDIOPULMONARY STRESS PHASE 8 TIME SEC: 0 SEC
CARDIOPULMONARY STRESS PHASE 8 TIME: 16 MINS
CARDIOPULMONARY SVC (L) ACTUAL: 3
CARDIOPULMONARY SVC (L) PERCENT: 92 %
CARDIOPULMONARY SVC (L) PREDICTED: 3.26
CARDIOPULMONARY TIDAL VOLUME REST: 485 ML
CARDIOPULMONARY TIDAL VOLUME VO2MAX: 1166 ML
CARDIOPULMONARY VE/VCO2 SLOPE: 29.47
CARDIOPULMONARY VENTILATORY EQUIVALENT 02 REST: 34
CARDIOPULMONARY VENTILATORY EQUIVALENT 02 V02: 32
CARDIOPULMONARY VENTILATORY EQUIVALENT C02 REST: 47
CARDIOPULMONARY VENTILATORY EQUIVALENT C02 SLOPE VO2MAX: 29.47
CARDIOPULMONARY VENTILATORY EQUIVALENT C02 VO2MAX: 35
CV STRESS MAX HR HE: 158
Lab: 158 BPM
Lab: 16 MINS
Lab: 20 SEC
PREDICTED VO2MAX: 28
STRESS ANGINA INDEX: 0
STRESS ECHO BASELINE BP: NORMAL MMHG
STRESS ECHO BASELINE HR: 72 BPM
STRESS ECHO CALCULATED PERCENT HR: 94 %
STRESS ECHO LAST STRESS BP: NORMAL MMHG
STRESS ECHO POST ESTIMATED WORKLOAD: 6.1 METS
STRESS ECHO POST EXERCISE DUR MIN: 16 MIN
STRESS ECHO POST EXERCISE DUR SEC: 20 SEC
STRESS ECHO TARGET HR: 168

## 2025-01-31 PROBLEM — Z87.19 HISTORY OF COLITIS: Status: ACTIVE | Noted: 2025-01-31

## 2025-02-01 ENCOUNTER — HEALTH MAINTENANCE LETTER (OUTPATIENT)
Age: 55
End: 2025-02-01

## 2025-02-03 ENCOUNTER — LAB (OUTPATIENT)
Dept: LAB | Facility: CLINIC | Age: 55
End: 2025-02-03
Payer: COMMERCIAL

## 2025-02-03 DIAGNOSIS — Z13.29 SCREENING FOR THYROID DISORDER: ICD-10-CM

## 2025-02-03 DIAGNOSIS — R73.09 ELEVATED GLUCOSE: ICD-10-CM

## 2025-02-03 DIAGNOSIS — E67.3 HYPERVITAMINOSIS D: ICD-10-CM

## 2025-02-03 DIAGNOSIS — R53.82 CHRONIC FATIGUE: ICD-10-CM

## 2025-02-03 DIAGNOSIS — N91.2 AMENORRHEA: ICD-10-CM

## 2025-02-03 DIAGNOSIS — Z13.220 SCREENING FOR LIPID DISORDERS: ICD-10-CM

## 2025-02-03 LAB
ALBUMIN UR-MCNC: NEGATIVE MG/DL
APPEARANCE UR: CLEAR
BILIRUB UR QL STRIP: NEGATIVE
CHOLEST SERPL-MCNC: 249 MG/DL
COLOR UR AUTO: YELLOW
EST. AVERAGE GLUCOSE BLD GHB EST-MCNC: 108 MG/DL
FASTING STATUS PATIENT QL REPORTED: NO
FSH SERPL IRP2-ACNC: 57.4 MIU/ML
GLUCOSE UR STRIP-MCNC: NEGATIVE MG/DL
HBA1C MFR BLD: 5.4 % (ref 0–5.6)
HDLC SERPL-MCNC: 110 MG/DL
HGB UR QL STRIP: NEGATIVE
KETONES UR STRIP-MCNC: 15 MG/DL
LDLC SERPL CALC-MCNC: 120 MG/DL
LEUKOCYTE ESTERASE UR QL STRIP: NEGATIVE
NITRATE UR QL: NEGATIVE
NONHDLC SERPL-MCNC: 139 MG/DL
PH UR STRIP: 7 [PH] (ref 5–7)
RBC #/AREA URNS AUTO: NORMAL /HPF
SP GR UR STRIP: 1.01 (ref 1–1.03)
TRIGL SERPL-MCNC: 96 MG/DL
TSH SERPL DL<=0.005 MIU/L-ACNC: 0.62 UIU/ML (ref 0.3–4.2)
UROBILINOGEN UR STRIP-ACNC: 0.2 E.U./DL
VIT D+METAB SERPL-MCNC: 124 NG/ML (ref 20–50)
WBC #/AREA URNS AUTO: NORMAL /HPF

## 2025-02-03 PROCEDURE — 81001 URINALYSIS AUTO W/SCOPE: CPT

## 2025-02-03 PROCEDURE — 36415 COLL VENOUS BLD VENIPUNCTURE: CPT

## 2025-02-03 PROCEDURE — 83001 ASSAY OF GONADOTROPIN (FSH): CPT

## 2025-02-03 PROCEDURE — 82306 VITAMIN D 25 HYDROXY: CPT

## 2025-02-03 PROCEDURE — 83036 HEMOGLOBIN GLYCOSYLATED A1C: CPT

## 2025-02-03 PROCEDURE — 80061 LIPID PANEL: CPT

## 2025-02-03 PROCEDURE — 84443 ASSAY THYROID STIM HORMONE: CPT

## 2025-02-04 PROBLEM — D12.6 ADENOMATOUS POLYP OF COLON: Status: ACTIVE | Noted: 2025-02-04

## 2025-02-04 NOTE — RESULT ENCOUNTER NOTE
Brandon Birmingham    This is to inform you regarding your test result.    I spoke to you on phone but sending you a result note also.  Vit D level is elevated.  Do not take any vit D until  number is back to normal  Recheck Vit D in 3 months  Your total cholesterol is elevated.  HDL which is called good cholesterol is normal.  Your LDL cholesterol is elevated   Your triglycerides are normal.  Eat low cholesterol low fat  diet and do regular physical activity.  FSH shows that you are in menopause  TSH which is thyroid hormone is normal.  Urine test is negative for infection.  HbA1c which is average glucose during last 3 months is normal.        Sincerely,      Dr.Nasima Nathen MD,FACP

## 2025-02-05 ENCOUNTER — PATIENT OUTREACH (OUTPATIENT)
Dept: GASTROENTEROLOGY | Facility: CLINIC | Age: 55
End: 2025-02-05
Payer: COMMERCIAL

## 2025-02-06 ENCOUNTER — MYC MEDICAL ADVICE (OUTPATIENT)
Dept: FAMILY MEDICINE | Facility: CLINIC | Age: 55
End: 2025-02-06

## 2025-02-06 ENCOUNTER — OFFICE VISIT (OUTPATIENT)
Dept: FAMILY MEDICINE | Facility: CLINIC | Age: 55
End: 2025-02-06
Payer: COMMERCIAL

## 2025-02-06 VITALS
RESPIRATION RATE: 12 BRPM | SYSTOLIC BLOOD PRESSURE: 124 MMHG | TEMPERATURE: 96.9 F | OXYGEN SATURATION: 98 % | HEIGHT: 62 IN | DIASTOLIC BLOOD PRESSURE: 79 MMHG | WEIGHT: 116 LBS | HEART RATE: 93 BPM | BODY MASS INDEX: 21.35 KG/M2

## 2025-02-06 DIAGNOSIS — M30.1 CHURG-STRAUSS SYNDROME (H): ICD-10-CM

## 2025-02-06 DIAGNOSIS — D72.18 CHURG-STRAUSS SYNDROME (H): ICD-10-CM

## 2025-02-06 DIAGNOSIS — N95.1 SYMPTOMATIC MENOPAUSAL OR FEMALE CLIMACTERIC STATES: Primary | ICD-10-CM

## 2025-02-06 DIAGNOSIS — R06.09 DOE (DYSPNEA ON EXERTION): ICD-10-CM

## 2025-02-06 RX ORDER — ESTRADIOL 0.03 MG/D
1 FILM, EXTENDED RELEASE TRANSDERMAL
Qty: 8 PATCH | Refills: 11 | Status: SHIPPED | OUTPATIENT
Start: 2025-02-06

## 2025-02-06 ASSESSMENT — PAIN SCALES - GENERAL: PAINLEVEL_OUTOF10: NO PAIN (0)

## 2025-02-06 ASSESSMENT — ENCOUNTER SYMPTOMS: FATIGUE: 1

## 2025-02-06 NOTE — PROGRESS NOTES
Assessment & Plan     Symptomatic menopausal or female climacteric states  Multiple symptoms of menopause. I discussed with my physician mentor and it is safe for her to be on a topical estrogen. She had a hysterectomy so will only do P4. She potentially did not do well with progesterone in the past. Can add in the future if she needs more support as a trial     MG (dyspnea on exertion)  Unknown etiology. Very large negative workup which is reassuring.     Churg-Antonina syndrome (H)  Will start LDN as this has been shown to work well with autoimmunity. Gave thorough instruction. Sent to [facility].   - Naltrexone HCl, Pain, 1.5 MG CAPS; Take 3 mg by mouth at bedtime. Take half tab every night and increase as instructed.    Recommend follow-up with integrative medicine considering her complex history         Teresa Birmingham is a 54 year old, presenting for the following health issues:  Fatigue and Menopausal Sx        2/6/2025    12:45 PM   Additional Questions   Roomed by Rebecca   Accompanied by Self     Fatigue  Associated symptoms include fatigue.   History of Present Illness       Reason for visit:  Next steps to address source of new issues which include breathlessness; and to establish new primary care provider.    She eats 2-3 servings of fruits and vegetables daily.She consumes 0 sweetened beverage(s) daily.She exercises with enough effort to increase her heart rate 10 to 19 minutes per day.  She exercises with enough effort to increase her heart rate 3 or less days per week.   She is taking medications regularly.     Goals  Breathlessness   Exhaustion     Quite a complex history of events that are hard to piece together in this history:   Ongoing breathlessness for about 6 months   Was doing 6 hours of yoga per week and now is down to maybe 2   Saw Pulm and had extensive neg workup   Saw cardiology, has a slightly lower EF.     Jan 2024 had big life event with a lot of stress and  "palpitations. Got weight loss.   Had a lot of heartburn. Very limited diet  Then got vertigo that has terrible  A couple months later got abd pain, burning mouth   Very diligent with steroid inhaler. Treated for thrush and no improvement   Abd bloating and abd pain was bad for awhile     Sudden weight gain and fluid retention   Severe exhaustion     Had an episode of severe SOB. Found iron deficiency     Dec 2024 had norovirus that was severe.   Was getting more TONY. Wearing compression stocking   Faraz 3 had severe abd pain. Hx diverticulitis. Had ischemic colitis.   Has IBS- Typical bowel habits: had bad oily diarrhea with the abd pains.   Wet constipation, bloating.   Lots of mucus recently. Incomplete emptying.   Worked with GI. Nothing really helps.     Leaning forward has increased SOB   Hands turn blue leaning forward    Was on effexor for awhile but tapered off   Increased sweet tooth   Started FemHRT May 2024. Stopped a few months later. Wondering if it caused her fluid retention. The retention started after this   Was getting daily hot flashes   LMP feb 2009 and kept ovaries d/t high grade dysplasia      Hx peripheral thrombophlebitis. Neg clotting workup.      Has probiotic blend and akkermansia. Has done a lot of great gut supplements        Review of Systems  Detailed as above         Objective    /79 (BP Location: Left arm, Patient Position: Sitting, Cuff Size: Adult Regular)   Pulse 93   Temp 96.9  F (36.1  C) (Tympanic)   Resp 12   Ht 1.575 m (5' 2\")   Wt 52.6 kg (116 lb)   LMP 02/01/2009 (LMP Unknown)   SpO2 98%   BMI 21.22 kg/m    Body mass index is 21.22 kg/m .  Physical Exam   NAD   Normal mood and affect           60 minutes spent on the date of the encounter doing chart review, history and exam, documentation and further activities as noted above     Signed Electronically by: SUNNY Bowers CNP    "

## 2025-02-06 NOTE — PATIENT INSTRUCTIONS
Now Super enzymes to help with digestion.       Low dose naltrexone:   LDN INFO:  Naltrexone, an orally semisynthetic opiate antagonist, is FDA-approved for treatment of heroin/alcohol addiction, but there have been clinical observations and some clinical studies that show naltrexone in very low doses can be helpful in treatment of many immune/inflammatory and autoimmune related conditions. Conditions in which low dose naltrexone has been reported to be helpful include: chronic fatigue and fibromyalgia, chronic pain, multiple sclerosis, inflammatory bowel disease, irritable bowel disease, thyroid disorders, restless leg syndrome, Lyme's, hives/itching, MCAS, cancer, stress, anxiety, depression, and traumatic brain injuries.    How it works:  Many diseases are expressions of a malfunction of the immune system. The immune system is regulated by endorphins, which have primary action on our opioid receptors. Blocking the opioid receptors briefly using naltrexone stimulates your body to make more endorphins, which can then act in an immunomodulatory way to correct immune system malfunction and reduce inflammation.    Most common side effects:   Vivid dreams or sleep disturbance (though in some it may improve insomnia). This is generally mitigated by adjusting the dose or time of day you take the medication.   Increased sensitivity to alcohol -Generally, recommend refraining from alcohol use when starting LDN and then slowly introducing to see how you tolerate. For alcohol sensitivity patients, LDN is best taken 6 hrs apart from alcohol.   Headaches- these often resolve with dose adjustment or as you acclimate to the medication.    Where you can read more about LDN:  www.ldntrust.org  www.ldnscience.org  The LDN Book (on [employer])      If it is prescribed:   *Low Dose Naltrexone (LDN) compounded to 3 mg tablets: Take 1/2 tablet (1.5 mg) daily at bedtime for 2 weeks and then if tolerating increase to 1 tablet (3mg), and then  if tolerating after 2 weeks, increase to 1 1/2 tablets (4.5mg). Goal is to find the dose you tolerate without any side effects between 1 and 4.5 mg.  I will send 90 pills of 3mg. You can request the next refill to be 4.5mg.     LDN must be compounded at a specialty compounding pharmacy and is therefore not covered by insurance. The cost is: $40ish for 30 tablets, $45ish for 60 tablets, $51 for 90 tablets.   If you would like the prescription mailed to your house, then you may be charged an additional service fee ranging from $2.99- $6.99   Possible side effects or signs of too high of a dose include: insomnia or vivid dreams and headaches. Although sometimes LDN can help with sleep if someone has existing insomnia.   Note: most side effects will resolve within 1-2 weeks of use as the body adjusts to the medication.    Alcohol use: May increase alcohol intolerance including causing headaches or nausea. Avoid alcohol when you are first starting LDN. You can then add in small amounts of alcohol at time to see how you tolerate. If you do not tolerate, either avoid consuming alcohol while on LDN or take LDN at least 6 hrs apart from alcohol.     If you have been prescribed an opioid (Vicodin, percocet, morphine, dilaudid, etc) for pain management, please stop your LDN until you are no longer using the opioid.     Stop LDN 5 days before any procedure requiring anesthesia or narcotic pain medication.    It generally takes about 1-3 months on highest tolerated dose to determine how well the LDN is working for pain, fatigue, autoimmunity, mood, etc.    If sleep disturbance persists past 2 weeks or are bothersome, switch to taking either  between 2 pm-6 pm or in the morning.    You prescription will be sent to [facility] and they will call you within 24 hours, phone: [phone].

## 2025-02-11 ENCOUNTER — DOCUMENTATION ONLY (OUTPATIENT)
Dept: GASTROENTEROLOGY | Facility: CLINIC | Age: 55
End: 2025-02-11
Payer: COMMERCIAL

## 2025-02-11 NOTE — PROGRESS NOTES
LPN faxed pelvic floor referral [facility] per Stefanie Ibarra PA-C orders.  Fax number: [phone]  Fax transmission confirmed successful via right fax.      Miracle Rainey RN Lacher, Stacy, LPN Hello  Would you be able to fax in the PF referral for eval and treat for this patient  Stefanie's note is done    Miracle   Previous Messages       ----- Message -----  From: Stefanie Ibarra PA-C  Sent: 2/7/2025  10:16 AM CST  To: Miracle Rainey RN  Subject: [facility] Referral                                    Hi Miracle,  Could you fax a referral to [facility] for this patient for obstructive defecation?    Thank you,  Stefanie Mon LPN

## 2025-02-17 ENCOUNTER — MEDICAL CORRESPONDENCE (OUTPATIENT)
Dept: HEALTH INFORMATION MANAGEMENT | Facility: CLINIC | Age: 55
End: 2025-02-17
Payer: COMMERCIAL

## 2025-02-26 DIAGNOSIS — J47.9 BRONCHIECTASIS WITHOUT COMPLICATION (H): ICD-10-CM

## 2025-02-26 RX ORDER — ACETYLCYSTEINE 200 MG/ML
SOLUTION ORAL; RESPIRATORY (INHALATION)
Qty: 800 ML | Refills: 1 | Status: SHIPPED | OUTPATIENT
Start: 2025-02-26

## 2025-02-28 ENCOUNTER — MYC MEDICAL ADVICE (OUTPATIENT)
Dept: FAMILY MEDICINE | Facility: CLINIC | Age: 55
End: 2025-02-28

## 2025-03-20 ENCOUNTER — OFFICE VISIT (OUTPATIENT)
Dept: PULMONOLOGY | Facility: CLINIC | Age: 55
End: 2025-03-20
Attending: INTERNAL MEDICINE
Payer: COMMERCIAL

## 2025-03-20 VITALS
OXYGEN SATURATION: 99 % | SYSTOLIC BLOOD PRESSURE: 128 MMHG | DIASTOLIC BLOOD PRESSURE: 86 MMHG | BODY MASS INDEX: 20.38 KG/M2 | HEIGHT: 63 IN | HEART RATE: 94 BPM | WEIGHT: 115 LBS

## 2025-03-20 DIAGNOSIS — D72.18 CHURG-STRAUSS SYNDROME (H): ICD-10-CM

## 2025-03-20 DIAGNOSIS — M30.1 CHURG-STRAUSS SYNDROME (H): ICD-10-CM

## 2025-03-20 DIAGNOSIS — R06.09 DYSPNEA ON EXERTION: Primary | ICD-10-CM

## 2025-03-20 LAB
EXPTIME-PRE: 8.74 SEC
FEF2575-%PRED-PRE: 45 %
FEF2575-PRE: 1.12 L/SEC
FEF2575-PRED: 2.43 L/SEC
FEFMAX-%PRED-PRE: 81 %
FEFMAX-PRE: 5.32 L/SEC
FEFMAX-PRED: 6.49 L/SEC
FEV1-%PRED-PRE: 95 %
FEV1-PRE: 2.33 L
FEV1FEV6-PRE: 65 %
FEV1FEV6-PRED: 82 %
FEV1FVC-PRE: 63 %
FEV1FVC-PRED: 81 %
FIFMAX-PRE: 4.42 L/SEC
FVC-%PRED-PRE: 122 %
FVC-PRE: 3.69 L
FVC-PRED: 3.01 L

## 2025-03-20 ASSESSMENT — ASTHMA QUESTIONNAIRES
ACT_TOTALSCORE: 25
QUESTION_1 LAST FOUR WEEKS HOW MUCH OF THE TIME DID YOUR ASTHMA KEEP YOU FROM GETTING AS MUCH DONE AT WORK, SCHOOL OR AT HOME: NONE OF THE TIME
QUESTION_4 LAST FOUR WEEKS HOW OFTEN HAVE YOU USED YOUR RESCUE INHALER OR NEBULIZER MEDICATION (SUCH AS ALBUTEROL): NOT AT ALL
QUESTION_5 LAST FOUR WEEKS HOW WOULD YOU RATE YOUR ASTHMA CONTROL: COMPLETELY CONTROLLED
QUESTION_3 LAST FOUR WEEKS HOW OFTEN DID YOUR ASTHMA SYMPTOMS (WHEEZING, COUGHING, SHORTNESS OF BREATH, CHEST TIGHTNESS OR PAIN) WAKE YOU UP AT NIGHT OR EARLIER THAN USUAL IN THE MORNING: NOT AT ALL
QUESTION_2 LAST FOUR WEEKS HOW OFTEN HAVE YOU HAD SHORTNESS OF BREATH: NOT AT ALL

## 2025-03-20 ASSESSMENT — PAIN SCALES - GENERAL: PAINLEVEL_OUTOF10: NO PAIN (0)

## 2025-03-20 NOTE — PROGRESS NOTES
Reason for Visit  Valencia Ye is a 54 year old year old female who is being seen for Follow Up (Return Bronchiectas-non CF)    Pulmonary HPI    The patient was seen and examined by Dagoberto Briscoe MD           Current Outpatient Medications   Medication Sig Dispense Refill    acetylcysteine (MUCOMYST) 20 % neb solution NEBULIZE THE CONTENTS OF ONE VIAL (4 ML) TWO TIMES A DAY. 800 mL 1    albuterol (PROAIR HFA/PROVENTIL HFA/VENTOLIN HFA) 108 (90 Base) MCG/ACT inhaler INHALE 2 PUFFS INTO THE LUNGS EVERY 4 HOURS AS NEEDED FOR SHORTNESS OF BREATH/WHEEZE 18 g 11    albuterol (PROVENTIL) (2.5 MG/3ML) 0.083% neb solution TAKE 1 VIAL VIA NEBULIZER TWICE A DAY. 180 mL 11    ciprofloxacin-dexAMETHasone (CIPRODEX) 0.3-0.1 % otic suspension INSTILL 3 DROPS INTO AFFECTED EARS 2 TIMES DAILY AS NEEDED 7.5 mL 10    estradiol (VIVELLE-DOT) 0.025 MG/24HR bi-weekly patch Place 1 patch over 96 hours onto the skin twice a week. 8 patch 11    estradiol (VIVELLE-DOT) 0.0375 MG/24HR BIW patch Place 1 patch over 96 hours onto the skin twice a week. 24 patch 3    fluticasone (FLONASE) 50 MCG/ACT nasal spray INSTILL 2 SPRAYS INTO BOTH NOSTRILS DAILY. 16 mL 2    fluticasone-vilanterol (BREO ELLIPTA) 200-25 MCG/ACT inhaler Inhale 1 puff into the lungs daily. 60 each 11    Glucosamine-Chondroit-Vit C-Mn (GLUCOSAMINE CHONDROITINOITIN COMPLEX) CAPS Take 2 capsules by mouth daily.      Iodine, Kelp, (KELP PO) Take 2 capsules by mouth daily      LORazepam (ATIVAN) 0.5 MG tablet Take 1 tablet (0.5 mg) by mouth every 8 hours as needed for anxiety 8 tablet 0    magnesium 250 MG tablet Take 1 tablet by mouth daily.      metoprolol tartrate (LOPRESSOR) 25 MG tablet TAKE 0.5 TABLETS (12.5 MG) BY MOUTH 2 TIMES DAILY AS NEEDED (FOR HIGH BLOOD PRESSURE >130/80) 90 tablet 1    Omega-3 Fatty Acids (FISH OIL) 1200 MG capsule Take 4 capsules (4.8 g) by mouth daily      Potassium Gluconate 595 (99 K) MG TABS Take 5 g by mouth daily      predniSONE  (DELTASONE) 1 MG tablet Take 5.5 mg by mouth daily.      Probiotic Product ([employer] PROBIOTIC MAXIMUM STRENGTH) CAPS Take 1 capsule by mouth daily      VITAMIN D PO Take 10,000-15,000 Units by mouth daily.      VITAMIN K PO Take 1 capsule by mouth daily. Strength unknown      Naltrexone HCl, Pain, 1.5 MG CAPS Take 3 mg by mouth at bedtime. Take half tab every night and increase as instructed. (Patient not taking: Reported on 3/20/2025) 90 capsule 3    polyethylene glycol (GOLYTELY) 236 g suspension The night before the exam at 6 pm drink an 8-ounce glass every 15 minutes until the jug is half empty. If you arrive before 11 AM: Drink the other half of the Golytely jug at 11 PM night before procedure. If you arrive after 11 AM: Drink the other half of the Golytely jug at 6 AM day of procedure. For additional instructions refer to your colonoscopy prep instructions. (Patient not taking: Reported on 3/20/2025) 4000 mL 0     No current facility-administered medications for this visit.     Allergies   Allergen Reactions    Colistimethate Anaphylaxis    Colymycin M [Colistimethate Sodium] Anaphylaxis    Tamiflu [Oseltamivir]      Gums Blister up    Hydroxyzine     Azithromycin Palpitations    Clindamycin Rash    Tizanidine Other (See Comments)     Severe panic attack     Past Medical History:   Diagnosis Date    Anxiety     Aortitis     Asthma     associated with Churg Antonina syndrome    Bronchiectasis (H)     Cerebral infarction (H) 08/1990    Very small, caused small permanent optical migraine    Chronic sinusitis     Churg-Antonina syndrome (H)     dx 1990    Conductive hearing loss     Depressive disorder     Eustachian tube dysfunction     Goiter     Hearing loss, conductive     Heart rate problem     Hypertension     Hypocalcemia     Immunosuppression     Infection due to 2019 novel coronavirus 04/2022 Feb 2020, April 20211, June 2022    Irritable bowel syndrome     Major depressive disorder     Mannose-binding lectin  deficiency     Nonspecific abnormal results of thyroid function study     Osteoporosis     Pericarditis      and     Pneumonia     4/23/10    Recurrent otitis media     Recurrent UTI     Rheumatoid vasculitis (H)     Sinusitis, chronic     Steroid long-term use     Tinnitus     Varicose veins of leg with swelling        Past Surgical History:   Procedure Laterality Date    C/SECTION, CLASSICAL       SECTION      COLONOSCOPY Left 2014    Procedure: COMBINED COLONOSCOPY, SINGLE OR MULTIPLE BIOPSY/POLYPECTOMY BY BIOPSY;  Surgeon: Js Pinedo MD;  Location:  GI    COLONOSCOPY N/A 2019    Procedure: COLONOSCOPY;  Surgeon: Bryce Pimentel MD;  Location:  GI    COLONOSCOPY N/A 2025    Procedure: COLONOSCOPY, WITH POLYPECTOMY AND BIOPSY;  Surgeon: Kris Keller MD;  Location: [facility] OR    ENT SURGERY      ESOPHAGOSCOPY, GASTROSCOPY, DUODENOSCOPY (EGD), COMBINED N/A 2023    Procedure: ESOPHAGOGASTRODUODENOSCOPY, WITH BIOPSY;  Surgeon: Mo Rosa MD;  Location:  GI    ESOPHAGOSCOPY, GASTROSCOPY, DUODENOSCOPY (EGD), COMBINED N/A 10/17/2024    Procedure: ESOPHAGOGASTRODUODENOSCOPY with DUODENAL BIOPSY;  Surgeon: Dillon Guillen MD;  Location:  OR    ESOPHAGOSCOPY, GASTROSCOPY, DUODENOSCOPY (EGD), DILATATION, COMBINED N/A 2023    Procedure: ESOPHAGOGASTRODUODENOSCOPY, WITH DILATION;  Surgeon: Mo Rosa MD;  Location: [facility]    GENITOURINARY SURGERY      HEAD & NECK SURGERY      HYSTERECTOMY TOTAL ABDOMINAL  2009    without oopherectomy for TEO 3 on LEEP w/ pos margins - Path all benign    HYSTERECTOMY, PAP NO LONGER INDICATED      cervix removed     LAPAROSCOPIC APPENDECTOMY N/A 2022    Procedure: APPENDECTOMY, LAPAROSCOPIC;  Surgeon: Fany Hannah MD;  Location: [facility] OR    PE TUBES      PICC INSERTION  10/10/2013    5fr DL PASV PICC, 43cm (1cm external) in the L basilic vein w/ tip in the  low SVC.    PICC INSERTION Left 05/26/2017    5fr DL BioFlo PICC, 42cm (3cm external) in the L lateral brachial vein w/ tip in the SVC RA junction.    SINUS SURGERY      TUBAL LIGATION      TYMPANOPLASTY      ZZHC COLP CERVIX/UPPER VAGINA W LOOP ELEC BX CERVIX         Social History     Socioeconomic History    Marital status:      Spouse name: Not on file    Number of children: Not on file    Years of education: Not on file    Highest education level: Not on file   Occupational History    Not on file   Tobacco Use    Smoking status: Never     Passive exposure: Never    Smokeless tobacco: Never   Vaping Use    Vaping status: Never Used   Substance and Sexual Activity    Alcohol use: Not Currently    Drug use: No    Sexual activity: Yes     Partners: Male     Birth control/protection: Male Surgical, Female Surgical     Comment: CARMELA   Other Topics Concern    Parent/sibling w/ CABG, MI or angioplasty before 65F 55M? Not Asked     Service No    Blood Transfusions No    Caffeine Concern No    Occupational Exposure No    Hobby Hazards No    Sleep Concern No    Stress Concern Yes    Weight Concern No    Special Diet Yes     Comment: IBS diet    Back Care No    Exercise Not Asked     Comment: 1 mile power walk 5 days a week at least.     Bike Helmet Not Asked    Seat Belt Yes    Self-Exams Yes   Social History Narrative    Retired  in a Radiology Department.  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 No smokers at home.  Enjoys walking and dancing.  Alcohol-abstains. No illicits.     Social Drivers of Health     Financial Resource Strain: Low Risk  (1/4/2025)    Financial Resource Strain     Within the past 12 months, have you or your family members you live with been unable to get utilities (heat, electricity) when it was really needed?: No   Food Insecurity: Low Risk  (1/4/2025)    Food Insecurity     Within the past 12 months, did you worry that your food would run  "out before you got money to buy more?: No     Within the past 12 months, did the food you bought just not last and you didn t have money to get more?: No   Transportation Needs: Low Risk  (1/4/2025)    Transportation Needs     Within the past 12 months, has lack of transportation kept you from medical appointments, getting your medicines, non-medical meetings or appointments, work, or from getting things that you need?: No   Physical Activity: Not on file   Stress: Not on file   Social Connections: Not on file   Interpersonal Safety: Low Risk  (1/21/2025)    Interpersonal Safety     Do you feel physically and emotionally safe where you currently live?: Yes     Within the past 12 months, have you been hit, slapped, kicked or otherwise physically hurt by someone?: No     Within the past 12 months, have you been humiliated or emotionally abused in other ways by your partner or ex-partner?: No   Housing Stability: Low Risk  (1/4/2025)    Housing Stability     Do you have housing? : Yes     Are you worried about losing your housing?: No       ROS Pulmonary    A complete ROS was otherwise negative except as noted in the HPI.  /86 (BP Location: Right arm, Patient Position: Chair, Cuff Size: Adult Regular)   Pulse 94   Ht 1.6 m (5' 3\")   Wt 52.2 kg (115 lb)   LMP 02/01/2009 (LMP Unknown)   SpO2 99%   BMI 20.37 kg/m    Exam:   GENERAL APPEARANCE: Well developed, well nourished, alert, and in no apparent distress.  EYES: PERRL, EOMI  HENT: Nasal mucosa with no edema and no hyperemia. No nasal polyps.  EARS: Canals clear, TMs normal  MOUTH: Oral mucosa is moist, without any lesions, no tonsillar enlargement, no oropharyngeal exudate.  NECK: supple, no masses, no thyromegaly.  LYMPHATICS: No significant axillary, cervical, or supraclavicular nodes.  RESP: normal percussion, good air flow throughout.  No crackles. No rhonchi. No wheezes.  CV: Normal S1, S2, regular rhythm, normal rate. No murmur.  No rub. No gallop. " No LE edema.   ABDOMEN:  Bowel sounds normal, soft, nontender, no HSM or masses.   MS: extremities normal. No clubbing. No cyanosis.  SKIN: no rash on limited exam  NEURO: Mentation intact, speech normal, normal strength and tone, normal gait and stance  PSYCH: mentation appears normal. and affect normal/bright  Results:  Recent Results (from the past week)   General PFT Lab (Please always keep checked)    Collection Time: 03/20/25  9:38 AM   Result Value Ref Range    FVC-Pred 3.01 L    FVC-Pre 3.69 L    FVC-%Pred-Pre 122 %    FEV1-Pre 2.33 L    FEV1-%Pred-Pre 95 %    FEV1FVC-Pred 81 %    FEV1FVC-Pre 63 %    FEFMax-Pred 6.49 L/sec    FEFMax-Pre 5.32 L/sec    FEFMax-%Pred-Pre 81 %    FEF2575-Pred 2.43 L/sec    FEF2575-Pre 1.12 L/sec    CRN6358-%Pred-Pre 45 %    ExpTime-Pre 8.74 sec    FIFMax-Pre 4.42 L/sec    FEV1FEV6-Pred 82 %    FEV1FEV6-Pre 65 %       Assessment and plan: ***

## 2025-03-20 NOTE — PATIENT INSTRUCTIONS
Continue current medication, exercise and vest therapy.   Sniff test  Sitting and supine PFTs with next visit    [facility] Nurse line:  Edward  [phone]     [facility] Fax Number:     [phone]        Cyst[facility] Fibrosis Respiratory Therapists:   Kaylee Miller              [phone]          Barbi Ruiz  [phone]

## 2025-03-20 NOTE — NURSING NOTE
Chief Complaint   Patient presents with    Follow Up     Return Bronchiectas-non CF       Vitals were taken, medications reconciled.    Hannah Rodriguez, Clinic Assistant   10:15 AM

## 2025-03-25 ENCOUNTER — OFFICE VISIT (OUTPATIENT)
Dept: FAMILY MEDICINE | Facility: CLINIC | Age: 55
End: 2025-03-25
Payer: COMMERCIAL

## 2025-03-25 VITALS
BODY MASS INDEX: 20.66 KG/M2 | OXYGEN SATURATION: 97 % | SYSTOLIC BLOOD PRESSURE: 127 MMHG | DIASTOLIC BLOOD PRESSURE: 81 MMHG | HEART RATE: 70 BPM | RESPIRATION RATE: 16 BRPM | TEMPERATURE: 98.1 F | WEIGHT: 116.6 LBS | HEIGHT: 63 IN

## 2025-03-25 DIAGNOSIS — J45.20 MILD INTERMITTENT ASTHMA WITHOUT COMPLICATION: ICD-10-CM

## 2025-03-25 DIAGNOSIS — Z23 NEED FOR VACCINATION: ICD-10-CM

## 2025-03-25 DIAGNOSIS — M85.80 OSTEOPENIA WITH HIGH RISK OF FRACTURE: ICD-10-CM

## 2025-03-25 DIAGNOSIS — R94.39 ABNORMAL STRESS TEST: ICD-10-CM

## 2025-03-25 DIAGNOSIS — J47.9 BRONCHIECTASIS WITHOUT COMPLICATION (H): ICD-10-CM

## 2025-03-25 DIAGNOSIS — I10 BENIGN ESSENTIAL HYPERTENSION: ICD-10-CM

## 2025-03-25 DIAGNOSIS — Z98.890 HISTORY OF SINUS SURGERY: ICD-10-CM

## 2025-03-25 DIAGNOSIS — Z13.820 ENCOUNTER FOR OSTEOPOROSIS SCREENING IN ASYMPTOMATIC POSTMENOPAUSAL PATIENT: ICD-10-CM

## 2025-03-25 DIAGNOSIS — Z00.00 ROUTINE GENERAL MEDICAL EXAMINATION AT A HEALTH CARE FACILITY: Primary | ICD-10-CM

## 2025-03-25 DIAGNOSIS — D72.18 CHURG-STRAUSS SYNDROME (H): ICD-10-CM

## 2025-03-25 DIAGNOSIS — M30.1 CHURG-STRAUSS SYNDROME (H): ICD-10-CM

## 2025-03-25 DIAGNOSIS — K58.9 IRRITABLE BOWEL SYNDROME WITHOUT DIARRHEA: ICD-10-CM

## 2025-03-25 DIAGNOSIS — G89.4 PAIN SYNDROME, CHRONIC: ICD-10-CM

## 2025-03-25 DIAGNOSIS — Z86.16 HISTORY OF COVID-19: ICD-10-CM

## 2025-03-25 DIAGNOSIS — J84.9 ILD (INTERSTITIAL LUNG DISEASE) (H): ICD-10-CM

## 2025-03-25 DIAGNOSIS — R91.1 PULMONARY NODULE: ICD-10-CM

## 2025-03-25 DIAGNOSIS — Z79.52 CURRENT CHRONIC USE OF SYSTEMIC STEROIDS: ICD-10-CM

## 2025-03-25 DIAGNOSIS — Z90.710 S/P TOTAL ABDOMINAL HYSTERECTOMY: ICD-10-CM

## 2025-03-25 DIAGNOSIS — Z86.69 HX OF PERFORATION OF TYMPANIC MEMBRANE: ICD-10-CM

## 2025-03-25 DIAGNOSIS — Z78.0 ENCOUNTER FOR OSTEOPOROSIS SCREENING IN ASYMPTOMATIC POSTMENOPAUSAL PATIENT: ICD-10-CM

## 2025-03-25 PROCEDURE — 99214 OFFICE O/P EST MOD 30 MIN: CPT | Mod: 25 | Performed by: INTERNAL MEDICINE

## 2025-03-25 PROCEDURE — 90677 PCV20 VACCINE IM: CPT | Performed by: INTERNAL MEDICINE

## 2025-03-25 PROCEDURE — 90471 IMMUNIZATION ADMIN: CPT | Performed by: INTERNAL MEDICINE

## 2025-03-25 PROCEDURE — 3079F DIAST BP 80-89 MM HG: CPT | Performed by: INTERNAL MEDICINE

## 2025-03-25 PROCEDURE — 99396 PREV VISIT EST AGE 40-64: CPT | Mod: 25 | Performed by: INTERNAL MEDICINE

## 2025-03-25 PROCEDURE — 3074F SYST BP LT 130 MM HG: CPT | Performed by: INTERNAL MEDICINE

## 2025-03-25 PROCEDURE — 1126F AMNT PAIN NOTED NONE PRSNT: CPT | Performed by: INTERNAL MEDICINE

## 2025-03-25 SDOH — HEALTH STABILITY: PHYSICAL HEALTH: ON AVERAGE, HOW MANY DAYS PER WEEK DO YOU ENGAGE IN MODERATE TO STRENUOUS EXERCISE (LIKE A BRISK WALK)?: 4 DAYS

## 2025-03-25 SDOH — HEALTH STABILITY: PHYSICAL HEALTH: ON AVERAGE, HOW MANY MINUTES DO YOU ENGAGE IN EXERCISE AT THIS LEVEL?: 60 MIN

## 2025-03-25 ASSESSMENT — PATIENT HEALTH QUESTIONNAIRE - PHQ9: SUM OF ALL RESPONSES TO PHQ QUESTIONS 1-9: 19

## 2025-03-25 ASSESSMENT — SOCIAL DETERMINANTS OF HEALTH (SDOH): HOW OFTEN DO YOU GET TOGETHER WITH FRIENDS OR RELATIVES?: ONCE A WEEK

## 2025-03-25 ASSESSMENT — PAIN SCALES - GENERAL: PAINLEVEL_OUTOF10: NO PAIN (0)

## 2025-03-25 NOTE — PATIENT INSTRUCTIONS
You are due for mammogram.  Please call the following number to make appointment :  [phone]  It is located in [address]    You are due for bone density.  Please call the following number to make appointment :  [phone]  It is located in [address]    Get the second dose shingle           Patient Education   Preventive Care Advice   This is general advice given by our system to help you stay healthy. However, your care team may have specific advice just for you. Please talk to your care team about your preventive care needs.  Nutrition  Eat 5 or more servings of fruits and vegetables each day.  Try wheat bread, brown rice and whole grain pasta (instead of white bread, rice, and pasta).  Get enough calcium and vitamin D. Check the label on foods and aim for 100% of the RDA (recommended daily allowance).  Lifestyle  Exercise at least 150 minutes each week  (30 minutes a day, 5 days a week).  Do muscle strengthening activities 2 days a week. These help control your weight and prevent disease.  No smoking.  Wear sunscreen to prevent skin cancer.  Have a dental exam and cleaning every 6 months.  Yearly exams  See your health care team every year to talk about:  Any changes in your health.  Any medicines your care team has prescribed.  Preventive care, family planning, and ways to prevent chronic diseases.  Shots (vaccines)   HPV shots (up to age 26), if you've never had them before.  Hepatitis B shots (up to age 59), if you've never had them before.  COVID-19 shot: Get this shot when it's due.  Flu shot: Get a flu shot every year.  Tetanus shot: Get a tetanus shot every 10 years.  Pneumococcal, hepatitis A, and RSV shots: Ask your care team if you need these based on your risk.  Shingles shot (for age 50 and up)  General health tests  Diabetes screening:  Starting at age 35, Get screened for diabetes at least every 3 years.  If you are younger than age 35, ask your care team if you should be screened for  diabetes.  Cholesterol test: At age 39, start having a cholesterol test every 5 years, or more often if advised.  Bone density scan (DEXA): At age 50, ask your care team if you should have this scan for osteoporosis (brittle bones).  Hepatitis C: Get tested at least once in your life.  STIs (sexually transmitted infections)  Before age 24: Ask your care team if you should be screened for STIs.  After age 24: Get screened for STIs if you're at risk. You are at risk for STIs (including HIV) if:  You are sexually active with more than one person.  You don't use condoms every time.  You or a partner was diagnosed with a sexually transmitted infection.  If you are at risk for HIV, ask about PrEP medicine to prevent HIV.  Get tested for HIV at least once in your life, whether you are at risk for HIV or not.  Cancer screening tests  Cervical cancer screening: If you have a cervix, begin getting regular cervical cancer screening tests starting at age 21.  Breast cancer scan (mammogram): If you've ever had breasts, begin having regular mammograms starting at age 40. This is a scan to check for breast cancer.  Colon cancer screening: It is important to start screening for colon cancer at age 45.  Have a colonoscopy test every 10 years (or more often if you're at risk) Or, ask your provider about stool tests like a FIT test every year or Cologuard test every 3 years.  To learn more about your testing options, visit:   .  For help making a decision, visit:   https://bit.ly/dm31314.  Prostate cancer screening test: If you have a prostate, ask your care team if a prostate cancer screening test (PSA) at age 55 is right for you.  Lung cancer screening: If you are a current or former smoker ages 50 to 80, ask your care team if ongoing lung cancer screenings are right for you.  For informational purposes only. Not to replace the advice of your health care provider. Copyright   2023 [employer]. All rights reserved.  Clinically reviewed by the [facility]. [employer] 800862 - REV 01/24.  Learning About Stress  What is stress?     Stress is your body's response to a hard situation. Your body can have a physical, emotional, or mental response. Stress is a fact of life for most people, and it affects everyone differently. What causes stress for you may not be stressful for someone else.  A lot of things can cause stress. You may feel stress when you go on a job interview, take a test, or run a race. This kind of short-term stress is normal and even useful. It can help you if you need to work hard or react quickly. For example, stress can help you finish an important job on time.  Long-term stress is caused by ongoing stressful situations or events. Examples of long-term stress include long-term health problems, ongoing problems at work, or conflicts in your family. Long-term stress can harm your health.  How does stress affect your health?  When you are stressed, your body responds as though you are in danger. It makes hormones that speed up your heart, make you breathe faster, and give you a burst of energy. This is called the fight-or-flight stress response. If the stress is over quickly, your body goes back to normal and no harm is done.  But if stress happens too often or lasts too long, it can have bad effects. Long-term stress can make you more likely to get sick, and it can make symptoms of some diseases worse. If you tense up when you are stressed, you may develop neck, shoulder, or low back pain. Stress is linked to high blood pressure and heart disease.  Stress also harms your emotional health. It can make you winchester, tense, or depressed. Your relationships may suffer, and you may not do well at work or school.  What can you do to manage stress?  You can try these things to help manage stress:   Do something active. Exercise or activity can help reduce stress. Walking is a great way to get  started. Even everyday activities such as housecleaning or yard work can help.  Try yoga or alex chi. These techniques combine exercise and meditation. You may need some training at first to learn them.  Do something you enjoy. For example, listen to music or go to a movie. Practice your hobby or do volunteer work.  Meditate. This can help you relax, because you are not worrying about what happened before or what may happen in the future.  Do guided imagery. Imagine yourself in any setting that helps you feel calm. You can use online videos, books, or a teacher to guide you.  Do breathing exercises. For example:  From a standing position, bend forward from the waist with your knees slightly bent. Let your arms dangle close to the floor.  Breathe in slowly and deeply as you return to a standing position. Roll up slowly and lift your head last.  Hold your breath for just a few seconds in the standing position.  Breathe out slowly and bend forward from the waist.  Let your feelings out. Talk, laugh, cry, and express anger when you need to. Talking with supportive friends or family, a counselor, or a tanner leader about your feelings is a healthy way to relieve stress. Avoid discussing your feelings with people who make you feel worse.  Write. It may help to write about things that are bothering you. This helps you find out how much stress you feel and what is causing it. When you know this, you can find better ways to cope.  What can you do to prevent stress?  You might try some of these things to help prevent stress:  Manage your time. This helps you find time to do the things you want and need to do.  Get enough sleep. Your body recovers from the stresses of the day while you are sleeping.  Get support. Your family, friends, and community can make a difference in how you experience stress.  Limit your news feed. Avoid or limit time on social media or news that may make you feel stressed.  Do something active. Exercise  "or activity can help reduce stress. Walking is a great way to get started.  Where can you learn more?  Go to https://www.[employer].net/patiented  Enter N032 in the search box to learn more about \"Learning About Stress.\"  Current as of: October 24, 2024  Content Version: 14.4    9492-1102 [employer] [facility].   Care instructions adapted under license by your healthcare professional. If you have questions about a medical condition or this instruction, always ask your healthcare professional. [facility] disclaims any warranty or liability for your use of this information.       "

## 2025-03-25 NOTE — PROGRESS NOTES
Valencia was seen today for physical.    Diagnoses and all orders for this visit:    Routine general medical examination at a health care facility  Preventive health counseling was also done.  Counseled on healthy eating and physical activity  Last colonoscopy on   Last DEXA on 6/13/2022 which shows osteopenia. Pt reports past history of osteoporosis. Advised that hormones will help her with bone density.   Advised to take adequate calcium, vitamin D and do weight bearing exercises.   Last Mammo on 8/1/2023 and pt advised to complete again at earliest convenience.   Pt advised to receive shingles vaccine at pharmacy at earliest convenience  Pt agreed to receive PCV-20 vaccine today  Pt declined covid booster since she does not trust it. She has received in the past but reports she had Covid multiple times.   Patient follows Elsa for hormones and naltrexone    Pain syndrome, chronic  Per Dr Fernanda Chávez pt should continue Effexor and she should continue Ativan prn    Mild intermittent asthma without complication  Continues to have symptoms of shortness of breath. Has been using inhaler regimen, feels like underlying asthma has been stable. Will be completing echo, completed pulmonary function test.     ILD (interstitial lung disease) (H)  Pt follow pulmonologist Dr Briscoe    Bronchiectasis without complication (H)  See above    Irritable bowel syndrome without diarrhea  Longstanding history     Churg-Antonina syndrome (H)  Follows with pul  On fluticasone-vilanterol.    Benign essential hypertension  Takes Lopressor    Current chronic use of systemic steroids  Comments:  currrently on 5.5 mg of prednison  Pt takes 5.5 mg of  prednisone currently  Pt [facility]  Advised pt to have Carin Boswell send notes to our clinic.  Reviewed rheumatology notes.    Osteopenia with high risk of fracture  Last DEXA on 6/13/2022 which shows osteopenia. Pt reports past history  of osteoporosis. Pt finished fosamax therapy on 6/13/2014. Advised that hormones will also help her with bone density.   Advised to take adequate calcium, vitamin D and do weight bearing exercises.     Encounter for osteoporosis screening in asymptomatic postmenopausal patient  -     DX Bone Density; Future  See above    Pulmonary nodule  CT on 1/24/25 which showed left lower lobe nodule.   Follows pulmonologist     Abnormal stress test  Pt had cardiac stress MRI on 1/16/25 which was abnormal and will see cardiologist on 4/3/25    S/P total abdominal hysterectomy  Pt had partial hysterectomy in 2009 they left her ovaries in. Procedure was completed 2/27/2009 pt states that rectal prolapse repair was not done.     History of sinus surgery  Comments:  multiple  Pt reports hx of multiple sinus and ear surgeries.    History of COVID-19  Pt declined covid booster since she does not trust it. She has received in the past but reports she had Covid multiple times.     Hx of perforation of tympanic membrane  Past History  Takes Ciprodex    Need for vaccination  Pt agreed to receive PCV-20 vaccine today    Other orders  -     Pneumococcal 20 Valent Conjugate (Prevnar 20)       Other   Pt is holding vitamin D since her levels were elevated previously.    Pt was seen by Elsa Peguero who gave her topical estrogen, new probiotic and naltrexone.   Pt tolerates current treatment well.  Pt states she will f/up with her.    Subjective   Valencia is a 54 year old, presenting for the following health issues:  Physical    HPI    Valencia is a 54 year old, presenting for the following health issues:  Physical    Annual Wellness Visit     Patient has been advised of split billing requirements and indicates understanding: Yes      Health Care Directive  Patient does not have a Health Care Directive: Discussed advance care planning with patient; however, patient declined at this time.      3/25/2025   General Health   How would you rate your  overall physical health? (!) FAIR   Feel stress (tense, anxious, or unable to sleep) To some extent          3/25/2025   Nutrition   Diet: Other   If other, please elaborate: IBS diet         3/25/2025   Exercise   Days per week of moderate/strenous exercise 4 days   Average minutes spent exercising at this level 60 min           3/25/2025   Social Factors   Frequency of gathering with friends or relatives Once a week   Worry food won't last until get money to buy more No   Food not last or not have enough money for food? No   Do you have housing? (Housing is defined as stable permanent housing and does not include staying ouside in a car, in a tent, in an abandoned building, in an overnight shelter, or couch-surfing.) Yes   Are you worried about losing your housing? No   Lack of transportation? No   Unable to get utilities (heat,electricity)? No           3/25/2025   Fall Risk   Fallen 2 or more times in the past year? No    Trouble with walking or balance? No        Proxy-reported           No data to display                  3/25/2025   Dental   Dentist two times every year? Yes          No data to display                     No data to display                   No data to display                  3/25/2025   Substance Use   Alcohol more than 3/day or more than 7/wk No   Do you use any other substances recreationally? No     Social History     Tobacco Use    Smoking status: Never     Passive exposure: Never    Smokeless tobacco: Never   Vaping Use    Vaping status: Never Used   Substance Use Topics    Alcohol use: Not Currently    Drug use: No                 8/1/2023   LAST FHS-7 RESULTS   1st degree relative breast or ovarian cancer No   Any relative bilateral breast cancer No   Any male have breast cancer No   Any ONE woman have BOTH breast AND ovarian cancer No   Any woman with breast cancer before 50yrs No   2 or more relatives with breast AND/OR ovarian cancer No   2 or more relatives with breast AND/OR  bowel cancer Yes              3/25/2025   STI Screening   New sexual partner(s) since last STI/HIV test? No     History of abnormal Pap smear: No - age 30- 64 PAP with HPV every 5 years recommended        5/28/2014    12:00 AM 4/18/2011    12:00 AM 3/24/2010    12:00 AM   PAP / HPV   PAP (Historical) NIL  NIL  NIL      ASCVD Risk   The ASCVD Risk score (Alexis DK, et al., 2019) failed to calculate for the following reasons:    Risk score cannot be calculated because patient has a medical history suggesting prior/existing ASCVD         Reviewed and updated as needed this visit by Provider                  Current providers sharing in care for this patient include:  Patient Care Team:  Padmini Renee MD as PCP - General (Internal Medicine)  Carrie Lopez MD as MD (Internal Medicine)  Dagoberto Briscoe MD as MD (Pulmonary Disease)  Bekah Alford MD as MD (Ophthalmology)  Marlyn Flores APRN CNS as Nurse Practitioner (Psychiatry)  Randal Logan OD as MD (Optometry)  Sunil Butts MD as MD (Pediatrics)  Suze Laughlin MD as MD (Urology)  Dagoberto Briscoe MD as Assigned Pulmonology Provider  Adal Gallo MD as Assigned Rheumatology Provider  Viviane Freitas AuD as Audiologist (Audiology)  Nissen, Rick L, MD as MD (Otolaryngology)  Lisa Galan NP as Nurse Practitioner (Nurse Practitioner)  Shruti Garcia as Other (see comments) (Acupuncture)  Yvonne Obando MD as MD (OB/Gyn)  Surya Bradford MD as MD (OB/Gyn)  Surya Bradford MD as Assigned OBGYN Provider  Lakesha Lewis MA as Audiologist (Audiology)  Landen Abdul MD as Physician (Otolaryngology)  Padmini Renee MD as Assigned PCP  Landen Abdul MD as Assigned Surgical Provider  Noy Nicholson APRN CNS as Clinical Nurse Specialist (Cardiovascular & Thoracic Surgery)  Stefanie Ibarra PA-C as Physician Assistant (Gastroenterology)  Mo Bryant MD as MD  (Emergency Medicine)  Sebas James MD as MD (Cardiovascular Disease)  Stefanie Ibarra PA-C as Assigned Gastroenterology Provider  Sebas James MD as Assigned Heart and Vascular Provider    The following health maintenance items are reviewed in Epic and correct as of today:  Health Maintenance   Topic Date Due    HEPATITIS B IMMUNIZATION (1 of 3 - 19+ 3-dose series) Never done    ZOSTER IMMUNIZATION (2 of 2) 12/05/2022    ASTHMA ACTION PLAN  03/17/2024    COVID-19 Vaccine (4 - 2024-25 season) 09/01/2024    MICROALBUMIN  12/15/2024    DEPRESSION 6 MO INDEX REPEAT PHQ-9  04/08/2025    MAMMO SCREENING  08/01/2025    ROSIBEL ASSESSMENT  08/29/2025    ASTHMA CONTROL TEST  09/25/2025    PHQ-9  09/25/2025    BMP  01/24/2026    ANNUAL REVIEW OF HM ORDERS  01/31/2026    LIPID  02/03/2026    YEARLY PREVENTIVE VISIT  03/25/2026    DIABETES SCREENING  02/03/2028    COLORECTAL CANCER SCREENING  01/21/2030    DTAP/TDAP/TD IMMUNIZATION (6 - Td or Tdap) 02/04/2030    ADVANCE CARE PLANNING  03/25/2030    HEPATITIS C SCREENING  Completed    HIV SCREENING  Completed    DEPRESSION ACTION PLAN  Completed    INFLUENZA VACCINE  Completed    Pneumococcal Vaccine: 50+ Years  Completed    HPV IMMUNIZATION  Aged Out    MENINGITIS IMMUNIZATION  Aged Out    PAP  Discontinued    CYSTIC FIBROSIS: COLONOSCOPY  Discontinued       Appropriate preventive services were discussed with this patient, including applicable screening as appropriate for fall prevention, nutrition, physical activity, Tobacco-use cessation, weight loss and cognition.  Checklist reviewing preventive services available has been given to the patient.      Review of Systems  Constitutional, HEENT, cardiovascular, pulmonary, GI, , musculoskeletal, neuro, skin, endocrine and psych systems are negative, except as otherwise noted.      Objective    /81 (BP Location: Right arm, Patient Position: Sitting, Cuff Size: Adult Regular)   Pulse 70   Temp 98.1  F  "(36.7  C) (Oral)   Resp 16   Ht 1.6 m (5' 3\")   Wt 52.9 kg (116 lb 9.6 oz)   LMP 02/01/2009 (LMP Unknown)   SpO2 97%   BMI 20.65 kg/m    Body mass index is 20.65 kg/m .  Physical Exam   GENERAL: alert and no distress  EYES: Eyes grossly normal to inspection, PERRL and conjunctivae and sclerae normal  HENT: ear canals and TM's normal, nose and mouth without ulcers or lesions  Bilateral perforated eardrum right side uses ciproex  Hx of multiple sinus and ear surgeries.  NECK: no adenopathy, no asymmetry, masses, or scars  RESP: lungs clear to auscultation - no rales, rhonchi or wheezes  BREAST: normal without masses, tenderness or nipple discharge and no palpable axillary masses or adenopathy  CV: regular rate and rhythm, normal S1 S2, no S3 or S4, no murmur, click or rub, no peripheral edema  ABDOMEN: soft, nontender, no hepatosplenomegaly, no masses and bowel sounds normal  MS: no gross musculoskeletal defects noted, no edema  SKIN: no suspicious lesions or rashes  NEURO: Normal strength and tone, mentation intact and speech normal  PSYCH: mentation appears normal, affect normal/bright        Signed Electronically by: Janie Sena MD  Scribe Disclosure:   Courtney CLEMENTE, am serving as a scribe; to document services personally performed by Janie Sena MD -based on data collection and the provider's statements to me.     "

## 2025-03-26 ENCOUNTER — PATIENT OUTREACH (OUTPATIENT)
Dept: CARE COORDINATION | Facility: CLINIC | Age: 55
End: 2025-03-26
Payer: COMMERCIAL

## 2025-03-27 ENCOUNTER — LAB (OUTPATIENT)
Dept: LAB | Facility: CLINIC | Age: 55
End: 2025-03-27
Payer: COMMERCIAL

## 2025-03-27 DIAGNOSIS — E61.1 IRON DEFICIENCY: ICD-10-CM

## 2025-03-27 DIAGNOSIS — Z13.1 SCREENING FOR DIABETES MELLITUS: Primary | ICD-10-CM

## 2025-03-27 LAB
CREAT UR-MCNC: 40 MG/DL
FERRITIN SERPL-MCNC: 78 NG/ML (ref 11–328)
IRON BINDING CAPACITY (ROCHE): 274 UG/DL (ref 240–430)
IRON SATN MFR SERPL: 39 % (ref 15–46)
IRON SERPL-MCNC: 106 UG/DL (ref 37–145)
MAGNESIUM SERPL-MCNC: 2.3 MG/DL (ref 1.7–2.3)
MICROALBUMIN UR-MCNC: <12 MG/L
MICROALBUMIN/CREAT UR: NORMAL MG/G{CREAT}

## 2025-03-30 ASSESSMENT — ASTHMA QUESTIONNAIRES
QUESTION_3 LAST FOUR WEEKS HOW OFTEN DID YOUR ASTHMA SYMPTOMS (WHEEZING, COUGHING, SHORTNESS OF BREATH, CHEST TIGHTNESS OR PAIN) WAKE YOU UP AT NIGHT OR EARLIER THAN USUAL IN THE MORNING: NOT AT ALL
QUESTION_5 LAST FOUR WEEKS HOW WOULD YOU RATE YOUR ASTHMA CONTROL: COMPLETELY CONTROLLED
QUESTION_2 LAST FOUR WEEKS HOW OFTEN HAVE YOU HAD SHORTNESS OF BREATH: NOT AT ALL
QUESTION_1 LAST FOUR WEEKS HOW MUCH OF THE TIME DID YOUR ASTHMA KEEP YOU FROM GETTING AS MUCH DONE AT WORK, SCHOOL OR AT HOME: NONE OF THE TIME
QUESTION_4 LAST FOUR WEEKS HOW OFTEN HAVE YOU USED YOUR RESCUE INHALER OR NEBULIZER MEDICATION (SUCH AS ALBUTEROL): NOT AT ALL
ACT_TOTALSCORE: 25

## 2025-03-31 ENCOUNTER — VIRTUAL VISIT (OUTPATIENT)
Dept: FAMILY MEDICINE | Facility: CLINIC | Age: 55
End: 2025-03-31
Payer: COMMERCIAL

## 2025-03-31 DIAGNOSIS — N95.1 SYMPTOMATIC MENOPAUSAL OR FEMALE CLIMACTERIC STATES: ICD-10-CM

## 2025-03-31 PROCEDURE — 98005 SYNCH AUDIO-VIDEO EST LOW 20: CPT | Performed by: NURSE PRACTITIONER

## 2025-03-31 RX ORDER — PROGESTERONE 100 MG/1
100 CAPSULE ORAL AT BEDTIME
Qty: 30 CAPSULE | Refills: 5 | Status: SHIPPED | OUTPATIENT
Start: 2025-03-31

## 2025-03-31 RX ORDER — ESTRADIOL 0.05 MG/D
1 PATCH, EXTENDED RELEASE TRANSDERMAL
Qty: 24 PATCH | Refills: 3 | Status: SHIPPED | OUTPATIENT
Start: 2025-03-31

## 2025-03-31 NOTE — PATIENT INSTRUCTIONS
LDN INFO:  Naltrexone, an orally semisynthetic opiate antagonist, is FDA-approved for treatment of heroin/alcohol addiction, but there have been clinical observations and some clinical studies that show naltrexone in very low doses can be helpful in treatment of many immune/inflammatory and autoimmune related conditions. Conditions in which low dose naltrexone has been reported to be helpful include: chronic fatigue and fibromyalgia, chronic pain, multiple sclerosis, inflammatory bowel disease, irritable bowel disease, thyroid disorders, restless leg syndrome, Lyme's, hives/itching, MCAS, cancer, stress, anxiety, depression, and traumatic brain injuries.    How it works:  Many diseases are expressions of a malfunction of the immune system. The immune system is regulated by endorphins, which have primary action on our opioid receptors. Blocking the opioid receptors briefly using naltrexone stimulates your body to make more endorphins, which can then act in an immunomodulatory way to correct immune system malfunction and reduce inflammation.    Most common side effects:   Vivid dreams or sleep disturbance (though in some it may improve insomnia). This is generally mitigated by adjusting the dose or time of day you take the medication.   Increased sensitivity to alcohol -Generally, recommend refraining from alcohol use when starting LDN and then slowly introducing to see how you tolerate. For alcohol sensitivity patients, LDN is best taken 6 hrs apart from alcohol.   Headaches- these often resolve with dose adjustment or as you acclimate to the medication.    Where you can read more about LDN:  www.ldntrust.org  www.ldnscience.org  The LDN Book (on [employer])        *Low Dose Naltrexone (LDN) compounded to 3 mg tablets: Take 1/2 tablet (1.5 mg) daily at bedtime for 2 weeks and then if tolerating increase to 1 tablet (3mg), and then if tolerating after 2 weeks, increase to 1 1/2 tablets (4.5mg). Goal is to find the dose  you tolerate without any side effects between 1 and 4.5 mg. You can request the next refill to be 4.5mg.     LDN must be compounded at a specialty compounding pharmacy and is therefore not covered by insurance. The cost is: $40 for 30 tablets, $45 for 60 tablets, $51 for 90 tablets.   If you would like the prescription mailed to your house, then you may be charged an additional service fee ranging from $2.99- $6.99   Possible side effects or signs of too high of a dose include: insomnia or vivid dreams and headaches. Although sometimes LDN can help with sleep if someone has existing insomnia.   Note: most side effects will resolve within 1-2 weeks of use as the body adjusts to the medication.    Alcohol use: May increase alcohol intolerance including causing headaches or nausea. Avoid alcohol when you are first starting LDN. You can then add in small amounts of alcohol at time to see how you tolerate. If you do not tolerate, either avoid consuming alcohol while on LDN or take LDN at least 6 hrs apart from alcohol.     If you have been prescribed an opioid (Vicodin, percocet, morphine, dilaudid, etc) for pain management, please stop your LDN until you are no longer using the opioid.     Stop LDN 5 days before any procedure requiring anesthesia or narcotic pain medication.    It generally takes about 1-3 months on highest tolerated dose to determine how well the LDN is working for pain, fatigue, autoimmunity, mood, etc.    If sleep disturbance persists past 2 weeks or are bothersome, switch to taking either  between 2 pm-6 pm or in the morning.    You prescription will be sent to [facility] and they will call you within 24 hours, phone: [phone].

## 2025-03-31 NOTE — PROGRESS NOTES
Valencia is a 54 year old who is being evaluated via a billable video visit.    How would you like to obtain your AVS? MyChart  If the video visit is dropped, the invitation should be resent by: Text to cell phone: [phone]  Will anyone else be joining your video visit? No      Assessment & Plan     Symptomatic menopausal or female climacteric states  Doing well with HRT. Will increase dose of estrogen. Can try increasing progesterone   - estradiol (VIVELLE-DOT) 0.05 MG/24HR bi-weekly patch; Place 1 patch over 96 hours onto the skin twice a week.  - progesterone (PROMETRIUM) 100 MG capsule; Take 1 capsule (100 mg) by mouth at bedtime.      Has a complex medical history. Can try restarting LDN to see if that helps with her other chronic symptoms        Subjective   Valencia is a 54 year old, presenting for the following health issues:  No chief complaint on file.    History of Present Illness       Reason for visit:  Followup for menopause, fibromyalgia, chronic fatigue, IBS, breathlessness    She eats 4 or more servings of fruits and vegetables daily.She consumes 0 sweetened beverage(s) daily.She exercises with enough effort to increase her heart rate 30 to 60 minutes per day.  She exercises with enough effort to increase her heart rate 4 days per week.   She is taking medications regularly.        Initially was feeling better with HRT   Still getting hot flashes   Potentially had P4 intolerance     Tried LDN  Had intense agitation in afternoon, almost panic feeling   Stopped and still has the agitation.     Often cycles supplements   Added berberine. This helped her symptoms in the past.   Is taking soil based probiotic and enzymes- very helpful   Takes psyllium husk and twice a week miralax   Stopped effexor that helped her with a more balanced diet   Had a traumatic event 1 year ago    Still having breathlessness. Not as severe   Presents as exhaustion. Especially when she is leaning forward.   Lots of muscle  fatigue.  Does yoga regularly. Can only do it 3 times a week not when she used to do it 6 times a week         Review of Systems  Detailed as above         Objective           Vitals:  No vitals were obtained today due to virtual visit.    Physical Exam   GENERAL: alert and no distress  EYES: Eyes grossly normal to inspection.  No discharge or erythema, or obvious scleral/conjunctival abnormalities.  RESP: No audible wheeze, cough, or visible cyanosis.    SKIN: Visible skin clear. No significant rash, abnormal pigmentation or lesions.  NEURO: Cranial nerves grossly intact.  Mentation and speech appropriate for age.  PSYCH: Appropriate affect, tone, and pace of words          Video-Visit Details    Type of service:  Video Visit   Originating Location (pt. Location): Home    Distant Location (provider location):  On-site  Platform used for Video Visit: Chencho  Signed Electronically by: SUNNY Bowers CNP

## 2025-04-01 ENCOUNTER — HOSPITAL ENCOUNTER (OUTPATIENT)
Dept: MAMMOGRAPHY | Facility: CLINIC | Age: 55
Discharge: HOME | End: 2025-04-01
Attending: INTERNAL MEDICINE | Admitting: INTERNAL MEDICINE
Payer: COMMERCIAL

## 2025-04-01 DIAGNOSIS — Z12.31 VISIT FOR SCREENING MAMMOGRAM: ICD-10-CM

## 2025-04-01 PROCEDURE — 77063 BREAST TOMOSYNTHESIS BI: CPT

## 2025-04-08 DIAGNOSIS — J31.0 CHRONIC RHINITIS: ICD-10-CM

## 2025-04-08 RX ORDER — FLUTICASONE PROPIONATE 50 MCG
2 SPRAY, SUSPENSION (ML) NASAL DAILY
Qty: 48 ML | Refills: 1 | Status: SHIPPED | OUTPATIENT
Start: 2025-04-08

## 2025-04-13 DIAGNOSIS — H92.09 EAR PAIN, UNSPECIFIED LATERALITY: ICD-10-CM

## 2025-04-15 ENCOUNTER — MYC MEDICAL ADVICE (OUTPATIENT)
Dept: FAMILY MEDICINE | Facility: CLINIC | Age: 55
End: 2025-04-15
Payer: COMMERCIAL

## 2025-04-15 DIAGNOSIS — N95.1 SYMPTOMATIC MENOPAUSAL OR FEMALE CLIMACTERIC STATES: ICD-10-CM

## 2025-04-15 RX ORDER — CIPROFLOXACIN AND DEXAMETHASONE 3; 1 MG/ML; MG/ML
SUSPENSION/ DROPS AURICULAR (OTIC)
Qty: 7.5 ML | Refills: 0 | Status: SHIPPED | OUTPATIENT
Start: 2025-04-15

## 2025-04-21 RX ORDER — ESTRADIOL 0.1 MG/D
1 FILM, EXTENDED RELEASE TRANSDERMAL
Qty: 24 PATCH | Refills: 3 | Status: SHIPPED | OUTPATIENT
Start: 2025-04-21

## 2025-04-28 ENCOUNTER — NURSE TRIAGE (OUTPATIENT)
Dept: FAMILY MEDICINE | Facility: CLINIC | Age: 55
End: 2025-04-28
Payer: COMMERCIAL

## 2025-04-29 NOTE — TELEPHONE ENCOUNTER
Discontinue estradiol patch and progesterone  Ok to take OTC zyrtec 10 mg daily  Make appointment for office visit tomorrow if no opening available today  Ok to go to UC if symptoms get worse   Dr.Nasima Nathen MD

## 2025-04-29 NOTE — TELEPHONE ENCOUNTER
[facility] (supporting Elsa Peguero, APRN CNP)21 hours ago (4:29 PM)       Hello - since we upped the estrogen, I ve developed a rash on my arms and torso. It s lots of little raised pink bumps. Only a bit of itchiness here and there, but largely unfelt. It s not distressing, I m just wondering if it s a hormonal thing that might go away once I m acclimated to the new dose, or if I should look for other causes. - thank you    Nurse Triage SBAR    Is this a 2nd Level Triage? YES, LICENSED PRACTITIONER REVIEW IS REQUIRED    Situation: Patient had gradual onset of rash on arms and torso since starting increased estrogen. Denies other new medications. Not itching. No facial swelling or difficulty breathing. Patient states that her hot flashes have improved.     Background: Patient had VV with Elsa CORREA CNP                      On 3/31/25.Estradiol patch was increased 4/21/25.    Assessment: Allergic type rash on arms and torso without itching or signs of anaphylaxis possibly related to increased estradiol patch.     Protocol Recommended Disposition:   Callback by PCP Today, See More Appropriate Protocol    Recommendation: Please advise if medication adjustment or if the patient needs appointment.      Routed to provider    Does the patient meet one of the following criteria for ADS visit consideration? 16+ years old, with an [facility] PCP     TIP  Providers, please consider if this condition is appropriate for management at one of our Acute and Diagnostic Services sites.     If patient is a good candidate, please use dotphrase <dot>triageresponse and select Refer to ADS to document.    Reason for Disposition   Drug rash suspected and started taking new medicine within last 2 weeks  (Exception: Antihistamine, eye drops, ear drops, decongestant or other OTC cough/cold medicines.)   Taking new prescription antibiotic  (Exception: Finished taking new prescription  antibiotic.)    Additional Information   Negative: Sudden onset of rash (within last 2 hours) and difficulty with breathing or swallowing   Negative: Difficult to awaken or acting confused (e.g., disoriented, slurred speech)   Negative: Fever and purple or blood-colored spots or dots   Negative: Too weak or sick to stand   Negative: Life-threatening reaction (anaphylaxis) in the past to similar substance (e.g., food, insect bite/sting, chemical, etc.) and < 2 hours since exposure   Negative: Sounds like a life-threatening emergency to the triager   Negative: Difficulty breathing or wheezing   Negative: Hoarseness or cough that started soon after 1st dose of drug   Negative: Swollen tongue that started soon after first dose of drug   Negative: Fever and purple or blood-colored spots or dots   Negative: Too weak or sick to stand   Negative: Sounds like a life-threatening emergency to the triager   Negative: Rash is only on 1 part of the body (localized)   Negative: Taking new non-prescription (OTC) antihistamine, decongestant, ear drops, eye drops, or other OTC cough/cold medicine   Negative: Taking new prescription antihistamine, allergy medicine, asthma medicine, eye drops, ear drops or nose drops   Negative: Rash started more than 3 days after stopping new prescription medicine   Negative: Swollen tongue   Negative: Widespread hives and onset < 2 hours of exposure to 1st dose of drug   Negative: Patient sounds very sick or weak to the triager   Negative: Fever   Negative: Face or lip swelling   Negative: Purple or blood-colored spots or dots (no fever and sounds well to triager)   Negative: Joint pain or swelling   Negative: Bloody crusts on lips or in mouth   Negative: Large or small blisters on skin (i.e., fluid filled bubbles or sacs)   Negative: Pregnant   Negative: Rash beginning within 4 hours of a new prescription medication   Negative: Hives or itching   Negative: Patient wants to be seen    Answer  "Assessment - Initial Assessment Questions  1. APPEARANCE of RASH: \"Describe the rash.\" (e.g., spots, blisters, raised areas, skin peeling, scaly)       It's lots of little raised pink bumps  2. SIZE: \"How big are the spots?\" (e.g., tip of pen, eraser, coin; inches, centimeters)      Ball point pen tip, some slightly bigger. Innumerable. Skin feels rough.   3. LOCATION: \"Where is the rash located?\"      Arms and torso but not hands.   4. COLOR: \"What color is the rash?\" (Note: It is difficult to assess rash color in people with darker-colored skin. When this situation occurs, simply ask the caller to describe what they see.)      pink  5. ONSET: \"When did the rash begin?\"      Came on gradually. Noticed about a week and a half ago.   6. FEVER: \"Do you have a fever?\" If Yes, ask: \"What is your temperature, how was it measured, and when did it start?\"      No fever  7. ITCHING: \"Does the rash itch?\" If Yes, ask: \"How bad is the itch?\" (Scale 1-10; or mild, moderate, severe)      Not itchy  8. CAUSE: \"What do you think is causing the rash?\"      Increased dose of estrogen.  9. MEDICINE FACTORS: \"Have you started any new medicines within the last 2 weeks?\" (e.g., antibiotics)       Increased estrogen dose.   10. OTHER SYMPTOMS: \"Do you have any other symptoms?\" (e.g., dizziness, headache, sore throat, joint pain)        Denies any other symptoms.   11. PREGNANCY: \"Is there any chance you are pregnant?\" \"When was your last menstrual period?\"        NA    Protocols used: Rash or Redness - Widespread-A-OH, Rash - Widespread On Drugs-A-OH  Mily Diez RN    "

## 2025-04-29 NOTE — TELEPHONE ENCOUNTER
Spoke with pt about provider's recommendations. Pt verbalized understanding and pt will continue to monitor.     Donis Caro RN  [facility]

## 2025-05-04 ENCOUNTER — MYC MEDICAL ADVICE (OUTPATIENT)
Dept: FAMILY MEDICINE | Facility: CLINIC | Age: 55
End: 2025-05-04
Payer: COMMERCIAL

## 2025-05-07 ENCOUNTER — VIRTUAL VISIT (OUTPATIENT)
Dept: INTERNAL MEDICINE | Facility: CLINIC | Age: 55
End: 2025-05-07
Payer: COMMERCIAL

## 2025-05-07 DIAGNOSIS — R21 RASH AND NONSPECIFIC SKIN ERUPTION: ICD-10-CM

## 2025-05-07 DIAGNOSIS — E04.1 THYROID NODULE: Primary | ICD-10-CM

## 2025-05-07 DIAGNOSIS — T49.0X5A ADVERSE REACTION TO IODINE: ICD-10-CM

## 2025-05-07 PROCEDURE — 98005 SYNCH AUDIO-VIDEO EST LOW 20: CPT | Performed by: INTERNAL MEDICINE

## 2025-05-07 ASSESSMENT — PATIENT HEALTH QUESTIONNAIRE - PHQ9
SUM OF ALL RESPONSES TO PHQ QUESTIONS 1-9: 12
SUM OF ALL RESPONSES TO PHQ QUESTIONS 1-9: 12
10. IF YOU CHECKED OFF ANY PROBLEMS, HOW DIFFICULT HAVE THESE PROBLEMS MADE IT FOR YOU TO DO YOUR WORK, TAKE CARE OF THINGS AT HOME, OR GET ALONG WITH OTHER PEOPLE: EXTREMELY DIFFICULT

## 2025-05-07 NOTE — PROGRESS NOTES
Valencia is a 54 year old who is being evaluated via a billable video visit.    How would you like to obtain your AVS? MyChart  If the video visit is dropped, the invitation should be resent by: Text to cell phone: [phone]  Will anyone else be joining your video visit? No      Assessment & Plan     Thyroid nodule   to complete ultrasound imaging and thyroid lab work as below.  - TSH with free T4 reflex; Future  - US Thyroid; Future    Rash and nonspecific skin eruption.  Adverse reaction to iodine  Patient received iodine for cardiac imaging around 2 weeks prior to onset of rash.  Due to concern of delayed onset reaction, patient will be proceeding  with allergy referral.  Has not had iodine reaction before.  - Adult Allergy/Asthma  Referral; Future        21 minutes spent by me on the date of the encounter doing chart review, history and exam, documentation and further activities per the note          Subjective   Valencia is a 54 year old, presenting for the following health issues:  Consult    History of Present Illness       Reason for visit:  Concerns about possible contrast induced thyroiditis  Symptom onset:  1-2 weeks ago  Symptoms include:  Bumpy rash, soreness over existing thyroid nodule, sleeplessness  Symptom intensity:  Moderate  Symptom progression:  Improving  Had these symptoms before:  No  What makes it worse:  No  What makes it better:  No   She is taking medications regularly.        Has had contrast exposure due to cardiac imaging with [facility] on 4/18/2025. Has started having a rash around torso and hand area around 2 weeks after.  Additionally, started having symptoms of soreness around the throat area and insomnia.  Symptoms have improved though patient worries that this could be something to do with the thyroid gland as she reports having a thyroid nodule and would like to proceed with evaluation of the same.      Review of Systems  Constitutional, neuro, ENT, endocrine,  pulmonary, cardiac, gastrointestinal, genitourinary, musculoskeletal, integument and psychiatric systems are negative, except as otherwise noted.      Objective           Vitals:  No vitals were obtained today due to virtual visit.    Physical Exam   GENERAL: alert and no distress  EYES: Eyes grossly normal to inspection.  No discharge or erythema, or obvious scleral/conjunctival abnormalities.  RESP: No audible wheeze, cough, or visible cyanosis.    SKIN: Visible skin clear. No significant rash, abnormal pigmentation or lesions.  NEURO: Cranial nerves grossly intact.  Mentation and speech appropriate for age.  PSYCH: Appropriate affect, tone, and pace of words          Video-Visit Details    Type of service:  Video Visit   Originating Location (pt. Location): Home    Distant Location (provider location):  On-site  Platform used for Video Visit: Chencho  Signed Electronically by: Padmini Renee MD

## 2025-05-08 ENCOUNTER — LAB (OUTPATIENT)
Dept: LAB | Facility: CLINIC | Age: 55
End: 2025-05-08
Payer: COMMERCIAL

## 2025-05-08 ENCOUNTER — PATIENT OUTREACH (OUTPATIENT)
Dept: CARE COORDINATION | Facility: CLINIC | Age: 55
End: 2025-05-08

## 2025-05-08 DIAGNOSIS — E04.1 THYROID NODULE: ICD-10-CM

## 2025-05-08 LAB — TSH SERPL DL<=0.005 MIU/L-ACNC: 0.76 UIU/ML (ref 0.3–4.2)

## 2025-05-08 PROCEDURE — 84443 ASSAY THYROID STIM HORMONE: CPT

## 2025-05-08 PROCEDURE — 36415 COLL VENOUS BLD VENIPUNCTURE: CPT

## 2025-05-09 ENCOUNTER — RESULTS FOLLOW-UP (OUTPATIENT)
Dept: INTERNAL MEDICINE | Facility: CLINIC | Age: 55
End: 2025-05-09

## 2025-05-12 ENCOUNTER — PATIENT OUTREACH (OUTPATIENT)
Dept: CARE COORDINATION | Facility: CLINIC | Age: 55
End: 2025-05-12
Payer: COMMERCIAL

## 2025-05-13 ENCOUNTER — TRANSFERRED RECORDS (OUTPATIENT)
Dept: HEALTH INFORMATION MANAGEMENT | Facility: CLINIC | Age: 55
End: 2025-05-13
Payer: COMMERCIAL

## 2025-05-15 ENCOUNTER — ANCILLARY PROCEDURE (OUTPATIENT)
Dept: ULTRASOUND IMAGING | Facility: CLINIC | Age: 55
End: 2025-05-15
Attending: INTERNAL MEDICINE
Payer: COMMERCIAL

## 2025-05-15 DIAGNOSIS — E04.1 THYROID NODULE: ICD-10-CM

## 2025-05-15 PROCEDURE — 76536 US EXAM OF HEAD AND NECK: CPT

## 2025-05-19 ENCOUNTER — VIRTUAL VISIT (OUTPATIENT)
Dept: FAMILY MEDICINE | Facility: CLINIC | Age: 55
End: 2025-05-19
Payer: COMMERCIAL

## 2025-05-19 DIAGNOSIS — R53.82 CHRONIC FATIGUE: Primary | ICD-10-CM

## 2025-05-19 DIAGNOSIS — N95.1 SYMPTOMATIC MENOPAUSAL OR FEMALE CLIMACTERIC STATES: ICD-10-CM

## 2025-05-19 PROCEDURE — 1126F AMNT PAIN NOTED NONE PRSNT: CPT | Mod: 95 | Performed by: NURSE PRACTITIONER

## 2025-05-19 PROCEDURE — 98006 SYNCH AUDIO-VIDEO EST MOD 30: CPT | Performed by: NURSE PRACTITIONER

## 2025-05-19 NOTE — PROGRESS NOTES
Valencia is a 54 year old who is being evaluated via a billable video visit.    How would you like to obtain your AVS? MyChart  If the video visit is dropped, the invitation should be resent by: Text to cell phone: [phone]  Will anyone else be joining your video visit? No      Assessment & Plan     Chronic fatigue  Suspect ME/CFS like presentation. She is doing a slow increase of LDN. She also added nicotine patches as this is supported in the data and is working well for her      Symptomatic menopausal or female climacteric states  Doing great on estrogen patches. I previously checked in with my physician mentors about her case. It is safe for her to be on topical estrogen. She did not tolerate progesterone well previously. OK for estrogen alone with hysterectomy status without noted endometriosis.         Subjective   Valencia is a 54 year old, presenting for the following health issues:  Follow Up    HPI      Going for right heart cath on 5/30 to look for pulmonary hypertension   Working with cardiology   Found research on nicotine for ME/CFS. Half of the lowest dose patch has been really helpful. She is a never smoker.   Using 20mg Lithium helps her mood  Is on SBO probiotic and is now able to tolerate veggies that she hasn' t been able to tolerate for years. Still using super enzymes and miralax. Also psyllium husk   Occasion super cleanse if needed    Has cannabis tincture that she uses that helps with muscle soreness    No hot flashes     LDN- 1.5 and 3mg every other night.    Just had a thyroid US.       Review of Systems  Detailed as above         Objective           Vitals:  No vitals were obtained today due to virtual visit.    Physical Exam   GENERAL: alert and no distress  EYES: Eyes grossly normal to inspection.  No discharge or erythema, or obvious scleral/conjunctival abnormalities.  RESP: No audible wheeze, cough, or visible cyanosis.    SKIN: Visible skin clear. No significant rash, abnormal  pigmentation or lesions.  NEURO: Cranial nerves grossly intact.  Mentation and speech appropriate for age.  PSYCH: Appropriate affect, tone, and pace of words          Video-Visit Details    Type of service:  Video Visit   Originating Location (pt. Location): Home    Distant Location (provider location):  On-site  Platform used for Video Visit: Chencho  Signed Electronically by: SUNNY Bowers CNP

## 2025-05-21 ENCOUNTER — TELEPHONE (OUTPATIENT)
Dept: ALLERGY | Facility: CLINIC | Age: 55
End: 2025-05-21
Payer: COMMERCIAL

## 2025-05-21 NOTE — TELEPHONE ENCOUNTER
[facility] Call Center    Phone Message    May a detailed message be left on voicemail: yes     Reason for Call: Appointment Intake    Referring Provider Name: ISREAL AYALA   Diagnosis and/or Symptoms: T49.0X5A (ICD-10-CM) - Adverse reaction to iodine (pt specifically noting iodine contrast used in imaging- causes sleeplessness & neck pain)    Can Dr. Potts test for the iodine allergy?    Action Taken: Other: TE to CS allergy    Travel Screening: Not Applicable     Date of Service:

## 2025-05-22 NOTE — TELEPHONE ENCOUNTER
RN called and left a voicemail letting patient know that we are unable to test for iodine contrast here in [place]. Gave patient Adult allergy scheduling number to call back and ask to be scheduled at the [facility] with [place]. Toth.    Angy Vigil RN

## 2025-06-07 DIAGNOSIS — J47.9 BRONCHIECTASIS WITHOUT COMPLICATION (H): ICD-10-CM

## 2025-06-09 RX ORDER — ALBUTEROL SULFATE 0.83 MG/ML
SOLUTION RESPIRATORY (INHALATION)
Qty: 150 ML | Refills: 11 | Status: SHIPPED | OUTPATIENT
Start: 2025-06-09

## 2025-07-14 ENCOUNTER — ANCILLARY PROCEDURE (OUTPATIENT)
Dept: BONE DENSITY | Facility: CLINIC | Age: 55
End: 2025-07-14
Attending: INTERNAL MEDICINE
Payer: COMMERCIAL

## 2025-07-14 DIAGNOSIS — Z78.0 ENCOUNTER FOR OSTEOPOROSIS SCREENING IN ASYMPTOMATIC POSTMENOPAUSAL PATIENT: ICD-10-CM

## 2025-07-14 DIAGNOSIS — Z13.820 ENCOUNTER FOR OSTEOPOROSIS SCREENING IN ASYMPTOMATIC POSTMENOPAUSAL PATIENT: ICD-10-CM

## 2025-07-14 PROCEDURE — 77080 DXA BONE DENSITY AXIAL: CPT | Mod: TC

## 2025-07-15 ENCOUNTER — RESULTS FOLLOW-UP (OUTPATIENT)
Dept: FAMILY MEDICINE | Facility: CLINIC | Age: 55
End: 2025-07-15
Payer: COMMERCIAL

## 2025-07-15 DIAGNOSIS — E67.3 HYPERVITAMINOSIS D: Primary | ICD-10-CM

## 2025-07-16 ASSESSMENT — PAIN SCALES - GENERAL: PAINLEVEL_OUTOF10: MODERATE PAIN (5)

## 2025-07-16 NOTE — RESULT ENCOUNTER NOTE
Brandon Birmingham    This is to inform you regarding your test result.    The bone density test shows osteopenia. This is an intermediate category that is in between normal and osteoporosis.  People with osteopenia should work on taking adequate amount of calcium with vitamin D daily. They should also be getting daily weight bearing exercise (walking works)  Repeat bone density in 3 years.      Sincerely,      Dr.Nasima Nathen MD,FACP

## 2025-07-17 ENCOUNTER — LAB (OUTPATIENT)
Dept: LAB | Facility: CLINIC | Age: 55
End: 2025-07-17
Payer: COMMERCIAL

## 2025-07-17 ENCOUNTER — VIRTUAL VISIT (OUTPATIENT)
Dept: GASTROENTEROLOGY | Facility: CLINIC | Age: 55
End: 2025-07-17
Payer: COMMERCIAL

## 2025-07-17 VITALS
HEIGHT: 63 IN | WEIGHT: 112 LBS | SYSTOLIC BLOOD PRESSURE: 101 MMHG | BODY MASS INDEX: 19.84 KG/M2 | DIASTOLIC BLOOD PRESSURE: 52 MMHG

## 2025-07-17 DIAGNOSIS — Z87.19 HISTORY OF ISCHEMIC COLITIS: ICD-10-CM

## 2025-07-17 DIAGNOSIS — E67.3 HYPERVITAMINOSIS D: ICD-10-CM

## 2025-07-17 DIAGNOSIS — K59.04 CHRONIC IDIOPATHIC CONSTIPATION: Primary | ICD-10-CM

## 2025-07-17 LAB — VIT D+METAB SERPL-MCNC: 50 NG/ML (ref 20–50)

## 2025-07-17 NOTE — LETTER
7/17/2025      Valencia Ye  [address] [facility] [id number]      Dear Colleague,    Thank you for referring your patient, Valencia Ye, to the [facility] [place]. Please see a copy of my visit note below.    Virtual Visit Details    Type of service:  Video Visit   Originating Location (pt. Location): Home    Distant Location (provider location):  Off-site  Platform used for Video Visit: [facility]    GI CLINIC VISIT    CC/REFERRING MD:  Mo Bryant  REASON FOR CONSULTATION:   Valencia Ye is a 54 year old female who I was asked to see in consultation at the request of Dr. Mo Bryant for   Chief Complaint   Patient presents with     RECHECK       ASSESSMENT/PLAN:  1. Chronic idiopathic constipation   2. History of ischemic colitis  - linaclotide (LINZESS) 72 MCG capsule; Take 1 capsule (72 mcg) by mouth every morning (before breakfast).  Dispense: 90 capsule; Refill: 3  - Adult GI Clinic Follow-Up Order (Blank); Future    54 year old female with ILD, anxiety, depression, overactive bladder, mannose-binding lectin deficiency, eosinophilic granulomatosis with polyangiitis, IBS, asthma, GERD with hx of Schatzki ring dilated in 2023 and history of hysterectomy who presents for follow up.    She presented to the ER with abdominal pain and shortness of breath in September 2024. She had a CT scan that showed duodenitis. She was having increased diarrhea around that time that has continued. She is having alternating diarrhea and constipation, usually ranging from BSC type 4 (small and incomplete) to BSC type 7 stools. She has had to splint her pelvic floor to have a complete BM and reports vaginal prolapse.    She had a follow up EGD 10/17/24 with 1 cm HH, normal esophagus, stomach, duodenum. Biopsies of the duodenum were normal.     Colonoscopy 1/2025 was normal to the TI, one TA polyp. Random colon biopsies noted melanosis coli otherwise  "unremarkable.     We discussed treatment of her constipation to help with gas, bloating, abdominal pains, fullness, increased reflux. After restarting Amitiza at 24 mcg BID, she had no improvement. She manages with miralax daily, magnesium (glycinate, citrate, aspartate, and oxide), castor oil caplets, mini enemas    To follow up on duodenitis, she had an MRE which showed small hiatal hernia, moderate stool burden, pancreatic cyst that is 0.4 cm, recommended to repeat in 2-3 years. Ferritin, iron, magnesium, B12 and B6 were normal. Fecal elastase and fecal fat were normal.    She had a bout of ischemic colitis with severe LLQ abdominal pain in January. CT scan showed severe colitis of the descending colon thought to be due to ischemic colitis. Colonoscopy 1/2025 was without any signs of colitis. Biopsies showed 1 TA polyp, melanosis coli in random colon biopsies otherwise normal. Since then has had imaging showing \"improving colitis.\" She does take many supplements, unknown if these could contribute to ischemic colitis. Discussed control of constipation as well to help reduce risk. Pelvic floor center referral placed for obstructive defecation symptoms.     Her GPA is controlled.    She continues to have intermittent constipation despite use of psyllium husk, miralax and senna. She has not been able to tolerate watermelon or other fruits and vegetables that she enjoys.     Summary of plan:   - Linzess 72 mcg prescribed   - She will look into nortriptyline or cymbalta   - Follow up 3 months       Specialty Problems          Gastroenterology Diagnoses    Irritable bowel syndrome without diarrhea        Bladder pain        Diverticulosis           RTC 3 months    66 minutes was spent on the date of the encounter doing chart review, documentation, and further activities as noted above.    Stefanie Ibarra PA-C  Division of Gastroenterology, Hepatology and Nutrition  [facility]" [place]    ---------------------------------------------------------------------------------------------------  HPI:  Valencia Ye is a 54 year old female with past medical history significant for ILD, anxiety, depression, overactive bladder, mannose-binding lectin deficiency, eosinophilic granulomatosis with polyangiitis, IBS, asthma, Schatzki ring dilated in 2023 who presents for GERD, duodenitis, obstipation seen on CT. Duodenitis was seen on a CT scan in the ER on 9/27/24.     Since had EGD to follow up on this through [facility] 10/17/24 with 1 cm HH, normal esophagus, stomach, duodenum. Biopsies of the duodenum were normal.     Initial visit 11/2024:  Valencia reports she has been experiencing new breathlessness, which was the main reason she went to ER in September. Seeing cardiology soon and has seen her pulmonologist as well.     She has had worsening reflux, sore tongue, and chelitis intermittently. Saw her primary provider about her tongue, dx with thrush but pt didn't feel it was thrush. Tx did not significantly help. Steroid cream does seem to help at the angle of her mouth but not using regularly. Feeling acidic sensation in her mouth. Toothpaste is burning her mouth.     Also having intermittent abdominal pain in the LUQ, which she describes as nagging, throbbing. Sometimes RUQ pain, sharp pains radiating down the sides bilaterally, pain b/w shoulder blades and across diaphragm. Some BSC type 1 at times, but usually type 4-7, most of the time feeling incomplete, thin, sometimes oily. Has had abdominal bloating. Doesn't usually feel complete after bowel movements. Having to do some perineal splinting with bowel movements since her had kids. Does a lot of yoga, no pelvic floor therapy in the past. Was taking some extra magnesium (mag complex with carbonate, citrate and oxide plus separate glycinate, and mag citrate few scoops per day), bile salts, apple cider vinegar which helped reflux and  constipation. This summer this all seemed to stop working. Was on Amitiza for a while, recently restarted her old rx at 24 mcg 1-2 times per day which helps.    Eating mostly pureed foods. Eats some nuts. Soups, yogurt. Denies trouble swallowing. Big difference in abd pain when she eats mostly pureed, less abdominal pain. Some upper abd fullness. Does have some early satiety.     Has had some trouble emptying her bladder too.     Family history - maternal grandmother and her siblings: many with GI cancers. One with gallbladder cancer. One with colon cancer, almost all 10 with some type of gut cancer  NSAID use- steroid use, no advil or ibuprofen other than few times per year   Hx abd surgeries- Hx lap appy 2022, c/s, abdominal open hysterectomy with tubal, noted adhesions during this surgery  Hx colonoscopy - 12/2019, 12/2014  EGD- 10/2024, 2/2023    Interval history 2/2025:   She saw an integrative practitioner yesterday and started digestive enzymes and and low dose naltrexone to help with constitutional symptoms and estrogen.     She was hospitalized in January with severe LLQ abdominal pain. She did see some small blood clots in her stool while hospitalized. This was associated with mucus in the stool, cramping and bloating. Her symptoms were severe.     Amitiza didn't help her at all at 24 mcg BID. With Amitiza she had abdominal pain, but no improvement in her bowel movements, as well as nausea. Still having obstipation symptoms. She is taking miralax 1 capful qam which is mild but helps her get more formed, larger stools, but still incompletely evacuating. Taking a product called Super Colon Cleanse with senna, psyllium, among other ingredients for 10 days at time then takes a break. Has also used a Mini enema prn. Also takes magnesium glycinate, citrate, aspartate, and oxide, in lower doses than before. Added in Castor oil caplets 2-4 per day (650 mg/cap). She is having daily bowel movements, with  insufficient evacuation. She is using a squatty potty and needing to splint due to vaginal prolapse, per patient. She has not yet had any pelvic floor evaluation. She has had 5 children and is s/p hysterectomy.    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 She has had this in the past. Breathlessness waxes and wanes.     Feels effexor may have played a role, since stopped it in December and suspects long term autonomic side effects.     She reports her GI symptoms have been awful. She feels she has to be very restrictive with her diet. She has a hard time tolerating some fruits and vegetables, including some watermelon. This caused bloating and discomfort and she increased psyllium, miralax and senna which helped. Bloating comes first, then she deals with constipation. Integrative medicine started her on a  soil-based probiotic and super enzymes (betain, oxbile, amylase, lipase, protease, etc.). with thises she can eat more vegetables. Her gut feels better overall. Also takes Berberine and ground oregano which she has taken for a long time. She takes miralax daily, which works better for her than as needed. This does help with bloating but can cause loose stools. She takes 3 capfuls per day. She makes bread with flax and psyllium and also takes psyllium husk and encapsulates these in addition. She needs senna pretty regularly but not daily, sometimes has to take more than 3 caps at once. With this regimen, her stools vary quite a bit. She will have anywhere from 0-6 bowel movements per day. When things are going well she will have 2-3  bm/day. She will intervene if no BM in 2 days. She saw the [facility] and it was determined that she couldn't relax her pelvic floor, but didn't feel she was able to fully relax her pelvic floor at that time as she can at home. Has been overwhelmed with appts lately. Did not pursue PT. Does have to splint with rectal prolapse. Was not recommended surgery for this.Pineapple set off chronic pain for 1.5 weeks. She avoids cow dairy as of last summer, does tolerate goat dairy.     When she took amitiza, which didn't make things worse, but it was sporadic. Almost felt like there was some placebo pills. When she got up to the higher dose, she had quite a bit of nausea.     ROS:    A 10 point review of systems was performed and is negative except as noted in the HPI.     PREVIOUS ENDOSCOPY:    Colonoscopy 1/21/25  Recommendation:        - Discharge patient to home (with escort).                          - Resume previous diet.                          - Continue present medications.                          - Await pathology results.                          - Repeat colonoscopy in 5 years for surveillance based                          on pathology results.                          - Return to GI clinic as previously scheduled.       EGD 10/2024  Impression:               - Normal esophagus.                             - 1 cm hiatal hernia.                             - Normal stomach.                             - Normal duodenal bulb, first portion of the                             duodenum and second portion of the duodenum.                             Biopsied.   Recommendation:           - Discharge patient to home (ambulatory).                             - Await pathology results.     A. Duodenum, biopsy:  -Small intestinal mucosa with no significant histopathologic abnormalities.  -Normal villous architecture identified and no prominence in intraepithelial lymphocytes seen.  -Negative for luminal organisms.  -Negative  "for dysplasia or malignancy.    ALLERGIES:     Allergies   Allergen Reactions     Colistimethate Anaphylaxis     Colymycin M [Colistimethate Sodium] Anaphylaxis     Tamiflu [Oseltamivir]      Gums Blister up     Hydroxyzine      Azithromycin Palpitations     Clindamycin Rash     Tizanidine Other (See Comments)     Severe panic attack     Past/family/social history reviewed and no changes    PHYSICAL EXAMINATION:  Vitals /52   Ht 1.6 m (5' 3\")   Wt 50.8 kg (112 lb)   LMP 02/01/2009 (LMP Unknown)   BMI 19.84 kg/m     Wt   Wt Readings from Last 2 Encounters:   07/16/25 50.8 kg (112 lb)   03/25/25 52.9 kg (116 lb 9.6 oz)      Constitutional - general appearance is well and in no acute distress. Body habitus normal  Eyes - No redness or discharge  Respiratory - No cough, unlabored breathing  Musculoskeletal - range of motion intact: Neck and arms  Skin - No discoloration or lesions  Neurological - No tremors  Psychiatric - No anxiety, alert & oriented         Again, thank you for allowing me to participate in the care of your patient.        Sincerely,        Stefanie Ibarra PA-C    Electronically signed"

## 2025-07-17 NOTE — PROGRESS NOTES
Virtual Visit Details    Type of service:  Video Visit   Originating Location (pt. Location): Home    Distant Location (provider location):  Off-site  Platform used for Video Visit: [facility]    GI CLINIC VISIT    CC/REFERRING MD:  Mo Bryant  REASON FOR CONSULTATION:   Valencia Ye is a 54 year old female who I was asked to see in consultation at the request of Dr. Mo Bryant for   Chief Complaint   Patient presents with    RECHECK       ASSESSMENT/PLAN:  1. Chronic idiopathic constipation   2. History of ischemic colitis  - linaclotide (LINZESS) 72 MCG capsule; Take 1 capsule (72 mcg) by mouth every morning (before breakfast).  Dispense: 90 capsule; Refill: 3  - Adult GI Clinic Follow-Up Order (Blank); Future    54 year old female with ILD, anxiety, depression, overactive bladder, mannose-binding lectin deficiency, eosinophilic granulomatosis with polyangiitis, IBS, asthma, GERD with hx of Schatzki ring dilated in 2023 and history of hysterectomy who presents for follow up.    She presented to the ER with abdominal pain and shortness of breath in September 2024. She had a CT scan that showed duodenitis. She was having increased diarrhea around that time that has continued. She is having alternating diarrhea and constipation, usually ranging from BSC type 4 (small and incomplete) to BSC type 7 stools. She has had to splint her pelvic floor to have a complete BM and reports vaginal prolapse.    She had a follow up EGD 10/17/24 with 1 cm HH, normal esophagus, stomach, duodenum. Biopsies of the duodenum were normal.     Colonoscopy 1/2025 was normal to the TI, one TA polyp. Random colon biopsies noted melanosis coli otherwise unremarkable.     We discussed treatment of her constipation to help with gas, bloating, abdominal pains, fullness, increased reflux. After restarting Amitiza at 24 mcg BID, she had no improvement. She manages with miralax daily, magnesium (glycinate, citrate, aspartate,  "and oxide), castor oil caplets, mini enemas    To follow up on duodenitis, she had an MRE which showed small hiatal hernia, moderate stool burden, pancreatic cyst that is 0.4 cm, recommended to repeat in 2-3 years. Ferritin, iron, magnesium, B12 and B6 were normal. Fecal elastase and fecal fat were normal.    She had a bout of ischemic colitis with severe LLQ abdominal pain in January. CT scan showed severe colitis of the descending colon thought to be due to ischemic colitis. Colonoscopy 1/2025 was without any signs of colitis. Biopsies showed 1 TA polyp, melanosis coli in random colon biopsies otherwise normal. Since then has had imaging showing \"improving colitis.\" She does take many supplements, unknown if these could contribute to ischemic colitis. Discussed control of constipation as well to help reduce risk. Pelvic floor center referral placed for obstructive defecation symptoms.     Her GPA is controlled.    She continues to have intermittent constipation despite use of psyllium husk, miralax and senna. She has not been able to tolerate watermelon or other fruits and vegetables that she enjoys.     Summary of plan:   - Linzess 72 mcg prescribed   - She will look into nortriptyline or cymbalta   - Follow up 3 months       Specialty Problems          Gastroenterology Diagnoses    Irritable bowel syndrome without diarrhea        Bladder pain        Diverticulosis           RTC 3 months    66 minutes was spent on the date of the encounter doing chart review, documentation, and further activities as noted above.    Stefanie Ibarra PA-C  Division of Gastroenterology, Hepatology and Nutrition  [facility]    ---------------------------------------------------------------------------------------------------  HPI:  Valencia Ye is a 54 year old female with past medical history significant for ILD, anxiety, depression, overactive bladder, mannose-binding lectin deficiency, eosinophilic granulomatosis " with polyangiitis, IBS, asthma, Schatzki ring dilated in 2023 who presents for GERD, duodenitis, obstipation seen on CT. Duodenitis was seen on a CT scan in the ER on 9/27/24.     Since had EGD to follow up on this through [facility] 10/17/24 with 1 cm HH, normal esophagus, stomach, duodenum. Biopsies of the duodenum were normal.     Initial visit 11/2024:  Valencia reports she has been experiencing new breathlessness, which was the main reason she went to ER in September. Seeing cardiology soon and has seen her pulmonologist as well.     She has had worsening reflux, sore tongue, and chelitis intermittently. Saw her primary provider about her tongue, dx with thrush but pt didn't feel it was thrush. Tx did not significantly help. Steroid cream does seem to help at the angle of her mouth but not using regularly. Feeling acidic sensation in her mouth. Toothpaste is burning her mouth.     Also having intermittent abdominal pain in the LUQ, which she describes as nagging, throbbing. Sometimes RUQ pain, sharp pains radiating down the sides bilaterally, pain b/w shoulder blades and across diaphragm. Some BSC type 1 at times, but usually type 4-7, most of the time feeling incomplete, thin, sometimes oily. Has had abdominal bloating. Doesn't usually feel complete after bowel movements. Having to do some perineal splinting with bowel movements since her had kids. Does a lot of yoga, no pelvic floor therapy in the past. Was taking some extra magnesium (mag complex with carbonate, citrate and oxide plus separate glycinate, and mag citrate few scoops per day), bile salts, apple cider vinegar which helped reflux and constipation. This summer this all seemed to stop working. Was on Amitiza for a while, recently restarted her old rx at 24 mcg 1-2 times per day which helps.    Eating mostly pureed foods. Eats some nuts. Soups, yogurt. Denies trouble swallowing. Big difference in abd pain when she eats mostly pureed, less abdominal pain.  Some upper abd fullness. Does have some early satiety.     Has had some trouble emptying her bladder too.     Family history - maternal grandmother and her siblings: many with GI cancers. One with gallbladder cancer. One with colon cancer, almost all 10 with some type of gut cancer  NSAID use- steroid use, no advil or ibuprofen other than few times per year   Hx abd surgeries- Hx lap appy 2022, c/s, abdominal open hysterectomy with tubal, noted adhesions during this surgery  Hx colonoscopy - 12/2019, 12/2014  EGD- 10/2024, 2/2023    Interval history 2/2025:   She saw an integrative practitioner yesterday and started digestive enzymes and and low dose naltrexone to help with constitutional symptoms and estrogen.     She was hospitalized in January with severe LLQ abdominal pain. She did see some small blood clots in her stool while hospitalized. This was associated with mucus in the stool, cramping and bloating. Her symptoms were severe.     Amitiza didn't help her at all at 24 mcg BID. With Amitiza she had abdominal pain, but no improvement in her bowel movements, as well as nausea. Still having obstipation symptoms. She is taking miralax 1 capful qam which is mild but helps her get more formed, larger stools, but still incompletely evacuating. Taking a product called Super Colon Cleanse with senna, psyllium, among other ingredients for 10 days at time then takes a break. Has also used a Mini enema prn. Also takes magnesium glycinate, citrate, aspartate, and oxide, in lower doses than before. Added in Castor oil caplets 2-4 per day (650 mg/cap). She is having daily bowel movements, with insufficient evacuation. She is using a squatty potty and needing to splint due to vaginal prolapse, per patient. She has not yet had any pelvic floor evaluation. She has had 5 children and is s/p hysterectomy.    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 She has had this in the past. Breathlessness waxes and wanes.     Feels effexor may have played a role, since stopped it in December and suspects long term autonomic side effects.     She reports her GI symptoms have been awful. She feels she has to be very restrictive with her diet. She has a hard time tolerating some fruits and vegetables, including some watermelon. This caused bloating and discomfort and she increased psyllium, miralax and senna which helped. Bloating comes first, then she deals with constipation. Integrative medicine started her on a  soil-based probiotic and super enzymes (betain, oxbile, amylase, lipase, protease, etc.). with thises she can eat more vegetables. Her gut feels better overall. Also takes Berberine and ground oregano which she has taken for a long time. She takes miralax daily, which works better for her than as needed. This does help with bloating but can cause loose stools. She takes 3 capfuls per day. She makes bread with flax and psyllium and also takes psyllium husk and encapsulates these in addition. She needs senna pretty regularly but not daily, sometimes has to take more than 3 caps at once. With this regimen, her stools vary quite a bit. She will have anywhere from 0-6 bowel movements per day. When things are going well she will have 2-3 bm/day. She will intervene if no BM in 2 days. She saw the [facility] and it was determined that she couldn't relax her pelvic floor, but didn't feel she was able to fully relax her pelvic floor at that time as she can at home. Has been overwhelmed with appts lately. Did not pursue PT. Does have to splint with rectal prolapse. Was not  recommended surgery for this.Pineapple set off chronic pain for 1.5 weeks. She avoids cow dairy as of last summer, does tolerate goat dairy.     When she took amitiza, which didn't make things worse, but it was sporadic. Almost felt like there was some placebo pills. When she got up to the higher dose, she had quite a bit of nausea.     ROS:    A 10 point review of systems was performed and is negative except as noted in the HPI.     PREVIOUS ENDOSCOPY:    Colonoscopy 1/21/25  Recommendation:        - Discharge patient to home (with escort).                          - Resume previous diet.                          - Continue present medications.                          - Await pathology results.                          - Repeat colonoscopy in 5 years for surveillance based                          on pathology results.                          - Return to GI clinic as previously scheduled.       EGD 10/2024  Impression:               - Normal esophagus.                             - 1 cm hiatal hernia.                             - Normal stomach.                             - Normal duodenal bulb, first portion of the                             duodenum and second portion of the duodenum.                             Biopsied.   Recommendation:           - Discharge patient to home (ambulatory).                             - Await pathology results.     A. Duodenum, biopsy:  -Small intestinal mucosa with no significant histopathologic abnormalities.  -Normal villous architecture identified and no prominence in intraepithelial lymphocytes seen.  -Negative for luminal organisms.  -Negative for dysplasia or malignancy.    ALLERGIES:     Allergies   Allergen Reactions    Colistimethate Anaphylaxis    Colymycin M [Colistimethate Sodium] Anaphylaxis    Tamiflu [Oseltamivir]      Gums Blister up    Hydroxyzine     Azithromycin Palpitations    Clindamycin Rash    Tizanidine Other (See Comments)     Severe panic attack  "    Past/family/social history reviewed and no changes    PHYSICAL EXAMINATION:  Vitals /52   Ht 1.6 m (5' 3\")   Wt 50.8 kg (112 lb)   LMP 02/01/2009 (LMP Unknown)   BMI 19.84 kg/m     Wt   Wt Readings from Last 2 Encounters:   07/16/25 50.8 kg (112 lb)   03/25/25 52.9 kg (116 lb 9.6 oz)      Constitutional - general appearance is well and in no acute distress. Body habitus normal  Eyes - No redness or discharge  Respiratory - No cough, unlabored breathing  Musculoskeletal - range of motion intact: Neck and arms  Skin - No discoloration or lesions  Neurological - No tremors  Psychiatric - No anxiety, alert & oriented       "

## 2025-07-17 NOTE — NURSING NOTE
[place] [address]    Is the patient currently in the state of [place]? NO    Visit mode: VIDEO    If the visit is dropped, the patient can be reconnected by:VIDEO VISIT: Send to e-mail at: [email or web address]    Will anyone else be joining the visit? NO  (If patient encounters technical issues they should call [phone])    Are changes needed to the allergy or medication list? No    Are refills needed on medications prescribed by this physician? NO    Rooming Documentation:  Not applicable    Reason for visit: PERRY MERAZ

## 2025-07-17 NOTE — PATIENT INSTRUCTIONS
It was a pleasure meeting with you today and discussing your healthcare plan. Below is a summary of what we covered:    - Nortriptyline or Cymbalta could be considered   - One helpful study: Official journal of the American College of Gastroenterology  ACG (Central Neuromodulators for Treating Functional GI Disorders: A Primer  Roger Jensen MD1; Charbel Gaviria MD, FAPM2; Elroy Wells MD, MACG3 )  - Linzess 72 mcg prescribed for you to take every other day or daily to help prevent constipation and bloating   - Follow up in 3 months     Please see below for any additional questions and scheduling guidelines.    Sign up for [employer]: [employer] patient portal serves as a secure platform for accessing your medical records from the [facility]. Additionally, [employer] facilitates easy, timely, and secure messaging with your care team. If you have not signed up, you may do so by using the provided code or calling [phone].    Coordinating your care after your visit:  There are multiple options for scheduling your follow-up care based on your provider's recommendation.    How do I schedule a follow-up clinic appointment:   After your appointment, you may receive scheduling assistance with the Clinic Coordinators by having a seat in the waiting room and a Clinic Coordinator will call you up to schedule.  Virtual visits or after you leave the clinic:  Your provider has placed a follow-up order in the [employer] portal for scheduling your return appointment. A member of the scheduling team will contact you to schedule.  [employer] Scheduling: Timely scheduling through [employer] is advised to ensure appointment availability.   Call to schedule: You may schedule your follow-up appointment(s) by calling [phone], option 1.    How do I schedule my endoscopy or colonoscopy procedure:  If a procedure, such as a colonoscopy or upper endoscopy was ordered by your provider, the scheduling team will contact you to  schedule this procedure. Or you may choose to call to schedule at   [phone], option 2.  Please allow 20-30 minutes when scheduling a procedure.    How do I get my blood work done? To get your blood work done, you need to schedule a lab appointment at an [facility]. There are multiple ways to schedule:   At the clinic: The Clinic Coordinator you meet after your visit can help you schedule a lab appointment.   [employer] scheduling: [employer] offers online lab scheduling at all [facility] laboratory locations.   Call to schedule: You can call [phone] to schedule your lab appointment.    How do I schedule my imaging study: To schedule imaging studies, such as CT scans, ultrasounds, MRIs, or X-rays, contact Imaging Services at [phone].    How do I schedule a referral to another doctor: If your provider recommended a referral to another specialist(s), the referral order was placed by your provider. You will receive a phone call to schedule this referral, or you may choose to call the number attached to the referral to self-schedule.    For Post-Visit Question(s):  For any inquiries following today's visit:  Please utilize [employer] messaging and allow 48 hours for reply or contact the Call Center during normal business hours at [phone], option 3.  For Emergent After-hours questions, contact the On-Call GI Fellow through the [facility]  at [phone].  In addition, you may contact your Nurse directly using the provided contact information.    Test Results:  Test results will be accessible via [employer] in compliance with the 21st Century Cures Act. This means that your results will be available to you at the same time as your provider. Often you may see your results before your provider does. Results are reviewed by staff within two weeks with communication follow-up. Results may be released in the patient portal prior to your care team review.    Prescription  Refill(s):  Medication prescribed by your provider will be addressed during your visit. For future refills, please coordinate with your pharmacy. If you have not had a recent clinic visit or routine labs, for your safety, your provider may not be able to refill your prescription.

## 2025-07-30 ENCOUNTER — MYC MEDICAL ADVICE (OUTPATIENT)
Dept: GASTROENTEROLOGY | Facility: CLINIC | Age: 55
End: 2025-07-30
Payer: COMMERCIAL

## 2025-07-30 DIAGNOSIS — R14.0 BLOATING: Primary | ICD-10-CM

## 2025-07-30 DIAGNOSIS — R10.84 ABDOMINAL PAIN, GENERALIZED: ICD-10-CM

## 2025-08-05 DIAGNOSIS — R14.0 BLOATING: Primary | ICD-10-CM

## 2025-08-05 DIAGNOSIS — R10.84 ABDOMINAL PAIN, GENERALIZED: ICD-10-CM

## 2025-08-28 ENCOUNTER — OFFICE VISIT (OUTPATIENT)
Dept: PULMONOLOGY | Facility: CLINIC | Age: 55
End: 2025-08-28
Attending: INTERNAL MEDICINE
Payer: COMMERCIAL

## 2025-08-28 VITALS
WEIGHT: 113.3 LBS | BODY MASS INDEX: 20.07 KG/M2 | HEIGHT: 63 IN | SYSTOLIC BLOOD PRESSURE: 135 MMHG | HEART RATE: 65 BPM | OXYGEN SATURATION: 97 % | DIASTOLIC BLOOD PRESSURE: 79 MMHG

## 2025-08-28 DIAGNOSIS — D72.18 CHURG-STRAUSS SYNDROME (H): ICD-10-CM

## 2025-08-28 DIAGNOSIS — R06.09 DYSPNEA ON EXERTION: ICD-10-CM

## 2025-08-28 DIAGNOSIS — J45.20 MILD INTERMITTENT ASTHMA WITHOUT COMPLICATION: ICD-10-CM

## 2025-08-28 DIAGNOSIS — M30.1 CHURG-STRAUSS SYNDROME (H): ICD-10-CM

## 2025-08-28 DIAGNOSIS — J84.9 ILD (INTERSTITIAL LUNG DISEASE) (H): Primary | ICD-10-CM

## 2025-08-28 LAB
EXPTIME-PRE: 14.31 SEC
FEF2575-%PRED-PRE: 33 %
FEF2575-PRE: 0.8 L/SEC
FEF2575-PRED: 2.41 L/SEC
FEFMAX-%PRED-PRE: 76 %
FEFMAX-PRE: 4.96 L/SEC
FEFMAX-PRED: 6.48 L/SEC
FEV1-%PRED-PRE: 90 %
FEV1-PRE: 2.24 L
FEV1FEV6-PRE: 65 %
FEV1FEV6-PRED: 82 %
FEV1FVC-PRE: 57 %
FEV1FVC-PRED: 81 %
FEV1SVC-PRED: 70 L
FIFMAX-PRE: 4.17 L/SEC
FVC-%PRED-PRE: 130 %
FVC-PRE: 3.95 L
FVC-PRED: 3.03 L
Lab: 69 %

## 2025-08-28 PROCEDURE — 3078F DIAST BP <80 MM HG: CPT | Performed by: INTERNAL MEDICINE

## 2025-08-28 PROCEDURE — 99213 OFFICE O/P EST LOW 20 MIN: CPT | Mod: 25 | Performed by: INTERNAL MEDICINE

## 2025-08-28 PROCEDURE — 1126F AMNT PAIN NOTED NONE PRSNT: CPT | Performed by: INTERNAL MEDICINE

## 2025-08-28 PROCEDURE — 3075F SYST BP GE 130 - 139MM HG: CPT | Performed by: INTERNAL MEDICINE

## 2025-08-28 PROCEDURE — 99213 OFFICE O/P EST LOW 20 MIN: CPT | Performed by: INTERNAL MEDICINE

## 2025-08-28 ASSESSMENT — PAIN SCALES - GENERAL: PAINLEVEL_OUTOF10: NO PAIN (0)

## 2025-09-02 ENCOUNTER — TELEPHONE (OUTPATIENT)
Dept: PULMONOLOGY | Facility: CLINIC | Age: 55
End: 2025-09-02
Payer: COMMERCIAL

## (undated) DEVICE — ESU GROUND PAD ADULT W/CORD E7507

## (undated) DEVICE — Device

## (undated) DEVICE — SNARE CAPIVATOR ROUND COLD SNR BX10 M00561101

## (undated) DEVICE — ESU CORD MONOPOLAR 10'  E0510

## (undated) DEVICE — NDL 22GA 1.5"

## (undated) DEVICE — KIT ENDO FIRST STEP DISINFECTANT 200ML W/POUCH EP-4

## (undated) DEVICE — LINEN DRAPE 54X72" 5467

## (undated) DEVICE — ENDO FORCEP ENDOJAW BIOPSY 2.8MMX160CM FB-220K

## (undated) DEVICE — LINEN HALF SHEET 5512

## (undated) DEVICE — LINEN FULL SHEET 5511

## (undated) DEVICE — ENDO TRAP POLYP E-TRAP 00711099

## (undated) DEVICE — SOL WATER IRRIG 1000ML BOTTLE 2F7114

## (undated) DEVICE — ENDO TROCAR SLEEVE KII Z-THREADED 05X100MM CTS02

## (undated) DEVICE — SU VICRYL 4-0 PS-2 18" UND J496H

## (undated) DEVICE — ENDO TROCAR FIRST ENTRY KII FIOS Z-THRD 05X100MM CTF03

## (undated) DEVICE — GLOVE PROTEXIS MICRO 6.5  2D73PM65

## (undated) DEVICE — BAG CLEAR TRASH 1.3M 39X33" P4040C

## (undated) DEVICE — KIT ENDO TURNOVER/PROCEDURE CARRY-ON 101822

## (undated) DEVICE — BLADE CLIPPER 3M 9670

## (undated) DEVICE — ESU ELEC BLADE 2.75" COATED/INSULATED E1455

## (undated) DEVICE — PAD CHUX UNDERPAD 30X36" P3036C

## (undated) DEVICE — SOL NACL 0.9% INJ 1000ML BAG 2B1324X

## (undated) DEVICE — SUCTION IRR STRYKERFLOW II W/TIP 250-070-520

## (undated) DEVICE — SOL NACL 0.9% IRRIG 1000ML BOTTLE 2F7124

## (undated) DEVICE — GLOVE PROTEXIS BLUE W/NEU-THERA 7.0  2D73EB70

## (undated) DEVICE — MANIFOLD NEPTUNE 4 PORT 700-20

## (undated) DEVICE — LINEN TOWEL PACK X10 5473

## (undated) DEVICE — KIT ENDO TURNOVER/PROCEDURE W/CLEAN A SCOPE LINERS 103888

## (undated) DEVICE — SU VICRYL 0 UR-6 27" J603H

## (undated) DEVICE — LINEN POUCH DBL 5427

## (undated) DEVICE — SPECIMEN CONTAINER 3OZ W/FORMALIN 59901

## (undated) DEVICE — ENDO FORCEP BX CAPTURA PRO SPIKE G50696

## (undated) DEVICE — KIT PROCEDURE W/CLEAN-A-SCOPE LINERS V2 200800

## (undated) DEVICE — TUBING SUCTION MEDI-VAC 1/4"X20' N620A

## (undated) DEVICE — SUCTION MANIFOLD NEPTUNE 2 SYS 4 PORT 0702-020-000

## (undated) DEVICE — TUBING SUCTION MEDI-VAC SOFT 3/16"X20' N520A

## (undated) DEVICE — STPL ENDO RELOAD ECHELON 45X2.0MM GREY ECR45M

## (undated) DEVICE — SUCTION MANIFOLD NEPTUNE 2 SYS 1 PORT 702-025-000

## (undated) DEVICE — SOL WATER IRRIG 500ML BOTTLE 2F7113

## (undated) DEVICE — STPL POWERED ECHELON 45MM PSEE45A

## (undated) DEVICE — ENDO POUCH UNIV RETRIEVAL SYSTEM INZII 10MM CD001

## (undated) DEVICE — ESU PENCIL W/HOLSTER E2350H

## (undated) DEVICE — BLADE KNIFE SURG 11 371111

## (undated) DEVICE — GOWN IMPERVIOUS 2XL BLUE

## (undated) RX ORDER — REGADENOSON 0.08 MG/ML
INJECTION, SOLUTION INTRAVENOUS
Status: DISPENSED
Start: 2025-01-16

## (undated) RX ORDER — NITROGLYCERIN 0.4 MG/1
TABLET SUBLINGUAL
Status: DISPENSED
Start: 2018-02-22

## (undated) RX ORDER — METOPROLOL TARTRATE 1 MG/ML
INJECTION, SOLUTION INTRAVENOUS
Status: DISPENSED
Start: 2018-02-22

## (undated) RX ORDER — FENTANYL CITRATE 50 UG/ML
INJECTION, SOLUTION INTRAMUSCULAR; INTRAVENOUS
Status: DISPENSED
Start: 2019-12-20

## (undated) RX ORDER — BUPIVACAINE HYDROCHLORIDE AND EPINEPHRINE 5; 5 MG/ML; UG/ML
INJECTION, SOLUTION EPIDURAL; INTRACAUDAL; PERINEURAL
Status: DISPENSED
Start: 2022-06-22

## (undated) RX ORDER — FENTANYL CITRATE 50 UG/ML
INJECTION, SOLUTION INTRAMUSCULAR; INTRAVENOUS
Status: DISPENSED
Start: 2022-06-22

## (undated) RX ORDER — METOPROLOL TARTRATE 50 MG
TABLET ORAL
Status: DISPENSED
Start: 2018-02-22